# Patient Record
Sex: MALE | Race: WHITE | NOT HISPANIC OR LATINO | Employment: PART TIME | URBAN - NONMETROPOLITAN AREA
[De-identification: names, ages, dates, MRNs, and addresses within clinical notes are randomized per-mention and may not be internally consistent; named-entity substitution may affect disease eponyms.]

---

## 2017-01-03 ENCOUNTER — PROCEDURE VISIT (OUTPATIENT)
Dept: UROLOGY | Facility: CLINIC | Age: 67
End: 2017-01-03

## 2017-01-03 DIAGNOSIS — E29.1 HYPOGONADISM IN MALE: Primary | ICD-10-CM

## 2017-01-03 PROCEDURE — 96372 THER/PROPH/DIAG INJ SC/IM: CPT | Performed by: UROLOGY

## 2017-01-03 RX ORDER — TESTOSTERONE CYPIONATE 200 MG/ML
300 INJECTION, SOLUTION INTRAMUSCULAR
Status: DISCONTINUED | OUTPATIENT
Start: 2017-01-03 | End: 2017-01-03 | Stop reason: HOSPADM

## 2017-01-03 RX ORDER — TESTOSTERONE CYPIONATE 200 MG/ML
300 INJECTION, SOLUTION INTRAMUSCULAR
Status: CANCELLED
Start: 2017-01-03

## 2017-01-03 RX ADMIN — TESTOSTERONE CYPIONATE 300 MG: 200 INJECTION, SOLUTION INTRAMUSCULAR at 13:11

## 2017-01-03 NOTE — MR AVS SNAPSHOT
Zay LANGSTON Asad   1/3/2017 1:00 PM   Procedure visit    Dept Phone:  [phone]   Encounter #:  [id number]    Provider:  [facility] UROLOGY NURSE   Department:  [facility] UROLOGY                Your Full Care Plan              Your Updated Medication List          This list is accurate as of: 1/3/17  1:11 PM.  Always use your most recent med list.                atorvastatin 10 MG tablet   Commonly known as:  LIPITOR       LEVOTHROID 200 MCG tablet   Generic drug:  levothyroxine       lisinopril 10 MG tablet   Commonly known as:  PRINIVIL,ZESTRIL       meloxicam 15 MG tablet   Commonly known as:  MOBIC       SITagliptin 100 MG tablet   Commonly known as:  JANUVIA       tamsulosin 0.4 MG capsule 24 hr capsule   Commonly known as:  FLOMAX               You Were Diagnosed With        Codes Comments    Hypogonadism in male    -  Primary ICD-10-CM: E29.1  ICD-9-CM: 257.2       Instructions     None    Patient Instructions History      Upcoming Appointments     Visit Type Date Time Department    IN OFFICE PROCEDURE 1/3/2017  1:00 PM [facility] UROLOGY PAD    FOLLOW UP 2017  4:10 PM [facility] UROLOGY PAD    IN OFFICE PROCEDURE 2017  1:30 PM [facility] UROLOGY PAD    OFFICE VISIT 2017  9:45 AM [facility] GASTRO PAD      MyChart Signup     [facility] [employer] allows you to send messages to your doctor, view your test results, renew your prescriptions, schedule appointments, and more. To sign up, go to [employer] [facility] and click on the Sign Up Now link in the New User? box. Enter your [employer]Stream Activation Code exactly as it appears below along with the last four digits of your Social Security Number and your Date of Birth () to complete the sign-up process. If you do not sign up before the expiration date, you must request a new code.    ProfStream Activation Code: [email or web address]  Expires: 2017  1:10 PM    If you have questions, you can email [email or web address] or call  [phone] to talk to our MyChart staff. Remember, MyChart is NOT to be used for urgent needs. For medical emergencies, dial 911.               Other Info from Your Visit           Your Appointments     Jan 05, 2017  4:10 PM CST   Follow Up with Hernesto Hong MD   [facility] UROLOGY (--)    [address]  [facility] [address] [phone]           Arrive 15 minutes prior to appointment.            Jan 24, 2017  1:30 PM CST   IN OFFICE PROCEDURE with [facility]   [facility] UROLOGY (--)    [address]  [facility] [address] [phone]           Bring medication list, test results, and radiology films that apply.            Feb 07, 2017  9:45 AM CST   Office Visit with LUIS Mast   [facility] GASTROENTEROLOGY (--)    [address]  [facility] [address] [phone]           Arrive 15 minutes prior to appointment.              Allergies     No Known Allergies      Reason for Visit     Injections Patient states he is here his 3 week testosterone injection.       Vital Signs     Smoking Status                   Never Smoker           Problems and Diagnoses Noted     Hypogonadism in male

## 2017-01-03 NOTE — PROGRESS NOTES
Patient of Dr. Hong states he is here today for his testosterone injection. Patient denies any fever, chills, N&V or hematuria. I administered 1.5cc/ 300 mg of testosterone IM Right hip with no complications. Dr. Rebolledo was here in the office at the time of injection. The patient was advised to follow up in 3 weeks for his next testosterone injection. He verbalized understanding.

## 2017-01-05 ENCOUNTER — OFFICE VISIT (OUTPATIENT)
Dept: UROLOGY | Facility: CLINIC | Age: 67
End: 2017-01-05

## 2017-01-05 VITALS — BODY MASS INDEX: 31.92 KG/M2 | HEIGHT: 71 IN | TEMPERATURE: 96.5 F | WEIGHT: 228 LBS

## 2017-01-05 DIAGNOSIS — E29.1 HYPOGONADISM IN MALE: Primary | ICD-10-CM

## 2017-01-05 DIAGNOSIS — N52.01 ERECTILE DYSFUNCTION DUE TO ARTERIAL INSUFFICIENCY: ICD-10-CM

## 2017-01-05 LAB
BILIRUB BLD-MCNC: NEGATIVE MG/DL
CLARITY, POC: CLEAR
COLOR UR: YELLOW
GLUCOSE UR STRIP-MCNC: ABNORMAL MG/DL
KETONES UR QL: NEGATIVE
LEUKOCYTE EST, POC: NEGATIVE
NITRITE UR-MCNC: NEGATIVE MG/ML
PH UR: 7 [PH] (ref 5–8)
PROT UR STRIP-MCNC: NEGATIVE MG/DL
RBC # UR STRIP: NEGATIVE /UL
SP GR UR: 1.02 (ref 1–1.03)
UROBILINOGEN UR QL: NORMAL

## 2017-01-05 PROCEDURE — 81003 URINALYSIS AUTO W/O SCOPE: CPT | Performed by: UROLOGY

## 2017-01-05 PROCEDURE — 99212 OFFICE O/P EST SF 10 MIN: CPT | Performed by: UROLOGY

## 2017-01-05 NOTE — PROGRESS NOTES
Mr. Kamara is 66 y.o. male    CHIEF COMPLAINT: I am still having problems with erections    HPI  Erectile Dysfunction  Patient complains of erectile dysfunction. Onset of dysfunction was approximately  2 years ago and was gradual in onset.  Patient states the nature of difficulty is both attaining and maintaining erection.   Libido is not affected. Associated features include Hypertension and Hypercholesterolemia. Previous treatment of ED include(s) PDE 5 inhibitors  which did not provide relief. Partial erection with 0.4 cc (16mg) of PGE1.        The following portions of the patient's history were reviewed and updated as appropriate: allergies, current medications, past family history, past medical history, past social history, past surgical history and problem list.    Review of Systems   Constitutional: Negative for chills and fever.   Gastrointestinal: Negative for abdominal pain, anal bleeding and blood in stool.   Genitourinary: Negative for flank pain and hematuria.         Current Outpatient Prescriptions:   •  atorvastatin (LIPITOR) 10 MG tablet, Take 10 mg by mouth daily., Disp: , Rfl:   •  levothyroxine (LEVOTHROID) 200 MCG tablet, Take 1 tablet by mouth daily, Disp: , Rfl:   •  lisinopril (PRINIVIL,ZESTRIL) 10 MG tablet, Take 10 mg by mouth daily., Disp: , Rfl:   •  meloxicam (MOBIC) 15 MG tablet, Take 15 mg by mouth daily., Disp: , Rfl:   •  SITagliptin (JANUVIA) 100 MG tablet, Take 100 mg by mouth Daily., Disp: , Rfl:   •  tamsulosin (FLOMAX) 0.4 MG capsule 24 hr capsule, Take 1 capsule by mouth every night., Disp: , Rfl:     Past Medical History   Diagnosis Date   • Diabetes    • Hypercholesteremia    • Hypertension        Past Surgical History   Procedure Laterality Date   • Thyroglossal cystectomy     • Cholecystectomy         Social History     Social History   • Marital status:      Spouse name: N/A   • Number of children: N/A   • Years of education: N/A     Social History Main Topics   •  "Smoking status: Never Smoker   • Smokeless tobacco: None   • Alcohol use No   • Drug use: No   • Sexual activity: Not Asked     Other Topics Concern   • None     Social History Narrative       Family History   Problem Relation Age of Onset   • Family history unknown: Yes   • Prostate cancer Father        Visit Vitals   • Temp 96.5 °F (35.8 °C)   • Ht 71\" (180.3 cm)   • Wt 228 lb (103 kg)   • BMI 31.8 kg/m2       Physical Exam   Constitutional: He is oriented to person, place, and time. He appears well-developed and well-nourished. No distress.   Pulmonary/Chest: Effort normal.   Abdominal: Soft. He exhibits no distension and no mass. There is no tenderness. There is no rebound and no guarding. No hernia.   Neurological: He is alert and oriented to person, place, and time.   Skin: Skin is warm and dry. He is not diaphoretic.   Psychiatric: He has a normal mood and affect.   Vitals reviewed.        Results for orders placed or performed in visit on 01/05/17   POC Urinalysis Dipstick, Automated   Result Value Ref Range    Color Yellow Yellow, Straw, Dark Yellow, Cindi    Clarity, UA Clear Clear    Glucose,  mg/dL (A) Negative, 1000 mg/dL (3+) mg/dL    Bilirubin Negative Negative    Ketones, UA Negative Negative    Specific Gravity  1.025 1.005 - 1.030    Blood, UA Negative Negative    pH, Urine 7.0 5.0 - 8.0    Protein, POC Negative Negative mg/dL    Urobilinogen, UA Normal Normal    Leukocytes Negative Negative    Nitrite, UA Negative Negative       Assessment and Plan  Diagnoses and all orders for this visit:    Hypogonadism in male  -     POC Urinalysis Dipstick, Automated    Erectile dysfunction due to arterial insufficiency    Will increase his PGE 1 does to 0.7ml (25 mg). ONly partial erection with 0.7ml.        Hernesto Hong MD  01/05/17  4:25 PM    EMR Dragon/Transcription disclaimer:  Much of this encounter note is an electronic transcription/translation of spoken language to printed text. The " electronic translation of spoken language may permit erroneous, or at times, nonsensical words or phrases to be inadvertently transcribed; although I have reviewed the note for such errors, some may still exist.       Cc:

## 2017-01-05 NOTE — MR AVS SNAPSHOT
Zay LANGSTON Asad   2017 4:10 PM   Office Visit    Dept Phone:  [phone]   Encounter #:  [id number]    Provider:  Hernesto Hong MD   Department:  [facility] UROLOGY                Your Full Care Plan              Your Updated Medication List          This list is accurate as of: 17  4:40 PM.  Always use your most recent med list.                atorvastatin 10 MG tablet   Commonly known as:  LIPITOR       LEVOTHROID 200 MCG tablet   Generic drug:  levothyroxine       lisinopril 10 MG tablet   Commonly known as:  PRINIVIL,ZESTRIL       meloxicam 15 MG tablet   Commonly known as:  MOBIC       SITagliptin 100 MG tablet   Commonly known as:  JANUVIA       tamsulosin 0.4 MG capsule 24 hr capsule   Commonly known as:  FLOMAX               We Performed the Following     POC Urinalysis Dipstick, Automated       You Were Diagnosed With        Codes Comments    Hypogonadism in male    -  Primary ICD-10-CM: E29.1  ICD-9-CM: 257.2     Erectile dysfunction due to arterial insufficiency     ICD-10-CM: N52.01  ICD-9-CM: 607.84       Instructions     None    Patient Instructions History      Upcoming Appointments     Visit Type Date Time Department    FOLLOW UP 2017  4:10 PM [facility]    IN OFFICE PROCEDURE 2017  1:30 PM [facility]    OFFICE VISIT 2017  9:45 AM [facility]      MyChart Signup     [facility] [employer] allows you to send messages to your doctor, view your test results, renew your prescriptions, schedule appointments, and more. To sign up, go to [employer] and click on the Sign Up Now link in the New User? box. Enter your [employer] Activation Code exactly as it appears below along with the last four digits of your Social Security Number and your Date of Birth () to complete the sign-up process. If you do not sign up before the expiration date, you must request a new code.    [employer] Activation Code:  [email or web address]  Expires: 1/17/2017  1:10 PM    If you have questions, you can email [email or web address] or call [phone] to talk to our MyChart staff. Remember, [employer] is NOT to be used for urgent needs. For medical emergencies, dial 911.               Other Info from Your Visit           Your Appointments     Jan 24, 2017  1:30 PM CST   IN OFFICE PROCEDURE with [facility] UROLOGY NURSE   [facility] UROLOGY (--)    [address]  [facility] [address] [phone]           Bring medication list, test results, and radiology films that apply.            Feb 07, 2017  9:45 AM CST   Office Visit with LUIS Mast   [facility] GASTROENTEROLOGY (--)    [address]  [place] [address] [phone]           Arrive 15 minutes prior to appointment.              Allergies     No Known Allergies      Vital Signs     Temperature Height Weight Body Mass Index Smoking Status       96.5 °F (35.8 °C) 71\" (180.3 cm) 228 lb (103 kg) 31.8 kg/m2 Never Smoker       Problems and Diagnoses Noted     ED (erectile dysfunction)    Hypogonadism in male      Results     POC Urinalysis Dipstick, Automated      Component Value Standard Range & Units    Color Yellow Yellow, Straw, Dark Yellow, Cindi    Clarity, UA Clear Clear    Glucose,  mg/dL Negative, 1000 mg/dL (3+) mg/dL    Bilirubin Negative Negative    Ketones, UA Negative Negative    Specific Gravity  1.025 1.005 - 1.030    Blood, UA Negative Negative    pH, Urine 7.0 5.0 - 8.0    Protein, POC Negative Negative mg/dL    Urobilinogen, UA Normal Normal    Leukocytes Negative Negative    Nitrite, UA Negative Negative                    "

## 2017-01-05 NOTE — LETTER
January 8, 2017     LUIS Tesfaye  [address] [address]    Patient: Zay Kamara   YOB: 1950   Date of Visit: 1/5/2017     Dear LUIS Rodriguez:    Thank you for referring Zay Kamara to me for evaluation. Below are the relevant portions of my assessment and plan of care.    If you have questions, please do not hesitate to call me. I look forward to following Zay along with you.         Sincerely,        Hernesto Hong MD        CC: No Recipients    Progress Notes:    Mr. Kamara is 66 y.o. male    CHIEF COMPLAINT: I am still having problems with erections    HPI  Erectile Dysfunction  Patient complains of erectile dysfunction. Onset of dysfunction was approximately  2 years ago and was gradual in onset.  Patient states the nature of difficulty is both attaining and maintaining erection.   Libido is not affected. Associated features include Hypertension and Hypercholesterolemia. Previous treatment of ED include(s) PDE 5 inhibitors  which did not provide relief. Partial erection with 0.4 cc (16mg) of PGE1.        The following portions of the patient's history were reviewed and updated as appropriate: allergies, current medications, past family history, past medical history, past social history, past surgical history and problem list.    Review of Systems   Constitutional: Negative for chills and fever.   Gastrointestinal: Negative for abdominal pain, anal bleeding and blood in stool.   Genitourinary: Negative for flank pain and hematuria.         Current Outpatient Prescriptions:   •  atorvastatin (LIPITOR) 10 MG tablet, Take 10 mg by mouth daily., Disp: , Rfl:   •  levothyroxine (LEVOTHROID) 200 MCG tablet, Take 1 tablet by mouth daily, Disp: , Rfl:   •  lisinopril (PRINIVIL,ZESTRIL) 10 MG tablet, Take 10 mg by mouth daily., Disp: , Rfl:   •  meloxicam (MOBIC) 15 MG tablet, Take 15 mg by mouth daily., Disp: , Rfl:   •  SITagliptin (JANUVIA) 100 MG  "tablet, Take 100 mg by mouth Daily., Disp: , Rfl:   •  tamsulosin (FLOMAX) 0.4 MG capsule 24 hr capsule, Take 1 capsule by mouth every night., Disp: , Rfl:     Past Medical History   Diagnosis Date   • Diabetes    • Hypercholesteremia    • Hypertension        Past Surgical History   Procedure Laterality Date   • Thyroglossal cystectomy     • Cholecystectomy         Social History     Social History   • Marital status:      Spouse name: N/A   • Number of children: N/A   • Years of education: N/A     Social History Main Topics   • Smoking status: Never Smoker   • Smokeless tobacco: None   • Alcohol use No   • Drug use: No   • Sexual activity: Not Asked     Other Topics Concern   • None     Social History Narrative       Family History   Problem Relation Age of Onset   • Family history unknown: Yes   • Prostate cancer Father        Visit Vitals   • Temp 96.5 °F (35.8 °C)   • Ht 71\" (180.3 cm)   • Wt 228 lb (103 kg)   • BMI 31.8 kg/m2       Physical Exam   Constitutional: He is oriented to person, place, and time. He appears well-developed and well-nourished. No distress.   Pulmonary/Chest: Effort normal.   Abdominal: Soft. He exhibits no distension and no mass. There is no tenderness. There is no rebound and no guarding. No hernia.   Neurological: He is alert and oriented to person, place, and time.   Skin: Skin is warm and dry. He is not diaphoretic.   Psychiatric: He has a normal mood and affect.   Vitals reviewed.        Results for orders placed or performed in visit on 01/05/17   POC Urinalysis Dipstick, Automated   Result Value Ref Range    Color Yellow Yellow, Straw, Dark Yellow, Cindi    Clarity, UA Clear Clear    Glucose,  mg/dL (A) Negative, 1000 mg/dL (3+) mg/dL    Bilirubin Negative Negative    Ketones, UA Negative Negative    Specific Gravity  1.025 1.005 - 1.030    Blood, UA Negative Negative    pH, Urine 7.0 5.0 - 8.0    Protein, POC Negative Negative mg/dL    Urobilinogen, UA Normal Normal "    Leukocytes Negative Negative    Nitrite, UA Negative Negative       Assessment and Plan  Diagnoses and all orders for this visit:    Hypogonadism in male  -     POC Urinalysis Dipstick, Automated    Erectile dysfunction due to arterial insufficiency    Will increase his PGE 1 does to 0.7ml (25 mg). ONly partial erection with 0.7ml.        Hernesto Hong MD  01/05/17  4:25 PM    EMR Dragon/Transcription disclaimer:  Much of this encounter note is an electronic transcription/translation of spoken language to printed text. The electronic translation of spoken language may permit erroneous, or at times, nonsensical words or phrases to be inadvertently transcribed; although I have reviewed the note for such errors, some may still exist.       Cc:

## 2017-01-31 ENCOUNTER — PROCEDURE VISIT (OUTPATIENT)
Dept: UROLOGY | Facility: CLINIC | Age: 67
End: 2017-01-31

## 2017-01-31 DIAGNOSIS — E29.1 HYPOGONADISM IN MALE: Primary | ICD-10-CM

## 2017-01-31 PROCEDURE — 96372 THER/PROPH/DIAG INJ SC/IM: CPT | Performed by: UROLOGY

## 2017-01-31 RX ORDER — TESTOSTERONE CYPIONATE 200 MG/ML
300 INJECTION, SOLUTION INTRAMUSCULAR
Status: CANCELLED
Start: 2017-01-31

## 2017-01-31 RX ORDER — TESTOSTERONE CYPIONATE 200 MG/ML
300 INJECTION, SOLUTION INTRAMUSCULAR
Status: DISCONTINUED | OUTPATIENT
Start: 2017-01-31 | End: 2017-01-31 | Stop reason: HOSPADM

## 2017-01-31 RX ADMIN — TESTOSTERONE CYPIONATE 300 MG: 200 INJECTION, SOLUTION INTRAMUSCULAR at 14:32

## 2017-01-31 NOTE — MR AVS SNAPSHOT
Zay LANGSTON Asad   2017 2:30 PM   Appointment    Dept Phone:  [phone]   Encounter #:  [id number]    Provider:  [facility]   Department:  [facility] UROLOGY                Your Full Care Plan              Your Updated Medication List          This list is accurate as of: 17  2:19 PM.  Always use your most recent med list.                atorvastatin 10 MG tablet   Commonly known as:  LIPITOR       LEVOTHROID 200 MCG tablet   Generic drug:  levothyroxine       lisinopril 10 MG tablet   Commonly known as:  PRINIVIL,ZESTRIL       meloxicam 15 MG tablet   Commonly known as:  MOBIC       SITagliptin 100 MG tablet   Commonly known as:  JANUVIA       tamsulosin 0.4 MG capsule 24 hr capsule   Commonly known as:  FLOMAX               Instructions     None    Patient Instructions History      Upcoming Appointments     Visit Type Date Time Department    IN OFFICE PROCEDURE 2017  2:30 PM [facility]    OFFICE VISIT 2017  9:45 AM [facility]    IN OFFICE PROCEDURE 2017  1:30 PM [facility]      ePrept Signup     [facility] [employer] allows you to send messages to your doctor, view your test results, renew your prescriptions, schedule appointments, and more. To sign up, go to "[employer]" and click on the Sign Up Now link in the New User? box. Enter your [employer] Activation Code exactly as it appears below along with the last four digits of your Social Security Number and your Date of Birth () to complete the sign-up process. If you do not sign up before the expiration date, you must request a new code.    [employer] Activation Code: [email or web address]  Expires: 2017  2:18 PM    If you have questions, you can email [email or web address] or call [phone] to talk to our [employer] staff. Remember, [employer] is NOT to be used for urgent needs. For medical emergencies, dial 911.               Other Info from Your Visit           Your Appointments     Jan 31, 2017  2:30 PM CST   IN OFFICE PROCEDURE with [facility] NURSE   [facility] UROLOGY (--)    [address]  [facility] [address] [phone]           Bring medication list, test results, and radiology films that apply.            Feb 07, 2017  9:45 AM CST   Office Visit with LUIS Mast   [facility] GASTROENTEROLOGY (--)    [address] [place] [address] [address]  [facility] [address] [phone]           Arrive 15 minutes prior to appointment.            Feb 21, 2017  1:30 PM CST   IN OFFICE PROCEDURE with [facility] UROLOGY NURSE   [facility] UROLOGY (--)    [address]  [facility] [address] [phone]           Bring medication list, test results, and radiology films that apply.              Allergies     No Known Allergies      Vital Signs     Smoking Status                   Never Smoker

## 2017-01-31 NOTE — PROGRESS NOTES
Patient of Dr. Hong states he is here today for his testosterone injection. Patient denies any fever, chills, N&V or hematuria. I administered 1.5cc/ 300 mg of testosterone IM  Left hip with no complications. Dr. Garcia was here in the office at the time of injection. The patient was advised to follow up in 3 weeks for his next testosterone injection. He verbalized understanding.

## 2017-02-07 ENCOUNTER — OFFICE VISIT (OUTPATIENT)
Dept: GASTROENTEROLOGY | Facility: CLINIC | Age: 67
End: 2017-02-07

## 2017-02-07 ENCOUNTER — TELEPHONE (OUTPATIENT)
Dept: UROLOGY | Facility: CLINIC | Age: 67
End: 2017-02-07

## 2017-02-07 VITALS
HEART RATE: 90 BPM | TEMPERATURE: 96.3 F | WEIGHT: 229 LBS | HEIGHT: 71 IN | OXYGEN SATURATION: 99 % | BODY MASS INDEX: 32.06 KG/M2 | DIASTOLIC BLOOD PRESSURE: 72 MMHG | SYSTOLIC BLOOD PRESSURE: 152 MMHG

## 2017-02-07 DIAGNOSIS — R19.7 DIARRHEA, UNSPECIFIED TYPE: Primary | ICD-10-CM

## 2017-02-07 DIAGNOSIS — I10 ESSENTIAL HYPERTENSION: ICD-10-CM

## 2017-02-07 PROCEDURE — 99214 OFFICE O/P EST MOD 30 MIN: CPT | Performed by: NURSE PRACTITIONER

## 2017-02-07 RX ORDER — LISINOPRIL 40 MG/1
TABLET ORAL
Refills: 10 | Status: ON HOLD | COMMUNITY
Start: 2017-01-09 | End: 2017-08-03

## 2017-02-07 NOTE — TELEPHONE ENCOUNTER
----- Message from Maty Ng sent at 2/7/2017 12:24 PM CST -----  Contact: PATIENT  PATIENT CAME INTO THE OFFICE TODAY AND SAID THAT HIS PGE INJECTIONS HAVE NOT BEEN WORKING. HE WANTED TO KNOW IF HE COULD GET A STRONGER DOSE OR IF HE NEEDED TO MAKE AN APPT WITH MIKEY. HE CAN BE REACHED [phone]. THANKS.

## 2017-02-07 NOTE — PROGRESS NOTES
[facility] GASTROENTEROLOGY - OFFICE NOTE    2/7/2017    Zay Kamara   1950    Chief Complaint   Patient presents with   • GI Problem     IBS   • Diarrhea         HISTORY OF PRESENT ILLNESS    Zay Kamara is a 66 y.o. male presents  with diarrhea and he has had intermittent diarrhea over the last year.  This has worsened over the last 4-5 months.  The diarrhea is daily.  He has anywhere from 2-4 bowel movements a day.  His stools are almost always loose.  He will occasionally have a solid stool.  This usually occurs after eating, he starts with lower abdominal cramping followed by bowel movement.  After the bowel movement the pain resolves.  He has found no certain foods.  It can be bland food.  Has rectal bleeding.  Denies unintentional weight loss.  Denies fevers or chills.  He was placed on antibiotics for the diarrhea to see if it would improve but did not.  Also metformin was stopped which did not help.  Also tried over-the-counter gas gas pills which did not help.  He was placed on Bentyl and that did not help.  Tried Imodium without relief.  According to his PCP's notes, the patient try to eliminate caffeine.  However the patient tells me he still drinks diet sodas.  He does use artificial sweeteners.  He denies any new medicines prior to the diarrhea getting worse or starting.  He denies any antibiotics prior to the diarrhea starting are getting worse.  Been under some stress.  Went through her divorce over the last year.  States that he had a colonoscopy by Dr. Lama at [facility] around a year ago and was okay.    Past Medical History   Diagnosis Date   • Cancer      thyroid   • Diabetes    • Disease of thyroid gland    • Hypercholesteremia    • Hypertension    • IBS (irritable bowel syndrome)    • Sleep apnea        Past Surgical History   Procedure Laterality Date   • Thyroglossal cystectomy     • Cholecystectomy     • Thyroid surgery     • Prostate surgery         Outpatient  Prescriptions Marked as Taking for the 2/7/17 encounter (Office Visit) with LUIS Mast   Medication Sig Dispense Refill   • atorvastatin (LIPITOR) 10 MG tablet Take 10 mg by mouth daily.     • levothyroxine (LEVOTHROID) 200 MCG tablet Take 1 tablet by mouth daily     • lisinopril (PRINIVIL,ZESTRIL) 40 MG tablet TAKE 1 TABLET BY MOUTH DAILY  10   • meloxicam (MOBIC) 15 MG tablet Take 15 mg by mouth daily.     • SITagliptin (JANUVIA) 100 MG tablet Take 100 mg by mouth Daily.     • tamsulosin (FLOMAX) 0.4 MG capsule 24 hr capsule Take 1 capsule by mouth every night.         No Known Allergies    Social History     Social History   • Marital status:      Spouse name: N/A   • Number of children: N/A   • Years of education: N/A     Occupational History   • Not on file.     Social History Main Topics   • Smoking status: Never Smoker   • Smokeless tobacco: Not on file   • Alcohol use No   • Drug use: No   • Sexual activity: Not on file     Other Topics Concern   • Not on file     Social History Narrative       Family History   Problem Relation Age of Onset   • Family history unknown: Yes   • Prostate cancer Father        Review of Systems   Constitutional: Negative for appetite change, chills, fatigue, fever and unexpected weight change.   HENT: Negative for sore throat and trouble swallowing.    Eyes: Negative for pain and visual disturbance.   Respiratory: Negative for cough, chest tightness, shortness of breath and wheezing.    Cardiovascular: Negative for chest pain and palpitations.   Gastrointestinal: Positive for abdominal pain and diarrhea. Negative for abdominal distention, anal bleeding, blood in stool, constipation, nausea, rectal pain and vomiting.        As mentioned in hpi   Endocrine: Negative for cold intolerance and heat intolerance.   Genitourinary: Negative for difficulty urinating, dysuria and hematuria.   Musculoskeletal: Negative for arthralgias and back pain.   Skin: Negative for color  "change and rash.   Neurological: Negative for dizziness, tremors, seizures, syncope, light-headedness and headaches.   Hematological: Negative for adenopathy. Does not bruise/bleed easily.   Psychiatric/Behavioral: Negative for confusion. The patient is not nervous/anxious.        Vitals:    02/07/17 0941   BP: 152/72   BP Location: Left arm   Patient Position: Sitting   Cuff Size: Adult   Pulse: 90   Temp: 96.3 °F (35.7 °C)   SpO2: 99%   Weight: 229 lb (104 kg)   Height: 71\" (180.3 cm)      Body mass index is 31.94 kg/(m^2).    Physical Exam   Constitutional: He is oriented to person, place, and time. He appears well-developed and well-nourished. No distress.   HENT:   Head: Normocephalic and atraumatic.   Mouth/Throat: Oropharynx is clear and moist.   Eyes: Pupils are equal, round, and reactive to light. No scleral icterus.   Neck: Normal range of motion. Neck supple. No JVD present. No thyromegaly present.   Cardiovascular: Normal rate, regular rhythm, normal heart sounds and intact distal pulses.  Exam reveals no gallop and no friction rub.    No murmur heard.  Pulmonary/Chest: Effort normal and breath sounds normal. No respiratory distress. He has no wheezes. He has no rales.   Abdominal: Soft. Bowel sounds are normal. He exhibits no distension and no mass. There is no tenderness. There is no rebound and no guarding.   Musculoskeletal: Normal range of motion. He exhibits no edema or deformity.   Lymphadenopathy:     He has no cervical adenopathy.   Neurological: He is alert and oriented to person, place, and time. No cranial nerve deficit.   Skin: Skin is warm and dry. No rash noted.   Psychiatric: He has a normal mood and affect. His behavior is normal.   Vitals reviewed.      Results for orders placed or performed in visit on 01/05/17   POC Urinalysis Dipstick, Automated   Result Value Ref Range    Color Yellow Yellow, Straw, Dark Yellow, Cindi    Clarity, UA Clear Clear    Glucose,  mg/dL (A) Negative, " 1000 mg/dL (3+) mg/dL    Bilirubin Negative Negative    Ketones, UA Negative Negative    Specific Gravity  1.025 1.005 - 1.030    Blood, UA Negative Negative    pH, Urine 7.0 5.0 - 8.0    Protein, POC Negative Negative mg/dL    Urobilinogen, UA Normal Normal    Leukocytes Negative Negative    Nitrite, UA Negative Negative           ASSESSMENT AND PLAN    Zay was seen today for gi problem and diarrhea.    Diagnoses and all orders for this visit:    Diarrhea, unspecified type  Comments:  worsening   Orders:  -     Gastrointestinal Panel, PCR; Future  -     Fecal Leukocytes  -     Case request    Essential hypertension    BMI 31.0-31.9,adult    Other orders  -     polyethylene glycol (GOLYTELY) 236 G solution; Take 4,000 mL by mouth 1 (One) Time for 1 dose. Take as directed per instruction sheet.      In regards to diarrhea, will check stool studies.  Recommend taking a probiotic daily.  Recommended a daily fiber supplementation such as Citrucel.  Trial of a fod map diet.  Plan for repeat colonoscopy.  Request last colonoscopy from Dr. Lama/[facility].  Instructed patient to take blood pressure medication a.m. of procedure if that is when he normally takes.  Also instructed patient to hold Mobic 5 days prior to procedure.  We discussed BMI, recommend gradual weight loss to ideal body weight, healthy diet, exercise, and follow-up with PCP.      COLONOSCOPY WITH ANESTHESIA (N/A) All risks, benefits, alternatives, and indications of colonoscopy procedure have been discussed with the patient. Risks to include perforation of the colon requiring possible surgery or colostomy, risk of bleeding from biopsies or removal of colon tissue, possibility of missing a colon polyp or cancer, or adverse drug reaction.  Benefits to include the diagnosis and management of disease of the colon and rectum. Alternatives to include barium enema, radiographic evaluation, lab testing or no intervention. Pt verbalizes  understanding and agrees.         Body mass index is 31.94 kg/(m^2).         LUIS Weir    EMR Dragon/transcription disclaimer:  Much of this encounter note is electronic transcription/translation of spoken language to printed text.  The electronic translation of spoken language may be erroneous, or at times, nonsensical words or phrases may be inadvertently transcribed.  Although I have reviewed the note for such errors, some may still exist.      Received last colonoscopy report done 12/17/2015, dr mcpherson,  colonoscopy  to the cecum, diverticular disease was noted, 2 polyps were noted, pathology revealed hyperplastic polyp.    He would like to use  suprep instead of GoLYTELY.  Will send prescription.

## 2017-02-14 ENCOUNTER — TELEPHONE (OUTPATIENT)
Dept: UROLOGY | Facility: CLINIC | Age: 67
End: 2017-02-14

## 2017-02-14 NOTE — TELEPHONE ENCOUNTER
----- Message from Maty Ng sent at 2/14/2017  4:33 PM CST -----  Contact: PATIENT  PATIENT SAID THE NEW DOSE OF THE PGE INJECTION IS NOT WORKING. HE WANTED TO KNOW IF HE NEEDED ANOTHER STRONGER DOSE. PLEASE ADVISE.

## 2017-02-20 NOTE — TELEPHONE ENCOUNTER
Will increase dosage to 1 mL (40 µg) per penile injection.  Patient may  maximum 3 injections.  We will last staff to prepare.

## 2017-02-21 ENCOUNTER — PROCEDURE VISIT (OUTPATIENT)
Dept: UROLOGY | Facility: CLINIC | Age: 67
End: 2017-02-21

## 2017-02-21 DIAGNOSIS — E29.1 HYPOGONADISM IN MALE: Primary | ICD-10-CM

## 2017-02-21 PROCEDURE — 96372 THER/PROPH/DIAG INJ SC/IM: CPT | Performed by: UROLOGY

## 2017-02-21 RX ORDER — TESTOSTERONE CYPIONATE 200 MG/ML
300 INJECTION, SOLUTION INTRAMUSCULAR
Status: DISCONTINUED | OUTPATIENT
Start: 2017-02-21 | End: 2017-02-21 | Stop reason: HOSPADM

## 2017-02-21 RX ORDER — TESTOSTERONE CYPIONATE 200 MG/ML
300 INJECTION, SOLUTION INTRAMUSCULAR
Status: CANCELLED
Start: 2017-02-21

## 2017-02-21 RX ADMIN — TESTOSTERONE CYPIONATE 300 MG: 200 INJECTION, SOLUTION INTRAMUSCULAR at 13:41

## 2017-02-21 NOTE — PROGRESS NOTES
Patient of Dr. Hong states he is here today for his testosterone injection. Patient denies any fever, chills, N&V or hematuria. I administered 1.5cc/300 mg of testosterone IM Right hip with no complications. Dr. Rebolledo was here in the office at the time of injection. The patient was advised to follow up in 2 weeks for his next testosterone injection. He verbalized understanding.

## 2017-03-02 ENCOUNTER — TELEPHONE (OUTPATIENT)
Dept: GASTROENTEROLOGY | Facility: CLINIC | Age: 67
End: 2017-03-02

## 2017-03-02 RX ORDER — SODIUM, POTASSIUM,MAG SULFATES 17.5-3.13G
SOLUTION, RECONSTITUTED, ORAL ORAL
Qty: 2 BOTTLE | Refills: 0 | Status: ON HOLD | OUTPATIENT
Start: 2017-03-02 | End: 2017-03-07

## 2017-03-02 NOTE — TELEPHONE ENCOUNTER
i picked golytely because safer on the kidneys ( since he is diabetic),  He can use suprep otherwise. Let me know

## 2017-03-06 ENCOUNTER — ANESTHESIA EVENT (OUTPATIENT)
Dept: GASTROENTEROLOGY | Facility: HOSPITAL | Age: 67
End: 2017-03-06

## 2017-03-07 ENCOUNTER — HOSPITAL ENCOUNTER (OUTPATIENT)
Facility: HOSPITAL | Age: 67
Setting detail: HOSPITAL OUTPATIENT SURGERY
Discharge: HOME | End: 2017-03-07
Attending: INTERNAL MEDICINE | Admitting: INTERNAL MEDICINE

## 2017-03-07 ENCOUNTER — ANESTHESIA (OUTPATIENT)
Dept: GASTROENTEROLOGY | Facility: HOSPITAL | Age: 67
End: 2017-03-07

## 2017-03-07 VITALS
RESPIRATION RATE: 14 BRPM | WEIGHT: 219 LBS | TEMPERATURE: 98.3 F | BODY MASS INDEX: 30.66 KG/M2 | SYSTOLIC BLOOD PRESSURE: 115 MMHG | OXYGEN SATURATION: 98 % | DIASTOLIC BLOOD PRESSURE: 62 MMHG | HEART RATE: 78 BPM | HEIGHT: 71 IN

## 2017-03-07 DIAGNOSIS — R19.7 DIARRHEA, UNSPECIFIED TYPE: ICD-10-CM

## 2017-03-07 PROCEDURE — 25010000002 PROPOFOL 10 MG/ML EMULSION: Performed by: NURSE ANESTHETIST, CERTIFIED REGISTERED

## 2017-03-07 PROCEDURE — 45380 COLONOSCOPY AND BIOPSY: CPT | Performed by: INTERNAL MEDICINE

## 2017-03-07 PROCEDURE — 45385 COLONOSCOPY W/LESION REMOVAL: CPT | Performed by: INTERNAL MEDICINE

## 2017-03-07 PROCEDURE — 88305 TISSUE EXAM BY PATHOLOGIST: CPT | Performed by: INTERNAL MEDICINE

## 2017-03-07 RX ORDER — SODIUM CHLORIDE 0.9 % (FLUSH) 0.9 %
1-10 SYRINGE (ML) INJECTION AS NEEDED
Status: DISCONTINUED | OUTPATIENT
Start: 2017-03-07 | End: 2017-03-07 | Stop reason: HOSPADM

## 2017-03-07 RX ORDER — LIDOCAINE HYDROCHLORIDE 20 MG/ML
INJECTION, SOLUTION INFILTRATION; PERINEURAL AS NEEDED
Status: DISCONTINUED | OUTPATIENT
Start: 2017-03-07 | End: 2017-03-07 | Stop reason: SURG

## 2017-03-07 RX ORDER — SODIUM CHLORIDE 9 MG/ML
100 INJECTION, SOLUTION INTRAVENOUS CONTINUOUS
Status: DISCONTINUED | OUTPATIENT
Start: 2017-03-07 | End: 2017-03-07 | Stop reason: HOSPADM

## 2017-03-07 RX ORDER — ONDANSETRON 2 MG/ML
4 INJECTION INTRAMUSCULAR; INTRAVENOUS ONCE AS NEEDED
Status: DISCONTINUED | OUTPATIENT
Start: 2017-03-07 | End: 2017-03-07 | Stop reason: HOSPADM

## 2017-03-07 RX ORDER — PROPOFOL 10 MG/ML
VIAL (ML) INTRAVENOUS AS NEEDED
Status: DISCONTINUED | OUTPATIENT
Start: 2017-03-07 | End: 2017-03-07 | Stop reason: SURG

## 2017-03-07 RX ADMIN — LIDOCAINE HYDROCHLORIDE 20 MG: 20 INJECTION, SOLUTION INFILTRATION; PERINEURAL at 08:18

## 2017-03-07 RX ADMIN — PROPOFOL 50 MG: 10 INJECTION, EMULSION INTRAVENOUS at 08:28

## 2017-03-07 RX ADMIN — PROPOFOL 50 MG: 10 INJECTION, EMULSION INTRAVENOUS at 08:39

## 2017-03-07 RX ADMIN — PROPOFOL 50 MG: 10 INJECTION, EMULSION INTRAVENOUS at 08:30

## 2017-03-07 RX ADMIN — SODIUM CHLORIDE 100 ML/HR: 9 INJECTION, SOLUTION INTRAVENOUS at 07:54

## 2017-03-07 RX ADMIN — PROPOFOL 50 MG: 10 INJECTION, EMULSION INTRAVENOUS at 08:25

## 2017-03-07 RX ADMIN — PROPOFOL 100 MG: 10 INJECTION, EMULSION INTRAVENOUS at 08:18

## 2017-03-07 RX ADMIN — PROPOFOL 50 MG: 10 INJECTION, EMULSION INTRAVENOUS at 08:22

## 2017-03-07 RX ADMIN — PROPOFOL 50 MG: 10 INJECTION, EMULSION INTRAVENOUS at 08:35

## 2017-03-07 NOTE — PLAN OF CARE
Problem: GI Endoscopy (Adult)  Goal: Signs and Symptoms of Listed Potential Problems Will be Absent or Manageable (GI Endoscopy)  Outcome: Outcome(s) achieved Date Met:  03/07/17 03/07/17 0902   GI Endoscopy   Problems Assessed (GI Endoscopy) all   Problems Present (GI Endoscopy) none

## 2017-03-07 NOTE — PLAN OF CARE
Problem: Patient Care Overview (Adult)  Goal: Plan of Care Review  Outcome: Outcome(s) achieved Date Met:  03/07/17 03/07/17 0902   Coping/Psychosocial Response Interventions   Plan Of Care Reviewed With patient;daughter   Patient Care Overview   Progress improving   Outcome Evaluation   Outcome Summary/Follow up Plan discharge criteria met

## 2017-03-07 NOTE — ANESTHESIA PREPROCEDURE EVALUATION
Anesthesia Evaluation        Airway   Mallampati: II  no difficulty expected  Dental      Pulmonary    (+) sleep apnea,   Cardiovascular   Exercise tolerance: excellent (>7 METS)    (+) hypertension,       Neuro/Psych- negative ROS  GI/Hepatic/Renal/Endo    (+)  diabetes mellitus type 2 well controlled,     Musculoskeletal (-) negative ROS    Abdominal    Substance History      OB/GYN negative ob/gyn ROS         Other - negative ROS                                   Anesthesia Plan    ASA 2     general and MAC     intravenous induction   Anesthetic plan and risks discussed with patient.

## 2017-03-07 NOTE — PLAN OF CARE
Problem: GI Endoscopy (Adult)  Goal: Signs and Symptoms of Listed Potential Problems Will be Absent or Manageable (GI Endoscopy)  Outcome: Ongoing (interventions implemented as appropriate)    Problem: Patient Care Overview (Adult)  Goal: Plan of Care Review  Outcome: Ongoing (interventions implemented as appropriate)

## 2017-03-07 NOTE — INTERVAL H&P NOTE
H&P updated. The patient was examined and the following changes are noted:  He is feeling little better on the daily probiotic.  He did not have stool studies done.  He also has not tried a fiber supplement.

## 2017-03-07 NOTE — H&P (VIEW-ONLY)
[facility] GASTROENTEROLOGY - OFFICE NOTE    2/7/2017    Zay Kamara   1950    Chief Complaint   Patient presents with   • GI Problem     IBS   • Diarrhea         HISTORY OF PRESENT ILLNESS    Zay Kamara is a 66 y.o. male presents  with diarrhea and he has had intermittent diarrhea over the last year.  This has worsened over the last 4-5 months.  The diarrhea is daily.  He has anywhere from 2-4 bowel movements a day.  His stools are almost always loose.  He will occasionally have a solid stool.  This usually occurs after eating, he starts with lower abdominal cramping followed by bowel movement.  After the bowel movement the pain resolves.  He has found no certain foods.  It can be bland food.  Has rectal bleeding.  Denies unintentional weight loss.  Denies fevers or chills.  He was placed on antibiotics for the diarrhea to see if it would improve but did not.  Also metformin was stopped which did not help.  Also tried over-the-counter gas gas pills which did not help.  He was placed on Bentyl and that did not help.  Tried Imodium without relief.  According to his PCP's notes, the patient try to eliminate caffeine.  However the patient tells me he still drinks diet sodas.  He does use artificial sweeteners.  He denies any new medicines prior to the diarrhea getting worse or starting.  He denies any antibiotics prior to the diarrhea starting are getting worse.  Been under some stress.  Went through her divorce over the last year.  States that he had a colonoscopy by Dr. Lama at [facility] around a year ago and was okay.    Past Medical History   Diagnosis Date   • Cancer      thyroid   • Diabetes    • Disease of thyroid gland    • Hypercholesteremia    • Hypertension    • IBS (irritable bowel syndrome)    • Sleep apnea        Past Surgical History   Procedure Laterality Date   • Thyroglossal cystectomy     • Cholecystectomy     • Thyroid surgery     • Prostate surgery         Outpatient  Prescriptions Marked as Taking for the 2/7/17 encounter (Office Visit) with LUIS Mast   Medication Sig Dispense Refill   • atorvastatin (LIPITOR) 10 MG tablet Take 10 mg by mouth daily.     • levothyroxine (LEVOTHROID) 200 MCG tablet Take 1 tablet by mouth daily     • lisinopril (PRINIVIL,ZESTRIL) 40 MG tablet TAKE 1 TABLET BY MOUTH DAILY  10   • meloxicam (MOBIC) 15 MG tablet Take 15 mg by mouth daily.     • SITagliptin (JANUVIA) 100 MG tablet Take 100 mg by mouth Daily.     • tamsulosin (FLOMAX) 0.4 MG capsule 24 hr capsule Take 1 capsule by mouth every night.         No Known Allergies    Social History     Social History   • Marital status:      Spouse name: N/A   • Number of children: N/A   • Years of education: N/A     Occupational History   • Not on file.     Social History Main Topics   • Smoking status: Never Smoker   • Smokeless tobacco: Not on file   • Alcohol use No   • Drug use: No   • Sexual activity: Not on file     Other Topics Concern   • Not on file     Social History Narrative       Family History   Problem Relation Age of Onset   • Family history unknown: Yes   • Prostate cancer Father        Review of Systems   Constitutional: Negative for appetite change, chills, fatigue, fever and unexpected weight change.   HENT: Negative for sore throat and trouble swallowing.    Eyes: Negative for pain and visual disturbance.   Respiratory: Negative for cough, chest tightness, shortness of breath and wheezing.    Cardiovascular: Negative for chest pain and palpitations.   Gastrointestinal: Positive for abdominal pain and diarrhea. Negative for abdominal distention, anal bleeding, blood in stool, constipation, nausea, rectal pain and vomiting.        As mentioned in hpi   Endocrine: Negative for cold intolerance and heat intolerance.   Genitourinary: Negative for difficulty urinating, dysuria and hematuria.   Musculoskeletal: Negative for arthralgias and back pain.   Skin: Negative for color  "change and rash.   Neurological: Negative for dizziness, tremors, seizures, syncope, light-headedness and headaches.   Hematological: Negative for adenopathy. Does not bruise/bleed easily.   Psychiatric/Behavioral: Negative for confusion. The patient is not nervous/anxious.        Vitals:    02/07/17 0941   BP: 152/72   BP Location: Left arm   Patient Position: Sitting   Cuff Size: Adult   Pulse: 90   Temp: 96.3 °F (35.7 °C)   SpO2: 99%   Weight: 229 lb (104 kg)   Height: 71\" (180.3 cm)      Body mass index is 31.94 kg/(m^2).    Physical Exam   Constitutional: He is oriented to person, place, and time. He appears well-developed and well-nourished. No distress.   HENT:   Head: Normocephalic and atraumatic.   Mouth/Throat: Oropharynx is clear and moist.   Eyes: Pupils are equal, round, and reactive to light. No scleral icterus.   Neck: Normal range of motion. Neck supple. No JVD present. No thyromegaly present.   Cardiovascular: Normal rate, regular rhythm, normal heart sounds and intact distal pulses.  Exam reveals no gallop and no friction rub.    No murmur heard.  Pulmonary/Chest: Effort normal and breath sounds normal. No respiratory distress. He has no wheezes. He has no rales.   Abdominal: Soft. Bowel sounds are normal. He exhibits no distension and no mass. There is no tenderness. There is no rebound and no guarding.   Musculoskeletal: Normal range of motion. He exhibits no edema or deformity.   Lymphadenopathy:     He has no cervical adenopathy.   Neurological: He is alert and oriented to person, place, and time. No cranial nerve deficit.   Skin: Skin is warm and dry. No rash noted.   Psychiatric: He has a normal mood and affect. His behavior is normal.   Vitals reviewed.      Results for orders placed or performed in visit on 01/05/17   POC Urinalysis Dipstick, Automated   Result Value Ref Range    Color Yellow Yellow, Straw, Dark Yellow, Cindi    Clarity, UA Clear Clear    Glucose,  mg/dL (A) Negative, " 1000 mg/dL (3+) mg/dL    Bilirubin Negative Negative    Ketones, UA Negative Negative    Specific Gravity  1.025 1.005 - 1.030    Blood, UA Negative Negative    pH, Urine 7.0 5.0 - 8.0    Protein, POC Negative Negative mg/dL    Urobilinogen, UA Normal Normal    Leukocytes Negative Negative    Nitrite, UA Negative Negative           ASSESSMENT AND PLAN    Zay was seen today for gi problem and diarrhea.    Diagnoses and all orders for this visit:    Diarrhea, unspecified type  Comments:  worsening   Orders:  -     Gastrointestinal Panel, PCR; Future  -     Fecal Leukocytes  -     Case request    Essential hypertension    BMI 31.0-31.9,adult    Other orders  -     polyethylene glycol (GOLYTELY) 236 G solution; Take 4,000 mL by mouth 1 (One) Time for 1 dose. Take as directed per instruction sheet.      In regards to diarrhea, will check stool studies.  Recommend taking a probiotic daily.  Recommended a daily fiber supplementation such as Citrucel.  Trial of a fod map diet.  Plan for repeat colonoscopy.  Request last colonoscopy from Dr. Lama/[facility].  Instructed patient to take blood pressure medication a.m. of procedure if that is when he normally takes.  Also instructed patient to hold Mobic 5 days prior to procedure.  We discussed BMI, recommend gradual weight loss to ideal body weight, healthy diet, exercise, and follow-up with PCP.      COLONOSCOPY WITH ANESTHESIA (N/A) All risks, benefits, alternatives, and indications of colonoscopy procedure have been discussed with the patient. Risks to include perforation of the colon requiring possible surgery or colostomy, risk of bleeding from biopsies or removal of colon tissue, possibility of missing a colon polyp or cancer, or adverse drug reaction.  Benefits to include the diagnosis and management of disease of the colon and rectum. Alternatives to include barium enema, radiographic evaluation, lab testing or no intervention. Pt verbalizes  understanding and agrees.         Body mass index is 31.94 kg/(m^2).         LUIS Weir    EMR Dragon/transcription disclaimer:  Much of this encounter note is electronic transcription/translation of spoken language to printed text.  The electronic translation of spoken language may be erroneous, or at times, nonsensical words or phrases may be inadvertently transcribed.  Although I have reviewed the note for such errors, some may still exist.      Received last colonoscopy report done 12/17/2015, dr mcpherson,  colonoscopy  to the cecum, diverticular disease was noted, 2 polyps were noted, pathology revealed hyperplastic polyp.    He would like to use  suprep instead of GoLYTELY.  Will send prescription.

## 2017-03-08 LAB
CYTO UR: NORMAL
LAB AP CASE REPORT: NORMAL
LAB AP CLINICAL INFORMATION: NORMAL
Lab: NORMAL
PATH REPORT.FINAL DX SPEC: NORMAL
PATH REPORT.GROSS SPEC: NORMAL

## 2017-03-14 ENCOUNTER — PROCEDURE VISIT (OUTPATIENT)
Dept: UROLOGY | Facility: CLINIC | Age: 67
End: 2017-03-14

## 2017-03-14 DIAGNOSIS — N40.1 BPH (BENIGN PROSTATIC HYPERTROPHY) WITH URINARY OBSTRUCTION: ICD-10-CM

## 2017-03-14 DIAGNOSIS — N13.8 BPH (BENIGN PROSTATIC HYPERTROPHY) WITH URINARY OBSTRUCTION: ICD-10-CM

## 2017-03-14 DIAGNOSIS — E29.1 HYPOGONADISM MALE: Primary | ICD-10-CM

## 2017-03-14 DIAGNOSIS — E29.1 HYPOGONADISM IN MALE: ICD-10-CM

## 2017-03-14 PROCEDURE — 96372 THER/PROPH/DIAG INJ SC/IM: CPT | Performed by: UROLOGY

## 2017-03-14 RX ORDER — TESTOSTERONE CYPIONATE 200 MG/ML
300 INJECTION, SOLUTION INTRAMUSCULAR
Status: DISCONTINUED | OUTPATIENT
Start: 2017-03-14 | End: 2017-03-14 | Stop reason: HOSPADM

## 2017-03-14 RX ORDER — TESTOSTERONE CYPIONATE 200 MG/ML
300 INJECTION, SOLUTION INTRAMUSCULAR
Status: CANCELLED
Start: 2017-03-14

## 2017-03-14 RX ADMIN — TESTOSTERONE CYPIONATE 300 MG: 200 INJECTION, SOLUTION INTRAMUSCULAR at 14:12

## 2017-03-14 NOTE — PATIENT INSTRUCTIONS
Patient advised to have AM Testosterone, CMP and CBC and schedule a follow up appt with Dr. Hong. Schedule follow up appt for Hypogonadism and lab review about 1 week after labs.

## 2017-03-14 NOTE — PROGRESS NOTES
Patient of Dr. Hong states he is here today for his testosterone injection. Patient denies any fever, chills, N&V or hematuria. I administered 1.5cc/ 300 mg of testosterone IM Left hip with no complications. Dr. Hong was here in the office at the time of injection. The patient was advised to follow up in 3 weeks for his next testosterone injection. He verbalized understanding.     Patient advised to have AM Testosterone, CMP and CBC and schedule a follow up appt with Dr. Hong.

## 2017-03-19 ENCOUNTER — RESULTS ENCOUNTER (OUTPATIENT)
Dept: UROLOGY | Facility: CLINIC | Age: 67
End: 2017-03-19

## 2017-03-19 DIAGNOSIS — E29.1 HYPOGONADISM MALE: ICD-10-CM

## 2017-03-19 DIAGNOSIS — N13.8 BPH (BENIGN PROSTATIC HYPERTROPHY) WITH URINARY OBSTRUCTION: ICD-10-CM

## 2017-03-19 DIAGNOSIS — N40.1 BPH (BENIGN PROSTATIC HYPERTROPHY) WITH URINARY OBSTRUCTION: ICD-10-CM

## 2017-03-21 ENCOUNTER — OFFICE VISIT (OUTPATIENT)
Dept: PRIMARY CARE CLINIC | Age: 67
End: 2017-03-21
Payer: MEDICARE

## 2017-03-21 VITALS
DIASTOLIC BLOOD PRESSURE: 80 MMHG | SYSTOLIC BLOOD PRESSURE: 138 MMHG | WEIGHT: 222 LBS | TEMPERATURE: 98.3 F | OXYGEN SATURATION: 96 % | BODY MASS INDEX: 31.08 KG/M2 | HEART RATE: 87 BPM | HEIGHT: 71 IN

## 2017-03-21 DIAGNOSIS — E78.2 MIXED HYPERLIPIDEMIA: ICD-10-CM

## 2017-03-21 DIAGNOSIS — I10 ESSENTIAL HYPERTENSION: Primary | ICD-10-CM

## 2017-03-21 DIAGNOSIS — K21.9 GASTROESOPHAGEAL REFLUX DISEASE WITHOUT ESOPHAGITIS: ICD-10-CM

## 2017-03-21 DIAGNOSIS — B35.1 ONYCHOMYCOSIS OF MULTIPLE TOENAILS WITH TYPE 2 DIABETES MELLITUS (HCC): ICD-10-CM

## 2017-03-21 DIAGNOSIS — E11.69 ONYCHOMYCOSIS OF MULTIPLE TOENAILS WITH TYPE 2 DIABETES MELLITUS (HCC): ICD-10-CM

## 2017-03-21 DIAGNOSIS — E11.9 ENCOUNTER FOR DIABETIC FOOT EXAM (HCC): ICD-10-CM

## 2017-03-21 DIAGNOSIS — E11.9 TYPE 2 DIABETES MELLITUS WITHOUT COMPLICATION, WITHOUT LONG-TERM CURRENT USE OF INSULIN (HCC): ICD-10-CM

## 2017-03-21 PROCEDURE — 99214 OFFICE O/P EST MOD 30 MIN: CPT | Performed by: NURSE PRACTITIONER

## 2017-03-21 PROCEDURE — 3044F HG A1C LEVEL LT 7.0%: CPT | Performed by: NURSE PRACTITIONER

## 2017-03-21 PROCEDURE — G8417 CALC BMI ABV UP PARAM F/U: HCPCS | Performed by: NURSE PRACTITIONER

## 2017-03-21 PROCEDURE — 1123F ACP DISCUSS/DSCN MKR DOCD: CPT | Performed by: NURSE PRACTITIONER

## 2017-03-21 PROCEDURE — G8484 FLU IMMUNIZE NO ADMIN: HCPCS | Performed by: NURSE PRACTITIONER

## 2017-03-21 PROCEDURE — 1036F TOBACCO NON-USER: CPT | Performed by: NURSE PRACTITIONER

## 2017-03-21 PROCEDURE — 4040F PNEUMOC VAC/ADMIN/RCVD: CPT | Performed by: NURSE PRACTITIONER

## 2017-03-21 PROCEDURE — 3017F COLORECTAL CA SCREEN DOC REV: CPT | Performed by: NURSE PRACTITIONER

## 2017-03-21 PROCEDURE — G8427 DOCREV CUR MEDS BY ELIG CLIN: HCPCS | Performed by: NURSE PRACTITIONER

## 2017-03-21 RX ORDER — DEXLANSOPRAZOLE 30 MG/1
30 CAPSULE, DELAYED RELEASE ORAL DAILY
Qty: 30 CAPSULE | Refills: 11 | Status: SHIPPED | OUTPATIENT
Start: 2017-03-21 | End: 2017-09-21 | Stop reason: ALTCHOICE

## 2017-03-21 RX ORDER — OMEPRAZOLE 40 MG/1
40 CAPSULE, DELAYED RELEASE ORAL DAILY
Qty: 30 CAPSULE | Refills: 11 | Status: CANCELLED | OUTPATIENT
Start: 2017-03-21

## 2017-03-21 RX ORDER — TERBINAFINE HYDROCHLORIDE 250 MG/1
250 TABLET ORAL DAILY
Qty: 30 TABLET | Refills: 2 | Status: SHIPPED | OUTPATIENT
Start: 2017-03-21 | End: 2017-09-21 | Stop reason: ALTCHOICE

## 2017-03-21 ASSESSMENT — ENCOUNTER SYMPTOMS
VOMITING: 0
BLOOD IN STOOL: 0
ABDOMINAL PAIN: 1
CONSTIPATION: 0
NAUSEA: 0

## 2017-03-24 ENCOUNTER — TELEPHONE (OUTPATIENT)
Dept: PRIMARY CARE CLINIC | Age: 67
End: 2017-03-24

## 2017-03-24 DIAGNOSIS — Z12.5 PROSTATE CANCER SCREENING: ICD-10-CM

## 2017-03-24 DIAGNOSIS — E78.2 MIXED HYPERLIPIDEMIA: ICD-10-CM

## 2017-03-24 DIAGNOSIS — E11.9 TYPE 2 DIABETES MELLITUS WITHOUT COMPLICATION, WITHOUT LONG-TERM CURRENT USE OF INSULIN (HCC): ICD-10-CM

## 2017-03-24 DIAGNOSIS — I10 ESSENTIAL HYPERTENSION: ICD-10-CM

## 2017-03-24 DIAGNOSIS — Z00.00 ROUTINE PHYSICAL EXAMINATION: ICD-10-CM

## 2017-03-24 LAB
ALBUMIN SERPL-MCNC: 4.2 G/DL (ref 3.5–5.2)
ALP BLD-CCNC: 39 U/L (ref 40–130)
ALT SERPL-CCNC: 8 U/L (ref 5–41)
ANION GAP SERPL CALCULATED.3IONS-SCNC: 13 MMOL/L (ref 7–19)
AST SERPL-CCNC: 13 U/L (ref 5–40)
BACTERIA: NEGATIVE /HPF
BASOPHILS ABSOLUTE: 0 K/UL (ref 0–0.2)
BASOPHILS RELATIVE PERCENT: 0.3 % (ref 0–1)
BILIRUB SERPL-MCNC: 0.6 MG/DL (ref 0.2–1.2)
BILIRUBIN URINE: NEGATIVE
BLOOD, URINE: NEGATIVE
BUN BLDV-MCNC: 20 MG/DL (ref 8–23)
CALCIUM SERPL-MCNC: 8.7 MG/DL (ref 8.8–10.2)
CHLORIDE BLD-SCNC: 101 MMOL/L (ref 98–111)
CHOLESTEROL, TOTAL: 81 MG/DL (ref 160–199)
CLARITY: CLEAR
CO2: 25 MMOL/L (ref 22–29)
COLOR: YELLOW
CREAT SERPL-MCNC: 1.1 MG/DL (ref 0.5–1.2)
CREATININE URINE: 137.5 MG/DL (ref 4.2–622)
EOSINOPHILS ABSOLUTE: 0.2 K/UL (ref 0–0.6)
EOSINOPHILS RELATIVE PERCENT: 2.3 % (ref 0–5)
EPITHELIAL CELLS, UA: 0 /HPF (ref 0–5)
GFR NON-AFRICAN AMERICAN: >60
GLOBULIN: 2.8 G/DL
GLUCOSE BLD-MCNC: 100 MG/DL (ref 74–109)
GLUCOSE URINE: NEGATIVE MG/DL
HBA1C MFR BLD: 5.8 %
HCT VFR BLD CALC: 44.8 % (ref 42–52)
HDLC SERPL-MCNC: 18 MG/DL (ref 55–121)
HEMOGLOBIN: 15.6 G/DL (ref 14–18)
HYALINE CASTS: 2 /HPF (ref 0–8)
KETONES, URINE: ABNORMAL MG/DL
LDL CHOLESTEROL CALCULATED: 47 MG/DL
LEUKOCYTE ESTERASE, URINE: ABNORMAL
LYMPHOCYTES ABSOLUTE: 1 K/UL (ref 1.1–4.5)
LYMPHOCYTES RELATIVE PERCENT: 13.5 % (ref 20–40)
MCH RBC QN AUTO: 29.7 PG (ref 27–31)
MCHC RBC AUTO-ENTMCNC: 34.8 G/DL (ref 33–37)
MCV RBC AUTO: 85.2 FL (ref 80–94)
MICROALBUMIN UR-MCNC: 2.5 MG/DL (ref 0–19)
MICROALBUMIN/CREAT UR-RTO: 18.2 MG/G
MONOCYTES ABSOLUTE: 0.5 K/UL (ref 0–0.9)
MONOCYTES RELATIVE PERCENT: 7.2 % (ref 0–10)
NEUTROPHILS ABSOLUTE: 5.6 K/UL (ref 1.5–7.5)
NEUTROPHILS RELATIVE PERCENT: 76.7 % (ref 50–65)
NITRITE, URINE: NEGATIVE
PDW BLD-RTO: 14.1 % (ref 11.5–14.5)
PH UA: 6
PLATELET # BLD: 228 K/UL (ref 130–400)
PMV BLD AUTO: 10.5 FL (ref 7.4–10.4)
POTASSIUM SERPL-SCNC: 4.2 MMOL/L (ref 3.5–5)
PROSTATE SPECIFIC ANTIGEN: 6.55 NG/ML (ref 0–4)
PROTEIN UA: NEGATIVE MG/DL
RBC # BLD: 5.26 M/UL (ref 4.7–6.1)
RBC UA: 1 /HPF (ref 0–4)
SODIUM BLD-SCNC: 139 MMOL/L (ref 136–145)
SPECIFIC GRAVITY UA: 1.02
TOTAL PROTEIN: 7 G/DL (ref 6.6–8.7)
TRIGL SERPL-MCNC: 80 MG/DL (ref 150–199)
UROBILINOGEN, URINE: 0.2 E.U./DL
WBC # BLD: 7.4 K/UL (ref 4.8–10.8)
WBC UA: 20 /HPF (ref 0–5)

## 2017-03-25 LAB
T4 FREE: 1.3 NG/ML (ref 0.9–1.7)
TSH SERPL DL<=0.05 MIU/L-ACNC: 2.6 UIU/ML (ref 0.27–4.2)

## 2017-03-26 LAB — URINE CULTURE, ROUTINE: NORMAL

## 2017-03-27 ENCOUNTER — TELEPHONE (OUTPATIENT)
Dept: PRIMARY CARE CLINIC | Age: 67
End: 2017-03-27

## 2017-04-04 ENCOUNTER — PROCEDURE VISIT (OUTPATIENT)
Dept: UROLOGY | Facility: CLINIC | Age: 67
End: 2017-04-04

## 2017-04-04 DIAGNOSIS — E29.1 HYPOGONADISM IN MALE: Primary | ICD-10-CM

## 2017-04-04 PROCEDURE — 96372 THER/PROPH/DIAG INJ SC/IM: CPT | Performed by: UROLOGY

## 2017-04-04 RX ORDER — TESTOSTERONE CYPIONATE 200 MG/ML
300 INJECTION, SOLUTION INTRAMUSCULAR
Status: DISCONTINUED | OUTPATIENT
Start: 2017-04-04 | End: 2017-04-04 | Stop reason: HOSPADM

## 2017-04-04 RX ORDER — TESTOSTERONE CYPIONATE 200 MG/ML
300 INJECTION, SOLUTION INTRAMUSCULAR
Status: CANCELLED
Start: 2017-04-04

## 2017-04-04 RX ADMIN — TESTOSTERONE CYPIONATE 300 MG: 200 INJECTION, SOLUTION INTRAMUSCULAR at 09:04

## 2017-04-05 LAB
ALBUMIN SERPL-MCNC: 4.2 G/DL (ref 3.5–5)
ALBUMIN/GLOB SERPL: 1.4 G/DL (ref 1.1–2.5)
ALP SERPL-CCNC: 48 U/L (ref 24–120)
ALT SERPL-CCNC: 33 U/L (ref 0–54)
AST SERPL-CCNC: 21 U/L (ref 7–45)
BASOPHILS # BLD AUTO: 0.02 10*3/MM3 (ref 0–0.2)
BASOPHILS NFR BLD AUTO: 0.3 % (ref 0–2)
BILIRUB SERPL-MCNC: 1.1 MG/DL (ref 0.1–1)
BUN SERPL-MCNC: 18 MG/DL (ref 5–21)
BUN/CREAT SERPL: 16.4 (ref 7–25)
CALCIUM SERPL-MCNC: 9.4 MG/DL (ref 8.4–10.4)
CHLORIDE SERPL-SCNC: 99 MMOL/L (ref 98–110)
CO2 SERPL-SCNC: 27 MMOL/L (ref 24–31)
CONV COMMENT: ABNORMAL
CREAT SERPL-MCNC: 1.1 MG/DL (ref 0.5–1.4)
EOSINOPHIL # BLD AUTO: 0.11 10*3/MM3 (ref 0–0.7)
EOSINOPHIL NFR BLD AUTO: 1.4 % (ref 0–4)
ERYTHROCYTE [DISTWIDTH] IN BLOOD BY AUTOMATED COUNT: 14.1 % (ref 12–15)
GLOBULIN SER CALC-MCNC: 2.9 GM/DL
GLUCOSE SERPL-MCNC: 119 MG/DL (ref 70–100)
HCT VFR BLD AUTO: 47.1 % (ref 40–52)
HGB BLD-MCNC: 16.1 G/DL (ref 14–18)
IMM GRANULOCYTES # BLD: 0.02 10*3/MM3 (ref 0–0.03)
IMM GRANULOCYTES NFR BLD: 0.3 % (ref 0–5)
LYMPHOCYTES # BLD AUTO: 0.89 10*3/MM3 (ref 0.72–4.86)
LYMPHOCYTES NFR BLD AUTO: 11.5 % (ref 15–45)
MCH RBC QN AUTO: 29 PG (ref 28–32)
MCHC RBC AUTO-ENTMCNC: 34.2 G/DL (ref 33–36)
MCV RBC AUTO: 84.9 FL (ref 82–95)
MONOCYTES # BLD AUTO: 0.4 10*3/MM3 (ref 0.19–1.3)
MONOCYTES NFR BLD AUTO: 5.2 % (ref 4–12)
NEUTROPHILS # BLD AUTO: 6.27 10*3/MM3 (ref 1.87–8.4)
NEUTROPHILS NFR BLD AUTO: 81.3 % (ref 39–78)
NRBC BLD AUTO-RTO: 0 /100 WBC (ref 0–0)
PLATELET # BLD AUTO: 182 10*3/MM3 (ref 130–400)
POTASSIUM SERPL-SCNC: 4.6 MMOL/L (ref 3.5–5.3)
PROT SERPL-MCNC: 7.1 G/DL (ref 6.3–8.7)
PSA SERPL-MCNC: 4.87 NG/ML (ref 0–4)
RBC # BLD AUTO: 5.55 10*6/MM3 (ref 4.8–5.9)
SODIUM SERPL-SCNC: 139 MMOL/L (ref 135–145)
TESTOST SERPL-MCNC: 134 NG/DL (ref 348–1197)
WBC # BLD AUTO: 7.71 10*3/MM3 (ref 4.8–10.8)

## 2017-04-10 ENCOUNTER — OFFICE VISIT (OUTPATIENT)
Dept: UROLOGY | Facility: CLINIC | Age: 67
End: 2017-04-10

## 2017-04-10 DIAGNOSIS — R97.20 ELEVATED PSA: ICD-10-CM

## 2017-04-10 DIAGNOSIS — E29.1 HYPOGONADISM IN MALE: Primary | ICD-10-CM

## 2017-04-10 LAB
BILIRUB BLD-MCNC: NEGATIVE MG/DL
CLARITY, POC: CLEAR
COLOR UR: YELLOW
GLUCOSE UR STRIP-MCNC: NEGATIVE MG/DL
KETONES UR QL: NEGATIVE
LEUKOCYTE EST, POC: NEGATIVE
NITRITE UR-MCNC: NEGATIVE MG/ML
PH UR: 5 [PH] (ref 5–8)
PROT UR STRIP-MCNC: NEGATIVE MG/DL
RBC # UR STRIP: NEGATIVE /UL
SP GR UR: 1.03 (ref 1–1.03)
UROBILINOGEN UR QL: NORMAL

## 2017-04-10 PROCEDURE — 81003 URINALYSIS AUTO W/O SCOPE: CPT | Performed by: UROLOGY

## 2017-04-10 PROCEDURE — 99214 OFFICE O/P EST MOD 30 MIN: CPT | Performed by: UROLOGY

## 2017-04-10 NOTE — PROGRESS NOTES
Mr. Kamara is 66 y.o. male    CHIEF COMPLAINT: I am here today for elevated psa and hypogonadism. My psa is 4.870 and testosterone is 134    HPI  Elevated PSA  The patient presents with a presumed abnormality of the prostate gland, that is an elevated PSA.  This has been found in the context of PSA screening. Severity is best defined by the PSA level of 4.87 ng/ml. This test was done approximately 1week ago. Associated voiding symptom assessment reveals Intermittency. He has never undergone prostate biopsy..     Erectile Dysfunction  Patient complains of erectile dysfunction. Onset of dysfunction was approximately  5 years ago and was sudden in onset.  Patient states the nature of difficulty is maintaining erection.   Libido is affected. Associated features include No obvious items. Previous treatment ofED include(s) PDE 5 inhibitors   ineffective and Penile injections   not very effective         Previous PSA values include :   Lab Results   Component Value Date    PSA 4.870 (H) 04/04/2017         The following portions of the patient's history were reviewed and updated as appropriate: allergies, current medications, past family history, past medical history, past social history, past surgical history and problem list.    Review of Systems   Constitutional: Negative for chills and fever.   Gastrointestinal: Negative for abdominal pain, anal bleeding and blood in stool.   Genitourinary: Negative for flank pain and hematuria.         Current Outpatient Prescriptions:   •  atorvastatin (LIPITOR) 10 MG tablet, Take 10 mg by mouth daily., Disp: , Rfl:   •  levothyroxine (LEVOTHROID) 200 MCG tablet, Take 1 tablet by mouth daily, Disp: , Rfl:   •  lisinopril (PRINIVIL,ZESTRIL) 40 MG tablet, TAKE 1 TABLET BY MOUTH DAILY, Disp: , Rfl: 10  •  meloxicam (MOBIC) 15 MG tablet, Take 15 mg by mouth daily., Disp: , Rfl:   •  SITagliptin (JANUVIA) 100 MG tablet, Take 100 mg by mouth Daily., Disp: , Rfl:   •  tamsulosin (FLOMAX)  0.4 MG capsule 24 hr capsule, Take 1 capsule by mouth every night., Disp: , Rfl:     Past Medical History:   Diagnosis Date   • Arthritis    • Cancer     thyroid   • Diabetes    • Disease of thyroid gland    • Hypercholesteremia    • Hypertension    • IBS (irritable bowel syndrome)    • Sleep apnea        Past Surgical History:   Procedure Laterality Date   • CHOLECYSTECTOMY     • COLONOSCOPY N/A 3/7/2017    Procedure: COLONOSCOPY WITH ANESTHESIA;  Surgeon: Hector Alvarenga MD;  Location: [facility] ENDOSCOPY;  Service:    • PROSTATE SURGERY     • THYROID SURGERY         Social History     Social History   • Marital status:      Spouse name: N/A   • Number of children: N/A   • Years of education: N/A     Social History Main Topics   • Smoking status: Never Smoker   • Smokeless tobacco: Never Used   • Alcohol use No   • Drug use: No   • Sexual activity: Not Asked     Other Topics Concern   • None     Social History Narrative       Family History   Problem Relation Age of Onset   • Family history unknown: Yes   • Prostate cancer Father        There were no vitals taken for this visit.    Physical Exam   Constitutional: He is oriented to person, place, and time. He appears well-developed and well-nourished.   Pulmonary/Chest: Effort normal.   Abdominal: Soft. He exhibits no distension and no mass. There is no tenderness. There is no rebound and no guarding. No hernia.   Genitourinary: Penis normal. Rectal exam shows no mass, no tenderness and anal tone normal. Enlarged: for the age of the patient. Right testis shows no mass, no swelling and no tenderness. Left testis shows no mass, no swelling and no tenderness. No hypospadias. No discharge found.   Genitourinary Comments:  The urethral meatus normal in position without evidence of stricture. Epididymis without mass or tenderness. Vas Deferens is palpably normal.Anus and perineum without mass or tenderness. The prostate is approximately 40 ml. It is Symmetric, with  a Soft consistency. There are no nodules present. . The seminal vesicles are Not palpable due to the size of the prostate.     Neurological: He is alert and oriented to person, place, and time.   Vitals reviewed.        Results for orders placed or performed in visit on 04/10/17   POC Urinalysis Dipstick, Automated   Result Value Ref Range    Color Yellow Yellow, Straw, Dark Yellow, Cindi    Clarity, UA Clear Clear    Glucose, UA Negative Negative, 1000 mg/dL (3+) mg/dL    Bilirubin Negative Negative    Ketones, UA Negative Negative    Specific Gravity  1.030 1.005 - 1.030    Blood, UA Negative Negative    pH, Urine 5.0 5.0 - 8.0    Protein, POC Negative Negative mg/dL    Urobilinogen, UA Normal Normal    Leukocytes Negative Negative    Nitrite, UA Negative Negative       Imaging Results (last 7 days)     ** No results found for the last 168 hours. **          Assessment and Plan  Diagnoses and all orders for this visit:    Hypogonadism in male  -     POC Urinalysis Dipstick, Automated    Elevated PSA  -     PSA, Total & Free; Future    This represents an undiagnosed problem with uncertain prognosis.  I discussed elevated PSA with the patient today.  We discussed that PSA is a protein measured in the bloodstream that comes exclusively from the prostate gland.  I mentioned to him that all men with a prostate gland will have a certain PSA level.  We discussed this number can be compared to all men, or men of a certain age.  We can also follow trend of PSA which is called the PSA velocity.  We discussed the prostate cancer is a possible cause of PSA elevation, but benign etiologies such as infection, enlargement, aging, and inflammation should also be considered.  There is also convincing evidence that some patients will have a PSA that waxes and wanes completely unrelated to symptomatic disease of the prostate for cancer.  We discussed that some patients with a normal PSA may also have prostate cancer.  The necessity  of digital rectal exam is also discussed.  Finally we discussed the role of free to total PSA ratio.  It is explained that this test is done in hopes of avoiding unnecessary biopsies, but that a few patients with prostate cancer will have false-negative results when measuring there free PSA.  We have elected to do a PSA with free to total ratio.  We did discuss the natural course of prostate cancer in most patients.  It is explained that waiting to see what the results of this test*are very unlikely to affect the clinical course of the disease.  However, there are exceptions in the biology of each cancer.  The risks and possible benefits of transrectal ultrasound with biopsy of the prostate gland is also discussed.  I did explain that biopsy is the standard of care for diagnosing prostate cancer. The risks, alternatives, and benefits of this treatment recommendation are discussed.  I did answer all questions of the patient.      Hernesto Hong MD  04/10/17  3:43 PM    EMR Dragon/Transcription disclaimer:  Much of this encounter note is an electronic transcription/translation of spoken language to printed text. The electronic translation of spoken language may permit erroneous, or at times, nonsensical words or phrases to be inadvertently transcribed; although I have reviewed the note for such errors, some may still exist.       Cc:.

## 2017-04-18 ENCOUNTER — OFFICE VISIT (OUTPATIENT)
Dept: GASTROENTEROLOGY | Facility: CLINIC | Age: 67
End: 2017-04-18

## 2017-04-18 VITALS
DIASTOLIC BLOOD PRESSURE: 86 MMHG | BODY MASS INDEX: 31.78 KG/M2 | SYSTOLIC BLOOD PRESSURE: 146 MMHG | OXYGEN SATURATION: 98 % | WEIGHT: 227 LBS | TEMPERATURE: 96.5 F | HEIGHT: 71 IN | HEART RATE: 84 BPM

## 2017-04-18 DIAGNOSIS — R19.7 DIARRHEA, UNSPECIFIED TYPE: Primary | ICD-10-CM

## 2017-04-18 PROCEDURE — 99213 OFFICE O/P EST LOW 20 MIN: CPT | Performed by: NURSE PRACTITIONER

## 2017-04-18 RX ORDER — ATORVASTATIN CALCIUM 20 MG/1
TABLET, FILM COATED ORAL
Refills: 2 | COMMUNITY
Start: 2017-02-09 | End: 2017-04-18 | Stop reason: SDUPTHER

## 2017-04-18 RX ORDER — TERBINAFINE HYDROCHLORIDE 250 MG/1
TABLET ORAL
COMMUNITY
Start: 2017-03-21 | End: 2017-07-25

## 2017-04-18 RX ORDER — TERBINAFINE HYDROCHLORIDE 250 MG/1
TABLET ORAL
Refills: 2 | COMMUNITY
Start: 2017-03-21 | End: 2017-04-18 | Stop reason: SDUPTHER

## 2017-04-18 RX ORDER — DEXLANSOPRAZOLE 30 MG/1
CAPSULE, DELAYED RELEASE ORAL
COMMUNITY
Start: 2017-03-21 | End: 2017-04-18

## 2017-04-18 NOTE — PROGRESS NOTES
[facility] GASTROENTEROLOGY - OFFICE NOTE    4/18/2017    Zay Kamara   1950    Chief Complaint   Patient presents with   • Follow-up   f/u diarrhea      HISTORY OF PRESENT ILLNESS    Zay Kamara is a 66 y.o. male presents here for f/u on diarrhea.  Since last ov had colonoscopy done 3/7/17,  Noting diverticulosis and colon polyp.,  Biopsies were obtained of the left and right colon which were normal, he had a hyperplastic polyp noted.  Recommended recall colonoscopy in 5 years.  Since his colonoscopy he is doing better.  He is having one bowel movement a day and stools are formed.  He might have some diarrhea about once a week. No abdominal pain .   He does complain of a lot of gas.  he does snore.  He still uses artificial sweeteners.  He has tried to cut back on the spicy foods.  States that he probably needs to redo the diet sheet that Dr. Alvarenga suggested,fod map diet.  Has cut back on milk as well as cheese.  He has been under a lot of stress over the last year.  Denies fevers chills.  Denies wt loss. Denies nausea or vomiting.             Ov  2/7/17  Zay Kamara is a 66 y.o. male presents with diarrhea and he has had intermittent diarrhea over the last year. This has worsened over the last 4-5 months. The diarrhea is daily. He has anywhere from 2-4 bowel movements a day. His stools are almost always loose. He will occasionally have a solid stool. This usually occurs after eating, he starts with lower abdominal cramping followed by bowel movement. After the bowel movement the pain resolves. He has found no certain foods. It can be bland food. Has no  rectal bleeding. Denies unintentional weight loss. Denies fevers or chills. He was placed on antibiotics for the diarrhea to see if it would improve but did not. Also metformin was stopped which did not help. Also tried over-the-counter gas gas pills which did not help. He was placed on Bentyl and that did not help. Tried Imodium without  relief. According to his PCP's notes, the patient try to eliminate caffeine. However the patient tells me he still drinks diet sodas. He does use artificial sweeteners. He denies any new medicines prior to the diarrhea getting worse or starting. He denies any antibiotics prior to the diarrhea starting are getting worse. Been under some stress. Went through her divorce over the last year. States that he had a colonoscopy by Dr. Lama at [facility] around a year ago and was okay    Past Medical History:   Diagnosis Date   • Arthritis    • Cancer     thyroid   • Diabetes    • Disease of thyroid gland    • Hypercholesteremia    • Hypertension    • IBS (irritable bowel syndrome)    • Sleep apnea        Past Surgical History:   Procedure Laterality Date   • CHOLECYSTECTOMY     • COLONOSCOPY N/A 3/7/2017    Procedure: COLONOSCOPY WITH ANESTHESIA;  Surgeon: Hector Alvarenga MD;  Location: [facility] ENDOSCOPY;  Service:    • PROSTATE SURGERY     • THYROID SURGERY         Outpatient Prescriptions Marked as Taking for the 4/18/17 encounter (Office Visit) with LUIS Mast   Medication Sig Dispense Refill   • atorvastatin (LIPITOR) 10 MG tablet Take 10 mg by mouth daily.     • levothyroxine (LEVOTHROID) 200 MCG tablet Take 1 tablet by mouth daily     • lisinopril (PRINIVIL,ZESTRIL) 40 MG tablet TAKE 1 TABLET BY MOUTH DAILY  10   • meloxicam (MOBIC) 15 MG tablet Take 15 mg by mouth daily.     • SITagliptin (JANUVIA) 100 MG tablet Take 100 mg by mouth Daily.     • tamsulosin (FLOMAX) 0.4 MG capsule 24 hr capsule Take 1 capsule by mouth every night.     • terbinafine (lamiSIL) 250 MG tablet Take  by mouth.     • [DISCONTINUED] dexlansoprazole (DEXILANT) 30 MG capsule Take  by mouth.         No Known Allergies    Social History     Social History   • Marital status:      Spouse name: N/A   • Number of children: N/A   • Years of education: N/A     Occupational History   • Not on file.     Social History Main Topics  "  • Smoking status: Never Smoker   • Smokeless tobacco: Never Used   • Alcohol use No   • Drug use: No   • Sexual activity: Not on file     Other Topics Concern   • Not on file     Social History Narrative       Family History   Problem Relation Age of Onset   • Family history unknown: Yes   • Prostate cancer Father        Review of Systems   Constitutional: Negative for appetite change, chills, fatigue, fever and unexpected weight change.   HENT: Negative for sore throat and trouble swallowing.    Respiratory: Negative for cough, chest tightness, shortness of breath and wheezing.    Cardiovascular: Negative for chest pain and palpitations.   Gastrointestinal: Positive for diarrhea. Negative for abdominal distention, abdominal pain, anal bleeding, blood in stool, constipation, nausea, rectal pain and vomiting.        As mentioned in hpi   Genitourinary: Negative for difficulty urinating, dysuria and hematuria.   Musculoskeletal: Negative for arthralgias and back pain.   Skin: Negative for color change and rash.   Neurological: Negative for dizziness, tremors, seizures, syncope, light-headedness and headaches.   Psychiatric/Behavioral: Negative for confusion. The patient is not nervous/anxious.        Vitals:    04/18/17 0854   BP: 146/86   BP Location: Left arm   Patient Position: Sitting   Cuff Size: Adult   Pulse: 84   Temp: 96.5 °F (35.8 °C)   SpO2: 98%   Weight: 227 lb (103 kg)   Height: 71\" (180.3 cm)      Body mass index is 31.66 kg/(m^2).    Physical Exam   Constitutional: He is oriented to person, place, and time. He appears well-developed and well-nourished. No distress.   HENT:   Head: Normocephalic and atraumatic.   Mouth/Throat: Oropharynx is clear and moist.   Eyes: Pupils are equal, round, and reactive to light. No scleral icterus.   Neck: Normal range of motion. Neck supple. No JVD present. No tracheal deviation present.   Cardiovascular: Normal rate, regular rhythm, normal heart sounds and intact " distal pulses.  Exam reveals no gallop and no friction rub.    No murmur heard.  Pulmonary/Chest: Effort normal and breath sounds normal. No stridor. No respiratory distress. He has no wheezes. He has no rales.   Abdominal: Soft. Bowel sounds are normal. He exhibits no distension and no mass. There is no tenderness. There is no rebound and no guarding.   Musculoskeletal: Normal range of motion. He exhibits no edema or deformity.   Neurological: He is alert and oriented to person, place, and time.   Skin: Skin is warm and dry. No rash noted.   Psychiatric: He has a normal mood and affect. His behavior is normal.   Vitals reviewed.      Results for orders placed or performed in visit on 04/10/17   POC Urinalysis Dipstick, Automated   Result Value Ref Range    Color Yellow Yellow, Straw, Dark Yellow, Cindi    Clarity, UA Clear Clear    Glucose, UA Negative Negative, 1000 mg/dL (3+) mg/dL    Bilirubin Negative Negative    Ketones, UA Negative Negative    Specific Gravity  1.030 1.005 - 1.030    Blood, UA Negative Negative    pH, Urine 5.0 5.0 - 8.0    Protein, POC Negative Negative mg/dL    Urobilinogen, UA Normal Normal    Leukocytes Negative Negative    Nitrite, UA Negative Negative           ASSESSMENT AND PLAN    Zay was seen today for follow-up.    Diagnoses and all orders for this visit:    Diarrhea, unspecified type     he is doing better since his last office visit.  No further abdominal pain.  Less diarrhea.  He is having maybe 1 episode of loose stool per week.  Otherwise he is moving his bowels once a day and stools are formed.  He does have a lot of gas.  He has changed his diet, cut back on milk as well as cheese.  He has cut back on spicy foods as well which was a culprit that would cause his lower abdominal pain and cramping.  He still does use artificial sweeteners and I have recommended that he eliminate.  He admits that he did not really do the diet sheet well that Dr. Alvarenga provided.  I have  provided him again with the fod map diet sheet.  Continue taking a fiber supplement daily.  Continue taking probiotic.  I have recommended anti-gas pill Phazyme to start taking as well.  Since he is doing better overall we will see how he does over the next few weeks.  He would rather call back if he  continues to have problems rather than make  another appointment.  If he continues to have symptoms and they worsen we can consider further evaluation such as EGD with small bowel biopsies rule out celiac.  Currently I feel like his symptoms are most likely rated related to IBS.       Body mass index is 31.66 kg/(m^2).     Return if symptoms worsen or fail to improve.                Kati Galvan, LUIS    EMR Dragon/transcription disclaimer:  Much of this encounter note is electronic transcription/translation of spoken language to printed text.  The electronic translation of spoken language may be erroneous, or at times, nonsensical words or phrases may be inadvertently transcribed.  Although I have reviewed the note for such errors, some may still exist.    Results for orders placed or performed in visit on 04/10/17   POC Urinalysis Dipstick, Automated   Result Value Ref Range    Color Yellow Yellow, Straw, Dark Yellow, Cindi    Clarity, UA Clear Clear    Glucose, UA Negative Negative, 1000 mg/dL (3+) mg/dL    Bilirubin Negative Negative    Ketones, UA Negative Negative    Specific Gravity  1.030 1.005 - 1.030    Blood, UA Negative Negative    pH, Urine 5.0 5.0 - 8.0    Protein, POC Negative Negative mg/dL    Urobilinogen, UA Normal Normal    Leukocytes Negative Negative    Nitrite, UA Negative Negative

## 2017-04-25 ENCOUNTER — TELEPHONE (OUTPATIENT)
Dept: PRIMARY CARE CLINIC | Age: 67
End: 2017-04-25

## 2017-04-25 ENCOUNTER — OFFICE VISIT (OUTPATIENT)
Dept: PRIMARY CARE CLINIC | Age: 67
End: 2017-04-25
Payer: MEDICARE

## 2017-04-25 ENCOUNTER — HOSPITAL ENCOUNTER (OUTPATIENT)
Dept: GENERAL RADIOLOGY | Age: 67
Discharge: HOME | End: 2017-04-25
Payer: MEDICARE

## 2017-04-25 VITALS
HEIGHT: 71 IN | SYSTOLIC BLOOD PRESSURE: 160 MMHG | HEART RATE: 95 BPM | BODY MASS INDEX: 31.64 KG/M2 | OXYGEN SATURATION: 97 % | DIASTOLIC BLOOD PRESSURE: 90 MMHG | TEMPERATURE: 98.3 F | WEIGHT: 226 LBS

## 2017-04-25 DIAGNOSIS — M25.562 ACUTE PAIN OF LEFT KNEE: ICD-10-CM

## 2017-04-25 DIAGNOSIS — M17.12 PRIMARY OSTEOARTHRITIS OF LEFT KNEE: ICD-10-CM

## 2017-04-25 DIAGNOSIS — M25.562 ACUTE PAIN OF LEFT KNEE: Primary | ICD-10-CM

## 2017-04-25 PROCEDURE — 1123F ACP DISCUSS/DSCN MKR DOCD: CPT | Performed by: NURSE PRACTITIONER

## 2017-04-25 PROCEDURE — 1036F TOBACCO NON-USER: CPT | Performed by: NURSE PRACTITIONER

## 2017-04-25 PROCEDURE — 20610 DRAIN/INJ JOINT/BURSA W/O US: CPT | Performed by: NURSE PRACTITIONER

## 2017-04-25 PROCEDURE — G8427 DOCREV CUR MEDS BY ELIG CLIN: HCPCS | Performed by: NURSE PRACTITIONER

## 2017-04-25 PROCEDURE — 3017F COLORECTAL CA SCREEN DOC REV: CPT | Performed by: NURSE PRACTITIONER

## 2017-04-25 PROCEDURE — G8417 CALC BMI ABV UP PARAM F/U: HCPCS | Performed by: NURSE PRACTITIONER

## 2017-04-25 PROCEDURE — 73562 X-RAY EXAM OF KNEE 3: CPT

## 2017-04-25 PROCEDURE — 99213 OFFICE O/P EST LOW 20 MIN: CPT | Performed by: NURSE PRACTITIONER

## 2017-04-25 PROCEDURE — 4040F PNEUMOC VAC/ADMIN/RCVD: CPT | Performed by: NURSE PRACTITIONER

## 2017-04-25 RX ORDER — METHYLPREDNISOLONE ACETATE 40 MG/ML
80 INJECTION, SUSPENSION INTRA-ARTICULAR; INTRALESIONAL; INTRAMUSCULAR; SOFT TISSUE ONCE
Qty: 1 ML | Refills: 0
Start: 2017-04-25 | End: 2017-04-25

## 2017-04-27 ENCOUNTER — TELEPHONE (OUTPATIENT)
Dept: PRIMARY CARE CLINIC | Age: 67
End: 2017-04-27

## 2017-04-27 DIAGNOSIS — M25.562 LEFT KNEE PAIN, UNSPECIFIED CHRONICITY: Primary | ICD-10-CM

## 2017-04-27 RX ORDER — CELECOXIB 100 MG/1
100 CAPSULE ORAL DAILY
Qty: 30 CAPSULE | Refills: 3 | Status: SHIPPED | OUTPATIENT
Start: 2017-04-27 | End: 2017-09-21 | Stop reason: ALTCHOICE

## 2017-04-28 ENCOUNTER — PROCEDURE VISIT (OUTPATIENT)
Dept: UROLOGY | Facility: CLINIC | Age: 67
End: 2017-04-28

## 2017-04-28 DIAGNOSIS — E29.1 HYPOGONADISM IN MALE: Primary | ICD-10-CM

## 2017-04-28 PROCEDURE — 96372 THER/PROPH/DIAG INJ SC/IM: CPT | Performed by: UROLOGY

## 2017-04-28 RX ORDER — TESTOSTERONE CYPIONATE 200 MG/ML
300 INJECTION, SOLUTION INTRAMUSCULAR
Status: DISCONTINUED | OUTPATIENT
Start: 2017-04-28 | End: 2017-04-28 | Stop reason: HOSPADM

## 2017-04-28 RX ORDER — TESTOSTERONE CYPIONATE 200 MG/ML
300 INJECTION, SOLUTION INTRAMUSCULAR
Status: CANCELLED
Start: 2017-04-28

## 2017-04-28 RX ADMIN — TESTOSTERONE CYPIONATE 300 MG: 200 INJECTION, SOLUTION INTRAMUSCULAR at 09:34

## 2017-05-08 ENCOUNTER — TELEPHONE (OUTPATIENT)
Dept: PRIMARY CARE CLINIC | Age: 67
End: 2017-05-08

## 2017-05-08 RX ORDER — TRAMADOL HYDROCHLORIDE 50 MG/1
50 TABLET ORAL EVERY 6 HOURS PRN
Qty: 40 TABLET | Refills: 0 | OUTPATIENT
Start: 2017-05-08 | End: 2017-05-18

## 2017-05-18 DIAGNOSIS — R97.20 ELEVATED PSA: ICD-10-CM

## 2017-05-19 LAB
PSA FREE MFR SERPL: 27.9 %
PSA FREE SERPL-MCNC: 1.76 NG/ML
PSA SERPL-MCNC: 6.3 NG/ML (ref 0–4)

## 2017-05-25 ENCOUNTER — OFFICE VISIT (OUTPATIENT)
Dept: UROLOGY | Facility: CLINIC | Age: 67
End: 2017-05-25

## 2017-05-25 ENCOUNTER — TELEPHONE (OUTPATIENT)
Dept: UROLOGY | Facility: CLINIC | Age: 67
End: 2017-05-25

## 2017-05-25 VITALS — WEIGHT: 220 LBS | TEMPERATURE: 98.2 F | HEIGHT: 71 IN | BODY MASS INDEX: 30.8 KG/M2

## 2017-05-25 DIAGNOSIS — N52.9 ERECTILE DYSFUNCTION, UNSPECIFIED ERECTILE DYSFUNCTION TYPE: ICD-10-CM

## 2017-05-25 DIAGNOSIS — R97.20 ELEVATED PSA: Primary | ICD-10-CM

## 2017-05-25 DIAGNOSIS — N40.1 BPH (BENIGN PROSTATIC HYPERTROPHY) WITH URINARY OBSTRUCTION: ICD-10-CM

## 2017-05-25 DIAGNOSIS — E29.1 HYPOGONADISM IN MALE: ICD-10-CM

## 2017-05-25 DIAGNOSIS — N13.8 BPH (BENIGN PROSTATIC HYPERTROPHY) WITH URINARY OBSTRUCTION: ICD-10-CM

## 2017-05-25 LAB
BILIRUB BLD-MCNC: NEGATIVE MG/DL
CLARITY, POC: CLEAR
COLOR UR: YELLOW
GLUCOSE UR STRIP-MCNC: NEGATIVE MG/DL
KETONES UR QL: NEGATIVE
LEUKOCYTE EST, POC: NEGATIVE
NITRITE UR-MCNC: NEGATIVE MG/ML
PH UR: 5.5 [PH] (ref 5–8)
PROT UR STRIP-MCNC: NEGATIVE MG/DL
RBC # UR STRIP: NEGATIVE /UL
SP GR UR: 1.02 (ref 1–1.03)
UROBILINOGEN UR QL: NORMAL

## 2017-05-25 PROCEDURE — 99214 OFFICE O/P EST MOD 30 MIN: CPT | Performed by: UROLOGY

## 2017-05-25 PROCEDURE — 81003 URINALYSIS AUTO W/O SCOPE: CPT | Performed by: UROLOGY

## 2017-05-25 RX ORDER — CEPHALEXIN 500 MG/1
500 CAPSULE ORAL 2 TIMES DAILY
Qty: 2 CAPSULE | Refills: 0 | Status: SHIPPED | OUTPATIENT
Start: 2017-05-25 | End: 2017-05-26

## 2017-06-20 ENCOUNTER — PROCEDURE VISIT (OUTPATIENT)
Dept: UROLOGY | Facility: CLINIC | Age: 67
End: 2017-06-20

## 2017-06-20 DIAGNOSIS — E29.1 HYPOGONADISM IN MALE: Primary | ICD-10-CM

## 2017-06-20 PROCEDURE — 96372 THER/PROPH/DIAG INJ SC/IM: CPT | Performed by: UROLOGY

## 2017-06-20 RX ORDER — TESTOSTERONE CYPIONATE 200 MG/ML
300 INJECTION, SOLUTION INTRAMUSCULAR
Status: CANCELLED
Start: 2017-06-20

## 2017-06-20 RX ORDER — TESTOSTERONE CYPIONATE 200 MG/ML
300 INJECTION, SOLUTION INTRAMUSCULAR
Status: DISCONTINUED | OUTPATIENT
Start: 2017-06-20 | End: 2017-06-20 | Stop reason: HOSPADM

## 2017-06-20 RX ADMIN — TESTOSTERONE CYPIONATE 300 MG: 200 INJECTION, SOLUTION INTRAMUSCULAR at 14:10

## 2017-06-23 ENCOUNTER — TELEPHONE (OUTPATIENT)
Dept: UROLOGY | Facility: CLINIC | Age: 67
End: 2017-06-23

## 2017-06-23 NOTE — TELEPHONE ENCOUNTER
Called and left message with patient to confirm his appt for his biopsy on 6/26/17 730. I needed to confirm he has been off his Mobic and remind him to do his fleets enema 1-2 hrs prior to procedure and start abx morning of procedure. Awaiting call back to confirm

## 2017-06-26 ENCOUNTER — TELEPHONE (OUTPATIENT)
Dept: UROLOGY | Facility: CLINIC | Age: 67
End: 2017-06-26

## 2017-06-26 NOTE — TELEPHONE ENCOUNTER
Patient arrived for his BIOPSY however he had been taken off the Meloxicam and put on Generic Relefan (Naubentom 500mg) and had only been off 4 days, i believe Dr. Hong told him to come back Friday Morning 6/30/17 at 7:30 to try again.    Please Advise.

## 2017-06-26 NOTE — TELEPHONE ENCOUNTER
Had to reschedule patient for bx due to not being off Relefen long enough prior to biopsy per Dr. Hong. I have called him in some more antibiotics for the procedure, see below:    Keflex 500mg 1 po bid starting morning of procedure. #2 No refills.

## 2017-06-27 DIAGNOSIS — R97.20 ELEVATED PSA: Primary | ICD-10-CM

## 2017-06-27 RX ORDER — NABUMETONE 500 MG/1
500 TABLET, FILM COATED ORAL 2 TIMES DAILY PRN
Qty: 30 TABLET | Refills: 0 | Status: ON HOLD
Start: 2017-06-27 | End: 2017-08-03

## 2017-06-27 NOTE — TELEPHONE ENCOUNTER
Called and left message for patient needing to confirm his biopsy appt for 6/30/17 020. I informed him he would have to redo his enema the morning of 1-2 hrs prior, stay off his relefan and any other blood thinners, and that I had called him in some more antibiotics to his pharmacy. I asked pt to call back just to confirm his did rec. My message.

## 2017-06-30 ENCOUNTER — PROCEDURE VISIT (OUTPATIENT)
Dept: UROLOGY | Facility: CLINIC | Age: 67
End: 2017-06-30

## 2017-06-30 DIAGNOSIS — R97.20 ELEVATED PSA: Primary | ICD-10-CM

## 2017-06-30 LAB
BILIRUB BLD-MCNC: NEGATIVE MG/DL
CLARITY, POC: CLEAR
COLOR UR: YELLOW
GLUCOSE UR STRIP-MCNC: NEGATIVE MG/DL
KETONES UR QL: NEGATIVE
LEUKOCYTE EST, POC: NEGATIVE
NITRITE UR-MCNC: NEGATIVE MG/ML
PH UR: 6 [PH] (ref 5–8)
PROT UR STRIP-MCNC: NEGATIVE MG/DL
RBC # UR STRIP: NEGATIVE /UL
SP GR UR: 1.02 (ref 1–1.03)
UROBILINOGEN UR QL: NORMAL

## 2017-06-30 PROCEDURE — 88342 IMHCHEM/IMCYTCHM 1ST ANTB: CPT | Performed by: UROLOGY

## 2017-06-30 PROCEDURE — 81003 URINALYSIS AUTO W/O SCOPE: CPT | Performed by: UROLOGY

## 2017-06-30 PROCEDURE — 76872 US TRANSRECTAL: CPT | Performed by: UROLOGY

## 2017-06-30 PROCEDURE — G0416 PROSTATE BIOPSY, ANY MTHD: HCPCS | Performed by: UROLOGY

## 2017-06-30 PROCEDURE — 55700 PR PROSTATE NEEDLE BIOPSY ANY APPROACH: CPT | Performed by: UROLOGY

## 2017-06-30 PROCEDURE — 76942 ECHO GUIDE FOR BIOPSY: CPT | Performed by: UROLOGY

## 2017-06-30 NOTE — PROGRESS NOTES
CC: I am here for my prostate biopsy    TRUS PROSTATE WITH BIOPSY PROCEDURE NOTE  Indications:  Elevated PSA    Pre-operative prep:  fleets enema and oral antibiotic      Procedure:    After proper identification of patient and procedure, patient was placed in the left later decubitus position.  2% lidocaine jelly was instilled per rectum  for topical anesthesia.  The ultrasound probe was gently inserted per rectum. Prostate was scanned from the base of the bladder to the apex.  20 cc of 1% lidocaine plain was then used to perform a prostate nerve block injecting the junction of the seminal vesicle and bladder laterally.      Prostate length: 6.43 cm    Prostate width: 6.28 cm    Prostate height: 5.31 cm    Prostate volume: 112 cc    PSA density: 0.06    Abnormal findings:  No lesions noted, Ejaculatory ducts not visible.  No significant dilation, Transition zone enlargement and Seminal vesical enlargement    Median lobe:  no    A total of 16 biopsies were taken from the base, apex, and mid portion of the gland on both the right and the left sides.     Patient tolerated the procedure well    Complications: none    Estimated blood loss: minimal    Mr. Kamara was given instructions for follow up.  He will notify the office if he has excessive hematuria, hematochezia, fevers, perineal, or abdominal pain.    Follow up:   He will follow up in 1 week  for pathology results.

## 2017-06-30 NOTE — PATIENT INSTRUCTIONS
What can I expect after a prostate biopsy?    After the biopsy, it is normal to experience the following sensation or symptoms:    Burning with urination-it is normal to feel burning with urination for the first 24 hours after the biopsy.  It may continue for up to 3 days.    Frequent urination-this will gradually improve over the first 24-36 hours.    Blood in the urine-is normal to have slightly red tinged urine or urine that resembles a césar or red wine color.  This may last for 12 hours and may occur intermittently up to a couple weeks.    Blood in stool-you may notice red stains on the toilet tissue or see some bloody streaks in your stool.  This may last up to 5 days.    Blood in the semen-this may persist for up to 6 weeks after your biopsy.  It often starts bright red and then gradually gives way to more of a purplish discoloration.  Eventually it will be rust colored and then go away.    How should I take care of myself after the biopsy?    Drink plenty of fluids to prevent blood clots and infection and the bladder    Avoid strenuous exercise such as jogging, heavy lifting, golfing, and bike riding for 5 days.  He should also avoid riding motorcycles, lawn and tractor equipment, and ATVs during this period.    Be sure to take your final antibiotic tablet, if a prescription was given, the evening following the biopsy.    Do not drink alcoholic beverages until after completing your antibiotics.    Do not take aspirin and anti-inflammatory products such as Celebrex, ibuprofen or naproxen for 5 days following the biopsy unless specifically instructed to do so by the doctor.    Avoid sexual activity for 7 days.    If you are on warfarin, clopidogrel (Plavix), Elliquis, Effient, Trental, Pletal, or other drug to reduce clotting be sure you have discussed when to resume the medication. As a general rule, we like to keep you off this drug approximately 3-4 days following the biopsy.     When should I call my  doctor?    Call [facility] at [phone] if you have any of the following signs and symptoms:    Persistent urinary frequency or burning.    Fever greater than 101°.    Urine that is bright red or has clots large enough to make it difficult to urinate.    Rectal bleeding with clots or pure bloody stools.    Persistent bleeding that lasts longer than 1 week.

## 2017-07-06 LAB
CYTO UR: NORMAL
LAB AP CASE REPORT: NORMAL
LAB AP SYNOPTIC CHECKLIST: NORMAL
Lab: NORMAL
PATH REPORT.FINAL DX SPEC: NORMAL
PATH REPORT.GROSS SPEC: NORMAL

## 2017-07-07 ENCOUNTER — TELEPHONE (OUTPATIENT)
Dept: UROLOGY | Facility: CLINIC | Age: 67
End: 2017-07-07

## 2017-07-11 ENCOUNTER — PROCEDURE VISIT (OUTPATIENT)
Dept: UROLOGY | Facility: CLINIC | Age: 67
End: 2017-07-11

## 2017-07-11 DIAGNOSIS — E29.1 HYPOGONADISM IN MALE: Primary | ICD-10-CM

## 2017-07-11 PROCEDURE — 96372 THER/PROPH/DIAG INJ SC/IM: CPT | Performed by: UROLOGY

## 2017-07-11 RX ORDER — TESTOSTERONE CYPIONATE 200 MG/ML
300 INJECTION, SOLUTION INTRAMUSCULAR
Status: DISCONTINUED | OUTPATIENT
Start: 2017-07-11 | End: 2017-07-11 | Stop reason: HOSPADM

## 2017-07-11 RX ORDER — TESTOSTERONE CYPIONATE 200 MG/ML
300 INJECTION, SOLUTION INTRAMUSCULAR
Status: CANCELLED
Start: 2017-07-11

## 2017-07-11 RX ADMIN — TESTOSTERONE CYPIONATE 300 MG: 200 INJECTION, SOLUTION INTRAMUSCULAR at 09:58

## 2017-07-13 ENCOUNTER — OFFICE VISIT (OUTPATIENT)
Dept: UROLOGY | Facility: CLINIC | Age: 67
End: 2017-07-13

## 2017-07-13 DIAGNOSIS — C61 PROSTATE CANCER (HCC): Primary | ICD-10-CM

## 2017-07-13 PROCEDURE — 99215 OFFICE O/P EST HI 40 MIN: CPT | Performed by: UROLOGY

## 2017-07-16 NOTE — PROGRESS NOTES
Prostate Cancer consult  Mr. Kamara is 66 y.o. male    Chief complaint: I am here about my prostate biopsy.    He is here today to discuss his newly diagnosed prostate cancer.  His biopsy was done 1 week(s) ago. This was done in the context of Elevated PSA. Severity best described as adenocarcinoma in 2/12 cores with the maximum emma grade of 3+3. The patient did not need imaging based on risk stratification. . Symptoms include obstructive voiding symptoms.  The patients potency status can be defined as impotence that is occasionally PDE responsive with testosterone supplementation.      Current Outpatient Prescriptions:   •  atorvastatin (LIPITOR) 10 MG tablet, Take 10 mg by mouth daily., Disp: , Rfl:   •  levothyroxine (LEVOTHROID) 200 MCG tablet, Take 1 tablet by mouth daily, Disp: , Rfl:   •  lisinopril (PRINIVIL,ZESTRIL) 40 MG tablet, TAKE 1 TABLET BY MOUTH DAILY, Disp: , Rfl: 10  •  nabumetone (RELAFEN) 500 MG tablet, Take 1 tablet by mouth 2 (Two) Times a Day As Needed for Mild Pain (1-3)., Disp: 30 tablet, Rfl: 0  •  SITagliptin (JANUVIA) 100 MG tablet, Take 100 mg by mouth Daily., Disp: , Rfl:   •  tamsulosin (FLOMAX) 0.4 MG capsule 24 hr capsule, Take 1 capsule by mouth every night., Disp: , Rfl:   •  terbinafine (lamiSIL) 250 MG tablet, Take  by mouth., Disp: , Rfl:     Past Medical History:   Diagnosis Date   • Arthritis    • Cancer     thyroid   • Diabetes    • Disease of thyroid gland    • Hypercholesteremia    • Hypertension    • IBS (irritable bowel syndrome)    • Sleep apnea        Past Surgical History:   Procedure Laterality Date   • CHOLECYSTECTOMY     • COLONOSCOPY N/A 3/7/2017    Procedure: COLONOSCOPY WITH ANESTHESIA;  Surgeon: Hector Alvarenga MD;  Location: [facility] ENDOSCOPY;  Service:    • PROSTATE SURGERY     • THYROID SURGERY     Review of Systems  Constitutional: Negative for chills and fever.   Gastrointestinal: Negative for abdominal pain, anal bleeding and blood in stool.    Genitourinary: Negative for flank pain and hematuria.     Physical examination  Alert and oriented ×3  Not agitated or distressed  No obvious deformities  No respiratory distress  Skin without pallor or diaphoresis      Assessment and Plan  There are no diagnoses linked to this encounter.    After finding out the patient has done well from the biopsy, we went over the pathology report including the assignment and application of the [place] score, the volume of prostate cancer as predicted by the number of cores and percent involvement in each, We went over how this is used to determine whether or not the patient needs a bone scan and or CT scan of the abdomen and pelvis in addition to other preoperative workup. We talked about the use of a Anna nomogram to predict whether or not this prostate cancer is likely to still be confined to the gland.     We categorize him as: low risk with [place] grade 3+3, t1c disease in 2/12 cores    Active Surveillance  Active Surveillance is explained as a technique that involves following the cancer because in most patients there is a significant lag time between diagnosis and spread beyond the prostate gland. This type of surveillance is different than watchful waiting because it provides a way to closely follow the patient using CALIN, PSA and repeat biopsies to assess an increase in volume or grade of tumor in hopes of still providing curative therapy while delaying the possible side effects that go with therapy as opposed to not treating the patient until there are symptoms of metastatic disease. The former treatment appears to be beneficial for those patients that have low risk disease but a greater than ten year life expectancy. The psychological manifestations of observing a clinically confined cancer are considered and discussed. The latter treatment is only appropriate in those patients whose life expectancy is less than ten years. I emphasized the risks of repeat biopsy.  We talked about the experience in  countries in this technique and that a joint study with the [place] and [place] shows promise but will not be complete with definitive recommendations for a few years. It is also emphasized that some patients did not have a cancer free survival and that some patients  in the active surveillance groups but that there was no statistical significance when compared to the group treated immediately after diagnosis.     Additional information reviewed with the patient and provided to him on active surveillance as follows:   Which patients are the best candidates for no immediate treatment or active surveillance?   Prostate cancer is the most prevalent male cancer, but the majority of men with the disease do not die of prostate cancer. Studies suggest that 30-50 percent of men over the age of 60 years diagnosed with prostate cancer today by PSA screening undergo a treatment that will not extend their life or improve its quality. This does not mean that prostate cancer does not kill men, but rather that some men who are older and/or in poor health with a slowly progressive form of the disease, may not need immediate treatment. The key is to identify the men who for now can safely forego treatment.   Eligible men should meet the following criteria for Very Low or Low Risk Disease:   Very Low Risk Prostate Cancer   · Life expectancy less than 20 years   · Cancer not felt on digital rectal examination (stage T1c)   · PSA density (PSA divided by prostate volume) is less than 0.15   · Minerva score is 6 or less with no Minerva pattern 4 or 5   · No more than 2 cores with cancer, or cancer involving no more than 50% of any core on at least a 12 core biopsy   Low Risk Prostate Cancer   · Life expectancy less than 10-15 years   · Cancer not felt on digital rectal examination and/or small nodule (stage T1c or T2a)   · PSA below 10ng/ml   · Van Dyne score is 6 or less with no Van Dyne pattern 4  or 5 on at least a 12 core biopsy   What does active surveillance mean?   In the past, the term watchful waiting meant no treatment until the development of metastatic disease, at which time androgen ablation (hormonal) therapy was initiated.     Today, men who have very low to low risk prostate cancer, and who choose no immediate treatment as an alternative to immediate treatment, are closely monitored with intervention -if necessary- at a time when cure is still possible. We now refer to this as active surveillance with selective delayed curative intervention. This means that men undergo periodic evaluations including PSA tests, digital rectal examinations, and prostate biopsies. If there is evidence that the cancer is progressing, treatment is recommended with the intention of curing the disease.     We discussed what rationale is used to define lower risk prostate cancer based on biopsy results.   Dr. Andrea Morgan, an expert in prostate pathology at [facility], has shown in two separate studies that cancers less than 0.5 cc (smaller than an eraser tip) can be identified correctly about 75-80 percent of the time using PSA and information from the prostate biopsy. Dr. Morgan's criteria provide a method for helping men choose between immediate treatment and active surveillance.   If a man decides on active surveillance, what recommendations are made for follow-up of the cancer?   · 12-14 core prostate biopsy periodically until age 75 years   · PSA and Digital Rectal Examination every six months     Studies from [facility] active surveillance program have shown that PSA level changes are not sufficient to alert us as to whether or not the cancer is changing. This necessitates surveillance biopsies for careful monitoring. For those men that have a biopsy done about one year after the original biopsy that shows no cancer, we recommend that the interval between biopsies be lengthened to 18-24  months. This interval may increase with time e.g. every 2-3 years. Prostate biopsies may be discontinued at age 75 years, since prostate cancer is unlikely to cause harm beyond this age if a man has not yet had a change in the cancer with careful monitoring.   How does a man who qualifies for surveillance make a choice between active surveillance and treatment?     I also explained that I do not encourage healthy men in their 50's to strongly consider active surveillance because of their longer life expectancy and time horizon for cancer progression. For older men, especially over age 65 years who meet criteria, active surveillance is one of the options that should be seriously considered.     The men best suited for surveillance would appear to be those that:   · have the ability to live with cancer without worry reducing their quality of life   · are most concerned about the potential side effects of treatments   · value near term quality of life to a greater extent than any long term consequences that could occur   Each man should carefully weigh the potential loss of quality of life with treatment (radiation or surgery), against the possibility that the window of opportunity for cure will disappear without treatment.   What does a man have to lose with active surveillance of a PSA-detected cancer?   The major concern is that while surveillance is taking place, the tumor will progress beyond the prostate to the point where cure is no longer possible. Experience to date with active surveillance allows a number of conclusions regarding this risk.   · About 30 percent of the men who have entered surveillance have undergone treatment within 5 years   · High grade cancers that are Minerva score 7 or more are found on surveillance biopsies at a rate of about 4% per year   · Most men who undergo treatment do not have high grade cancers   · Recognizing the probability of death from prostate cancer among men who are  treated for high grade cancers, it is estimated that a man at age 65 years who enters surveillance with very low risk prostate cancer has   · a 20 year risk of death from prostate cancer of approximately 5%   · a 20 year risk of dying of another cause of approximately 60%     Healthy Living Tips   If a man chooses active surveillance, what dietary changes should he make?   Most men ask what they should be doing in terms of lifestyle changes to help prevent progression of their disease. This is an area of intense interest now but it's one that is clouded by the fact that dietary supplements are a billion dollar industry in the [place] This often makes it hard for consumers to distinguish between science and marketing.     The Prostate Cancer Foundation (PCF.org <http://www.pcf.org/>) has a free guide that can be ordered from their website titled Nutrition, Exercise, and Prostate Cancer. I would advise all men with prostate cancer to read this. Also, the American Cancer Society <http://www.cancer.org/Healthy/EatHealthyGetActive/ACSGuidelinesonNutritionPhysicalActivityforCancerPrevention/dwll-frxdltnfeq-rwp>has up to date information on lifestyle choices and cancer prevention.   The bottom line is that a diet that is low in animal and dairy fat, high in a wide variety of fruits and vegetables and healthy grains and nuts, is thought be a diet that can slow the progression of cancer. Maintaining a healthy weight with daily exercise is also considered important.     Recent studies have shown that a man's lifestyle-especially nutrition and exercise-has a significant influence in prostate cancer prevention and treatment.   · Follow these tips to help you live a healthier life   · Lose body fat by eating fewer calories per day than you burn   · Maintain muscle mass by increasing protein intake and exercise   · Cut carbohydrate intake to cut down on excess fat and weight, which can slow tumor growth   · Exercise every day,  combining cardio fitness and weight lifting   · Eat nine servings a day of colorful fruits and vegetables   · Reduce stress by focusing on living a balanced life and taking care of yourself   · Plan ahead to eat healthfully and minimize stress   · Track your behaviors and help chart your progress   · Establish a support system by maintaining healthy relationships with people who understand what you are going through     What about patients with intermediate disease risk?   Intermediate disease risk is best defined as Lenox Grade 7 disease (preferably 3+4), PSA >10, or palpable disease over 2cm on one side or both.   Studies show that the risk of death on active surveillance with intermediate disease is approximately 15% compared to approximately 5% with low risk disease.   Future studies will likely reveal reasonable candidates for intermediate risk disease.     Robot Assisted Laparoscopic Prostatectomy  We went over the rationale of the use of robot assisted laparoscopic prostatectomy. I explained my experience with the procedure. I explained to him that I feel confident with this technique because I have done nearly 300 cases in six years. I explained that I had not done that many open radical prostatectomies the previous ten years of practice combined which I believe plays a role in my comfort level. I explained that physicians that do that many open retropubic prostatectomies or perineal prostatectomies likely feel that they offer more benefit to patients with their respective technique because they are more proficient, familiar and comfortable. My experience with cancer control is discussed as well as the fact that cancer free survival from this technique are based on comparing early success and that long term comparisons aren't available yet. Clinically it is my impression that this procedure offers excellent local control with comparable intermediate cure rates. I also explained the technique of open  retropubic prostatectomy. We discussed how previous abdominal surgery makes the operation more challenging in addition to body habitus and previous other anatomic variations that sometimes cause the procedure to last longer than expected or require conversion to the open procedure. A shorter hospital stay, more rapid progression during convalescence, and considerable decrease in blood loss due to pneumoperitoneum are explained. Complications of pneumoperitoneum and trochar placement are explained as a unique problem with robot prostatectomy. Preservation of continence and erections are discussed and compared with open surgery, radiation and other modalities of treatment. The role of early penile rehabilitation for more rapid resumption of erectile treatment is also discussed. Bladder neck contracture, Vesicourethral anastomotic leak, ureteral obstruction, and rectal injury are also discussed.     Radiation therapy options  We also talked about the types of radiation therapy are available. I explained to him the difference in traditional external beam radiotherapy versus three-dimensional conformal IMR T. I explained that the latter seems to reduce the risks of damage to surrounding tissue with no decrease in the benefit of overall cancer treatment in studies. I did explain that typically this requires the placement of prostate side a shearing markers which will require an additional transrectal ultrasound with transrectal puncture to place these markers. He was explained that this is to accommodate the radiation oncologist in locating the prostate gland at each therapy session. I did explain that 1 disadvantages of this technique as the time consumption 8-9 consecutive weeks of treatment. We also discussed the used to prostate interstitial seed implantation. I explained to him the role of a volume study. It is also explained that radioactive seeds are implanted which increases the overall dose of radiation to the  prostate while limiting the exposure to the surrounding tissues. I did also explain that our experience with this type of treatment in the limited number of these therapies that we did lead us to make a decision to no longer offer this to patient's locally. I did explain that I have a referral basis would be delighted to send this patient. We also briefly discussed proton therapy since that is not something available in our area. I explained the basics that normal tissue and a fixed distance from the target receives less scatter radiation from proton therapy than x-rays. This is this is the theoretical advantage to use of proton therapy versus IMRT.     Systemic Therapy  We talked about the role of chemotherapy and hormonal therapy in this setting. I explained that the former is used in patients that have failed primary curative therapy and have radiographic evidence of metastatic disease. It is generally preserved for patients that have failed hormonal therapy because of its high toxicity. Hormonal therapy and prostate cancer is explained. Both orchiectomy as well as systemic therapy using gonadotropin releasing hormones is explained to the patient. The benefits of hormonal therapy is explained as palliative or adjunctive but not curative. The benefit as shown clinically by neoadjuvant hormonal therapy combined with radiotherapy in high risk patients is explained. I also went over the risks of hormonal therapy including the metabolic effects that may lead to metabolic syndrome. The latter is explained at increased risk in heart attack stroke and death. The metabolic effects on bone are explained including the increased possibility of fracture due to osteoporosis. It is explained at vitamin D and calcium supplementation is the recommendation upon initiation of the stalk treatments. Sexual side effects will also testosterone were also discussed.     Cryotherapy  Cryotherapy is explained as freezing of the prostate  gland which is believed to kill prostate cancer cells. Clinical studies have shown a benefit for patients with organ confined prostate cancer. I told the patient that we don't offer this therapy, but also I really have never had a patient that has undergone cryotherapy, but I did offer to find a regional urologist for them to see that does it. I explained that it can also be used to treat patients that have failed initial radiation therapy.    Based on this discussion, the patient has elected to undergo robot assisted laparoscopic prostatectomy.     More than half of this 50 minute visit was spent on coordination of care and counseling the patient about the biology of prostate cancer, his biopsy report, risk stratification and management options .          Hernesto Hong MD  07/16/17  3:26 PM

## 2017-07-20 ENCOUNTER — OFFICE VISIT (OUTPATIENT)
Dept: UROLOGY | Facility: CLINIC | Age: 67
End: 2017-07-20

## 2017-07-20 VITALS — TEMPERATURE: 98.4 F | WEIGHT: 231 LBS | HEIGHT: 71 IN | BODY MASS INDEX: 32.34 KG/M2

## 2017-07-20 DIAGNOSIS — R31.29 MICROSCOPIC HEMATURIA: ICD-10-CM

## 2017-07-20 DIAGNOSIS — C61 PROSTATE CANCER (HCC): Primary | ICD-10-CM

## 2017-07-20 LAB
BILIRUB BLD-MCNC: NEGATIVE MG/DL
CLARITY, POC: CLEAR
COLOR UR: YELLOW
GLUCOSE UR STRIP-MCNC: NEGATIVE MG/DL
KETONES UR QL: NEGATIVE
LEUKOCYTE EST, POC: NEGATIVE
NITRITE UR-MCNC: NEGATIVE MG/ML
PH UR: 5.5 [PH] (ref 5–8)
PROT UR STRIP-MCNC: NEGATIVE MG/DL
RBC # UR STRIP: ABNORMAL /UL
SP GR UR: 1.02 (ref 1–1.03)
UROBILINOGEN UR QL: NORMAL

## 2017-07-20 PROCEDURE — 99214 OFFICE O/P EST MOD 30 MIN: CPT | Performed by: UROLOGY

## 2017-07-20 PROCEDURE — 81003 URINALYSIS AUTO W/O SCOPE: CPT | Performed by: UROLOGY

## 2017-07-20 NOTE — PROGRESS NOTES
Mr. Kamara is 66 y.o. male    CHIEF COMPLAINT: I am here to discuss my prostate cancer.     HPI  Prostate cancer   He was initially diagnosed with prostate cancer about  3 week(s) ago. This was identified in the context of elevated psa.  Severity of the disease is best described as probable organ confined disease. Previous or current management includes biopsy and awaiting treatment. Associated symptoms include erectile dysfunction.     The following portions of the patient's history were reviewed and updated as appropriate: allergies, current medications, past family history, past medical history, past social history, past surgical history and problem list.    Review of Systems   Constitutional: Negative for chills and fever.   Gastrointestinal: Negative for abdominal pain, anal bleeding and blood in stool.   Genitourinary: Negative for flank pain and hematuria.         Current Outpatient Prescriptions:   •  atorvastatin (LIPITOR) 10 MG tablet, Take 10 mg by mouth daily., Disp: , Rfl:   •  levothyroxine (LEVOTHROID) 200 MCG tablet, Take 1 tablet by mouth daily, Disp: , Rfl:   •  lisinopril (PRINIVIL,ZESTRIL) 40 MG tablet, TAKE 1 TABLET BY MOUTH DAILY, Disp: , Rfl: 10  •  nabumetone (RELAFEN) 500 MG tablet, Take 1 tablet by mouth 2 (Two) Times a Day As Needed for Mild Pain (1-3)., Disp: 30 tablet, Rfl: 0  •  SITagliptin (JANUVIA) 100 MG tablet, Take 100 mg by mouth Daily., Disp: , Rfl:   •  tamsulosin (FLOMAX) 0.4 MG capsule 24 hr capsule, Take 1 capsule by mouth every night., Disp: , Rfl:   •  terbinafine (lamiSIL) 250 MG tablet, Take  by mouth., Disp: , Rfl:     Past Medical History:   Diagnosis Date   • Arthritis    • Cancer     thyroid   • Diabetes    • Disease of thyroid gland    • Hypercholesteremia    • Hypertension    • IBS (irritable bowel syndrome)    • Sleep apnea        Past Surgical History:   Procedure Laterality Date   • CHOLECYSTECTOMY     • COLONOSCOPY N/A 3/7/2017    Procedure: COLONOSCOPY WITH  "ANESTHESIA;  Surgeon: Hector Alvarenga MD;  Location: [facility] ENDOSCOPY;  Service:    • PROSTATE SURGERY     • THYROID SURGERY         Social History     Social History   • Marital status:      Spouse name: N/A   • Number of children: N/A   • Years of education: N/A     Social History Main Topics   • Smoking status: Never Smoker   • Smokeless tobacco: Never Used   • Alcohol use No   • Drug use: No   • Sexual activity: Not on file     Other Topics Concern   • Not on file     Social History Narrative       Family History   Problem Relation Age of Onset   • Family history unknown: Yes   • Prostate cancer Father        Temp 98.4 °F (36.9 °C)  Ht 71\" (180.3 cm)  Wt 231 lb (105 kg)  BMI 32.22 kg/m2    Physical Exam  Constitutional: The patient  is oriented to person, place, and time. They  appear well-developed and well-nourished. No distress.   Pulmonary/Chest: Effort normal.   Abdominal: Soft. The patient exhibits no distension and no mass. There is no tenderness. There is no rebound and no guarding. No hernia.   Neurological: Patient is alert and oriented to person, place, and time.   Skin: Skin is warm and dry. Not diaphoretic.   Psychiatric:  normal mood and affect.   Vitals reviewed.      Results for orders placed or performed in visit on 07/20/17   POC Urinalysis Dipstick, Automated   Result Value Ref Range    Color Yellow Yellow, Straw, Dark Yellow, Cindi    Clarity, UA Clear Clear    Glucose, UA Negative Negative, 1000 mg/dL (3+) mg/dL    Bilirubin Negative Negative    Ketones, UA Negative Negative    Specific Gravity  1.025 1.005 - 1.030    Blood, UA Large (A) Negative    pH, Urine 5.5 5.0 - 8.0    Protein, POC Negative Negative mg/dL    Urobilinogen, UA Normal Normal    Leukocytes Negative Negative    Nitrite, UA Negative Negative     Microscopic Urinalysis  I inspected the urine myself based on the clinical situation including the dipstick urine. The urine is spun in a centrifuge for three minutes. " The spun urine shows 3-6 rbc/hpf, 0-2 wbc/hpf, 0-2 epi/hpf, trace bacteria, negative crystals, and negative casts.         Assessment and Plan  Diagnoses and all orders for this visit:    Prostate cancer  -     POC Urinalysis Dipstick, Automated  -     Case Request; Standing  -     ceFAZolin (ANCEF) 2 g in sodium chloride 0.9 % 100 mL IVPB; Infuse 2 g into a venous catheter 1 (One) Time.  -     Case Request    Microscopic hematuria    Other orders  -     Inpatient Admission; Standing  -     Follow Anesthesia Guidelines / Standing Orders; Future  -     Obtain Informed Consent  -     Follow Anesthesia Guidelines / Standing Orders; Standing  -     Verify NPO Status; Standing  -     Obtain Informed Consent; Standing    He opted to undergo robot-assisted lap scopic prostatectomy.  At his previous visit we went over the pros and cons of treatment versus active surveillance.  We discussed the types of therapy that are available and today he is back because he has decided to undergo robotic prostatectomy.  He was present with his wife and we discussed the following  #1.  The technique of robot-assisted laparoscopic prostatectomy including my preference and approaching prostatectomy in this manner versus open prostatectomy.  I explained that the magnification and 3-dimensional view via the robot gives a high definition view that is superior to the view in pelvic surgery.  We talked about the precision of the small instruments in addition to tremor reduction.  Lastly we discussed that robot prostatectomy in my experience leads to shorter hospital stay.  Return to normal activities sooner and less blood loss.  I explained my 7 years of experience with the procedure to draw these conclusions.  #2.  We went over the complications of the procedure in more detail than at the previous visit regarding infection, hernia, blood loss including the possible need for transfusion, incontinence, impotence, infertility with loss of  ejaculation, shortening of the penis and possibly Peyronie's disease, vesicourethral anastomotic leak and its complications including abscess, urinoma, along with bladder neck contracture, urethral stricture, ureteral injury either immediate or delayed, need to convert to open surgery rectal injury including temporary or permanent colostomy possibly, and damage to other intra-abdominal organs.  I explained that there is even a possibility that upon introduction of the trochars and mobilization that there could be a reason to abandon the procedure and recommend external beam radiotherapy and a later time.  #3.  We went over the anticipated postoperative course that included hospital stay, work limitations, and other restrictions that will be recommended following the procedure.  We also discussed management of the catheter postoperatively as well as possible GETACHEW drain.    More than half of this 25 minute visit was spent on counseling/coordinating care.     Hernesto Hong MD  07/22/17  9:12 PM      Cc:LUIS Razo

## 2017-07-21 ENCOUNTER — RESULTS ENCOUNTER (OUTPATIENT)
Dept: UROLOGY | Facility: CLINIC | Age: 67
End: 2017-07-21

## 2017-07-21 DIAGNOSIS — C61 PROSTATE CANCER (HCC): ICD-10-CM

## 2017-07-25 ENCOUNTER — APPOINTMENT (OUTPATIENT)
Dept: PREADMISSION TESTING | Facility: HOSPITAL | Age: 67
End: 2017-07-25

## 2017-07-25 VITALS
OXYGEN SATURATION: 94 % | DIASTOLIC BLOOD PRESSURE: 63 MMHG | HEIGHT: 71 IN | SYSTOLIC BLOOD PRESSURE: 152 MMHG | WEIGHT: 227 LBS | HEART RATE: 85 BPM | BODY MASS INDEX: 31.78 KG/M2

## 2017-07-25 LAB
ALBUMIN SERPL-MCNC: 4.3 G/DL (ref 3.5–5)
ALBUMIN/GLOB SERPL: 1.5 G/DL (ref 1.1–2.5)
ALP SERPL-CCNC: 41 U/L (ref 24–120)
ALT SERPL W P-5'-P-CCNC: 43 U/L (ref 0–54)
ANION GAP SERPL CALCULATED.3IONS-SCNC: 13 MMOL/L (ref 4–13)
AST SERPL-CCNC: 20 U/L (ref 7–45)
BILIRUB SERPL-MCNC: 1.2 MG/DL (ref 0.1–1)
BILIRUB UR QL STRIP: NEGATIVE
BUN BLD-MCNC: 19 MG/DL (ref 5–21)
BUN/CREAT SERPL: 16.4 (ref 7–25)
CALCIUM SPEC-SCNC: 9.1 MG/DL (ref 8.4–10.4)
CHLORIDE SERPL-SCNC: 99 MMOL/L (ref 98–110)
CLARITY UR: CLEAR
CO2 SERPL-SCNC: 27 MMOL/L (ref 24–31)
COLOR UR: YELLOW
CREAT BLD-MCNC: 1.16 MG/DL (ref 0.5–1.4)
DEPRECATED RDW RBC AUTO: 46.5 FL (ref 40–54)
ERYTHROCYTE [DISTWIDTH] IN BLOOD BY AUTOMATED COUNT: 15 % (ref 12–15)
GFR SERPL CREATININE-BSD FRML MDRD: 63 ML/MIN/1.73
GLOBULIN UR ELPH-MCNC: 2.8 GM/DL
GLUCOSE BLD-MCNC: 158 MG/DL (ref 70–100)
GLUCOSE UR STRIP-MCNC: NEGATIVE MG/DL
HCT VFR BLD AUTO: 46.4 % (ref 40–52)
HGB BLD-MCNC: 15.8 G/DL (ref 14–18)
HGB UR QL STRIP.AUTO: NEGATIVE
KETONES UR QL STRIP: NEGATIVE
LEUKOCYTE ESTERASE UR QL STRIP.AUTO: NEGATIVE
MCH RBC QN AUTO: 29.4 PG (ref 28–32)
MCHC RBC AUTO-ENTMCNC: 34.1 G/DL (ref 33–36)
MCV RBC AUTO: 86.2 FL (ref 82–95)
NITRITE UR QL STRIP: NEGATIVE
PH UR STRIP.AUTO: 5.5 [PH] (ref 5–8)
PLATELET # BLD AUTO: 195 10*3/MM3 (ref 130–400)
PMV BLD AUTO: 10.4 FL (ref 6–12)
POTASSIUM BLD-SCNC: 4.3 MMOL/L (ref 3.5–5.3)
PROT SERPL-MCNC: 7.1 G/DL (ref 6.3–8.7)
PROT UR QL STRIP: NEGATIVE
RBC # BLD AUTO: 5.38 10*6/MM3 (ref 4.8–5.9)
SODIUM BLD-SCNC: 139 MMOL/L (ref 135–145)
SP GR UR STRIP: 1.01 (ref 1–1.03)
UROBILINOGEN UR QL STRIP: NORMAL
WBC NRBC COR # BLD: 9.75 10*3/MM3 (ref 4.8–10.8)

## 2017-07-25 PROCEDURE — 93005 ELECTROCARDIOGRAM TRACING: CPT

## 2017-07-25 PROCEDURE — 36415 COLL VENOUS BLD VENIPUNCTURE: CPT | Performed by: UROLOGY

## 2017-07-25 PROCEDURE — 80053 COMPREHEN METABOLIC PANEL: CPT | Performed by: UROLOGY

## 2017-07-25 PROCEDURE — 81003 URINALYSIS AUTO W/O SCOPE: CPT | Performed by: UROLOGY

## 2017-07-25 PROCEDURE — 93010 ELECTROCARDIOGRAM REPORT: CPT | Performed by: INTERNAL MEDICINE

## 2017-07-25 PROCEDURE — 85027 COMPLETE CBC AUTOMATED: CPT | Performed by: UROLOGY

## 2017-07-25 NOTE — DISCHARGE INSTRUCTIONS
DAY OF SURGERY INSTRUCTIONS      Prostatectomy Laparoscopic With Davinci Si Robot -N/A    Hernesto Hong MD    Admission Date:   8/1/2017    ARRIVAL TIME: AS DIRECTED BY OFFICE    DAY OF SURGERY TAKE ONLY THESE MEDICATIONS: NONE        BEFORE YOU COME TO THE HOSPITAL  (Pre-op instructions)  • Do not eat, drink, smoke or chew gum after midnight the night before surgery.  This also includes no mints.  • Morning of surgery take only the medicines you have been instructed with a sip of water unless otherwise instructed  by your physician.  • Do not shave, wear makeup or dark nail polish.  • Remove all jewelry including rings.  • Leave anything you consider valuable at home.  • Leave your suitcase in the car until after your surgery.  • Bring the following with you if applicable:  o Picture ID and insurance, Medicare or Medicaid cards  o Co-pay/deductible required by insurance (cash, check, credit card)  o Copy of advance directive, living will or power-of- documents if not brought to PAT  o CPAP or BIPAP mask and tubing  o Relaxation aids (MP3 player, book, magazine)  • On the day of surgery check in at registration located at the main entrance of the hospital.       Outpatient Surgery Guidelines, Adult  Outpatient procedures are those for which the person having the procedure is allowed to go home the same day as the procedure. Various procedures are done on an outpatient basis. You should follow some general guidelines if you will be having an outpatient procedure.  LET YOUR HEALTH CARE PROVIDER KNOW ABOUT:  · Any allergies you have.  · All medicines you are taking, including vitamins, herbs, eye drops, creams, and over-the-counter medicines.  · Previous problems you or members of your family have had with the use of anesthetics.  · Any blood disorders you have.  · Previous surgeries you have had.  · Medical conditions you have.  RISKS AND COMPLICATIONS  Your health care provider will discuss possible  risks and complications with you before surgery. Common risks and complications include:    · Problems due to the use of anesthetics.  · Blood loss and replacement (does not apply to minor surgical procedures).  · Temporary increase in pain due to surgery.  · Uncorrected pain or problems that the surgery was meant to correct.  · Infection.  · New damage.  BEFORE THE PROCEDURE  · Ask your health care provider about changing or stopping your regular medicines. You may need to stop taking certain medicines in the days or weeks before the procedure.  · Stop smoking at least 2 weeks before surgery. This lowers your risk for complications during and after surgery. Ask your health care provider for help with this if needed.  · Eat your usual meals and a light supper the day before surgery. Continue fluid intake. Do not drink alcohol.  · Do not eat or drink after midnight the night before your surgery.   · Arrange for someone to take you home and to stay with you for 24 hours after the procedure. Medicine given for your procedure may affect your ability to drive or to care for yourself.  · Call your health care provider's office if you develop an illness or problem that may prevent you from safely having your procedure.  AFTER THE PROCEDURE  After surgery, you will be taken to a recovery area, where your progress will be monitored. If there are no complications, you will be allowed to go home when you are awake, stable, and taking fluids well. You may have numbness around the surgical site. Healing will take some time. You will have tenderness at the surgical site and may have some swelling and bruising. You may also have some nausea.  HOME CARE INSTRUCTIONS  · Do not drive for 24 hours, or as directed by your health care provider. Do not drive while taking prescription pain medicines.  · Do not drink alcohol for 24 hours.  · Do not make important decisions or sign legal documents for 24 hours.  · You may resume a normal  diet and activities as directed.  · Do not lift anything heavier than 10 pounds (4.5 kg) or play contact sports until your health care provider says it is okay.  · Change your bandages (dressings) as directed.  · Only take over-the-counter or prescription medicines as directed by your health care provider.  · Follow up with your health care provider as directed.  SEEK MEDICAL CARE IF:  · You have increased bleeding (more than a small spot) from the surgical site.  · You have redness, swelling, or increasing pain in the wound.  · You see pus coming from the wound.  · You have a fever.  · You notice a bad smell coming from the wound or dressing.  · You feel lightheaded or faint.  · You develop a rash.  · You have trouble breathing.  · You develop allergies.  MAKE SURE YOU:  · Understand these instructions.  · Will watch your condition.  · Will get help right away if you are not doing well or get worse.     This information is not intended to replace advice given to you by your health care provider. Make sure you discuss any questions you have with your health care provider.     Document Released: 09/12/2002 Document Revised: 05/03/2016 Document Reviewed: 05/22/2014  [employer] Interactive Patient Education ©2016 [employer]       Fall Prevention in Hospitals, Adult  As a hospital patient, your condition and the treatments you receive can increase your risk for falls. Some additional risk factors for falls in a hospital include:  · Being in an unfamiliar environment.  · Being on bed rest.  · Your surgery.  · Taking certain medicines.  · Your tubing requirements, such as intravenous (IV) therapy or catheters.  It is important that you learn how to decrease fall risks while at the hospital. Below are important tips that can help prevent falls.  SAFETY TIPS FOR PREVENTING FALLS  Talk about your risk of falling.  · Ask your health care provider why you are at risk for falling. Is it your medicine, illness, tubing  placement, or something else?  · Make a plan with your health care provider to keep you safe from falls.  · Ask your health care provider or pharmacist about side effects of your medicines. Some medicines can make you dizzy or affect your coordination.  Ask for help.  · Ask for help before getting out of bed. You may need to press your call button.  · Ask for assistance in getting safely to the toilet.  · Ask for a walker or cane to be put at your bedside. Ask that most of the side rails on your bed be placed up before your health care provider leaves the room.  · Ask family or friends to sit with you.  · Ask for things that are out of your reach, such as your glasses, hearing aids, telephone, bedside table, or call button.  Follow these tips to avoid falling:  · Stay lying or seated, rather than standing, while waiting for help.  · Wear rubber-soled slippers or shoes whenever you walk in the hospital.  · Avoid quick, sudden movements.  ¨ Change positions slowly.  ¨ Sit on the side of your bed before standing.  ¨ Stand up slowly and wait before you start to walk.  · Let your health care provider know if there is a spill on the floor.  · Pay careful attention to the medical equipment, electrical cords, and tubes around you.  · When you need help, use your call button by your bed or in the bathroom. Wait for one of your health care providers to help you.  · If you feel dizzy or unsure of your footing, return to bed and wait for assistance.  · Avoid being distracted by the TV, telephone, or another person in your room.  · Do not lean or support yourself on rolling objects, such as IV poles or bedside tables.     This information is not intended to replace advice given to you by your health care provider. Make sure you discuss any questions you have with your health care provider.     Document Released: 12/15/2001 Document Revised: 01/08/2016 Document Reviewed: 08/25/2013  Elsevier Interactive Patient Education ©2016  [employer]       Surgical Site Infections FAQs  What is a Surgical Site Infection (SSI)?  A surgical site infection is an infection that occurs after surgery in the part of the body where the surgery took place. Most patients who have surgery do not develop an infection. However, infections develop in about 1 to 3 out of every 100 patients who have surgery.  Some of the common symptoms of a surgical site infection are:  · Redness and pain around the area where you had surgery  · Drainage of cloudy fluid from your surgical wound  · Fever  Can SSIs be treated?  Yes. Most surgical site infections can be treated with antibiotics. The antibiotic given to you depends on the bacteria (germs) causing the infection. Sometimes patients with SSIs also need another surgery to treat the infection.  What are some of the things that hospitals are doing to prevent SSIs?  To prevent SSIs, doctors, nurses, and other healthcare providers:  · Clean their hands and arms up to their elbows with an antiseptic agent just before the surgery.  · Clean their hands with soap and water or an alcohol-based hand rub before and after caring for each patient.  · May remove some of your hair immediately before your surgery using electric clippers if the hair is in the same area where the procedure will occur. They should not shave you with a razor.  · Wear special hair covers, masks, gowns, and gloves during surgery to keep the surgery area clean.  · Give you antibiotics before your surgery starts. In most cases, you should get antibiotics within 60 minutes before the surgery starts and the antibiotics should be stopped within 24 hours after surgery.  · Clean the skin at the site of your surgery with a special soap that kills germs.  What can I do to help prevent SSIs?  Before your surgery:  · Tell your doctor about other medical problems you may have. Health problems such as allergies, diabetes, and obesity could affect your surgery and your  treatment.  · Quit smoking. Patients who smoke get more infections. Talk to your doctor about how you can quit before your surgery.  · Do not shave near where you will have surgery. Shaving with a razor can irritate your skin and make it easier to develop an infection.  At the time of your surgery:  · Speak up if someone tries to shave you with a razor before surgery. Ask why you need to be shaved and talk with your surgeon if you have any concerns.  · Ask if you will get antibiotics before surgery.  After your surgery:  · Make sure that your healthcare providers clean their hands before examining you, either with soap and water or an alcohol-based hand rub.  · If you do not see your providers clean their hands, please ask them to do so.  · Family and friends who visit you should not touch the surgical wound or dressings.  · Family and friends should clean their hands with soap and water or an alcohol-based hand rub before and after visiting you. If you do not see them clean their hands, ask them to clean their hands.  What do I need to do when I go home from the hospital?  · Before you go home, your doctor or nurse should explain everything you need to know about taking care of your wound. Make sure you understand how to care for your wound before you leave the hospital.  · Always clean your hands before and after caring for your wound.  · Before you go home, make sure you know who to contact if you have questions or problems after you get home.  · If you have any symptoms of an infection, such as redness and pain at the surgery site, drainage, or fever, call your doctor immediately.  If you have additional questions, please ask your doctor or nurse.  Developed and co-sponsored by The Society for Healthcare Epidemiology of [place] (SHEA); Infectious Diseases Society [place] (IDSA); American Hospital Association; Association for Professionals in Infection Control and Epidemiology (APIC); Centers for Disease  Control and Prevention (CDC); and The Joint Commission.     This information is not intended to replace advice given to you by your health care provider. Make sure you discuss any questions you have with your health care provider.     Document Released: 12/23/2014 Document Revised: 01/08/2016 Document Reviewed: 03/02/2016  [employer] Interactive Patient Education ©2016 [employer].       [facility]  CHG 4% Patient Instruction Sheet    Preparing the Skin Before Surgery  Preparing or “prepping” skin before surgery can reduce the risk of infection at the surgical site. To make the process easier,[facility] has chosen 4% Chlorhexidine Gluconate (CHG) antiseptic solution.   The steps below outline the prepping process and should be carefully followed.                                                                                                                                                      Prep the skin at the following time(s):                                                      We recommend you shower the night before surgery, and again the morning of surgery with the 4% CHG antiseptic solution using half of the bottle and a cloth each time.  Dress in clean clothes/sleepwear after showering.  See instructions below for application.          Do not apply any lotions or moisturizers.       Do not shave the area to be prepped for at least 2 days prior to surgery.    Clipping the hair may be done immediately prior to your surgery at the hospital    if needed.    Directions:  Thoroughly rinse your body with water.  Apply 4% CHG to a cloth and wash skin gently, paying special attention to the operative site.  Rinse again thoroughly.  Once you have begun using this product do not apply anything else to your skin. If itching or redness persists, rinse affected areas and discontinue use.    When using this product:  • Keep out of eyes, ears, and mouth.  • If solution should contact these areas, rinse out promptly  and thoroughly with water.  • For external use only.  • Do not use in genital area, on your face or head.      PATIENT/FAMILY/RESPONSIBLE PARTY VERBALIZES UNDERSTANDING OF ABOVE EDUCATION.  COPY OF PAIN SCALE GIVEN AND REVIEWED WITH VERBALIZED UNDERSTANDING.

## 2017-07-31 ENCOUNTER — ANESTHESIA EVENT (OUTPATIENT)
Dept: PERIOP | Facility: HOSPITAL | Age: 67
End: 2017-07-31

## 2017-07-31 ENCOUNTER — TELEPHONE (OUTPATIENT)
Dept: UROLOGY | Facility: CLINIC | Age: 67
End: 2017-07-31

## 2017-07-31 DIAGNOSIS — C61 PROSTATE CANCER (HCC): Primary | ICD-10-CM

## 2017-07-31 NOTE — TELEPHONE ENCOUNTER
----- Message from Dirk Espinal sent at 7/31/2017  2:22 PM CDT -----  Regarding: LAB ORDER  NEED TYPE AND SCREEN ORDER PUT IN FOR THIS PATIENT FOR SURGERY JACINTA

## 2017-08-01 ENCOUNTER — ANESTHESIA (OUTPATIENT)
Dept: PERIOP | Facility: HOSPITAL | Age: 67
End: 2017-08-01

## 2017-08-01 ENCOUNTER — HOSPITAL ENCOUNTER (INPATIENT)
Facility: HOSPITAL | Age: 67
LOS: 11 days | Discharge: HOME | End: 2017-08-12
Attending: UROLOGY | Admitting: UROLOGY

## 2017-08-01 DIAGNOSIS — C61 PROSTATE CANCER (HCC): ICD-10-CM

## 2017-08-01 LAB
ABO GROUP BLD: NORMAL
BLD GP AB SCN SERPL QL: NEGATIVE
GLUCOSE BLDC GLUCOMTR-MCNC: 129 MG/DL (ref 70–130)
GLUCOSE BLDC GLUCOMTR-MCNC: 191 MG/DL (ref 70–130)
RH BLD: POSITIVE

## 2017-08-01 PROCEDURE — 25010000003 CEFAZOLIN PER 500 MG: Performed by: UROLOGY

## 2017-08-01 PROCEDURE — 0VT04ZZ RESECTION OF PROSTATE, PERCUTANEOUS ENDOSCOPIC APPROACH: ICD-10-PCS | Performed by: UROLOGY

## 2017-08-01 PROCEDURE — 82962 GLUCOSE BLOOD TEST: CPT

## 2017-08-01 PROCEDURE — 25010000002 MIDAZOLAM PER 1 MG: Performed by: ANESTHESIOLOGY

## 2017-08-01 PROCEDURE — 86850 RBC ANTIBODY SCREEN: CPT | Performed by: UROLOGY

## 2017-08-01 PROCEDURE — 25010000002 ONDANSETRON PER 1 MG: Performed by: UROLOGY

## 2017-08-01 PROCEDURE — 55866 LAPS SURG PRST8ECT RPBIC RAD: CPT | Performed by: UROLOGY

## 2017-08-01 PROCEDURE — 94799 UNLISTED PULMONARY SVC/PX: CPT

## 2017-08-01 PROCEDURE — 36415 COLL VENOUS BLD VENIPUNCTURE: CPT

## 2017-08-01 PROCEDURE — 25010000002 ONDANSETRON PER 1 MG: Performed by: NURSE ANESTHETIST, CERTIFIED REGISTERED

## 2017-08-01 PROCEDURE — 25010000002 HYDROMORPHONE PER 4 MG: Performed by: ANESTHESIOLOGY

## 2017-08-01 PROCEDURE — 86900 BLOOD TYPING SEROLOGIC ABO: CPT | Performed by: UROLOGY

## 2017-08-01 PROCEDURE — 25010000002 PROPOFOL 10 MG/ML EMULSION: Performed by: NURSE ANESTHETIST, CERTIFIED REGISTERED

## 2017-08-01 PROCEDURE — 86901 BLOOD TYPING SEROLOGIC RH(D): CPT | Performed by: UROLOGY

## 2017-08-01 PROCEDURE — 25010000002 NEOSTIGMINE PER 0.5 MG: Performed by: NURSE ANESTHETIST, CERTIFIED REGISTERED

## 2017-08-01 PROCEDURE — 25010000002 MORPHINE PER 10 MG: Performed by: UROLOGY

## 2017-08-01 PROCEDURE — 25010000002 ONDANSETRON PER 1 MG: Performed by: ANESTHESIOLOGY

## 2017-08-01 PROCEDURE — 25010000002 KETOROLAC TROMETHAMINE PER 15 MG: Performed by: UROLOGY

## 2017-08-01 PROCEDURE — 88309 TISSUE EXAM BY PATHOLOGIST: CPT | Performed by: UROLOGY

## 2017-08-01 PROCEDURE — 8E0W4CZ ROBOTIC ASSISTED PROCEDURE OF TRUNK REGION, PERCUTANEOUS ENDOSCOPIC APPROACH: ICD-10-PCS | Performed by: UROLOGY

## 2017-08-01 RX ORDER — LEVOTHYROXINE SODIUM 0.2 MG/1
200 TABLET ORAL
Status: DISCONTINUED | OUTPATIENT
Start: 2017-08-01 | End: 2017-08-05

## 2017-08-01 RX ORDER — SODIUM CHLORIDE, SODIUM LACTATE, POTASSIUM CHLORIDE, CALCIUM CHLORIDE 600; 310; 30; 20 MG/100ML; MG/100ML; MG/100ML; MG/100ML
100 INJECTION, SOLUTION INTRAVENOUS CONTINUOUS
Status: DISCONTINUED | OUTPATIENT
Start: 2017-08-01 | End: 2017-08-01 | Stop reason: HOSPADM

## 2017-08-01 RX ORDER — HYDROCODONE BITARTRATE AND ACETAMINOPHEN 7.5; 325 MG/1; MG/1
2 TABLET ORAL EVERY 4 HOURS PRN
Status: COMPLETED | OUTPATIENT
Start: 2017-08-01 | End: 2017-08-04

## 2017-08-01 RX ORDER — LIDOCAINE HYDROCHLORIDE 20 MG/ML
INJECTION, SOLUTION INFILTRATION; PERINEURAL AS NEEDED
Status: DISCONTINUED | OUTPATIENT
Start: 2017-08-01 | End: 2017-08-01 | Stop reason: SURG

## 2017-08-01 RX ORDER — SODIUM CHLORIDE, SODIUM LACTATE, POTASSIUM CHLORIDE, CALCIUM CHLORIDE 600; 310; 30; 20 MG/100ML; MG/100ML; MG/100ML; MG/100ML
20 INJECTION, SOLUTION INTRAVENOUS CONTINUOUS
Status: DISCONTINUED | OUTPATIENT
Start: 2017-08-01 | End: 2017-08-01 | Stop reason: HOSPADM

## 2017-08-01 RX ORDER — NALOXONE HCL 0.4 MG/ML
0.04 VIAL (ML) INJECTION AS NEEDED
Status: DISCONTINUED | OUTPATIENT
Start: 2017-08-01 | End: 2017-08-01 | Stop reason: HOSPADM

## 2017-08-01 RX ORDER — ONDANSETRON 2 MG/ML
4 INJECTION INTRAMUSCULAR; INTRAVENOUS EVERY 6 HOURS PRN
Status: DISCONTINUED | OUTPATIENT
Start: 2017-08-01 | End: 2017-08-04

## 2017-08-01 RX ORDER — LISINOPRIL 20 MG/1
40 TABLET ORAL
Status: DISCONTINUED | OUTPATIENT
Start: 2017-08-01 | End: 2017-08-05

## 2017-08-01 RX ORDER — MORPHINE SULFATE 2 MG/ML
2 INJECTION, SOLUTION INTRAMUSCULAR; INTRAVENOUS AS NEEDED
Status: DISCONTINUED | OUTPATIENT
Start: 2017-08-01 | End: 2017-08-01 | Stop reason: HOSPADM

## 2017-08-01 RX ORDER — IPRATROPIUM BROMIDE AND ALBUTEROL SULFATE 2.5; .5 MG/3ML; MG/3ML
3 SOLUTION RESPIRATORY (INHALATION) ONCE AS NEEDED
Status: DISCONTINUED | OUTPATIENT
Start: 2017-08-01 | End: 2017-08-01 | Stop reason: HOSPADM

## 2017-08-01 RX ORDER — FAMOTIDINE 10 MG/ML
20 INJECTION, SOLUTION INTRAVENOUS
Status: DISCONTINUED | OUTPATIENT
Start: 2017-08-01 | End: 2017-08-01 | Stop reason: HOSPADM

## 2017-08-01 RX ORDER — VASOPRESSIN 20 U/ML
INJECTION PARENTERAL AS NEEDED
Status: DISCONTINUED | OUTPATIENT
Start: 2017-08-01 | End: 2017-08-01 | Stop reason: SURG

## 2017-08-01 RX ORDER — NALOXONE HCL 0.4 MG/ML
0.4 VIAL (ML) INJECTION
Status: DISCONTINUED | OUTPATIENT
Start: 2017-08-01 | End: 2017-08-12 | Stop reason: HOSPADM

## 2017-08-01 RX ORDER — MEPERIDINE HYDROCHLORIDE 25 MG/ML
12.5 INJECTION INTRAMUSCULAR; INTRAVENOUS; SUBCUTANEOUS
Status: DISCONTINUED | OUTPATIENT
Start: 2017-08-01 | End: 2017-08-01 | Stop reason: HOSPADM

## 2017-08-01 RX ORDER — ONDANSETRON 4 MG/1
4 TABLET, FILM COATED ORAL EVERY 6 HOURS PRN
Status: DISCONTINUED | OUTPATIENT
Start: 2017-08-01 | End: 2017-08-04

## 2017-08-01 RX ORDER — MIDAZOLAM HYDROCHLORIDE 1 MG/ML
2 INJECTION INTRAMUSCULAR; INTRAVENOUS
Status: DISCONTINUED | OUTPATIENT
Start: 2017-08-01 | End: 2017-08-01 | Stop reason: HOSPADM

## 2017-08-01 RX ORDER — SODIUM CHLORIDE 0.9 % (FLUSH) 0.9 %
1-10 SYRINGE (ML) INJECTION AS NEEDED
Status: DISCONTINUED | OUTPATIENT
Start: 2017-08-01 | End: 2017-08-01 | Stop reason: HOSPADM

## 2017-08-01 RX ORDER — SODIUM CHLORIDE 450 MG/100ML
150 INJECTION, SOLUTION INTRAVENOUS CONTINUOUS
Status: DISCONTINUED | OUTPATIENT
Start: 2017-08-01 | End: 2017-08-03

## 2017-08-01 RX ORDER — SUFENTANIL CITRATE 50 UG/ML
INJECTION EPIDURAL; INTRAVENOUS AS NEEDED
Status: DISCONTINUED | OUTPATIENT
Start: 2017-08-01 | End: 2017-08-01 | Stop reason: SURG

## 2017-08-01 RX ORDER — ONDANSETRON 2 MG/ML
INJECTION INTRAMUSCULAR; INTRAVENOUS AS NEEDED
Status: DISCONTINUED | OUTPATIENT
Start: 2017-08-01 | End: 2017-08-01 | Stop reason: SURG

## 2017-08-01 RX ORDER — MORPHINE SULFATE 10 MG/ML
6 INJECTION INTRAMUSCULAR; INTRAVENOUS; SUBCUTANEOUS
Status: DISPENSED | OUTPATIENT
Start: 2017-08-01 | End: 2017-08-11

## 2017-08-01 RX ORDER — MIDAZOLAM HYDROCHLORIDE 1 MG/ML
1 INJECTION INTRAMUSCULAR; INTRAVENOUS
Status: DISCONTINUED | OUTPATIENT
Start: 2017-08-01 | End: 2017-08-01 | Stop reason: HOSPADM

## 2017-08-01 RX ORDER — MAGNESIUM HYDROXIDE 1200 MG/15ML
LIQUID ORAL AS NEEDED
Status: DISCONTINUED | OUTPATIENT
Start: 2017-08-01 | End: 2017-08-01 | Stop reason: HOSPADM

## 2017-08-01 RX ORDER — FLUMAZENIL 0.1 MG/ML
0.2 INJECTION INTRAVENOUS AS NEEDED
Status: DISCONTINUED | OUTPATIENT
Start: 2017-08-01 | End: 2017-08-01 | Stop reason: HOSPADM

## 2017-08-01 RX ORDER — GLYCOPYRROLATE 0.2 MG/ML
INJECTION INTRAMUSCULAR; INTRAVENOUS AS NEEDED
Status: DISCONTINUED | OUTPATIENT
Start: 2017-08-01 | End: 2017-08-01 | Stop reason: SURG

## 2017-08-01 RX ORDER — PHENYLEPHRINE HCL IN 0.9% NACL 0.8MG/10ML
SYRINGE (ML) INTRAVENOUS AS NEEDED
Status: DISCONTINUED | OUTPATIENT
Start: 2017-08-01 | End: 2017-08-01 | Stop reason: SURG

## 2017-08-01 RX ORDER — ONDANSETRON 2 MG/ML
4 INJECTION INTRAMUSCULAR; INTRAVENOUS AS NEEDED
Status: DISCONTINUED | OUTPATIENT
Start: 2017-08-01 | End: 2017-08-01 | Stop reason: HOSPADM

## 2017-08-01 RX ORDER — ACETAMINOPHEN 325 MG/1
650 TABLET ORAL EVERY 4 HOURS PRN
Status: DISCONTINUED | OUTPATIENT
Start: 2017-08-01 | End: 2017-08-05

## 2017-08-01 RX ORDER — PROPOFOL 10 MG/ML
VIAL (ML) INTRAVENOUS AS NEEDED
Status: DISCONTINUED | OUTPATIENT
Start: 2017-08-01 | End: 2017-08-01 | Stop reason: SURG

## 2017-08-01 RX ORDER — METOCLOPRAMIDE HYDROCHLORIDE 5 MG/ML
5 INJECTION INTRAMUSCULAR; INTRAVENOUS
Status: DISCONTINUED | OUTPATIENT
Start: 2017-08-01 | End: 2017-08-01 | Stop reason: HOSPADM

## 2017-08-01 RX ORDER — HYDRALAZINE HYDROCHLORIDE 20 MG/ML
5 INJECTION INTRAMUSCULAR; INTRAVENOUS
Status: DISCONTINUED | OUTPATIENT
Start: 2017-08-01 | End: 2017-08-01 | Stop reason: HOSPADM

## 2017-08-01 RX ORDER — KETOROLAC TROMETHAMINE 15 MG/ML
15 INJECTION, SOLUTION INTRAMUSCULAR; INTRAVENOUS EVERY 6 HOURS SCHEDULED
Status: COMPLETED | OUTPATIENT
Start: 2017-08-01 | End: 2017-08-02

## 2017-08-01 RX ORDER — ONDANSETRON 4 MG/1
4 TABLET, ORALLY DISINTEGRATING ORAL EVERY 6 HOURS PRN
Status: DISCONTINUED | OUTPATIENT
Start: 2017-08-01 | End: 2017-08-04

## 2017-08-01 RX ORDER — ROCURONIUM BROMIDE 10 MG/ML
INJECTION, SOLUTION INTRAVENOUS AS NEEDED
Status: DISCONTINUED | OUTPATIENT
Start: 2017-08-01 | End: 2017-08-01 | Stop reason: SURG

## 2017-08-01 RX ORDER — LABETALOL HYDROCHLORIDE 5 MG/ML
5 INJECTION, SOLUTION INTRAVENOUS
Status: DISCONTINUED | OUTPATIENT
Start: 2017-08-01 | End: 2017-08-01 | Stop reason: HOSPADM

## 2017-08-01 RX ORDER — HYDROCODONE BITARTRATE AND ACETAMINOPHEN 7.5; 325 MG/1; MG/1
1 TABLET ORAL EVERY 4 HOURS PRN
Status: DISPENSED | OUTPATIENT
Start: 2017-08-02 | End: 2017-08-04

## 2017-08-01 RX ADMIN — ONDANSETRON HYDROCHLORIDE 4 MG: 2 SOLUTION INTRAMUSCULAR; INTRAVENOUS at 15:05

## 2017-08-01 RX ADMIN — KETOROLAC TROMETHAMINE 15 MG: 15 INJECTION, SOLUTION INTRAMUSCULAR; INTRAVENOUS at 18:02

## 2017-08-01 RX ADMIN — MORPHINE SULFATE 10 MG: 10 INJECTION, SOLUTION INTRAMUSCULAR; INTRAVENOUS at 19:55

## 2017-08-01 RX ADMIN — ROCURONIUM BROMIDE 50 MG: 10 INJECTION INTRAVENOUS at 11:38

## 2017-08-01 RX ADMIN — GLYCOPYRROLATE 0.4 MG: 0.2 INJECTION, SOLUTION INTRAMUSCULAR; INTRAVENOUS at 15:05

## 2017-08-01 RX ADMIN — KETOROLAC TROMETHAMINE 15 MG: 15 INJECTION, SOLUTION INTRAMUSCULAR; INTRAVENOUS at 23:27

## 2017-08-01 RX ADMIN — Medication 160 MCG: at 13:09

## 2017-08-01 RX ADMIN — SODIUM CHLORIDE, POTASSIUM CHLORIDE, SODIUM LACTATE AND CALCIUM CHLORIDE 100 ML/HR: 600; 310; 30; 20 INJECTION, SOLUTION INTRAVENOUS at 09:34

## 2017-08-01 RX ADMIN — SODIUM CHLORIDE, POTASSIUM CHLORIDE, SODIUM LACTATE AND CALCIUM CHLORIDE 20 ML/HR: 600; 310; 30; 20 INJECTION, SOLUTION INTRAVENOUS at 09:30

## 2017-08-01 RX ADMIN — HYDROMORPHONE HYDROCHLORIDE 1 MG: 1 INJECTION, SOLUTION INTRAMUSCULAR; INTRAVENOUS; SUBCUTANEOUS at 15:50

## 2017-08-01 RX ADMIN — ROCURONIUM BROMIDE 20 MG: 10 INJECTION INTRAVENOUS at 12:29

## 2017-08-01 RX ADMIN — SUFENTANIL CITRATE 50 MCG: 50 INJECTION, SOLUTION EPIDURAL; INTRAVENOUS at 11:37

## 2017-08-01 RX ADMIN — FAMOTIDINE 20 MG: 10 INJECTION, SOLUTION INTRAVENOUS at 10:22

## 2017-08-01 RX ADMIN — ONDANSETRON 4 MG: 2 INJECTION INTRAMUSCULAR; INTRAVENOUS at 15:41

## 2017-08-01 RX ADMIN — PROPOFOL 100 MG: 10 INJECTION, EMULSION INTRAVENOUS at 11:37

## 2017-08-01 RX ADMIN — CEFAZOLIN SODIUM 2 G: 2 SOLUTION INTRAVENOUS at 11:51

## 2017-08-01 RX ADMIN — EPHEDRINE SULFATE 10 MG: 50 INJECTION INTRAMUSCULAR; INTRAVENOUS; SUBCUTANEOUS at 11:47

## 2017-08-01 RX ADMIN — Medication 3 MG: at 15:05

## 2017-08-01 RX ADMIN — ONDANSETRON 4 MG: 2 INJECTION INTRAMUSCULAR; INTRAVENOUS at 19:57

## 2017-08-01 RX ADMIN — LISINOPRIL 40 MG: 20 TABLET ORAL at 18:04

## 2017-08-01 RX ADMIN — CEFAZOLIN SODIUM 2 G: 2 SOLUTION INTRAVENOUS at 18:09

## 2017-08-01 RX ADMIN — Medication 80 MCG: at 11:54

## 2017-08-01 RX ADMIN — EPHEDRINE SULFATE 10 MG: 50 INJECTION INTRAMUSCULAR; INTRAVENOUS; SUBCUTANEOUS at 11:51

## 2017-08-01 RX ADMIN — LINAGLIPTIN 5 MG: 5 TABLET, FILM COATED ORAL at 18:04

## 2017-08-01 RX ADMIN — Medication 80 MCG: at 11:45

## 2017-08-01 RX ADMIN — ROCURONIUM BROMIDE 30 MG: 10 INJECTION INTRAVENOUS at 12:17

## 2017-08-01 RX ADMIN — LIDOCAINE HYDROCHLORIDE 100 MG: 20 INJECTION, SOLUTION INFILTRATION; PERINEURAL at 11:37

## 2017-08-01 RX ADMIN — SODIUM CHLORIDE 150 ML/HR: 4.5 INJECTION, SOLUTION INTRAVENOUS at 17:31

## 2017-08-01 RX ADMIN — MIDAZOLAM HYDROCHLORIDE 2 MG: 1 INJECTION, SOLUTION INTRAMUSCULAR; INTRAVENOUS at 10:23

## 2017-08-01 RX ADMIN — LEVOTHYROXINE SODIUM 200 MCG: 200 TABLET ORAL at 18:04

## 2017-08-01 RX ADMIN — LIDOCAINE HYDROCHLORIDE 0.5 ML: 10 INJECTION, SOLUTION EPIDURAL; INFILTRATION; INTRACAUDAL; PERINEURAL at 09:21

## 2017-08-01 RX ADMIN — SUFENTANIL CITRATE 50 MCG: 50 INJECTION, SOLUTION EPIDURAL; INTRAVENOUS at 12:29

## 2017-08-01 RX ADMIN — VASOPRESSIN 1 UNITS: 20 INJECTION INTRAVENOUS at 11:50

## 2017-08-01 RX ADMIN — EPHEDRINE SULFATE 10 MG: 50 INJECTION INTRAMUSCULAR; INTRAVENOUS; SUBCUTANEOUS at 11:49

## 2017-08-01 RX ADMIN — METHYLENE BLUE 10 ML: 10 INJECTION INTRAVENOUS at 13:08

## 2017-08-01 RX ADMIN — SUFENTANIL CITRATE 20 MCG: 50 INJECTION, SOLUTION EPIDURAL; INTRAVENOUS at 13:55

## 2017-08-01 NOTE — OP NOTE
Operative Summary    Zay Kamara  Date of Procedure: 8/1/2017    Pre-op Diagnosis:   Prostate cancer [C61]    Post-op Diagnosis:     Post-Op Diagnosis Codes:     * Prostate cancer [C61]    Procedure/CPT® Codes:      Procedure(s):  PROSTATECTOMY LAPAROSCOPIC WITH DAVINCI SI ROBOT     Surgeon(s):  Hernesto Hong MD    Anesthesia: General    Staff:   Circulator: Radha Lafleur RN; Stefan Kuhn RN  Scrub Person: Daniel Freire; Rylee Chery; Darron Murillo  Assistant: Bruna Garcia    Indications for procedure:  This 66-year-old white male has low risk prostate cancer at 2 of 16 cores showing Minerva 3+3= 6.  He has opted for robot prostatectomy.    Procedure details:  The patient is identified in the preoperative holding area.  The informed consent process had been completed In the office documented by my last progress note that included a discussion of the risks of this procedure, alternative management options, and the potential benefits of this procedure.  The patient voiced a good recollection of that discussion.  The patient voiced no additional questions.     The patient is taken to the operating room and given effective general anesthesia. The patient is then placed in theBody position: dorsal lithotomy position with care being placed to appropriately pad the patient to avoid excessive compression with the Donald stirrups, especially laterally to the leg on the peroneal nerve.  The patient is then prepped and draped in the usual sterile fashion. A sterile 18FR corbin catheter is placed at this point.  Appropriate timeout protocol was observed.    The anterior portion abdominal walls examined closely.  There are no previous incisions noted.  An 11 blade is used to make a small incision just above the umbilicus.  A Veress needle was used and passed gently into the abdomen.  Definitive pops to the midline fascia and peritoneum were felt.  At this point CO2 insufflation was started.  I started  out with low flow and observe the pressures closely.  A slow increase in the abdominal pressure was noted.  Once examination noted the abdomen was distending appropriately, I increased the patient the high flow.  Pressure maximum was set on 15.  Once this was observed the Veress needle was removed.  At this point a 15 mm bariatric trocar was placed, mainly for the extra length, with a blade less trocar.  The obturator is removed and the 30° robotic camera is inserted.  The intra-abdominal contents are examined.  There was no evidence of trauma from the Veress needle.  No significant adhesions are noted.  There is no evidence of inguinal hernia.  The sigmoid colon as well as the distal ileum and cecum showed no evidence of an obvious mass.    The anterior abdominal wall is inspected.  I used the standard trocar placements for robot prostatectomy.  Initially I placed an 8.5 Kenyan da Curry trocar site about 2 fingerbreadths cephalad and medial to the left anterior superior iliac spine.  Arm #3 of the robot would be attached to this trocar Then 1 handbreadth from the camera port, which was near the left midclavicular line, I placed another 8.5 Kenyan da Curry trocar.  Arm #2 of the robot would be attached to this trocar.  These were both placed under direct vision.  There was no evidence of excessive bleeding.    I then turned my attention to the right lower quadrant.  Again under direct vision and 1 handbreadth from the camera port near the right midclavicular line, I placed another 8.5 Kenyan da Curry trocar.  Arm #3 of the robot would be attached to this trocar.  Then in the right lower quadrant approximately 2 fingerbreadths medial and cephalad to the anterior superior iliac spine, I placed a 12 mm air seal port.  CO2 insufflation at this point was changed to the air seal system which allowed constant insufflation throughout the case.  Pressure would be limited to 15 throughout the entire case.  At this point I  placed 2 separate 1 Vicryl sutures using the Vik Rojas needle to prevent hernia.    Lastly in the right upper quadrant, approximately 1 handbreadth cephalad to the camera port and midway between the right midclavicular line port and the camera port, I placed a 5 mm trocar for the assistant to use throughout the case.    With all trochars placed in the standard fashion and without evidence of trauma, the patient is placed in the Trendelenburg position at this point the da Curry SI robot is docked in the standard fashion.    The initial step was to take down an anterior strip of the peritoneum lateral to the median umbilical ligaments on both sides and connect these just below the umbilicus.  As I took this plane down I could identify the inferior aspect of the pubic bone which was the initial landmark.  On both sides the posterior perineal incision is carried around laterally towards the vas deferens.  This exposed the obturator forest package.  On both sides at that posterior perineal incision is carried along the external iliac artery until the common artery iliac is identified.  The ureter is identified but not exposed on both sides.    Attention was turned to removing the prostate gland. This was a very large prostate gland with large intravesical portion of the gland.   Fat is removed off the anterior surface of the prostate.  The endopelvic fascia is then opened laterally on both sides.  The levator ani fibers are pushed away laterally to define the lateral aspect of the prostate.  The general area of the  bladder neck is then identified by manipulating the catheter, compressing the lateral aspect of the bladder with these and then noting the prostate gland was not compressible, and looking for the slight angle created by the base of the prostate and the bladder. It was difficult to tell given the size to the gland.   At this point I began to cut down on the area anteriorly that I felt defined the base of  the prostate from the bladder neck.  Circular fibers of the detrusor muscle are noted.  A relatively bloodless plane is also identified to make me feel comfortable I was in this area. The bladder was entered at this point. A large intravesical portion of the prostate is noted, particularly anterior. The ureteral orifices were identified and cannulated with 5 Slovenian whistle tip catheters on both sides. This dissection is carried around laterally so that the bladder mucosa and the most proximal aspect of the urethral mucosa is opened well away from the trigone.  The posterior aspect of the urethra and bladder neck were then identified.  The balloon is deflated and the tip of the catheter is pulled into the posterior urethra.    Once I was through the posterior lip of the bladder neck, I could see the fibers of Denonivillier's fascia as well as fat that surrounds the vas deferens and seminal vesicles.  I initially dissected out the right vas deferens to the level of what I felt would be the tip of the seminal vesicles.  The identical maneuver was used on the contralateral side.  I felt it I was away from the nerve bundle enough to comfortably use cautery in this area.  I then dissected out the seminal vesicles doing the right side prior to the left.  There was no obvious evidence of metastatic disease to the seminal vesicles.  I dissected the medial aspect initially and then freed the lateral aspect from what was felt to be the neurovascular bundle.  Care was taken to avoid the use of electrocautery from about the midportion of the seminal vesicle all the way to the tip.  I did use bipolar cautery to cauterize the artery at the very tip of the seminal vesicle holding it medially away from the remainder of the neurovascular bundle.  The lateral most attachments of the bladder to the prostate are then taken down with the bipolar energy.  This created a nice plane as well as good hemostasis.  The anteriormost aspect of  the neurovascular bundle was now identified.  At this point the base of the prostate, and the seminal vesicles were free.    I then turned my attention to the posterior aspect of the prostate gland.  Caudal to the seminal vesicles and vas deferens which were now retracted anteriorly I developed this plane between the prerectal fat and  Denonvilliers' fascia.  fascia.  I did this in the midline initially and then swept this laterally with a combination of both sharp and blunt dissection with the instruments.  I was able to free the prostate down very near the apex.  There was no obvious evidence of extracapsular involvement nor invasion to the prerectal fat.    Now the prostate free at its base and posterior aspect, I turned my attention to doing a nerve sparing prostatectomy.  I used the same technique on the left as the right to do a bilateral nerve sparing approach as follows: The lateral prostatic fascia is divided about midway up on the prostate as defined by the lateral coursing vein that goes all the way to the apex of the prostate.  I identified the plane between the lateral wall the prostate and the neurovascular bundle at about the mid level of the prostate.  This is dissected both apically and towards the base of the prostate.  At the base of the prostate was felt to be the largest portion of the blood supply to the gland.  Where the blood supply coursed anterior laterally to the prostate, I had dissected this free so that I could insert the vessel sealer and divided this neurovascular tissue.  I used scissors without cautery to dissected the lateral aspect of the prostate all the way down apically.  Any obvious vessel but needed to be controlled at this point was divided after a titanium clip was applied.    With the prostate now free at its base, posteriorly, and laterally, I turned my attention to the apex of the prostate.  The levator fibers were swept away from the apex.  The puboprostatic ligaments  are identified and divided using the electrocautery scissors.  The plane between the dorsal vein and the anterior aspect of the urethra is developed.  Care was taken to avoid entering the apical tissue of the prostate posteriorly.  I then placed 1 Vicryl suture around the dorsal vein to prevent backbleeding.  The dorsal vein at this point is divided.  I used scissors to remove the anterior attachments of the endopelvic fascia and left later fibers from the apex of the prostate.  The urethra circumferentially dissected out and divided anteriorly.  The catheters pulled back into the bulbar urethra at this point.  The posterior lip urethra was then divided.  Slight retraction is noted but the mucosa was still visible.  The final attachments from the posterior, apical button of the prostate are removed from the endopelvic fascia sharply.  Then and a barrel rolled type technique, any remaining attachments from apex to the neurovascular bundle, the posterior striated sphincter, and the prerectal fat were divided sharply without cautery.  The prostate was now free and placed in a laparoscopic specimen removal bag and placed in the upper abdomen.  Hemostatic agent Lian was placed in the prostatic fossa for additional hemostasis.    A vesicourethral anastomosis is carried out with a 2-0 Stratofix suture in a running nature.  This was started at anatomic 6 o'clock for a mucosa to mucosa anastomosis.  Perineal pressure with a sponge stick by the assistant 80 did an exposure of the urethral stump.  Care was taken not to incorporate the neurovascular bundle.  At the conclusion of the anastomosis, the bladder is irrigated free of clot and the anastomosis expose for leak.  It did appear to be watertight.    At this point CO2 insufflation pressures were dropped to 6 and the pelvis inspected.  There was no active venous or arterial bleeding.  A 15 mm round GETACHEW drain is brought through the 5 mm trocar site and placed in the space or  Retzius.  That trocar is removed and the drain is secured at the level of the skin with a 2-0 silk suture.  The robotic instruments are removed and the robot is undocked in standard fashion.  The patient is then taken out of Trendelenburg position.    Using standard lap scopic instruments the strings of both specimen removal bag's, the forest package and the prostate are brought out the camera port.  CO2 insufflation is halted and all trochars are removed.  Manipulation anterior abdominal wall to remove CO2 gas is then attempted with external pressure.  The camera port incision is extended to allow removal of the specimen bags.  The previously placed Vicryl sutures for the 12 mm air seal port are then tied.  The rectus fascia of the camera is approximated using interrupted 0 PDS sutures.  All skin is closed with 4-0 Vicryl in a subcuticular nature.  Sterile dressing is applied.  The catheter is secured.  The bulb was attached with suction to the GETACHEW drain.    Patient is extubated after appropriate respiratory parameters were met per anesthesia.  The patient is transferred to recovery room in good condition.    Estimated Blood Loss: 150 mL    Specimens:                  ID Type Source Tests Collected by Time Destination   A : Prostate Tissue Prostate TISSUE EXAM Hernesto Hong MD 8/1/2017 1256          Drains:   Drain/Device Site 08/01/17 1442 Right lower abdomen collapsible closed device (Active)       Urethral Catheter 08/01/17 1226 100% silicone 20 10 10 (Active)           Complications: none    Plan: We will leave the corbin for 14 days.     Hernesto Hong MD     Date: 8/1/2017  Time: 3:35 PM

## 2017-08-01 NOTE — ANESTHESIA POSTPROCEDURE EVALUATION
Patient: Zay Kamara    Procedure Summary     Date Anesthesia Start Anesthesia Stop Room / Location    08/01/17 1133 1533 [facility] OR 14 / [facility]       Procedure Diagnosis Surgeon Provider    PROSTATECTOMY LAPAROSCOPIC WITH DAVINCI SI ROBOT  (N/A Abdomen) Prostate cancer  (Prostate cancer [C61]) MD Crow Bella CRNA          Anesthesia Type: general  Last vitals  BP   166/74 (08/01/17 1649)    Temp   97.1 °F (36.2 °C) (08/01/17 1635)    Pulse   100 (08/01/17 1649)   Resp   18 (08/01/17 1649)    SpO2   95 % (08/01/17 1649)      Post Anesthesia Care and Evaluation    Patient location during evaluation: PACU  Patient participation: complete - patient participated  Level of consciousness: awake and alert  Pain management: adequate  Airway patency: patent  Anesthetic complications: No anesthetic complications    Cardiovascular status: acceptable  Respiratory status: acceptable  Hydration status: acceptable

## 2017-08-01 NOTE — H&P (VIEW-ONLY)
Mr. Kamara is 66 y.o. male    CHIEF COMPLAINT: I am here to discuss my prostate cancer.     HPI  Prostate cancer   He was initially diagnosed with prostate cancer about  3 week(s) ago. This was identified in the context of elevated psa.  Severity of the disease is best described as probable organ confined disease. Previous or current management includes biopsy and awaiting treatment. Associated symptoms include erectile dysfunction.     The following portions of the patient's history were reviewed and updated as appropriate: allergies, current medications, past family history, past medical history, past social history, past surgical history and problem list.    Review of Systems   Constitutional: Negative for chills and fever.   Gastrointestinal: Negative for abdominal pain, anal bleeding and blood in stool.   Genitourinary: Negative for flank pain and hematuria.         Current Outpatient Prescriptions:   •  atorvastatin (LIPITOR) 10 MG tablet, Take 10 mg by mouth daily., Disp: , Rfl:   •  levothyroxine (LEVOTHROID) 200 MCG tablet, Take 1 tablet by mouth daily, Disp: , Rfl:   •  lisinopril (PRINIVIL,ZESTRIL) 40 MG tablet, TAKE 1 TABLET BY MOUTH DAILY, Disp: , Rfl: 10  •  nabumetone (RELAFEN) 500 MG tablet, Take 1 tablet by mouth 2 (Two) Times a Day As Needed for Mild Pain (1-3)., Disp: 30 tablet, Rfl: 0  •  SITagliptin (JANUVIA) 100 MG tablet, Take 100 mg by mouth Daily., Disp: , Rfl:   •  tamsulosin (FLOMAX) 0.4 MG capsule 24 hr capsule, Take 1 capsule by mouth every night., Disp: , Rfl:   •  terbinafine (lamiSIL) 250 MG tablet, Take  by mouth., Disp: , Rfl:     Past Medical History:   Diagnosis Date   • Arthritis    • Cancer     thyroid   • Diabetes    • Disease of thyroid gland    • Hypercholesteremia    • Hypertension    • IBS (irritable bowel syndrome)    • Sleep apnea        Past Surgical History:   Procedure Laterality Date   • CHOLECYSTECTOMY     • COLONOSCOPY N/A 3/7/2017    Procedure: COLONOSCOPY WITH  "ANESTHESIA;  Surgeon: Hector Alvarenga MD;  Location: [facility] ENDOSCOPY;  Service:    • PROSTATE SURGERY     • THYROID SURGERY         Social History     Social History   • Marital status:      Spouse name: N/A   • Number of children: N/A   • Years of education: N/A     Social History Main Topics   • Smoking status: Never Smoker   • Smokeless tobacco: Never Used   • Alcohol use No   • Drug use: No   • Sexual activity: Not on file     Other Topics Concern   • Not on file     Social History Narrative       Family History   Problem Relation Age of Onset   • Family history unknown: Yes   • Prostate cancer Father        Temp 98.4 °F (36.9 °C)  Ht 71\" (180.3 cm)  Wt 231 lb (105 kg)  BMI 32.22 kg/m2    Physical Exam  Constitutional: The patient  is oriented to person, place, and time. They  appear well-developed and well-nourished. No distress.   Pulmonary/Chest: Effort normal.   Abdominal: Soft. The patient exhibits no distension and no mass. There is no tenderness. There is no rebound and no guarding. No hernia.   Neurological: Patient is alert and oriented to person, place, and time.   Skin: Skin is warm and dry. Not diaphoretic.   Psychiatric:  normal mood and affect.   Vitals reviewed.      Results for orders placed or performed in visit on 07/20/17   POC Urinalysis Dipstick, Automated   Result Value Ref Range    Color Yellow Yellow, Straw, Dark Yellow, Cindi    Clarity, UA Clear Clear    Glucose, UA Negative Negative, 1000 mg/dL (3+) mg/dL    Bilirubin Negative Negative    Ketones, UA Negative Negative    Specific Gravity  1.025 1.005 - 1.030    Blood, UA Large (A) Negative    pH, Urine 5.5 5.0 - 8.0    Protein, POC Negative Negative mg/dL    Urobilinogen, UA Normal Normal    Leukocytes Negative Negative    Nitrite, UA Negative Negative     Microscopic Urinalysis  I inspected the urine myself based on the clinical situation including the dipstick urine. The urine is spun in a centrifuge for three minutes. " The spun urine shows 3-6 rbc/hpf, 0-2 wbc/hpf, 0-2 epi/hpf, trace bacteria, negative crystals, and negative casts.         Assessment and Plan  Diagnoses and all orders for this visit:    Prostate cancer  -     POC Urinalysis Dipstick, Automated  -     Case Request; Standing  -     ceFAZolin (ANCEF) 2 g in sodium chloride 0.9 % 100 mL IVPB; Infuse 2 g into a venous catheter 1 (One) Time.  -     Case Request    Microscopic hematuria    Other orders  -     Inpatient Admission; Standing  -     Follow Anesthesia Guidelines / Standing Orders; Future  -     Obtain Informed Consent  -     Follow Anesthesia Guidelines / Standing Orders; Standing  -     Verify NPO Status; Standing  -     Obtain Informed Consent; Standing    He opted to undergo robot-assisted lap scopic prostatectomy.  At his previous visit we went over the pros and cons of treatment versus active surveillance.  We discussed the types of therapy that are available and today he is back because he has decided to undergo robotic prostatectomy.  He was present with his wife and we discussed the following  #1.  The technique of robot-assisted laparoscopic prostatectomy including my preference and approaching prostatectomy in this manner versus open prostatectomy.  I explained that the magnification and 3-dimensional view via the robot gives a high definition view that is superior to the view in pelvic surgery.  We talked about the precision of the small instruments in addition to tremor reduction.  Lastly we discussed that robot prostatectomy in my experience leads to shorter hospital stay.  Return to normal activities sooner and less blood loss.  I explained my 7 years of experience with the procedure to draw these conclusions.  #2.  We went over the complications of the procedure in more detail than at the previous visit regarding infection, hernia, blood loss including the possible need for transfusion, incontinence, impotence, infertility with loss of  ejaculation, shortening of the penis and possibly Peyronie's disease, vesicourethral anastomotic leak and its complications including abscess, urinoma, along with bladder neck contracture, urethral stricture, ureteral injury either immediate or delayed, need to convert to open surgery rectal injury including temporary or permanent colostomy possibly, and damage to other intra-abdominal organs.  I explained that there is even a possibility that upon introduction of the trochars and mobilization that there could be a reason to abandon the procedure and recommend external beam radiotherapy and a later time.  #3.  We went over the anticipated postoperative course that included hospital stay, work limitations, and other restrictions that will be recommended following the procedure.  We also discussed management of the catheter postoperatively as well as possible GETACHEW drain.    More than half of this 25 minute visit was spent on counseling/coordinating care.     Hernesto Hong MD  07/22/17  9:12 PM      Cc:LUIS Razo

## 2017-08-01 NOTE — ANESTHESIA PROCEDURE NOTES
Airway  Urgency: elective    Date/Time: 8/1/2017 11:40 AM  End Time:8/1/2017 11:40 AM  Airway not difficult    General Information and Staff    Patient location during procedure: OR  CRNA: JESENIA MOREL    Indications and Patient Condition  Indications for airway management: airway protection    Preoxygenated: yes  MILS maintained throughout  Mask difficulty assessment: 2 - vent by mask + OA or adjuvant +/- NMBA    Final Airway Details  Final airway type: endotracheal airway      Successful airway: ETT  Cuffed: yes   Successful intubation technique: direct laryngoscopy  Endotracheal tube insertion site: oral  Blade: Cmaara  Blade size: #2  ETT size: 7.5 mm  Cormack-Lehane Classification: grade I - full view of glottis  Placement verified by: chest auscultation and capnometry   Cuff volume (mL): 8  Measured from: lips  ETT to lips (cm): 23  Number of attempts at approach: 1    Additional Comments  Intubation by ALVARO Valles CRNA

## 2017-08-01 NOTE — ANESTHESIA PREPROCEDURE EVALUATION
Anesthesia Evaluation     Patient summary reviewed and Nursing notes reviewed   no history of anesthetic complications:  NPO Solid Status: > 8 hours  NPO Liquid Status: > 8 hours     Airway   Mallampati: II  TM distance: >3 FB  Neck ROM: full  no difficulty expected  Dental      Pulmonary     breath sounds clear to auscultation  (+) sleep apnea (not using cpap),   (-) COPD, not a smoker  Cardiovascular   Exercise tolerance: good (4-7 METS)    ECG reviewed  Rhythm: regular  Rate: normal    (+) hypertension, hyperlipidemia  (-) CAD, cardiac stents    ROS comment: PVC's on EKG. Pt reports stress test 2 years prior that was WNL. Reports decent functional status.     Neuro/Psych  (-) seizures, TIA, CVA  GI/Hepatic/Renal/Endo    (+) obesity,  diabetes mellitus type 2, hypothyroidism,   (-) GERD, liver disease, no renal disease    ROS Comment: IBS    Musculoskeletal     Abdominal    Substance History      OB/GYN          Other   (+) arthritis                                     Anesthesia Plan    ASA 3     general     intravenous induction   Anesthetic plan and risks discussed with patient.

## 2017-08-01 NOTE — PLAN OF CARE
Problem: Patient Care Overview (Adult)  Goal: Plan of Care Review  Outcome: Ongoing (interventions implemented as appropriate)    08/01/17 1643   Coping/Psychosocial Response Interventions   Plan Of Care Reviewed With patient   Patient Care Overview   Progress no change   Outcome Evaluation   Outcome Summary/Follow up Plan Pt still drowsy bu more oriented and awakens more readily. VSS. decreased drainage from GETACHEW. Uriine less bloody. States pain still 10 but stil sleeps very easily between care. Meets dc criteria.         Problem: Perioperative Period (Adult)  Goal: Signs and Symptoms of Listed Potential Problems Will be Absent or Manageable (Perioperative Period)  Outcome: Ongoing (interventions implemented as appropriate)

## 2017-08-01 NOTE — PLAN OF CARE
Problem: Perioperative Period (Adult)  Goal: Signs and Symptoms of Listed Potential Problems Will be Absent or Manageable (Perioperative Period)  Outcome: Ongoing (interventions implemented as appropriate)    08/01/17 1007   Perioperative Period   Problems Assessed (Perioperative Period) pain;embolism;hypothermia;hemorrhage;hypoxia/hypoxemia;infection;situational response   Problems Present (Perioperative Period) situational response

## 2017-08-02 ENCOUNTER — APPOINTMENT (OUTPATIENT)
Dept: ULTRASOUND IMAGING | Facility: HOSPITAL | Age: 67
End: 2017-08-02

## 2017-08-02 LAB
ANION GAP SERPL CALCULATED.3IONS-SCNC: 11 MMOL/L (ref 4–13)
ANION GAP SERPL CALCULATED.3IONS-SCNC: 9 MMOL/L (ref 4–13)
BASOPHILS # BLD AUTO: 0.03 10*3/MM3 (ref 0–0.2)
BASOPHILS NFR BLD AUTO: 0.2 % (ref 0–2)
BUN BLD-MCNC: 29 MG/DL (ref 5–21)
BUN BLD-MCNC: 37 MG/DL (ref 5–21)
BUN/CREAT SERPL: 12 (ref 7–25)
BUN/CREAT SERPL: 12.5 (ref 7–25)
CALCIUM SPEC-SCNC: 7.2 MG/DL (ref 8.4–10.4)
CALCIUM SPEC-SCNC: 7.6 MG/DL (ref 8.4–10.4)
CHLORIDE SERPL-SCNC: 101 MMOL/L (ref 98–110)
CHLORIDE SERPL-SCNC: 102 MMOL/L (ref 98–110)
CO2 SERPL-SCNC: 24 MMOL/L (ref 24–31)
CO2 SERPL-SCNC: 24 MMOL/L (ref 24–31)
CREAT BLD-MCNC: 2.42 MG/DL (ref 0.5–1.4)
CREAT BLD-MCNC: 2.97 MG/DL (ref 0.5–1.4)
CREAT FLD-MCNC: 1.7 MG/DL
DEPRECATED RDW RBC AUTO: 44.7 FL (ref 40–54)
DEPRECATED RDW RBC AUTO: 45.1 FL (ref 40–54)
EOSINOPHIL # BLD AUTO: 0.01 10*3/MM3 (ref 0–0.7)
EOSINOPHIL NFR BLD AUTO: 0.1 % (ref 0–4)
ERYTHROCYTE [DISTWIDTH] IN BLOOD BY AUTOMATED COUNT: 14.3 % (ref 12–15)
ERYTHROCYTE [DISTWIDTH] IN BLOOD BY AUTOMATED COUNT: 14.5 % (ref 12–15)
GFR SERPL CREATININE-BSD FRML MDRD: 21 ML/MIN/1.73
GFR SERPL CREATININE-BSD FRML MDRD: 27 ML/MIN/1.73
GLUCOSE BLD-MCNC: 142 MG/DL (ref 70–100)
GLUCOSE BLD-MCNC: 215 MG/DL (ref 70–100)
HCT VFR BLD AUTO: 32.5 % (ref 40–52)
HCT VFR BLD AUTO: 37.7 % (ref 40–52)
HGB BLD-MCNC: 11 G/DL (ref 14–18)
HGB BLD-MCNC: 12.7 G/DL (ref 14–18)
IMM GRANULOCYTES # BLD: 0.08 10*3/MM3 (ref 0–0.03)
IMM GRANULOCYTES NFR BLD: 0.5 % (ref 0–5)
LYMPHOCYTES # BLD AUTO: 0.2 10*3/MM3 (ref 0.72–4.86)
LYMPHOCYTES NFR BLD AUTO: 1.2 % (ref 15–45)
MCH RBC QN AUTO: 29 PG (ref 28–32)
MCH RBC QN AUTO: 29.1 PG (ref 28–32)
MCHC RBC AUTO-ENTMCNC: 33.7 G/DL (ref 33–36)
MCHC RBC AUTO-ENTMCNC: 33.8 G/DL (ref 33–36)
MCV RBC AUTO: 85.8 FL (ref 82–95)
MCV RBC AUTO: 86.3 FL (ref 82–95)
MONOCYTES # BLD AUTO: 0.83 10*3/MM3 (ref 0.19–1.3)
MONOCYTES NFR BLD AUTO: 4.9 % (ref 4–12)
NEUTROPHILS # BLD AUTO: 15.69 10*3/MM3 (ref 1.87–8.4)
NEUTROPHILS NFR BLD AUTO: 93.1 % (ref 39–78)
PLATELET # BLD AUTO: 232 10*3/MM3 (ref 130–400)
PLATELET # BLD AUTO: 277 10*3/MM3 (ref 130–400)
PMV BLD AUTO: 10.2 FL (ref 6–12)
PMV BLD AUTO: 10.5 FL (ref 6–12)
POTASSIUM BLD-SCNC: 5 MMOL/L (ref 3.5–5.3)
POTASSIUM BLD-SCNC: 5.5 MMOL/L (ref 3.5–5.3)
RBC # BLD AUTO: 3.79 10*6/MM3 (ref 4.8–5.9)
RBC # BLD AUTO: 4.37 10*6/MM3 (ref 4.8–5.9)
SODIUM BLD-SCNC: 135 MMOL/L (ref 135–145)
SODIUM BLD-SCNC: 136 MMOL/L (ref 135–145)
WBC NRBC COR # BLD: 14.61 10*3/MM3 (ref 4.8–10.8)
WBC NRBC COR # BLD: 16.84 10*3/MM3 (ref 4.8–10.8)

## 2017-08-02 PROCEDURE — 82570 ASSAY OF URINE CREATININE: CPT | Performed by: UROLOGY

## 2017-08-02 PROCEDURE — 25010000002 KETOROLAC TROMETHAMINE PER 15 MG: Performed by: UROLOGY

## 2017-08-02 PROCEDURE — 85025 COMPLETE CBC W/AUTO DIFF WBC: CPT | Performed by: UROLOGY

## 2017-08-02 PROCEDURE — 85027 COMPLETE CBC AUTOMATED: CPT | Performed by: UROLOGY

## 2017-08-02 PROCEDURE — 83935 ASSAY OF URINE OSMOLALITY: CPT | Performed by: INTERNAL MEDICINE

## 2017-08-02 PROCEDURE — 25010000002 MORPHINE PER 10 MG: Performed by: UROLOGY

## 2017-08-02 PROCEDURE — 84540 ASSAY OF URINE/UREA-N: CPT | Performed by: INTERNAL MEDICINE

## 2017-08-02 PROCEDURE — 82652 VIT D 1 25-DIHYDROXY: CPT | Performed by: INTERNAL MEDICINE

## 2017-08-02 PROCEDURE — 25010000003 CEFAZOLIN PER 500 MG: Performed by: UROLOGY

## 2017-08-02 PROCEDURE — 80048 BASIC METABOLIC PNL TOTAL CA: CPT | Performed by: UROLOGY

## 2017-08-02 PROCEDURE — 84300 ASSAY OF URINE SODIUM: CPT | Performed by: INTERNAL MEDICINE

## 2017-08-02 PROCEDURE — 76775 US EXAM ABDO BACK WALL LIM: CPT

## 2017-08-02 PROCEDURE — 82570 ASSAY OF URINE CREATININE: CPT | Performed by: INTERNAL MEDICINE

## 2017-08-02 RX ADMIN — KETOROLAC TROMETHAMINE 15 MG: 15 INJECTION, SOLUTION INTRAMUSCULAR; INTRAVENOUS at 06:12

## 2017-08-02 RX ADMIN — KETOROLAC TROMETHAMINE 15 MG: 15 INJECTION, SOLUTION INTRAMUSCULAR; INTRAVENOUS at 12:46

## 2017-08-02 RX ADMIN — HYDROCODONE BITARTRATE AND ACETAMINOPHEN 1 TABLET: 7.5; 325 TABLET ORAL at 06:13

## 2017-08-02 RX ADMIN — SODIUM CHLORIDE 1000 ML: 9 INJECTION, SOLUTION INTRAVENOUS at 03:36

## 2017-08-02 RX ADMIN — SODIUM CHLORIDE 1000 ML: 9 INJECTION, SOLUTION INTRAVENOUS at 18:50

## 2017-08-02 RX ADMIN — LEVOTHYROXINE SODIUM 200 MCG: 200 TABLET ORAL at 06:12

## 2017-08-02 RX ADMIN — HYDROCODONE BITARTRATE AND ACETAMINOPHEN 2 TABLET: 7.5; 325 TABLET ORAL at 20:24

## 2017-08-02 RX ADMIN — CEFAZOLIN SODIUM 2 G: 2 SOLUTION INTRAVENOUS at 03:01

## 2017-08-02 RX ADMIN — SODIUM CHLORIDE 150 ML/HR: 4.5 INJECTION, SOLUTION INTRAVENOUS at 16:02

## 2017-08-02 RX ADMIN — HYDROCODONE BITARTRATE AND ACETAMINOPHEN 2 TABLET: 7.5; 325 TABLET ORAL at 16:00

## 2017-08-02 RX ADMIN — SODIUM CHLORIDE 1000 ML: 9 INJECTION, SOLUTION INTRAVENOUS at 12:46

## 2017-08-02 RX ADMIN — LINAGLIPTIN 5 MG: 5 TABLET, FILM COATED ORAL at 09:10

## 2017-08-02 RX ADMIN — SODIUM CHLORIDE 150 ML/HR: 4.5 INJECTION, SOLUTION INTRAVENOUS at 00:30

## 2017-08-02 RX ADMIN — MORPHINE SULFATE 6 MG: 10 INJECTION, SOLUTION INTRAMUSCULAR; INTRAVENOUS at 09:10

## 2017-08-02 NOTE — PLAN OF CARE
Problem: Patient Care Overview (Adult)  Goal: Plan of Care Review  Outcome: Ongoing (interventions implemented as appropriate)    08/01/17 1859   Coping/Psychosocial Response Interventions   Plan Of Care Reviewed With patient;family   Patient Care Overview   Progress no change   Outcome Evaluation   Outcome Summary/Follow up Plan New admit from PACU to 3C. Pt drowsy, responds to voice, voices pain, medicated per orders. GETACHEW x1. Lap x5, mepilex dressings, scant serosanguineous drainage. VSS. F/C to BSD, urine red/bloody, MD aware. Sleeping between care. IVF and abx initiated per orders. Tolerating ice chips and sips. Will continue to monitor.        Goal: Adult Individualization and Mutuality  Outcome: Ongoing (interventions implemented as appropriate)    08/01/17 1859   Individualization   Patient Specific Goals Resolve pain   Patient Specific Interventions PRN and scheduled pain meds, encourage turn, cough, deep breathing.       Goal: Discharge Needs Assessment  Outcome: Ongoing (interventions implemented as appropriate)    Problem: Perioperative Period (Adult)  Goal: Signs and Symptoms of Listed Potential Problems Will be Absent or Manageable (Perioperative Period)  Outcome: Ongoing (interventions implemented as appropriate)    08/01/17 1859   Perioperative Period   Problems Assessed (Perioperative Period) all   Problems Present (Perioperative Period) pain

## 2017-08-02 NOTE — NURSING NOTE
The patient was sitting up in the chair when the nurse observed a large amount of blood had soaked through his gown via the GETACHEW insertion site.  Upon closer inspection, blood had accumulated underneath the patient as well.  Pressure was held and charge nurse, Julianne Troy, was notified.  She notified Dr. Hong who ordered the GETACHEW stripped and redressed with gauze.

## 2017-08-02 NOTE — PLAN OF CARE
Problem: Patient Care Overview (Adult)  Goal: Plan of Care Review  Outcome: Ongoing (interventions implemented as appropriate)    08/02/17 1641   Coping/Psychosocial Response Interventions   Plan Of Care Reviewed With patient;family   Patient Care Overview   Progress improving   Outcome Evaluation   Outcome Summary/Follow up Plan Medicated for pain PRN per orders. Urine output continues to be low after 2L total fluid boluses today. Renal ultrasound this afternoon. Hypotensive this morning. GETACHEW dressing changed d/t copious bleeding, MD notified, currently C/D/I. IVF continues. Will continue to monitor.        Goal: Adult Individualization and Mutuality  Outcome: Ongoing (interventions implemented as appropriate)    08/02/17 1641   Individualization   Patient Specific Preferences None identified at this time.   Patient Specific Goals Resolve pain, improve urine output   Patient Specific Interventions IV fluid bolus per orders, encourage OOB   Mutuality/Individual Preferences   What Anxieties, Fears or Concerns Do You Have About Your Health or Care? Concerned about urine output.   What Questions Do You Have About Your Health or Care? None identified at this time.   What Information Would Help Us Give You More Personalized Care? None identified at this time.       Goal: Discharge Needs Assessment  Outcome: Ongoing (interventions implemented as appropriate)    08/01/17 1702 08/02/17 0240 08/02/17 1641   Discharge Needs Assessment   Concerns To Be Addressed --  no discharge needs identified --    Readmission Within The Last 30 Days --  --  no previous admission in last 30 days   Discharge Disposition --  --  still a patient   Current Health   Anticipated Changes Related to Illness --  --  none   Living Environment   Transportation Available car;family or friend will provide --  --    Self-Care   Equipment Currently Used at Home none --  --          Problem: Perioperative Period (Adult)  Goal: Signs and Symptoms of Listed  Potential Problems Will be Absent or Manageable (Perioperative Period)  Outcome: Ongoing (interventions implemented as appropriate)    08/02/17 3693   Perioperative Period   Problems Assessed (Perioperative Period) all   Problems Present (Perioperative Period) pain;situational response  (low urine output)

## 2017-08-02 NOTE — PROGRESS NOTES
Urology  Length of Stay: 1  Patient Care Team:  LUIS Souza as PCP - General (Family Medicine)  Hernesto Hong MD as Consulting Physician (Urology)    Chief Complaint:  I had my prostate removed    Subjective     Interval History:   Patient's profile prostatectomy.  Mild nausea.    Review of Systems:   Review of Systems   Constitutional: Negative for fever.   Gastrointestinal: Positive for abdominal pain (typical incisional).   Genitourinary: Negative for flank pain.       Objective     Vital Signs  Temp:  [97.1 °F (36.2 °C)-99.2 °F (37.3 °C)] 98 °F (36.7 °C)  Heart Rate:  [] 84  Resp:  [14-18] 16  BP: ()/() 100/53    Physical Exam:  Physical Exam  Alert and oriented ×3  Not agitated or distressed  No obvious deformities  No respiratory distress  Skin without pallor or diaphoresis  Abdomen mildly distended. Dressing dry and intact.   Results Review:       I reviewed the patient's new clinical results.  Lab Results (last 24 hours)     Procedure Component Value Units Date/Time    POC Glucose Fingerstick [[id number]]  (Normal) Collected:  08/01/17 0925    Specimen:  Blood Updated:  08/01/17 0943     Glucose 129 mg/dL       : [id number] Rafi Watkins ID: [id number]       POC Glucose Fingerstick [[id number]]  (Abnormal) Collected:  08/01/17 1600    Specimen:  Blood Updated:  08/01/17 1612     Glucose 191 (H) mg/dL       : [id number] Lanny Johnson ID: [id number]       CBC & Differential [[id number]] Collected:  08/02/17 0304    Specimen:  Blood Updated:  08/02/17 0346    Narrative:       The following orders were created for panel order CBC & Differential.  Procedure                               Abnormality         Status                     ---------                               -----------         ------                     CBC Auto Differential[[id number]]        Abnormal            Final result                 Please view results for these tests on the individual  orders.    CBC Auto Differential [[id number]]  (Abnormal) Collected:  08/02/17 0304    Specimen:  Blood Updated:  08/02/17 0346     WBC 16.84 (H) 10*3/mm3      RBC 4.37 (L) 10*6/mm3      Hemoglobin 12.7 (L) g/dL      Hematocrit 37.7 (L) %      MCV 86.3 fL      MCH 29.1 pg      MCHC 33.7 g/dL      RDW 14.3 %      RDW-SD 44.7 fl      MPV 10.5 fL      Platelets 277 10*3/mm3      Neutrophil % 93.1 (H) %      Lymphocyte % 1.2 (L) %      Monocyte % 4.9 %      Eosinophil % 0.1 %      Basophil % 0.2 %      Immature Grans % 0.5 %      Neutrophils, Absolute 15.69 (H) 10*3/mm3      Lymphocytes, Absolute 0.20 (L) 10*3/mm3      Monocytes, Absolute 0.83 10*3/mm3      Eosinophils, Absolute 0.01 10*3/mm3      Basophils, Absolute 0.03 10*3/mm3      Immature Grans, Absolute 0.08 (H) 10*3/mm3     Basic Metabolic Panel [[id number]]  (Abnormal) Collected:  08/02/17 0304    Specimen:  Blood Updated:  08/02/17 0405     Glucose 215 (H) mg/dL      BUN 29 (H) mg/dL      Creatinine 2.42 (H) mg/dL      Sodium 136 mmol/L      Potassium 5.5 (H) mmol/L      Chloride 101 mmol/L      CO2 24.0 mmol/L      Calcium 7.6 (L) mg/dL      eGFR Non African Amer 27 (L) mL/min/1.73      BUN/Creatinine Ratio 12.0     Anion Gap 11.0 mmol/L     Narrative:       GFR Normal >60  Chronic Kidney Disease <60  Kidney Failure <15    Creatinine, Body Fluid [[id number]] Collected:  08/02/17 0344    Specimen:  Body Fluid from Peritoneum Updated:  08/02/17 0423     Creatinine, Fluid 1.7 mg/dL         Imaging Results (last 24 hours)     ** No results found for the last 24 hours. **          Medication Review:     Current Facility-Administered Medications:   •  acetaminophen (TYLENOL) tablet 650 mg, 650 mg, Oral, Q4H PRN, Hernesto Hong MD  •  HYDROcodone-acetaminophen (NORCO) 7.5-325 MG per tablet 1 tablet, 1 tablet, Oral, Q4H PRN, Hernesto Hong MD, 1 tablet at 08/02/17 0613  •  HYDROcodone-acetaminophen (NORCO) 7.5-325 MG per tablet 2 tablet, 2 tablet, Oral, Q4H PRN,  Hernesto Hong MD  •  ketorolac (TORADOL) injection 15 mg, 15 mg, Intravenous, Q6H, Hernesto Hong MD, 15 mg at 08/02/17 0612  •  levothyroxine (SYNTHROID, LEVOTHROID) tablet 200 mcg, 200 mcg, Oral, Q AM, Hernesto Hong MD, 200 mcg at 08/02/17 0612  •  linagliptin (TRADJENTA) tablet 5 mg, 5 mg, Oral, Daily, Hernesto Hong MD, 5 mg at 08/01/17 1804  •  lisinopril (PRINIVIL,ZESTRIL) tablet 40 mg, 40 mg, Oral, Q24H, Hernesto Hong MD, 40 mg at 08/01/17 1804  •  Morphine injection 6 mg, 6 mg, Intravenous, Q2H PRN, 10 mg at 08/01/17 1955 **AND** naloxone (NARCAN) injection 0.4 mg, 0.4 mg, Intravenous, Q5 Min PRN, Hernesto Hong MD  •  ondansetron (ZOFRAN) tablet 4 mg, 4 mg, Oral, Q6H PRN **OR** ondansetron ODT (ZOFRAN-ODT) disintegrating tablet 4 mg, 4 mg, Oral, Q6H PRN **OR** ondansetron (ZOFRAN) injection 4 mg, 4 mg, Intravenous, Q6H PRN, Hernesto Hong MD, 4 mg at 08/01/17 1957  •  sodium chloride 0.45 % infusion, 150 mL/hr, Intravenous, Continuous, Hernesto Hong MD, Stopped at 08/02/17 0336    Assessment/Plan:   #1. Prostate cancer - s/p prostatectomy  #2.. Acute Kidney inury  - fluid bolus. If no response, repeat and get a renal ultrasound. Doubt obstruction.       Hernesto Hong MD  08/02/17  7:33 AM

## 2017-08-03 LAB
25(OH)D3 SERPL-MCNC: 20.9 NG/ML (ref 30–100)
ALBUMIN SERPL-MCNC: 2.8 G/DL (ref 3.5–5)
ALBUMIN/GLOB SERPL: 1.2 G/DL (ref 1.1–2.5)
ALP SERPL-CCNC: 29 U/L (ref 24–120)
ALT SERPL W P-5'-P-CCNC: 28 U/L (ref 0–54)
ANION GAP SERPL CALCULATED.3IONS-SCNC: 7 MMOL/L (ref 4–13)
AST SERPL-CCNC: 24 U/L (ref 7–45)
BILIRUB SERPL-MCNC: 1.3 MG/DL (ref 0.1–1)
BUN BLD-MCNC: 33 MG/DL (ref 5–21)
BUN/CREAT SERPL: 17.4 (ref 7–25)
CALCIUM SPEC-SCNC: 6.8 MG/DL (ref 8.4–10.4)
CHLORIDE SERPL-SCNC: 105 MMOL/L (ref 98–110)
CO2 SERPL-SCNC: 24 MMOL/L (ref 24–31)
CREAT BLD-MCNC: 1.9 MG/DL (ref 0.5–1.4)
CREAT UR-MCNC: 139.9 MG/DL
CYTO UR: NORMAL
CYTO UR: NORMAL
DEPRECATED RDW RBC AUTO: 46.4 FL (ref 40–54)
ERYTHROCYTE [DISTWIDTH] IN BLOOD BY AUTOMATED COUNT: 14.7 % (ref 12–15)
GFR SERPL CREATININE-BSD FRML MDRD: 36 ML/MIN/1.73
GLOBULIN UR ELPH-MCNC: 2.3 GM/DL
GLUCOSE BLD-MCNC: 118 MG/DL (ref 70–100)
HCT VFR BLD AUTO: 27.2 % (ref 40–52)
HGB BLD-MCNC: 9.2 G/DL (ref 14–18)
LAB AP CASE REPORT: NORMAL
LAB AP CASE REPORT: NORMAL
LAB AP SYNOPTIC CHECKLIST: NORMAL
Lab: NORMAL
Lab: NORMAL
MAGNESIUM SERPL-MCNC: 2.4 MG/DL (ref 1.4–2.2)
MCH RBC QN AUTO: 29.3 PG (ref 28–32)
MCHC RBC AUTO-ENTMCNC: 33.8 G/DL (ref 33–36)
MCV RBC AUTO: 86.6 FL (ref 82–95)
OSMOLALITY UR: 411 MOSM/KG (ref 601–850)
PATH REPORT.FINAL DX SPEC: NORMAL
PATH REPORT.FINAL DX SPEC: NORMAL
PATH REPORT.GROSS SPEC: NORMAL
PATH REPORT.GROSS SPEC: NORMAL
PHOSPHATE SERPL-MCNC: 2.9 MG/DL (ref 2.5–4.5)
PLATELET # BLD AUTO: 187 10*3/MM3 (ref 130–400)
PMV BLD AUTO: 9.8 FL (ref 6–12)
POTASSIUM BLD-SCNC: 4.5 MMOL/L (ref 3.5–5.3)
PROT SERPL-MCNC: 5.1 G/DL (ref 6.3–8.7)
RBC # BLD AUTO: 3.14 10*6/MM3 (ref 4.8–5.9)
SODIUM BLD-SCNC: 136 MMOL/L (ref 135–145)
SODIUM UR-SCNC: 36 MMOL/L (ref 30–90)
UUN 24H UR-MCNC: 705 MG/DL
WBC NRBC COR # BLD: 11.34 10*3/MM3 (ref 4.8–10.8)

## 2017-08-03 PROCEDURE — 25010000002 ONDANSETRON PER 1 MG: Performed by: UROLOGY

## 2017-08-03 PROCEDURE — 84100 ASSAY OF PHOSPHORUS: CPT | Performed by: INTERNAL MEDICINE

## 2017-08-03 PROCEDURE — 83735 ASSAY OF MAGNESIUM: CPT | Performed by: INTERNAL MEDICINE

## 2017-08-03 PROCEDURE — 85027 COMPLETE CBC AUTOMATED: CPT | Performed by: INTERNAL MEDICINE

## 2017-08-03 PROCEDURE — 82306 VITAMIN D 25 HYDROXY: CPT | Performed by: INTERNAL MEDICINE

## 2017-08-03 PROCEDURE — 80053 COMPREHEN METABOLIC PANEL: CPT | Performed by: INTERNAL MEDICINE

## 2017-08-03 RX ORDER — SODIUM CHLORIDE 9 MG/ML
125 INJECTION, SOLUTION INTRAVENOUS CONTINUOUS
Status: DISCONTINUED | OUTPATIENT
Start: 2017-08-03 | End: 2017-08-08

## 2017-08-03 RX ORDER — IBUPROFEN 200 MG
1250 CAPSULE ORAL
Status: DISCONTINUED | OUTPATIENT
Start: 2017-08-03 | End: 2017-08-03 | Stop reason: SDUPTHER

## 2017-08-03 RX ORDER — NABUMETONE 500 MG/1
500 TABLET, FILM COATED ORAL 2 TIMES DAILY PRN
COMMUNITY
End: 2017-11-15 | Stop reason: ALTCHOICE

## 2017-08-03 RX ORDER — MELATONIN
1000 DAILY
Status: DISCONTINUED | OUTPATIENT
Start: 2017-08-03 | End: 2017-08-05

## 2017-08-03 RX ORDER — LISINOPRIL 40 MG/1
40 TABLET ORAL DAILY
COMMUNITY
End: 2017-12-05 | Stop reason: HOSPADM

## 2017-08-03 RX ORDER — LEVOTHYROXINE SODIUM 0.2 MG/1
200 TABLET ORAL DAILY
COMMUNITY

## 2017-08-03 RX ADMIN — HYDROCODONE BITARTRATE AND ACETAMINOPHEN 2 TABLET: 7.5; 325 TABLET ORAL at 17:18

## 2017-08-03 RX ADMIN — HYDROCODONE BITARTRATE AND ACETAMINOPHEN 2 TABLET: 7.5; 325 TABLET ORAL at 06:18

## 2017-08-03 RX ADMIN — SODIUM CHLORIDE 150 ML/HR: 4.5 INJECTION, SOLUTION INTRAVENOUS at 13:49

## 2017-08-03 RX ADMIN — LEVOTHYROXINE SODIUM 200 MCG: 200 TABLET ORAL at 06:18

## 2017-08-03 RX ADMIN — LINAGLIPTIN 5 MG: 5 TABLET, FILM COATED ORAL at 09:06

## 2017-08-03 RX ADMIN — SODIUM CHLORIDE 125 ML/HR: 9 INJECTION, SOLUTION INTRAVENOUS at 15:52

## 2017-08-03 RX ADMIN — CALCIUM CARBONATE 1250 MG: 1250 SUSPENSION ORAL at 17:15

## 2017-08-03 RX ADMIN — SODIUM CHLORIDE 150 ML/HR: 4.5 INJECTION, SOLUTION INTRAVENOUS at 07:21

## 2017-08-03 RX ADMIN — HYDROCODONE BITARTRATE AND ACETAMINOPHEN 2 TABLET: 7.5; 325 TABLET ORAL at 11:52

## 2017-08-03 RX ADMIN — SODIUM CHLORIDE 150 ML/HR: 4.5 INJECTION, SOLUTION INTRAVENOUS at 00:41

## 2017-08-03 RX ADMIN — HYDROCODONE BITARTRATE AND ACETAMINOPHEN 2 TABLET: 7.5; 325 TABLET ORAL at 00:02

## 2017-08-03 RX ADMIN — VITAMIN D 1000 UNITS: 25 TAB ORAL at 15:52

## 2017-08-03 RX ADMIN — HYDROCODONE BITARTRATE AND ACETAMINOPHEN 2 TABLET: 7.5; 325 TABLET ORAL at 20:49

## 2017-08-03 RX ADMIN — ONDANSETRON 4 MG: 2 INJECTION INTRAMUSCULAR; INTRAVENOUS at 10:00

## 2017-08-03 RX ADMIN — ONDANSETRON 4 MG: 2 INJECTION INTRAMUSCULAR; INTRAVENOUS at 15:14

## 2017-08-03 NOTE — PLAN OF CARE
Problem: Patient Care Overview (Adult)  Goal: Plan of Care Review  Outcome: Ongoing (interventions implemented as appropriate)  Medicated for nausea X2, pt vomited X1, taking clear liquids, adequate UO, ambulating in hallway with SBA, IVF changed to NS    08/03/17 1536   Coping/Psychosocial Response Interventions   Plan Of Care Reviewed With patient   Patient Care Overview   Progress improving       Goal: Adult Individualization and Mutuality  Outcome: Ongoing (interventions implemented as appropriate)  Goal: Discharge Needs Assessment  Outcome: Ongoing (interventions implemented as appropriate)    Problem: Perioperative Period (Adult)  Goal: Signs and Symptoms of Listed Potential Problems Will be Absent or Manageable (Perioperative Period)  Outcome: Ongoing (interventions implemented as appropriate)

## 2017-08-03 NOTE — CONSULTS
Nephrology ([facility]) Consult Note      Patient:  Zay Kamara  YOB: 1950  Date of Service: 8/3/2017  MRN: [id number]   Acct: [id number]   Primary Care Physician: LUIS Souza  Advance Directive: Full Code  Admit Date: 8/1/2017       Hospital Day: 2  Referring Provider: Hernesto Hong MD      Patient personally seen and examined.  Complete chart including Consults, Notes, Operative Reports, Labs, Cardiology, and Radiology studies reviewed as able.        Subjective:  Zay Kamara is a 66 y.o. male  whom we were consulted for acute kidney injury.  No prior known renal history.  Underwent prostatectomy on 8/01 due to prostate CA.  Since then has had diminished urine output and increased creatinine.  Jacobson catheter in place draining clear urine.  Administered IV fluid bolus yesterday with improvement of urine output.  Output is nonoliguric.    Allergies:  Review of patient's allergies indicates no known allergies.    Home Meds:  Prescriptions Prior to Admission   Medication Sig Dispense Refill Last Dose   • atorvastatin (LIPITOR) 10 MG tablet Take 20 mg by mouth Daily.   7/31/2017   • levothyroxine (LEVOTHROID) 200 MCG tablet Take 1 tablet by mouth daily   7/30/2017   • lisinopril (PRINIVIL,ZESTRIL) 40 MG tablet TAKE 1 TABLET BY MOUTH DAILY  10 7/30/2017   • nabumetone (RELAFEN) 500 MG tablet Take 1 tablet by mouth 2 (Two) Times a Day As Needed for Mild Pain (1-3). (Patient taking differently: Take 500 mg by mouth 2 (Two) Times a Day As Needed for Mild Pain (1-3) (ARTHRITIS AND KNEE PAIN). ON HOLD SINCE 7-22-17) 30 tablet 0 7/30/2017   • SITagliptin (JANUVIA) 100 MG tablet Take 100 mg by mouth Daily.   7/30/2017   • tamsulosin (FLOMAX) 0.4 MG capsule 24 hr capsule Take 1 capsule by mouth every night.   7/30/2017       Medicines:  Current Facility-Administered Medications   Medication Dose Route Frequency Provider Last Rate Last Dose   • acetaminophen  (TYLENOL) tablet 650 mg  650 mg Oral Q4H PRN Hernesto Hong MD       • HYDROcodone-acetaminophen (NORCO) 7.5-325 MG per tablet 1 tablet  1 tablet Oral Q4H PRN Hernesto Hong MD   1 tablet at 08/02/17 0613   • HYDROcodone-acetaminophen (NORCO) 7.5-325 MG per tablet 2 tablet  2 tablet Oral Q4H PRN Hernesto Hong MD   2 tablet at 08/03/17 0618   • levothyroxine (SYNTHROID, LEVOTHROID) tablet 200 mcg  200 mcg Oral Q AM Hernesto Hong MD   200 mcg at 08/03/17 0618   • linagliptin (TRADJENTA) tablet 5 mg  5 mg Oral Daily Hernesto Hong MD   5 mg at 08/03/17 0906   • lisinopril (PRINIVIL,ZESTRIL) tablet 40 mg  40 mg Oral Q24H Hernesto Hong MD   40 mg at 08/01/17 1804   • Morphine injection 6 mg  6 mg Intravenous Q2H PRN Hernesto Hong MD   6 mg at 08/02/17 0910    And   • naloxone (NARCAN) injection 0.4 mg  0.4 mg Intravenous Q5 Min PRN Hernesto Hong MD       • ondansetron (ZOFRAN) tablet 4 mg  4 mg Oral Q6H PRN Hernesto Hong MD        Or   • ondansetron ODT (ZOFRAN-ODT) disintegrating tablet 4 mg  4 mg Oral Q6H PRN Hernesto Hong MD        Or   • ondansetron (ZOFRAN) injection 4 mg  4 mg Intravenous Q6H PRN Hernesto Hong MD   4 mg at 08/01/17 1957   • sodium chloride 0.45 % infusion  150 mL/hr Intravenous Continuous Hernesto Hong  mL/hr at 08/03/17 0721 150 mL/hr at 08/03/17 0721       Past Medical History:  Past Medical History:   Diagnosis Date   • Arthritis    • Cancer     thyroid   • Diabetes    • Disease of thyroid gland    • Hypercholesteremia    • Hypertension    • IBS (irritable bowel syndrome)    • Prostate cancer    • Sleep apnea     NON COMPLIANT WITH C PAP       Past Surgical History:  Past Surgical History:   Procedure Laterality Date   • CHOLECYSTECTOMY     • COLONOSCOPY N/A 3/7/2017    Procedure: COLONOSCOPY WITH ANESTHESIA;  Surgeon: Hector Alvarenga MD;  Location: [facility] ENDOSCOPY;  Service:    • CYSTOSCOPY TRANSURETHRAL RESECTION OF PROSTATE     • PROSTATE SURGERY    "  • PROSTATECTOMY N/A 8/1/2017    Procedure: PROSTATECTOMY LAPAROSCOPIC WITH DAVINCI SI ROBOT ;  Surgeon: Hernesto Hong MD;  Location: [facility] OR;  Service:    • THYROID SURGERY      RADIATION THERAPY AFTER SURGERY       Family History  Family History   Problem Relation Age of Onset   • Family history unknown: Yes   • Prostate cancer Father        Social History  Social History     Social History   • Marital status:      Spouse name: N/A   • Number of children: N/A   • Years of education: N/A     Occupational History   • Not on file.     Social History Main Topics   • Smoking status: Never Smoker   • Smokeless tobacco: Never Used   • Alcohol use No   • Drug use: No   • Sexual activity: Defer     Other Topics Concern   • Not on file     Social History Narrative         Review of Systems:  History obtained from chart review and the patient  General ROS: No fever or chills  Respiratory ROS: No cough, shortness of breath, wheezing  Cardiovascular ROS: no chest pain or dyspnea on exertion  Gastrointestinal ROS: No abdominal pain or melena  Genito-Urinary ROS: positive for - pelvic pain  No dysuria or hematuria  Neurological ROS: negative  14 point ROS reviewed with the patient and negative except as noted above and in the HPI unless unable to obtain.    Objective:  /61 (BP Location: Right arm, Patient Position: Lying)  Pulse 88  Temp 98.5 °F (36.9 °C) (Oral)   Resp 16  Ht 71\" (180.3 cm)  Wt 227 lb (103 kg)  SpO2 93%  BMI 31.66 kg/m2    Intake/Output Summary (Last 24 hours) at 08/03/17 1000  Last data filed at 08/03/17 0903   Gross per 24 hour   Intake             5407 ml   Output             1585 ml   Net             3822 ml     General: awake/alert    Neck: supple, no JVD  Chest:  clear to auscultation bilaterally without respiratory distress  CVS: regular rate and rhythm  Abdominal: soft, nontender, normal bowel sounds  Extremities: no cyanosis or edema  Skin: warm and dry without rash   Neuro: no " focal motor deficits      Labs:    Results from last 7 days  Lab Units 08/03/17  0516 08/02/17  1323 08/02/17  0304   WBC 10*3/mm3 11.34* 14.61* 16.84*   HEMOGLOBIN g/dL 9.2* 11.0* 12.7*   HEMATOCRIT % 27.2* 32.5* 37.7*   PLATELETS 10*3/mm3 187 232 277           Results from last 7 days  Lab Units 08/03/17  0516 08/02/17  1323 08/02/17  0304   SODIUM mmol/L 136 135 136   POTASSIUM mmol/L 4.5 5.0 5.5*   CHLORIDE mmol/L 105 102 101   CO2 mmol/L 24.0 24.0 24.0   BUN mg/dL 33* 37* 29*   CREATININE mg/dL 1.90* 2.97* 2.42*   CALCIUM mg/dL 6.8* 7.2* 7.6*   BILIRUBIN mg/dL 1.3*  --   --    ALK PHOS U/L 29  --   --    ALT (SGPT) U/L 28  --   --    AST (SGOT) U/L 24  --   --    GLUCOSE mg/dL 118* 142* 215*       Radiology:   Imaging Results (last 72 hours)     Procedure Component Value Units Date/Time    US Renal Bilateral [[id number]] Collected:  08/02/17 1450     Updated:  08/02/17 1516    Narrative:       RENAL ULTRASOUND COMPLETE 8/2/2017 2:21 PM CDT     REASON FOR EXAM: Oliguria s/p prostatectomy; C53-[id number] neoplasm of  prostate       COMPARISON: None       TECHNIQUE: Multiple longitudinal and transverse realtime sonographic  images of the kidneys and urinary bladder are obtained.      FINDINGS:      RIGHT KIDNEY: 5.6 x 5.1 x 12.1 cm. Normal in size, shape, contour and  position. Small 2.5 cm cyst is present upper pole the right kidney. The  central echo complex is compact with no evidence for hydronephrosis. No  nephrolithiasis or abnormal perinephric fluid collections . The  pelvicalyceal system is mildly prominent..      LEFT KIDNEY: 5.7 x 4.8 x 13.2 cm. Normal in size, shape, contour and  position. No solid or cystic masses. The central echo complex is compact  with no evidence for hydronephrosis. No nephrolithiasis or abnormal  perinephric fluid collections . No hydroureter.      PELVIS: Jacobson catheter is present in the bladder. There is no  surrounding ascites.        Impression:       1. Slightly prominent  right renal pelvis. These findings were reviewed  with Dr. Eliazar Hong at the time of this dictation.  2. Incidental note made of a right renal cyst.  3. Negative left renal ultrasound.        This report was finalized on 08/02/2017 14:58 by Dr. Ruiz Sandoval MD.          Culture:         Assessment   1.  Acute kidney injury due to prerenal azotemia from intravascular volume depletion (calculated FeNa 0.4%)--improving  2.  Essential hypertension  3.  Type 2 diabetes  4.  Prostate cancer--status post prostate resection    Plan:  1.  Continue IV fluids  2.  Continue to follow renal function closely.  3.  Further assessment and plan pending Dr Doss's evaluation of patient      Thank you for the consult, we appreciate the opportunity to provide care to your patients.  Feel free to contact me if I can be of any further assistance.      Tigre Cabello, APRN  8/3/2017  10:00 AM

## 2017-08-03 NOTE — PLAN OF CARE
Problem: Patient Care Overview (Adult)  Goal: Plan of Care Review  Outcome: Ongoing (interventions implemented as appropriate)  ivf cont. Po pain meds effective. fouzia drain with serosang drainage, drsg at drain site with scant old drainage. abd dist and firmer than yesturday. Jacobson with blue urine, output improved, sent spec to lab as ordered. Ambulated in [place] with assist x1, sat up in chair x1.     08/03/17 0207   Coping/Psychosocial Response Interventions   Plan Of Care Reviewed With patient   Patient Care Overview   Progress improving       Goal: Adult Individualization and Mutuality  Outcome: Ongoing (interventions implemented as appropriate)  Goal: Discharge Needs Assessment  Outcome: Ongoing (interventions implemented as appropriate)    Problem: Perioperative Period (Adult)  Goal: Signs and Symptoms of Listed Potential Problems Will be Absent or Manageable (Perioperative Period)  Outcome: Ongoing (interventions implemented as appropriate)

## 2017-08-03 NOTE — PROGRESS NOTES
Urology  Length of Stay: 2  Patient Care Team:  LUIS Souza as PCP - General (Family Medicine)  Hernesto Hong MD as Consulting Physician (Urology)    Chief Complaint:  You took my prostate out    Subjective     Interval History:   POD#2  C/o abdominal distension  No flatus    Review of Systems:   Review of Systems   Constitutional: Negative for fever.   Gastrointestinal: Positive for abdominal distention. Negative for nausea.       Objective     Vital Signs  Temp:  [97 °F (36.1 °C)-99.4 °F (37.4 °C)] 98.1 °F (36.7 °C)  Heart Rate:  [78-99] 92  Resp:  [16-18] 16  BP: ()/(41-57) 111/54    Physical Exam:  Physical Exam  Abdomen moderately distended.    Results Review:       I reviewed the patient's new clinical results.  Lab Results (last 24 hours)     Procedure Component Value Units Date/Time    Tissue Pathology Exam [[id number]] Collected:  08/01/17 1256    Specimen:  Tissue from Prostate Updated:  08/02/17 0809    CBC (No Diff) [[id number]]  (Abnormal) Collected:  08/02/17 1323    Specimen:  Blood Updated:  08/02/17 1332     WBC 14.61 (H) 10*3/mm3      RBC 3.79 (L) 10*6/mm3      Hemoglobin 11.0 (L) g/dL      Hematocrit 32.5 (L) %      MCV 85.8 fL      MCH 29.0 pg      MCHC 33.8 g/dL      RDW 14.5 %      RDW-SD 45.1 fl      MPV 10.2 fL      Platelets 232 10*3/mm3     Basic Metabolic Panel [[id number]]  (Abnormal) Collected:  08/02/17 1323    Specimen:  Blood Updated:  08/02/17 1345     Glucose 142 (H) mg/dL      BUN 37 (H) mg/dL      Creatinine 2.97 (H) mg/dL      Sodium 135 mmol/L      Potassium 5.0 mmol/L      Chloride 102 mmol/L      CO2 24.0 mmol/L      Calcium 7.2 (L) mg/dL      eGFR Non African Amer 21 (L) mL/min/1.73      BUN/Creatinine Ratio 12.5     Anion Gap 9.0 mmol/L     Narrative:       GFR Normal >60  Chronic Kidney Disease <60  Kidney Failure <15    Vitamin D 1,25 Dihydroxy [[id number]] Collected:  08/02/17 1922    Specimen:  Blood Updated:  08/02/17 1932    Creatinine, Urine,  Random [[id number]] Collected:  08/02/17 2346    Specimen:  Urine from Urine, Clean Catch Updated:  08/03/17 0010     Creatinine, Urine 139.9 mg/dL     Sodium, Urine, Random [[id number]]  (Normal) Collected:  08/02/17 2346    Specimen:  Urine from Urine, Clean Catch Updated:  08/03/17 0010     Sodium, Urine 36 mmol/L     Urea Nitrogen, Urine [[id number]] Collected:  08/02/17 2346    Specimen:  Urine from Urine, Clean Catch Updated:  08/03/17 0010     Urea Nitrogen, Urine 705 mg/dL     Osmolality, Urine [[id number]]  (Abnormal) Collected:  08/02/17 2346    Specimen:  Urine from Urine, Clean Catch Updated:  08/03/17 0015     Osmolality, Urine 411 (L) mOsm/kg     CBC (No Diff) [[id number]]  (Abnormal) Collected:  08/03/17 0516    Specimen:  Blood Updated:  08/03/17 0534     WBC 11.34 (H) 10*3/mm3      RBC 3.14 (L) 10*6/mm3      Hemoglobin 9.2 (L) g/dL      Hematocrit 27.2 (L) %      MCV 86.6 fL      MCH 29.3 pg      MCHC 33.8 g/dL      RDW 14.7 %      RDW-SD 46.4 fl      MPV 9.8 fL      Platelets 187 10*3/mm3     Comprehensive Metabolic Panel [[id number]]  (Abnormal) Collected:  08/03/17 0516    Specimen:  Blood Updated:  08/03/17 0552     Glucose 118 (H) mg/dL      BUN 33 (H) mg/dL      Creatinine 1.90 (H) mg/dL      Sodium 136 mmol/L      Potassium 4.5 mmol/L      Chloride 105 mmol/L      CO2 24.0 mmol/L      Calcium 6.8 (L) mg/dL      Total Protein 5.1 (L) g/dL      Albumin 2.80 (L) g/dL      ALT (SGPT) 28 U/L      AST (SGOT) 24 U/L      Alkaline Phosphatase 29 U/L      Total Bilirubin 1.3 (H) mg/dL      eGFR Non African Amer 36 (L) mL/min/1.73      Globulin 2.3 gm/dL      A/G Ratio 1.2 g/dL      BUN/Creatinine Ratio 17.4     Anion Gap 7.0 mmol/L     Magnesium [[id number]]  (Abnormal) Collected:  08/03/17 0516    Specimen:  Blood Updated:  08/03/17 0552     Magnesium 2.4 (H) mg/dL     Phosphorus [[id number]]  (Normal) Collected:  08/03/17 0516    Specimen:  Blood Updated:  08/03/17 0552     Phosphorus 2.9 mg/dL          Imaging Results (last 24 hours)     Procedure Component Value Units Date/Time    US Renal Bilateral [[id number]] Collected:  08/02/17 1450     Updated:  08/02/17 1516    Narrative:       RENAL ULTRASOUND COMPLETE 8/2/2017 2:21 PM CDT     REASON FOR EXAM: Oliguria s/p prostatectomy; P19-[id number] neoplasm of  prostate       COMPARISON: None       TECHNIQUE: Multiple longitudinal and transverse realtime sonographic  images of the kidneys and urinary bladder are obtained.      FINDINGS:      RIGHT KIDNEY: 5.6 x 5.1 x 12.1 cm. Normal in size, shape, contour and  position. Small 2.5 cm cyst is present upper pole the right kidney. The  central echo complex is compact with no evidence for hydronephrosis. No  nephrolithiasis or abnormal perinephric fluid collections . The  pelvicalyceal system is mildly prominent..      LEFT KIDNEY: 5.7 x 4.8 x 13.2 cm. Normal in size, shape, contour and  position. No solid or cystic masses. The central echo complex is compact  with no evidence for hydronephrosis. No nephrolithiasis or abnormal  perinephric fluid collections . No hydroureter.      PELVIS: Jacobson catheter is present in the bladder. There is no  surrounding ascites.        Impression:       1. Slightly prominent right renal pelvis. These findings were reviewed  with Dr. Eliazar Hong at the time of this dictation.  2. Incidental note made of a right renal cyst.  3. Negative left renal ultrasound.        This report was finalized on 08/02/2017 14:58 by Dr. Ruiz Sandoval MD.          Medication Review:     Current Facility-Administered Medications:   •  acetaminophen (TYLENOL) tablet 650 mg, 650 mg, Oral, Q4H PRN, Hernesto Hong MD  •  HYDROcodone-acetaminophen (NORCO) 7.5-325 MG per tablet 1 tablet, 1 tablet, Oral, Q4H PRN, Hernesto Hong MD, 1 tablet at 08/02/17 0613  •  HYDROcodone-acetaminophen (NORCO) 7.5-325 MG per tablet 2 tablet, 2 tablet, Oral, Q4H PRN, Hernesto Hong MD, 2 tablet at 08/03/17 0618  •   levothyroxine (SYNTHROID, LEVOTHROID) tablet 200 mcg, 200 mcg, Oral, Q AM, Hernesto Hong MD, 200 mcg at 08/03/17 0618  •  linagliptin (TRADJENTA) tablet 5 mg, 5 mg, Oral, Daily, Hernesto Hong MD, 5 mg at 08/02/17 0910  •  lisinopril (PRINIVIL,ZESTRIL) tablet 40 mg, 40 mg, Oral, Q24H, Hernesto Hong MD, 40 mg at 08/01/17 1804  •  Morphine injection 6 mg, 6 mg, Intravenous, Q2H PRN, 6 mg at 08/02/17 0910 **AND** naloxone (NARCAN) injection 0.4 mg, 0.4 mg, Intravenous, Q5 Min PRN, Hernesto Hong MD  •  ondansetron (ZOFRAN) tablet 4 mg, 4 mg, Oral, Q6H PRN **OR** ondansetron ODT (ZOFRAN-ODT) disintegrating tablet 4 mg, 4 mg, Oral, Q6H PRN **OR** ondansetron (ZOFRAN) injection 4 mg, 4 mg, Intravenous, Q6H PRN, Hernesto Hong MD, 4 mg at 08/01/17 1957  •  sodium chloride 0.45 % infusion, 150 mL/hr, Intravenous, Continuous, Hernesto Hong MD, Last Rate: 150 mL/hr at 08/03/17 0041, 150 mL/hr at 08/03/17 0041    Assessment/Plan:   #.1 Prostate Cancer  #2. Acute Kidney injury - improving with fluids. Appreciate Dr. Doss input  #3. Anemia secondary to acute blood loss. Hemodynamically stable. Will continue to follow.         Hernesto Hong MD  08/03/17  6:55 AM

## 2017-08-04 ENCOUNTER — TELEPHONE (OUTPATIENT)
Dept: UROLOGY | Facility: CLINIC | Age: 67
End: 2017-08-04

## 2017-08-04 LAB
ALBUMIN SERPL-MCNC: 3.2 G/DL (ref 3.5–5)
ALBUMIN/GLOB SERPL: 1.3 G/DL (ref 1.1–2.5)
ALP SERPL-CCNC: 32 U/L (ref 24–120)
ALT SERPL W P-5'-P-CCNC: 27 U/L (ref 0–54)
ANION GAP SERPL CALCULATED.3IONS-SCNC: 5 MMOL/L (ref 4–13)
AST SERPL-CCNC: 24 U/L (ref 7–45)
BASOPHILS # BLD AUTO: 0.01 10*3/MM3 (ref 0–0.2)
BASOPHILS NFR BLD AUTO: 0.1 % (ref 0–2)
BILIRUB SERPL-MCNC: 1.2 MG/DL (ref 0.1–1)
BUN BLD-MCNC: 22 MG/DL (ref 5–21)
BUN/CREAT SERPL: 19.3 (ref 7–25)
CALCIUM SPEC-SCNC: 7.7 MG/DL (ref 8.4–10.4)
CHLORIDE SERPL-SCNC: 103 MMOL/L (ref 98–110)
CO2 SERPL-SCNC: 27 MMOL/L (ref 24–31)
CREAT BLD-MCNC: 1.14 MG/DL (ref 0.5–1.4)
CREAT FLD-MCNC: 3.7 MG/DL
DEPRECATED RDW RBC AUTO: 46.7 FL (ref 40–54)
EOSINOPHIL # BLD AUTO: 0.25 10*3/MM3 (ref 0–0.7)
EOSINOPHIL NFR BLD AUTO: 2.3 % (ref 0–4)
ERYTHROCYTE [DISTWIDTH] IN BLOOD BY AUTOMATED COUNT: 14.6 % (ref 12–15)
GFR SERPL CREATININE-BSD FRML MDRD: 64 ML/MIN/1.73
GLOBULIN UR ELPH-MCNC: 2.4 GM/DL
GLUCOSE BLD-MCNC: 113 MG/DL (ref 70–100)
HCT VFR BLD AUTO: 26.9 % (ref 40–52)
HGB BLD-MCNC: 9 G/DL (ref 14–18)
IMM GRANULOCYTES # BLD: 0.05 10*3/MM3 (ref 0–0.03)
IMM GRANULOCYTES NFR BLD: 0.5 % (ref 0–5)
LYMPHOCYTES # BLD AUTO: 0.58 10*3/MM3 (ref 0.72–4.86)
LYMPHOCYTES NFR BLD AUTO: 5.3 % (ref 15–45)
MCH RBC QN AUTO: 29.2 PG (ref 28–32)
MCHC RBC AUTO-ENTMCNC: 33.5 G/DL (ref 33–36)
MCV RBC AUTO: 87.3 FL (ref 82–95)
MONOCYTES # BLD AUTO: 0.59 10*3/MM3 (ref 0.19–1.3)
MONOCYTES NFR BLD AUTO: 5.3 % (ref 4–12)
NEUTROPHILS # BLD AUTO: 9.56 10*3/MM3 (ref 1.87–8.4)
NEUTROPHILS NFR BLD AUTO: 86.5 % (ref 39–78)
PLATELET # BLD AUTO: 208 10*3/MM3 (ref 130–400)
PMV BLD AUTO: 10 FL (ref 6–12)
POTASSIUM BLD-SCNC: 4.7 MMOL/L (ref 3.5–5.3)
PROT SERPL-MCNC: 5.6 G/DL (ref 6.3–8.7)
RBC # BLD AUTO: 3.08 10*6/MM3 (ref 4.8–5.9)
SODIUM BLD-SCNC: 135 MMOL/L (ref 135–145)
WBC NRBC COR # BLD: 11.04 10*3/MM3 (ref 4.8–10.8)

## 2017-08-04 PROCEDURE — 25010000002 MORPHINE PER 10 MG: Performed by: UROLOGY

## 2017-08-04 PROCEDURE — 82570 ASSAY OF URINE CREATININE: CPT | Performed by: UROLOGY

## 2017-08-04 PROCEDURE — 25010000002 ONDANSETRON PER 1 MG: Performed by: UROLOGY

## 2017-08-04 PROCEDURE — 85025 COMPLETE CBC W/AUTO DIFF WBC: CPT | Performed by: INTERNAL MEDICINE

## 2017-08-04 PROCEDURE — 80053 COMPREHEN METABOLIC PANEL: CPT | Performed by: INTERNAL MEDICINE

## 2017-08-04 PROCEDURE — 0D9670Z DRAINAGE OF STOMACH WITH DRAINAGE DEVICE, VIA NATURAL OR ARTIFICIAL OPENING: ICD-10-PCS | Performed by: UROLOGY

## 2017-08-04 RX ORDER — ONDANSETRON 4 MG/1
4 TABLET, FILM COATED ORAL EVERY 4 HOURS PRN
Status: DISCONTINUED | OUTPATIENT
Start: 2017-08-04 | End: 2017-08-12 | Stop reason: HOSPADM

## 2017-08-04 RX ORDER — ONDANSETRON 2 MG/ML
4 INJECTION INTRAMUSCULAR; INTRAVENOUS EVERY 4 HOURS PRN
Status: DISCONTINUED | OUTPATIENT
Start: 2017-08-04 | End: 2017-08-12 | Stop reason: HOSPADM

## 2017-08-04 RX ORDER — LEVOFLOXACIN 500 MG/1
500 TABLET, FILM COATED ORAL ONCE
Qty: 1 TABLET | Refills: 0 | Status: SHIPPED | OUTPATIENT
Start: 2017-08-04 | End: 2017-08-12 | Stop reason: HOSPADM

## 2017-08-04 RX ORDER — ONDANSETRON 4 MG/1
4 TABLET, ORALLY DISINTEGRATING ORAL EVERY 4 HOURS PRN
Status: DISCONTINUED | OUTPATIENT
Start: 2017-08-04 | End: 2017-08-12 | Stop reason: HOSPADM

## 2017-08-04 RX ORDER — FERROUS SULFATE 325(65) MG
325 TABLET ORAL
Qty: 30 TABLET | Refills: 0 | Status: SHIPPED | OUTPATIENT
Start: 2017-08-04 | End: 2017-08-12 | Stop reason: HOSPADM

## 2017-08-04 RX ADMIN — LISINOPRIL 40 MG: 20 TABLET ORAL at 08:22

## 2017-08-04 RX ADMIN — ONDANSETRON 4 MG: 2 INJECTION INTRAMUSCULAR; INTRAVENOUS at 13:58

## 2017-08-04 RX ADMIN — HYDROCODONE BITARTRATE AND ACETAMINOPHEN 2 TABLET: 7.5; 325 TABLET ORAL at 00:53

## 2017-08-04 RX ADMIN — SODIUM CHLORIDE 125 ML/HR: 9 INJECTION, SOLUTION INTRAVENOUS at 14:47

## 2017-08-04 RX ADMIN — LINAGLIPTIN 5 MG: 5 TABLET, FILM COATED ORAL at 08:22

## 2017-08-04 RX ADMIN — HYDROCODONE BITARTRATE AND ACETAMINOPHEN 1 TABLET: 7.5; 325 TABLET ORAL at 18:52

## 2017-08-04 RX ADMIN — MORPHINE SULFATE 6 MG: 10 INJECTION, SOLUTION INTRAMUSCULAR; INTRAVENOUS at 20:17

## 2017-08-04 RX ADMIN — VITAMIN D 1000 UNITS: 25 TAB ORAL at 08:22

## 2017-08-04 RX ADMIN — MORPHINE SULFATE 6 MG: 10 INJECTION, SOLUTION INTRAMUSCULAR; INTRAVENOUS at 22:14

## 2017-08-04 RX ADMIN — HYDROCODONE BITARTRATE AND ACETAMINOPHEN 2 TABLET: 7.5; 325 TABLET ORAL at 05:41

## 2017-08-04 RX ADMIN — HYDROCODONE BITARTRATE AND ACETAMINOPHEN 1 TABLET: 7.5; 325 TABLET ORAL at 14:44

## 2017-08-04 RX ADMIN — LEVOTHYROXINE SODIUM 200 MCG: 200 TABLET ORAL at 05:41

## 2017-08-04 RX ADMIN — CALCIUM CARBONATE 1250 MG: 1250 SUSPENSION ORAL at 08:22

## 2017-08-04 RX ADMIN — ONDANSETRON 4 MG: 2 INJECTION INTRAMUSCULAR; INTRAVENOUS at 08:35

## 2017-08-04 RX ADMIN — HYDROCODONE BITARTRATE AND ACETAMINOPHEN 2 TABLET: 7.5; 325 TABLET ORAL at 09:55

## 2017-08-04 RX ADMIN — SODIUM CHLORIDE 125 ML/HR: 9 INJECTION, SOLUTION INTRAVENOUS at 08:41

## 2017-08-04 RX ADMIN — ONDANSETRON 4 MG: 2 INJECTION INTRAMUSCULAR; INTRAVENOUS at 20:06

## 2017-08-04 NOTE — DISCHARGE SUMMARY
Date of Discharge:  8/12/2017    Discharge Diagnosis:   #1.  Prostate cancer  #2.  Acute kidney injury probably due to acute tubular necrosis with resolution at time of discharge  #3.  Anemia secondary to acute blood loss  #4. Protracted colonic ileus  #5. Vesicourethral anastomosis.     Presenting Problem/History of Present Illness  Prostate cancer [C61]  Prostate cancer [C61]       Hospital Course  Patient is a 66 y.o. male presented with likely organ confined prostate cancer.  He underwent a robot-assisted prostatectomy.  Postoperatively he developed significant oliguria.  His creatinine césar to 2.9 but he responded to fluid challenge nicely.  Ultrasound confirmed no evidence of obstruction.    The patient also had some postoperative anemia that was seen to be from slight bleeding following the procedure.  This is not unusual with a very large prostate like this one.    He developed significant abdominal distention with obstipation and pickups.  He eventually required a nasogastric tube for a couple days.  After nasogastric tube removal he started tolerating clear liquid diet but then developed increasing abdominal distention again in general surgery consultation was obtained.  Both CT scan and an ileus.  There was no abscess or significant fluid accumulations.  He started tolerating a clear liquid diet and then regular diet prior to discharge and had improvement in the above symptoms.    There was an increase in his GETACHEW drainage several days the hospitalization.  Creatinine level was 6.5 consistent with vesicourethral anastomotic leak.  The drains taken off suction and left to gravity drainage he will be discharged home with this.  He is also being given Jacobson catheter care.  The catheter will not be removed until we have had a cystogram confirming there is no evidence of leak.      Procedures Performed  Procedure(s):  PROSTATECTOMY LAPAROSCOPIC WITH DAVINCI SI ROBOT        Consults:   Consults     Date and  Time Order Name Status Description    8/10/2017 0700 Inpatient Consult to General Surgery      8/2/2017 1752 Inpatient Consult to Nephrology Completed           Condition on Discharge:  Stable    Vital Signs  Temp:  [97.7 °F (36.5 °C)-98.9 °F (37.2 °C)] 98.4 °F (36.9 °C)  Heart Rate:  [71-91] 71  Resp:  [14-18] 16  BP: (120-151)/(47-65) 151/65      Discharge Disposition  Home or Self Care    Discharge Medications   Zay Kamara   Home Medication Instructions JOSAFAT:[id number]    Printed on:08/12/17 0713   Medication Information                      atorvastatin (LIPITOR) 10 MG tablet  Take 20 mg by mouth Daily.             cephalexin (KEFLEX) 500 MG capsule  Take 1 capsule by mouth 1 (One) Time for 1 dose. Take this morning of catheter removal             ferrous sulfate 325 (65 FE) MG tablet  Take 1 tablet by mouth 3 (Three) Times a Day With Meals for 10 days.             HYDROcodone-acetaminophen (NORCO) 7.5-325 MG per tablet  Take 1 tablet by mouth Every 6 (Six) Hours As Needed for Moderate Pain (4-6) for up to 24 doses.             levothyroxine (SYNTHROID, LEVOTHROID) 200 MCG tablet  Take 200 mcg by mouth Daily.             lisinopril (PRINIVIL,ZESTRIL) 40 MG tablet  Take 40 mg by mouth Daily.             nabumetone (RELAFEN) 500 MG tablet  Take 500 mg by mouth 2 (Two) Times a Day As Needed for Mild Pain (1-3) (arhritis and knee pain).             SITagliptin (JANUVIA) 100 MG tablet  Take 100 mg by mouth Daily.             tamsulosin (FLOMAX) 0.4 MG capsule 24 hr capsule  Take 1 capsule by mouth every night.                 Discharge Diet:   Diet Instructions     Diet: Regular       Discharge Diet:  Regular       Diet: Regular; Thin Liquids, No Restrictions       Discharge Diet:  Regular   Fluid Consistency:  Thin Liquids, No Restrictions                 Activity at Discharge:   Activity Instructions     Discharge Activity Restrictions       1) No driving for 2 weeks and no longer taking narcotics.   2)  Return to school / work in 4 weeks.  3) May shower / sponge bathe today. No tub baths until corbin catheter is removed.   4) Do not lift / push / pull more then 10 lbs for four weeks.  5) Avoid ATV's/bicycles/motorcycle for 4 weeks.  6) No suppositories or enemas for 3 weeks.       Discharge Activity Restrictions       1) No driving until catheter is removed  2) Return to school / work 24 hours after catheter is removed.  3) May shower / sponge bathe starting tomorrow  4) Do not lift / push / pull more then 10 lbs, exercise, or ride ATV, Bicycle, Lawn equipment while catheter is in place.                 Follow-up Appointments  Future Appointments  Date Time Provider Department Center   8/18/2017 3:30 PM Hernesto Hong MD [facility] None     Additional Instructions for the Follow-ups that You Need to Schedule     Call MD With Problems / Concerns    As directed    If catheter stops draining; If urine becomes bloody enough to form clots in tubing; If wound drainage requires dressing changes more than twice daily; If you have fever >101F; If you have nausea and vomiting more than once in a day, especially if abdomen is distended;       Call MD With Problems / Concerns    As directed    Instructions: If catheter becomes obstructed try to gently irrigate as instructed by nursing. If catheter will not drain after attempt at irrigation. Contact office between hours of [phone] or go to Emergency room at [facility]       Discharge Follow-Up With Specified Provider    As directed    To:  Follow up will be arranged when he calls office Monday 8/14/17   Follow Up:  1 Week   Follow Up Details:  Pt should call office Monday with drain out put amounts.       Follow-Up    As directed    Follow Up Details:  2 weeks to remove catheter.   Has the patient’s follow-up appointment been scheduled and documented in the discharge navigator?:  Yes, documented in the appointment section                 Test Results Pending at  Discharge       Hernesto Hong MD  08/12/17  7:13 AM    Time: Discharge 15 min

## 2017-08-04 NOTE — PLAN OF CARE
Problem: Patient Care Overview (Adult)  Goal: Plan of Care Review  Outcome: Ongoing (interventions implemented as appropriate)    08/03/17 1536 08/03/17 2000   Coping/Psychosocial Response Interventions   Plan Of Care Reviewed With --  patient   Patient Care Overview   Progress improving --        Goal: Adult Individualization and Mutuality  Outcome: Ongoing (interventions implemented as appropriate)  Goal: Discharge Needs Assessment  Outcome: Ongoing (interventions implemented as appropriate)    Problem: Perioperative Period (Adult)  Goal: Signs and Symptoms of Listed Potential Problems Will be Absent or Manageable (Perioperative Period)  Outcome: Ongoing (interventions implemented as appropriate)

## 2017-08-04 NOTE — PLAN OF CARE
Problem: Patient Care Overview (Adult)  Goal: Plan of Care Review  Outcome: Ongoing (interventions implemented as appropriate)  Discharge planned for today was cancelled due to nausea and vomiting, returned to Clear Liquid Diet, VSS, ok from nephrology standpoint to be discharged, GETACHEW output sent to lab with result of 3.7, safety maintained    08/04/17 0890   Coping/Psychosocial Response Interventions   Plan Of Care Reviewed With patient   Patient Care Overview   Progress improving       Goal: Adult Individualization and Mutuality  Outcome: Ongoing (interventions implemented as appropriate)  Goal: Discharge Needs Assessment  Outcome: Ongoing (interventions implemented as appropriate)    Problem: Perioperative Period (Adult)  Goal: Signs and Symptoms of Listed Potential Problems Will be Absent or Manageable (Perioperative Period)  Outcome: Ongoing (interventions implemented as appropriate)

## 2017-08-04 NOTE — TELEPHONE ENCOUNTER
Hospital called about patient appointment. They said patient needs appointment in 2 weeks with corbin removal with G[facility]. He has no appointment available then. Does he need to come in 2 weeks for nurse and then next week with follow with [facility].

## 2017-08-04 NOTE — PROGRESS NOTES
Nephrology ([facility]) Progress Note      Patient:  Zay Kamara  YOB: 1950  Date of Service: 8/4/2017  MRN: [id number]   Acct: [id number]   Primary Care Physician: LUIS Souza  Advance Directive: Full Code  Admit Date: 8/1/2017       Hospital Day: 3  Referring Provider: Hernesto Hong MD      Patient personally seen and examined.  Complete chart including Consults, Notes, Operative Reports, Labs, Cardiology, and Radiology studies reviewed as able.        Subjective:  Zay Kamara is a 66 y.o. male  whom we were consulted for acute kidney injury.  No prior renal history.  Has prostatectomy on 8/01 due to prostate CA.  Episode of acute kidney injury post-operatively that was due to volume depletion.  responded well to IV volume replacement.  Urine output nonoliguric.  Today is feeling better; no new issues.    Allergies:  Review of patient's allergies indicates no known allergies.    Home Meds:  Prescriptions Prior to Admission   Medication Sig Dispense Refill Last Dose   • atorvastatin (LIPITOR) 10 MG tablet Take 20 mg by mouth Daily.   7/30/2017   • levothyroxine (SYNTHROID, LEVOTHROID) 200 MCG tablet Take 200 mcg by mouth Daily.   7/30/2017   • lisinopril (PRINIVIL,ZESTRIL) 40 MG tablet Take 40 mg by mouth Daily.   7/30/2017   • nabumetone (RELAFEN) 500 MG tablet Take 500 mg by mouth 2 (Two) Times a Day As Needed for Mild Pain (1-3) (arhritis and knee pain).   7/30/2017   • SITagliptin (JANUVIA) 100 MG tablet Take 100 mg by mouth Daily.   7/30/2017   • tamsulosin (FLOMAX) 0.4 MG capsule 24 hr capsule Take 1 capsule by mouth every night.   7/30/2017       Medicines:  Current Facility-Administered Medications   Medication Dose Route Frequency Provider Last Rate Last Dose   • acetaminophen (TYLENOL) tablet 650 mg  650 mg Oral Q4H PRN Hernesto Hong MD       • calcium carbonate 1250 (500 CA) MG/5ML suspension 1,250 mg  1,250 mg Oral TID With Meals  Jasen Doss MD   1,250 mg at 08/04/17 0822   • cholecalciferol (VITAMIN D3) tablet 1,000 Units  1,000 Units Oral Daily Jasen Doss MD   1,000 Units at 08/04/17 0822   • HYDROcodone-acetaminophen (NORCO) 7.5-325 MG per tablet 1 tablet  1 tablet Oral Q4H PRN Hernesto Hong MD   1 tablet at 08/02/17 0613   • levothyroxine (SYNTHROID, LEVOTHROID) tablet 200 mcg  200 mcg Oral Q AM Hernesto Hong MD   200 mcg at 08/04/17 0541   • linagliptin (TRADJENTA) tablet 5 mg  5 mg Oral Daily Hernesto Hong MD   5 mg at 08/04/17 0822   • lisinopril (PRINIVIL,ZESTRIL) tablet 40 mg  40 mg Oral Q24H Hernesto Hong MD   40 mg at 08/04/17 0822   • Morphine injection 6 mg  6 mg Intravenous Q2H PRN Hernesto Hong MD   6 mg at 08/02/17 0910    And   • naloxone (NARCAN) injection 0.4 mg  0.4 mg Intravenous Q5 Min PRN Hernesto Hong MD       • ondansetron (ZOFRAN) tablet 4 mg  4 mg Oral Q6H PRN Hernesto Hong MD        Or   • ondansetron ODT (ZOFRAN-ODT) disintegrating tablet 4 mg  4 mg Oral Q6H PRN Hernesto Hong MD        Or   • ondansetron (ZOFRAN) injection 4 mg  4 mg Intravenous Q6H PRN Hernesto Hong MD   4 mg at 08/04/17 0835   • sodium chloride 0.9 % infusion  125 mL/hr Intravenous Continuous Jasen Doss MD   Stopped at 08/04/17 0955       Past Medical History:  Past Medical History:   Diagnosis Date   • Arthritis    • Cancer     thyroid   • Diabetes    • Disease of thyroid gland    • Hypercholesteremia    • Hypertension    • IBS (irritable bowel syndrome)    • Prostate cancer    • Sleep apnea     NON COMPLIANT WITH C PAP       Past Surgical History:  Past Surgical History:   Procedure Laterality Date   • CHOLECYSTECTOMY     • COLONOSCOPY N/A 3/7/2017    Procedure: COLONOSCOPY WITH ANESTHESIA;  Surgeon: Hector Alvarenga MD;  Location: [facility] ENDOSCOPY;  Service:    • CYSTOSCOPY TRANSURETHRAL RESECTION OF PROSTATE     • PROSTATE SURGERY     • PROSTATECTOMY N/A 8/1/2017    Procedure: PROSTATECTOMY LAPAROSCOPIC WITH  "ILDAI SI ROBOT ;  Surgeon: Hernesto Hong MD;  Location: [facility] OR;  Service:    • THYROID SURGERY      RADIATION THERAPY AFTER SURGERY       Family History  Family History   Problem Relation Age of Onset   • Family history unknown: Yes   • Prostate cancer Father        Social History  Social History     Social History   • Marital status:      Spouse name: N/A   • Number of children: N/A   • Years of education: N/A     Occupational History   • Not on file.     Social History Main Topics   • Smoking status: Never Smoker   • Smokeless tobacco: Never Used   • Alcohol use No   • Drug use: No   • Sexual activity: Defer     Other Topics Concern   • Not on file     Social History Narrative         Review of Systems:  History obtained from chart review and the patient  General ROS: No fever or chills  Respiratory ROS: No cough, shortness of breath, wheezing  Cardiovascular ROS: no chest pain or dyspnea on exertion  Gastrointestinal ROS: No abdominal pain or melena  Genito-Urinary ROS: No dysuria or hematuria  14 point ROS reviewed with the patient and negative except as noted above and in the HPI unless unable to obtain.    Objective:  /56 (BP Location: Right arm, Patient Position: Lying)  Pulse 76  Temp 98.6 °F (37 °C) (Oral)   Resp 16  Ht 71\" (180.3 cm)  Wt 227 lb (103 kg)  SpO2 91%  BMI 31.66 kg/m2    Intake/Output Summary (Last 24 hours) at 08/04/17 1043  Last data filed at 08/04/17 1037   Gross per 24 hour   Intake             2404 ml   Output             2680 ml   Net             -276 ml     General: awake/alert   Chest:  clear to auscultation bilaterally without respiratory distress  CVS: regular rate and rhythm  Abdominal: soft, nontender, normal bowel sounds  Extremities: no cyanosis or edema  Skin: warm and dry without rash      Labs:    Results from last 7 days  Lab Units 08/04/17  0457 08/03/17  0516 08/02/17  1323   WBC 10*3/mm3 11.04* 11.34* 14.61*   HEMOGLOBIN g/dL 9.0* 9.2* 11.0* "   HEMATOCRIT % 26.9* 27.2* 32.5*   PLATELETS 10*3/mm3 208 187 232           Results from last 7 days  Lab Units 08/04/17  0457 08/03/17  0516 08/02/17  1323   SODIUM mmol/L 135 136 135   POTASSIUM mmol/L 4.7 4.5 5.0   CHLORIDE mmol/L 103 105 102   CO2 mmol/L 27.0 24.0 24.0   BUN mg/dL 22* 33* 37*   CREATININE mg/dL 1.14 1.90* 2.97*   CALCIUM mg/dL 7.7* 6.8* 7.2*   BILIRUBIN mg/dL 1.2* 1.3*  --    ALK PHOS U/L 32 29  --    ALT (SGPT) U/L 27 28  --    AST (SGOT) U/L 24 24  --    GLUCOSE mg/dL 113* 118* 142*       Radiology:   Imaging Results (last 72 hours)     Procedure Component Value Units Date/Time    US Renal Bilateral [[id number]] Collected:  08/02/17 1450     Updated:  08/02/17 1516    Narrative:       RENAL ULTRASOUND COMPLETE 8/2/2017 2:21 PM CDT     REASON FOR EXAM: Oliguria s/p prostatectomy; [id number] neoplasm of  prostate       COMPARISON: None       TECHNIQUE: Multiple longitudinal and transverse realtime sonographic  images of the kidneys and urinary bladder are obtained.      FINDINGS:      RIGHT KIDNEY: 5.6 x 5.1 x 12.1 cm. Normal in size, shape, contour and  position. Small 2.5 cm cyst is present upper pole the right kidney. The  central echo complex is compact with no evidence for hydronephrosis. No  nephrolithiasis or abnormal perinephric fluid collections . The  pelvicalyceal system is mildly prominent..      LEFT KIDNEY: 5.7 x 4.8 x 13.2 cm. Normal in size, shape, contour and  position. No solid or cystic masses. The central echo complex is compact  with no evidence for hydronephrosis. No nephrolithiasis or abnormal  perinephric fluid collections . No hydroureter.      PELVIS: Jacobson catheter is present in the bladder. There is no  surrounding ascites.        Impression:       1. Slightly prominent right renal pelvis. These findings were reviewed  with Dr. Eliazar Hong at the time of this dictation.  2. Incidental note made of a right renal cyst.  3. Negative left renal ultrasound.        This  report was finalized on 08/02/2017 14:58 by Dr. Ruiz Sandoval MD.          Culture:         Assessment   1.  Acute kidney injury secondary to prerenal azotemia-- resolving  2.  Status post prostatectomy for the treatment of prostate cancer.  3.  Type II diabetes.  4.  Hypertension  5.  Hypocalcemia--improving    Plan:  1.  OK for discharge from renal standpoint; follow up in office in 1-2 weeks      Tigre Cabello, LUIS  8/4/2017  10:43 AM

## 2017-08-05 ENCOUNTER — APPOINTMENT (OUTPATIENT)
Dept: GENERAL RADIOLOGY | Facility: HOSPITAL | Age: 67
End: 2017-08-05

## 2017-08-05 LAB
ANION GAP SERPL CALCULATED.3IONS-SCNC: 9 MMOL/L (ref 4–13)
BASOPHILS # BLD AUTO: 0.01 10*3/MM3 (ref 0–0.2)
BASOPHILS NFR BLD AUTO: 0.1 % (ref 0–2)
BUN BLD-MCNC: 17 MG/DL (ref 5–21)
BUN/CREAT SERPL: 17.2 (ref 7–25)
CALCIUM SPEC-SCNC: 8.1 MG/DL (ref 8.4–10.4)
CHLORIDE SERPL-SCNC: 99 MMOL/L (ref 98–110)
CO2 SERPL-SCNC: 28 MMOL/L (ref 24–31)
CREAT BLD-MCNC: 0.99 MG/DL (ref 0.5–1.4)
DEPRECATED RDW RBC AUTO: 44.3 FL (ref 40–54)
DEPRECATED RDW RBC AUTO: 44.9 FL (ref 40–54)
EOSINOPHIL # BLD AUTO: 0.19 10*3/MM3 (ref 0–0.7)
EOSINOPHIL NFR BLD AUTO: 1.6 % (ref 0–4)
ERYTHROCYTE [DISTWIDTH] IN BLOOD BY AUTOMATED COUNT: 14.1 % (ref 12–15)
ERYTHROCYTE [DISTWIDTH] IN BLOOD BY AUTOMATED COUNT: 14.2 % (ref 12–15)
GFR SERPL CREATININE-BSD FRML MDRD: 75 ML/MIN/1.73
GLUCOSE BLD-MCNC: 127 MG/DL (ref 70–100)
GLUCOSE BLDC GLUCOMTR-MCNC: 130 MG/DL (ref 70–130)
GLUCOSE BLDC GLUCOMTR-MCNC: 131 MG/DL (ref 70–130)
GLUCOSE BLDC GLUCOMTR-MCNC: 133 MG/DL (ref 70–130)
GLUCOSE BLDC GLUCOMTR-MCNC: 137 MG/DL (ref 70–130)
HCT VFR BLD AUTO: 29.2 % (ref 40–52)
HCT VFR BLD AUTO: 30.6 % (ref 40–52)
HGB BLD-MCNC: 10.1 G/DL (ref 14–18)
HGB BLD-MCNC: 9.8 G/DL (ref 14–18)
IMM GRANULOCYTES # BLD: 0.05 10*3/MM3 (ref 0–0.03)
IMM GRANULOCYTES NFR BLD: 0.4 % (ref 0–5)
LYMPHOCYTES # BLD AUTO: 0.43 10*3/MM3 (ref 0.72–4.86)
LYMPHOCYTES NFR BLD AUTO: 3.5 % (ref 15–45)
MCH RBC QN AUTO: 28.9 PG (ref 28–32)
MCH RBC QN AUTO: 29.2 PG (ref 28–32)
MCHC RBC AUTO-ENTMCNC: 33 G/DL (ref 33–36)
MCHC RBC AUTO-ENTMCNC: 33.6 G/DL (ref 33–36)
MCV RBC AUTO: 86.9 FL (ref 82–95)
MCV RBC AUTO: 87.7 FL (ref 82–95)
MONOCYTES # BLD AUTO: 0.69 10*3/MM3 (ref 0.19–1.3)
MONOCYTES NFR BLD AUTO: 5.7 % (ref 4–12)
NEUTROPHILS # BLD AUTO: 10.81 10*3/MM3 (ref 1.87–8.4)
NEUTROPHILS NFR BLD AUTO: 88.7 % (ref 39–78)
PLATELET # BLD AUTO: 256 10*3/MM3 (ref 130–400)
PLATELET # BLD AUTO: 283 10*3/MM3 (ref 130–400)
PMV BLD AUTO: 9.5 FL (ref 6–12)
PMV BLD AUTO: 9.8 FL (ref 6–12)
POTASSIUM BLD-SCNC: 4.5 MMOL/L (ref 3.5–5.3)
RBC # BLD AUTO: 3.36 10*6/MM3 (ref 4.8–5.9)
RBC # BLD AUTO: 3.49 10*6/MM3 (ref 4.8–5.9)
SODIUM BLD-SCNC: 136 MMOL/L (ref 135–145)
WBC NRBC COR # BLD: 11.18 10*3/MM3 (ref 4.8–10.8)
WBC NRBC COR # BLD: 12.18 10*3/MM3 (ref 4.8–10.8)

## 2017-08-05 PROCEDURE — 25010000002 HEPARIN (PORCINE) PER 1000 UNITS: Performed by: UROLOGY

## 2017-08-05 PROCEDURE — 74000 HC ABDOMEN KUB: CPT

## 2017-08-05 PROCEDURE — 25010000002 MORPHINE PER 10 MG: Performed by: UROLOGY

## 2017-08-05 PROCEDURE — 85025 COMPLETE CBC W/AUTO DIFF WBC: CPT | Performed by: INTERNAL MEDICINE

## 2017-08-05 PROCEDURE — 85027 COMPLETE CBC AUTOMATED: CPT | Performed by: UROLOGY

## 2017-08-05 PROCEDURE — 80048 BASIC METABOLIC PNL TOTAL CA: CPT | Performed by: INTERNAL MEDICINE

## 2017-08-05 PROCEDURE — 82962 GLUCOSE BLOOD TEST: CPT

## 2017-08-05 PROCEDURE — 99024 POSTOP FOLLOW-UP VISIT: CPT | Performed by: UROLOGY

## 2017-08-05 RX ORDER — NICOTINE POLACRILEX 4 MG
15 LOZENGE BUCCAL
Status: DISCONTINUED | OUTPATIENT
Start: 2017-08-05 | End: 2017-08-12 | Stop reason: HOSPADM

## 2017-08-05 RX ORDER — HEPARIN SODIUM 5000 [USP'U]/ML
5000 INJECTION, SOLUTION INTRAVENOUS; SUBCUTANEOUS EVERY 8 HOURS SCHEDULED
Status: DISCONTINUED | OUTPATIENT
Start: 2017-08-05 | End: 2017-08-12 | Stop reason: HOSPADM

## 2017-08-05 RX ORDER — HYDRALAZINE HYDROCHLORIDE 20 MG/ML
10 INJECTION INTRAMUSCULAR; INTRAVENOUS EVERY 4 HOURS PRN
Status: DISCONTINUED | OUTPATIENT
Start: 2017-08-05 | End: 2017-08-12 | Stop reason: HOSPADM

## 2017-08-05 RX ORDER — DEXTROSE MONOHYDRATE 25 G/50ML
25 INJECTION, SOLUTION INTRAVENOUS
Status: DISCONTINUED | OUTPATIENT
Start: 2017-08-05 | End: 2017-08-12 | Stop reason: HOSPADM

## 2017-08-05 RX ADMIN — SODIUM CHLORIDE 125 ML/HR: 9 INJECTION, SOLUTION INTRAVENOUS at 06:05

## 2017-08-05 RX ADMIN — HEPARIN SODIUM 5000 UNITS: 5000 INJECTION, SOLUTION INTRAVENOUS; SUBCUTANEOUS at 14:55

## 2017-08-05 RX ADMIN — MORPHINE SULFATE 6 MG: 10 INJECTION, SOLUTION INTRAMUSCULAR; INTRAVENOUS at 08:55

## 2017-08-05 RX ADMIN — MORPHINE SULFATE 6 MG: 10 INJECTION, SOLUTION INTRAMUSCULAR; INTRAVENOUS at 13:09

## 2017-08-05 RX ADMIN — LEVOTHYROXINE SODIUM 200 MCG: 200 TABLET ORAL at 06:04

## 2017-08-05 RX ADMIN — SODIUM CHLORIDE 125 ML/HR: 9 INJECTION, SOLUTION INTRAVENOUS at 20:57

## 2017-08-05 RX ADMIN — SODIUM CHLORIDE 125 ML/HR: 9 INJECTION, SOLUTION INTRAVENOUS at 13:10

## 2017-08-05 RX ADMIN — HEPARIN SODIUM 5000 UNITS: 5000 INJECTION, SOLUTION INTRAVENOUS; SUBCUTANEOUS at 08:56

## 2017-08-05 RX ADMIN — MORPHINE SULFATE 6 MG: 10 INJECTION, SOLUTION INTRAMUSCULAR; INTRAVENOUS at 06:04

## 2017-08-05 RX ADMIN — MORPHINE SULFATE 6 MG: 10 INJECTION, SOLUTION INTRAMUSCULAR; INTRAVENOUS at 00:19

## 2017-08-05 RX ADMIN — MORPHINE SULFATE 6 MG: 10 INJECTION, SOLUTION INTRAMUSCULAR; INTRAVENOUS at 18:27

## 2017-08-05 RX ADMIN — HEPARIN SODIUM 5000 UNITS: 5000 INJECTION, SOLUTION INTRAVENOUS; SUBCUTANEOUS at 23:10

## 2017-08-05 RX ADMIN — MORPHINE SULFATE 6 MG: 10 INJECTION, SOLUTION INTRAMUSCULAR; INTRAVENOUS at 03:08

## 2017-08-05 NOTE — PLAN OF CARE
Problem: Patient Care Overview (Adult)  Goal: Discharge Needs Assessment  Outcome: Ongoing (interventions implemented as appropriate)    Problem: Prostatectomy, Radical (Adult)  Goal: Signs and Symptoms of Listed Potential Problems Will be Absent or Manageable (Prostatectomy, Radical)  Outcome: Ongoing (interventions implemented as appropriate)    08/05/17 1321   Prostatectomy, Radical   Problems Assessed (Radical Prostatectomy) all   Problems Present (Radical Prostatectomy) pain;other (see comments)

## 2017-08-05 NOTE — PROGRESS NOTES
Nephrology ([facility]) Progress Note      Patient:  Zay Kamara  YOB: 1950  Date of Service: 8/5/2017  MRN: [id number]   Acct: [id number]   Primary Care Physician: LUIS Souza  Advance Directive: Full Code  Admit Date: 8/1/2017       Hospital Day: 4  Referring Provider: Hernesto Hong MD      Patient personally seen and examined.  Complete chart including Consults, Notes, Operative Reports, Labs, Cardiology, and Radiology studies reviewed as able.        Subjective:  Zay Kamara is a 66 y.o. male  whom we were consulted for acute kidney injury.  No prior renal history.  He is status post prostatectomy on 8/01 for the treatment of prostate cancer.  Patient has one episode of acute kidney injury related to prerenal as ischemia, responded very well to IV fluid administration.  He is now developing paralytic ileus and has nasogastric tube.  Otherwise he is feeling better.    Allergies:  Review of patient's allergies indicates no known allergies.    Home Meds:  Prescriptions Prior to Admission   Medication Sig Dispense Refill Last Dose   • atorvastatin (LIPITOR) 10 MG tablet Take 20 mg by mouth Daily.   7/30/2017   • levothyroxine (SYNTHROID, LEVOTHROID) 200 MCG tablet Take 200 mcg by mouth Daily.   7/30/2017   • lisinopril (PRINIVIL,ZESTRIL) 40 MG tablet Take 40 mg by mouth Daily.   7/30/2017   • nabumetone (RELAFEN) 500 MG tablet Take 500 mg by mouth 2 (Two) Times a Day As Needed for Mild Pain (1-3) (arhritis and knee pain).   7/30/2017   • SITagliptin (JANUVIA) 100 MG tablet Take 100 mg by mouth Daily.   7/30/2017   • tamsulosin (FLOMAX) 0.4 MG capsule 24 hr capsule Take 1 capsule by mouth every night.   7/30/2017       Medicines:  Current Facility-Administered Medications   Medication Dose Route Frequency Provider Last Rate Last Dose   • dextrose (D50W) solution 25 g  25 g Intravenous Q15 Min PRN Zaheer Rebolledo MD       • dextrose (GLUTOSE)  oral gel 15 g  15 g Oral Q15 Min PRN Zaheer Rebolledo MD       • glucagon (human recombinant) (GLUCAGEN DIAGNOSTIC) injection 1 mg  1 mg Subcutaneous Q15 Min PRN Zaheer Rebolledo MD       • heparin (porcine) 5000 UNIT/ML injection 5,000 Units  5,000 Units Subcutaneous Q8H Zaheer Rebolledo MD   5,000 Units at 08/05/17 0856   • hydrALAZINE (APRESOLINE) injection 10 mg  10 mg Intravenous Q4H PRN Zaheer Rebolledo MD       • insulin lispro (humaLOG) injection 0-9 Units  0-9 Units Subcutaneous 4x Daily With Meals & Nightly Zaheer Rebolledo MD       • Morphine injection 6 mg  6 mg Intravenous Q2H PRN Hernesto Hong MD   6 mg at 08/05/17 0855    And   • naloxone (NARCAN) injection 0.4 mg  0.4 mg Intravenous Q5 Min PRN Hernesto Hong MD       • ondansetron (ZOFRAN) injection 4 mg  4 mg Intravenous Q4H PRN Zaheer Rebolledo MD   4 mg at 08/04/17 2006    Or   • ondansetron (ZOFRAN) tablet 4 mg  4 mg Oral Q4H PRN Zaheer Rebolledo MD        Or   • ondansetron ODT (ZOFRAN-ODT) disintegrating tablet 4 mg  4 mg Oral Q4H PRN Zaheer Rebolledo MD       • sodium chloride 0.9 % infusion  125 mL/hr Intravenous Continuous Jasen Doss  mL/hr at 08/05/17 0605 125 mL/hr at 08/05/17 0605       Past Medical History:  Past Medical History:   Diagnosis Date   • Arthritis    • Cancer     thyroid   • Diabetes    • Disease of thyroid gland    • Hypercholesteremia    • Hypertension    • IBS (irritable bowel syndrome)    • Prostate cancer    • Sleep apnea     NON COMPLIANT WITH C PAP       Past Surgical History:  Past Surgical History:   Procedure Laterality Date   • CHOLECYSTECTOMY     • COLONOSCOPY N/A 3/7/2017    Procedure: COLONOSCOPY WITH ANESTHESIA;  Surgeon: Hector Alvarenga MD;  Location: [facility] ENDOSCOPY;  Service:    • CYSTOSCOPY TRANSURETHRAL RESECTION OF PROSTATE     • PROSTATE SURGERY     • PROSTATECTOMY N/A 8/1/2017    Procedure: PROSTATECTOMY LAPAROSCOPIC WITH DAVINCI SI ROBOT ;  Surgeon: Hernesto LANGSTON  "MD Gely;  Location:  [facility] OR;  Service:    • THYROID SURGERY      RADIATION THERAPY AFTER SURGERY       Family History  Family History   Problem Relation Age of Onset   • Family history unknown: Yes   • Prostate cancer Father        Social History  Social History     Social History   • Marital status:      Spouse name: N/A   • Number of children: N/A   • Years of education: N/A     Occupational History   • Not on file.     Social History Main Topics   • Smoking status: Never Smoker   • Smokeless tobacco: Never Used   • Alcohol use No   • Drug use: No   • Sexual activity: Defer     Other Topics Concern   • Not on file     Social History Narrative         Review of Systems:  History obtained from chart review and the patient  General ROS: No fever or chills  Respiratory ROS: No cough, shortness of breath, wheezing  Cardiovascular ROS: no chest pain or dyspnea on exertion  Gastrointestinal ROS: No abdominal pain or melena  Genito-Urinary ROS: No dysuria or hematuria  14 point ROS reviewed with the patient and negative except as noted above and in the HPI unless unable to obtain.    Objective:  /67 (BP Location: Right arm, Patient Position: Sitting) Comment: nurse notifed  Pulse 92  Temp 99.8 °F (37.7 °C) (Oral)   Resp 16  Ht 71\" (180.3 cm)  Wt 227 lb (103 kg)  SpO2 93%  BMI 31.66 kg/m2    Intake/Output Summary (Last 24 hours) at 08/05/17 1219  Last data filed at 08/05/17 0945   Gross per 24 hour   Intake             1589 ml   Output             5350 ml   Net            -3761 ml     General: awake/alert   HEENT: Normocephalic atraumatic head, pupils round and reactive to light  Neck:supple, no JVD or carotid bruits.  Chest:  clear to auscultation bilaterally without respiratory distress  CVS: regular rate and rhythm  Abdominal: Distended, hypoactive bowel sounds, right lower quadrant GETACHEW.  Extremities: no cyanosis or edema  Skin: warm and dry without rash      Labs:    Results from last 7 " days  Lab Units 08/05/17  0436 08/04/17  0457 08/03/17  0516   WBC 10*3/mm3 12.18* 11.04* 11.34*   HEMOGLOBIN g/dL 9.8* 9.0* 9.2*   HEMATOCRIT % 29.2* 26.9* 27.2*   PLATELETS 10*3/mm3 256 208 187           Results from last 7 days  Lab Units 08/05/17  0436 08/04/17  0457 08/03/17  0516   SODIUM mmol/L 136 135 136   POTASSIUM mmol/L 4.5 4.7 4.5   CHLORIDE mmol/L 99 103 105   CO2 mmol/L 28.0 27.0 24.0   BUN mg/dL 17 22* 33*   CREATININE mg/dL 0.99 1.14 1.90*   CALCIUM mg/dL 8.1* 7.7* 6.8*   BILIRUBIN mg/dL  --  1.2* 1.3*   ALK PHOS U/L  --  32 29   ALT (SGPT) U/L  --  27 28   AST (SGOT) U/L  --  24 24   GLUCOSE mg/dL 127* 113* 118*       Radiology:   Imaging Results (last 72 hours)     Procedure Component Value Units Date/Time    US Renal Bilateral [[id number]] Collected:  08/02/17 1450     Updated:  08/02/17 1516    Narrative:       RENAL ULTRASOUND COMPLETE 8/2/2017 2:21 PM CDT     REASON FOR EXAM: Oliguria s/p prostatectomy; W03-Fdufadrip neoplasm of  prostate       COMPARISON: None       TECHNIQUE: Multiple longitudinal and transverse realtime sonographic  images of the kidneys and urinary bladder are obtained.      FINDINGS:      RIGHT KIDNEY: 5.6 x 5.1 x 12.1 cm. Normal in size, shape, contour and  position. Small 2.5 cm cyst is present upper pole the right kidney. The  central echo complex is compact with no evidence for hydronephrosis. No  nephrolithiasis or abnormal perinephric fluid collections . The  pelvicalyceal system is mildly prominent..      LEFT KIDNEY: 5.7 x 4.8 x 13.2 cm. Normal in size, shape, contour and  position. No solid or cystic masses. The central echo complex is compact  with no evidence for hydronephrosis. No nephrolithiasis or abnormal  perinephric fluid collections . No hydroureter.      PELVIS: Jacobson catheter is present in the bladder. There is no  surrounding ascites.        Impression:       1. Slightly prominent right renal pelvis. These findings were reviewed  with Dr. Eliazar Hong at  the time of this dictation.  2. Incidental note made of a right renal cyst.  3. Negative left renal ultrasound.        This report was finalized on 08/02/2017 14:58 by Dr. Ruiz Sandoval MD.          Culture:         Assessment   1.  Acute kidney injury secondary to prerenal azotemia, improving with IV fluid.  2.  Status post prostatectomy for the treatment of prostate cancer.  3.  Type II diabetes.  4.  Hypertension  5.  Hypocalcemia--improving  6.  Paralytic ileus.  7.  Right renal simple cyst.    Plan:  1.  Continue IV fluid.  2.  Continue nothing by mouth and NG tube.      Jasen Doss MD  8/5/2017  12:19 PM

## 2017-08-05 NOTE — PROGRESS NOTES
LOS: 4 days   Patient Care Team:  LUIS Souza as PCP - General (Family Medicine)  Hernesto Hong MD as Consulting Physician (Urology)    Chief Complaint:  Nausea vomiting    Subjective     Interval History:     Patient Complaints: Vomiting  Patient Denies:  Fevers  History taken from: patient RN    Review of Systems:   Review of Systems   Constitutional: Negative for chills and fever.   Respiratory: Negative for shortness of breath.    Cardiovascular: Negative for chest pain.   Gastrointestinal: Positive for abdominal distention, nausea and vomiting. Negative for abdominal pain.   Genitourinary: Negative for hematuria.       Objective     Vital Signs  Temp:  [98 °F (36.7 °C)-99.3 °F (37.4 °C)] 98.7 °F (37.1 °C)  Heart Rate:  [] 94  Resp:  [16] 16  BP: (155-167)/(56-94) 166/74    Physical Exam:  Physical Exam   Constitutional: He is oriented to person, place, and time. He appears well-developed. No distress.   Cardiovascular: Normal rate.    Pulmonary/Chest: Effort normal.   Abdominal: Soft. He exhibits distension. There is no tenderness. There is no rebound and no guarding.   NG tube in place with light brown output.  GETACHEW is serosanguineous   Genitourinary:   Genitourinary Comments: Jacobson in place draining clear urine   Neurological: He is alert and oriented to person, place, and time.        Results Review:       Lab Results (last 72 hours)     Procedure Component Value Units Date/Time    CBC (No Diff) [[id number]]  (Abnormal) Collected:  08/02/17 1323    Specimen:  Blood Updated:  08/02/17 1332     WBC 14.61 (H) 10*3/mm3      RBC 3.79 (L) 10*6/mm3      Hemoglobin 11.0 (L) g/dL      Hematocrit 32.5 (L) %      MCV 85.8 fL      MCH 29.0 pg      MCHC 33.8 g/dL      RDW 14.5 %      RDW-SD 45.1 fl      MPV 10.2 fL      Platelets 232 10*3/mm3     Basic Metabolic Panel [[id number]]  (Abnormal) Collected:  08/02/17 1323    Specimen:  Blood Updated:  08/02/17 1345     Glucose 142 (H) mg/dL       BUN 37 (H) mg/dL      Creatinine 2.97 (H) mg/dL      Sodium 135 mmol/L      Potassium 5.0 mmol/L      Chloride 102 mmol/L      CO2 24.0 mmol/L      Calcium 7.2 (L) mg/dL      eGFR Non African Amer 21 (L) mL/min/1.73      BUN/Creatinine Ratio 12.5     Anion Gap 9.0 mmol/L     Narrative:       GFR Normal >60  Chronic Kidney Disease <60  Kidney Failure <15    Vitamin D 1,25 Dihydroxy [[id number]] Collected:  08/02/17 1922    Specimen:  Blood Updated:  08/02/17 1932    Creatinine, Urine, Random [[id number]] Collected:  08/02/17 2346    Specimen:  Urine from Urine, Clean Catch Updated:  08/03/17 0010     Creatinine, Urine 139.9 mg/dL     Sodium, Urine, Random [[id number]]  (Normal) Collected:  08/02/17 2346    Specimen:  Urine from Urine, Clean Catch Updated:  08/03/17 0010     Sodium, Urine 36 mmol/L     Urea Nitrogen, Urine [[id number]] Collected:  08/02/17 2346    Specimen:  Urine from Urine, Clean Catch Updated:  08/03/17 0010     Urea Nitrogen, Urine 705 mg/dL     Osmolality, Urine [[id number]]  (Abnormal) Collected:  08/02/17 2346    Specimen:  Urine from Urine, Clean Catch Updated:  08/03/17 0015     Osmolality, Urine 411 (L) mOsm/kg     CBC (No Diff) [[id number]]  (Abnormal) Collected:  08/03/17 0516    Specimen:  Blood Updated:  08/03/17 0534     WBC 11.34 (H) 10*3/mm3      RBC 3.14 (L) 10*6/mm3      Hemoglobin 9.2 (L) g/dL      Hematocrit 27.2 (L) %      MCV 86.6 fL      MCH 29.3 pg      MCHC 33.8 g/dL      RDW 14.7 %      RDW-SD 46.4 fl      MPV 9.8 fL      Platelets 187 10*3/mm3     Comprehensive Metabolic Panel [[id number]]  (Abnormal) Collected:  08/03/17 0516    Specimen:  Blood Updated:  08/03/17 0552     Glucose 118 (H) mg/dL      BUN 33 (H) mg/dL      Creatinine 1.90 (H) mg/dL      Sodium 136 mmol/L      Potassium 4.5 mmol/L      Chloride 105 mmol/L      CO2 24.0 mmol/L      Calcium 6.8 (L) mg/dL      Total Protein 5.1 (L) g/dL      Albumin 2.80 (L) g/dL      ALT (SGPT) 28 U/L      AST (SGOT) 24 U/L       Alkaline Phosphatase 29 U/L      Total Bilirubin 1.3 (H) mg/dL      eGFR Non African Amer 36 (L) mL/min/1.73      Globulin 2.3 gm/dL      A/G Ratio 1.2 g/dL      BUN/Creatinine Ratio 17.4     Anion Gap 7.0 mmol/L     Magnesium [[id number]]  (Abnormal) Collected:  08/03/17 0516    Specimen:  Blood Updated:  08/03/17 0552     Magnesium 2.4 (H) mg/dL     Phosphorus [[id number]]  (Normal) Collected:  08/03/17 0516    Specimen:  Blood Updated:  08/03/17 0552     Phosphorus 2.9 mg/dL     Tissue Pathology Exam [[id number]] Collected:  08/01/17 1256    Specimen:  Tissue from Prostate Updated:  08/03/17 1511     Case Report --     Surgical Pathology Report                         Case: [id number]                                  Authorizing Provider:  Hernesto Hong MD        Collected:           08/01/2017 12:56 PM          Ordering Location:     [facility] OR  Received:            08/02/2017 08:09 AM          Pathologist:           Ej Schroeder MD                                                      Specimen:    Prostate                                                                                    Final Diagnosis --     Prostate, radical prostatectomy:       A.  Prostatic adenocarcinoma (Custer's grade 3+4 = 7).       B.  Tumor is present bilaterally in the gland.       C.  Perineural invasion is present.       D.  Largest tumor focus measures 1.3 cm in greatest dimension.       E.  Tumor occupies approximately 10% of the evaluated gland.       F.  Negative for evidence of extraprostatic extension.       G.  Negative for evidence of vas deferens or seminal vesicle invasion.       H.  Surgical excision margins are negative for evidence of malignancy.    AJCC STAGE:  pT2c, pNx, pMx          Synoptic Checklist --     PROSTATE GLAND: Radical Prostatectomy  (Prostate Res - All Specimens)         Specimen Site:    Prostatic structure     Tumor Site:    Prostatic structure    SPECIMEN     Procedure:     "Radical prostatectomy     Prostate Size:           Size (cm):    7.3 cm       Size (cm):    4.9 cm       Size (cm):    4.2 cm     Prostate Weight:           Specify Weight (g):    115     Lymph Node Sampling:    No lymph nodes present    TUMOR     Histologic Type:    Adenocarcinoma (acinar, not otherwise specified)       [place] Pattern:    [place] pattern         Primary Pattern:    Grade 3         Secondary Pattern:    Grade 4         Tertiary Pattern:    Not applicable         Total [place] Score:    7       Tumor Quantitation:    Proportion (percentage) of Prostate Involved by Tumor         Specify (%):    10       Tumor Quantitation:    Tumor size (dominant nodule, if present)         Greatest Dimension (mm):    13 mm       Extraprostatic Extension:    Not identified       Seminal Vesicle Invasion (invasion of muscular wall required):    Not identified       Lymph-Vascular Invasion:    Not Identified       Perineural Invasion:    Present       Treatment Effect:    Not identified    MARGINS     Margins:    Margins uninvolved by invasive carcinoma    LYMPH NODES     Regional Lymph Nodes:    No nodes submitted or found    STAGE (pTNM)     Primary Tumor (pT):    +pT2c: Bilateral disease     Regional Lymph Nodes (pN):    pNX: Cannot be assessed     Distant Metastasis (pM):    Not applicable    ADDITIONAL FINDINGS     Additional Pathologic Findings:    None identified    SPECIAL STUDIES     Ancillary Studies:    Not performed       Gross Description --     *Surgery - Radical prostatectomy  *Designation - \"Prostate\"  *Labelled - Patient's name and accession number  *Received - in formalin is a prostate with attached bilateral seminal vesicles and vas deferens.    PROSTATE    *Measurement - 7.3 cm  right to left; 4.9 cm apex to base; 4.2 cm anterior to posterior  *Weight - 115 g.   *Capsule - tan/pink and intact.  Smooth and glistening with a small amount of adherent soft tissue.  A bulging, intact yellow to tan nodule " is bulging out of the bladder neck and measuring 2.7 x 2.4 cm.  Right lobe is inked black, left lobe is inked blue and the posterior midline is inked yellow.    *Sectioning - The prostate is sectioned from apex to base.  · Right Lobe - multiple yellow/pink periurethral nodules grossly consistent with benign hyperplasia measuring up to 2.3 cm.  There is an area of yellow/tan, firm discoloration in the anterior lateral aspect of the apical region measuring 1.1 cm  in greatest dimension and extending to within 0.1 cm  of the lateral capsule.  These areas do not appear to cross the midline.    · Left Lobe -  multiple yellow/pink periurethral nodules grossly consistent with benign hyperplasia measuring up to 2.7 cm.  There are 2 areas of yellow/tan, firm discoloration in the posterior-lateral aspect of the apical and mid regions measuring 0.7 and 0.5 cm less than 0.1 cm from the lateral capsule and 0.2 cm from the posterior capsule.  Another area of yellow/tan, firm discoloration is identified in the posterior-lateral aspect of the base region measuring 0.4 cm in greatest dimension and extending to within 0.1 cm of the posterior lateral capsule.  These areas do not appear to cross the midline.     Right Seminal Vesicle and Vas Deferens  *Seminal vesicle measurement - 2.5 x 2.3 x 0.6 cm   *External surface - tan/brown, intact and tightly adhered to prostate capsule  *Sectioning - pink/gray and unremarkable  *Vas deferens measurement - 4.4 cm in length by 0.5 cm in diameter  *External surface - tan/brown, intact and unremarkable  *Sectioning - unremarkable    Left Seminal Vesicle and Vas Deferens  *Seminal vesicle measurement - 2.8 x 1.3 x 0.5 cm   *External surface - tan/brown, intact and tightly adhered to capsule  *Sectioning - pink/gray and unremarkable  *Vas deferens measurement - 3.8 cm in length by 0.4 cm in diameter.  *External surface - tan/brown, intact and unremarkable  *Sectioning - unremarkable    Block  Summary    1A through 1C -  Apical margin shaved, sectioned and submitted from right to left  1D - Right bladder neck margin shaved and sectioned  1E - Left bladder neck margin shave and sectioned  1F - Perpendicular sections of mass bulging from bladder neck  1G [id number] - Representative sections of apical region of the right lobe  1K through 1P - Representative sections of mid region of the right lobe  1Q through 1V - Representative sections of base region of the right lobe  1W - Representative sections of right seminal vesicle and vas deferens  1X through 1Z - Representative sections of apical region of the left lobe  1AA through 1FF - Representative sections of mid region of the left lobe  1GG through 1KK - Representative sections of base region of the left lobe  1LL - Representative sections of left seminal vesicle and vas deferens               Microscopic Description --     Microscopic examination reveal sections of a prostate gland demonstrating an infiltrative epithelial lesion comprised predominantly of closely spaced small round glands with minor foci of more complexly arranged to poorly formed glands also identified.  The largest tumor focus is 1.3 cm in greatest dimension and tumor appears to occupy approximately 10% of the evaluated gland.  Perineural invasion is identified, however no evidence of extraprostatic extension is present.  Sections of the vas deferens and seminal vesicles bilaterally reveal no evidence of involvement by malignancy.  The surgical excision margins are negative for evidence of malignancy.       Embedded Images --    Urine Eosinophils, Rafa's Stain [[id number]] Collected:  08/02/17 0936    Specimen:  Urine from Urine, Clean Catch Updated:  08/03/17 1520     Case Report --     Surgical Pathology Report                         [id number]: [id number]                                  Authorizing Provider:  Jasen Doss MD            Collected:           08/02/2017 11:46 PM           Ordering Location:     [address]  Received:            08/03/2017 01:25 PM          Pathologist:           Jackie Steve MD                                                           Specimen:    Urine, Clean Catch, Urine Eosinophils, Rafa's Stain                                       Final Diagnosis --     Urine, Rafa's stain:  Negative for increased urine eosinophils       Gross Description --     Received in a container labeled with the patient's name, medical record number and date of birth is 100 mls of blue-green fluid.  1 smear and  1 cytospin slide is prepared for examination.         Microscopic Description --     Smear and cytospin demonstrate red blood cells, neutrophils and scattered urothelial cells.  Negative for increased urine eosinophils.  Given the atypical appearance of the patient's gross urine specimen.  Dr. Steve contacted KALLI Núñez on 8/3/2017 at 15:05.  Dr. Steve conveyed that the urine was a dark blue-green in color and may warrant further investigation.  The urine is currently saved in the refrigerator pathology department.       Embedded Images --    Vitamin D 25 Hydroxy [[id number]]  (Abnormal) Collected:  08/03/17 1450    Specimen:  Blood Updated:  08/03/17 1609     25 Hydroxy, Vitamin D 20.9 (L) ng/ml     CBC & Differential [[id number]] Collected:  08/04/17 0457    Specimen:  Blood Updated:  08/04/17 0536    Narrative:       The following orders were created for panel order CBC & Differential.  Procedure                               Abnormality         Status                     ---------                               -----------         ------                     CBC Auto Differential[[id number]]        Abnormal            Final result                 Please view results for these tests on the individual orders.    CBC Auto Differential [[id number]]  (Abnormal) Collected:  08/04/17 0457    Specimen:  Blood Updated:  08/04/17 0536     WBC 11.04 (H)  10*3/mm3      RBC 3.08 (L) 10*6/mm3      Hemoglobin 9.0 (L) g/dL      Hematocrit 26.9 (L) %      MCV 87.3 fL      MCH 29.2 pg      MCHC 33.5 g/dL      RDW 14.6 %      RDW-SD 46.7 fl      MPV 10.0 fL      Platelets 208 10*3/mm3      Neutrophil % 86.5 (H) %      Lymphocyte % 5.3 (L) %      Monocyte % 5.3 %      Eosinophil % 2.3 %      Basophil % 0.1 %      Immature Grans % 0.5 %      Neutrophils, Absolute 9.56 (H) 10*3/mm3      Lymphocytes, Absolute 0.58 (L) 10*3/mm3      Monocytes, Absolute 0.59 10*3/mm3      Eosinophils, Absolute 0.25 10*3/mm3      Basophils, Absolute 0.01 10*3/mm3      Immature Grans, Absolute 0.05 (H) 10*3/mm3     Comprehensive Metabolic Panel [[id number]]  (Abnormal) Collected:  08/04/17 0457    Specimen:  Blood Updated:  08/04/17 0547     Glucose 113 (H) mg/dL      BUN 22 (H) mg/dL      Creatinine 1.14 mg/dL      Sodium 135 mmol/L      Potassium 4.7 mmol/L      Chloride 103 mmol/L      CO2 27.0 mmol/L      Calcium 7.7 (L) mg/dL      Total Protein 5.6 (L) g/dL      Albumin 3.20 (L) g/dL      ALT (SGPT) 27 U/L      AST (SGOT) 24 U/L      Alkaline Phosphatase 32 U/L      Total Bilirubin 1.2 (H) mg/dL      eGFR Non African Amer 64 mL/min/1.73      Globulin 2.4 gm/dL      A/G Ratio 1.3 g/dL      BUN/Creatinine Ratio 19.3     Anion Gap 5.0 mmol/L     Creatinine, Body Fluid [[id number]] Collected:  08/04/17 0825    Specimen:  Body Fluid from Peritoneum Updated:  08/04/17 0849     Creatinine, Fluid 3.7 mg/dL     CBC & Differential [[id number]] Collected:  08/05/17 0436    Specimen:  Blood Updated:  08/05/17 0454    Narrative:       The following orders were created for panel order CBC & Differential.  Procedure                               Abnormality         Status                     ---------                               -----------         ------                     CBC Auto Differential[[id number]]        Abnormal            Final result                 Please view results for these tests on the  individual orders.    CBC Auto Differential [[id number]]  (Abnormal) Collected:  08/05/17 0436    Specimen:  Blood Updated:  08/05/17 0454     WBC 12.18 (H) 10*3/mm3      RBC 3.36 (L) 10*6/mm3      Hemoglobin 9.8 (L) g/dL      Hematocrit 29.2 (L) %      MCV 86.9 fL      MCH 29.2 pg      MCHC 33.6 g/dL      RDW 14.1 %      RDW-SD 44.3 fl      MPV 9.8 fL      Platelets 256 10*3/mm3      Neutrophil % 88.7 (H) %      Lymphocyte % 3.5 (L) %      Monocyte % 5.7 %      Eosinophil % 1.6 %      Basophil % 0.1 %      Immature Grans % 0.4 %      Neutrophils, Absolute 10.81 (H) 10*3/mm3      Lymphocytes, Absolute 0.43 (L) 10*3/mm3      Monocytes, Absolute 0.69 10*3/mm3      Eosinophils, Absolute 0.19 10*3/mm3      Basophils, Absolute 0.01 10*3/mm3      Immature Grans, Absolute 0.05 (H) 10*3/mm3     Basic Metabolic Panel [[id number]]  (Abnormal) Collected:  08/05/17 0436    Specimen:  Blood Updated:  08/05/17 0503     Glucose 127 (H) mg/dL      BUN 17 mg/dL      Creatinine 0.99 mg/dL      Sodium 136 mmol/L      Potassium 4.5 mmol/L      Chloride 99 mmol/L      CO2 28.0 mmol/L      Calcium 8.1 (L) mg/dL      eGFR Non African Amer 75 mL/min/1.73      BUN/Creatinine Ratio 17.2     Anion Gap 9.0 mmol/L     Narrative:       GFR Normal >60  Chronic Kidney Disease <60  Kidney Failure <15        Imaging Results (last 72 hours)     Procedure Component Value Units Date/Time    US Renal Bilateral [[id number]] Collected:  08/02/17 1450     Updated:  08/02/17 1516    Narrative:       RENAL ULTRASOUND COMPLETE 8/2/2017 2:21 PM CDT     REASON FOR EXAM: Oliguria s/p prostatectomy; [id number] neoplasm of  prostate       COMPARISON: None       TECHNIQUE: Multiple longitudinal and transverse realtime sonographic  images of the kidneys and urinary bladder are obtained.      FINDINGS:      RIGHT KIDNEY: 5.6 x 5.1 x 12.1 cm. Normal in size, shape, contour and  position. Small 2.5 cm cyst is present upper pole the right kidney. The  central echo  complex is compact with no evidence for hydronephrosis. No  nephrolithiasis or abnormal perinephric fluid collections . The  pelvicalyceal system is mildly prominent..      LEFT KIDNEY: 5.7 x 4.8 x 13.2 cm. Normal in size, shape, contour and  position. No solid or cystic masses. The central echo complex is compact  with no evidence for hydronephrosis. No nephrolithiasis or abnormal  perinephric fluid collections . No hydroureter.      PELVIS: Jacobson catheter is present in the bladder. There is no  surrounding ascites.        Impression:       1. Slightly prominent right renal pelvis. These findings were reviewed  with Dr. Eliazar Hong at the time of this dictation.  2. Incidental note made of a right renal cyst.  3. Negative left renal ultrasound.        This report was finalized on 08/02/2017 14:58 by Dr. Ruiz Sandoval MD.          Medication Review:     Current Facility-Administered Medications:   •  dextrose (D50W) solution 25 g, 25 g, Intravenous, Q15 Min PRN, Zaheer Rebolledo MD  •  dextrose (GLUTOSE) oral gel 15 g, 15 g, Oral, Q15 Min PRN, Zaheer Rebolledo MD  •  glucagon (human recombinant) (GLUCAGEN DIAGNOSTIC) injection 1 mg, 1 mg, Subcutaneous, Q15 Min PRN, Zaheer Rebolledo MD  •  heparin (porcine) 5000 UNIT/ML injection 5,000 Units, 5,000 Units, Subcutaneous, Q8H, Zaheer Rebolledo MD  •  hydrALAZINE (APRESOLINE) injection 10 mg, 10 mg, Intravenous, Q4H PRN, Zaheer Rebolledo MD  •  insulin lispro (humaLOG) injection 0-9 Units, 0-9 Units, Subcutaneous, 4x Daily With Meals & Nightly, Zaheer Rebolledo MD  •  Morphine injection 6 mg, 6 mg, Intravenous, Q2H PRN, 6 mg at 08/05/17 0604 **AND** naloxone (NARCAN) injection 0.4 mg, 0.4 mg, Intravenous, Q5 Min PRN, Hernesto Hong MD  •  ondansetron (ZOFRAN) tablet 4 mg, 4 mg, Oral, Q4H PRN **OR** ondansetron ODT (ZOFRAN-ODT) disintegrating tablet 4 mg, 4 mg, Oral, Q4H PRN **OR** ondansetron (ZOFRAN) injection 4 mg, 4 mg, Intravenous, Q4H PRN,  Zaheer Rebolledo MD, 4 mg at 08/04/17 2006  •  sodium chloride 0.9 % infusion, 125 mL/hr, Intravenous, Continuous, Jasen Doss MD, Last Rate: 125 mL/hr at 08/05/17 0605, 125 mL/hr at 08/05/17 0605    Assessment/Plan     Principal Problem:    Prostate cancer    Multiple episodes of large volume emesis described as light brown.  NG tube placed overnight.  Continue NG tube to wall suction.  Nothing by mouth.  KUB this morning.  Likely significant ileus.  Continue to ambulate, continue pain control.  Creatinine within normal limits today.  No significant leukocytosis or acidosis.      Zaheer Rebolledo MD  08/05/17  7:25 AM

## 2017-08-05 NOTE — PLAN OF CARE
Problem: Patient Care Overview (Adult)  Goal: Plan of Care Review  Outcome: Ongoing (interventions implemented as appropriate)    08/04/17 1606 08/04/17 2000   Coping/Psychosocial Response Interventions   Plan Of Care Reviewed With --  patient   Patient Care Overview   Progress improving --        Goal: Adult Individualization and Mutuality  Outcome: Ongoing (interventions implemented as appropriate)  Goal: Discharge Needs Assessment  Outcome: Ongoing (interventions implemented as appropriate)    Problem: Perioperative Period (Adult)  Goal: Signs and Symptoms of Listed Potential Problems Will be Absent or Manageable (Perioperative Period)  Outcome: Ongoing (interventions implemented as appropriate)

## 2017-08-05 NOTE — PLAN OF CARE
Problem: Patient Care Overview (Adult)  Goal: Plan of Care Review  Outcome: Ongoing (interventions implemented as appropriate)    08/05/17 1322   Coping/Psychosocial Response Interventions   Plan Of Care Reviewed With patient   Patient Care Overview   Progress no change   Pt educated on pain medications and decided to wait at least 4 hours between pain medications. NG to LIWS. GETACHEW dressing chnaged twice. Pt ambulating in halls. NPO.   Goal: Adult Individualization and Mutuality  Outcome: Ongoing (interventions implemented as appropriate)

## 2017-08-06 LAB
ALBUMIN SERPL-MCNC: 3.1 G/DL (ref 3.5–5)
ALBUMIN/GLOB SERPL: 1.3 G/DL (ref 1.1–2.5)
ALP SERPL-CCNC: 36 U/L (ref 24–120)
ALT SERPL W P-5'-P-CCNC: 31 U/L (ref 0–54)
ANION GAP SERPL CALCULATED.3IONS-SCNC: 7 MMOL/L (ref 4–13)
AST SERPL-CCNC: 22 U/L (ref 7–45)
BASOPHILS # BLD AUTO: 0.02 10*3/MM3 (ref 0–0.2)
BASOPHILS NFR BLD AUTO: 0.2 % (ref 0–2)
BILIRUB SERPL-MCNC: 1.5 MG/DL (ref 0.1–1)
BUN BLD-MCNC: 18 MG/DL (ref 5–21)
BUN/CREAT SERPL: 19.1 (ref 7–25)
CALCIUM SPEC-SCNC: 7.8 MG/DL (ref 8.4–10.4)
CHLORIDE SERPL-SCNC: 101 MMOL/L (ref 98–110)
CO2 SERPL-SCNC: 30 MMOL/L (ref 24–31)
CREAT BLD-MCNC: 0.94 MG/DL (ref 0.5–1.4)
DEPRECATED RDW RBC AUTO: 45.6 FL (ref 40–54)
EOSINOPHIL # BLD AUTO: 0.33 10*3/MM3 (ref 0–0.7)
EOSINOPHIL NFR BLD AUTO: 2.6 % (ref 0–4)
ERYTHROCYTE [DISTWIDTH] IN BLOOD BY AUTOMATED COUNT: 14.2 % (ref 12–15)
GFR SERPL CREATININE-BSD FRML MDRD: 80 ML/MIN/1.73
GLOBULIN UR ELPH-MCNC: 2.4 GM/DL
GLUCOSE BLD-MCNC: 125 MG/DL (ref 70–100)
GLUCOSE BLDC GLUCOMTR-MCNC: 121 MG/DL (ref 70–130)
GLUCOSE BLDC GLUCOMTR-MCNC: 127 MG/DL (ref 70–130)
GLUCOSE BLDC GLUCOMTR-MCNC: 133 MG/DL (ref 70–130)
GLUCOSE BLDC GLUCOMTR-MCNC: 139 MG/DL (ref 70–130)
HCT VFR BLD AUTO: 29.9 % (ref 40–52)
HGB BLD-MCNC: 9.9 G/DL (ref 14–18)
IMM GRANULOCYTES # BLD: 0.08 10*3/MM3 (ref 0–0.03)
IMM GRANULOCYTES NFR BLD: 0.6 % (ref 0–5)
LYMPHOCYTES # BLD AUTO: 0.58 10*3/MM3 (ref 0.72–4.86)
LYMPHOCYTES NFR BLD AUTO: 4.6 % (ref 15–45)
MCH RBC QN AUTO: 29.3 PG (ref 28–32)
MCHC RBC AUTO-ENTMCNC: 33.1 G/DL (ref 33–36)
MCV RBC AUTO: 88.5 FL (ref 82–95)
MONOCYTES # BLD AUTO: 0.95 10*3/MM3 (ref 0.19–1.3)
MONOCYTES NFR BLD AUTO: 7.6 % (ref 4–12)
NEUTROPHILS # BLD AUTO: 10.62 10*3/MM3 (ref 1.87–8.4)
NEUTROPHILS NFR BLD AUTO: 84.4 % (ref 39–78)
PLATELET # BLD AUTO: 293 10*3/MM3 (ref 130–400)
PMV BLD AUTO: 9.5 FL (ref 6–12)
POTASSIUM BLD-SCNC: 4.6 MMOL/L (ref 3.5–5.3)
PROT SERPL-MCNC: 5.5 G/DL (ref 6.3–8.7)
RBC # BLD AUTO: 3.38 10*6/MM3 (ref 4.8–5.9)
SODIUM BLD-SCNC: 138 MMOL/L (ref 135–145)
WBC NRBC COR # BLD: 12.58 10*3/MM3 (ref 4.8–10.8)

## 2017-08-06 PROCEDURE — 85025 COMPLETE CBC W/AUTO DIFF WBC: CPT | Performed by: INTERNAL MEDICINE

## 2017-08-06 PROCEDURE — 80053 COMPREHEN METABOLIC PANEL: CPT | Performed by: UROLOGY

## 2017-08-06 PROCEDURE — 25010000002 MORPHINE PER 10 MG: Performed by: UROLOGY

## 2017-08-06 PROCEDURE — 25010000002 HEPARIN (PORCINE) PER 1000 UNITS: Performed by: UROLOGY

## 2017-08-06 PROCEDURE — 99024 POSTOP FOLLOW-UP VISIT: CPT | Performed by: UROLOGY

## 2017-08-06 PROCEDURE — 82962 GLUCOSE BLOOD TEST: CPT

## 2017-08-06 RX ADMIN — SODIUM CHLORIDE 125 ML/HR: 9 INJECTION, SOLUTION INTRAVENOUS at 13:26

## 2017-08-06 RX ADMIN — SODIUM CHLORIDE 125 ML/HR: 9 INJECTION, SOLUTION INTRAVENOUS at 21:36

## 2017-08-06 RX ADMIN — MORPHINE SULFATE 6 MG: 10 INJECTION, SOLUTION INTRAMUSCULAR; INTRAVENOUS at 09:07

## 2017-08-06 RX ADMIN — MORPHINE SULFATE 6 MG: 10 INJECTION, SOLUTION INTRAMUSCULAR; INTRAVENOUS at 13:11

## 2017-08-06 RX ADMIN — HEPARIN SODIUM 5000 UNITS: 5000 INJECTION, SOLUTION INTRAVENOUS; SUBCUTANEOUS at 05:48

## 2017-08-06 RX ADMIN — SODIUM CHLORIDE 125 ML/HR: 9 INJECTION, SOLUTION INTRAVENOUS at 05:48

## 2017-08-06 RX ADMIN — MORPHINE SULFATE 5 MG: 10 INJECTION, SOLUTION INTRAMUSCULAR; INTRAVENOUS at 00:05

## 2017-08-06 RX ADMIN — HEPARIN SODIUM 5000 UNITS: 5000 INJECTION, SOLUTION INTRAVENOUS; SUBCUTANEOUS at 21:25

## 2017-08-06 RX ADMIN — MORPHINE SULFATE 5 MG: 10 INJECTION, SOLUTION INTRAMUSCULAR; INTRAVENOUS at 23:40

## 2017-08-06 RX ADMIN — HEPARIN SODIUM 5000 UNITS: 5000 INJECTION, SOLUTION INTRAVENOUS; SUBCUTANEOUS at 13:10

## 2017-08-06 NOTE — PROGRESS NOTES
LOS: 5 days   Patient Care Team:  LUIS Souza as PCP - General (Family Medicine)  Hernesto Hong MD as Consulting Physician (Urology)    Chief Complaint:  Prostate cancer, nausea/vomiting    Subjective     Interval History:     Patient Complaints: none  Patient Denies:  Nausea, vomitng  History taken from: patient  Passing gas overnight    Review of Systems:   Review of Systems   Constitutional: Negative for chills and fever.   Respiratory: Negative for shortness of breath.    Cardiovascular: Negative for chest pain.   Gastrointestinal: Positive for abdominal distention. Negative for vomiting.   Genitourinary: Negative for hematuria.       Objective     Vital Signs  Temp:  [98 °F (36.7 °C)-99.8 °F (37.7 °C)] 98.9 °F (37.2 °C)  Heart Rate:  [87-94] 91  Resp:  [16-18] 16  BP: (137-167)/(62-71) 167/71    Physical Exam:  Physical Exam   Constitutional: He appears well-developed. No distress.   Cardiovascular: Normal rate.    Pulmonary/Chest: Effort normal.   Abdominal: Soft. He exhibits distension. There is no rebound and no guarding.   GETACHEW serosanguineous   Genitourinary:   Genitourinary Comments: Jacobson in place clear urine   Vitals reviewed.       Results Review:       Lab Results (last 72 hours)     Procedure Component Value Units Date/Time    Tissue Pathology Exam [[id number]] Collected:  08/01/17 1256    Specimen:  Tissue from Prostate Updated:  08/03/17 1511     Case Report --     Surgical Pathology Report                         Case: [id number]                                  Authorizing Provider:  Hernesto Hong MD        Collected:           08/01/2017 12:56 PM          Ordering Location:     [facility] OR  Received:            08/02/2017 08:09 AM          Pathologist:           Ej Schroeder MD                                                      Specimen:    Prostate                                                                                    Final  Diagnosis --     Prostate, radical prostatectomy:       A.  Prostatic adenocarcinoma (Minerva's grade 3+4 = 7).       B.  Tumor is present bilaterally in the gland.       C.  Perineural invasion is present.       D.  Largest tumor focus measures 1.3 cm in greatest dimension.       E.  Tumor occupies approximately 10% of the evaluated gland.       F.  Negative for evidence of extraprostatic extension.       G.  Negative for evidence of vas deferens or seminal vesicle invasion.       H.  Surgical excision margins are negative for evidence of malignancy.    AJCC STAGE:  pT2c, pNx, pMx          Synoptic Checklist --     PROSTATE GLAND: Radical Prostatectomy  (Prostate Res - All Specimens)         Specimen Site:    Prostatic structure     Tumor Site:    Prostatic structure    SPECIMEN     Procedure:    Radical prostatectomy     Prostate Size:           Size (cm):    7.3 cm       Size (cm):    4.9 cm       Size (cm):    4.2 cm     Prostate Weight:           Specify Weight (g):    115     Lymph Node Sampling:    No lymph nodes present    TUMOR     Histologic Type:    Adenocarcinoma (acinar, not otherwise specified)       Minerva Pattern:    Minerva pattern         Primary Pattern:    Grade 3         Secondary Pattern:    Grade 4         Tertiary Pattern:    Not applicable         Total [place] Score:    7       Tumor Quantitation:    Proportion (percentage) of Prostate Involved by Tumor         Specify (%):    10       Tumor Quantitation:    Tumor size (dominant nodule, if present)         Greatest Dimension (mm):    13 mm       Extraprostatic Extension:    Not identified       Seminal Vesicle Invasion (invasion of muscular wall required):    Not identified       Lymph-Vascular Invasion:    Not Identified       Perineural Invasion:    Present       Treatment Effect:    Not identified    MARGINS     Margins:    Margins uninvolved by invasive carcinoma    LYMPH NODES     Regional Lymph Nodes:    No nodes submitted or  "found    STAGE (pTNM)     Primary Tumor (pT):    +pT2c: Bilateral disease     Regional Lymph Nodes (pN):    pNX: Cannot be assessed     Distant Metastasis (pM):    Not applicable    ADDITIONAL FINDINGS     Additional Pathologic Findings:    None identified    SPECIAL STUDIES     Ancillary Studies:    Not performed       Gross Description --     *Surgery - Radical prostatectomy  *Designation - \"Prostate\"  *Labelled - Patient's name and accession number  *Received - in formalin is a prostate with attached bilateral seminal vesicles and vas deferens.    PROSTATE    *Measurement - 7.3 cm  right to left; 4.9 cm apex to base; 4.2 cm anterior to posterior  *Weight - 115 g.   *Capsule - tan/pink and intact.  Smooth and glistening with a small amount of adherent soft tissue.  A bulging, intact yellow to tan nodule is bulging out of the bladder neck and measuring 2.7 x 2.4 cm.  Right lobe is inked black, left lobe is inked blue and the posterior midline is inked yellow.    *Sectioning - The prostate is sectioned from apex to base.  · Right Lobe - multiple yellow/pink periurethral nodules grossly consistent with benign hyperplasia measuring up to 2.3 cm.  There is an area of yellow/tan, firm discoloration in the anterior lateral aspect of the apical region measuring 1.1 cm  in greatest dimension and extending to within 0.1 cm  of the lateral capsule.  These areas do not appear to cross the midline.    · Left Lobe -  multiple yellow/pink periurethral nodules grossly consistent with benign hyperplasia measuring up to 2.7 cm.  There are 2 areas of yellow/tan, firm discoloration in the posterior-lateral aspect of the apical and mid regions measuring 0.7 and 0.5 cm less than 0.1 cm from the lateral capsule and 0.2 cm from the posterior capsule.  Another area of yellow/tan, firm discoloration is identified in the posterior-lateral aspect of the base region measuring 0.4 cm in greatest dimension and extending to within 0.1 cm of the " posterior lateral capsule.  These areas do not appear to cross the midline.     Right Seminal Vesicle and Vas Deferens  *Seminal vesicle measurement - 2.5 x 2.3 x 0.6 cm   *External surface - tan/brown, intact and tightly adhered to prostate capsule  *Sectioning - pink/gray and unremarkable  *Vas deferens measurement - 4.4 cm in length by 0.5 cm in diameter  *External surface - tan/brown, intact and unremarkable  *Sectioning - unremarkable    Left Seminal Vesicle and Vas Deferens  *Seminal vesicle measurement - 2.8 x 1.3 x 0.5 cm   *External surface - tan/brown, intact and tightly adhered to capsule  *Sectioning - pink/gray and unremarkable  *Vas deferens measurement - 3.8 cm in length by 0.4 cm in diameter.  *External surface - tan/brown, intact and unremarkable  *Sectioning - unremarkable    Block Summary    1A through 1C -  Apical margin shaved, sectioned and submitted from right to left  1D - Right bladder neck margin shaved and sectioned  1E - Left bladder neck margin shave and sectioned  1F - Perpendicular sections of mass bulging from bladder neck  1G [id number] - Representative sections of apical region of the right lobe  1K through 1P - Representative sections of mid region of the right lobe  1Q through 1V - Representative sections of base region of the right lobe  1W - Representative sections of right seminal vesicle and vas deferens  1X through 1Z - Representative sections of apical region of the left lobe  1AA through 1FF - Representative sections of mid region of the left lobe  1GG through 1KK - Representative sections of base region of the left lobe  1LL - Representative sections of left seminal vesicle and vas deferens               Microscopic Description --     Microscopic examination reveal sections of a prostate gland demonstrating an infiltrative epithelial lesion comprised predominantly of closely spaced small round glands with minor foci of more complexly arranged to poorly formed glands also  identified.  The largest tumor focus is 1.3 cm in greatest dimension and tumor appears to occupy approximately 10% of the evaluated gland.  Perineural invasion is identified, however no evidence of extraprostatic extension is present.  Sections of the vas deferens and seminal vesicles bilaterally reveal no evidence of involvement by malignancy.  The surgical excision margins are negative for evidence of malignancy.       Embedded Images --    Urine Eosinophils, Rafa's Stain [[id number]] Collected:  08/02/17 2346    Specimen:  Urine from Urine, Clean Catch Updated:  08/03/17 1520     Case Report --     Surgical Pathology Report                         Case: [id number]                                  Authorizing Provider:  Jasen Doss MD            Collected:           08/02/2017 11:46 PM          Ordering Location:     [address]  Received:            08/03/2017 01:25 PM          Pathologist:           Jackie Steve MD                                                           Specimen:    Urine, Clean Catch, Urine Eosinophils, Rafa's Stain                                       Final Diagnosis --     Urine, Rafa's stain:  Negative for increased urine eosinophils       Gross Description --     Received in a container labeled with the patient's name, medical record number and date of birth is 100 mls of blue-green fluid.  1 smear and  1 cytospin slide is prepared for examination.         Microscopic Description --     Smear and cytospin demonstrate red blood cells, neutrophils and scattered urothelial cells.  Negative for increased urine eosinophils.  Given the atypical appearance of the patient's gross urine specimen.  Dr. Steve contacted KALLI Núñez on 8/3/2017 at 15:05.  Dr. Steve conveyed that the urine was a dark blue-green in color and may warrant further investigation.  The urine is currently saved in the refrigerator pathology department.       Embedded Images --    Vitamin D 25  Hydroxy [[id number]]  (Abnormal) Collected:  08/03/17 1450    Specimen:  Blood Updated:  08/03/17 1609     25 Hydroxy, Vitamin D 20.9 (L) ng/ml     CBC & Differential [[id number]] Collected:  08/04/17 0457    Specimen:  Blood Updated:  08/04/17 0536    Narrative:       The following orders were created for panel order CBC & Differential.  Procedure                               Abnormality         Status                     ---------                               -----------         ------                     CBC Auto Differential[[id number]]        Abnormal            Final result                 Please view results for these tests on the individual orders.    CBC Auto Differential [[id number]]  (Abnormal) Collected:  08/04/17 0457    Specimen:  Blood Updated:  08/04/17 0536     WBC 11.04 (H) 10*3/mm3      RBC 3.08 (L) 10*6/mm3      Hemoglobin 9.0 (L) g/dL      Hematocrit 26.9 (L) %      MCV 87.3 fL      MCH 29.2 pg      MCHC 33.5 g/dL      RDW 14.6 %      RDW-SD 46.7 fl      MPV 10.0 fL      Platelets 208 10*3/mm3      Neutrophil % 86.5 (H) %      Lymphocyte % 5.3 (L) %      Monocyte % 5.3 %      Eosinophil % 2.3 %      Basophil % 0.1 %      Immature Grans % 0.5 %      Neutrophils, Absolute 9.56 (H) 10*3/mm3      Lymphocytes, Absolute 0.58 (L) 10*3/mm3      Monocytes, Absolute 0.59 10*3/mm3      Eosinophils, Absolute 0.25 10*3/mm3      Basophils, Absolute 0.01 10*3/mm3      Immature Grans, Absolute 0.05 (H) 10*3/mm3     Comprehensive Metabolic Panel [[id number]]  (Abnormal) Collected:  08/04/17 0457    Specimen:  Blood Updated:  08/04/17 0547     Glucose 113 (H) mg/dL      BUN 22 (H) mg/dL      Creatinine 1.14 mg/dL      Sodium 135 mmol/L      Potassium 4.7 mmol/L      Chloride 103 mmol/L      CO2 27.0 mmol/L      Calcium 7.7 (L) mg/dL      Total Protein 5.6 (L) g/dL      Albumin 3.20 (L) g/dL      ALT (SGPT) 27 U/L      AST (SGOT) 24 U/L      Alkaline Phosphatase 32 U/L      Total Bilirubin 1.2 (H) mg/dL      eGFR Non   Amer 64 mL/min/1.73      Globulin 2.4 gm/dL      A/G Ratio 1.3 g/dL      BUN/Creatinine Ratio 19.3     Anion Gap 5.0 mmol/L     Creatinine, Body Fluid [[id number]] Collected:  08/04/17 0825    Specimen:  Body Fluid from Peritoneum Updated:  08/04/17 0849     Creatinine, Fluid 3.7 mg/dL     CBC & Differential [[id number]] Collected:  08/05/17 0436    Specimen:  Blood Updated:  08/05/17 0454    Narrative:       The following orders were created for panel order CBC & Differential.  Procedure                               Abnormality         Status                     ---------                               -----------         ------                     CBC Auto Differential[[id number]]        Abnormal            Final result                 Please view results for these tests on the individual orders.    CBC Auto Differential [[id number]]  (Abnormal) Collected:  08/05/17 0436    Specimen:  Blood Updated:  08/05/17 0454     WBC 12.18 (H) 10*3/mm3      RBC 3.36 (L) 10*6/mm3      Hemoglobin 9.8 (L) g/dL      Hematocrit 29.2 (L) %      MCV 86.9 fL      MCH 29.2 pg      MCHC 33.6 g/dL      RDW 14.1 %      RDW-SD 44.3 fl      MPV 9.8 fL      Platelets 256 10*3/mm3      Neutrophil % 88.7 (H) %      Lymphocyte % 3.5 (L) %      Monocyte % 5.7 %      Eosinophil % 1.6 %      Basophil % 0.1 %      Immature Grans % 0.4 %      Neutrophils, Absolute 10.81 (H) 10*3/mm3      Lymphocytes, Absolute 0.43 (L) 10*3/mm3      Monocytes, Absolute 0.69 10*3/mm3      Eosinophils, Absolute 0.19 10*3/mm3      Basophils, Absolute 0.01 10*3/mm3      Immature Grans, Absolute 0.05 (H) 10*3/mm3     Basic Metabolic Panel [[id number]]  (Abnormal) Collected:  08/05/17 0436    Specimen:  Blood Updated:  08/05/17 0503     Glucose 127 (H) mg/dL      BUN 17 mg/dL      Creatinine 0.99 mg/dL      Sodium 136 mmol/L      Potassium 4.5 mmol/L      Chloride 99 mmol/L      CO2 28.0 mmol/L      Calcium 8.1 (L) mg/dL      eGFR Non African Amer 75 mL/min/1.73       BUN/Creatinine Ratio 17.2     Anion Gap 9.0 mmol/L     Narrative:       GFR Normal >60  Chronic Kidney Disease <60  Kidney Failure <15    POC Glucose Fingerstick [[id number]]  (Abnormal) Collected:  08/05/17 0859    Specimen:  Blood Updated:  08/05/17 0920     Glucose 137 (H) mg/dL       : [id number] Franko BallicaMeter ID: [id number]       POC Glucose Fingerstick [[id number]]  (Abnormal) Collected:  08/05/17 1209    Specimen:  Blood Updated:  08/05/17 1230     Glucose 133 (H) mg/dL       : [id number] Franko VelascossicaMeter ID: [id number]       CBC (No Diff) [[id number]]  (Abnormal) Collected:  08/05/17 1652    Specimen:  Blood Updated:  08/05/17 1659     WBC 11.18 (H) 10*3/mm3      RBC 3.49 (L) 10*6/mm3      Hemoglobin 10.1 (L) g/dL      Hematocrit 30.6 (L) %      MCV 87.7 fL      MCH 28.9 pg      MCHC 33.0 g/dL      RDW 14.2 %      RDW-SD 44.9 fl      MPV 9.5 fL      Platelets 283 10*3/mm3     POC Glucose Fingerstick [[id number]]  (Abnormal) Collected:  08/05/17 1734    Specimen:  Blood Updated:  08/05/17 1755     Glucose 131 (H) mg/dL       : [id number] Franko BallicaMeter ID: [id number]       POC Glucose Fingerstick [[id number]]  (Normal) Collected:  08/05/17 2018    Specimen:  Blood Updated:  08/05/17 2035     Glucose 130 mg/dL       : [id number] Be MoreiraMeter ID: [id number]       CBC & Differential [[id number]] Collected:  08/06/17 0456    Specimen:  Blood Updated:  08/06/17 0520    Narrative:       The following orders were created for panel order CBC & Differential.  Procedure                               Abnormality         Status                     ---------                               -----------         ------                     CBC Auto Differential[[id number]]        Abnormal            Final result                 Please view results for these tests on the individual orders.    CBC Auto Differential [[id number]]  (Abnormal) Collected:  08/06/17 0456    Specimen:  Blood Updated:   08/06/17 0520     WBC 12.58 (H) 10*3/mm3      RBC 3.38 (L) 10*6/mm3      Hemoglobin 9.9 (L) g/dL      Hematocrit 29.9 (L) %      MCV 88.5 fL      MCH 29.3 pg      MCHC 33.1 g/dL      RDW 14.2 %      RDW-SD 45.6 fl      MPV 9.5 fL      Platelets 293 10*3/mm3      Neutrophil % 84.4 (H) %      Lymphocyte % 4.6 (L) %      Monocyte % 7.6 %      Eosinophil % 2.6 %      Basophil % 0.2 %      Immature Grans % 0.6 %      Neutrophils, Absolute 10.62 (H) 10*3/mm3      Lymphocytes, Absolute 0.58 (L) 10*3/mm3      Monocytes, Absolute 0.95 10*3/mm3      Eosinophils, Absolute 0.33 10*3/mm3      Basophils, Absolute 0.02 10*3/mm3      Immature Grans, Absolute 0.08 (H) 10*3/mm3     Comprehensive Metabolic Panel [[id number]]  (Abnormal) Collected:  08/06/17 0456    Specimen:  Blood Updated:  08/06/17 0537     Glucose 125 (H) mg/dL      BUN 18 mg/dL      Creatinine 0.94 mg/dL      Sodium 138 mmol/L      Potassium 4.6 mmol/L      Chloride 101 mmol/L      CO2 30.0 mmol/L      Calcium 7.8 (L) mg/dL      Total Protein 5.5 (L) g/dL      Albumin 3.10 (L) g/dL      ALT (SGPT) 31 U/L      AST (SGOT) 22 U/L      Alkaline Phosphatase 36 U/L      Total Bilirubin 1.5 (H) mg/dL      eGFR Non African Amer 80 mL/min/1.73      Globulin 2.4 gm/dL      A/G Ratio 1.3 g/dL      BUN/Creatinine Ratio 19.1     Anion Gap 7.0 mmol/L         Imaging Results (last 72 hours)     Procedure Component Value Units Date/Time    XR Abdomen KUB [[id number]] Collected:  08/05/17 0903     Updated:  08/05/17 0909    Narrative:       EXAMINATION: XR ABDOMEN KUB- 8/5/2017 8:03 CST     HISTORY: Possible obstruction     Comparison: None     Findings:  Single view of the abdomen is submitted, demonstrating gas-filled loops  of large and small bowel throughout the abdomen, with gas noted in the  rectum. There is no significant gaseous distention to suggest  obstruction. A catheter is seen overlying the pelvis with tip against  the left pelvic sidewall, which may represent a drain.  Air is seen  within the subcutaneous soft tissues of the right abdominal wall. No  pathologic calcifications are seen within the abdomen. A surgical clip  is seen in the right upper quadrant and in the pelvis. There is no  appreciable organomegaly.       Impression:       Impression: No evidence of obstruction.  This report was finalized on 08/05/2017 09:06 by Dr. Ta Camara MD.          Medication Review:     Current Facility-Administered Medications:   •  dextrose (D50W) solution 25 g, 25 g, Intravenous, Q15 Min PRN, Zaheer Rebolledo MD  •  dextrose (GLUTOSE) oral gel 15 g, 15 g, Oral, Q15 Min PRN, Zaheer Rebolledo MD  •  glucagon (human recombinant) (GLUCAGEN DIAGNOSTIC) injection 1 mg, 1 mg, Subcutaneous, Q15 Min PRN, Zaheer Rebolledo MD  •  heparin (porcine) 5000 UNIT/ML injection 5,000 Units, 5,000 Units, Subcutaneous, Q8H, Zaheer Rebolledo MD, 5,000 Units at 08/06/17 0548  •  hydrALAZINE (APRESOLINE) injection 10 mg, 10 mg, Intravenous, Q4H PRN, Zaheer Rebolledo MD  •  insulin lispro (humaLOG) injection 0-9 Units, 0-9 Units, Subcutaneous, 4x Daily With Meals & Nightly, Zaheer Rebolledo MD  •  Morphine injection 6 mg, 6 mg, Intravenous, Q2H PRN, 5 mg at 08/06/17 0005 **AND** naloxone (NARCAN) injection 0.4 mg, 0.4 mg, Intravenous, Q5 Min PRN, Hernesto Hong MD  •  ondansetron (ZOFRAN) tablet 4 mg, 4 mg, Oral, Q4H PRN **OR** ondansetron ODT (ZOFRAN-ODT) disintegrating tablet 4 mg, 4 mg, Oral, Q4H PRN **OR** ondansetron (ZOFRAN) injection 4 mg, 4 mg, Intravenous, Q4H PRN, Zaheer Rebolledo MD, 4 mg at 08/04/17 2006  •  sodium chloride 0.9 % infusion, 125 mL/hr, Intravenous, Continuous, Jasen Doss MD, Last Rate: 125 mL/hr at 08/06/17 0548, 125 mL/hr at 08/06/17 0570    Assessment/Plan     Principal Problem:    Prostate cancer    Hemoglobin stable.  Creatinine continues to improve.  NG output significantly reduced overnight.  Nausea has improved.  He is passing gas.  Plan to discontinue  the NG tube today.  He is still quite distended and somewhat start him on a diet yet.      Zaheer Rebolledo MD  08/06/17  7:43 AM

## 2017-08-06 NOTE — PROGRESS NOTES
Nephrology ([facility]) Progress Note      Patient:  Zay Kamara  YOB: 1950  Date of Service: 8/6/2017  MRN: [id number]   Acct: [id number]   Primary Care Physician: LUIS Souza  Advance Directive: Full Code  Admit Date: 8/1/2017       Hospital Day: 5  Referring Provider: Hernesto Hong MD      Patient personally seen and examined.  Complete chart including Consults, Notes, Operative Reports, Labs, Cardiology, and Radiology studies reviewed as able.        Subjective:  Zay Kamara is a 66 y.o. male  whom we were consulted for acute kidney injury.  No prior renal history.  He is status post prostatectomy on 8/01 for the treatment of prostate cancer.  Patient has one episode of acute kidney injury related to prerenal as ischemia, responded very well to IV fluid administration.  He has also developed paralytic ileus, needing nasogastric tube.  Nasogastric tube is out now and he is taking chips and sips.  Otherwise he is feeling better.    Allergies:  Review of patient's allergies indicates no known allergies.    Home Meds:  Prescriptions Prior to Admission   Medication Sig Dispense Refill Last Dose   • atorvastatin (LIPITOR) 10 MG tablet Take 20 mg by mouth Daily.   7/30/2017   • levothyroxine (SYNTHROID, LEVOTHROID) 200 MCG tablet Take 200 mcg by mouth Daily.   7/30/2017   • lisinopril (PRINIVIL,ZESTRIL) 40 MG tablet Take 40 mg by mouth Daily.   7/30/2017   • nabumetone (RELAFEN) 500 MG tablet Take 500 mg by mouth 2 (Two) Times a Day As Needed for Mild Pain (1-3) (arhritis and knee pain).   7/30/2017   • SITagliptin (JANUVIA) 100 MG tablet Take 100 mg by mouth Daily.   7/30/2017   • tamsulosin (FLOMAX) 0.4 MG capsule 24 hr capsule Take 1 capsule by mouth every night.   7/30/2017       Medicines:  Current Facility-Administered Medications   Medication Dose Route Frequency Provider Last Rate Last Dose   • dextrose (D50W) solution 25 g  25 g  Intravenous Q15 Min PRN Zaheer Rebolledo MD       • dextrose (GLUTOSE) oral gel 15 g  15 g Oral Q15 Min PRN Zaheer Rebolledo MD       • glucagon (human recombinant) (GLUCAGEN DIAGNOSTIC) injection 1 mg  1 mg Subcutaneous Q15 Min PRN Zaheer Rebolledo MD       • heparin (porcine) 5000 UNIT/ML injection 5,000 Units  5,000 Units Subcutaneous Q8H Zaheer Rebolledo MD   5,000 Units at 08/06/17 0548   • hydrALAZINE (APRESOLINE) injection 10 mg  10 mg Intravenous Q4H PRN Zaheer Rebolledo MD       • insulin lispro (humaLOG) injection 0-9 Units  0-9 Units Subcutaneous 4x Daily With Meals & Nightly Zaheer Rebolledo MD       • Morphine injection 6 mg  6 mg Intravenous Q2H PRN Hernesto Hong MD   6 mg at 08/06/17 0907    And   • naloxone (NARCAN) injection 0.4 mg  0.4 mg Intravenous Q5 Min PRN Hernesto Hong MD       • ondansetron (ZOFRAN) injection 4 mg  4 mg Intravenous Q4H PRN Zaheer Rebolledo MD   4 mg at 08/04/17 2006    Or   • ondansetron (ZOFRAN) tablet 4 mg  4 mg Oral Q4H PRN Zaheer Rebolledo MD        Or   • ondansetron ODT (ZOFRAN-ODT) disintegrating tablet 4 mg  4 mg Oral Q4H PRN Zaheer Rebolledo MD       • sodium chloride 0.9 % infusion  125 mL/hr Intravenous Continuous Jasen Doss  mL/hr at 08/06/17 0548 125 mL/hr at 08/06/17 0548       Past Medical History:  Past Medical History:   Diagnosis Date   • Arthritis    • Cancer     thyroid   • Diabetes    • Disease of thyroid gland    • Hypercholesteremia    • Hypertension    • IBS (irritable bowel syndrome)    • Prostate cancer    • Sleep apnea     NON COMPLIANT WITH C PAP       Past Surgical History:  Past Surgical History:   Procedure Laterality Date   • CHOLECYSTECTOMY     • COLONOSCOPY N/A 3/7/2017    Procedure: COLONOSCOPY WITH ANESTHESIA;  Surgeon: Hector Alvarenga MD;  Location: [facility] ENDOSCOPY;  Service:    • CYSTOSCOPY TRANSURETHRAL RESECTION OF PROSTATE     • PROSTATE SURGERY     • PROSTATECTOMY N/A 8/1/2017    Procedure:  "PROSTATECTOMY LAPAROSCOPIC WITH DAVINCI SI ROBOT ;  Surgeon: Hernesto Hong MD;  Location: [facility] OR;  Service:    • THYROID SURGERY      RADIATION THERAPY AFTER SURGERY       Family History  Family History   Problem Relation Age of Onset   • Family history unknown: Yes   • Prostate cancer Father        Social History  Social History     Social History   • Marital status:      Spouse name: N/A   • Number of children: N/A   • Years of education: N/A     Occupational History   • Not on file.     Social History Main Topics   • Smoking status: Never Smoker   • Smokeless tobacco: Never Used   • Alcohol use No   • Drug use: No   • Sexual activity: Defer     Other Topics Concern   • Not on file     Social History Narrative         Review of Systems:  History obtained from chart review and the patient  General ROS: No fever or chills  Respiratory ROS: No cough, shortness of breath, wheezing  Cardiovascular ROS: no chest pain or dyspnea on exertion  Gastrointestinal ROS: No abdominal pain or melena  Genito-Urinary ROS: No dysuria or hematuria  14 point ROS reviewed with the patient and negative except as noted above and in the HPI unless unable to obtain.    Objective:  /71 (BP Location: Right arm, Patient Position: Lying)  Pulse 91  Temp 98.9 °F (37.2 °C) (Oral)   Resp 16  Ht 71\" (180.3 cm)  Wt 227 lb (103 kg)  SpO2 93%  BMI 31.66 kg/m2    Intake/Output Summary (Last 24 hours) at 08/06/17 1153  Last data filed at 08/06/17 1057   Gross per 24 hour   Intake              950 ml   Output             3680 ml   Net            -2730 ml     General: awake/alert   HEENT: Normocephalic atraumatic head, pupils round and reactive to light  Neck:supple, no JVD or carotid bruits.  Chest:  clear to auscultation bilaterally without respiratory distress  CVS: regular rate and rhythm  Abdominal: Distended, hypoactive bowel sounds, right lower quadrant GETACHEW.  Extremities: no cyanosis or edema  Skin: warm and dry without " rash      Labs:    Results from last 7 days  Lab Units 08/06/17  0456 08/05/17  1652 08/05/17  0436   WBC 10*3/mm3 12.58* 11.18* 12.18*   HEMOGLOBIN g/dL 9.9* 10.1* 9.8*   HEMATOCRIT % 29.9* 30.6* 29.2*   PLATELETS 10*3/mm3 293 283 256           Results from last 7 days  Lab Units 08/06/17  0456 08/05/17  0436 08/04/17  0457 08/03/17  0516   SODIUM mmol/L 138 136 135 136   POTASSIUM mmol/L 4.6 4.5 4.7 4.5   CHLORIDE mmol/L 101 99 103 105   CO2 mmol/L 30.0 28.0 27.0 24.0   BUN mg/dL 18 17 22* 33*   CREATININE mg/dL 0.94 0.99 1.14 1.90*   CALCIUM mg/dL 7.8* 8.1* 7.7* 6.8*   BILIRUBIN mg/dL 1.5*  --  1.2* 1.3*   ALK PHOS U/L 36  --  32 29   ALT (SGPT) U/L 31  --  27 28   AST (SGOT) U/L 22  --  24 24   GLUCOSE mg/dL 125* 127* 113* 118*       Radiology:   Imaging Results (last 72 hours)     Procedure Component Value Units Date/Time    US Renal Bilateral [[id number]] Collected:  08/02/17 1450     Updated:  08/02/17 1516    Narrative:       RENAL ULTRASOUND COMPLETE 8/2/2017 2:21 PM CDT     REASON FOR EXAM: Oliguria s/p prostatectomy; L71-Fqnqujagy neoplasm of  prostate       COMPARISON: None       TECHNIQUE: Multiple longitudinal and transverse realtime sonographic  images of the kidneys and urinary bladder are obtained.      FINDINGS:      RIGHT KIDNEY: 5.6 x 5.1 x 12.1 cm. Normal in size, shape, contour and  position. Small 2.5 cm cyst is present upper pole the right kidney. The  central echo complex is compact with no evidence for hydronephrosis. No  nephrolithiasis or abnormal perinephric fluid collections . The  pelvicalyceal system is mildly prominent..      LEFT KIDNEY: 5.7 x 4.8 x 13.2 cm. Normal in size, shape, contour and  position. No solid or cystic masses. The central echo complex is compact  with no evidence for hydronephrosis. No nephrolithiasis or abnormal  perinephric fluid collections . No hydroureter.      PELVIS: Jacobson catheter is present in the bladder. There is no  surrounding ascites.         Impression:       1. Slightly prominent right renal pelvis. These findings were reviewed  with Dr. Eliazar Hong at the time of this dictation.  2. Incidental note made of a right renal cyst.  3. Negative left renal ultrasound.        This report was finalized on 08/02/2017 14:58 by Dr. Ruiz Sandoval MD.          Culture:         Assessment   1.  Acute kidney injury secondary to prerenal azotemia, improving with IV fluid.  2.  Status post prostatectomy for the treatment of prostate cancer.  3.  Type II diabetes.  4.  Hypertension  5.  Hypocalcemia--improving  6.  Paralytic ileus.  7.  Right renal simple cyst.    Plan:  1.  Continue IV fluid.  2.  Monitor electrolytes      Jasen Doss MD  8/6/2017  11:53 AM

## 2017-08-06 NOTE — PLAN OF CARE
Problem: Patient Care Overview (Adult)  Goal: Plan of Care Review  Outcome: Ongoing (interventions implemented as appropriate)  Goal: Adult Individualization and Mutuality  Outcome: Ongoing (interventions implemented as appropriate)  Goal: Discharge Needs Assessment  Outcome: Ongoing (interventions implemented as appropriate)  C/o gas pain/constipation. Advised to move more and ambulate in [place].     08/06/17 8308   Discharge Needs Assessment   Discharge Disposition still a patient         Problem: Prostatectomy, Radical (Adult)  Goal: Signs and Symptoms of Listed Potential Problems Will be Absent or Manageable (Prostatectomy, Radical)  Outcome: Ongoing (interventions implemented as appropriate)    Problem: Pain, Acute (Adult)  Goal: Identify Related Risk Factors and Signs and Symptoms  Outcome: Ongoing (interventions implemented as appropriate)  Goal: Acceptable Pain Control/Comfort Level  Outcome: Ongoing (interventions implemented as appropriate)

## 2017-08-06 NOTE — PLAN OF CARE
Problem: Patient Care Overview (Adult)  Goal: Plan of Care Review  Outcome: Ongoing (interventions implemented as appropriate)  ivf cont. Npo with ng to lws with grn/ brn drainage. Jacobson with very slight blue tinge. + small amount of flatus, no stool since prior to hospitalization. fouzia drain with serosang drainage- drsg at midline dry & intact. Morphine iv for pain control- effective relief. Cont to monitor.    08/02/17 1641 08/05/17 1322   Coping/Psychosocial Response Interventions   Plan Of Care Reviewed With --  patient   Patient Care Overview   Progress --  no change   Outcome Evaluation   Outcome Summary/Follow up Plan Medicated for pain PRN per orders. Urine output continues to be low after 2L total fluid boluses today. Renal ultrasound. Hypotensive this morning. FOUZIA dressing changed d/t copious bleeding, currently C/D/I. IVF continues. Will continue to monitor.  --        Goal: Adult Individualization and Mutuality  Outcome: Ongoing (interventions implemented as appropriate)  Goal: Discharge Needs Assessment  Outcome: Ongoing (interventions implemented as appropriate)    Problem: Prostatectomy, Radical (Adult)  Goal: Signs and Symptoms of Listed Potential Problems Will be Absent or Manageable (Prostatectomy, Radical)  Outcome: Ongoing (interventions implemented as appropriate)

## 2017-08-07 LAB
1,25(OH)2D3 SERPL-MCNC: 18.7 PG/ML (ref 19.9–79.3)
ANION GAP SERPL CALCULATED.3IONS-SCNC: 4 MMOL/L (ref 4–13)
BASOPHILS # BLD AUTO: 0.05 10*3/MM3 (ref 0–0.2)
BASOPHILS NFR BLD AUTO: 0.5 % (ref 0–2)
BUN BLD-MCNC: 14 MG/DL (ref 5–21)
BUN/CREAT SERPL: 16.7 (ref 7–25)
CALCIUM SPEC-SCNC: 7.5 MG/DL (ref 8.4–10.4)
CHLORIDE SERPL-SCNC: 99 MMOL/L (ref 98–110)
CO2 SERPL-SCNC: 32 MMOL/L (ref 24–31)
CREAT BLD-MCNC: 0.84 MG/DL (ref 0.5–1.4)
DEPRECATED RDW RBC AUTO: 44.7 FL (ref 40–54)
EOSINOPHIL # BLD AUTO: 0.45 10*3/MM3 (ref 0–0.7)
EOSINOPHIL NFR BLD AUTO: 4.2 % (ref 0–4)
ERYTHROCYTE [DISTWIDTH] IN BLOOD BY AUTOMATED COUNT: 14.2 % (ref 12–15)
GFR SERPL CREATININE-BSD FRML MDRD: 91 ML/MIN/1.73
GLUCOSE BLD-MCNC: 118 MG/DL (ref 70–100)
GLUCOSE BLDC GLUCOMTR-MCNC: 135 MG/DL (ref 70–130)
GLUCOSE BLDC GLUCOMTR-MCNC: 150 MG/DL (ref 70–130)
HCT VFR BLD AUTO: 28.6 % (ref 40–52)
HGB BLD-MCNC: 9.6 G/DL (ref 14–18)
IMM GRANULOCYTES # BLD: 0.12 10*3/MM3 (ref 0–0.03)
IMM GRANULOCYTES NFR BLD: 1.1 % (ref 0–5)
LYMPHOCYTES # BLD AUTO: 0.99 10*3/MM3 (ref 0.72–4.86)
LYMPHOCYTES NFR BLD AUTO: 9.3 % (ref 15–45)
MCH RBC QN AUTO: 29.4 PG (ref 28–32)
MCHC RBC AUTO-ENTMCNC: 33.6 G/DL (ref 33–36)
MCV RBC AUTO: 87.7 FL (ref 82–95)
MONOCYTES # BLD AUTO: 0.75 10*3/MM3 (ref 0.19–1.3)
MONOCYTES NFR BLD AUTO: 7.1 % (ref 4–12)
NEUTROPHILS # BLD AUTO: 8.23 10*3/MM3 (ref 1.87–8.4)
NEUTROPHILS NFR BLD AUTO: 77.8 % (ref 39–78)
PLATELET # BLD AUTO: 282 10*3/MM3 (ref 130–400)
PMV BLD AUTO: 9.4 FL (ref 6–12)
POTASSIUM BLD-SCNC: 4 MMOL/L (ref 3.5–5.3)
RBC # BLD AUTO: 3.26 10*6/MM3 (ref 4.8–5.9)
SODIUM BLD-SCNC: 135 MMOL/L (ref 135–145)
WBC NRBC COR # BLD: 10.59 10*3/MM3 (ref 4.8–10.8)

## 2017-08-07 PROCEDURE — 82962 GLUCOSE BLOOD TEST: CPT

## 2017-08-07 PROCEDURE — 25010000002 MORPHINE PER 10 MG: Performed by: UROLOGY

## 2017-08-07 PROCEDURE — 85025 COMPLETE CBC W/AUTO DIFF WBC: CPT | Performed by: INTERNAL MEDICINE

## 2017-08-07 PROCEDURE — 25010000002 HEPARIN (PORCINE) PER 1000 UNITS: Performed by: UROLOGY

## 2017-08-07 PROCEDURE — 25010000002 HYDRALAZINE PER 20 MG: Performed by: UROLOGY

## 2017-08-07 PROCEDURE — 80048 BASIC METABOLIC PNL TOTAL CA: CPT | Performed by: UROLOGY

## 2017-08-07 RX ADMIN — HYDRALAZINE HYDROCHLORIDE 10 MG: 20 INJECTION INTRAMUSCULAR; INTRAVENOUS at 21:09

## 2017-08-07 RX ADMIN — SODIUM CHLORIDE 125 ML/HR: 9 INJECTION, SOLUTION INTRAVENOUS at 05:24

## 2017-08-07 RX ADMIN — HEPARIN SODIUM 5000 UNITS: 5000 INJECTION, SOLUTION INTRAVENOUS; SUBCUTANEOUS at 14:09

## 2017-08-07 RX ADMIN — SODIUM CHLORIDE 125 ML/HR: 9 INJECTION, SOLUTION INTRAVENOUS at 14:07

## 2017-08-07 RX ADMIN — SODIUM CHLORIDE 125 ML/HR: 9 INJECTION, SOLUTION INTRAVENOUS at 21:05

## 2017-08-07 RX ADMIN — HEPARIN SODIUM 5000 UNITS: 5000 INJECTION, SOLUTION INTRAVENOUS; SUBCUTANEOUS at 21:00

## 2017-08-07 RX ADMIN — MORPHINE SULFATE 6 MG: 10 INJECTION, SOLUTION INTRAMUSCULAR; INTRAVENOUS at 21:14

## 2017-08-07 RX ADMIN — MORPHINE SULFATE 6 MG: 10 INJECTION, SOLUTION INTRAMUSCULAR; INTRAVENOUS at 03:08

## 2017-08-07 RX ADMIN — HEPARIN SODIUM 5000 UNITS: 5000 INJECTION, SOLUTION INTRAVENOUS; SUBCUTANEOUS at 05:22

## 2017-08-07 NOTE — PLAN OF CARE
Problem: Patient Care Overview (Adult)  Goal: Plan of Care Review  Outcome: Ongoing (interventions implemented as appropriate)    08/07/17 1635   Coping/Psychosocial Response Interventions   Plan Of Care Reviewed With patient   Patient Care Overview   Progress improving   Outcome Evaluation   Outcome Summary/Follow up Plan Tolerating clear liquid diet. Up with assist. Moderate drainage to GETACHEW continues. Jacobson care q4 hours. No c/o pain. C/O abdominal distention and hiccups.       Goal: Adult Individualization and Mutuality  Outcome: Ongoing (interventions implemented as appropriate)    08/07/17 1635   Individualization   Patient Specific Preferences none   Patient Specific Goals go home soon   Patient Specific Interventions cath care q4   Mutuality/Individual Preferences   What Anxieties, Fears or Concerns Do You Have About Your Health or Care? none   What Questions Do You Have About Your Health or Care? when can he go home   What Information Would Help Us Give You More Personalized Care? none       Goal: Discharge Needs Assessment  Outcome: Ongoing (interventions implemented as appropriate)    08/07/17 1446   Discharge Needs Assessment   Concerns To Be Addressed denies needs/concerns at this time;no discharge needs identified   Readmission Within The Last 30 Days no previous admission in last 30 days   Equipment Needed After Discharge none   Discharge Disposition still a patient   Current Health   Anticipated Changes Related to Illness none   Living Environment   Transportation Available family or friend will provide;car   Self-Care   Equipment Currently Used at Home none         Problem: Prostatectomy, Radical (Adult)  Goal: Signs and Symptoms of Listed Potential Problems Will be Absent or Manageable (Prostatectomy, Radical)  Outcome: Ongoing (interventions implemented as appropriate)    08/07/17 1635   Prostatectomy, Radical   Problems Assessed (Radical Prostatectomy) all   Problems Present (Radical Prostatectomy)  postoperative ileus         Problem: Pain, Acute (Adult)  Goal: Identify Related Risk Factors and Signs and Symptoms  Outcome: Outcome(s) achieved Date Met:  08/07/17 08/06/17 1557 08/07/17 1635   Pain, Acute   Related Risk Factors (Acute Pain) surgery --    Signs and Symptoms (Acute Pain) --  verbalization of pain descriptors       Goal: Acceptable Pain Control/Comfort Level  Outcome: Ongoing (interventions implemented as appropriate)    08/07/17 1635   Pain, Acute (Adult)   Acceptable Pain Control/Comfort Level making progress toward outcome

## 2017-08-07 NOTE — PROGRESS NOTES
Discharge Planning Assessment  [facility]     Patient Name: Zay Kamara  MRN: [id number]  Today's Date: 8/7/2017    Admit Date: 8/1/2017          Discharge Needs Assessment       08/07/17 1446    Living Environment    Lives With alone    Living Arrangements house    Home Accessibility no concerns    Stair Railings at Home none    Type of Financial/Environmental Concern none    Transportation Available family or friend will provide;car    Living Environment    Provides Primary Care For no one    Quality Of Family Relationships helpful    Able to Return to Prior Living Arrangements yes    Discharge Needs Assessment    Concerns To Be Addressed denies needs/concerns at this time;no discharge needs identified    Readmission Within The Last 30 Days no previous admission in last 30 days    Anticipated Changes Related to Illness none    Equipment Currently Used at Home none    Equipment Needed After Discharge none    Discharge Disposition still a patient    Discharge Planning Comments PT LIVES ALONE AND PLANS HOME AT [place]. PT INDEPENDENT PRIOR TO ADMIT. [place] NEEDS DENIED AT PRESENT TIME. WILL CONTINUE TO FOLLOW.             Discharge Plan       08/07/17 1447    Case Management/Social Work Plan    Plan HOME        Discharge Placement     No information found        Expected Discharge Date and Time     Expected Discharge Date Expected Discharge Time    Aug 4, 2017               Demographic Summary     None            Functional Status     None            Psychosocial     None            Abuse/Neglect     None            Legal     None            Substance Abuse     None            Patient Forms     None          JORGE Qiu

## 2017-08-07 NOTE — PLAN OF CARE
Problem: Patient Care Overview (Adult)  Goal: Plan of Care Review  Outcome: Ongoing (interventions implemented as appropriate)  ivf cont. Npo. abd dist, + flatus, no stool, + bow sounds. fouzia drain with serosang drainage. Jacobson with yellow urine. amb in [place], up in chair this shift. Moving about better, more independent. Iv pain med x1- effective relief. Cont monitor lab.    08/07/17 0125   Coping/Psychosocial Response Interventions   Plan Of Care Reviewed With patient   Patient Care Overview   Progress improving       Goal: Adult Individualization and Mutuality  Outcome: Ongoing (interventions implemented as appropriate)  Goal: Discharge Needs Assessment  Outcome: Ongoing (interventions implemented as appropriate)    Problem: Prostatectomy, Radical (Adult)  Goal: Signs and Symptoms of Listed Potential Problems Will be Absent or Manageable (Prostatectomy, Radical)  Outcome: Ongoing (interventions implemented as appropriate)    Problem: Pain, Acute (Adult)  Goal: Acceptable Pain Control/Comfort Level  Outcome: Ongoing (interventions implemented as appropriate)

## 2017-08-07 NOTE — PROGRESS NOTES
Nephrology ([facility]) Progress Note      Patient:  Zay Kamara  YOB: 1950  Date of Service: 8/7/2017  MRN: [id number]   Acct: [id number]   Primary Care Physician: ULIS Souza  Advance Directive: Full Code  Admit Date: 8/1/2017       Hospital Day: 6  Referring Provider: Hernesto Hong MD      Patient personally seen and examined.  Complete chart including Consults, Notes, Operative Reports, Labs, Cardiology, and Radiology studies reviewed as able.        Subjective:  Zay Kamara is a 66 y.o. male  whom we were consulted for acute kidney injury.  No prior renal history.  He is status post prostatectomy on 8/01 for the treatment of prostate cancer.  Patient had episode of acute kidney injury related to prerenal azotemia, responded very well to IV fluid administration.  He also developed paralytic ileus, needing nasogastric tube.  Nasogastric tube is out now and diet is being advanced.  Otherwise is feeling well, looking forward to discharge.    Allergies:  Review of patient's allergies indicates no known allergies.    Home Meds:  Prescriptions Prior to Admission   Medication Sig Dispense Refill Last Dose   • atorvastatin (LIPITOR) 10 MG tablet Take 20 mg by mouth Daily.   7/30/2017   • levothyroxine (SYNTHROID, LEVOTHROID) 200 MCG tablet Take 200 mcg by mouth Daily.   7/30/2017   • lisinopril (PRINIVIL,ZESTRIL) 40 MG tablet Take 40 mg by mouth Daily.   7/30/2017   • nabumetone (RELAFEN) 500 MG tablet Take 500 mg by mouth 2 (Two) Times a Day As Needed for Mild Pain (1-3) (arhritis and knee pain).   7/30/2017   • SITagliptin (JANUVIA) 100 MG tablet Take 100 mg by mouth Daily.   7/30/2017   • tamsulosin (FLOMAX) 0.4 MG capsule 24 hr capsule Take 1 capsule by mouth every night.   7/30/2017       Medicines:  Current Facility-Administered Medications   Medication Dose Route Frequency Provider Last Rate Last Dose   • dextrose (D50W) solution 25 g  25 g  Intravenous Q15 Min PRN Zaheer Rebolledo MD       • dextrose (GLUTOSE) oral gel 15 g  15 g Oral Q15 Min PRN Zaheer Rebolledo MD       • glucagon (human recombinant) (GLUCAGEN DIAGNOSTIC) injection 1 mg  1 mg Subcutaneous Q15 Min PRN Zaheer Rebolledo MD       • heparin (porcine) 5000 UNIT/ML injection 5,000 Units  5,000 Units Subcutaneous Q8H Zaheer Rebolledo MD   5,000 Units at 08/07/17 0522   • hydrALAZINE (APRESOLINE) injection 10 mg  10 mg Intravenous Q4H PRN Zaheer Rebolledo MD       • insulin lispro (humaLOG) injection 0-9 Units  0-9 Units Subcutaneous 4x Daily With Meals & Nightly Zaheer Rebolledo MD       • Morphine injection 6 mg  6 mg Intravenous Q2H PRN Hernesto Hong MD   6 mg at 08/07/17 0308    And   • naloxone (NARCAN) injection 0.4 mg  0.4 mg Intravenous Q5 Min PRN Hernesto Hong MD       • ondansetron (ZOFRAN) injection 4 mg  4 mg Intravenous Q4H PRN Zaheer Rebolledo MD   4 mg at 08/04/17 2006    Or   • ondansetron (ZOFRAN) tablet 4 mg  4 mg Oral Q4H PRN Zaheer Rebolledo MD        Or   • ondansetron ODT (ZOFRAN-ODT) disintegrating tablet 4 mg  4 mg Oral Q4H PRN Zaheer Rebolledo MD       • sodium chloride 0.9 % infusion  125 mL/hr Intravenous Continuous Jasen Doss  mL/hr at 08/07/17 0524 125 mL/hr at 08/07/17 0524       Past Medical History:  Past Medical History:   Diagnosis Date   • Arthritis    • Cancer     thyroid   • Diabetes    • Disease of thyroid gland    • Hypercholesteremia    • Hypertension    • IBS (irritable bowel syndrome)    • Prostate cancer    • Sleep apnea     NON COMPLIANT WITH C PAP       Past Surgical History:  Past Surgical History:   Procedure Laterality Date   • CHOLECYSTECTOMY     • COLONOSCOPY N/A 3/7/2017    Procedure: COLONOSCOPY WITH ANESTHESIA;  Surgeon: Hector Alvarenga MD;  Location: [facility] ENDOSCOPY;  Service:    • CYSTOSCOPY TRANSURETHRAL RESECTION OF PROSTATE     • PROSTATE SURGERY     • PROSTATECTOMY N/A 8/1/2017    Procedure:  "PROSTATECTOMY LAPAROSCOPIC WITH DAVINCI SI ROBOT ;  Surgeon: Hernesto Hong MD;  Location:  [facility] OR;  Service:    • THYROID SURGERY      RADIATION THERAPY AFTER SURGERY       Family History  Family History   Problem Relation Age of Onset   • Family history unknown: Yes   • Prostate cancer Father        Social History  Social History     Social History   • Marital status:      Spouse name: N/A   • Number of children: N/A   • Years of education: N/A     Occupational History   • Not on file.     Social History Main Topics   • Smoking status: Never Smoker   • Smokeless tobacco: Never Used   • Alcohol use No   • Drug use: No   • Sexual activity: Defer     Other Topics Concern   • Not on file     Social History Narrative         Review of Systems:  History obtained from chart review and the patient  General ROS: No fever or chills  Respiratory ROS: No cough, shortness of breath, wheezing  Cardiovascular ROS: no chest pain or dyspnea on exertion  Gastrointestinal ROS: No abdominal pain or melena  Genito-Urinary ROS: No dysuria or hematuria  Neurological ROS: negative  14 point ROS reviewed with the patient and negative except as noted above and in the HPI unless unable to obtain.    Objective:  /56 (BP Location: Right arm, Patient Position: Lying)  Pulse 84  Temp 98.7 °F (37.1 °C) (Oral)   Resp 18  Ht 71\" (180.3 cm)  Wt 227 lb (103 kg)  SpO2 94%  BMI 31.66 kg/m2    Intake/Output Summary (Last 24 hours) at 08/07/17 1034  Last data filed at 08/07/17 1016   Gross per 24 hour   Intake             5475 ml   Output             3835 ml   Net             1640 ml     General: awake/alert   HEENT: Normocephalic, atraumatic  Neck:supple, no JVD or carotid bruits.  Chest:  clear to auscultation bilaterally without respiratory distress  CVS: regular rate and rhythm  Abdominal: Distended, hypoactive bowel sounds, right lower quadrant GETACHEW.  Extremities: no cyanosis or edema  Skin: warm and dry without " rash      Labs:    Results from last 7 days  Lab Units 08/07/17  0436 08/06/17  0456 08/05/17  1652   WBC 10*3/mm3 10.59 12.58* 11.18*   HEMOGLOBIN g/dL 9.6* 9.9* 10.1*   HEMATOCRIT % 28.6* 29.9* 30.6*   PLATELETS 10*3/mm3 282 293 283           Results from last 7 days  Lab Units 08/07/17  0436 08/06/17  0456 08/05/17  0436 08/04/17  0457 08/03/17  0516   SODIUM mmol/L 135 138 136 135 136   POTASSIUM mmol/L 4.0 4.6 4.5 4.7 4.5   CHLORIDE mmol/L 99 101 99 103 105   CO2 mmol/L 32.0* 30.0 28.0 27.0 24.0   BUN mg/dL 14 18 17 22* 33*   CREATININE mg/dL 0.84 0.94 0.99 1.14 1.90*   CALCIUM mg/dL 7.5* 7.8* 8.1* 7.7* 6.8*   BILIRUBIN mg/dL  --  1.5*  --  1.2* 1.3*   ALK PHOS U/L  --  36  --  32 29   ALT (SGPT) U/L  --  31  --  27 28   AST (SGOT) U/L  --  22  --  24 24   GLUCOSE mg/dL 118* 125* 127* 113* 118*       Radiology:   Imaging Results (last 72 hours)     Procedure Component Value Units Date/Time    US Renal Bilateral [[id number]] Collected:  08/02/17 1450     Updated:  08/02/17 1516    Narrative:       RENAL ULTRASOUND COMPLETE 8/2/2017 2:21 PM CDT     REASON FOR EXAM: Oliguria s/p prostatectomy; [id number] neoplasm of  prostate       COMPARISON: None       TECHNIQUE: Multiple longitudinal and transverse realtime sonographic  images of the kidneys and urinary bladder are obtained.      FINDINGS:      RIGHT KIDNEY: 5.6 x 5.1 x 12.1 cm. Normal in size, shape, contour and  position. Small 2.5 cm cyst is present upper pole the right kidney. The  central echo complex is compact with no evidence for hydronephrosis. No  nephrolithiasis or abnormal perinephric fluid collections . The  pelvicalyceal system is mildly prominent..      LEFT KIDNEY: 5.7 x 4.8 x 13.2 cm. Normal in size, shape, contour and  position. No solid or cystic masses. The central echo complex is compact  with no evidence for hydronephrosis. No nephrolithiasis or abnormal  perinephric fluid collections . No hydroureter.      PELVIS: Jacobson catheter is  present in the bladder. There is no  surrounding ascites.        Impression:       1. Slightly prominent right renal pelvis. These findings were reviewed  with Dr. Eliazar Hong at the time of this dictation.  2. Incidental note made of a right renal cyst.  3. Negative left renal ultrasound.        This report was finalized on 08/02/2017 14:58 by Dr. Ruiz Sandoval MD.          Culture:         Assessment   1.  Acute kidney injury secondary to prerenal azotemia--resolved  2.  Status post prostatectomy for the treatment of prostate cancer.  3.  Type II diabetes.  4.  Hypertension  5.  Hypocalcemia  6.  Paralytic ileus.  7.  Right renal simple cyst.    Plan:  1.  Continue IV fluid.  2.  Monitor electrolytes      Tigre Cabello, LUIS  8/7/2017  10:34 AM

## 2017-08-07 NOTE — PROGRESS NOTES
Nutrition Services    Patient Name:  Zay Kamara  YOB: 1950  MRN: [id number]  Admit Date:  8/1/2017        Pt has been NPO x 3 days with some chips/sips. If unable to advance diet and establish intake, pt may benefit from alternate means of nutrition support. Please advise. KARL will continue to follow.      Electronically signed by:  Bria Amaya RDN, XENIA  08/07/17 10:15 AM

## 2017-08-08 LAB
DEPRECATED RDW RBC AUTO: 43.5 FL (ref 40–54)
ERYTHROCYTE [DISTWIDTH] IN BLOOD BY AUTOMATED COUNT: 14.5 % (ref 12–15)
GLUCOSE BLDC GLUCOMTR-MCNC: 134 MG/DL (ref 70–130)
GLUCOSE BLDC GLUCOMTR-MCNC: 143 MG/DL (ref 70–130)
GLUCOSE BLDC GLUCOMTR-MCNC: 143 MG/DL (ref 70–130)
GLUCOSE BLDC GLUCOMTR-MCNC: 157 MG/DL (ref 70–130)
HCT VFR BLD AUTO: 30.1 % (ref 40–52)
HGB BLD-MCNC: 10.2 G/DL (ref 14–18)
MCH RBC QN AUTO: 29.2 PG (ref 28–32)
MCHC RBC AUTO-ENTMCNC: 33.9 G/DL (ref 33–36)
MCV RBC AUTO: 86.2 FL (ref 82–95)
PLATELET # BLD AUTO: 327 10*3/MM3 (ref 130–400)
PMV BLD AUTO: 9.5 FL (ref 6–12)
RBC # BLD AUTO: 3.49 10*6/MM3 (ref 4.8–5.9)
WBC NRBC COR # BLD: 12.95 10*3/MM3 (ref 4.8–10.8)

## 2017-08-08 PROCEDURE — 82962 GLUCOSE BLOOD TEST: CPT

## 2017-08-08 PROCEDURE — 85027 COMPLETE CBC AUTOMATED: CPT | Performed by: UROLOGY

## 2017-08-08 PROCEDURE — 25010000002 MORPHINE PER 10 MG: Performed by: UROLOGY

## 2017-08-08 PROCEDURE — 25010000002 HEPARIN (PORCINE) PER 1000 UNITS: Performed by: UROLOGY

## 2017-08-08 RX ADMIN — HEPARIN SODIUM 5000 UNITS: 5000 INJECTION, SOLUTION INTRAVENOUS; SUBCUTANEOUS at 23:17

## 2017-08-08 RX ADMIN — MORPHINE SULFATE 6 MG: 10 INJECTION, SOLUTION INTRAMUSCULAR; INTRAVENOUS at 19:56

## 2017-08-08 RX ADMIN — HEPARIN SODIUM 5000 UNITS: 5000 INJECTION, SOLUTION INTRAVENOUS; SUBCUTANEOUS at 14:45

## 2017-08-08 RX ADMIN — SODIUM CHLORIDE 125 ML/HR: 9 INJECTION, SOLUTION INTRAVENOUS at 06:18

## 2017-08-08 RX ADMIN — HEPARIN SODIUM 5000 UNITS: 5000 INJECTION, SOLUTION INTRAVENOUS; SUBCUTANEOUS at 05:11

## 2017-08-08 RX ADMIN — MORPHINE SULFATE 6 MG: 10 INJECTION, SOLUTION INTRAMUSCULAR; INTRAVENOUS at 00:17

## 2017-08-08 NOTE — PROGRESS NOTES
Nephrology ([facility]) Progress Note      Patient:  Zay Kamara  YOB: 1950  Date of Service: 8/8/2017  MRN: [id number]   Acct: [id number]   Primary Care Physician: LUIS Souza  Advance Directive: Full Code  Admit Date: 8/1/2017       Hospital Day: 7  Referring Provider: Hernesto Hong MD      Patient personally seen and examined.  Complete chart including Consults, Notes, Operative Reports, Labs, Cardiology, and Radiology studies reviewed as able.        Subjective:  Zay Kamara is a 66 y.o. male  whom we were consulted for acute kidney injury.  No prior renal history.  He is status post prostatectomy on 8/01 for the treatment of prostate cancer.  Patient had episode of acute kidney injury related to prerenal azotemia, responded very well to IV fluid administration.  He also developed paralytic ileus needing nasogastric tube.  Nasogastric tube has now been removed and diet is being advanced.  Continues to look forward to going home; no new issues.    Allergies:  Review of patient's allergies indicates no known allergies.    Home Meds:  Prescriptions Prior to Admission   Medication Sig Dispense Refill Last Dose   • atorvastatin (LIPITOR) 10 MG tablet Take 20 mg by mouth Daily.   7/30/2017   • levothyroxine (SYNTHROID, LEVOTHROID) 200 MCG tablet Take 200 mcg by mouth Daily.   7/30/2017   • lisinopril (PRINIVIL,ZESTRIL) 40 MG tablet Take 40 mg by mouth Daily.   7/30/2017   • nabumetone (RELAFEN) 500 MG tablet Take 500 mg by mouth 2 (Two) Times a Day As Needed for Mild Pain (1-3) (arhritis and knee pain).   7/30/2017   • SITagliptin (JANUVIA) 100 MG tablet Take 100 mg by mouth Daily.   7/30/2017   • tamsulosin (FLOMAX) 0.4 MG capsule 24 hr capsule Take 1 capsule by mouth every night.   7/30/2017       Medicines:  Current Facility-Administered Medications   Medication Dose Route Frequency Provider Last Rate Last Dose   • dextrose (D50W) solution 25 g   25 g Intravenous Q15 Min PRN Zaheer Rebolledo MD       • dextrose (GLUTOSE) oral gel 15 g  15 g Oral Q15 Min PRN Zaheer Rebolledo MD       • glucagon (human recombinant) (GLUCAGEN DIAGNOSTIC) injection 1 mg  1 mg Subcutaneous Q15 Min PRN Zaheer Rebolledo MD       • heparin (porcine) 5000 UNIT/ML injection 5,000 Units  5,000 Units Subcutaneous Q8H Zaheer Rebolledo MD   5,000 Units at 08/08/17 0511   • hydrALAZINE (APRESOLINE) injection 10 mg  10 mg Intravenous Q4H PRN Zaheer Rebolledo MD   10 mg at 08/07/17 2109   • insulin lispro (humaLOG) injection 0-9 Units  0-9 Units Subcutaneous 4x Daily With Meals & Nightly Zaheer Rebolledo MD       • Morphine injection 6 mg  6 mg Intravenous Q2H PRN Hernesto Hong MD   6 mg at 08/08/17 0017    And   • naloxone (NARCAN) injection 0.4 mg  0.4 mg Intravenous Q5 Min PRN Hernesto Hong MD       • ondansetron (ZOFRAN) injection 4 mg  4 mg Intravenous Q4H PRN Zaheer Rebolledo MD   4 mg at 08/04/17 2006    Or   • ondansetron (ZOFRAN) tablet 4 mg  4 mg Oral Q4H PRN Zaheer Rebolledo MD        Or   • ondansetron ODT (ZOFRAN-ODT) disintegrating tablet 4 mg  4 mg Oral Q4H PRN Zaheer Rebolledo MD       • sodium chloride 0.9 % infusion  125 mL/hr Intravenous Continuous Jasen Doss  mL/hr at 08/08/17 0618 125 mL/hr at 08/08/17 0618       Past Medical History:  Past Medical History:   Diagnosis Date   • Arthritis    • Cancer     thyroid   • Diabetes    • Disease of thyroid gland    • Hypercholesteremia    • Hypertension    • IBS (irritable bowel syndrome)    • Prostate cancer    • Sleep apnea     NON COMPLIANT WITH C PAP       Past Surgical History:  Past Surgical History:   Procedure Laterality Date   • CHOLECYSTECTOMY     • COLONOSCOPY N/A 3/7/2017    Procedure: COLONOSCOPY WITH ANESTHESIA;  Surgeon: Hector Alvarenga MD;  Location: [facility] ENDOSCOPY;  Service:    • CYSTOSCOPY TRANSURETHRAL RESECTION OF PROSTATE     • PROSTATE SURGERY     • PROSTATECTOMY  "N/A 8/1/2017    Procedure: PROSTATECTOMY LAPAROSCOPIC WITH DAVINCI SI ROBOT ;  Surgeon: Hernesto Hong MD;  Location: [facility] OR;  Service:    • THYROID SURGERY      RADIATION THERAPY AFTER SURGERY       Family History  Family History   Problem Relation Age of Onset   • Family history unknown: Yes   • Prostate cancer Father        Social History  Social History     Social History   • Marital status:      Spouse name: N/A   • Number of children: N/A   • Years of education: N/A     Occupational History   • Not on file.     Social History Main Topics   • Smoking status: Never Smoker   • Smokeless tobacco: Never Used   • Alcohol use No   • Drug use: No   • Sexual activity: Defer     Other Topics Concern   • Not on file     Social History Narrative         Review of Systems:  History obtained from chart review and the patient  General ROS: No fever or chills  Respiratory ROS: No cough, shortness of breath, wheezing  Cardiovascular ROS: no chest pain or dyspnea on exertion  Gastrointestinal ROS: No abdominal pain or melena  Genito-Urinary ROS: No dysuria or hematuria  Neurological ROS: negative  14 point ROS reviewed with the patient and negative except as noted above and in the HPI unless unable to obtain.    Objective:  /83 (BP Location: Left arm)  Pulse 84  Temp 98.5 °F (36.9 °C) (Oral)   Resp 16  Ht 71\" (180.3 cm)  Wt 227 lb (103 kg)  SpO2 97%  BMI 31.66 kg/m2    Intake/Output Summary (Last 24 hours) at 08/08/17 1142  Last data filed at 08/08/17 1100   Gross per 24 hour   Intake          4201.32 ml   Output             6265 ml   Net         -2063.68 ml     General: awake/alert   HEENT: Normocephalic, atraumatic  Neck:supple, no JVD or carotid bruits.  Chest:  clear to auscultation bilaterally without respiratory distress  CVS: regular rate and rhythm  Abdominal: Distended, hypoactive bowel sounds, right lower quadrant GETACHEW.  Extremities: no cyanosis or edema  Skin: warm and dry without " rash      Labs:    Results from last 7 days  Lab Units 08/08/17  0440 08/07/17  0436 08/06/17  0456   WBC 10*3/mm3 12.95* 10.59 12.58*   HEMOGLOBIN g/dL 10.2* 9.6* 9.9*   HEMATOCRIT % 30.1* 28.6* 29.9*   PLATELETS 10*3/mm3 327 282 293           Results from last 7 days  Lab Units 08/07/17  0436 08/06/17  0456 08/05/17  0436 08/04/17  0457 08/03/17  0516   SODIUM mmol/L 135 138 136 135 136   POTASSIUM mmol/L 4.0 4.6 4.5 4.7 4.5   CHLORIDE mmol/L 99 101 99 103 105   CO2 mmol/L 32.0* 30.0 28.0 27.0 24.0   BUN mg/dL 14 18 17 22* 33*   CREATININE mg/dL 0.84 0.94 0.99 1.14 1.90*   CALCIUM mg/dL 7.5* 7.8* 8.1* 7.7* 6.8*   BILIRUBIN mg/dL  --  1.5*  --  1.2* 1.3*   ALK PHOS U/L  --  36  --  32 29   ALT (SGPT) U/L  --  31  --  27 28   AST (SGOT) U/L  --  22  --  24 24   GLUCOSE mg/dL 118* 125* 127* 113* 118*       Radiology:   Imaging Results (last 72 hours)     Procedure Component Value Units Date/Time    US Renal Bilateral [[id number]] Collected:  08/02/17 1450     Updated:  08/02/17 1516    Narrative:       RENAL ULTRASOUND COMPLETE 8/2/2017 2:21 PM CDT     REASON FOR EXAM: Oliguria s/p prostatectomy; [id number] neoplasm of  prostate       COMPARISON: None       TECHNIQUE: Multiple longitudinal and transverse realtime sonographic  images of the kidneys and urinary bladder are obtained.      FINDINGS:      RIGHT KIDNEY: 5.6 x 5.1 x 12.1 cm. Normal in size, shape, contour and  position. Small 2.5 cm cyst is present upper pole the right kidney. The  central echo complex is compact with no evidence for hydronephrosis. No  nephrolithiasis or abnormal perinephric fluid collections . The  pelvicalyceal system is mildly prominent..      LEFT KIDNEY: 5.7 x 4.8 x 13.2 cm. Normal in size, shape, contour and  position. No solid or cystic masses. The central echo complex is compact  with no evidence for hydronephrosis. No nephrolithiasis or abnormal  perinephric fluid collections . No hydroureter.      PELVIS: Jacobson catheter is  present in the bladder. There is no  surrounding ascites.        Impression:       1. Slightly prominent right renal pelvis. These findings were reviewed  with Dr. Eliazar Hong at the time of this dictation.  2. Incidental note made of a right renal cyst.  3. Negative left renal ultrasound.        This report was finalized on 08/02/2017 14:58 by Dr. Ruiz Sandoval MD.          Culture:         Assessment   1.  Acute kidney injury secondary to prerenal azotemia--resolved  2.  Status post prostatectomy for the treatment of prostate cancer.  3.  Type II diabetes.  4.  Hypertension  5.  Hypocalcemia  6.  Paralytic ileus.  7.  Right renal simple cyst.    Plan:  1.  Wean IV fluid.  2.  Monitor electrolytes  3.  OK for discharge from renal standpoint; follow up in office in 1-2 weeks      LUIS Puga  8/8/2017  11:42 AM

## 2017-08-08 NOTE — PLAN OF CARE
Problem: Patient Care Overview (Adult)  Goal: Plan of Care Review  Outcome: Ongoing (interventions implemented as appropriate)    08/08/17 1532   Coping/Psychosocial Response Interventions   Plan Of Care Reviewed With patient   Patient Care Overview   Progress no change   Outcome Evaluation   Outcome Summary/Follow up Plan Patient ambulating frequently in hallway. Continues to c/o hiccups. Abdominal distention continues. GETACHEW output decreased. No BM yet, but does report passing gas.       Goal: Adult Individualization and Mutuality  Outcome: Ongoing (interventions implemented as appropriate)    08/08/17 1532   Individualization   Patient Specific Preferences none   Patient Specific Goals decrease distention, resolve hiccups, have BM   Patient Specific Interventions q 4 hour cath care   Mutuality/Individual Preferences   What Anxieties, Fears or Concerns Do You Have About Your Health or Care? none   What Questions Do You Have About Your Health or Care? none   What Information Would Help Us Give You More Personalized Care? none       Goal: Discharge Needs Assessment  Outcome: Ongoing (interventions implemented as appropriate)    08/07/17 1446   Discharge Needs Assessment   Concerns To Be Addressed denies needs/concerns at this time;no discharge needs identified   Readmission Within The Last 30 Days no previous admission in last 30 days   Equipment Needed After Discharge none   Discharge Disposition still a patient   Current Health   Anticipated Changes Related to Illness none   Living Environment   Transportation Available family or friend will provide;car   Self-Care   Equipment Currently Used at Home none         Problem: Prostatectomy, Radical (Adult)  Goal: Signs and Symptoms of Listed Potential Problems Will be Absent or Manageable (Prostatectomy, Radical)  Outcome: Ongoing (interventions implemented as appropriate)    08/08/17 1532   Prostatectomy, Radical   Problems Assessed (Radical Prostatectomy) all   Problems  Present (Radical Prostatectomy) postoperative ileus         Problem: Pain, Acute (Adult)  Goal: Acceptable Pain Control/Comfort Level  Outcome: Ongoing (interventions implemented as appropriate)    08/08/17 1532   Pain, Acute (Adult)   Acceptable Pain Control/Comfort Level making progress toward outcome

## 2017-08-08 NOTE — PLAN OF CARE
Problem: Patient Care Overview (Adult)  Goal: Plan of Care Review  Outcome: Ongoing (interventions implemented as appropriate)    08/07/17 1635 08/08/17 0124   Coping/Psychosocial Response Interventions   Plan Of Care Reviewed With patient --    Patient Care Overview   Progress improving --    Outcome Evaluation   Outcome Summary/Follow up Plan --  Pt has c/o abd pain X2, gave prn pain meds. States he feels like it's gas pain and is relieved when passing flatus. Abd still distended. Drsg c/d/i, GETACHEW to suction, voiding, ambulating, will cont to monitor.        Goal: Adult Individualization and Mutuality  Outcome: Ongoing (interventions implemented as appropriate)    08/07/17 1635   Individualization   Patient Specific Preferences none   Patient Specific Goals go home soon   Patient Specific Interventions cath care q4   Mutuality/Individual Preferences   What Anxieties, Fears or Concerns Do You Have About Your Health or Care? none   What Questions Do You Have About Your Health or Care? when can he go home   What Information Would Help Us Give You More Personalized Care? none       Goal: Discharge Needs Assessment  Outcome: Ongoing (interventions implemented as appropriate)    08/07/17 1446   Discharge Needs Assessment   Concerns To Be Addressed denies needs/concerns at this time;no discharge needs identified   Readmission Within The Last 30 Days no previous admission in last 30 days   Equipment Needed After Discharge none   Discharge Disposition still a patient   Discharge Planning Comments PT LIVES ALONE AND PLANS HOME AT [place]. PT INDEPENDENT PRIOR TO ADMIT. [place] NEEDS DENIED AT PRESENT TIME. WILL CONTINUE TO FOLLOW.    Current Health   Anticipated Changes Related to Illness none   Living Environment   Transportation Available family or friend will provide;car   Self-Care   Equipment Currently Used at Home none         Problem: Prostatectomy, Radical (Adult)  Goal: Signs and Symptoms of Listed Potential Problems Will be  Absent or Manageable (Prostatectomy, Radical)  Outcome: Ongoing (interventions implemented as appropriate)    08/07/17 1635   Prostatectomy, Radical   Problems Assessed (Radical Prostatectomy) all   Problems Present (Radical Prostatectomy) postoperative ileus         Problem: Pain, Acute (Adult)  Goal: Acceptable Pain Control/Comfort Level  Outcome: Ongoing (interventions implemented as appropriate)    08/08/17 0124   Pain, Acute (Adult)   Acceptable Pain Control/Comfort Level making progress toward outcome

## 2017-08-09 ENCOUNTER — APPOINTMENT (OUTPATIENT)
Dept: CT IMAGING | Facility: HOSPITAL | Age: 67
End: 2017-08-09

## 2017-08-09 LAB
ALBUMIN SERPL-MCNC: 3.2 G/DL (ref 3.5–5)
ALBUMIN/GLOB SERPL: 1.4 G/DL (ref 1.1–2.5)
ALP SERPL-CCNC: 40 U/L (ref 24–120)
ALT SERPL W P-5'-P-CCNC: 41 U/L (ref 0–54)
ANION GAP SERPL CALCULATED.3IONS-SCNC: 8 MMOL/L (ref 4–13)
AST SERPL-CCNC: 24 U/L (ref 7–45)
BILIRUB SERPL-MCNC: 1.4 MG/DL (ref 0.1–1)
BUN BLD-MCNC: 12 MG/DL (ref 5–21)
BUN/CREAT SERPL: 13.5 (ref 7–25)
CALCIUM SPEC-SCNC: 8.7 MG/DL (ref 8.4–10.4)
CHLORIDE SERPL-SCNC: 98 MMOL/L (ref 98–110)
CO2 SERPL-SCNC: 28 MMOL/L (ref 24–31)
CREAT BLD-MCNC: 0.89 MG/DL (ref 0.5–1.4)
DEPRECATED RDW RBC AUTO: 44.3 FL (ref 40–54)
ERYTHROCYTE [DISTWIDTH] IN BLOOD BY AUTOMATED COUNT: 14.8 % (ref 12–15)
GFR SERPL CREATININE-BSD FRML MDRD: 85 ML/MIN/1.73
GLOBULIN UR ELPH-MCNC: 2.3 GM/DL
GLUCOSE BLD-MCNC: 120 MG/DL (ref 70–100)
GLUCOSE BLDC GLUCOMTR-MCNC: 131 MG/DL (ref 70–130)
GLUCOSE BLDC GLUCOMTR-MCNC: 131 MG/DL (ref 70–130)
GLUCOSE BLDC GLUCOMTR-MCNC: 165 MG/DL (ref 70–130)
GLUCOSE BLDC GLUCOMTR-MCNC: 191 MG/DL (ref 70–130)
HCT VFR BLD AUTO: 31.8 % (ref 40–52)
HGB BLD-MCNC: 10.7 G/DL (ref 14–18)
MCH RBC QN AUTO: 28.9 PG (ref 28–32)
MCHC RBC AUTO-ENTMCNC: 33.6 G/DL (ref 33–36)
MCV RBC AUTO: 85.9 FL (ref 82–95)
PLATELET # BLD AUTO: 346 10*3/MM3 (ref 130–400)
PMV BLD AUTO: 9.6 FL (ref 6–12)
POTASSIUM BLD-SCNC: 4.3 MMOL/L (ref 3.5–5.3)
PROT SERPL-MCNC: 5.5 G/DL (ref 6.3–8.7)
RBC # BLD AUTO: 3.7 10*6/MM3 (ref 4.8–5.9)
SODIUM BLD-SCNC: 134 MMOL/L (ref 135–145)
WBC NRBC COR # BLD: 13.77 10*3/MM3 (ref 4.8–10.8)

## 2017-08-09 PROCEDURE — 80053 COMPREHEN METABOLIC PANEL: CPT | Performed by: UROLOGY

## 2017-08-09 PROCEDURE — 85027 COMPLETE CBC AUTOMATED: CPT | Performed by: UROLOGY

## 2017-08-09 PROCEDURE — 25010000002 HEPARIN (PORCINE) PER 1000 UNITS: Performed by: UROLOGY

## 2017-08-09 PROCEDURE — 82962 GLUCOSE BLOOD TEST: CPT

## 2017-08-09 PROCEDURE — 74177 CT ABD & PELVIS W/CONTRAST: CPT

## 2017-08-09 PROCEDURE — 0 IOHEXOL 300 MG/ML SOLUTION: Performed by: UROLOGY

## 2017-08-09 PROCEDURE — 25010000002 MORPHINE PER 10 MG: Performed by: UROLOGY

## 2017-08-09 PROCEDURE — 0 IOPAMIDOL PER 1 ML: Performed by: UROLOGY

## 2017-08-09 RX ORDER — SODIUM CHLORIDE, SODIUM LACTATE, POTASSIUM CHLORIDE, CALCIUM CHLORIDE 600; 310; 30; 20 MG/100ML; MG/100ML; MG/100ML; MG/100ML
100 INJECTION, SOLUTION INTRAVENOUS CONTINUOUS
Status: DISCONTINUED | OUTPATIENT
Start: 2017-08-09 | End: 2017-08-11

## 2017-08-09 RX ORDER — DIAZEPAM 10 MG/1
10 TABLET ORAL ONCE
Status: COMPLETED | OUTPATIENT
Start: 2017-08-10 | End: 2017-08-09

## 2017-08-09 RX ADMIN — MORPHINE SULFATE 6 MG: 10 INJECTION, SOLUTION INTRAMUSCULAR; INTRAVENOUS at 22:42

## 2017-08-09 RX ADMIN — IOHEXOL 50 ML: 300 INJECTION, SOLUTION INTRAVENOUS at 06:16

## 2017-08-09 RX ADMIN — MORPHINE SULFATE 6 MG: 10 INJECTION, SOLUTION INTRAMUSCULAR; INTRAVENOUS at 18:21

## 2017-08-09 RX ADMIN — DIAZEPAM 10 MG: 10 TABLET ORAL at 23:28

## 2017-08-09 RX ADMIN — SODIUM CHLORIDE, POTASSIUM CHLORIDE, SODIUM LACTATE AND CALCIUM CHLORIDE 100 ML/HR: 600; 310; 30; 20 INJECTION, SOLUTION INTRAVENOUS at 22:42

## 2017-08-09 RX ADMIN — HEPARIN SODIUM 5000 UNITS: 5000 INJECTION, SOLUTION INTRAVENOUS; SUBCUTANEOUS at 13:23

## 2017-08-09 RX ADMIN — HEPARIN SODIUM 5000 UNITS: 5000 INJECTION, SOLUTION INTRAVENOUS; SUBCUTANEOUS at 22:24

## 2017-08-09 RX ADMIN — IOPAMIDOL 100 ML: 755 INJECTION, SOLUTION INTRAVENOUS at 08:45

## 2017-08-09 RX ADMIN — HEPARIN SODIUM 5000 UNITS: 5000 INJECTION, SOLUTION INTRAVENOUS; SUBCUTANEOUS at 05:04

## 2017-08-09 NOTE — PROGRESS NOTES
Nephrology ([facility]) Progress Note      Patient:  Zay Kamara  YOB: 1950  Date of Service: 8/9/2017  MRN: [id number]   Acct: [id number]   Primary Care Physician: LUIS Souza  Advance Directive: Full Code  Admit Date: 8/1/2017       Hospital Day: 8  Referring Provider: Hernesto Hong MD      Patient personally seen and examined.  Complete chart including Consults, Notes, Operative Reports, Labs, Cardiology, and Radiology studies reviewed as able.        Subjective:  Zay Kamara is a 66 y.o. male  whom we were consulted for acute kidney injury.  No prior renal history.  He is status post prostatectomy on 8/01 for the treatment of prostate cancer.  Patient had episode of acute kidney injury related to prerenal azotemia, responded very well to IV fluid administration.       Having more issues with ileus, now off diet again.    Allergies:  Review of patient's allergies indicates no known allergies.    Home Meds:  Prescriptions Prior to Admission   Medication Sig Dispense Refill Last Dose   • atorvastatin (LIPITOR) 10 MG tablet Take 20 mg by mouth Daily.   7/30/2017   • levothyroxine (SYNTHROID, LEVOTHROID) 200 MCG tablet Take 200 mcg by mouth Daily.   7/30/2017   • lisinopril (PRINIVIL,ZESTRIL) 40 MG tablet Take 40 mg by mouth Daily.   7/30/2017   • nabumetone (RELAFEN) 500 MG tablet Take 500 mg by mouth 2 (Two) Times a Day As Needed for Mild Pain (1-3) (arhritis and knee pain).   7/30/2017   • SITagliptin (JANUVIA) 100 MG tablet Take 100 mg by mouth Daily.   7/30/2017   • tamsulosin (FLOMAX) 0.4 MG capsule 24 hr capsule Take 1 capsule by mouth every night.   7/30/2017       Medicines:  Current Facility-Administered Medications   Medication Dose Route Frequency Provider Last Rate Last Dose   • dextrose (D50W) solution 25 g  25 g Intravenous Q15 Min PRN Zaheer Rebolledo MD       • dextrose (GLUTOSE) oral gel 15 g  15 g Oral Q15 Min PRSHAUNNA Schwab  Chepe Rebolledo MD       • glucagon (human recombinant) (GLUCAGEN DIAGNOSTIC) injection 1 mg  1 mg Subcutaneous Q15 Min PRN Zaheer Rebolledo MD       • heparin (porcine) 5000 UNIT/ML injection 5,000 Units  5,000 Units Subcutaneous Q8H Zaheer Rebolledo MD   5,000 Units at 08/09/17 1323   • hydrALAZINE (APRESOLINE) injection 10 mg  10 mg Intravenous Q4H PRN Zaheer Rebolledo MD   10 mg at 08/07/17 2109   • insulin lispro (humaLOG) injection 0-9 Units  0-9 Units Subcutaneous 4x Daily With Meals & Nightly Zaheer Rebolledo MD       • Morphine injection 6 mg  6 mg Intravenous Q2H PRN Hernesto Hong MD   6 mg at 08/08/17 1956    And   • naloxone (NARCAN) injection 0.4 mg  0.4 mg Intravenous Q5 Min PRN Hernesto Hong MD       • ondansetron (ZOFRAN) injection 4 mg  4 mg Intravenous Q4H PRN Zaheer Rebolledo MD   4 mg at 08/04/17 2006    Or   • ondansetron (ZOFRAN) tablet 4 mg  4 mg Oral Q4H PRN Zaheer Rebolledo MD        Or   • ondansetron ODT (ZOFRAN-ODT) disintegrating tablet 4 mg  4 mg Oral Q4H PRN Zaheer Rebolledo MD           Past Medical History:  Past Medical History:   Diagnosis Date   • Arthritis    • Cancer     thyroid   • Diabetes    • Disease of thyroid gland    • Hypercholesteremia    • Hypertension    • IBS (irritable bowel syndrome)    • Prostate cancer    • Sleep apnea     NON COMPLIANT WITH C PAP       Past Surgical History:  Past Surgical History:   Procedure Laterality Date   • CHOLECYSTECTOMY     • COLONOSCOPY N/A 3/7/2017    Procedure: COLONOSCOPY WITH ANESTHESIA;  Surgeon: Hector Alvarenga MD;  Location: [facility] ENDOSCOPY;  Service:    • CYSTOSCOPY TRANSURETHRAL RESECTION OF PROSTATE     • PROSTATE SURGERY     • PROSTATECTOMY N/A 8/1/2017    Procedure: PROSTATECTOMY LAPAROSCOPIC WITH DAVINCI SI ROBOT ;  Surgeon: Hernesto Hong MD;  Location: [facility] OR;  Service:    • THYROID SURGERY      RADIATION THERAPY AFTER SURGERY       Family History  Family History   Problem Relation Age of  "Onset   • Family history unknown: Yes   • Prostate cancer Father        Social History  Social History     Social History   • Marital status:      Spouse name: N/A   • Number of children: N/A   • Years of education: N/A     Occupational History   • Not on file.     Social History Main Topics   • Smoking status: Never Smoker   • Smokeless tobacco: Never Used   • Alcohol use No   • Drug use: No   • Sexual activity: Defer     Other Topics Concern   • Not on file     Social History Narrative         Review of Systems:  History obtained from chart review and the patient  General ROS: No fever or chills  Respiratory ROS: No cough, shortness of breath, wheezing  Cardiovascular ROS: no chest pain or dyspnea on exertion  Gastrointestinal ROS: No abdominal pain or melena  Genito-Urinary ROS: No dysuria or hematuria  14 point ROS reviewed with the patient and negative except as noted above and in the HPI unless unable to obtain.    Objective:  /66 (BP Location: Right arm, Patient Position: Sitting)  Pulse 95  Temp 97.9 °F (36.6 °C) (Oral)   Resp 16  Ht 71\" (180.3 cm)  Wt 214 lb (97.1 kg)  SpO2 95%  BMI 29.85 kg/m2    Intake/Output Summary (Last 24 hours) at 08/09/17 1342  Last data filed at 08/09/17 1331   Gross per 24 hour   Intake              240 ml   Output             4955 ml   Net            -4715 ml     General: awake/alert   Chest:  clear to auscultation bilaterally without respiratory distress  CVS: regular rate and rhythm  Abdominal: soft, distended, mild ttp  Extremities: no cyanosis or edema  Skin: warm and dry without rash      Labs:    Results from last 7 days  Lab Units 08/09/17  0503 08/08/17  0440 08/07/17  0436   WBC 10*3/mm3 13.77* 12.95* 10.59   HEMOGLOBIN g/dL 10.7* 10.2* 9.6*   HEMATOCRIT % 31.8* 30.1* 28.6*   PLATELETS 10*3/mm3 346 327 282           Results from last 7 days  Lab Units 08/09/17  0503 08/07/17  0436 08/06/17  0456  08/04/17  0457   SODIUM mmol/L 134* 135 138  < > 135 "   POTASSIUM mmol/L 4.3 4.0 4.6  < > 4.7   CHLORIDE mmol/L 98 99 101  < > 103   CO2 mmol/L 28.0 32.0* 30.0  < > 27.0   BUN mg/dL 12 14 18  < > 22*   CREATININE mg/dL 0.89 0.84 0.94  < > 1.14   CALCIUM mg/dL 8.7 7.5* 7.8*  < > 7.7*   BILIRUBIN mg/dL 1.4*  --  1.5*  --  1.2*   ALK PHOS U/L 40  --  36  --  32   ALT (SGPT) U/L 41  --  31  --  27   AST (SGOT) U/L 24  --  22  --  24   GLUCOSE mg/dL 120* 118* 125*  < > 113*   < > = values in this interval not displayed.    Radiology:   Imaging Results (last 72 hours)     Procedure Component Value Units Date/Time    CT Abdomen Pelvis With Contrast [[id number]] Collected:  08/09/17 0831     Updated:  08/09/17 0837    Narrative:       EXAMINATION: CT ABDOMEN PELVIS W CONTRAST-      8/9/2017 9:12 AM EDT     HISTORY: Protracted ileus; L99-Wwhmyckja neoplasm of prostate     In order to have a CT radiation dose as low as reasonably achievable  Automated Exposure Control was utilized for adjustment of the mA and/or  KV according to patient size.     DLP in mGycm= 775.     Axial, sagittal, and coronal post contrast CT imaging of the  abdomen/pelvis.     Postsurgical changes with anterior abdominal wall and inguinal  subcutaneous air.  Pelvic surgical drain in good position.     No significant peritoneal fluid.     Mildly prominent air-filled transverse colon. No small bowel dilation.  Oral contrast is seen throughout the small intestine.     Heart size is normal.  Mild bibasilar atelectasis.     A few tiny low density foci within the liver have the appearance of  cysts though are too small to characterize.  Cholecystectomy clips.  Normal pancreas and spleen.  Normal and symmetric adrenal glands.  Symmetric renal size. Small nonobstructing stones are seen within each  kidney.  There is mild right hydronephrosis and mild prominence of the full  length of the right ureter though no obstructing ureteral stone is seen.     Incidental tiny fat-containing umbilical hernia.     Summary:  1.  Air-filled mildly dilated transverse colon. No small bowel dilation  to indicate diffuse ileus.  2. Postsurgical changes.  3. No mass or abscess is seen.                                   This report was finalized on 08/09/2017 08:34 by Dr. Anish Reeves MD.          Culture:         Assessment   Acute kidney injury secondary to prerenal azotemia--resolved  Status post prostatectomy for the treatment of prostate cancer.  Type II diabetes.  Hypertension  Hypocalcemia  Paralytic ileus.  Right renal simple cyst.      Plan:  Resume IVF  W/u ongoing    Andrea Savage MD  8/9/2017  1:42 PM

## 2017-08-09 NOTE — PROGRESS NOTES
Urology  S: Passing more flatus  Still belching  O: No fever  Abdomen moderate distension but less than day before   A/P: Advance diet  Ambulate

## 2017-08-09 NOTE — PROGRESS NOTES
Urology  Length of Stay: 8  Patient Care Team:  LUIS Souza as PCP - General (Family Medicine)  Hernesto Hong MD as Consulting Physician (Urology)    Chief Complaint:  Complains of hiccups, nausea and abdominal distention    Subjective     Interval History:   Patient is now postoperative day 8 from robot assisted laparoscopic prostatectomy.  Continues to have difficulty with abdominal distention with nausea.    Review of Systems:   Review of Systems   Gastrointestinal: Positive for abdominal distention, abdominal pain and nausea.   Genitourinary: Negative for flank pain and hematuria.       Objective     Vital Signs  Temp:  [97.6 °F (36.4 °C)-98.7 °F (37.1 °C)] 97.6 °F (36.4 °C)  Heart Rate:  [74-88] 76  Resp:  [16-17] 17  BP: (150-157)/(66-83) 150/72    Physical Exam:  Physical Exam   Constitutional: He is oriented to person, place, and time. He appears well-developed and well-nourished. No distress.   Pulmonary/Chest: Effort normal.   Abdominal: Soft. He exhibits distension. He exhibits no mass. There is tenderness. There is no rebound and no guarding. No hernia.   Neurological: He is alert and oriented to person, place, and time.   Skin: Skin is warm and dry. He is not diaphoretic.   Psychiatric: He has a normal mood and affect.   Vitals reviewed.       Results Review:       I reviewed the patient's new clinical results.  Lab Results (last 24 hours)     Procedure Component Value Units Date/Time    POC Glucose Fingerstick [[id number]]  (Abnormal) Collected:  08/08/17 0744    Specimen:  Blood Updated:  08/08/17 0756     Glucose 143 (H) mg/dL       : [id number] Pandoo TEKter ID: [id number]       POC Glucose Fingerstick [[id number]]  (Abnormal) Collected:  08/08/17 1115    Specimen:  Blood Updated:  08/08/17 1138     Glucose 157 (H) mg/dL       : [id number] [place] TEKter ID: [id number]       POC Glucose Fingerstick [[id number]]  (Abnormal) Collected:  08/08/17 1658    Specimen:  Blood  Updated:  08/08/17 1720     Glucose 134 (H) mg/dL       : [id number] Dipti More ID: [id number]       POC Glucose Fingerstick [[id number]]  (Abnormal) Collected:  08/08/17 2002    Specimen:  Blood Updated:  08/08/17 2013     Glucose 143 (H) mg/dL       : [id number] Kamron SaenzMeter ID: [id number]           Imaging Results (last 24 hours)     ** No results found for the last 24 hours. **          Medication Review:     Current Facility-Administered Medications:   •  dextrose (D50W) solution 25 g, 25 g, Intravenous, Q15 Min PRN, Zaheer Rebolledo MD  •  dextrose (GLUTOSE) oral gel 15 g, 15 g, Oral, Q15 Min PRN, Zaheer Rebolledo MD  •  glucagon (human recombinant) (GLUCAGEN DIAGNOSTIC) injection 1 mg, 1 mg, Subcutaneous, Q15 Min PRN, Zaheer Rebolledo MD  •  heparin (porcine) 5000 UNIT/ML injection 5,000 Units, 5,000 Units, Subcutaneous, Q8H, Zaheer Rebolledo MD, 5,000 Units at 08/09/17 0504  •  hydrALAZINE (APRESOLINE) injection 10 mg, 10 mg, Intravenous, Q4H PRN, Zaheer Rebolledo MD, 10 mg at 08/07/17 2109  •  insulin lispro (humaLOG) injection 0-9 Units, 0-9 Units, Subcutaneous, 4x Daily With Meals & Nightly, Zaheer Rebolledo MD  •  Morphine injection 6 mg, 6 mg, Intravenous, Q2H PRN, 6 mg at 08/08/17 1956 **AND** naloxone (NARCAN) injection 0.4 mg, 0.4 mg, Intravenous, Q5 Min PRN, Hernesto Hong MD  •  ondansetron (ZOFRAN) tablet 4 mg, 4 mg, Oral, Q4H PRN **OR** ondansetron ODT (ZOFRAN-ODT) disintegrating tablet 4 mg, 4 mg, Oral, Q4H PRN **OR** ondansetron (ZOFRAN) injection 4 mg, 4 mg, Intravenous, Q4H PRN, Zaheer Rebolledo MD, 4 mg at 08/04/17 2006    Assessment/Plan:   #1.  Adenocarcinoma of the prostate status post robot assisted laparoscopic prostatectomy.  Pathology is T2c with negative margins, [place] 3+4 = 7  #2.  Protracted ileus- Symptoms worsened after he resumed a regular diet yesterday.  We will consider TPN.  We will obtain CT scan abdomen and pelvis today.  #3.   Anemia secondary to acute blood loss        Hernesto Hong MD  08/09/17  5:37 AM

## 2017-08-09 NOTE — PLAN OF CARE
Problem: Patient Care Overview (Adult)  Goal: Plan of Care Review  Outcome: Ongoing (interventions implemented as appropriate)    08/09/17 1449   Coping/Psychosocial Response Interventions   Plan Of Care Reviewed With patient   Patient Care Overview   Progress no change   Outcome Evaluation   Outcome Summary/Follow up Plan Pt has been NPO today, abdomen distended, hypoactive bowel sounds. No nausea or vomiting this shift. Encouraged ambulation. C/o constant hiccups. F/C to BSD, Q4H corbin care maintained per protocol, urine output adequate. GETACHEW x1, serosanguineous drainage. CT of abd/pelvis this AM. VSS. Will continue to monitor.        Goal: Adult Individualization and Mutuality  Outcome: Ongoing (interventions implemented as appropriate)    08/09/17 1449   Individualization   Patient Specific Preferences None identified at this time.   Patient Specific Goals Resolve ileus, hiccups, have BM, tolerate regular food   Patient Specific Interventions Encourage ambulation, pain meds PRN   Mutuality/Individual Preferences   What Anxieties, Fears or Concerns Do You Have About Your Health or Care? Concerned about having a BM   What Questions Do You Have About Your Health or Care? None identified at this time.   What Information Would Help Us Give You More Personalized Care? None identified at this time.       Goal: Discharge Needs Assessment  Outcome: Ongoing (interventions implemented as appropriate)    08/07/17 1446   Discharge Needs Assessment   Concerns To Be Addressed denies needs/concerns at this time;no discharge needs identified   Readmission Within The Last 30 Days no previous admission in last 30 days   Equipment Needed After Discharge none   Discharge Disposition still a patient   Current Health   Anticipated Changes Related to Illness none   Living Environment   Transportation Available family or friend will provide;car   Self-Care   Equipment Currently Used at Home none         Problem: Prostatectomy, Radical  (Adult)  Goal: Signs and Symptoms of Listed Potential Problems Will be Absent or Manageable (Prostatectomy, Radical)  Outcome: Ongoing (interventions implemented as appropriate)    08/09/17 1449   Prostatectomy, Radical   Problems Assessed (Radical Prostatectomy) all   Problems Present (Radical Prostatectomy) postoperative ileus         Problem: Pain, Acute (Adult)  Goal: Acceptable Pain Control/Comfort Level  Outcome: Ongoing (interventions implemented as appropriate)    08/09/17 1449   Pain, Acute (Adult)   Acceptable Pain Control/Comfort Level making progress toward outcome

## 2017-08-09 NOTE — PLAN OF CARE
Problem: Patient Care Overview (Adult)  Goal: Plan of Care Review  Outcome: Ongoing (interventions implemented as appropriate)    08/08/17 1532 08/09/17 0225   Coping/Psychosocial Response Interventions   Plan Of Care Reviewed With patient --    Patient Care Overview   Progress no change --    Outcome Evaluation   Outcome Summary/Follow up Plan --  Pt still having hiccups and belching. Abd still distended. Passing flatus--no BM. Dr. Hong notified and stated to place NG if pt has emesis. Vitals stable, ambulating, will cont to monitor.        Goal: Adult Individualization and Mutuality  Outcome: Ongoing (interventions implemented as appropriate)    08/08/17 1532   Individualization   Patient Specific Preferences none   Patient Specific Goals decrease distention, resolve hiccups, have BM   Patient Specific Interventions q 4 hour cath care   Mutuality/Individual Preferences   What Anxieties, Fears or Concerns Do You Have About Your Health or Care? none   What Questions Do You Have About Your Health or Care? none   What Information Would Help Us Give You More Personalized Care? none       Goal: Discharge Needs Assessment  Outcome: Ongoing (interventions implemented as appropriate)    08/07/17 1446   Discharge Needs Assessment   Concerns To Be Addressed denies needs/concerns at this time;no discharge needs identified   Readmission Within The Last 30 Days no previous admission in last 30 days   Equipment Needed After Discharge none   Discharge Disposition still a patient   Discharge Planning Comments PT LIVES ALONE AND PLANS HOME AT [place]. PT INDEPENDENT PRIOR TO ADMIT. DC NEEDS DENIED AT PRESENT TIME. WILL CONTINUE TO FOLLOW.    Current Health   Anticipated Changes Related to Illness none   Living Environment   Transportation Available family or friend will provide;car   Self-Care   Equipment Currently Used at Home none         Problem: Prostatectomy, Radical (Adult)  Goal: Signs and Symptoms of Listed Potential Problems  Will be Absent or Manageable (Prostatectomy, Radical)  Outcome: Ongoing (interventions implemented as appropriate)    08/08/17 1532   Prostatectomy, Radical   Problems Assessed (Radical Prostatectomy) all   Problems Present (Radical Prostatectomy) postoperative ileus         Problem: Pain, Acute (Adult)  Goal: Acceptable Pain Control/Comfort Level  Outcome: Ongoing (interventions implemented as appropriate)    08/09/17 0225   Pain, Acute (Adult)   Acceptable Pain Control/Comfort Level making progress toward outcome

## 2017-08-09 NOTE — PLAN OF CARE
Problem: Patient Care Overview (Adult)  Goal: Plan of Care Review  Outcome: Ongoing (interventions implemented as appropriate)    08/09/17 0839   Coping/Psychosocial Response Interventions   Plan Of Care Reviewed With caregiver   Patient Care Overview   Progress no change   Outcome Evaluation   Outcome Summary/Follow up Plan Pt is now NPO again. Having abdominal distention, belching, hiccups. If pt has emesis, they will place an NG tube for suction. Prior to NPO status, pt had consumed 94% of the last 4 meals. No new weight to compare. Will request updated weight. ROGELIO resolved per MD notes. Will cont to follow for diet initiation and tolerance.

## 2017-08-10 ENCOUNTER — APPOINTMENT (OUTPATIENT)
Dept: GENERAL RADIOLOGY | Facility: HOSPITAL | Age: 67
End: 2017-08-10

## 2017-08-10 LAB
ANION GAP SERPL CALCULATED.3IONS-SCNC: 6 MMOL/L (ref 4–13)
BUN BLD-MCNC: 15 MG/DL (ref 5–21)
BUN/CREAT SERPL: 15.2 (ref 7–25)
CALCIUM SPEC-SCNC: 8.2 MG/DL (ref 8.4–10.4)
CHLORIDE SERPL-SCNC: 98 MMOL/L (ref 98–110)
CO2 SERPL-SCNC: 30 MMOL/L (ref 24–31)
CREAT BLD-MCNC: 0.99 MG/DL (ref 0.5–1.4)
DEPRECATED RDW RBC AUTO: 45.8 FL (ref 40–54)
ERYTHROCYTE [DISTWIDTH] IN BLOOD BY AUTOMATED COUNT: 15.5 % (ref 12–15)
GFR SERPL CREATININE-BSD FRML MDRD: 75 ML/MIN/1.73
GLUCOSE BLD-MCNC: 117 MG/DL (ref 70–100)
GLUCOSE BLDC GLUCOMTR-MCNC: 122 MG/DL (ref 70–130)
GLUCOSE BLDC GLUCOMTR-MCNC: 168 MG/DL (ref 70–130)
GLUCOSE BLDC GLUCOMTR-MCNC: 220 MG/DL (ref 70–130)
HCT VFR BLD AUTO: 32.1 % (ref 40–52)
HGB BLD-MCNC: 10.6 G/DL (ref 14–18)
MAGNESIUM SERPL-MCNC: 2 MG/DL (ref 1.4–2.2)
MCH RBC QN AUTO: 29 PG (ref 28–32)
MCHC RBC AUTO-ENTMCNC: 33 G/DL (ref 33–36)
MCV RBC AUTO: 87.7 FL (ref 82–95)
PHOSPHATE SERPL-MCNC: 3.9 MG/DL (ref 2.5–4.5)
PLATELET # BLD AUTO: 344 10*3/MM3 (ref 130–400)
PMV BLD AUTO: 9.5 FL (ref 6–12)
POTASSIUM BLD-SCNC: 4.2 MMOL/L (ref 3.5–5.3)
RBC # BLD AUTO: 3.66 10*6/MM3 (ref 4.8–5.9)
SODIUM BLD-SCNC: 134 MMOL/L (ref 135–145)
WBC NRBC COR # BLD: 14.27 10*3/MM3 (ref 4.8–10.8)

## 2017-08-10 PROCEDURE — 85027 COMPLETE CBC AUTOMATED: CPT | Performed by: UROLOGY

## 2017-08-10 PROCEDURE — 74250 X-RAY XM SM INT 1CNTRST STD: CPT

## 2017-08-10 PROCEDURE — 82962 GLUCOSE BLOOD TEST: CPT

## 2017-08-10 PROCEDURE — 83735 ASSAY OF MAGNESIUM: CPT | Performed by: SPECIALIST

## 2017-08-10 PROCEDURE — 63710000001 INSULIN LISPRO (HUMAN) PER 5 UNITS: Performed by: UROLOGY

## 2017-08-10 PROCEDURE — 25010000002 CHLORPROMAZINE HCL 50 MG/2ML SOLUTION: Performed by: UROLOGY

## 2017-08-10 PROCEDURE — 80048 BASIC METABOLIC PNL TOTAL CA: CPT | Performed by: NURSE PRACTITIONER

## 2017-08-10 PROCEDURE — 25010000002 MORPHINE PER 10 MG: Performed by: UROLOGY

## 2017-08-10 PROCEDURE — 84100 ASSAY OF PHOSPHORUS: CPT | Performed by: SPECIALIST

## 2017-08-10 PROCEDURE — 25010000002 HEPARIN (PORCINE) PER 1000 UNITS: Performed by: UROLOGY

## 2017-08-10 RX ORDER — CHLORPROMAZINE HYDROCHLORIDE 25 MG/ML
12.5 INJECTION INTRAMUSCULAR ONCE
Status: COMPLETED | OUTPATIENT
Start: 2017-08-10 | End: 2017-08-10

## 2017-08-10 RX ADMIN — HEPARIN SODIUM 5000 UNITS: 5000 INJECTION, SOLUTION INTRAVENOUS; SUBCUTANEOUS at 06:19

## 2017-08-10 RX ADMIN — SODIUM CHLORIDE, POTASSIUM CHLORIDE, SODIUM LACTATE AND CALCIUM CHLORIDE 100 ML/HR: 600; 310; 30; 20 INJECTION, SOLUTION INTRAVENOUS at 06:19

## 2017-08-10 RX ADMIN — MORPHINE SULFATE 6 MG: 10 INJECTION, SOLUTION INTRAMUSCULAR; INTRAVENOUS at 03:09

## 2017-08-10 RX ADMIN — SODIUM CHLORIDE, POTASSIUM CHLORIDE, SODIUM LACTATE AND CALCIUM CHLORIDE 100 ML/HR: 600; 310; 30; 20 INJECTION, SOLUTION INTRAVENOUS at 18:46

## 2017-08-10 RX ADMIN — CHLORPROMAZINE HYDROCHLORIDE 12.5 MG: 25 INJECTION INTRAMUSCULAR at 09:12

## 2017-08-10 RX ADMIN — INSULIN LISPRO 4 UNITS: 100 INJECTION, SOLUTION INTRAVENOUS; SUBCUTANEOUS at 17:52

## 2017-08-10 RX ADMIN — HEPARIN SODIUM 5000 UNITS: 5000 INJECTION, SOLUTION INTRAVENOUS; SUBCUTANEOUS at 21:52

## 2017-08-10 RX ADMIN — HEPARIN SODIUM 5000 UNITS: 5000 INJECTION, SOLUTION INTRAVENOUS; SUBCUTANEOUS at 14:24

## 2017-08-10 NOTE — PROGRESS NOTES
Nephrology ([facility]) Progress Note      Patient:  Zay Kamara  YOB: 1950  Date of Service: 8/10/2017  MRN: [id number]   Acct: [id number]   Primary Care Physician: LUIS Souza  Advance Directive: Full Code  Admit Date: 8/1/2017       Hospital Day: 9  Referring Provider: Hernesto Hong MD      Patient personally seen and examined.  Complete chart including Consults, Notes, Operative Reports, Labs, Cardiology, and Radiology studies reviewed as able.        Subjective:  Zay Kamara is a 66 y.o. male  whom we were consulted for acute kidney injury.  No prior renal history.  He is status post prostatectomy on 8/01 for the treatment of prostate cancer.  Patient had episode of acute kidney injury related to prerenal azotemia, responded very well to IV fluid administration.  He also developed paralytic ileus--is currently NPO awaiting general surgery consult.  Complaint today of protracted hiccups.    Allergies:  Review of patient's allergies indicates no known allergies.    Home Meds:  Prescriptions Prior to Admission   Medication Sig Dispense Refill Last Dose   • atorvastatin (LIPITOR) 10 MG tablet Take 20 mg by mouth Daily.   7/30/2017   • levothyroxine (SYNTHROID, LEVOTHROID) 200 MCG tablet Take 200 mcg by mouth Daily.   7/30/2017   • lisinopril (PRINIVIL,ZESTRIL) 40 MG tablet Take 40 mg by mouth Daily.   7/30/2017   • nabumetone (RELAFEN) 500 MG tablet Take 500 mg by mouth 2 (Two) Times a Day As Needed for Mild Pain (1-3) (arhritis and knee pain).   7/30/2017   • SITagliptin (JANUVIA) 100 MG tablet Take 100 mg by mouth Daily.   7/30/2017   • tamsulosin (FLOMAX) 0.4 MG capsule 24 hr capsule Take 1 capsule by mouth every night.   7/30/2017       Medicines:  Current Facility-Administered Medications   Medication Dose Route Frequency Provider Last Rate Last Dose   • dextrose (D50W) solution 25 g  25 g Intravenous Q15 Min PRN Zaheer Rebolledo MD        • dextrose (GLUTOSE) oral gel 15 g  15 g Oral Q15 Min PRN Zaheer Rebolledo MD       • glucagon (human recombinant) (GLUCAGEN DIAGNOSTIC) injection 1 mg  1 mg Subcutaneous Q15 Min PRN Zaheer Rebolledo MD       • heparin (porcine) 5000 UNIT/ML injection 5,000 Units  5,000 Units Subcutaneous Q8H Zaheer Rebolledo MD   5,000 Units at 08/10/17 0619   • hydrALAZINE (APRESOLINE) injection 10 mg  10 mg Intravenous Q4H PRN Zaheer Rebolledo MD   10 mg at 08/07/17 2109   • insulin lispro (humaLOG) injection 0-9 Units  0-9 Units Subcutaneous 4x Daily With Meals & Nightly Zaheer Rebolledo MD       • lactated ringers infusion  100 mL/hr Intravenous Continuous Andrea Savage  mL/hr at 08/10/17 0619 100 mL/hr at 08/10/17 0619   • Morphine injection 6 mg  6 mg Intravenous Q2H PRN Hernesto Hong MD   6 mg at 08/10/17 0309    And   • naloxone (NARCAN) injection 0.4 mg  0.4 mg Intravenous Q5 Min PRN Hernesto Hong MD       • ondansetron (ZOFRAN) injection 4 mg  4 mg Intravenous Q4H PRN Zaheer Rebolledo MD   4 mg at 08/04/17 2006    Or   • ondansetron (ZOFRAN) tablet 4 mg  4 mg Oral Q4H PRSHAUNNA Rebolledo MD        Or   • ondansetron ODT (ZOFRAN-ODT) disintegrating tablet 4 mg  4 mg Oral Q4H PRN Zaheer Rebolledo MD           Past Medical History:  Past Medical History:   Diagnosis Date   • Arthritis    • Cancer     thyroid   • Diabetes    • Disease of thyroid gland    • Hypercholesteremia    • Hypertension    • IBS (irritable bowel syndrome)    • Prostate cancer    • Sleep apnea     NON COMPLIANT WITH C PAP       Past Surgical History:  Past Surgical History:   Procedure Laterality Date   • CHOLECYSTECTOMY     • COLONOSCOPY N/A 3/7/2017    Procedure: COLONOSCOPY WITH ANESTHESIA;  Surgeon: Hector Alvarenga MD;  Location: [facility] ENDOSCOPY;  Service:    • CYSTOSCOPY TRANSURETHRAL RESECTION OF PROSTATE     • PROSTATE SURGERY     • PROSTATECTOMY N/A 8/1/2017    Procedure: PROSTATECTOMY  "LAPAROSCOPIC WITH DAVINCI SI ROBOT ;  Surgeon: Hernesto Hong MD;  Location: [facility] OR;  Service:    • THYROID SURGERY      RADIATION THERAPY AFTER SURGERY       Family History  Family History   Problem Relation Age of Onset   • Family history unknown: Yes   • Prostate cancer Father        Social History  Social History     Social History   • Marital status:      Spouse name: N/A   • Number of children: N/A   • Years of education: N/A     Occupational History   • Not on file.     Social History Main Topics   • Smoking status: Never Smoker   • Smokeless tobacco: Never Used   • Alcohol use No   • Drug use: No   • Sexual activity: Defer     Other Topics Concern   • Not on file     Social History Narrative         Review of Systems:  History obtained from chart review and the patient  General ROS: No fever or chills  Respiratory ROS: No cough, shortness of breath, wheezing  Cardiovascular ROS: no chest pain or dyspnea on exertion  Gastrointestinal ROS: No abdominal pain or melena  Genito-Urinary ROS: No dysuria or hematuria  Neurological ROS: negative  14 point ROS reviewed with the patient and negative except as noted above and in the HPI unless unable to obtain.    Objective:  /64 (BP Location: Right arm, Patient Position: Lying)  Pulse 80  Temp 98.3 °F (36.8 °C) (Oral)   Resp 18  Ht 71\" (180.3 cm)  Wt 214 lb (97.1 kg)  SpO2 96%  BMI 29.85 kg/m2    Intake/Output Summary (Last 24 hours) at 08/10/17 0959  Last data filed at 08/10/17 0734   Gross per 24 hour   Intake              950 ml   Output             1925 ml   Net             -975 ml     General: awake/alert   HEENT: Normocephalic, atraumatic  Neck:supple, no JVD or carotid bruits.  Chest:  clear to auscultation bilaterally without respiratory distress  CVS: regular rate and rhythm  Abdominal: Distended, + bowel sounds, diffuse mild TTP  Extremities: no cyanosis or edema  Skin: warm and dry without rash      Labs:    Results from last 7 " days  Lab Units 08/10/17  0507 08/09/17  0503 08/08/17  0440   WBC 10*3/mm3 14.27* 13.77* 12.95*   HEMOGLOBIN g/dL 10.6* 10.7* 10.2*   HEMATOCRIT % 32.1* 31.8* 30.1*   PLATELETS 10*3/mm3 344 346 327           Results from last 7 days  Lab Units 08/10/17  0925 08/09/17  0503 08/07/17  0436 08/06/17  0456  08/04/17  0457   SODIUM mmol/L 134* 134* 135 138  < > 135   POTASSIUM mmol/L 4.2 4.3 4.0 4.6  < > 4.7   CHLORIDE mmol/L 98 98 99 101  < > 103   CO2 mmol/L 30.0 28.0 32.0* 30.0  < > 27.0   BUN mg/dL 15 12 14 18  < > 22*   CREATININE mg/dL 0.99 0.89 0.84 0.94  < > 1.14   CALCIUM mg/dL 8.2* 8.7 7.5* 7.8*  < > 7.7*   BILIRUBIN mg/dL  --  1.4*  --  1.5*  --  1.2*   ALK PHOS U/L  --  40  --  36  --  32   ALT (SGPT) U/L  --  41  --  31  --  27   AST (SGOT) U/L  --  24  --  22  --  24   GLUCOSE mg/dL 117* 120* 118* 125*  < > 113*   < > = values in this interval not displayed.    Radiology:   Imaging Results (last 72 hours)     Procedure Component Value Units Date/Time    US Renal Bilateral [[id number]] Collected:  08/02/17 1450     Updated:  08/02/17 1516    Narrative:       RENAL ULTRASOUND COMPLETE 8/2/2017 2:21 PM CDT     REASON FOR EXAM: Oliguria s/p prostatectomy; D[place] neoplasm of  prostate       COMPARISON: None       TECHNIQUE: Multiple longitudinal and transverse realtime sonographic  images of the kidneys and urinary bladder are obtained.      FINDINGS:      RIGHT KIDNEY: 5.6 x 5.1 x 12.1 cm. Normal in size, shape, contour and  position. Small 2.5 cm cyst is present upper pole the right kidney. The  central echo complex is compact with no evidence for hydronephrosis. No  nephrolithiasis or abnormal perinephric fluid collections . The  pelvicalyceal system is mildly prominent..      LEFT KIDNEY: 5.7 x 4.8 x 13.2 cm. Normal in size, shape, contour and  position. No solid or cystic masses. The central echo complex is compact  with no evidence for hydronephrosis. No nephrolithiasis or abnormal  perinephric  fluid collections . No hydroureter.      PELVIS: Jacobson catheter is present in the bladder. There is no  surrounding ascites.        Impression:       1. Slightly prominent right renal pelvis. These findings were reviewed  with Dr. Eliazar Hong at the time of this dictation.  2. Incidental note made of a right renal cyst.  3. Negative left renal ultrasound.        This report was finalized on 08/02/2017 14:58 by Dr. Ruiz Sandoval MD.          Culture:         Assessment   1.  Acute kidney injury secondary to prerenal azotemia--resolved  2.  Status post prostatectomy for the treatment of prostate cancer.  3.  Type II diabetes.  4.  Hypertension  5.  Hypocalcemia  6.  Paralytic ileus.  7.  Right renal simple cyst.    Plan:  1.  Continue IV fluid.  2.  Monitor electrolytes        Tigre Cabello, LUIS  8/10/2017  9:59 AM

## 2017-08-10 NOTE — PROGRESS NOTES
Urology  Length of Stay: 9  Patient Care Team:  LUIS Souza as PCP - General (Family Medicine)  Hernesto Hong MD as Consulting Physician (Urology)    Chief Complaint:  Hiccups  Subjective     Interval History:   Patient with persistent hiccups.  He has continued abdominal distention.  He is passing some flatus and had small bowel movement yesterday    Review of Systems:   Review of Systems   Constitutional: Negative for fever.   Gastrointestinal: Positive for abdominal distention. Negative for abdominal pain.   Genitourinary: Negative for hematuria.       Objective     Vital Signs  Temp:  [97.9 °F (36.6 °C)-98.8 °F (37.1 °C)] 98.4 °F (36.9 °C)  Heart Rate:  [80-95] 80  Resp:  [16-18] 16  BP: (119-139)/(49-66) 127/62    Physical Exam:  Physical Exam  Alert and oriented ×3  Not agitated or distressed  No obvious deformities  No respiratory distress  Skin without pallor or diaphoresis  Abdomen moderately distended. Slightly better today.    Results Review:       I reviewed the patient's new clinical results.  Lab Results (last 24 hours)     Procedure Component Value Units Date/Time    Comprehensive Metabolic Panel [[id number]]  (Abnormal) Collected:  08/09/17 0503    Specimen:  Blood Updated:  08/09/17 0711     Glucose 120 (H) mg/dL      BUN 12 mg/dL      Creatinine 0.89 mg/dL      Sodium 134 (L) mmol/L      Potassium 4.3 mmol/L      Chloride 98 mmol/L      CO2 28.0 mmol/L      Calcium 8.7 mg/dL      Total Protein 5.5 (L) g/dL      Albumin 3.20 (L) g/dL      ALT (SGPT) 41 U/L      AST (SGOT) 24 U/L      Alkaline Phosphatase 40 U/L      Total Bilirubin 1.4 (H) mg/dL      eGFR Non African Amer 85 mL/min/1.73      Globulin 2.3 gm/dL      A/G Ratio 1.4 g/dL      BUN/Creatinine Ratio 13.5     Anion Gap 8.0 mmol/L     POC Glucose Fingerstick [[id number]]  (Abnormal) Collected:  08/09/17 0753    Specimen:  Blood Updated:  08/09/17 0805     Glucose 191 (H) mg/dL       : [id number] Riddle  ID: [id number]       POC Glucose Fingerstick [[id number]]  (Abnormal) Collected:  08/09/17 1127    Specimen:  Blood Updated:  08/09/17 1138     Glucose 131 (H) mg/dL       : [id number] Judy KristieMeter ID: [id number]       POC Glucose Fingerstick [[id number]]  (Abnormal) Collected:  08/09/17 1805    Specimen:  Blood Updated:  08/09/17 1817     Glucose 131 (H) mg/dL       : [id number] Judy KristieMeter ID: [id number]       POC Glucose Fingerstick [[id number]]  (Abnormal) Collected:  08/09/17 2222    Specimen:  Blood Updated:  08/09/17 2251     Glucose 165 (H) mg/dL       : [id number] Holli AngelitaMeter ID: [id number]       CBC (No Diff) [[id number]]  (Abnormal) Collected:  08/10/17 0507    Specimen:  Blood Updated:  08/10/17 0542     WBC 14.27 (H) 10*3/mm3      RBC 3.66 (L) 10*6/mm3      Hemoglobin 10.6 (L) g/dL      Hematocrit 32.1 (L) %      MCV 87.7 fL      MCH 29.0 pg      MCHC 33.0 g/dL      RDW 15.5 (H) %      RDW-SD 45.8 fl      MPV 9.5 fL      Platelets 344 10*3/mm3         Imaging Results (last 24 hours)     Procedure Component Value Units Date/Time    CT Abdomen Pelvis With Contrast [[id number]] Collected:  08/09/17 0831     Updated:  08/09/17 0837    Narrative:       EXAMINATION: CT ABDOMEN PELVIS W CONTRAST-      8/9/2017 9:12 AM EDT     HISTORY: Protracted ileus; N00-Yrsnhrxgo neoplasm of prostate     In order to have a CT radiation dose as low as reasonably achievable  Automated Exposure Control was utilized for adjustment of the mA and/or  KV according to patient size.     DLP in mGycm= 775.     Axial, sagittal, and coronal post contrast CT imaging of the  abdomen/pelvis.     Postsurgical changes with anterior abdominal wall and inguinal  subcutaneous air.  Pelvic surgical drain in good position.     No significant peritoneal fluid.     Mildly prominent air-filled transverse colon. No small bowel dilation.  Oral contrast is seen throughout the small intestine.     Heart size is  normal.  Mild bibasilar atelectasis.     A few tiny low density foci within the liver have the appearance of  cysts though are too small to characterize.  Cholecystectomy clips.  Normal pancreas and spleen.  Normal and symmetric adrenal glands.  Symmetric renal size. Small nonobstructing stones are seen within each  kidney.  There is mild right hydronephrosis and mild prominence of the full  length of the right ureter though no obstructing ureteral stone is seen.     Incidental tiny fat-containing umbilical hernia.     Summary:  1. Air-filled mildly dilated transverse colon. No small bowel dilation  to indicate diffuse ileus.  2. Postsurgical changes.  3. No mass or abscess is seen.                                   This report was finalized on 08/09/2017 08:34 by Dr. Anish Reeves MD.          Medication Review:     Current Facility-Administered Medications:   •  ChlorproMAZINE HCl (THORAZINE) injection 12.5 mg, 12.5 mg, Intramuscular, Once, Hernesto Hong MD  •  dextrose (D50W) solution 25 g, 25 g, Intravenous, Q15 Min PRN, Zaheer Rebolledo MD  •  dextrose (GLUTOSE) oral gel 15 g, 15 g, Oral, Q15 Min PRN, Zaheer Rebolledo MD  •  glucagon (human recombinant) (GLUCAGEN DIAGNOSTIC) injection 1 mg, 1 mg, Subcutaneous, Q15 Min PRN, Zaheer Rebolledo MD  •  heparin (porcine) 5000 UNIT/ML injection 5,000 Units, 5,000 Units, Subcutaneous, Q8H, Zaheer Rebolledo MD, 5,000 Units at 08/10/17 0619  •  hydrALAZINE (APRESOLINE) injection 10 mg, 10 mg, Intravenous, Q4H PRN, Zaheer Rebolledo MD, 10 mg at 08/07/17 2109  •  insulin lispro (humaLOG) injection 0-9 Units, 0-9 Units, Subcutaneous, 4x Daily With Meals & Nightly, Zaheer Rebolledo MD  •  lactated ringers infusion, 100 mL/hr, Intravenous, Continuous, Andrea Savage MD, Last Rate: 100 mL/hr at 08/10/17 0619, 100 mL/hr at 08/10/17 0619  •  Morphine injection 6 mg, 6 mg, Intravenous, Q2H PRN, 6 mg at 08/10/17 0309 **AND** naloxone (NARCAN)  injection 0.4 mg, 0.4 mg, Intravenous, Q5 Min PRN, Hernesto Hong MD  •  ondansetron (ZOFRAN) tablet 4 mg, 4 mg, Oral, Q4H PRN **OR** ondansetron ODT (ZOFRAN-ODT) disintegrating tablet 4 mg, 4 mg, Oral, Q4H PRN **OR** ondansetron (ZOFRAN) injection 4 mg, 4 mg, Intravenous, Q4H PRN, Zaheer Rebolledo MD, 4 mg at 08/04/17 2006    Assessment/Plan:   #1. Prostate cancer  #2. Anemia secondary to acute blood loss improving  #3. Protracted post operative abdominal distension  #4. Hiccups    May be some better today. Exhausted secondary to protracted hiccups which I believe is related to his abdominal distension. CT scan without evidence of fluid collection, abscess, etc.     Will ask General surgery to see. Will hold diet until they see. If he is going to remain NPO, I need to have a PICC line placed and begin TPN. Will await their recommendations.     Will try low I.m. Dose of thorazine for his hiccups.     Hernesto Hong MD  08/10/17  7:01 AM

## 2017-08-10 NOTE — PLAN OF CARE
Problem: Patient Care Overview (Adult)  Goal: Plan of Care Review  Outcome: Ongoing (interventions implemented as appropriate)    08/10/17 0314   Coping/Psychosocial Response Interventions   Plan Of Care Reviewed With patient   Patient Care Overview   Progress no change   Outcome Evaluation   Outcome Summary/Follow up Plan Pt cont to be NPO. Abd distended with hypoactive bowel sounds. No c/o N/V this shift. Cont to encourage ambulation. Pt c/o constant hiccups. Call placed X1 to Dr. Matos. One time order for Valium. FC to BSD urine looked joelle colored for a short period, urine has cleared. GETACHEW X 1 with small amt drainage. VSS, cont to monitor.       Goal: Adult Individualization and Mutuality  Outcome: Ongoing (interventions implemented as appropriate)  Goal: Discharge Needs Assessment  Outcome: Ongoing (interventions implemented as appropriate)    Problem: Prostatectomy, Radical (Adult)  Goal: Signs and Symptoms of Listed Potential Problems Will be Absent or Manageable (Prostatectomy, Radical)  Outcome: Ongoing (interventions implemented as appropriate)    Problem: Pain, Acute (Adult)  Goal: Acceptable Pain Control/Comfort Level  Outcome: Ongoing (interventions implemented as appropriate)

## 2017-08-10 NOTE — PLAN OF CARE
Problem: Patient Care Overview (Adult)  Goal: Plan of Care Review  Outcome: Ongoing (interventions implemented as appropriate)    08/10/17 1507   Coping/Psychosocial Response Interventions   Plan Of Care Reviewed With patient   Patient Care Overview   Progress improving   Outcome Evaluation   Outcome Summary/Follow up Plan General surgery consult, SBFT this AM. Awaiting orders from Dr. Freitas. One time dose of thorazine given per orders, hiccups resolved. Pt has had several large BM's following SBFT. Q4H corbin care continues. IVF continues. Will continue to monitor.        Goal: Adult Individualization and Mutuality  Outcome: Ongoing (interventions implemented as appropriate)    08/09/17 1449 08/10/17 1507   Individualization   Patient Specific Preferences None identified at this time. --    Patient Specific Goals --  Resolve ileus, tolerate regular food.   Patient Specific Interventions Encourage ambulation, pain meds PRN --    Mutuality/Individual Preferences   What Anxieties, Fears or Concerns Do You Have About Your Health or Care? --  Concerned about results of SBFT   What Questions Do You Have About Your Health or Care? --  None identified at this time.   What Information Would Help Us Give You More Personalized Care? --  None identified at this time.       Goal: Discharge Needs Assessment  Outcome: Ongoing (interventions implemented as appropriate)    08/07/17 1446   Discharge Needs Assessment   Concerns To Be Addressed denies needs/concerns at this time;no discharge needs identified   Readmission Within The Last 30 Days no previous admission in last 30 days   Equipment Needed After Discharge none   Discharge Disposition still a patient   Current Health   Anticipated Changes Related to Illness none   Living Environment   Transportation Available family or friend will provide;car   Self-Care   Equipment Currently Used at Home none         Problem: Prostatectomy, Radical (Adult)  Goal: Signs and Symptoms of  Listed Potential Problems Will be Absent or Manageable (Prostatectomy, Radical)  Outcome: Ongoing (interventions implemented as appropriate)    08/10/17 1507   Prostatectomy, Radical   Problems Assessed (Radical Prostatectomy) all   Problems Present (Radical Prostatectomy) postoperative ileus         Problem: Pain, Acute (Adult)  Goal: Acceptable Pain Control/Comfort Level  Outcome: Ongoing (interventions implemented as appropriate)    08/10/17 1507   Pain, Acute (Adult)   Acceptable Pain Control/Comfort Level making progress toward outcome

## 2017-08-10 NOTE — CONSULTS
Diamond Freitas MD FACS Consult Note    Referring Provider: Hernesto Hong MD    Patient Care Team:  LUIS Souza as PCP - General (Family Medicine)  Hernesto Hong MD as Consulting Physician (Urology)    Chief complaint ileus    Subjective .     History of present illness:  The patient is a 66-year-old male who is status post da daiana assisted laparoscopic prostatectomy.  He has develped postoperative distension.  CT a/p demonstrated decompressed small bowel with dilation of the right and mainly transverse colon.  He denies nausea or vomiting, just he feels full.    Review of Systems  All systems were reviewed and negative for    Constitution:chills, fevers, night sweats and weight loss, weight gain  Eyes:  double vision, blurriness and loss of vision  ENT:  earaches, hearing loss and hoarseness  Respiratory:  cough, dry, cough, productive, hemoptysis and shortness of air  Cardiovascular:  chest pressure / pain, at rest, chest pressure / pain, on exertion, irregular pulse and palpitations  Gastrointestinal: bright red blood per rectum, change in bowel habits, constipation, diarrhea, heartburn, hematemesis, melena, nausea, pain and vomiting  Genitourinary:  difficulty / inability to void, pain, blood in urine and painful urination  Integument:  itching, rash, redness and swelling  Breast:  lump / mass, nipple discharge and pain  Hematologic / Lymphatic: easy bruising and lymphadenopathy  Musculoskeletal: joint pain, muscle pain and muscle weakness  Neurological: dizziness, loss of consciousness, numbness, vertigo and weakness  Behavioral/Psych: anxiety and depression  Endocrine: diabetes, thyroid disorder      History  Past Medical History:   Diagnosis Date   • Arthritis    • Cancer     thyroid   • Diabetes    • Disease of thyroid gland    • Hypercholesteremia    • Hypertension    • IBS (irritable bowel syndrome)    • Prostate cancer    • Sleep apnea     NON COMPLIANT WITH C PAP   ,   Past  Surgical History:   Procedure Laterality Date   • CHOLECYSTECTOMY     • COLONOSCOPY N/A 3/7/2017    Procedure: COLONOSCOPY WITH ANESTHESIA;  Surgeon: Hector Alvarenga MD;  Location: [facility] ENDOSCOPY;  Service:    • CYSTOSCOPY TRANSURETHRAL RESECTION OF PROSTATE     • PROSTATE SURGERY     • PROSTATECTOMY N/A 8/1/2017    Procedure: PROSTATECTOMY LAPAROSCOPIC WITH DAVINCI SI ROBOT ;  Surgeon: Hernesto Hong MD;  Location: [facility] OR;  Service:    • THYROID SURGERY      RADIATION THERAPY AFTER SURGERY   ,   Family History   Problem Relation Age of Onset   • Family history unknown: Yes   • Prostate cancer Father    ,   Social History   Substance Use Topics   • Smoking status: Never Smoker   • Smokeless tobacco: Never Used   • Alcohol use No   ,   Prescriptions Prior to Admission   Medication Sig Dispense Refill Last Dose   • atorvastatin (LIPITOR) 10 MG tablet Take 20 mg by mouth Daily.   7/30/2017   • levothyroxine (SYNTHROID, LEVOTHROID) 200 MCG tablet Take 200 mcg by mouth Daily.   7/30/2017   • lisinopril (PRINIVIL,ZESTRIL) 40 MG tablet Take 40 mg by mouth Daily.   7/30/2017   • nabumetone (RELAFEN) 500 MG tablet Take 500 mg by mouth 2 (Two) Times a Day As Needed for Mild Pain (1-3) (arhritis and knee pain).   7/30/2017   • SITagliptin (JANUVIA) 100 MG tablet Take 100 mg by mouth Daily.   7/30/2017   • tamsulosin (FLOMAX) 0.4 MG capsule 24 hr capsule Take 1 capsule by mouth every night.   7/30/2017    and Allergies:  Review of patient's allergies indicates no known allergies.    Current Facility-Administered Medications:   •  dextrose (D50W) solution 25 g, 25 g, Intravenous, Q15 Min PRN, Zaheer Rebolledo MD  •  dextrose (GLUTOSE) oral gel 15 g, 15 g, Oral, Q15 Min PRN, Zaheer Rebolledo MD  •  glucagon (human recombinant) (GLUCAGEN DIAGNOSTIC) injection 1 mg, 1 mg, Subcutaneous, Q15 Min PRN, Zaheer Rebolledo MD  •  heparin (porcine) 5000 UNIT/ML injection 5,000 Units, 5,000 Units, Subcutaneous, Q8H, Zaheer  Chepe Rebolledo MD, 5,000 Units at 08/10/17 1424  •  hydrALAZINE (APRESOLINE) injection 10 mg, 10 mg, Intravenous, Q4H PRN, Zaheer Rebolledo MD, 10 mg at 08/07/17 2109  •  insulin lispro (humaLOG) injection 0-9 Units, 0-9 Units, Subcutaneous, 4x Daily With Meals & Nightly, Zaheer Rebolledo MD  •  lactated ringers infusion, 100 mL/hr, Intravenous, Continuous, Andrea Savage MD, Last Rate: 100 mL/hr at 08/10/17 0619, 100 mL/hr at 08/10/17 0619  •  Morphine injection 6 mg, 6 mg, Intravenous, Q2H PRN, 6 mg at 08/10/17 0309 **AND** naloxone (NARCAN) injection 0.4 mg, 0.4 mg, Intravenous, Q5 Min PRN, Hernesto Hong MD  •  ondansetron (ZOFRAN) tablet 4 mg, 4 mg, Oral, Q4H PRN **OR** ondansetron ODT (ZOFRAN-ODT) disintegrating tablet 4 mg, 4 mg, Oral, Q4H PRN **OR** ondansetron (ZOFRAN) injection 4 mg, 4 mg, Intravenous, Q4H PRN, Zaheer Rebolledo MD, 4 mg at 08/04/17 2006    Objective     Vital Signs   Temp:  [96.8 °F (36 °C)-98.8 °F (37.1 °C)] 97.6 °F (36.4 °C)  Heart Rate:  [80-99] 81  Resp:  [16-18] 18  BP: (119-143)/(56-65) 139/56    Physical Exam:  General appearance - alert, well appearing, and in no distress  Mental status - alert, oriented to person, place, and time  Neck - supple, no significant adenopathy  Chest - clear to auscultation, no wheezes, rales or rhonchi, symmetric air entry  Heart - normal rate, regular rhythm, normal S1, S2, no murmurs, rubs, clicks or gallops  Abdomen - soft, distended, no masses, appropriately tender at incision sites  Neurological - alert, oriented, normal speech, no focal findings or movement disorder noted  Musculoskeletal - no joint tenderness, deformity or swelling  Extremities - peripheral pulses normal, no pedal edema, no clubbing or cyanosis    Results Review:    Lab Results (last 24 hours)     Procedure Component Value Units Date/Time    POC Glucose Fingerstick [[id number]]  (Abnormal) Collected:  08/09/17 1805    Specimen:  Blood Updated:  08/09/17 1817      Glucose 131 (H) mg/dL       : [id number] Judy KristieMeter ID: [id number]       POC Glucose Fingerstick [[id number]]  (Abnormal) Collected:  08/09/17 2222    Specimen:  Blood Updated:  08/09/17 2251     Glucose 165 (H) mg/dL       : [id number] Holli AngelitaMeter ID: [id number]       CBC (No Diff) [[id number]]  (Abnormal) Collected:  08/10/17 0507    Specimen:  Blood Updated:  08/10/17 0542     WBC 14.27 (H) 10*3/mm3      RBC 3.66 (L) 10*6/mm3      Hemoglobin 10.6 (L) g/dL      Hematocrit 32.1 (L) %      MCV 87.7 fL      MCH 29.0 pg      MCHC 33.0 g/dL      RDW 15.5 (H) %      RDW-SD 45.8 fl      MPV 9.5 fL      Platelets 344 10*3/mm3     POC Glucose Fingerstick [[id number]]  (Normal) Collected:  08/10/17 0907    Specimen:  Blood Updated:  08/10/17 0919     Glucose 122 mg/dL       : [id number] Judy KristieMeter ID: [id number]       Basic Metabolic Panel [[id number]]  (Abnormal) Collected:  08/10/17 0925    Specimen:  Blood Updated:  08/10/17 0957     Glucose 117 (H) mg/dL      BUN 15 mg/dL      Creatinine 0.99 mg/dL      Sodium 134 (L) mmol/L      Potassium 4.2 mmol/L      Chloride 98 mmol/L      CO2 30.0 mmol/L      Calcium 8.2 (L) mg/dL      eGFR Non African Amer 75 mL/min/1.73      BUN/Creatinine Ratio 15.2     Anion Gap 6.0 mmol/L     Narrative:       GFR Normal >60  Chronic Kidney Disease <60  Kidney Failure <15    Magnesium [[id number]]  (Normal) Collected:  08/10/17 0925    Specimen:  Blood Updated:  08/10/17 0957     Magnesium 2.0 mg/dL     Phosphorus [[id number]]  (Normal) Collected:  08/10/17 0925    Specimen:  Blood Updated:  08/10/17 0957     Phosphorus 3.9 mg/dL     POC Glucose Fingerstick [[id number]]  (Abnormal) Collected:  08/10/17 1139    Specimen:  Blood Updated:  08/10/17 1151     Glucose 168 (H) mg/dL       : Napoleon Kim KristieMeter ID: [id number]           Imaging Results (last 24 hours)     Procedure Component Value Units Date/Time    FL Small Bowel Follow Through  [[id number]] Collected:  08/10/17 1228     Updated:  08/10/17 1238    Narrative:       History:  66-year-old male evaluated for colonic ileus.     Reference:  CT abdomen/pelvis 1 day previous. CT abdomen pelvis October 2011.     Findings:  Multiple sequential radiographs of the abdomen following oral  administration of water-soluble contrast.      film shows residual contrast in the colon (including distal colon)  related to yesterday's CT examination. A catheter enters the right  abdomen terminating in the left hemipelvis. Cholecystectomy clips.     Images show rapid transit of contrast through the small bowel with  contrast reaching the colon by approximately 30 minutes. Contrast passes  to the level of the distal descending/sigmoid colon by 60 minutes. The  colon, particularly transverse colon is dilated which appears to be a  chronic finding.          Impression:       Impression:  Negative for obstruction. Chronic dilatation of the colon suggestive of  ileus.  This report was finalized on 08/10/2017 12:35 by  Lester Elliott, .                Assessment/Plan       Colonic ileus.  Continue to correct electrolytes and increase activity.  SBFT stimulated flatus and multiple loose stools.  Will start clears and advance as ileus resolves.      Diamond Freitas MD  08/10/17  5:25 PM

## 2017-08-10 NOTE — PROGRESS NOTES
Continued Stay Note   [facility]     Patient Name: Zay Kamara  MRN: [id number]  Today's Date: 8/10/2017    Admit Date: 8/1/2017          Discharge Plan       08/10/17 1601    Case Management/Social Work Plan    Plan HOME    Patient/Family In Agreement With Plan yes    Additional Comments REVIEWED CHART; NOTED GEN SURGERY CONSULT AND POSSIBLE TPN.  WILL CONT. TO FOLLOW FOR ANY D/C PLANNING NEEDS THAT MAY ARISE.  PLEASE ADVISE.  THANKS.               Discharge Codes     None        Expected Discharge Date and Time     Expected Discharge Date Expected Discharge Time    Aug 4, 2017             JAJA Jacobsen

## 2017-08-11 LAB
ANION GAP SERPL CALCULATED.3IONS-SCNC: 7 MMOL/L (ref 4–13)
BASOPHILS # BLD AUTO: 0.04 10*3/MM3 (ref 0–0.2)
BASOPHILS NFR BLD AUTO: 0.3 % (ref 0–2)
BUN BLD-MCNC: 16 MG/DL (ref 5–21)
BUN/CREAT SERPL: 15.1 (ref 7–25)
CALCIUM SPEC-SCNC: 7.9 MG/DL (ref 8.4–10.4)
CHLORIDE SERPL-SCNC: 100 MMOL/L (ref 98–110)
CO2 SERPL-SCNC: 28 MMOL/L (ref 24–31)
CREAT BLD-MCNC: 1.06 MG/DL (ref 0.5–1.4)
CREAT FLD-MCNC: 6.5 MG/DL
DEPRECATED RDW RBC AUTO: 48.6 FL (ref 40–54)
EOSINOPHIL # BLD AUTO: 0.64 10*3/MM3 (ref 0–0.7)
EOSINOPHIL NFR BLD AUTO: 5 % (ref 0–4)
ERYTHROCYTE [DISTWIDTH] IN BLOOD BY AUTOMATED COUNT: 15.7 % (ref 12–15)
GFR SERPL CREATININE-BSD FRML MDRD: 70 ML/MIN/1.73
GLUCOSE BLD-MCNC: 124 MG/DL (ref 70–100)
GLUCOSE BLDC GLUCOMTR-MCNC: 115 MG/DL (ref 70–130)
GLUCOSE BLDC GLUCOMTR-MCNC: 190 MG/DL (ref 70–130)
GLUCOSE BLDC GLUCOMTR-MCNC: 196 MG/DL (ref 70–130)
GLUCOSE BLDC GLUCOMTR-MCNC: 209 MG/DL (ref 70–130)
HCT VFR BLD AUTO: 30.4 % (ref 40–52)
HGB BLD-MCNC: 10.1 G/DL (ref 14–18)
IMM GRANULOCYTES # BLD: 0.19 10*3/MM3 (ref 0–0.03)
IMM GRANULOCYTES NFR BLD: 1.5 % (ref 0–5)
LYMPHOCYTES # BLD AUTO: 1.41 10*3/MM3 (ref 0.72–4.86)
LYMPHOCYTES NFR BLD AUTO: 10.9 % (ref 15–45)
MAGNESIUM SERPL-MCNC: 1.9 MG/DL (ref 1.4–2.2)
MCH RBC QN AUTO: 29.3 PG (ref 28–32)
MCHC RBC AUTO-ENTMCNC: 33.2 G/DL (ref 33–36)
MCV RBC AUTO: 88.1 FL (ref 82–95)
MONOCYTES # BLD AUTO: 0.82 10*3/MM3 (ref 0.19–1.3)
MONOCYTES NFR BLD AUTO: 6.4 % (ref 4–12)
NEUTROPHILS # BLD AUTO: 9.81 10*3/MM3 (ref 1.87–8.4)
NEUTROPHILS NFR BLD AUTO: 75.9 % (ref 39–78)
PLATELET # BLD AUTO: 322 10*3/MM3 (ref 130–400)
PMV BLD AUTO: 9.2 FL (ref 6–12)
POTASSIUM BLD-SCNC: 3.7 MMOL/L (ref 3.5–5.3)
RBC # BLD AUTO: 3.45 10*6/MM3 (ref 4.8–5.9)
SODIUM BLD-SCNC: 135 MMOL/L (ref 135–145)
WBC NRBC COR # BLD: 12.91 10*3/MM3 (ref 4.8–10.8)

## 2017-08-11 PROCEDURE — 83735 ASSAY OF MAGNESIUM: CPT | Performed by: SPECIALIST

## 2017-08-11 PROCEDURE — 63710000001 INSULIN LISPRO (HUMAN) PER 5 UNITS: Performed by: UROLOGY

## 2017-08-11 PROCEDURE — 82962 GLUCOSE BLOOD TEST: CPT

## 2017-08-11 PROCEDURE — 25010000002 HEPARIN (PORCINE) PER 1000 UNITS: Performed by: UROLOGY

## 2017-08-11 PROCEDURE — 85025 COMPLETE CBC W/AUTO DIFF WBC: CPT | Performed by: UROLOGY

## 2017-08-11 PROCEDURE — 80048 BASIC METABOLIC PNL TOTAL CA: CPT | Performed by: NURSE PRACTITIONER

## 2017-08-11 PROCEDURE — 82570 ASSAY OF URINE CREATININE: CPT | Performed by: UROLOGY

## 2017-08-11 PROCEDURE — 25010000002 MORPHINE PER 10 MG: Performed by: UROLOGY

## 2017-08-11 RX ORDER — HYDROCODONE BITARTRATE AND ACETAMINOPHEN 7.5; 325 MG/1; MG/1
1 TABLET ORAL EVERY 4 HOURS PRN
Status: DISCONTINUED | OUTPATIENT
Start: 2017-08-11 | End: 2017-08-12 | Stop reason: HOSPADM

## 2017-08-11 RX ADMIN — MORPHINE SULFATE 6 MG: 10 INJECTION, SOLUTION INTRAMUSCULAR; INTRAVENOUS at 03:50

## 2017-08-11 RX ADMIN — HEPARIN SODIUM 5000 UNITS: 5000 INJECTION, SOLUTION INTRAVENOUS; SUBCUTANEOUS at 06:25

## 2017-08-11 RX ADMIN — HYDROCODONE BITARTRATE AND ACETAMINOPHEN 1 TABLET: 7.5; 325 TABLET ORAL at 21:50

## 2017-08-11 RX ADMIN — SODIUM CHLORIDE, POTASSIUM CHLORIDE, SODIUM LACTATE AND CALCIUM CHLORIDE 100 ML/HR: 600; 310; 30; 20 INJECTION, SOLUTION INTRAVENOUS at 13:40

## 2017-08-11 RX ADMIN — HEPARIN SODIUM 5000 UNITS: 5000 INJECTION, SOLUTION INTRAVENOUS; SUBCUTANEOUS at 13:40

## 2017-08-11 RX ADMIN — INSULIN LISPRO 2 UNITS: 100 INJECTION, SOLUTION INTRAVENOUS; SUBCUTANEOUS at 08:43

## 2017-08-11 RX ADMIN — SODIUM CHLORIDE, POTASSIUM CHLORIDE, SODIUM LACTATE AND CALCIUM CHLORIDE 100 ML/HR: 600; 310; 30; 20 INJECTION, SOLUTION INTRAVENOUS at 03:51

## 2017-08-11 RX ADMIN — INSULIN LISPRO 2 UNITS: 100 INJECTION, SOLUTION INTRAVENOUS; SUBCUTANEOUS at 11:42

## 2017-08-11 RX ADMIN — INSULIN LISPRO 4 UNITS: 100 INJECTION, SOLUTION INTRAVENOUS; SUBCUTANEOUS at 17:35

## 2017-08-11 RX ADMIN — HEPARIN SODIUM 5000 UNITS: 5000 INJECTION, SOLUTION INTRAVENOUS; SUBCUTANEOUS at 21:50

## 2017-08-11 RX ADMIN — HYDROCODONE BITARTRATE AND ACETAMINOPHEN 1 TABLET: 7.5; 325 TABLET ORAL at 14:54

## 2017-08-11 RX ADMIN — MORPHINE SULFATE 6 MG: 10 INJECTION, SOLUTION INTRAMUSCULAR; INTRAVENOUS at 00:26

## 2017-08-11 NOTE — PROGRESS NOTES
Urology  Length of Stay: 10  Patient Care Team:  LUIS Souza as PCP - General (Family Medicine)  Hernesto Hong MD as Consulting Physician (Urology)    Chief Complaint:  Still describes some cramping in epigastrium    Subjective     Interval History:   He feels better this morning.  Tolerated liquid diet.  Denies nausea.  Multiple bowel movements yesterday after small bowel follow-through.    Review of Systems:   Review of Systems   Constitutional: Negative for chills and fever.   Gastrointestinal: Positive for abdominal distention and abdominal pain (both improved). Negative for anal bleeding and blood in stool.   Genitourinary: Negative for flank pain and hematuria.       Objective     Vital Signs  Temp:  [96.8 °F (36 °C)-98.6 °F (37 °C)] 97.9 °F (36.6 °C)  Heart Rate:  [74-99] 74  Resp:  [16-19] 19  BP: (124-143)/(50-68) 139/68    Physical Exam:  Physical Exam  Abdomen soft but still slightly distended.  Incisional tenderness but otherwise benign exam.   Results Review:       I reviewed the patient's new clinical results.  Lab Results (last 24 hours)     Procedure Component Value Units Date/Time    POC Glucose Fingerstick [[id number]]  (Normal) Collected:  08/10/17 0907    Specimen:  Blood Updated:  08/10/17 0919     Glucose 122 mg/dL       : [id number] Kim KristieMeter ID: [id number]       Basic Metabolic Panel [[id number]]  (Abnormal) Collected:  08/10/17 0925    Specimen:  Blood Updated:  08/10/17 0957     Glucose 117 (H) mg/dL      BUN 15 mg/dL      Creatinine 0.99 mg/dL      Sodium 134 (L) mmol/L      Potassium 4.2 mmol/L      Chloride 98 mmol/L      CO2 30.0 mmol/L      Calcium 8.2 (L) mg/dL      eGFR Non African Amer 75 mL/min/1.73      BUN/Creatinine Ratio 15.2     Anion Gap 6.0 mmol/L     Narrative:       GFR Normal >60  Chronic Kidney Disease <60  Kidney Failure <15    Magnesium [[id number]]  (Normal) Collected:  08/10/17 0925    Specimen:  Blood Updated:  08/10/17 0957      Magnesium 2.0 mg/dL     Phosphorus [[id number]]  (Normal) Collected:  08/10/17 0925    Specimen:  Blood Updated:  08/10/17 0957     Phosphorus 3.9 mg/dL     POC Glucose Fingerstick [[id number]]  (Abnormal) Collected:  08/10/17 1139    Specimen:  Blood Updated:  08/10/17 1151     Glucose 168 (H) mg/dL       : [id number] Judy KristieMeter ID: [id number]       POC Glucose Fingerstick [[id number]]  (Abnormal) Collected:  08/10/17 1748    Specimen:  Blood Updated:  08/10/17 1759     Glucose 220 (H) mg/dL       : [id number] Judy KristieMeter ID: [id number]       CBC & Differential [[id number]] Collected:  08/11/17 0528    Specimen:  Blood Updated:  08/11/17 0558    Narrative:       The following orders were created for panel order CBC & Differential.  Procedure                               Abnormality         Status                     ---------                               -----------         ------                     CBC Auto Differential[[id number]]        Abnormal            Final result                 Please view results for these tests on the individual orders.    CBC Auto Differential [[id number]]  (Abnormal) Collected:  08/11/17 0528    Specimen:  Blood Updated:  08/11/17 0558     WBC 12.91 (H) 10*3/mm3      RBC 3.45 (L) 10*6/mm3      Hemoglobin 10.1 (L) g/dL      Hematocrit 30.4 (L) %      MCV 88.1 fL      MCH 29.3 pg      MCHC 33.2 g/dL      RDW 15.7 (H) %      RDW-SD 48.6 fl      MPV 9.2 fL      Platelets 322 10*3/mm3      Neutrophil % 75.9 %      Lymphocyte % 10.9 (L) %      Monocyte % 6.4 %      Eosinophil % 5.0 (H) %      Basophil % 0.3 %      Immature Grans % 1.5 %      Neutrophils, Absolute 9.81 (H) 10*3/mm3      Lymphocytes, Absolute 1.41 10*3/mm3      Monocytes, Absolute 0.82 10*3/mm3      Eosinophils, Absolute 0.64 10*3/mm3      Basophils, Absolute 0.04 10*3/mm3      Immature Grans, Absolute 0.19 (H) 10*3/mm3     Basic Metabolic Panel [[id number]]  (Abnormal) Collected:  08/11/17 0528     Specimen:  Blood Updated:  08/11/17 0626     Glucose 124 (H) mg/dL      BUN 16 mg/dL      Creatinine 1.06 mg/dL      Sodium 135 mmol/L      Potassium 3.7 mmol/L      Chloride 100 mmol/L      CO2 28.0 mmol/L      Calcium 7.9 (L) mg/dL      eGFR Non African Amer 70 mL/min/1.73      BUN/Creatinine Ratio 15.1     Anion Gap 7.0 mmol/L     Narrative:       GFR Normal >60  Chronic Kidney Disease <60  Kidney Failure <15    Magnesium [[id number]]  (Normal) Collected:  08/11/17 0528    Specimen:  Blood Updated:  08/11/17 0626     Magnesium 1.9 mg/dL         Imaging Results (last 24 hours)     Procedure Component Value Units Date/Time    FL Small Bowel Follow Through [[id number]] Collected:  08/10/17 1228     Updated:  08/10/17 1238    Narrative:       History:  66-year-old male evaluated for colonic ileus.     Reference:  CT abdomen/pelvis 1 day previous. CT abdomen pelvis October 2011.     Findings:  Multiple sequential radiographs of the abdomen following oral  administration of water-soluble contrast.      film shows residual contrast in the colon (including distal colon)  related to yesterday's CT examination. A catheter enters the right  abdomen terminating in the left hemipelvis. Cholecystectomy clips.     Images show rapid transit of contrast through the small bowel with  contrast reaching the colon by approximately 30 minutes. Contrast passes  to the level of the distal descending/sigmoid colon by 60 minutes. The  colon, particularly transverse colon is dilated which appears to be a  chronic finding.          Impression:       Impression:  Negative for obstruction. Chronic dilatation of the colon suggestive of  ileus.  This report was finalized on 08/10/2017 12:35 by  Lester Elliott, .          Medication Review:     Current Facility-Administered Medications:   •  dextrose (D50W) solution 25 g, 25 g, Intravenous, Q15 Min PRN, Zaheer Rebolledo MD  •  dextrose (GLUTOSE) oral gel 15 g, 15 g, Oral, Q15 Min PRN, Zaheer  Chepe Rebolledo MD  •  glucagon (human recombinant) (GLUCAGEN DIAGNOSTIC) injection 1 mg, 1 mg, Subcutaneous, Q15 Min PRN, Zaheer Rebolledo MD  •  heparin (porcine) 5000 UNIT/ML injection 5,000 Units, 5,000 Units, Subcutaneous, Q8H, Zaheer Rebolledo MD, 5,000 Units at 08/11/17 0625  •  hydrALAZINE (APRESOLINE) injection 10 mg, 10 mg, Intravenous, Q4H PRN, Zaheer Rebolledo MD, 10 mg at 08/07/17 2109  •  insulin lispro (humaLOG) injection 0-9 Units, 0-9 Units, Subcutaneous, 4x Daily With Meals & Nightly, Zaheer Rebolledo MD, 4 Units at 08/10/17 1752  •  lactated ringers infusion, 100 mL/hr, Intravenous, Continuous, Andrea Savage MD, Last Rate: 100 mL/hr at 08/11/17 0351, 100 mL/hr at 08/11/17 0351  •  Morphine injection 6 mg, 6 mg, Intravenous, Q2H PRN, 6 mg at 08/11/17 0350 **AND** naloxone (NARCAN) injection 0.4 mg, 0.4 mg, Intravenous, Q5 Min PRN, Hernesto Hong MD  •  ondansetron (ZOFRAN) tablet 4 mg, 4 mg, Oral, Q4H PRN **OR** ondansetron ODT (ZOFRAN-ODT) disintegrating tablet 4 mg, 4 mg, Oral, Q4H PRN **OR** ondansetron (ZOFRAN) injection 4 mg, 4 mg, Intravenous, Q4H PRN, Zaheer Rebolledo MD, 4 mg at 08/04/17 2006    Assessment/Plan:   #1. Prostate cancer status post robot assisted laparoscopic prostatectomy.  GETACHEW output is increase I will go ahead and check another creatinine on this.  Given all the abdominal distention he may well have a small vesicourethral anastomotic leak.  #2. Anemia secondary to acute blood loss improving  #3. Colonic ileus - considerably improved after small bowel follow-through.  Denies nausea this morning.  May be able to advance diet.  Will defer to Dr. Freitas's recommendations.  #4. Hiccups- improved with Thorazine  #5. Acute kidney injury         Hernesto Hong MD  08/11/17  6:34 AM

## 2017-08-11 NOTE — PROGRESS NOTES
Diamond Freitas MD FACS  Progress Note     LOS: 10 days   Patient Care Team:  LUIS Souza as PCP - General (Family Medicine)  Hernesto Hong MD as Consulting Physician (Urology)      Subjective     Interval History:      no nausea.  rosalie soft diet.  +flatus     Objective     Vital Signs  Temp:  [97.4 °F (36.3 °C)-98.6 °F (37 °C)] 97.7 °F (36.5 °C)  Heart Rate:  [74-91] 91  Resp:  [16-19] 18  BP: (120-139)/(47-68) 120/47    Physical Exam:  General appearance - alert, well appearing, and in no distress  Abdomen - soft, still some distension      Results Review:    Lab Results (last 24 hours)     Procedure Component Value Units Date/Time    POC Glucose Fingerstick [[id number]]  (Abnormal) Collected:  08/10/17 1748    Specimen:  Blood Updated:  08/10/17 1759     Glucose 220 (H) mg/dL       : [id number] ReynaistieMeter ID: [id number]       CBC & Differential [[id number]] Collected:  08/11/17 0528    Specimen:  Blood Updated:  08/11/17 0558    Narrative:       The following orders were created for panel order CBC & Differential.  Procedure                               Abnormality         Status                     ---------                               -----------         ------                     CBC Auto Differential[[id number]]        Abnormal            Final result                 Please view results for these tests on the individual orders.    CBC Auto Differential [[id number]]  (Abnormal) Collected:  08/11/17 0528    Specimen:  Blood Updated:  08/11/17 0558     WBC 12.91 (H) 10*3/mm3      RBC 3.45 (L) 10*6/mm3      Hemoglobin 10.1 (L) g/dL      Hematocrit 30.4 (L) %      MCV 88.1 fL      MCH 29.3 pg      MCHC 33.2 g/dL      RDW 15.7 (H) %      RDW-SD 48.6 fl      MPV 9.2 fL      Platelets 322 10*3/mm3      Neutrophil % 75.9 %      Lymphocyte % 10.9 (L) %      Monocyte % 6.4 %      Eosinophil % 5.0 (H) %      Basophil % 0.3 %      Immature Grans % 1.5 %      Neutrophils, Absolute 9.81  (H) 10*3/mm3      Lymphocytes, Absolute 1.41 10*3/mm3      Monocytes, Absolute 0.82 10*3/mm3      Eosinophils, Absolute 0.64 10*3/mm3      Basophils, Absolute 0.04 10*3/mm3      Immature Grans, Absolute 0.19 (H) 10*3/mm3     Basic Metabolic Panel [[id number]]  (Abnormal) Collected:  08/11/17 0528    Specimen:  Blood Updated:  08/11/17 0626     Glucose 124 (H) mg/dL      BUN 16 mg/dL      Creatinine 1.06 mg/dL      Sodium 135 mmol/L      Potassium 3.7 mmol/L      Chloride 100 mmol/L      CO2 28.0 mmol/L      Calcium 7.9 (L) mg/dL      eGFR Non African Amer 70 mL/min/1.73      BUN/Creatinine Ratio 15.1     Anion Gap 7.0 mmol/L     Narrative:       GFR Normal >60  Chronic Kidney Disease <60  Kidney Failure <15    Magnesium [[id number]]  (Normal) Collected:  08/11/17 0528    Specimen:  Blood Updated:  08/11/17 0626     Magnesium 1.9 mg/dL     POC Glucose Fingerstick [[id number]]  (Abnormal) Collected:  08/11/17 0839    Specimen:  Blood Updated:  08/11/17 0850     Glucose 190 (H) mg/dL       : [id number] eGood PatriciaMeter ID: [id number]       Creatinine, Body Fluid [[id number]] Collected:  08/11/17 0845    Specimen:  Body Fluid from Peritoneum Updated:  08/11/17 0927     Creatinine, Fluid 6.5 mg/dL     POC Glucose Fingerstick [[id number]]  (Abnormal) Collected:  08/11/17 1139    Specimen:  Blood Updated:  08/11/17 1151     Glucose 196 (H) mg/dL       : [id number] eGood PatriciaMeter ID: [id number]           Imaging Results (last 24 hours)     ** No results found for the last 24 hours. **            Assessment/Plan       Continue to increase activity.  Improving colonic ileus      Diamond Freitas MD  08/11/17  12:58 PM

## 2017-08-11 NOTE — PLAN OF CARE
Problem: Patient Care Overview (Adult)  Goal: Plan of Care Review  Outcome: Ongoing (interventions implemented as appropriate)    08/11/17 0247   Coping/Psychosocial Response Interventions   Plan Of Care Reviewed With patient   Patient Care Overview   Progress improving   Outcome Evaluation   Outcome Summary/Follow up Plan Pt tolerating clear liquid diet. C/o pain X 1 with good relief. No c/o nausea/hiccups. Pt cont to pass large amounts of gas. Pt cont to ambulate in [place]. Jacobson to BSD with Q4 care. IVF cont.       Goal: Adult Individualization and Mutuality  Outcome: Ongoing (interventions implemented as appropriate)  Goal: Discharge Needs Assessment  Outcome: Ongoing (interventions implemented as appropriate)    Problem: Prostatectomy, Radical (Adult)  Goal: Signs and Symptoms of Listed Potential Problems Will be Absent or Manageable (Prostatectomy, Radical)  Outcome: Ongoing (interventions implemented as appropriate)    Problem: Pain, Acute (Adult)  Goal: Acceptable Pain Control/Comfort Level  Outcome: Ongoing (interventions implemented as appropriate)

## 2017-08-11 NOTE — PLAN OF CARE
Problem: Patient Care Overview (Adult)  Goal: Plan of Care Review  Outcome: Ongoing (interventions implemented as appropriate)    08/11/17 1505   Coping/Psychosocial Response Interventions   Plan Of Care Reviewed With patient   Patient Care Overview   Progress improving   Outcome Evaluation   Outcome Summary/Follow up Plan Patient tolerating GI soft diet. Norco ordered for pain. Ambulating in halls. [place] fluid sent for creatinine- results normal. Possible discharge tomorrow.       Goal: Adult Individualization and Mutuality  Outcome: Ongoing (interventions implemented as appropriate)    08/11/17 1505   Individualization   Patient Specific Preferences None at this time   Patient Specific Goals Decreased pain   Patient Specific Interventions Encourage ambulation, called for PO pain medication   Mutuality/Individual Preferences   What Anxieties, Fears or Concerns Do You Have About Your Health or Care? None at this time   What Questions Do You Have About Your Health or Care? None at this time   What Information Would Help Us Give You More Personalized Care? None at this time       Goal: Discharge Needs Assessment  Outcome: Ongoing (interventions implemented as appropriate)    08/07/17 1446   Discharge Needs Assessment   Concerns To Be Addressed denies needs/concerns at this time;no discharge needs identified   Readmission Within The Last 30 Days no previous admission in last 30 days   Equipment Needed After Discharge none   Discharge Disposition still a patient   Discharge Planning Comments PT LIVES ALONE AND PLANS HOME AT [place]. PT INDEPENDENT PRIOR TO ADMIT. DC NEEDS DENIED AT PRESENT TIME. WILL CONTINUE TO FOLLOW.    Current Health   Anticipated Changes Related to Illness none   Living Environment   Transportation Available family or friend will provide;car   Self-Care   Equipment Currently Used at Home none         Problem: Prostatectomy, Radical (Adult)  Goal: Signs and Symptoms of Listed Potential Problems Will be  Absent or Manageable (Prostatectomy, Radical)  Outcome: Ongoing (interventions implemented as appropriate)    08/11/17 1505   Prostatectomy, Radical   Problems Assessed (Radical Prostatectomy) all   Problems Present (Radical Prostatectomy) pain;postoperative ileus         Problem: Pain, Acute (Adult)  Goal: Acceptable Pain Control/Comfort Level  Outcome: Ongoing (interventions implemented as appropriate)    08/11/17 1505   Pain, Acute (Adult)   Acceptable Pain Control/Comfort Level making progress toward outcome

## 2017-08-12 VITALS
WEIGHT: 214 LBS | HEART RATE: 74 BPM | SYSTOLIC BLOOD PRESSURE: 133 MMHG | BODY MASS INDEX: 29.96 KG/M2 | HEIGHT: 71 IN | DIASTOLIC BLOOD PRESSURE: 57 MMHG | RESPIRATION RATE: 16 BRPM | TEMPERATURE: 98.4 F | OXYGEN SATURATION: 95 %

## 2017-08-12 LAB
ALBUMIN SERPL-MCNC: 3 G/DL (ref 3.5–5)
ALBUMIN/GLOB SERPL: 1.2 G/DL (ref 1.1–2.5)
ALP SERPL-CCNC: 39 U/L (ref 24–120)
ALT SERPL W P-5'-P-CCNC: 65 U/L (ref 0–54)
ANION GAP SERPL CALCULATED.3IONS-SCNC: 7 MMOL/L (ref 4–13)
AST SERPL-CCNC: 35 U/L (ref 7–45)
BILIRUB SERPL-MCNC: 0.8 MG/DL (ref 0.1–1)
BUN BLD-MCNC: 14 MG/DL (ref 5–21)
BUN/CREAT SERPL: 11.8 (ref 7–25)
CALCIUM SPEC-SCNC: 8.1 MG/DL (ref 8.4–10.4)
CHLORIDE SERPL-SCNC: 99 MMOL/L (ref 98–110)
CO2 SERPL-SCNC: 29 MMOL/L (ref 24–31)
CREAT BLD-MCNC: 1.19 MG/DL (ref 0.5–1.4)
DEPRECATED RDW RBC AUTO: 48.2 FL (ref 40–54)
ERYTHROCYTE [DISTWIDTH] IN BLOOD BY AUTOMATED COUNT: 15.3 % (ref 12–15)
GFR SERPL CREATININE-BSD FRML MDRD: 61 ML/MIN/1.73
GLOBULIN UR ELPH-MCNC: 2.5 GM/DL
GLUCOSE BLD-MCNC: 126 MG/DL (ref 70–100)
GLUCOSE BLDC GLUCOMTR-MCNC: 115 MG/DL (ref 70–130)
HCT VFR BLD AUTO: 31.5 % (ref 40–52)
HGB BLD-MCNC: 10.4 G/DL (ref 14–18)
MAGNESIUM SERPL-MCNC: 2 MG/DL (ref 1.4–2.2)
MCH RBC QN AUTO: 29.1 PG (ref 28–32)
MCHC RBC AUTO-ENTMCNC: 33 G/DL (ref 33–36)
MCV RBC AUTO: 88 FL (ref 82–95)
PLATELET # BLD AUTO: 317 10*3/MM3 (ref 130–400)
PMV BLD AUTO: 9.3 FL (ref 6–12)
POTASSIUM BLD-SCNC: 3.8 MMOL/L (ref 3.5–5.3)
PROT SERPL-MCNC: 5.5 G/DL (ref 6.3–8.7)
RBC # BLD AUTO: 3.58 10*6/MM3 (ref 4.8–5.9)
SODIUM BLD-SCNC: 135 MMOL/L (ref 135–145)
WBC NRBC COR # BLD: 13.05 10*3/MM3 (ref 4.8–10.8)

## 2017-08-12 PROCEDURE — 25010000002 HEPARIN (PORCINE) PER 1000 UNITS: Performed by: UROLOGY

## 2017-08-12 PROCEDURE — 82962 GLUCOSE BLOOD TEST: CPT

## 2017-08-12 PROCEDURE — 85027 COMPLETE CBC AUTOMATED: CPT | Performed by: UROLOGY

## 2017-08-12 PROCEDURE — 80053 COMPREHEN METABOLIC PANEL: CPT | Performed by: UROLOGY

## 2017-08-12 PROCEDURE — 83735 ASSAY OF MAGNESIUM: CPT | Performed by: SPECIALIST

## 2017-08-12 RX ORDER — CEPHALEXIN 500 MG/1
500 CAPSULE ORAL ONCE
Qty: 1 CAPSULE | Refills: 0 | Status: SHIPPED | OUTPATIENT
Start: 2017-08-12 | End: 2017-08-12

## 2017-08-12 RX ORDER — HYDROCODONE BITARTRATE AND ACETAMINOPHEN 7.5; 325 MG/1; MG/1
1 TABLET ORAL EVERY 6 HOURS PRN
Qty: 24 TABLET | Refills: 0 | Status: SHIPPED | OUTPATIENT
Start: 2017-08-12 | End: 2017-08-22

## 2017-08-12 RX ADMIN — HEPARIN SODIUM 5000 UNITS: 5000 INJECTION, SOLUTION INTRAVENOUS; SUBCUTANEOUS at 06:21

## 2017-08-12 NOTE — PLAN OF CARE
Problem: Patient Care Overview (Adult)  Goal: Plan of Care Review  Outcome: Ongoing (interventions implemented as appropriate)    08/12/17 0308   Coping/Psychosocial Response Interventions   Plan Of Care Reviewed With patient   Patient Care Overview   Progress no change   Outcome Evaluation   Outcome Summary/Follow up Plan Pt tolerating GI soft diet. Norco for pain. Pt c/o bloating, I advised pt to ambulate. Pt ambulating in the halls. Cont Q4 corbin care. Possible DC today.       Goal: Adult Individualization and Mutuality  Outcome: Ongoing (interventions implemented as appropriate)  Goal: Discharge Needs Assessment  Outcome: Ongoing (interventions implemented as appropriate)    Problem: Prostatectomy, Radical (Adult)  Goal: Signs and Symptoms of Listed Potential Problems Will be Absent or Manageable (Prostatectomy, Radical)  Outcome: Ongoing (interventions implemented as appropriate)    Problem: Pain, Acute (Adult)  Goal: Acceptable Pain Control/Comfort Level  Outcome: Ongoing (interventions implemented as appropriate)

## 2017-08-12 NOTE — PROGRESS NOTES
Diamond Freitas MD FACS  Progress Note     LOS: 11 days   Patient Care Team:  LUIS Souza as PCP - General (Family Medicine)  Hernesto Hong MD as Consulting Physician (Urology)      Subjective     Interval History:      rosalie po.  +flatus  +bm     Objective     Vital Signs  Temp:  [98.2 °F (36.8 °C)-98.9 °F (37.2 °C)] 98.4 °F (36.9 °C)  Heart Rate:  [71-88] 74  Resp:  [14-18] 16  BP: (133-151)/(55-65) 133/57    Physical Exam:  General appearance - alert, well appearing, and in no distress  Abdomen - soft, mildly distended, appropriately tender      Results Review:    Lab Results (last 24 hours)     Procedure Component Value Units Date/Time    POC Glucose Fingerstick [[id number]]  (Abnormal) Collected:  08/11/17 1139    Specimen:  Blood Updated:  08/11/17 1151     Glucose 196 (H) mg/dL       : [id number] luma-id PatriciaMeter ID: [id number]       POC Glucose Fingerstick [[id number]]  (Abnormal) Collected:  08/11/17 1732    Specimen:  Blood Updated:  08/11/17 1743     Glucose 209 (H) mg/dL       : [id number] luma-id PatriciaMeter ID: [id number]       POC Glucose Fingerstick [[id number]]  (Normal) Collected:  08/11/17 2149    Specimen:  Blood Updated:  08/11/17 2209     Glucose 115 mg/dL       : [id number] Holli AngelitaMeter ID: [id number]       CBC (No Diff) [[id number]]  (Abnormal) Collected:  08/12/17 0449    Specimen:  Blood Updated:  08/12/17 0511     WBC 13.05 (H) 10*3/mm3      RBC 3.58 (L) 10*6/mm3      Hemoglobin 10.4 (L) g/dL      Hematocrit 31.5 (L) %      MCV 88.0 fL      MCH 29.1 pg      MCHC 33.0 g/dL      RDW 15.3 (H) %      RDW-SD 48.2 fl      MPV 9.3 fL      Platelets 317 10*3/mm3     Comprehensive Metabolic Panel [[id number]]  (Abnormal) Collected:  08/12/17 0449    Specimen:  Blood Updated:  08/12/17 0522     Glucose 126 (H) mg/dL      BUN 14 mg/dL      Creatinine 1.19 mg/dL      Sodium 135 mmol/L      Potassium 3.8 mmol/L      Chloride 99 mmol/L      CO2 29.0 mmol/L       Calcium 8.1 (L) mg/dL      Total Protein 5.5 (L) g/dL      Albumin 3.00 (L) g/dL      ALT (SGPT) 65 (H) U/L      AST (SGOT) 35 U/L      Alkaline Phosphatase 39 U/L      Total Bilirubin 0.8 mg/dL      eGFR Non African Amer 61 mL/min/1.73      Globulin 2.5 gm/dL      A/G Ratio 1.2 g/dL      BUN/Creatinine Ratio 11.8     Anion Gap 7.0 mmol/L     Magnesium [[id number]]  (Normal) Collected:  08/12/17 0449    Specimen:  Blood Updated:  08/12/17 0522     Magnesium 2.0 mg/dL     POC Glucose Fingerstick [[id number]]  (Normal) Collected:  08/12/17 0803    Specimen:  Blood Updated:  08/12/17 0814     Glucose 115 mg/dL       : [id number] Elizabeth PatriciaMeter ID: [id number]           Imaging Results (last 24 hours)     ** No results found for the last 24 hours. **            Assessment/Plan       Continue to increase activity for colonic ileus resolution.  Home today.      Diamond Freitas MD  08/12/17  9:45 AM

## 2017-08-14 DIAGNOSIS — C61 PROSTATE CANCER (HCC): Primary | ICD-10-CM

## 2017-08-15 ENCOUNTER — TELEPHONE (OUTPATIENT)
Dept: UROLOGY | Facility: CLINIC | Age: 67
End: 2017-08-15

## 2017-08-17 ENCOUNTER — PROCEDURE VISIT (OUTPATIENT)
Dept: UROLOGY | Facility: CLINIC | Age: 67
End: 2017-08-17

## 2017-08-17 DIAGNOSIS — C61 PROSTATE CANCER (HCC): Primary | ICD-10-CM

## 2017-08-17 PROCEDURE — 99024 POSTOP FOLLOW-UP VISIT: CPT | Performed by: UROLOGY

## 2017-08-18 ENCOUNTER — APPOINTMENT (OUTPATIENT)
Dept: GENERAL RADIOLOGY | Facility: HOSPITAL | Age: 67
End: 2017-08-18
Attending: UROLOGY

## 2017-08-22 ENCOUNTER — TELEPHONE (OUTPATIENT)
Dept: UROLOGY | Facility: CLINIC | Age: 67
End: 2017-08-22

## 2017-08-22 DIAGNOSIS — C61 PROSTATE CANCER (HCC): Primary | ICD-10-CM

## 2017-08-22 RX ORDER — HYDROCODONE BITARTRATE AND ACETAMINOPHEN 5; 325 MG/1; MG/1
1 TABLET ORAL EVERY 4 HOURS PRN
Qty: 24 TABLET | Refills: 0 | Status: SHIPPED | OUTPATIENT
Start: 2017-08-22 | End: 2017-09-22

## 2017-08-22 NOTE — PROGRESS NOTES
He called me and has run out of pain medication.  He still has a catheter in place and is still quite uncomfortable.  I am going to reduce the dose of his Norco from 7.5 down to 5 mg.  At the conclusion of this I will expect him to change to a nonsteroidal anti-inflammatory drug.

## 2017-08-22 NOTE — TELEPHONE ENCOUNTER
----- Message from Mitra L Paxton sent at 8/22/2017 10:03 AM CDT -----  Regarding: RE: Is his GETACHEW out yet?   1/2 ounce a day if that. I notified him to wait until Friday. He wants more pain medication. He has one left.   ----- Message -----     From: Hernesto Hong MD     Sent: 8/22/2017   6:50 AM       To: Mitra Matta  Subject: Is his GETACHEW out yet?                               He needs to wait until Friday. Had a postop leak. Is his drain still In? If so, need to know how much he is draining.         ----- Message -----     From: Mitra Matta     Sent: 8/21/2017   2:16 PM       To: Hernesto Hong MD    Patient had prostatectomy 8/1 and is scheduled for cystogram Friday and then to get catheter out. He wants to know if we can do it sooner. Would it be okay to do it sooner or do you want him to wait until Friday?

## 2017-08-23 ENCOUNTER — PROCEDURE VISIT (OUTPATIENT)
Dept: UROLOGY | Facility: CLINIC | Age: 67
End: 2017-08-23

## 2017-08-23 DIAGNOSIS — C61 PROSTATE CANCER (HCC): Primary | ICD-10-CM

## 2017-08-23 NOTE — PROGRESS NOTES
Patient is here today to get his GETACHEW Drain Removed. He has had less than 25ml of drainage in the past 24 hours. I cut the suture below the knot and pulled the tube out. I put a piece of gauze covering the wound and put medical tape on it. Patient tolerated well with no complications.

## 2017-08-25 ENCOUNTER — HOSPITAL ENCOUNTER (OUTPATIENT)
Dept: GENERAL RADIOLOGY | Facility: HOSPITAL | Age: 67
Discharge: HOME | End: 2017-08-25
Attending: UROLOGY | Admitting: UROLOGY

## 2017-08-25 ENCOUNTER — APPOINTMENT (OUTPATIENT)
Dept: GENERAL RADIOLOGY | Facility: HOSPITAL | Age: 67
End: 2017-08-25
Attending: UROLOGY

## 2017-08-25 ENCOUNTER — OFFICE VISIT (OUTPATIENT)
Dept: UROLOGY | Facility: CLINIC | Age: 67
End: 2017-08-25

## 2017-08-25 VITALS — HEIGHT: 71 IN | BODY MASS INDEX: 29.96 KG/M2 | WEIGHT: 214 LBS

## 2017-08-25 DIAGNOSIS — C61 PROSTATE CANCER (HCC): Primary | ICD-10-CM

## 2017-08-25 PROCEDURE — 74430 CONTRAST X-RAY BLADDER: CPT

## 2017-08-25 PROCEDURE — 99024 POSTOP FOLLOW-UP VISIT: CPT | Performed by: UROLOGY

## 2017-08-25 PROCEDURE — 0 DIATRIZOATE MEGLUMINE PER 1 ML: Performed by: UROLOGY

## 2017-08-25 RX ADMIN — DIATRIZOATE MEGLUMINE 300 ML: 300 INJECTION, SOLUTION INTRAVENOUS at 10:10

## 2017-08-28 ENCOUNTER — TELEPHONE (OUTPATIENT)
Dept: UROLOGY | Facility: CLINIC | Age: 67
End: 2017-08-28

## 2017-08-28 NOTE — TELEPHONE ENCOUNTER
Left patient a message and informed him that the incontinence after his prostatectomy is not abnormal. And the pain in his penis is not abnormal after he got that catheter removed. Informed patient to give us a call back if he has any questions

## 2017-08-28 NOTE — TELEPHONE ENCOUNTER
Pt called. Had cath out 8/25/17. He states that he had a temp of 103 Friday evening but that has resolved. He has gone through 1 1/2 packs of pull ups over the weekend. He states his penis is throbbing and painful.

## 2017-08-30 DIAGNOSIS — E78.2 MIXED HYPERLIPIDEMIA: ICD-10-CM

## 2017-08-30 RX ORDER — ATORVASTATIN CALCIUM 20 MG/1
TABLET, FILM COATED ORAL
Qty: 90 TABLET | Refills: 2 | Status: SHIPPED | OUTPATIENT
Start: 2017-08-30 | End: 2018-09-10 | Stop reason: SDUPTHER

## 2017-08-31 DIAGNOSIS — I10 ESSENTIAL HYPERTENSION: ICD-10-CM

## 2017-08-31 RX ORDER — LISINOPRIL 40 MG/1
TABLET ORAL
Qty: 30 TABLET | Refills: 11 | Status: SHIPPED | OUTPATIENT
Start: 2017-08-31 | End: 2018-09-11 | Stop reason: SDUPTHER

## 2017-09-13 ENCOUNTER — TELEPHONE (OUTPATIENT)
Dept: UROLOGY | Facility: CLINIC | Age: 67
End: 2017-09-13

## 2017-09-13 NOTE — TELEPHONE ENCOUNTER
Patient called-still having issues with can't control flow-has been over 6 weeks since his surgery.  He is going through a lot of pull-ups.  He has flow every time he moves.  Is this considered as normal this far out from surgery?     He does have appt for PSA on Friday 9/15 and F/U appt on 9/22.

## 2017-09-13 NOTE — TELEPHONE ENCOUNTER
Called and informed patient that this was not unusual. Patient has an appt on the 22nd and I informed him he could consult dr leblanc about this then. HC

## 2017-09-15 DIAGNOSIS — C61 PROSTATE CANCER (HCC): ICD-10-CM

## 2017-09-15 LAB — PSA SERPL-MCNC: <0.07 NG/ML (ref 0–4)

## 2017-09-21 ENCOUNTER — OFFICE VISIT (OUTPATIENT)
Dept: PSYCHIATRY | Age: 67
End: 2017-09-21
Payer: MEDICARE

## 2017-09-21 ENCOUNTER — OFFICE VISIT (OUTPATIENT)
Dept: PRIMARY CARE CLINIC | Age: 67
End: 2017-09-21
Payer: MEDICARE

## 2017-09-21 VITALS
BODY MASS INDEX: 29.29 KG/M2 | TEMPERATURE: 98.2 F | HEART RATE: 60 BPM | WEIGHT: 209.25 LBS | SYSTOLIC BLOOD PRESSURE: 138 MMHG | HEIGHT: 71 IN | OXYGEN SATURATION: 98 % | DIASTOLIC BLOOD PRESSURE: 74 MMHG

## 2017-09-21 DIAGNOSIS — E03.9 ACQUIRED HYPOTHYROIDISM: ICD-10-CM

## 2017-09-21 DIAGNOSIS — E55.9 VITAMIN D DEFICIENCY: ICD-10-CM

## 2017-09-21 DIAGNOSIS — I10 ESSENTIAL HYPERTENSION: ICD-10-CM

## 2017-09-21 DIAGNOSIS — M19.90 OSTEOARTHRITIS, UNSPECIFIED OSTEOARTHRITIS TYPE, UNSPECIFIED SITE: ICD-10-CM

## 2017-09-21 DIAGNOSIS — Z11.59 NEED FOR HEPATITIS C SCREENING TEST: ICD-10-CM

## 2017-09-21 DIAGNOSIS — F32.A DEPRESSIVE DISORDER: Primary | ICD-10-CM

## 2017-09-21 DIAGNOSIS — E11.9 TYPE 2 DIABETES MELLITUS WITHOUT COMPLICATION, WITHOUT LONG-TERM CURRENT USE OF INSULIN (HCC): ICD-10-CM

## 2017-09-21 DIAGNOSIS — Z23 NEED FOR INFLUENZA VACCINATION: ICD-10-CM

## 2017-09-21 DIAGNOSIS — I49.9 IRREGULAR HEARTBEAT: ICD-10-CM

## 2017-09-21 DIAGNOSIS — F32.9 REACTIVE DEPRESSION: ICD-10-CM

## 2017-09-21 DIAGNOSIS — C61 PROSTATE CANCER (HCC): Primary | ICD-10-CM

## 2017-09-21 LAB
ALBUMIN SERPL-MCNC: 4.2 G/DL (ref 3.5–5.2)
ALP BLD-CCNC: 44 U/L (ref 40–130)
ALT SERPL-CCNC: 7 U/L (ref 5–41)
ANION GAP SERPL CALCULATED.3IONS-SCNC: 15 MMOL/L (ref 7–19)
AST SERPL-CCNC: 9 U/L (ref 5–40)
BASOPHILS ABSOLUTE: 0 K/UL (ref 0–0.2)
BASOPHILS RELATIVE PERCENT: 0.5 % (ref 0–1)
BILIRUB SERPL-MCNC: 0.6 MG/DL (ref 0.2–1.2)
BUN BLDV-MCNC: 18 MG/DL (ref 8–23)
CALCIUM SERPL-MCNC: 9.3 MG/DL (ref 8.8–10.2)
CHLORIDE BLD-SCNC: 103 MMOL/L (ref 98–111)
CO2: 25 MMOL/L (ref 22–29)
CREAT SERPL-MCNC: 1.1 MG/DL (ref 0.5–1.2)
EOSINOPHILS ABSOLUTE: 0.2 K/UL (ref 0–0.6)
EOSINOPHILS RELATIVE PERCENT: 2 % (ref 0–5)
GFR NON-AFRICAN AMERICAN: >60
GLUCOSE BLD-MCNC: 141 MG/DL (ref 74–109)
HBA1C MFR BLD: 6.6 %
HCT VFR BLD CALC: 40.9 % (ref 42–52)
HEMOGLOBIN: 13 G/DL (ref 14–18)
LYMPHOCYTES ABSOLUTE: 1.4 K/UL (ref 1.1–4.5)
LYMPHOCYTES RELATIVE PERCENT: 15.7 % (ref 20–40)
MCH RBC QN AUTO: 28.6 PG (ref 27–31)
MCHC RBC AUTO-ENTMCNC: 31.8 G/DL (ref 33–37)
MCV RBC AUTO: 89.9 FL (ref 80–94)
MONOCYTES ABSOLUTE: 0.5 K/UL (ref 0–0.9)
MONOCYTES RELATIVE PERCENT: 5.2 % (ref 0–10)
NEUTROPHILS ABSOLUTE: 6.6 K/UL (ref 1.5–7.5)
NEUTROPHILS RELATIVE PERCENT: 76.3 % (ref 50–65)
PDW BLD-RTO: 14.9 % (ref 11.5–14.5)
PLATELET # BLD: 240 K/UL (ref 130–400)
PMV BLD AUTO: 10.3 FL (ref 9.4–12.4)
POTASSIUM SERPL-SCNC: 4.3 MMOL/L (ref 3.5–5)
RBC # BLD: 4.55 M/UL (ref 4.7–6.1)
SODIUM BLD-SCNC: 143 MMOL/L (ref 136–145)
T4 FREE: 2.2 NG/DL (ref 0.9–1.7)
TOTAL PROTEIN: 7.3 G/DL (ref 6.6–8.7)
TSH SERPL DL<=0.05 MIU/L-ACNC: 3.48 UIU/ML (ref 0.27–4.2)
WBC # BLD: 8.6 K/UL (ref 4.8–10.8)

## 2017-09-21 PROCEDURE — 4040F PNEUMOC VAC/ADMIN/RCVD: CPT | Performed by: NURSE PRACTITIONER

## 2017-09-21 PROCEDURE — G8427 DOCREV CUR MEDS BY ELIG CLIN: HCPCS | Performed by: NURSE PRACTITIONER

## 2017-09-21 PROCEDURE — 1123F ACP DISCUSS/DSCN MKR DOCD: CPT | Performed by: NURSE PRACTITIONER

## 2017-09-21 PROCEDURE — 93000 ELECTROCARDIOGRAM COMPLETE: CPT | Performed by: NURSE PRACTITIONER

## 2017-09-21 PROCEDURE — 99214 OFFICE O/P EST MOD 30 MIN: CPT | Performed by: NURSE PRACTITIONER

## 2017-09-21 PROCEDURE — 90791 PSYCH DIAGNOSTIC EVALUATION: CPT | Performed by: SOCIAL WORKER

## 2017-09-21 PROCEDURE — 3017F COLORECTAL CA SCREEN DOC REV: CPT | Performed by: NURSE PRACTITIONER

## 2017-09-21 PROCEDURE — 1036F TOBACCO NON-USER: CPT | Performed by: NURSE PRACTITIONER

## 2017-09-21 PROCEDURE — G0008 ADMIN INFLUENZA VIRUS VAC: HCPCS | Performed by: NURSE PRACTITIONER

## 2017-09-21 PROCEDURE — 3046F HEMOGLOBIN A1C LEVEL >9.0%: CPT | Performed by: NURSE PRACTITIONER

## 2017-09-21 PROCEDURE — G0444 DEPRESSION SCREEN ANNUAL: HCPCS | Performed by: NURSE PRACTITIONER

## 2017-09-21 PROCEDURE — G8417 CALC BMI ABV UP PARAM F/U: HCPCS | Performed by: NURSE PRACTITIONER

## 2017-09-21 PROCEDURE — 90662 IIV NO PRSV INCREASED AG IM: CPT | Performed by: NURSE PRACTITIONER

## 2017-09-21 RX ORDER — ESCITALOPRAM OXALATE 10 MG/1
10 TABLET ORAL DAILY
Qty: 30 TABLET | Refills: 11 | Status: SHIPPED | OUTPATIENT
Start: 2017-09-21 | End: 2018-09-29 | Stop reason: SDUPTHER

## 2017-09-21 RX ORDER — MELOXICAM 15 MG/1
15 TABLET ORAL DAILY
Qty: 30 TABLET | Refills: 11 | Status: SHIPPED | OUTPATIENT
Start: 2017-09-21 | End: 2018-05-09 | Stop reason: SINTOL

## 2017-09-21 RX ORDER — LEVOTHYROXINE SODIUM 0.2 MG/1
200 TABLET ORAL DAILY
Qty: 90 TABLET | Refills: 3 | Status: SHIPPED | OUTPATIENT
Start: 2017-09-21 | End: 2019-01-09 | Stop reason: SDUPTHER

## 2017-09-21 ASSESSMENT — PATIENT HEALTH QUESTIONNAIRE - PHQ9
SUM OF ALL RESPONSES TO PHQ9 QUESTIONS 1 & 2: 6
SUM OF ALL RESPONSES TO PHQ QUESTIONS 1-9: 14
2. FEELING DOWN, DEPRESSED OR HOPELESS: 3
9. THOUGHTS THAT YOU WOULD BE BETTER OFF DEAD, OR OF HURTING YOURSELF: 0
4. FEELING TIRED OR HAVING LITTLE ENERGY: 1
8. MOVING OR SPEAKING SO SLOWLY THAT OTHER PEOPLE COULD HAVE NOTICED. OR THE OPPOSITE, BEING SO FIGETY OR RESTLESS THAT YOU HAVE BEEN MOVING AROUND A LOT MORE THAN USUAL: 2
10. IF YOU CHECKED OFF ANY PROBLEMS, HOW DIFFICULT HAVE THESE PROBLEMS MADE IT FOR YOU TO DO YOUR WORK, TAKE CARE OF THINGS AT HOME, OR GET ALONG WITH OTHER PEOPLE: 1
6. FEELING BAD ABOUT YOURSELF - OR THAT YOU ARE A FAILURE OR HAVE LET YOURSELF OR YOUR FAMILY DOWN: 2
1. LITTLE INTEREST OR PLEASURE IN DOING THINGS: 3
7. TROUBLE CONCENTRATING ON THINGS, SUCH AS READING THE NEWSPAPER OR WATCHING TELEVISION: 1
5. POOR APPETITE OR OVEREATING: 0
3. TROUBLE FALLING OR STAYING ASLEEP: 2

## 2017-09-21 ASSESSMENT — ENCOUNTER SYMPTOMS
RHINORRHEA: 0
DIARRHEA: 0
SORE THROAT: 0
CONSTIPATION: 0
VOMITING: 0
COUGH: 0
ABDOMINAL PAIN: 0
NAUSEA: 0
TROUBLE SWALLOWING: 0
SHORTNESS OF BREATH: 0

## 2017-09-22 ENCOUNTER — OFFICE VISIT (OUTPATIENT)
Dept: UROLOGY | Facility: CLINIC | Age: 67
End: 2017-09-22

## 2017-09-22 ENCOUNTER — TELEPHONE (OUTPATIENT)
Dept: PRIMARY CARE CLINIC | Age: 67
End: 2017-09-22

## 2017-09-22 VITALS — HEIGHT: 71 IN | TEMPERATURE: 97.4 F | WEIGHT: 210 LBS | BODY MASS INDEX: 29.4 KG/M2

## 2017-09-22 DIAGNOSIS — C61 PROSTATE CANCER (HCC): Primary | ICD-10-CM

## 2017-09-22 LAB — HEPATITIS C ANTIBODY INTERPRETATION: NORMAL

## 2017-09-22 PROCEDURE — 99024 POSTOP FOLLOW-UP VISIT: CPT | Performed by: UROLOGY

## 2017-09-22 PROCEDURE — 51798 US URINE CAPACITY MEASURE: CPT | Performed by: UROLOGY

## 2017-09-22 NOTE — PROGRESS NOTES
Mr. Kamara is 66 y.o. male    CHIEF COMPLAINT: I am here to follow up on my prostate cancer    HPI  S/p prostatectomy      Lab Results   Component Value Date    PSA <0.070 09/15/2017    PSA 6.3 (H) 05/18/2017    PSA 4.870 (H) 04/04/2017         The following portions of the patient's history were reviewed and updated as appropriate: allergies, current medications, past family history, past medical history, past social history, past surgical history and problem list.    Review of Systems   Constitutional: Negative for chills and fever.   Gastrointestinal: Negative for abdominal pain, anal bleeding and blood in stool.   Genitourinary: Negative for flank pain and hematuria.         Current Outpatient Prescriptions:   •  atorvastatin (LIPITOR) 10 MG tablet, Take 20 mg by mouth Daily., Disp: , Rfl:   •  levothyroxine (SYNTHROID, LEVOTHROID) 200 MCG tablet, Take 200 mcg by mouth Daily., Disp: , Rfl:   •  lisinopril (PRINIVIL,ZESTRIL) 40 MG tablet, Take 40 mg by mouth Daily., Disp: , Rfl:   •  nabumetone (RELAFEN) 500 MG tablet, Take 500 mg by mouth 2 (Two) Times a Day As Needed for Mild Pain (1-3) (arhritis and knee pain)., Disp: , Rfl:   •  SITagliptin (JANUVIA) 100 MG tablet, Take 100 mg by mouth Daily., Disp: , Rfl:   •  tamsulosin (FLOMAX) 0.4 MG capsule 24 hr capsule, Take 1 capsule by mouth every night., Disp: , Rfl:     Past Medical History:   Diagnosis Date   • Arthritis    • Cancer     thyroid   • Diabetes    • Disease of thyroid gland    • Hypercholesteremia    • Hypertension    • IBS (irritable bowel syndrome)    • Prostate cancer    • Sleep apnea     NON COMPLIANT WITH C PAP       Past Surgical History:   Procedure Laterality Date   • CHOLECYSTECTOMY     • COLONOSCOPY N/A 3/7/2017    Procedure: COLONOSCOPY WITH ANESTHESIA;  Surgeon: Hector Alvarenga MD;  Location: [facility] ENDOSCOPY;  Service:    • CYSTOSCOPY TRANSURETHRAL RESECTION OF PROSTATE     • PROSTATE SURGERY     • PROSTATECTOMY N/A 8/1/2017     "Procedure: PROSTATECTOMY LAPAROSCOPIC WITH DAVINCI SI ROBOT ;  Surgeon: Hernesto Hong MD;  Location: [facility] OR;  Service:    • THYROID SURGERY      RADIATION THERAPY AFTER SURGERY       Social History     Social History   • Marital status:      Spouse name: N/A   • Number of children: N/A   • Years of education: N/A     Social History Main Topics   • Smoking status: Never Smoker   • Smokeless tobacco: Never Used   • Alcohol use No   • Drug use: No   • Sexual activity: Defer     Other Topics Concern   • Not on file     Social History Narrative       Family History   Problem Relation Age of Onset   • Family history unknown: Yes   • Prostate cancer Father        Temp 97.4 °F (36.3 °C)  Ht 71\" (180.3 cm)  Wt 210 lb (95.3 kg)  BMI 29.29 kg/m2    Physical Exam  Alert and oriented ×3  Not agitated or distressed  No obvious deformities  No respiratory distress  Skin without pallor or diaphoresis        Results for orders placed or performed in visit on 09/15/17   PSA   Result Value Ref Range    PSA <0.070 0.000 - 4.000 ng/mL       Imaging Results (last 7 days)     ** No results found for the last 168 hours. **      Bladder Scan interpretation  Estimation of residual urine via abdominal ultrasound  Residual Urine: 0 ml  Indication: bph  Position: Supine  Examination: Incremental scanning of the suprapubic area using 3 MHz transducer using copious amounts of acoustic gel.   Findings: An anechoic area was demonstrated which represented the bladder, with measurement of residual urine as noted. I inspected this myself. In that the residual urine was stable or insignificant, no treatment will be necessary at this time.         Assessment and Plan  Diagnoses and all orders for this visit:    Prostate cancer  Still with significant stress incontinence.     Hernesto Hong MD  09/22/17  10:55 AM      Cc: LUIS Maria  "

## 2017-10-05 ENCOUNTER — OFFICE VISIT (OUTPATIENT)
Dept: PSYCHIATRY | Age: 67
End: 2017-10-05
Payer: MEDICARE

## 2017-10-05 DIAGNOSIS — F32.A DEPRESSIVE DISORDER: Primary | ICD-10-CM

## 2017-10-05 PROCEDURE — 90837 PSYTX W PT 60 MINUTES: CPT | Performed by: SOCIAL WORKER

## 2017-10-05 NOTE — MR AVS SNAPSHOT
Alcohol often promotes the onset of sleep, but as alcohol is metabolized sleep becomes disturbed and fragmented. Thus, a large amount of alcohol is a poor sleep aid and should not be used as such. Limit alcohol use to small quantities to moderate quantities. Sleeping Pills:  Sleep Medications are Effective Only Temporarily       Scientists have shown that sleep medications lose their effectiveness in about 2 - 4 weeks when taken regularly. Despite advertisements to the contrary, over-the-counter sleeping aids have little impact on sleep beyond the placebo effect. Over time, sleeping pills actually can make sleep problems worse. When sleeping pills have been used for a long period, withdrawal from the medication can lead to an insomnia rebound. Thus, after long-term use, many individuals incorrectly conclude that they need sleeping pills in order to sleep normally. Keep use of sleep pills infrequent, but dont worry if you need t use one on an occasional basis. Regular Exercise       Get regular exercise, preferably 40 minutes each day of an activity that causes sweating. .  Exercise in the late afternoon or early evening seems to aid sleep, although the positive effect often takes several weeks to become noticeable. Exercising sporadically is not likely to improve sleep, and exercise within 2 hours of bedtime may elevate nervous system activity and interfere with sleep onset. Hot Baths  Spending 20 minutes in a tub of hot water an hour or two prior to bedtime may promote sleep and is strongly recommended. Bedroom Environment: Moderate Temperature, Quiet, and Dark       Extremes of heat or cold can disrupt sleep. A quiet environment is more sleep promoting than a noisy one. Noises can be masked with background white noise (such as the noise of a fan) or with earplugs. Bedrooms may be darkened with black-out shades or sleep masks can be worn. Position clocks out-of-sight since clock-watching can increase worry about the effects of lack of sleep. Be sure your mattress is not too soft or too firm and that your pillow is the right height and firmness. Eating       A light bedtime snack, such a glass of warm milk, cheese, or a bowl of cereal can promote sleep. You should avoid the following foods at bedtime:  any caffeinated foods (e.g., chocolate), peanuts, beans, most raw fruits and vegetables (since they may cause gas), and high-fat foods such as potato chips or corn chips. Avoid snacks in the middle of the nights since awakening may become associated with hunger. If you have trouble with regurgitation, be especially careful to avid heavy meals and spices in the evening. Do not go to bed too hungry or too full. It may help to elevate you head with some pillows. Avoid Naps       Avoid naps, the sleep you obtain during the day takes away from you sleep need that night resulting in lighter, more restless sleep, difficulty falling asleep or early morning awakening. If you must nap, keep it brief, and take the nap about 8 hours after arising. It is best to set an alarm to ensure you dont sleep more than 10-15 minutes. Limit Your Time in Bed        Restrict your sleep period to the average number of hours you have actually slept per night during the preceding week. Quality of sleep is important. Too much time in bed can decrease the quality on subsequent night and contribute to the maintenance of existing sleep problems. Dont lay in bed for extended times not sleep. If you arent asleep in about 15-20 minutes go ahead and get up. Do something outside the bedroom that is relaxing. When you feel sleepy (i.e., yawning, head bobbing, eyes closing, concentration decreasing, then return to bed. Dont confuse tiredness with sleepiness, they are different. Tiredness doesnt lead to sleep, only sleepiness does. Regular Sleep Schedule       Keep a regular time each day, 7 days a week, to get out of bed. Keeping a regular awaking time helps set your circadian rhythm set so that your body learns to sleep at the desired time. Use the attached form to develop a plan for improving you sleep hygiene. It will take time for you sleep to get back in line so once you begin your sleep hygiene plan, stick with if for at least 6-8 weeks. Planned Improvements of My Sleep Hygiene    Check Those  That Apply  _____ Avoid Caffeine 6-8 Hours Before Bedtime. I will not have caffeine after ________ PM.    ____ Avoid Nicotine Before Bedtime. I will not have a cigarette after _________ PM.    ______  Limit Alcohol Use. I will not have more than _______ drinks in the evening.    ______ Avoid Use of Sleeping Pills. (If you are currently using them regularly, all changes should be   medical supervised by your medical provider). ______ Do Exercise Regularly, But Not Within 2 Hours of Bedtime. I ________________ for ____   minutes, on the following days ____________________________________________________    ______ Ensure your Bedroom is a Comfortable Temperature, Quiet, and Dark and Your   Mattress and Pillow are good. I will make the  following changes to my bedroom   ____________________________________________________________________________    ______ Do Take a Hot Bath 1-2 Hours Prior to Bedtime. I will take a hot bath about ______ PM.    ______ Eat a Light Snack at Bedtime but Avoid Large or Problematic Foods. I will eat     __________________  or _____________________ or __________________ before bed.    ______ Avoid Naps. I try not to nap, if I must, I will limit it to _______ minutes, about 8 hours after I   awoke and will use alarm to limit my nap time. ______ Limit Time In Bed.   I have been sleeping on average ______ hours per night, therefore I will [employer] allows you to send messages to your doctor, view your test results, renew your prescriptions, schedule appointments, view visit notes, and more. How Do I Sign Up? 1. In your Internet browser, go to [email or web address]  2. Click on the Sign Up Now link in the Sign In box. You will see the New Member Sign Up page. 3. Enter your [employer] Access Code exactly as it appears below. You will not need to use this code after youve completed the sign-up process. If you do not sign up before the expiration date, you must request a new code. [employer] Access Code: [email or web address]  Expires: 11/20/2017  8:40 AM    4. Enter your Social Security Number ([id number]) and Date of Birth (mm/dd/yyyy) as indicated and click Submit. You will be taken to the next sign-up page. 5. Create a [employer] ID. This will be your [employer] login ID and cannot be changed, so think of one that is secure and easy to remember. 6. Create a [employer] password. You can change your password at any time. 7. Enter your Password Reset Question and Answer. This can be used at a later time if you forget your password. 8. Enter your e-mail address. You will receive e-mail notification when new information is available in [address]. 9. Click Sign Up. You can now view your medical record. Additional Information  If you have questions, please contact the physician practice where you receive care. Remember, [employer] is NOT to be used for urgent needs. For medical emergencies, dial 911. For questions regarding your [employer] account call [phone]. If you have a clinical question, please call your doctor's office.

## 2017-10-05 NOTE — PROGRESS NOTES
Behavioral Health Consultation  Ruthine Litten [place]  10/5/2017  11:33 AM      Time spent with Patient: 30 minutes  This is patient's second  [facility] appointment. Reason for Consult:    Chief Complaint   Patient presents with    Depression     Referring Provider: Demario Ribeiro, APRN  [address], [address]      Feedback given to PCP. S:  CC:  Depressed mood  Doing some better. A bit sleepy still in the morning; pt inquired if it was the anti depressant but also shared he took tylenol pm.   Discussed occurrences since last session. Pt's gfriend is attempting to repair relationship. Pt is guarded, but willing to give the relationship a try. Discussed healthy communication and healthy relationships and how gfriend has been in unhealthy relationships in the past.  Pt shared he has a good relationship with gfriend's family and enjoys spending time with them. Wants to have a healthy relationship. Discussed financial stressors due to being laid off from work; plans to [employer] his home possibly and get an apartment. Has been getting out and participating in activities in the area and with other people. May be going on a trip next week. Talked about stressors related to prostate surgery, and how he may have to have another surgery if he doesn't recover/restore bodily functions within a certain time frame. O:  MSE:    Appearance    alert, cooperative  Appetite normal- was having less of an appetite but reports its better now  Sleep disturbance Yes and taking some tylenol pm at times.    Fatigue No  Loss of pleasure No  Impulsive behavior No  Speech    spontaneous, normal rate and normal volume  Mood    Euthymic   Affect    normal affect  Thought Content    intact  Thought Process    circumstantial  Associations    logical connections  Insight    Fair  Judgment    Intact  Orientation    oriented to person, place, time, and general circumstances  Memory    recent and sweating. .  Exercise in the late afternoon or early evening seems to aid sleep, although the positive effect often takes several weeks to become noticeable. Exercising sporadically is not likely to improve sleep, and exercise within 2 hours of bedtime may elevate nervous system activity and interfere with sleep onset. Hot Baths  Spending 20 minutes in a tub of hot water an hour or two prior to bedtime may promote sleep and is strongly recommended. Bedroom Environment: Moderate Temperature, Quiet, and Dark       Extremes of heat or cold can disrupt sleep. A quiet environment is more sleep promoting than a noisy one. Noises can be masked with background white noise (such as the noise of a fan) or with earplugs. Bedrooms may be darkened with black-out shades or sleep masks can be worn. Position clocks out-of-sight since clock-watching can increase worry about the effects of lack of sleep. Be sure your mattress is not too soft or too firm and that your pillow is the right height and firmness. Eating       A light bedtime snack, such a glass of warm milk, cheese, or a bowl of cereal can promote sleep. You should avoid the following foods at bedtime:  any caffeinated foods (e.g., chocolate), peanuts, beans, most raw fruits and vegetables (since they may cause gas), and high-fat foods such as potato chips or corn chips. Avoid snacks in the middle of the nights since awakening may become associated with hunger. If you have trouble with regurgitation, be especially careful to avid heavy meals and spices in the evening. Do not go to bed too hungry or too full. It may help to elevate you head with some pillows. Avoid Naps       Avoid naps, the sleep you obtain during the day takes away from you sleep need that night resulting in lighter, more restless sleep, difficulty falling asleep or early morning awakening. If you must nap, keep it brief, and take the nap about 8 hours after arising.

## 2017-11-02 ENCOUNTER — OFFICE VISIT (OUTPATIENT)
Dept: PRIMARY CARE CLINIC | Age: 67
End: 2017-11-02
Payer: MEDICARE

## 2017-11-02 VITALS
SYSTOLIC BLOOD PRESSURE: 130 MMHG | HEART RATE: 82 BPM | TEMPERATURE: 97.5 F | HEIGHT: 71 IN | OXYGEN SATURATION: 97 % | DIASTOLIC BLOOD PRESSURE: 76 MMHG | WEIGHT: 210.5 LBS | BODY MASS INDEX: 29.47 KG/M2

## 2017-11-02 DIAGNOSIS — E03.9 ACQUIRED HYPOTHYROIDISM: ICD-10-CM

## 2017-11-02 DIAGNOSIS — Z85.46 HISTORY OF PROSTATE CANCER: ICD-10-CM

## 2017-11-02 DIAGNOSIS — E11.9 TYPE 2 DIABETES MELLITUS WITHOUT COMPLICATION, WITHOUT LONG-TERM CURRENT USE OF INSULIN (HCC): Primary | ICD-10-CM

## 2017-11-02 DIAGNOSIS — I10 ESSENTIAL HYPERTENSION: ICD-10-CM

## 2017-11-02 DIAGNOSIS — F51.04 PSYCHOPHYSIOLOGICAL INSOMNIA: ICD-10-CM

## 2017-11-02 DIAGNOSIS — F32.9 REACTIVE DEPRESSION: ICD-10-CM

## 2017-11-02 PROCEDURE — 1036F TOBACCO NON-USER: CPT | Performed by: NURSE PRACTITIONER

## 2017-11-02 PROCEDURE — 3017F COLORECTAL CA SCREEN DOC REV: CPT | Performed by: NURSE PRACTITIONER

## 2017-11-02 PROCEDURE — G8417 CALC BMI ABV UP PARAM F/U: HCPCS | Performed by: NURSE PRACTITIONER

## 2017-11-02 PROCEDURE — 3044F HG A1C LEVEL LT 7.0%: CPT | Performed by: NURSE PRACTITIONER

## 2017-11-02 PROCEDURE — G8427 DOCREV CUR MEDS BY ELIG CLIN: HCPCS | Performed by: NURSE PRACTITIONER

## 2017-11-02 PROCEDURE — 99213 OFFICE O/P EST LOW 20 MIN: CPT | Performed by: NURSE PRACTITIONER

## 2017-11-02 PROCEDURE — G8484 FLU IMMUNIZE NO ADMIN: HCPCS | Performed by: NURSE PRACTITIONER

## 2017-11-02 PROCEDURE — 1123F ACP DISCUSS/DSCN MKR DOCD: CPT | Performed by: NURSE PRACTITIONER

## 2017-11-02 PROCEDURE — 4040F PNEUMOC VAC/ADMIN/RCVD: CPT | Performed by: NURSE PRACTITIONER

## 2017-11-02 RX ORDER — TRAZODONE HYDROCHLORIDE 50 MG/1
50 TABLET ORAL NIGHTLY
Qty: 60 TABLET | Refills: 1 | Status: SHIPPED | OUTPATIENT
Start: 2017-11-02 | End: 2018-03-04 | Stop reason: SDUPTHER

## 2017-11-02 ASSESSMENT — ENCOUNTER SYMPTOMS
NAUSEA: 0
COUGH: 0
VOMITING: 0
TROUBLE SWALLOWING: 0
ABDOMINAL PAIN: 0
DIARRHEA: 0
SORE THROAT: 0
CONSTIPATION: 0
RHINORRHEA: 0
SHORTNESS OF BREATH: 0

## 2017-11-02 NOTE — PROGRESS NOTES
[address]  [place], [address]  Phone [phone]   Fax [phone]      OFFICE VISIT: 2017    Ahmet Dykes- : 1950        Reason For Visit:  Evelyne Kaye is a 75 y.o. male who is here for Diabetes (here for a follow up. [place] and [place] cost increased. ); Hypertension; and Health Maintenance (eye exam- scheduled next week. )         HPI    Pt is here for diabetes and HTN follow up    Diabetes  Checks sugars at home-usually every other day  130's-150's average at home  Denies adverse effects with medication    HTN  Checks at home intermittently   Home checks average around 130's/70's  Denies unwanted side effects of medications  Denies chest pain, SOA, headaches, vision changes    Depression  Stabilized-improved from last visit  Seen Leatha Carter twice and thought that helped. Difficulty falling asleep-no issue when finally asleep       height is 5' 11\" (1.803 m) and weight is 210 lb 8 oz (95.5 kg). His temporal temperature is 97.5 °F (36.4 °C). His blood pressure is 130/76 and his pulse is 82. His oxygen saturation is 97%. Body mass index is 29.36 kg/m².     Results for orders placed or performed in visit on 17   TSH without Reflex   Result Value Ref Range    TSH 3.480 0.270 - 4.200 uIU/mL   T4, Free   Result Value Ref Range    T4 Free 2.2 (H) 0.9 - 1.7 ng/dL   Comprehensive Metabolic Panel   Result Value Ref Range    Sodium 143 136 - 145 mmol/L    Potassium 4.3 3.5 - 5.0 mmol/L    Chloride 103 98 - 111 mmol/L    CO2 25 22 - 29 mmol/L    Anion Gap 15 7 - 19 mmol/L    Glucose 141 (H) 74 - 109 mg/dL    BUN 18 8 - 23 mg/dL    CREATININE 1.1 0.5 - 1.2 mg/dL    GFR Non-African American >60 >60    Calcium 9.3 8.8 - 10.2 mg/dL    Total Protein 7.3 6.6 - 8.7 g/dL    Alb 4.2 3.5 - 5.2 g/dL    Total Bilirubin 0.6 0.2 - 1.2 mg/dL    Alkaline Phosphatase 44 40 - 130 U/L    ALT 7 5 - 41 U/L    AST 9 5 - 40 U/L   HEMOGLOBIN A1C   Result Value Ref Range    Hemoglobin A1C 6.6 (H) See comment %   Hepatitis C Antibody Result Value Ref Range    Hep C Ab Interp Non-Reactive    CBC Auto Differential   Result Value Ref Range    WBC 8.6 4.8 - 10.8 K/uL    RBC 4.55 (L) 4.70 - 6.10 M/uL    Hemoglobin 13.0 (L) 14.0 - 18.0 g/dL    Hematocrit 40.9 (L) 42.0 - 52.0 %    MCV 89.9 80.0 - 94.0 fL    MCH 28.6 27.0 - 31.0 pg    MCHC 31.8 (L) 33.0 - 37.0 g/dL    RDW 14.9 (H) 11.5 - 14.5 %    Platelets 747 665 - 133 K/uL    MPV 10.3 9.4 - 12.4 fL    Neutrophils % 76.3 (H) 50.0 - 65.0 %    Lymphocytes % 15.7 (L) 20.0 - 40.0 %    Monocytes % 5.2 0.0 - 10.0 %    Eosinophils % 2.0 0.0 - 5.0 %    Basophils % 0.5 0.0 - 1.0 %    Neutrophils # 6.6 1.5 - 7.5 K/uL    Lymphocytes # 1.4 1.1 - 4.5 K/uL    Monocytes # 0.50 0.00 - 0.90 K/uL    Eosinophils # 0.20 0.00 - 0.60 K/uL    Basophils # 0.00 0.00 - 0.20 K/uL       I have reviewed the following with the Mr. Ortega   Lab Review   Orders Only on 09/21/2017   Component Date Value    TSH 09/21/2017 3.480     T4 Free 09/21/2017 2.2*    Sodium 09/21/2017 143     Potassium 09/21/2017 4.3     Chloride 09/21/2017 103     CO2 09/21/2017 25     Anion Gap 09/21/2017 15     Glucose 09/21/2017 141*    BUN 09/21/2017 18     CREATININE 09/21/2017 1.1     GFR Non- 09/21/2017 >60     Calcium 09/21/2017 9.3     Total Protein 09/21/2017 7.3     Alb 09/21/2017 4.2     Total Bilirubin 09/21/2017 0.6     Alkaline Phosphatase 09/21/2017 44     ALT 09/21/2017 7     AST 09/21/2017 9     Hemoglobin A1C 09/21/2017 6.6*    Hep C Ab Interp 09/22/2017 Non-Reactive     WBC 09/21/2017 8.6     RBC 09/21/2017 4.55*    Hemoglobin 09/21/2017 13.0*    Hematocrit 09/21/2017 40.9*    MCV 09/21/2017 89.9     MCH 09/21/2017 28.6     MCHC 09/21/2017 31.8*    RDW 09/21/2017 14.9*    Platelets 28/99/0720 240     MPV 09/21/2017 10.3     Neutrophils % 09/21/2017 76.3*    Lymphocytes % 09/21/2017 15.7*    Monocytes % 09/21/2017 5.2     Eosinophils % 09/21/2017 2.0     Basophils % 09/21/2017 0.5     Neutrophils # 09/21/2017 6.6     Lymphocytes # 09/21/2017 1.4     Monocytes # 09/21/2017 0.50     Eosinophils # 09/21/2017 0.20     Basophils # 09/21/2017 0.00      Copies of these are in the chart. Prior to Visit Medications    Medication Sig Taking? Authorizing Provider   traZODone (DESYREL) 50 MG tablet Take 1 tablet by mouth nightly May take 1-2 tablets at bedtime Yes MARY Rosen   meloxicam (MOBIC) 15 MG tablet Take 1 tablet by mouth daily Yes MARY Rosen   SITagliptin (JANUVIA) 100 MG tablet Take 1 tablet by mouth daily Yes MARY Rosen   levothyroxine (LEVOTHROID) 200 MCG tablet Take 1 tablet by mouth daily Yes MARY Rosen   escitalopram (LEXAPRO) 10 MG tablet Take 1 tablet by mouth daily Yes MARY Rosen   lisinopril (PRINIVIL;ZESTRIL) 40 MG tablet TAKE 1 TABLET BY MOUTH DAILY Yes MARY Rosen   atorvastatin (LIPITOR) 20 MG tablet TAKE 1 TABLET EVERY DAY Yes MARY Rosen       Allergies: Review of patient's allergies indicates no known allergies. Past Medical History:   Diagnosis Date    Allergic rhinitis     Cancer ([facility] [address] 2012    Thyroid  - Dr Neha Boone Hyperthyroidism     Prostate cancer [facility])     Sleep apnea     Type II or unspecified type diabetes mellitus without mention of complication, not stated as uncontrolled        Past Surgical History:   Procedure Laterality Date    CHOLECYSTECTOMY      PROSTATE SURGERY      Dr Irving Wong,[address]  08/01/2017    removal, dr Margo Ricketts      removed by Dr Cordelia Paz History   Substance Use Topics    Smoking status: Never Smoker    Smokeless tobacco: Never Used    Alcohol use No       Review of Systems   Constitutional: Negative for activity change, appetite change, fatigue, fever and unexpected weight change.    HENT: Negative for ear pain, rhinorrhea, sore throat and trouble swallowing. Eyes: Negative for visual disturbance. Respiratory: Negative for cough and shortness of breath. Cardiovascular: Negative for chest pain, palpitations and leg swelling. Gastrointestinal: Negative for abdominal pain, constipation, diarrhea, nausea and vomiting. Genitourinary: Negative for flank pain. Musculoskeletal: Negative for arthralgias, myalgias, neck pain and neck stiffness. Neurological: Negative for headaches. Psychiatric/Behavioral: Negative for decreased concentration and sleep disturbance. The patient is not nervous/anxious. Physical Exam   Constitutional: He is oriented to person, place, and time. He appears well-developed and well-nourished. HENT:   Head: Normocephalic and atraumatic. Right Ear: External ear normal.   Left Ear: External ear normal.   Nose: Nose normal.   Mouth/Throat: Oropharynx is clear and moist.   Eyes: Conjunctivae are normal. Pupils are equal, round, and reactive to light. No scleral icterus. Neck: Normal range of motion. Neck supple. Carotid bruit is not present. No edema present. Cardiovascular: Normal rate, regular rhythm, normal heart sounds and intact distal pulses. No murmur heard. Pulmonary/Chest: Effort normal and breath sounds normal. No respiratory distress. Abdominal: Soft. Normal appearance and bowel sounds are normal. He exhibits no distension. There is no hepatosplenomegaly. There is no tenderness. Musculoskeletal: Normal range of motion. He exhibits no edema. Neurological: He is alert and oriented to person, place, and time. Skin: Skin is warm, dry and intact. No rash noted. Psychiatric: He has a normal mood and affect. His speech is normal and behavior is normal. Judgment and thought content normal.   Vitals reviewed. ASSESSMENT      ICD-10-CM ICD-9-CM    1.  Type 2 diabetes mellitus without complication, without long-term current use of insulin ([facility]) E11.9 250.00 CBC Auto Differential      Comprehensive and Free        Return in about 3 months (around 2/2/2018). There are no Patient Instructions on file for this visit. Controlled Substances Monitoring: Additional Instructions: As always, patient is advised to bring in medication bottles in order to correctly reconcile with our current list.    Jerry Choi received counseling on the following healthy behaviors: medication adherence    Patient given educational materials on dx    I have instructed Pillo to complete a self tracking handout on blood sugar and instructed them to bring it with them to his next appointment. Discussed use, benefit, and side effects of prescribed medications. Barriers to medication compliance addressed. All patient questions answered. Pt voiced understanding.      MARY Santana

## 2017-11-15 ENCOUNTER — OFFICE VISIT (OUTPATIENT)
Dept: GASTROENTEROLOGY | Facility: CLINIC | Age: 67
End: 2017-11-15

## 2017-11-15 VITALS
TEMPERATURE: 96.9 F | HEART RATE: 77 BPM | OXYGEN SATURATION: 99 % | SYSTOLIC BLOOD PRESSURE: 124 MMHG | DIASTOLIC BLOOD PRESSURE: 64 MMHG | WEIGHT: 211 LBS | HEIGHT: 71 IN | BODY MASS INDEX: 29.54 KG/M2

## 2017-11-15 DIAGNOSIS — R14.3 FLATULENCE: Primary | ICD-10-CM

## 2017-11-15 PROCEDURE — 99213 OFFICE O/P EST LOW 20 MIN: CPT | Performed by: NURSE PRACTITIONER

## 2017-11-15 RX ORDER — MELOXICAM 7.5 MG/1
7.5 TABLET ORAL DAILY
COMMUNITY
End: 2017-12-05 | Stop reason: HOSPADM

## 2017-11-15 RX ORDER — TRAZODONE HYDROCHLORIDE 50 MG/1
50 TABLET ORAL NIGHTLY PRN
COMMUNITY
End: 2019-08-09

## 2017-11-15 NOTE — PROGRESS NOTES
[facility] GASTROENTEROLOGY - OFFICE NOTE    11/15/2017    Zay Kamara   1950    Primary Physician: LUIS Souza    Chief Complaint   Patient presents with   • GI Problem     gas          HISTORY OF PRESENT ILLNESS    Zay Kamara is a 67 y.o. male presents with Gas.  Recently he has noted increased intestinal gas.  He is moving his bowels daily without any constipation or diarrhea.  No rectal bleeding.    Recently had prostate removed August 2017 for cancer.  No unintentional weight loss.  No abdominal pain.  No fevers or chills.  No nausea or vomiting..  He had a colonoscopy March 2017.      Past Medical History:   Diagnosis Date   • Arthritis    • Cancer     thyroid   • Diabetes    • Disease of thyroid gland    • Hypercholesteremia    • Hypertension    • IBS (irritable bowel syndrome)    • Prostate cancer    • Sleep apnea     NON COMPLIANT WITH C PAP       Past Surgical History:   Procedure Laterality Date   • CHOLECYSTECTOMY     • COLONOSCOPY N/A 3/7/2017    Procedure: COLONOSCOPY WITH ANESTHESIA;  Surgeon: Hector Alvarenga MD;  Location: [facility] ENDOSCOPY;  Service:    • CYSTOSCOPY TRANSURETHRAL RESECTION OF PROSTATE     • PROSTATE SURGERY     • PROSTATECTOMY N/A 8/1/2017    Procedure: PROSTATECTOMY LAPAROSCOPIC WITH DAVINCI SI ROBOT ;  Surgeon: Hernesto Hong MD;  Location: [facility] OR;  Service:    • THYROID SURGERY      RADIATION THERAPY AFTER SURGERY       Outpatient Prescriptions Marked as Taking for the 11/15/17 encounter (Office Visit) with LUIS Mast   Medication Sig Dispense Refill   • atorvastatin (LIPITOR) 10 MG tablet Take 20 mg by mouth Daily.     • levothyroxine (SYNTHROID, LEVOTHROID) 200 MCG tablet Take 200 mcg by mouth Daily.     • lisinopril (PRINIVIL,ZESTRIL) 40 MG tablet Take 40 mg by mouth Daily.     • meloxicam (MOBIC) 7.5 MG tablet Take 7.5 mg by mouth Daily.     • SITagliptin (JANUVIA) 100 MG tablet Take 100 mg by mouth Daily.     • traZODone  "(DESYREL) 50 MG tablet Take 50 mg by mouth Every Night.         No Known Allergies    Social History     Social History   • Marital status:      Spouse name: N/A   • Number of children: N/A   • Years of education: N/A     Occupational History   • Not on file.     Social History Main Topics   • Smoking status: Never Smoker   • Smokeless tobacco: Never Used   • Alcohol use Yes      Comment: occ   • Drug use: No   • Sexual activity: Defer     Other Topics Concern   • Not on file     Social History Narrative       Family History   Problem Relation Age of Onset   • Prostate cancer Father    • Colon cancer Neg Hx    • Colon polyps Neg Hx        Review of Systems   Constitutional: Negative for chills and fever.   Respiratory: Negative for cough, shortness of breath and wheezing.    Cardiovascular: Negative for chest pain and palpitations.   Gastrointestinal: Negative for abdominal distention, abdominal pain, anal bleeding, blood in stool, constipation, diarrhea, nausea, rectal pain and vomiting.       Vitals:    11/15/17 1357   BP: 124/64   BP Location: Left arm   Patient Position: Sitting   Cuff Size: Adult   Pulse: 77   Temp: 96.9 °F (36.1 °C)   SpO2: 99%   Weight: 211 lb (95.7 kg)   Height: 71\" (180.3 cm)      Body mass index is 29.43 kg/(m^2).    Physical Exam   Constitutional: He is oriented to person, place, and time. He appears well-developed and well-nourished. No distress.   Cardiovascular: Normal rate, regular rhythm and normal heart sounds.    Pulmonary/Chest: Effort normal and breath sounds normal.   Abdominal: Soft. Bowel sounds are normal. He exhibits no distension and no mass. There is no tenderness. There is no rebound and no guarding.   Musculoskeletal: He exhibits no edema.   Neurological: He is alert and oriented to person, place, and time.   Skin: Skin is warm and dry.   Psychiatric: He has a normal mood and affect. His behavior is normal.   Vitals reviewed.      Results for orders placed or " performed in visit on 09/15/17   PSA   Result Value Ref Range    PSA <0.070 0.000 - 4.000 ng/mL           ASSESSMENT AND PLAN    Zay was seen today for gi problem.    Diagnoses and all orders for this visit:    Flatulence       he is up to date on colonoscopy.  We discussed causes of  Gas.  I provided him literature of foods that can cause gas.  Also rec trial of either gas x or phazyme.  Will see if this helps. Ov prn.     Body mass index is 29.43 kg/(m^2).     Return if symptoms worsen or fail to improve.              LUIS Weir    EMR Dragon/transcription disclaimer:  Much of this encounter note is electronic transcription/translation of spoken language to printed text.  The electronic translation of spoken language may be erroneous, or at times, nonsensical words or phrases may be inadvertently transcribed.  Although I have reviewed the note for such errors, some may still exist.    Results for orders placed or performed in visit on 09/15/17   PSA   Result Value Ref Range    PSA <0.070 0.000 - 4.000 ng/mL

## 2017-11-28 ENCOUNTER — TELEPHONE (OUTPATIENT)
Dept: UROLOGY | Facility: CLINIC | Age: 67
End: 2017-11-28

## 2017-11-28 NOTE — TELEPHONE ENCOUNTER
I spoke with patient he informed me he has been urinating today but now he feels constipated and he thinks that is why he is having trouble urinating normally Patient is going to try a stool softener and if he has any problems not being able to urinate at all he will give us a call.

## 2017-11-28 NOTE — TELEPHONE ENCOUNTER
Zay called and said that this morning he had a throbbing in the bottom of his stomach. He feels like he has to urinate and also have a bowel movement and nothing is happening. He does not know what to do.

## 2017-11-30 ENCOUNTER — APPOINTMENT (OUTPATIENT)
Dept: CT IMAGING | Facility: HOSPITAL | Age: 67
End: 2017-11-30

## 2017-11-30 ENCOUNTER — OFFICE VISIT (OUTPATIENT)
Dept: PRIMARY CARE CLINIC | Age: 67
End: 2017-11-30
Payer: MEDICARE

## 2017-11-30 ENCOUNTER — HOSPITAL ENCOUNTER (OUTPATIENT)
Dept: GENERAL RADIOLOGY | Age: 67
Discharge: HOME | End: 2017-11-30
Payer: MEDICARE

## 2017-11-30 ENCOUNTER — HOSPITAL ENCOUNTER (INPATIENT)
Facility: HOSPITAL | Age: 67
LOS: 5 days | Discharge: HOME | End: 2017-12-05
Attending: EMERGENCY MEDICINE | Admitting: INTERNAL MEDICINE

## 2017-11-30 VITALS
WEIGHT: 210 LBS | DIASTOLIC BLOOD PRESSURE: 58 MMHG | OXYGEN SATURATION: 96 % | BODY MASS INDEX: 29.29 KG/M2 | TEMPERATURE: 98.7 F | SYSTOLIC BLOOD PRESSURE: 104 MMHG | HEART RATE: 100 BPM

## 2017-11-30 DIAGNOSIS — R19.7 DIARRHEA, UNSPECIFIED TYPE: ICD-10-CM

## 2017-11-30 DIAGNOSIS — R10.30 LOWER ABDOMINAL PAIN: ICD-10-CM

## 2017-11-30 DIAGNOSIS — N17.9 ACUTE KIDNEY INJURY (HCC): ICD-10-CM

## 2017-11-30 DIAGNOSIS — N17.9 ACUTE RENAL FAILURE, UNSPECIFIED ACUTE RENAL FAILURE TYPE (HCC): Primary | ICD-10-CM

## 2017-11-30 DIAGNOSIS — E86.0 DEHYDRATION: Primary | ICD-10-CM

## 2017-11-30 DIAGNOSIS — E11.9 TYPE 2 DIABETES MELLITUS WITHOUT COMPLICATION, WITHOUT LONG-TERM CURRENT USE OF INSULIN (HCC): ICD-10-CM

## 2017-11-30 PROBLEM — E07.9 DISEASE OF THYROID GLAND: Status: ACTIVE | Noted: 2017-11-30

## 2017-11-30 PROBLEM — N12 PYELONEPHRITIS: Status: ACTIVE | Noted: 2017-11-30

## 2017-11-30 PROBLEM — Z85.46 HISTORY OF PROSTATE CANCER: Status: ACTIVE | Noted: 2017-11-30

## 2017-11-30 PROBLEM — D72.829 LEUKOCYTOSIS: Status: ACTIVE | Noted: 2017-11-30

## 2017-11-30 PROBLEM — I10 HYPERTENSION: Status: ACTIVE | Noted: 2017-11-30

## 2017-11-30 LAB
ALBUMIN SERPL-MCNC: 3.6 G/DL (ref 3.5–5)
ALBUMIN/GLOB SERPL: 1.1 G/DL (ref 1.1–2.5)
ALP SERPL-CCNC: 49 U/L (ref 24–120)
ALT SERPL W P-5'-P-CCNC: 45 U/L (ref 0–54)
ANION GAP SERPL CALCULATED.3IONS-SCNC: 13 MMOL/L (ref 4–13)
AST SERPL-CCNC: 28 U/L (ref 7–45)
BILIRUB SERPL-MCNC: 0.7 MG/DL (ref 0.1–1)
BUN BLD-MCNC: 78 MG/DL (ref 5–21)
BUN/CREAT SERPL: 15.1 (ref 7–25)
CALCIUM IONIZED: 1.06 MMOL/L (ref 1.1–1.3)
CALCIUM SPEC-SCNC: 8.1 MG/DL (ref 8.4–10.4)
CHLORIDE SERPL-SCNC: 100 MMOL/L (ref 98–110)
CO2 SERPL-SCNC: 21 MMOL/L (ref 24–31)
CO2: 22 MEQ/L (ref 21–32)
CREAT BLD-MCNC: 5.15 MG/DL (ref 0.5–1.4)
D-LACTATE SERPL-SCNC: 1.6 MMOL/L (ref 0.5–2)
D-LACTATE SERPL-SCNC: 1.7 MMOL/L (ref 0.5–2)
DEPRECATED RDW RBC AUTO: 42.9 FL (ref 40–54)
ELLIPTOCYTES BLD QL SMEAR: ABNORMAL
EOSINOPHIL # BLD MANUAL: 0.17 10*3/MM3 (ref 0–0.7)
EOSINOPHIL NFR BLD MANUAL: 1 % (ref 0–4)
ERYTHROCYTE [DISTWIDTH] IN BLOOD BY AUTOMATED COUNT: 14.5 % (ref 12–15)
GFR NON-AFRICAN AMERICAN: 10
GFR SERPL CREATININE-BSD FRML MDRD: 11 ML/MIN/1.73
GLOBULIN UR ELPH-MCNC: 3.2 GM/DL
GLUCOSE BLD-MCNC: 162 MG/DL (ref 70–100)
GLUCOSE BLD-MCNC: 202 MG/DL (ref 70–99)
GLUCOSE BLDC GLUCOMTR-MCNC: 147 MG/DL (ref 70–130)
HCT VFR BLD AUTO: 32.5 % (ref 40–52)
HEMOGLOBIN: 13.3 GM/DL (ref 12–18)
HGB BLD-MCNC: 11.3 G/DL (ref 14–18)
HOLD SPECIMEN: NORMAL
HOLD SPECIMEN: NORMAL
LIPASE SERPL-CCNC: 15 U/L (ref 23–203)
LYMPHOCYTES # BLD MANUAL: 0.35 10*3/MM3 (ref 0.72–4.86)
LYMPHOCYTES NFR BLD MANUAL: 2 % (ref 15–45)
LYMPHOCYTES NFR BLD MANUAL: 2 % (ref 4–12)
MAGNESIUM SERPL-MCNC: 1.9 MG/DL (ref 1.4–2.2)
MCH RBC QN AUTO: 28.1 PG (ref 28–32)
MCHC RBC AUTO-ENTMCNC: 34.8 G/DL (ref 33–36)
MCV RBC AUTO: 80.8 FL (ref 82–95)
METAMYELOCYTES NFR BLD MANUAL: 3 % (ref 0–0)
MONOCYTES # BLD AUTO: 0.35 10*3/MM3 (ref 0.19–1.3)
NEUTROPHILS # BLD AUTO: 15.87 10*3/MM3 (ref 1.87–8.4)
NEUTROPHILS NFR BLD MANUAL: 67 % (ref 39–78)
NEUTS BAND NFR BLD MANUAL: 25 % (ref 0–10)
NEUTS VAC BLD QL SMEAR: ABNORMAL
PERFORMED ON: ABNORMAL
PHOSPHATE SERPL-MCNC: 3.5 MG/DL (ref 2.5–4.5)
PLATELET # BLD AUTO: 204 10*3/MM3 (ref 130–400)
PMV BLD AUTO: 10.6 FL (ref 6–12)
POC ANION GAP: 13
POC BUN: 76 MG/DL (ref 7–18)
POC CHLORIDE: 98 MEQ/L (ref 99–110)
POC CREATININE: 5.8 MG/DL (ref 0.3–1.3)
POC HEMATOCRIT: 39 % (ref 37–52)
POC POTASSIUM: 3.5 MEQ/L (ref 3.5–5.1)
POC SODIUM: 133 MEQ/L (ref 136–145)
POTASSIUM BLD-SCNC: 3.3 MMOL/L (ref 3.5–5.3)
PROCALCITONIN SERPL-MCNC: 39.11 NG/ML
PROT SERPL-MCNC: 6.8 G/DL (ref 6.3–8.7)
RBC # BLD AUTO: 4.02 10*6/MM3 (ref 4.8–5.9)
SCAN SLIDE: NORMAL
SMALL PLATELETS BLD QL SMEAR: ADEQUATE
SODIUM BLD-SCNC: 134 MMOL/L (ref 135–145)
WBC NRBC COR # BLD: 17.25 10*3/MM3 (ref 4.8–10.8)
WHOLE BLOOD HOLD SPECIMEN: NORMAL
WHOLE BLOOD HOLD SPECIMEN: NORMAL

## 2017-11-30 PROCEDURE — 87186 SC STD MICRODIL/AGAR DIL: CPT | Performed by: FAMILY MEDICINE

## 2017-11-30 PROCEDURE — 82962 GLUCOSE BLOOD TEST: CPT

## 2017-11-30 PROCEDURE — 83735 ASSAY OF MAGNESIUM: CPT | Performed by: FAMILY MEDICINE

## 2017-11-30 PROCEDURE — 83605 ASSAY OF LACTIC ACID: CPT | Performed by: FAMILY MEDICINE

## 2017-11-30 PROCEDURE — 80053 COMPREHEN METABOLIC PANEL: CPT | Performed by: EMERGENCY MEDICINE

## 2017-11-30 PROCEDURE — G8427 DOCREV CUR MEDS BY ELIG CLIN: HCPCS | Performed by: NURSE PRACTITIONER

## 2017-11-30 PROCEDURE — 83605 ASSAY OF LACTIC ACID: CPT | Performed by: INTERNAL MEDICINE

## 2017-11-30 PROCEDURE — 1036F TOBACCO NON-USER: CPT | Performed by: NURSE PRACTITIONER

## 2017-11-30 PROCEDURE — 85025 COMPLETE CBC W/AUTO DIFF WBC: CPT | Performed by: EMERGENCY MEDICINE

## 2017-11-30 PROCEDURE — 99215 OFFICE O/P EST HI 40 MIN: CPT | Performed by: NURSE PRACTITIONER

## 2017-11-30 PROCEDURE — 87150 DNA/RNA AMPLIFIED PROBE: CPT | Performed by: FAMILY MEDICINE

## 2017-11-30 PROCEDURE — 85007 BL SMEAR W/DIFF WBC COUNT: CPT | Performed by: EMERGENCY MEDICINE

## 2017-11-30 PROCEDURE — 84145 PROCALCITONIN (PCT): CPT | Performed by: FAMILY MEDICINE

## 2017-11-30 PROCEDURE — 25010000002 CEFTRIAXONE PER 250 MG: Performed by: FAMILY MEDICINE

## 2017-11-30 PROCEDURE — 3044F HG A1C LEVEL LT 7.0%: CPT | Performed by: NURSE PRACTITIONER

## 2017-11-30 PROCEDURE — 1123F ACP DISCUSS/DSCN MKR DOCD: CPT | Performed by: NURSE PRACTITIONER

## 2017-11-30 PROCEDURE — 84100 ASSAY OF PHOSPHORUS: CPT | Performed by: FAMILY MEDICINE

## 2017-11-30 PROCEDURE — 96360 HYDRATION IV INFUSION INIT: CPT | Performed by: NURSE PRACTITIONER

## 2017-11-30 PROCEDURE — G8484 FLU IMMUNIZE NO ADMIN: HCPCS | Performed by: NURSE PRACTITIONER

## 2017-11-30 PROCEDURE — G8417 CALC BMI ABV UP PARAM F/U: HCPCS | Performed by: NURSE PRACTITIONER

## 2017-11-30 PROCEDURE — 99283 EMERGENCY DEPT VISIT LOW MDM: CPT

## 2017-11-30 PROCEDURE — 87040 BLOOD CULTURE FOR BACTERIA: CPT | Performed by: FAMILY MEDICINE

## 2017-11-30 PROCEDURE — 80047 BASIC METABLC PNL IONIZED CA: CPT | Performed by: NURSE PRACTITIONER

## 2017-11-30 PROCEDURE — 74176 CT ABD & PELVIS W/O CONTRAST: CPT

## 2017-11-30 PROCEDURE — 25010000002 ENOXAPARIN PER 10 MG: Performed by: FAMILY MEDICINE

## 2017-11-30 PROCEDURE — 4040F PNEUMOC VAC/ADMIN/RCVD: CPT | Performed by: NURSE PRACTITIONER

## 2017-11-30 PROCEDURE — 3017F COLORECTAL CA SCREEN DOC REV: CPT | Performed by: NURSE PRACTITIONER

## 2017-11-30 PROCEDURE — 83690 ASSAY OF LIPASE: CPT | Performed by: EMERGENCY MEDICINE

## 2017-11-30 RX ORDER — HYDROCODONE BITARTRATE AND ACETAMINOPHEN 5; 325 MG/1; MG/1
1 TABLET ORAL EVERY 4 HOURS PRN
Status: DISCONTINUED | OUTPATIENT
Start: 2017-11-30 | End: 2017-12-05 | Stop reason: HOSPADM

## 2017-11-30 RX ORDER — ALUMINA, MAGNESIA, AND SIMETHICONE 2400; 2400; 240 MG/30ML; MG/30ML; MG/30ML
15 SUSPENSION ORAL EVERY 6 HOURS PRN
Status: DISCONTINUED | OUTPATIENT
Start: 2017-11-30 | End: 2017-12-05 | Stop reason: HOSPADM

## 2017-11-30 RX ORDER — LEVOTHYROXINE SODIUM 0.2 MG/1
200 TABLET ORAL
Status: DISCONTINUED | OUTPATIENT
Start: 2017-12-01 | End: 2017-12-05 | Stop reason: HOSPADM

## 2017-11-30 RX ORDER — NICOTINE POLACRILEX 4 MG
15 LOZENGE BUCCAL
Status: DISCONTINUED | OUTPATIENT
Start: 2017-11-30 | End: 2017-12-05 | Stop reason: HOSPADM

## 2017-11-30 RX ORDER — TRAZODONE HYDROCHLORIDE 50 MG/1
50 TABLET ORAL NIGHTLY
Status: DISCONTINUED | OUTPATIENT
Start: 2017-11-30 | End: 2017-12-01

## 2017-11-30 RX ORDER — HYDROCODONE BITARTRATE AND ACETAMINOPHEN 10; 325 MG/1; MG/1
1 TABLET ORAL EVERY 4 HOURS PRN
Status: DISCONTINUED | OUTPATIENT
Start: 2017-11-30 | End: 2017-12-05 | Stop reason: HOSPADM

## 2017-11-30 RX ORDER — SODIUM CHLORIDE 0.9 % (FLUSH) 0.9 %
1-10 SYRINGE (ML) INJECTION AS NEEDED
Status: DISCONTINUED | OUTPATIENT
Start: 2017-11-30 | End: 2017-12-05 | Stop reason: HOSPADM

## 2017-11-30 RX ORDER — SODIUM CHLORIDE 9 MG/ML
60 INJECTION, SOLUTION INTRAVENOUS CONTINUOUS
Status: DISCONTINUED | OUTPATIENT
Start: 2017-11-30 | End: 2017-12-05 | Stop reason: HOSPADM

## 2017-11-30 RX ORDER — ACETAMINOPHEN 325 MG/1
650 TABLET ORAL EVERY 4 HOURS PRN
Status: DISCONTINUED | OUTPATIENT
Start: 2017-11-30 | End: 2017-12-05 | Stop reason: HOSPADM

## 2017-11-30 RX ORDER — SODIUM CHLORIDE 0.9 % (FLUSH) 0.9 %
10 SYRINGE (ML) INJECTION AS NEEDED
Status: DISCONTINUED | OUTPATIENT
Start: 2017-11-30 | End: 2017-12-05 | Stop reason: HOSPADM

## 2017-11-30 RX ORDER — ONDANSETRON 2 MG/ML
4 INJECTION INTRAMUSCULAR; INTRAVENOUS EVERY 6 HOURS PRN
Status: DISCONTINUED | OUTPATIENT
Start: 2017-11-30 | End: 2017-12-05 | Stop reason: HOSPADM

## 2017-11-30 RX ORDER — ATORVASTATIN CALCIUM 10 MG/1
20 TABLET, FILM COATED ORAL NIGHTLY
Status: DISCONTINUED | OUTPATIENT
Start: 2017-11-30 | End: 2017-12-05 | Stop reason: HOSPADM

## 2017-11-30 RX ORDER — DEXTROSE MONOHYDRATE 25 G/50ML
25 INJECTION, SOLUTION INTRAVENOUS
Status: DISCONTINUED | OUTPATIENT
Start: 2017-11-30 | End: 2017-12-05 | Stop reason: HOSPADM

## 2017-11-30 RX ORDER — SODIUM CHLORIDE 9 MG/ML
125 INJECTION, SOLUTION INTRAVENOUS CONTINUOUS
Status: DISCONTINUED | OUTPATIENT
Start: 2017-11-30 | End: 2017-12-01 | Stop reason: SDUPTHER

## 2017-11-30 RX ADMIN — SODIUM CHLORIDE 1000 ML: 9 INJECTION, SOLUTION INTRAVENOUS at 23:00

## 2017-11-30 RX ADMIN — ATORVASTATIN CALCIUM 20 MG: 10 TABLET, FILM COATED ORAL at 21:35

## 2017-11-30 RX ADMIN — ENOXAPARIN SODIUM 30 MG: 30 INJECTION SUBCUTANEOUS at 21:49

## 2017-11-30 RX ADMIN — SODIUM CHLORIDE 125 ML/HR: 9 INJECTION, SOLUTION INTRAVENOUS at 21:36

## 2017-11-30 RX ADMIN — CEFTRIAXONE SODIUM 1 G: 1 INJECTION, POWDER, FOR SOLUTION INTRAMUSCULAR; INTRAVENOUS at 21:34

## 2017-11-30 RX ADMIN — TRAZODONE HYDROCHLORIDE 50 MG: 50 TABLET ORAL at 21:35

## 2017-11-30 RX ADMIN — LINAGLIPTIN 5 MG: 5 TABLET, FILM COATED ORAL at 21:35

## 2017-11-30 RX ADMIN — SODIUM CHLORIDE 1000 ML: 9 INJECTION, SOLUTION INTRAVENOUS at 17:29

## 2017-11-30 ASSESSMENT — ENCOUNTER SYMPTOMS
VOMITING: 0
ABDOMINAL PAIN: 1
SORE THROAT: 0
TROUBLE SWALLOWING: 0
DIARRHEA: 1
CONSTIPATION: 0
NAUSEA: 1
SHORTNESS OF BREATH: 0
COUGH: 0
RHINORRHEA: 0

## 2017-11-30 NOTE — PROGRESS NOTES
Type II or unspecified type diabetes mellitus without mention of complication, not stated as uncontrolled        Past Surgical History:   Procedure Laterality Date    CHOLECYSTECTOMY      PROSTATE SURGERY      Dr Alvino Ballard  08/01/2017    removal, dr Julia Ann      removed by Dr Merissa Cortes History   Substance Use Topics    Smoking status: Never Smoker    Smokeless tobacco: Never Used    Alcohol use No       Review of Systems   Constitutional: Positive for chills. Negative for activity change, appetite change, fatigue, fever and unexpected weight change. HENT: Negative for ear pain, rhinorrhea, sore throat and trouble swallowing. Eyes: Negative for visual disturbance. Respiratory: Negative for cough and shortness of breath. Cardiovascular: Negative for chest pain, palpitations and leg swelling. Gastrointestinal: Positive for abdominal pain, diarrhea and nausea. Negative for constipation and vomiting. Genitourinary: Negative for flank pain. Musculoskeletal: Negative for arthralgias, myalgias, neck pain and neck stiffness. Neurological: Negative for headaches. Psychiatric/Behavioral: Negative for decreased concentration and sleep disturbance. The patient is not nervous/anxious. Physical Exam   Constitutional: He is oriented to person, place, and time. He appears well-developed and well-nourished. HENT:   Head: Normocephalic and atraumatic. Mouth/Throat: Mucous membranes are dry. Neck: Normal range of motion. Neck supple. Cardiovascular: Regular rhythm, normal heart sounds and intact distal pulses. Tachycardia present. Pulmonary/Chest: Effort normal and breath sounds normal.   Abdominal: Soft. There is tenderness. There is rebound and guarding. Musculoskeletal: Normal range of motion. Neurological: He is alert and oriented to person, place, and time. Skin: Skin is warm and dry. Psychiatric: He has a normal mood and affect.

## 2017-12-01 LAB
AMORPH URATE CRY URNS QL MICRO: ABNORMAL /HPF
ANION GAP SERPL CALCULATED.3IONS-SCNC: 9 MMOL/L (ref 4–13)
BACTERIA BLD CULT: ABNORMAL
BACTERIA UR QL AUTO: ABNORMAL /HPF
BILIRUB UR QL STRIP: NEGATIVE
BUN BLD-MCNC: 71 MG/DL (ref 5–21)
BUN/CREAT SERPL: 17.2 (ref 7–25)
CALCIUM SPEC-SCNC: 7.2 MG/DL (ref 8.4–10.4)
CHLORIDE SERPL-SCNC: 106 MMOL/L (ref 98–110)
CLARITY UR: ABNORMAL
CO2 SERPL-SCNC: 21 MMOL/L (ref 24–31)
COLOR UR: YELLOW
CREAT BLD-MCNC: 4.13 MG/DL (ref 0.5–1.4)
DEPRECATED RDW RBC AUTO: 42.6 FL (ref 40–54)
EOSINOPHIL # BLD MANUAL: 0.12 10*3/MM3 (ref 0–0.7)
EOSINOPHIL NFR BLD MANUAL: 1 % (ref 0–4)
ERYTHROCYTE [DISTWIDTH] IN BLOOD BY AUTOMATED COUNT: 14.5 % (ref 12–15)
GFR SERPL CREATININE-BSD FRML MDRD: 14 ML/MIN/1.73
GLUCOSE BLD-MCNC: 142 MG/DL (ref 70–100)
GLUCOSE BLDC GLUCOMTR-MCNC: 123 MG/DL (ref 70–130)
GLUCOSE BLDC GLUCOMTR-MCNC: 135 MG/DL (ref 70–130)
GLUCOSE BLDC GLUCOMTR-MCNC: 147 MG/DL (ref 70–130)
GLUCOSE BLDC GLUCOMTR-MCNC: 163 MG/DL (ref 70–130)
GLUCOSE UR STRIP-MCNC: NEGATIVE MG/DL
HCT VFR BLD AUTO: 28 % (ref 40–52)
HGB BLD-MCNC: 9.7 G/DL (ref 14–18)
HGB UR QL STRIP.AUTO: ABNORMAL
HYALINE CASTS UR QL AUTO: ABNORMAL /LPF
KETONES UR QL STRIP: NEGATIVE
LEUKOCYTE ESTERASE UR QL STRIP.AUTO: ABNORMAL
LYMPHOCYTES # BLD MANUAL: 0.25 10*3/MM3 (ref 0.72–4.86)
LYMPHOCYTES NFR BLD MANUAL: 2 % (ref 15–45)
LYMPHOCYTES NFR BLD MANUAL: 2 % (ref 4–12)
MAGNESIUM SERPL-MCNC: 1.8 MG/DL (ref 1.4–2.2)
MCH RBC QN AUTO: 28 PG (ref 28–32)
MCHC RBC AUTO-ENTMCNC: 34.6 G/DL (ref 33–36)
MCV RBC AUTO: 80.7 FL (ref 82–95)
MONOCYTES # BLD AUTO: 0.25 10*3/MM3 (ref 0.19–1.3)
NEUTROPHILS # BLD AUTO: 11.68 10*3/MM3 (ref 1.87–8.4)
NEUTROPHILS NFR BLD MANUAL: 89 % (ref 39–78)
NEUTS BAND NFR BLD MANUAL: 6 % (ref 0–10)
NITRITE UR QL STRIP: NEGATIVE
PH UR STRIP.AUTO: <=5 [PH] (ref 5–8)
PHOSPHATE SERPL-MCNC: 3.2 MG/DL (ref 2.5–4.5)
PLAT MORPH BLD: NORMAL
PLATELET # BLD AUTO: 162 10*3/MM3 (ref 130–400)
PMV BLD AUTO: 10.5 FL (ref 6–12)
POIKILOCYTOSIS BLD QL SMEAR: ABNORMAL
POTASSIUM BLD-SCNC: 3.2 MMOL/L (ref 3.5–5.3)
PROT UR QL STRIP: ABNORMAL
RBC # BLD AUTO: 3.47 10*6/MM3 (ref 4.8–5.9)
RBC # UR: ABNORMAL /HPF
REF LAB TEST METHOD: ABNORMAL
SCAN SLIDE: NORMAL
SODIUM BLD-SCNC: 136 MMOL/L (ref 135–145)
SP GR UR STRIP: 1.02 (ref 1–1.03)
SQUAMOUS #/AREA URNS HPF: ABNORMAL /HPF
UROBILINOGEN UR QL STRIP: ABNORMAL
WBC MORPH BLD: NORMAL
WBC NRBC COR # BLD: 12.29 10*3/MM3 (ref 4.8–10.8)
WBC UR QL AUTO: ABNORMAL /HPF

## 2017-12-01 PROCEDURE — 25010000002 ENOXAPARIN PER 10 MG: Performed by: FAMILY MEDICINE

## 2017-12-01 PROCEDURE — 99222 1ST HOSP IP/OBS MODERATE 55: CPT | Performed by: UROLOGY

## 2017-12-01 PROCEDURE — 80048 BASIC METABOLIC PNL TOTAL CA: CPT | Performed by: FAMILY MEDICINE

## 2017-12-01 PROCEDURE — 85007 BL SMEAR W/DIFF WBC COUNT: CPT | Performed by: FAMILY MEDICINE

## 2017-12-01 PROCEDURE — 82962 GLUCOSE BLOOD TEST: CPT

## 2017-12-01 PROCEDURE — 87086 URINE CULTURE/COLONY COUNT: CPT | Performed by: FAMILY MEDICINE

## 2017-12-01 PROCEDURE — 84100 ASSAY OF PHOSPHORUS: CPT | Performed by: FAMILY MEDICINE

## 2017-12-01 PROCEDURE — 85025 COMPLETE CBC W/AUTO DIFF WBC: CPT | Performed by: FAMILY MEDICINE

## 2017-12-01 PROCEDURE — 83735 ASSAY OF MAGNESIUM: CPT | Performed by: FAMILY MEDICINE

## 2017-12-01 PROCEDURE — 81001 URINALYSIS AUTO W/SCOPE: CPT | Performed by: FAMILY MEDICINE

## 2017-12-01 PROCEDURE — 25010000002 CEFTRIAXONE PER 250 MG: Performed by: FAMILY MEDICINE

## 2017-12-01 PROCEDURE — 63710000001 INSULIN LISPRO (HUMAN) PER 5 UNITS: Performed by: FAMILY MEDICINE

## 2017-12-01 RX ORDER — TRAZODONE HYDROCHLORIDE 50 MG/1
50 TABLET ORAL NIGHTLY PRN
Status: DISCONTINUED | OUTPATIENT
Start: 2017-12-01 | End: 2017-12-05 | Stop reason: HOSPADM

## 2017-12-01 RX ORDER — POTASSIUM CHLORIDE 20 MEQ/1
20 TABLET, EXTENDED RELEASE ORAL ONCE
Status: DISCONTINUED | OUTPATIENT
Start: 2017-12-01 | End: 2017-12-01 | Stop reason: CLARIF

## 2017-12-01 RX ORDER — TRAZODONE HYDROCHLORIDE 50 MG/1
50 TABLET ORAL NIGHTLY PRN
Status: DISCONTINUED | OUTPATIENT
Start: 2017-12-01 | End: 2017-12-01 | Stop reason: SDUPTHER

## 2017-12-01 RX ORDER — POTASSIUM CHLORIDE 750 MG/1
20 CAPSULE, EXTENDED RELEASE ORAL ONCE
Status: COMPLETED | OUTPATIENT
Start: 2017-12-01 | End: 2017-12-01

## 2017-12-01 RX ADMIN — CEFTRIAXONE SODIUM 1 G: 1 INJECTION, POWDER, FOR SOLUTION INTRAMUSCULAR; INTRAVENOUS at 21:12

## 2017-12-01 RX ADMIN — INSULIN LISPRO 2 UNITS: 100 INJECTION, SOLUTION INTRAVENOUS; SUBCUTANEOUS at 21:13

## 2017-12-01 RX ADMIN — SODIUM CHLORIDE 125 ML/HR: 9 INJECTION, SOLUTION INTRAVENOUS at 09:59

## 2017-12-01 RX ADMIN — TRAZODONE HYDROCHLORIDE 50 MG: 50 TABLET ORAL at 21:12

## 2017-12-01 RX ADMIN — POTASSIUM CHLORIDE 20 MEQ: 750 CAPSULE, EXTENDED RELEASE ORAL at 22:46

## 2017-12-01 RX ADMIN — LEVOTHYROXINE SODIUM 200 MCG: 200 TABLET ORAL at 06:38

## 2017-12-01 RX ADMIN — LINAGLIPTIN 5 MG: 5 TABLET, FILM COATED ORAL at 08:11

## 2017-12-01 RX ADMIN — ENOXAPARIN SODIUM 30 MG: 30 INJECTION SUBCUTANEOUS at 21:12

## 2017-12-01 RX ADMIN — SODIUM CHLORIDE 125 ML/HR: 9 INJECTION, SOLUTION INTRAVENOUS at 17:34

## 2017-12-01 RX ADMIN — ATORVASTATIN CALCIUM 20 MG: 10 TABLET, FILM COATED ORAL at 21:11

## 2017-12-01 RX ADMIN — HYDROCODONE BITARTRATE AND ACETAMINOPHEN 1 TABLET: 10; 325 TABLET ORAL at 21:12

## 2017-12-01 NOTE — H&P
[facility] Medicine Services  HISTORY AND PHYSICAL    Date of Admission: 11/30/2017  Primary Care Physician: LUIS Souza    Subjective     Chief Complaint:   Fatigue, lower abdominal discomfort and diarrhea    History of Present Illness  This 67-year-old white male is admitted with a chief complaint of fatigue, lower abdominal discomfort and diarrhea.  Patient states that he has been having the aforementioned symptoms for the past 3 or 4 days with symptoms worsening over that period of time.  He went to his primary care physician's office today for evaluation.  Laboratory obtained at that office showed evidence of acute renal failure with an elevated BUN and creatinine the patient received 1 L of IV normal saline at that office and was sent to the emergency department for further evaluation.  The patient states that he thought that he was constipated initially and took laxatives on Tuesday with resulting frequent diarrhea 4-5 times daily since that time.   Patient states that he has had no appetite since that time and has had intermittent blurry vision.  The patient also notes a decrease in urinary frequency over the past 2 or 3 days.  He also states that he has been having nightly chills and cold sweats.    The patient has a history of prostate cancer with laparoscopic assisted prostatectomy performed in August of this year.      Review of Systems   Constitutional: Positive for activity change, appetite change (anorexia), chills, fatigue and fever.   HENT: Negative.    Eyes: Negative.    Respiratory: Negative.    Cardiovascular: Negative.    Gastrointestinal: Positive for abdominal pain and diarrhea.   Endocrine: Negative.    Genitourinary: Positive for decreased urine volume and flank pain.   Skin: Negative.    Allergic/Immunologic: Negative.    Neurological: Positive for weakness.   Hematological: Negative.    Psychiatric/Behavioral: Negative.        Otherwise complete ROS reviewed and negative except as mentioned in the HPI.    Past Medical History:     Past Medical History:   Diagnosis Date   • Arthritis    • Cancer     thyroid   • Diabetes    • Disease of thyroid gland    • Hypercholesteremia    • Hypertension    • IBS (irritable bowel syndrome)    • Prostate cancer    • Sleep apnea     NON COMPLIANT WITH C PAP       Past Surgical History:  Past Surgical History:   Procedure Laterality Date   • CHOLECYSTECTOMY     • COLONOSCOPY N/A 3/7/2017    Procedure: COLONOSCOPY WITH ANESTHESIA;  Surgeon: Hector Alvarenga MD;  Location:  [facility] ENDOSCOPY;  Service:    • LAPAROSCOPIC RETROPUBIC PROSTATECTOMY     • PROSTATE SURGERY     • PROSTATECTOMY N/A 8/1/2017    Procedure: PROSTATECTOMY LAPAROSCOPIC WITH DAVINCI SI ROBOT ;  Surgeon: Hernesto Hong MD;  Location:  [facility] OR;  Service:    • THYROID SURGERY      RADIATION THERAPY AFTER SURGERY       Family History:   family history includes Cancer in his father; Diabetes in his sister; Heart disease in his mother; Prostate cancer in his father. There is no history of Colon cancer or Colon polyps.    Social History:    reports that he has never smoked. He has never used smokeless tobacco. He reports that he drinks alcohol. He reports that he does not use illicit drugs.    Medications:  Prior to Admission medications    Medication Sig Start Date End Date Taking? Authorizing Provider   atorvastatin (LIPITOR) 10 MG tablet Take 20 mg by mouth Daily.   Yes Historical Provider, MD   levothyroxine (SYNTHROID, LEVOTHROID) 200 MCG tablet Take 200 mcg by mouth Daily.   Yes Historical Provider, MD   lisinopril (PRINIVIL,ZESTRIL) 40 MG tablet Take 40 mg by mouth Daily.   Yes Historical Provider, MD   meloxicam (MOBIC) 7.5 MG tablet Take 7.5 mg by mouth Daily.   Yes Historical Provider, MD   SITagliptin (JANUVIA) 100 MG tablet Take 100 mg by mouth Daily.   Yes Historical Provider, MD   traZODone (DESYREL) 50 MG tablet Take 50 mg by  "mouth Every Night.   Yes Historical Provider, MD       Allergies:  No Known Allergies    Objective     Vital Signs:   /60  Pulse 102  Temp 98.3 °F (36.8 °C)  Resp 17  Ht 71\" (180.3 cm)  Wt 214 lb (97.1 kg)  SpO2 97%  BMI 29.85 kg/m2    Physical Exam   Constitutional: He is oriented to person, place, and time. He appears well-developed and well-nourished. He is cooperative. He appears ill. No distress.   HENT:   Head: Normocephalic and atraumatic.   Right Ear: External ear normal.   Left Ear: External ear normal.   Nose: Nose normal.   Mouth/Throat: Oropharynx is clear and moist.   Eyes: Conjunctivae and EOM are normal. Pupils are equal, round, and reactive to light. No scleral icterus.   Neck: Normal range of motion. Neck supple. No JVD present. No tracheal deviation present. No thyromegaly present.   Cardiovascular: Normal rate, regular rhythm, normal heart sounds and intact distal pulses.    No murmur heard.  Pulmonary/Chest: Effort normal and breath sounds normal. No respiratory distress. He has no wheezes. He has no rales.   Abdominal: Soft. Bowel sounds are normal. He exhibits no distension. There is tenderness (right and left lower quadrants). There is rebound. There is no guarding.   Musculoskeletal: Normal range of motion. He exhibits no edema or tenderness.   Neurological: He is alert and oriented to person, place, and time. He has normal reflexes. No cranial nerve deficit. Coordination normal.   Skin: Skin is warm and dry. No erythema. No pallor.   Psychiatric: He has a normal mood and affect. His behavior is normal. Judgment and thought content normal.     Results Reviewed:  Lab Results (last 24 hours)     Procedure Component Value Units Date/Time    Scan Slide [[id number]] Collected:  11/30/17 1729    Specimen:  Blood Updated:  11/30/17 1755    CBC Auto Differential [[id number]]  (Abnormal) Collected:  11/30/17 1729    Specimen:  Blood Updated:  11/30/17 1758     WBC 17.25 (H) 10*3/mm3      " RBC 4.02 (L) 10*6/mm3      Hemoglobin 11.3 (L) g/dL      Hematocrit 32.5 (L) %      MCV 80.8 (L) fL      MCH 28.1 pg      MCHC 34.8 g/dL      RDW 14.5 %      RDW-SD 42.9 fl      MPV 10.6 fL      Platelets 204 10*3/mm3      nRBC 0.0 /100 WBC     Comprehensive Metabolic Panel [[id number]]  (Abnormal) Collected:  11/30/17 1729    Specimen:  Blood Updated:  11/30/17 1804     Glucose 162 (H) mg/dL      BUN 78 (H) mg/dL      Creatinine 5.15 (H) mg/dL      Sodium 134 (L) mmol/L      Potassium 3.3 (L) mmol/L      Chloride 100 mmol/L      CO2 21.0 (L) mmol/L      Calcium 8.1 (L) mg/dL      Total Protein 6.8 g/dL      Albumin 3.60 g/dL      ALT (SGPT) 45 U/L      AST (SGOT) 28 U/L      Alkaline Phosphatase 49 U/L      Total Bilirubin 0.7 mg/dL      eGFR Non African Amer 11 (L) mL/min/1.73      Globulin 3.2 gm/dL      A/G Ratio 1.1 g/dL      BUN/Creatinine Ratio 15.1     Anion Gap 13.0 mmol/L     Lipase [[id number]]  (Abnormal) Collected:  11/30/17 1729    Specimen:  Blood Updated:  11/30/17 1804     Lipase 15 (L) U/L     Manual Differential [[id number]] Collected:  11/30/17 1729    Specimen:  Blood Updated:  11/30/17 1848          Ct Abdomen Pelvis Without Contrast    Result Date: 11/30/2017  Narrative: EXAMINATION: CT ABDOMEN PELVIS WO CONTRAST- 11/30/2017 6:15 PM CST  HISTORY: History of prostate cancer with prostatectomy; bilateral flank pain  COMPARISON: CT abdomen and pelvis contrast 08/09/2017  DOSE: 1204 mGy-cm  TECHNIQUE: Sequential imaging was performed from the lung bases through the pubic symphysis without the use of IV contrast.  Sagittal and coronal reformations were made from the original source data and reviewed. Automated exposure control was also utilized to decrease patient radiation dose.  FINDINGS: Visualized heart appears normal in size. Coronary artery calcifications are present. Dependent changes are seen at the lung bases.  Evaluation is limited by a lack of IV contrast. Allowing for these limitations,  the liver, pancreas, and adrenal glands are normal in appearance. Gallbladder is surgically absent. Spleen is mildly enlarged, measuring up to 16.4 cm in length. A nonobstructive stone is seen in the left kidney measuring approximately 4 mm in size. There is mild left perinephric stranding with prominence of the left renal collecting system and left ureter to the level of the urinary bladder. No ureteral stones are identified. A stable hypodense lesion is seen in the right kidney, presumably a cyst. The right ureter appears decompressed.  The abdominal aorta appears normal in caliber. There are scattered atherosclerotic calcifications of the aorta and its branch vessels.  There is no appreciable central mesenteric lymphadenopathy. Numerous small lymph nodes are seen in the retroperitoneum in the para-aortic region, not pathologically enlarged but more prominent than on the most recent examination. There is mild retroperitoneal stranding.  Small hiatal hernia is suspected. The stomach and small bowel do not appear overly dilated. There are scattered colonic diverticula without evidence of diverticulitis. Large bowel otherwise appears grossly normal. The appendix appears normal. No free air is seen in the abdomen. There is a small fat-containing umbilical hernia. A midline abdominal wall scar is present.  The urinary bladder is completely decompressed and not well evaluated. Surgical clips are seen in the pelvis, compatible with recent prostatectomy. Free fluid is also noted in the pelvis. Inflammation extends along the left ureter to the level of the urinary bladder, with a large amount of inflammatory stranding in the left pelvic sidewall similar to the recent comparison exam.  Review of the visualized osseous structures demonstrates no acute or aggressive lesions.         Impression: 1. Increased left perinephric stranding as well as mild hydronephrosis and questionable left hydroureter to the level of the urinary  bladder. Additionally, lymph nodes in the retroperitoneum do not appear pathologically enlarged but are increased in size when compared to the most recent exam, and there is some associated retroperitoneal stranding. These findings are concerning for possible ascending infection. Correlate clinically. Free fluid in the pelvis may be reactive or postsurgical in nature. The urinary bladder is completely decompressed and not well evaluated. 2. Atherosclerotic disease. 3. Splenomegaly. 4. Nonobstructive left renal stone. 5. Colonic diverticulosis without evidence of diverticulitis.    This report was finalized on 11/30/2017 18:27 by Dr. Ta Camara MD.     I have personally reviewed and interpreted the radiology studies and ECG obtained at time of admission.     Assessment / Plan      Assessment & Plan  Hospital Problem List     * (Principal)Pyelonephritis    Acute renal failure    Leukocytosis    Diabetes    Hypertension    Disease of thyroid gland    History of prostate cancer          PLAN:   Admit to the medical surgical floor  Rocephin 1 g IV every 24 hours  IV fluids normal saline at 125 mL hour  Nephrology consult  Urology consult  Urinalysis with urine culture  Blood cultures ×2  Discontinue lisinopril and meloxicam  Sliding scale insulin    Code Status:   Full code  Surrogate decision-maker is the patient's daughter     I discussed the patients findings and my recommendations with: The patient and his daughter    Estimated length of stay: Unknown    Chino Sanders DO   11/30/17   6:54 PM

## 2017-12-01 NOTE — CONSULTS
Consults         Referring Provider: Tommy  Reason for Consultation: Left Pyelonephritis/Left Hydronephrosis    Chief complaint: Dehydration  Subjective .     History of present illness:  Abnormal radiographic finding   This patient presents with a possible abnormal finding of the left kidneyon CT that included abdominal cuts. This is a  new finding. This test was done 1 day(s) ago. Onset is sudden. This was done in context of evaluation for declining renal function. Symptoms include Flank pain  on the left.         Review of Systems  The following systems were reviewed and negative;  eyes, ENT, respiratory, integument, breast, hematologic / lymphatic, musculoskeletal, neurological, behavioral/psych, endocrine and allergies / immunologic     History  Past Medical History:   Diagnosis Date   • Arthritis    • Cancer     thyroid   • Diabetes    • Disease of thyroid gland    • Hypercholesteremia    • Hypertension    • IBS (irritable bowel syndrome)    • Prostate cancer    • Sleep apnea     NON COMPLIANT WITH C PAP       Past Surgical History:   Procedure Laterality Date   • CHOLECYSTECTOMY     • COLONOSCOPY N/A 3/7/2017    Procedure: COLONOSCOPY WITH ANESTHESIA;  Surgeon: Hector Alvraenga MD;  Location: [facility] ENDOSCOPY;  Service:    • LAPAROSCOPIC RETROPUBIC PROSTATECTOMY     • PROSTATE SURGERY     • PROSTATECTOMY N/A 8/1/2017    Procedure: PROSTATECTOMY LAPAROSCOPIC WITH DAVINCI SI ROBOT ;  Surgeon: Hernesto Hong MD;  Location: [facility] OR;  Service:    • THYROID SURGERY      RADIATION THERAPY AFTER SURGERY       Family History   Problem Relation Age of Onset   • Prostate cancer Father    • Cancer Father    • Heart disease Mother    • Diabetes Sister    • Colon cancer Neg Hx    • Colon polyps Neg Hx        Social History     Social History   • Marital status:      Spouse name: N/A   • Number of children: N/A   • Years of education: N/A     Occupational History   • Not on file.     Social History Main  Topics   • Smoking status: Never Smoker   • Smokeless tobacco: Never Used   • Alcohol use Yes      Comment: occ   • Drug use: No   • Sexual activity: Defer     Other Topics Concern   • Not on file     Social History Narrative       Current Facility-Administered Medications   Medication Dose Route Frequency Provider Last Rate Last Dose   • acetaminophen (TYLENOL) tablet 650 mg  650 mg Oral Q4H PRN Chino Sanders, DO       • aluminum-magnesium hydroxide-simethicone (MAALOX MAX) 400-400-40 MG/5ML suspension 15 mL  15 mL Oral Q6H PRN Chino Sanders DO       • atorvastatin (LIPITOR) tablet 20 mg  20 mg Oral Nightly Chino Sanders DO   20 mg at 11/30/17 2135   • cefTRIAXone (ROCEPHIN) 1 g/100 mL 0.9% NS (MBP)  1 g Intravenous Q24H Chino Sanders DO   Stopped at 11/30/17 2312   • dextrose (D50W) solution 25 g  25 g Intravenous Q15 Min PRN Chino Sanders, DO       • dextrose (GLUTOSE) oral gel 15 g  15 g Oral Q15 Min PRN Chino Sanders DO       • enoxaparin (LOVENOX) syringe 30 mg  30 mg Subcutaneous Q24H Chino Sanders, DO   30 mg at 11/30/17 2149   • glucagon (human recombinant) (GLUCAGEN DIAGNOSTIC) injection 1 mg  1 mg Subcutaneous Q15 Min PRN Chino Sanders DO       • HYDROcodone-acetaminophen (NORCO)  MG per tablet 1 tablet  1 tablet Oral Q4H PRN Chino Sanders DO       • HYDROcodone-acetaminophen (NORCO) 5-325 MG per tablet 1 tablet  1 tablet Oral Q4H PRN Chino Sanders DO       • insulin lispro (humaLOG) injection 2-7 Units  2-7 Units Subcutaneous 4x Daily With Meals & Nightly Chino Sanders DO       • levothyroxine (SYNTHROID, LEVOTHROID) tablet 200 mcg  200 mcg Oral Q AM Chino Sanders DO   200 mcg at 12/01/17 0638   • linagliptin (TRADJENTA) tablet 5 mg  5 mg Oral Daily Chino Sanders DO   5 mg at 12/01/17 0811   • ondansetron (ZOFRAN) injection 4 mg  4 mg Intravenous Q6H PRN Chino S Sanders, DO       • pneumococcal polysaccharide 23-valent  (PNEUMOVAX-23) vaccine 0.5 mL  0.5 mL Intramuscular During Hospitalization Chino Sanders, DO       • sodium chloride 0.9 % flush 1-10 mL  1-10 mL Intravenous PRN Chino Sanders DO       • sodium chloride 0.9 % flush 10 mL  10 mL Intravenous PRN Segun Coulter Jr., MD       • sodium chloride 0.9 % infusion  125 mL/hr Intravenous Continuous Segun Coulter Jr.,  mL/hr at 11/30/17 2136 125 mL/hr at 11/30/17 2136   • sodium chloride 0.9 % infusion  125 mL/hr Intravenous Continuous Chino Sanders  mL/hr at 12/01/17 0959 125 mL/hr at 12/01/17 0959   • traZODone (DESYREL) tablet 50 mg  50 mg Oral Nightly Chino Sanders, DO   50 mg at 11/30/17 2135        No Known Allergies    Objective     Vital Signs   Temp:  [97.2 °F (36.2 °C)-99.3 °F (37.4 °C)] 97.7 °F (36.5 °C)  Heart Rate:  [] 70  Resp:  [14-20] 20  BP: ()/(41-62) 113/62    Intake/Output Summary (Last 24 hours) at 12/01/17 1457  Last data filed at 12/01/17 1340   Gross per 24 hour   Intake             1360 ml   Output              500 ml   Net              860 ml       Physical Exam:     General Appearance:    Alert, cooperative, in no acute distress   Head:    Normocephalic, without obvious abnormality, atraumatic   Eyes:            Lids and lashes normal, conjunctivae and sclerae normal, no   icterus, no pallor, corneas clear, PERRLA   Ears:    Ears appear intact with no abnormalities noted   Throat:   No oral lesions, no thrush, oral mucosa moist   Neck:   No adenopathy, supple, trachea midline, no thyromegaly, no   carotid bruit, no JVD   Back:     No kyphosis present, no scoliosis present, no skin lesions,      erythema or scars, no tenderness to percussion or                   palpation,   range of motion normal   Lungs:     Unlabored respirations    Heart:    Regular rate   Chest Wall:    No abnormalities observed   Abdomen:     Normal bowel sounds, no masses, no organomegaly, soft        non-tender, non-distended,  no guarding, no rebound                Tenderness     Genitorurinary:   + L CVA tenderness   Extremities:   Moves all extremities well, no edema, no cyanosis, no             redness   Pulses:   Pulses palpable and equal bilaterally   Skin:   No bleeding, bruising or rash   Lymph nodes:   No palpable adenopathy   Neurologic:   Cranial nerves 2 - 12 grossly intact       Results Review:   I reviewed the patient's new clinical results.              Imaging Results (last 24 hours)     Procedure Component Value Units Date/Time    CT Abdomen Pelvis Without Contrast [[id number]] Collected:  11/30/17 1815     Updated:  11/30/17 1830    Narrative:       EXAMINATION: CT ABDOMEN PELVIS WO CONTRAST- 11/30/2017 6:15 PM CST     HISTORY: History of prostate cancer with prostatectomy; bilateral flank  pain     COMPARISON: CT abdomen and pelvis contrast 08/09/2017     DOSE: 1204 mGy-cm     TECHNIQUE: Sequential imaging was performed from the lung bases through  the pubic symphysis without the use of IV contrast.  Sagittal and  coronal reformations were made from the original source data and  reviewed. Automated exposure control was also utilized to decrease  patient radiation dose.     FINDINGS:   Visualized heart appears normal in size. Coronary artery calcifications  are present. Dependent changes are seen at the lung bases.     Evaluation is limited by a lack of IV contrast. Allowing for these  limitations, the liver, pancreas, and adrenal glands are normal in  appearance. Gallbladder is surgically absent. Spleen is mildly enlarged,  measuring up to 16.4 cm in length. A nonobstructive stone is seen in the  left kidney measuring approximately 4 mm in size. There is mild left  perinephric stranding with prominence of the left renal collecting  system and left ureter to the level of the urinary bladder. No ureteral  stones are identified. A stable hypodense lesion is seen in the right  kidney, presumably a cyst. The right ureter  appears decompressed.     The abdominal aorta appears normal in caliber. There are scattered  atherosclerotic calcifications of the aorta and its branch vessels.     There is no appreciable central mesenteric lymphadenopathy. Numerous  small lymph nodes are seen in the retroperitoneum in the para-aortic  region, not pathologically enlarged but more prominent than on the most  recent examination. There is mild retroperitoneal stranding.     Small hiatal hernia is suspected. The stomach and small bowel do not  appear overly dilated. There are scattered colonic diverticula without  evidence of diverticulitis. Large bowel otherwise appears grossly  normal. The appendix appears normal. No free air is seen in the abdomen.  There is a small fat-containing umbilical hernia. A midline abdominal  wall scar is present.     The urinary bladder is completely decompressed and not well evaluated.  Surgical clips are seen in the pelvis, compatible with recent  prostatectomy. Free fluid is also noted in the pelvis. Inflammation  extends along the left ureter to the level of the urinary bladder, with  a large amount of inflammatory stranding in the left pelvic sidewall  similar to the recent comparison exam.     Review of the visualized osseous structures demonstrates no acute or  aggressive lesions.           Impression:       1. Increased left perinephric stranding as well as mild hydronephrosis  and questionable left hydroureter to the level of the urinary bladder.  Additionally, lymph nodes in the retroperitoneum do not appear  pathologically enlarged but are increased in size when compared to the  most recent exam, and there is some associated retroperitoneal  stranding. These findings are concerning for possible ascending  infection. Correlate clinically. Free fluid in the pelvis may be  reactive or postsurgical in nature. The urinary bladder is completely  decompressed and not well evaluated.  2. Atherosclerotic disease.  3.  Splenomegaly.  4. Nonobstructive left renal stone.  5. Colonic diverticulosis without evidence of diverticulitis.           This report was finalized on 11/30/2017 18:27 by Dr. Ta Camara MD.        CT independent reivew    The CT scan of the abdomen/pelvis done with and without contrast is available for me to review.  Treatment recommendations require an independent review.  First I scanned the liver, spleen, and bowel pattern.  The retroperitoneum including the major vessels and lymphatic packages are briefly reviewed.  This film as been reviewed by the radiologist to determine any non urologic abnormalities that are present.  The kidneys are closely inspected for size, symmetry, contour, parenchymal thickness, perinephric reaction, presence of calcifications, and intrarenal dilation of the collecting system.  The ureters are inspected for their course, caliber, and any calcifications.  The bladder is inspected for its thickness, size, and presence of any calcifications.  This scan shows:    The right kidney appears normal on this contrasted CT scan.  The renal parenchymal is norml in thickness.  There are no solid masses or cysts.  There is no hydronephrosis.  There are no stones.      The left kidney appears + perinephric stranding with left hydronephrosis down to level of bladder    The bladder appears normal on this non-contrasted CT scan.  The bladder appears normal in thickness.  There no masses or stones seen on this exam.         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  His hospital course was prolonged with ileus, acute renal failure and anastomotic leak.  He presented with dehydration as well as left flank pain and was found have a creatinine  of 5.1 his creatinine has improved to 4.1 with hydration.  CT scan shows perinephric stranding on left with dilation of the ureter down to level of the bladder.  I believe this is from a decrease in peristalsis related to infection and does not require stent placement we will watch to make sure that his creatinine improves.  If it does not improve then he may require ureteral stent placement.  However I doubt that he will need this.  Continue culture specific antibiotics we will continue to follow along.  [place] will return on Monday.    I discussed the patients findings and my recommendations with patient    Crow Matos MD  12/01/17  2:57 PM

## 2017-12-01 NOTE — PROGRESS NOTES
[facility]  INPATIENT PROGRESS NOTE    Length of Stay: 1  Date of Admission: 11/30/2017  Primary Care Physician: LUIS Souza    Subjective     Chief Complaint:     Fatigue, low abdominal discomfort and diarrhea    HPI     The patient feels significantly improved today.  He feels very fatigued however and has only been out of bed to use the bathroom.  Renal function is improving.  White blood cell count has decreased into the 12,000 range.  He has very little appetite but has been drinking well.  Urology consultation is noted and appreciated.  Urine culture is positive for Escherichia coli.  Sensitivities are pending.    Review of Systems     All pertinent negatives and positives are as above. All other systems have been reviewed and are negative unless otherwise stated.     Objective    Temp:  [96.5 °F (35.8 °C)-99.3 °F (37.4 °C)] 96.5 °F (35.8 °C)  Heart Rate:  [] 84  Resp:  [14-20] 18  BP: ()/(41-92) 131/92    Lab Results (last 24 hours)     Procedure Component Value Units Date/Time    Comprehensive Metabolic Panel [[id number]]  (Abnormal) Collected:  11/30/17 1729    Specimen:  Blood Updated:  11/30/17 1804     Glucose 162 (H) mg/dL      BUN 78 (H) mg/dL      Creatinine 5.15 (H) mg/dL      Sodium 134 (L) mmol/L      Potassium 3.3 (L) mmol/L      Chloride 100 mmol/L      CO2 21.0 (L) mmol/L      Calcium 8.1 (L) mg/dL      Total Protein 6.8 g/dL      Albumin 3.60 g/dL      ALT (SGPT) 45 U/L      AST (SGOT) 28 U/L      Alkaline Phosphatase 49 U/L      Total Bilirubin 0.7 mg/dL      eGFR Non African Amer 11 (L) mL/min/1.73      Globulin 3.2 gm/dL      A/G Ratio 1.1 g/dL      BUN/Creatinine Ratio 15.1     Anion Gap 13.0 mmol/L     Lipase [[id number]]  (Abnormal) Collected:  11/30/17 1729    Specimen:  Blood Updated:  11/30/17 1804     Lipase 15 (L) U/L     CBC Auto Differential [[id number]]  (Abnormal) Collected:  11/30/17 1729     Specimen:  Blood Updated:  11/30/17 1857     WBC 17.25 (H) 10*3/mm3      RBC 4.02 (L) 10*6/mm3      Hemoglobin 11.3 (L) g/dL      Hematocrit 32.5 (L) %      MCV 80.8 (L) fL      MCH 28.1 pg      MCHC 34.8 g/dL      RDW 14.5 %      RDW-SD 42.9 fl      MPV 10.6 fL      Platelets 204 10*3/mm3     Manual Differential [[id number]]  (Abnormal) Collected:  11/30/17 1729    Specimen:  Blood Updated:  11/30/17 1857     Neutrophil % 67.0 %      Lymphocyte % 2.0 (L) %      Monocyte % 2.0 (L) %      Eosinophil % 1.0 %      Bands %  25.0 (H) %      Metamyelocyte % 3.0 (H) %      Neutrophils Absolute 15.87 (H) 10*3/mm3      Lymphocytes Absolute 0.35 (L) 10*3/mm3      Monocytes Absolute 0.35 10*3/mm3      Eosinophils Absolute 0.17 10*3/mm3      Elliptocytes Slight/1+     Vacuolated Neutrophils Slight/1+     Platelet Estimate Adequate    Magnesium [[id number]]  (Normal) Collected:  11/30/17 1729    Specimen:  Blood Updated:  11/30/17 2010     Magnesium 1.9 mg/dL     Phosphorus [[id number]]  (Normal) Collected:  11/30/17 1729    Specimen:  Blood Updated:  11/30/17 2010     Phosphorus 3.5 mg/dL     Procalcitonin [[id number]]  (Abnormal) Collected:  11/30/17 1729    Specimen:  Blood Updated:  11/30/17 2052     Procalcitonin 39.11 (H) ng/mL     Lactic Acid, Plasma [[id number]]  (Normal) Collected:  11/30/17 2013    Specimen:  Blood Updated:  11/30/17 2058     Lactate 1.7 mmol/L     POC Glucose Fingerstick [[id number]]  (Abnormal) Collected:  11/30/17 2049    Specimen:  Blood Updated:  11/30/17 2100     Glucose 147 (H) mg/dL       : [id number] Pedroza ID: [id number]       Lactic Acid, Plasma [[id number]]  (Normal) Collected:  11/30/17 2239    Specimen:  Blood Updated:  11/30/17 2302     Lactate 1.6 mmol/L     Urine Culture - Urine, Urine, Clean Catch [[id number]] Collected:  12/01/17 0051    Specimen:  Urine from Urine, Clean Catch Updated:  12/01/17 0054    Urinalysis With / Microscopic If Indicated - Urine, Clean Catch  [[id number]]  (Abnormal) Collected:  12/01/17 0051    Specimen:  Urine from Urine, Clean Catch Updated:  12/01/17 0113     Color, UA Yellow     Appearance, UA Cloudy (A)     pH, UA <=5.0     Specific Gravity, UA 1.017     Glucose, UA Negative     Ketones, UA Negative     Bilirubin, UA Negative     Blood, UA Moderate (2+) (A)     Protein,  mg/dL (2+) (A)     Leuk Esterase, UA Moderate (2+) (A)     Nitrite, UA Negative     Urobilinogen, UA 0.2 E.U./dL    Urinalysis, Microscopic Only - Urine, Clean Catch [[id number]]  (Abnormal) Collected:  12/01/17 0051    Specimen:  Urine from Urine, Clean Catch Updated:  12/01/17 0113     RBC, UA 21-30 (A) /HPF      WBC, UA 21-30 (A) /HPF      Bacteria, UA Trace (A) /HPF      Squamous Epithelial Cells, UA 3-6 (A) /HPF      Hyaline Casts, UA 3-6 /LPF      Amorphous Crystals, UA Small/1+ /HPF      Methodology Manual Light Microscopy    Basic Metabolic Panel [[id number]]  (Abnormal) Collected:  12/01/17 0416    Specimen:  Blood Updated:  12/01/17 0550     Glucose 142 (H) mg/dL      BUN 71 (H) mg/dL      Creatinine 4.13 (H) mg/dL      Sodium 136 mmol/L      Potassium 3.2 (L) mmol/L      Chloride 106 mmol/L      CO2 21.0 (L) mmol/L      Calcium 7.2 (L) mg/dL      eGFR Non African Amer 14 (L) mL/min/1.73      BUN/Creatinine Ratio 17.2     Anion Gap 9.0 mmol/L     Narrative:       GFR Normal >60  Chronic Kidney Disease <60  Kidney Failure <15    Magnesium [[id number]]  (Normal) Collected:  12/01/17 0416    Specimen:  Blood Updated:  12/01/17 0550     Magnesium 1.8 mg/dL     Phosphorus [[id number]]  (Normal) Collected:  12/01/17 0416    Specimen:  Blood Updated:  12/01/17 0550     Phosphorus 3.2 mg/dL     CBC Auto Differential [[id number]]  (Abnormal) Collected:  12/01/17 0416    Specimen:  Blood Updated:  12/01/17 0652     WBC 12.29 (H) 10*3/mm3      RBC 3.47 (L) 10*6/mm3      Hemoglobin 9.7 (L) g/dL      Hematocrit 28.0 (L) %      MCV 80.7 (L) fL      MCH 28.0 pg      MCHC 34.6 g/dL       RDW 14.5 %      RDW-SD 42.6 fl      MPV 10.5 fL      Platelets 162 10*3/mm3     Scan Slide [[id number]] Collected:  12/01/17 0416    Specimen:  Blood Updated:  12/01/17 0652     Scan Slide --      See Manual Differential Results       Manual Differential [[id number]]  (Abnormal) Collected:  12/01/17 0416    Specimen:  Blood Updated:  12/01/17 0652     Neutrophil % 89.0 (H) %      Lymphocyte % 2.0 (L) %      Monocyte % 2.0 (L) %      Eosinophil % 1.0 %      Bands %  6.0 %      Neutrophils Absolute 11.68 (H) 10*3/mm3      Lymphocytes Absolute 0.25 (L) 10*3/mm3      Monocytes Absolute 0.25 10*3/mm3      Eosinophils Absolute 0.12 10*3/mm3      Poikilocytes Slight/1+     WBC Morphology Normal     Platelet Morphology Normal    POC Glucose Fingerstick [[id number]]  (Abnormal) Collected:  12/01/17 0721    Specimen:  Blood Updated:  12/01/17 0734     Glucose 135 (H) mg/dL       : [id number] [employer] ID: [id number]       Blood Culture - Blood, [[id number]]  (Normal) Collected:  11/30/17 2013    Specimen:  Blood from Arm, Left Updated:  12/01/17 0901     Blood Culture No growth at less than 24 hours    Blood Culture - Blood, [[id number]]  (Abnormal) Collected:  11/30/17 2013    Specimen:  Blood from Arm, Right Updated:  12/01/17 1014     Blood Culture Abnormal Stain (A)      Gram Negative Bacilli (A)     Isolated from Pediatric Bottle     Gram Stain Result Gram negative bacilli    POC Glucose Fingerstick [[id number]]  (Abnormal) Collected:  12/01/17 1105    Specimen:  Blood Updated:  12/01/17 1118     Glucose 147 (H) mg/dL       : [id number] VeryLastRoomidMedprivÃ©eter ID: [id number]       Blood Culture ID, PCR - Blood, [[id number]]  (Abnormal) Collected:  11/30/17 2013    Specimen:  Blood from Arm, Right Updated:  12/01/17 1133     BCID, PCR Escherichia coli. Identification by BCID PCR. (C)          Imaging Results (last 24 hours)     Procedure Component Value Units Date/Time    CT Abdomen Pelvis Without Contrast [[id number]]  Collected:  11/30/17 1815     Updated:  11/30/17 1830    Narrative:       EXAMINATION: CT ABDOMEN PELVIS WO CONTRAST- 11/30/2017 6:15 PM CST     HISTORY: History of prostate cancer with prostatectomy; bilateral flank  pain     COMPARISON: CT abdomen and pelvis contrast 08/09/2017     DOSE: 1204 mGy-cm     TECHNIQUE: Sequential imaging was performed from the lung bases through  the pubic symphysis without the use of IV contrast.  Sagittal and  coronal reformations were made from the original source data and  reviewed. Automated exposure control was also utilized to decrease  patient radiation dose.     FINDINGS:   Visualized heart appears normal in size. Coronary artery calcifications  are present. Dependent changes are seen at the lung bases.     Evaluation is limited by a lack of IV contrast. Allowing for these  limitations, the liver, pancreas, and adrenal glands are normal in  appearance. Gallbladder is surgically absent. Spleen is mildly enlarged,  measuring up to 16.4 cm in length. A nonobstructive stone is seen in the  left kidney measuring approximately 4 mm in size. There is mild left  perinephric stranding with prominence of the left renal collecting  system and left ureter to the level of the urinary bladder. No ureteral  stones are identified. A stable hypodense lesion is seen in the right  kidney, presumably a cyst. The right ureter appears decompressed.     The abdominal aorta appears normal in caliber. There are scattered  atherosclerotic calcifications of the aorta and its branch vessels.     There is no appreciable central mesenteric lymphadenopathy. Numerous  small lymph nodes are seen in the retroperitoneum in the para-aortic  region, not pathologically enlarged but more prominent than on the most  recent examination. There is mild retroperitoneal stranding.     Small hiatal hernia is suspected. The stomach and small bowel do not  appear overly dilated. There are scattered colonic diverticula  without  evidence of diverticulitis. Large bowel otherwise appears grossly  normal. The appendix appears normal. No free air is seen in the abdomen.  There is a small fat-containing umbilical hernia. A midline abdominal  wall scar is present.     The urinary bladder is completely decompressed and not well evaluated.  Surgical clips are seen in the pelvis, compatible with recent  prostatectomy. Free fluid is also noted in the pelvis. Inflammation  extends along the left ureter to the level of the urinary bladder, with  a large amount of inflammatory stranding in the left pelvic sidewall  similar to the recent comparison exam.     Review of the visualized osseous structures demonstrates no acute or  aggressive lesions.           Impression:       1. Increased left perinephric stranding as well as mild hydronephrosis  and questionable left hydroureter to the level of the urinary bladder.  Additionally, lymph nodes in the retroperitoneum do not appear  pathologically enlarged but are increased in size when compared to the  most recent exam, and there is some associated retroperitoneal  stranding. These findings are concerning for possible ascending  infection. Correlate clinically. Free fluid in the pelvis may be  reactive or postsurgical in nature. The urinary bladder is completely  decompressed and not well evaluated.  2. Atherosclerotic disease.  3. Splenomegaly.  4. Nonobstructive left renal stone.  5. Colonic diverticulosis without evidence of diverticulitis.           This report was finalized on 11/30/2017 18:27 by Dr. Ta Camara MD.             Intake/Output Summary (Last 24 hours) at 12/01/17 1549  Last data filed at 12/01/17 1340   Gross per 24 hour   Intake             1360 ml   Output              500 ml   Net              860 ml       Physical Exam  Constitutional: He is oriented to person, place, and time. He appears well-developed and well-nourished. He is cooperative. He appears ill. No distress.    HENT:   Head: Normocephalic and atraumatic.   Right Ear: External ear normal.   Left Ear: External ear normal.   Nose: Nose normal.   Mouth/Throat: Oropharynx is clear and moist.   Eyes: Conjunctivae and EOM are normal. Pupils are equal, round, and reactive to light. No scleral icterus.   Neck: Normal range of motion. Neck supple. No JVD present. No tracheal deviation present. No thyromegaly present.   Cardiovascular: Normal rate, regular rhythm, normal heart sounds and intact distal pulses.    No murmur heard.  Pulmonary/Chest: Effort normal and breath sounds normal. No respiratory distress. He has no wheezes. He has no rales.   Abdominal: Soft. Bowel sounds are normal. He exhibits no distension. There is tenderness (right and left lower quadrants). There is rebound. There is no guarding.   Musculoskeletal: Normal range of motion. He exhibits no edema or tenderness.   Neurological: He is alert and oriented to person, place, and time. He has normal reflexes. No cranial nerve deficit. Coordination normal.   Skin: Skin is warm and dry. No erythema. No pallor.   Psychiatric: He has a normal mood and affect. His behavior is normal. Judgment and thought content normal.         Results Review:  I have reviewed the labs, radiology results, and diagnostic studies since my last progress note and made treatment changes reflective of the results.   I have reviewed the current medications.    Assessment/Plan     Hospital Problem List     * (Principal)Pyelonephritis    Acute renal failure    Leukocytosis    Diabetes    Hypertension    Disease of thyroid gland    History of prostate cancer          PLAN:  Continue IV antibiotics  Continue IV fluids  Continue serial labs    Chino Sanders DO   12/01/17   3:49 PM

## 2017-12-01 NOTE — PLAN OF CARE
Problem: Patient Care Overview (Adult)  Goal: Plan of Care Review  Outcome: Ongoing (interventions implemented as appropriate)    12/01/17 0328   Coping/Psychosocial Response Interventions   Plan Of Care Reviewed With patient;family   Patient Care Overview   Progress no change   Outcome Evaluation   Outcome Summary/Follow up Plan Pt rested well this shift, IV abx initiated, blood glucose level monitored. No c/o of pain voiced, will continue to monitor.        Goal: Adult Individualization and Mutuality  Outcome: Ongoing (interventions implemented as appropriate)  Goal: Discharge Needs Assessment  Outcome: Ongoing (interventions implemented as appropriate)    Problem: Infection, Risk/Actual (Adult)  Goal: Identify Related Risk Factors and Signs and Symptoms  Outcome: Ongoing (interventions implemented as appropriate)  Goal: Infection Prevention/Resolution  Outcome: Ongoing (interventions implemented as appropriate)    Problem: Diabetes, Type 2 (Adult)  Goal: Signs and Symptoms of Listed Potential Problems Will be Absent or Manageable (Diabetes, Type 2)  Outcome: Ongoing (interventions implemented as appropriate)

## 2017-12-02 PROBLEM — A41.51 SEPSIS DUE TO ESCHERICHIA COLI (HCC): Status: ACTIVE | Noted: 2017-12-02

## 2017-12-02 LAB
ANION GAP SERPL CALCULATED.3IONS-SCNC: 6 MMOL/L (ref 4–13)
BASOPHILS # BLD AUTO: 0.03 10*3/MM3 (ref 0–0.2)
BASOPHILS NFR BLD AUTO: 0.3 % (ref 0–2)
BUN BLD-MCNC: 57 MG/DL (ref 5–21)
BUN/CREAT SERPL: 20.3 (ref 7–25)
CALCIUM SPEC-SCNC: 7.7 MG/DL (ref 8.4–10.4)
CHLORIDE SERPL-SCNC: 108 MMOL/L (ref 98–110)
CO2 SERPL-SCNC: 24 MMOL/L (ref 24–31)
CREAT BLD-MCNC: 2.81 MG/DL (ref 0.5–1.4)
DEPRECATED RDW RBC AUTO: 44.2 FL (ref 40–54)
EOSINOPHIL # BLD AUTO: 0.12 10*3/MM3 (ref 0–0.7)
EOSINOPHIL NFR BLD AUTO: 1.1 % (ref 0–4)
ERYTHROCYTE [DISTWIDTH] IN BLOOD BY AUTOMATED COUNT: 14.9 % (ref 12–15)
GFR SERPL CREATININE-BSD FRML MDRD: 23 ML/MIN/1.73
GLUCOSE BLD-MCNC: 131 MG/DL (ref 70–100)
GLUCOSE BLDC GLUCOMTR-MCNC: 115 MG/DL (ref 70–130)
GLUCOSE BLDC GLUCOMTR-MCNC: 147 MG/DL (ref 70–130)
GLUCOSE BLDC GLUCOMTR-MCNC: 163 MG/DL (ref 70–130)
GLUCOSE BLDC GLUCOMTR-MCNC: 189 MG/DL (ref 70–130)
HCT VFR BLD AUTO: 29 % (ref 40–52)
HGB BLD-MCNC: 10.1 G/DL (ref 14–18)
IMM GRANULOCYTES # BLD: 0.05 10*3/MM3 (ref 0–0.03)
IMM GRANULOCYTES NFR BLD: 0.5 % (ref 0–5)
LYMPHOCYTES # BLD AUTO: 0.36 10*3/MM3 (ref 0.72–4.86)
LYMPHOCYTES NFR BLD AUTO: 3.4 % (ref 15–45)
MAGNESIUM SERPL-MCNC: 2 MG/DL (ref 1.4–2.2)
MCH RBC QN AUTO: 28.6 PG (ref 28–32)
MCHC RBC AUTO-ENTMCNC: 34.8 G/DL (ref 33–36)
MCV RBC AUTO: 82.2 FL (ref 82–95)
MONOCYTES # BLD AUTO: 0.57 10*3/MM3 (ref 0.19–1.3)
MONOCYTES NFR BLD AUTO: 5.4 % (ref 4–12)
NEUTROPHILS # BLD AUTO: 9.48 10*3/MM3 (ref 1.87–8.4)
NEUTROPHILS NFR BLD AUTO: 89.3 % (ref 39–78)
NRBC BLD MANUAL-RTO: 0 /100 WBC (ref 0–0)
PHOSPHATE SERPL-MCNC: 3.1 MG/DL (ref 2.5–4.5)
PLATELET # BLD AUTO: 159 10*3/MM3 (ref 130–400)
PMV BLD AUTO: 10.1 FL (ref 6–12)
POTASSIUM BLD-SCNC: 3.5 MMOL/L (ref 3.5–5.3)
RBC # BLD AUTO: 3.53 10*6/MM3 (ref 4.8–5.9)
SODIUM BLD-SCNC: 138 MMOL/L (ref 135–145)
URATE SERPL-MCNC: 8.8 MG/DL (ref 3.5–8.5)
WBC NRBC COR # BLD: 10.61 10*3/MM3 (ref 4.8–10.8)

## 2017-12-02 PROCEDURE — 99232 SBSQ HOSP IP/OBS MODERATE 35: CPT | Performed by: UROLOGY

## 2017-12-02 PROCEDURE — 25010000002 CEFTRIAXONE PER 250 MG: Performed by: FAMILY MEDICINE

## 2017-12-02 PROCEDURE — 82962 GLUCOSE BLOOD TEST: CPT

## 2017-12-02 PROCEDURE — 80048 BASIC METABOLIC PNL TOTAL CA: CPT | Performed by: FAMILY MEDICINE

## 2017-12-02 PROCEDURE — 25010000002 ENOXAPARIN PER 10 MG: Performed by: FAMILY MEDICINE

## 2017-12-02 PROCEDURE — 84100 ASSAY OF PHOSPHORUS: CPT | Performed by: FAMILY MEDICINE

## 2017-12-02 PROCEDURE — 84550 ASSAY OF BLOOD/URIC ACID: CPT | Performed by: INTERNAL MEDICINE

## 2017-12-02 PROCEDURE — 83735 ASSAY OF MAGNESIUM: CPT | Performed by: FAMILY MEDICINE

## 2017-12-02 PROCEDURE — 63710000001 INSULIN LISPRO (HUMAN) PER 5 UNITS: Performed by: FAMILY MEDICINE

## 2017-12-02 PROCEDURE — 85025 COMPLETE CBC W/AUTO DIFF WBC: CPT | Performed by: FAMILY MEDICINE

## 2017-12-02 RX ADMIN — INSULIN LISPRO 2 UNITS: 100 INJECTION, SOLUTION INTRAVENOUS; SUBCUTANEOUS at 22:00

## 2017-12-02 RX ADMIN — ATORVASTATIN CALCIUM 20 MG: 10 TABLET, FILM COATED ORAL at 21:51

## 2017-12-02 RX ADMIN — LEVOTHYROXINE SODIUM 200 MCG: 200 TABLET ORAL at 06:17

## 2017-12-02 RX ADMIN — HYDROCODONE BITARTRATE AND ACETAMINOPHEN 1 TABLET: 10; 325 TABLET ORAL at 21:51

## 2017-12-02 RX ADMIN — CEFTRIAXONE SODIUM 1 G: 1 INJECTION, POWDER, FOR SOLUTION INTRAMUSCULAR; INTRAVENOUS at 21:50

## 2017-12-02 RX ADMIN — INSULIN LISPRO 2 UNITS: 100 INJECTION, SOLUTION INTRAVENOUS; SUBCUTANEOUS at 12:16

## 2017-12-02 RX ADMIN — SODIUM CHLORIDE 125 ML/HR: 9 INJECTION, SOLUTION INTRAVENOUS at 18:33

## 2017-12-02 RX ADMIN — LINAGLIPTIN 5 MG: 5 TABLET, FILM COATED ORAL at 08:32

## 2017-12-02 RX ADMIN — ENOXAPARIN SODIUM 30 MG: 30 INJECTION SUBCUTANEOUS at 21:57

## 2017-12-02 RX ADMIN — SODIUM CHLORIDE 125 ML/HR: 9 INJECTION, SOLUTION INTRAVENOUS at 11:20

## 2017-12-02 RX ADMIN — TRAZODONE HYDROCHLORIDE 50 MG: 50 TABLET ORAL at 21:50

## 2017-12-02 RX ADMIN — ALUMINUM HYDROXIDE, MAGNESIUM HYDROXIDE, AND DIMETHICONE 15 ML: 400; 400; 40 SUSPENSION ORAL at 18:36

## 2017-12-02 RX ADMIN — SODIUM CHLORIDE 125 ML/HR: 9 INJECTION, SOLUTION INTRAVENOUS at 01:55

## 2017-12-02 RX ADMIN — HYDROCODONE BITARTRATE AND ACETAMINOPHEN 1 TABLET: 10; 325 TABLET ORAL at 08:35

## 2017-12-02 NOTE — CONSULTS
Nephrology ([facility]) Consult Note      Patient:  Zay Kamara  YOB: 1950  Date of Service: 12/1/2017  MRN: [id number]   Acct: [id number]   Primary Care Physician: LUIS Souza  Advance Directive: Full Code  Admit Date: 11/30/2017       Hospital Day: 1  Referring Provider: No ref. provider found      Patient Seen, Chart, Consults, Notes, Labs, Radiology studies reviewed.        Subjective:  Zay Kamara is a 67 y.o. male  whom we were consulted for acute kidney injury.  Patient has a history of benign hypertension, type II diabetes and degenerative spine disease.  In August 2017, he underwent prostatectomy for cancer. His course was complicated with acute kidney injury that he recovered.  He was in his regular state of health until recently when he noticed that he was somewhat constipated.  He started using some laxatives and this caused him to have diarrhea. He was having 3-4 large watery bowel movements per day.  The patient was also experiencing some abdominal pain as well as flank pain and was having malaise and decreased appetite.  He was seen by his primary care physician who diagnosed an acute kidney injury.  He was given IV fluids at his primary physician's office and then he was sent to the emergency room to be subsequently admitted for acute kidney injury management.  His workup revealed acute pyelonephritis as his urine was infected and there were positive CT scan findings.  He was also found with nonobstructing kidney stones.  His serum creatinine was 5.1 on admission and the patient was started on intravenous fluids along with antibiotics.  Renal service was consulted to manage his acute kidney injury.  Patient was having dysuria and decreased urine output prior to admission.  He also has urinary incontinence mostly after his prostate surgery.  He admitted the use of meloxicam for pain management.    Allergies:  Review of patient's  allergies indicates no known allergies.    Home Meds:  Prescriptions Prior to Admission   Medication Sig Dispense Refill Last Dose   • atorvastatin (LIPITOR) 10 MG tablet Take 20 mg by mouth Daily.   Taking   • levothyroxine (SYNTHROID, LEVOTHROID) 200 MCG tablet Take 200 mcg by mouth Daily.   Taking   • lisinopril (PRINIVIL,ZESTRIL) 40 MG tablet Take 40 mg by mouth Daily.   Taking   • meloxicam (MOBIC) 7.5 MG tablet Take 7.5 mg by mouth Daily.   Taking   • SITagliptin (JANUVIA) 100 MG tablet Take 100 mg by mouth Daily.   Taking   • traZODone (DESYREL) 50 MG tablet Take 50 mg by mouth Every Night.   Taking       Medicines:  Current Facility-Administered Medications   Medication Dose Route Frequency Provider Last Rate Last Dose   • acetaminophen (TYLENOL) tablet 650 mg  650 mg Oral Q4H PRN Chino Sanders, DO       • aluminum-magnesium hydroxide-simethicone (MAALOX MAX) 400-400-40 MG/5ML suspension 15 mL  15 mL Oral Q6H PRN Chino Sanders DO       • atorvastatin (LIPITOR) tablet 20 mg  20 mg Oral Nightly Chino Sanders DO   20 mg at 11/30/17 2135   • cefTRIAXone (ROCEPHIN) 1 g/100 mL 0.9% NS (MBP)  1 g Intravenous Q24H Chino Sanders DO   Stopped at 11/30/17 2312   • dextrose (D50W) solution 25 g  25 g Intravenous Q15 Min PRN Chino Sanders DO       • dextrose (GLUTOSE) oral gel 15 g  15 g Oral Q15 Min PRN Chino Sanders DO       • enoxaparin (LOVENOX) syringe 30 mg  30 mg Subcutaneous Q24H Chino Sanders DO   30 mg at 11/30/17 2149   • glucagon (human recombinant) (GLUCAGEN DIAGNOSTIC) injection 1 mg  1 mg Subcutaneous Q15 Min PRN Chino Sanders DO       • HYDROcodone-acetaminophen (NORCO)  MG per tablet 1 tablet  1 tablet Oral Q4H PRN Chino Sanders DO       • HYDROcodone-acetaminophen (NORCO) 5-325 MG per tablet 1 tablet  1 tablet Oral Q4H PRN Chino Sanders DO       • insulin lispro (humaLOG) injection 2-7 Units  2-7 Units Subcutaneous 4x Daily With Meals &  Nightly Chino Sanders DO       • levothyroxine (SYNTHROID, LEVOTHROID) tablet 200 mcg  200 mcg Oral Q AM Chino Sanders DO   200 mcg at 12/01/17 0638   • linagliptin (TRADJENTA) tablet 5 mg  5 mg Oral Daily Chino Sanders DO   5 mg at 12/01/17 0811   • ondansetron (ZOFRAN) injection 4 mg  4 mg Intravenous Q6H PRN Chino Sanders DO       • pneumococcal polysaccharide 23-valent (PNEUMOVAX-23) vaccine 0.5 mL  0.5 mL Intramuscular During Hospitalization Chino Sanders DO       • sodium chloride 0.9 % flush 1-10 mL  1-10 mL Intravenous PRN Chino Sanders DO       • sodium chloride 0.9 % flush 10 mL  10 mL Intravenous PRN Segun Coulter Jr., MD       • sodium chloride 0.9 % infusion  125 mL/hr Intravenous Continuous Chino Sanders  mL/hr at 12/01/17 1734 125 mL/hr at 12/01/17 1734   • traZODone (DESYREL) tablet 50 mg  50 mg Oral Nightly PRN Chino Sanders DO           Past Medical History:  Past Medical History:   Diagnosis Date   • Arthritis    • Cancer     thyroid   • Diabetes    • Disease of thyroid gland    • Hypercholesteremia    • Hypertension    • IBS (irritable bowel syndrome)    • Prostate cancer    • Sleep apnea     NON COMPLIANT WITH C PAP       Past Surgical History:  Past Surgical History:   Procedure Laterality Date   • CHOLECYSTECTOMY     • COLONOSCOPY N/A 3/7/2017    Procedure: COLONOSCOPY WITH ANESTHESIA;  Surgeon: Hector Alvarenga MD;  Location: [facility] ENDOSCOPY;  Service:    • LAPAROSCOPIC RETROPUBIC PROSTATECTOMY     • PROSTATE SURGERY     • PROSTATECTOMY N/A 8/1/2017    Procedure: PROSTATECTOMY LAPAROSCOPIC WITH DAVINCI SI ROBOT ;  Surgeon: Hernesto Hong MD;  Location: [facility] OR;  Service:    • THYROID SURGERY      RADIATION THERAPY AFTER SURGERY       Family History  Family History   Problem Relation Age of Onset   • Prostate cancer Father    • Cancer Father    • Heart disease Mother    • Diabetes Sister    • Colon cancer Neg Hx    • Colon polyps  "Neg Hx        Social History  Social History     Social History   • Marital status:      Spouse name: N/A   • Number of children: N/A   • Years of education: N/A     Occupational History   • Not on file.     Social History Main Topics   • Smoking status: Never Smoker   • Smokeless tobacco: Never Used   • Alcohol use Yes      Comment: occ   • Drug use: No   • Sexual activity: Defer     Other Topics Concern   • Not on file     Social History Narrative         Review of Systems:  History obtained from chart review and the patient  General ROS: No fever or chills  Respiratory ROS: No cough, shortness of breath, wheezing  Cardiovascular ROS: no chest pain or dyspnea on exertion  Gastrointestinal ROS: No abdominal pain or melena  Genito-Urinary ROS: + dysuria or hematuria  14 point ROS reviewed with the patient and negative except as noted above and in the HPI unless unable to obtain.    Objective:  /61 (BP Location: Right arm)  Pulse 100  Temp 98.3 °F (36.8 °C) (Oral)   Resp 18  Ht 71\" (180.3 cm)  Wt 215 lb (97.5 kg)  SpO2 94%  BMI 29.99 kg/m2    Intake/Output Summary (Last 24 hours) at 12/01/17 2037  Last data filed at 12/01/17 1807   Gross per 24 hour   Intake             2492 ml   Output              925 ml   Net             1567 ml     General: awake/alert   Chest:  clear to auscultation bilaterally without respiratory distress  CVS: regular rate and rhythm  Abdominal: mildly distended, non-tender to palpation, splenomegaly, BS (+)  Extremities: no cyanosis or edema  Skin: warm and dry without rash  Neuro: No focal motor deficits  Musculoskeletal: No obvious joint effusions    Labs:  Lab Results (last 72 hours)     Procedure Component Value Units Date/Time    Comprehensive Metabolic Panel [[id number]]  (Abnormal) Collected:  11/30/17 1729    Specimen:  Blood Updated:  11/30/17 1804     Glucose 162 (H) mg/dL      BUN 78 (H) mg/dL      Creatinine 5.15 (H) mg/dL      Sodium 134 (L) mmol/L      " Potassium 3.3 (L) mmol/L      Chloride 100 mmol/L      CO2 21.0 (L) mmol/L      Calcium 8.1 (L) mg/dL      Total Protein 6.8 g/dL      Albumin 3.60 g/dL      ALT (SGPT) 45 U/L      AST (SGOT) 28 U/L      Alkaline Phosphatase 49 U/L      Total Bilirubin 0.7 mg/dL      eGFR Non African Amer 11 (L) mL/min/1.73      Globulin 3.2 gm/dL      A/G Ratio 1.1 g/dL      BUN/Creatinine Ratio 15.1     Anion Gap 13.0 mmol/L     Lipase [[id number]]  (Abnormal) Collected:  11/30/17 1729    Specimen:  Blood Updated:  11/30/17 1804     Lipase 15 (L) U/L     Light Blue Top [[id number]] Collected:  11/30/17 1728    Specimen:  Blood Updated:  11/30/17 1831     Extra Tube hold for add-on      Auto resulted       Green Top (Gel) [[id number]] Collected:  11/30/17 1728    Specimen:  Blood Updated:  11/30/17 1831     Extra Tube Hold for add-ons.      Auto resulted.       Lavender Top [[id number]] Collected:  11/30/17 1728    Specimen:  Blood Updated:  11/30/17 1831     Extra Tube hold for add-on      Auto resulted       Red Top [[id number]] Collected:  11/30/17 1728    Specimen:  Blood Updated:  11/30/17 1831     Extra Tube Hold for add-ons.      Auto resulted.       Bedias Draw [[id number]] Collected:  11/30/17 1728    Specimen:  Blood Updated:  11/30/17 1831    Narrative:       The following orders were created for panel order Bedias Draw.  Procedure                               Abnormality         Status                     ---------                               -----------         ------                     Light Blue Top[[id number]]                                   Final result               Green Top (Gel)[[id number]]                                  Final result               Lavender Top[[id number]]                                     Final result               Red Top[[id number]]                                          Final result                 Please view results for these tests on the individual orders.    CBC & Differential  [[id number]] Collected:  11/30/17 1729    Specimen:  Blood Updated:  11/30/17 1857    Narrative:       The following orders were created for panel order CBC & Differential.  Procedure                               Abnormality         Status                     ---------                               -----------         ------                     Manual Differential[[id number]]          Abnormal            Final result               Scan Slide[[id number]]                                       Final result               CBC Auto Differential[[id number]]        Abnormal            Final result                 Please view results for these tests on the individual orders.    CBC Auto Differential [[id number]]  (Abnormal) Collected:  11/30/17 1729    Specimen:  Blood Updated:  11/30/17 1857     WBC 17.25 (H) 10*3/mm3      RBC 4.02 (L) 10*6/mm3      Hemoglobin 11.3 (L) g/dL      Hematocrit 32.5 (L) %      MCV 80.8 (L) fL      MCH 28.1 pg      MCHC 34.8 g/dL      RDW 14.5 %      RDW-SD 42.9 fl      MPV 10.6 fL      Platelets 204 10*3/mm3     Narrative:       The previously reported component NRBC is no longer being reported.    Scan Slide [[id number]] Collected:  11/30/17 1729    Specimen:  Blood Updated:  11/30/17 1857     Scan Slide --      See Manual Differential Results       Manual Differential [[id number]]  (Abnormal) Collected:  11/30/17 1729    Specimen:  Blood Updated:  11/30/17 1857     Neutrophil % 67.0 %      Lymphocyte % 2.0 (L) %      Monocyte % 2.0 (L) %      Eosinophil % 1.0 %      Bands %  25.0 (H) %      Metamyelocyte % 3.0 (H) %      Neutrophils Absolute 15.87 (H) 10*3/mm3      Lymphocytes Absolute 0.35 (L) 10*3/mm3      Monocytes Absolute 0.35 10*3/mm3      Eosinophils Absolute 0.17 10*3/mm3      Elliptocytes Slight/1+     Vacuolated Neutrophils Slight/1+     Platelet Estimate Adequate    Magnesium [[id number]]  (Normal) Collected:  11/30/17 1729    Specimen:  Blood Updated:  11/30/17 2010     Magnesium 1.9  mg/dL     Phosphorus [[id number]]  (Normal) Collected:  11/30/17 1729    Specimen:  Blood Updated:  11/30/17 2010     Phosphorus 3.5 mg/dL     Procalcitonin [[id number]]  (Abnormal) Collected:  11/30/17 1729    Specimen:  Blood Updated:  11/30/17 2052     Procalcitonin 39.11 (H) ng/mL     Narrative:       SIRS, sepsis, severe sepsis, and septic shock are categorized according to the criteria of the consensus conference of the American College of Chest Physicians/Society of Critical Care Medicine.    PCT < 0.5 ng/mL     Systemic infection (sepsis) is not likely.    PCT >0.5 and < 2.0 ng/mL Systemic infection (sepsis) is possible, but other conditions are known to elevate PCT as well.    PCT > 2.0 ng/mL     Systemic infection (sepsis) is likely, unless other causes are known.      PCT > 10.0 ng/mL    Important systemic inflammatory response, almost exclusively due to severe bacterial sepsis or septic shock.    PCT values of < 0.5 ng/mL do not exclude an infection, because localized infections (without systemic signs) may be associated with such low concentrations, or a systemic infection in its initial stages (<6 hours).  Increased PCT can occur without infection.  PCT concentrations between 0.5 and 2.0 ng/mL should be interpreted taking into account the patients history.  It is recommended to retest PCT within 6-24 hours if any concentrations < 2.0 ng/mL are obtained.    Lactic Acid, Plasma [[id number]]  (Normal) Collected:  11/30/17 2013    Specimen:  Blood Updated:  11/30/17 2058     Lactate 1.7 mmol/L     POC Glucose Fingerstick [[id number]]  (Abnormal) Collected:  11/30/17 2049    Specimen:  Blood Updated:  11/30/17 2100     Glucose 147 (H) mg/dL       [facility] [id number] [id number]       Lactic Acid, Plasma [[id number]]  (Normal) Collected:  11/30/17 2239    Specimen:  Blood Updated:  11/30/17 2302     Lactate 1.6 mmol/L     Urine Culture - Urine, Urine, Clean Catch [[id number]] Collected:  12/01/17  0051    Specimen:  Urine from Urine, Clean Catch Updated:  12/01/17 0054    Urinalysis With / Microscopic If Indicated - Urine, Clean Catch [[id number]]  (Abnormal) Collected:  12/01/17 0051    Specimen:  Urine from Urine, Clean Catch Updated:  12/01/17 0113     Color, UA Yellow     Appearance, UA Cloudy (A)     pH, UA <=5.0     Specific Gravity, UA 1.017     Glucose, UA Negative     Ketones, UA Negative     Bilirubin, UA Negative     Blood, UA Moderate (2+) (A)     Protein,  mg/dL (2+) (A)     Leuk Esterase, UA Moderate (2+) (A)     Nitrite, UA Negative     Urobilinogen, UA 0.2 E.U./dL    Urinalysis, Microscopic Only - Urine, Clean Catch [[id number]]  (Abnormal) Collected:  12/01/17 0051    Specimen:  Urine from Urine, Clean Catch Updated:  12/01/17 0113     RBC, UA 21-30 (A) /HPF      WBC, UA 21-30 (A) /HPF      Bacteria, UA Trace (A) /HPF      Squamous Epithelial Cells, UA 3-6 (A) /HPF      Hyaline Casts, UA 3-6 /LPF      Amorphous Crystals, UA Small/1+ /HPF      Methodology Manual Light Microscopy    Basic Metabolic Panel [[id number]]  (Abnormal) Collected:  12/01/17 0416    Specimen:  Blood Updated:  12/01/17 0550     Glucose 142 (H) mg/dL      BUN 71 (H) mg/dL      Creatinine 4.13 (H) mg/dL      Sodium 136 mmol/L      Potassium 3.2 (L) mmol/L      Chloride 106 mmol/L      CO2 21.0 (L) mmol/L      Calcium 7.2 (L) mg/dL      eGFR Non African Amer 14 (L) mL/min/1.73      BUN/Creatinine Ratio 17.2     Anion Gap 9.0 mmol/L     Narrative:       GFR Normal >60  Chronic Kidney Disease <60  Kidney Failure <15    Magnesium [[id number]]  (Normal) Collected:  12/01/17 0416    Specimen:  Blood Updated:  12/01/17 0550     Magnesium 1.8 mg/dL     Phosphorus [[id number]]  (Normal) Collected:  12/01/17 0416    Specimen:  Blood Updated:  12/01/17 0550     Phosphorus 3.2 mg/dL     CBC & Differential [[id number]] Collected:  12/01/17 0416    Specimen:  Blood Updated:  12/01/17 0652    Narrative:       The following orders were  created for panel order CBC & Differential.  Procedure                               Abnormality         Status                     ---------                               -----------         ------                     Manual Differential[[id number]]          Abnormal            Final result               Scan Slide[[id number]]                                       Final result               CBC Auto Differential[[id number]]        Abnormal            Final result                 Please view results for these tests on the individual orders.    CBC Auto Differential [[id number]]  (Abnormal) Collected:  12/01/17 0416    Specimen:  Blood Updated:  12/01/17 0652     WBC 12.29 (H) 10*3/mm3      RBC 3.47 (L) 10*6/mm3      Hemoglobin 9.7 (L) g/dL      Hematocrit 28.0 (L) %      MCV 80.7 (L) fL      MCH 28.0 pg      MCHC 34.6 g/dL      RDW 14.5 %      RDW-SD 42.6 fl      MPV 10.5 fL      Platelets 162 10*3/mm3     Scan Slide [[id number]] Collected:  12/01/17 0416    Specimen:  Blood Updated:  12/01/17 0652     Scan Slide --      See Manual Differential Results       Manual Differential [[id number]]  (Abnormal) Collected:  12/01/17 0416    Specimen:  Blood Updated:  12/01/17 0652     Neutrophil % 89.0 (H) %      Lymphocyte % 2.0 (L) %      Monocyte % 2.0 (L) %      Eosinophil % 1.0 %      Bands %  6.0 %      Neutrophils Absolute 11.68 (H) 10*3/mm3      Lymphocytes Absolute 0.25 (L) 10*3/mm3      Monocytes Absolute 0.25 10*3/mm3      Eosinophils Absolute 0.12 10*3/mm3      Poikilocytes Slight/1+     WBC Morphology Normal     Platelet Morphology Normal    POC Glucose Fingerstick [[id number]]  (Abnormal) Collected:  12/01/17 0721    Specimen:  Blood Updated:  12/01/17 0734     Glucose 135 (H) mg/dL       : [id number] Claudette Costa ID: [id number]       Blood Culture - Blood, [[id number]]  (Abnormal) Collected:  11/30/17 2013    Specimen:  Blood from Arm, Right Updated:  12/01/17 1014     Blood Culture Abnormal Stain (A)       Gram Negative Bacilli (A)     Isolated from Pediatric Bottle     Gram Stain Result Gram negative bacilli    POC Glucose Fingerstick [[id number]]  (Abnormal) Collected:  12/01/17 1105    Specimen:  Blood Updated:  12/01/17 1118     Glucose 147 (H) mg/dL       : [id number] Borum DeidraMeter ID: [id number]       Blood Culture ID, PCR - Blood, [[id number]]  (Abnormal) Collected:  11/30/17 2013    Specimen:  Blood from Arm, Right Updated:  12/01/17 1133     BCID, PCR Escherichia coli. Identification by BCID PCR. (C)    POC Glucose Fingerstick [[id number]]  (Normal) Collected:  12/01/17 1627    Specimen:  Blood Updated:  12/01/17 1638     Glucose 123 mg/dL       : [id number] Borum DeidraMeter ID: [id number]       Blood Culture - Blood, [[id number]]  (Abnormal) Collected:  11/30/17 2013    Specimen:  Blood from Arm, Left Updated:  12/01/17 1908     Blood Culture Abnormal Stain (A)      Gram Negative Bacilli (A)     Isolated from Aerobic Bottle     Gram Stain Result Gram negative bacilli          Radiology:   Imaging Results (last 72 hours)     Procedure Component Value Units Date/Time    CT Abdomen Pelvis Without Contrast [[id number]] Collected:  11/30/17 1815     Updated:  11/30/17 1830    Narrative:       EXAMINATION: CT ABDOMEN PELVIS WO CONTRAST- 11/30/2017 6:15 PM CST     HISTORY: History of prostate cancer with prostatectomy; bilateral flank  pain     COMPARISON: CT abdomen and pelvis contrast 08/09/2017     DOSE: 1204 mGy-cm     TECHNIQUE: Sequential imaging was performed from the lung bases through  the pubic symphysis without the use of IV contrast.  Sagittal and  coronal reformations were made from the original source data and  reviewed. Automated exposure control was also utilized to decrease  patient radiation dose.     FINDINGS:   Visualized heart appears normal in size. Coronary artery calcifications  are present. Dependent changes are seen at the lung bases.     Evaluation is limited by a lack of IV  contrast. Allowing for these  limitations, the liver, pancreas, and adrenal glands are normal in  appearance. Gallbladder is surgically absent. Spleen is mildly enlarged,  measuring up to 16.4 cm in length. A nonobstructive stone is seen in the  left kidney measuring approximately 4 mm in size. There is mild left  perinephric stranding with prominence of the left renal collecting  system and left ureter to the level of the urinary bladder. No ureteral  stones are identified. A stable hypodense lesion is seen in the right  kidney, presumably a cyst. The right ureter appears decompressed.     The abdominal aorta appears normal in caliber. There are scattered  atherosclerotic calcifications of the aorta and its branch vessels.     There is no appreciable central mesenteric lymphadenopathy. Numerous  small lymph nodes are seen in the retroperitoneum in the para-aortic  region, not pathologically enlarged but more prominent than on the most  recent examination. There is mild retroperitoneal stranding.     Small hiatal hernia is suspected. The stomach and small bowel do not  appear overly dilated. There are scattered colonic diverticula without  evidence of diverticulitis. Large bowel otherwise appears grossly  normal. The appendix appears normal. No free air is seen in the abdomen.  There is a small fat-containing umbilical hernia. A midline abdominal  wall scar is present.     The urinary bladder is completely decompressed and not well evaluated.  Surgical clips are seen in the pelvis, compatible with recent  prostatectomy. Free fluid is also noted in the pelvis. Inflammation  extends along the left ureter to the level of the urinary bladder, with  a large amount of inflammatory stranding in the left pelvic sidewall  similar to the recent comparison exam.     Review of the visualized osseous structures demonstrates no acute or  aggressive lesions.           Impression:       1. Increased left perinephric stranding as  well as mild hydronephrosis  and questionable left hydroureter to the level of the urinary bladder.  Additionally, lymph nodes in the retroperitoneum do not appear  pathologically enlarged but are increased in size when compared to the  most recent exam, and there is some associated retroperitoneal  stranding. These findings are concerning for possible ascending  infection. Correlate clinically. Free fluid in the pelvis may be  reactive or postsurgical in nature. The urinary bladder is completely  decompressed and not well evaluated.  2. Atherosclerotic disease.  3. Splenomegaly.  4. Nonobstructive left renal stone.  5. Colonic diverticulosis without evidence of diverticulitis.           This report was finalized on 11/30/2017 18:27 by Dr. Ta Camara MD.          Culture:  No components found for: WOUNDCUL, 3  No components found for: CSFCUL, 3  No components found for: BC, 3  No components found for: URINECUL, 3      Assessment     -Acute pyelonephritis  -Acute kidney injury likely acute tubular necrosis  -Benign hypertension  -Diarrhea  -Hypokalemia  -Type 2 diabetes  -History of prostate cancer    Plan:  As his time, I agree was intravenous hydration.His serum creatinine seems to be improving.  There is no need for dialysis.  I will check a serum uric acid level.  We will continue to follow his urine culture result and adjust the antibiotic therapy accordingly.  We will correct his electrolytes and check his serum magnesium.  We need to avoid laxatives and NSAIDs at this time.      Thank you for the consult, we appreciate the opportunity to provide care to your patients.  Feel free to contact me if I can be of any further assistance.      Renaldo Matos MD  12/1/2017  8:37 PM

## 2017-12-02 NOTE — PROGRESS NOTES
[facility] Medicine Services  INPATIENT PROGRESS NOTE    Length of Stay: 2  Date of Admission: 11/30/2017  Primary Care Physician: LUIS Souza    Subjective     Chief Complaint:      Fatigue, low abdominal discomfort and diarrhea    HPI     He's feeling much better. He has been ambulating in his room without difficulty. He even took a shower today.  He's been afrebrile for 24 hours now.  Renal function continues to improve but is not yet nearing baseline.  Blood and urine culture are all positive for E. Coli.      Review of Systems     All pertinent negatives and positives are as above. All other systems have been reviewed and are negative unless otherwise stated.     Objective    Temp:  [96.5 °F (35.8 °C)-99 °F (37.2 °C)] 98 °F (36.7 °C)  Heart Rate:  [] 90  Resp:  [16-22] 20  BP: (105-138)/(50-92) 138/61    Lab Results (last 24 hours)     Procedure Component Value Units Date/Time    POC Glucose Fingerstick [[id number]]  (Normal) Collected:  12/01/17 1627    Specimen:  Blood Updated:  12/01/17 1638     Glucose 123 mg/dL       : [id number] Claudette DeidraMeter ID: [id number]       POC Glucose Fingerstick [[id number]]  (Abnormal) Collected:  12/01/17 2102    Specimen:  Blood Updated:  12/01/17 2113     Glucose 163 (H) mg/dL       : [id number] Stan KarlaMeter ID: [id number]       CBC Auto Differential [[id number]]  (Abnormal) Collected:  12/02/17 0425    Specimen:  Blood Updated:  12/02/17 0439     WBC 10.61 10*3/mm3      RBC 3.53 (L) 10*6/mm3      Hemoglobin 10.1 (L) g/dL      Hematocrit 29.0 (L) %      MCV 82.2 fL      MCH 28.6 pg      MCHC 34.8 g/dL      RDW 14.9 %      RDW-SD 44.2 fl      MPV 10.1 fL      Platelets 159 10*3/mm3      Neutrophil % 89.3 (H) %      Lymphocyte % 3.4 (L) %      Monocyte % 5.4 %      Eosinophil % 1.1 %      Basophil % 0.3 %      Immature Grans % 0.5 %      Neutrophils, Absolute 9.48 (H) 10*3/mm3      Lymphocytes, Absolute  0.36 (L) 10*3/mm3      Monocytes, Absolute 0.57 10*3/mm3      Eosinophils, Absolute 0.12 10*3/mm3      Basophils, Absolute 0.03 10*3/mm3      Immature Grans, Absolute 0.05 (H) 10*3/mm3      nRBC 0.0 /100 WBC     Magnesium [[id number]]  (Normal) Collected:  12/02/17 0425    Specimen:  Blood Updated:  12/02/17 0449     Magnesium 2.0 mg/dL     Phosphorus [[id number]]  (Normal) Collected:  12/02/17 0425    Specimen:  Blood Updated:  12/02/17 0449     Phosphorus 3.1 mg/dL     Uric Acid [[id number]]  (Abnormal) Collected:  12/02/17 0425    Specimen:  Blood Updated:  12/02/17 0449     Uric Acid 8.8 (H) mg/dL     Basic Metabolic Panel [[id number]]  (Abnormal) Collected:  12/02/17 0425    Specimen:  Blood Updated:  12/02/17 0500     Glucose 131 (H) mg/dL      BUN 57 (H) mg/dL      Creatinine 2.81 (H) mg/dL      Sodium 138 mmol/L      Potassium 3.5 mmol/L      Chloride 108 mmol/L      CO2 24.0 mmol/L      Calcium 7.7 (L) mg/dL      eGFR Non African Amer 23 (L) mL/min/1.73      BUN/Creatinine Ratio 20.3     Anion Gap 6.0 mmol/L     Urine Culture - Urine, Urine, Clean Catch [[id number]]  (Abnormal) Collected:  12/01/17 0051    Specimen:  Urine from Urine, Clean Catch Updated:  12/02/17 0740     Urine Culture --      10,000-20,000 CFU/mL Mixed Gram Positive Garima (A)    Narrative:       Probable contaminant.    POC Glucose Fingerstick [[id number]]  (Normal) Collected:  12/02/17 0758    Specimen:  Blood Updated:  12/02/17 0831     Glucose 115 mg/dL       : [id number] PriestMeter ID: [id number]       Blood Culture - Blood, [[id number]]  (Abnormal) Collected:  11/30/17 2013    Specimen:  Blood from Arm, Right Updated:  12/02/17 1040     Blood Culture Abnormal Stain (A)      Escherichia coli (A)     Isolated from Pediatric Bottle     Gram Stain Result Gram negative bacilli    Blood Culture - Blood, [[id number]]  (Abnormal) Collected:  11/30/17 2013    Specimen:  Blood from Arm, Left Updated:  12/02/17 1041     Blood Culture  Abnormal Stain (A)      Escherichia coli (A)     Isolated from Aerobic Bottle     Gram Stain Result Gram negative bacilli          Imaging Results (last 24 hours)     ** No results found for the last 24 hours. **             Intake/Output Summary (Last 24 hours) at 12/02/17 1208  Last data filed at 12/02/17 1001   Gross per 24 hour   Intake          3515.75 ml   Output             1100 ml   Net          2415.75 ml       Physical Exam    Constitutional: He is oriented to person, place, and time. He appears well-developed and well-nourished. He is cooperative. No distress.   HENT:   Head: Normocephalic and atraumatic.   Right Ear: External ear normal.   Left Ear: External ear normal.   Nose: Nose normal.   Mouth/Throat: Oropharynx is clear and moist.   Eyes: Conjunctivae and EOM are normal. Pupils are equal, round, and reactive to light. No scleral icterus.   Neck: Normal range of motion. Neck supple. No JVD present. No tracheal deviation present. No thyromegaly present.   Cardiovascular: Normal rate, regular rhythm, normal heart sounds and intact distal pulses.    No murmur heard.  Pulmonary/Chest: Effort normal and breath sounds normal. No respiratory distress. He has no wheezes. He has no rales.   Abdominal: Soft. Bowel sounds are normal. He exhibits no distension. There is almost no tenderness (right and left lower quadrants). There is no further rebound. There is no guarding.   Musculoskeletal: Normal range of motion. He exhibits no edema or tenderness.   Neurological: He is alert and oriented to person, place, and time. He has normal reflexes. No cranial nerve deficit. Coordination normal.   Skin: Skin is warm and dry. No erythema. No pallor.   Psychiatric: He has a normal mood and affect. His behavior is normal. Judgment and thought content normal    Results Review:  I have reviewed the labs, radiology results, and diagnostic studies since my last progress note and made treatment changes reflective of the  results.   I have reviewed the current medications.    Assessment/Plan     Hospital Problem List     * (Principal)Sepsis due to Escherichia coli    Pyelonephritis    Acute renal failure    Leukocytosis    Diabetes    Hypertension    Disease of thyroid gland    History of prostate cancer          PLAN:  Continue IV Rocephin.   Serial labs  Increase ambulation    Chino Sanders DO   12/02/17   12:08 PM

## 2017-12-02 NOTE — PLAN OF CARE
Problem: Patient Care Overview (Adult)  Goal: Plan of Care Review  Outcome: Ongoing (interventions implemented as appropriate)    12/02/17 0457   Outcome Evaluation   Outcome Summary/Follow up Plan C/o of pain x1, PRN given with relief. Continues IV abx, VSS. Will continue to monitor.        Goal: Adult Individualization and Mutuality  Outcome: Ongoing (interventions implemented as appropriate)  Goal: Discharge Needs Assessment  Outcome: Ongoing (interventions implemented as appropriate)    Problem: Infection, Risk/Actual (Adult)  Goal: Identify Related Risk Factors and Signs and Symptoms  Outcome: Ongoing (interventions implemented as appropriate)  Goal: Infection Prevention/Resolution  Outcome: Ongoing (interventions implemented as appropriate)    Problem: Diabetes, Type 2 (Adult)  Goal: Signs and Symptoms of Listed Potential Problems Will be Absent or Manageable (Diabetes, Type 2)  Outcome: Ongoing (interventions implemented as appropriate)

## 2017-12-02 NOTE — PLAN OF CARE
"Problem: Patient Care Overview (Adult)  Goal: Plan of Care Review  Outcome: Ongoing (interventions implemented as appropriate)    12/01/17 4940   Coping/Psychosocial Response Interventions   Plan Of Care Reviewed With patient   Patient Care Overview   Progress no change   Outcome Evaluation   Outcome Summary/Follow up Plan pt c/o mild abdominal pain, fatigue, and \"not feeling well\". making urine. blood cultures positive for gram negative rods today. md notified. vss. will continue to monitor         Problem: Infection, Risk/Actual (Adult)  Goal: Identify Related Risk Factors and Signs and Symptoms  Outcome: Ongoing (interventions implemented as appropriate)  Goal: Infection Prevention/Resolution  Outcome: Ongoing (interventions implemented as appropriate)    Problem: Diabetes, Type 2 (Adult)  Goal: Signs and Symptoms of Listed Potential Problems Will be Absent or Manageable (Diabetes, Type 2)  Outcome: Ongoing (interventions implemented as appropriate)      "

## 2017-12-02 NOTE — PROGRESS NOTES
Subjective    Mr. Kamara is 67 y.o. male    Chief Complaint: Flank Pain    History of Present Illness    Continues to have flank pain, bilateral, no radiation, worse with laying in bed, no hematuria, associated dysuria    The following portions of the patient's history were reviewed and updated as appropriate: allergies, current medications, past family history, past medical history, past social history, past surgical history and problem list.    Review of Systems   Constitutional: Negative for chills and fever.   Gastrointestinal: Negative for abdominal pain, anal bleeding and blood in stool.   Genitourinary: Positive for dysuria and flank pain. Negative for hematuria.         Current Facility-Administered Medications:   •  acetaminophen (TYLENOL) tablet 650 mg, 650 mg, Oral, Q4H PRN, Chino Sanders DO  •  aluminum-magnesium hydroxide-simethicone (MAALOX MAX) 400-400-40 MG/5ML suspension 15 mL, 15 mL, Oral, Q6H PRN, Chino Sanders DO  •  atorvastatin (LIPITOR) tablet 20 mg, 20 mg, Oral, Nightly, Chino Sanders DO, 20 mg at 12/01/17 2111  •  cefTRIAXone (ROCEPHIN) 1 g/100 mL 0.9% NS (MBP), 1 g, Intravenous, Q24H, Chino Sanders DO, Stopped at 12/01/17 2307  •  dextrose (D50W) solution 25 g, 25 g, Intravenous, Q15 Min PRN, Chino Sanders DO  •  dextrose (GLUTOSE) oral gel 15 g, 15 g, Oral, Q15 Min PRN, Chino Sanders DO  •  enoxaparin (LOVENOX) syringe 30 mg, 30 mg, Subcutaneous, Q24H, Chino Sanders DO, 30 mg at 12/01/17 2112  •  glucagon (human recombinant) (GLUCAGEN DIAGNOSTIC) injection 1 mg, 1 mg, Subcutaneous, Q15 Min PRN, Chino Sanders DO  •  HYDROcodone-acetaminophen (NORCO)  MG per tablet 1 tablet, 1 tablet, Oral, Q4H PRN, Chino Sanders DO, 1 tablet at 12/01/17 2112  •  HYDROcodone-acetaminophen (NORCO) 5-325 MG per tablet 1 tablet, 1 tablet, Oral, Q4H PRN, Chino S Sanders, DO  •  insulin lispro (humaLOG) injection 2-7 Units, 2-7 Units, Subcutaneous, 4x  Daily With Meals & Nightly, Chino Sanders DO, 2 Units at 12/01/17 2113  •  levothyroxine (SYNTHROID, LEVOTHROID) tablet 200 mcg, 200 mcg, Oral, Q AM, Chino Sanders DO, 200 mcg at 12/02/17 0617  •  linagliptin (TRADJENTA) tablet 5 mg, 5 mg, Oral, Daily, Chino Sanders DO, 5 mg at 12/01/17 0811  •  ondansetron (ZOFRAN) injection 4 mg, 4 mg, Intravenous, Q6H PRN, Chino Sanders DO  •  pneumococcal polysaccharide 23-valent (PNEUMOVAX-23) vaccine 0.5 mL, 0.5 mL, Intramuscular, During Hospitalization, Chino Sanders DO  •  sodium chloride 0.9 % flush 1-10 mL, 1-10 mL, Intravenous, PRN, Chino Sanders DO  •  Insert peripheral IV, , , Once **AND** sodium chloride 0.9 % flush 10 mL, 10 mL, Intravenous, PRN, Segun Coulter Jr., MD  •  sodium chloride 0.9 % infusion, 125 mL/hr, Intravenous, Continuous, Chino Sanders DO, Last Rate: 125 mL/hr at 12/02/17 0155, 125 mL/hr at 12/02/17 0155  •  traZODone (DESYREL) tablet 50 mg, 50 mg, Oral, Nightly PRN, Chino Sanders DO, 50 mg at 12/01/17 2112    Past Medical History:   Diagnosis Date   • Arthritis    • Cancer     thyroid   • Diabetes    • Disease of thyroid gland    • Hypercholesteremia    • Hypertension    • IBS (irritable bowel syndrome)    • Prostate cancer    • Sleep apnea     NON COMPLIANT WITH C PAP       Past Surgical History:   Procedure Laterality Date   • CHOLECYSTECTOMY     • COLONOSCOPY N/A 3/7/2017    Procedure: COLONOSCOPY WITH ANESTHESIA;  Surgeon: Hector Alvarenga MD;  Location: [facility] ENDOSCOPY;  Service:    • LAPAROSCOPIC RETROPUBIC PROSTATECTOMY     • PROSTATE SURGERY     • PROSTATECTOMY N/A 8/1/2017    Procedure: PROSTATECTOMY LAPAROSCOPIC WITH DAVINCI SI ROBOT ;  Surgeon: Hernesto Hong MD;  Location: [facility] OR;  Service:    • THYROID SURGERY      RADIATION THERAPY AFTER SURGERY       Social History     Social History   • Marital status:      Spouse name: N/A   • Number of children: N/A   • Years of  "education: N/A     Social History Main Topics   • Smoking status: Never Smoker   • Smokeless tobacco: Never Used   • Alcohol use Yes      Comment: occ   • Drug use: No   • Sexual activity: Defer     Other Topics Concern   • None     Social History Narrative       Family History   Problem Relation Age of Onset   • Prostate cancer Father    • Cancer Father    • Heart disease Mother    • Diabetes Sister    • Colon cancer Neg Hx    • Colon polyps Neg Hx        Objective    /53  Pulse 85  Temp 97.6 °F (36.4 °C)  Resp 22  Ht 71\" (180.3 cm)  Wt 215 lb (97.5 kg)  SpO2 94%  BMI 29.99 kg/m2    Intake/Output Summary (Last 24 hours) at 12/02/17 0806  Last data filed at 12/02/17 0425   Gross per 24 hour   Intake          3635.75 ml   Output             1025 ml   Net          2610.75 ml       Physical Exam   Constitutional: He is oriented to person, place, and time. He appears well-developed and well-nourished. No distress.   Pulmonary/Chest: Effort normal.   Abdominal: Soft. He exhibits no distension and no mass. There is no tenderness. There is no rebound and no guarding. No hernia.   Genitourinary:   Genitourinary Comments: + Left CVA tenderness   Neurological: He is alert and oriented to person, place, and time.   Skin: Skin is warm and dry. He is not diaphoretic.   Psychiatric: He has a normal mood and affect.   Vitals reviewed.      Results Review:   I reviewed the patient's new clinical results.              Imaging Results (last 24 hours)     ** No results found for the last 24 hours. **            Results for orders placed or performed during the hospital encounter of 11/30/17   Blood Culture - Blood,   Result Value Ref Range    Blood Culture Abnormal Stain (A)     Blood Culture Gram Negative Bacilli (A)     Isolated from Aerobic Bottle     Gram Stain Result Gram negative bacilli    Blood Culture - Blood,   Result Value Ref Range    Blood Culture Abnormal Stain (A)     Blood Culture Gram Negative Bacilli (A) "     Isolated from Pediatric Bottle     Gram Stain Result Gram negative bacilli    Urine Culture - Urine, Urine, Clean Catch   Result Value Ref Range    Urine Culture      Urine Culture 10,000-20,000 CFU/mL Mixed Gram Positive Garima (A)    Blood Culture ID, PCR - Blood,   Result Value Ref Range    BCID, PCR Escherichia coli. Identification by BCID PCR. (C) No organism detected by BCID PCR., Negative by BCID PCR. Culture to Follow.   Comprehensive Metabolic Panel   Result Value Ref Range    Glucose 162 (H) 70 - 100 mg/dL    BUN 78 (H) 5 - 21 mg/dL    Creatinine 5.15 (H) 0.50 - 1.40 mg/dL    Sodium 134 (L) 135 - 145 mmol/L    Potassium 3.3 (L) 3.5 - 5.3 mmol/L    Chloride 100 98 - 110 mmol/L    CO2 21.0 (L) 24.0 - 31.0 mmol/L    Calcium 8.1 (L) 8.4 - 10.4 mg/dL    Total Protein 6.8 6.3 - 8.7 g/dL    Albumin 3.60 3.50 - 5.00 g/dL    ALT (SGPT) 45 0 - 54 U/L    AST (SGOT) 28 7 - 45 U/L    Alkaline Phosphatase 49 24 - 120 U/L    Total Bilirubin 0.7 0.1 - 1.0 mg/dL    eGFR Non African Amer 11 (L) >60 mL/min/1.73    Globulin 3.2 gm/dL    A/G Ratio 1.1 1.1 - 2.5 g/dL    BUN/Creatinine Ratio 15.1 7.0 - 25.0    Anion Gap 13.0 4.0 - 13.0 mmol/L   Lipase   Result Value Ref Range    Lipase 15 (L) 23 - 203 U/L   CBC Auto Differential   Result Value Ref Range    WBC 17.25 (H) 4.80 - 10.80 10*3/mm3    RBC 4.02 (L) 4.80 - 5.90 10*6/mm3    Hemoglobin 11.3 (L) 14.0 - 18.0 g/dL    Hematocrit 32.5 (L) 40.0 - 52.0 %    MCV 80.8 (L) 82.0 - 95.0 fL    MCH 28.1 28.0 - 32.0 pg    MCHC 34.8 33.0 - 36.0 g/dL    RDW 14.5 12.0 - 15.0 %    RDW-SD 42.9 40.0 - 54.0 fl    MPV 10.6 6.0 - 12.0 fL    Platelets 204 130 - 400 10*3/mm3   Scan Slide   Result Value Ref Range    Scan Slide     Lactic Acid, Plasma   Result Value Ref Range    Lactate 1.7 0.5 - 2.0 mmol/L   Procalcitonin   Result Value Ref Range    Procalcitonin 39.11 (H) <=0.25 ng/mL   Magnesium   Result Value Ref Range    Magnesium 1.9 1.4 - 2.2 mg/dL   Phosphorus   Result Value Ref Range     Phosphorus 3.5 2.5 - 4.5 mg/dL   Urinalysis With / Microscopic If Indicated - Urine, Clean Catch   Result Value Ref Range    Color, UA Yellow Yellow, Straw    Appearance, UA Cloudy (A) Clear    pH, UA <=5.0 5.0 - 8.0    Specific Gravity, UA 1.017 1.005 - 1.030    Glucose, UA Negative Negative    Ketones, UA Negative Negative    Bilirubin, UA Negative Negative    Blood, UA Moderate (2+) (A) Negative    Protein,  mg/dL (2+) (A) Negative    Leuk Esterase, UA Moderate (2+) (A) Negative    Nitrite, UA Negative Negative    Urobilinogen, UA 0.2 E.U./dL 0.2 - 1.0 E.U./dL   Lactic Acid, Plasma   Result Value Ref Range    Lactate 1.6 0.5 - 2.0 mmol/L   Basic Metabolic Panel   Result Value Ref Range    Glucose 142 (H) 70 - 100 mg/dL    BUN 71 (H) 5 - 21 mg/dL    Creatinine 4.13 (H) 0.50 - 1.40 mg/dL    Sodium 136 135 - 145 mmol/L    Potassium 3.2 (L) 3.5 - 5.3 mmol/L    Chloride 106 98 - 110 mmol/L    CO2 21.0 (L) 24.0 - 31.0 mmol/L    Calcium 7.2 (L) 8.4 - 10.4 mg/dL    eGFR Non African Amer 14 (L) >60 mL/min/1.73    BUN/Creatinine Ratio 17.2 7.0 - 25.0    Anion Gap 9.0 4.0 - 13.0 mmol/L   Magnesium   Result Value Ref Range    Magnesium 1.8 1.4 - 2.2 mg/dL   Phosphorus   Result Value Ref Range    Phosphorus 3.2 2.5 - 4.5 mg/dL   CBC Auto Differential   Result Value Ref Range    WBC 12.29 (H) 4.80 - 10.80 10*3/mm3    RBC 3.47 (L) 4.80 - 5.90 10*6/mm3    Hemoglobin 9.7 (L) 14.0 - 18.0 g/dL    Hematocrit 28.0 (L) 40.0 - 52.0 %    MCV 80.7 (L) 82.0 - 95.0 fL    MCH 28.0 28.0 - 32.0 pg    MCHC 34.6 33.0 - 36.0 g/dL    RDW 14.5 12.0 - 15.0 %    RDW-SD 42.6 40.0 - 54.0 fl    MPV 10.5 6.0 - 12.0 fL    Platelets 162 130 - 400 10*3/mm3   Urinalysis, Microscopic Only - Urine, Clean Catch   Result Value Ref Range    RBC, UA 21-30 (A) None Seen /HPF    WBC, UA 21-30 (A) None Seen /HPF    Bacteria, UA Trace (A) None Seen /HPF    Squamous Epithelial Cells, UA 3-6 (A) None Seen, 0-2 /HPF    Hyaline Casts, UA 3-6 None Seen /LPF     Amorphous Crystals, UA Small/1+ None Seen /HPF    Methodology Manual Light Microscopy    Scan Slide   Result Value Ref Range    Scan Slide     Basic Metabolic Panel   Result Value Ref Range    Glucose 131 (H) 70 - 100 mg/dL    BUN 57 (H) 5 - 21 mg/dL    Creatinine 2.81 (H) 0.50 - 1.40 mg/dL    Sodium 138 135 - 145 mmol/L    Potassium 3.5 3.5 - 5.3 mmol/L    Chloride 108 98 - 110 mmol/L    CO2 24.0 24.0 - 31.0 mmol/L    Calcium 7.7 (L) 8.4 - 10.4 mg/dL    eGFR Non African Amer 23 (L) >60 mL/min/1.73    BUN/Creatinine Ratio 20.3 7.0 - 25.0    Anion Gap 6.0 4.0 - 13.0 mmol/L   Magnesium   Result Value Ref Range    Magnesium 2.0 1.4 - 2.2 mg/dL   Phosphorus   Result Value Ref Range    Phosphorus 3.1 2.5 - 4.5 mg/dL   Uric Acid   Result Value Ref Range    Uric Acid 8.8 (H) 3.5 - 8.5 mg/dL   CBC Auto Differential   Result Value Ref Range    WBC 10.61 4.80 - 10.80 10*3/mm3    RBC 3.53 (L) 4.80 - 5.90 10*6/mm3    Hemoglobin 10.1 (L) 14.0 - 18.0 g/dL    Hematocrit 29.0 (L) 40.0 - 52.0 %    MCV 82.2 82.0 - 95.0 fL    MCH 28.6 28.0 - 32.0 pg    MCHC 34.8 33.0 - 36.0 g/dL    RDW 14.9 12.0 - 15.0 %    RDW-SD 44.2 40.0 - 54.0 fl    MPV 10.1 6.0 - 12.0 fL    Platelets 159 130 - 400 10*3/mm3    Neutrophil % 89.3 (H) 39.0 - 78.0 %    Lymphocyte % 3.4 (L) 15.0 - 45.0 %    Monocyte % 5.4 4.0 - 12.0 %    Eosinophil % 1.1 0.0 - 4.0 %    Basophil % 0.3 0.0 - 2.0 %    Immature Grans % 0.5 0.0 - 5.0 %    Neutrophils, Absolute 9.48 (H) 1.87 - 8.40 10*3/mm3    Lymphocytes, Absolute 0.36 (L) 0.72 - 4.86 10*3/mm3    Monocytes, Absolute 0.57 0.19 - 1.30 10*3/mm3    Eosinophils, Absolute 0.12 0.00 - 0.70 10*3/mm3    Basophils, Absolute 0.03 0.00 - 0.20 10*3/mm3    Immature Grans, Absolute 0.05 (H) 0.00 - 0.03 10*3/mm3    nRBC 0.0 0.0 - 0.0 /100 WBC   POC Glucose Fingerstick   Result Value Ref Range    Glucose 147 (H) 70 - 130 mg/dL   POC Glucose Fingerstick   Result Value Ref Range    Glucose 135 (H) 70 - 130 mg/dL   POC Glucose Fingerstick    Result Value Ref Range    Glucose 147 (H) 70 - 130 mg/dL   POC Glucose Fingerstick   Result Value Ref Range    Glucose 123 70 - 130 mg/dL   POC Glucose Fingerstick   Result Value Ref Range    Glucose 163 (H) 70 - 130 mg/dL   Light Blue Top   Result Value Ref Range    Extra Tube hold for add-on    Green Top (Gel)   Result Value Ref Range    Extra Tube Hold for add-ons.    Lavender Top   Result Value Ref Range    Extra Tube hold for add-on    Red Top   Result Value Ref Range    Extra Tube Hold for add-ons.    Manual Differential   Result Value Ref Range    Neutrophil % 67.0 39.0 - 78.0 %    Lymphocyte % 2.0 (L) 15.0 - 45.0 %    Monocyte % 2.0 (L) 4.0 - 12.0 %    Eosinophil % 1.0 0.0 - 4.0 %    Bands %  25.0 (H) 0.0 - 10.0 %    Metamyelocyte % 3.0 (H) 0.0 - 0.0 %    Neutrophils Absolute 15.87 (H) 1.87 - 8.40 10*3/mm3    Lymphocytes Absolute 0.35 (L) 0.72 - 4.86 10*3/mm3    Monocytes Absolute 0.35 0.19 - 1.30 10*3/mm3    Eosinophils Absolute 0.17 0.00 - 0.70 10*3/mm3    Elliptocytes Slight/1+ None Seen    Vacuolated Neutrophils Slight/1+ None Seen    Platelet Estimate Adequate Normal   Manual Differential   Result Value Ref Range    Neutrophil % 89.0 (H) 39.0 - 78.0 %    Lymphocyte % 2.0 (L) 15.0 - 45.0 %    Monocyte % 2.0 (L) 4.0 - 12.0 %    Eosinophil % 1.0 0.0 - 4.0 %    Bands %  6.0 0.0 - 10.0 %    Neutrophils Absolute 11.68 (H) 1.87 - 8.40 10*3/mm3    Lymphocytes Absolute 0.25 (L) 0.72 - 4.86 10*3/mm3    Monocytes Absolute 0.25 0.19 - 1.30 10*3/mm3    Eosinophils Absolute 0.12 0.00 - 0.70 10*3/mm3    Poikilocytes Slight/1+ None Seen    WBC Morphology Normal Normal    Platelet Morphology Normal Normal     Assessment and Plan  Left Pyelonephritis  E. Coli Sepsis  Left Hydronephrosis  Sp RALP for prostate cancer    Creatinine continues to improve with hydration.  Continue antibiotic therapy.  .  Blood cultures show Escherichia coli however urine culture shows gram- positive Garima. No need for ureteral stenting at  this time.  We will continue to follow.  Dr. Hong will return on Monday.

## 2017-12-02 NOTE — PROGRESS NOTES
PROGRESS NOTE.      Patient:  Zay Kamara  YOB: 1950  Date of Service: 12/2/2017  MRN: [id number]   Acct: [id number]   Primary Care Physician: LUIS Souza  Advance Directive: Full Code  Admit Date: 11/30/2017       Hospital Day: 2  Referring Provider: No ref. provider found      Patient Seen, Chart, Consults, Notes, Labs, Radiology studies reviewed.        Subjective:  Zay Kamara is a 67 y.o. male  whom we were consulted for acute kidney injury.  Patient has a history of benign hypertension, type II diabetes and degenerative spine disease.  In August 2017, he underwent prostatectomy for cancer. His course was complicated with acute kidney injury that he recovered. Recently, he was using some laxatives and this caused him to have diarrhea. He was having 3-4 large watery bowel movements per day.  The patient was also experiencing some abdominal pain as well as flank pain and malaise along with decreased appetite.  He was seen by his primary care physician who diagnosed an acute kidney injury.  He was given IV fluids at his primary physician's office and then he was sent to the emergency room to be subsequently admitted for acute kidney injury management.  His workup revealed acute pyelonephritis.  He was also found with nonobstructing kidney stones.  His serum creatinine was 5.1 on admission and the patient was started on intravenous fluids along with antibiotics.  Renal service was consulted to manage his acute kidney injury.  Patient has been doing better since admission. He denies much pain today.       Review of Systems:  History obtained from chart review and the patient  General ROS: No fever or chills  Respiratory ROS: No cough, shortness of breath, wheezing  Cardiovascular ROS: no chest pain or dyspnea on exertion  Gastrointestinal ROS: No abdominal pain or melena  Genito-Urinary ROS: No dysuria or hematuria  Musculoskeletal: negative  Skin:  "negative    Objective:  /61  Pulse 90  Temp 98 °F (36.7 °C)  Resp 20  Ht 71\" (180.3 cm)  Wt 215 lb (97.5 kg)  SpO2 94%  BMI 29.99 kg/m2    Intake/Output Summary (Last 24 hours) at 12/02/17 1243  Last data filed at 12/02/17 1001   Gross per 24 hour   Intake          3515.75 ml   Output             1100 ml   Net          2415.75 ml       Physical examination:  General: awake/alert   Chest:  clear to auscultation bilaterally without respiratory distress  CVS: regular rate and rhythm  Abdominal: soft, nontender, normal bowel sounds  Extremities: no cyanosis or edema  Skin: warm and dry without rash  Neuro: No focal motor deficits    Labs:  Lab Results (last 24 hours)     Procedure Component Value Units Date/Time    POC Glucose Fingerstick [[id number]]  (Normal) Collected:  12/01/17 1627    Specimen:  Blood Updated:  12/01/17 1638     Glucose 123 mg/dL       : [id number] Borum DeidraMeter ID: [id number]       POC Glucose Fingerstick [[id number]]  (Abnormal) Collected:  12/01/17 2102    Specimen:  Blood Updated:  12/01/17 2113     Glucose 163 (H) mg/dL       : [id number] Stan KarlaMeter ID: [id number]       CBC & Differential [[id number]] Collected:  12/02/17 0425    Specimen:  Blood Updated:  12/02/17 0439    Narrative:       The following orders were created for panel order CBC & Differential.  Procedure                               Abnormality         Status                     ---------                               -----------         ------                     CBC Auto Differential[[id number]]        Abnormal            Final result                 Please view results for these tests on the individual orders.    CBC Auto Differential [[id number]]  (Abnormal) Collected:  12/02/17 0425    Specimen:  Blood Updated:  12/02/17 0439     WBC 10.61 10*3/mm3      RBC 3.53 (L) 10*6/mm3      Hemoglobin 10.1 (L) g/dL      Hematocrit 29.0 (L) %      MCV 82.2 fL      MCH 28.6 pg      MCHC 34.8 g/dL      RDW 14.9 " %      RDW-SD 44.2 fl      MPV 10.1 fL      Platelets 159 10*3/mm3      Neutrophil % 89.3 (H) %      Lymphocyte % 3.4 (L) %      Monocyte % 5.4 %      Eosinophil % 1.1 %      Basophil % 0.3 %      Immature Grans % 0.5 %      Neutrophils, Absolute 9.48 (H) 10*3/mm3      Lymphocytes, Absolute 0.36 (L) 10*3/mm3      Monocytes, Absolute 0.57 10*3/mm3      Eosinophils, Absolute 0.12 10*3/mm3      Basophils, Absolute 0.03 10*3/mm3      Immature Grans, Absolute 0.05 (H) 10*3/mm3      nRBC 0.0 /100 WBC     Magnesium [[id number]]  (Normal) Collected:  12/02/17 0425    Specimen:  Blood Updated:  12/02/17 0449     Magnesium 2.0 mg/dL     Phosphorus [[id number]]  (Normal) Collected:  12/02/17 0425    Specimen:  Blood Updated:  12/02/17 0449     Phosphorus 3.1 mg/dL     Uric Acid [[id number]]  (Abnormal) Collected:  12/02/17 0425    Specimen:  Blood Updated:  12/02/17 0449     Uric Acid 8.8 (H) mg/dL     Basic Metabolic Panel [[id number]]  (Abnormal) Collected:  12/02/17 0425    Specimen:  Blood Updated:  12/02/17 0500     Glucose 131 (H) mg/dL      BUN 57 (H) mg/dL      Creatinine 2.81 (H) mg/dL      Sodium 138 mmol/L      Potassium 3.5 mmol/L      Chloride 108 mmol/L      CO2 24.0 mmol/L      Calcium 7.7 (L) mg/dL      eGFR Non African Amer 23 (L) mL/min/1.73      BUN/Creatinine Ratio 20.3     Anion Gap 6.0 mmol/L     Narrative:       GFR Normal >60  Chronic Kidney Disease <60  Kidney Failure <15    Urine Culture - Urine, Urine, Clean Catch [[id number]]  (Abnormal) Collected:  12/01/17 0051    Specimen:  Urine from Urine, Clean Catch Updated:  12/02/17 0740     Urine Culture --      10,000-20,000 CFU/mL Mixed Gram Positive Garima (A)    Narrative:       Probable contaminant.    POC Glucose Fingerstick [[id number]]  (Normal) Collected:  12/02/17 0758    Specimen:  Blood Updated:  12/02/17 0831     Glucose 115 mg/dL       : [id number] Zackary [facility] ID: [id number]       Blood Culture - Blood, [[id number]]  (Abnormal) Collected:   11/30/17 2013    Specimen:  Blood from Arm, Right Updated:  12/02/17 1040     Blood Culture Abnormal Stain (A)      Escherichia coli (A)     Isolated from Pediatric Bottle     Gram Stain Result Gram negative bacilli    Blood Culture - Blood, [[id number]]  (Abnormal) Collected:  11/30/17 2013    Specimen:  Blood from Arm, Left Updated:  12/02/17 1041     Blood Culture Abnormal Stain (A)      Escherichia coli (A)     Isolated from Aerobic Bottle     Gram Stain Result Gram negative bacilli    POC Glucose Fingerstick [[id number]]  (Abnormal) Collected:  12/02/17 1135    Specimen:  Blood Updated:  12/02/17 1208     Glucose 189 (H) mg/dL       : [id number] Zackary [employer] Boards[employer] ID: [id number]             Radiology:   Imaging Results (last 24 hours)     ** No results found for the last 24 hours. **              Assessment     -Acute pyelonephritis  -Acute kidney injury (acute tubular necrosis)- renal function has improved  -Benign hypertension  -Diarrhea  -Hypokalemia  -Type 2 diabetes  -History of prostate cancer    Plan:  Continue IV hydration and follow up labs.       Renaldo Matos MD  12/2/2017  12:43 PM

## 2017-12-02 NOTE — PLAN OF CARE
Problem: Patient Care Overview (Adult)  Goal: Plan of Care Review  Outcome: Ongoing (interventions implemented as appropriate)    12/02/17 1600   Coping/Psychosocial Response Interventions   Plan Of Care Reviewed With patient   Patient Care Overview   Progress no change   Outcome Evaluation   Outcome Summary/Follow up Plan C/O back pain, prn pain meds given with good relief. VSS. Pt has been up walking orellana today.          Problem: Infection, Risk/Actual (Adult)  Goal: Identify Related Risk Factors and Signs and Symptoms  Outcome: Ongoing (interventions implemented as appropriate)  Goal: Infection Prevention/Resolution  Outcome: Ongoing (interventions implemented as appropriate)    Problem: Diabetes, Type 2 (Adult)  Goal: Signs and Symptoms of Listed Potential Problems Will be Absent or Manageable (Diabetes, Type 2)  Outcome: Ongoing (interventions implemented as appropriate)

## 2017-12-03 LAB
ANION GAP SERPL CALCULATED.3IONS-SCNC: 6 MMOL/L (ref 4–13)
BACTERIA SPEC AEROBE CULT: ABNORMAL
BASOPHILS # BLD AUTO: 0.01 10*3/MM3 (ref 0–0.2)
BASOPHILS NFR BLD AUTO: 0.1 % (ref 0–2)
BUN BLD-MCNC: 43 MG/DL (ref 5–21)
BUN/CREAT SERPL: 19.2 (ref 7–25)
CALCIUM SPEC-SCNC: 7.7 MG/DL (ref 8.4–10.4)
CHLORIDE SERPL-SCNC: 108 MMOL/L (ref 98–110)
CO2 SERPL-SCNC: 22 MMOL/L (ref 24–31)
CREAT BLD-MCNC: 2.24 MG/DL (ref 0.5–1.4)
DEPRECATED RDW RBC AUTO: 45.7 FL (ref 40–54)
EOSINOPHIL # BLD AUTO: 0.25 10*3/MM3 (ref 0–0.7)
EOSINOPHIL NFR BLD AUTO: 2.6 % (ref 0–4)
ERYTHROCYTE [DISTWIDTH] IN BLOOD BY AUTOMATED COUNT: 15 % (ref 12–15)
GFR SERPL CREATININE-BSD FRML MDRD: 29 ML/MIN/1.73
GLUCOSE BLD-MCNC: 107 MG/DL (ref 70–100)
GLUCOSE BLDC GLUCOMTR-MCNC: 111 MG/DL (ref 70–130)
GLUCOSE BLDC GLUCOMTR-MCNC: 147 MG/DL (ref 70–130)
GLUCOSE BLDC GLUCOMTR-MCNC: 172 MG/DL (ref 70–130)
GLUCOSE BLDC GLUCOMTR-MCNC: 193 MG/DL (ref 70–130)
GRAM STN SPEC: ABNORMAL
HCT VFR BLD AUTO: 27.8 % (ref 40–52)
HGB BLD-MCNC: 9.2 G/DL (ref 14–18)
IMM GRANULOCYTES # BLD: 0.08 10*3/MM3 (ref 0–0.03)
IMM GRANULOCYTES NFR BLD: 0.8 % (ref 0–5)
ISOLATED FROM: ABNORMAL
LYMPHOCYTES # BLD AUTO: 0.51 10*3/MM3 (ref 0.72–4.86)
LYMPHOCYTES NFR BLD AUTO: 5.3 % (ref 15–45)
MAGNESIUM SERPL-MCNC: 2 MG/DL (ref 1.4–2.2)
MCH RBC QN AUTO: 27.7 PG (ref 28–32)
MCHC RBC AUTO-ENTMCNC: 33.1 G/DL (ref 33–36)
MCV RBC AUTO: 83.7 FL (ref 82–95)
MONOCYTES # BLD AUTO: 0.87 10*3/MM3 (ref 0.19–1.3)
MONOCYTES NFR BLD AUTO: 9 % (ref 4–12)
NEUTROPHILS # BLD AUTO: 7.95 10*3/MM3 (ref 1.87–8.4)
NEUTROPHILS NFR BLD AUTO: 82.2 % (ref 39–78)
NRBC BLD MANUAL-RTO: 0 /100 WBC (ref 0–0)
PHOSPHATE SERPL-MCNC: 2.8 MG/DL (ref 2.5–4.5)
PLATELET # BLD AUTO: 153 10*3/MM3 (ref 130–400)
PMV BLD AUTO: 9.9 FL (ref 6–12)
POTASSIUM BLD-SCNC: 4.2 MMOL/L (ref 3.5–5.3)
RBC # BLD AUTO: 3.32 10*6/MM3 (ref 4.8–5.9)
SODIUM BLD-SCNC: 136 MMOL/L (ref 135–145)
WBC NRBC COR # BLD: 9.67 10*3/MM3 (ref 4.8–10.8)

## 2017-12-03 PROCEDURE — 80048 BASIC METABOLIC PNL TOTAL CA: CPT | Performed by: FAMILY MEDICINE

## 2017-12-03 PROCEDURE — 85025 COMPLETE CBC W/AUTO DIFF WBC: CPT | Performed by: FAMILY MEDICINE

## 2017-12-03 PROCEDURE — 25010000002 ENOXAPARIN PER 10 MG: Performed by: FAMILY MEDICINE

## 2017-12-03 PROCEDURE — 99232 SBSQ HOSP IP/OBS MODERATE 35: CPT | Performed by: UROLOGY

## 2017-12-03 PROCEDURE — 83735 ASSAY OF MAGNESIUM: CPT | Performed by: FAMILY MEDICINE

## 2017-12-03 PROCEDURE — 84100 ASSAY OF PHOSPHORUS: CPT | Performed by: FAMILY MEDICINE

## 2017-12-03 PROCEDURE — 63710000001 INSULIN LISPRO (HUMAN) PER 5 UNITS: Performed by: FAMILY MEDICINE

## 2017-12-03 PROCEDURE — 82962 GLUCOSE BLOOD TEST: CPT

## 2017-12-03 PROCEDURE — 25010000002 CEFTRIAXONE PER 250 MG: Performed by: FAMILY MEDICINE

## 2017-12-03 RX ADMIN — ENOXAPARIN SODIUM 30 MG: 30 INJECTION SUBCUTANEOUS at 21:38

## 2017-12-03 RX ADMIN — HYDROCODONE BITARTRATE AND ACETAMINOPHEN 1 TABLET: 10; 325 TABLET ORAL at 21:33

## 2017-12-03 RX ADMIN — TRAZODONE HYDROCHLORIDE 50 MG: 50 TABLET ORAL at 21:33

## 2017-12-03 RX ADMIN — ATORVASTATIN CALCIUM 20 MG: 10 TABLET, FILM COATED ORAL at 21:34

## 2017-12-03 RX ADMIN — SODIUM CHLORIDE 125 ML/HR: 9 INJECTION, SOLUTION INTRAVENOUS at 06:20

## 2017-12-03 RX ADMIN — LEVOTHYROXINE SODIUM 200 MCG: 200 TABLET ORAL at 06:20

## 2017-12-03 RX ADMIN — LINAGLIPTIN 5 MG: 5 TABLET, FILM COATED ORAL at 08:09

## 2017-12-03 RX ADMIN — CEFTRIAXONE SODIUM 1 G: 1 INJECTION, POWDER, FOR SOLUTION INTRAMUSCULAR; INTRAVENOUS at 22:09

## 2017-12-03 RX ADMIN — INSULIN LISPRO 2 UNITS: 100 INJECTION, SOLUTION INTRAVENOUS; SUBCUTANEOUS at 21:37

## 2017-12-03 RX ADMIN — SODIUM CHLORIDE 75 ML/HR: 9 INJECTION, SOLUTION INTRAVENOUS at 13:29

## 2017-12-03 RX ADMIN — INSULIN LISPRO 2 UNITS: 100 INJECTION, SOLUTION INTRAVENOUS; SUBCUTANEOUS at 17:25

## 2017-12-03 RX ADMIN — HYDROCODONE BITARTRATE AND ACETAMINOPHEN 1 TABLET: 10; 325 TABLET ORAL at 06:23

## 2017-12-03 RX ADMIN — ALUMINUM HYDROXIDE, MAGNESIUM HYDROXIDE, AND DIMETHICONE 15 ML: 400; 400; 40 SUSPENSION ORAL at 20:18

## 2017-12-03 NOTE — PROGRESS NOTES
PROGRESS NOTE.      Patient:  Zay Kamara  YOB: 1950  Date of Service: 12/3/2017  MRN: [id number]   Acct: [id number]   Primary Care Physician: LUIS Souza  Advance Directive: Full Code  Admit Date: 11/30/2017       Hospital Day: 3  Referring Provider: No ref. provider found      Patient Seen, Chart, Consults, Notes, Labs, Radiology studies reviewed.        Subjective:  Zay Kamara is a 67 y.o. male  whom we were consulted for acute kidney injury.  Patient has a history of benign hypertension, type II diabetes and degenerative spine disease.  In August 2017, he underwent prostatectomy for cancer. His course was complicated with acute kidney injury that he recovered. Recently, he was using some laxatives and this caused him to have diarrhea. He was having 3-4 large watery bowel movements per day.  The patient was also experiencing some abdominal pain as well as flank pain and malaise along with decreased appetite.  He was seen by his primary care physician who diagnosed an acute kidney injury.  He was given IV fluids at his primary physician's office and then he was sent to the emergency room to be subsequently admitted for acute kidney injury management.  His workup revealed acute pyelonephritis.  He was also found with nonobstructing kidney stones.  His serum creatinine was 5.1 on admission and the patient was started on intravenous fluids along with antibiotics.  Renal service was consulted to manage his acute kidney injury.  Patient has no diarrhea and better urine output.       Review of Systems:  History obtained from chart review and the patient  General ROS: No fever or chills  Respiratory ROS: No cough, shortness of breath, wheezing  Cardiovascular ROS: no chest pain or dyspnea on exertion  Gastrointestinal ROS: No abdominal pain or melena  Genito-Urinary ROS: No dysuria or hematuria  Musculoskeletal: negative  Skin: negative    Objective:  /66  Pulse 86   "Temp 96.9 °F (36.1 °C) (Temporal Artery )   Resp 16  Ht 71\" (180.3 cm)  Wt 220 lb (99.8 kg)  SpO2 97%  BMI 30.68 kg/m2    Intake/Output Summary (Last 24 hours) at 12/03/17 1234  Last data filed at 12/03/17 1210   Gross per 24 hour   Intake             3326 ml   Output             3125 ml   Net              201 ml       Physical examination:  General: awake/alert   Chest:  clear to auscultation bilaterally without respiratory distress  CVS: regular rate and rhythm  Abdominal: soft, nontender, normal bowel sounds  Extremities: no cyanosis or edema  Skin: warm and dry without rash  Neuro: No focal motor deficits    Labs:  Lab Results (last 24 hours)     Procedure Component Value Units Date/Time    POC Glucose Fingerstick [[id number]]  (Abnormal) Collected:  12/02/17 1646    Specimen:  Blood Updated:  12/02/17 1729     Glucose 147 (H) mg/dL       : [id number] Zackary KimMeter ID: [id number]       POC Glucose Fingerstick [[id number]]  (Abnormal) Collected:  12/02/17 2149    Specimen:  Blood Updated:  12/02/17 2219     Glucose 163 (H) mg/dL       : [id number] Teodoro Pickett ID: [id number]       CBC & Differential [[id number]] Collected:  12/03/17 0431    Specimen:  Blood Updated:  12/03/17 0444    Narrative:       The following orders were created for panel order CBC & Differential.  Procedure                               Abnormality         Status                     ---------                               -----------         ------                     CBC Auto Differential[[id number]]        Abnormal            Final result                 Please view results for these tests on the individual orders.    CBC Auto Differential [[id number]]  (Abnormal) Collected:  12/03/17 0431    Specimen:  Blood Updated:  12/03/17 0444     WBC 9.67 10*3/mm3      RBC 3.32 (L) 10*6/mm3      Hemoglobin 9.2 (L) g/dL      Hematocrit 27.8 (L) %      MCV 83.7 fL      MCH 27.7 (L) pg      MCHC 33.1 g/dL      RDW 15.0 %      " RDW-SD 45.7 fl      MPV 9.9 fL      Platelets 153 10*3/mm3      Neutrophil % 82.2 (H) %      Lymphocyte % 5.3 (L) %      Monocyte % 9.0 %      Eosinophil % 2.6 %      Basophil % 0.1 %      Immature Grans % 0.8 %      Neutrophils, Absolute 7.95 10*3/mm3      Lymphocytes, Absolute 0.51 (L) 10*3/mm3      Monocytes, Absolute 0.87 10*3/mm3      Eosinophils, Absolute 0.25 10*3/mm3      Basophils, Absolute 0.01 10*3/mm3      Immature Grans, Absolute 0.08 (H) 10*3/mm3      nRBC 0.0 /100 WBC     Basic Metabolic Panel [[id number]]  (Abnormal) Collected:  12/03/17 0431    Specimen:  Blood Updated:  12/03/17 0455     Glucose 107 (H) mg/dL      BUN 43 (H) mg/dL      Creatinine 2.24 (H) mg/dL      Sodium 136 mmol/L      Potassium 4.2 mmol/L      Chloride 108 mmol/L      CO2 22.0 (L) mmol/L      Calcium 7.7 (L) mg/dL      eGFR Non African Amer 29 (L) mL/min/1.73      BUN/Creatinine Ratio 19.2     Anion Gap 6.0 mmol/L     Narrative:       GFR Normal >60  Chronic Kidney Disease <60  Kidney Failure <15    Magnesium [[id number]]  (Normal) Collected:  12/03/17 0431    Specimen:  Blood Updated:  12/03/17 0455     Magnesium 2.0 mg/dL     Phosphorus [[id number]]  (Normal) Collected:  12/03/17 0431    Specimen:  Blood Updated:  12/03/17 0455     Phosphorus 2.8 mg/dL     Blood Culture - Blood, [[id number]]  (Abnormal)  (Susceptibility) Collected:  11/30/17 2013    Specimen:  Blood from Arm, Right Updated:  12/03/17 0648     Blood Culture Abnormal Stain (A)      Escherichia coli (A)     Isolated from Pediatric Bottle     Gram Stain Result Gram negative bacilli    Susceptibility      Escherichia coli     [place]     Ampicillin <=2 ug/ml Susceptible     Ampicillin + Sulbactam <=2 ug/ml Susceptible     Cefazolin <=4 ug/ml Susceptible     Cefepime <=1 ug/ml Susceptible     Ceftriaxone <=1 ug/ml Susceptible     Ertapenem <=0.5 ug/ml Susceptible     ESBL Confirmation Test NEG  Negative     Gentamicin <=1 ug/ml Susceptible     Levofloxacin <=0.12 ug/ml  Susceptible     Meropenem <=0.25 ug/ml Susceptible     Piperacillin + Tazobactam <=4 ug/ml Susceptible     Trimethoprim + Sulfamethoxazole <=20 ug/ml Susceptible                    POC Glucose Fingerstick [[id number]]  (Normal) Collected:  12/03/17 0725    Specimen:  Blood Updated:  12/03/17 0736     Glucose 111 mg/dL       : [id number] Rundles SabrinaMeter ID: [id number]       Urine Culture - Urine, Urine, Clean Catch [[id number]]  (Abnormal) Collected:  12/01/17 0051    Specimen:  Urine from Urine, Clean Catch Updated:  12/03/17 0744     Urine Culture --      10,000-20,000 CFU/mL Mixed Gram Positive Garima (A)    Narrative:       Probable contaminant.    POC Glucose Fingerstick [[id number]]  (Abnormal) Collected:  12/03/17 1125    Specimen:  Blood Updated:  12/03/17 1136     Glucose 147 (H) mg/dL       : [id number] Rundles SabrinaMeter ID: [id number]             Radiology:   Imaging Results (last 24 hours)     ** No results found for the last 24 hours. **              Assessment     -Acute pyelonephritis  -Acute kidney injury (acute tubular necrosis)- renal function has improved  -Benign hypertension  -Diarrhea  -Hypokalemia  -Type 2 diabetes  -History of prostate cancer    Plan:  Continue gentle IV hydration and will follow up labs.       Renaldo Matos MD  12/3/2017  12:34 PM

## 2017-12-03 NOTE — PROGRESS NOTES
Subjective    Mr. Kamara is 67 y.o. male    Chief Complaint: Dehydration    History of Present Illness     Flank pain improved, dysuria improved, no hematuria, having hip pain    The following portions of the patient's history were reviewed and updated as appropriate: allergies, current medications, past family history, past medical history, past social history, past surgical history and problem list.    Review of Systems   Constitutional: Negative for chills and fever.   Gastrointestinal: Negative for abdominal pain, anal bleeding and blood in stool.   Genitourinary: Negative for flank pain and hematuria.         Current Facility-Administered Medications:   •  acetaminophen (TYLENOL) tablet 650 mg, 650 mg, Oral, Q4H PRN, Chino Sanders, DO  •  aluminum-magnesium hydroxide-simethicone (MAALOX MAX) 400-400-40 MG/5ML suspension 15 mL, 15 mL, Oral, Q6H PRN, Chino Sanders, DO, 15 mL at 12/02/17 1836  •  atorvastatin (LIPITOR) tablet 20 mg, 20 mg, Oral, Nightly, Chino Sanders, DO, 20 mg at 12/02/17 2151  •  cefTRIAXone (ROCEPHIN) 1 g/100 mL 0.9% NS (MBP), 1 g, Intravenous, Q24H, Chino Sanders DO, Last Rate: 0 mL/hr at 12/01/17 2307, 1 g at 12/02/17 2150  •  dextrose (D50W) solution 25 g, 25 g, Intravenous, Q15 Min PRN, Chino Sanders, DO  •  dextrose (GLUTOSE) oral gel 15 g, 15 g, Oral, Q15 Min PRN, Chino Sanders, DO  •  enoxaparin (LOVENOX) syringe 30 mg, 30 mg, Subcutaneous, Q24H, Chino Sanders, DO, 30 mg at 12/02/17 2157  •  glucagon (human recombinant) (GLUCAGEN DIAGNOSTIC) injection 1 mg, 1 mg, Subcutaneous, Q15 Min PRN, Chino Sanders, DO  •  HYDROcodone-acetaminophen (NORCO)  MG per tablet 1 tablet, 1 tablet, Oral, Q4H PRN, Chino Sanders, DO, 1 tablet at 12/03/17 0623  •  HYDROcodone-acetaminophen (NORCO) 5-325 MG per tablet 1 tablet, 1 tablet, Oral, Q4H PRN, Chino Sanders, DO  •  insulin lispro (humaLOG) injection 2-7 Units, 2-7 Units, Subcutaneous, 4x Daily  With Meals & Nightly, Chino Sanders DO, 2 Units at 12/02/17 2200  •  levothyroxine (SYNTHROID, LEVOTHROID) tablet 200 mcg, 200 mcg, Oral, Q AM, Chino Sanders DO, 200 mcg at 12/03/17 0620  •  linagliptin (TRADJENTA) tablet 5 mg, 5 mg, Oral, Daily, Chino Sanders DO, 5 mg at 12/03/17 0809  •  ondansetron (ZOFRAN) injection 4 mg, 4 mg, Intravenous, Q6H PRN, Chino Sanders DO  •  pneumococcal polysaccharide 23-valent (PNEUMOVAX-23) vaccine 0.5 mL, 0.5 mL, Intramuscular, During Hospitalization, Chino Sanders DO  •  sodium chloride 0.9 % flush 1-10 mL, 1-10 mL, Intravenous, PRN, Chino Sanders DO  •  Insert peripheral IV, , , Once **AND** sodium chloride 0.9 % flush 10 mL, 10 mL, Intravenous, PRN, Segun Coulter Jr., MD  •  sodium chloride 0.9 % infusion, 125 mL/hr, Intravenous, Continuous, Chino Sanders DO, Last Rate: 125 mL/hr at 12/03/17 0620, 125 mL/hr at 12/03/17 0620  •  traZODone (DESYREL) tablet 50 mg, 50 mg, Oral, Nightly PRN, Chino Sanders DO, 50 mg at 12/02/17 2150    Past Medical History:   Diagnosis Date   • Arthritis    • Cancer     thyroid   • Diabetes    • Disease of thyroid gland    • Hypercholesteremia    • Hypertension    • IBS (irritable bowel syndrome)    • Prostate cancer    • Sleep apnea     NON COMPLIANT WITH C PAP       Past Surgical History:   Procedure Laterality Date   • CHOLECYSTECTOMY     • COLONOSCOPY N/A 3/7/2017    Procedure: COLONOSCOPY WITH ANESTHESIA;  Surgeon: Hector Alvarenga MD;  Location: [facility] ENDOSCOPY;  Service:    • LAPAROSCOPIC RETROPUBIC PROSTATECTOMY     • PROSTATE SURGERY     • PROSTATECTOMY N/A 8/1/2017    Procedure: PROSTATECTOMY LAPAROSCOPIC WITH DAVINCI SI ROBOT ;  Surgeon: Hernesto Hong MD;  Location: [facility] OR;  Service:    • THYROID SURGERY      RADIATION THERAPY AFTER SURGERY       Social History     Social History   • Marital status:      Spouse name: N/A   • Number of children: N/A   • Years of education:  "N/A     Social History Main Topics   • Smoking status: Never Smoker   • Smokeless tobacco: Never Used   • Alcohol use Yes      Comment: occ   • Drug use: No   • Sexual activity: Defer     Other Topics Concern   • None     Social History Narrative       Family History   Problem Relation Age of Onset   • Prostate cancer Father    • Cancer Father    • Heart disease Mother    • Diabetes Sister    • Colon cancer Neg Hx    • Colon polyps Neg Hx        Objective    /58  Pulse 83  Temp 98.2 °F (36.8 °C) (Temporal Artery )   Resp 16  Ht 71\" (180.3 cm)  Wt 220 lb (99.8 kg)  SpO2 96%  BMI 30.68 kg/m2    Intake/Output Summary (Last 24 hours) at 12/03/17 0931  Last data filed at 12/03/17 0852   Gross per 24 hour   Intake             3616 ml   Output             2725 ml   Net              891 ml       Physical Exam   Constitutional: He is oriented to person, place, and time. He appears well-developed and well-nourished. No distress.   Pulmonary/Chest: Effort normal.   Abdominal: Soft. He exhibits no distension and no mass. There is no tenderness. There is no rebound and no guarding. No hernia.   Neurological: He is alert and oriented to person, place, and time.   Skin: Skin is warm and dry. He is not diaphoretic.   Psychiatric: He has a normal mood and affect.   Vitals reviewed.      Results Review:   I reviewed the patient's new clinical results.            Imaging Results (last 24 hours)     ** No results found for the last 24 hours. **            Results for orders placed or performed during the hospital encounter of 11/30/17   Blood Culture - Blood,   Result Value Ref Range    Blood Culture Abnormal Stain (A)     Blood Culture Escherichia coli (A)     Isolated from Aerobic Bottle     Gram Stain Result Gram negative bacilli    Blood Culture - Blood,   Result Value Ref Range    Blood Culture Abnormal Stain (A)     Blood Culture Escherichia coli (A)     Isolated from Pediatric Bottle     Gram Stain Result Gram " negative bacilli        Susceptibility    Escherichia coli - [facility]     Ampicillin <=2 Susceptible ug/ml     Ampicillin + Sulbactam <=2 Susceptible ug/ml     Cefazolin <=4 Susceptible ug/ml     Cefepime <=1 Susceptible ug/ml     Ceftriaxone <=1 Susceptible ug/ml     Ertapenem <=0.5 Susceptible ug/ml     ESBL Confirmation Test NEG Negative      Gentamicin <=1 Susceptible ug/ml     Levofloxacin <=0.12 Susceptible ug/ml     Meropenem <=0.25 Susceptible ug/ml     Piperacillin + Tazobactam <=4 Susceptible ug/ml     Trimethoprim + Sulfamethoxazole <=20 Susceptible ug/ml   Urine Culture - Urine, Urine, Clean Catch   Result Value Ref Range    Urine Culture      Urine Culture 10,000-20,000 CFU/mL Mixed Gram Positive Garima (A)    Blood Culture ID, PCR - Blood,   Result Value Ref Range    BCID, PCR Escherichia coli. Identification by BCID PCR. (C) No organism detected by BCID PCR., Negative by BCID PCR. Culture to Follow.   Comprehensive Metabolic Panel   Result Value Ref Range    Glucose 162 (H) 70 - 100 mg/dL    BUN 78 (H) 5 - 21 mg/dL    Creatinine 5.15 (H) 0.50 - 1.40 mg/dL    Sodium 134 (L) 135 - 145 mmol/L    Potassium 3.3 (L) 3.5 - 5.3 mmol/L    Chloride 100 98 - 110 mmol/L    CO2 21.0 (L) 24.0 - 31.0 mmol/L    Calcium 8.1 (L) 8.4 - 10.4 mg/dL    Total Protein 6.8 6.3 - 8.7 g/dL    Albumin 3.60 3.50 - 5.00 g/dL    ALT (SGPT) 45 0 - 54 U/L    AST (SGOT) 28 7 - 45 U/L    Alkaline Phosphatase 49 24 - 120 U/L    Total Bilirubin 0.7 0.1 - 1.0 mg/dL    eGFR Non African Amer 11 (L) >60 mL/min/1.73    Globulin 3.2 gm/dL    A/G Ratio 1.1 1.1 - 2.5 g/dL    BUN/Creatinine Ratio 15.1 7.0 - 25.0    Anion Gap 13.0 4.0 - 13.0 mmol/L   Lipase   Result Value Ref Range    Lipase 15 (L) 23 - 203 U/L   CBC Auto Differential   Result Value Ref Range    WBC 17.25 (H) 4.80 - 10.80 10*3/mm3    RBC 4.02 (L) 4.80 - 5.90 10*6/mm3    Hemoglobin 11.3 (L) 14.0 - 18.0 g/dL    Hematocrit 32.5 (L) 40.0 - 52.0 %    MCV 80.8 (L) 82.0 - 95.0 fL    MCH 28.1  28.0 - 32.0 pg    MCHC 34.8 33.0 - 36.0 g/dL    RDW 14.5 12.0 - 15.0 %    RDW-SD 42.9 40.0 - 54.0 fl    MPV 10.6 6.0 - 12.0 fL    Platelets 204 130 - 400 10*3/mm3   Scan Slide   Result Value Ref Range    Scan Slide     Lactic Acid, Plasma   Result Value Ref Range    Lactate 1.7 0.5 - 2.0 mmol/L   Procalcitonin   Result Value Ref Range    Procalcitonin 39.11 (H) <=0.25 ng/mL   Magnesium   Result Value Ref Range    Magnesium 1.9 1.4 - 2.2 mg/dL   Phosphorus   Result Value Ref Range    Phosphorus 3.5 2.5 - 4.5 mg/dL   Urinalysis With / Microscopic If Indicated - Urine, Clean Catch   Result Value Ref Range    Color, UA Yellow Yellow, Straw    Appearance, UA Cloudy (A) Clear    pH, UA <=5.0 5.0 - 8.0    Specific Gravity, UA 1.017 1.005 - 1.030    Glucose, UA Negative Negative    Ketones, UA Negative Negative    Bilirubin, UA Negative Negative    Blood, UA Moderate (2+) (A) Negative    Protein,  mg/dL (2+) (A) Negative    Leuk Esterase, UA Moderate (2+) (A) Negative    Nitrite, UA Negative Negative    Urobilinogen, UA 0.2 E.U./dL 0.2 - 1.0 E.U./dL   Lactic Acid, Plasma   Result Value Ref Range    Lactate 1.6 0.5 - 2.0 mmol/L   Basic Metabolic Panel   Result Value Ref Range    Glucose 142 (H) 70 - 100 mg/dL    BUN 71 (H) 5 - 21 mg/dL    Creatinine 4.13 (H) 0.50 - 1.40 mg/dL    Sodium 136 135 - 145 mmol/L    Potassium 3.2 (L) 3.5 - 5.3 mmol/L    Chloride 106 98 - 110 mmol/L    CO2 21.0 (L) 24.0 - 31.0 mmol/L    Calcium 7.2 (L) 8.4 - 10.4 mg/dL    eGFR Non African Amer 14 (L) >60 mL/min/1.73    BUN/Creatinine Ratio 17.2 7.0 - 25.0    Anion Gap 9.0 4.0 - 13.0 mmol/L   Magnesium   Result Value Ref Range    Magnesium 1.8 1.4 - 2.2 mg/dL   Phosphorus   Result Value Ref Range    Phosphorus 3.2 2.5 - 4.5 mg/dL   CBC Auto Differential   Result Value Ref Range    WBC 12.29 (H) 4.80 - 10.80 10*3/mm3    RBC 3.47 (L) 4.80 - 5.90 10*6/mm3    Hemoglobin 9.7 (L) 14.0 - 18.0 g/dL    Hematocrit 28.0 (L) 40.0 - 52.0 %    MCV 80.7 (L)  82.0 - 95.0 fL    MCH 28.0 28.0 - 32.0 pg    MCHC 34.6 33.0 - 36.0 g/dL    RDW 14.5 12.0 - 15.0 %    RDW-SD 42.6 40.0 - 54.0 fl    MPV 10.5 6.0 - 12.0 fL    Platelets 162 130 - 400 10*3/mm3   Urinalysis, Microscopic Only - Urine, Clean Catch   Result Value Ref Range    RBC, UA 21-30 (A) None Seen /HPF    WBC, UA 21-30 (A) None Seen /HPF    Bacteria, UA Trace (A) None Seen /HPF    Squamous Epithelial Cells, UA 3-6 (A) None Seen, 0-2 /HPF    Hyaline Casts, UA 3-6 None Seen /LPF    Amorphous Crystals, UA Small/1+ None Seen /HPF    Methodology Manual Light Microscopy    Scan Slide   Result Value Ref Range    Scan Slide     Basic Metabolic Panel   Result Value Ref Range    Glucose 131 (H) 70 - 100 mg/dL    BUN 57 (H) 5 - 21 mg/dL    Creatinine 2.81 (H) 0.50 - 1.40 mg/dL    Sodium 138 135 - 145 mmol/L    Potassium 3.5 3.5 - 5.3 mmol/L    Chloride 108 98 - 110 mmol/L    CO2 24.0 24.0 - 31.0 mmol/L    Calcium 7.7 (L) 8.4 - 10.4 mg/dL    eGFR Non African Amer 23 (L) >60 mL/min/1.73    BUN/Creatinine Ratio 20.3 7.0 - 25.0    Anion Gap 6.0 4.0 - 13.0 mmol/L   Magnesium   Result Value Ref Range    Magnesium 2.0 1.4 - 2.2 mg/dL   Phosphorus   Result Value Ref Range    Phosphorus 3.1 2.5 - 4.5 mg/dL   Uric Acid   Result Value Ref Range    Uric Acid 8.8 (H) 3.5 - 8.5 mg/dL   CBC Auto Differential   Result Value Ref Range    WBC 10.61 4.80 - 10.80 10*3/mm3    RBC 3.53 (L) 4.80 - 5.90 10*6/mm3    Hemoglobin 10.1 (L) 14.0 - 18.0 g/dL    Hematocrit 29.0 (L) 40.0 - 52.0 %    MCV 82.2 82.0 - 95.0 fL    MCH 28.6 28.0 - 32.0 pg    MCHC 34.8 33.0 - 36.0 g/dL    RDW 14.9 12.0 - 15.0 %    RDW-SD 44.2 40.0 - 54.0 fl    MPV 10.1 6.0 - 12.0 fL    Platelets 159 130 - 400 10*3/mm3    Neutrophil % 89.3 (H) 39.0 - 78.0 %    Lymphocyte % 3.4 (L) 15.0 - 45.0 %    Monocyte % 5.4 4.0 - 12.0 %    Eosinophil % 1.1 0.0 - 4.0 %    Basophil % 0.3 0.0 - 2.0 %    Immature Grans % 0.5 0.0 - 5.0 %    Neutrophils, Absolute 9.48 (H) 1.87 - 8.40 10*3/mm3     Lymphocytes, Absolute 0.36 (L) 0.72 - 4.86 10*3/mm3    Monocytes, Absolute 0.57 0.19 - 1.30 10*3/mm3    Eosinophils, Absolute 0.12 0.00 - 0.70 10*3/mm3    Basophils, Absolute 0.03 0.00 - 0.20 10*3/mm3    Immature Grans, Absolute 0.05 (H) 0.00 - 0.03 10*3/mm3    nRBC 0.0 0.0 - 0.0 /100 WBC   Basic Metabolic Panel   Result Value Ref Range    Glucose 107 (H) 70 - 100 mg/dL    BUN 43 (H) 5 - 21 mg/dL    Creatinine 2.24 (H) 0.50 - 1.40 mg/dL    Sodium 136 135 - 145 mmol/L    Potassium 4.2 3.5 - 5.3 mmol/L    Chloride 108 98 - 110 mmol/L    CO2 22.0 (L) 24.0 - 31.0 mmol/L    Calcium 7.7 (L) 8.4 - 10.4 mg/dL    eGFR Non African Amer 29 (L) >60 mL/min/1.73    BUN/Creatinine Ratio 19.2 7.0 - 25.0    Anion Gap 6.0 4.0 - 13.0 mmol/L   Magnesium   Result Value Ref Range    Magnesium 2.0 1.4 - 2.2 mg/dL   Phosphorus   Result Value Ref Range    Phosphorus 2.8 2.5 - 4.5 mg/dL   CBC Auto Differential   Result Value Ref Range    WBC 9.67 4.80 - 10.80 10*3/mm3    RBC 3.32 (L) 4.80 - 5.90 10*6/mm3    Hemoglobin 9.2 (L) 14.0 - 18.0 g/dL    Hematocrit 27.8 (L) 40.0 - 52.0 %    MCV 83.7 82.0 - 95.0 fL    MCH 27.7 (L) 28.0 - 32.0 pg    MCHC 33.1 33.0 - 36.0 g/dL    RDW 15.0 12.0 - 15.0 %    RDW-SD 45.7 40.0 - 54.0 fl    MPV 9.9 6.0 - 12.0 fL    Platelets 153 130 - 400 10*3/mm3    Neutrophil % 82.2 (H) 39.0 - 78.0 %    Lymphocyte % 5.3 (L) 15.0 - 45.0 %    Monocyte % 9.0 4.0 - 12.0 %    Eosinophil % 2.6 0.0 - 4.0 %    Basophil % 0.1 0.0 - 2.0 %    Immature Grans % 0.8 0.0 - 5.0 %    Neutrophils, Absolute 7.95 1.87 - 8.40 10*3/mm3    Lymphocytes, Absolute 0.51 (L) 0.72 - 4.86 10*3/mm3    Monocytes, Absolute 0.87 0.19 - 1.30 10*3/mm3    Eosinophils, Absolute 0.25 0.00 - 0.70 10*3/mm3    Basophils, Absolute 0.01 0.00 - 0.20 10*3/mm3    Immature Grans, Absolute 0.08 (H) 0.00 - 0.03 10*3/mm3    nRBC 0.0 0.0 - 0.0 /100 WBC   POC Glucose Fingerstick   Result Value Ref Range    Glucose 147 (H) 70 - 130 mg/dL   POC Glucose Fingerstick   Result  Value Ref Range    Glucose 135 (H) 70 - 130 mg/dL   POC Glucose Fingerstick   Result Value Ref Range    Glucose 147 (H) 70 - 130 mg/dL   POC Glucose Fingerstick   Result Value Ref Range    Glucose 123 70 - 130 mg/dL   POC Glucose Fingerstick   Result Value Ref Range    Glucose 163 (H) 70 - 130 mg/dL   POC Glucose Fingerstick   Result Value Ref Range    Glucose 115 70 - 130 mg/dL   POC Glucose Fingerstick   Result Value Ref Range    Glucose 189 (H) 70 - 130 mg/dL   POC Glucose Fingerstick   Result Value Ref Range    Glucose 147 (H) 70 - 130 mg/dL   POC Glucose Fingerstick   Result Value Ref Range    Glucose 163 (H) 70 - 130 mg/dL   POC Glucose Fingerstick   Result Value Ref Range    Glucose 111 70 - 130 mg/dL   Light Blue Top   Result Value Ref Range    Extra Tube hold for add-on    Green Top (Gel)   Result Value Ref Range    Extra Tube Hold for add-ons.    Lavender Top   Result Value Ref Range    Extra Tube hold for add-on    Red Top   Result Value Ref Range    Extra Tube Hold for add-ons.    Manual Differential   Result Value Ref Range    Neutrophil % 67.0 39.0 - 78.0 %    Lymphocyte % 2.0 (L) 15.0 - 45.0 %    Monocyte % 2.0 (L) 4.0 - 12.0 %    Eosinophil % 1.0 0.0 - 4.0 %    Bands %  25.0 (H) 0.0 - 10.0 %    Metamyelocyte % 3.0 (H) 0.0 - 0.0 %    Neutrophils Absolute 15.87 (H) 1.87 - 8.40 10*3/mm3    Lymphocytes Absolute 0.35 (L) 0.72 - 4.86 10*3/mm3    Monocytes Absolute 0.35 0.19 - 1.30 10*3/mm3    Eosinophils Absolute 0.17 0.00 - 0.70 10*3/mm3    Elliptocytes Slight/1+ None Seen    Vacuolated Neutrophils Slight/1+ None Seen    Platelet Estimate Adequate Normal   Manual Differential   Result Value Ref Range    Neutrophil % 89.0 (H) 39.0 - 78.0 %    Lymphocyte % 2.0 (L) 15.0 - 45.0 %    Monocyte % 2.0 (L) 4.0 - 12.0 %    Eosinophil % 1.0 0.0 - 4.0 %    Bands %  6.0 0.0 - 10.0 %    Neutrophils Absolute 11.68 (H) 1.87 - 8.40 10*3/mm3    Lymphocytes Absolute 0.25 (L) 0.72 - 4.86 10*3/mm3    Monocytes Absolute 0.25  0.19 - 1.30 10*3/mm3    Eosinophils Absolute 0.12 0.00 - 0.70 10*3/mm3    Poikilocytes Slight/1+ None Seen    WBC Morphology Normal Normal    Platelet Morphology Normal Normal     Assessment and Plan    E.coli Sepsis  Left Pyelonephritis  Left Hydronephrosis  Acute Kidney Injury  Sp RALP    1. Cont abx.  Dr. Hong to return tomorrow.

## 2017-12-03 NOTE — PLAN OF CARE
Problem: Patient Care Overview (Adult)  Goal: Plan of Care Review  Outcome: Ongoing (interventions implemented as appropriate)    Problem: Infection, Risk/Actual (Adult)  Goal: Infection Prevention/Resolution  Outcome: Ongoing (interventions implemented as appropriate)    Problem: Diabetes, Type 2 (Adult)  Goal: Signs and Symptoms of Listed Potential Problems Will be Absent or Manageable (Diabetes, Type 2)  Outcome: Ongoing (interventions implemented as appropriate)

## 2017-12-03 NOTE — PLAN OF CARE
Problem: Patient Care Overview (Adult)  Goal: Plan of Care Review  Outcome: Ongoing (interventions implemented as appropriate)    12/03/17 3625   Coping/Psychosocial Response Interventions   Plan Of Care Reviewed With patient   Patient Care Overview   Progress no change   Outcome Evaluation   Outcome Summary/Follow up Plan Patient has been walking hallways today. VSS. Pt is hoping to go home soon. Continue to monitor labs.          Problem: Infection, Risk/Actual (Adult)  Goal: Identify Related Risk Factors and Signs and Symptoms  Outcome: Ongoing (interventions implemented as appropriate)  Goal: Infection Prevention/Resolution  Outcome: Ongoing (interventions implemented as appropriate)    Problem: Diabetes, Type 2 (Adult)  Goal: Signs and Symptoms of Listed Potential Problems Will be Absent or Manageable (Diabetes, Type 2)  Outcome: Ongoing (interventions implemented as appropriate)

## 2017-12-03 NOTE — PROGRESS NOTES
[facility] Medicine Services  INPATIENT PROGRESS NOTE    Length of Stay: 3  Date of Admission: 11/30/2017  Primary Care Physician: LUIS Souza    Subjective     Chief Complaint:     Fatigue, low abdominal discomfort and diarrhea    HPI     The patient continues to feel much better.  He continues to ambulate without difficulty.  He had a low-grade temperature of 99.1 last evening.  Renal function continues to slowly improve.  Escherichia coli cultured from urine is pansensitive.    Review of Systems     All pertinent negatives and positives are as above. All other systems have been reviewed and are negative unless otherwise stated.     Objective    Temp:  [97 °F (36.1 °C)-99.1 °F (37.3 °C)] 98.2 °F (36.8 °C)  Heart Rate:  [] 83  Resp:  [16-22] 16  BP: (123-146)/(57-65) 124/58    Lab Results (last 24 hours)     Procedure Component Value Units Date/Time    POC Glucose Fingerstick [[id number]]  (Abnormal) Collected:  12/02/17 1135    Specimen:  Blood Updated:  12/02/17 1208     Glucose 189 (H) mg/dL       : [id number] Zackary KimMeter ID: [id number]       POC Glucose Fingerstick [[id number]]  (Abnormal) Collected:  12/02/17 1646    Specimen:  Blood Updated:  12/02/17 1729     Glucose 147 (H) mg/dL       : [id number] Zackary KimMeter ID: [id number]       POC Glucose Fingerstick [[id number]]  (Abnormal) Collected:  12/02/17 2149    Specimen:  Blood Updated:  12/02/17 2219     Glucose 163 (H) mg/dL       : [id number] [facility]ly[facility]: [id number]       CBC Auto Differential [[id number]]  (Abnormal) Collected:  12/03/17 0431    Specimen:  Blood Updated:  12/03/17 0444     WBC 9.67 10*3/mm3      RBC 3.32 (L) 10*6/mm3      Hemoglobin 9.2 (L) g/dL      Hematocrit 27.8 (L) %      MCV 83.7 fL      MCH 27.7 (L) pg      MCHC 33.1 g/dL      RDW 15.0 %      RDW-SD 45.7 fl      MPV 9.9 fL      Platelets 153 10*3/mm3      Neutrophil % 82.2 (H) %       Lymphocyte % 5.3 (L) %      Monocyte % 9.0 %      Eosinophil % 2.6 %      Basophil % 0.1 %      Immature Grans % 0.8 %      Neutrophils, Absolute 7.95 10*3/mm3      Lymphocytes, Absolute 0.51 (L) 10*3/mm3      Monocytes, Absolute 0.87 10*3/mm3      Eosinophils, Absolute 0.25 10*3/mm3      Basophils, Absolute 0.01 10*3/mm3      Immature Grans, Absolute 0.08 (H) 10*3/mm3      nRBC 0.0 /100 WBC     Basic Metabolic Panel [[id number]]  (Abnormal) Collected:  12/03/17 0431    Specimen:  Blood Updated:  12/03/17 0455     Glucose 107 (H) mg/dL      BUN 43 (H) mg/dL      Creatinine 2.24 (H) mg/dL      Sodium 136 mmol/L      Potassium 4.2 mmol/L      Chloride 108 mmol/L      CO2 22.0 (L) mmol/L      Calcium 7.7 (L) mg/dL      eGFR Non African Amer 29 (L) mL/min/1.73      BUN/Creatinine Ratio 19.2     Anion Gap 6.0 mmol/L     Magnesium [[id number]]  (Normal) Collected:  12/03/17 0431    Specimen:  Blood Updated:  12/03/17 0455     Magnesium 2.0 mg/dL     Phosphorus [[id number]]  (Normal) Collected:  12/03/17 0431    Specimen:  Blood Updated:  12/03/17 0455     Phosphorus 2.8 mg/dL     Blood Culture - Blood, [[id number]]  (Abnormal)  (Susceptibility) Collected:  11/30/17 2013    Specimen:  Blood from Arm, Right Updated:  12/03/17 0648     Blood Culture Abnormal Stain (A)      Escherichia coli (A)     Isolated from Pediatric Bottle     Gram Stain Result Gram negative bacilli    Susceptibility      Escherichia coli     [facility]     Ampicillin <=2 ug/ml Susceptible     Ampicillin + Sulbactam <=2 ug/ml Susceptible     Cefazolin <=4 ug/ml Susceptible     Cefepime <=1 ug/ml Susceptible     Ceftriaxone <=1 ug/ml Susceptible     Ertapenem <=0.5 ug/ml Susceptible     ESBL Confirmation Test NEG  Negative     Gentamicin <=1 ug/ml Susceptible     Levofloxacin <=0.12 ug/ml Susceptible     Meropenem <=0.25 ug/ml Susceptible     Piperacillin + Tazobactam <=4 ug/ml Susceptible     Trimethoprim + Sulfamethoxazole <=20 ug/ml Susceptible                     POC Glucose Fingerstick [[id number]]  (Normal) Collected:  12/03/17 0725    Specimen:  Blood Updated:  12/03/17 0736     Glucose 111 mg/dL       : [id number] Susanna YañezaMeter ID: [id number]       Urine Culture - Urine, Urine, Clean Catch [[id number]]  (Abnormal) Collected:  12/01/17 0051    Specimen:  Urine from Urine, Clean Catch Updated:  12/03/17 0744     Urine Culture --      10,000-20,000 CFU/mL Mixed Gram Positive Garima (A)    Narrative:       Probable contaminant.          Imaging Results (last 24 hours)     ** No results found for the last 24 hours. **             Intake/Output Summary (Last 24 hours) at 12/03/17 1121  Last data filed at 12/03/17 0852   Gross per 24 hour   Intake             2616 ml   Output             2725 ml   Net             -109 ml       Physical Exam  Constitutional: He is oriented to person, place, and time. He appears well-developed and well-nourished. He is cooperative. No distress.   HENT:   Head: Normocephalic and atraumatic.   Right Ear: External ear normal.   Left Ear: External ear normal.   Nose: Nose normal.   Mouth/Throat: Oropharynx is clear and moist.   Eyes: Conjunctivae and EOM are normal. Pupils are equal, round, and reactive to light. No scleral icterus.   Neck: Normal range of motion. Neck supple. No JVD present. No tracheal deviation present. No thyromegaly present.   Cardiovascular: Normal rate, regular rhythm, normal heart sounds and intact distal pulses.    No murmur heard.  Pulmonary/Chest: Effort normal and breath sounds normal. No respiratory distress. He has no wheezes. He has no rales.   Abdominal: Soft. Bowel sounds are normal. He exhibits no distension. There is almost no tenderness (right and left lower quadrants). There is no further rebound. There is no guarding.   Musculoskeletal: Normal range of motion. He exhibits no edema or tenderness.   Neurological: He is alert and oriented to person, place, and time. He has normal reflexes. No cranial  nerve deficit. Coordination normal.   Skin: Skin is warm and dry. No erythema. No pallor.   Psychiatric: He has a normal mood and affect. His behavior is normal. Judgment and thought content normal      Results Review:  I have reviewed the labs, radiology results, and diagnostic studies since my last progress note and made treatment changes reflective of the results.   I have reviewed the current medications.    Assessment/Plan     Hospital Problem List     * (Principal)Sepsis due to Escherichia coli    Pyelonephritis    Acute renal failure    Leukocytosis    Diabetes    Hypertension    Disease of thyroid gland    History of prostate cancer          PLAN:  Continue IV Rocephin.   Serial labs  Increase ambulation  Decrease IV fluids to 75 mL per hour    Chino Sanders DO   12/03/17   11:21 AM

## 2017-12-04 LAB
ANION GAP SERPL CALCULATED.3IONS-SCNC: 7 MMOL/L (ref 4–13)
BASOPHILS # BLD AUTO: 0.03 10*3/MM3 (ref 0–0.2)
BASOPHILS NFR BLD AUTO: 0.4 % (ref 0–2)
BUN BLD-MCNC: 33 MG/DL (ref 5–21)
BUN/CREAT SERPL: 16.5 (ref 7–25)
CALCIUM SPEC-SCNC: 7.7 MG/DL (ref 8.4–10.4)
CHLORIDE SERPL-SCNC: 106 MMOL/L (ref 98–110)
CO2 SERPL-SCNC: 24 MMOL/L (ref 24–31)
CREAT BLD-MCNC: 2 MG/DL (ref 0.5–1.4)
DEPRECATED RDW RBC AUTO: 45.6 FL (ref 40–54)
EOSINOPHIL # BLD AUTO: 0.2 10*3/MM3 (ref 0–0.7)
EOSINOPHIL NFR BLD AUTO: 2.5 % (ref 0–4)
ERYTHROCYTE [DISTWIDTH] IN BLOOD BY AUTOMATED COUNT: 14.9 % (ref 12–15)
GFR SERPL CREATININE-BSD FRML MDRD: 33 ML/MIN/1.73
GLUCOSE BLD-MCNC: 118 MG/DL (ref 70–100)
GLUCOSE BLDC GLUCOMTR-MCNC: 106 MG/DL (ref 70–130)
GLUCOSE BLDC GLUCOMTR-MCNC: 138 MG/DL (ref 70–130)
GLUCOSE BLDC GLUCOMTR-MCNC: 144 MG/DL (ref 70–130)
GLUCOSE BLDC GLUCOMTR-MCNC: 189 MG/DL (ref 70–130)
HBA1C MFR BLD: 5.9 %
HCT VFR BLD AUTO: 28.2 % (ref 40–52)
HGB BLD-MCNC: 9.4 G/DL (ref 14–18)
IMM GRANULOCYTES # BLD: 0.08 10*3/MM3 (ref 0–0.03)
IMM GRANULOCYTES NFR BLD: 1 % (ref 0–5)
LYMPHOCYTES # BLD AUTO: 0.51 10*3/MM3 (ref 0.72–4.86)
LYMPHOCYTES NFR BLD AUTO: 6.3 % (ref 15–45)
MAGNESIUM SERPL-MCNC: 2 MG/DL (ref 1.4–2.2)
MCH RBC QN AUTO: 27.6 PG (ref 28–32)
MCHC RBC AUTO-ENTMCNC: 33.3 G/DL (ref 33–36)
MCV RBC AUTO: 82.9 FL (ref 82–95)
MONOCYTES # BLD AUTO: 0.88 10*3/MM3 (ref 0.19–1.3)
MONOCYTES NFR BLD AUTO: 10.9 % (ref 4–12)
NEUTROPHILS # BLD AUTO: 6.4 10*3/MM3 (ref 1.87–8.4)
NEUTROPHILS NFR BLD AUTO: 78.9 % (ref 39–78)
NRBC BLD MANUAL-RTO: 0 /100 WBC (ref 0–0)
PHOSPHATE SERPL-MCNC: 2.6 MG/DL (ref 2.5–4.5)
PLATELET # BLD AUTO: 148 10*3/MM3 (ref 130–400)
PMV BLD AUTO: 10.4 FL (ref 6–12)
POTASSIUM BLD-SCNC: 3.7 MMOL/L (ref 3.5–5.3)
RBC # BLD AUTO: 3.4 10*6/MM3 (ref 4.8–5.9)
SODIUM BLD-SCNC: 137 MMOL/L (ref 135–145)
WBC NRBC COR # BLD: 8.1 10*3/MM3 (ref 4.8–10.8)

## 2017-12-04 PROCEDURE — 82962 GLUCOSE BLOOD TEST: CPT

## 2017-12-04 PROCEDURE — 80048 BASIC METABOLIC PNL TOTAL CA: CPT | Performed by: FAMILY MEDICINE

## 2017-12-04 PROCEDURE — 85025 COMPLETE CBC W/AUTO DIFF WBC: CPT | Performed by: FAMILY MEDICINE

## 2017-12-04 PROCEDURE — 63710000001 INSULIN LISPRO (HUMAN) PER 5 UNITS: Performed by: FAMILY MEDICINE

## 2017-12-04 PROCEDURE — 25010000002 ENOXAPARIN PER 10 MG: Performed by: FAMILY MEDICINE

## 2017-12-04 PROCEDURE — 83036 HEMOGLOBIN GLYCOSYLATED A1C: CPT | Performed by: INTERNAL MEDICINE

## 2017-12-04 PROCEDURE — 84100 ASSAY OF PHOSPHORUS: CPT | Performed by: FAMILY MEDICINE

## 2017-12-04 PROCEDURE — 25010000002 CEFTRIAXONE PER 250 MG: Performed by: FAMILY MEDICINE

## 2017-12-04 PROCEDURE — 83735 ASSAY OF MAGNESIUM: CPT | Performed by: FAMILY MEDICINE

## 2017-12-04 RX ORDER — LISINOPRIL 5 MG/1
5 TABLET ORAL
Status: DISCONTINUED | OUTPATIENT
Start: 2017-12-04 | End: 2017-12-05 | Stop reason: HOSPADM

## 2017-12-04 RX ADMIN — SODIUM CHLORIDE 75 ML/HR: 9 INJECTION, SOLUTION INTRAVENOUS at 08:12

## 2017-12-04 RX ADMIN — HYDROCODONE BITARTRATE AND ACETAMINOPHEN 1 TABLET: 10; 325 TABLET ORAL at 15:39

## 2017-12-04 RX ADMIN — HYDROCODONE BITARTRATE AND ACETAMINOPHEN 1 TABLET: 5; 325 TABLET ORAL at 08:17

## 2017-12-04 RX ADMIN — LINAGLIPTIN 5 MG: 5 TABLET, FILM COATED ORAL at 08:12

## 2017-12-04 RX ADMIN — TRAZODONE HYDROCHLORIDE 50 MG: 50 TABLET ORAL at 22:58

## 2017-12-04 RX ADMIN — ATORVASTATIN CALCIUM 20 MG: 10 TABLET, FILM COATED ORAL at 21:41

## 2017-12-04 RX ADMIN — INSULIN LISPRO 2 UNITS: 100 INJECTION, SOLUTION INTRAVENOUS; SUBCUTANEOUS at 11:44

## 2017-12-04 RX ADMIN — LEVOTHYROXINE SODIUM 200 MCG: 200 TABLET ORAL at 08:12

## 2017-12-04 RX ADMIN — ENOXAPARIN SODIUM 30 MG: 30 INJECTION SUBCUTANEOUS at 21:41

## 2017-12-04 RX ADMIN — HYDROCODONE BITARTRATE AND ACETAMINOPHEN 1 TABLET: 10; 325 TABLET ORAL at 21:40

## 2017-12-04 RX ADMIN — LEVOTHYROXINE SODIUM 200 MCG: 200 TABLET ORAL at 04:40

## 2017-12-04 RX ADMIN — HYDROCODONE BITARTRATE AND ACETAMINOPHEN 1 TABLET: 10; 325 TABLET ORAL at 04:40

## 2017-12-04 RX ADMIN — CEFTRIAXONE SODIUM 1 G: 1 INJECTION, POWDER, FOR SOLUTION INTRAMUSCULAR; INTRAVENOUS at 21:40

## 2017-12-04 RX ADMIN — LISINOPRIL 5 MG: 5 TABLET ORAL at 11:44

## 2017-12-04 NOTE — PLAN OF CARE
Problem: Patient Care Overview (Adult)  Goal: Plan of Care Review  Outcome: Ongoing (interventions implemented as appropriate)    12/04/17 1534   Coping/Psychosocial Response Interventions   Plan Of Care Reviewed With patient   Patient Care Overview   Progress improving   Outcome Evaluation   Outcome Summary/Follow up Plan Pain managed with PRN pain medication. VSS. IV fluids infusing at 60. Pt up walking halls. Antipate d/c marsha. Will continue to monitor.        Goal: Adult Individualization and Mutuality  Outcome: Ongoing (interventions implemented as appropriate)  Goal: Discharge Needs Assessment  Outcome: Ongoing (interventions implemented as appropriate)    Problem: Infection, Risk/Actual (Adult)  Goal: Identify Related Risk Factors and Signs and Symptoms  Outcome: Ongoing (interventions implemented as appropriate)  Goal: Infection Prevention/Resolution  Outcome: Ongoing (interventions implemented as appropriate)    Problem: Diabetes, Type 2 (Adult)  Goal: Signs and Symptoms of Listed Potential Problems Will be Absent or Manageable (Diabetes, Type 2)  Outcome: Ongoing (interventions implemented as appropriate)

## 2017-12-04 NOTE — PROGRESS NOTES
Nephrology ([facility]) Progress Note      Patient:  Zay Kamara  YOB: 1950  Date of Service: 12/4/2017  MRN: [id number]   Acct: [id number]   Primary Care Physician: LUIS Souza  Advance Directive: Full Code  Admit Date: 11/30/2017       Hospital Day: 4  Referring Provider: No ref. provider found      Patient personally seen and examined.  Complete chart including Consults, Notes, Operative Reports, Labs, Cardiology, and Radiology studies reviewed as able.        Subjective:  Zay Kamara is a 67 y.o. male  whom we were consulted for acute kidney injury.  Patient has a history of benign hypertension, type II diabetes and degenerative spine disease.  In August 2017, he underwent prostatectomy for cancer. His course was complicated with acute kidney injury that later recovered. Recently, he was using some laxatives and this caused him to have diarrhea. He was having 3-4 large watery bowel movements per day.  The patient was also experiencing some abdominal pain as well as flank pain and malaise along with decreased appetite.  He was seen by his primary care physician who diagnosed an acute kidney injury.  He was given IV fluids at his primary physician's office and then he was sent to the emergency room to be subsequently admitted for acute kidney injury management.  His workup revealed acute pyelonephritis.  He was also found with nonobstructing kidney stones.  His serum creatinine was 5.1 on admission and the patient was started on intravenous fluids along with antibiotics.  Renal service was consulted to manage his acute kidney injury.  Patient has no diarrhea and better urine output.     Today, no overnight events.  No n/v/d/cp.       Allergies:  Review of patient's allergies indicates no known allergies.    Home Meds:  Prescriptions Prior to Admission   Medication Sig Dispense Refill Last Dose   • atorvastatin (LIPITOR) 10 MG tablet Take 20 mg by  mouth Daily.   Taking   • levothyroxine (SYNTHROID, LEVOTHROID) 200 MCG tablet Take 200 mcg by mouth Daily.   Taking   • lisinopril (PRINIVIL,ZESTRIL) 40 MG tablet Take 40 mg by mouth Daily.   Taking   • meloxicam (MOBIC) 7.5 MG tablet Take 7.5 mg by mouth Daily.   Taking   • SITagliptin (JANUVIA) 100 MG tablet Take 100 mg by mouth Daily.   Taking   • traZODone (DESYREL) 50 MG tablet Take 50 mg by mouth Every Night.   Taking       Medicines:  Current Facility-Administered Medications   Medication Dose Route Frequency Provider Last Rate Last Dose   • acetaminophen (TYLENOL) tablet 650 mg  650 mg Oral Q4H PRN Chino Sanders, DO       • aluminum-magnesium hydroxide-simethicone (MAALOX MAX) 400-400-40 MG/5ML suspension 15 mL  15 mL Oral Q6H PRN Chino Sanders, DO   15 mL at 12/03/17 2018   • atorvastatin (LIPITOR) tablet 20 mg  20 mg Oral Nightly Chino Sanders, DO   20 mg at 12/03/17 2134   • cefTRIAXone (ROCEPHIN) 1 g/100 mL 0.9% NS (MBP)  1 g Intravenous Q24H Chino Sanders, DO 0 mL/hr at 12/01/17 2307 1 g at 12/03/17 2209   • dextrose (D50W) solution 25 g  25 g Intravenous Q15 Min PRN Chino Sanders, DO       • dextrose (GLUTOSE) oral gel 15 g  15 g Oral Q15 Min PRN Chino Sanders DO       • enoxaparin (LOVENOX) syringe 30 mg  30 mg Subcutaneous Q24H Chino Sanders, DO   30 mg at 12/03/17 2138   • glucagon (human recombinant) (GLUCAGEN DIAGNOSTIC) injection 1 mg  1 mg Subcutaneous Q15 Min PRN Chino Sanders, DO       • HYDROcodone-acetaminophen (NORCO)  MG per tablet 1 tablet  1 tablet Oral Q4H PRN Chino Sanders, DO   1 tablet at 12/04/17 1539   • HYDROcodone-acetaminophen (NORCO) 5-325 MG per tablet 1 tablet  1 tablet Oral Q4H PRN Chino Sanders, DO   1 tablet at 12/04/17 0817   • insulin lispro (humaLOG) injection 2-7 Units  2-7 Units Subcutaneous 4x Daily With Meals & Nightly Chino Sanders, DO   2 Units at 12/04/17 1144   • levothyroxine (SYNTHROID, LEVOTHROID)  tablet 200 mcg  200 mcg Oral Q AM Chino Sanders DO   200 mcg at 12/04/17 0812   • linagliptin (TRADJENTA) tablet 5 mg  5 mg Oral Daily Chino Sanders DO   5 mg at 12/04/17 0812   • lisinopril (PRINIVIL,ZESTRIL) tablet 5 mg  5 mg Oral Q24H Eduar Ziegler, DO   5 mg at 12/04/17 1144   • ondansetron (ZOFRAN) injection 4 mg  4 mg Intravenous Q6H PRN Chino Sanders DO       • pneumococcal polysaccharide 23-valent (PNEUMOVAX-23) vaccine 0.5 mL  0.5 mL Intramuscular During Hospitalization Chino Sanders, DO       • sodium chloride 0.9 % flush 1-10 mL  1-10 mL Intravenous PRN Chino Sanders DO       • sodium chloride 0.9 % flush 10 mL  10 mL Intravenous PRN Segun Coulter Jr., MD       • sodium chloride 0.9 % infusion  60 mL/hr Intravenous Continuous Eduar Ziegler DO 60 mL/hr at 12/04/17 1136 60 mL/hr at 12/04/17 1136   • traZODone (DESYREL) tablet 50 mg  50 mg Oral Nightly PRN Chino Sanders DO   50 mg at 12/03/17 2133       Past Medical History:  Past Medical History:   Diagnosis Date   • Arthritis    • Cancer     thyroid   • Diabetes    • Disease of thyroid gland    • Hypercholesteremia    • Hypertension    • IBS (irritable bowel syndrome)    • Prostate cancer    • Sleep apnea     NON COMPLIANT WITH C PAP       Past Surgical History:  Past Surgical History:   Procedure Laterality Date   • CHOLECYSTECTOMY     • COLONOSCOPY N/A 3/7/2017    Procedure: COLONOSCOPY WITH ANESTHESIA;  Surgeon: Hector Alvarenga MD;  Location: [facility] ENDOSCOPY;  Service:    • LAPAROSCOPIC RETROPUBIC PROSTATECTOMY     • PROSTATE SURGERY     • PROSTATECTOMY N/A 8/1/2017    Procedure: PROSTATECTOMY LAPAROSCOPIC WITH DAVINCI SI ROBOT ;  Surgeon: Hernesto Hong MD;  Location: [facility] OR;  Service:    • THYROID SURGERY      RADIATION THERAPY AFTER SURGERY       Family History  Family History   Problem Relation Age of Onset   • Prostate cancer Father    • Cancer Father    • Heart disease Mother    • Diabetes Sister   "  • Colon cancer Neg Hx    • Colon polyps Neg Hx        Social History  Social History     Social History   • Marital status:      Spouse name: N/A   • Number of children: N/A   • Years of education: N/A     Occupational History   • Not on file.     Social History Main Topics   • Smoking status: Never Smoker   • Smokeless tobacco: Never Used   • Alcohol use Yes      Comment: occ   • Drug use: No   • Sexual activity: Defer     Other Topics Concern   • Not on file     Social History Narrative         Review of Systems:  History obtained from chart review and the patient  General ROS: No fever or chills  Respiratory ROS: No cough, shortness of breath, wheezing  Cardiovascular ROS: no chest pain or dyspnea on exertion  Gastrointestinal ROS: No abdominal pain or melena  Genito-Urinary ROS: No dysuria or hematuria      Objective:  /60 (BP Location: Right arm, Patient Position: Lying)  Pulse 89  Temp 98.2 °F (36.8 °C) (Tympanic)   Resp 16  Ht 71\" (180.3 cm)  Wt 230 lb (104 kg)  SpO2 96%  BMI 32.08 kg/m2    Intake/Output Summary (Last 24 hours) at 12/04/17 1608  Last data filed at 12/04/17 1334   Gross per 24 hour   Intake             1480 ml   Output             2900 ml   Net            -1420 ml     General: awake/alert   Chest:  clear to auscultation bilaterally without respiratory distress  CVS: regular rate and rhythm  Abdominal: soft, nontender, normal bowel sounds  Extremities: no cyanosis or edema  Skin: warm and dry without rash      Labs:    Results from last 7 days  Lab Units 12/04/17 0421 12/03/17 0431 12/02/17 0425   WBC 10*3/mm3 8.10 9.67 10.61   HEMOGLOBIN g/dL 9.4* 9.2* 10.1*   HEMATOCRIT % 28.2* 27.8* 29.0*   PLATELETS 10*3/mm3 148 153 159           Results from last 7 days  Lab Units 12/04/17  0421 12/03/17  0431 12/02/17 0425 11/30/17  1729   SODIUM mmol/L 137 136 138  < > 134*   POTASSIUM mmol/L 3.7 4.2 3.5  < > 3.3*   CHLORIDE mmol/L 106 108 108  < > 100   CO2 mmol/L 24.0 22.0* " 24.0  < > 21.0*   BUN mg/dL 33* 43* 57*  < > 78*   CREATININE mg/dL 2.00* 2.24* 2.81*  < > 5.15*   CALCIUM mg/dL 7.7* 7.7* 7.7*  < > 8.1*   BILIRUBIN mg/dL  --   --   --   --  0.7   ALK PHOS U/L  --   --   --   --  49   ALT (SGPT) U/L  --   --   --   --  45   AST (SGOT) U/L  --   --   --   --  28   GLUCOSE mg/dL 118* 107* 131*  < > 162*   < > = values in this interval not displayed.    Radiology:   Imaging Results (last 72 hours)     ** No results found for the last 72 hours. **          Culture:  Blood Culture   Date Value Ref Range Status   11/30/2017 Abnormal Stain (A)  Preliminary   11/30/2017 Escherichia coli (A)  Preliminary   11/30/2017 Abnormal Stain (A)  Final   11/30/2017 Escherichia coli (A)  Final     Urine Culture   Date Value Ref Range Status   12/01/2017 10,000-20,000 CFU/mL Mixed Gram Positive Garima (A)  Final         Assessment   ROGELIO/ATN  Acute left pyelonephritis  HTN  DM2  Anemia    Plan:  Wean IVF  Reviewed condition with pt/family        Andrea Savage MD  12/4/2017  4:08 PM

## 2017-12-04 NOTE — PLAN OF CARE
Problem: Patient Care Overview (Adult)  Goal: Plan of Care Review  Outcome: Ongoing (interventions implemented as appropriate)  Renal function improving but pt tired of being here displeased with bed wants to go home taking more pain meds

## 2017-12-04 NOTE — PROGRESS NOTES
Discharge Planning Assessment  [facility]     Patient Name: Zay Kamara  MRN: [id number]  Today's Date: 12/4/2017    Admit Date: 11/30/2017          Discharge Needs Assessment       12/04/17 1547    Living Environment    Lives With alone    Living Arrangements house    Home Accessibility no concerns    Stair Railings at Home none    Type of Financial/Environmental Concern none    Transportation Available car;family or friend will provide    Living Environment    Provides Primary Care For no one    Quality Of Family Relationships unable to assess    Able to Return to Prior Living Arrangements yes    Discharge Needs Assessment    Concerns To Be Addressed denies needs/concerns at this time;no discharge needs identified    Readmission Within The Last 30 Days no previous admission in last 30 days    Anticipated Changes Related to Illness none    Equipment Currently Used at Home none    Equipment Needed After Discharge none    Discharge Disposition home or self-care    Discharge Planning Comments Pt lives alone and has been independent. He will likely d/c tomorrow. No needs identified at this time.             Discharge Plan     None        Discharge Placement     No information found                Demographic Summary     None            Functional Status     None            Psychosocial     None            Abuse/Neglect     None            Legal     None            Substance Abuse     None            Patient Forms     None          JAJA Noriega

## 2017-12-04 NOTE — PLAN OF CARE
Problem: Patient Care Overview (Adult)  Goal: Plan of Care Review  Outcome: Ongoing (interventions implemented as appropriate)    12/04/17 1044   Coping/Psychosocial Response Interventions   Plan Of Care Reviewed With patient   Patient Care Overview   Progress improving   Outcome Evaluation   Outcome Summary/Follow up Plan Initial RDN assessment. Pt reports his appetite is improving. PO intake 60.7%/7meals/3days on Renal diet. Pt did not desire RD to review diet edu information at this time. Encouraged intake and will continue to follow.

## 2017-12-04 NOTE — PROGRESS NOTES
[facility]  INPATIENT PROGRESS NOTE    Length of Stay: 4  Date of Admission: 11/30/2017  Primary Care Physician: LUIS Souza    Subjective   Chief Complaint: hip pain  HPI   He continues to improve.  He says that he feels much better today.  He did have to take a pain pill this morning due to his chronic left hip pain, but feels much better now.  He is hopeful to go home tomorrow.    He does not complain of any current flank pain.  He has run no fever.  No difficulties with urinating or constipation at this point in time.  Tolerating diet.    Review of Systems   All pertinent negatives and positives are as above. All other systems have been reviewed and are negative unless otherwise stated.     Objective    Temp:  [96.9 °F (36.1 °C)-99 °F (37.2 °C)] 97.9 °F (36.6 °C)  Heart Rate:  [] 82  Resp:  [16-18] 16  BP: (129-155)/(48-73) 149/73  Physical Exam   Constitutional: He is oriented to person, place, and time. He appears well-developed and well-nourished.   Up in bed.  No distress.  No family present.  Discussed with his nurse, Abbey.   HENT:   Head: Normocephalic and atraumatic.   Eyes: Conjunctivae and EOM are normal. Pupils are equal, round, and reactive to light.   Neck: Neck supple. No JVD present.   Cardiovascular: Normal rate, regular rhythm, normal heart sounds and intact distal pulses.    Pulmonary/Chest: Effort normal and breath sounds normal.   Abdominal: Soft. Bowel sounds are normal. He exhibits no distension. There is no tenderness.   Musculoskeletal: Normal range of motion. He exhibits no edema, tenderness or deformity.   Neurological: He is alert and oriented to person, place, and time. He displays normal reflexes. No cranial nerve deficit. He exhibits normal muscle tone.   Skin: Skin is warm and dry. No rash noted.   Psychiatric: He has a normal mood and affect. His behavior is normal. Judgment and thought content  normal.     Results Review:  I have reviewed the labs, radiology results, and diagnostic studies.    Laboratory Data:     Results from last 7 days  Lab Units 12/04/17 0421 12/03/17 0431 12/02/17 0425   WBC 10*3/mm3 8.10 9.67 10.61   HEMOGLOBIN g/dL 9.4* 9.2* 10.1*   HEMATOCRIT % 28.2* 27.8* 29.0*   PLATELETS 10*3/mm3 148 153 159       Results from last 7 days  Lab Units 12/04/17  0421 12/03/17 0431 12/02/17 0425 11/30/17  1729   SODIUM mmol/L 137 136 138  < > 134*   POTASSIUM mmol/L 3.7 4.2 3.5  < > 3.3*   CHLORIDE mmol/L 106 108 108  < > 100   CO2 mmol/L 24.0 22.0* 24.0  < > 21.0*   BUN mg/dL 33* 43* 57*  < > 78*   CREATININE mg/dL 2.00* 2.24* 2.81*  < > 5.15*   CALCIUM mg/dL 7.7* 7.7* 7.7*  < > 8.1*   BILIRUBIN mg/dL  --   --   --   --  0.7   ALK PHOS U/L  --   --   --   --  49   ALT (SGPT) U/L  --   --   --   --  45   AST (SGOT) U/L  --   --   --   --  28   GLUCOSE mg/dL 118* 107* 131*  < > 162*   < > = values in this interval not displayed.    Most recent glucose is 147, 172, 118, 106.    Culture Data:   Blood Culture   Date Value Ref Range Status   11/30/2017 Abnormal Stain (A)  Preliminary   11/30/2017 Escherichia coli (A)  Preliminary   11/30/2017 Abnormal Stain (A)  Final   11/30/2017 Escherichia coli (A)  Final       Susceptibility         Escherichia coli         BRADFORD         Ampicillin <=2  Susceptible         Ampicillin + Sulbactam <=2  Susceptible         Cefazolin <=4  Susceptible         Cefepime <=1  Susceptible         Ceftriaxone <=1  Susceptible         Ertapenem <=0.5  Susceptible         ESBL Confirmation Test NEG  Negative         Gentamicin <=1  Susceptible         Levofloxacin <=0.12  Susceptible         Meropenem <=0.25  Susceptible         Piperacillin + Tazobactam <=4  Susceptible         Trimethoprim + Sulfamethoxazole <=20  Susceptible       Urine Culture   Date Value Ref Range Status   12/01/2017 10,000-20,000 CFU/mL Mixed Gram Positive Garima (A)  Final     I have reviewed the  patient current medications.     Assessment/Plan   Assessment:   1.  Acute left sided pyelonephritis.   2.  Questionable mild left hydronephrosis and left hydroureter, no stent per urology.  3.  Escherichia coli bacteremia.  4.  Acute renal failure, resolving.  5.  History of prostate cancer status post robotic-assisted laparoscopic prostatectomy in August 2017.  6.  Leukocytosis, resolved.  7.  Normocytic anemia.  8.  Systemic arterial hypertension.  9.  Hypothyroidism.  10.  Type II diabetes mellitus of uncertain control.    Plan:   Continue Rocephin.  He will need 2 weeks total of antibiotics.  We can likely use Omnicef as an outpatient.  Today is day #5.     Decrease IV fluids. Restart a low dose of lisinopril today.     Continue to hold meloxicam.    Check anemia substrates.    Accu-Cheks and sliding scale insulin before meals and at bedtime.  Tradjenta has been used in the hospital as a substitute for Januvia.  Check hemoglobin A1c.    Lovenox for DVT prophylaxis.  Encouraged to increase activity today.    Discharge Planning: I expect the patient to be discharged to home tomorrow.     Eduar Ziegler,    12/04/17   10:51 AM

## 2017-12-04 NOTE — PROGRESS NOTES
Chief complaint: Follow up UTI    HPI:    Hx: Denies dysuria or hematuria. Course has improved over last 48 hours.   ROS: Diarrhea and abdominal pain improved. No dysuria or hematuria. Mild flank pain.     Medication Review:     Current Facility-Administered Medications:   •  acetaminophen (TYLENOL) tablet 650 mg, 650 mg, Oral, Q4H PRN, Chino Sanders DO  •  aluminum-magnesium hydroxide-simethicone (MAALOX MAX) 400-400-40 MG/5ML suspension 15 mL, 15 mL, Oral, Q6H PRN, Chino Sanders DO, 15 mL at 12/03/17 2018  •  atorvastatin (LIPITOR) tablet 20 mg, 20 mg, Oral, Nightly, Chino Sanders DO, 20 mg at 12/03/17 2134  •  cefTRIAXone (ROCEPHIN) 1 g/100 mL 0.9% NS (MBP), 1 g, Intravenous, Q24H, Chino Sanders DO, Last Rate: 0 mL/hr at 12/01/17 2307, 1 g at 12/03/17 2209  •  dextrose (D50W) solution 25 g, 25 g, Intravenous, Q15 Min PRN, Chino Sanders DO  •  dextrose (GLUTOSE) oral gel 15 g, 15 g, Oral, Q15 Min PRN, Chino Sanders DO  •  enoxaparin (LOVENOX) syringe 30 mg, 30 mg, Subcutaneous, Q24H, Chino Sanders DO, 30 mg at 12/03/17 2138  •  glucagon (human recombinant) (GLUCAGEN DIAGNOSTIC) injection 1 mg, 1 mg, Subcutaneous, Q15 Min PRN, Chino Sanders DO  •  HYDROcodone-acetaminophen (NORCO)  MG per tablet 1 tablet, 1 tablet, Oral, Q4H PRN, Chino Sanders DO, 1 tablet at 12/04/17 0440  •  HYDROcodone-acetaminophen (NORCO) 5-325 MG per tablet 1 tablet, 1 tablet, Oral, Q4H PRN, Chino Sanders DO  •  insulin lispro (humaLOG) injection 2-7 Units, 2-7 Units, Subcutaneous, 4x Daily With Meals & Nightly, Chino Sanders DO, 2 Units at 12/03/17 2137  •  levothyroxine (SYNTHROID, LEVOTHROID) tablet 200 mcg, 200 mcg, Oral, Q AM, Chino Sanders DO, 200 mcg at 12/04/17 0440  •  linagliptin (TRADJENTA) tablet 5 mg, 5 mg, Oral, Daily, Chino Sanders DO, 5 mg at 12/03/17 0809  •  ondansetron (ZOFRAN) injection 4 mg, 4 mg, Intravenous, Q6H PRN, Chino CANALES  DO Tommy  •  pneumococcal polysaccharide 23-valent (PNEUMOVAX-23) vaccine 0.5 mL, 0.5 mL, Intramuscular, During Hospitalization, Chino Sanders DO  •  sodium chloride 0.9 % flush 1-10 mL, 1-10 mL, Intravenous, PRN, Chino Sanders DO  •  Insert peripheral IV, , , Once **AND** sodium chloride 0.9 % flush 10 mL, 10 mL, Intravenous, PRN, Segun Coulter Jr., MD  •  sodium chloride 0.9 % infusion, 75 mL/hr, Intravenous, Continuous, Chino Sanders DO, Last Rate: 75 mL/hr at 12/03/17 1329, 75 mL/hr at 12/03/17 1329  •  traZODone (DESYREL) tablet 50 mg, 50 mg, Oral, Nightly PRN, Chino Sanders DO, 50 mg at 12/03/17 2133  EXAM:     Temp:  [96.9 °F (36.1 °C)-99 °F (37.2 °C)] 99 °F (37.2 °C)  Heart Rate:  [] 85  Resp:  [16-18] 18  BP: (124-155)/(48-72) 131/48  Alert and oriented ×3  Not agitated or distressed  No obvious deformities  No respiratory distress  Skin without pallor or diaphoresis      DATA Review:         I reviewed the patient's new clinical results.  Lab Results (last 24 hours)     Procedure Component Value Units Date/Time    POC Glucose Fingerstick [[id number]]  (Normal) Collected:  12/03/17 0725    Specimen:  Blood Updated:  12/03/17 0736     Glucose 111 mg/dL       : [id number] Aloricater ID: [id number]       Urine Culture - Urine, Urine, Clean Catch [[id number]]  (Abnormal) Collected:  12/01/17 0051    Specimen:  Urine from Urine, Clean Catch Updated:  12/03/17 0744     Urine Culture --      10,000-20,000 CFU/mL Mixed Gram Positive Garima (A)    Narrative:       Probable contaminant.    POC Glucose Fingerstick [[id number]]  (Abnormal) Collected:  12/03/17 1125    Specimen:  Blood Updated:  12/03/17 1136     Glucose 147 (H) mg/dL       : [id number] [facility] ID: [id number]       POC Glucose Fingerstick [[id number]]  (Abnormal) Collected:  12/03/17 1646    Specimen:  Blood Updated:  12/03/17 1657     Glucose 193 (H) mg/dL       : [id number] Susanna  MatthiasrashawnAmina ID: [id number]       POC Glucose Fingerstick [[id number]]  (Abnormal) Collected:  12/03/17 2132    Specimen:  Blood Updated:  12/03/17 2155     Glucose 172 (H) mg/dL       : [id number] Teodoro Pickett ID: [id number]       CBC & Differential [[id number]] Collected:  12/04/17 0421    Specimen:  Blood Updated:  12/04/17 0444    Narrative:       The following orders were created for panel order CBC & Differential.  Procedure                               Abnormality         Status                     ---------                               -----------         ------                     Scan Slide[[id number]]                                                                  CBC Auto Differential[[id number]]        Abnormal            Final result                 Please view results for these tests on the individual orders.    CBC Auto Differential [[id number]]  (Abnormal) Collected:  12/04/17 0421    Specimen:  Blood Updated:  12/04/17 0444     WBC 8.10 10*3/mm3      RBC 3.40 (L) 10*6/mm3      Hemoglobin 9.4 (L) g/dL      Hematocrit 28.2 (L) %      MCV 82.9 fL      MCH 27.6 (L) pg      MCHC 33.3 g/dL      RDW 14.9 %      RDW-SD 45.6 fl      MPV 10.4 fL      Platelets 148 10*3/mm3      Neutrophil % 78.9 (H) %      Lymphocyte % 6.3 (L) %      Monocyte % 10.9 %      Eosinophil % 2.5 %      Basophil % 0.4 %      Immature Grans % 1.0 %      Neutrophils, Absolute 6.40 10*3/mm3      Lymphocytes, Absolute 0.51 (L) 10*3/mm3      Monocytes, Absolute 0.88 10*3/mm3      Eosinophils, Absolute 0.20 10*3/mm3      Basophils, Absolute 0.03 10*3/mm3      Immature Grans, Absolute 0.08 (H) 10*3/mm3      nRBC 0.0 /100 WBC     Magnesium [[id number]]  (Normal) Collected:  12/04/17 0421    Specimen:  Blood Updated:  12/04/17 0452     Magnesium 2.0 mg/dL     Basic Metabolic Panel [[id number]]  (Abnormal) Collected:  12/04/17 0421    Specimen:  Blood Updated:  12/04/17 0452     Glucose 118 (H) mg/dL      BUN 33 (H) mg/dL       Creatinine 2.00 (H) mg/dL      Sodium 137 mmol/L      Potassium 3.7 mmol/L      Chloride 106 mmol/L      CO2 24.0 mmol/L      Calcium 7.7 (L) mg/dL      eGFR Non African Amer 33 (L) mL/min/1.73      BUN/Creatinine Ratio 16.5     Anion Gap 7.0 mmol/L     Narrative:       GFR Normal >60  Chronic Kidney Disease <60  Kidney Failure <15    Phosphorus [[id number]]  (Normal) Collected:  12/04/17 0421    Specimen:  Blood Updated:  12/04/17 0452     Phosphorus 2.6 mg/dL       Independent review of CT scan of the abdomen/pelvis The patient has undergone a CT scan of the abdomen and pelvis Without contrast. The images are available for me to review as an independent interpretation for evaluation and management.  Assessment of the renal parenchyma with regards to thickness, scarring, symmetry in appearance and function, presence of masses both pre-and postcontrast, and calcifications are noted.  The collecting system with regard to dilatation, presence of calcifications, and masses were reviewed.  The course and caliber the ureters also noted.  The renal vessels and retroperitoneum is inspected for pathology.  The solid viscera and bowel pattern are briefly reviewed, but will also be inspected by the radiologist. The renal pelvis is inspected.  This study shows mild left hydronephrosis with significant perinephric inflammation.    Assessment/Plan:     #1. Sepsis secondary to UTI with pansensitive E.coli.   #2. Left hydronephrosis - mild, probably secondary to UTI with slowed peristalsis.   #3. Left Renal calculus - non obstructive. Will follow.   #4. Acute kidney injury. Doubt obstructive.     Agree with current antibiotic coverage. Will need 14 day total upon discharge including iv antibiotic coverage.   Renal function improving.  Will need repeat renal US in few weeks.   Consider cystoscopy.      Hernesto Hong MD  12/04/17  7:09 AM

## 2017-12-05 VITALS
DIASTOLIC BLOOD PRESSURE: 65 MMHG | HEIGHT: 71 IN | OXYGEN SATURATION: 95 % | HEART RATE: 97 BPM | BODY MASS INDEX: 32.07 KG/M2 | TEMPERATURE: 97.6 F | SYSTOLIC BLOOD PRESSURE: 123 MMHG | RESPIRATION RATE: 16 BRPM | WEIGHT: 229.1 LBS

## 2017-12-05 PROBLEM — N12 PYELONEPHRITIS: Status: RESOLVED | Noted: 2017-11-30 | Resolved: 2017-12-05

## 2017-12-05 PROBLEM — A41.51 SEPSIS DUE TO ESCHERICHIA COLI: Status: RESOLVED | Noted: 2017-12-02 | Resolved: 2017-12-05

## 2017-12-05 PROBLEM — N17.9 ACUTE RENAL FAILURE (HCC): Status: RESOLVED | Noted: 2017-11-30 | Resolved: 2017-12-05

## 2017-12-05 LAB
ANION GAP SERPL CALCULATED.3IONS-SCNC: 7 MMOL/L (ref 4–13)
BUN BLD-MCNC: 29 MG/DL (ref 5–21)
BUN/CREAT SERPL: 16.1 (ref 7–25)
CALCIUM SPEC-SCNC: 8 MG/DL (ref 8.4–10.4)
CHLORIDE SERPL-SCNC: 103 MMOL/L (ref 98–110)
CO2 SERPL-SCNC: 27 MMOL/L (ref 24–31)
CREAT BLD-MCNC: 1.8 MG/DL (ref 0.5–1.4)
DEPRECATED RDW RBC AUTO: 44.6 FL (ref 40–54)
ERYTHROCYTE [DISTWIDTH] IN BLOOD BY AUTOMATED COUNT: 14.6 % (ref 12–15)
FERRITIN SERPL-MCNC: 227 NG/ML (ref 17.9–464)
FOLATE SERPL-MCNC: 5.05 NG/ML
GFR SERPL CREATININE-BSD FRML MDRD: 38 ML/MIN/1.73
GLUCOSE BLD-MCNC: 128 MG/DL (ref 70–100)
GLUCOSE BLDC GLUCOMTR-MCNC: 129 MG/DL (ref 70–130)
HCT VFR BLD AUTO: 31.3 % (ref 40–52)
HGB BLD-MCNC: 10.3 G/DL (ref 14–18)
IRON 24H UR-MRATE: 19 MCG/DL (ref 42–180)
IRON SATN MFR SERPL: 10 % (ref 20–45)
MCH RBC QN AUTO: 27.4 PG (ref 28–32)
MCHC RBC AUTO-ENTMCNC: 32.9 G/DL (ref 33–36)
MCV RBC AUTO: 83.2 FL (ref 82–95)
PLATELET # BLD AUTO: 200 10*3/MM3 (ref 130–400)
PMV BLD AUTO: 10.2 FL (ref 6–12)
POTASSIUM BLD-SCNC: 3.4 MMOL/L (ref 3.5–5.3)
RBC # BLD AUTO: 3.76 10*6/MM3 (ref 4.8–5.9)
SODIUM BLD-SCNC: 137 MMOL/L (ref 135–145)
TIBC SERPL-MCNC: 195 MCG/DL (ref 225–420)
VIT B12 BLD-MCNC: 733 PG/ML (ref 239–931)
WBC NRBC COR # BLD: 9.09 10*3/MM3 (ref 4.8–10.8)

## 2017-12-05 PROCEDURE — 85027 COMPLETE CBC AUTOMATED: CPT | Performed by: INTERNAL MEDICINE

## 2017-12-05 PROCEDURE — 82728 ASSAY OF FERRITIN: CPT | Performed by: INTERNAL MEDICINE

## 2017-12-05 PROCEDURE — 80048 BASIC METABOLIC PNL TOTAL CA: CPT | Performed by: INTERNAL MEDICINE

## 2017-12-05 PROCEDURE — 83550 IRON BINDING TEST: CPT | Performed by: INTERNAL MEDICINE

## 2017-12-05 PROCEDURE — 82746 ASSAY OF FOLIC ACID SERUM: CPT | Performed by: INTERNAL MEDICINE

## 2017-12-05 PROCEDURE — 82962 GLUCOSE BLOOD TEST: CPT

## 2017-12-05 PROCEDURE — 83540 ASSAY OF IRON: CPT | Performed by: INTERNAL MEDICINE

## 2017-12-05 PROCEDURE — 82607 VITAMIN B-12: CPT | Performed by: INTERNAL MEDICINE

## 2017-12-05 RX ORDER — LISINOPRIL 5 MG/1
5 TABLET ORAL
Qty: 30 TABLET | Refills: 1 | Status: SHIPPED | OUTPATIENT
Start: 2017-12-06 | End: 2018-05-25

## 2017-12-05 RX ORDER — HYDROCODONE BITARTRATE AND ACETAMINOPHEN 5; 325 MG/1; MG/1
1 TABLET ORAL EVERY 6 HOURS PRN
Qty: 12 TABLET | Refills: 0 | Status: SHIPPED | OUTPATIENT
Start: 2017-12-05 | End: 2017-12-10

## 2017-12-05 RX ORDER — CIPROFLOXACIN 500 MG/1
500 TABLET, FILM COATED ORAL 2 TIMES DAILY
Qty: 16 TABLET | Refills: 0 | Status: SHIPPED | OUTPATIENT
Start: 2017-12-05 | End: 2017-12-28

## 2017-12-05 RX ORDER — FERROUS SULFATE 325(65) MG
325 TABLET ORAL
Qty: 30 TABLET | Refills: 1 | Status: SHIPPED | OUTPATIENT
Start: 2017-12-05 | End: 2018-08-08

## 2017-12-05 RX ADMIN — LINAGLIPTIN 5 MG: 5 TABLET, FILM COATED ORAL at 08:22

## 2017-12-05 RX ADMIN — HYDROCODONE BITARTRATE AND ACETAMINOPHEN 1 TABLET: 10; 325 TABLET ORAL at 08:29

## 2017-12-05 RX ADMIN — SODIUM CHLORIDE 60 ML/HR: 9 INJECTION, SOLUTION INTRAVENOUS at 02:05

## 2017-12-05 RX ADMIN — LEVOTHYROXINE SODIUM 200 MCG: 200 TABLET ORAL at 06:26

## 2017-12-05 RX ADMIN — LISINOPRIL 5 MG: 5 TABLET ORAL at 08:22

## 2017-12-05 RX ADMIN — HYDROCODONE BITARTRATE AND ACETAMINOPHEN 1 TABLET: 10; 325 TABLET ORAL at 03:59

## 2017-12-05 NOTE — PLAN OF CARE
Problem: Patient Care Overview (Adult)  Goal: Plan of Care Review  Outcome: Ongoing (interventions implemented as appropriate)    12/05/17 0657   Coping/Psychosocial Response Interventions   Plan Of Care Reviewed With patient   Patient Care Overview   Progress no change   Outcome Evaluation   Outcome Summary/Follow up Plan PRN pain meds as ordered, with relief noted. IVF as ordered. Pt up ad segundo in room. Pt hoping for d/c today         Problem: Infection, Risk/Actual (Adult)  Goal: Identify Related Risk Factors and Signs and Symptoms  Outcome: Outcome(s) achieved Date Met:  12/05/17  Goal: Infection Prevention/Resolution  Outcome: Ongoing (interventions implemented as appropriate)    Problem: Diabetes, Type 2 (Adult)  Goal: Signs and Symptoms of Listed Potential Problems Will be Absent or Manageable (Diabetes, Type 2)  Outcome: Ongoing (interventions implemented as appropriate)

## 2017-12-05 NOTE — DISCHARGE SUMMARY
[facility] Medicine Services  DISCHARGE SUMMARY       Date of Admission: 11/30/2017  Date of Discharge:  12/5/2017  Primary Care Physician: LUIS Souza    Presenting Problem/History of Present Illness:  Flank pain.     Final Discharge Diagnoses:  1.  Acute left sided pyelonephritis.   2.  Questionable mild left hydronephrosis and left hydroureter, no stent per urology.  3.  Escherichia coli bacteremia.  4.  Acute renal failure, resolving.  5.  History of prostate cancer status post robotic-assisted laparoscopic prostatectomy in August 2017.  6.  Leukocytosis, resolved.  7.  Normocytic anemia consistent with anemia of chronic disease.  8.  Systemic arterial hypertension.  9.  Hypothyroidism.  10.  Type II diabetes mellitus well-controlled on oral hypoglycemics with a hemoglobin A1c of 5.9.    Consults:   1. Dr. Matos covering for Dr. Hong with urology.   2. Dr. Matos and Dr. Savage with nephrology.     Procedures Performed: None.     Pertinent Test Results:   Imaging Results (last 7 days)     Procedure Component Value Units Date/Time    CT Abdomen Pelvis Without Contrast [[id number]] Collected:  11/30/17 1815     Updated:  11/30/17 1830    Narrative:       EXAMINATION: CT ABDOMEN PELVIS WO CONTRAST- 11/30/2017 6:15 PM CST     HISTORY: History of prostate cancer with prostatectomy; bilateral flank  pain     COMPARISON: CT abdomen and pelvis contrast 08/09/2017     DOSE: 1204 mGy-cm     TECHNIQUE: Sequential imaging was performed from the lung bases through  the pubic symphysis without the use of IV contrast.  Sagittal and  coronal reformations were made from the original source data and  reviewed. Automated exposure control was also utilized to decrease  patient radiation dose.     FINDINGS:   Visualized heart appears normal in size. Coronary artery calcifications  are present. Dependent changes are seen at the lung bases.     Evaluation is limited by a  lack of IV contrast. Allowing for these  limitations, the liver, pancreas, and adrenal glands are normal in  appearance. Gallbladder is surgically absent. Spleen is mildly enlarged,  measuring up to 16.4 cm in length. A nonobstructive stone is seen in the  left kidney measuring approximately 4 mm in size. There is mild left  perinephric stranding with prominence of the left renal collecting  system and left ureter to the level of the urinary bladder. No ureteral  stones are identified. A stable hypodense lesion is seen in the right  kidney, presumably a cyst. The right ureter appears decompressed.     The abdominal aorta appears normal in caliber. There are scattered  atherosclerotic calcifications of the aorta and its branch vessels.     There is no appreciable central mesenteric lymphadenopathy. Numerous  small lymph nodes are seen in the retroperitoneum in the para-aortic  region, not pathologically enlarged but more prominent than on the most  recent examination. There is mild retroperitoneal stranding.     Small hiatal hernia is suspected. The stomach and small bowel do not  appear overly dilated. There are scattered colonic diverticula without  evidence of diverticulitis. Large bowel otherwise appears grossly  normal. The appendix appears normal. No free air is seen in the abdomen.  There is a small fat-containing umbilical hernia. A midline abdominal  wall scar is present.     The urinary bladder is completely decompressed and not well evaluated.  Surgical clips are seen in the pelvis, compatible with recent  prostatectomy. Free fluid is also noted in the pelvis. Inflammation  extends along the left ureter to the level of the urinary bladder, with  a large amount of inflammatory stranding in the left pelvic sidewall  similar to the recent comparison exam.     Review of the visualized osseous structures demonstrates no acute or  aggressive lesions.           Impression:       1. Increased left perinephric  stranding as well as mild hydronephrosis  and questionable left hydroureter to the level of the urinary bladder.  Additionally, lymph nodes in the retroperitoneum do not appear  pathologically enlarged but are increased in size when compared to the  most recent exam, and there is some associated retroperitoneal  stranding. These findings are concerning for possible ascending  infection. Correlate clinically. Free fluid in the pelvis may be  reactive or postsurgical in nature. The urinary bladder is completely  decompressed and not well evaluated.  2. Atherosclerotic disease.  3. Splenomegaly.  4. Nonobstructive left renal stone.  5. Colonic diverticulosis without evidence of diverticulitis.           This report was finalized on 11/30/2017 18:27 by Dr. Ta Camara MD.        Lab Results (last 7 days)     Procedure Component Value Units Date/Time    Comprehensive Metabolic Panel [[id number]]  (Abnormal) Collected:  11/30/17 1729    Specimen:  Blood Updated:  11/30/17 1804     Glucose 162 (H) mg/dL      BUN 78 (H) mg/dL      Creatinine 5.15 (H) mg/dL      Sodium 134 (L) mmol/L      Potassium 3.3 (L) mmol/L      Chloride 100 mmol/L      CO2 21.0 (L) mmol/L      Calcium 8.1 (L) mg/dL      Total Protein 6.8 g/dL      Albumin 3.60 g/dL      ALT (SGPT) 45 U/L      AST (SGOT) 28 U/L      Alkaline Phosphatase 49 U/L      Total Bilirubin 0.7 mg/dL      eGFR Non African Amer 11 (L) mL/min/1.73      Globulin 3.2 gm/dL      A/G Ratio 1.1 g/dL      BUN/Creatinine Ratio 15.1     Anion Gap 13.0 mmol/L     Lipase [[id number]]  (Abnormal) Collected:  11/30/17 1729    Specimen:  Blood Updated:  11/30/17 1804     Lipase 15 (L) U/L     CBC Auto Differential [[id number]]  (Abnormal) Collected:  11/30/17 1729    Specimen:  Blood Updated:  11/30/17 1857     WBC 17.25 (H) 10*3/mm3      RBC 4.02 (L) 10*6/mm3      Hemoglobin 11.3 (L) g/dL      Hematocrit 32.5 (L) %      MCV 80.8 (L) fL      MCH 28.1 pg      MCHC 34.8 g/dL      RDW 14.5 %       RDW-SD 42.9 fl      MPV 10.6 fL      Platelets 204 10*3/mm3     Narrative:       The previously reported component NRBC is no longer being reported.    Scan Slide [[id number]] Collected:  11/30/17 1729    Specimen:  Blood Updated:  11/30/17 1857     Scan Slide --      See Manual Differential Results       Manual Differential [[id number]]  (Abnormal) Collected:  11/30/17 1729    Specimen:  Blood Updated:  11/30/17 1857     Neutrophil % 67.0 %      Lymphocyte % 2.0 (L) %      Monocyte % 2.0 (L) %      Eosinophil % 1.0 %      Bands %  25.0 (H) %      Metamyelocyte % 3.0 (H) %      Neutrophils Absolute 15.87 (H) 10*3/mm3      Lymphocytes Absolute 0.35 (L) 10*3/mm3      Monocytes Absolute 0.35 10*3/mm3      Eosinophils Absolute 0.17 10*3/mm3      Elliptocytes Slight/1+     Vacuolated Neutrophils Slight/1+     Platelet Estimate Adequate    Magnesium [[id number]]  (Normal) Collected:  11/30/17 1729    Specimen:  Blood Updated:  11/30/17 2010     Magnesium 1.9 mg/dL     Phosphorus [[id number]]  (Normal) Collected:  11/30/17 1729    Specimen:  Blood Updated:  11/30/17 2010     Phosphorus 3.5 mg/dL     Procalcitonin [[id number]]  (Abnormal) Collected:  11/30/17 1729    Specimen:  Blood Updated:  11/30/17 2052     Procalcitonin 39.11 (H) ng/mL     Lactic Acid, Plasma [[id number]]  (Normal) Collected:  11/30/17 2013    Specimen:  Blood Updated:  11/30/17 2058     Lactate 1.7 mmol/L     Lactic Acid, Plasma [[id number]]  (Normal) Collected:  11/30/17 2239    Specimen:  Blood Updated:  11/30/17 2302     Lactate 1.6 mmol/L     Urinalysis With / Microscopic If Indicated - Urine, Clean Catch [[id number]]  (Abnormal) Collected:  12/01/17 0051    Specimen:  Urine from Urine, Clean Catch Updated:  12/01/17 0113     Color, UA Yellow     Appearance, UA Cloudy (A)     pH, UA <=5.0     Specific Gravity, UA 1.017     Glucose, UA Negative     Ketones, UA Negative     Bilirubin, UA Negative     Blood, UA Moderate (2+) (A)     Protein,  mg/dL  (2+) (A)     Leuk Esterase, UA Moderate (2+) (A)     Nitrite, UA Negative     Urobilinogen, UA 0.2 E.U./dL    Urinalysis, Microscopic Only - Urine, Clean Catch [[id number]]  (Abnormal) Collected:  12/01/17 0051    Specimen:  Urine from Urine, Clean Catch Updated:  12/01/17 0113     RBC, UA 21-30 (A) /HPF      WBC, UA 21-30 (A) /HPF      Bacteria, UA Trace (A) /HPF      Squamous Epithelial Cells, UA 3-6 (A) /HPF      Hyaline Casts, UA 3-6 /LPF      Amorphous Crystals, UA Small/1+ /HPF      Methodology Manual Light Microscopy    Basic Metabolic Panel [[id number]]  (Abnormal) Collected:  12/01/17 0416    Specimen:  Blood Updated:  12/01/17 0550     Glucose 142 (H) mg/dL      BUN 71 (H) mg/dL      Creatinine 4.13 (H) mg/dL      Sodium 136 mmol/L      Potassium 3.2 (L) mmol/L      Chloride 106 mmol/L      CO2 21.0 (L) mmol/L      Calcium 7.2 (L) mg/dL      eGFR Non African Amer 14 (L) mL/min/1.73      BUN/Creatinine Ratio 17.2     Anion Gap 9.0 mmol/L     Narrative:       GFR Normal >60  Chronic Kidney Disease <60  Kidney Failure <15    Magnesium [[id number]]  (Normal) Collected:  12/01/17 0416    Specimen:  Blood Updated:  12/01/17 0550     Magnesium 1.8 mg/dL     Phosphorus [[id number]]  (Normal) Collected:  12/01/17 0416    Specimen:  Blood Updated:  12/01/17 0550     Phosphorus 3.2 mg/dL     CBC Auto Differential [[id number]]  (Abnormal) Collected:  12/01/17 0416    Specimen:  Blood Updated:  12/01/17 0652     WBC 12.29 (H) 10*3/mm3      RBC 3.47 (L) 10*6/mm3      Hemoglobin 9.7 (L) g/dL      Hematocrit 28.0 (L) %      MCV 80.7 (L) fL      MCH 28.0 pg      MCHC 34.6 g/dL      RDW 14.5 %      RDW-SD 42.6 fl      MPV 10.5 fL      Platelets 162 10*3/mm3     Scan Slide [[id number]] Collected:  12/01/17 0416    Specimen:  Blood Updated:  12/01/17 0652     Scan Slide --      See Manual Differential Results       Manual Differential [[id number]]  (Abnormal) Collected:  12/01/17 0416    Specimen:  Blood Updated:  12/01/17 0652      Neutrophil % 89.0 (H) %      Lymphocyte % 2.0 (L) %      Monocyte % 2.0 (L) %      Eosinophil % 1.0 %      Bands %  6.0 %      Neutrophils Absolute 11.68 (H) 10*3/mm3      Lymphocytes Absolute 0.25 (L) 10*3/mm3      Monocytes Absolute 0.25 10*3/mm3      Eosinophils Absolute 0.12 10*3/mm3      Poikilocytes Slight/1+     WBC Morphology Normal     Platelet Morphology Normal    Blood Culture ID, PCR - Blood, [[id number]]  (Abnormal) Collected:  11/30/17 2013    Specimen:  Blood from Arm, Right Updated:  12/01/17 1133     BCID, PCR Escherichia coli. Identification by BCID PCR. (C)    CBC Auto Differential [[id number]]  (Abnormal) Collected:  12/02/17 0425    Specimen:  Blood Updated:  12/02/17 0439     WBC 10.61 10*3/mm3      RBC 3.53 (L) 10*6/mm3      Hemoglobin 10.1 (L) g/dL      Hematocrit 29.0 (L) %      MCV 82.2 fL      MCH 28.6 pg      MCHC 34.8 g/dL      RDW 14.9 %      RDW-SD 44.2 fl      MPV 10.1 fL      Platelets 159 10*3/mm3      Neutrophil % 89.3 (H) %      Lymphocyte % 3.4 (L) %      Monocyte % 5.4 %      Eosinophil % 1.1 %      Basophil % 0.3 %      Immature Grans % 0.5 %      Neutrophils, Absolute 9.48 (H) 10*3/mm3      Lymphocytes, Absolute 0.36 (L) 10*3/mm3      Monocytes, Absolute 0.57 10*3/mm3      Eosinophils, Absolute 0.12 10*3/mm3      Basophils, Absolute 0.03 10*3/mm3      Immature Grans, Absolute 0.05 (H) 10*3/mm3      nRBC 0.0 /100 WBC     Magnesium [[id number]]  (Normal) Collected:  12/02/17 0425    Specimen:  Blood Updated:  12/02/17 0449     Magnesium 2.0 mg/dL     Phosphorus [[id number]]  (Normal) Collected:  12/02/17 0425    Specimen:  Blood Updated:  12/02/17 0449     Phosphorus 3.1 mg/dL     Uric Acid [[id number]]  (Abnormal) Collected:  12/02/17 0425    Specimen:  Blood Updated:  12/02/17 0449     Uric Acid 8.8 (H) mg/dL     Basic Metabolic Panel [[id number]]  (Abnormal) Collected:  12/02/17 0425    Specimen:  Blood Updated:  12/02/17 0500     Glucose 131 (H) mg/dL      BUN 57 (H) mg/dL       Creatinine 2.81 (H) mg/dL      Sodium 138 mmol/L      Potassium 3.5 mmol/L      Chloride 108 mmol/L      CO2 24.0 mmol/L      Calcium 7.7 (L) mg/dL      eGFR Non African Amer 23 (L) mL/min/1.73      BUN/Creatinine Ratio 20.3     Anion Gap 6.0 mmol/L     Narrative:       GFR Normal >60  Chronic Kidney Disease <60  Kidney Failure <15    Blood Culture - Blood, [[id number]]  (Abnormal) Collected:  11/30/17 2013    Specimen:  Blood from Arm, Left Updated:  12/02/17 1041     Blood Culture Abnormal Stain (A)      Escherichia coli (A)     Isolated from Aerobic Bottle     Gram Stain Result Gram negative bacilli    CBC Auto Differential [[id number]]  (Abnormal) Collected:  12/03/17 0431    Specimen:  Blood Updated:  12/03/17 0444     WBC 9.67 10*3/mm3      RBC 3.32 (L) 10*6/mm3      Hemoglobin 9.2 (L) g/dL      Hematocrit 27.8 (L) %      MCV 83.7 fL      MCH 27.7 (L) pg      MCHC 33.1 g/dL      RDW 15.0 %      RDW-SD 45.7 fl      MPV 9.9 fL      Platelets 153 10*3/mm3      Neutrophil % 82.2 (H) %      Lymphocyte % 5.3 (L) %      Monocyte % 9.0 %      Eosinophil % 2.6 %      Basophil % 0.1 %      Immature Grans % 0.8 %      Neutrophils, Absolute 7.95 10*3/mm3      Lymphocytes, Absolute 0.51 (L) 10*3/mm3      Monocytes, Absolute 0.87 10*3/mm3      Eosinophils, Absolute 0.25 10*3/mm3      Basophils, Absolute 0.01 10*3/mm3      Immature Grans, Absolute 0.08 (H) 10*3/mm3      nRBC 0.0 /100 WBC     Basic Metabolic Panel [[id number]]  (Abnormal) Collected:  12/03/17 0431    Specimen:  Blood Updated:  12/03/17 0455     Glucose 107 (H) mg/dL      BUN 43 (H) mg/dL      Creatinine 2.24 (H) mg/dL      Sodium 136 mmol/L      Potassium 4.2 mmol/L      Chloride 108 mmol/L      CO2 22.0 (L) mmol/L      Calcium 7.7 (L) mg/dL      eGFR Non African Amer 29 (L) mL/min/1.73      BUN/Creatinine Ratio 19.2     Anion Gap 6.0 mmol/L     Narrative:       GFR Normal >60  Chronic Kidney Disease <60  Kidney Failure <15    Magnesium [[id number]]  (Normal)  Collected:  12/03/17 0431    Specimen:  Blood Updated:  12/03/17 0455     Magnesium 2.0 mg/dL     Phosphorus [[id number]]  (Normal) Collected:  12/03/17 0431    Specimen:  Blood Updated:  12/03/17 0455     Phosphorus 2.8 mg/dL     Blood Culture - Blood, [[id number]]  (Abnormal)  (Susceptibility) Collected:  11/30/17 2013    Specimen:  Blood from Arm, Right Updated:  12/03/17 0648     Blood Culture Abnormal Stain (A)      Escherichia coli (A)     Isolated from Pediatric Bottle     Gram Stain Result Gram negative bacilli    Susceptibility      Escherichia coli     [place]     Ampicillin <=2 ug/ml Susceptible     Ampicillin + Sulbactam <=2 ug/ml Susceptible     Cefazolin <=4 ug/ml Susceptible     Cefepime <=1 ug/ml Susceptible     Ceftriaxone <=1 ug/ml Susceptible     Ertapenem <=0.5 ug/ml Susceptible     ESBL Confirmation Test NEG  Negative     Gentamicin <=1 ug/ml Susceptible     Levofloxacin <=0.12 ug/ml Susceptible     Meropenem <=0.25 ug/ml Susceptible     Piperacillin + Tazobactam <=4 ug/ml Susceptible     Trimethoprim + Sulfamethoxazole <=20 ug/ml Susceptible                    Urine Culture - Urine, Urine, Clean Catch [[id number]]  (Abnormal) Collected:  12/01/17 0051    Specimen:  Urine from Urine, Clean Catch Updated:  12/03/17 0744     Urine Culture --      10,000-20,000 CFU/mL Mixed Gram Positive Garima (A)    Narrative:       Probable contaminant.    CBC Auto Differential [[id number]]  (Abnormal) Collected:  12/04/17 0421    Specimen:  Blood Updated:  12/04/17 0444     WBC 8.10 10*3/mm3      RBC 3.40 (L) 10*6/mm3      Hemoglobin 9.4 (L) g/dL      Hematocrit 28.2 (L) %      MCV 82.9 fL      MCH 27.6 (L) pg      MCHC 33.3 g/dL      RDW 14.9 %      RDW-SD 45.6 fl      MPV 10.4 fL      Platelets 148 10*3/mm3      Neutrophil % 78.9 (H) %      Lymphocyte % 6.3 (L) %      Monocyte % 10.9 %      Eosinophil % 2.5 %      Basophil % 0.4 %      Immature Grans % 1.0 %      Neutrophils, Absolute 6.40 10*3/mm3      Lymphocytes,  Absolute 0.51 (L) 10*3/mm3      Monocytes, Absolute 0.88 10*3/mm3      Eosinophils, Absolute 0.20 10*3/mm3      Basophils, Absolute 0.03 10*3/mm3      Immature Grans, Absolute 0.08 (H) 10*3/mm3      nRBC 0.0 /100 WBC     Magnesium [[id number]]  (Normal) Collected:  12/04/17 0421    Specimen:  Blood Updated:  12/04/17 0452     Magnesium 2.0 mg/dL     Basic Metabolic Panel [[id number]]  (Abnormal) Collected:  12/04/17 0421    Specimen:  Blood Updated:  12/04/17 0452     Glucose 118 (H) mg/dL      BUN 33 (H) mg/dL      Creatinine 2.00 (H) mg/dL      Sodium 137 mmol/L      Potassium 3.7 mmol/L      Chloride 106 mmol/L      CO2 24.0 mmol/L      Calcium 7.7 (L) mg/dL      eGFR Non African Amer 33 (L) mL/min/1.73      BUN/Creatinine Ratio 16.5     Anion Gap 7.0 mmol/L     Narrative:       GFR Normal >60  Chronic Kidney Disease <60  Kidney Failure <15    Phosphorus [[id number]]  (Normal) Collected:  12/04/17 0421    Specimen:  Blood Updated:  12/04/17 0452     Phosphorus 2.6 mg/dL     Hemoglobin A1c [[id number]] Collected:  12/04/17 0421    Specimen:  Blood Updated:  12/04/17 1142     Hemoglobin A1C 5.9 %     Narrative:       Less than 6.0           Non-Diabetic Range  6.0-7.0                 ADA Therapeutic Target  Greater than 7.0        Action Suggested    CBC (No Diff) [[id number]]  (Abnormal) Collected:  12/05/17 0419    Specimen:  Blood Updated:  12/05/17 0455     WBC 9.09 10*3/mm3      RBC 3.76 (L) 10*6/mm3      Hemoglobin 10.3 (L) g/dL      Hematocrit 31.3 (L) %      MCV 83.2 fL      MCH 27.4 (L) pg      MCHC 32.9 (L) g/dL      RDW 14.6 %      RDW-SD 44.6 fl      MPV 10.2 fL      Platelets 200 10*3/mm3     Basic Metabolic Panel [[id number]]  (Abnormal) Collected:  12/05/17 0419    Specimen:  Blood Updated:  12/05/17 0509     Glucose 128 (H) mg/dL      BUN 29 (H) mg/dL      Creatinine 1.80 (H) mg/dL      Sodium 137 mmol/L      Potassium 3.4 (L) mmol/L      Chloride 103 mmol/L      CO2 27.0 mmol/L      Calcium 8.0 (L) mg/dL       eGFR Non  Amer 38 (L) mL/min/1.73      BUN/Creatinine Ratio 16.1     Anion Gap 7.0 mmol/L     Narrative:       GFR Normal >60  Chronic Kidney Disease <60  Kidney Failure <15    Iron Profile [[id number]]  (Abnormal) Collected:  12/05/17 0419    Specimen:  Blood Updated:  12/05/17 0516     Iron 19 (L) mcg/dL      TIBC 195 (L) mcg/dL      Iron Saturation 10 (L) %     Ferritin [[id number]]  (Normal) Collected:  12/05/17 0419    Specimen:  Blood Updated:  12/05/17 0544     Ferritin 227.00 ng/mL     Vitamin B12 [[id number]]  (Normal) Collected:  12/05/17 0419    Specimen:  Blood Updated:  12/05/17 0559     Vitamin B-12 733 pg/mL     Folate [[id number]] Collected:  12/05/17 0419    Specimen:  Blood Updated:  12/05/17 0614     Folate 5.05 ng/mL         Hospital Course:  The patient is a 67 y.o. male who presented to [facility] with complaints of flank pain, nausea, and weakness.  He was admitted to the hospital by Dr. Sanders.  He was subsequently evaluated by nephrology and urology as he presented with acute renal failure, left-sided hydronephrosis/hydroureter, and a urinary tract infection.  He clinically had pyelonephritis and radiographically had pyelonephritis.  He was evaluated by urology who felt that a ureteral stent was not needed.  He was treated aggressively with saline hydration and Rocephin.  He ultimately grew pan susceptible Escherichia coli from blood.  At this point in time, he has completed 6 days of IV antibiotics with Rocephin.  I was going to transition him to [facility] as an outpatient, however, his insurance will pay for this.  Ciprofloxacin will be used instead.  He will complete a total of 2 weeks of culture specific antibiotic therapy.    His serum creatinine has continued to improve on a daily basis.  His baseline serum creatinine appears to be 1.1.  It is 1.8 today.  I suspect that this will continue to improve even after discharge.      I restarted a low dose of lisinopril on  "12/4.  He will go home on 5 mg as his blood pressure is running well with this.  He likely does not need to be back on 40 mg daily at this point.  Continue to hold meloxicam.  I will send him home with a small prescription of Norco.     Check of anemia substrates is consistent with chronic disease.  I will place him on ferrous sulfate once daily and he can follow-up with primary care on this.     Accu-Cheks and sliding scale insulin before meals and at bedtime were done here.  Tradjenta was used in the hospital as a substitute for Januvia.  Check of hemoglobin A1c shows good control at 5.9.     He is doing very well today.  He feels to be back at his baseline.  He would like to go home and follow-up as an outpatient. He can see the nephrology group in 1 week in their office with a BMP.  He has an appointment to see Dr. Hong at the end of the month, which he will keep.      Physical Exam on Discharge:  /65  Pulse 97  Temp 97.6 °F (36.4 °C) (Temporal Artery )   Resp 16  Ht 180.3 cm (71\")  Wt 104 kg (229 lb 1.6 oz)  SpO2 95%  BMI 31.95 kg/m2  Physical Exam  Constitutional: He is oriented to person, place, and time. He appears well-developed and well-nourished.   Up in bed.  No distress.  No family present.  Head: Normocephalic and atraumatic.   Eyes: Conjunctivae and EOM are normal. Pupils are equal, round, and reactive to light.   Neck: Neck supple. No JVD present.   Cardiovascular: Normal rate, regular rhythm, normal heart sounds and intact distal pulses.    Pulmonary/Chest: Effort normal and breath sounds normal.   Abdominal: Soft. Bowel sounds are normal. He exhibits no distension. There is no tenderness.   Musculoskeletal: Normal range of motion. He exhibits no edema, tenderness or deformity.   Neurological: He is alert and oriented to person, place, and time. He displays normal reflexes. No cranial nerve deficit. He exhibits normal muscle tone.   Skin: Skin is warm and dry. No rash noted. "   Psychiatric: He has a normal mood and affect. His behavior is normal. Judgment and thought content normal.     Condition on Discharge: Good.     Discharge Disposition:  Home or Self Care    Discharge Medications:   Zay Kamara   Home Medication Instructions JOSAFAT:[id number]    Printed on:12/05/17 6579   Medication Information                      atorvastatin (LIPITOR) 10 MG tablet  Take 20 mg by mouth Daily.             ciprofloxacin (CIPRO) 500 MG tablet  Take 1 tablet by mouth 2 (Two) Times a Day.             ferrous sulfate (FERROUSUL) 325 (65 FE) MG tablet  Take 1 tablet by mouth Daily With Breakfast.             HYDROcodone-acetaminophen (NORCO) 5-325 MG per tablet  Take 1 tablet by mouth Every 6 (Six) Hours As Needed for Moderate Pain  for up to 5 days.             levothyroxine (SYNTHROID, LEVOTHROID) 200 MCG tablet  Take 200 mcg by mouth Daily.             lisinopril (PRINIVIL,ZESTRIL) 5 MG tablet  Take 1 tablet by mouth Daily.             pneumococcal polysaccharide 23-valent (PNEUMOVAX-23) 25 MCG/0.5ML vaccine  Inject 0.5 mL into the shoulder, thigh, or buttocks 1 (One) Time for 1 dose.             SITagliptin (JANUVIA) 100 MG tablet  Take 100 mg by mouth Daily.             traZODone (DESYREL) 50 MG tablet  Take 50 mg by mouth Every Night.               Discharge Diet:   Diet Instructions     Diet: Consistent Carbohydrate; Thin       Discharge Diet:  Consistent Carbohydrate   Fluid Consistency:  Thin               Activity at Discharge:   Activity Instructions     Activity as Tolerated                   Follow-up Appointments:   LUIS Souza in 1 week.   Nephrology in 1 week with a EDUAR.   Future Appointments  Date Time Provider Department Center   12/28/2017 8:50 AM Hernesto Hong MD [facility] None     Test Results Pending at Discharge: 35 minutes.     Eduar Ziegler DO  12/05/17  9:17 AM    Time: 35 minutes.

## 2017-12-06 LAB
BACTERIA SPEC AEROBE CULT: ABNORMAL
BACTERIA SPEC AEROBE CULT: ABNORMAL
GRAM STN SPEC: ABNORMAL
ISOLATED FROM: ABNORMAL

## 2017-12-21 ENCOUNTER — RESULTS ENCOUNTER (OUTPATIENT)
Dept: UROLOGY | Facility: CLINIC | Age: 67
End: 2017-12-21

## 2017-12-21 DIAGNOSIS — C61 PROSTATE CANCER (HCC): ICD-10-CM

## 2017-12-22 LAB — PSA SERPL-MCNC: <0.07 NG/ML (ref 0–4)

## 2017-12-28 ENCOUNTER — OFFICE VISIT (OUTPATIENT)
Dept: UROLOGY | Facility: CLINIC | Age: 67
End: 2017-12-28

## 2017-12-28 VITALS
TEMPERATURE: 97.5 F | WEIGHT: 214 LBS | HEIGHT: 71 IN | SYSTOLIC BLOOD PRESSURE: 158 MMHG | BODY MASS INDEX: 29.96 KG/M2 | DIASTOLIC BLOOD PRESSURE: 86 MMHG

## 2017-12-28 DIAGNOSIS — N13.30 HYDRONEPHROSIS OF LEFT KIDNEY: ICD-10-CM

## 2017-12-28 DIAGNOSIS — C61 PROSTATE CANCER (HCC): Primary | ICD-10-CM

## 2017-12-28 DIAGNOSIS — N10 ACUTE PYELONEPHRITIS: ICD-10-CM

## 2017-12-28 LAB
BILIRUB BLD-MCNC: NEGATIVE MG/DL
CLARITY, POC: CLEAR
COLOR UR: YELLOW
GLUCOSE UR STRIP-MCNC: NEGATIVE MG/DL
KETONES UR QL: NEGATIVE
LEUKOCYTE EST, POC: ABNORMAL
NITRITE UR-MCNC: NEGATIVE MG/ML
PH UR: 5 [PH] (ref 5–8)
PROT UR STRIP-MCNC: NEGATIVE MG/DL
RBC # UR STRIP: NEGATIVE /UL
SP GR UR: 1.01 (ref 1–1.03)
UROBILINOGEN UR QL: NORMAL

## 2017-12-28 PROCEDURE — 81003 URINALYSIS AUTO W/O SCOPE: CPT | Performed by: UROLOGY

## 2017-12-28 PROCEDURE — 87086 URINE CULTURE/COLONY COUNT: CPT | Performed by: UROLOGY

## 2017-12-28 PROCEDURE — 51798 US URINE CAPACITY MEASURE: CPT | Performed by: UROLOGY

## 2017-12-28 PROCEDURE — 99214 OFFICE O/P EST MOD 30 MIN: CPT | Performed by: UROLOGY

## 2017-12-28 NOTE — PROGRESS NOTES
Mr. Kamara is 67 y.o. male    CHIEF COMPLAINT: I am here to follow up on my urinary tract infection and prostate cancer  HPI  Prostate cancer   He was initially diagnosed with prostate cancer about  4 month(s) ago. This was identified in the context of elevated psa.  Severity of the disease is best described as probable organ confined disease. Previous or current management includes robot assisted laparoscpic prostatectomy. Associated symptoms include no evidence of irritative or obstructive voiding symptoms, gross hematuria, lower extremity swelling or weight loss. . Currently his PSA is  <0.070 ng/ml.       Lab Results   Component Value Date    PSA <0.070 12/22/2017    PSA <0.070 09/15/2017    PSA 6.3 (H) 05/18/2017     Recent Urinary Tract Infection  The patient presents after a recent episode classified anatomically as Acute pyelonephritis. The symptoms started 1months ago. Symptoms included urgency and frequency and flank pain  on the left. These are better. Possible coexisting conditions or situations that may predispose the patient to urinary tract include recent surgery. The patient  Evaluation included ds uti evaluation: ct scan. A urine culture grew E.coli.  The patient was treated with antibiotics which did provide some relief.       The following portions of the patient's history were reviewed and updated as appropriate: allergies, current medications, past family history, past medical history, past social history, past surgical history and problem list.    Review of Systems   Constitutional: Negative for chills and fever.   Gastrointestinal: Negative for abdominal pain, anal bleeding and blood in stool.   Genitourinary: Negative for flank pain and hematuria.         Current Outpatient Prescriptions:   •  atorvastatin (LIPITOR) 10 MG tablet, Take 20 mg by mouth Daily., Disp: , Rfl:   •  ferrous sulfate (FERROUSUL) 325 (65 FE) MG tablet, Take 1 tablet by mouth Daily With Breakfast., Disp: 30 tablet, Rfl:  "1  •  levothyroxine (SYNTHROID, LEVOTHROID) 200 MCG tablet, Take 200 mcg by mouth Daily., Disp: , Rfl:   •  lisinopril (PRINIVIL,ZESTRIL) 5 MG tablet, Take 1 tablet by mouth Daily., Disp: 30 tablet, Rfl: 1  •  SITagliptin (JANUVIA) 100 MG tablet, Take 100 mg by mouth Daily., Disp: , Rfl:   •  traZODone (DESYREL) 50 MG tablet, Take 50 mg by mouth Every Night., Disp: , Rfl:     Past Medical History:   Diagnosis Date   • Arthritis    • Cancer     thyroid   • Diabetes    • Disease of thyroid gland    • Hypercholesteremia    • Hypertension    • IBS (irritable bowel syndrome)    • Prostate cancer    • Sleep apnea     NON COMPLIANT WITH C PAP       Past Surgical History:   Procedure Laterality Date   • CHOLECYSTECTOMY     • COLONOSCOPY N/A 3/7/2017    Procedure: COLONOSCOPY WITH ANESTHESIA;  Surgeon: Hector Alvarenga MD;  Location: [facility] ENDOSCOPY;  Service:    • LAPAROSCOPIC RETROPUBIC PROSTATECTOMY     • PROSTATE SURGERY     • PROSTATECTOMY N/A 8/1/2017    Procedure: PROSTATECTOMY LAPAROSCOPIC WITH DAVINCI SI ROBOT ;  Surgeon: Hernesto Hong MD;  Location: [facility] OR;  Service:    • THYROID SURGERY      RADIATION THERAPY AFTER SURGERY       Social History     Social History   • Marital status:      Spouse name: N/A   • Number of children: N/A   • Years of education: N/A     Social History Main Topics   • Smoking status: Never Smoker   • Smokeless tobacco: Never Used   • Alcohol use Yes      Comment: occ   • Drug use: No   • Sexual activity: Defer     Other Topics Concern   • None     Social History Narrative       Family History   Problem Relation Age of Onset   • Prostate cancer Father    • Cancer Father    • Heart disease Mother    • Diabetes Sister    • Colon cancer Neg Hx    • Colon polyps Neg Hx        /86  Temp 97.5 °F (36.4 °C)  Ht 180.3 cm (71\")  Wt 97.1 kg (214 lb)  BMI 29.85 kg/m2    Physical Exam  Constitutional: The patient  is oriented to person, place, and time. They  appear " well-developed and well-nourished. No distress.   Pulmonary/Chest: Effort normal.   Abdominal: Soft. The patient exhibits no distension and no mass. There is no tenderness. There is no rebound and no guarding. No hernia.   Neurological: Patient is alert and oriented to person, place, and time.   Skin: Skin is warm and dry. Not diaphoretic.   Psychiatric:  normal mood and affect.   Vitals reviewed.        Results for orders placed or performed in visit on 12/28/17   POC Urinalysis Dipstick, Automated   Result Value Ref Range    Color Yellow Yellow, Straw, Dark Yellow, Cindi    Clarity, UA Clear Clear    Glucose, UA Negative Negative, 1000 mg/dL (3+) mg/dL    Bilirubin Negative Negative    Ketones, UA Negative Negative    Specific Gravity  1.015 1.005 - 1.030    Blood, UA Negative Negative    pH, Urine 5.0 5.0 - 8.0    Protein, POC Negative Negative mg/dL    Urobilinogen, UA Normal Normal    Leukocytes Trace (A) Negative    Nitrite, UA Negative Negative            Bladder Scan interpretation  Estimation of residual urine via abdominal ultrasound  Residual Urine: 26 ml  Indication: incontinence  Position: Supine  Examination: Incremental scanning of the suprapubic area using 3 MHz transducer using copious amounts of acoustic gel.   Findings: An anechoic area was demonstrated which represented the bladder, with measurement of residual urine as noted. I inspected this myself. In that the residual urine was stable or insignificant, no treatment will be necessary at this time.         Assessment and Plan  Diagnoses and all orders for this visit:    Prostate cancer  -     POC Urinalysis Dipstick, Automated    Acute pyelonephritis  -     Urine Culture - Urine, Urine, Clean Catch    Hydronephrosis of left kidney  -     Urine Culture - Urine, Urine, Clean Catch    #1. The PSA is <0.07 which is undetectable suggesting no clinical evidence of prostate cancer   #2. Repeat urine culture. Grew pansensitive E.coli one month ago.  Antbiotic course completed.   #3. Needs a repeat US and cystoscopy. Cystoscopy as the definitive lower urinary tract study is discussed . The risks of pain and discomfort, infection, and urethral stricture are discussed with the patient including the technique used in the office setting.  All patient questions were answered.     Hernesto Hong MD  12/28/17  9:24 AM      Cc: LUIS Maria

## 2017-12-30 LAB — BACTERIA SPEC AEROBE CULT: NORMAL

## 2018-01-10 ENCOUNTER — HOSPITAL ENCOUNTER (OUTPATIENT)
Dept: ULTRASOUND IMAGING | Facility: HOSPITAL | Age: 68
Discharge: HOME | End: 2018-01-10
Attending: UROLOGY | Admitting: UROLOGY

## 2018-01-10 DIAGNOSIS — N13.30 HYDRONEPHROSIS OF LEFT KIDNEY: ICD-10-CM

## 2018-01-10 PROCEDURE — 76775 US EXAM ABDO BACK WALL LIM: CPT

## 2018-01-11 ENCOUNTER — PROCEDURE VISIT (OUTPATIENT)
Dept: UROLOGY | Facility: CLINIC | Age: 68
End: 2018-01-11

## 2018-01-11 DIAGNOSIS — C61 PROSTATE CANCER (HCC): Primary | ICD-10-CM

## 2018-01-11 DIAGNOSIS — N10 ACUTE PYELONEPHRITIS: ICD-10-CM

## 2018-01-11 LAB
BILIRUB BLD-MCNC: NEGATIVE MG/DL
CLARITY, POC: CLEAR
COLOR UR: YELLOW
GLUCOSE UR STRIP-MCNC: NEGATIVE MG/DL
KETONES UR QL: NEGATIVE
LEUKOCYTE EST, POC: NEGATIVE
NITRITE UR-MCNC: NEGATIVE MG/ML
PH UR: 5 [PH] (ref 5–8)
PROT UR STRIP-MCNC: NEGATIVE MG/DL
RBC # UR STRIP: ABNORMAL /UL
SP GR UR: 1.01 (ref 1–1.03)
UROBILINOGEN UR QL: NORMAL

## 2018-01-11 PROCEDURE — 52000 CYSTOURETHROSCOPY: CPT | Performed by: UROLOGY

## 2018-01-11 PROCEDURE — 81003 URINALYSIS AUTO W/O SCOPE: CPT | Performed by: UROLOGY

## 2018-01-11 NOTE — PROGRESS NOTES
CC: I am here for the doctor to look at my bladder    Cystoscopy procedure note  Pre- operative diagnosis:  Recurrent UTI    Post operative diagnosis:  Same    Procedure:  The patient was prepped and draped in a normal sterile fashion.  The urethra was anesthetized with 2% lidocaine jelly.  A flexible cystoscope was introduced per urethra.      Urethra:  No urethral stricture    Bladder:  There is no evidence of a stone, foreign body or mass within the bladder.  The bladder is minimally trabeculated.  The bladder neck is without contracture.  The vesicourethral anastomosis looks normal and wide open.  No evidence for an body.    Ureteral orifices:  Normal position bilaterally and Clear efflux bilaterally      Patient tolerated the procedure well    Complications: none    Blood loss: minimal    Diagnoses and all orders for this visit:    Prostate cancer  -     POC Urinalysis Dipstick, Automated  -     PSA DIAGNOSTIC; Future    Acute pyelonephritis        Follow up:    Routine follow up  Renal ultrasound looks normal.

## 2018-03-04 DIAGNOSIS — F32.9 REACTIVE DEPRESSION: ICD-10-CM

## 2018-03-04 DIAGNOSIS — F51.04 PSYCHOPHYSIOLOGICAL INSOMNIA: ICD-10-CM

## 2018-03-05 RX ORDER — TRAZODONE HYDROCHLORIDE 50 MG/1
50 TABLET ORAL NIGHTLY
Qty: 60 TABLET | Refills: 5 | Status: SHIPPED | OUTPATIENT
Start: 2018-03-05 | End: 2019-07-17

## 2018-03-22 ENCOUNTER — OFFICE VISIT (OUTPATIENT)
Dept: PRIMARY CARE CLINIC | Age: 68
End: 2018-03-22
Payer: MEDICARE

## 2018-03-22 VITALS
SYSTOLIC BLOOD PRESSURE: 152 MMHG | DIASTOLIC BLOOD PRESSURE: 78 MMHG | OXYGEN SATURATION: 95 % | HEIGHT: 71 IN | WEIGHT: 223 LBS | BODY MASS INDEX: 31.22 KG/M2 | TEMPERATURE: 98.7 F | HEART RATE: 83 BPM

## 2018-03-22 DIAGNOSIS — E11.9 TYPE 2 DIABETES MELLITUS WITHOUT COMPLICATION, WITHOUT LONG-TERM CURRENT USE OF INSULIN (HCC): ICD-10-CM

## 2018-03-22 DIAGNOSIS — M10.9 ACUTE GOUT INVOLVING TOE OF RIGHT FOOT, UNSPECIFIED CAUSE: Primary | ICD-10-CM

## 2018-03-22 DIAGNOSIS — D17.1 LIPOMA OF BACK: ICD-10-CM

## 2018-03-22 PROCEDURE — 96372 THER/PROPH/DIAG INJ SC/IM: CPT | Performed by: NURSE PRACTITIONER

## 2018-03-22 PROCEDURE — 3017F COLORECTAL CA SCREEN DOC REV: CPT | Performed by: NURSE PRACTITIONER

## 2018-03-22 PROCEDURE — 1036F TOBACCO NON-USER: CPT | Performed by: NURSE PRACTITIONER

## 2018-03-22 PROCEDURE — G8417 CALC BMI ABV UP PARAM F/U: HCPCS | Performed by: NURSE PRACTITIONER

## 2018-03-22 PROCEDURE — G8427 DOCREV CUR MEDS BY ELIG CLIN: HCPCS | Performed by: NURSE PRACTITIONER

## 2018-03-22 PROCEDURE — G8482 FLU IMMUNIZE ORDER/ADMIN: HCPCS | Performed by: NURSE PRACTITIONER

## 2018-03-22 PROCEDURE — 1123F ACP DISCUSS/DSCN MKR DOCD: CPT | Performed by: NURSE PRACTITIONER

## 2018-03-22 PROCEDURE — 99213 OFFICE O/P EST LOW 20 MIN: CPT | Performed by: NURSE PRACTITIONER

## 2018-03-22 PROCEDURE — 3046F HEMOGLOBIN A1C LEVEL >9.0%: CPT | Performed by: NURSE PRACTITIONER

## 2018-03-22 PROCEDURE — 4040F PNEUMOC VAC/ADMIN/RCVD: CPT | Performed by: NURSE PRACTITIONER

## 2018-03-22 RX ORDER — COLCHICINE 0.6 MG/1
TABLET ORAL
Qty: 12 TABLET | Refills: 0 | Status: SHIPPED | OUTPATIENT
Start: 2018-03-22 | End: 2019-02-10 | Stop reason: SDUPTHER

## 2018-03-22 RX ORDER — TESTOSTERONE CYPIONATE 200 MG/ML
10 INJECTION INTRAMUSCULAR ONCE
Status: COMPLETED | OUTPATIENT
Start: 2018-03-22 | End: 2018-03-22

## 2018-03-22 RX ORDER — DEXAMETHASONE SODIUM PHOSPHATE 10 MG/ML
10 INJECTION INTRAMUSCULAR; INTRAVENOUS ONCE
Qty: 1 ML | Refills: 0
Start: 2018-03-22 | End: 2018-03-22 | Stop reason: CLARIF

## 2018-03-22 RX ADMIN — TESTOSTERONE CYPIONATE 10 MG: 200 INJECTION INTRAMUSCULAR at 16:16

## 2018-03-22 ASSESSMENT — ENCOUNTER SYMPTOMS
DIARRHEA: 0
BACK PAIN: 1
NAUSEA: 0
RHINORRHEA: 0
ABDOMINAL PAIN: 0
SORE THROAT: 0
TROUBLE SWALLOWING: 0
SHORTNESS OF BREATH: 0
VOMITING: 0
COUGH: 0
CONSTIPATION: 0

## 2018-03-22 NOTE — PROGRESS NOTES
After obtaining consent from Nakia Bain, gave patient Dexamethasone 10mg injection in Right upper quad. gluteus, patient tolerated well. Medication was not supplied by the patient.

## 2018-03-27 ENCOUNTER — TELEPHONE (OUTPATIENT)
Dept: UROLOGY | Facility: CLINIC | Age: 68
End: 2018-03-27

## 2018-03-27 NOTE — TELEPHONE ENCOUNTER
Patient called and is still having issues with leaking urine. He states if he's sleeping or just sitting and watching tv he's fine. When he is up walking or working he is leaking to the point he's going thru 2-3 pads a day. His next appt is May 14th and he wanted to see if he needed to schedule an appt sooner. His day off is Thursday 3-29 and he said a response could wait until then.

## 2018-03-27 NOTE — TELEPHONE ENCOUNTER
Called and spoke with patient he is not taking any bladder medication, I will send dr leblanc a message regarding this.

## 2018-03-28 ENCOUNTER — TELEPHONE (OUTPATIENT)
Dept: UROLOGY | Facility: CLINIC | Age: 68
End: 2018-03-28

## 2018-03-28 NOTE — TELEPHONE ENCOUNTER
----- Message from Hernesto Hong MD sent at 3/27/2018  3:42 PM CDT -----  Regarding: Incontinence  There is no medicine to make a sphincter work better. He needs to continue his pelvic floor exercises. May eventually need a procedure to fix it.   ----- Message -----  From: Riddhi Cartwright CMA  Sent: 3/27/2018   2:57 PM  To: Hernesto Hong MD    Patient is still leaking when he stands up or works outside. Could we offer patient a medication for this or do you want me to put him on the nurse practitioner schedule?

## 2018-04-16 ENCOUNTER — RESULTS ENCOUNTER (OUTPATIENT)
Dept: UROLOGY | Facility: CLINIC | Age: 68
End: 2018-04-16

## 2018-04-16 DIAGNOSIS — C61 PROSTATE CANCER (HCC): ICD-10-CM

## 2018-05-04 NOTE — PROGRESS NOTES
Mr. Kamara is 67 y.o. male    CHIEF COMPLAINT: I am here for follow up on prostate cancer.  PSA is <0.070    HPI  Prostate cancer  Location: Prostate glands, bilateral  Quality:  Adenocarcinoma  Severity: intermediate risk, [place] 3+4, t2c with perineural invasion. 10% gland involvement.   Duration: 9 months  Context: Found during evaluation of elevated PSA   Modifying factors: PSA immeasurable (remission) after prostatectomy  Associated signs or symptoms: Moderate to severe stress incontinence since surgery. Also complains of a midline protuberance above midline incision.       Lab Results   Component Value Date    PSA <0.070 05/09/2018    PSA <0.070 12/22/2017    PSA <0.070 09/15/2017         The following portions of the patient's history were reviewed and updated as appropriate: allergies, current medications, past family history, past medical history, past social history, past surgical history and problem list.      Review of Systems   Constitutional: Negative for chills and fever.   Gastrointestinal: Negative for abdominal pain, anal bleeding and blood in stool.   Genitourinary: Positive for frequency (filling 4 pads a day). Negative for flank pain, hematuria and urgency.           Current Outpatient Prescriptions:   •  atorvastatin (LIPITOR) 10 MG tablet, Take 20 mg by mouth Daily., Disp: , Rfl:   •  levothyroxine (SYNTHROID, LEVOTHROID) 200 MCG tablet, Take 200 mcg by mouth Daily., Disp: , Rfl:   •  lisinopril (PRINIVIL,ZESTRIL) 5 MG tablet, Take 1 tablet by mouth Daily., Disp: 30 tablet, Rfl: 1  •  SITagliptin (JANUVIA) 100 MG tablet, Take 100 mg by mouth Daily., Disp: , Rfl:   •  traZODone (DESYREL) 50 MG tablet, Take 50 mg by mouth Every Night., Disp: , Rfl:   •  escitalopram (LEXAPRO) 10 MG tablet, Take 10 mg by mouth Daily., Disp: , Rfl: 11  •  ferrous sulfate (FERROUSUL) 325 (65 FE) MG tablet, Take 1 tablet by mouth Daily With Breakfast., Disp: 30 tablet, Rfl: 1    Past Medical History:   Diagnosis  "Date   • Arthritis    • Cancer     thyroid   • Diabetes    • Disease of thyroid gland    • Hypercholesteremia    • Hypertension    • IBS (irritable bowel syndrome)    • Prostate cancer    • Sleep apnea     NON COMPLIANT WITH C PAP       Past Surgical History:   Procedure Laterality Date   • CHOLECYSTECTOMY     • COLONOSCOPY N/A 3/7/2017    Procedure: COLONOSCOPY WITH ANESTHESIA;  Surgeon: Hector Alvarenga MD;  Location: [facility] ENDOSCOPY;  Service:    • LAPAROSCOPIC RETROPUBIC PROSTATECTOMY     • PROSTATE SURGERY     • PROSTATECTOMY N/A 8/1/2017    Procedure: PROSTATECTOMY LAPAROSCOPIC WITH DAVINCI SI ROBOT ;  Surgeon: Hernesto Hong MD;  Location:  [facility] OR;  Service:    • THYROID SURGERY      RADIATION THERAPY AFTER SURGERY       Social History     Social History   • Marital status:      Social History Main Topics   • Smoking status: Never Smoker   • Smokeless tobacco: Never Used   • Alcohol use Yes      Comment: occ   • Drug use: No   • Sexual activity: Defer     Other Topics Concern   • Not on file       Family History   Problem Relation Age of Onset   • Prostate cancer Father    • Cancer Father    • Heart disease Mother    • Diabetes Sister    • Colon cancer Neg Hx    • Colon polyps Neg Hx          /80   Temp 99 °F (37.2 °C)   Ht 180.3 cm (71\")   Wt 101 kg (223 lb 9.6 oz)   BMI 31.19 kg/m²       Physical Exam  Constitutional: The patient  is oriented to person, place, and time. They  appear well-developed and well-nourished. No distress.   Pulmonary/Chest: Effort normal.   Abdominal: Soft. The patient exhibits no distension and no mass. There is no tenderness. There is no rebound and no guarding. No hernia.   Neurological: Patient is alert and oriented to person, place, and time.   Skin: Skin is warm and dry. Not diaphoretic.   Psychiatric:  normal mood and affect.   Vitals reviewed.      Data  Results for orders placed or performed in visit on 05/14/18   POC Urinalysis Dipstick, Automated "   Result Value Ref Range    Color Yellow Yellow, Straw, Dark Yellow, Cindi    Clarity, UA Clear Clear    Glucose, UA Negative Negative, 1000 mg/dL (3+) mg/dL    Bilirubin Negative Negative    Ketones, UA Negative Negative    Specific Gravity  1.020 1.005 - 1.030    Blood, UA Trace (A) Negative    pH, Urine 5.5 5.0 - 8.0    Protein, POC Negative Negative mg/dL    Urobilinogen, UA Normal Normal    Leukocytes Negative Negative    Nitrite, UA Negative Negative     Bladder Scan interpretation  Estimation of residual urine via abdominal ultrasound  Residual Urine: 96 ml  Indication: Stress incontinence.   Position: Supine  Examination: Incremental scanning of the suprapubic area using 3 MHz transducer using copious amounts of acoustic gel.   Findings: An anechoic area was demonstrated which represented the bladder, with measurement of residual urine as noted. I inspected this myself. In that the residual urine was stable or insignificant, no treatment will be necessary at this time.         Assessment and Plan  Diagnoses and all orders for this visit:    Prostate cancer  -     POC Urinalysis Dipstick, Automated    Stress incontinence, male    Incisional hernia, without obstruction or gangrene  -     Ambulatory Referral to General Surgery      #1. The PSA is <0.07 which is undetectable suggesting no clinical evidence of prostate cancer   #2. Moderate to severe stress incontinence. Kegel exercises are not helpful. Will probably eventually need an artificial urinary sphincter vs. Male sling procedure.   #3. He also wants to consider having a penile prosthesis. He has tried PDE 5 inhibitors and penile injections without success.   #4. Lastly, I will set up him up to see Dr. Flowers about this incisional hernia that is symptomatic.         Hernesto Hong MD  05/14/18  8:44 AM      Cc: LUIS Maria

## 2018-05-09 ENCOUNTER — TELEPHONE (OUTPATIENT)
Dept: PRIMARY CARE CLINIC | Age: 68
End: 2018-05-09

## 2018-05-09 DIAGNOSIS — M10.9 ACUTE GOUT INVOLVING TOE OF RIGHT FOOT, UNSPECIFIED CAUSE: ICD-10-CM

## 2018-05-09 DIAGNOSIS — Z85.46 HISTORY OF PROSTATE CANCER: ICD-10-CM

## 2018-05-09 DIAGNOSIS — E03.9 ACQUIRED HYPOTHYROIDISM: ICD-10-CM

## 2018-05-09 DIAGNOSIS — E11.9 TYPE 2 DIABETES MELLITUS WITHOUT COMPLICATION, WITHOUT LONG-TERM CURRENT USE OF INSULIN (HCC): ICD-10-CM

## 2018-05-09 DIAGNOSIS — I10 ESSENTIAL HYPERTENSION: ICD-10-CM

## 2018-05-09 DIAGNOSIS — F32.9 REACTIVE DEPRESSION: ICD-10-CM

## 2018-05-09 LAB
ALBUMIN SERPL-MCNC: 4.7 G/DL (ref 3.5–5.2)
ALP BLD-CCNC: 45 U/L (ref 40–130)
ALT SERPL-CCNC: 11 U/L (ref 5–41)
ANION GAP SERPL CALCULATED.3IONS-SCNC: 16 MMOL/L (ref 7–19)
AST SERPL-CCNC: 15 U/L (ref 5–40)
BILIRUB SERPL-MCNC: 0.5 MG/DL (ref 0.2–1.2)
BUN BLDV-MCNC: 26 MG/DL (ref 8–23)
CALCIUM SERPL-MCNC: 10.3 MG/DL (ref 8.8–10.2)
CHLORIDE BLD-SCNC: 102 MMOL/L (ref 98–111)
CHOLESTEROL, TOTAL: 147 MG/DL (ref 160–199)
CO2: 26 MMOL/L (ref 22–29)
CREAT SERPL-MCNC: 1.6 MG/DL (ref 0.5–1.2)
CREATININE URINE: 77.4 MG/DL (ref 4.2–622)
GFR NON-AFRICAN AMERICAN: 43
GLUCOSE BLD-MCNC: 121 MG/DL (ref 74–109)
HBA1C MFR BLD: 5.7 %
HDLC SERPL-MCNC: 25 MG/DL (ref 55–121)
LDL CHOLESTEROL CALCULATED: 98 MG/DL
MICROALBUMIN UR-MCNC: 1.4 MG/DL (ref 0–19)
MICROALBUMIN/CREAT UR-RTO: 18.1 MG/G
POTASSIUM SERPL-SCNC: 4.9 MMOL/L (ref 3.5–5)
PSA SERPL-MCNC: <0.07 NG/ML (ref 0–4)
SODIUM BLD-SCNC: 144 MMOL/L (ref 136–145)
T4 FREE: 1.5 NG/DL (ref 0.9–1.7)
TOTAL PROTEIN: 7.4 G/DL (ref 6.6–8.7)
TRIGL SERPL-MCNC: 120 MG/DL (ref 0–149)
TSH SERPL DL<=0.05 MIU/L-ACNC: 4.63 UIU/ML (ref 0.27–4.2)
URIC ACID, SERUM: 7.9 MG/DL (ref 3.4–7)

## 2018-05-11 LAB
PROSTATE SPECIFIC ANTIGEN FREE: <0.1 NG/ML
PROSTATE SPECIFIC ANTIGEN PERCENT FREE: NORMAL %
PROSTATE SPECIFIC ANTIGEN: <0.1 NG/ML (ref 0–4)

## 2018-05-14 ENCOUNTER — OFFICE VISIT (OUTPATIENT)
Dept: UROLOGY | Facility: CLINIC | Age: 68
End: 2018-05-14

## 2018-05-14 VITALS
BODY MASS INDEX: 31.3 KG/M2 | HEIGHT: 71 IN | TEMPERATURE: 99 F | WEIGHT: 223.6 LBS | DIASTOLIC BLOOD PRESSURE: 80 MMHG | SYSTOLIC BLOOD PRESSURE: 134 MMHG

## 2018-05-14 DIAGNOSIS — N39.3 STRESS INCONTINENCE, MALE: ICD-10-CM

## 2018-05-14 DIAGNOSIS — C61 PROSTATE CANCER (HCC): Primary | ICD-10-CM

## 2018-05-14 DIAGNOSIS — K43.2 INCISIONAL HERNIA, WITHOUT OBSTRUCTION OR GANGRENE: ICD-10-CM

## 2018-05-14 PROCEDURE — 51798 US URINE CAPACITY MEASURE: CPT | Performed by: UROLOGY

## 2018-05-14 PROCEDURE — 99214 OFFICE O/P EST MOD 30 MIN: CPT | Performed by: UROLOGY

## 2018-05-14 PROCEDURE — 81003 URINALYSIS AUTO W/O SCOPE: CPT | Performed by: UROLOGY

## 2018-05-14 RX ORDER — ESCITALOPRAM OXALATE 10 MG/1
10 TABLET ORAL DAILY
Refills: 11 | COMMUNITY
Start: 2018-04-18 | End: 2019-08-09

## 2018-05-14 NOTE — PATIENT INSTRUCTIONS

## 2018-05-16 ENCOUNTER — OFFICE VISIT (OUTPATIENT)
Dept: PRIMARY CARE CLINIC | Age: 68
End: 2018-05-16
Payer: MEDICARE

## 2018-05-16 VITALS
WEIGHT: 223 LBS | OXYGEN SATURATION: 96 % | HEART RATE: 74 BPM | DIASTOLIC BLOOD PRESSURE: 69 MMHG | BODY MASS INDEX: 31.1 KG/M2 | TEMPERATURE: 98.4 F | SYSTOLIC BLOOD PRESSURE: 139 MMHG

## 2018-05-16 DIAGNOSIS — E79.0 HYPERURICEMIA: ICD-10-CM

## 2018-05-16 DIAGNOSIS — I10 ESSENTIAL HYPERTENSION: ICD-10-CM

## 2018-05-16 DIAGNOSIS — E03.9 ACQUIRED HYPOTHYROIDISM: ICD-10-CM

## 2018-05-16 DIAGNOSIS — E78.2 MIXED HYPERLIPIDEMIA: ICD-10-CM

## 2018-05-16 DIAGNOSIS — M15.9 PRIMARY OSTEOARTHRITIS INVOLVING MULTIPLE JOINTS: ICD-10-CM

## 2018-05-16 DIAGNOSIS — E11.9 TYPE 2 DIABETES MELLITUS WITHOUT COMPLICATION, WITHOUT LONG-TERM CURRENT USE OF INSULIN (HCC): Primary | ICD-10-CM

## 2018-05-16 DIAGNOSIS — R79.89 ELEVATED SERUM CREATININE: ICD-10-CM

## 2018-05-16 PROCEDURE — 4040F PNEUMOC VAC/ADMIN/RCVD: CPT | Performed by: NURSE PRACTITIONER

## 2018-05-16 PROCEDURE — 1036F TOBACCO NON-USER: CPT | Performed by: NURSE PRACTITIONER

## 2018-05-16 PROCEDURE — 3017F COLORECTAL CA SCREEN DOC REV: CPT | Performed by: NURSE PRACTITIONER

## 2018-05-16 PROCEDURE — 99214 OFFICE O/P EST MOD 30 MIN: CPT | Performed by: NURSE PRACTITIONER

## 2018-05-16 PROCEDURE — G8427 DOCREV CUR MEDS BY ELIG CLIN: HCPCS | Performed by: NURSE PRACTITIONER

## 2018-05-16 PROCEDURE — 1123F ACP DISCUSS/DSCN MKR DOCD: CPT | Performed by: NURSE PRACTITIONER

## 2018-05-16 PROCEDURE — 3044F HG A1C LEVEL LT 7.0%: CPT | Performed by: NURSE PRACTITIONER

## 2018-05-16 PROCEDURE — G8417 CALC BMI ABV UP PARAM F/U: HCPCS | Performed by: NURSE PRACTITIONER

## 2018-05-16 PROCEDURE — 2022F DILAT RTA XM EVC RTNOPTHY: CPT | Performed by: NURSE PRACTITIONER

## 2018-05-16 ASSESSMENT — ENCOUNTER SYMPTOMS
NAUSEA: 0
CONSTIPATION: 0
TROUBLE SWALLOWING: 0
DIARRHEA: 0
ABDOMINAL PAIN: 0
COUGH: 0
SORE THROAT: 0
VOMITING: 0
RHINORRHEA: 0
SHORTNESS OF BREATH: 0

## 2018-05-30 ENCOUNTER — TELEPHONE (OUTPATIENT)
Dept: URGENT CARE | Facility: CLINIC | Age: 68
End: 2018-05-30

## 2018-05-30 DIAGNOSIS — H65.01 RIGHT ACUTE SEROUS OTITIS MEDIA, RECURRENCE NOT SPECIFIED: Primary | ICD-10-CM

## 2018-05-30 DIAGNOSIS — R79.89 ELEVATED SERUM CREATININE: ICD-10-CM

## 2018-05-30 DIAGNOSIS — I10 ESSENTIAL HYPERTENSION: ICD-10-CM

## 2018-05-30 LAB
ANION GAP SERPL CALCULATED.3IONS-SCNC: 12 MMOL/L (ref 7–19)
BUN BLDV-MCNC: 31 MG/DL (ref 8–23)
CALCIUM SERPL-MCNC: 9 MG/DL (ref 8.8–10.2)
CHLORIDE BLD-SCNC: 103 MMOL/L (ref 98–111)
CO2: 27 MMOL/L (ref 22–29)
CREAT SERPL-MCNC: 1.3 MG/DL (ref 0.5–1.2)
GFR NON-AFRICAN AMERICAN: 55
GLUCOSE BLD-MCNC: 89 MG/DL (ref 74–109)
POTASSIUM SERPL-SCNC: 4.4 MMOL/L (ref 3.5–5)
SODIUM BLD-SCNC: 142 MMOL/L (ref 136–145)

## 2018-05-30 NOTE — TELEPHONE ENCOUNTER
Pt called back wondering if he could have a referral because pts ear is not feeling any better and hard of hearing.

## 2018-05-31 ENCOUNTER — TELEPHONE (OUTPATIENT)
Dept: PRIMARY CARE CLINIC | Age: 68
End: 2018-05-31

## 2018-06-12 ENCOUNTER — TELEPHONE (OUTPATIENT)
Dept: PRIMARY CARE CLINIC | Age: 68
End: 2018-06-12

## 2018-06-12 DIAGNOSIS — M25.50 ARTHRALGIA OF MULTIPLE JOINTS: Primary | ICD-10-CM

## 2018-06-13 RX ORDER — TRAMADOL HYDROCHLORIDE 50 MG/1
50 TABLET ORAL 2 TIMES DAILY PRN
Qty: 60 TABLET | Refills: 0 | Status: SHIPPED | OUTPATIENT
Start: 2018-06-13 | End: 2018-08-22 | Stop reason: SDUPTHER

## 2018-06-13 RX ORDER — TRAMADOL HYDROCHLORIDE 50 MG/1
50 TABLET ORAL 2 TIMES DAILY PRN
Qty: 60 TABLET | Refills: 0 | Status: SHIPPED | OUTPATIENT
Start: 2018-06-13 | End: 2018-06-13 | Stop reason: SDUPTHER

## 2018-06-27 ENCOUNTER — OFFICE VISIT (OUTPATIENT)
Dept: PRIMARY CARE CLINIC | Age: 68
End: 2018-06-27
Payer: MEDICARE

## 2018-06-27 ENCOUNTER — HOSPITAL ENCOUNTER (OUTPATIENT)
Dept: GENERAL RADIOLOGY | Age: 68
Discharge: HOME | End: 2018-06-27
Payer: MEDICARE

## 2018-06-27 ENCOUNTER — TELEPHONE (OUTPATIENT)
Dept: PRIMARY CARE CLINIC | Age: 68
End: 2018-06-27

## 2018-06-27 VITALS
BODY MASS INDEX: 31.41 KG/M2 | OXYGEN SATURATION: 96 % | DIASTOLIC BLOOD PRESSURE: 74 MMHG | SYSTOLIC BLOOD PRESSURE: 160 MMHG | WEIGHT: 224.38 LBS | TEMPERATURE: 98.1 F | HEART RATE: 78 BPM | HEIGHT: 71 IN

## 2018-06-27 DIAGNOSIS — M54.42 ACUTE LEFT-SIDED LOW BACK PAIN WITH LEFT-SIDED SCIATICA: ICD-10-CM

## 2018-06-27 DIAGNOSIS — L30.9 DERMATITIS: ICD-10-CM

## 2018-06-27 DIAGNOSIS — E11.9 TYPE 2 DIABETES MELLITUS WITHOUT COMPLICATION, WITHOUT LONG-TERM CURRENT USE OF INSULIN (HCC): ICD-10-CM

## 2018-06-27 DIAGNOSIS — M54.42 ACUTE LEFT-SIDED LOW BACK PAIN WITH LEFT-SIDED SCIATICA: Primary | ICD-10-CM

## 2018-06-27 PROCEDURE — 3044F HG A1C LEVEL LT 7.0%: CPT | Performed by: NURSE PRACTITIONER

## 2018-06-27 PROCEDURE — G8427 DOCREV CUR MEDS BY ELIG CLIN: HCPCS | Performed by: NURSE PRACTITIONER

## 2018-06-27 PROCEDURE — 3017F COLORECTAL CA SCREEN DOC REV: CPT | Performed by: NURSE PRACTITIONER

## 2018-06-27 PROCEDURE — 72100 X-RAY EXAM L-S SPINE 2/3 VWS: CPT

## 2018-06-27 PROCEDURE — 2022F DILAT RTA XM EVC RTNOPTHY: CPT | Performed by: NURSE PRACTITIONER

## 2018-06-27 PROCEDURE — 1036F TOBACCO NON-USER: CPT | Performed by: NURSE PRACTITIONER

## 2018-06-27 PROCEDURE — 96372 THER/PROPH/DIAG INJ SC/IM: CPT | Performed by: NURSE PRACTITIONER

## 2018-06-27 PROCEDURE — G8417 CALC BMI ABV UP PARAM F/U: HCPCS | Performed by: NURSE PRACTITIONER

## 2018-06-27 PROCEDURE — 99213 OFFICE O/P EST LOW 20 MIN: CPT | Performed by: NURSE PRACTITIONER

## 2018-06-27 PROCEDURE — 73502 X-RAY EXAM HIP UNI 2-3 VIEWS: CPT

## 2018-06-27 PROCEDURE — 4040F PNEUMOC VAC/ADMIN/RCVD: CPT | Performed by: NURSE PRACTITIONER

## 2018-06-27 PROCEDURE — 1123F ACP DISCUSS/DSCN MKR DOCD: CPT | Performed by: NURSE PRACTITIONER

## 2018-06-27 RX ORDER — TESTOSTERONE CYPIONATE 200 MG/ML
10 INJECTION INTRAMUSCULAR ONCE
Status: COMPLETED | OUTPATIENT
Start: 2018-06-27 | End: 2018-06-27

## 2018-06-27 RX ADMIN — TESTOSTERONE CYPIONATE 10 MG: 200 INJECTION INTRAMUSCULAR at 08:21

## 2018-06-27 ASSESSMENT — ENCOUNTER SYMPTOMS
CONSTIPATION: 0
SHORTNESS OF BREATH: 0
DIARRHEA: 0
ABDOMINAL PAIN: 0
RHINORRHEA: 0
TROUBLE SWALLOWING: 0
SORE THROAT: 0
VOMITING: 0
BACK PAIN: 1
COUGH: 0
NAUSEA: 0

## 2018-06-27 NOTE — TELEPHONE ENCOUNTER
----- Message from MARY Darby sent at 6/27/2018 10:02 AM CDT -----  Lumbar x-ray shows diffuse disc degeneration particularly in the lower spine. There is no acute bony abnormality. There is no evidence of acute fracture. Recommend treatment as prescribed in office. If symptoms don't improve recommend physical therapy.

## 2018-06-28 ENCOUNTER — OFFICE VISIT (OUTPATIENT)
Dept: OTOLARYNGOLOGY | Facility: CLINIC | Age: 68
End: 2018-06-28

## 2018-06-28 ENCOUNTER — TELEPHONE (OUTPATIENT)
Dept: PRIMARY CARE CLINIC | Age: 68
End: 2018-06-28

## 2018-06-28 ENCOUNTER — PROCEDURE VISIT (OUTPATIENT)
Dept: OTOLARYNGOLOGY | Facility: CLINIC | Age: 68
End: 2018-06-28

## 2018-06-28 VITALS
BODY MASS INDEX: 31.5 KG/M2 | TEMPERATURE: 97.8 F | WEIGHT: 225 LBS | HEIGHT: 71 IN | DIASTOLIC BLOOD PRESSURE: 90 MMHG | RESPIRATION RATE: 20 BRPM | SYSTOLIC BLOOD PRESSURE: 142 MMHG | HEART RATE: 78 BPM

## 2018-06-28 DIAGNOSIS — H90.3 ASYMMETRIC SNHL (SENSORINEURAL HEARING LOSS): ICD-10-CM

## 2018-06-28 DIAGNOSIS — H93.11 TINNITUS OF RIGHT EAR: ICD-10-CM

## 2018-06-28 DIAGNOSIS — H91.21 SUDDEN HEARING LOSS, RIGHT: ICD-10-CM

## 2018-06-28 DIAGNOSIS — H90.3 SENSORINEURAL HEARING LOSS (SNHL) OF BOTH EARS: Primary | ICD-10-CM

## 2018-06-28 PROCEDURE — 99214 OFFICE O/P EST MOD 30 MIN: CPT | Performed by: NURSE PRACTITIONER

## 2018-06-28 RX ORDER — PREDNISONE 10 MG/1
TABLET ORAL
Qty: 42 TABLET | Refills: 0 | Status: SHIPPED | OUTPATIENT
Start: 2018-06-28 | End: 2018-11-21
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
## 2018-06-28 NOTE — PATIENT INSTRUCTIONS
###### BMI  #####   MyPlate from USDA  The general, healthful diet is based on the 2010 Dietary Guidelines for Americans. The amount of food you need to eat from each food group depends on your age, sex, and level of physical activity and can be individualized by a dietitian. Go to ChooseMyPlate.gov for more information.  What do I need to know about the MyPlate plan?  · Enjoy your food, but eat less.  · Avoid oversized portions.  ? ½ of your plate should include fruits and vegetables.  ? ¼ of your plate should be grains.  ? ¼ of your plate should be protein.  Grains  · Make at least half of your grains whole grains.  · For a 2,000 calorie daily food plan, eat 6 oz every day.  · 1 oz is about 1 slice bread, 1 cup cereal, or ½ cup cooked rice, cereal, or pasta.  Vegetables  · Make half your plate fruits and vegetables.  · For a 2,000 calorie daily food plan, eat 2½ cups every day.  · 1 cup is about 1 cup raw or cooked vegetables or vegetable juice or 2 cups raw leafy greens.  Fruits  · Make half your plate fruits and vegetables.  · For a 2,000 calorie daily food plan, eat 2 cups every day.  · 1 cup is about 1 cup fruit or 100% fruit juice or ½ cup dried fruit.  Protein  · For a 2,000 calorie daily food plan, eat 5½ oz every day.  · 1 oz is about 1 oz meat, poultry, or fish, ¼ cup cooked beans, 1 egg, 1 Tbsp peanut butter, or ½ oz nuts or seeds.  Dairy  · Switch to fat-free or low-fat (1%) milk.  · For a 2,000 calorie daily food plan, eat 3 cups every day.  · 1 cup is about 1 cup milk or yogurt or soy milk (soy beverage), 1½ oz natural cheese, or 2 oz processed cheese.  Fats, Oils, and Empty Calories  · Only small amounts of oils are recommended.  · Empty calories are calories from solid fats or added sugars.  · Compare sodium in foods like soup, bread, and frozen meals. Choose the foods with lower numbers.  · Drink water instead of sugary drinks.  What foods can I eat?  Grains  Whole grains such as whole wheat,  quinoa, millet, and bulgur. Bread, rolls, and pasta made from whole grains. Brown or wild rice. Hot or cold cereals made from whole grains and without added sugar.  Vegetables  All fresh vegetables, especially fresh red, dark green, or orange vegetables. Peas and beans. Low-sodium frozen or canned vegetables prepared without added salt. Low-sodium vegetable juices.  Fruits  All fresh, frozen, and dried fruits. Canned fruit packed in water or fruit juice without added sugar. Fruit juices without added sugar.  Meats and Other Protein Sources  Boiled, baked, or grilled lean meat trimmed of fat. Skinless poultry. Fresh seafood and shellfish. Canned seafood packed in water. Unsalted nuts and unsalted nut butters. Tofu. Dried beans and pea. Eggs.  Dairy  Low-fat or fat-free milk, yogurt, and cheeses.  Sweets and Desserts  Frozen desserts made from low-fat milk.  Fats and Oils  Olive, peanut, and canola oils and margarine. Salad dressing and mayonnaise made from these oils.  Other  Soups and casseroles made from allowed ingredients and without added fat or salt.  The items listed above may not be a complete list of recommended foods or beverages. Contact your dietitian for more options.  What foods are not recommended?  Grains  Sweetened, low-fiber cereals. Packaged baked goods. Snack crackers and chips. Cheese crackers, butter crackers, and biscuits. Frozen waffles, sweet breads, doughnuts, pastries, packaged baking mixes, pancakes, cakes, and cookies.  Vegetables  Regular canned or frozen vegetables or vegetables prepared with salt. Canned tomatoes. Canned tomato sauce. Fried vegetables. Vegetables in cream sauce or cheese sauce.  Fruits  Fruits packed in syrup or made with added sugar.  Meats and Other Protein Sources  Marbled or fatty meats such as ribs. Poultry with skin. Fried meats, poultry, eggs, or fish. Sausages, hot dogs, and deli meats such as pastrami, bologna, or salami.  Dairy  Whole milk, cream, cheeses  made from whole milk, sour cream. Ice cream or yogurt made from whole milk or with added sugar.  Beverages  For adults, no more than one alcoholic drink per day. Regular soft drinks or other sugary beverages. Juice drinks.  Sweets and Desserts  Sugary or fatty desserts, candy, and other sweets.  Fats and Oils  Solid shortening or partially hydrogenated oils. Solid margarine. Margarine that contains trans fats. Butter.  The items listed above may not be a complete list of foods and beverages to avoid. Contact your dietitian for more information.  This information is not intended to replace advice given to you by your health care provider. Make sure you discuss any questions you have with your health care provider.  Document Released: 01/06/2009 Document Revised: 05/25/2017 Document Reviewed: 11/26/2014  [employer] Interactive Patient Education © 2018 [employer]     Calorie Counting for Weight Loss  Calories are units of energy. Your body needs a certain amount of calories from food to keep you going throughout the day. When you eat more calories than your body needs, your body stores the extra calories as fat. When you eat fewer calories than your body needs, your body burns fat to get the energy it needs.  Calorie counting means keeping track of how many calories you eat and drink each day. Calorie counting can be helpful if you need to lose weight. If you make sure to eat fewer calories than your body needs, you should lose weight. Ask your health care provider what a healthy weight is for you.  For calorie counting to work, you will need to eat the right number of calories in a day in order to lose a healthy amount of weight per week. A dietitian can help you determine how many calories you need in a day and will give you suggestions on how to reach your calorie goal.  · A healthy amount of weight to lose per week is usually 1-2 lb (0.5-0.9 kg). This usually means that your daily calorie intake should be reduced  by 500-750 calories.  · Eating 1,200 - 1,500 calories per day can help most women lose weight.  · Eating 1,500 - 1,800 calories per day can help most men lose weight.    What is my plan?  My goal is to have __________ calories per day.  If I have this many calories per day, I should lose around __________ pounds per week.  What do I need to know about calorie counting?  In order to meet your daily calorie goal, you will need to:  · Find out how many calories are in each food you would like to eat. Try to do this before you eat.  · Decide how much of the food you plan to eat.  · Write down what you ate and how many calories it had. Doing this is called keeping a food log.    To successfully lose weight, it is important to balance calorie counting with a healthy lifestyle that includes regular activity. Aim for 150 minutes of moderate exercise (such as walking) or 75 minutes of vigorous exercise (such as running) each week.  Where do I find calorie information?    The number of calories in a food can be found on a Nutrition Facts label. If a food does not have a Nutrition Facts label, try to look up the calories online or ask your dietitian for help.  Remember that calories are listed per serving. If you choose to have more than one serving of a food, you will have to multiply the calories per serving by the amount of servings you plan to eat. For example, the label on a package of bread might say that a serving size is 1 slice and that there are 90 calories in a serving. If you eat 1 slice, you will have eaten 90 calories. If you eat 2 slices, you will have eaten 180 calories.  How do I keep a food log?  Immediately after each meal, record the following information in your food log:  · What you ate. Don't forget to include toppings, sauces, and other extras on the food.  · How much you ate. This can be measured in cups, ounces, or number of items.  · How many calories each food and drink had.  · The total number of  "calories in the meal.    Keep your food log near you, such as in a small notebook in your pocket, or use a mobile matthew or website. Some programs will calculate calories for you and show you how many calories you have left for the day to meet your goal.  What are some calorie counting tips?  · Use your calories on foods and drinks that will fill you up and not leave you hungry:  ? Some examples of foods that fill you up are nuts and nut butters, vegetables, lean proteins, and high-fiber foods like whole grains. High-fiber foods are foods with more than 5 g fiber per serving.  ? Drinks such as sodas, specialty coffee drinks, alcohol, and juices have a lot of calories, yet do not fill you up.  · Eat nutritious foods and avoid empty calories. Empty calories are calories you get from foods or beverages that do not have many vitamins or protein, such as candy, sweets, and soda. It is better to have a nutritious high-calorie food (such as an avocado) than a food with few nutrients (such as a bag of chips).  · Know how many calories are in the foods you eat most often. This will help you calculate calorie counts faster.  · Pay attention to calories in drinks. Low-calorie drinks include water and unsweetened drinks.  · Pay attention to nutrition labels for \"low fat\" or \"fat free\" foods. These foods sometimes have the same amount of calories or more calories than the full fat versions. They also often have added sugar, starch, or salt, to make up for flavor that was removed with the fat.  · Find a way of tracking calories that works for you. Get creative. Try different apps or programs if writing down calories does not work for you.  What are some portion control tips?  · Know how many calories are in a serving. This will help you know how many servings of a certain food you can have.  · Use a measuring cup to measure serving sizes. You could also try weighing out portions on a kitchen scale. With time, you will be able to " estimate serving sizes for some foods.  · Take some time to put servings of different foods on your favorite plates, bowls, and cups so you know what a serving looks like.  · Try not to eat straight from a bag or box. Doing this can lead to overeating. Put the amount you would like to eat in a cup or on a plate to make sure you are eating the right portion.  · Use smaller plates, glasses, and bowls to prevent overeating.  · Try not to multitask (for example, watch TV or use your computer) while eating. If it is time to eat, sit down at a table and enjoy your food. This will help you to know when you are full. It will also help you to be aware of what you are eating and how much you are eating.  What are tips for following this plan?  Reading food labels  · Check the calorie count compared to the serving size. The serving size may be smaller than what you are used to eating.  · Check the source of the calories. Make sure the food you are eating is high in vitamins and protein and low in saturated and trans fats.  Shopping  · Read nutrition labels while you shop. This will help you make healthy decisions before you decide to purchase your food.  · Make a grocery list and stick to it.  Cooking  · Try to cook your favorite foods in a healthier way. For example, try baking instead of frying.  · Use low-fat dairy products.  Meal planning  · Use more fruits and vegetables. Half of your plate should be fruits and vegetables.  · Include lean proteins like poultry and fish.  How do I count calories when eating out?  · Ask for smaller portion sizes.  · Consider sharing an entree and sides instead of getting your own entree.  · If you get your own entree, eat only half. Ask for a box at the beginning of your meal and put the rest of your entree in it so you are not tempted to eat it.  · If calories are listed on the menu, choose the lower calorie options.  · Choose dishes that include vegetables, fruits, whole grains, low-fat  dairy products, and lean protein.  · Choose items that are boiled, broiled, grilled, or steamed. Stay away from items that are buttered, battered, fried, or served with cream sauce. Items labeled “crispy” are usually fried, unless stated otherwise.  · Choose water, low-fat milk, unsweetened iced tea, or other drinks without added sugar. If you want an alcoholic beverage, choose a lower calorie option such as a glass of wine or light beer.  · Ask for dressings, sauces, and syrups on the side. These are usually high in calories, so you should limit the amount you eat.  · If you want a salad, choose a garden salad and ask for grilled meats. Avoid extra toppings like rider, cheese, or fried items. Ask for the dressing on the side, or ask for olive oil and vinegar or lemon to use as dressing.  · Estimate how many servings of a food you are given. For example, a serving of cooked rice is ½ cup or about the size of half a baseball. Knowing serving sizes will help you be aware of how much food you are eating at restaurants. The list below tells you how big or small some common portion sizes are based on everyday objects:  ? 1 oz--4 stacked dice.  ? 3 oz--1 deck of cards.  ? 1 tsp--1 die.  ? 1 Tbsp--½ a ping-pong ball.  ? 2 Tbsp--1 ping-pong ball.  ? ½ cup--½ baseball.  ? 1 cup--1 baseball.  Summary  · Calorie counting means keeping track of how many calories you eat and drink each day. If you eat fewer calories than your body needs, you should lose weight.  · A healthy amount of weight to lose per week is usually 1-2 lb (0.5-0.9 kg). This usually means reducing your daily calorie intake by 500-750 calories.  · The number of calories in a food can be found on a Nutrition Facts label. If a food does not have a Nutrition Facts label, try to look up the calories online or ask your dietitian for help.  · Use your calories on foods and drinks that will fill you up, and not on foods and drinks that will leave you hungry.  · Use  smaller plates, glasses, and bowls to prevent overeating.  This information is not intended to replace advice given to you by your health care provider. Make sure you discuss any questions you have with your health care provider.  Document Released: 12/18/2006 Document Revised: 11/17/2017 Document Reviewed: 11/17/2017  [employer] Interactive Patient Education © 2018 [employer]     Exercising to Lose Weight  Exercising can help you to lose weight. In order to lose weight through exercise, you need to do vigorous-intensity exercise. You can tell that you are exercising with vigorous intensity if you are breathing very hard and fast and cannot hold a conversation while exercising.  Moderate-intensity exercise helps to maintain your current weight. You can tell that you are exercising at a moderate level if you have a higher heart rate and faster breathing, but you are still able to hold a conversation.  How often should I exercise?  Choose an activity that you enjoy and set realistic goals. Your health care provider can help you to make an activity plan that works for you. Exercise regularly as directed by your health care provider. This may include:  · Doing resistance training twice each week, such as:  ? Push-ups.  ? Sit-ups.  ? Lifting weights.  ? Using resistance bands.  · Doing a given intensity of exercise for a given amount of time. Choose from these options:  ? 150 minutes of moderate-intensity exercise every week.  ? 75 minutes of vigorous-intensity exercise every week.  ? A mix of moderate-intensity and vigorous-intensity exercise every week.    Children, pregnant women, people who are out of shape, people who are overweight, and older adults may need to consult a health care provider for individual recommendations. If you have any sort of medical condition, be sure to consult your health care provider before starting a new exercise program.  What are some activities that can help me to lose  weight?  · Walking at a rate of at least 4.5 miles an hour.  · Jogging or running at a rate of 5 miles per hour.  · Biking at a rate of at least 10 miles per hour.  · Lap swimming.  · Roller-skating or in-line skating.  · Cross-country skiing.  · Vigorous competitive sports, such as football, basketball, and soccer.  · Jumping rope.  · Aerobic dancing.  How can I be more active in my day-to-day activities?  · Use the stairs instead of the elevator.  · Take a walk during your lunch break.  · If you drive, park your car farther away from work or school.  · If you take public transportation, get off one stop early and walk the rest of the way.  · Make all of your phone calls while standing up and walking around.  · Get up, stretch, and walk around every 30 minutes throughout the day.  What guidelines should I follow while exercising?  · Do not exercise so much that you hurt yourself, feel dizzy, or get very short of breath.  · Consult your health care provider prior to starting a new exercise program.  · Wear comfortable clothes and shoes with good support.  · Drink plenty of water while you exercise to prevent dehydration or heat stroke. Body water is lost during exercise and must be replaced.  · Work out until you breathe faster and your heart beats faster.  This information is not intended to replace advice given to you by your health care provider. Make sure you discuss any questions you have with your health care provider.  Document Released: 01/20/2012 Document Revised: 05/25/2017 Document Reviewed: 05/21/2015  [employer] Interactive Patient Education © 2018 [employer]

## 2018-06-28 NOTE — PATIENT INSTRUCTIONS
(1) See the medical provider as scheduled due to patient complaints as well as asymmetrical hearing. Imaging studies are recommended to rule out any retrocochlear pathology.  (2) Receive audiological testing as needed.

## 2018-06-29 ENCOUNTER — TELEPHONE (OUTPATIENT)
Dept: PRIMARY CARE CLINIC | Age: 68
End: 2018-06-29

## 2018-07-02 ENCOUNTER — TELEPHONE (OUTPATIENT)
Dept: PRIMARY CARE CLINIC | Age: 68
End: 2018-07-02

## 2018-07-02 DIAGNOSIS — M54.50 LOW BACK PAIN WITHOUT SCIATICA, UNSPECIFIED BACK PAIN LATERALITY, UNSPECIFIED CHRONICITY: Primary | ICD-10-CM

## 2018-07-02 NOTE — TELEPHONE ENCOUNTER
Called patient, spoke with: Patient regarding the results of the patients most recent x-rays. I advised Patient of Monse Caputo recommendations. Patient did voice understanding. Referred patient to [facility] per patient's choice.

## 2018-07-02 NOTE — TELEPHONE ENCOUNTER
Called patient to give results and refer to Physical Therapy. Patient requested something stronger than Tramadol.

## 2018-07-09 ENCOUNTER — HOSPITAL ENCOUNTER (OUTPATIENT)
Dept: MRI IMAGING | Facility: HOSPITAL | Age: 68
Discharge: HOME | End: 2018-07-09
Admitting: NURSE PRACTITIONER

## 2018-07-09 ENCOUNTER — HOSPITAL ENCOUNTER (OUTPATIENT)
Dept: MRI IMAGING | Facility: HOSPITAL | Age: 68
Discharge: HOME | End: 2018-07-09

## 2018-07-09 DIAGNOSIS — H93.11 TINNITUS OF RIGHT EAR: ICD-10-CM

## 2018-07-09 DIAGNOSIS — H90.3 SENSORINEURAL HEARING LOSS (SNHL) OF BOTH EARS: ICD-10-CM

## 2018-07-09 DIAGNOSIS — H91.21 SUDDEN HEARING LOSS, RIGHT: ICD-10-CM

## 2018-07-09 DIAGNOSIS — H90.3 ASYMMETRIC SNHL (SENSORINEURAL HEARING LOSS): ICD-10-CM

## 2018-07-09 LAB — CREAT BLDA-MCNC: 1.6 MG/DL (ref 0.6–1.3)

## 2018-07-09 PROCEDURE — A9577 INJ MULTIHANCE: HCPCS | Performed by: NURSE PRACTITIONER

## 2018-07-09 PROCEDURE — 0 GADOBENATE DIMEGLUMINE 529 MG/ML SOLUTION: Performed by: NURSE PRACTITIONER

## 2018-07-09 PROCEDURE — 82565 ASSAY OF CREATININE: CPT

## 2018-07-09 PROCEDURE — 70553 MRI BRAIN STEM W/O & W/DYE: CPT

## 2018-07-09 RX ADMIN — GADOBENATE DIMEGLUMINE 20 ML: 529 INJECTION, SOLUTION INTRAVENOUS at 09:45

## 2018-07-31 NOTE — PROGRESS NOTES
Left message for patient regarding availability of genetic testing results  The patient was encouraged to call back to discuss results  Patient was in today for GETACHEW drain removal. Patients GETACHEW had drained 25ml from 9:00 AM-10:30 AM. I consulted Dr. Rebolledo about this and he advised me to not take this GETACHEW drain out because of how much it had drained in a hour in a half. I informed patient to consult me when the GETACHEW drain has drained less then 3 ounces all day. Patient voiced understanding and will give us a call.

## 2018-08-08 ENCOUNTER — OFFICE VISIT (OUTPATIENT)
Dept: OTOLARYNGOLOGY | Facility: CLINIC | Age: 68
End: 2018-08-08

## 2018-08-08 ENCOUNTER — PROCEDURE VISIT (OUTPATIENT)
Dept: OTOLARYNGOLOGY | Facility: CLINIC | Age: 68
End: 2018-08-08

## 2018-08-08 VITALS
HEIGHT: 71 IN | TEMPERATURE: 97.8 F | SYSTOLIC BLOOD PRESSURE: 136 MMHG | WEIGHT: 225.5 LBS | BODY MASS INDEX: 31.57 KG/M2 | HEART RATE: 76 BPM | DIASTOLIC BLOOD PRESSURE: 73 MMHG

## 2018-08-08 DIAGNOSIS — H93.3X1 DISORDER OF RIGHT ACOUSTIC NERVE: Primary | ICD-10-CM

## 2018-08-08 DIAGNOSIS — H93.A1 PULSATILE TINNITUS, RIGHT EAR: ICD-10-CM

## 2018-08-08 DIAGNOSIS — H90.3 ASYMMETRICAL SENSORINEURAL HEARING LOSS: ICD-10-CM

## 2018-08-08 DIAGNOSIS — H90.3 ASYMMETRIC SNHL (SENSORINEURAL HEARING LOSS): Primary | ICD-10-CM

## 2018-08-08 PROCEDURE — 99213 OFFICE O/P EST LOW 20 MIN: CPT | Performed by: NURSE PRACTITIONER

## 2018-08-08 NOTE — PROGRESS NOTES
CASE HISTORY DETAILS   Mr. Kamara presented to the clinic this date for a recheck of hearing following treatment for sudden hearing loss in the right ear. He denied improvement since treatment.        SUMMARY   RIGHT  · Otoscopy revealed clear EAC/Unremarkable TM.  · Mild to profound sloping sensorineural hearing loss (previously established).    LEFT  · Otoscopy revealed non-occluding cerumen.  · Moderately-severe high frequency sensorineural hearing loss (previously established).    Stable thresholds bilaterally when compared to pre-treatment audiogram obtained in June.    RECOMMENDATIONS   Results of today's evaluation were discussed with Mr. Kamara and the following recommendations were made:  1. ENT evaluation today as scheduled.    AUDIOGRAM AND IMMITANCE       Grace Gerardo, CCC-A  Audiologist
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
## 2018-08-08 NOTE — PATIENT INSTRUCTIONS
Avoid loud noise exposure. Protect hearing in with extreme loud noise.   Recommend fan, sound machine, white noise from TV for tinnitus masking. Avoid excessive caffeine, decrease stress level, monitor BP regularly, routine sleep schedule. Low Sodium Diet.   Tinnitus Handout   Hearing loss handout   Appt with hearing aid specialist for hearing aid fitting when patient so desires.   Call for change or worsening symptoms not controlled by current treatment regimen.      Hearing aids if he decides to proceed  Handouts given regarding Vascular loop syndrome  Referral to [facility] for further eval and recommendations

## 2018-08-17 ENCOUNTER — TELEPHONE (OUTPATIENT)
Dept: PRIMARY CARE CLINIC | Age: 68
End: 2018-08-17

## 2018-08-17 DIAGNOSIS — R79.89 ELEVATED SERUM CREATININE: ICD-10-CM

## 2018-08-17 DIAGNOSIS — E78.2 MIXED HYPERLIPIDEMIA: ICD-10-CM

## 2018-08-17 DIAGNOSIS — E79.0 HYPERURICEMIA: ICD-10-CM

## 2018-08-17 DIAGNOSIS — E11.9 TYPE 2 DIABETES MELLITUS WITHOUT COMPLICATION, WITHOUT LONG-TERM CURRENT USE OF INSULIN (HCC): ICD-10-CM

## 2018-08-17 DIAGNOSIS — E03.9 ACQUIRED HYPOTHYROIDISM: ICD-10-CM

## 2018-08-17 DIAGNOSIS — I10 ESSENTIAL HYPERTENSION: ICD-10-CM

## 2018-08-17 LAB
ALBUMIN SERPL-MCNC: 4.5 G/DL (ref 3.5–5.2)
ALP BLD-CCNC: 42 U/L (ref 40–130)
ALT SERPL-CCNC: 9 U/L (ref 5–41)
ANION GAP SERPL CALCULATED.3IONS-SCNC: 18 MMOL/L (ref 7–19)
AST SERPL-CCNC: 13 U/L (ref 5–40)
BILIRUB SERPL-MCNC: 0.6 MG/DL (ref 0.2–1.2)
BUN BLDV-MCNC: 25 MG/DL (ref 8–23)
CALCIUM SERPL-MCNC: 9.6 MG/DL (ref 8.8–10.2)
CHLORIDE BLD-SCNC: 99 MMOL/L (ref 98–111)
CHOLESTEROL, TOTAL: 112 MG/DL (ref 160–199)
CO2: 24 MMOL/L (ref 22–29)
CREAT SERPL-MCNC: 1.6 MG/DL (ref 0.5–1.2)
GFR NON-AFRICAN AMERICAN: 43
GLUCOSE BLD-MCNC: 129 MG/DL (ref 74–109)
HBA1C MFR BLD: 6.2 % (ref 4–6)
HDLC SERPL-MCNC: 23 MG/DL (ref 55–121)
LDL CHOLESTEROL CALCULATED: 62 MG/DL
POTASSIUM SERPL-SCNC: 4.7 MMOL/L (ref 3.5–5)
SODIUM BLD-SCNC: 141 MMOL/L (ref 136–145)
TOTAL PROTEIN: 7.2 G/DL (ref 6.6–8.7)
TRIGL SERPL-MCNC: 133 MG/DL (ref 0–149)
TSH SERPL DL<=0.05 MIU/L-ACNC: 4.54 UIU/ML (ref 0.27–4.2)
URIC ACID, SERUM: 8 MG/DL (ref 3.4–7)

## 2018-08-22 ENCOUNTER — OFFICE VISIT (OUTPATIENT)
Dept: PRIMARY CARE CLINIC | Age: 68
End: 2018-08-22
Payer: MEDICARE

## 2018-08-22 VITALS
HEIGHT: 71 IN | OXYGEN SATURATION: 98 % | DIASTOLIC BLOOD PRESSURE: 77 MMHG | BODY MASS INDEX: 31.36 KG/M2 | HEART RATE: 71 BPM | TEMPERATURE: 97.3 F | WEIGHT: 224 LBS | SYSTOLIC BLOOD PRESSURE: 149 MMHG

## 2018-08-22 DIAGNOSIS — N18.30 TYPE 2 DIABETES MELLITUS WITH STAGE 3 CHRONIC KIDNEY DISEASE, WITHOUT LONG-TERM CURRENT USE OF INSULIN (HCC): ICD-10-CM

## 2018-08-22 DIAGNOSIS — E11.22 TYPE 2 DIABETES MELLITUS WITH STAGE 3 CHRONIC KIDNEY DISEASE, WITHOUT LONG-TERM CURRENT USE OF INSULIN (HCC): ICD-10-CM

## 2018-08-22 DIAGNOSIS — I10 ESSENTIAL HYPERTENSION: ICD-10-CM

## 2018-08-22 DIAGNOSIS — M54.42 CHRONIC LEFT-SIDED LOW BACK PAIN WITH LEFT-SIDED SCIATICA: ICD-10-CM

## 2018-08-22 DIAGNOSIS — E78.2 MIXED HYPERLIPIDEMIA: ICD-10-CM

## 2018-08-22 DIAGNOSIS — G89.29 CHRONIC LEFT-SIDED LOW BACK PAIN WITH LEFT-SIDED SCIATICA: ICD-10-CM

## 2018-08-22 DIAGNOSIS — M25.50 ARTHRALGIA OF MULTIPLE JOINTS: Primary | ICD-10-CM

## 2018-08-22 DIAGNOSIS — L84 CORN OF FOOT: ICD-10-CM

## 2018-08-22 DIAGNOSIS — E79.0 HYPERURICEMIA: ICD-10-CM

## 2018-08-22 PROCEDURE — 1123F ACP DISCUSS/DSCN MKR DOCD: CPT | Performed by: NURSE PRACTITIONER

## 2018-08-22 PROCEDURE — 2022F DILAT RTA XM EVC RTNOPTHY: CPT | Performed by: NURSE PRACTITIONER

## 2018-08-22 PROCEDURE — G8417 CALC BMI ABV UP PARAM F/U: HCPCS | Performed by: NURSE PRACTITIONER

## 2018-08-22 PROCEDURE — 3017F COLORECTAL CA SCREEN DOC REV: CPT | Performed by: NURSE PRACTITIONER

## 2018-08-22 PROCEDURE — 1101F PT FALLS ASSESS-DOCD LE1/YR: CPT | Performed by: NURSE PRACTITIONER

## 2018-08-22 PROCEDURE — 4040F PNEUMOC VAC/ADMIN/RCVD: CPT | Performed by: NURSE PRACTITIONER

## 2018-08-22 PROCEDURE — 99214 OFFICE O/P EST MOD 30 MIN: CPT | Performed by: NURSE PRACTITIONER

## 2018-08-22 PROCEDURE — G8427 DOCREV CUR MEDS BY ELIG CLIN: HCPCS | Performed by: NURSE PRACTITIONER

## 2018-08-22 PROCEDURE — 3044F HG A1C LEVEL LT 7.0%: CPT | Performed by: NURSE PRACTITIONER

## 2018-08-22 PROCEDURE — 1036F TOBACCO NON-USER: CPT | Performed by: NURSE PRACTITIONER

## 2018-08-22 RX ORDER — TRAMADOL HYDROCHLORIDE 50 MG/1
50 TABLET ORAL 2 TIMES DAILY PRN
Qty: 60 TABLET | Refills: 1 | Status: SHIPPED | OUTPATIENT
Start: 2018-08-22 | End: 2018-10-03 | Stop reason: SDUPTHER

## 2018-08-22 RX ORDER — ALLOPURINOL 100 MG/1
100 TABLET ORAL DAILY
Qty: 30 TABLET | Refills: 3 | Status: SHIPPED | OUTPATIENT
Start: 2018-08-22 | End: 2018-11-13 | Stop reason: SDUPTHER

## 2018-08-22 RX ORDER — GABAPENTIN 400 MG/1
400 CAPSULE ORAL 3 TIMES DAILY
Qty: 90 CAPSULE | Refills: 3 | Status: SHIPPED | OUTPATIENT
Start: 2018-08-22 | End: 2019-01-09 | Stop reason: SDUPTHER

## 2018-08-22 RX ORDER — AMLODIPINE BESYLATE 5 MG/1
5 TABLET ORAL DAILY
Qty: 30 TABLET | Refills: 3 | Status: SHIPPED | OUTPATIENT
Start: 2018-08-22 | End: 2018-11-13 | Stop reason: SDUPTHER

## 2018-08-22 ASSESSMENT — ENCOUNTER SYMPTOMS
COUGH: 0
SORE THROAT: 0
RHINORRHEA: 0
NAUSEA: 0
VOMITING: 0
TROUBLE SWALLOWING: 0
SHORTNESS OF BREATH: 0
BACK PAIN: 1
DIARRHEA: 0
ROS SKIN COMMENTS: RIGHT FOOT PAIN
CONSTIPATION: 0
ABDOMINAL PAIN: 0

## 2018-08-24 ENCOUNTER — TELEPHONE (OUTPATIENT)
Dept: PRIMARY CARE CLINIC | Age: 68
End: 2018-08-24

## 2018-08-24 NOTE — TELEPHONE ENCOUNTER
I called and left a message to se if pt had been seen there and to get records sent to us to update referral.

## 2018-09-01 ENCOUNTER — RESULTS ENCOUNTER (OUTPATIENT)
Dept: UROLOGY | Facility: CLINIC | Age: 68
End: 2018-09-01

## 2018-09-01 DIAGNOSIS — C61 PROSTATE CANCER (HCC): ICD-10-CM

## 2018-09-05 ENCOUNTER — HOSPITAL ENCOUNTER (OUTPATIENT)
Dept: MRI IMAGING | Age: 68
Discharge: HOME | End: 2018-09-05
Payer: MEDICARE

## 2018-09-05 DIAGNOSIS — M54.42 CHRONIC LEFT-SIDED LOW BACK PAIN WITH LEFT-SIDED SCIATICA: ICD-10-CM

## 2018-09-05 DIAGNOSIS — G89.29 CHRONIC LEFT-SIDED LOW BACK PAIN WITH LEFT-SIDED SCIATICA: ICD-10-CM

## 2018-09-05 PROCEDURE — 72148 MRI LUMBAR SPINE W/O DYE: CPT

## 2018-09-06 ENCOUNTER — TELEPHONE (OUTPATIENT)
Dept: PRIMARY CARE CLINIC | Age: 68
End: 2018-09-06

## 2018-09-06 NOTE — TELEPHONE ENCOUNTER
----- Message from MARY Jiang sent at 9/6/2018  8:55 AM CDT -----  Please call patient and let them know results. MRI shows lumbar spondylosis (arthritis) with mild stenosis. Recommend physical therapy as discussed.

## 2018-09-10 DIAGNOSIS — E78.2 MIXED HYPERLIPIDEMIA: ICD-10-CM

## 2018-09-10 RX ORDER — ATORVASTATIN CALCIUM 20 MG/1
20 TABLET, FILM COATED ORAL DAILY
Qty: 90 TABLET | Refills: 3 | Status: SHIPPED | OUTPATIENT
Start: 2018-09-10 | End: 2019-10-25 | Stop reason: SDUPTHER

## 2018-09-11 DIAGNOSIS — I10 ESSENTIAL HYPERTENSION: ICD-10-CM

## 2018-09-11 RX ORDER — LISINOPRIL 40 MG/1
TABLET ORAL
Qty: 90 TABLET | Refills: 3 | Status: SHIPPED | OUTPATIENT
Start: 2018-09-11 | End: 2019-10-04 | Stop reason: SDUPTHER

## 2018-09-11 NOTE — TELEPHONE ENCOUNTER
Received fax from pharmacy requesting refill on pts medication(s). Pt was last seen in office on 8/22/2018  and has a follow up scheduled for 9/26/2018. Will send request to  Dulce Frias  for authorization.      Requested Prescriptions     Pending Prescriptions Disp Refills    lisinopril (PRINIVIL;ZESTRIL) 40 MG tablet 90 tablet 3     Sig: TAKE 1 TABLET BY MOUTH DAILY

## 2018-09-12 NOTE — PROGRESS NOTES
Mr. Kamara is 67 y.o. male    CHIEF COMPLAINT: I am here for follow up on prostate cancer. My PSA is <0.070    HPI  Prostate cancer  Location: Prostate gland  Quality:  Adenocarcinoma  Severity: Thornton 3+4, t2c with perineural invasion. 10% gland involvement.   Duration: Diagnosed in June 2017   Context: Found during evaluation of elevated PSA   Modifying factors: PSA immeasurable  after robot-assisted laparoscopic prostatectomy  Associated signs or symptoms: Moderate (4 pads/day) stress incontinence since surgery.   Hx related to this:   08/2017- RALP     Final Diagnosis   Prostate, radical prostatectomy:       A.  Prostatic adenocarcinoma (Minerva's grade 3+4 = 7).       B.  Tumor is present bilaterally in the gland.       C.  Perineural invasion is present.       D.  Largest tumor focus measures 1.3 cm in greatest dimension.       E.  Tumor occupies approximately 10% of the evaluated gland.       F.  Negative for evidence of extraprostatic extension.       G.  Negative for evidence of vas deferens or seminal vesicle invasion.       H.  Surgical excision margins are negative for evidence of malignancy.     AJCC STAGE:  pT2c, pNx, pMx      Electronically signed by Ej Schroeder MD on 8/3/2017 at 1511    Other issues:   ED since surgery 14 months. Failed PDE 5 inhibitors. Failed intracavernosal injections.     Lab Results   Component Value Date    PSA <0.070 09/14/2018    PSA <0.070 05/09/2018    PSA <0.070 12/22/2017           The following portions of the patient's history were reviewed and updated as appropriate: allergies, current medications, past family history, past medical history, past social history, past surgical history and problem list.      Review of Systems   Constitutional: Negative for chills and fever.   Gastrointestinal: Negative for abdominal pain, anal bleeding and blood in stool.   Genitourinary: Positive for frequency. Negative for dysuria, hematuria and urgency.           Current  Outpatient Prescriptions:   •  allopurinol (ZYLOPRIM) 100 MG tablet, , Disp: , Rfl:   •  amLODIPine (NORVASC) 5 MG tablet, , Disp: , Rfl:   •  atorvastatin (LIPITOR) 20 MG tablet, Take 20 mg by mouth Daily., Disp: , Rfl: 2  •  escitalopram (LEXAPRO) 10 MG tablet, Take 10 mg by mouth Daily., Disp: , Rfl: 11  •  gabapentin (NEURONTIN) 400 MG capsule, Take 400 mg by mouth., Disp: , Rfl:   •  levothyroxine (SYNTHROID, LEVOTHROID) 200 MCG tablet, Take 200 mcg by mouth Daily., Disp: , Rfl:   •  lisinopril (PRINIVIL,ZESTRIL) 40 MG tablet, Take 40 mg by mouth Daily., Disp: , Rfl: 11  •  SITagliptin (JANUVIA) 100 MG tablet, Take 100 mg by mouth Daily., Disp: , Rfl:   •  traMADol (ULTRAM) 50 MG tablet, , Disp: , Rfl:   •  traZODone (DESYREL) 50 MG tablet, Take 50 mg by mouth Every Night., Disp: , Rfl:   •  predniSONE (DELTASONE) 10 MG tablet, 6 tabs daily x 2 days, 5 tabs daily x 2 days, 4 tabs x 2 days, 3 tablets x 2 days, 2 tablets x 2 days, and 1 tab x 2 days., Disp: 42 tablet, Rfl: 0    Past Medical History:   Diagnosis Date   • Arthritis    • Cancer (CMS/HCC)     thyroid   • Diabetes (CMS/HCC)    • Disease of thyroid gland    • Hypercholesteremia    • Hypertension    • IBS (irritable bowel syndrome)    • Prostate cancer (CMS/HCC)    • Sensorineural hearing loss    • Sleep apnea     NON COMPLIANT WITH C PAP   • Sudden hearing loss    • Tinnitus        Past Surgical History:   Procedure Laterality Date   • CHOLECYSTECTOMY     • COLONOSCOPY N/A 3/7/2017    Procedure: COLONOSCOPY WITH ANESTHESIA;  Surgeon: Hector Alvarenga MD;  Location: [facility] ENDOSCOPY;  Service:    • LAPAROSCOPIC RETROPUBIC PROSTATECTOMY     • PROSTATE SURGERY     • PROSTATECTOMY N/A 8/1/2017    Procedure: PROSTATECTOMY LAPAROSCOPIC WITH DAVINCI SI ROBOT ;  Surgeon: Hernesto Hogn MD;  Location: [facility] OR;  Service:    • THYROID SURGERY      RADIATION THERAPY AFTER SURGERY       Social History     Social History   • Marital status:      Social  "History Main Topics   • Smoking status: Never Smoker   • Smokeless tobacco: Never Used   • Alcohol use Yes      Comment: occasionally   • Drug use: No   • Sexual activity: Defer     Other Topics Concern   • Not on file       Family History   Problem Relation Age of Onset   • Prostate cancer Father    • Cancer Father    • Heart disease Mother    • Diabetes Sister    • Colon cancer Neg Hx    • Colon polyps Neg Hx          /70   Temp 98 °F (36.7 °C)   Ht 180.3 cm (71\")   Wt 102 kg (225 lb 3.2 oz)   BMI 31.41 kg/m²       Physical Exam  Constitutional:  They  appear well-developed and well-nourished. There are no obvious deformities. No distress.   Pulmonary/Chest: Effort normal.   GI: Soft. The patient exhibits no distension and no mass. There is no tenderness. There is no rebound and no guarding. No hernia.   Neurological: Patient is alert and oriented to person, place, and time.   Skin: Skin is warm and dry. Not diaphoretic.   Psychiatric:  normal mood and affect. Not agitated.         Data  Results for orders placed or performed in visit on 09/19/18   POC Urinalysis Dipstick, Multipro   Result Value Ref Range    Color Yellow Yellow, Straw, Dark Yellow, Cindi    Clarity, UA Clear Clear    Glucose,  mg/dL (A) Negative, 1000 mg/dL (3+) mg/dL    Bilirubin Negative Negative    Ketones, UA Negative Negative    Specific Gravity  1.025 1.005 - 1.030    Blood, UA Trace (A) Negative    pH, Urine 6.5 5.0 - 8.0    Protein, POC Negative Negative mg/dL    Urobilinogen, UA Normal Normal    Nitrite, UA Negative Negative    Leukocytes Negative Negative       Assessment and Plan  Diagnoses and all orders for this visit:    Prostate cancer (CMS/[facility])  -     POC Urinalysis Dipstick, Multipro  -     PSA DIAGNOSTIC; Future    Stress incontinence, male    Erectile dysfunction, unspecified erectile dysfunction type    - The PSA is <0.07 which is undetectable suggesting no clinical evidence of prostate cancer   - Incontinence " very bothersome. Will likely need sling vs. Sphincter.  - His ED continues. Failed PDE5 inhibitors. Failed intracavernosal injections. Wants to consider penile prosthesis.     Will ask Dr. Rebolledo to evaluate. He saw him one year ago.     (Please note that portions of this note were completed with a voice recognition program.)  Hernesto Hong MD  09/19/18  8:48 AM      Cc: Frankie Bradley APRN

## 2018-09-14 ENCOUNTER — TELEPHONE (OUTPATIENT)
Dept: UROLOGY | Facility: CLINIC | Age: 68
End: 2018-09-14

## 2018-09-14 LAB — PSA SERPL-MCNC: <0.07 NG/ML (ref 0–4)

## 2018-09-19 ENCOUNTER — OFFICE VISIT (OUTPATIENT)
Dept: UROLOGY | Facility: CLINIC | Age: 68
End: 2018-09-19

## 2018-09-19 VITALS
DIASTOLIC BLOOD PRESSURE: 70 MMHG | SYSTOLIC BLOOD PRESSURE: 150 MMHG | HEIGHT: 71 IN | WEIGHT: 225.2 LBS | TEMPERATURE: 98 F | BODY MASS INDEX: 31.53 KG/M2

## 2018-09-19 DIAGNOSIS — C61 PROSTATE CANCER (HCC): Primary | ICD-10-CM

## 2018-09-19 DIAGNOSIS — N52.9 ERECTILE DYSFUNCTION, UNSPECIFIED ERECTILE DYSFUNCTION TYPE: ICD-10-CM

## 2018-09-19 DIAGNOSIS — N39.3 STRESS INCONTINENCE, MALE: ICD-10-CM

## 2018-09-19 LAB
BILIRUB BLD-MCNC: NEGATIVE MG/DL
CLARITY, POC: CLEAR
COLOR UR: YELLOW
GLUCOSE UR STRIP-MCNC: ABNORMAL MG/DL
KETONES UR QL: NEGATIVE
LEUKOCYTE EST, POC: NEGATIVE
NITRITE UR-MCNC: NEGATIVE MG/ML
PH UR: 6.5 [PH] (ref 5–8)
PROT UR STRIP-MCNC: NEGATIVE MG/DL
RBC # UR STRIP: ABNORMAL /UL
SP GR UR: 1.02 (ref 1–1.03)
UROBILINOGEN UR QL: NORMAL

## 2018-09-19 PROCEDURE — 99214 OFFICE O/P EST MOD 30 MIN: CPT | Performed by: UROLOGY

## 2018-09-19 PROCEDURE — 81001 URINALYSIS AUTO W/SCOPE: CPT | Performed by: UROLOGY

## 2018-09-19 RX ORDER — TRAMADOL HYDROCHLORIDE 50 MG/1
50 TABLET ORAL 2 TIMES DAILY
COMMUNITY
Start: 2018-08-22 | End: 2021-12-08

## 2018-09-19 RX ORDER — GABAPENTIN 400 MG/1
400 CAPSULE ORAL 2 TIMES DAILY
COMMUNITY
Start: 2018-08-22 | End: 2018-11-21

## 2018-09-19 RX ORDER — ALLOPURINOL 100 MG/1
100 TABLET ORAL DAILY
COMMUNITY
Start: 2018-08-22

## 2018-09-19 RX ORDER — AMLODIPINE BESYLATE 5 MG/1
5 TABLET ORAL DAILY
COMMUNITY
Start: 2018-08-22 | End: 2021-12-08 | Stop reason: SDUPTHER

## 2018-09-19 NOTE — PATIENT INSTRUCTIONS

## 2018-09-26 ENCOUNTER — OFFICE VISIT (OUTPATIENT)
Dept: UROLOGY | Facility: CLINIC | Age: 68
End: 2018-09-26

## 2018-09-26 VITALS — TEMPERATURE: 98.6 F | HEIGHT: 71 IN | WEIGHT: 225 LBS | BODY MASS INDEX: 31.5 KG/M2

## 2018-09-26 DIAGNOSIS — N39.3 STRESS INCONTINENCE OF URINE: ICD-10-CM

## 2018-09-26 DIAGNOSIS — N52.31 ERECTILE DYSFUNCTION FOLLOWING RADICAL PROSTATECTOMY: Primary | ICD-10-CM

## 2018-09-26 DIAGNOSIS — E79.0 HYPERURICEMIA: ICD-10-CM

## 2018-09-26 LAB
ANION GAP SERPL CALCULATED.3IONS-SCNC: 11 MMOL/L (ref 7–19)
BILIRUB BLD-MCNC: NEGATIVE MG/DL
BUN BLDV-MCNC: 29 MG/DL (ref 8–23)
CALCIUM SERPL-MCNC: 9.6 MG/DL (ref 8.8–10.2)
CHLORIDE BLD-SCNC: 101 MMOL/L (ref 98–111)
CLARITY, POC: CLEAR
CO2: 27 MMOL/L (ref 22–29)
COLOR UR: YELLOW
CREAT SERPL-MCNC: 1.5 MG/DL (ref 0.5–1.2)
GFR NON-AFRICAN AMERICAN: 46
GLUCOSE BLD-MCNC: 131 MG/DL (ref 74–109)
GLUCOSE UR STRIP-MCNC: NEGATIVE MG/DL
KETONES UR QL: NEGATIVE
LEUKOCYTE EST, POC: NEGATIVE
NITRITE UR-MCNC: NEGATIVE MG/ML
PH UR: 7 [PH] (ref 5–8)
POTASSIUM SERPL-SCNC: 4.6 MMOL/L (ref 3.5–5)
PROT UR STRIP-MCNC: NEGATIVE MG/DL
RBC # UR STRIP: NEGATIVE /UL
SODIUM BLD-SCNC: 139 MMOL/L (ref 136–145)
SP GR UR: 1.03 (ref 1–1.03)
URIC ACID, SERUM: 6.1 MG/DL (ref 3.4–7)
UROBILINOGEN UR QL: NORMAL

## 2018-09-26 PROCEDURE — 99213 OFFICE O/P EST LOW 20 MIN: CPT | Performed by: UROLOGY

## 2018-09-26 PROCEDURE — 51798 US URINE CAPACITY MEASURE: CPT | Performed by: UROLOGY

## 2018-09-26 PROCEDURE — 81001 URINALYSIS AUTO W/SCOPE: CPT | Performed by: UROLOGY

## 2018-09-26 NOTE — PROGRESS NOTES
Mr. Kamara is 67 y.o. male    Chief Complaint   Patient presents with   • Erectile Dysfunction   • Urinary Incontinence       Erectile Dysfunction   This is a chronic problem. The current episode started more than 1 year ago. The problem is unchanged. The nature of his difficulty is achieving erection. Irritative symptoms include frequency and urgency. Pertinent negatives include no chills, dysuria or hematuria. Nothing aggravates the symptoms. Past treatments include sildenafil and injection treatment. The treatment provided no relief. He has had no adverse reactions caused by medications. Risk factors include prostate surgery.     Urinary Incontinence  Patient complains of urinary incontinence. This has been present for several months. The patient leaks urine with bending, coughing, lifting. Patient describes the symptoms as urine leakage with coughing/heavy physical activity. Factors associated with symptoms include prostate surgery. Evaluation to date includes none. Treatment to date includes Kegel exercises, which was ineffective.      The following portions of the patient's history were reviewed and updated as appropriate: allergies, current medications, past family history, past medical history, past social history, past surgical history and problem list.    Review of Systems   Constitutional: Negative for chills and fever.   Gastrointestinal: Negative for abdominal pain, anal bleeding and blood in stool.   Genitourinary: Positive for frequency and urgency. Negative for decreased urine volume, difficulty urinating, discharge, dysuria, enuresis, flank pain, genital sores, hematuria, penile pain, penile swelling, scrotal swelling and testicular pain.         Current Outpatient Prescriptions:   •  allopurinol (ZYLOPRIM) 100 MG tablet, , Disp: , Rfl:   •  amLODIPine (NORVASC) 5 MG tablet, , Disp: , Rfl:   •  atorvastatin (LIPITOR) 20 MG tablet, Take 20 mg by mouth Daily., Disp: , Rfl: 2  •  escitalopram (LEXAPRO)  10 MG tablet, Take 10 mg by mouth Daily., Disp: , Rfl: 11  •  gabapentin (NEURONTIN) 400 MG capsule, Take 400 mg by mouth., Disp: , Rfl:   •  levothyroxine (SYNTHROID, LEVOTHROID) 200 MCG tablet, Take 200 mcg by mouth Daily., Disp: , Rfl:   •  lisinopril (PRINIVIL,ZESTRIL) 40 MG tablet, Take 40 mg by mouth Daily., Disp: , Rfl: 11  •  predniSONE (DELTASONE) 10 MG tablet, 6 tabs daily x 2 days, 5 tabs daily x 2 days, 4 tabs x 2 days, 3 tablets x 2 days, 2 tablets x 2 days, and 1 tab x 2 days., Disp: 42 tablet, Rfl: 0  •  SITagliptin (JANUVIA) 100 MG tablet, Take 100 mg by mouth Daily., Disp: , Rfl:   •  traMADol (ULTRAM) 50 MG tablet, , Disp: , Rfl:   •  traZODone (DESYREL) 50 MG tablet, Take 50 mg by mouth Every Night., Disp: , Rfl:     Past Medical History:   Diagnosis Date   • Arthritis    • Cancer (CMS/HCC)     thyroid   • Diabetes (CMS/HCC)    • Disease of thyroid gland    • Hypercholesteremia    • Hypertension    • IBS (irritable bowel syndrome)    • Prostate cancer (CMS/HCC)    • Sensorineural hearing loss    • Sleep apnea     NON COMPLIANT WITH C PAP   • Sudden hearing loss    • Tinnitus        Past Surgical History:   Procedure Laterality Date   • CHOLECYSTECTOMY     • COLONOSCOPY N/A 3/7/2017    Procedure: COLONOSCOPY WITH ANESTHESIA;  Surgeon: Hector Alvarenga MD;  Location: [facility] ENDOSCOPY;  Service:    • LAPAROSCOPIC RETROPUBIC PROSTATECTOMY     • PROSTATE SURGERY     • PROSTATECTOMY N/A 8/1/2017    Procedure: PROSTATECTOMY LAPAROSCOPIC WITH DAVINCI SI ROBOT ;  Surgeon: Hernesto Hong MD;  Location: [facility] OR;  Service:    • THYROID SURGERY      RADIATION THERAPY AFTER SURGERY       Social History     Social History   • Marital status:      Social History Main Topics   • Smoking status: Never Smoker   • Smokeless tobacco: Never Used   • Alcohol use Yes      Comment: occasionally   • Drug use: No   • Sexual activity: Defer     Other Topics Concern   • Not on file       Family History   Problem  "Relation Age of Onset   • Prostate cancer Father    • Cancer Father    • Heart disease Mother    • Diabetes Sister    • Colon cancer Neg Hx    • Colon polyps Neg Hx        Objective    Temp 98.6 °F (37 °C)   Ht 180.3 cm (71\")   Wt 102 kg (225 lb)   BMI 31.38 kg/m²     Physical Exam    Office Visit on 09/19/2018   Component Date Value Ref Range Status   • Color 09/19/2018 Yellow  Yellow, Straw, Dark Yellow, Cindi Final   • Clarity, UA 09/19/2018 Clear  Clear Final   • Glucose, UA 09/19/2018 100 mg/dL* Negative, 1000 mg/dL (3+) mg/dL Final   • Bilirubin 09/19/2018 Negative  Negative Final   • Ketones, UA 09/19/2018 Negative  Negative Final   • Specific Gravity  09/19/2018 1.025  1.005 - 1.030 Final   • Blood, UA 09/19/2018 Trace* Negative Final   • pH, Urine 09/19/2018 6.5  5.0 - 8.0 Final   • Protein, POC 09/19/2018 Negative  Negative mg/dL Final   • Urobilinogen, UA 09/19/2018 Normal  Normal Final   • Nitrite, UA 09/19/2018 Negative  Negative Final   • Leukocytes 09/19/2018 Negative  Negative Final       Results for orders placed or performed in visit on 09/26/18   POC Urinalysis Dipstick, Multipro   Result Value Ref Range    Color Yellow Yellow, Straw, Dark Yellow, Cindi    Clarity, UA Clear Clear    Glucose, UA Negative Negative, 1000 mg/dL (3+) mg/dL    Bilirubin Negative Negative    Ketones, UA Negative Negative    Specific Gravity  1.030 1.005 - 1.030    Blood, UA Negative Negative    pH, Urine 7.0 5.0 - 8.0    Protein, POC Negative Negative mg/dL    Urobilinogen, UA Normal Normal    Nitrite, UA Negative Negative    Leukocytes Negative Negative     Estimation of residual urine via abdominal ultrasound  Residual Urine: 0 ml  Indication: bph  Position: Supine  Examination: Incremental scanning of the suprapubic area using 3 MHz transducer using copious amounts of acoustic gel.   Findings: An anechoic area was demonstrated which represented the bladder, with measurement of residual urine as noted. I inspected " this myself. In that the residual urine was stable or insignificant, no treatment will be necessary at this time.         Assessment and Plan    Zay was seen today for erectile dysfunction and urinary incontinence.    Diagnoses and all orders for this visit:    Erectile dysfunction following radical prostatectomy  -     POC Urinalysis Dipstick, Multipro    Stress incontinence of urine    The patient is known to me.  I saw him approximately 2 years ago for erectile dysfunction and he was started on injections the time.  Patient states that these did not work for him.  Since our last visit, he has had a prostatectomy.  He continues to have erectile dysfunction which is unchanged from prior to his prostatectomy.  In addition, now he does have significant stress incontinence.  He does empty his bladder well today on bladder scan.    I think in terms of treatment of this patient, the which I will treat his complaints are treating his incontinence first followed by erectile dysfunction.  He is interested in surgical intervention for both.  We discussed today at length the penile prosthesis, as well as the male advance sling versus artificial urinary sphincter, in addition to nonoperative management of both erectile dysfunction and incontinence.  He is not interested in any of the nonoperative managements at this time.  In order to determine which procedure he would be best suited for, he does need a cystoscopy to ensure that he has coaptation of his external sphincter.  Based on this, we will give him a better idea of surgery better for him moving forward.  He expressed understanding and understands the risk and benefits associated with a cystoscopy.  He does have information on both the penile prosthesis as well as the artificial sphincter and advanced sling with him today    Cystoscopy as the definitive lower urinary tract study is discussed . The risks of pain and discomfort, infection, and urethral stricture are  discussed with the patient including the technique used in the office setting.  All patient questions were answered.

## 2018-09-27 ENCOUNTER — TELEPHONE (OUTPATIENT)
Dept: PRIMARY CARE CLINIC | Age: 68
End: 2018-09-27

## 2018-09-29 DIAGNOSIS — F32.9 REACTIVE DEPRESSION: ICD-10-CM

## 2018-10-01 RX ORDER — ESCITALOPRAM OXALATE 10 MG/1
10 TABLET ORAL DAILY
Qty: 30 TABLET | Refills: 11 | Status: SHIPPED | OUTPATIENT
Start: 2018-10-01 | End: 2018-10-17 | Stop reason: SDUPTHER

## 2018-10-03 ENCOUNTER — PROCEDURE VISIT (OUTPATIENT)
Dept: UROLOGY | Facility: CLINIC | Age: 68
End: 2018-10-03

## 2018-10-03 ENCOUNTER — OFFICE VISIT (OUTPATIENT)
Dept: PRIMARY CARE CLINIC | Age: 68
End: 2018-10-03
Payer: MEDICARE

## 2018-10-03 VITALS
OXYGEN SATURATION: 96 % | HEART RATE: 79 BPM | BODY MASS INDEX: 31.78 KG/M2 | HEIGHT: 71 IN | SYSTOLIC BLOOD PRESSURE: 114 MMHG | WEIGHT: 227 LBS | TEMPERATURE: 97.8 F | DIASTOLIC BLOOD PRESSURE: 61 MMHG

## 2018-10-03 DIAGNOSIS — Z23 NEED FOR INFLUENZA VACCINATION: ICD-10-CM

## 2018-10-03 DIAGNOSIS — N18.30 TYPE 2 DIABETES MELLITUS WITH STAGE 3 CHRONIC KIDNEY DISEASE, WITHOUT LONG-TERM CURRENT USE OF INSULIN (HCC): Primary | ICD-10-CM

## 2018-10-03 DIAGNOSIS — I10 ESSENTIAL HYPERTENSION: ICD-10-CM

## 2018-10-03 DIAGNOSIS — Z23 NEED FOR SHINGLES VACCINE: ICD-10-CM

## 2018-10-03 DIAGNOSIS — L84 CORN OF FOOT: ICD-10-CM

## 2018-10-03 DIAGNOSIS — E03.9 ACQUIRED HYPOTHYROIDISM: ICD-10-CM

## 2018-10-03 DIAGNOSIS — N32.0 BLADDER NECK CONTRACTURE: ICD-10-CM

## 2018-10-03 DIAGNOSIS — E11.22 TYPE 2 DIABETES MELLITUS WITH STAGE 3 CHRONIC KIDNEY DISEASE, WITHOUT LONG-TERM CURRENT USE OF INSULIN (HCC): Primary | ICD-10-CM

## 2018-10-03 DIAGNOSIS — N39.3 STRESS INCONTINENCE OF URINE: Primary | ICD-10-CM

## 2018-10-03 DIAGNOSIS — M47.26 OSTEOARTHRITIS OF SPINE WITH RADICULOPATHY, LUMBAR REGION: ICD-10-CM

## 2018-10-03 DIAGNOSIS — M25.50 ARTHRALGIA OF MULTIPLE JOINTS: ICD-10-CM

## 2018-10-03 PROCEDURE — G8427 DOCREV CUR MEDS BY ELIG CLIN: HCPCS | Performed by: NURSE PRACTITIONER

## 2018-10-03 PROCEDURE — 52000 CYSTOURETHROSCOPY: CPT | Performed by: UROLOGY

## 2018-10-03 PROCEDURE — 1123F ACP DISCUSS/DSCN MKR DOCD: CPT | Performed by: NURSE PRACTITIONER

## 2018-10-03 PROCEDURE — 3044F HG A1C LEVEL LT 7.0%: CPT | Performed by: NURSE PRACTITIONER

## 2018-10-03 PROCEDURE — 4040F PNEUMOC VAC/ADMIN/RCVD: CPT | Performed by: NURSE PRACTITIONER

## 2018-10-03 PROCEDURE — 99213 OFFICE O/P EST LOW 20 MIN: CPT | Performed by: UROLOGY

## 2018-10-03 PROCEDURE — 1036F TOBACCO NON-USER: CPT | Performed by: NURSE PRACTITIONER

## 2018-10-03 PROCEDURE — 1101F PT FALLS ASSESS-DOCD LE1/YR: CPT | Performed by: NURSE PRACTITIONER

## 2018-10-03 PROCEDURE — 99214 OFFICE O/P EST MOD 30 MIN: CPT | Performed by: NURSE PRACTITIONER

## 2018-10-03 PROCEDURE — 2022F DILAT RTA XM EVC RTNOPTHY: CPT | Performed by: NURSE PRACTITIONER

## 2018-10-03 PROCEDURE — G0008 ADMIN INFLUENZA VIRUS VAC: HCPCS | Performed by: NURSE PRACTITIONER

## 2018-10-03 PROCEDURE — 3017F COLORECTAL CA SCREEN DOC REV: CPT | Performed by: NURSE PRACTITIONER

## 2018-10-03 PROCEDURE — G8417 CALC BMI ABV UP PARAM F/U: HCPCS | Performed by: NURSE PRACTITIONER

## 2018-10-03 PROCEDURE — G8482 FLU IMMUNIZE ORDER/ADMIN: HCPCS | Performed by: NURSE PRACTITIONER

## 2018-10-03 PROCEDURE — 90662 IIV NO PRSV INCREASED AG IM: CPT | Performed by: NURSE PRACTITIONER

## 2018-10-03 RX ORDER — TRAMADOL HYDROCHLORIDE 50 MG/1
50 TABLET ORAL 2 TIMES DAILY PRN
Qty: 60 TABLET | Refills: 1 | Status: SHIPPED | OUTPATIENT
Start: 2018-10-03 | End: 2018-11-02

## 2018-10-03 ASSESSMENT — ENCOUNTER SYMPTOMS
SORE THROAT: 0
VOMITING: 0
RHINORRHEA: 0
ABDOMINAL PAIN: 0
TROUBLE SWALLOWING: 0
NAUSEA: 0
COUGH: 0
SHORTNESS OF BREATH: 0
CONSTIPATION: 0
DIARRHEA: 0

## 2018-10-03 NOTE — PROGRESS NOTES
After obtaining consent from [facility], gave patient fluzone high dose injection in Left deltoid, patient tolerated well. Medication was not supplied by the patient.
Status:   Future     Standing Expiration Date:   10/4/2019     Orders Placed This Encounter   Medications    zoster recombinant adjuvanted vaccine (SHINGRIX) 50 MCG SUSR injection     Sig: Inject 0.5 mLs into the muscle once for 1 dose     Dispense:  0.5 mL     Refill:  1    traMADol (ULTRAM) 50 MG tablet     Sig: Take 1 tablet by mouth 2 times daily as needed for Pain for up to 30 days. .     Dispense:  60 tablet     Refill:  1         Discussed use, benefit, and side effects of prescribed medications. All patient questions answered. Pt voiced understanding. Reviewed health maintenance. .  Patient agreed with treatment plan. Follow up as directed. Controlled Substances Monitoring:     RX Monitoring 10/3/2018   Attestation The Prescription Monitoring Report for this patient was reviewed today. Documentation Possible medication side effects, risk of tolerance/dependence & alternative treatments discussed. ;No signs of potential drug abuse or diversion identified. Patient Instructions   Fasting labs prior to follow up appointment.            Electronically signed by MARY Rutledge on 10/3/2018 at 12:25 PM

## 2018-10-03 NOTE — PROGRESS NOTES
Pre- operative diagnosis:  incontinence    Post operative diagnosis:  Date coaptation of the external sphincter, mild bladder neck contracture    Procedure:  The patient was prepped and draped in a normal sterile fashion.  The urethra was anesthetized with 2% lidocaine jelly.  A flexible cystoscope was introduced per urethra.      Urethra:  Normal, the bladder neck there is a contracture but I am able to pass the scope without difficulty    Bladder:  There is no evidence of a stone, foreign body or mass within the bladder.  The bladder is minimally trabeculated.  The bladder neck is without contracture.    Ureteral orifices:  Normal position bilaterally    Prostate:  Surgically absent    Patient tolerated the procedure well    Complications: none    Blood loss: minimal    Follow up:    Advance male sling risk and benefits as outlined below.    Mr. Kamara is 67 y.o. male    Chief Complaint   Patient presents with   • Urinary Incontinence       History of Present Illness    The following portions of the patient's history were reviewed and updated as appropriate: allergies, current medications, past family history, past medical history, past social history, past surgical history and problem list.    Review of Systems   Constitutional: Negative for chills and fever.   Gastrointestinal: Negative for abdominal pain, anal bleeding and blood in stool.   Genitourinary: Positive for frequency and urgency. Negative for decreased urine volume, difficulty urinating, discharge, dysuria, enuresis, flank pain, genital sores, hematuria, penile pain, penile swelling, scrotal swelling and testicular pain.         Current Outpatient Prescriptions:   •  allopurinol (ZYLOPRIM) 100 MG tablet, , Disp: , Rfl:   •  amLODIPine (NORVASC) 5 MG tablet, , Disp: , Rfl:   •  atorvastatin (LIPITOR) 20 MG tablet, Take 20 mg by mouth Daily., Disp: , Rfl: 2  •  escitalopram (LEXAPRO) 10 MG tablet, Take 10 mg by mouth Daily., Disp: , Rfl: 11  •   gabapentin (NEURONTIN) 400 MG capsule, Take 400 mg by mouth., Disp: , Rfl:   •  levothyroxine (SYNTHROID, LEVOTHROID) 200 MCG tablet, Take 200 mcg by mouth Daily., Disp: , Rfl:   •  lisinopril (PRINIVIL,ZESTRIL) 40 MG tablet, Take 40 mg by mouth Daily., Disp: , Rfl: 11  •  predniSONE (DELTASONE) 10 MG tablet, 6 tabs daily x 2 days, 5 tabs daily x 2 days, 4 tabs x 2 days, 3 tablets x 2 days, 2 tablets x 2 days, and 1 tab x 2 days., Disp: 42 tablet, Rfl: 0  •  SITagliptin (JANUVIA) 100 MG tablet, Take 100 mg by mouth Daily., Disp: , Rfl:   •  traMADol (ULTRAM) 50 MG tablet, , Disp: , Rfl:   •  traZODone (DESYREL) 50 MG tablet, Take 50 mg by mouth Every Night., Disp: , Rfl:     Past Medical History:   Diagnosis Date   • Arthritis    • Cancer (CMS/HCC)     thyroid   • Diabetes (CMS/HCC)    • Disease of thyroid gland    • Hypercholesteremia    • Hypertension    • IBS (irritable bowel syndrome)    • Prostate cancer (CMS/HCC)    • Sensorineural hearing loss    • Sleep apnea     NON COMPLIANT WITH C PAP   • Sudden hearing loss    • Tinnitus        Past Surgical History:   Procedure Laterality Date   • CHOLECYSTECTOMY     • COLONOSCOPY N/A 3/7/2017    Procedure: COLONOSCOPY WITH ANESTHESIA;  Surgeon: Hector Alvarenga MD;  Location: [facility] ENDOSCOPY;  Service:    • LAPAROSCOPIC RETROPUBIC PROSTATECTOMY     • PROSTATE SURGERY     • PROSTATECTOMY N/A 8/1/2017    Procedure: PROSTATECTOMY LAPAROSCOPIC WITH DAVINCI SI ROBOT ;  Surgeon: Hernesto Hong MD;  Location: [facility] OR;  Service:    • THYROID SURGERY      RADIATION THERAPY AFTER SURGERY       Social History     Social History   • Marital status:      Social History Main Topics   • Smoking status: Never Smoker   • Smokeless tobacco: Never Used   • Alcohol use Yes      Comment: occasionally   • Drug use: No   • Sexual activity: Defer     Other Topics Concern   • Not on file       Family History   Problem Relation Age of Onset   • Prostate cancer Father    • Cancer  Father    • Heart disease Mother    • Diabetes Sister    • Colon cancer Neg Hx    • Colon polyps Neg Hx        Objective    There were no vitals taken for this visit.    Physical Exam   Constitutional: He is oriented to person, place, and time. He appears well-developed and well-nourished. No distress.   Pulmonary/Chest: Effort normal.   Abdominal: Soft. He exhibits no distension and no mass. There is no tenderness. There is no rebound and no guarding. No hernia.   Neurological: He is alert and oriented to person, place, and time.   Skin: Skin is warm and dry. He is not diaphoretic.   Psychiatric: He has a normal mood and affect.   Vitals reviewed.      Office Visit on 09/26/2018   Component Date Value Ref Range Status   • Color 09/26/2018 Yellow  Yellow, Straw, Dark Yellow, Cindi Final   • Clarity, UA 09/26/2018 Clear  Clear Final   • Glucose, UA 09/26/2018 Negative  Negative, 1000 mg/dL (3+) mg/dL Final   • Bilirubin 09/26/2018 Negative  Negative Final   • Ketones, UA 09/26/2018 Negative  Negative Final   • Specific Gravity  09/26/2018 1.030  1.005 - 1.030 Final   • Blood, UA 09/26/2018 Negative  Negative Final   • pH, Urine 09/26/2018 7.0  5.0 - 8.0 Final   • Protein, POC 09/26/2018 Negative  Negative mg/dL Final   • Urobilinogen, UA 09/26/2018 Normal  Normal Final   • Nitrite, UA 09/26/2018 Negative  Negative Final   • Leukocytes 09/26/2018 Negative  Negative Final       Results for orders placed or performed in visit on 09/26/18   POC Urinalysis Dipstick, Multipro   Result Value Ref Range    Color Yellow Yellow, Straw, Dark Yellow, Cindi    Clarity, UA Clear Clear    Glucose, UA Negative Negative, 1000 mg/dL (3+) mg/dL    Bilirubin Negative Negative    Ketones, UA Negative Negative    Specific Gravity  1.030 1.005 - 1.030    Blood, UA Negative Negative    pH, Urine 7.0 5.0 - 8.0    Protein, POC Negative Negative mg/dL    Urobilinogen, UA Normal Normal    Nitrite, UA Negative Negative    Leukocytes Negative  Negative     Assessment and Plan    Zay was seen today for urinary incontinence.    Diagnoses and all orders for this visit:    Stress incontinence of urine  -     Case Request; Standing  -     ceFAZolin (ANCEF) 2 g in sodium chloride 0.9 % 100 mL IVPB; Infuse 2 g into a venous catheter 1 (One) Time.  -     Case Request    Bladder neck contracture    Other orders  -     Follow Anesthesia Guidelines / Standing Orders; Future  -     Provide instructions to patient on NPO status  -     Obtain informed consent  -     Follow Anesthesia Guidelines / Standing Orders; Standing  -     Verify NPO Status; Standing      On cystoscopic evaluation today, he does have good coaptation of the external sphincter.  He does have a bladder neck contracture.  I did test the size of this and it appears to be approximately 18 Belgian, as I was able to place an 18 Belgian catheter per urethra sterilely with very little resistance.  At this point I think with a bladder neck open at least 18 Belgian he is appropriate for surgical intervention.  Options include the artificial urinary sphincter versus the advance male sling.  After review of literature and discussion with me, he has chosen the advance male sling.  Risks of bleeding, infection, mesh extrusion or erosion, urinary retention requiring catheterization or eventual surgical removal, injury to bladder or urethra or ureters, or postoperative pain were discussed.  He would like to proceed.

## 2018-10-05 ENCOUNTER — TELEPHONE (OUTPATIENT)
Dept: OTOLARYNGOLOGY | Facility: CLINIC | Age: 68
End: 2018-10-05

## 2018-10-05 NOTE — TELEPHONE ENCOUNTER
Pt cannot keep referral appt on Oct 9 at 12:15 with [facility] (Hank Finch) and will call to reschedule.

## 2018-10-08 ENCOUNTER — APPOINTMENT (OUTPATIENT)
Dept: PREADMISSION TESTING | Facility: HOSPITAL | Age: 68
End: 2018-10-08

## 2018-10-17 DIAGNOSIS — F32.9 REACTIVE DEPRESSION: ICD-10-CM

## 2018-10-17 RX ORDER — ESCITALOPRAM OXALATE 10 MG/1
10 TABLET ORAL DAILY
Qty: 90 TABLET | Refills: 3 | Status: SHIPPED | OUTPATIENT
Start: 2018-10-17 | End: 2019-01-18 | Stop reason: ALTCHOICE

## 2018-11-13 DIAGNOSIS — I10 ESSENTIAL HYPERTENSION: ICD-10-CM

## 2018-11-13 DIAGNOSIS — E79.0 HYPERURICEMIA: ICD-10-CM

## 2018-11-13 RX ORDER — AMLODIPINE BESYLATE 5 MG/1
5 TABLET ORAL DAILY
Qty: 90 TABLET | Refills: 3 | Status: SHIPPED | OUTPATIENT
Start: 2018-11-13 | End: 2019-01-09 | Stop reason: SDUPTHER

## 2018-11-13 RX ORDER — ALLOPURINOL 100 MG/1
100 TABLET ORAL DAILY
Qty: 90 TABLET | Refills: 3 | Status: SHIPPED | OUTPATIENT
Start: 2018-11-13 | End: 2020-01-20

## 2018-11-13 NOTE — TELEPHONE ENCOUNTER
Received fax from pharmacy requesting refill on pts medication(s). Pt was last seen in office on 10/3/2018  and has a follow up scheduled for 1/9/2019. Will send request to  Ree Abdoulaye  for patient.      Requested Prescriptions     Pending Prescriptions Disp Refills    allopurinol (ZYLOPRIM) 100 MG tablet 90 tablet 3     Sig: Take 1 tablet by mouth daily

## 2018-11-19 ENCOUNTER — APPOINTMENT (OUTPATIENT)
Dept: PREADMISSION TESTING | Facility: HOSPITAL | Age: 68
End: 2018-11-19

## 2018-11-21 ENCOUNTER — APPOINTMENT (OUTPATIENT)
Dept: PREADMISSION TESTING | Facility: HOSPITAL | Age: 68
End: 2018-11-21

## 2018-11-21 VITALS
BODY MASS INDEX: 32.35 KG/M2 | HEART RATE: 85 BPM | HEIGHT: 70 IN | OXYGEN SATURATION: 97 % | SYSTOLIC BLOOD PRESSURE: 152 MMHG | DIASTOLIC BLOOD PRESSURE: 59 MMHG | WEIGHT: 225.97 LBS

## 2018-11-21 LAB
ANION GAP SERPL CALCULATED.3IONS-SCNC: 11 MMOL/L (ref 4–13)
BACTERIA UR QL AUTO: ABNORMAL /HPF
BILIRUB UR QL STRIP: NEGATIVE
BUN BLD-MCNC: 32 MG/DL (ref 5–21)
BUN/CREAT SERPL: 20.8 (ref 7–25)
CALCIUM SPEC-SCNC: 9.4 MG/DL (ref 8.4–10.4)
CHLORIDE SERPL-SCNC: 99 MMOL/L (ref 98–110)
CLARITY UR: CLEAR
CO2 SERPL-SCNC: 29 MMOL/L (ref 24–31)
COLOR UR: YELLOW
CREAT BLD-MCNC: 1.54 MG/DL (ref 0.5–1.4)
DEPRECATED RDW RBC AUTO: 40.4 FL (ref 40–54)
ERYTHROCYTE [DISTWIDTH] IN BLOOD BY AUTOMATED COUNT: 13.1 % (ref 12–15)
GFR SERPL CREATININE-BSD FRML MDRD: 45 ML/MIN/1.73
GLUCOSE BLD-MCNC: 130 MG/DL (ref 70–100)
GLUCOSE UR STRIP-MCNC: NEGATIVE MG/DL
HCT VFR BLD AUTO: 34.9 % (ref 40–52)
HGB BLD-MCNC: 11.9 G/DL (ref 14–18)
HGB UR QL STRIP.AUTO: NEGATIVE
HYALINE CASTS UR QL AUTO: ABNORMAL /LPF
KETONES UR QL STRIP: NEGATIVE
LEUKOCYTE ESTERASE UR QL STRIP.AUTO: ABNORMAL
MCH RBC QN AUTO: 29 PG (ref 28–32)
MCHC RBC AUTO-ENTMCNC: 34.1 G/DL (ref 33–36)
MCV RBC AUTO: 85.1 FL (ref 82–95)
NITRITE UR QL STRIP: NEGATIVE
PH UR STRIP.AUTO: 5.5 [PH] (ref 5–8)
PLATELET # BLD AUTO: 217 10*3/MM3 (ref 130–400)
PMV BLD AUTO: 9.9 FL (ref 6–12)
POTASSIUM BLD-SCNC: 4.7 MMOL/L (ref 3.5–5.3)
PROT UR QL STRIP: NEGATIVE
RBC # BLD AUTO: 4.1 10*6/MM3 (ref 4.8–5.9)
RBC # UR: ABNORMAL /HPF
REF LAB TEST METHOD: ABNORMAL
SODIUM BLD-SCNC: 139 MMOL/L (ref 135–145)
SP GR UR STRIP: 1.01 (ref 1–1.03)
SQUAMOUS #/AREA URNS HPF: ABNORMAL /HPF
UROBILINOGEN UR QL STRIP: ABNORMAL
WBC NRBC COR # BLD: 8.5 10*3/MM3 (ref 4.8–10.8)
WBC UR QL AUTO: ABNORMAL /HPF

## 2018-11-21 PROCEDURE — 81001 URINALYSIS AUTO W/SCOPE: CPT | Performed by: UROLOGY

## 2018-11-21 PROCEDURE — 93010 ELECTROCARDIOGRAM REPORT: CPT | Performed by: INTERNAL MEDICINE

## 2018-11-21 PROCEDURE — 85027 COMPLETE CBC AUTOMATED: CPT | Performed by: UROLOGY

## 2018-11-21 PROCEDURE — 80048 BASIC METABOLIC PNL TOTAL CA: CPT | Performed by: UROLOGY

## 2018-11-21 PROCEDURE — 87186 SC STD MICRODIL/AGAR DIL: CPT | Performed by: UROLOGY

## 2018-11-21 PROCEDURE — 87077 CULTURE AEROBIC IDENTIFY: CPT | Performed by: UROLOGY

## 2018-11-21 PROCEDURE — 93005 ELECTROCARDIOGRAM TRACING: CPT

## 2018-11-21 PROCEDURE — 87086 URINE CULTURE/COLONY COUNT: CPT | Performed by: UROLOGY

## 2018-11-21 PROCEDURE — 36415 COLL VENOUS BLD VENIPUNCTURE: CPT

## 2018-11-21 RX ORDER — SULFAMETHOXAZOLE AND TRIMETHOPRIM 800; 160 MG/1; MG/1
1 TABLET ORAL 2 TIMES DAILY
Status: ON HOLD | COMMUNITY
Start: 2018-11-21 | End: 2018-11-26 | Stop reason: SDUPTHER

## 2018-11-21 NOTE — DISCHARGE INSTRUCTIONS
DAY OF SURGERY INSTRUCTIONS  advance male sling    Dr ivey      DATE OF SURGERY: NOV 26 2018    ARRIVAL TIME: AS DIRECTED BY OFFICE    YOU MAY TAKE THE FOLLOWING MEDICATION(S) THE MORNING OF SURGERY WITH A SIP OF WATER: AMLODIPINE, GABAPENTIN AND TRAMADOL    DO NOT TAKE LISINOPRIL MORNING OF SURGERY                MANAGING PAIN AFTER SURGERY    We know you are probably wondering what your pain will be like after surgery.  Following surgery it is unrealistic to expect you will not have pain.   Pain is how our bodies let us know that something is wrong or cautions us to be careful.  That said, our goal is to make your pain tolerable.    Methods we may use to treat your pain include (oral or IV medications, PCAs, epidurals, nerve blocks, etc.)   While some procedures require IV pain medications for a short time after surgery, transitioning to pain medications by mouth allows for better management of pain.   Your nurse will encourage you to take oral pain medications whenever possible.  IV medications work almost immediately, but only last a short while.  Taking medications by mouth allows for a more constant level of medication in your blood stream for a longer period of time.      Once your pain is out of control it is harder to get back under control.  It is important you are aware when your next dose of pain medication is due.  If you are admitted, your nurse may write the time of your next dose on the white board in your room to help you remember.      We are interested in your pain and encourage you to inform us about aggravating factors during your visit.   Many times a simple repositioning every few hours can make a big difference.    If your physician says it is okay, do not let your pain prevent you from getting out of bed. Be sure to call your nurse for assistance prior to getting up so you do not fall.      Before surgery, please decide your tolerable pain goal.  These faces help describe the pain ratings  we use on a 0-10 scale.   Be prepared to tell us your goal and whether or not you take pain or anxiety medications at home.            BEFORE YOU COME TO THE HOSPITAL  (Pre-op instructions)  • Do not eat, drink, smoke or chew gum after midnight the night before surgery.  This also includes no mints.  • Morning of surgery take only the medicines you have been instructed with a sip of water unless otherwise instructed  by your physician.  • Do not shave, wear makeup or dark nail polish.  • Remove all jewelry including rings.  • Leave anything you consider valuable at home.  • Leave your suitcase in the car until after your surgery.  • Bring the following with you if applicable:  o Picture ID and insurance, Medicare or Medicaid cards  o Co-pay/deductible required by insurance (cash, check, credit card)  o Copy of advance directive, living will or power-of- documents if not brought to PAT  o CPAP or BIPAP mask and tubing  o Relaxation aids (MP3 player, book, magazine)  • On the day of surgery check in at registration located at the main entrance of the hospital.       Outpatient Surgery Guidelines, Adult  Outpatient procedures are those for which the person having the procedure is allowed to go home the same day as the procedure. Various procedures are done on an outpatient basis. You should follow some general guidelines if you will be having an outpatient procedure.  LET YOUR HEALTH CARE PROVIDER KNOW ABOUT:  · Any allergies you have.  · All medicines you are taking, including vitamins, herbs, eye drops, creams, and over-the-counter medicines.  · Previous problems you or members of your family have had with the use of anesthetics.  · Any blood disorders you have.  · Previous surgeries you have had.  · Medical conditions you have.  RISKS AND COMPLICATIONS  Your health care provider will discuss possible risks and complications with you before surgery. Common risks and complications include:    · Problems due to  the use of anesthetics.  · Blood loss and replacement (does not apply to minor surgical procedures).  · Temporary increase in pain due to surgery.  · Uncorrected pain or problems that the surgery was meant to correct.  · Infection.  · New damage.  BEFORE THE PROCEDURE  · Ask your health care provider about changing or stopping your regular medicines. You may need to stop taking certain medicines in the days or weeks before the procedure.  · Stop smoking at least 2 weeks before surgery. This lowers your risk for complications during and after surgery. Ask your health care provider for help with this if needed.  · Eat your usual meals and a light supper the day before surgery. Continue fluid intake. Do not drink alcohol.  · Do not eat or drink after midnight the night before your surgery.   · Arrange for someone to take you home and to stay with you for 24 hours after the procedure. Medicine given for your procedure may affect your ability to drive or to care for yourself.  · Call your health care provider's office if you develop an illness or problem that may prevent you from safely having your procedure.  AFTER THE PROCEDURE  After surgery, you will be taken to a recovery area, where your progress will be monitored. If there are no complications, you will be allowed to go home when you are awake, stable, and taking fluids well. You may have numbness around the surgical site. Healing will take some time. You will have tenderness at the surgical site and may have some swelling and bruising. You may also have some nausea.  HOME CARE INSTRUCTIONS  · Do not drive for 24 hours, or as directed by your health care provider. Do not drive while taking prescription pain medicines.  · Do not drink alcohol for 24 hours.  · Do not make important decisions or sign legal documents for 24 hours.  · You may resume a normal diet and activities as directed.  · Do not lift anything heavier than 10 pounds (4.5 kg) or play contact sports  until your health care provider says it is okay.  · Change your bandages (dressings) as directed.  · Only take over-the-counter or prescription medicines as directed by your health care provider.  · Follow up with your health care provider as directed.  SEEK MEDICAL CARE IF:  · You have increased bleeding (more than a small spot) from the surgical site.  · You have redness, swelling, or increasing pain in the wound.  · You see pus coming from the wound.  · You have a fever.  · You notice a bad smell coming from the wound or dressing.  · You feel lightheaded or faint.  · You develop a rash.  · You have trouble breathing.  · You develop allergies.  MAKE SURE YOU:  · Understand these instructions.  · Will watch your condition.  · Will get help right away if you are not doing well or get worse.     This information is not intended to replace advice given to you by your health care provider. Make sure you discuss any questions you have with your health care provider.     Document Released: 09/12/2002 Document Revised: 05/03/2016 Document Reviewed: 05/22/2014  [employer] Interactive Patient Education ©2016 [employer]       Fall Prevention in Hospitals, Adult  As a hospital patient, your condition and the treatments you receive can increase your risk for falls. Some additional risk factors for falls in a hospital include:  · Being in an unfamiliar environment.  · Being on bed rest.  · Your surgery.  · Taking certain medicines.  · Your tubing requirements, such as intravenous (IV) therapy or catheters.  It is important that you learn how to decrease fall risks while at the hospital. Below are important tips that can help prevent falls.  SAFETY TIPS FOR PREVENTING FALLS  Talk about your risk of falling.  · Ask your health care provider why you are at risk for falling. Is it your medicine, illness, tubing placement, or something else?  · Make a plan with your health care provider to keep you safe from falls.  · Ask your  health care provider or pharmacist about side effects of your medicines. Some medicines can make you dizzy or affect your coordination.  Ask for help.  · Ask for help before getting out of bed. You may need to press your call button.  · Ask for assistance in getting safely to the toilet.  · Ask for a walker or cane to be put at your bedside. Ask that most of the side rails on your bed be placed up before your health care provider leaves the room.  · Ask family or friends to sit with you.  · Ask for things that are out of your reach, such as your glasses, hearing aids, telephone, bedside table, or call button.  Follow these tips to avoid falling:  · Stay lying or seated, rather than standing, while waiting for help.  · Wear rubber-soled slippers or shoes whenever you walk in the hospital.  · Avoid quick, sudden movements.  ¨ Change positions slowly.  ¨ Sit on the side of your bed before standing.  ¨ Stand up slowly and wait before you start to walk.  · Let your health care provider know if there is a spill on the floor.  · Pay careful attention to the medical equipment, electrical cords, and tubes around you.  · When you need help, use your call button by your bed or in the bathroom. Wait for one of your health care providers to help you.  · If you feel dizzy or unsure of your footing, return to bed and wait for assistance.  · Avoid being distracted by the TV, telephone, or another person in your room.  · Do not lean or support yourself on rolling objects, such as IV poles or bedside tables.     This information is not intended to replace advice given to you by your health care provider. Make sure you discuss any questions you have with your health care provider.     Document Released: 12/15/2001 Document Revised: 01/08/2016 Document Reviewed: 08/25/2013  [employer] Interactive Patient Education ©2016 [employer]       Surgical Site Infections FAQs  What is a Surgical Site Infection (SSI)?  A surgical site infection  is an infection that occurs after surgery in the part of the body where the surgery took place. Most patients who have surgery do not develop an infection. However, infections develop in about 1 to 3 out of every 100 patients who have surgery.  Some of the common symptoms of a surgical site infection are:  · Redness and pain around the area where you had surgery  · Drainage of cloudy fluid from your surgical wound  · Fever  Can SSIs be treated?  Yes. Most surgical site infections can be treated with antibiotics. The antibiotic given to you depends on the bacteria (germs) causing the infection. Sometimes patients with SSIs also need another surgery to treat the infection.  What are some of the things that hospitals are doing to prevent SSIs?  To prevent SSIs, doctors, nurses, and other healthcare providers:  · Clean their hands and arms up to their elbows with an antiseptic agent just before the surgery.  · Clean their hands with soap and water or an alcohol-based hand rub before and after caring for each patient.  · May remove some of your hair immediately before your surgery using electric clippers if the hair is in the same area where the procedure will occur. They should not shave you with a razor.  · Wear special hair covers, masks, gowns, and gloves during surgery to keep the surgery area clean.  · Give you antibiotics before your surgery starts. In most cases, you should get antibiotics within 60 minutes before the surgery starts and the antibiotics should be stopped within 24 hours after surgery.  · Clean the skin at the site of your surgery with a special soap that kills germs.  What can I do to help prevent SSIs?  Before your surgery:  · Tell your doctor about other medical problems you may have. Health problems such as allergies, diabetes, and obesity could affect your surgery and your treatment.  · Quit smoking. Patients who smoke get more infections. Talk to your doctor about how you can quit before your  surgery.  · Do not shave near where you will have surgery. Shaving with a razor can irritate your skin and make it easier to develop an infection.  At the time of your surgery:  · Speak up if someone tries to shave you with a razor before surgery. Ask why you need to be shaved and talk with your surgeon if you have any concerns.  · Ask if you will get antibiotics before surgery.  After your surgery:  · Make sure that your healthcare providers clean their hands before examining you, either with soap and water or an alcohol-based hand rub.  · If you do not see your providers clean their hands, please ask them to do so.  · Family and friends who visit you should not touch the surgical wound or dressings.  · Family and friends should clean their hands with soap and water or an alcohol-based hand rub before and after visiting you. If you do not see them clean their hands, ask them to clean their hands.  What do I need to do when I go home from the hospital?  · Before you go home, your doctor or nurse should explain everything you need to know about taking care of your wound. Make sure you understand how to care for your wound before you leave the hospital.  · Always clean your hands before and after caring for your wound.  · Before you go home, make sure you know who to contact if you have questions or problems after you get home.  · If you have any symptoms of an infection, such as redness and pain at the surgery site, drainage, or fever, call your doctor immediately.  If you have additional questions, please ask your doctor or nurse.  Developed and co-sponsored by The Society for Healthcare Epidemiology of [place] (SHEA); Infectious Diseases Society of [place] (IDSA); American Hospital Association; Association for Professionals in Infection Control and Epidemiology (APIC); Centers for Disease Control and Prevention (CDC); and The Joint Commission.     This information is not intended to replace advice given to you by  your health care provider. Make sure you discuss any questions you have with your health care provider.     Document Released: 12/23/2014 Document Revised: 01/08/2016 Document Reviewed: 03/02/2016  [employer] Interactive Patient Education ©2016 [employer]     PATIENT/FAMILY/RESPONSIBLE PARTY VERBALIZES UNDERSTANDING OF ABOVE EDUCATION.  COPY OF PAIN SCALE GIVEN AND REVIEWED WITH VERBALIZED UNDERSTANDING.

## 2018-11-21 NOTE — PAT
NOTIFIED DR. LOVE OF UA WITH 4+ BACTERIA AND CULTURE PENDING, ALSO ELEVATED BUN/CREATININE, DR. LOVE STS HE IS NOT NEAR COMPUTER AND REQUESTED I NOTIFY PT TO  PRESCRIPTION FOR BACTRIM DS, NOTIFIED PT AND CALLED IN PRESCRIPTION TO [facility] ON [facility]

## 2018-11-23 LAB — BACTERIA SPEC AEROBE CULT: ABNORMAL

## 2018-11-26 ENCOUNTER — ANESTHESIA (OUTPATIENT)
Dept: PERIOP | Facility: HOSPITAL | Age: 68
End: 2018-11-26

## 2018-11-26 ENCOUNTER — HOSPITAL ENCOUNTER (OUTPATIENT)
Facility: HOSPITAL | Age: 68
Setting detail: HOSPITAL OUTPATIENT SURGERY
Discharge: HOME | End: 2018-11-26
Attending: UROLOGY | Admitting: UROLOGY

## 2018-11-26 ENCOUNTER — ANESTHESIA EVENT (OUTPATIENT)
Dept: PERIOP | Facility: HOSPITAL | Age: 68
End: 2018-11-26

## 2018-11-26 VITALS
TEMPERATURE: 98.4 F | HEART RATE: 79 BPM | DIASTOLIC BLOOD PRESSURE: 62 MMHG | RESPIRATION RATE: 16 BRPM | SYSTOLIC BLOOD PRESSURE: 126 MMHG | OXYGEN SATURATION: 94 %

## 2018-11-26 DIAGNOSIS — N39.3 STRESS INCONTINENCE OF URINE: ICD-10-CM

## 2018-11-26 LAB
BILIRUB UR QL STRIP: NEGATIVE
CLARITY UR: CLEAR
COLOR UR: YELLOW
GLUCOSE BLDC GLUCOMTR-MCNC: 117 MG/DL (ref 70–130)
GLUCOSE UR STRIP-MCNC: NEGATIVE MG/DL
HGB UR QL STRIP.AUTO: NEGATIVE
KETONES UR QL STRIP: NEGATIVE
LEUKOCYTE ESTERASE UR QL STRIP.AUTO: NEGATIVE
NITRITE UR QL STRIP: NEGATIVE
PH UR STRIP.AUTO: 5.5 [PH] (ref 5–8)
PROT UR QL STRIP: NEGATIVE
SP GR UR STRIP: 1.02 (ref 1–1.03)
UROBILINOGEN UR QL STRIP: NORMAL

## 2018-11-26 PROCEDURE — 81003 URINALYSIS AUTO W/O SCOPE: CPT | Performed by: UROLOGY

## 2018-11-26 PROCEDURE — 25010000002 ONDANSETRON PER 1 MG: Performed by: NURSE ANESTHETIST, CERTIFIED REGISTERED

## 2018-11-26 PROCEDURE — 25010000002 FENTANYL CITRATE (PF) 100 MCG/2ML SOLUTION: Performed by: NURSE ANESTHETIST, CERTIFIED REGISTERED

## 2018-11-26 PROCEDURE — 53440 MALE SLING PROCEDURE: CPT | Performed by: UROLOGY

## 2018-11-26 PROCEDURE — 82962 GLUCOSE BLOOD TEST: CPT

## 2018-11-26 PROCEDURE — S0260 H&P FOR SURGERY: HCPCS | Performed by: UROLOGY

## 2018-11-26 PROCEDURE — 25010000002 PROPOFOL 10 MG/ML EMULSION: Performed by: NURSE ANESTHETIST, CERTIFIED REGISTERED

## 2018-11-26 PROCEDURE — C1771 REP DEV, URINARY, W/SLING: HCPCS | Performed by: UROLOGY

## 2018-11-26 PROCEDURE — 87086 URINE CULTURE/COLONY COUNT: CPT | Performed by: UROLOGY

## 2018-11-26 PROCEDURE — C1769 GUIDE WIRE: HCPCS | Performed by: UROLOGY

## 2018-11-26 DEVICE — SLNG M SYS ADV: Type: IMPLANTABLE DEVICE | Status: FUNCTIONAL

## 2018-11-26 RX ORDER — OXYCODONE HYDROCHLORIDE AND ACETAMINOPHEN 5; 325 MG/1; MG/1
1 TABLET ORAL EVERY 4 HOURS PRN
Qty: 18 TABLET | Refills: 0 | Status: SHIPPED | OUTPATIENT
Start: 2018-11-26 | End: 2019-01-23

## 2018-11-26 RX ORDER — OXYCODONE AND ACETAMINOPHEN 7.5; 325 MG/1; MG/1
2 TABLET ORAL ONCE AS NEEDED
Status: DISCONTINUED | OUTPATIENT
Start: 2018-11-26 | End: 2018-11-26 | Stop reason: HOSPADM

## 2018-11-26 RX ORDER — NALOXONE HCL 0.4 MG/ML
0.4 VIAL (ML) INJECTION AS NEEDED
Status: DISCONTINUED | OUTPATIENT
Start: 2018-11-26 | End: 2018-11-26 | Stop reason: HOSPADM

## 2018-11-26 RX ORDER — SODIUM CHLORIDE, SODIUM LACTATE, POTASSIUM CHLORIDE, CALCIUM CHLORIDE 600; 310; 30; 20 MG/100ML; MG/100ML; MG/100ML; MG/100ML
9 INJECTION, SOLUTION INTRAVENOUS CONTINUOUS
Status: DISCONTINUED | OUTPATIENT
Start: 2018-11-26 | End: 2018-11-26 | Stop reason: HOSPADM

## 2018-11-26 RX ORDER — METOCLOPRAMIDE HYDROCHLORIDE 5 MG/ML
5 INJECTION INTRAMUSCULAR; INTRAVENOUS
Status: DISCONTINUED | OUTPATIENT
Start: 2018-11-26 | End: 2018-11-26 | Stop reason: HOSPADM

## 2018-11-26 RX ORDER — MIDAZOLAM HYDROCHLORIDE 1 MG/ML
1 INJECTION INTRAMUSCULAR; INTRAVENOUS
Status: DISCONTINUED | OUTPATIENT
Start: 2018-11-26 | End: 2018-11-26 | Stop reason: HOSPADM

## 2018-11-26 RX ORDER — MIDAZOLAM HYDROCHLORIDE 1 MG/ML
2 INJECTION INTRAMUSCULAR; INTRAVENOUS
Status: DISCONTINUED | OUTPATIENT
Start: 2018-11-26 | End: 2018-11-26 | Stop reason: HOSPADM

## 2018-11-26 RX ORDER — FENTANYL CITRATE 50 UG/ML
INJECTION, SOLUTION INTRAMUSCULAR; INTRAVENOUS AS NEEDED
Status: DISCONTINUED | OUTPATIENT
Start: 2018-11-26 | End: 2018-11-26 | Stop reason: SURG

## 2018-11-26 RX ORDER — LIDOCAINE HYDROCHLORIDE 20 MG/ML
INJECTION, SOLUTION INFILTRATION; PERINEURAL AS NEEDED
Status: DISCONTINUED | OUTPATIENT
Start: 2018-11-26 | End: 2018-11-26 | Stop reason: SURG

## 2018-11-26 RX ORDER — ONDANSETRON 2 MG/ML
4 INJECTION INTRAMUSCULAR; INTRAVENOUS ONCE AS NEEDED
Status: DISCONTINUED | OUTPATIENT
Start: 2018-11-26 | End: 2018-11-26 | Stop reason: HOSPADM

## 2018-11-26 RX ORDER — BUPIVACAINE HCL/0.9 % NACL/PF 0.1 %
2 PLASTIC BAG, INJECTION (ML) EPIDURAL ONCE
Status: COMPLETED | OUTPATIENT
Start: 2018-11-26 | End: 2018-11-26

## 2018-11-26 RX ORDER — ONDANSETRON 2 MG/ML
INJECTION INTRAMUSCULAR; INTRAVENOUS AS NEEDED
Status: DISCONTINUED | OUTPATIENT
Start: 2018-11-26 | End: 2018-11-26 | Stop reason: SURG

## 2018-11-26 RX ORDER — FENTANYL CITRATE 50 UG/ML
25 INJECTION, SOLUTION INTRAMUSCULAR; INTRAVENOUS
Status: DISCONTINUED | OUTPATIENT
Start: 2018-11-26 | End: 2018-11-26 | Stop reason: HOSPADM

## 2018-11-26 RX ORDER — OXYCODONE AND ACETAMINOPHEN 10; 325 MG/1; MG/1
1 TABLET ORAL ONCE AS NEEDED
Status: COMPLETED | OUTPATIENT
Start: 2018-11-26 | End: 2018-11-26

## 2018-11-26 RX ORDER — GABAPENTIN 400 MG/1
400 CAPSULE ORAL 3 TIMES DAILY
COMMUNITY

## 2018-11-26 RX ORDER — SODIUM CHLORIDE 9 MG/ML
INJECTION, SOLUTION INTRAVENOUS AS NEEDED
Status: DISCONTINUED | OUTPATIENT
Start: 2018-11-26 | End: 2018-11-26 | Stop reason: HOSPADM

## 2018-11-26 RX ORDER — PROPOFOL 10 MG/ML
VIAL (ML) INTRAVENOUS AS NEEDED
Status: DISCONTINUED | OUTPATIENT
Start: 2018-11-26 | End: 2018-11-26 | Stop reason: SURG

## 2018-11-26 RX ORDER — SODIUM CHLORIDE, SODIUM LACTATE, POTASSIUM CHLORIDE, CALCIUM CHLORIDE 600; 310; 30; 20 MG/100ML; MG/100ML; MG/100ML; MG/100ML
1000 INJECTION, SOLUTION INTRAVENOUS CONTINUOUS
Status: DISCONTINUED | OUTPATIENT
Start: 2018-11-26 | End: 2018-11-26 | Stop reason: HOSPADM

## 2018-11-26 RX ORDER — DEXTROSE MONOHYDRATE 25 G/50ML
12.5 INJECTION, SOLUTION INTRAVENOUS AS NEEDED
Status: DISCONTINUED | OUTPATIENT
Start: 2018-11-26 | End: 2018-11-26 | Stop reason: HOSPADM

## 2018-11-26 RX ORDER — SODIUM CHLORIDE 0.9 % (FLUSH) 0.9 %
3 SYRINGE (ML) INJECTION AS NEEDED
Status: DISCONTINUED | OUTPATIENT
Start: 2018-11-26 | End: 2018-11-26 | Stop reason: HOSPADM

## 2018-11-26 RX ORDER — IBUPROFEN 600 MG/1
600 TABLET ORAL ONCE AS NEEDED
Status: DISCONTINUED | OUTPATIENT
Start: 2018-11-26 | End: 2018-11-26 | Stop reason: HOSPADM

## 2018-11-26 RX ORDER — MEPERIDINE HYDROCHLORIDE 25 MG/ML
12.5 INJECTION INTRAMUSCULAR; INTRAVENOUS; SUBCUTANEOUS
Status: DISCONTINUED | OUTPATIENT
Start: 2018-11-26 | End: 2018-11-26 | Stop reason: HOSPADM

## 2018-11-26 RX ORDER — BUPIVACAINE HYDROCHLORIDE 2.5 MG/ML
INJECTION, SOLUTION INFILTRATION; PERINEURAL AS NEEDED
Status: DISCONTINUED | OUTPATIENT
Start: 2018-11-26 | End: 2018-11-26 | Stop reason: HOSPADM

## 2018-11-26 RX ORDER — SULFAMETHOXAZOLE AND TRIMETHOPRIM 800; 160 MG/1; MG/1
1 TABLET ORAL 2 TIMES DAILY
Qty: 14 TABLET | Refills: 0 | Status: SHIPPED | OUTPATIENT
Start: 2018-11-26 | End: 2018-12-03

## 2018-11-26 RX ORDER — SODIUM CHLORIDE 0.9 % (FLUSH) 0.9 %
1-10 SYRINGE (ML) INJECTION AS NEEDED
Status: DISCONTINUED | OUTPATIENT
Start: 2018-11-26 | End: 2018-11-26 | Stop reason: HOSPADM

## 2018-11-26 RX ORDER — LABETALOL HYDROCHLORIDE 5 MG/ML
5 INJECTION, SOLUTION INTRAVENOUS
Status: DISCONTINUED | OUTPATIENT
Start: 2018-11-26 | End: 2018-11-26 | Stop reason: HOSPADM

## 2018-11-26 RX ORDER — FENTANYL CITRATE 50 UG/ML
25 INJECTION, SOLUTION INTRAMUSCULAR; INTRAVENOUS AS NEEDED
Status: DISCONTINUED | OUTPATIENT
Start: 2018-11-26 | End: 2018-11-26 | Stop reason: HOSPADM

## 2018-11-26 RX ORDER — OXYCODONE HYDROCHLORIDE AND ACETAMINOPHEN 5; 325 MG/1; MG/1
1 TABLET ORAL ONCE AS NEEDED
Status: DISCONTINUED | OUTPATIENT
Start: 2018-11-26 | End: 2018-11-26 | Stop reason: HOSPADM

## 2018-11-26 RX ORDER — IPRATROPIUM BROMIDE AND ALBUTEROL SULFATE 2.5; .5 MG/3ML; MG/3ML
3 SOLUTION RESPIRATORY (INHALATION) ONCE AS NEEDED
Status: DISCONTINUED | OUTPATIENT
Start: 2018-11-26 | End: 2018-11-26 | Stop reason: HOSPADM

## 2018-11-26 RX ADMIN — OXYCODONE HYDROCHLORIDE AND ACETAMINOPHEN 1 TABLET: 5; 325 TABLET ORAL at 10:29

## 2018-11-26 RX ADMIN — FENTANYL CITRATE 100 MCG: 50 INJECTION, SOLUTION INTRAMUSCULAR; INTRAVENOUS at 07:24

## 2018-11-26 RX ADMIN — Medication 2 G: at 07:30

## 2018-11-26 RX ADMIN — LIDOCAINE HYDROCHLORIDE 100 MG: 20 INJECTION, SOLUTION INFILTRATION; PERINEURAL at 07:24

## 2018-11-26 RX ADMIN — PROPOFOL 150 MG: 10 INJECTION, EMULSION INTRAVENOUS at 07:24

## 2018-11-26 RX ADMIN — FENTANYL CITRATE 100 MCG: 50 INJECTION, SOLUTION INTRAMUSCULAR; INTRAVENOUS at 08:03

## 2018-11-26 RX ADMIN — SODIUM CHLORIDE, POTASSIUM CHLORIDE, SODIUM LACTATE AND CALCIUM CHLORIDE 1000 ML: 600; 310; 30; 20 INJECTION, SOLUTION INTRAVENOUS at 06:19

## 2018-11-26 RX ADMIN — OXYCODONE HYDROCHLORIDE AND ACETAMINOPHEN 1 TABLET: 10; 325 TABLET ORAL at 09:26

## 2018-11-26 RX ADMIN — SODIUM CHLORIDE, POTASSIUM CHLORIDE, SODIUM LACTATE AND CALCIUM CHLORIDE: 600; 310; 30; 20 INJECTION, SOLUTION INTRAVENOUS at 08:30

## 2018-11-26 RX ADMIN — ONDANSETRON HYDROCHLORIDE 4 MG: 2 SOLUTION INTRAMUSCULAR; INTRAVENOUS at 07:45

## 2018-11-26 NOTE — ANESTHESIA PREPROCEDURE EVALUATION
Anesthesia Evaluation     Patient summary reviewed   no history of anesthetic complications:  NPO Solid Status: > 8 hours             Airway   Mallampati: II  TM distance: >3 FB  Neck ROM: full  Dental      Pulmonary    (+) sleep apnea on CPAP,   (-) COPD, asthma, not a smoker  Cardiovascular   Exercise tolerance: excellent (>7 METS)    ECG reviewed    (+) hypertension, hyperlipidemia,   (-) pacemaker, past MI, angina, cardiac stents      Neuro/Psych  (-) seizures, TIA, CVA  GI/Hepatic/Renal/Endo    (+) obesity,   renal disease CRI, diabetes mellitus, hypothyroidism,   (-) GERD, liver disease    Musculoskeletal     Abdominal    Substance History      OB/GYN          Other                        Anesthesia Plan    ASA 3     general     intravenous induction   Anesthetic plan, all risks, benefits, and alternatives have been provided, discussed and informed consent has been obtained with: patient.

## 2018-11-26 NOTE — OP NOTE
BLADDER SUSPENSION MALE SLING  Procedure Note    Zay Kamara  11/26/2018    Pre-op Diagnosis:   Stress incontinence of urine [N39.3]    Post-op Diagnosis:     Post-Op Diagnosis Codes:     * Stress incontinence of urine [N39.3]    Procedure/CPT® Codes:      Procedure(s):  CYSTOSCOPY BLADDER SUSPENSION MALE SLING    Surgeon(s):  Zaheer Rebolledo MD    Anesthesia: General    Staff:   Circulator: Jodi Johnson RN  Scrub Person: Hank Carreon; Daniel Freire    Indications for procedure:  Post prostatectomy incontinence    Procedure details:  Patient identified in preoperative anesthesia care unit. He was then brought back to the operating theater and after induction of general anesthetic, a proper timeout was performed. After all were in agreement, patient was prepped and draped in a normal sterile fashion. I began by inserting a Jacobson catheter per urethra and draining the bladder completely. I then made an incision 1 cm above the anus and the perineum midline. I used electrocautery to carry this down to the level of the bulbospongiosis muscle. I then split the muscle sharply and identified the urethra at the bulb. I dissected a clear plane around this in the avascular plane and then identified the central tendon proximally. I marked the area of insertion of the central tendon onto the urethra and then sharply dissected the central tendon down until there was 4 cm of proximal movement of the urethra. I next identified the patient's adductor longus on the left and marked this. I marked an area 1 fingerbreadth below and used a finder needle to ensure that this was a good spot off of the ischial pubic ramus. I then repeated this on the patient's right side. Once identified the spot a used a knife to make an incision. I started on the patient's left side and used the helical trocar to pop through the fascia and come around the initial pubic ramus in the obturator foramen. I was careful to come through to  the apex of the dissection made with the urethra medially and the pubic bone laterally. The urethra was protected with my finger. Once I came through this area I did place the left arm of the sling on the helical trocar and brought this back out through the incision. This was repeated on the patient's right side. Next tension the sling so that the midline laid flat on the urethra and sutured using 3-0 Vicryl sutures the sling into the urethra itself, with the proximal end aligning to the insertion point of the central tendon. All 4 corners of the sling were sutured to the urethra itself. I then removed the retractor and placed a cystoscope per urethra and identified the external sphincter. I tensioned the sling and there showed to be good coaptation of the sphincter with the sling tension. Cystoscopy showed no evidence of any damage to bladder or urethra itself. This point I removed the outer sheath of the sling on both sides and the sling remained in good tension. The excess was cut at the skin level. I replaced the Jacobson catheter. The wound was then closed in several layers using 2-0 Vicryl sutures first closing the bulbospongiosus muscle in closing the fascia over top. I then closed the skin using running Monocryl suture. Closed the 2 stab incisions using the same Monocryl suture. Dermabond was placed over all incisions and cord percent Marcaine was used and anesthetize.     Estimated Blood Loss: 50 mL    Specimens:                None      Drains:   Urethral Catheter Other (Comment);Latex;Double-lumen 16 Fr. (Active)   Daily Indications Selected surgeries ( tract, abdomen) 11/26/2018  9:03 AM   Site Assessment Clean;Skin intact 11/26/2018  9:03 AM   Collection Container Standard drainage bag 11/26/2018  9:03 AM   Securement Method Securing device 11/26/2018  9:03 AM       Complications: none    Follow up:   Wednesday nurse visit catheter removal, Dr. Rebolledo in 4 weeks    Zaheer Rebolledo MD     Date:  11/26/2018  Time: 9:18 AM

## 2018-11-26 NOTE — H&P
Mr. Kamara is 68 y.o. male    CHIEF COMPLAINT: stress incontinence    History of Present Illness  Stress incontinence after prostatectomy    The following portions of the patient's history were reviewed and updated as appropriate: allergies, current medications, past family history, past medical history, past social history, past surgical history and problem list.    Review of Systems   Constitutional: Negative for chills and fever.   Gastrointestinal: Negative for abdominal pain, anal bleeding and blood in stool.   Genitourinary: Positive for frequency and urgency. Negative for decreased urine volume, difficulty urinating, discharge, dysuria, enuresis, flank pain, genital sores, hematuria, penile pain, penile swelling, scrotal swelling and testicular pain.         Current Facility-Administered Medications:   •  buffered lidocaine syringe 0.5 mL, 0.5 mL, Intradermal, Once PRN, Zaheer Rebolledo MD  •  buffered lidocaine syringe 0.5 mL, 0.5 mL, Injection, Once PRN, Andrea Vargas MD  •  ceFAZolin in 0.9% normal saline (ANCEF) IVPB solution 2 g, 2 g, Intravenous, Once, Zaheer Rebolledo MD  •  dextrose (D50W) 25 g/ 50mL Intravenous Solution 12.5 g, 12.5 g, Intravenous, PRN, Andrea Vargas MD  •  fentaNYL citrate (PF) (SUBLIMAZE) injection 25 mcg, 25 mcg, Intravenous, Q5 Min PRN, Andrea Vargas MD  •  lactated ringers infusion 1,000 mL, 1,000 mL, Intravenous, Continuous, Zaheer Rebolledo MD, Last Rate: 25 mL/hr at 11/26/18 0619, 1,000 mL at 11/26/18 0619  •  lactated ringers infusion, 9 mL/hr, Intravenous, Continuous, Andrea Vargas MD  •  midazolam (VERSED) injection 1 mg, 1 mg, Intravenous, Q5 Min PRN **OR** midazolam (VERSED) injection 2 mg, 2 mg, Intravenous, Q5 Min PRN, Andrea Vargas MD  •  sodium chloride 0.9 % flush 1-10 mL, 1-10 mL, Intravenous, PRN, Andrea Vargas MD  •  sodium chloride 0.9 % flush 3 mL, 3 mL, Intravenous, PRN,  Zaheer Rebolledo MD  •  sodium chloride 0.9 % solution, , , Amaury GARCIA Matthew Alan, MD, 1,000 mL at 11/26/18 0710  •  sodium chloride 1,000 mL with bacitracin 50,000 Units, polymyxin B 500,000 Units irrigation, , , Amaury GARCIA Matthew Alan, MD    No Known Allergies    Past Medical History:   Diagnosis Date   • Arthritis    • Cancer (CMS/HCC)     thyroid   • Depression    • Diabetes (CMS/HCC)    • Disease of thyroid gland    • Gout    • Hypercholesteremia    • Hypertension    • IBS (irritable bowel syndrome)    • Kidney disease    • Prostate cancer (CMS/HCC)    • Sensorineural hearing loss    • Sleep apnea     NON COMPLIANT WITH C PAP   • Sudden hearing loss    • Tinnitus    • Urine incontinence        Past Surgical History:   Procedure Laterality Date   • CHOLECYSTECTOMY     • LAPAROSCOPIC RETROPUBIC PROSTATECTOMY     • PROSTATE SURGERY     • THYROID SURGERY      RADIATION THERAPY AFTER SURGERY       Social History     Socioeconomic History   • Marital status:      Spouse name: Not on file   • Number of children: Not on file   • Years of education: Not on file   • Highest education level: Not on file   Tobacco Use   • Smoking status: Never Smoker   • Smokeless tobacco: Never Used   Substance and Sexual Activity   • Alcohol use: Yes     Comment: occasionally   • Drug use: No   • Sexual activity: Defer       Family History   Problem Relation Age of Onset   • Prostate cancer Father    • Cancer Father    • Heart disease Mother    • Diabetes Sister    • Colon cancer Neg Hx    • Colon polyps Neg Hx          /63 (BP Location: Left arm, Patient Position: Sitting)   Pulse 73   Temp 97.6 °F (36.4 °C) (Temporal)   Resp 18   SpO2 93%     Physical Exam   Constitutional: He is oriented to person, place, and time. He appears well-developed and well-nourished. No distress.   Pulmonary/Chest: Effort normal.   Abdominal: Soft. He exhibits no distension and no mass. There is no tenderness. There is no rebound  and no guarding. No hernia.   Neurological: He is alert and oriented to person, place, and time.   Skin: Skin is warm and dry. He is not diaphoretic.   Psychiatric: He has a normal mood and affect.   Vitals reviewed.      Lab Results (last 72 hours)     Procedure Component Value Units Date/Time    POC Glucose Once [[id number]]  (Normal) Collected:  11/26/18 0618    Specimen:  Blood Updated:  11/26/18 0634     Glucose 117 mg/dL      Comment: : [id number] Peter Junior ID: [id number]       Urinalysis With Culture If Indicated - Urine, Clean Catch [[id number]]  (Normal) Collected:  11/26/18 0545    Specimen:  Urine, Clean Catch Updated:  11/26/18 0619     Color, UA Yellow     Appearance, UA Clear     pH, UA 5.5     Specific Gravity, UA 1.016     Glucose, UA Negative     Ketones, UA Negative     Bilirubin, UA Negative     Blood, UA Negative     Protein, UA Negative     Leuk Esterase, UA Negative     Nitrite, UA Negative     Urobilinogen, UA 1.0 E.U./dL    Narrative:       Urine microscopic not indicated.    Urine Culture - Urine, Urine, Clean Catch [[id number]] Collected:  11/26/18 0545    Specimen:  Urine, Clean Catch Updated:  11/26/18 0619        Imaging Results (last 72 hours)     ** No results found for the last 72 hours. **          Assessment and Plan      Stress incontinence of urine  Proceed with male sling.  Risks ans benefits as previously outlined.  Positive urine culture, on Bacrim, culture specific

## 2018-11-26 NOTE — ANESTHESIA POSTPROCEDURE EVALUATION
Patient: Zay Kamara    Procedure Summary     Date:  11/26/18 Room / Location:   [facility]  /  [facility]    Anesthesia Start:  0720 Anesthesia Stop:  0908    Procedure:  CYSTOSCOPY BLADDER SUSPENSION MALE SLING (N/A Scrotum) Diagnosis:       Stress incontinence of urine      (Stress incontinence of urine [N39.3])    Surgeon:  Zaheer Rebolledo MD Provider:  Javan Balderrama CRNA    Anesthesia Type:  general ASA Status:  3          Anesthesia Type: general  Last vitals  BP   126/62 (11/26/18 1000)   Temp   98.4 °F (36.9 °C) (11/26/18 0945)   Pulse   79 (11/26/18 1000)   Resp   16 (11/26/18 1000)     SpO2   94 % (11/26/18 1000)     Post Anesthesia Care and Evaluation    Patient location during evaluation: PACU  Patient participation: complete - patient participated  Level of consciousness: awake and alert  Pain management: adequate  Airway patency: patent  Anesthetic complications: No anesthetic complications    Cardiovascular status: acceptable  Respiratory status: acceptable  Hydration status: acceptable    Comments: Blood pressure 126/62, pulse 79, temperature 98.4 °F (36.9 °C), temperature source Temporal, resp. rate 16, SpO2 94 %.    Pt discharged from PACU based on naheed score >8

## 2018-11-26 NOTE — ANESTHESIA PROCEDURE NOTES
ANESTHESIA INTUBATION  Airway not difficult    General Information and Staff    Patient location during procedure: OR  CRNA: Javan Balderrama CRNA    Indications and Patient Condition  Indications for airway management: airway protection    Preoxygenated: yes  Mask difficulty assessment: 1 - vent by mask    Final Airway Details  Final airway type: supraglottic airway      Successful airway: classic  Size 4    Number of attempts at approach: 1

## 2018-11-26 NOTE — DISCHARGE INSTRUCTIONS

## 2018-11-28 ENCOUNTER — PROCEDURE VISIT (OUTPATIENT)
Dept: UROLOGY | Facility: CLINIC | Age: 68
End: 2018-11-28

## 2018-11-28 DIAGNOSIS — N39.3 STRESS INCONTINENCE OF URINE: Primary | ICD-10-CM

## 2018-11-28 LAB — BACTERIA SPEC AEROBE CULT: NORMAL

## 2018-11-28 PROCEDURE — 51700 IRRIGATION OF BLADDER: CPT | Performed by: UROLOGY

## 2018-11-28 NOTE — PROGRESS NOTES
Patient of  states he is here today to have his catheter removed. The patient denies any fever, chills or  N&V. Using the catheter in place and sterile water, 130cc was installed into the bladder with no complications. Patient was able to void 50cc.  Patient advised to call the office with any questions or concerns. The patient verbalized understanding. Dr. Rebolledo  was in the office at the time of procedure.      Patient was advised to drink clear fluids for the next couple hours and urinate. he was advised he may experience some blood in the urine and burning with urination for the next couple days. If the patient is unable to urinate or develops fever, chills, N&V or suprapubic pain he will call to return for an appt at clinic or seek medical treatment at [facility], PCP or Urgent Care after hours. Patient verbalized understanding and all questions were answered.      Reviewed and agree with above

## 2018-11-29 ENCOUNTER — TELEPHONE (OUTPATIENT)
Dept: UROLOGY | Facility: CLINIC | Age: 68
End: 2018-11-29

## 2018-11-29 NOTE — TELEPHONE ENCOUNTER
Patient called concerned about urinating slow and not having a forceful stream. He also mention that he was urinating frequently last night. I did call ANDREW Puentes to double check to make sure this was normal. Deloris did inform me that this was normal. I did let the patient know that ANDREW Puentes informed me that this was normal and that he would have some discomfort and it would take a couple of days for him to get back to normal. Patient verbally understood.

## 2018-12-26 ENCOUNTER — OFFICE VISIT (OUTPATIENT)
Dept: UROLOGY | Facility: CLINIC | Age: 68
End: 2018-12-26

## 2018-12-26 VITALS
BODY MASS INDEX: 30.8 KG/M2 | HEIGHT: 71 IN | SYSTOLIC BLOOD PRESSURE: 138 MMHG | TEMPERATURE: 98.9 F | DIASTOLIC BLOOD PRESSURE: 74 MMHG | WEIGHT: 220 LBS

## 2018-12-26 DIAGNOSIS — N39.41 URGE INCONTINENCE: Primary | ICD-10-CM

## 2018-12-26 DIAGNOSIS — N52.31 ERECTILE DYSFUNCTION AFTER RADICAL PROSTATECTOMY: ICD-10-CM

## 2018-12-26 DIAGNOSIS — N39.3 STRESS INCONTINENCE OF URINE: ICD-10-CM

## 2018-12-26 PROCEDURE — 99024 POSTOP FOLLOW-UP VISIT: CPT | Performed by: UROLOGY

## 2018-12-26 PROCEDURE — 81003 URINALYSIS AUTO W/O SCOPE: CPT | Performed by: UROLOGY

## 2018-12-26 PROCEDURE — 51798 US URINE CAPACITY MEASURE: CPT | Performed by: UROLOGY

## 2018-12-26 NOTE — PATIENT INSTRUCTIONS

## 2018-12-26 NOTE — PROGRESS NOTES
Mr. Kamara is 68 y.o. male    Chief Complaint   Patient presents with   • Urinary Incontinence       History of Present Illness   Urinary Incontinence  Patient complains of urinary incontinence. This has been present for several months. The patient leaks urine with with urge, with a full bladder. Patient describes the symptoms as decreased force of stream, the urge to urinate recurs again shortly following micturition, urge to urinate with little or no warning and urine leaking unpredictably. Factors associated with symptoms include prostate surgery. Evaluation to date includes none. Treatment to date includes Kegel exercises, which was unable to assess effectiveness and s/p male bladder sling 11/2018.    The following portions of the patient's history were reviewed and updated as appropriate: allergies, current medications, past family history, past medical history, past social history, past surgical history and problem list.    Review of Systems   Constitutional: Negative for chills and fever.   Gastrointestinal: Negative for abdominal pain, anal bleeding and blood in stool.   Genitourinary: Negative for dysuria, frequency, hematuria and urgency.         Current Outpatient Medications:   •  allopurinol (ZYLOPRIM) 100 MG tablet, Take 100 mg by mouth Daily., Disp: , Rfl:   •  amLODIPine (NORVASC) 5 MG tablet, Take 5 mg by mouth Daily., Disp: , Rfl:   •  atorvastatin (LIPITOR) 20 MG tablet, Take 20 mg by mouth Daily., Disp: , Rfl: 2  •  escitalopram (LEXAPRO) 10 MG tablet, Take 10 mg by mouth Daily., Disp: , Rfl: 11  •  gabapentin (NEURONTIN) 100 MG capsule, Take 100 mg by mouth 3 (Three) Times a Day. STATES TAKES ONLY BID D/T SIDE EFFECTS, Disp: , Rfl:   •  levothyroxine (SYNTHROID, LEVOTHROID) 200 MCG tablet, Take 200 mcg by mouth Daily., Disp: , Rfl:   •  lisinopril (PRINIVIL,ZESTRIL) 40 MG tablet, Take 40 mg by mouth Daily., Disp: , Rfl: 11  •  oxyCODONE-acetaminophen (PERCOCET) 5-325 MG per tablet, Take 1 tablet  by mouth Every 4 (Four) Hours As Needed for Severe Pain ., Disp: 18 tablet, Rfl: 0  •  SITagliptin (JANUVIA) 100 MG tablet, Take 100 mg by mouth Daily., Disp: , Rfl:   •  traMADol (ULTRAM) 50 MG tablet, Take 50 mg by mouth 2 (Two) Times a Day., Disp: , Rfl:   •  traZODone (DESYREL) 50 MG tablet, Take 50 mg by mouth At Night As Needed for Sleep., Disp: , Rfl:     Past Medical History:   Diagnosis Date   • Arthritis    • Cancer (CMS/HCC)     thyroid   • Depression    • Diabetes (CMS/HCC)    • Disease of thyroid gland    • Gout    • Hypercholesteremia    • Hypertension    • IBS (irritable bowel syndrome)    • Kidney disease    • Prostate cancer (CMS/HCC)    • Sensorineural hearing loss    • Sleep apnea     NON COMPLIANT WITH C PAP   • Sudden hearing loss    • Tinnitus    • Urine incontinence        Past Surgical History:   Procedure Laterality Date   • BLADDER SUSPENSION N/A 11/26/2018    Procedure: CYSTOSCOPY BLADDER SUSPENSION MALE SLING;  Surgeon: Zaheer Rebolledo MD;  Location: [facility] OR;  Service: Urology   • CHOLECYSTECTOMY     • COLONOSCOPY N/A 3/7/2017    Procedure: COLONOSCOPY WITH ANESTHESIA;  Surgeon: Hector Alvarenga MD;  Location: [facility] ENDOSCOPY;  Service:    • LAPAROSCOPIC RETROPUBIC PROSTATECTOMY     • PROSTATE SURGERY     • PROSTATECTOMY N/A 8/1/2017    Procedure: PROSTATECTOMY LAPAROSCOPIC WITH DAVINCI SI ROBOT ;  Surgeon: Hernesto Hong MD;  Location: [facility] OR;  Service:    • THYROID SURGERY      RADIATION THERAPY AFTER SURGERY       Social History     Socioeconomic History   • Marital status:      Spouse name: Not on file   • Number of children: Not on file   • Years of education: Not on file   • Highest education level: Not on file   Tobacco Use   • Smoking status: Never Smoker   • Smokeless tobacco: Never Used   Substance and Sexual Activity   • Alcohol use: Yes     Comment: occasionally   • Drug use: No   • Sexual activity: Defer       Family History   Problem Relation Age of  "Onset   • Prostate cancer Father    • Cancer Father    • Heart disease Mother    • Diabetes Sister    • Colon cancer Neg Hx    • Colon polyps Neg Hx        Objective    /74   Temp 98.9 °F (37.2 °C)   Ht 180.3 cm (71\")   Wt 99.8 kg (220 lb)   BMI 30.68 kg/m²     Physical Exam   Constitutional: Well nourished, Well developed; No apparent distress.  His vital signs are reviewed  Psychiatric: Appropriate affect; Alert and oriented  Eyes: Unremarkable  Musculoskeletal: Normal gait and station  GI: Abdomen is soft, non-tender  Respiratory: No distress; Unlabored movement; No accessory musculature needed with symmetric movements  Skin: No pallor or diaphoresis  : Penis and testicles are normal; healing surgical incision in perineal area      Admission on 11/26/2018, Discharged on 11/26/2018   Component Date Value Ref Range Status   • Color, UA 11/26/2018 Yellow  Yellow, Straw Final   • Appearance, UA 11/26/2018 Clear  Clear Final   • pH, UA 11/26/2018 5.5  5.0 - 8.0 Final   • Specific Gravity, UA 11/26/2018 1.016  1.005 - 1.030 Final   • Glucose, UA 11/26/2018 Negative  Negative Final   • Ketones, UA 11/26/2018 Negative  Negative Final   • Bilirubin, UA 11/26/2018 Negative  Negative Final   • Blood, UA 11/26/2018 Negative  Negative Final   • Protein, UA 11/26/2018 Negative  Negative Final   • Leuk Esterase, UA 11/26/2018 Negative  Negative Final   • Nitrite, UA 11/26/2018 Negative  Negative Final   • Urobilinogen, UA 11/26/2018 1.0 E.U./dL  0.2 - 1.0 E.U./dL Final   • Urine Culture 11/26/2018 No growth at 2 days   Final   • Glucose 11/26/2018 117  70 - 130 mg/dL Final    : [id number] Peter Junior ID: [id number]       Results for orders placed or performed in visit on 12/26/18   POC Urinalysis Dipstick, Multipro   Result Value Ref Range    Color Yellow Yellow, Straw, Dark Yellow, Cindi    Clarity, UA Clear Clear    Glucose, UA Negative Negative, 1000 mg/dL (3+) mg/dL    Bilirubin Negative Negative "    Ketones, UA Negative Negative    Specific Gravity  1.020 1.005 - 1.030    Blood, UA Negative Negative    pH, Urine 6.0 5.0 - 8.0    Protein, POC Negative Negative mg/dL    Urobilinogen, UA Normal Normal    Nitrite, UA Negative Negative    Leukocytes Negative Negative     Patient's Body mass index is 30.68 kg/m². BMI is above normal parameters. Recommendations include: educational material.    Bladder Scan interpretation  Estimation of residual urine via abdominal ultrasound  Residual Urine: 53 ml  Indication: stress incontinence  Position: Supine  Examination: Incremental scanning of the suprapubic area using 3 MHz transducer using copious amounts of acoustic gel.   Findings: An anechoic area was demonstrated which represented the bladder, with measurement of residual urine as noted. I inspected this myself. In that the residual urine was stable or insignificant, no treatment will be necessary at this time.       Assessment and Plan    Zay was seen today for urinary incontinence.    Diagnoses and all orders for this visit:    Urge incontinence    Stress incontinence of urine  -     POC Urinalysis Dipstick, Multipro    Erectile dysfunction after radical prostatectomy      Patient here today for follow up after male bladder sling surgery on 11/26/2018.  Patient is down to 1 PPD incontinence and is very pleased with his results thus far.  Continued Kegel exercises were recommended to improve leaking and a month sample of Myrbetriq was given to hopefully improve urge incontinence issues.  The side effects of Myrbetriq were discussed, including high blood pressure, dry eyes, dry mouth, and constipation.  Patient verbalized understanding and has no further questions.    Surgical management of ED was briefly discussed and patient remains interested in this option.  We will follow up with him in 4 weeks and if his incontinence has improved and he has sufficiently healed from sling surgery, we will likely proceed with  a penile implant.

## 2019-01-08 ENCOUNTER — TELEPHONE (OUTPATIENT)
Dept: PRIMARY CARE CLINIC | Age: 69
End: 2019-01-08

## 2019-01-08 DIAGNOSIS — E03.9 ACQUIRED HYPOTHYROIDISM: ICD-10-CM

## 2019-01-08 DIAGNOSIS — N18.30 TYPE 2 DIABETES MELLITUS WITH STAGE 3 CHRONIC KIDNEY DISEASE, WITHOUT LONG-TERM CURRENT USE OF INSULIN (HCC): ICD-10-CM

## 2019-01-08 DIAGNOSIS — E11.22 TYPE 2 DIABETES MELLITUS WITH STAGE 3 CHRONIC KIDNEY DISEASE, WITHOUT LONG-TERM CURRENT USE OF INSULIN (HCC): ICD-10-CM

## 2019-01-08 DIAGNOSIS — I10 ESSENTIAL HYPERTENSION: ICD-10-CM

## 2019-01-08 LAB
ALBUMIN SERPL-MCNC: 4.1 G/DL (ref 3.5–5.2)
ALP BLD-CCNC: 51 U/L (ref 40–130)
ALT SERPL-CCNC: 10 U/L (ref 5–41)
ANION GAP SERPL CALCULATED.3IONS-SCNC: 13 MMOL/L (ref 7–19)
AST SERPL-CCNC: 12 U/L (ref 5–40)
BASOPHILS ABSOLUTE: 0 K/UL (ref 0–0.2)
BASOPHILS RELATIVE PERCENT: 0.5 % (ref 0–1)
BILIRUB SERPL-MCNC: 0.5 MG/DL (ref 0.2–1.2)
BUN BLDV-MCNC: 30 MG/DL (ref 8–23)
CALCIUM SERPL-MCNC: 9.2 MG/DL (ref 8.8–10.2)
CHLORIDE BLD-SCNC: 100 MMOL/L (ref 98–111)
CHOLESTEROL, TOTAL: 107 MG/DL (ref 160–199)
CO2: 25 MMOL/L (ref 22–29)
CREAT SERPL-MCNC: 1.6 MG/DL (ref 0.5–1.2)
EOSINOPHILS ABSOLUTE: 0.2 K/UL (ref 0–0.6)
EOSINOPHILS RELATIVE PERCENT: 2.6 % (ref 0–5)
GFR NON-AFRICAN AMERICAN: 43
GLUCOSE BLD-MCNC: 145 MG/DL (ref 74–109)
HBA1C MFR BLD: 5.7 % (ref 4–6)
HCT VFR BLD CALC: 36.9 % (ref 42–52)
HDLC SERPL-MCNC: 21 MG/DL (ref 55–121)
HEMOGLOBIN: 12 G/DL (ref 14–18)
LDL CHOLESTEROL CALCULATED: 61 MG/DL
LYMPHOCYTES ABSOLUTE: 1.2 K/UL (ref 1.1–4.5)
LYMPHOCYTES RELATIVE PERCENT: 15.3 % (ref 20–40)
MCH RBC QN AUTO: 28.4 PG (ref 27–31)
MCHC RBC AUTO-ENTMCNC: 32.5 G/DL (ref 33–37)
MCV RBC AUTO: 87.4 FL (ref 80–94)
MONOCYTES ABSOLUTE: 0.5 K/UL (ref 0–0.9)
MONOCYTES RELATIVE PERCENT: 6.5 % (ref 0–10)
NEUTROPHILS ABSOLUTE: 5.9 K/UL (ref 1.5–7.5)
NEUTROPHILS RELATIVE PERCENT: 74.5 % (ref 50–65)
PDW BLD-RTO: 13.8 % (ref 11.5–14.5)
PLATELET # BLD: 225 K/UL (ref 130–400)
PMV BLD AUTO: 9.7 FL (ref 9.4–12.4)
POTASSIUM SERPL-SCNC: 4.5 MMOL/L (ref 3.5–5)
RBC # BLD: 4.22 M/UL (ref 4.7–6.1)
SODIUM BLD-SCNC: 138 MMOL/L (ref 136–145)
TOTAL PROTEIN: 7.1 G/DL (ref 6.6–8.7)
TRIGL SERPL-MCNC: 127 MG/DL (ref 0–149)
TSH SERPL DL<=0.05 MIU/L-ACNC: 8.32 UIU/ML (ref 0.27–4.2)
WBC # BLD: 8 K/UL (ref 4.8–10.8)

## 2019-01-09 ENCOUNTER — OFFICE VISIT (OUTPATIENT)
Dept: PRIMARY CARE CLINIC | Age: 69
End: 2019-01-09
Payer: MEDICARE

## 2019-01-09 VITALS
OXYGEN SATURATION: 98 % | DIASTOLIC BLOOD PRESSURE: 64 MMHG | BODY MASS INDEX: 32.48 KG/M2 | SYSTOLIC BLOOD PRESSURE: 143 MMHG | HEIGHT: 71 IN | HEART RATE: 87 BPM | TEMPERATURE: 98.7 F | WEIGHT: 232 LBS

## 2019-01-09 DIAGNOSIS — M54.42 CHRONIC LEFT-SIDED LOW BACK PAIN WITH LEFT-SIDED SCIATICA: ICD-10-CM

## 2019-01-09 DIAGNOSIS — M15.9 PRIMARY OSTEOARTHRITIS INVOLVING MULTIPLE JOINTS: ICD-10-CM

## 2019-01-09 DIAGNOSIS — E03.9 ACQUIRED HYPOTHYROIDISM: ICD-10-CM

## 2019-01-09 DIAGNOSIS — E11.22 TYPE 2 DIABETES MELLITUS WITH STAGE 3 CHRONIC KIDNEY DISEASE, WITHOUT LONG-TERM CURRENT USE OF INSULIN (HCC): Primary | ICD-10-CM

## 2019-01-09 DIAGNOSIS — M25.50 ARTHRALGIA OF MULTIPLE JOINTS: ICD-10-CM

## 2019-01-09 DIAGNOSIS — E78.2 MIXED HYPERLIPIDEMIA: ICD-10-CM

## 2019-01-09 DIAGNOSIS — I10 ESSENTIAL HYPERTENSION: ICD-10-CM

## 2019-01-09 DIAGNOSIS — N18.30 CHRONIC KIDNEY DISEASE, STAGE III (MODERATE) (HCC): ICD-10-CM

## 2019-01-09 DIAGNOSIS — N18.30 TYPE 2 DIABETES MELLITUS WITH STAGE 3 CHRONIC KIDNEY DISEASE, WITHOUT LONG-TERM CURRENT USE OF INSULIN (HCC): Primary | ICD-10-CM

## 2019-01-09 DIAGNOSIS — G89.29 CHRONIC LEFT-SIDED LOW BACK PAIN WITH LEFT-SIDED SCIATICA: ICD-10-CM

## 2019-01-09 PROCEDURE — 1101F PT FALLS ASSESS-DOCD LE1/YR: CPT | Performed by: NURSE PRACTITIONER

## 2019-01-09 PROCEDURE — 99214 OFFICE O/P EST MOD 30 MIN: CPT | Performed by: NURSE PRACTITIONER

## 2019-01-09 PROCEDURE — 2022F DILAT RTA XM EVC RTNOPTHY: CPT | Performed by: NURSE PRACTITIONER

## 2019-01-09 PROCEDURE — G8417 CALC BMI ABV UP PARAM F/U: HCPCS | Performed by: NURSE PRACTITIONER

## 2019-01-09 PROCEDURE — 3017F COLORECTAL CA SCREEN DOC REV: CPT | Performed by: NURSE PRACTITIONER

## 2019-01-09 PROCEDURE — 4040F PNEUMOC VAC/ADMIN/RCVD: CPT | Performed by: NURSE PRACTITIONER

## 2019-01-09 PROCEDURE — G8482 FLU IMMUNIZE ORDER/ADMIN: HCPCS | Performed by: NURSE PRACTITIONER

## 2019-01-09 PROCEDURE — G8427 DOCREV CUR MEDS BY ELIG CLIN: HCPCS | Performed by: NURSE PRACTITIONER

## 2019-01-09 PROCEDURE — 1123F ACP DISCUSS/DSCN MKR DOCD: CPT | Performed by: NURSE PRACTITIONER

## 2019-01-09 PROCEDURE — 1036F TOBACCO NON-USER: CPT | Performed by: NURSE PRACTITIONER

## 2019-01-09 PROCEDURE — 3044F HG A1C LEVEL LT 7.0%: CPT | Performed by: NURSE PRACTITIONER

## 2019-01-09 RX ORDER — TRAMADOL HYDROCHLORIDE 50 MG/1
50 TABLET ORAL 2 TIMES DAILY PRN
Qty: 60 TABLET | Refills: 1 | Status: SHIPPED | OUTPATIENT
Start: 2019-01-09 | End: 2019-04-10 | Stop reason: SDUPTHER

## 2019-01-09 RX ORDER — LEVOTHYROXINE SODIUM 0.2 MG/1
200 TABLET ORAL DAILY
Qty: 90 TABLET | Refills: 3 | Status: SHIPPED | OUTPATIENT
Start: 2019-01-09 | End: 2020-01-23

## 2019-01-09 RX ORDER — LEVOTHYROXINE SODIUM 0.03 MG/1
25 TABLET ORAL DAILY
Qty: 30 TABLET | Refills: 11 | Status: SHIPPED | OUTPATIENT
Start: 2019-01-09 | End: 2019-11-13 | Stop reason: ALTCHOICE

## 2019-01-09 RX ORDER — GABAPENTIN 400 MG/1
400 CAPSULE ORAL 3 TIMES DAILY
Qty: 90 CAPSULE | Refills: 3 | Status: SHIPPED | OUTPATIENT
Start: 2019-01-09 | End: 2019-05-09 | Stop reason: SDUPTHER

## 2019-01-09 RX ORDER — AMLODIPINE BESYLATE 10 MG/1
10 TABLET ORAL DAILY
Qty: 90 TABLET | Refills: 3 | Status: SHIPPED | OUTPATIENT
Start: 2019-01-09 | End: 2019-07-17 | Stop reason: SDUPTHER

## 2019-01-09 RX ORDER — TRAMADOL HYDROCHLORIDE 50 MG/1
50 TABLET ORAL 2 TIMES DAILY PRN
COMMUNITY
End: 2019-01-09 | Stop reason: SDUPTHER

## 2019-01-09 ASSESSMENT — PATIENT HEALTH QUESTIONNAIRE - PHQ9
SUM OF ALL RESPONSES TO PHQ9 QUESTIONS 1 & 2: 0
1. LITTLE INTEREST OR PLEASURE IN DOING THINGS: 0
SUM OF ALL RESPONSES TO PHQ QUESTIONS 1-9: 0
2. FEELING DOWN, DEPRESSED OR HOPELESS: 0
SUM OF ALL RESPONSES TO PHQ QUESTIONS 1-9: 0

## 2019-01-09 ASSESSMENT — ENCOUNTER SYMPTOMS
DIARRHEA: 0
SHORTNESS OF BREATH: 0
NAUSEA: 0
RHINORRHEA: 0
TROUBLE SWALLOWING: 0
ABDOMINAL PAIN: 0
SORE THROAT: 0
CONSTIPATION: 0
COUGH: 0
VOMITING: 0
BACK PAIN: 1

## 2019-01-18 RX ORDER — CITALOPRAM 20 MG/1
20 TABLET ORAL DAILY
Qty: 30 TABLET | Refills: 11 | Status: SHIPPED | OUTPATIENT
Start: 2019-01-18 | End: 2020-01-22

## 2019-01-23 ENCOUNTER — OFFICE VISIT (OUTPATIENT)
Dept: UROLOGY | Facility: CLINIC | Age: 69
End: 2019-01-23

## 2019-01-23 VITALS — TEMPERATURE: 97.3 F | BODY MASS INDEX: 30.8 KG/M2 | WEIGHT: 220 LBS | HEIGHT: 71 IN

## 2019-01-23 DIAGNOSIS — C61 PROSTATE CANCER (HCC): ICD-10-CM

## 2019-01-23 DIAGNOSIS — N39.3 STRESS INCONTINENCE: Primary | ICD-10-CM

## 2019-01-23 DIAGNOSIS — N52.31 ERECTILE DYSFUNCTION AFTER RADICAL PROSTATECTOMY: ICD-10-CM

## 2019-01-23 LAB
BILIRUB BLD-MCNC: NEGATIVE MG/DL
CLARITY, POC: CLEAR
COLOR UR: YELLOW
GLUCOSE UR STRIP-MCNC: NEGATIVE MG/DL
KETONES UR QL: NEGATIVE
LEUKOCYTE EST, POC: ABNORMAL
NITRITE UR-MCNC: NEGATIVE MG/ML
PH UR: 5.5 [PH] (ref 5–8)
PROT UR STRIP-MCNC: NEGATIVE MG/DL
RBC # UR STRIP: NEGATIVE /UL
SP GR UR: 1.02 (ref 1–1.03)
UROBILINOGEN UR QL: NORMAL

## 2019-01-23 PROCEDURE — 99024 POSTOP FOLLOW-UP VISIT: CPT | Performed by: UROLOGY

## 2019-01-23 PROCEDURE — 51798 US URINE CAPACITY MEASURE: CPT | Performed by: UROLOGY

## 2019-01-23 PROCEDURE — 81003 URINALYSIS AUTO W/O SCOPE: CPT | Performed by: UROLOGY

## 2019-01-23 NOTE — PATIENT INSTRUCTIONS

## 2019-01-23 NOTE — PROGRESS NOTES
Mr. Kamara is 68 y.o. male    Chief Complaint   Patient presents with   • Prostate Cancer   • Urinary Incontinence   • Erectile Dysfunction       History of Present Illness   He was initially diagnosed with prostate cancer about  2 year(s) ago. This was identified in the context of elevated psa.  Severity of the disease is best described as probable organ confined disease. Previous or current management includes robot assisted laparoscpic prostatectomy. Associated symptoms include urinary incontinence and erectile dysfunction. Currently his PSA is  undetectable.     The following portions of the patient's history were reviewed and updated as appropriate: allergies, current medications, past family history, past medical history, past social history, past surgical history and problem list.    Review of Systems   Constitutional: Negative for chills and fever.   Gastrointestinal: Negative for abdominal pain, anal bleeding and blood in stool.   Genitourinary: Positive for urgency (leaking). Negative for decreased urine volume, difficulty urinating (intermit; dribbling), discharge, dysuria, enuresis, flank pain, frequency, genital sores, hematuria, penile pain, penile swelling, scrotal swelling and testicular pain.       I have reviewed the review of systems      Current Outpatient Medications:   •  allopurinol (ZYLOPRIM) 100 MG tablet, Take 100 mg by mouth Daily., Disp: , Rfl:   •  amLODIPine (NORVASC) 5 MG tablet, Take 5 mg by mouth Daily., Disp: , Rfl:   •  atorvastatin (LIPITOR) 20 MG tablet, Take 20 mg by mouth Daily., Disp: , Rfl: 2  •  escitalopram (LEXAPRO) 10 MG tablet, Take 10 mg by mouth Daily., Disp: , Rfl: 11  •  gabapentin (NEURONTIN) 100 MG capsule, Take 100 mg by mouth 3 (Three) Times a Day. STATES TAKES ONLY BID D/T SIDE EFFECTS, Disp: , Rfl:   •  levothyroxine (SYNTHROID, LEVOTHROID) 200 MCG tablet, Take 200 mcg by mouth Daily., Disp: , Rfl:   •  lisinopril (PRINIVIL,ZESTRIL) 40 MG tablet, Take 40 mg by  mouth Daily., Disp: , Rfl: 11  •  SITagliptin (JANUVIA) 100 MG tablet, Take 100 mg by mouth Daily., Disp: , Rfl:   •  traMADol (ULTRAM) 50 MG tablet, Take 50 mg by mouth 2 (Two) Times a Day., Disp: , Rfl:   •  traZODone (DESYREL) 50 MG tablet, Take 50 mg by mouth At Night As Needed for Sleep., Disp: , Rfl:     Past Medical History:   Diagnosis Date   • Arthritis    • Cancer (CMS/HCC)     thyroid   • Depression    • Diabetes (CMS/HCC)    • Disease of thyroid gland    • Gout    • Hypercholesteremia    • Hypertension    • IBS (irritable bowel syndrome)    • Kidney disease    • Prostate cancer (CMS/HCC)    • Sensorineural hearing loss    • Sleep apnea     NON COMPLIANT WITH C PAP   • Sudden hearing loss    • Tinnitus    • Urine incontinence        Past Surgical History:   Procedure Laterality Date   • BLADDER SUSPENSION N/A 11/26/2018    Procedure: CYSTOSCOPY BLADDER SUSPENSION MALE SLING;  Surgeon: Zaheer Rebolledo MD;  Location: [facility] OR;  Service: Urology   • CHOLECYSTECTOMY     • COLONOSCOPY N/A 3/7/2017    Procedure: COLONOSCOPY WITH ANESTHESIA;  Surgeon: Hector Alvarenga MD;  Location: [facility] ENDOSCOPY;  Service:    • LAPAROSCOPIC RETROPUBIC PROSTATECTOMY     • PROSTATE SURGERY     • PROSTATECTOMY N/A 8/1/2017    Procedure: PROSTATECTOMY LAPAROSCOPIC WITH DAVINCI SI ROBOT ;  Surgeon: Hernesto Hong MD;  Location: [facility] OR;  Service:    • THYROID SURGERY      RADIATION THERAPY AFTER SURGERY       Social History     Socioeconomic History   • Marital status:      Spouse name: Not on file   • Number of children: Not on file   • Years of education: Not on file   • Highest education level: Not on file   Tobacco Use   • Smoking status: Never Smoker   • Smokeless tobacco: Never Used   Substance and Sexual Activity   • Alcohol use: Yes     Comment: occasionally   • Drug use: No   • Sexual activity: Defer       Family History   Problem Relation Age of Onset   • Prostate cancer Father    • Cancer Father    •  "Heart disease Mother    • Diabetes Sister    • Colon cancer Neg Hx    • Colon polyps Neg Hx        Objective    Temp 97.3 °F (36.3 °C)   Ht 180.3 cm (71\")   Wt 99.8 kg (220 lb)   BMI 30.68 kg/m²     Physical Exam    Office Visit on 12/26/2018   Component Date Value Ref Range Status   • Color 12/26/2018 Yellow  Yellow, Straw, Dark Yellow, Cindi Final   • Clarity, UA 12/26/2018 Clear  Clear Final   • Glucose, UA 12/26/2018 Negative  Negative, 1000 mg/dL (3+) mg/dL Final   • Bilirubin 12/26/2018 Negative  Negative Final   • Ketones, UA 12/26/2018 Negative  Negative Final   • Specific Gravity  12/26/2018 1.020  1.005 - 1.030 Final   • Blood, UA 12/26/2018 Negative  Negative Final   • pH, Urine 12/26/2018 6.0  5.0 - 8.0 Final   • Protein, POC 12/26/2018 Negative  Negative mg/dL Final   • Urobilinogen, UA 12/26/2018 Normal  Normal Final   • Nitrite, UA 12/26/2018 Negative  Negative Final   • Leukocytes 12/26/2018 Negative  Negative Final       Results for orders placed or performed in visit on 01/23/19   POC Urinalysis Dipstick, Multipro   Result Value Ref Range    Color Yellow Yellow, Straw, Dark Yellow, Cindi    Clarity, UA Clear Clear    Glucose, UA Negative Negative, 1000 mg/dL (3+) mg/dL    Bilirubin Negative Negative    Ketones, UA Negative Negative    Specific Gravity  1.020 1.005 - 1.030    Blood, UA Negative Negative    pH, Urine 5.5 5.0 - 8.0    Protein, POC Negative Negative mg/dL    Urobilinogen, UA Normal Normal    Nitrite, UA Negative Negative    Leukocytes Small (1+) (A) Negative     Patient's Body mass index is 30.68 kg/m². BMI is above normal parameters. Recommendations include: educational material.    Estimation of residual urine via abdominal ultrasound  Residual Urine: 174 ml  Indication: incontinence  Position: Supine  Examination: Incremental scanning of the suprapubic area using 3 MHz transducer using copious amounts of acoustic gel.   Findings: An anechoic area was demonstrated which " represented the bladder, with measurement of residual urine as noted. I inspected this myself. In that the residual urine was stable or insignificant, no treatment will be necessary at this time.       Assessment and Plan    Zay was seen today for prostate cancer, urinary incontinence and erectile dysfunction.    Diagnoses and all orders for this visit:    Stress incontinence  -     POC Urinalysis Dipstick, Multipro    Erectile dysfunction after radical prostatectomy    Prostate cancer (CMS/[facility])    Patient's history of prostate cancer treated with radical prostatectomy by Dr. Hong.  Postoperative erectile dysfunction and stress incontinence.  He underwent a advance male urethral sling with significant decrease in his urinary incontinence.  Patient states he wears 1 pad a day which is mostly dry depending on his activities and he is overall very happy with this.  He is interested in a penile prosthesis and so today we spent some time discussing the risks and benefits as well as the options for this.  At this time he would like to wait until April due to the fact that he cannot take off of work at this point.  I will see him back in April and we will decide he was to have this at the time.    He will continue follow-up for his prostate cancer with Dr. Hong.

## 2019-01-25 DIAGNOSIS — E11.9 TYPE 2 DIABETES MELLITUS WITHOUT COMPLICATION, WITHOUT LONG-TERM CURRENT USE OF INSULIN (HCC): ICD-10-CM

## 2019-02-10 DIAGNOSIS — M10.9 ACUTE GOUT INVOLVING TOE OF RIGHT FOOT, UNSPECIFIED CAUSE: ICD-10-CM

## 2019-02-11 RX ORDER — COLCHICINE 0.6 MG/1
TABLET, FILM COATED ORAL
Qty: 12 TABLET | Refills: 5 | Status: SHIPPED | OUTPATIENT
Start: 2019-02-11

## 2019-03-08 ENCOUNTER — RESULTS ENCOUNTER (OUTPATIENT)
Dept: UROLOGY | Facility: CLINIC | Age: 69
End: 2019-03-08

## 2019-03-08 DIAGNOSIS — C61 PROSTATE CANCER (HCC): ICD-10-CM

## 2019-03-11 ENCOUNTER — RESULTS ENCOUNTER (OUTPATIENT)
Dept: UROLOGY | Facility: CLINIC | Age: 69
End: 2019-03-11

## 2019-03-11 DIAGNOSIS — C61 PROSTATE CANCER (HCC): ICD-10-CM

## 2019-03-11 DIAGNOSIS — C61 PROSTATE CANCER (HCC): Primary | ICD-10-CM

## 2019-03-11 NOTE — PROGRESS NOTES
Mr. Kamara is 68 y.o. male    CHIEF COMPLAINT: I am here for follow up on prostate cancer. My PSA is <0.070    HPI     Prostate cancer  Location: Prostate gland  Quality:  Adenocarcinoma  Severity: [place] 3+4, t2c with perineural invasion. 10% gland involvement.   Duration: Diagnosed in June 2017   Context: Found during evaluation of elevated PSA   Modifying factors: Biochemical remission after robot-assisted laparoscopic prostatectomy  Associated signs or symptoms: Incontinence much better after male sling procedure.   Hx related to this:   08/2017- RALP                Final Diagnosis   Prostate, radical prostatectomy:       A.  Prostatic adenocarcinoma ([place]'s grade 3+4 = 7).       B.  Tumor is present bilaterally in the gland.       C.  Perineural invasion is present.       D.  Largest tumor focus measures 1.3 cm in greatest dimension.       E.  Tumor occupies approximately 10% of the evaluated gland.       F.  Negative for evidence of extraprostatic extension.       G.  Negative for evidence of vas deferens or seminal vesicle invasion.       H.  Surgical excision margins are negative for evidence of malignancy.     AJCC STAGE:  pT2c, pNx, pMx      Electronically signed by Ej Schroeder MD on 8/3/2017 at 1511    Other issues:       Lab Results   Component Value Date    PSA <0.070 03/13/2019    PSA <0.070 09/14/2018    PSA <0.070 05/09/2018       The following portions of the patient's history were reviewed and updated as appropriate: allergies, current medications, past family history, past medical history, past social history, past surgical history and problem list.      Review of Systems   Constitutional: Negative for chills and fever.   Gastrointestinal: Negative for abdominal pain, anal bleeding and blood in stool.   Genitourinary: Positive for urgency. Negative for dysuria, frequency and hematuria.           Current Outpatient Medications:   •  allopurinol (ZYLOPRIM) 100 MG tablet, Take 100 mg  by mouth Daily., Disp: , Rfl:   •  amLODIPine (NORVASC) 5 MG tablet, Take 5 mg by mouth Daily., Disp: , Rfl:   •  atorvastatin (LIPITOR) 20 MG tablet, Take 20 mg by mouth Daily., Disp: , Rfl: 2  •  escitalopram (LEXAPRO) 10 MG tablet, Take 10 mg by mouth Daily., Disp: , Rfl: 11  •  gabapentin (NEURONTIN) 100 MG capsule, Take 100 mg by mouth 3 (Three) Times a Day. STATES TAKES ONLY BID D/T SIDE EFFECTS, Disp: , Rfl:   •  levothyroxine (SYNTHROID, LEVOTHROID) 200 MCG tablet, Take 200 mcg by mouth Daily., Disp: , Rfl:   •  lisinopril (PRINIVIL,ZESTRIL) 40 MG tablet, Take 40 mg by mouth Daily., Disp: , Rfl: 11  •  SITagliptin (JANUVIA) 100 MG tablet, Take 100 mg by mouth Daily., Disp: , Rfl:   •  traMADol (ULTRAM) 50 MG tablet, Take 50 mg by mouth 2 (Two) Times a Day., Disp: , Rfl:   •  traZODone (DESYREL) 50 MG tablet, Take 50 mg by mouth At Night As Needed for Sleep., Disp: , Rfl:     Past Medical History:   Diagnosis Date   • Arthritis    • Cancer (CMS/HCC)     thyroid   • Depression    • Diabetes (CMS/HCC)    • Disease of thyroid gland    • Gout    • Hypercholesteremia    • Hypertension    • IBS (irritable bowel syndrome)    • Kidney disease    • Prostate cancer (CMS/HCC)    • Sensorineural hearing loss    • Sleep apnea     NON COMPLIANT WITH C PAP   • Sudden hearing loss    • Tinnitus    • Urine incontinence        Past Surgical History:   Procedure Laterality Date   • BLADDER SUSPENSION N/A 11/26/2018    Procedure: CYSTOSCOPY BLADDER SUSPENSION MALE SLING;  Surgeon: Zaheer Rebolledo MD;  Location: [facility] OR;  Service: Urology   • CHOLECYSTECTOMY     • COLONOSCOPY N/A 3/7/2017    Procedure: COLONOSCOPY WITH ANESTHESIA;  Surgeon: Hector Alvarenga MD;  Location: [facility] ENDOSCOPY;  Service:    • LAPAROSCOPIC RETROPUBIC PROSTATECTOMY     • PROSTATE SURGERY     • PROSTATECTOMY N/A 8/1/2017    Procedure: PROSTATECTOMY LAPAROSCOPIC WITH DAVINCI SI ROBOT ;  Surgeon: Hernesto Hong MD;  Location: [facility] OR;  Service:  "   • THYROID SURGERY      RADIATION THERAPY AFTER SURGERY       Social History     Socioeconomic History   • Marital status:      Spouse name: Not on file   • Number of children: Not on file   • Years of education: Not on file   • Highest education level: Not on file   Tobacco Use   • Smoking status: Never Smoker   • Smokeless tobacco: Never Used   Substance and Sexual Activity   • Alcohol use: Yes     Comment: occasionally   • Drug use: No   • Sexual activity: Defer       Family History   Problem Relation Age of Onset   • Prostate cancer Father    • Cancer Father    • Heart disease Mother    • Diabetes Sister    • Colon cancer Neg Hx    • Colon polyps Neg Hx          Temp 98.2 °F (36.8 °C)   Ht 180.3 cm (71\")   Wt 99.8 kg (220 lb)   BMI 30.68 kg/m²       Physical Exam  Constitutional:  They  appear well-developed and well-nourished. There are no obvious deformities. No distress.   Pulmonary/Chest: Effort normal.   GI: Soft. The patient exhibits no distension and no mass. There is no tenderness. There is no rebound and no guarding. No hernia.   Neurological: Patient is alert and oriented to person, place, and time.   Skin: Skin is warm and dry. Not diaphoretic.   Psychiatric:  normal mood and affect. Not agitated.     Data  Results for orders placed or performed in visit on 03/20/19   POC Urinalysis Dipstick, Multipro   Result Value Ref Range    Color Yellow Yellow, Straw, Dark Yellow, Cindi    Clarity, UA Clear Clear    Glucose, UA Negative Negative, 1000 mg/dL (3+) mg/dL    Bilirubin Negative Negative    Ketones, UA Negative Negative    Specific Gravity  1.020 1.005 - 1.030    Blood, UA Trace (A) Negative    pH, Urine 6.0 5.0 - 8.0    Protein, POC Negative Negative mg/dL    Urobilinogen, UA Normal Normal    Nitrite, UA Negative Negative    Leukocytes Small (1+) (A) Negative         Imaging Results (last 7 days)     ** No results found for the last 168 hours. **        Patient's Body mass index is 30.68 " kg/m². BMI is above normal parameters. Recommendations include: educational material.    Assessment and Plan  Diagnoses and all orders for this visit:    Prostate cancer (CMS/[facility])  -     POC Urinalysis Dipstick, Multipro  -     PSA DIAGNOSTIC; Future    Stress incontinence, male    Erectile dysfunction after radical prostatectomy  -     Testosterone; Future    The PSA is <0.07 which is undetectable suggesting no clinical evidence of prostate cancer   Incontinence improved. Still with some post void dribbling. Perineal massage.   Refractory ED. Considering penile injections.     (Please note that portions of this note were completed with a voice recognition program.)  Hernesto Hong MD  03/20/19  9:20 AM    Cc: Frankie Bradley APRN

## 2019-03-13 LAB — PSA SERPL-MCNC: <0.07 NG/ML (ref 0–4)

## 2019-03-20 ENCOUNTER — OFFICE VISIT (OUTPATIENT)
Dept: UROLOGY | Facility: CLINIC | Age: 69
End: 2019-03-20

## 2019-03-20 VITALS — HEIGHT: 71 IN | TEMPERATURE: 98.2 F | WEIGHT: 220 LBS | BODY MASS INDEX: 30.8 KG/M2

## 2019-03-20 DIAGNOSIS — N52.31 ERECTILE DYSFUNCTION AFTER RADICAL PROSTATECTOMY: ICD-10-CM

## 2019-03-20 DIAGNOSIS — N39.3 STRESS INCONTINENCE, MALE: ICD-10-CM

## 2019-03-20 DIAGNOSIS — C61 PROSTATE CANCER (HCC): Primary | ICD-10-CM

## 2019-03-20 LAB
BILIRUB BLD-MCNC: NEGATIVE MG/DL
CLARITY, POC: CLEAR
COLOR UR: YELLOW
GLUCOSE UR STRIP-MCNC: NEGATIVE MG/DL
KETONES UR QL: NEGATIVE
LEUKOCYTE EST, POC: ABNORMAL
NITRITE UR-MCNC: NEGATIVE MG/ML
PH UR: 6 [PH] (ref 5–8)
PROT UR STRIP-MCNC: NEGATIVE MG/DL
RBC # UR STRIP: ABNORMAL /UL
SP GR UR: 1.02 (ref 1–1.03)
UROBILINOGEN UR QL: NORMAL

## 2019-03-20 PROCEDURE — 81003 URINALYSIS AUTO W/O SCOPE: CPT | Performed by: UROLOGY

## 2019-03-20 PROCEDURE — 99214 OFFICE O/P EST MOD 30 MIN: CPT | Performed by: UROLOGY

## 2019-03-20 NOTE — PATIENT INSTRUCTIONS

## 2019-04-02 ENCOUNTER — TELEPHONE (OUTPATIENT)
Dept: PRIMARY CARE CLINIC | Age: 69
End: 2019-04-02

## 2019-04-02 DIAGNOSIS — E78.2 MIXED HYPERLIPIDEMIA: ICD-10-CM

## 2019-04-02 DIAGNOSIS — E03.9 ACQUIRED HYPOTHYROIDISM: ICD-10-CM

## 2019-04-02 DIAGNOSIS — E11.9 TYPE 2 DIABETES MELLITUS WITHOUT COMPLICATION, WITHOUT LONG-TERM CURRENT USE OF INSULIN (HCC): Primary | ICD-10-CM

## 2019-04-02 DIAGNOSIS — N18.30 CHRONIC KIDNEY DISEASE, STAGE III (MODERATE) (HCC): ICD-10-CM

## 2019-04-02 DIAGNOSIS — I10 ESSENTIAL HYPERTENSION: ICD-10-CM

## 2019-04-02 DIAGNOSIS — E55.9 VITAMIN D DEFICIENCY: ICD-10-CM

## 2019-04-02 NOTE — TELEPHONE ENCOUNTER
pts needs labs ordered for his appt next week. He is also out of Tramadol, but refused an earlier appt to be seen for his refill.

## 2019-04-08 ENCOUNTER — TELEPHONE (OUTPATIENT)
Dept: PRIMARY CARE CLINIC | Age: 69
End: 2019-04-08

## 2019-04-08 DIAGNOSIS — I10 ESSENTIAL HYPERTENSION: ICD-10-CM

## 2019-04-08 DIAGNOSIS — E55.9 VITAMIN D DEFICIENCY: ICD-10-CM

## 2019-04-08 DIAGNOSIS — N18.30 CHRONIC KIDNEY DISEASE, STAGE III (MODERATE) (HCC): ICD-10-CM

## 2019-04-08 DIAGNOSIS — E11.9 TYPE 2 DIABETES MELLITUS WITHOUT COMPLICATION, WITHOUT LONG-TERM CURRENT USE OF INSULIN (HCC): ICD-10-CM

## 2019-04-08 DIAGNOSIS — E78.2 MIXED HYPERLIPIDEMIA: ICD-10-CM

## 2019-04-08 DIAGNOSIS — E03.9 ACQUIRED HYPOTHYROIDISM: ICD-10-CM

## 2019-04-08 LAB
ALBUMIN SERPL-MCNC: 4.2 G/DL (ref 3.5–5.2)
ALP BLD-CCNC: 57 U/L (ref 40–130)
ALT SERPL-CCNC: 12 U/L (ref 5–41)
ANION GAP SERPL CALCULATED.3IONS-SCNC: 15 MMOL/L (ref 7–19)
AST SERPL-CCNC: 12 U/L (ref 5–40)
BASOPHILS ABSOLUTE: 0 K/UL (ref 0–0.2)
BASOPHILS RELATIVE PERCENT: 0.6 % (ref 0–1)
BILIRUB SERPL-MCNC: 0.5 MG/DL (ref 0.2–1.2)
BUN BLDV-MCNC: 27 MG/DL (ref 8–23)
CALCIUM SERPL-MCNC: 9.4 MG/DL (ref 8.8–10.2)
CHLORIDE BLD-SCNC: 102 MMOL/L (ref 98–111)
CHOLESTEROL, TOTAL: 92 MG/DL (ref 160–199)
CO2: 24 MMOL/L (ref 22–29)
CREAT SERPL-MCNC: 1.3 MG/DL (ref 0.5–1.2)
CREATININE URINE: 84.9 MG/DL (ref 4.2–622)
EOSINOPHILS ABSOLUTE: 0.2 K/UL (ref 0–0.6)
EOSINOPHILS RELATIVE PERCENT: 3.2 % (ref 0–5)
GFR NON-AFRICAN AMERICAN: 55
GLUCOSE BLD-MCNC: 139 MG/DL (ref 74–109)
HBA1C MFR BLD: 6 % (ref 4–6)
HCT VFR BLD CALC: 36.9 % (ref 42–52)
HDLC SERPL-MCNC: 21 MG/DL (ref 55–121)
HEMOGLOBIN: 11.7 G/DL (ref 14–18)
LDL CHOLESTEROL CALCULATED: 48 MG/DL
LYMPHOCYTES ABSOLUTE: 1.1 K/UL (ref 1.1–4.5)
LYMPHOCYTES RELATIVE PERCENT: 15.7 % (ref 20–40)
MCH RBC QN AUTO: 28.4 PG (ref 27–31)
MCHC RBC AUTO-ENTMCNC: 31.7 G/DL (ref 33–37)
MCV RBC AUTO: 89.6 FL (ref 80–94)
MICROALBUMIN UR-MCNC: <1.2 MG/DL (ref 0–19)
MICROALBUMIN/CREAT UR-RTO: NORMAL MG/G
MONOCYTES ABSOLUTE: 0.5 K/UL (ref 0–0.9)
MONOCYTES RELATIVE PERCENT: 6.6 % (ref 0–10)
NEUTROPHILS ABSOLUTE: 5.2 K/UL (ref 1.5–7.5)
NEUTROPHILS RELATIVE PERCENT: 73.3 % (ref 50–65)
PDW BLD-RTO: 13.3 % (ref 11.5–14.5)
PLATELET # BLD: 223 K/UL (ref 130–400)
PMV BLD AUTO: 10 FL (ref 9.4–12.4)
POTASSIUM SERPL-SCNC: 4.6 MMOL/L (ref 3.5–5)
RBC # BLD: 4.12 M/UL (ref 4.7–6.1)
SODIUM BLD-SCNC: 141 MMOL/L (ref 136–145)
T4 FREE: 2.1 NG/DL (ref 0.9–1.7)
TOTAL PROTEIN: 7.1 G/DL (ref 6.6–8.7)
TRIGL SERPL-MCNC: 114 MG/DL (ref 0–149)
TSH SERPL DL<=0.05 MIU/L-ACNC: 0.05 UIU/ML (ref 0.27–4.2)
VITAMIN D 25-HYDROXY: 26.9 NG/ML
WBC # BLD: 7.1 K/UL (ref 4.8–10.8)

## 2019-04-08 NOTE — TELEPHONE ENCOUNTER
----- Message from MARY Son sent at 4/8/2019  2:07 PM CDT -----  Please call patient and let them know results. Normal cholesterol  Metabolic panel is normal which includes liver function. Kidney function has improved from previous check and is stable  Vitamin D level is slightly low, recommend taking vitamin D 3 4000 units daily (this is available over the counter)  Thyroid testing is slightly abnormal but if no symptoms of diarrhea, sweating, jitteriness would leave at current dosage.

## 2019-04-10 ENCOUNTER — OFFICE VISIT (OUTPATIENT)
Dept: PRIMARY CARE CLINIC | Age: 69
End: 2019-04-10
Payer: MEDICARE

## 2019-04-10 VITALS
TEMPERATURE: 98.6 F | HEIGHT: 71 IN | HEART RATE: 82 BPM | SYSTOLIC BLOOD PRESSURE: 130 MMHG | DIASTOLIC BLOOD PRESSURE: 57 MMHG | OXYGEN SATURATION: 95 % | WEIGHT: 233 LBS | BODY MASS INDEX: 32.62 KG/M2

## 2019-04-10 DIAGNOSIS — E55.9 VITAMIN D DEFICIENCY: ICD-10-CM

## 2019-04-10 DIAGNOSIS — M54.42 CHRONIC LEFT-SIDED LOW BACK PAIN WITH LEFT-SIDED SCIATICA: ICD-10-CM

## 2019-04-10 DIAGNOSIS — G89.29 CHRONIC LEFT-SIDED LOW BACK PAIN WITH LEFT-SIDED SCIATICA: ICD-10-CM

## 2019-04-10 DIAGNOSIS — M25.50 ARTHRALGIA OF MULTIPLE JOINTS: ICD-10-CM

## 2019-04-10 DIAGNOSIS — B35.6 TINEA CRURIS: ICD-10-CM

## 2019-04-10 DIAGNOSIS — E11.22 TYPE 2 DIABETES MELLITUS WITH STAGE 3 CHRONIC KIDNEY DISEASE, WITHOUT LONG-TERM CURRENT USE OF INSULIN (HCC): Primary | ICD-10-CM

## 2019-04-10 DIAGNOSIS — E78.2 MIXED HYPERLIPIDEMIA: ICD-10-CM

## 2019-04-10 DIAGNOSIS — I10 ESSENTIAL HYPERTENSION: ICD-10-CM

## 2019-04-10 DIAGNOSIS — Z85.46 HISTORY OF PROSTATE CANCER: ICD-10-CM

## 2019-04-10 DIAGNOSIS — N18.30 TYPE 2 DIABETES MELLITUS WITH STAGE 3 CHRONIC KIDNEY DISEASE, WITHOUT LONG-TERM CURRENT USE OF INSULIN (HCC): Primary | ICD-10-CM

## 2019-04-10 PROCEDURE — 2022F DILAT RTA XM EVC RTNOPTHY: CPT | Performed by: NURSE PRACTITIONER

## 2019-04-10 PROCEDURE — 1123F ACP DISCUSS/DSCN MKR DOCD: CPT | Performed by: NURSE PRACTITIONER

## 2019-04-10 PROCEDURE — G8417 CALC BMI ABV UP PARAM F/U: HCPCS | Performed by: NURSE PRACTITIONER

## 2019-04-10 PROCEDURE — 99214 OFFICE O/P EST MOD 30 MIN: CPT | Performed by: NURSE PRACTITIONER

## 2019-04-10 PROCEDURE — 3017F COLORECTAL CA SCREEN DOC REV: CPT | Performed by: NURSE PRACTITIONER

## 2019-04-10 PROCEDURE — 4040F PNEUMOC VAC/ADMIN/RCVD: CPT | Performed by: NURSE PRACTITIONER

## 2019-04-10 PROCEDURE — G8427 DOCREV CUR MEDS BY ELIG CLIN: HCPCS | Performed by: NURSE PRACTITIONER

## 2019-04-10 PROCEDURE — 1036F TOBACCO NON-USER: CPT | Performed by: NURSE PRACTITIONER

## 2019-04-10 PROCEDURE — 3044F HG A1C LEVEL LT 7.0%: CPT | Performed by: NURSE PRACTITIONER

## 2019-04-10 RX ORDER — CHOLECALCIFEROL (VITAMIN D3) 125 MCG
1 CAPSULE ORAL DAILY
Qty: 30 TABLET | Refills: 11 | COMMUNITY
Start: 2019-04-10

## 2019-04-10 RX ORDER — TRAMADOL HYDROCHLORIDE 50 MG/1
50 TABLET ORAL 2 TIMES DAILY PRN
Qty: 60 TABLET | Refills: 0 | Status: SHIPPED | OUTPATIENT
Start: 2019-04-10 | End: 2019-05-15 | Stop reason: SDUPTHER

## 2019-04-10 RX ORDER — NYSTATIN 100000 [USP'U]/G
POWDER TOPICAL
Qty: 60 G | Refills: 1 | Status: SHIPPED | OUTPATIENT
Start: 2019-04-10 | End: 2019-11-13

## 2019-04-10 ASSESSMENT — ENCOUNTER SYMPTOMS
SORE THROAT: 0
DIARRHEA: 0
SHORTNESS OF BREATH: 0
COUGH: 0
TROUBLE SWALLOWING: 0
CONSTIPATION: 0
VOMITING: 0
ABDOMINAL PAIN: 0
NAUSEA: 0
RHINORRHEA: 0

## 2019-04-10 NOTE — PATIENT INSTRUCTIONS
feet:  ? Wash your feet every day. Use warm (not hot) water. Check the water temperature with your wrists or other part of your body, not your feet. ? Dry your feet well. Pat them dry. Do not rub the skin on your feet too hard. Dry well between your toes. If the skin on your feet stays moist, bacteria or a fungus can grow, which can lead to infection. ? Keep your skin soft. Use moisturizing skin cream to keep the skin on your feet soft and prevent calluses and cracks. But do not put the cream between your toes, and stop using any cream that causes a rash. ? Clean underneath your toenails carefully. Do not use a sharp object to clean underneath your toenails. Use the blunt end of a nail file or other rounded tool. ? Trim and file your toenails straight across to prevent ingrown toenails. Use a nail clipper, not scissors. Use an emery board to smooth the edges. · Change socks daily. Socks without seams are best, because seams often rub the feet. You can find socks for people with diabetes from specialty catalogs. · Look inside your shoes every day for things like gravel or torn linings, which could cause blisters or sores. · Buy shoes that fit well:  ? Look for shoes that have plenty of space around the toes. This helps prevent bunions and blisters. ? Try on shoes while wearing the kind of socks you will usually wear with the shoes. ? Avoid plastic shoes. They may rub your feet and cause blisters. Good shoes should be made of materials that are flexible and breathable, such as leather or cloth. ? Break in new shoes slowly by wearing them for no more than an hour a day for several days. Take extra time to check your feet for red areas, blisters, or other problems after you wear new shoes. · Do not go barefoot. Do not wear sandals, and do not wear shoes with very thin soles. Thin soles are easy to puncture. They also do not protect your feet from hot pavement or cold weather.   · Have your doctor check your feet during each visit. If you have a foot problem, see your doctor. Do not try to treat an early foot problem at home. Home remedies or treatments that you can buy without a prescription (such as corn removers) can be harmful. · Always get early treatment for foot problems. A minor irritation can lead to a major problem if not properly cared for early. When should you call for help? Call your doctor now or seek immediate medical care if:    · You have a foot sore, an ulcer or break in the skin that is not healing after 4 days, bleeding corns or calluses, or an ingrown toenail.     · You have blue or black areas, which can mean bruising or blood flow problems.     · You have peeling skin or tiny blisters between your toes or cracking or oozing of the skin.     · You have a fever for more than 24 hours and a foot sore.     · You have new numbness or tingling in your feet that does not go away after you move your feet or change positions.     · You have unexplained or unusual swelling of the foot or ankle.    Watch closely for changes in your health, and be sure to contact your doctor if:    · You cannot do proper foot care. Where can you learn more? Go to [email or web address] and sign in to your [employer] account. Enter A739 in the [facility] box to learn more about \"Diabetes Foot Health: Care Instructions. \"     If you do not have an account, please click on the \"Sign Up Now\" link. Current as of: July 25, 2018  Content Version: 11.9  © 0656-9353 Healthwise, Incorporated. Care instructions adapted under license by [facility] ([facility]). If you have questions about a medical condition or this instruction, always ask your healthcare professional. [place] [address] any warranty or liability for your use of this information. Patient Education        Jock Itch: Care Instructions  Your Care Instructions  Jock itch is a fungal infection of the groin.  The fungus that causes jock itch lives on your skin. It often affects male athletes, but anyone can get jock itch. You may get an itchy rash on your inner thighs and rear end (buttocks). It spreads and starts to itch when you sweat or are in steamy showers or locker rooms. Jock itch should end soon if you keep your skin dry after you clean it. You can treat jock itch at home with antifungal creams and powders that you can buy without a prescription. Follow-up care is a key part of your treatment and safety. Be sure to make and go to all appointments, and call your doctor if you are having problems. It's also a good idea to know your test results and keep a list of the medicines you take. How can you care for yourself at home? · Wash the rash with soap and water. · Wet a soft cloth with cool water alone or mixed with baking soda or essential oils such as lavender or jericho. Put the cool compress on the skin to relieve itching. · If you have large areas of blisters or sores, use compresses such as those made with Burow's solution (available without a prescription) to soothe and dry out the blisters. · Spread antifungal cream or powder over, and beyond the edge of, the rash. Follow the directions on the package. · If your doctor prescribed medicine, take it exactly as directed. Call your doctor if you have any problems with your medicine. · Try not to scratch the rash. · Shower or bathe daily and after you exercise. · Keep your skin dry as much as possible to allow it to heal.  · Until your jock itch is cured, wear loose-fitting cotton clothing. Avoid tight underwear, pants, and panty hose. · Wash your supporters and shorts after every wearing. · Do not share clothing, sports equipment, towels, or sheets to avoid spreading the fungi to other people. To prevent jock itch  · Put on socks before you put on underwear if you have athlete's foot.  This action helps prevent the fungus on your feet from spreading to your

## 2019-04-10 NOTE — PROGRESS NOTES
4/10/2019     Lissy Dior (:  1950) is a 70 y.o. male, here for evaluation of the following medical concerns: here for f/u on diabetes, HTN, HLP     HPI  Treatment Adherence:   Medication compliance:  compliant all of the time  Diet compliance:  noncompliant: tries to for the most part but not closely  Weight trend: stable  Current exercise: no regular exercise  Barriers: none    Diabetes Mellitus Type 2: Current symptoms/problems include none. Home blood sugar records: patient tests 3 time(s) per week runs around 150 fasting. Any episodes of hypoglycemia? no  Eye exam current (within one year): yes  Tobacco history: He  reports that he has never smoked. He has never used smokeless tobacco.   Daily Aspirin? Yes  Would like diabetic shoe form sent to [facility]    Hypertension:  Home blood pressure monitoring: Yes - occasionally. He is not adherent to a low sodium diet. Patient denies chest pain, shortness of breath, headache and lightheadedness. Antihypertensive medication side effects: no medication side effects noted. Use of agents associated with hypertension: none. Hyperlipidemia:  No new myalgias or GI upset on atorvastatin (Lipitor). Lab Results   Component Value Date    LABA1C 6.0 2019    LABA1C 5.7 2019    LABA1C 6.2 (H) 2018     Lab Results   Component Value Date    LABMICR <1.20 2019    CREATININE 1.3 (H) 2019     Lab Results   Component Value Date    ALT 12 2019    AST 12 2019     Lab Results   Component Value Date    CHOL 92 (L) 2019    TRIG 114 2019    HDL 21 (L) 2019    LDLCALC 48 2019    LDLDIRECT 81 (L) 2015        Rash in groin  Has been there off/on for years. Usually more during the summer.    Itches at times  Have treated with topical medicine and it helps    Need refill on ultram for chronic arthritis  Can not take NSAIDS/arthritis meds due to CKD  Have not had any so pain has been bad.     Vitamin d def  Vitamin D level 26.9    Prostate cancer  Followed by Dr Ashley Samaniego at [facility].. Review of Systems   Constitutional: Negative for activity change, appetite change, fatigue, fever and unexpected weight change. HENT: Negative for ear pain, rhinorrhea, sore throat and trouble swallowing. Eyes: Negative for visual disturbance. Respiratory: Negative for cough and shortness of breath. Cardiovascular: Negative for chest pain, palpitations and leg swelling. Gastrointestinal: Negative for abdominal pain, constipation, diarrhea, nausea and vomiting. Genitourinary: Negative for flank pain. Musculoskeletal: Positive for arthralgias. Negative for myalgias, neck pain and neck stiffness. Skin: Positive for rash. Neurological: Negative for headaches. Psychiatric/Behavioral: Negative for decreased concentration and sleep disturbance. The patient is not nervous/anxious. Prior to Visit Medications    Medication Sig Taking? Authorizing Provider   traMADol (ULTRAM) 50 MG tablet Take 1 tablet by mouth 2 times daily as needed for Pain for up to 60 days. Yes MARY Kwan   nystatin (MYCOSTATIN) 137051 UNIT/GM powder Apply topically 4 times daily. Yes MARY Devi   Cholecalciferol (VITAMIN D3) 2000 units TABS Take 1 tablet by mouth daily Yes MARY Devi   COLCRYS 0.6 MG tablet TAKE 2 TABLETS THEN AN HOUR LATER TAKE 1 TABLET WHEN GOUT FLARES Yes MARY Devi   SITagliptin (JANUVIA) 100 MG tablet Take 1 tablet by mouth daily Yes MARY Devi   citalopram (CELEXA) 20 MG tablet Take 1 tablet by mouth daily Yes MARY Devi   levothyroxine (LEVOTHROID) 200 MCG tablet Take 1 tablet by mouth daily Yes MARY Devi   gabapentin (NEURONTIN) 400 MG capsule Take 1 capsule by mouth 3 times daily for 90 days. MARY Briseno   levothyroxine (SYNTHROID) 25 MCG tablet Take 1 tablet by mouth daily Yes MARY Shah   amLODIPine (NORVASC) 10 MG tablet Take 1 tablet by mouth daily Yes MARY Shah   allopurinol (ZYLOPRIM) 100 MG tablet Take 1 tablet by mouth daily Yes MARY Shah   lisinopril (PRINIVIL;ZESTRIL) 40 MG tablet TAKE 1 TABLET BY MOUTH DAILY Yes MARY Shah   atorvastatin (LIPITOR) 20 MG tablet Take 1 tablet by mouth daily Yes MARY Shah   traZODone (DESYREL) 50 MG tablet Take 1 tablet by mouth nightly Yes MRAY Shah        Social History     Tobacco Use    Smoking status: Never Smoker    Smokeless tobacco: Never Used   Substance Use Topics    Alcohol use: No        Vitals:    04/10/19 1400   BP: (!) 130/57   Site: Left Upper Arm   Position: Sitting   Cuff Size: Large Adult   Pulse: 82   Temp: 98.6 °F (37 °C)   TempSrc: Temporal   SpO2: 95%   Weight: 233 lb (105.7 kg)   Height: 5' 11\" (1.803 m)     Estimated body mass index is 32.5 kg/m² as calculated from the following:    Height as of this encounter: 5' 11\" (1.803 m). Weight as of this encounter: 233 lb (105.7 kg). Physical Exam   Constitutional: He is oriented to person, place, and time. He appears well-developed and well-nourished. HENT:   Head: Normocephalic and atraumatic. Neck: Normal range of motion. Neck supple. Cardiovascular: Normal rate, regular rhythm, normal heart sounds and intact distal pulses. Pulmonary/Chest: Effort normal and breath sounds normal.   Abdominal: Soft. Bowel sounds are normal. He exhibits no distension and no mass. There is no tenderness. There is no guarding. Musculoskeletal: Normal range of motion. Neurological: He is alert and oriented to person, place, and time. Skin: Skin is warm. Groin folds with mildly erythematous patches of skin. No drainage or excoriation noted. Psychiatric: He has a normal mood and affect.  His behavior is normal. Judgment and thought content normal.     Monofilament Exam Reveals:  Pulses: normal  Edema:normal  Skin Lesions:callous right foot ball proximal to great toe  Right Foot:    Left Foot:  Normal sensation at all but 2  Normal sensation at all but 3   Diminished sensation at    Diminished sensation at    No sensation at 2    No sensation at 3           ASSESSMENT/PLAN:  1. Arthralgia of multiple joints    - traMADol (ULTRAM) 50 MG tablet; Take 1 tablet by mouth 2 times daily as needed for Pain for up to 60 days. Dispense: 60 tablet; Refill: 0    2. Chronic left-sided low back pain with left-sided sciatica    - traMADol (ULTRAM) 50 MG tablet; Take 1 tablet by mouth 2 times daily as needed for Pain for up to 60 days. Dispense: 60 tablet; Refill: 0    3. Type 2 diabetes mellitus with stage 3 chronic kidney disease, without long-term current use of insulin (HCC)  The current medical regimen is effective;  continue present plan and medications. - Diabetic Foot Exam  - Comprehensive Metabolic Panel; Future  - Hemoglobin A1C; Future  - Lipid Panel; Future    4. Essential hypertension  The current medical regimen is effective;  continue present plan and medications. 5. Mixed hyperlipidemia  The current medical regimen is effective;  continue present plan and medications. - Hemoglobin A1C; Future  - Lipid Panel; Future    6. History of prostate cancer  The current medical regimen is effective;  continue present plan and medications. 7. Tinea cruris  Discussed ensuring completely dry after bathing.     - nystatin (MYCOSTATIN) 163705 UNIT/GM powder; Apply topically 4 times daily. Dispense: 60 g; Refill: 1    8. Vitamin D deficiency  otc vitamin d3   - Vitamin D 25 hydroxy    Controlled Substances Monitoring:     RX Monitoring 4/10/2019   Attestation The Prescription Monitoring Report for this patient was reviewed today.    Chronic Pain Routine Monitoring Possible medication side effects, risk of tolerance/dependence & alternative treatments discussed. ;No signs of potential drug abuse or diversion identified: otherwise, see note documentation           No follow-ups on file. An electronic signature was used to authenticate this note.     --Elvin Escobar, APRN on 4/10/2019 at 3:38 PM

## 2019-04-12 ENCOUNTER — TELEPHONE (OUTPATIENT)
Dept: UROLOGY | Facility: CLINIC | Age: 69
End: 2019-04-12

## 2019-04-12 NOTE — TELEPHONE ENCOUNTER
Called and left patient a message in regards to his appointment on 4/24/19 with .  wanted patient to keep his follow up with  and didn't need to follow up with . I left a message saying that we cancelled his appointment because he didn't need to follow up with  and to keep his follow up appointment with .

## 2019-04-17 ENCOUNTER — RESULTS ENCOUNTER (OUTPATIENT)
Dept: UROLOGY | Facility: CLINIC | Age: 69
End: 2019-04-17

## 2019-04-17 DIAGNOSIS — N52.31 ERECTILE DYSFUNCTION AFTER RADICAL PROSTATECTOMY: ICD-10-CM

## 2019-04-19 DIAGNOSIS — N52.31 ERECTILE DYSFUNCTION AFTER RADICAL PROSTATECTOMY: Primary | ICD-10-CM

## 2019-04-24 ENCOUNTER — TELEPHONE (OUTPATIENT)
Dept: UROLOGY | Facility: CLINIC | Age: 69
End: 2019-04-24

## 2019-04-24 NOTE — TELEPHONE ENCOUNTER
Pt called stating he does not understand why his appt with Dr. Rebolledo was cancelled today for he wanted to discuss the penial implant. He also informed me that due to work he wouldn't be able to get this procedure done for a few months. I advised him to call back and get rescheduled when he knows it is a good time with his work schedule to get this consultation. Verified understanding.

## 2019-05-09 DIAGNOSIS — M25.50 ARTHRALGIA OF MULTIPLE JOINTS: ICD-10-CM

## 2019-05-09 DIAGNOSIS — G89.29 CHRONIC LEFT-SIDED LOW BACK PAIN WITH LEFT-SIDED SCIATICA: ICD-10-CM

## 2019-05-09 DIAGNOSIS — M54.42 CHRONIC LEFT-SIDED LOW BACK PAIN WITH LEFT-SIDED SCIATICA: ICD-10-CM

## 2019-05-13 RX ORDER — GABAPENTIN 400 MG/1
400 CAPSULE ORAL 3 TIMES DAILY
Qty: 270 CAPSULE | Refills: 0 | Status: SHIPPED | OUTPATIENT
Start: 2019-05-13 | End: 2019-11-13 | Stop reason: SDUPTHER

## 2019-05-15 ENCOUNTER — OFFICE VISIT (OUTPATIENT)
Dept: PRIMARY CARE CLINIC | Age: 69
End: 2019-05-15
Payer: MEDICARE

## 2019-05-15 VITALS
TEMPERATURE: 97 F | OXYGEN SATURATION: 95 % | DIASTOLIC BLOOD PRESSURE: 61 MMHG | SYSTOLIC BLOOD PRESSURE: 138 MMHG | WEIGHT: 236 LBS | HEART RATE: 76 BPM | BODY MASS INDEX: 32.92 KG/M2

## 2019-05-15 DIAGNOSIS — M77.8 LEFT SHOULDER TENDONITIS: Primary | ICD-10-CM

## 2019-05-15 DIAGNOSIS — G89.29 CHRONIC LEFT-SIDED LOW BACK PAIN WITH LEFT-SIDED SCIATICA: ICD-10-CM

## 2019-05-15 DIAGNOSIS — K43.2 INCISIONAL HERNIA, WITHOUT OBSTRUCTION OR GANGRENE: ICD-10-CM

## 2019-05-15 DIAGNOSIS — M25.50 ARTHRALGIA OF MULTIPLE JOINTS: ICD-10-CM

## 2019-05-15 DIAGNOSIS — M54.42 CHRONIC LEFT-SIDED LOW BACK PAIN WITH LEFT-SIDED SCIATICA: ICD-10-CM

## 2019-05-15 DIAGNOSIS — B35.6 TINEA CRURIS: ICD-10-CM

## 2019-05-15 PROCEDURE — 1123F ACP DISCUSS/DSCN MKR DOCD: CPT | Performed by: NURSE PRACTITIONER

## 2019-05-15 PROCEDURE — 20610 DRAIN/INJ JOINT/BURSA W/O US: CPT | Performed by: NURSE PRACTITIONER

## 2019-05-15 PROCEDURE — 99214 OFFICE O/P EST MOD 30 MIN: CPT | Performed by: NURSE PRACTITIONER

## 2019-05-15 PROCEDURE — 3017F COLORECTAL CA SCREEN DOC REV: CPT | Performed by: NURSE PRACTITIONER

## 2019-05-15 PROCEDURE — 1036F TOBACCO NON-USER: CPT | Performed by: NURSE PRACTITIONER

## 2019-05-15 PROCEDURE — G8417 CALC BMI ABV UP PARAM F/U: HCPCS | Performed by: NURSE PRACTITIONER

## 2019-05-15 PROCEDURE — G8427 DOCREV CUR MEDS BY ELIG CLIN: HCPCS | Performed by: NURSE PRACTITIONER

## 2019-05-15 PROCEDURE — 4040F PNEUMOC VAC/ADMIN/RCVD: CPT | Performed by: NURSE PRACTITIONER

## 2019-05-15 RX ORDER — METHYLPREDNISOLONE ACETATE 80 MG/ML
80 INJECTION, SUSPENSION INTRA-ARTICULAR; INTRALESIONAL; INTRAMUSCULAR; SOFT TISSUE ONCE
Status: COMPLETED | OUTPATIENT
Start: 2019-05-15 | End: 2019-05-15

## 2019-05-15 RX ORDER — TRAMADOL HYDROCHLORIDE 50 MG/1
50 TABLET ORAL 2 TIMES DAILY PRN
Qty: 60 TABLET | Refills: 0 | Status: SHIPPED | OUTPATIENT
Start: 2019-05-15 | End: 2019-06-19 | Stop reason: SDUPTHER

## 2019-05-15 RX ORDER — CLOTRIMAZOLE 1 %
CREAM (GRAM) TOPICAL
Qty: 60 G | Refills: 0 | Status: SHIPPED | OUTPATIENT
Start: 2019-05-15 | End: 2019-05-22

## 2019-05-15 RX ADMIN — METHYLPREDNISOLONE ACETATE 80 MG: 80 INJECTION, SUSPENSION INTRA-ARTICULAR; INTRALESIONAL; INTRAMUSCULAR; SOFT TISSUE at 14:23

## 2019-05-15 ASSESSMENT — ENCOUNTER SYMPTOMS
TROUBLE SWALLOWING: 0
SORE THROAT: 0
RHINORRHEA: 0
CONSTIPATION: 0
SHORTNESS OF BREATH: 0
DIARRHEA: 0
COUGH: 0
VOMITING: 0
ABDOMINAL PAIN: 0
NAUSEA: 0

## 2019-05-15 NOTE — PROGRESS NOTES
[address]  [place], [address] [place] [address] Rd  Phone [phone]   Fax [phone]      OFFICE VISIT: 5/15/2019    Aleen Collet is a 70 y.o. male whopresents today for his medical conditions/complaints as noted below. Pillo King Purl isc/o of Shoulder Pain (arm and shoulder pain, right side. no help with tramadol. pain shoots down arm. started around 1 month ago. ); Medication Refill (tramadol); and Testiclular Problem (would like ointment for jock itch instead of cream if possible. )        HPI:      HPI  Here for right shoulder pain  Onset 1 month. Hurts in shoulder with movements. Throbbing pain. Hurts down arm. Denies any injury  Take the ultram with some relief. Having problems with jock itch. Have tried nystatin powder and it helps when use it. Have a hernia   appt with Dr Reji Wall surgeon on Friday.      Past Medical History:   Diagnosis Date    Allergic rhinitis     Cancer ([facility] [address] 2012    Thyroid  - Dr Ean Bray Hyperthyroidism     Prostate cancer [facility])     Sleep apnea     Type II or unspecified type diabetes mellitus without mention of complication, not stated as uncontrolled       Past Surgical History:   Procedure Laterality Date    CHOLECYSTECTOMY      INCONTINENCE SURGERY  11/26/2018    dr young Louisa Gitelman Dr Kip Frohlich  08/01/2017    removal, dr Lisset Gutierrez      removed by Dr Ondina Howard        Family History   Problem Relation Age of Onset    Schizophrenia Mother     Heart Failure Mother     Heart Attack Mother     Cancer Father         Prostate & Leukemia     Heart Disease Father     Heart Attack Father     Thyroid Disease Sister        Social History     Tobacco Use    Smoking status: Never Smoker    Smokeless tobacco: Never Used   Substance Use Topics    Alcohol use: No      Current Outpatient Medications   Medication Sig Dispense Refill    traMADol (ULTRAM) 50 MG tablet Take 1 tablet by mouth 2 times daily as needed for Pain for  Pneumococcal 65+ years Vaccine  Completed    Hepatitis C screen  Completed        Subjective:     Review of Systems   Constitutional: Negative for activity change, appetite change, fatigue, fever and unexpected weight change. HENT: Negative for ear pain, rhinorrhea, sore throat and trouble swallowing. Eyes: Negative for visual disturbance. Respiratory: Negative for cough and shortness of breath. Cardiovascular: Negative for chest pain, palpitations and leg swelling. Gastrointestinal: Negative for abdominal pain, constipation, diarrhea, nausea and vomiting. Genitourinary: Negative for flank pain. Musculoskeletal: Positive for arthralgias. Negative for myalgias, neck pain and neck stiffness. Right shoulder pain   Neurological: Negative for headaches. Psychiatric/Behavioral: Negative for decreased concentration and sleep disturbance. The patient is not nervous/anxious. Objective:      Physical Exam   Constitutional: He is oriented to person, place, and time. He appears well-developed and well-nourished. HENT:   Head: Normocephalic and atraumatic. Neck: Normal range of motion. Neck supple. Cardiovascular: Normal rate, regular rhythm, normal heart sounds and intact distal pulses. Pulmonary/Chest: Effort normal and breath sounds normal.   Abdominal: Soft. Bowel sounds are normal. He exhibits no distension. There is no tenderness. There is no guarding. Musculoskeletal:        Right shoulder: He exhibits decreased range of motion (secondary to pain) and tenderness (anterior lateral aspect of shoulder). He exhibits no bony tenderness. Neurological: He is alert and oriented to person, place, and time. Skin: Skin is warm. Groin folds with mild erythematous patches of skin. No excoriation.         /61 (Site: Left Upper Arm, Position: Sitting, Cuff Size: Large Adult)   Pulse 76   Temp 97 °F (36.1 °C) (Temporal)   Wt 236 lb (107 kg)   SpO2 95%   BMI 32.92 kg/m² Assessment:           ICD-10-CM    1. Left shoulder tendonitis M75.82 methylPREDNISolone acetate (DEPO-MEDROL) injection 80 mg     GA DRAIN/INJECT LARGE JOINT/BURSA   2. Arthralgia of multiple joints M25.50 traMADol (ULTRAM) 50 MG tablet   3. Chronic left-sided low back pain with left-sided sciatica M54.42 traMADol (ULTRAM) 50 MG tablet    G89.29    4. Incisional hernia, without obstruction or gangrene K43.2    5. Tinea cruris B35.6 clotrimazole (LOTRIMIN) 1 % cream       Plan:      No follow-ups on file. Orders Placed This Encounter   Procedures    GA DRAIN/INJECT LARGE JOINT/BURSA     Orders Placed This Encounter   Medications    traMADol (ULTRAM) 50 MG tablet     Sig: Take 1 tablet by mouth 2 times daily as needed for Pain for up to 60 days. Dispense:  60 tablet     Refill:  0     Reduce doses taken as pain becomes manageable    clotrimazole (LOTRIMIN) 1 % cream     Sig: Apply topically 2 times daily. Dispense:  60 g     Refill:  0    methylPREDNISolone acetate (DEPO-MEDROL) injection 80 mg         Discussed use, benefit, and side effectsof prescribed medications. All patient questions answered. Pt voiced understanding. Reviewed health maintenance. .  Patient agreed with treatment plan. Follow up asdirected. Patient Instructions     Patient Education        Learning About Joint Injections  What are joint injections? Joint injections are shots into a joint, such as the knee or shoulder. They are used to put in medicines, such as pain relievers and steroid medicines. Steroids can be injected directly into a swollen and painful joint to reduce inflammation. A steroid shot can sometimes help with short-term pain relief when other treatments haven't worked. If steroid shots help, pain may improve for weeks or months. How are they done? First, the area over the joint will be cleaned.  Your doctor may then use a tiny needle to numb the skin in the area where you will get the joint injection. If a tiny needle is used to numb the area, your doctor will use another needle to inject the medicine. Your doctor may use a pain reliever, a steroid, or both. You may feel some pressure or discomfort. The procedure takes 10 to 30 minutes. But the injection itself usually takes only a few minutes. Your doctor may put ice on the area before you go home. You will probably go home soon after your shot. What can you expect after a joint injection? You may have numbness around the joint for a few hours. If your shot included both a pain reliever and a steroid, then the pain will probably go away right away. But it might come back after a few hours. This might happen if the pain reliever wears off and the steroid hasn't started to work yet. Steroids don't always work. But when they do, the pain relief can last for several days to a few months or longer. Your doctor may tell you to use ice on the area. You can also use ice if the pain comes back. Put ice or a cold pack on your joint for 10 to 20 minutes at a time. Put a thin cloth between the ice and your skin. Follow your doctor's instructions carefully. Follow-up care is a key part of your treatment and safety. Be sure to make and go to all appointments, and call your doctor if you are having problems. It's also a good idea to know your test results and keep a list of the medicines you take. Where can you learn more? Go to [email or web address]://Desecuritrex.[email or web address] org and sign in to your [facility] account. Enter L887 in the Simplee box to learn more about \"Learning About Joint Injections. \"     If you do not have an account, please click on the \"Sign Up Now\" link. Current as of: September 20, 2018  Content Version: 12.0  © 5071-1563 Healthwise, Incorporated. Care instructions adapted under license by [employer].  If you have questions about a medical condition or this instruction, always ask your healthcare professional. Ai Masters

## 2019-05-15 NOTE — PATIENT INSTRUCTIONS
It's also a good idea to know your test results and keep a list of the medicines you take. Where can you learn more? Go to [email or web address] [email or web address] org and sign in to your [employer] account. Enter P371 in the Ind[employer] box to learn more about \"Learning About Joint Injections. \"     If you do not have an account, please click on the \"Sign Up Now\" link. Current as of: September 20, 2018  Content Version: 12.0  © 1937-3014 Healthwise, Incorporated. Care instructions adapted under license by [facility] ([facility]). If you have questions about a medical condition or this instruction, always ask your healthcare professional. [address] any warranty or liability for your use of this information.

## 2019-05-28 ENCOUNTER — APPOINTMENT (OUTPATIENT)
Dept: GENERAL RADIOLOGY | Facility: HOSPITAL | Age: 69
End: 2019-05-28

## 2019-05-28 ENCOUNTER — HOSPITAL ENCOUNTER (EMERGENCY)
Facility: HOSPITAL | Age: 69
Discharge: HOME | End: 2019-05-28
Admitting: EMERGENCY MEDICINE

## 2019-05-28 ENCOUNTER — APPOINTMENT (OUTPATIENT)
Dept: CT IMAGING | Facility: HOSPITAL | Age: 69
End: 2019-05-28

## 2019-05-28 VITALS
TEMPERATURE: 98 F | BODY MASS INDEX: 31.5 KG/M2 | HEIGHT: 71 IN | WEIGHT: 225 LBS | HEART RATE: 88 BPM | OXYGEN SATURATION: 96 % | SYSTOLIC BLOOD PRESSURE: 141 MMHG | RESPIRATION RATE: 22 BRPM | DIASTOLIC BLOOD PRESSURE: 56 MMHG

## 2019-05-28 DIAGNOSIS — R55 SYNCOPE, UNSPECIFIED SYNCOPE TYPE: Primary | ICD-10-CM

## 2019-05-28 LAB
ALBUMIN SERPL-MCNC: 4.4 G/DL (ref 3.5–5)
ALBUMIN/GLOB SERPL: 1.6 G/DL (ref 1.1–2.5)
ALP SERPL-CCNC: 58 U/L (ref 24–120)
ALT SERPL W P-5'-P-CCNC: 15 U/L (ref 0–54)
ANION GAP SERPL CALCULATED.3IONS-SCNC: 9 MMOL/L (ref 4–13)
APTT PPP: 29.8 SECONDS (ref 24.1–35)
AST SERPL-CCNC: 24 U/L (ref 7–45)
BASOPHILS # BLD AUTO: 0.04 10*3/MM3 (ref 0–0.2)
BASOPHILS NFR BLD AUTO: 0.2 % (ref 0–2)
BILIRUB SERPL-MCNC: 0.5 MG/DL (ref 0.1–1)
BUN BLD-MCNC: 39 MG/DL (ref 5–21)
BUN/CREAT SERPL: 22 (ref 7–25)
CALCIUM SPEC-SCNC: 9 MG/DL (ref 8.4–10.4)
CHLORIDE SERPL-SCNC: 103 MMOL/L (ref 98–110)
CO2 SERPL-SCNC: 29 MMOL/L (ref 24–31)
CREAT BLD-MCNC: 1.77 MG/DL (ref 0.5–1.4)
DEPRECATED RDW RBC AUTO: 40.9 FL (ref 40–54)
EOSINOPHIL # BLD AUTO: 0.04 10*3/MM3 (ref 0–0.7)
EOSINOPHIL NFR BLD AUTO: 0.2 % (ref 0–4)
ERYTHROCYTE [DISTWIDTH] IN BLOOD BY AUTOMATED COUNT: 13.3 % (ref 12–15)
GFR SERPL CREATININE-BSD FRML MDRD: 38 ML/MIN/1.73
GLOBULIN UR ELPH-MCNC: 2.7 GM/DL
GLUCOSE BLD-MCNC: 190 MG/DL (ref 70–100)
GLUCOSE BLDC GLUCOMTR-MCNC: 210 MG/DL (ref 70–130)
HCT VFR BLD AUTO: 38.4 % (ref 40–52)
HGB BLD-MCNC: 13 G/DL (ref 14–18)
HOLD SPECIMEN: NORMAL
HOLD SPECIMEN: NORMAL
IMM GRANULOCYTES # BLD AUTO: 0.12 10*3/MM3 (ref 0–0.05)
IMM GRANULOCYTES NFR BLD AUTO: 0.7 % (ref 0–5)
INR PPP: 0.96 (ref 0.91–1.09)
LYMPHOCYTES # BLD AUTO: 0.89 10*3/MM3 (ref 0.72–4.86)
LYMPHOCYTES NFR BLD AUTO: 5.3 % (ref 15–45)
MCH RBC QN AUTO: 28.5 PG (ref 28–32)
MCHC RBC AUTO-ENTMCNC: 33.9 G/DL (ref 33–36)
MCV RBC AUTO: 84.2 FL (ref 82–95)
MONOCYTES # BLD AUTO: 0.68 10*3/MM3 (ref 0.19–1.3)
MONOCYTES NFR BLD AUTO: 4.1 % (ref 4–12)
NEUTROPHILS # BLD AUTO: 15 10*3/MM3 (ref 1.87–8.4)
NEUTROPHILS NFR BLD AUTO: 89.5 % (ref 39–78)
NRBC BLD AUTO-RTO: 0 /100 WBC (ref 0–0.2)
PLATELET # BLD AUTO: 275 10*3/MM3 (ref 130–400)
PMV BLD AUTO: 9.4 FL (ref 6–12)
POTASSIUM BLD-SCNC: 4.7 MMOL/L (ref 3.5–5.3)
PROT SERPL-MCNC: 7.1 G/DL (ref 6.3–8.7)
PROTHROMBIN TIME: 13.1 SECONDS (ref 11.9–14.6)
RBC # BLD AUTO: 4.56 10*6/MM3 (ref 4.8–5.9)
SODIUM BLD-SCNC: 141 MMOL/L (ref 135–145)
TROPONIN I SERPL-MCNC: <0.012 NG/ML (ref 0–0.03)
TROPONIN I SERPL-MCNC: <0.012 NG/ML (ref 0–0.03)
TSH SERPL DL<=0.05 MIU/L-ACNC: 0.08 MIU/ML (ref 0.47–4.68)
WBC NRBC COR # BLD: 16.77 10*3/MM3 (ref 4.8–10.8)
WHOLE BLOOD HOLD SPECIMEN: NORMAL
WHOLE BLOOD HOLD SPECIMEN: NORMAL

## 2019-05-28 PROCEDURE — 72110 X-RAY EXAM L-2 SPINE 4/>VWS: CPT

## 2019-05-28 PROCEDURE — 99284 EMERGENCY DEPT VISIT MOD MDM: CPT

## 2019-05-28 PROCEDURE — 82962 GLUCOSE BLOOD TEST: CPT

## 2019-05-28 PROCEDURE — 73030 X-RAY EXAM OF SHOULDER: CPT

## 2019-05-28 PROCEDURE — 93010 ELECTROCARDIOGRAM REPORT: CPT | Performed by: INTERNAL MEDICINE

## 2019-05-28 PROCEDURE — 85025 COMPLETE CBC W/AUTO DIFF WBC: CPT | Performed by: NURSE PRACTITIONER

## 2019-05-28 PROCEDURE — 85610 PROTHROMBIN TIME: CPT | Performed by: NURSE PRACTITIONER

## 2019-05-28 PROCEDURE — 0 IOPAMIDOL PER 1 ML: Performed by: NURSE PRACTITIONER

## 2019-05-28 PROCEDURE — 72072 X-RAY EXAM THORAC SPINE 3VWS: CPT

## 2019-05-28 PROCEDURE — 96375 TX/PRO/DX INJ NEW DRUG ADDON: CPT

## 2019-05-28 PROCEDURE — 93005 ELECTROCARDIOGRAM TRACING: CPT | Performed by: NURSE PRACTITIONER

## 2019-05-28 PROCEDURE — 85730 THROMBOPLASTIN TIME PARTIAL: CPT | Performed by: NURSE PRACTITIONER

## 2019-05-28 PROCEDURE — 71275 CT ANGIOGRAPHY CHEST: CPT

## 2019-05-28 PROCEDURE — 72125 CT NECK SPINE W/O DYE: CPT

## 2019-05-28 PROCEDURE — 80053 COMPREHEN METABOLIC PANEL: CPT | Performed by: NURSE PRACTITIONER

## 2019-05-28 PROCEDURE — 96374 THER/PROPH/DIAG INJ IV PUSH: CPT

## 2019-05-28 PROCEDURE — 70450 CT HEAD/BRAIN W/O DYE: CPT

## 2019-05-28 PROCEDURE — 84443 ASSAY THYROID STIM HORMONE: CPT | Performed by: NURSE PRACTITIONER

## 2019-05-28 PROCEDURE — 25010000002 ONDANSETRON PER 1 MG: Performed by: NURSE PRACTITIONER

## 2019-05-28 PROCEDURE — 93005 ELECTROCARDIOGRAM TRACING: CPT

## 2019-05-28 PROCEDURE — 71045 X-RAY EXAM CHEST 1 VIEW: CPT

## 2019-05-28 PROCEDURE — 96376 TX/PRO/DX INJ SAME DRUG ADON: CPT

## 2019-05-28 PROCEDURE — 84484 ASSAY OF TROPONIN QUANT: CPT | Performed by: NURSE PRACTITIONER

## 2019-05-28 RX ORDER — ONDANSETRON 2 MG/ML
4 INJECTION INTRAMUSCULAR; INTRAVENOUS ONCE
Status: COMPLETED | OUTPATIENT
Start: 2019-05-28 | End: 2019-05-28

## 2019-05-28 RX ORDER — HYDROCODONE BITARTRATE AND ACETAMINOPHEN 5; 325 MG/1; MG/1
1 TABLET ORAL EVERY 4 HOURS PRN
Qty: 15 TABLET | Refills: 0 | Status: SHIPPED | OUTPATIENT
Start: 2019-05-28 | End: 2019-08-09

## 2019-05-28 RX ADMIN — HYDROMORPHONE HYDROCHLORIDE 1 MG: 1 INJECTION, SOLUTION INTRAMUSCULAR; INTRAVENOUS; SUBCUTANEOUS at 20:46

## 2019-05-28 RX ADMIN — HYDROMORPHONE HYDROCHLORIDE 1 MG: 1 INJECTION, SOLUTION INTRAMUSCULAR; INTRAVENOUS; SUBCUTANEOUS at 16:44

## 2019-05-28 RX ADMIN — ONDANSETRON 4 MG: 2 INJECTION, SOLUTION INTRAMUSCULAR; INTRAVENOUS at 20:47

## 2019-05-28 RX ADMIN — ONDANSETRON 4 MG: 2 SOLUTION INTRAMUSCULAR; INTRAVENOUS at 16:45

## 2019-05-28 RX ADMIN — IOPAMIDOL 86 ML: 755 INJECTION, SOLUTION INTRAVENOUS at 18:20

## 2019-06-05 ENCOUNTER — OFFICE VISIT (OUTPATIENT)
Dept: PRIMARY CARE CLINIC | Age: 69
End: 2019-06-05
Payer: MEDICARE

## 2019-06-05 ENCOUNTER — TRANSCRIBE ORDERS (OUTPATIENT)
Dept: ADMINISTRATIVE | Facility: HOSPITAL | Age: 69
End: 2019-06-05

## 2019-06-05 VITALS
TEMPERATURE: 98 F | DIASTOLIC BLOOD PRESSURE: 59 MMHG | BODY MASS INDEX: 31.36 KG/M2 | OXYGEN SATURATION: 97 % | HEIGHT: 71 IN | WEIGHT: 224 LBS | HEART RATE: 77 BPM | SYSTOLIC BLOOD PRESSURE: 137 MMHG

## 2019-06-05 DIAGNOSIS — E11.22 TYPE 2 DIABETES MELLITUS WITH STAGE 3 CHRONIC KIDNEY DISEASE, WITHOUT LONG-TERM CURRENT USE OF INSULIN (HCC): ICD-10-CM

## 2019-06-05 DIAGNOSIS — R55 SYNCOPE, UNSPECIFIED SYNCOPE TYPE: Primary | ICD-10-CM

## 2019-06-05 DIAGNOSIS — R55 SYNCOPE AND COLLAPSE: Primary | ICD-10-CM

## 2019-06-05 DIAGNOSIS — N18.30 TYPE 2 DIABETES MELLITUS WITH STAGE 3 CHRONIC KIDNEY DISEASE, WITHOUT LONG-TERM CURRENT USE OF INSULIN (HCC): ICD-10-CM

## 2019-06-05 DIAGNOSIS — I10 ESSENTIAL HYPERTENSION: ICD-10-CM

## 2019-06-05 PROCEDURE — 3017F COLORECTAL CA SCREEN DOC REV: CPT | Performed by: NURSE PRACTITIONER

## 2019-06-05 PROCEDURE — 1111F DSCHRG MED/CURRENT MED MERGE: CPT | Performed by: NURSE PRACTITIONER

## 2019-06-05 PROCEDURE — 3044F HG A1C LEVEL LT 7.0%: CPT | Performed by: NURSE PRACTITIONER

## 2019-06-05 PROCEDURE — 1036F TOBACCO NON-USER: CPT | Performed by: NURSE PRACTITIONER

## 2019-06-05 PROCEDURE — 99214 OFFICE O/P EST MOD 30 MIN: CPT | Performed by: NURSE PRACTITIONER

## 2019-06-05 PROCEDURE — 1123F ACP DISCUSS/DSCN MKR DOCD: CPT | Performed by: NURSE PRACTITIONER

## 2019-06-05 PROCEDURE — 4040F PNEUMOC VAC/ADMIN/RCVD: CPT | Performed by: NURSE PRACTITIONER

## 2019-06-05 PROCEDURE — 2022F DILAT RTA XM EVC RTNOPTHY: CPT | Performed by: NURSE PRACTITIONER

## 2019-06-05 PROCEDURE — G8427 DOCREV CUR MEDS BY ELIG CLIN: HCPCS | Performed by: NURSE PRACTITIONER

## 2019-06-05 PROCEDURE — G8417 CALC BMI ABV UP PARAM F/U: HCPCS | Performed by: NURSE PRACTITIONER

## 2019-06-05 ASSESSMENT — ENCOUNTER SYMPTOMS
CONSTIPATION: 0
SORE THROAT: 0
VOMITING: 0
TROUBLE SWALLOWING: 0
ABDOMINAL PAIN: 0
COUGH: 0
RHINORRHEA: 0
NAUSEA: 0
SHORTNESS OF BREATH: 0
DIARRHEA: 0

## 2019-06-05 NOTE — PROGRESS NOTES
[address], [address]  Phone [phone]   Fax [phone]      OFFICE VISIT: 2019    Nilesh Quintero is a 70 y.o. male whopresents today for his medical conditions/complaints as noted below. Nilesh Quintero isc/o of Loss of Consciousness (2019- passed out at work. hit head on concreate. went to [facility] er. still sore on back of head and all of back. ) and Health Maintenance (discuss shingrix. )        :     HPI  Here for f/u after passing out at work 2019  Seen and evaluated at [facility]. Was work standing behind counter, the next thing remembers woke up lying on the floor. Right before occurred was hot, dizzy. That morning had felt light headed, stopped and stool for a minute and it would get better. Denies chest pain. It was witnessed. He denies any seizure like activity. \"Before manager got up front to check on me I was better\"  Pt states it felt like he  and came back. Hit the back of head. Review of [facility] record 2019    CT head without/CT c-spine w/o  Result Impression   CT head. No acute intracranial abnormalities. CT cervical spine. No acute osseous posttraumatic finding. This report was finalized on 2019 18:45 by Dr. Evelyne Kaye MD.     CT chest with contrast  Result Addenda   Addendum by Michel Ramsay MD on 2019  6:58 PM  Addendum: Incompletely characterized right renal hypodensity measuring  2.7 cm, statistically most likely a cyst. Nonobstructive left  nephrolithiasis. This report was finalized on 2019 18:58 by Dr. Evelyne Kaye MD.   Result Impression   1. No evidence of pulmonary embolus or other acute cardiopulmonary  process. 2. Dependent atelectasis bilaterally     EKG  Normal sinus rhythm  Right bundle branch block  Abnormal ECG  When compared with ECG of 2018 15:15,  No significant change was found    Review of labs - unremarkable except leukocytosis.    Renal function stable BUN 39/creatinine 1.77  He was not orthostatic. Cardiac enzymes were negative x2    Normal stress echo in 2015  Sugars have been running good. Denies any lows. Past Medical History:   Diagnosis Date    Allergic rhinitis     Cancer ([address] 2012    Thyroid  - Dr Carroll Medico Hyperthyroidism     Prostate cancer [facility])     Sleep apnea     Type II or unspecified type diabetes mellitus without mention of complication, not stated as uncontrolled       Past Surgical History:   Procedure Laterality Date    CHOLECYSTECTOMY      INCONTINENCE SURGERY  11/26/2018    dr gino Hurst  08/01/2017    removal, dr Melvin Bonilla      removed by Dr Jani Little        Family History   Problem Relation Age of Onset    Schizophrenia Mother     Heart Failure Mother     Heart Attack Mother     Cancer Father         Prostate & Leukemia     Heart Disease Father     Heart Attack Father     Thyroid Disease Sister        Social History     Tobacco Use    Smoking status: Never Smoker    Smokeless tobacco: Never Used   Substance Use Topics    Alcohol use: No      Current Outpatient Medications   Medication Sig Dispense Refill    traMADol (ULTRAM) 50 MG tablet Take 1 tablet by mouth 2 times daily as needed for Pain for up to 60 days. 60 tablet 0    gabapentin (NEURONTIN) 400 MG capsule Take 1 capsule by mouth 3 times daily for 90 days. 270 capsule 0    nystatin (MYCOSTATIN) 266693 UNIT/GM powder Apply topically 4 times daily.  60 g 1    Cholecalciferol (VITAMIN D3) 2000 units TABS Take 1 tablet by mouth daily 30 tablet 11    COLCRYS 0.6 MG tablet TAKE 2 TABLETS THEN AN HOUR LATER TAKE 1 TABLET WHEN GOUT FLARES 12 tablet 5    SITagliptin (JANUVIA) 100 MG tablet Take 1 tablet by mouth daily 90 tablet 3    citalopram (CELEXA) 20 MG tablet Take 1 tablet by mouth daily 30 tablet 11    levothyroxine (LEVOTHROID) 200 MCG tablet Take 1 tablet by mouth daily 90 tablet 3    levothyroxine (SYNTHROID) 25 MCG Constitutional: He is oriented to person, place, and time. He appears well-developed and well-nourished. HENT:   Head: Normocephalic and atraumatic. Right Ear: External ear normal.   Left Ear: External ear normal.   Nose: Nose normal.   Mouth/Throat: Oropharynx is clear and moist.   Eyes: Pupils are equal, round, and reactive to light. Conjunctivae are normal. No scleral icterus. Neck: Trachea normal and normal range of motion. Neck supple. No spinous process tenderness present. Carotid bruit is not present. No edema present. Cardiovascular: Normal rate, regular rhythm, normal heart sounds and intact distal pulses. No murmur heard. Pulmonary/Chest: Effort normal and breath sounds normal.   Abdominal: Soft. Normal appearance and bowel sounds are normal. He exhibits no distension. There is no hepatosplenomegaly. There is no tenderness. There is no rebound and no guarding. Musculoskeletal: Normal range of motion. He exhibits no edema. Neurological: He is alert and oriented to person, place, and time. He has normal strength. No cranial nerve deficit or sensory deficit. Coordination and gait normal. GCS eye subscore is 4. GCS verbal subscore is 5. GCS motor subscore is 6. Finger to nose normal  Heel to shin normal   + bilaterally   Skin: Skin is warm, dry and intact. No rash noted. Psychiatric: He has a normal mood and affect. His speech is normal and behavior is normal. Judgment and thought content normal.   Vitals reviewed. BP (!) 137/59 (Site: Left Upper Arm, Position: Sitting, Cuff Size: Large Adult)   Pulse 77   Temp 98 °F (36.7 °C) (Temporal)   Ht 5' 11\" (1.803 m)   Wt 224 lb (101.6 kg)   SpO2 97%   BMI 31.24 kg/m²     :        ICD-10-CM    1. Syncope, unspecified syncope type R55 VA DISCHARGE MEDS RECONCILED W/ CURRENT OUTPATIENT MED LIST     Cardiac event monitor    Going to check for arrhythmia. Advised to go to ER if symptoms recurs.     2. Type 2 diabetes mellitus with

## 2019-06-07 ENCOUNTER — APPOINTMENT (OUTPATIENT)
Dept: CARDIOLOGY | Facility: HOSPITAL | Age: 69
End: 2019-06-07

## 2019-06-10 ENCOUNTER — HOSPITAL ENCOUNTER (OUTPATIENT)
Dept: CARDIOLOGY | Facility: HOSPITAL | Age: 69
Discharge: HOME | End: 2019-06-10
Admitting: NURSE PRACTITIONER

## 2019-06-10 DIAGNOSIS — R55 SYNCOPE AND COLLAPSE: ICD-10-CM

## 2019-06-10 PROCEDURE — 0296T HC EXT ECG > 48HR TO 21 DAY RCRD W/CONECT INTL RCRD: CPT

## 2019-06-19 DIAGNOSIS — M54.42 CHRONIC LEFT-SIDED LOW BACK PAIN WITH LEFT-SIDED SCIATICA: ICD-10-CM

## 2019-06-19 DIAGNOSIS — M25.50 ARTHRALGIA OF MULTIPLE JOINTS: ICD-10-CM

## 2019-06-19 DIAGNOSIS — G89.29 CHRONIC LEFT-SIDED LOW BACK PAIN WITH LEFT-SIDED SCIATICA: ICD-10-CM

## 2019-06-19 RX ORDER — TRAMADOL HYDROCHLORIDE 50 MG/1
50 TABLET ORAL 2 TIMES DAILY PRN
Qty: 60 TABLET | Refills: 0 | Status: SHIPPED | OUTPATIENT
Start: 2019-06-19 | End: 2019-07-26 | Stop reason: SDUPTHER

## 2019-06-19 NOTE — TELEPHONE ENCOUNTER
Pt was seen 6/5/19 and last refill 5/15/19. Didn't know if you would want to RF. Received fax from pharmacy requesting refill on pts medication(s). Pt was last seen in office on 6/5/2019  and has a follow up scheduled for 7/17/2019. Will send request to  Sly Tamez  for patient.      Requested Prescriptions     Pending Prescriptions Disp Refills    traMADol (ULTRAM) 50 MG tablet [Pharmacy Med Name: TRAMADOL HCL 50 MG TABLET] 60 tablet 0     Sig: TAKE 1 TABLET BY MOUTH 2 TIMES DAILY AS NEEDED FOR PAIN

## 2019-07-08 ENCOUNTER — RESULTS ENCOUNTER (OUTPATIENT)
Dept: UROLOGY | Facility: CLINIC | Age: 69
End: 2019-07-08

## 2019-07-08 DIAGNOSIS — C61 PROSTATE CANCER (HCC): ICD-10-CM

## 2019-07-10 DIAGNOSIS — C61 PROSTATE CANCER (HCC): Primary | ICD-10-CM

## 2019-07-15 ENCOUNTER — RESULTS ENCOUNTER (OUTPATIENT)
Dept: UROLOGY | Facility: CLINIC | Age: 69
End: 2019-07-15

## 2019-07-15 DIAGNOSIS — C61 PROSTATE CANCER (HCC): ICD-10-CM

## 2019-07-15 PROCEDURE — 0298T PR EXT ECG > 48HR TO 21 DAY REVIEW AND INTERPRETATN: CPT | Performed by: INTERNAL MEDICINE

## 2019-07-16 ENCOUNTER — TELEPHONE (OUTPATIENT)
Dept: PRIMARY CARE CLINIC | Age: 69
End: 2019-07-16

## 2019-07-16 DIAGNOSIS — N18.30 TYPE 2 DIABETES MELLITUS WITH STAGE 3 CHRONIC KIDNEY DISEASE, WITHOUT LONG-TERM CURRENT USE OF INSULIN (HCC): ICD-10-CM

## 2019-07-16 DIAGNOSIS — E78.2 MIXED HYPERLIPIDEMIA: ICD-10-CM

## 2019-07-16 DIAGNOSIS — E11.22 TYPE 2 DIABETES MELLITUS WITH STAGE 3 CHRONIC KIDNEY DISEASE, WITHOUT LONG-TERM CURRENT USE OF INSULIN (HCC): ICD-10-CM

## 2019-07-16 LAB
ALBUMIN SERPL-MCNC: 4.4 G/DL (ref 3.5–5.2)
ALP BLD-CCNC: 65 U/L (ref 40–130)
ALT SERPL-CCNC: 17 U/L (ref 5–41)
ANION GAP SERPL CALCULATED.3IONS-SCNC: 16 MMOL/L (ref 7–19)
AST SERPL-CCNC: 13 U/L (ref 5–40)
BILIRUB SERPL-MCNC: 0.4 MG/DL (ref 0.2–1.2)
BUN BLDV-MCNC: 30 MG/DL (ref 8–23)
CALCIUM SERPL-MCNC: 9.5 MG/DL (ref 8.8–10.2)
CHLORIDE BLD-SCNC: 103 MMOL/L (ref 98–111)
CHOLESTEROL, TOTAL: 102 MG/DL (ref 160–199)
CO2: 23 MMOL/L (ref 22–29)
CREAT SERPL-MCNC: 1.6 MG/DL (ref 0.5–1.2)
GFR NON-AFRICAN AMERICAN: 43
GLUCOSE BLD-MCNC: 138 MG/DL (ref 74–109)
HBA1C MFR BLD: 6 % (ref 4–6)
HDLC SERPL-MCNC: 22 MG/DL (ref 55–121)
LDL CHOLESTEROL CALCULATED: 57 MG/DL
POTASSIUM SERPL-SCNC: 4.9 MMOL/L (ref 3.5–5)
SODIUM BLD-SCNC: 142 MMOL/L (ref 136–145)
TOTAL PROTEIN: 7.5 G/DL (ref 6.6–8.7)
TRIGL SERPL-MCNC: 117 MG/DL (ref 0–149)

## 2019-07-16 NOTE — TELEPHONE ENCOUNTER
----- Message from MARY Thomas sent at 7/16/2019  1:08 PM CDT -----  Please call patient and let them know results. Normal cholesterol  Kidney function is stable, metabolic panel was normal  Blood sugars well controlled with an A1c of 6.0.   This is three-month blood sugar average of 126

## 2019-07-17 ENCOUNTER — OFFICE VISIT (OUTPATIENT)
Dept: PRIMARY CARE CLINIC | Age: 69
End: 2019-07-17
Payer: MEDICARE

## 2019-07-17 ENCOUNTER — TRANSCRIBE ORDERS (OUTPATIENT)
Dept: ADMINISTRATIVE | Facility: HOSPITAL | Age: 69
End: 2019-07-17

## 2019-07-17 ENCOUNTER — TELEPHONE (OUTPATIENT)
Dept: UROLOGY | Facility: CLINIC | Age: 69
End: 2019-07-17

## 2019-07-17 VITALS
TEMPERATURE: 98.4 F | HEART RATE: 83 BPM | OXYGEN SATURATION: 95 % | DIASTOLIC BLOOD PRESSURE: 60 MMHG | HEIGHT: 71 IN | WEIGHT: 230 LBS | SYSTOLIC BLOOD PRESSURE: 126 MMHG | BODY MASS INDEX: 32.2 KG/M2

## 2019-07-17 DIAGNOSIS — C61 PROSTATE CANCER (HCC): Primary | ICD-10-CM

## 2019-07-17 DIAGNOSIS — I10 ESSENTIAL HYPERTENSION: ICD-10-CM

## 2019-07-17 DIAGNOSIS — R07.9 CHEST PAIN, UNSPECIFIED TYPE: ICD-10-CM

## 2019-07-17 DIAGNOSIS — E78.2 MIXED HYPERLIPIDEMIA: ICD-10-CM

## 2019-07-17 DIAGNOSIS — N18.30 TYPE 2 DIABETES MELLITUS WITH STAGE 3 CHRONIC KIDNEY DISEASE, WITHOUT LONG-TERM CURRENT USE OF INSULIN (HCC): ICD-10-CM

## 2019-07-17 DIAGNOSIS — E11.22 TYPE 2 DIABETES MELLITUS WITH STAGE 3 CHRONIC KIDNEY DISEASE, WITHOUT LONG-TERM CURRENT USE OF INSULIN (HCC): ICD-10-CM

## 2019-07-17 DIAGNOSIS — I48.0 PAROXYSMAL ATRIAL FIBRILLATION (HCC): Primary | ICD-10-CM

## 2019-07-17 PROCEDURE — 2022F DILAT RTA XM EVC RTNOPTHY: CPT | Performed by: NURSE PRACTITIONER

## 2019-07-17 PROCEDURE — G8427 DOCREV CUR MEDS BY ELIG CLIN: HCPCS | Performed by: NURSE PRACTITIONER

## 2019-07-17 PROCEDURE — 99214 OFFICE O/P EST MOD 30 MIN: CPT | Performed by: NURSE PRACTITIONER

## 2019-07-17 PROCEDURE — 4040F PNEUMOC VAC/ADMIN/RCVD: CPT | Performed by: NURSE PRACTITIONER

## 2019-07-17 PROCEDURE — G8417 CALC BMI ABV UP PARAM F/U: HCPCS | Performed by: NURSE PRACTITIONER

## 2019-07-17 PROCEDURE — 3017F COLORECTAL CA SCREEN DOC REV: CPT | Performed by: NURSE PRACTITIONER

## 2019-07-17 PROCEDURE — 1036F TOBACCO NON-USER: CPT | Performed by: NURSE PRACTITIONER

## 2019-07-17 PROCEDURE — 1123F ACP DISCUSS/DSCN MKR DOCD: CPT | Performed by: NURSE PRACTITIONER

## 2019-07-17 PROCEDURE — 3044F HG A1C LEVEL LT 7.0%: CPT | Performed by: NURSE PRACTITIONER

## 2019-07-17 RX ORDER — AMLODIPINE BESYLATE 10 MG/1
5 TABLET ORAL DAILY
Qty: 90 TABLET | Refills: 3
Start: 2019-07-17 | End: 2022-01-18

## 2019-07-17 RX ORDER — METOPROLOL SUCCINATE 25 MG/1
25 TABLET, EXTENDED RELEASE ORAL DAILY
Qty: 30 TABLET | Refills: 11 | Status: SHIPPED | OUTPATIENT
Start: 2019-07-17 | End: 2020-08-17 | Stop reason: SDUPTHER

## 2019-07-17 ASSESSMENT — ENCOUNTER SYMPTOMS
NAUSEA: 0
CHEST TIGHTNESS: 0
RHINORRHEA: 0
DIARRHEA: 0
SHORTNESS OF BREATH: 0
ABDOMINAL PAIN: 0
COUGH: 0
CONSTIPATION: 0
SORE THROAT: 0
TROUBLE SWALLOWING: 0
VOMITING: 0

## 2019-07-17 NOTE — TELEPHONE ENCOUNTER
Called and left  for pt to return call.  He did not show for lab appt and we need to reschedule his  Appt as well.      Thanks.

## 2019-07-17 NOTE — PATIENT INSTRUCTIONS
Cut amlodipine dose in 1/2. Take 1/2 tablet daily. Patient is asked to monitor BP at home or work, several times per month and return with written values at next office visit. Any signs of bleeding follow up. Patient Education        Atrial Fibrillation: Care Instructions  Your Care Instructions    Atrial fibrillation is an irregular and often fast heartbeat. Treating this condition is important for several reasons. It can cause blood clots, which can travel from your heart to your brain and cause a stroke. If you have a fast heartbeat, you may feel lightheaded, dizzy, and weak. An irregular heartbeat can also increase your risk for heart failure. Atrial fibrillation is often the result of another heart condition, such as high blood pressure or coronary artery disease. Making changes to improve your heart condition will help you stay healthy and active. Follow-up care is a key part of your treatment and safety. Be sure to make and go to all appointments, and call your doctor if you are having problems. It's also a good idea to know your test results and keep a list of the medicines you take. How can you care for yourself at home? Medicines    · Take your medicines exactly as prescribed. Call your doctor if you think you are having a problem with your medicine. You will get more details on the specific medicines your doctor prescribes.     · If your doctor has given you a blood thinner to prevent a stroke, be sure you get instructions about how to take your medicine safely. Blood thinners can cause serious bleeding problems.     · Do not take any vitamins, over-the-counter drugs, or herbal products without talking to your doctor first.    Lifestyle changes    · Do not smoke. Smoking can increase your chance of a stroke and heart attack. If you need help quitting, talk to your doctor about stop-smoking programs and medicines.  These can increase your chances of quitting for good.     · Eat a heart-healthy

## 2019-07-17 NOTE — PROGRESS NOTES
2019     Evelina Fuentes (:  1950) is a 70 y.o. male, here for evaluation of the following medical concerns:  Chief Complaint   Patient presents with    Results     here for results of zio patch-Caodaism. had chest pain while wearing patch. also got lightheaded.  Results     discuss lab results. HPI   Here for f/u on zio patch results and f/u on labs for health conditions. Has not had any new syncope. States had some intermittent chest pains when sitting down when wearing the zio patch, was able to press the button to identify them on the timeline. States it was a dull pain, no pressure, and the longest lasted about 10 minutes. Some lightheadedness when he bends over and raises up. Treatment Adherence:   Medication compliance:  compliant all of the time  Diet compliance:  compliant most of the time  Weight trend: stable  Current exercise: states he has been cutting trees and working outside  Barriers: none    Diabetes Mellitus Type 2: Current symptoms/problems include none. Home blood sugar records: patient tests 2 time(s) per week, 140-150 in morning  Any episodes of hypoglycemia? no  Eye exam current (within one year): yes  Tobacco history: He  reports that he has never smoked. He has never used smokeless tobacco.   Daily Aspirin? Yes    Hypertension:  Home blood pressure monitoring: Yes - about once per week. He is not adherent to a low sodium diet. Patient denies shortness of breath and headache. States he has intermittent chest pains when he sits down, only a few times and it was when he was wearing the zio patch and able to miguel angel when it happened. Antihypertensive medication side effects: no medication side effects noted. Use of agents associated with hypertension: none. Hyperlipidemia:  No new myalgias or GI upset on atorvastatin (Lipitor).        Lab Results   Component Value Date    LABA1C 6.0 2019    LABA1C 6.0 2019    LABA1C 5.7 2019     Lab

## 2019-07-18 ENCOUNTER — TRANSCRIBE ORDERS (OUTPATIENT)
Dept: ADMINISTRATIVE | Facility: HOSPITAL | Age: 69
End: 2019-07-18

## 2019-07-18 DIAGNOSIS — R07.9 CHEST PAIN, UNSPECIFIED TYPE: Primary | ICD-10-CM

## 2019-07-22 ENCOUNTER — RESULTS ENCOUNTER (OUTPATIENT)
Dept: UROLOGY | Facility: CLINIC | Age: 69
End: 2019-07-22

## 2019-07-22 DIAGNOSIS — C61 PROSTATE CANCER (HCC): ICD-10-CM

## 2019-07-24 DIAGNOSIS — C61 PROSTATE CANCER (HCC): Primary | ICD-10-CM

## 2019-07-25 ENCOUNTER — TELEPHONE (OUTPATIENT)
Dept: PRIMARY CARE CLINIC | Age: 69
End: 2019-07-25

## 2019-07-25 ENCOUNTER — HOSPITAL ENCOUNTER (OUTPATIENT)
Dept: CARDIOLOGY | Facility: HOSPITAL | Age: 69
Discharge: HOME | End: 2019-07-25
Admitting: NURSE PRACTITIONER

## 2019-07-25 ENCOUNTER — HOSPITAL ENCOUNTER (OUTPATIENT)
Dept: CARDIOLOGY | Facility: HOSPITAL | Age: 69
Discharge: HOME | End: 2019-07-25

## 2019-07-25 VITALS — HEIGHT: 71 IN | BODY MASS INDEX: 31.4 KG/M2

## 2019-07-25 VITALS
HEART RATE: 60 BPM | SYSTOLIC BLOOD PRESSURE: 113 MMHG | BODY MASS INDEX: 30.7 KG/M2 | DIASTOLIC BLOOD PRESSURE: 48 MMHG | WEIGHT: 220 LBS

## 2019-07-25 DIAGNOSIS — R07.9 CHEST PAIN, UNSPECIFIED TYPE: ICD-10-CM

## 2019-07-25 DIAGNOSIS — I48.0 PAROXYSMAL ATRIAL FIBRILLATION (HCC): ICD-10-CM

## 2019-07-25 PROCEDURE — 93350 STRESS TTE ONLY: CPT | Performed by: INTERNAL MEDICINE

## 2019-07-25 PROCEDURE — 25010000002 PERFLUTREN 6.52 MG/ML SUSPENSION: Performed by: INTERNAL MEDICINE

## 2019-07-25 PROCEDURE — 93352 ADMIN ECG CONTRAST AGENT: CPT | Performed by: INTERNAL MEDICINE

## 2019-07-25 PROCEDURE — 93350 STRESS TTE ONLY: CPT

## 2019-07-25 PROCEDURE — 93018 CV STRESS TEST I&R ONLY: CPT | Performed by: INTERNAL MEDICINE

## 2019-07-25 PROCEDURE — 93306 TTE W/DOPPLER COMPLETE: CPT

## 2019-07-25 PROCEDURE — 93017 CV STRESS TEST TRACING ONLY: CPT

## 2019-07-25 PROCEDURE — 93306 TTE W/DOPPLER COMPLETE: CPT | Performed by: INTERNAL MEDICINE

## 2019-07-25 RX ADMIN — PERFLUTREN 8.48 MG: 6.52 INJECTION, SUSPENSION INTRAVENOUS at 11:02

## 2019-07-26 DIAGNOSIS — M54.42 CHRONIC LEFT-SIDED LOW BACK PAIN WITH LEFT-SIDED SCIATICA: ICD-10-CM

## 2019-07-26 DIAGNOSIS — G89.29 CHRONIC LEFT-SIDED LOW BACK PAIN WITH LEFT-SIDED SCIATICA: ICD-10-CM

## 2019-07-26 DIAGNOSIS — M25.50 ARTHRALGIA OF MULTIPLE JOINTS: ICD-10-CM

## 2019-07-26 LAB
BH CV ECHO MEAS - AO MAX PG (FULL): 3.3 MMHG
BH CV ECHO MEAS - AO MAX PG: 10.1 MMHG
BH CV ECHO MEAS - AO MEAN PG (FULL): 1 MMHG
BH CV ECHO MEAS - AO MEAN PG: 4 MMHG
BH CV ECHO MEAS - AO ROOT AREA (BSA CORRECTED): 1.4
BH CV ECHO MEAS - AO ROOT AREA: 7.1 CM^2
BH CV ECHO MEAS - AO ROOT DIAM: 3 CM
BH CV ECHO MEAS - AO V2 MAX: 159 CM/SEC
BH CV ECHO MEAS - AO V2 MEAN: 89.2 CM/SEC
BH CV ECHO MEAS - AO V2 VTI: 30.4 CM
BH CV ECHO MEAS - AVA(I,A): 3.3 CM^2
BH CV ECHO MEAS - AVA(I,D): 3.3 CM^2
BH CV ECHO MEAS - AVA(V,A): 2.8 CM^2
BH CV ECHO MEAS - AVA(V,D): 2.8 CM^2
BH CV ECHO MEAS - BSA(HAYCOCK): 2.3 M^2
BH CV ECHO MEAS - BSA: 2.2 M^2
BH CV ECHO MEAS - BZI_BMI: 30.7 KILOGRAMS/M^2
BH CV ECHO MEAS - BZI_METRIC_HEIGHT: 180.3 CM
BH CV ECHO MEAS - BZI_METRIC_WEIGHT: 99.8 KG
BH CV ECHO MEAS - EDV(CUBED): 65 ML
BH CV ECHO MEAS - EDV(MOD-SP4): 147 ML
BH CV ECHO MEAS - EDV(TEICH): 70.8 ML
BH CV ECHO MEAS - EF(CUBED): 58.9 %
BH CV ECHO MEAS - EF(MOD-SP4): 60.8 %
BH CV ECHO MEAS - EF(TEICH): 51 %
BH CV ECHO MEAS - ESV(CUBED): 26.7 ML
BH CV ECHO MEAS - ESV(MOD-SP4): 57.6 ML
BH CV ECHO MEAS - ESV(TEICH): 34.7 ML
BH CV ECHO MEAS - FS: 25.6 %
BH CV ECHO MEAS - IVS/LVPW: 1.2
BH CV ECHO MEAS - IVSD: 0.82 CM
BH CV ECHO MEAS - LA DIMENSION: 4 CM
BH CV ECHO MEAS - LA/AO: 1.3
BH CV ECHO MEAS - LAT PEAK E' VEL: 12.6 CM/SEC
BH CV ECHO MEAS - LV DIASTOLIC VOL/BSA (35-75): 66.9 ML/M^2
BH CV ECHO MEAS - LV MASS(C)D: 85.7 GRAMS
BH CV ECHO MEAS - LV MASS(C)DI: 39 GRAMS/M^2
BH CV ECHO MEAS - LV MAX PG: 6.8 MMHG
BH CV ECHO MEAS - LV MEAN PG: 3 MMHG
BH CV ECHO MEAS - LV SYSTOLIC VOL/BSA (12-30): 26.2 ML/M^2
BH CV ECHO MEAS - LV V1 MAX: 130.5 CM/SEC
BH CV ECHO MEAS - LV V1 MEAN: 79.9 CM/SEC
BH CV ECHO MEAS - LV V1 VTI: 29.2 CM
BH CV ECHO MEAS - LVIDD: 4 CM
BH CV ECHO MEAS - LVIDS: 3 CM
BH CV ECHO MEAS - LVLD AP4: 8.7 CM
BH CV ECHO MEAS - LVLS AP4: 6.8 CM
BH CV ECHO MEAS - LVOT AREA (M): 3.5 CM^2
BH CV ECHO MEAS - LVOT AREA: 3.5 CM^2
BH CV ECHO MEAS - LVOT DIAM: 2.1 CM
BH CV ECHO MEAS - LVPWD: 0.67 CM
BH CV ECHO MEAS - MED PEAK E' VEL: 8.49 CM/SEC
BH CV ECHO MEAS - MR MAX PG: 39.7 MMHG
BH CV ECHO MEAS - MR MAX VEL: 312.7 CM/SEC
BH CV ECHO MEAS - MV A MAX VEL: 49.9 CM/SEC
BH CV ECHO MEAS - MV DEC TIME: 0.25 SEC
BH CV ECHO MEAS - MV E MAX VEL: 74.7 CM/SEC
BH CV ECHO MEAS - MV E/A: 1.5
BH CV ECHO MEAS - SI(AO): 97.8 ML/M^2
BH CV ECHO MEAS - SI(CUBED): 17.4 ML/M^2
BH CV ECHO MEAS - SI(LVOT): 46.1 ML/M^2
BH CV ECHO MEAS - SI(MOD-SP4): 40.7 ML/M^2
BH CV ECHO MEAS - SI(TEICH): 16.4 ML/M^2
BH CV ECHO MEAS - SV(AO): 214.9 ML
BH CV ECHO MEAS - SV(CUBED): 38.2 ML
BH CV ECHO MEAS - SV(LVOT): 101.1 ML
BH CV ECHO MEAS - SV(MOD-SP4): 89.4 ML
BH CV ECHO MEAS - SV(TEICH): 36.1 ML
BH CV ECHO MEAS - TR MAX VEL: 218 CM/SEC
BH CV ECHO MEASUREMENTS AVERAGE E/E' RATIO: 7.08
BH CV STRESS BP STAGE 1: NORMAL
BH CV STRESS BP STAGE 2: NORMAL
BH CV STRESS DURATION MIN STAGE 1: 3
BH CV STRESS DURATION MIN STAGE 2: 3
BH CV STRESS DURATION SEC STAGE 1: 0
BH CV STRESS DURATION SEC STAGE 2: 0
BH CV STRESS GRADE STAGE 1: 10
BH CV STRESS GRADE STAGE 2: 12
BH CV STRESS HR STAGE 1: 89
BH CV STRESS HR STAGE 2: 108
BH CV STRESS METS STAGE 1: 5
BH CV STRESS METS STAGE 2: 7.5
BH CV STRESS PROTOCOL 1: NORMAL
BH CV STRESS RECOVERY BP: NORMAL MMHG
BH CV STRESS RECOVERY HR: 72 BPM
BH CV STRESS SPEED STAGE 1: 1.7
BH CV STRESS SPEED STAGE 2: 2.5
BH CV STRESS STAGE 1: 1
BH CV STRESS STAGE 2: 2
LEFT ATRIUM VOLUME INDEX: 48.2 ML/M2
LEFT ATRIUM VOLUME: 106 CM3
LV EF 2D ECHO EST: 60 %
MAXIMAL PREDICTED HEART RATE: 151 BPM
MAXIMAL PREDICTED HEART RATE: 151 BPM
PERCENT MAX PREDICTED HR: 71.52 %
STRESS BASELINE BP: NORMAL MMHG
STRESS BASELINE HR: 60 BPM
STRESS PERCENT HR: 84 %
STRESS POST ESTIMATED WORKLOAD: 7.5 METS
STRESS POST EXERCISE DUR MIN: 6 MIN
STRESS POST EXERCISE DUR SEC: 0 SEC
STRESS POST PEAK BP: NORMAL MMHG
STRESS POST PEAK HR: 108 BPM
STRESS TARGET HR: 128 BPM
STRESS TARGET HR: 128 BPM

## 2019-07-29 ENCOUNTER — TELEPHONE (OUTPATIENT)
Dept: PRIMARY CARE CLINIC | Age: 69
End: 2019-07-29

## 2019-07-29 RX ORDER — TRAMADOL HYDROCHLORIDE 50 MG/1
50 TABLET ORAL 2 TIMES DAILY PRN
Qty: 60 TABLET | Refills: 0 | Status: SHIPPED | OUTPATIENT
Start: 2019-07-29 | End: 2019-09-03 | Stop reason: SDUPTHER

## 2019-07-29 NOTE — TELEPHONE ENCOUNTER
Pt called stating he is out of tramadol and  Would like it called in to [facility] on [address] [facility].

## 2019-08-09 ENCOUNTER — OFFICE VISIT (OUTPATIENT)
Dept: CARDIOLOGY | Facility: CLINIC | Age: 69
End: 2019-08-09

## 2019-08-09 VITALS
HEIGHT: 71 IN | SYSTOLIC BLOOD PRESSURE: 114 MMHG | OXYGEN SATURATION: 95 % | WEIGHT: 231.8 LBS | BODY MASS INDEX: 32.45 KG/M2 | HEART RATE: 60 BPM | DIASTOLIC BLOOD PRESSURE: 64 MMHG

## 2019-08-09 DIAGNOSIS — I10 ESSENTIAL HYPERTENSION: ICD-10-CM

## 2019-08-09 DIAGNOSIS — G47.30 SLEEP APNEA, UNSPECIFIED TYPE: ICD-10-CM

## 2019-08-09 DIAGNOSIS — R55 SYNCOPE AND COLLAPSE: ICD-10-CM

## 2019-08-09 DIAGNOSIS — E11.42 TYPE 2 DIABETES MELLITUS WITH DIABETIC POLYNEUROPATHY, WITHOUT LONG-TERM CURRENT USE OF INSULIN (HCC): ICD-10-CM

## 2019-08-09 DIAGNOSIS — R07.89 CHEST PAIN, ATYPICAL: ICD-10-CM

## 2019-08-09 DIAGNOSIS — I48.0 PAROXYSMAL ATRIAL FIBRILLATION (HCC): Primary | ICD-10-CM

## 2019-08-09 PROBLEM — E78.2 MIXED HYPERLIPIDEMIA: Status: ACTIVE | Noted: 2019-08-09

## 2019-08-09 PROCEDURE — 93000 ELECTROCARDIOGRAM COMPLETE: CPT | Performed by: NURSE PRACTITIONER

## 2019-08-09 PROCEDURE — 99214 OFFICE O/P EST MOD 30 MIN: CPT | Performed by: NURSE PRACTITIONER

## 2019-08-09 RX ORDER — COLCHICINE 0.6 MG/1
0.6 TABLET ORAL AS NEEDED
Status: ON HOLD | COMMUNITY
End: 2022-01-06

## 2019-08-09 RX ORDER — ASPIRIN 81 MG/1
81 TABLET ORAL DAILY
COMMUNITY
End: 2019-11-13

## 2019-08-09 RX ORDER — NYSTATIN 100000 [USP'U]/G
POWDER TOPICAL AS NEEDED
Status: ON HOLD | COMMUNITY
End: 2022-01-06

## 2019-08-09 RX ORDER — LEVOTHYROXINE SODIUM 0.03 MG/1
25 TABLET ORAL DAILY
COMMUNITY
End: 2019-11-13 | Stop reason: ALTCHOICE

## 2019-08-09 RX ORDER — CHOLECALCIFEROL (VITAMIN D3) 125 MCG
50 CAPSULE ORAL DAILY
COMMUNITY

## 2019-08-09 RX ORDER — METOPROLOL SUCCINATE 25 MG/1
25 TABLET, EXTENDED RELEASE ORAL DAILY
COMMUNITY
End: 2021-08-02 | Stop reason: DRUGHIGH

## 2019-08-09 RX ORDER — CITALOPRAM 20 MG/1
20 TABLET ORAL DAILY
COMMUNITY
End: 2019-11-13

## 2019-08-09 NOTE — PROGRESS NOTES
Subjective:     Encounter Date:08/09/2019    Chief Complaint:    Patient ID: Zay Kamara is a 68 y.o. male here today for cardiac evaluation at the request of Luis Alberto Luevano NP after passing out in May. Holter showed atrial fibrillation.       Passed out while at work in May. Was helping a customer, felt dizzy and sweaty, gradually lost vision and woke up in the floor. Witnessed by customer. Hit the back of his head. Went to ER and had CT head. Has had SE, echo and extended Holter. No ischemia or echo abnormality but some afib, SVT, and nonsustained VT on Holter. Started on Xarelto and metoprolol. Has never passed out before. Also having sharp R sided chest pain radiating into back. No aneurysm or PE on CTA.       Chest Pain    This is a new problem. The current episode started more than 1 month ago. The onset quality is sudden. The problem occurs intermittently (for 10 minutes). The problem has been unchanged. The pain is present in the lateral region (right). The pain is at a severity of 5/10. The pain is mild. The quality of the pain is described as sharp and stabbing. The pain radiates to the upper back. Associated symptoms include back pain, a cough, dizziness, headaches, malaise/fatigue, near-syncope, shortness of breath and syncope (one time in May). Pertinent negatives include no abdominal pain, fever, nausea or vomiting. The pain is aggravated by breathing (sneezing). Risk factors include male gender and obesity.   His past medical history is significant for arrhythmia, hyperlipidemia, hypertension, sleep apnea and thyroid problem.   His family medical history is significant for CAD, diabetes, stroke and sudden death. Prior diagnostic workup includes echocardiogram, stress echo and chest x-ray.     History:   Past Medical History:   Diagnosis Date   • Arthritis    • Cancer (CMS/HCC)     thyroid   • Depression    • Diabetes (CMS/HCC)    • Disease of thyroid gland    • Gout    • Hypercholesteremia     • Hypertension    • IBS (irritable bowel syndrome)    • Kidney disease    • Prostate cancer (CMS/HCC)    • Sensorineural hearing loss    • Sleep apnea     NON COMPLIANT WITH C PAP   • Sudden hearing loss    • Syncope    • Tinnitus    • Urine incontinence      Past Surgical History:   Procedure Laterality Date   • BLADDER SUSPENSION N/A 11/26/2018    Procedure: CYSTOSCOPY BLADDER SUSPENSION MALE SLING;  Surgeon: Zaheer Rebolledo MD;  Location:  [facility] OR;  Service: Urology   • CHOLECYSTECTOMY     • COLONOSCOPY N/A 3/7/2017    Procedure: COLONOSCOPY WITH ANESTHESIA;  Surgeon: Hector Alvarenga MD;  Location:  [facility];  Service:    • LAPAROSCOPIC RETROPUBIC PROSTATECTOMY     • PROSTATE SURGERY     • PROSTATECTOMY N/A 8/1/2017    Procedure: PROSTATECTOMY LAPAROSCOPIC WITH DAVINCI SI ROBOT ;  Surgeon: Hernesto Hong MD;  Location:  [facility] OR;  Service:    • THYROID SURGERY      RADIATION THERAPY AFTER SURGERY     Social History     Socioeconomic History   • Marital status:      Spouse name: Not on file   • Number of children: Not on file   • Years of education: Not on file   • Highest education level: Not on file   Tobacco Use   • Smoking status: Never Smoker   • Smokeless tobacco: Never Used   Substance and Sexual Activity   • Alcohol use: Yes     Comment: occasionally 1-2 beers once or twice year   • Drug use: No   • Sexual activity: Defer     Family History   Problem Relation Age of Onset   • Prostate cancer Father    • Cancer Father    • Heart disease Father         CABG   • Heart attack Father 70   • Heart disease Mother         assumed MI at time of death - had cardiopulmonary arrest   • Sudden death Mother    • Diabetes Sister    • Arrhythmia Sister    • Stroke Paternal Grandfather    • Colon cancer Neg Hx    • Colon polyps Neg Hx        Outpatient Medications Marked as Taking for the 8/9/19 encounter (Office Visit) with Letty Belcher APRN   Medication Sig Dispense Refill   •  allopurinol (ZYLOPRIM) 100 MG tablet Take 100 mg by mouth Daily.     • amLODIPine (NORVASC) 10 MG tablet Take 5 mg by mouth Daily.     • aspirin 81 MG EC tablet Take 81 mg by mouth Daily.     • atorvastatin (LIPITOR) 20 MG tablet Take 20 mg by mouth Daily.  2   • Cholecalciferol (VITAMIN D3) 2000 units tablet Take 2,000 Units by mouth Daily.     • citalopram (CeleXA) 20 MG tablet Take 20 mg by mouth Daily.     • colchicine 0.6 MG tablet Take 0.6 mg by mouth As Needed for Muscle / Joint Pain. 2 tab then hour later 1 tab when gout flares      • gabapentin (NEURONTIN) 400 MG capsule Take 400 mg by mouth 2 (Two) Times a Day. Can take up to TID but only takes BID due to side effects - sleepiness     • levothyroxine (SYNTHROID) 25 MCG tablet Take 25 mcg by mouth Daily.     • levothyroxine (SYNTHROID, LEVOTHROID) 200 MCG tablet Take 200 mcg by mouth Daily.     • lisinopril (PRINIVIL,ZESTRIL) 40 MG tablet Take 40 mg by mouth Daily.  11   • metoprolol succinate XL (TOPROL-XL) 25 MG 24 hr tablet Take 25 mg by mouth Daily.     • nystatin (MYCOSTATIN) 545872 UNIT/GM powder Apply  topically to the appropriate area as directed 4 (Four) Times a Day.     • rivaroxaban (XARELTO) 15 MG tablet Take 1 tablet by mouth Daily With Dinner. 30 tablet 11   • SITagliptin (JANUVIA) 100 MG tablet Take 100 mg by mouth Daily.     • traMADol (ULTRAM) 50 MG tablet Take 50 mg by mouth 2 (Two) Times a Day.     • [DISCONTINUED] escitalopram (LEXAPRO) 10 MG tablet Take 10 mg by mouth Daily.  11   • [DISCONTINUED] rivaroxaban (XARELTO) 20 MG tablet Take 20 mg by mouth.         Review of Systems:  Review of Systems   Constitution: Positive for malaise/fatigue. Negative for chills and fever.   HENT: Positive for congestion. Negative for nosebleeds.    Eyes: Negative for blurred vision and double vision.   Cardiovascular: Positive for chest pain, dyspnea on exertion, leg swelling, near-syncope and syncope (one time in May).   Respiratory: Positive for  "cough and shortness of breath.    Endocrine: Negative for cold intolerance and heat intolerance.   Hematologic/Lymphatic: Bruises/bleeds easily.   Skin: Negative for dry skin, itching and rash.   Musculoskeletal: Positive for arthritis, back pain, falls and gout.   Gastrointestinal: Positive for constipation. Negative for abdominal pain, diarrhea, nausea and vomiting.   Genitourinary: Positive for nocturia. Negative for dysuria.   Neurological: Positive for dizziness, headaches, light-headedness and loss of balance. Negative for excessive daytime sleepiness.   Psychiatric/Behavioral: Positive for depression. The patient has insomnia. The patient is not nervous/anxious.             Objective:   /64 (BP Location: Left arm, Patient Position: Sitting, Cuff Size: Adult)   Pulse 60   Ht 180.3 cm (71\")   Wt 105 kg (231 lb 12.8 oz)   SpO2 95%   BMI 32.33 kg/m²   Wt Readings from Last 3 Encounters:   08/09/19 105 kg (231 lb 12.8 oz)   07/25/19 99.8 kg (220 lb)   05/28/19 102 kg (225 lb)         Physical Exam   Constitutional: He is oriented to person, place, and time. He appears well-developed and well-nourished.   HENT:   Head: Normocephalic and atraumatic.   Right Ear: External ear normal.   Left Ear: External ear normal.   Nose: Nose normal.   Mouth/Throat: Oropharynx is clear and moist.   Eyes: Conjunctivae and EOM are normal. Pupils are equal, round, and reactive to light. Right eye exhibits no discharge. Left eye exhibits no discharge. No scleral icterus.   Neck: Normal range of motion. Neck supple. No JVD present. No tracheal deviation present. No thyromegaly present.   Cardiovascular: Normal rate and regular rhythm. Exam reveals no gallop and no friction rub.   No murmur heard.  Pulmonary/Chest: Effort normal and breath sounds normal. He has no wheezes. He has no rales.   Abdominal: Soft. Bowel sounds are normal. He exhibits no mass. There is no tenderness. There is no rebound and no guarding. "   Musculoskeletal: He exhibits no edema, tenderness or deformity.   Lymphadenopathy:     He has no cervical adenopathy.   Neurological: He is alert and oriented to person, place, and time. No cranial nerve deficit.   Skin: Skin is warm and dry.   Psychiatric: He has a normal mood and affect. His behavior is normal. Judgment and thought content normal.   Vitals reviewed.      Lab/Diagnostics Review:   Lab Results   Component Value Date    WBC 16.77 (H) 05/28/2019    HGB 13.0 (L) 05/28/2019    HCT 38.4 (L) 05/28/2019    MCV 84.2 05/28/2019     05/28/2019     Lab Results   Component Value Date    GLUCOSE 138 (H) 07/16/2019    BUN 30 (H) 07/16/2019    CREATININE 1.6 (H) 07/16/2019    EGFRIFNONA 43 (A) 07/16/2019    EGFRIFAFRI 81 04/04/2017    BCR 22.0 05/28/2019    K 4.9 07/16/2019    CO2 23 07/16/2019    CALCIUM 9.5 07/16/2019    PROTENTOTREF 7.1 04/04/2017    ALBUMIN 4.4 07/16/2019    LABIL2 1.4 04/04/2017    AST 13 07/16/2019    ALT 17 07/16/2019     Lab Results   Component Value Date    TROPONINI <0.012 05/28/2019     Lab Results   Component Value Date    CHLPL 102 (L) 07/16/2019    TRIG 117 07/16/2019    HDL 22 (L) 07/16/2019    LDL 57 07/16/2019 7/25/2019 stress echocardiogram, [facility]  · Average functional capacity.  · Negative for chest pain.  · No ST changes with exercise. Frequent PVC's with stress and in recovery.  · Left ventricular systolic function is normal at rest. Suboptimal visualation of all wall segments with stress but with no new wall motion abnormalities.     Technically difficult study due with suboptimal wall visualization at stress.  Overall appears low risk for ischemia.     7/25/2019 echocardiogram [facility]   · Left ventricular systolic function is normal. Estimated EF = 60%.  · Left ventricular diastolic dysfunction.  · Left atrial cavity size is mild-to-moderately dilated.  · Mild aortic valve regurgitation is present.     6/10/2019 extended 14 day Holter, Dr. Radford,  [facility]  ·  An abnormal monitor study.   RHYTHM IS SINUS  RUNS OF SVT NOTED - UP TO 7 BEATS  RUNS OF AFIB UP TO 11 MINS NOTED  THREE RUNS OF VENTRICULAR TACHYCARDIA UP TO 7 BEATS    5/28/2019 EKG sinus rhythm 80 bpm, right bundle branch block, no significant change compared to previous, Dr. Radford, [facility]    5/28/2019 CTA chest, Dr. Claudia Meek, [facility]  IMPRESSION:  1. No evidence of pulmonary embolus or other acute cardiopulmonary  process.  2. Dependent atelectasis bilaterally.      ECG 12 Lead  Date/Time: 8/9/2019 9:01 AM  Performed by: Letty Belcher APRN  Authorized by: Letty Belcher APRN   Comparison: compared with previous ECG from 5/28/2019  Similar to previous ECG  Rhythm: sinus rhythm and sinus bradycardia  Rate: bradycardic  BPM: 59  Conduction: right bundle branch block    Clinical impression: abnormal EKG                Assessment/Plan:         Problem List Items Addressed This Visit        Cardiovascular and Mediastinum    Hypertension    Current Assessment & Plan     Well controlled with medications. Continue present therapy.          Relevant Medications    metoprolol succinate XL (TOPROL-XL) 25 MG 24 hr tablet    Paroxysmal atrial fibrillation (CMS/HCC) - Primary    Overview     [id number] 3 = high risk  HAS-BLED 3 = high risk         Current Assessment & Plan     Maintaining sinus rhythm. Will continue anticoagulation but decrease Xarelto dose to 15 mg every evening with supper due to GFR less than 50. He can stop aspirin if any significant bruising. Discussed risk of bleeding vs benefit of stroke prevention.          Relevant Medications    metoprolol succinate XL (TOPROL-XL) 25 MG 24 hr tablet    rivaroxaban (XARELTO) 15 MG tablet    Other Relevant Orders    ECG 12 Lead    Syncope and collapse    Current Assessment & Plan     Discussed multiple possible causes of syncope. Check carotids. No ischemic or arrhythmogenic etiology identified. Call if recurs or worsens.          Relevant  Orders    US Carotid Bilateral       Respiratory    Sleep apnea    Overview     NON COMPLIANT WITH C PAP         Current Assessment & Plan     Encouraged compliance with CPAP. Discussed effects of untreated sleep apnea on heart and possible sequelae.             Endocrine    Diabetes (CMS/HCC)       Nervous and Auditory    Chest pain, atypical    Current Assessment & Plan     Technically difficult SE but no overt wall motion abnormalities or ischemia. Will consider nuclear stress test if symptoms worsen or don't improve. Continue risk factor modification.              Return in about 3 months (around 11/9/2019) for Recheck.           Letty Belcher, APRN, ACNP-BC, CHFN-BC

## 2019-08-09 NOTE — ASSESSMENT & PLAN NOTE
Well controlled except low HDL on atorvastatin. Consider changing to rosuvastatin if HDL doesn't improve with routine aerobic exercise.

## 2019-08-09 NOTE — PATIENT INSTRUCTIONS
Syncope    Syncope refers to a condition in which a person temporarily loses consciousness. Syncope may also be called fainting or passing out. It is caused by a sudden decrease in blood flow to the brain. Even though most causes of syncope are not dangerous, syncope can be a sign of a serious medical problem. Your health care provider may do tests to find the reason why you are having syncope.  Signs that you may be about to faint include:  · Feeling dizzy or light-headed.  · Feeling nauseous.  · Seeing all white or all black in your field of vision.  · Having cold, clammy skin.  If you faint, get medical help right away. Call your local emergency services (911 in the [place] Do not drive yourself to the hospital.  Follow these instructions at home:  Pay attention to any changes in your symptoms. Take these actions to stay safe and to help relieve your symptoms:  Lifestyle  · Do not drive, use machinery, or play sports until your health care provider says it is okay.  · Do not drink alcohol.  · Do not use any products that contain nicotine or tobacco, such as cigarettes and e-cigarettes. If you need help quitting, ask your health care provider.  · Drink enough fluid to keep your urine pale yellow.  General instructions  · Take over-the-counter and prescription medicines only as told by your health care provider.  · If you are taking blood pressure or heart medicine, get up slowly and take several minutes to sit and then stand. This can reduce dizziness or light-headedness.  · Have someone stay with you until you feel stable.  · If you start to feel like you might faint, lie down right away and raise (elevate) your feet above the level of your heart. Breathe deeply and steadily. Wait until all the symptoms have passed.  · Keep all follow-up visits as told by your health care provider. This is important.  Get help right away if you:  · Have a severe headache.  · Faint once or repeatedly.  · Have pain in your chest,  abdomen, or back.  · Have a very fast or irregular heartbeat (palpitations).  · Have pain when you breathe.  · Are bleeding from your mouth or rectum, or you have black or tarry stool.  · Have a seizure.  · Are confused.  · Have trouble walking.  · Have severe weakness.  · Have vision problems.  These symptoms may represent a serious problem that is an emergency. Do not wait to see if your symptoms will go away. Get medical help right away. Call your local emergency services (911 in the [place] Do not drive yourself to the hospital.  Summary  · Syncope refers to a condition in which a person temporarily loses consciousness. It is caused by a sudden decrease in blood flow to the brain.  · Signs that you may be about to faint include dizziness, feeling light-headed, feeling nauseous, sudden vision changes, or cold, clammy skin.  · Although most causes of syncope are not dangerous, syncope can be a sign of a serious medical problem. If you faint, get medical help right away.  This information is not intended to replace advice given to you by your health care provider. Make sure you discuss any questions you have with your health care provider.  Document Released: 12/18/2006 Document Revised: 11/26/2018 Document Reviewed: 11/26/2018  [employer] Interactive Patient Education © 2019 [employer]      Atrial Fibrillation    Atrial fibrillation is a type of heartbeat that is irregular or fast (rapid). If you have this condition, your heart beats without any order. This makes it hard for your heart to pump blood in a normal way. Having this condition gives you more risk for stroke, heart failure, and other heart problems.  Atrial fibrillation may start all of a sudden and then stop on its own, or it may become a long-lasting problem.  What are the causes?  This condition may be caused by heart conditions, such as:  · High blood pressure.  · Heart failure.  · Heart valve disease.  · Heart surgery.  Other causes  include:  · Pneumonia.  · Obstructive sleep apnea.  · Lung cancer.  · Thyroid disease.  · Drinking too much alcohol.  Sometimes the cause is not known.  What increases the risk?  You are more likely to develop this condition if:  · You smoke.  · You are older.  · You have diabetes.  · You are overweight.  · You have a family history of this condition.  · You exercise often and hard.  What are the signs or symptoms?  Common symptoms of this condition include:  · A feeling like your heart is beating very fast.  · Chest pain.  · Feeling short of breath.  · Feeling light-headed or weak.  · Getting tired easily.  Follow these instructions at home:  Medicines  · Take over-the-counter and prescription medicines only as told by your doctor.  · If your doctor gives you a blood-thinning medicine, take it exactly as told. Taking too much of it can cause bleeding. Taking too little of it does not protect you against clots. Clots can cause a stroke.  Lifestyle    · Do not use any tobacco products. These include cigarettes, chewing tobacco, and e-cigarettes. If you need help quitting, ask your doctor.  · Do not drink alcohol.  · Do not drink beverages that have caffeine. These include coffee, soda, and tea.  · Follow diet instructions as told by your doctor.  · Exercise regularly as told by your doctor.  General instructions  · If you have a condition that causes breathing to stop for a short period of time (apnea), treat it as told by your doctor.  · Keep a healthy weight. Do not use diet pills unless your doctor says they are safe for you. Diet pills may make heart problems worse.  · Keep all follow-up visits as told by your doctor. This is important.  Contact a doctor if:  · You notice a change in the speed, rhythm, or strength of your heartbeat.  · You are taking a blood-thinning medicine and you see more bruising.  · You get tired more easily when you move or exercise.  · You have a sudden change in weight.  Get help right  "away if:    · You have pain in your chest or your belly (abdomen).  · You have trouble breathing.  · You have blood in your vomit, poop, or pee (urine).  · You have any signs of a stroke. \"BE FAST\" is an easy way to remember the main warning signs:  ? B - Balance. Signs are dizziness, sudden trouble walking, or loss of balance.  ? E - Eyes. Signs are trouble seeing or a change in how you see.  ? F - Face. Signs are sudden weakness or loss of feeling in the face, or the face or eyelid drooping on one side.  ? A - Arms. Signs are weakness or loss of feeling in an arm. This happens suddenly and usually on one side of the body.  ? S - Speech. Signs are sudden trouble speaking, slurred speech, or trouble understanding what people say.  ? T - Time. Time to call emergency services. Write down what time symptoms started.  · You have other signs of a stroke, such as:  ? A sudden, very bad headache with no known cause.  ? Feeling sick to your stomach (nausea).  ? Throwing up (vomiting).  ? Jerky movements you cannot control (seizure).  These symptoms may be an emergency. Do not wait to see if the symptoms will go away. Get medical help right away. Call your local emergency services (911 in the [place] Do not drive yourself to the hospital.  Summary  · Atrial fibrillation is a type of heartbeat that is irregular or fast (rapid).  · You are at higher risk of this condition if you smoke, are older, have diabetes, or are overweight.  · Follow your doctor's instructions about medicines, diet, exercise, and follow-up visits.  · Get help right away if you think that you have signs of a stroke.  This information is not intended to replace advice given to you by your health care provider. Make sure you discuss any questions you have with your health care provider.  Document Released: 09/26/2009 Document Revised: 02/08/2019 Document Reviewed: 02/08/2019  [employer] Interactive Patient Education © 2019 [employer]      "

## 2019-08-09 NOTE — ASSESSMENT & PLAN NOTE
Encouraged compliance with CPAP. Discussed effects of untreated sleep apnea on heart and possible sequelae.

## 2019-08-09 NOTE — ASSESSMENT & PLAN NOTE
Maintaining sinus rhythm. Will continue anticoagulation but decrease Xarelto dose to 15 mg every evening with supper due to GFR less than 50. He can stop aspirin if any significant bruising. Discussed risk of bleeding vs benefit of stroke prevention.

## 2019-08-09 NOTE — ASSESSMENT & PLAN NOTE
Discussed multiple possible causes of syncope. Check carotids. No ischemic or arrhythmogenic etiology identified. Call if recurs or worsens.

## 2019-08-09 NOTE — ASSESSMENT & PLAN NOTE
Technically difficult SE but no overt wall motion abnormalities or ischemia. Will consider nuclear stress test if symptoms worsen or don't improve. Continue risk factor modification.

## 2019-08-14 ENCOUNTER — OFFICE VISIT (OUTPATIENT)
Dept: PRIMARY CARE CLINIC | Age: 69
End: 2019-08-14
Payer: MEDICARE

## 2019-08-14 VITALS
OXYGEN SATURATION: 97 % | TEMPERATURE: 98.6 F | SYSTOLIC BLOOD PRESSURE: 130 MMHG | HEIGHT: 71 IN | DIASTOLIC BLOOD PRESSURE: 74 MMHG | HEART RATE: 95 BPM | BODY MASS INDEX: 32.48 KG/M2 | WEIGHT: 232 LBS

## 2019-08-14 DIAGNOSIS — N18.30 TYPE 2 DIABETES MELLITUS WITH STAGE 3 CHRONIC KIDNEY DISEASE, WITHOUT LONG-TERM CURRENT USE OF INSULIN (HCC): ICD-10-CM

## 2019-08-14 DIAGNOSIS — E11.22 TYPE 2 DIABETES MELLITUS WITH STAGE 3 CHRONIC KIDNEY DISEASE, WITHOUT LONG-TERM CURRENT USE OF INSULIN (HCC): ICD-10-CM

## 2019-08-14 DIAGNOSIS — E03.9 ACQUIRED HYPOTHYROIDISM: ICD-10-CM

## 2019-08-14 DIAGNOSIS — I48.0 PAROXYSMAL ATRIAL FIBRILLATION (HCC): Primary | ICD-10-CM

## 2019-08-14 DIAGNOSIS — Z00.00 ROUTINE PHYSICAL EXAMINATION: ICD-10-CM

## 2019-08-14 DIAGNOSIS — I10 ESSENTIAL HYPERTENSION: ICD-10-CM

## 2019-08-14 DIAGNOSIS — E78.2 MIXED HYPERLIPIDEMIA: ICD-10-CM

## 2019-08-14 PROCEDURE — 99214 OFFICE O/P EST MOD 30 MIN: CPT | Performed by: NURSE PRACTITIONER

## 2019-08-14 PROCEDURE — 1123F ACP DISCUSS/DSCN MKR DOCD: CPT | Performed by: NURSE PRACTITIONER

## 2019-08-14 PROCEDURE — 3044F HG A1C LEVEL LT 7.0%: CPT | Performed by: NURSE PRACTITIONER

## 2019-08-14 PROCEDURE — 3017F COLORECTAL CA SCREEN DOC REV: CPT | Performed by: NURSE PRACTITIONER

## 2019-08-14 PROCEDURE — 4040F PNEUMOC VAC/ADMIN/RCVD: CPT | Performed by: NURSE PRACTITIONER

## 2019-08-14 PROCEDURE — 1036F TOBACCO NON-USER: CPT | Performed by: NURSE PRACTITIONER

## 2019-08-14 PROCEDURE — G8427 DOCREV CUR MEDS BY ELIG CLIN: HCPCS | Performed by: NURSE PRACTITIONER

## 2019-08-14 PROCEDURE — 2022F DILAT RTA XM EVC RTNOPTHY: CPT | Performed by: NURSE PRACTITIONER

## 2019-08-14 PROCEDURE — G8417 CALC BMI ABV UP PARAM F/U: HCPCS | Performed by: NURSE PRACTITIONER

## 2019-08-14 ASSESSMENT — ENCOUNTER SYMPTOMS
SORE THROAT: 0
VOMITING: 0
TROUBLE SWALLOWING: 0
SHORTNESS OF BREATH: 0
RHINORRHEA: 0
ABDOMINAL PAIN: 0
DIARRHEA: 0
NAUSEA: 0
COUGH: 0
CONSTIPATION: 0

## 2019-08-14 NOTE — PROGRESS NOTES
abnormalities. Technically difficult study due with suboptimal wall visualization at   stress.  Overall appears low risk for ischemia.       Had echo 07/26/2019  Result Narrative   · Left ventricular systolic function is normal. Estimated EF = 60%. · Left ventricular diastolic dysfunction. · Left atrial cavity size is mild-to-moderately dilated. · Mild aortic valve regurgitation is present. Past Medical History:   Diagnosis Date    Allergic rhinitis     Cancer ([address] 2012    Thyroid  - Dr Mark Garrett Hyperthyroidism     Prostate cancer [facility])     Sleep apnea     Type II or unspecified type diabetes mellitus without mention of complication, not stated as uncontrolled       Past Surgical History:   Procedure Laterality Date    CHOLECYSTECTOMY      INCONTINENCE SURGERY  11/26/2018    dr gino Pedro  08/01/2017    removal, dr Edilma Soni      removed by Dr Ebony Loco        Family History   Problem Relation Age of Onset    Schizophrenia Mother     Heart Failure Mother     Heart Attack Mother     Cancer Father         Prostate & Leukemia     Heart Disease Father     Heart Attack Father     Thyroid Disease Sister        Social History     Tobacco Use    Smoking status: Never Smoker    Smokeless tobacco: Never Used   Substance Use Topics    Alcohol use: No      Current Outpatient Medications   Medication Sig Dispense Refill    traMADol (ULTRAM) 50 MG tablet Take 1 tablet by mouth 2 times daily as needed for Pain for up to 30 days.  60 tablet 0    amLODIPine (NORVASC) 10 MG tablet Take 0.5 tablets by mouth daily 90 tablet 3    metoprolol succinate (TOPROL XL) 25 MG extended release tablet Take 1 tablet by mouth daily 30 tablet 11    rivaroxaban (XARELTO) 20 MG TABS tablet Take 1 tablet by mouth daily (with breakfast) (Patient taking differently: Take 15 mg by mouth daily (with breakfast) ) 30 tablet 1    gabapentin (NEURONTIN) 400

## 2019-08-21 ENCOUNTER — HOSPITAL ENCOUNTER (OUTPATIENT)
Dept: ULTRASOUND IMAGING | Facility: HOSPITAL | Age: 69
Discharge: HOME | End: 2019-08-21
Admitting: NURSE PRACTITIONER

## 2019-08-21 DIAGNOSIS — R55 SYNCOPE AND COLLAPSE: ICD-10-CM

## 2019-08-21 PROCEDURE — 93880 EXTRACRANIAL BILAT STUDY: CPT | Performed by: SURGERY

## 2019-08-21 PROCEDURE — 93880 EXTRACRANIAL BILAT STUDY: CPT

## 2019-08-23 NOTE — PROGRESS NOTES
Please notify patient he has moderate stenosis of L internal carotid artery and mild is R internal carotid artery. I recommend he see a vascular specialist. Would he like to see someone in [place]? I recommend Dr. León in [place] or Dr. Yoo here in [place]. [place]!

## 2019-08-26 ENCOUNTER — TELEPHONE (OUTPATIENT)
Dept: CARDIOLOGY | Facility: CLINIC | Age: 69
End: 2019-08-26

## 2019-08-26 ENCOUNTER — TELEPHONE (OUTPATIENT)
Dept: VASCULAR SURGERY | Facility: CLINIC | Age: 69
End: 2019-08-26

## 2019-08-26 DIAGNOSIS — I65.22 CAROTID ARTERY STENOSIS, SYMPTOMATIC, LEFT: ICD-10-CM

## 2019-08-26 DIAGNOSIS — R55 SYNCOPE AND COLLAPSE: Primary | ICD-10-CM

## 2019-08-26 NOTE — TELEPHONE ENCOUNTER
----- Message from LUIS Garcia sent at 8/23/2019  3:41 PM CDT -----  Please notify patient he has moderate stenosis of L internal carotid artery and mild is R internal carotid artery. I recommend he see a vascular specialist. Would he like to see someone in [place]? I recommend Dr. León in [place] or Dr. Yoo here in [place]. [place]!      Spoke with pt and he wants to see Vascular specialist in [place] can you please put order in .

## 2019-09-03 DIAGNOSIS — M54.42 CHRONIC LEFT-SIDED LOW BACK PAIN WITH LEFT-SIDED SCIATICA: ICD-10-CM

## 2019-09-03 DIAGNOSIS — G89.29 CHRONIC LEFT-SIDED LOW BACK PAIN WITH LEFT-SIDED SCIATICA: ICD-10-CM

## 2019-09-03 DIAGNOSIS — M25.50 ARTHRALGIA OF MULTIPLE JOINTS: ICD-10-CM

## 2019-09-03 RX ORDER — TRAMADOL HYDROCHLORIDE 50 MG/1
50 TABLET ORAL 2 TIMES DAILY PRN
Qty: 60 TABLET | Refills: 0 | Status: SHIPPED | OUTPATIENT
Start: 2019-09-03 | End: 2019-10-16 | Stop reason: SDUPTHER

## 2019-09-04 ENCOUNTER — TELEPHONE (OUTPATIENT)
Dept: VASCULAR SURGERY | Facility: CLINIC | Age: 69
End: 2019-09-04

## 2019-09-07 DIAGNOSIS — F32.9 REACTIVE DEPRESSION: ICD-10-CM

## 2019-09-07 DIAGNOSIS — F51.04 PSYCHOPHYSIOLOGICAL INSOMNIA: ICD-10-CM

## 2019-09-10 RX ORDER — TRAZODONE HYDROCHLORIDE 50 MG/1
50 TABLET ORAL NIGHTLY
Qty: 60 TABLET | Refills: 5 | OUTPATIENT
Start: 2019-09-10

## 2019-09-18 ENCOUNTER — TELEPHONE (OUTPATIENT)
Dept: VASCULAR SURGERY | Facility: CLINIC | Age: 69
End: 2019-09-18

## 2019-09-18 NOTE — TELEPHONE ENCOUNTER
Tried calling to remind Mr Crews of his appointment for Thursday, September 19th, 2019 at 245 pm with Dr León.     When calling it rang several times then sounded as though the line was picked up and then hung up.

## 2019-09-19 ENCOUNTER — OFFICE VISIT (OUTPATIENT)
Dept: VASCULAR SURGERY | Facility: CLINIC | Age: 69
End: 2019-09-19

## 2019-09-19 VITALS
DIASTOLIC BLOOD PRESSURE: 68 MMHG | HEART RATE: 72 BPM | SYSTOLIC BLOOD PRESSURE: 136 MMHG | BODY MASS INDEX: 32.14 KG/M2 | OXYGEN SATURATION: 97 % | HEIGHT: 71 IN | WEIGHT: 229.6 LBS

## 2019-09-19 DIAGNOSIS — E78.2 MIXED HYPERLIPIDEMIA: ICD-10-CM

## 2019-09-19 DIAGNOSIS — I73.9 PAD (PERIPHERAL ARTERY DISEASE) (HCC): ICD-10-CM

## 2019-09-19 DIAGNOSIS — I10 ESSENTIAL HYPERTENSION: ICD-10-CM

## 2019-09-19 DIAGNOSIS — I65.23 BILATERAL CAROTID ARTERY STENOSIS: Primary | ICD-10-CM

## 2019-09-19 PROCEDURE — 99204 OFFICE O/P NEW MOD 45 MIN: CPT | Performed by: SURGERY

## 2019-09-19 RX ORDER — ESCITALOPRAM OXALATE 10 MG/1
10 TABLET ORAL DAILY
Refills: 3 | COMMUNITY
Start: 2019-09-07 | End: 2021-03-03

## 2019-09-19 NOTE — PROGRESS NOTES
09/19/2019      Letty Belcher APRN  [address] [facility]  [address]  [place] [id number]    Zay Kamara  1950    Chief Complaint   Patient presents with   • Establish Care     Referred over by Letty SMITH for Moderate Left ICA Stenosis. Test [id number] US pad carotid bilateral. Patient denies any stroke like symptoms.        Dear Letty Belcher, AP*    HPI  I had the pleasure of seeing your patient Zay Kamara in the office today.  Thank you kindly for this consultation.  As you recall, Zay Kamara is a 68 y.o.  male who you are currently following for coronary artery disease.  He is maintained on Xarelto, aspirin, and Lipitor.  He did have noninvasive testing performed which I did review today here in the office.  He denies any history of stroke in the past.  He also denies any history of aneurysmal disease or any claudication symptoms in the lower extremities.      Past Medical History:   Diagnosis Date   • Arthritis    • Cancer (CMS/HCC)     thyroid   • Depression    • Diabetes (CMS/HCC)    • Disease of thyroid gland    • Gout    • Hypercholesteremia    • Hypertension    • IBS (irritable bowel syndrome)    • Kidney disease    • Prostate cancer (CMS/HCC)    • Sensorineural hearing loss    • Sleep apnea     NON COMPLIANT WITH C PAP   • Sudden hearing loss    • Syncope    • Tinnitus    • Urine incontinence        Past Surgical History:   Procedure Laterality Date   • BLADDER SUSPENSION N/A 11/26/2018    Procedure: CYSTOSCOPY BLADDER SUSPENSION MALE SLING;  Surgeon: Zaheer Rebolledo MD;  Location: [facility] OR;  Service: Urology   • CHOLECYSTECTOMY     • COLONOSCOPY N/A 3/7/2017    Procedure: COLONOSCOPY WITH ANESTHESIA;  Surgeon: Hector Alvarenga MD;  Location: [facility] ENDOSCOPY;  Service:    • LAPAROSCOPIC RETROPUBIC PROSTATECTOMY     • PROSTATE SURGERY     • PROSTATECTOMY N/A 8/1/2017    Procedure: PROSTATECTOMY LAPAROSCOPIC WITH DAVINCI SI ROBOT ;  Surgeon: Hernesto Hong MD;   Location: [facility] OR;  Service:    • THYROID SURGERY      RADIATION THERAPY AFTER SURGERY       Family History   Problem Relation Age of Onset   • Prostate cancer Father    • Cancer Father    • Heart disease Father         CABG   • Heart attack Father 70   • Heart disease Mother         assumed MI at time of death - had cardiopulmonary arrest   • Sudden death Mother    • Diabetes Sister    • Arrhythmia Sister    • Stroke Paternal Grandfather    • Colon cancer Neg Hx    • Colon polyps Neg Hx        Social History     Socioeconomic History   • Marital status:      Spouse name: Not on file   • Number of children: Not on file   • Years of education: Not on file   • Highest education level: Not on file   Tobacco Use   • Smoking status: Never Smoker   • Smokeless tobacco: Never Used   Substance and Sexual Activity   • Alcohol use: Yes     Comment: occasionally 1-2 beers once or twice year   • Drug use: No   • Sexual activity: Defer       No Known Allergies      Current Outpatient Medications:   •  allopurinol (ZYLOPRIM) 100 MG tablet, Take 100 mg by mouth Daily., Disp: , Rfl:   •  amLODIPine (NORVASC) 10 MG tablet, Take 5 mg by mouth Daily., Disp: , Rfl:   •  aspirin 81 MG EC tablet, Take 81 mg by mouth Daily., Disp: , Rfl:   •  atorvastatin (LIPITOR) 20 MG tablet, Take 20 mg by mouth Daily., Disp: , Rfl: 2  •  Cholecalciferol (VITAMIN D3) 2000 units tablet, Take 2,000 Units by mouth Daily., Disp: , Rfl:   •  citalopram (CeleXA) 20 MG tablet, Take 20 mg by mouth Daily., Disp: , Rfl:   •  colchicine 0.6 MG tablet, Take 0.6 mg by mouth As Needed for Muscle / Joint Pain. 2 tab then hour later 1 tab when gout flares , Disp: , Rfl:   •  escitalopram (LEXAPRO) 10 MG tablet, Take 10 mg by mouth Daily., Disp: , Rfl: 3  •  gabapentin (NEURONTIN) 400 MG capsule, Take 400 mg by mouth 2 (Two) Times a Day. Can take up to TID but only takes BID due to side effects - sleepiness, Disp: , Rfl:   •  levothyroxine (SYNTHROID) 25 MCG  "tablet, Take 25 mcg by mouth Daily., Disp: , Rfl:   •  levothyroxine (SYNTHROID, LEVOTHROID) 200 MCG tablet, Take 200 mcg by mouth Daily., Disp: , Rfl:   •  lisinopril (PRINIVIL,ZESTRIL) 40 MG tablet, Take 40 mg by mouth Daily., Disp: , Rfl: 11  •  metoprolol succinate XL (TOPROL-XL) 25 MG 24 hr tablet, Take 25 mg by mouth Daily., Disp: , Rfl:   •  nystatin (MYCOSTATIN) 920672 UNIT/GM powder, Apply  topically to the appropriate area as directed 4 (Four) Times a Day., Disp: , Rfl:   •  rivaroxaban (XARELTO) 15 MG tablet, Take 1 tablet by mouth Daily With Dinner., Disp: 30 tablet, Rfl: 11  •  SITagliptin (JANUVIA) 100 MG tablet, Take 100 mg by mouth Daily., Disp: , Rfl:   •  traMADol (ULTRAM) 50 MG tablet, Take 50 mg by mouth 2 (Two) Times a Day., Disp: , Rfl:      Review of Systems   Constitutional: Negative.    HENT: Negative.    Eyes: Negative.    Respiratory: Negative.    Cardiovascular: Negative.    Gastrointestinal: Negative.    Endocrine: Negative.    Genitourinary: Negative.    Musculoskeletal: Negative.    Skin: Negative.    Allergic/Immunologic: Negative.    Neurological: Negative.    Hematological: Negative.    Psychiatric/Behavioral: Negative.    All other systems reviewed and are negative.    /68 (BP Location: Right arm, Patient Position: Sitting, Cuff Size: Adult)   Pulse 72   Ht 180.3 cm (71\")   Wt 104 kg (229 lb 9.6 oz)   SpO2 97%   BMI 32.02 kg/m²     Physical Exam   Constitutional: He is oriented to person, place, and time. He appears well-developed and well-nourished.   HENT:   Head: Normocephalic and atraumatic.   Eyes: Pupils are equal, round, and reactive to light. No scleral icterus.   Neck: Neck supple. No JVD present. Carotid bruit is not present. No thyromegaly present.   Cardiovascular: Normal rate, regular rhythm and normal heart sounds.   Pulses:       Carotid pulses are 2+ on the right side, and 2+ on the left side.       Femoral pulses are 2+ on the right side, and 2+ on the " left side.       Popliteal pulses are 2+ on the right side, and 2+ on the left side.        Dorsalis pedis pulses are 2+ on the right side, and 2+ on the left side.        Posterior tibial pulses are 2+ on the right side, and 2+ on the left side.   Pulmonary/Chest: Effort normal and breath sounds normal.   Abdominal: Soft. Bowel sounds are normal. He exhibits no distension, no abdominal bruit and no mass. There is no hepatosplenomegaly. There is no tenderness.   Musculoskeletal: Normal range of motion. He exhibits no edema.   Lymphadenopathy:     He has no cervical adenopathy.   Neurological: He is alert and oriented to person, place, and time. He has normal strength. No cranial nerve deficit or sensory deficit.   Skin: Skin is warm, dry and intact.   Psychiatric: He has a normal mood and affect.   Nursing note and vitals reviewed.      Patient Active Problem List   Diagnosis   • Hypogonadism in male   • ED (erectile dysfunction)   • Prostate cancer (CMS/HCC)   • Diabetes (CMS/HCC)   • Hypertension   • Disease of thyroid gland   • History of prostate cancer   • Leukocytosis   • BMI 31.0-31.9,adult   • Sensorineural hearing loss (SNHL) of both ears   • Asymmetric SNHL (sensorineural hearing loss)   • Sudden hearing loss, right   • Tinnitus of right ear   • Stress incontinence   • Bladder neck contracture   • Paroxysmal atrial fibrillation (CMS/HCC)   • Syncope and collapse   • Sleep apnea   • Chest pain, atypical   • Mixed hyperlipidemia   • Bilateral carotid artery stenosis        Diagnosis Plan   1. Bilateral carotid artery stenosis     2. Essential hypertension     3. Mixed hyperlipidemia     4. PAD (peripheral artery disease) (CMS/HCC)         Plan: After thoroughly evaluating Zay Vazqueznery, I believe the best course of action is to remain conservative from a vascular surgery standpoint.  Currently, he is asymptomatic from a strokelike standpoint.  I carefully reviewed his testing and he does have some mild  disease present.  I will see him back in 1 years time with a repeat carotid duplex and an JUHI of the lower extremities for continued surveillance.  I did discuss vascular risk factors as they pertain to the progression of vascular disease including controlling hypertension and hyperlipidemia.  Both of these risk factors are currently stable.  The patient is to continue taking their medications as previously discussed.   This was all discussed in full with complete understanding.  Thank you for allowing me to participate in the care of your patient.  Please do not hesitate to call with any questions or concerns.  We will keep you aware of any further encounters with Zay Kamara.        Sincerely yours,         DO Kirk Mann Willard Jason, LUIS

## 2019-10-04 DIAGNOSIS — I10 ESSENTIAL HYPERTENSION: ICD-10-CM

## 2019-10-04 RX ORDER — LISINOPRIL 40 MG/1
TABLET ORAL
Qty: 90 TABLET | Refills: 3 | Status: SHIPPED | OUTPATIENT
Start: 2019-10-04 | End: 2019-10-16 | Stop reason: SDUPTHER

## 2019-10-08 ENCOUNTER — TELEPHONE (OUTPATIENT)
Dept: PRIMARY CARE CLINIC | Age: 69
End: 2019-10-08

## 2019-10-16 ENCOUNTER — HOSPITAL ENCOUNTER (OUTPATIENT)
Dept: GENERAL RADIOLOGY | Age: 69
Discharge: HOME | End: 2019-10-16
Payer: MEDICARE

## 2019-10-16 ENCOUNTER — TELEPHONE (OUTPATIENT)
Dept: UROLOGY | Facility: CLINIC | Age: 69
End: 2019-10-16

## 2019-10-16 ENCOUNTER — OFFICE VISIT (OUTPATIENT)
Dept: PRIMARY CARE CLINIC | Age: 69
End: 2019-10-16
Payer: MEDICARE

## 2019-10-16 ENCOUNTER — TELEPHONE (OUTPATIENT)
Dept: PRIMARY CARE CLINIC | Age: 69
End: 2019-10-16

## 2019-10-16 VITALS
HEART RATE: 70 BPM | OXYGEN SATURATION: 98 % | DIASTOLIC BLOOD PRESSURE: 74 MMHG | TEMPERATURE: 98.6 F | SYSTOLIC BLOOD PRESSURE: 134 MMHG | BODY MASS INDEX: 32.36 KG/M2 | WEIGHT: 232 LBS

## 2019-10-16 DIAGNOSIS — G89.29 CHRONIC LEFT-SIDED LOW BACK PAIN WITH LEFT-SIDED SCIATICA: ICD-10-CM

## 2019-10-16 DIAGNOSIS — I10 ESSENTIAL HYPERTENSION: Primary | ICD-10-CM

## 2019-10-16 DIAGNOSIS — M25.50 ARTHRALGIA OF MULTIPLE JOINTS: ICD-10-CM

## 2019-10-16 DIAGNOSIS — M54.42 CHRONIC LEFT-SIDED LOW BACK PAIN WITH LEFT-SIDED SCIATICA: ICD-10-CM

## 2019-10-16 DIAGNOSIS — Z23 NEED FOR INFLUENZA VACCINATION: ICD-10-CM

## 2019-10-16 DIAGNOSIS — M25.511 RIGHT ANTERIOR SHOULDER PAIN: ICD-10-CM

## 2019-10-16 DIAGNOSIS — I48.0 PAROXYSMAL ATRIAL FIBRILLATION (HCC): ICD-10-CM

## 2019-10-16 PROCEDURE — G8482 FLU IMMUNIZE ORDER/ADMIN: HCPCS | Performed by: NURSE PRACTITIONER

## 2019-10-16 PROCEDURE — 99213 OFFICE O/P EST LOW 20 MIN: CPT | Performed by: NURSE PRACTITIONER

## 2019-10-16 PROCEDURE — 73030 X-RAY EXAM OF SHOULDER: CPT

## 2019-10-16 PROCEDURE — G8417 CALC BMI ABV UP PARAM F/U: HCPCS | Performed by: NURSE PRACTITIONER

## 2019-10-16 PROCEDURE — G0008 ADMIN INFLUENZA VIRUS VAC: HCPCS | Performed by: NURSE PRACTITIONER

## 2019-10-16 PROCEDURE — 3017F COLORECTAL CA SCREEN DOC REV: CPT | Performed by: NURSE PRACTITIONER

## 2019-10-16 PROCEDURE — 1036F TOBACCO NON-USER: CPT | Performed by: NURSE PRACTITIONER

## 2019-10-16 PROCEDURE — 1123F ACP DISCUSS/DSCN MKR DOCD: CPT | Performed by: NURSE PRACTITIONER

## 2019-10-16 PROCEDURE — 4040F PNEUMOC VAC/ADMIN/RCVD: CPT | Performed by: NURSE PRACTITIONER

## 2019-10-16 PROCEDURE — 90653 IIV ADJUVANT VACCINE IM: CPT | Performed by: NURSE PRACTITIONER

## 2019-10-16 PROCEDURE — G8427 DOCREV CUR MEDS BY ELIG CLIN: HCPCS | Performed by: NURSE PRACTITIONER

## 2019-10-16 RX ORDER — TRAMADOL HYDROCHLORIDE 50 MG/1
50 TABLET ORAL 2 TIMES DAILY PRN
Qty: 60 TABLET | Refills: 0 | Status: SHIPPED | OUTPATIENT
Start: 2019-10-16 | End: 2019-11-13 | Stop reason: SDUPTHER

## 2019-10-16 RX ORDER — LISINOPRIL 40 MG/1
TABLET ORAL
Qty: 90 TABLET | Refills: 3 | Status: SHIPPED | OUTPATIENT
Start: 2019-10-16 | End: 2020-11-04 | Stop reason: SDUPTHER

## 2019-10-16 ASSESSMENT — ENCOUNTER SYMPTOMS
CONSTIPATION: 0
RHINORRHEA: 0
ABDOMINAL PAIN: 0
SHORTNESS OF BREATH: 0
NAUSEA: 0
DIARRHEA: 0
SORE THROAT: 0
TROUBLE SWALLOWING: 0
VOMITING: 0
COUGH: 0

## 2019-10-16 NOTE — PROGRESS NOTES
Mr. Kamara is 69 y.o. male    CHIEF COMPLAINT: I am here for my 4 month follow up for prostate cancer, ED, and stress incontinence. My PSA is <0.014.    HPI  Prostate cancer  He is doing well since prostatectomy. Patient denies any possible systemic symptoms of prostate cancer such as weight loss, lower extremity edema, or skeletal pain that could be worrisome for metastases.  Location: Prostate gland  Quality:  Adenocarcinoma  Severity: [place] 3+4, t2c with perineural invasion. 10% gland involvement.   Duration: Diagnosed in June 2017   Context: Found during evaluation of elevated PSA   Modifying factors: Biochemical remission after robot-assisted laparoscopic prostatectomy  Associated signs or symptoms: Incontinence much better after male sling procedure.   Hx related to this:   08/2017- RALP     Final Diagnosis   Prostate, radical prostatectomy:       A.  Prostatic adenocarcinoma ([place]'s grade 3+4 = 7).       B.  Tumor is present bilaterally in the gland.       C.  Perineural invasion is present.       D.  Largest tumor focus measures 1.3 cm in greatest dimension.       E.  Tumor occupies approximately 10% of the evaluated gland.       F.  Negative for evidence of extraprostatic extension.       G.  Negative for evidence of vas deferens or seminal vesicle invasion.       H.  Surgical excision margins are negative for evidence of malignancy.     AJCC STAGE:  pT2c, pNx, pMx      Electronically signed by Ej Schroeder MD on 8/3/2017 at 1511     Lab Results   Component Value Date    PSA <0.014 10/18/2019    PSA <0.070 03/13/2019    PSA <0.070 09/14/2018     Other issues to be evaluated/managed today:  - KIRT. He underwent a male urethral sling by Dr. Rebolledo in 11/2018. Still occasionally wears a pad. Much better. More of an issue with post void dribbling.   -ED. He wants to consider a penile prosthesis. Failed PDE 5 inhibitors and intracavernosal injections.       The following portions of the patient's  history were reviewed and updated as appropriate: allergies, current medications, past family history, past medical history, past social history, past surgical history and problem list.      Review of Systems   Constitutional: Negative for chills and fever.   Gastrointestinal: Negative for abdominal pain, anal bleeding and blood in stool.   Genitourinary: Negative for dysuria and hematuria.         Current Outpatient Medications:   •  allopurinol (ZYLOPRIM) 100 MG tablet, Take 100 mg by mouth Daily., Disp: , Rfl:   •  amLODIPine (NORVASC) 10 MG tablet, Take 5 mg by mouth Daily., Disp: , Rfl:   •  aspirin 81 MG EC tablet, Take 81 mg by mouth Daily., Disp: , Rfl:   •  atorvastatin (LIPITOR) 20 MG tablet, Take 20 mg by mouth Daily., Disp: , Rfl: 2  •  Cholecalciferol (VITAMIN D3) 2000 units tablet, Take 2,000 Units by mouth Daily., Disp: , Rfl:   •  citalopram (CeleXA) 20 MG tablet, Take 20 mg by mouth Daily., Disp: , Rfl:   •  clotrimazole-betamethasone (LOTRISONE) 1-0.05 % cream, Apply  topically to the appropriate area as directed 2 (Two) Times a Day for 28 days. apply twice daily to affected area, Disp: 45 g, Rfl: 2  •  colchicine 0.6 MG tablet, Take 0.6 mg by mouth As Needed for Muscle / Joint Pain. 2 tab then hour later 1 tab when gout flares , Disp: , Rfl:   •  escitalopram (LEXAPRO) 10 MG tablet, Take 10 mg by mouth Daily., Disp: , Rfl: 3  •  gabapentin (NEURONTIN) 400 MG capsule, Take 400 mg by mouth 2 (Two) Times a Day. Can take up to TID but only takes BID due to side effects - sleepiness, Disp: , Rfl:   •  levothyroxine (SYNTHROID) 25 MCG tablet, Take 25 mcg by mouth Daily., Disp: , Rfl:   •  levothyroxine (SYNTHROID, LEVOTHROID) 200 MCG tablet, Take 200 mcg by mouth Daily., Disp: , Rfl:   •  lisinopril (PRINIVIL,ZESTRIL) 40 MG tablet, Take 40 mg by mouth Daily., Disp: , Rfl: 11  •  metoprolol succinate XL (TOPROL-XL) 25 MG 24 hr tablet, Take 25 mg by mouth Daily., Disp: , Rfl:   •  nystatin (MYCOSTATIN)  729268 UNIT/GM powder, Apply  topically to the appropriate area as directed 4 (Four) Times a Day., Disp: , Rfl:   •  rivaroxaban (XARELTO) 15 MG tablet, Take 1 tablet by mouth Daily With Dinner., Disp: 30 tablet, Rfl: 11  •  SITagliptin (JANUVIA) 100 MG tablet, Take 100 mg by mouth Daily., Disp: , Rfl:   •  traMADol (ULTRAM) 50 MG tablet, Take 50 mg by mouth 2 (Two) Times a Day., Disp: , Rfl:     Past Medical History:   Diagnosis Date   • Arthritis    • Cancer (CMS/HCC)     thyroid   • Depression    • Diabetes (CMS/HCC)    • Disease of thyroid gland    • Gout    • Hypercholesteremia    • Hypertension    • IBS (irritable bowel syndrome)    • Kidney disease    • Prostate cancer (CMS/HCC)    • Sensorineural hearing loss    • Sleep apnea     NON COMPLIANT WITH C PAP   • Sudden hearing loss    • Syncope    • Tinnitus    • Urine incontinence        Past Surgical History:   Procedure Laterality Date   • BLADDER SUSPENSION N/A 11/26/2018    Procedure: CYSTOSCOPY BLADDER SUSPENSION MALE SLING;  Surgeon: Zaheer Rebolledo MD;  Location: [facility] OR;  Service: Urology   • CHOLECYSTECTOMY     • COLONOSCOPY N/A 3/7/2017    Procedure: COLONOSCOPY WITH ANESTHESIA;  Surgeon: Hector Alvarenga MD;  Location: [facility] ENDOSCOPY;  Service:    • LAPAROSCOPIC RETROPUBIC PROSTATECTOMY     • PROSTATE SURGERY     • PROSTATECTOMY N/A 8/1/2017    Procedure: PROSTATECTOMY LAPAROSCOPIC WITH DAVINCI SI ROBOT ;  Surgeon: Hernesto Hong MD;  Location: [facility] OR;  Service:    • THYROID SURGERY      RADIATION THERAPY AFTER SURGERY       Social History     Socioeconomic History   • Marital status:      Spouse name: Not on file   • Number of children: Not on file   • Years of education: Not on file   • Highest education level: Not on file   Tobacco Use   • Smoking status: Never Smoker   • Smokeless tobacco: Never Used   Substance and Sexual Activity   • Alcohol use: Yes     Comment: occasionally 1-2 beers once or twice year   • Drug use: No  "  • Sexual activity: Defer       Family History   Problem Relation Age of Onset   • Prostate cancer Father    • Cancer Father    • Heart disease Father         CABG   • Heart attack Father 70   • Heart disease Mother         assumed MI at time of death - had cardiopulmonary arrest   • Sudden death Mother    • Diabetes Sister    • Arrhythmia Sister    • Stroke Paternal Grandfather    • Colon cancer Neg Hx    • Colon polyps Neg Hx          Temp 98 °F (36.7 °C)   Ht 180.3 cm (71\")   Wt 104 kg (229 lb)   BMI 31.94 kg/m²       Physical Exam  Constitutional:  They  appear well-developed and well-nourished. There are no obvious deformities. No distress. The vital signs are reviewed  Pulmonary/Chest: Effort normal.   GI: Soft. The patient exhibits no distension and no mass. There is no tenderness. There is no rebound and no guarding. No hernia.   Neurological: Patient is alert and oriented to person, place, and time.   Skin: Skin is warm and dry. Not diaphoretic.   Psychiatric:  normal mood and affect. Not agitated.   : Maculopapular erythema on left lateral aspect of penis and on glans.   Data  Results for orders placed or performed in visit on 10/22/19   POC Urinalysis Dipstick, Multipro   Result Value Ref Range    Color Yellow Yellow, Straw, Dark Yellow, Cindi    Clarity, UA Clear Clear    Glucose, UA Negative Negative, 1000 mg/dL (3+) mg/dL    Bilirubin Negative Negative    Ketones, UA Negative Negative    Specific Gravity  1.015 1.005 - 1.030    Blood, UA Negative Negative    pH, Urine 6.0 5.0 - 8.0    Protein, POC Negative Negative mg/dL    Urobilinogen, UA Normal Normal    Nitrite, UA Negative Negative    Leukocytes Negative Negative         Assessment and Plan  Diagnoses and all orders for this visit:    Prostate cancer (CMS/[facility])  -     POC Urinalysis Dipstick, Multipro  -     clotrimazole-betamethasone (LOTRISONE) 1-0.05 % cream; Apply  topically to the appropriate area as directed 2 (Two) Times a Day for 28 " days. apply twice daily to affected area  -     PSA DIAGNOSTIC; Future    Erectile dysfunction after radical prostatectomy  -     POC Urinalysis Dipstick, Multipro    Stress incontinence, male  -     POC Urinalysis Dipstick, Multipro    Balanoposthitis    -His PSA is undetectable suggesting no clinical evidence of prostate cancer   - Refer to Dr. Kiser in re: penile prosthesis. Discussed vacuum device and Muse.   - Improved with sling.   -Start lotrisone cream for balanitis.     F/u: 6 months with psa befoe the visit  (Please note that portions of this note were completed with a voice recognition program.)  Hernesto Hong MD  10/22/19  8:40 AM

## 2019-10-16 NOTE — TELEPHONE ENCOUNTER
Called, no answer, left voicemail to let pt know he has to have a PSA done prior to his appt on Tuesday next week or he will need to be reschd.

## 2019-10-18 LAB — PSA SERPL-MCNC: <0.014 NG/ML (ref 0–4)

## 2019-10-22 ENCOUNTER — OFFICE VISIT (OUTPATIENT)
Dept: UROLOGY | Facility: CLINIC | Age: 69
End: 2019-10-22

## 2019-10-22 VITALS — TEMPERATURE: 98 F | WEIGHT: 229 LBS | HEIGHT: 71 IN | BODY MASS INDEX: 32.06 KG/M2

## 2019-10-22 DIAGNOSIS — C61 PROSTATE CANCER (HCC): Primary | ICD-10-CM

## 2019-10-22 DIAGNOSIS — N39.3 STRESS INCONTINENCE, MALE: ICD-10-CM

## 2019-10-22 DIAGNOSIS — N52.31 ERECTILE DYSFUNCTION AFTER RADICAL PROSTATECTOMY: ICD-10-CM

## 2019-10-22 DIAGNOSIS — N47.6 BALANOPOSTHITIS: ICD-10-CM

## 2019-10-22 LAB
BILIRUB BLD-MCNC: NEGATIVE MG/DL
CLARITY, POC: CLEAR
COLOR UR: YELLOW
GLUCOSE UR STRIP-MCNC: NEGATIVE MG/DL
KETONES UR QL: NEGATIVE
LEUKOCYTE EST, POC: NEGATIVE
NITRITE UR-MCNC: NEGATIVE MG/ML
PH UR: 6 [PH] (ref 5–8)
PROT UR STRIP-MCNC: NEGATIVE MG/DL
RBC # UR STRIP: NEGATIVE /UL
SP GR UR: 1.01 (ref 1–1.03)
UROBILINOGEN UR QL: NORMAL

## 2019-10-22 PROCEDURE — 99214 OFFICE O/P EST MOD 30 MIN: CPT | Performed by: UROLOGY

## 2019-10-22 PROCEDURE — 81003 URINALYSIS AUTO W/O SCOPE: CPT | Performed by: UROLOGY

## 2019-10-22 RX ORDER — CLOTRIMAZOLE AND BETAMETHASONE DIPROPIONATE 10; .64 MG/G; MG/G
CREAM TOPICAL 2 TIMES DAILY
Qty: 45 G | Refills: 2 | Status: SHIPPED | OUTPATIENT
Start: 2019-10-22 | End: 2019-11-19

## 2019-10-25 DIAGNOSIS — E78.2 MIXED HYPERLIPIDEMIA: ICD-10-CM

## 2019-10-28 RX ORDER — ATORVASTATIN CALCIUM 20 MG/1
20 TABLET, FILM COATED ORAL DAILY
Qty: 90 TABLET | Refills: 3 | Status: SHIPPED | OUTPATIENT
Start: 2019-10-28 | End: 2021-02-17 | Stop reason: SDUPTHER

## 2019-11-06 ENCOUNTER — OFFICE VISIT (OUTPATIENT)
Dept: UROLOGY | Facility: CLINIC | Age: 69
End: 2019-11-06

## 2019-11-06 VITALS — BODY MASS INDEX: 30.8 KG/M2 | WEIGHT: 220 LBS | TEMPERATURE: 97.2 F | HEIGHT: 71 IN

## 2019-11-06 DIAGNOSIS — I48.0 PAROXYSMAL ATRIAL FIBRILLATION (HCC): ICD-10-CM

## 2019-11-06 DIAGNOSIS — I10 ESSENTIAL HYPERTENSION: ICD-10-CM

## 2019-11-06 DIAGNOSIS — N52.31 ERECTILE DYSFUNCTION AFTER RADICAL PROSTATECTOMY: Primary | ICD-10-CM

## 2019-11-06 PROCEDURE — 81001 URINALYSIS AUTO W/SCOPE: CPT | Performed by: UROLOGY

## 2019-11-06 PROCEDURE — 99214 OFFICE O/P EST MOD 30 MIN: CPT | Performed by: UROLOGY

## 2019-11-06 NOTE — PATIENT INSTRUCTIONS

## 2019-11-08 DIAGNOSIS — E03.9 ACQUIRED HYPOTHYROIDISM: ICD-10-CM

## 2019-11-08 DIAGNOSIS — E11.22 TYPE 2 DIABETES MELLITUS WITH STAGE 3 CHRONIC KIDNEY DISEASE, WITHOUT LONG-TERM CURRENT USE OF INSULIN (HCC): ICD-10-CM

## 2019-11-08 DIAGNOSIS — Z00.00 ROUTINE PHYSICAL EXAMINATION: ICD-10-CM

## 2019-11-08 DIAGNOSIS — I10 ESSENTIAL HYPERTENSION: ICD-10-CM

## 2019-11-08 DIAGNOSIS — E78.2 MIXED HYPERLIPIDEMIA: ICD-10-CM

## 2019-11-08 DIAGNOSIS — N18.30 TYPE 2 DIABETES MELLITUS WITH STAGE 3 CHRONIC KIDNEY DISEASE, WITHOUT LONG-TERM CURRENT USE OF INSULIN (HCC): ICD-10-CM

## 2019-11-08 LAB
ALBUMIN SERPL-MCNC: 4.3 G/DL (ref 3.5–5.2)
ALP BLD-CCNC: 52 U/L (ref 40–130)
ALT SERPL-CCNC: 9 U/L (ref 5–41)
ANION GAP SERPL CALCULATED.3IONS-SCNC: 13 MMOL/L (ref 7–19)
AST SERPL-CCNC: 11 U/L (ref 5–40)
BASOPHILS ABSOLUTE: 0.1 K/UL (ref 0–0.2)
BASOPHILS RELATIVE PERCENT: 0.6 % (ref 0–1)
BILIRUB SERPL-MCNC: 0.6 MG/DL (ref 0.2–1.2)
BUN BLDV-MCNC: 23 MG/DL (ref 8–23)
CALCIUM SERPL-MCNC: 9.2 MG/DL (ref 8.8–10.2)
CHLORIDE BLD-SCNC: 100 MMOL/L (ref 98–111)
CHOLESTEROL, TOTAL: 133 MG/DL (ref 160–199)
CO2: 25 MMOL/L (ref 22–29)
CREAT SERPL-MCNC: 1.3 MG/DL (ref 0.5–1.2)
CREATININE URINE: 86.3 MG/DL (ref 4.2–622)
EOSINOPHILS ABSOLUTE: 0.2 K/UL (ref 0–0.6)
EOSINOPHILS RELATIVE PERCENT: 2.4 % (ref 0–5)
GFR NON-AFRICAN AMERICAN: 55
GLUCOSE BLD-MCNC: 99 MG/DL (ref 74–109)
HBA1C MFR BLD: 5.8 % (ref 4–6)
HCT VFR BLD CALC: 41 % (ref 42–52)
HDLC SERPL-MCNC: 21 MG/DL (ref 55–121)
HEMOGLOBIN: 13.3 G/DL (ref 14–18)
IMMATURE GRANULOCYTES #: 0.1 K/UL
LDL CHOLESTEROL CALCULATED: 74 MG/DL
LYMPHOCYTES ABSOLUTE: 1.3 K/UL (ref 1.1–4.5)
LYMPHOCYTES RELATIVE PERCENT: 15 % (ref 20–40)
MCH RBC QN AUTO: 28.9 PG (ref 27–31)
MCHC RBC AUTO-ENTMCNC: 32.4 G/DL (ref 33–37)
MCV RBC AUTO: 88.9 FL (ref 80–94)
MICROALBUMIN UR-MCNC: <1.2 MG/DL (ref 0–19)
MICROALBUMIN/CREAT UR-RTO: NORMAL MG/G
MONOCYTES ABSOLUTE: 0.5 K/UL (ref 0–0.9)
MONOCYTES RELATIVE PERCENT: 5.3 % (ref 0–10)
NEUTROPHILS ABSOLUTE: 6.4 K/UL (ref 1.5–7.5)
NEUTROPHILS RELATIVE PERCENT: 76.1 % (ref 50–65)
PDW BLD-RTO: 13.2 % (ref 11.5–14.5)
PLATELET # BLD: 248 K/UL (ref 130–400)
PMV BLD AUTO: 9.7 FL (ref 9.4–12.4)
POTASSIUM SERPL-SCNC: 4.5 MMOL/L (ref 3.5–5)
RBC # BLD: 4.61 M/UL (ref 4.7–6.1)
SODIUM BLD-SCNC: 138 MMOL/L (ref 136–145)
T4 FREE: 2 NG/DL (ref 0.9–1.7)
TOTAL PROTEIN: 7.1 G/DL (ref 6.6–8.7)
TRIGL SERPL-MCNC: 192 MG/DL (ref 0–149)
TSH SERPL DL<=0.05 MIU/L-ACNC: 0.04 UIU/ML (ref 0.27–4.2)
WBC # BLD: 8.4 K/UL (ref 4.8–10.8)

## 2019-11-11 ENCOUNTER — TELEPHONE (OUTPATIENT)
Dept: PRIMARY CARE CLINIC | Age: 69
End: 2019-11-11

## 2019-11-13 ENCOUNTER — OFFICE VISIT (OUTPATIENT)
Dept: CARDIOLOGY | Facility: CLINIC | Age: 69
End: 2019-11-13

## 2019-11-13 ENCOUNTER — OFFICE VISIT (OUTPATIENT)
Dept: PRIMARY CARE CLINIC | Age: 69
End: 2019-11-13
Payer: MEDICARE

## 2019-11-13 VITALS
DIASTOLIC BLOOD PRESSURE: 77 MMHG | SYSTOLIC BLOOD PRESSURE: 134 MMHG | BODY MASS INDEX: 31.78 KG/M2 | WEIGHT: 227 LBS | TEMPERATURE: 98 F | HEIGHT: 71 IN | HEART RATE: 78 BPM | OXYGEN SATURATION: 97 %

## 2019-11-13 VITALS
WEIGHT: 230.8 LBS | HEART RATE: 71 BPM | HEIGHT: 71 IN | SYSTOLIC BLOOD PRESSURE: 132 MMHG | OXYGEN SATURATION: 96 % | DIASTOLIC BLOOD PRESSURE: 66 MMHG | BODY MASS INDEX: 32.31 KG/M2

## 2019-11-13 DIAGNOSIS — I10 ESSENTIAL HYPERTENSION: ICD-10-CM

## 2019-11-13 DIAGNOSIS — M25.50 ARTHRALGIA OF MULTIPLE JOINTS: ICD-10-CM

## 2019-11-13 DIAGNOSIS — I48.0 PAROXYSMAL ATRIAL FIBRILLATION (HCC): ICD-10-CM

## 2019-11-13 DIAGNOSIS — E78.2 MIXED HYPERLIPIDEMIA: ICD-10-CM

## 2019-11-13 DIAGNOSIS — G89.29 CHRONIC LEFT-SIDED LOW BACK PAIN WITHOUT SCIATICA: ICD-10-CM

## 2019-11-13 DIAGNOSIS — N18.30 TYPE 2 DIABETES MELLITUS WITH STAGE 3 CHRONIC KIDNEY DISEASE, WITHOUT LONG-TERM CURRENT USE OF INSULIN (HCC): ICD-10-CM

## 2019-11-13 DIAGNOSIS — M54.50 CHRONIC LEFT-SIDED LOW BACK PAIN WITHOUT SCIATICA: ICD-10-CM

## 2019-11-13 DIAGNOSIS — G47.30 SLEEP APNEA, UNSPECIFIED TYPE: ICD-10-CM

## 2019-11-13 DIAGNOSIS — Z85.46 HISTORY OF PROSTATE CANCER: ICD-10-CM

## 2019-11-13 DIAGNOSIS — E03.9 ACQUIRED HYPOTHYROIDISM: ICD-10-CM

## 2019-11-13 DIAGNOSIS — R55 SYNCOPE AND COLLAPSE: ICD-10-CM

## 2019-11-13 DIAGNOSIS — G89.29 CHRONIC LEFT-SIDED LOW BACK PAIN WITH LEFT-SIDED SCIATICA: ICD-10-CM

## 2019-11-13 DIAGNOSIS — M47.816 LUMBAR SPONDYLOSIS: ICD-10-CM

## 2019-11-13 DIAGNOSIS — Z00.00 ROUTINE GENERAL MEDICAL EXAMINATION AT A HEALTH CARE FACILITY: Primary | ICD-10-CM

## 2019-11-13 DIAGNOSIS — R07.89 CHEST PAIN, ATYPICAL: Primary | ICD-10-CM

## 2019-11-13 DIAGNOSIS — I65.23 BILATERAL CAROTID ARTERY STENOSIS: ICD-10-CM

## 2019-11-13 DIAGNOSIS — E11.22 TYPE 2 DIABETES MELLITUS WITH STAGE 3 CHRONIC KIDNEY DISEASE, WITHOUT LONG-TERM CURRENT USE OF INSULIN (HCC): ICD-10-CM

## 2019-11-13 DIAGNOSIS — G47.33 OSA (OBSTRUCTIVE SLEEP APNEA): ICD-10-CM

## 2019-11-13 DIAGNOSIS — M54.42 CHRONIC LEFT-SIDED LOW BACK PAIN WITH LEFT-SIDED SCIATICA: ICD-10-CM

## 2019-11-13 PROBLEM — E66.09 CLASS 1 OBESITY DUE TO EXCESS CALORIES WITH SERIOUS COMORBIDITY AND BODY MASS INDEX (BMI) OF 32.0 TO 32.9 IN ADULT: Chronic | Status: ACTIVE | Noted: 2018-06-28

## 2019-11-13 PROBLEM — E66.811 CLASS 1 OBESITY DUE TO EXCESS CALORIES WITH SERIOUS COMORBIDITY AND BODY MASS INDEX (BMI) OF 32.0 TO 32.9 IN ADULT: Chronic | Status: ACTIVE | Noted: 2018-06-28

## 2019-11-13 PROCEDURE — 99214 OFFICE O/P EST MOD 30 MIN: CPT | Performed by: NURSE PRACTITIONER

## 2019-11-13 PROCEDURE — 2022F DILAT RTA XM EVC RTNOPTHY: CPT | Performed by: NURSE PRACTITIONER

## 2019-11-13 PROCEDURE — 1123F ACP DISCUSS/DSCN MKR DOCD: CPT | Performed by: NURSE PRACTITIONER

## 2019-11-13 PROCEDURE — G8417 CALC BMI ABV UP PARAM F/U: HCPCS | Performed by: NURSE PRACTITIONER

## 2019-11-13 PROCEDURE — G8427 DOCREV CUR MEDS BY ELIG CLIN: HCPCS | Performed by: NURSE PRACTITIONER

## 2019-11-13 PROCEDURE — 4040F PNEUMOC VAC/ADMIN/RCVD: CPT | Performed by: NURSE PRACTITIONER

## 2019-11-13 PROCEDURE — 3044F HG A1C LEVEL LT 7.0%: CPT | Performed by: NURSE PRACTITIONER

## 2019-11-13 PROCEDURE — 1036F TOBACCO NON-USER: CPT | Performed by: NURSE PRACTITIONER

## 2019-11-13 PROCEDURE — G0439 PPPS, SUBSEQ VISIT: HCPCS | Performed by: NURSE PRACTITIONER

## 2019-11-13 PROCEDURE — 3017F COLORECTAL CA SCREEN DOC REV: CPT | Performed by: NURSE PRACTITIONER

## 2019-11-13 PROCEDURE — G8482 FLU IMMUNIZE ORDER/ADMIN: HCPCS | Performed by: NURSE PRACTITIONER

## 2019-11-13 RX ORDER — GABAPENTIN 400 MG/1
400 CAPSULE ORAL 3 TIMES DAILY
Qty: 270 CAPSULE | Refills: 0 | Status: SHIPPED | OUTPATIENT
Start: 2019-11-13 | End: 2020-04-08 | Stop reason: SDUPTHER

## 2019-11-13 RX ORDER — TRAMADOL HYDROCHLORIDE 50 MG/1
50 TABLET ORAL 2 TIMES DAILY PRN
Qty: 60 TABLET | Refills: 1 | Status: SHIPPED | OUTPATIENT
Start: 2019-11-13 | End: 2020-01-22 | Stop reason: SDUPTHER

## 2019-11-13 ASSESSMENT — LIFESTYLE VARIABLES
HOW OFTEN DURING THE LAST YEAR HAVE YOU FOUND THAT YOU WERE NOT ABLE TO STOP DRINKING ONCE YOU HAD STARTED: 0
AUDIT-C TOTAL SCORE: 1
HOW OFTEN DO YOU HAVE SIX OR MORE DRINKS ON ONE OCCASION: 0
HOW OFTEN DURING THE LAST YEAR HAVE YOU BEEN UNABLE TO REMEMBER WHAT HAPPENED THE NIGHT BEFORE BECAUSE YOU HAD BEEN DRINKING: 0
HAS A RELATIVE, FRIEND, DOCTOR, OR ANOTHER HEALTH PROFESSIONAL EXPRESSED CONCERN ABOUT YOUR DRINKING OR SUGGESTED YOU CUT DOWN: 0
HOW OFTEN DURING THE LAST YEAR HAVE YOU NEEDED AN ALCOHOLIC DRINK FIRST THING IN THE MORNING TO GET YOURSELF GOING AFTER A NIGHT OF HEAVY DRINKING: 0
HOW MANY STANDARD DRINKS CONTAINING ALCOHOL DO YOU HAVE ON A TYPICAL DAY: 0
HAVE YOU OR SOMEONE ELSE BEEN INJURED AS A RESULT OF YOUR DRINKING: 0
HOW OFTEN DO YOU HAVE A DRINK CONTAINING ALCOHOL: 1
HOW OFTEN DURING THE LAST YEAR HAVE YOU FAILED TO DO WHAT WAS NORMALLY EXPECTED FROM YOU BECAUSE OF DRINKING: 0
HOW OFTEN DURING THE LAST YEAR HAVE YOU HAD A FEELING OF GUILT OR REMORSE AFTER DRINKING: 0
AUDIT TOTAL SCORE: 1

## 2019-11-13 ASSESSMENT — ENCOUNTER SYMPTOMS
BACK PAIN: 1
COUGH: 0
VOMITING: 0
RHINORRHEA: 0
NAUSEA: 0
SHORTNESS OF BREATH: 0
DIARRHEA: 0
CONSTIPATION: 0
SORE THROAT: 0
ABDOMINAL PAIN: 0
TROUBLE SWALLOWING: 0

## 2019-11-13 ASSESSMENT — PATIENT HEALTH QUESTIONNAIRE - PHQ9
SUM OF ALL RESPONSES TO PHQ QUESTIONS 1-9: 0
SUM OF ALL RESPONSES TO PHQ QUESTIONS 1-9: 0

## 2019-11-13 NOTE — PROGRESS NOTES
Subjective:     Encounter Date:11/13/2019    Chief Complaint:    Patient ID: Zay Kamara is a 69 y.o. male here today for 3 month cardiac follow-up.    HPI     Atrial Fibrillation      Additional comments: paroxysmal, no palpitations, stopped aspirin, on Xarelto, bruises easily, no major bleeding, no falls. Thyroid replacement decreased a few days ago.              Chest Pain      Additional comments: resolved, technically difficult SE but no overt wall motion abnormalities or ischemia              Loss of Consciousness      Additional comments: resolved, no recurrence, able to exert without difficulty, mild to moderate carotid disease now being followed by Dr. León          Last edited by Letty Belcher APRN on 11/13/2019  2:26 PM. (History)        History:   Past Medical History:   Diagnosis Date   • Arthritis    • Atrial fibrillation (CMS/HCC)     paroxysmal, on Xarelto   • BMI 31.0-31.9,adult 6/28/2018   • Depression    • Diabetes (CMS/HCC)    • Disease of thyroid gland    • Gout    • Hypercholesteremia    • Hypertension    • IBS (irritable bowel syndrome)    • Kidney disease    • Prostate cancer (CMS/HCC)     Dr. Hong following   • Sensorineural hearing loss    • Sleep apnea     CPAP   • Sudden hearing loss    • Syncope    • Tinnitus    • Urine incontinence      Past Surgical History:   Procedure Laterality Date   • BLADDER SUSPENSION N/A 11/26/2018    Procedure: CYSTOSCOPY BLADDER SUSPENSION MALE SLING;  Surgeon: Zaheer Rebolledo MD;  Location: [facility] OR;  Service: Urology   • CHOLECYSTECTOMY     • COLONOSCOPY N/A 3/7/2017    Procedure: COLONOSCOPY WITH ANESTHESIA;  Surgeon: Hector Alvarenga MD;  Location: [facility] ENDOSCOPY;  Service:    • LAPAROSCOPIC RETROPUBIC PROSTATECTOMY     • PROSTATE SURGERY     • PROSTATECTOMY N/A 8/1/2017    Procedure: PROSTATECTOMY LAPAROSCOPIC WITH DAVINCI SI ROBOT ;  Surgeon: Hernesto Hong MD;  Location: [facility] OR;  Service:    • THYROID SURGERY       RADIATION THERAPY AFTER SURGERY     Social History     Socioeconomic History   • Marital status:      Spouse name: Not on file   • Number of children: Not on file   • Years of education: Not on file   • Highest education level: Not on file   Tobacco Use   • Smoking status: Never Smoker   • Smokeless tobacco: Never Used   Substance and Sexual Activity   • Alcohol use: Yes     Comment: occasionally 1-2 beers once or twice year   • Drug use: No   • Sexual activity: Defer     Family History   Problem Relation Age of Onset   • Prostate cancer Father    • Cancer Father    • Heart disease Father         CABG   • Heart attack Father 70   • Heart disease Mother         assumed MI at time of death - had cardiopulmonary arrest   • Sudden death Mother    • Diabetes Sister    • Arrhythmia Sister    • Stroke Paternal Grandfather    • Colon cancer Neg Hx    • Colon polyps Neg Hx        Outpatient Medications Marked as Taking for the 11/13/19 encounter (Office Visit) with Letty Belcher APRN   Medication Sig Dispense Refill   • allopurinol (ZYLOPRIM) 100 MG tablet Take 100 mg by mouth Daily.     • amLODIPine (NORVASC) 10 MG tablet Take 5 mg by mouth Daily.     • atorvastatin (LIPITOR) 20 MG tablet Take 20 mg by mouth Daily.  2   • Cholecalciferol (VITAMIN D3) 2000 units tablet Take 2,000 Units by mouth Daily.     • clotrimazole-betamethasone (LOTRISONE) 1-0.05 % cream Apply  topically to the appropriate area as directed 2 (Two) Times a Day for 28 days. apply twice daily to affected area 45 g 2   • colchicine 0.6 MG tablet Take 0.6 mg by mouth As Needed for Muscle / Joint Pain. 2 tab then hour later 1 tab when gout flares      • escitalopram (LEXAPRO) 10 MG tablet Take 10 mg by mouth Daily.  3   • gabapentin (NEURONTIN) 400 MG capsule Take 400 mg by mouth 2 (Two) Times a Day. Can take up to TID but only takes BID due to side effects - sleepiness     • levothyroxine (SYNTHROID, LEVOTHROID) 200 MCG tablet Take 200 mcg  by mouth Daily.     • lisinopril (PRINIVIL,ZESTRIL) 40 MG tablet Take 40 mg by mouth Daily.  11   • metoprolol succinate XL (TOPROL-XL) 25 MG 24 hr tablet Take 25 mg by mouth Daily.     • nystatin (MYCOSTATIN) 796108 UNIT/GM powder Apply  topically to the appropriate area as directed 4 (Four) Times a Day.     • rivaroxaban (XARELTO) 15 MG tablet Take 1 tablet by mouth Daily With Dinner. 30 tablet 11   • SITagliptin (JANUVIA) 100 MG tablet Take 100 mg by mouth Daily.     • traMADol (ULTRAM) 50 MG tablet Take 50 mg by mouth 2 (Two) Times a Day.     • [DISCONTINUED] citalopram (CeleXA) 20 MG tablet Take 20 mg by mouth Daily.     • [DISCONTINUED] levothyroxine (SYNTHROID) 25 MCG tablet Take 25 mcg by mouth Daily.         Review of Systems:  Review of Systems   Constitution: Positive for malaise/fatigue. Negative for chills and fever.   HENT: Positive for congestion and hearing loss (chronic). Negative for nosebleeds.    Eyes: Negative for blurred vision and double vision.   Cardiovascular: Positive for dyspnea on exertion. Negative for chest pain, leg swelling and near-syncope. Syncope: one time in May.   Respiratory: Positive for shortness of breath. Negative for cough.    Endocrine: Negative for cold intolerance and heat intolerance.   Hematologic/Lymphatic: Bruises/bleeds easily.   Skin: Negative for dry skin, itching and rash.   Musculoskeletal: Positive for arthritis, back pain and gout. Negative for falls.   Gastrointestinal: Positive for constipation (due to tramadol and gabapentin). Negative for abdominal pain, diarrhea, nausea and vomiting.   Genitourinary: Positive for nocturia. Negative for dysuria.   Neurological: Positive for headaches (not very often). Negative for excessive daytime sleepiness, dizziness, light-headedness and loss of balance.   Psychiatric/Behavioral: Positive for depression (controlled with medication). The patient does not have insomnia and is not nervous/anxious.         Objective:   BP  "132/66 (BP Location: Left arm, Patient Position: Sitting, Cuff Size: Adult)   Pulse 71   Ht 180.3 cm (71\")   Wt 105 kg (230 lb 12.8 oz)   SpO2 96%   BMI 32.19 kg/m²   Wt Readings from Last 3 Encounters:   11/13/19 105 kg (230 lb 12.8 oz)   11/06/19 99.8 kg (220 lb)   10/22/19 104 kg (229 lb)       Physical Exam   Constitutional: He is oriented to person, place, and time. He appears well-developed and well-nourished.   HENT:   Right Ear: Decreased hearing is noted.   Left Ear: Decreased hearing is noted.   Eyes: No scleral icterus.   Neck: No JVD present.   Cardiovascular: Normal rate, regular rhythm, normal heart sounds and intact distal pulses. Exam reveals no gallop and no friction rub.   No murmur heard.  Pulmonary/Chest: Effort normal and breath sounds normal.   Musculoskeletal: He exhibits no edema.   Neurological: He is alert and oriented to person, place, and time.   Skin: Skin is warm and dry.   Psychiatric: He has a normal mood and affect.   Vitals reviewed.    Lab/Diagnostics Review:   Lab Results   Component Value Date    WBC 8.4 11/08/2019    HGB 13.3 (L) 11/08/2019    HCT 41.0 (L) 11/08/2019    MCV 88.9 11/08/2019     11/08/2019     Lab Results   Component Value Date    GLUCOSE 99 11/08/2019    BUN 23 11/08/2019    CREATININE 1.3 (H) 11/08/2019    EGFRIFNONA 55 (A) 11/08/2019    EGFRIFAFRI 81 04/04/2017    BCR 22.0 05/28/2019    K 4.5 11/08/2019    CO2 25 11/08/2019    CALCIUM 9.2 11/08/2019    PROTENTOTREF 7.1 04/04/2017    ALBUMIN 4.3 11/08/2019    LABIL2 1.4 04/04/2017    AST 11 11/08/2019    ALT 9 11/08/2019     Lab Results   Component Value Date    TSH 0.035 (L) 11/08/2019     Lab Results   Component Value Date    CHLPL 133 (L) 11/08/2019    TRIG 192 (H) 11/08/2019    HDL 21 (L) 11/08/2019    LDL 74 11/08/2019     Lab Results   Component Value Date    HGBA1C 5.8 11/08/2019     Lab Results   Component Value Date     WBC 16.77 (H) 05/28/2019     HGB 13.0 (L) 05/28/2019     HCT 38.4 (L) " 05/28/2019     MCV 84.2 05/28/2019      05/28/2019            Lab Results   Component Value Date     GLUCOSE 138 (H) 07/16/2019     BUN 30 (H) 07/16/2019     CREATININE 1.6 (H) 07/16/2019     EGFRIFNONA 43 (A) 07/16/2019     EGFRIFAFRI 81 04/04/2017     BCR 22.0 05/28/2019     K 4.9 07/16/2019     CO2 23 07/16/2019     CALCIUM 9.5 07/16/2019     PROTENTOTREF 7.1 04/04/2017     ALBUMIN 4.4 07/16/2019     LABIL2 1.4 04/04/2017     AST 13 07/16/2019     ALT 17 07/16/2019            Lab Results   Component Value Date     TROPONINI <0.012 05/28/2019            Lab Results   Component Value Date     CHLPL 102 (L) 07/16/2019     TRIG 117 07/16/2019     HDL 22 (L) 07/16/2019     LDL 57 07/16/2019 8/21/2019  carotid bilateral, [facility], Dr. León  1. There is less than 50% stenosis of the right internal carotid artery.  2. There is 50-69% stenosis of the left internal carotid artery.  3. Antegrade flow is demonstrated in bilateral vertebral arteries.    7/25/2019 stress echocardiogram, [facility]  · Average functional capacity.  · Negative for chest pain.  · No ST changes with exercise. Frequent PVC's with stress and in recovery.  · Left ventricular systolic function is normal at rest. Suboptimal visualation of all wall segments with stress but with no new wall motion abnormalities.  Technically difficult study due with suboptimal wall visualization at stress.  Overall appears low risk for ischemia.      7/25/2019 echocardiogram [facility] Henson   · Left ventricular systolic function is normal. Estimated EF = 60%.  · Left ventricular diastolic dysfunction.  · Left atrial cavity size is mild-to-moderately dilated.  · Mild aortic valve regurgitation is present.     6/10/2019 extended 14 day Holter, [facility]  ·  An abnormal monitor study.   RHYTHM IS SINUS  RUNS OF SVT NOTED - UP TO 7 BEATS  RUNS OF AFIB UP TO 11 MINS NOTED  THREE RUNS OF VENTRICULAR TACHYCARDIA UP TO 7 BEATS     5/28/2019 EKG sinus rhythm 80  bpm, right bundle branch block, no significant change compared to previous, Dr. Radford [facility]     5/28/2019 CTA chest, Dr. Claudia Meek [facility]  IMPRESSION:  1. No evidence of pulmonary embolus or other acute cardiopulmonary  process.  2. Dependent atelectasis bilaterally.      ECG 12 Lead  Date/Time: 8/9/2019 9:01 AM  Performed by: Letty Belcher APRN  Authorized by: Letty Belcher APRN   Comparison: compared with previous ECG from 5/28/2019  Similar to previous ECG  Rhythm: sinus rhythm and sinus bradycardia  Rate: bradycardic  BPM: 59  Conduction: right bundle branch block    Clinical impression: abnormal EKG    Procedures: none in office today        Assessment/Plan:         Problem List Items Addressed This Visit        Cardiovascular and Mediastinum    Paroxysmal atrial fibrillation (CMS/HCC) (Chronic)    Overview     [id number] 3 = high risk  HAS-BLED 3 = high risk         Current Assessment & Plan     Continue Xarelto 15 mg daily. Call if begins to fall frequently or with minor bleeding. Go to ER with any major bleeding.         Hypertension (Chronic)    Current Assessment & Plan     Controlled with medications. Continue present therapy. Encouraged weight loss, healthy diet, and routine aerobic exercise.         Mixed hyperlipidemia (Chronic)    Current Assessment & Plan     Well controlled except low HDL on atorvastatin. Will consider changing to rosuvastatin if HDL doesn't improve to goal of 40 or above with healthy diet and routine aerobic exercise.          Bilateral carotid artery stenosis (Chronic)    Overview     Dr. León following  8/21/2019 US - less than 50% R ICA, 50-69% L ICA stenosis         Current Assessment & Plan     Encouraged healthy diet, BP, lipid, and glucose control, and routine aerobic exercise. Continue present therapy. Recheck per Dr. León.         RESOLVED: Syncope and collapse    Current Assessment & Plan     Unclear etiology. Call if recurs.             Respiratory    Sleep apnea (Chronic)    Overview     Previously noncompliant with CPAP         Current Assessment & Plan     Encouraged continued compliance with CPAP. Discussed weight loss and possibility of decreasing pressure requirements or stopping CPAP pending re-titration study.             Nervous and Auditory    RESOLVED: Chest pain, atypical - Primary    Current Assessment & Plan     Call if recurs and will check nuclear stress test given technically difficult SE 8/2019             Return in about 1 year (around 11/13/2020) for Recheck.           Letty Belcher, APRN, ACNP-BC, CHFN-BC

## 2019-11-13 NOTE — ASSESSMENT & PLAN NOTE
Encouraged continued compliance with CPAP. Discussed weight loss and possibility of decreasing pressure requirements or stopping CPAP pending re-titration study.

## 2019-11-13 NOTE — ASSESSMENT & PLAN NOTE
Encouraged healthy diet, BP, lipid, and glucose control, and routine aerobic exercise. Continue present therapy. Recheck per Dr. León.

## 2019-11-13 NOTE — ASSESSMENT & PLAN NOTE
Well controlled except low HDL on atorvastatin. Will consider changing to rosuvastatin if HDL doesn't improve to goal of 40 or above with healthy diet and routine aerobic exercise.

## 2019-11-13 NOTE — ASSESSMENT & PLAN NOTE
Controlled with medications. Continue present therapy. Encouraged weight loss, healthy diet, and routine aerobic exercise.

## 2019-11-13 NOTE — ASSESSMENT & PLAN NOTE
Continue Xarelto 15 mg daily. Call if begins to fall frequently or with minor bleeding. Go to ER with any major bleeding.

## 2019-11-20 ENCOUNTER — TELEPHONE (OUTPATIENT)
Dept: CARDIOLOGY | Facility: CLINIC | Age: 69
End: 2019-11-20

## 2019-12-04 ENCOUNTER — TELEPHONE (OUTPATIENT)
Dept: PRIMARY CARE CLINIC | Age: 69
End: 2019-12-04

## 2019-12-30 DIAGNOSIS — F32.9 REACTIVE DEPRESSION: ICD-10-CM

## 2019-12-30 RX ORDER — ESCITALOPRAM OXALATE 10 MG/1
10 TABLET ORAL DAILY
Qty: 90 TABLET | Refills: 3 | OUTPATIENT
Start: 2019-12-30

## 2020-01-08 ENCOUNTER — TELEPHONE (OUTPATIENT)
Dept: CARDIOLOGY | Facility: CLINIC | Age: 70
End: 2020-01-08

## 2020-01-08 RX ORDER — LEVOTHYROXINE SODIUM 0.03 MG/1
25 TABLET ORAL DAILY
Qty: 90 TABLET | Refills: 3 | OUTPATIENT
Start: 2020-01-08

## 2020-01-08 NOTE — TELEPHONE ENCOUNTER
This pt came by asking for Xarelto 15 mg samples. He received samples back in November when pt was in medicare gap. I gave him another month samples. I advised him that he needs to apply for pt asst if he cannot afford the copay. Pt asst forms given to pt. I told him to read and fill out the forms and bring it back to us along with proof of income. He voiced understanding..

## 2020-01-16 RX ORDER — GLIPIZIDE 5 MG/1
5 TABLET ORAL DAILY
Qty: 30 TABLET | Refills: 11 | Status: SHIPPED | OUTPATIENT
Start: 2020-01-16 | End: 2021-01-08

## 2020-01-20 ENCOUNTER — RESULTS ENCOUNTER (OUTPATIENT)
Dept: UROLOGY | Facility: CLINIC | Age: 70
End: 2020-01-20

## 2020-01-20 DIAGNOSIS — C61 PROSTATE CANCER (HCC): ICD-10-CM

## 2020-01-20 RX ORDER — ALLOPURINOL 100 MG/1
100 TABLET ORAL DAILY
Qty: 90 TABLET | Refills: 3 | Status: SHIPPED | OUTPATIENT
Start: 2020-01-20 | End: 2020-05-14

## 2020-01-22 ENCOUNTER — TELEPHONE (OUTPATIENT)
Dept: PRIMARY CARE CLINIC | Age: 70
End: 2020-01-22

## 2020-01-22 ENCOUNTER — OFFICE VISIT (OUTPATIENT)
Dept: PRIMARY CARE CLINIC | Age: 70
End: 2020-01-22
Payer: MEDICARE

## 2020-01-22 VITALS
BODY MASS INDEX: 32.76 KG/M2 | SYSTOLIC BLOOD PRESSURE: 122 MMHG | HEART RATE: 83 BPM | OXYGEN SATURATION: 97 % | WEIGHT: 234 LBS | HEIGHT: 71 IN | DIASTOLIC BLOOD PRESSURE: 60 MMHG | TEMPERATURE: 98.5 F

## 2020-01-22 DIAGNOSIS — E03.9 ACQUIRED HYPOTHYROIDISM: ICD-10-CM

## 2020-01-22 DIAGNOSIS — I10 ESSENTIAL HYPERTENSION: ICD-10-CM

## 2020-01-22 DIAGNOSIS — N18.30 TYPE 2 DIABETES MELLITUS WITH STAGE 3 CHRONIC KIDNEY DISEASE, WITHOUT LONG-TERM CURRENT USE OF INSULIN (HCC): ICD-10-CM

## 2020-01-22 DIAGNOSIS — E11.22 TYPE 2 DIABETES MELLITUS WITH STAGE 3 CHRONIC KIDNEY DISEASE, WITHOUT LONG-TERM CURRENT USE OF INSULIN (HCC): ICD-10-CM

## 2020-01-22 DIAGNOSIS — E78.2 MIXED HYPERLIPIDEMIA: ICD-10-CM

## 2020-01-22 DIAGNOSIS — E79.0 HYPERURICEMIA: ICD-10-CM

## 2020-01-22 LAB
ALBUMIN SERPL-MCNC: 4.3 G/DL (ref 3.5–5.2)
ALP BLD-CCNC: 52 U/L (ref 40–130)
ALT SERPL-CCNC: 10 U/L (ref 5–41)
ANION GAP SERPL CALCULATED.3IONS-SCNC: 13 MMOL/L (ref 7–19)
AST SERPL-CCNC: 13 U/L (ref 5–40)
BILIRUB SERPL-MCNC: 0.4 MG/DL (ref 0.2–1.2)
BUN BLDV-MCNC: 26 MG/DL (ref 8–23)
CALCIUM SERPL-MCNC: 8.8 MG/DL (ref 8.8–10.2)
CHLORIDE BLD-SCNC: 104 MMOL/L (ref 98–111)
CHOLESTEROL, TOTAL: 91 MG/DL (ref 160–199)
CO2: 23 MMOL/L (ref 22–29)
CREAT SERPL-MCNC: 1.3 MG/DL (ref 0.5–1.2)
CREATININE URINE: 97.7 MG/DL (ref 4.2–622)
GFR NON-AFRICAN AMERICAN: 55
GLUCOSE BLD-MCNC: 175 MG/DL (ref 74–109)
HBA1C MFR BLD: 6.3 % (ref 4–6)
HDLC SERPL-MCNC: 20 MG/DL (ref 55–121)
LDL CHOLESTEROL CALCULATED: 40 MG/DL
MICROALBUMIN UR-MCNC: 2 MG/DL (ref 0–19)
MICROALBUMIN/CREAT UR-RTO: 20.5 MG/G
POTASSIUM SERPL-SCNC: 4.4 MMOL/L (ref 3.5–5)
SODIUM BLD-SCNC: 140 MMOL/L (ref 136–145)
TOTAL PROTEIN: 7 G/DL (ref 6.6–8.7)
TRIGL SERPL-MCNC: 153 MG/DL (ref 0–149)
TSH SERPL DL<=0.05 MIU/L-ACNC: 0.34 UIU/ML (ref 0.27–4.2)
URIC ACID, SERUM: 5.7 MG/DL (ref 3.4–7)

## 2020-01-22 PROCEDURE — 99214 OFFICE O/P EST MOD 30 MIN: CPT | Performed by: NURSE PRACTITIONER

## 2020-01-22 PROCEDURE — G8482 FLU IMMUNIZE ORDER/ADMIN: HCPCS | Performed by: NURSE PRACTITIONER

## 2020-01-22 PROCEDURE — G8428 CUR MEDS NOT DOCUMENT: HCPCS | Performed by: NURSE PRACTITIONER

## 2020-01-22 PROCEDURE — 3017F COLORECTAL CA SCREEN DOC REV: CPT | Performed by: NURSE PRACTITIONER

## 2020-01-22 PROCEDURE — 4040F PNEUMOC VAC/ADMIN/RCVD: CPT | Performed by: NURSE PRACTITIONER

## 2020-01-22 PROCEDURE — 3046F HEMOGLOBIN A1C LEVEL >9.0%: CPT | Performed by: NURSE PRACTITIONER

## 2020-01-22 PROCEDURE — 2022F DILAT RTA XM EVC RTNOPTHY: CPT | Performed by: NURSE PRACTITIONER

## 2020-01-22 PROCEDURE — G8417 CALC BMI ABV UP PARAM F/U: HCPCS | Performed by: NURSE PRACTITIONER

## 2020-01-22 PROCEDURE — 1123F ACP DISCUSS/DSCN MKR DOCD: CPT | Performed by: NURSE PRACTITIONER

## 2020-01-22 PROCEDURE — 93000 ELECTROCARDIOGRAM COMPLETE: CPT | Performed by: NURSE PRACTITIONER

## 2020-01-22 PROCEDURE — 1036F TOBACCO NON-USER: CPT | Performed by: NURSE PRACTITIONER

## 2020-01-22 RX ORDER — TRAMADOL HYDROCHLORIDE 50 MG/1
50 TABLET ORAL 2 TIMES DAILY PRN
Qty: 60 TABLET | Refills: 1 | Status: SHIPPED | OUTPATIENT
Start: 2020-01-22 | End: 2020-04-08 | Stop reason: SDUPTHER

## 2020-01-22 RX ORDER — CITALOPRAM 20 MG/1
20 TABLET ORAL DAILY
Qty: 30 TABLET | Refills: 11 | Status: SHIPPED | OUTPATIENT
Start: 2020-01-22 | End: 2020-10-28 | Stop reason: SDUPTHER

## 2020-01-22 ASSESSMENT — ENCOUNTER SYMPTOMS
NAUSEA: 0
RHINORRHEA: 0
ABDOMINAL PAIN: 0
SORE THROAT: 0
VOMITING: 0
TROUBLE SWALLOWING: 0
DIARRHEA: 0
CONSTIPATION: 0
SHORTNESS OF BREATH: 0
COUGH: 0

## 2020-01-22 NOTE — PROGRESS NOTES
MARY Hi   atorvastatin (LIPITOR) 20 MG tablet Take 1 tablet by mouth daily Yes MARY Miles   lisinopril (PRINIVIL;ZESTRIL) 40 MG tablet TAKE 1 TABLET BY MOUTH DAILY Yes MARY Miles   rivaroxaban (XARELTO) 15 MG TABS tablet Take 15 mg by mouth daily Yes Historical Provider, MD   amLODIPine (NORVASC) 10 MG tablet Take 0.5 tablets by mouth daily Yes MARY Miles   metoprolol succinate (TOPROL XL) 25 MG extended release tablet Take 1 tablet by mouth daily Yes MARY Miles   Cholecalciferol (VITAMIN D3) 2000 units TABS Take 1 tablet by mouth daily Yes MARY Miles   COLCRYS 0.6 MG tablet TAKE 2 TABLETS THEN AN HOUR LATER TAKE 1 TABLET WHEN GOUT FLARES Yes MARY Miles   levothyroxine (LEVOTHROID) 200 MCG tablet Take 1 tablet by mouth daily Yes MARY Miles        Social History     Tobacco Use    Smoking status: Never Smoker    Smokeless tobacco: Never Used   Substance Use Topics    Alcohol use: No        Vitals:    01/22/20 0913 01/22/20 0920 01/22/20 0921   BP: 131/63 133/65 122/60   Site: Left Upper Arm Left Upper Arm Left Upper Arm   Position: Supine Sitting Standing   Cuff Size: Large Adult Large Adult Large Adult   Pulse: 74 78 83   Temp: 98.5 °F (36.9 °C)     TempSrc: Temporal     SpO2: 97% 96% 97%   Weight: 234 lb (106.1 kg)     Height: 5' 11\" (1.803 m)       Estimated body mass index is 32.64 kg/m² as calculated from the following:    Height as of this encounter: 5' 11\" (1.803 m). Weight as of this encounter: 234 lb (106.1 kg). Physical Exam  Vitals signs reviewed. Constitutional:       Appearance: Normal appearance. HENT:      Head: Normocephalic and atraumatic.       Right Ear: Tympanic membrane, ear canal and external ear normal.      Left Ear: Tympanic membrane, ear canal and external ear normal.      Nose: Nose normal.      Mouth/Throat:      Mouth: Mucous membranes are moist.      Pharynx: Oropharynx is clear. Eyes:      Extraocular Movements:      Right eye: No nystagmus. Left eye: No nystagmus. Conjunctiva/sclera: Conjunctivae normal.      Pupils: Pupils are equal, round, and reactive to light. Neck:      Musculoskeletal: Normal range of motion and neck supple. Cardiovascular:      Rate and Rhythm: Normal rate and regular rhythm. Pulses: Normal pulses. Heart sounds: Normal heart sounds. Pulmonary:      Effort: Pulmonary effort is normal.      Breath sounds: Normal breath sounds. Abdominal:      General: Bowel sounds are normal. There is no distension. Palpations: Abdomen is soft. Tenderness: There is no tenderness. There is no guarding. Musculoskeletal: Normal range of motion. Skin:     General: Skin is warm and dry. Neurological:      Mental Status: He is alert and oriented to person, place, and time. Psychiatric:         Mood and Affect: Mood normal.         Behavior: Behavior normal.         Thought Content: Thought content normal.         Judgment: Judgment normal.         ASSESSMENT/PLAN:  1. Dizziness    - EKG 12 lead; Future  - EKG 12 lead  - [place] ELECTROCARDIOGRAM, COMPLETE    Sinus rhythm with PVC. There is no ST or T wave changes. Noted rate of 78    2. Paroxysmal atrial fibrillation (HCC)    - [place] ELECTROCARDIOGRAM, COMPLETE    3. Essential hypertension  The current medical regimen is effective;  continue present plan and medications. 4. Type 2 diabetes mellitus with stage 3 chronic kidney disease, without long-term current use of insulin (HCC)  The current medical regimen is effective;  continue present plan and medications. Discussed with patient about continued lifestyle modifications ie: appropriate diet, exercise, and weight management. Check BS 1 times per day. Keep log for your next appointment.   Reviewed our continued goal of HbgA1c of 7.0 or less, LDL of 70 or less, and a BP of 130/80 or less. 5. Acquired hypothyroidism  The current medical regimen is effective;  continue present plan and medications. 6. Mixed hyperlipidemia  The current medical regimen is effective;  continue present plan and medications. 7. SUSY (obstructive sleep apnea)  Discussed importance of using    8. Primary osteoarthritis involving multiple joints  The current medical regimen is effective;  continue present plan and medications. - traMADol (ULTRAM) 50 MG tablet; Take 1 tablet by mouth 2 times daily as needed for Pain for up to 30 days. Dispense: 60 tablet; Refill: 1    9. Chronic left-sided low back pain with left-sided sciatica    - traMADol (ULTRAM) 50 MG tablet; Take 1 tablet by mouth 2 times daily as needed for Pain for up to 30 days. Dispense: 60 tablet; Refill: 1      Return in about 3 months (around 4/22/2020). An electronic signature was used to authenticate this note.     --MARY Quiroga on 1/22/2020 at 9:58 AM

## 2020-01-23 RX ORDER — LEVOTHYROXINE SODIUM 0.2 MG/1
200 TABLET ORAL DAILY
Qty: 90 TABLET | Refills: 3 | Status: SHIPPED | OUTPATIENT
Start: 2020-01-23 | End: 2021-02-05

## 2020-02-12 ENCOUNTER — OFFICE VISIT (OUTPATIENT)
Dept: PRIMARY CARE CLINIC | Age: 70
End: 2020-02-12
Payer: MEDICARE

## 2020-02-12 VITALS
BODY MASS INDEX: 32.48 KG/M2 | WEIGHT: 232 LBS | DIASTOLIC BLOOD PRESSURE: 80 MMHG | HEART RATE: 68 BPM | SYSTOLIC BLOOD PRESSURE: 160 MMHG | OXYGEN SATURATION: 97 % | TEMPERATURE: 98.1 F | HEIGHT: 71 IN

## 2020-02-12 PROBLEM — N32.0 BLADDER NECK CONTRACTURE: Status: ACTIVE | Noted: 2018-10-03

## 2020-02-12 PROBLEM — H91.21 SUDDEN HEARING LOSS, RIGHT: Status: ACTIVE | Noted: 2018-06-28

## 2020-02-12 PROBLEM — C73 PAPILLARY CARCINOMA OF THYROID (HCC): Status: ACTIVE | Noted: 2020-02-12

## 2020-02-12 PROBLEM — E66.811 CLASS 1 OBESITY DUE TO EXCESS CALORIES WITH SERIOUS COMORBIDITY AND BODY MASS INDEX (BMI) OF 32.0 TO 32.9 IN ADULT: Status: ACTIVE | Noted: 2018-06-28

## 2020-02-12 PROBLEM — C61 PROSTATE CANCER (HCC): Status: ACTIVE | Noted: 2017-07-20

## 2020-02-12 PROBLEM — D72.829 LEUKOCYTOSIS: Status: ACTIVE | Noted: 2017-11-30

## 2020-02-12 PROBLEM — E89.0 POSTOPERATIVE HYPOTHYROIDISM: Status: ACTIVE | Noted: 2020-02-12

## 2020-02-12 PROBLEM — I65.23 BILATERAL CAROTID ARTERY STENOSIS: Status: ACTIVE | Noted: 2019-09-19

## 2020-02-12 PROBLEM — H93.11 TINNITUS OF RIGHT EAR: Status: ACTIVE | Noted: 2018-06-28

## 2020-02-12 PROBLEM — D86.9 SARCOIDOSIS: Status: ACTIVE | Noted: 2020-02-12

## 2020-02-12 PROBLEM — N39.3 STRESS INCONTINENCE: Status: ACTIVE | Noted: 2018-10-03

## 2020-02-12 PROBLEM — H90.3 ASYMMETRIC SNHL (SENSORINEURAL HEARING LOSS): Status: ACTIVE | Noted: 2018-06-28

## 2020-02-12 PROBLEM — N40.0 BENIGN PROSTATIC HYPERPLASIA: Status: ACTIVE | Noted: 2020-02-12

## 2020-02-12 PROBLEM — E66.09 CLASS 1 OBESITY DUE TO EXCESS CALORIES WITH SERIOUS COMORBIDITY AND BODY MASS INDEX (BMI) OF 32.0 TO 32.9 IN ADULT: Status: ACTIVE | Noted: 2018-06-28

## 2020-02-12 PROCEDURE — 1123F ACP DISCUSS/DSCN MKR DOCD: CPT | Performed by: NURSE PRACTITIONER

## 2020-02-12 PROCEDURE — 4040F PNEUMOC VAC/ADMIN/RCVD: CPT | Performed by: NURSE PRACTITIONER

## 2020-02-12 PROCEDURE — G8482 FLU IMMUNIZE ORDER/ADMIN: HCPCS | Performed by: NURSE PRACTITIONER

## 2020-02-12 PROCEDURE — 1036F TOBACCO NON-USER: CPT | Performed by: NURSE PRACTITIONER

## 2020-02-12 PROCEDURE — 99213 OFFICE O/P EST LOW 20 MIN: CPT | Performed by: NURSE PRACTITIONER

## 2020-02-12 PROCEDURE — 3017F COLORECTAL CA SCREEN DOC REV: CPT | Performed by: NURSE PRACTITIONER

## 2020-02-12 PROCEDURE — G8427 DOCREV CUR MEDS BY ELIG CLIN: HCPCS | Performed by: NURSE PRACTITIONER

## 2020-02-12 PROCEDURE — G8417 CALC BMI ABV UP PARAM F/U: HCPCS | Performed by: NURSE PRACTITIONER

## 2020-02-12 RX ORDER — CHLORHEXIDINE GLUCONATE 0.12 MG/ML
15 RINSE ORAL 2 TIMES DAILY
Qty: 420 ML | Refills: 0 | Status: SHIPPED | OUTPATIENT
Start: 2020-02-12 | End: 2020-02-26

## 2020-02-12 RX ORDER — AMOXICILLIN AND CLAVULANATE POTASSIUM 875; 125 MG/1; MG/1
1 TABLET, FILM COATED ORAL 2 TIMES DAILY
Qty: 20 TABLET | Refills: 0 | Status: SHIPPED | OUTPATIENT
Start: 2020-02-12 | End: 2020-02-22

## 2020-02-12 RX ORDER — HYDROCODONE BITARTRATE AND ACETAMINOPHEN 7.5; 325 MG/1; MG/1
1 TABLET ORAL EVERY 6 HOURS PRN
Qty: 12 TABLET | Refills: 0 | Status: SHIPPED | OUTPATIENT
Start: 2020-02-12 | End: 2020-02-15

## 2020-02-12 ASSESSMENT — ENCOUNTER SYMPTOMS
RHINORRHEA: 1
VOMITING: 0
SHORTNESS OF BREATH: 0
SORE THROAT: 1
CONSTIPATION: 0
EYE REDNESS: 0
COUGH: 0
DIARRHEA: 0

## 2020-02-12 NOTE — PROGRESS NOTES
Brad Paredes  Phone [phone]   Fax [phone]      OFFICE VISIT: 2020    Shwetha Garcia- : 1950    Chief Complaint:Pillo is a 78 y.o. male who is here for Dental Pain; Headache; Otalgia; and Jaw Pain     HPI  The patient presents today for evaluation of dental pain. \"My gums started hurting. \"   This started a few days ago. \"I got a bad tooth. \"  \"The entire side of my face is hurting. \"  Reports right sided otalgia. He hasn't slept well the last 3 nights. height is 5' 11\" (1.803 m) and weight is 232 lb (105.2 kg). His temporal temperature is 98.1 °F (36.7 °C). His blood pressure is 160/80 (abnormal) and his pulse is 68. His oxygen saturation is 97%. Body mass index is 32.36 kg/m².     Results for orders placed or performed in visit on 20   Uric Acid   Result Value Ref Range    Uric Acid, Serum 5.7 3.4 - 7.0 mg/dL   TSH without Reflex   Result Value Ref Range    TSH 0.341 0.270 - 4.200 uIU/mL   Comprehensive Metabolic Panel   Result Value Ref Range    Sodium 140 136 - 145 mmol/L    Potassium 4.4 3.5 - 5.0 mmol/L    Chloride 104 98 - 111 mmol/L    CO2 23 22 - 29 mmol/L    Anion Gap 13 7 - 19 mmol/L    Glucose 175 (H) 74 - 109 mg/dL    BUN 26 (H) 8 - 23 mg/dL    CREATININE 1.3 (H) 0.5 - 1.2 mg/dL    GFR Non-African American 55 (A) >60    Calcium 8.8 8.8 - 10.2 mg/dL    Total Protein 7.0 6.6 - 8.7 g/dL    Alb 4.3 3.5 - 5.2 g/dL    Total Bilirubin 0.4 0.2 - 1.2 mg/dL    Alkaline Phosphatase 52 40 - 130 U/L    ALT 10 5 - 41 U/L    AST 13 5 - 40 U/L   Hemoglobin A1C   Result Value Ref Range    Hemoglobin A1C 6.3 (H) 4.0 - 6.0 %   Microalbumin / Creatinine Urine Ratio   Result Value Ref Range    Microalbumin, Random Urine 2.00 0.00 - 19.00 mg/dL    Creatinine, Ur 97.7 4.2 - 622.0 mg/dL    Microalbumin Creatinine Ratio 20.5 mg/g   Lipid Panel   Result Value Ref Range    Cholesterol, Total 91 (L) 160 - 199 mg/dL    Triglycerides 153 (H) 0 - 149 mg/dL    HDL 20 (L) 55 - 121 mg/dL    LDL Calculated 40 <100 mg/dL     have reviewed the following with the Mr. Ortega   Lab Review   Orders Only on 01/22/2020   Component Date Value    Uric Acid, Serum 01/22/2020 5.7     TSH 01/22/2020 0.341     Sodium 01/22/2020 140     Potassium 01/22/2020 4.4     Chloride 01/22/2020 104     CO2 01/22/2020 23     Anion Gap 01/22/2020 13     Glucose 01/22/2020 175*    BUN 01/22/2020 26*    CREATININE 01/22/2020 1.3*    GFR Non- 01/22/2020 55*    Calcium 01/22/2020 8.8     Total Protein 01/22/2020 7.0     Alb 01/22/2020 4.3     Total Bilirubin 01/22/2020 0.4     Alkaline Phosphatase 01/22/2020 52     ALT 01/22/2020 10     AST 01/22/2020 13     Hemoglobin A1C 01/22/2020 6.3*    Microalbumin, Random Uri* 01/22/2020 2.00     Creatinine, Ur 01/22/2020 97.7     Microalbumin Creatinine * 01/22/2020 20.5     Cholesterol, Total 01/22/2020 91*    Triglycerides 01/22/2020 153*    HDL 01/22/2020 20*    LDL Calculated 01/22/2020 40    Orders Only on 11/08/2019   Component Date Value    WBC 11/08/2019 8.4     RBC 11/08/2019 4.61*    Hemoglobin 11/08/2019 13.3*    Hematocrit 11/08/2019 41.0*    MCV 11/08/2019 88.9     MCH 11/08/2019 28.9     MCHC 11/08/2019 32.4*    RDW 11/08/2019 13.2     Platelets 50/41/3807 248     MPV 11/08/2019 9.7     Neutrophils % 11/08/2019 76.1*    Lymphocytes % 11/08/2019 15.0*    Monocytes % 11/08/2019 5.3     Eosinophils % 11/08/2019 2.4     Basophils % 11/08/2019 0.6     Neutrophils Absolute 11/08/2019 6.4     Immature Granulocytes # 11/08/2019 0.1     Lymphocytes Absolute 11/08/2019 1.3     Monocytes Absolute 11/08/2019 0.50     Eosinophils Absolute 11/08/2019 0.20     Basophils Absolute 11/08/2019 0.10     Sodium 11/08/2019 138     Potassium 11/08/2019 4.5     Chloride 11/08/2019 100     CO2 11/08/2019 25     Anion Gap 11/08/2019 13     Glucose 11/08/2019 99     BUN 11/08/2019 23     CREATININE 11/08/2019 1.3*    GFR Non- 11/08/2019 55*    Calcium 11/08/2019 9.2     Total Protein 11/08/2019 7.1     Alb 11/08/2019 4.3     Total Bilirubin 11/08/2019 0.6     Alkaline Phosphatase 11/08/2019 52     ALT 11/08/2019 9     AST 11/08/2019 11     Cholesterol, Total 11/08/2019 133*    Triglycerides 11/08/2019 192*    HDL 11/08/2019 21*    LDL Calculated 11/08/2019 74     TSH 11/08/2019 0.035*    T4 Free 11/08/2019 2.0*    Microalbumin, Random Uri* 11/08/2019 <1.20     Creatinine, Ur 11/08/2019 86.3     Microalbumin Creatinine * 11/08/2019 see below     Hemoglobin A1C 11/08/2019 5.8      Copies of these are in the chart. Prior to Visit Medications    Medication Sig Taking? Authorizing Provider   amoxicillin-clavulanate (AUGMENTIN) 875-125 MG per tablet Take 1 tablet by mouth 2 times daily for 10 days Yes MARY Fisher   chlorhexidine (PERIDEX) 0.12 % solution Take 15 mLs by mouth 2 times daily for 14 days Yes MARY Fisher   HYDROcodone-acetaminophen (NORCO) 7.5-325 MG per tablet Take 1 tablet by mouth every 6 hours as needed for Pain for up to 3 days. Intended supply: 3 days. Take lowest dose possible to manage pain Yes MARY Fisher   levothyroxine (SYNTHROID) 200 MCG tablet Take 1 tablet by mouth Daily Tube feeding (TF) interaction, obtain physician order to manage, recommend holding TF for 30 minutes before and after dose. Patient taking differently: Take 200 mcg by mouth Daily  Yes MARY Santos   traMADol (ULTRAM) 50 MG tablet Take 1 tablet by mouth 2 times daily as needed for Pain for up to 30 days.  Yes MARY Santos   citalopram (CELEXA) 20 MG tablet Take 1 tablet by mouth daily Yes MARY Santos   allopurinol (ZYLOPRIM) 100 MG tablet Take 1 tablet by mouth daily Yes MARY Santos   glipiZIDE (GLUCOTROL) 5 MG tablet Take 1 tablet by mouth daily Yes MARY Santos   gabapentin (NEURONTIN) 400 MG capsule Take 1 capsule by mouth 3 times daily for 90 days. Yes MARY Parikh   atorvastatin (LIPITOR) 20 MG tablet Take 1 tablet by mouth daily Yes MARY Parikh   lisinopril (PRINIVIL;ZESTRIL) 40 MG tablet TAKE 1 TABLET BY MOUTH DAILY Yes MARY Parikh   rivaroxaban (XARELTO) 15 MG TABS tablet Take 15 mg by mouth daily Yes Historical Provider, MD   amLODIPine (NORVASC) 10 MG tablet Take 0.5 tablets by mouth daily Yes MARY Parikh   metoprolol succinate (TOPROL XL) 25 MG extended release tablet Take 1 tablet by mouth daily Yes MARY Parikh   Cholecalciferol (VITAMIN D3) 2000 units TABS Take 1 tablet by mouth daily Yes MARY Parikh   COLCRYS 0.6 MG tablet TAKE 2 TABLETS THEN AN HOUR LATER TAKE 1 TABLET WHEN GOUT FLARES Yes MARY Parikh       Allergies: Patient has no known allergies.     Past Medical History:   Diagnosis Date    Allergic rhinitis     Cancer ([facility] [address] 2012    Thyroid  - Dr Mccauley Pay Hyperthyroidism     Prostate cancer [facility])     Sleep apnea     Type II or unspecified type diabetes mellitus without mention of complication, not stated as uncontrolled        Past Surgical History:   Procedure Laterality Date    CHOLECYSTECTOMY      INCONTINENCE SURGERY  11/26/2018    dr young Erman [place] Dr Vonna Jungling  08/01/2017    removal, dr Charleen Almeida      removed by Dr Samantha Blair History     Tobacco Use    Smoking status: Never Smoker    Smokeless tobacco: Never Used   Substance Use Topics    Alcohol use: No       Family History   Problem Relation Age of Onset    Schizophrenia Mother     Heart Failure Mother     Heart Attack Mother     Cancer Father         Prostate & Leukemia     Heart Disease Father     Heart Attack Father     Thyroid Disease Sister        Review of Systems   Constitutional: Negative for chills, fatigue and

## 2020-03-04 ENCOUNTER — TELEPHONE (OUTPATIENT)
Dept: CARDIOLOGY | Facility: CLINIC | Age: 70
End: 2020-03-04

## 2020-03-04 NOTE — TELEPHONE ENCOUNTER
Pt states that Xarelto is costing him $400 because he has not met deducible, after meeting deducible it will cost about $100. He would like something cheaper.

## 2020-03-04 NOTE — TELEPHONE ENCOUNTER
Patient called the [facility] office and left a voicemail stating that he is not able to afford his Xarelto.  He is wanting more samples.  He is also wanting to know if he could possibly be switched to an alternative.  He stated he made too much to qualify for patient assistance.  Please call when available.

## 2020-03-04 NOTE — TELEPHONE ENCOUNTER
He can check with his insurance to see if Eliquis or Pradaxa are preferred on his formulary with lower copay or if he can request a tier exception for Xarelto to get cheaper. Otherwise, he will have to take warfarin (Coumadin) and have at least monthly labs and adjust dose to keep levels therapeutic. Have him  samples for now. TX!

## 2020-03-11 NOTE — TELEPHONE ENCOUNTER
Advise pt to call insurance to see if Eliquis or Pradaxa are preferred on his formulary with lower copay. Advise he can try take Coumadin but will have to have labs to adjust dose. Advise he can come by [facility] to get samples of Xarelto until he call insurance.

## 2020-04-06 ENCOUNTER — TELEPHONE (OUTPATIENT)
Dept: PRIMARY CARE CLINIC | Age: 70
End: 2020-04-06

## 2020-04-07 NOTE — TELEPHONE ENCOUNTER
Abelino Parrish called this morning needing a refill for : -         Last Appt:  2/12/2020  Next Appt:   4/22/2020  [address] pharmacy[address] [place]

## 2020-04-08 ENCOUNTER — VIRTUAL VISIT (OUTPATIENT)
Dept: PRIMARY CARE CLINIC | Age: 70
End: 2020-04-08
Payer: MEDICARE

## 2020-04-08 PROCEDURE — 99442 PR PHYS/QHP TELEPHONE EVALUATION 11-20 MIN: CPT | Performed by: NURSE PRACTITIONER

## 2020-04-08 RX ORDER — GABAPENTIN 400 MG/1
400 CAPSULE ORAL 3 TIMES DAILY
Qty: 270 CAPSULE | Refills: 0 | Status: SHIPPED | OUTPATIENT
Start: 2020-04-08 | End: 2020-08-24 | Stop reason: SDUPTHER

## 2020-04-08 RX ORDER — TRAMADOL HYDROCHLORIDE 50 MG/1
50 TABLET ORAL 2 TIMES DAILY PRN
Qty: 60 TABLET | Refills: 0 | Status: SHIPPED | OUTPATIENT
Start: 2020-04-08 | End: 2020-05-13 | Stop reason: SDUPTHER

## 2020-04-08 ASSESSMENT — ENCOUNTER SYMPTOMS
COUGH: 0
SHORTNESS OF BREATH: 0
BACK PAIN: 1

## 2020-04-08 NOTE — PROGRESS NOTES
lisinopril (PRINIVIL;ZESTRIL) 40 MG tablet TAKE 1 TABLET BY MOUTH DAILY  MARY Burgess   rivaroxaban (XARELTO) 15 MG TABS tablet Take 15 mg by mouth daily  Historical Provider, MD   amLODIPine (NORVASC) 10 MG tablet Take 0.5 tablets by mouth daily  MARY Torres   metoprolol succinate (TOPROL XL) 25 MG extended release tablet Take 1 tablet by mouth daily  MARY Torres   Cholecalciferol (VITAMIN D3) 2000 units TABS Take 1 tablet by mouth daily  MARY Torres   COLCRYS 0.6 MG tablet TAKE 2 TABLETS THEN AN HOUR LATER TAKE 1 TABLET WHEN GOUT FLARES  MARY Torres       Allergies: Patient has no known allergies. Past Medical History:   Diagnosis Date    Allergic rhinitis     Cancer ([facility] [address] 2012    Thyroid  - Dr Kyle Hernandez Hyperthyroidism     [facility]state cancer [facility])     Sleep apnea     Type II or unspecified type diabetes mellitus without mention of complication, not stated as uncontrolled        Past Surgical History:   Procedure Laterality Date    CHOLECYSTECTOMY      INCONTINENCE SURGERY  11/26/2018    dr gino Mosher  08/01/2017    removal, dr Mayela Mixon      removed by Dr Tonny Garza History     Tobacco Use    Smoking status: Never Smoker    Smokeless tobacco: Never Used   Substance Use Topics    Alcohol use: No       Family History   Problem Relation Age of Onset    Schizophrenia Mother     Heart Failure Mother     Heart Attack Mother     Cancer Father         Prostate & Leukemia     Heart Disease Father     Heart Attack Father     Thyroid Disease Sister        Review of Systems   Constitutional: Negative for fever. Respiratory: Negative for cough and shortness of breath. Cardiovascular: Negative for palpitations. Musculoskeletal: Positive for arthralgias and back pain.        Physical Exam  N/A due to telehealth    ASSESSMENT tracking handout on n/a and instructed them to bring it with them to his next appointment. Discussed use, benefit, and side effects of prescribed medications. Barriers to medication compliance addressed. All patient questions answered. Pt voiced understanding.      Lavell Chen, APRN

## 2020-04-09 ENCOUNTER — RESULTS ENCOUNTER (OUTPATIENT)
Dept: UROLOGY | Facility: CLINIC | Age: 70
End: 2020-04-09

## 2020-04-09 DIAGNOSIS — C61 PROSTATE CANCER (HCC): ICD-10-CM

## 2020-04-14 ENCOUNTER — TELEPHONE (OUTPATIENT)
Dept: UROLOGY | Facility: CLINIC | Age: 70
End: 2020-04-14

## 2020-04-14 NOTE — TELEPHONE ENCOUNTER
Called pt and left message that we needed to set up a tele health visit with him on the 23rd with dr leblanc instead of him coming into the office. We also need to make sure he had a psa before his apt.

## 2020-04-19 ENCOUNTER — RESULTS ENCOUNTER (OUTPATIENT)
Dept: UROLOGY | Facility: CLINIC | Age: 70
End: 2020-04-19

## 2020-04-19 DIAGNOSIS — N52.31 ERECTILE DYSFUNCTION AFTER RADICAL PROSTATECTOMY: ICD-10-CM

## 2020-05-08 ENCOUNTER — TELEPHONE (OUTPATIENT)
Dept: PRIMARY CARE CLINIC | Age: 70
End: 2020-05-08

## 2020-05-08 RX ORDER — TRAMADOL HYDROCHLORIDE 50 MG/1
50 TABLET ORAL 2 TIMES DAILY PRN
Qty: 60 TABLET | Refills: 0 | OUTPATIENT
Start: 2020-05-08 | End: 2020-06-07

## 2020-05-08 NOTE — TELEPHONE ENCOUNTER
Do you want to do any labs? He is down for an AWV in office. Do you want to change that to a VV too?

## 2020-05-13 ENCOUNTER — OFFICE VISIT (OUTPATIENT)
Dept: PRIMARY CARE CLINIC | Age: 70
End: 2020-05-13
Payer: MEDICARE

## 2020-05-13 ENCOUNTER — TELEPHONE (OUTPATIENT)
Dept: PRIMARY CARE CLINIC | Age: 70
End: 2020-05-13

## 2020-05-13 VITALS
HEIGHT: 71 IN | HEART RATE: 72 BPM | SYSTOLIC BLOOD PRESSURE: 136 MMHG | TEMPERATURE: 97.5 F | WEIGHT: 232 LBS | OXYGEN SATURATION: 96 % | BODY MASS INDEX: 32.48 KG/M2 | DIASTOLIC BLOOD PRESSURE: 80 MMHG

## 2020-05-13 DIAGNOSIS — I10 ESSENTIAL HYPERTENSION: ICD-10-CM

## 2020-05-13 DIAGNOSIS — N18.30 TYPE 2 DIABETES MELLITUS WITH STAGE 3 CHRONIC KIDNEY DISEASE, WITHOUT LONG-TERM CURRENT USE OF INSULIN (HCC): ICD-10-CM

## 2020-05-13 DIAGNOSIS — E03.9 ACQUIRED HYPOTHYROIDISM: ICD-10-CM

## 2020-05-13 DIAGNOSIS — E11.22 TYPE 2 DIABETES MELLITUS WITH STAGE 3 CHRONIC KIDNEY DISEASE, WITHOUT LONG-TERM CURRENT USE OF INSULIN (HCC): ICD-10-CM

## 2020-05-13 LAB
ALBUMIN SERPL-MCNC: 4.5 G/DL (ref 3.5–5.2)
ALP BLD-CCNC: 56 U/L (ref 40–130)
ALT SERPL-CCNC: 16 U/L (ref 5–41)
ANION GAP SERPL CALCULATED.3IONS-SCNC: 13 MMOL/L (ref 7–19)
AST SERPL-CCNC: 15 U/L (ref 5–40)
BASOPHILS ABSOLUTE: 0 K/UL (ref 0–0.2)
BASOPHILS RELATIVE PERCENT: 0.5 % (ref 0–1)
BILIRUB SERPL-MCNC: 0.6 MG/DL (ref 0.2–1.2)
BUN BLDV-MCNC: 22 MG/DL (ref 8–23)
CALCIUM SERPL-MCNC: 9.2 MG/DL (ref 8.8–10.2)
CHLORIDE BLD-SCNC: 99 MMOL/L (ref 98–111)
CO2: 26 MMOL/L (ref 22–29)
CREAT SERPL-MCNC: 1.2 MG/DL (ref 0.5–1.2)
EOSINOPHILS ABSOLUTE: 0.2 K/UL (ref 0–0.6)
EOSINOPHILS RELATIVE PERCENT: 2.7 % (ref 0–5)
GFR NON-AFRICAN AMERICAN: 60
GLUCOSE BLD-MCNC: 123 MG/DL (ref 74–109)
HBA1C MFR BLD: 6.2 % (ref 4–6)
HCT VFR BLD CALC: 42.1 % (ref 42–52)
HEMOGLOBIN: 13.9 G/DL (ref 14–18)
IMMATURE GRANULOCYTES #: 0 K/UL
LYMPHOCYTES ABSOLUTE: 1.3 K/UL (ref 1.1–4.5)
LYMPHOCYTES RELATIVE PERCENT: 15.4 % (ref 20–40)
MCH RBC QN AUTO: 28.9 PG (ref 27–31)
MCHC RBC AUTO-ENTMCNC: 33 G/DL (ref 33–37)
MCV RBC AUTO: 87.5 FL (ref 80–94)
MONOCYTES ABSOLUTE: 0.5 K/UL (ref 0–0.9)
MONOCYTES RELATIVE PERCENT: 6.4 % (ref 0–10)
NEUTROPHILS ABSOLUTE: 6 K/UL (ref 1.5–7.5)
NEUTROPHILS RELATIVE PERCENT: 74.6 % (ref 50–65)
PDW BLD-RTO: 12.9 % (ref 11.5–14.5)
PLATELET # BLD: 230 K/UL (ref 130–400)
PMV BLD AUTO: 9.9 FL (ref 9.4–12.4)
POTASSIUM SERPL-SCNC: 4.7 MMOL/L (ref 3.5–5)
RBC # BLD: 4.81 M/UL (ref 4.7–6.1)
SODIUM BLD-SCNC: 138 MMOL/L (ref 136–145)
TOTAL PROTEIN: 7.1 G/DL (ref 6.6–8.7)
TSH SERPL DL<=0.05 MIU/L-ACNC: 0.25 UIU/ML (ref 0.27–4.2)
WBC # BLD: 8.1 K/UL (ref 4.8–10.8)

## 2020-05-13 PROCEDURE — 3044F HG A1C LEVEL LT 7.0%: CPT | Performed by: NURSE PRACTITIONER

## 2020-05-13 PROCEDURE — G0439 PPPS, SUBSEQ VISIT: HCPCS | Performed by: NURSE PRACTITIONER

## 2020-05-13 PROCEDURE — 3017F COLORECTAL CA SCREEN DOC REV: CPT | Performed by: NURSE PRACTITIONER

## 2020-05-13 PROCEDURE — 1123F ACP DISCUSS/DSCN MKR DOCD: CPT | Performed by: NURSE PRACTITIONER

## 2020-05-13 PROCEDURE — 4040F PNEUMOC VAC/ADMIN/RCVD: CPT | Performed by: NURSE PRACTITIONER

## 2020-05-13 PROCEDURE — 99213 OFFICE O/P EST LOW 20 MIN: CPT | Performed by: NURSE PRACTITIONER

## 2020-05-13 PROCEDURE — G8417 CALC BMI ABV UP PARAM F/U: HCPCS | Performed by: NURSE PRACTITIONER

## 2020-05-13 PROCEDURE — 1036F TOBACCO NON-USER: CPT | Performed by: NURSE PRACTITIONER

## 2020-05-13 PROCEDURE — 2022F DILAT RTA XM EVC RTNOPTHY: CPT | Performed by: NURSE PRACTITIONER

## 2020-05-13 PROCEDURE — G8427 DOCREV CUR MEDS BY ELIG CLIN: HCPCS | Performed by: NURSE PRACTITIONER

## 2020-05-13 RX ORDER — TRAMADOL HYDROCHLORIDE 50 MG/1
50 TABLET ORAL EVERY 8 HOURS PRN
Qty: 90 TABLET | Refills: 0 | Status: SHIPPED | OUTPATIENT
Start: 2020-05-13 | End: 2020-06-10 | Stop reason: SDUPTHER

## 2020-05-13 ASSESSMENT — PATIENT HEALTH QUESTIONNAIRE - PHQ9
SUM OF ALL RESPONSES TO PHQ QUESTIONS 1-9: 1
SUM OF ALL RESPONSES TO PHQ QUESTIONS 1-9: 1

## 2020-05-13 ASSESSMENT — ENCOUNTER SYMPTOMS
EYE REDNESS: 0
VOMITING: 0
NAUSEA: 0
DIARRHEA: 0
RHINORRHEA: 0
SHORTNESS OF BREATH: 0
ABDOMINAL PAIN: 0
SORE THROAT: 0
COUGH: 0
BACK PAIN: 1
CONSTIPATION: 0

## 2020-05-13 ASSESSMENT — LIFESTYLE VARIABLES: HOW OFTEN DO YOU HAVE A DRINK CONTAINING ALCOHOL: 0

## 2020-05-13 NOTE — PROGRESS NOTES
Medicare Annual Wellness Visit  Name: Devyn Santoro Date: 2020   MRN: [id number] Sex: Male   Age: 78 y.o. Ethnicity: Non-/Non    : 1950 Race: Da Reynolds is here for Medicare AWV and Discuss Medications (tramadol)    Screenings for behavioral, psychosocial and functional/safety risks, and cognitive dysfunction are all negative except as indicated below. These results, as well as other patient data from the [address] form, are documented in Flowsheets linked to this Encounter. No Known Allergies      Prior to Visit Medications    Medication Sig Taking? Authorizing Provider   traMADol (ULTRAM) 50 MG tablet Take 1 tablet by mouth every 8 hours as needed for Pain for up to 30 days. Yes MARY Fisher   gabapentin (NEURONTIN) 400 MG capsule Take 1 capsule by mouth 3 times daily for 90 days. Yes MARY Fisher   levothyroxine (SYNTHROID) 200 MCG tablet Take 1 tablet by mouth Daily Tube feeding (TF) interaction, obtain physician order to manage, recommend holding TF for 30 minutes before and after dose.   Patient taking differently: Take 200 mcg by mouth Daily  Yes MARY Velasquez   citalopram (CELEXA) 20 MG tablet Take 1 tablet by mouth daily Yes MARY Velasquez   allopurinol (ZYLOPRIM) 100 MG tablet Take 1 tablet by mouth daily Yes MARY Velasquez   glipiZIDE (GLUCOTROL) 5 MG tablet Take 1 tablet by mouth daily Yes MARY Velasquez   atorvastatin (LIPITOR) 20 MG tablet Take 1 tablet by mouth daily Yes MARY Velasquez   lisinopril (PRINIVIL;ZESTRIL) 40 MG tablet TAKE 1 TABLET BY MOUTH DAILY Yes MARY Velasquez   rivaroxaban (XARELTO) 15 MG TABS tablet Take 15 mg by mouth daily Yes Historical Provider, MD   amLODIPine (NORVASC) 10 MG tablet Take 0.5 tablets by mouth daily Yes MARY Velasquez   metoprolol succinate (TOPROL XL) 25 MG extended release tablet Take 1 tablet by mouth daily Yes MARY Oakes   Cholecalciferol (VITAMIN D3) 2000 units TABS Take 1 tablet by mouth daily Yes MARY Oakes   COLCRYS 0.6 MG tablet TAKE 2 TABLETS THEN AN HOUR LATER TAKE 1 TABLET WHEN GOUT FLARES Yes MARY Oakes         Past Medical History:   Diagnosis Date    Allergic rhinitis     Cancer ([facility] [address] 2012    Thyroid  - Dr Alex Arreola Hyperthyroidism     Prostate cancer ([facility] [address]     Sleep apnea     Type II or unspecified type diabetes mellitus without mention of complication, not stated as uncontrolled        Past Surgical History:   Procedure Laterality Date    CHOLECYSTECTOMY      INCONTINENCE SURGERY  11/26/2018    dr gino Vaughan  08/01/2017    removal, dr Iftikhar Kay      removed by Dr Remigio Gutierrez          Family History   Problem Relation Age of Onset    Schizophrenia Mother     Heart Failure Mother     Heart Attack Mother     Cancer Father         Prostate & Leukemia     Heart Disease Father     Heart Attack Father     Thyroid Disease Sister        CareTeam (Including outside providers/suppliers regularly involved in providing care):   Patient Care Team:  MARY Oakes as PCP - General (Family Nurse Practitioner)  MARY Oakes as PCP - [facility]  MARY Flanagan - CNP as Nurse Practitioner (Cardiology)  Usha Martin MD as Consulting Physician (Urology)    Wt Readings from Last 3 Encounters:   05/13/20 232 lb (105.2 kg)   02/12/20 232 lb (105.2 kg)   01/22/20 234 lb (106.1 kg)     Vitals:    05/13/20 0856   BP: 136/80   Pulse: 72   Temp: 97.5 °F (36.4 °C)   TempSrc: Temporal   SpO2: 96%   Weight: 232 lb (105.2 kg)   Height: 5' 11\" (1.803 m)     Body mass index is 32.36 kg/m².     Based upon direct observation of the patient, evaluation of cognition reveals recent and remote memory intact. Patient's complete Health Risk Assessment and screening values have been reviewed and are found in Flowsheets. The following problems were reviewed today and where indicated follow up appointments were made and/or referrals ordered. Positive Risk Factor Screenings with Interventions:     Fall Risk:  Timed Up and Go Test > 12 seconds? (Complete if either Fall Risk answers are Yes): no  2 or more falls in past year?: no  Fall with injury in past year?: (!) yes  Fall Risk Interventions:    · Home exercises provided to promote strength and balance    General Health:  General  In general, how would you say your health is?: Fair  In the past 7 days, have you experienced any of the following? New or Increased Pain, New or Increased Fatigue, Loneliness, Social Isolation, Stress or Anger?: (!) New or Increased Pain, Stress  Do you get the social and emotional support that you need?: Yes  Do you have a Living Will?: (!) No  General Health Risk Interventions:  · No Living Will: additional information provided patient and provided the state-specific advance directive document to the patient    Health Habits/Nutrition:  Health Habits/Nutrition  Do you exercise for at least 20 minutes 2-3 times per week?: Yes  Have you lost any weight without trying in the past 3 months?: No  Do you eat fewer than 2 meals per day?: No  Have you seen a dentist within the past year?: Yes  Body mass index is 32.36 kg/m².   Health Habits/Nutrition Interventions:  · No further counseling needed    Hearing/Vision:  No exam data present  Hearing/Vision  Do you or your family notice any trouble with your hearing?: (!) Yes  Do you have difficulty driving, watching TV, or doing any of your daily activities because of your eyesight?: No  Have you had an eye exam within the past year?: Yes  Hearing/Vision Interventions:  · Hearing concerns:  patient declines any further evaluation/treatment for hearing issues, he wears hearing sensation at 10, 2, 3, 7, 8   Normal sensation at 10, 2, 3, 7, 8   Diminished sensation at 1, 4, 5, 6   Diminished sensation at 4, 5, 6   No sensation at 9     No sensation at 1, 9           ASSESSMENT      ICD-10-CM    1. Routine general medical examination at a health care facility Z00.00  Repeat labs today TSH, CBC, CMP, A1c. Information regarding living will given to patient   2. Chronic left-sided low back pain with left-sided sciatica M54.42 traMADol (ULTRAM) 50 MG tablet  Increase to 90 tablets/month  Recommend patient take Tylenol with tramadol as needed    G89.29    3. Primary osteoarthritis involving multiple joints M15.0 traMADol (ULTRAM) 50 MG tablet  Recommend patient take Tylenol with tramadol as needed   4. Prostate cancer [facility]) C61  Keep follow-up with urology in June 2020  STABLE   5. Essential hypertension I10 TSH without Reflex  The current medical regimen is effective;  continue present plan and medications. Patient is asked to monitor BP at home or work, several times per month and return with written values at next office visit. Continue lisinopril 40 mg daily  Continue amlodipine 10 mg daily  Continue metoprolol 25 mg daily     6. Type 2 diabetes mellitus with stage 3 chronic kidney disease, without long-term current use of insulin ([facility]) E11.22 Diabetic Foot Exam  Continue glipizide 5 mg daily  Recommend ADA diet  Continue to check fasting blood sugars  Continue to exercise  Continue Lipitor 20 mg daily  Continue lisinopril 40 mg daily    N18.3    7. Acquired hypothyroidism E03.9 TSH without Reflex  Continue Synthroid 200 mcg daily  Further recommendations pending TSH results   8. PAF (paroxysmal atrial fibrillation) ([facility]) I48.0  Xarelto samples given to patient  Continue metoprolol 25 mg daily  Keep follow-up with cardiology  STABLE   9.  Impacted cerumen of left ear H61.22  Cerumen was removed         PLAN    Orders Placed This Encounter   Procedures    TSH without Reflex    Diabetic Foot Exam        Return in about 1 month (around 6/13/2020), or if symptoms worsen or fail to improve, for Medicare Annual Wellness Visit in 1 year. Patient Instructions     Personalized Preventive Plan for M[place] Se[place] - 5/13/2020  Medicare offers a range of preventive health benefits. Some of the tests and screenings are paid in full while other may be subject to a deductible, co-insurance, and/or copay. Some of these benefits include a comprehensive review of your medical history including lifestyle, illnesses that may run in your family, and various assessments and screenings as appropriate. After reviewing your medical record and screening and assessments performed today your provider may have ordered immunizations, labs, imaging, and/or referrals for you. A list of these orders (if applicable) as well as your Preventive Care list are included within your After Visit Summary for your review. Other Preventive Recommendations:    · A preventive eye exam performed by an eye specialist is recommended every 1-2 years to screen for glaucoma; cataracts, macular degeneration, and other eye disorders. · A preventive dental visit is recommended every 6 months. · Try to get at least 150 minutes of exercise per week or 10,000 steps per day on a pedometer . · Order or download the FREE \"Exercise & Physical Activity: Your Everyday Guide\" from [employer]. Call 0-891.975.6163 or search [employer] online. · You need 7614-6031 mg of calcium and 7618-7827 IU of vitamin D per day. It is possible to meet your calcium requirement with diet alone, but a vitamin D supplement is usually necessary to meet this goal.  · When exposed to the sun, use a sunscreen that protects against both UVA and UVB radiation with an SPF of 30 or greater. Reapply every 2 to 3 hours or after sweating, drying off with a towel, or swimming. · Always wear a seat belt when traveling in a car. Always wear a helmet when riding a bicycle or motorcycle. Controlled Substances Monitoring: Attestation: The Prescription Monitoring Report for this patient was reviewed today. ([id number]) MARY Santiago)        -MARY Santiago on 5/13/2020 at 9:59 AM    An electronic signature was used to authenticate this note.

## 2020-05-14 ENCOUNTER — TELEPHONE (OUTPATIENT)
Dept: PRIMARY CARE CLINIC | Age: 70
End: 2020-05-14

## 2020-05-14 RX ORDER — ALLOPURINOL 100 MG/1
100 TABLET ORAL DAILY
Qty: 90 TABLET | Refills: 3 | Status: SHIPPED | OUTPATIENT
Start: 2020-05-14 | End: 2021-06-04

## 2020-05-28 LAB — PSA SERPL-MCNC: <0.014 NG/ML (ref 0–4)

## 2020-06-01 NOTE — PROGRESS NOTES
Mr. Kamara is 69 y.o. male    CHIEF COMPLAINT: I am here for my 6 month follow for Prostate Cancer and Erectile Dysfunction.     HPI    Prostate cancer  Patient denies any possible systemic symptoms of prostate cancer such as weight loss, lower extremity edema, or skeletal pain that could be worrisome for metastases.  His incontinence is minimal since sling procedure but he is bothered by post void dribbling. ED continues. He is considering an inflatable penile implant by Dr. Kiser.   Location: Prostate gland  Quality:  Adenocarcinoma  Severity: Minerva 3+4, t2c with perineural invasion. 10% gland involvement.   Duration: Diagnosed in June 2017   Context: Found during evaluation of elevated PSA   Modifying factors: Biochemical remission after robot-assisted laparoscopic prostatectomy     Hx related to this:   08/2017- RALP     Final Diagnosis   Prostate, radical prostatectomy:       A.  Prostatic adenocarcinoma (Niwot's grade 3+4 = 7).       B.  Tumor is present bilaterally in the gland.       C.  Perineural invasion is present.       D.  Largest tumor focus measures 1.3 cm in greatest dimension.       E.  Tumor occupies approximately 10% of the evaluated gland.       F.  Negative for evidence of extraprostatic extension.       G.  Negative for evidence of vas deferens or seminal vesicle invasion.       H.  Surgical excision margins are negative for evidence of malignancy.     AJCC STAGE:  pT2c, pNx, pMx      Electronically signed by Ej Schroeder MD on 8/3/2017 at 1511       Lab Results   Component Value Date    PSA <0.014 05/27/2020    PSA <0.014 10/18/2019    PSA <0.070 03/13/2019       The following portions of the patient's history were reviewed and updated as appropriate: allergies, current medications, past family history, past medical history, past social history, past surgical history and problem list.      Review of Systems   Constitutional: Negative for chills and fever.   Gastrointestinal:  Negative for abdominal pain, anal bleeding and blood in stool.   Genitourinary: Negative for dysuria, frequency, hematuria and urgency.         Current Outpatient Medications:   •  allopurinol (ZYLOPRIM) 100 MG tablet, Take 100 mg by mouth Daily., Disp: , Rfl:   •  amLODIPine (NORVASC) 10 MG tablet, Take 5 mg by mouth Daily., Disp: , Rfl:   •  atorvastatin (LIPITOR) 20 MG tablet, Take 20 mg by mouth Daily., Disp: , Rfl: 2  •  Cholecalciferol (VITAMIN D3) 2000 units tablet, Take 2,000 Units by mouth Daily., Disp: , Rfl:   •  colchicine 0.6 MG tablet, Take 0.6 mg by mouth As Needed for Muscle / Joint Pain. 2 tab then hour later 1 tab when gout flares , Disp: , Rfl:   •  escitalopram (LEXAPRO) 10 MG tablet, Take 10 mg by mouth Daily., Disp: , Rfl: 3  •  gabapentin (NEURONTIN) 400 MG capsule, Take 400 mg by mouth 2 (Two) Times a Day. Can take up to TID but only takes BID due to side effects - sleepiness, Disp: , Rfl:   •  levothyroxine (SYNTHROID, LEVOTHROID) 200 MCG tablet, Take 200 mcg by mouth Daily., Disp: , Rfl:   •  lisinopril (PRINIVIL,ZESTRIL) 40 MG tablet, Take 40 mg by mouth Daily., Disp: , Rfl: 11  •  metoprolol succinate XL (TOPROL-XL) 25 MG 24 hr tablet, Take 25 mg by mouth Daily., Disp: , Rfl:   •  rivaroxaban (XARELTO) 15 MG tablet, Take 1 tablet by mouth Daily With Dinner., Disp: 30 tablet, Rfl: 11  •  traMADol (ULTRAM) 50 MG tablet, Take 50 mg by mouth 2 (Two) Times a Day., Disp: , Rfl:   •  glipizide (GLUCOTROL) 5 MG tablet, Take 5 mg by mouth Daily., Disp: , Rfl:   •  nystatin (MYCOSTATIN) 394282 UNIT/GM powder, Apply  topically to the appropriate area as directed 4 (Four) Times a Day., Disp: , Rfl:   •  SITagliptin (JANUVIA) 100 MG tablet, Take 100 mg by mouth Daily., Disp: , Rfl:     Past Medical History:   Diagnosis Date   • Arthritis    • Atrial fibrillation (CMS/HCC)     paroxysmal, on Xarelto   • BMI 31.0-31.9,adult 6/28/2018   • Depression    • Diabetes (CMS/HCC)    • Disease of thyroid gland   "  • Gout    • Hypercholesteremia    • Hypertension    • IBS (irritable bowel syndrome)    • Kidney disease    • Prostate cancer (CMS/HCC)     Dr. Hong following   • Sensorineural hearing loss    • Sleep apnea     CPAP   • Sudden hearing loss    • Syncope    • Tinnitus    • Urine incontinence        Past Surgical History:   Procedure Laterality Date   • BLADDER SUSPENSION N/A 11/26/2018    Procedure: CYSTOSCOPY BLADDER SUSPENSION MALE SLING;  Surgeon: Zaheer Rebolledo MD;  Location:  [facility] OR;  Service: Urology   • CHOLECYSTECTOMY     • COLONOSCOPY N/A 3/7/2017    Procedure: COLONOSCOPY WITH ANESTHESIA;  Surgeon: Hector Alvarenga MD;  Location:  [facility]COPY;  Service:    • LAPAROSCOPIC RETROPUBIC PROSTATECTOMY     • PROSTATE SURGERY     • PROSTATECTOMY N/A 8/1/2017    Procedure: PROSTATECTOMY LAPAROSCOPIC WITH DAVINCI SI ROBOT ;  Surgeon: Hernesto Hong MD;  Location:  [facility] OR;  Service:    • THYROID SURGERY      RADIATION THERAPY AFTER SURGERY       Social History     Socioeconomic History   • Marital status:      Spouse name: Not on file   • Number of children: Not on file   • Years of education: Not on file   • Highest education level: Not on file   Tobacco Use   • Smoking status: Never Smoker   • Smokeless tobacco: Never Used   Substance and Sexual Activity   • Alcohol use: Yes     Comment: occasionally 1-2 beers once or twice year   • Drug use: No   • Sexual activity: Defer       Family History   Problem Relation Age of Onset   • Prostate cancer Father    • Cancer Father    • Heart disease Father         CABG   • Heart attack Father 70   • Heart disease Mother         assumed MI at time of death - had cardiopulmonary arrest   • Sudden death Mother    • Diabetes Sister    • Arrhythmia Sister    • Stroke Paternal Grandfather    • Colon cancer Neg Hx    • Colon polyps Neg Hx          Temp 98 °F (36.7 °C)   Ht 180.3 cm (71\")   Wt 107 kg (235 lb 6.4 oz)   BMI 32.83 kg/m²       Physical " Exam  Constitutional:  They  appear well-developed and well-nourished. There are no obvious deformities. No distress. The vital signs are reviewed  Pulmonary/Chest: Effort normal.   GI: Soft. The patient exhibits no distension and no mass. There is no tenderness. There is no rebound and no guarding. No hernia.   Neurological: Patient is alert and oriented to person, place, and time.   Skin: Skin is warm and dry. Not diaphoretic.   Psychiatric:  normal mood and affect. Not agitated.         Data  Results for orders placed or performed in visit on 06/02/20   POC Urinalysis Dipstick, Multipro   Result Value Ref Range    Color Yellow Yellow, Straw, Dark Yellow, Cindi    Clarity, UA Clear Clear    Glucose, UA Negative Negative, 1000 mg/dL (3+) mg/dL    Bilirubin Negative Negative    Ketones, UA Negative Negative    Specific Gravity  1.020 1.005 - 1.030    Blood, UA Trace (A) Negative    pH, Urine 5.5 5.0 - 8.0    Protein, POC Trace (A) Negative mg/dL    Urobilinogen, UA Normal Normal    Nitrite, UA Negative Negative    Leukocytes Trace (A) Negative       Assessment and Plan  Diagnoses and all orders for this visit:    Prostate cancer (CMS/[facility])  -     POC Urinalysis Dipstick, Multipro    Erectile dysfunction after radical prostatectomy    Stress incontinence, male    -His PSA is <0.2 suggesting no clinical evidence of prostate cancer at this time.   -Considering an IPP in the fall.   -leakage has improved. Instruction on reducing post void dribbling discussed          (Please note that portions of this note were completed with a voice recognition program.)  Hernesto Hong MD  06/02/20  15:04

## 2020-06-02 ENCOUNTER — OFFICE VISIT (OUTPATIENT)
Dept: UROLOGY | Facility: CLINIC | Age: 70
End: 2020-06-02

## 2020-06-02 VITALS — WEIGHT: 235.4 LBS | TEMPERATURE: 98 F | BODY MASS INDEX: 32.96 KG/M2 | HEIGHT: 71 IN

## 2020-06-02 DIAGNOSIS — C61 PROSTATE CANCER (HCC): Primary | ICD-10-CM

## 2020-06-02 DIAGNOSIS — N39.3 STRESS INCONTINENCE, MALE: ICD-10-CM

## 2020-06-02 DIAGNOSIS — N52.31 ERECTILE DYSFUNCTION AFTER RADICAL PROSTATECTOMY: ICD-10-CM

## 2020-06-02 LAB
BILIRUB BLD-MCNC: NEGATIVE MG/DL
CLARITY, POC: CLEAR
COLOR UR: YELLOW
GLUCOSE UR STRIP-MCNC: NEGATIVE MG/DL
KETONES UR QL: NEGATIVE
LEUKOCYTE EST, POC: ABNORMAL
NITRITE UR-MCNC: NEGATIVE MG/ML
PH UR: 5.5 [PH] (ref 5–8)
PROT UR STRIP-MCNC: ABNORMAL MG/DL
RBC # UR STRIP: ABNORMAL /UL
SP GR UR: 1.02 (ref 1–1.03)
UROBILINOGEN UR QL: NORMAL

## 2020-06-02 PROCEDURE — 99214 OFFICE O/P EST MOD 30 MIN: CPT | Performed by: UROLOGY

## 2020-06-02 PROCEDURE — 81003 URINALYSIS AUTO W/O SCOPE: CPT | Performed by: UROLOGY

## 2020-06-02 RX ORDER — GLIPIZIDE 5 MG/1
5 TABLET ORAL DAILY
COMMUNITY
Start: 2020-05-24

## 2020-06-10 ENCOUNTER — OFFICE VISIT (OUTPATIENT)
Dept: PRIMARY CARE CLINIC | Age: 70
End: 2020-06-10
Payer: MEDICARE

## 2020-06-10 VITALS
HEART RATE: 60 BPM | HEIGHT: 71 IN | SYSTOLIC BLOOD PRESSURE: 134 MMHG | OXYGEN SATURATION: 98 % | WEIGHT: 234.8 LBS | DIASTOLIC BLOOD PRESSURE: 82 MMHG | TEMPERATURE: 97.9 F | BODY MASS INDEX: 32.87 KG/M2

## 2020-06-10 PROCEDURE — G8417 CALC BMI ABV UP PARAM F/U: HCPCS | Performed by: NURSE PRACTITIONER

## 2020-06-10 PROCEDURE — 3017F COLORECTAL CA SCREEN DOC REV: CPT | Performed by: NURSE PRACTITIONER

## 2020-06-10 PROCEDURE — 2022F DILAT RTA XM EVC RTNOPTHY: CPT | Performed by: NURSE PRACTITIONER

## 2020-06-10 PROCEDURE — 69209 REMOVE IMPACTED EAR WAX UNI: CPT | Performed by: NURSE PRACTITIONER

## 2020-06-10 PROCEDURE — 4040F PNEUMOC VAC/ADMIN/RCVD: CPT | Performed by: NURSE PRACTITIONER

## 2020-06-10 PROCEDURE — 3044F HG A1C LEVEL LT 7.0%: CPT | Performed by: NURSE PRACTITIONER

## 2020-06-10 PROCEDURE — 1036F TOBACCO NON-USER: CPT | Performed by: NURSE PRACTITIONER

## 2020-06-10 PROCEDURE — G8427 DOCREV CUR MEDS BY ELIG CLIN: HCPCS | Performed by: NURSE PRACTITIONER

## 2020-06-10 PROCEDURE — 1123F ACP DISCUSS/DSCN MKR DOCD: CPT | Performed by: NURSE PRACTITIONER

## 2020-06-10 PROCEDURE — 99214 OFFICE O/P EST MOD 30 MIN: CPT | Performed by: NURSE PRACTITIONER

## 2020-06-10 RX ORDER — TRAMADOL HYDROCHLORIDE 50 MG/1
50 TABLET ORAL EVERY 8 HOURS PRN
Qty: 90 TABLET | Refills: 0 | Status: SHIPPED | OUTPATIENT
Start: 2020-06-10 | End: 2020-07-24 | Stop reason: SDUPTHER

## 2020-06-10 RX ORDER — PANTOPRAZOLE SODIUM 20 MG/1
20 TABLET, DELAYED RELEASE ORAL
Qty: 30 TABLET | Refills: 5 | Status: SHIPPED | OUTPATIENT
Start: 2020-06-10 | End: 2020-12-30

## 2020-06-10 ASSESSMENT — ENCOUNTER SYMPTOMS
DIARRHEA: 0
ABDOMINAL PAIN: 0
VOMITING: 0
COUGH: 1
EYE REDNESS: 0
SHORTNESS OF BREATH: 0
BACK PAIN: 1
RHINORRHEA: 0
NAUSEA: 0
SORE THROAT: 0
CONSTIPATION: 0

## 2020-06-10 NOTE — PROGRESS NOTES
hours as needed for Pain for up to 30 days. Yes MARY Fisher   pantoprazole (PROTONIX) 20 MG tablet Take 1 tablet by mouth every morning (before breakfast) Yes MARY Fisher   allopurinol (ZYLOPRIM) 100 MG tablet Take 1 tablet by mouth daily Yes MARY Harvey   gabapentin (NEURONTIN) 400 MG capsule Take 1 capsule by mouth 3 times daily for 90 days. Yes MARY Fisher   levothyroxine (SYNTHROID) 200 MCG tablet Take 1 tablet by mouth Daily Tube feeding (TF) interaction, obtain physician order to manage, recommend holding TF for 30 minutes before and after dose. Patient taking differently: Take 200 mcg by mouth Daily  Yes MARY Harvey   citalopram (CELEXA) 20 MG tablet Take 1 tablet by mouth daily Yes MARY Harvey   glipiZIDE (GLUCOTROL) 5 MG tablet Take 1 tablet by mouth daily Yes MARY Harvey   atorvastatin (LIPITOR) 20 MG tablet Take 1 tablet by mouth daily Yes MARY Harvey   lisinopril (PRINIVIL;ZESTRIL) 40 MG tablet TAKE 1 TABLET BY MOUTH DAILY Yes MARY Harvey   rivaroxaban (XARELTO) 15 MG TABS tablet Take 15 mg by mouth daily Yes Historical Provider, MD   amLODIPine (NORVASC) 10 MG tablet Take 0.5 tablets by mouth daily Yes MARY Harvey   metoprolol succinate (TOPROL XL) 25 MG extended release tablet Take 1 tablet by mouth daily Yes MARY Harvey   Cholecalciferol (VITAMIN D3) 2000 units TABS Take 1 tablet by mouth daily Yes MARY Harvey   COLCRYS 0.6 MG tablet TAKE 2 TABLETS THEN AN HOUR LATER TAKE 1 TABLET WHEN GOUT FLARES Yes MARY Harvey       Allergies: Patient has no known allergies.     Past Medical History:   Diagnosis Date    Allergic rhinitis     Cancer [facility]) 2012    Thyroid  - Dr Jeremy Bradshaw Hyperthyroidism     Prostate cancer [facility])     Sleep apnea     Type II or unspecified type diabetes mellitus without mention of complication, not stated as uncontrolled        Past Surgical History:   Procedure Laterality Date    CHOLECYSTECTOMY      INCONTINENCE SURGERY  11/26/2018    dr christian   [address] [facility]      Dr Vicki Loomis  08/01/2017    removal, dr Sri Carter      removed by Dr Anatoliy Ling History     Tobacco Use    Smoking status: Never Smoker    Smokeless tobacco: Never Used   Substance Use Topics    Alcohol use: No       Family History   Problem Relation Age of Onset    Schizophrenia Mother     Heart Failure Mother     Heart Attack Mother     Cancer Father         Prostate & Leukemia     Heart Disease Father     Heart Attack Father     Thyroid Disease Sister        Review of Systems   Constitutional: Negative for chills, fatigue and fever. HENT: Positive for hearing loss and sneezing (After he eats). Negative for congestion, ear pain, rhinorrhea and sore throat. Eyes: Negative for redness. Respiratory: Positive for cough (After he eats). Negative for shortness of breath. Cardiovascular: Negative for chest pain. Gastrointestinal: Negative for abdominal pain, constipation, diarrhea, nausea and vomiting. Genitourinary: Negative for dysuria. Musculoskeletal: Positive for arthralgias (Improved with Ultram), back pain (Improved with Ultram) and neck pain (Improved with Ultram). Skin: Negative for rash. Neurological: Negative for dizziness and headaches. Psychiatric/Behavioral: Negative for sleep disturbance. The patient is not nervous/anxious (Stable on current regimen). Physical Exam  Vitals signs reviewed. Constitutional:       Appearance: He is well-developed. HENT:      Head: Normocephalic. Right Ear: Tympanic membrane and external ear normal. There is impacted cerumen (Removed with irrigation). Left Ear: Tympanic membrane and external ear normal. There is impacted cerumen (Removed with irrigation).       Nose: Nose normal. No congestion or rhinorrhea. Eyes:      General:         Right eye: No discharge. Left eye: No discharge. Neck:      Musculoskeletal: Normal range of motion. Vascular: No carotid bruit. Cardiovascular:      Rate and Rhythm: Normal rate and regular rhythm. Pulmonary:      Effort: Pulmonary effort is normal.      Breath sounds: Normal breath sounds. No wheezing, rhonchi or rales. Abdominal:      General: Bowel sounds are normal. There is no distension. Palpations: Abdomen is soft. Tenderness: There is no abdominal tenderness. Musculoskeletal:         General: Tenderness (generalized tenderness) present. Lymphadenopathy:      Cervical: No cervical adenopathy. Skin:     General: Skin is dry. Neurological:      General: No focal deficit present. Mental Status: He is alert and oriented to person, place, and time. Mental status is at baseline. Psychiatric:         Mood and Affect: Mood normal.         Behavior: Behavior normal.         Thought Content: Thought content normal.         Judgment: Judgment normal.       ASSESSMENT      ICD-10-CM    1. Primary osteoarthritis involving multiple joints M15.0 traMADol (ULTRAM) 50 MG tablet   2. Chronic left-sided low back pain with left-sided sciatica M54.42 traMADol (ULTRAM) 50 MG tablet    G89.29    3. Gastroesophageal reflux disease, esophagitis presence not specified K21.9 pantoprazole (PROTONIX) 20 MG tablet   4. Bilateral impacted cerumen H61.23 WA REMOVAL IMPACTED CERUMEN IRRIGATION/LVG UNILAT   5. Essential hypertension I10 The current medical regimen is effective;  continue present plan and medications. Patient is asked to monitor BP at home or work, several times per month and return with written values at next office visit.      6. Type 2 diabetes mellitus with stage 3 chronic kidney disease, without long-term current use of insulin (HCC) E11.22 The current medical regimen is effective;  continue present plan and

## 2020-07-23 NOTE — PROGRESS NOTES
[address], [address]  Phone [phone]   Fax [phone]      OFFICE VISIT: 7/24/2020    Anthony Whitaker is a 78 y.o. male whopresents today for his medical conditions/complaints as noted below. Anthony Whitaker isc/o of Medication Refill (here for medication refill )        :     HPI   Here for refill on pain medicine. CHRONIC PAIN:  He takes Ultram prn for chronic pain/osteoarthritis. Bilateral shoulders, knees and back. The Ultram 3x/day with Tylenol is helping his pain. Last fill of tramadol according to [facility] was 06/15/2020, #90. The patient is requesting a refill today.   Pain is much better controlled with 3 times a day and Tylenol as needed    Lab Review   Orders Only on 05/13/2020   Component Date Value    TSH 05/13/2020 0.247*    Sodium 05/13/2020 138     Potassium 05/13/2020 4.7     Chloride 05/13/2020 99     CO2 05/13/2020 26     Anion Gap 05/13/2020 13     Glucose 05/13/2020 123*    BUN 05/13/2020 22     CREATININE 05/13/2020 1.2     GFR Non- 05/13/2020 60*    Calcium 05/13/2020 9.2     Total Protein 05/13/2020 7.1     Alb 05/13/2020 4.5     Total Bilirubin 05/13/2020 0.6     Alkaline Phosphatase 05/13/2020 56     ALT 05/13/2020 16     AST 05/13/2020 15     WBC 05/13/2020 8.1     RBC 05/13/2020 4.81     Hemoglobin 05/13/2020 13.9*    Hematocrit 05/13/2020 42.1     MCV 05/13/2020 87.5     MCH 05/13/2020 28.9     MCHC 05/13/2020 33.0     RDW 05/13/2020 12.9     Platelets 47/60/6350 230     MPV 05/13/2020 9.9     Neutrophils % 05/13/2020 74.6*    Lymphocytes % 05/13/2020 15.4*    Monocytes % 05/13/2020 6.4     Eosinophils % 05/13/2020 2.7     Basophils % 05/13/2020 0.5     Neutrophils Absolute 05/13/2020 6.0     Immature Granulocytes # 05/13/2020 0.0     Lymphocytes Absolute 05/13/2020 1.3     Monocytes Absolute 05/13/2020 0.50     Eosinophils Absolute 05/13/2020 0.20     Basophils Absolute 05/13/2020 0.00     Hemoglobin A1C 05/13/2020 6.2*     Past Medical History:   Diagnosis Date    Allergic rhinitis     Cancer ([facility] [address] 2012    Thyroid  - Dr Vinny Boone Hyperthyroidism     Prostate cancer ([facility]     Sleep apnea     Type II or unspecified type diabetes mellitus without mention of complication, not stated as uncontrolled       Past Surgical History:   Procedure Laterality Date    CHOLECYSTECTOMY      INCONTINENCE SURGERY  11/26/2018    dr gino Kimball  08/01/2017    removal, dr Abdulaziz Meeks      removed by Dr Abraham العلي        Family History   Problem Relation Age of Onset    Schizophrenia Mother     Heart Failure Mother     Heart Attack Mother     Cancer Father         Prostate & Leukemia     Heart Disease Father     Heart Attack Father     Thyroid Disease Sister        Social History     Tobacco Use    Smoking status: Never Smoker    Smokeless tobacco: Never Used   Substance Use Topics    Alcohol use: No      Current Outpatient Medications   Medication Sig Dispense Refill    traMADol (ULTRAM) 50 MG tablet Take 1 tablet by mouth every 8 hours as needed for Pain for up to 30 days. 90 tablet 0    pantoprazole (PROTONIX) 20 MG tablet Take 1 tablet by mouth every morning (before breakfast) 30 tablet 5    allopurinol (ZYLOPRIM) 100 MG tablet Take 1 tablet by mouth daily 90 tablet 3    gabapentin (NEURONTIN) 400 MG capsule Take 1 capsule by mouth 3 times daily for 90 days. 270 capsule 0    levothyroxine (SYNTHROID) 200 MCG tablet Take 1 tablet by mouth Daily Tube feeding (TF) interaction, obtain physician order to manage, recommend holding TF for 30 minutes before and after dose.  (Patient taking differently: Take 200 mcg by mouth Daily ) 90 tablet 3    citalopram (CELEXA) 20 MG tablet Take 1 tablet by mouth daily 30 tablet 11    glipiZIDE (GLUCOTROL) 5 MG tablet Take 1 tablet by mouth daily 30 tablet 11    atorvastatin (LIPITOR) 20 MG tablet Take 1 tablet by mouth daily 90 tablet 3    lisinopril (PRINIVIL;ZESTRIL) 40 MG tablet TAKE 1 TABLET BY MOUTH DAILY 90 tablet 3    rivaroxaban (XARELTO) 15 MG TABS tablet Take 15 mg by mouth daily      amLODIPine (NORVASC) 10 MG tablet Take 0.5 tablets by mouth daily 90 tablet 3    metoprolol succinate (TOPROL XL) 25 MG extended release tablet Take 1 tablet by mouth daily 30 tablet 11    Cholecalciferol (VITAMIN D3) 2000 units TABS Take 1 tablet by mouth daily 30 tablet 11    COLCRYS 0.6 MG tablet TAKE 2 TABLETS THEN AN HOUR LATER TAKE 1 TABLET WHEN GOUT FLARES 12 tablet 5     No current facility-administered medications for this visit. No Known Allergies    Health Maintenance   Topic Date Due    DTaP/Tdap/Td vaccine (1 - Tdap) 10/22/1969    PSA counseling  05/09/2019    Diabetic retinal exam  12/05/2019    Flu vaccine (1) 09/01/2020    Lipid screen  01/22/2021    Diabetic foot exam  05/13/2021    A1C test (Diabetic or Prediabetic)  05/13/2021    TSH testing  05/13/2021    Potassium monitoring  05/13/2021    Creatinine monitoring  05/13/2021    Annual Wellness Visit (AWV)  05/14/2021    Colon cancer screen colonoscopy  03/08/2022    Shingles Vaccine  Completed    Pneumococcal 65+ years Vaccine  Completed    Hepatitis C screen  Completed    Hepatitis A vaccine  Aged Out    Hib vaccine  Aged Out    Meningococcal (ACWY) vaccine  Aged Out        :     Review of Systems   Constitutional: Negative for activity change, appetite change, fatigue, fever and unexpected weight change. HENT: Negative for ear pain, rhinorrhea, sore throat and trouble swallowing. Eyes: Negative for visual disturbance. Respiratory: Negative for cough and shortness of breath. Cardiovascular: Negative for chest pain, palpitations and leg swelling. Gastrointestinal: Negative for abdominal pain, constipation, diarrhea, nausea and vomiting. Genitourinary: Negative for flank pain.    Musculoskeletal: Negative for arthralgias, understanding. Reviewed health maintenance. .  Patient agreed with treatment plan. Follow up asdirected. There are no Patient Instructions on file for this visit. RX Monitoring 7/24/2020   Attestation The Prescription Monitoring Report for this patient was reviewed today. Periodic Controlled Substance Monitoring Possible medication side effects, risk of tolerance/dependence & alternative treatments discussed. ;No signs of potential drug abuse or diversion identified.        Electronically signed by MARY Balderas on7/24/2020 at 9:20 AM

## 2020-07-24 ENCOUNTER — OFFICE VISIT (OUTPATIENT)
Dept: PRIMARY CARE CLINIC | Age: 70
End: 2020-07-24
Payer: MEDICARE

## 2020-07-24 VITALS
DIASTOLIC BLOOD PRESSURE: 84 MMHG | SYSTOLIC BLOOD PRESSURE: 134 MMHG | HEIGHT: 71 IN | OXYGEN SATURATION: 94 % | HEART RATE: 87 BPM | TEMPERATURE: 98.4 F | WEIGHT: 235 LBS | BODY MASS INDEX: 32.9 KG/M2

## 2020-07-24 PROCEDURE — 4040F PNEUMOC VAC/ADMIN/RCVD: CPT | Performed by: NURSE PRACTITIONER

## 2020-07-24 PROCEDURE — 3017F COLORECTAL CA SCREEN DOC REV: CPT | Performed by: NURSE PRACTITIONER

## 2020-07-24 PROCEDURE — 1036F TOBACCO NON-USER: CPT | Performed by: NURSE PRACTITIONER

## 2020-07-24 PROCEDURE — G8427 DOCREV CUR MEDS BY ELIG CLIN: HCPCS | Performed by: NURSE PRACTITIONER

## 2020-07-24 PROCEDURE — G8417 CALC BMI ABV UP PARAM F/U: HCPCS | Performed by: NURSE PRACTITIONER

## 2020-07-24 PROCEDURE — 99213 OFFICE O/P EST LOW 20 MIN: CPT | Performed by: NURSE PRACTITIONER

## 2020-07-24 PROCEDURE — 1123F ACP DISCUSS/DSCN MKR DOCD: CPT | Performed by: NURSE PRACTITIONER

## 2020-07-24 RX ORDER — TRAMADOL HYDROCHLORIDE 50 MG/1
50 TABLET ORAL EVERY 8 HOURS PRN
Qty: 90 TABLET | Refills: 0 | Status: SHIPPED | OUTPATIENT
Start: 2020-07-24 | End: 2020-08-24 | Stop reason: SDUPTHER

## 2020-07-24 ASSESSMENT — ENCOUNTER SYMPTOMS
SORE THROAT: 0
NAUSEA: 0
VOMITING: 0
CONSTIPATION: 0
RHINORRHEA: 0
SHORTNESS OF BREATH: 0
COUGH: 0
ABDOMINAL PAIN: 0
TROUBLE SWALLOWING: 0
DIARRHEA: 0

## 2020-08-06 ENCOUNTER — TELEPHONE (OUTPATIENT)
Dept: VASCULAR SURGERY | Facility: CLINIC | Age: 70
End: 2020-08-06

## 2020-08-06 NOTE — TELEPHONE ENCOUNTER
Spoke with patient and advised that his 8/20 appointment with Dr. León had been moved to 9/1/2020.  Advised that patient needed to keep his testing on 8/20.  Patient expressed understanding for all that was discussed and all information was mailed to address on file.

## 2020-08-17 RX ORDER — METOPROLOL SUCCINATE 25 MG/1
25 TABLET, EXTENDED RELEASE ORAL DAILY
Qty: 90 TABLET | Refills: 3 | Status: SHIPPED | OUTPATIENT
Start: 2020-08-17 | End: 2021-09-13

## 2020-08-17 NOTE — TELEPHONE ENCOUNTER
Received fax from pharmacy requesting refill on pts medication(s). Pt was last seen in office on 7/24/2020  and has a follow up scheduled for 8/26/2020. Will send request to  Martine Joy  for patient.      Requested Prescriptions     Pending Prescriptions Disp Refills    metoprolol succinate (TOPROL XL) 25 MG extended release tablet 90 tablet 3     Sig: Take 1 tablet by mouth daily

## 2020-08-20 ENCOUNTER — HOSPITAL ENCOUNTER (OUTPATIENT)
Dept: ULTRASOUND IMAGING | Facility: HOSPITAL | Age: 70
Discharge: HOME | End: 2020-08-20

## 2020-08-20 ENCOUNTER — HOSPITAL ENCOUNTER (OUTPATIENT)
Dept: ULTRASOUND IMAGING | Facility: HOSPITAL | Age: 70
Discharge: HOME | End: 2020-08-20
Admitting: SURGERY

## 2020-08-20 DIAGNOSIS — I65.23 BILATERAL CAROTID ARTERY STENOSIS: ICD-10-CM

## 2020-08-20 DIAGNOSIS — I73.9 PAD (PERIPHERAL ARTERY DISEASE) (HCC): ICD-10-CM

## 2020-08-20 PROCEDURE — 93923 UPR/LXTR ART STDY 3+ LVLS: CPT | Performed by: SURGERY

## 2020-08-20 PROCEDURE — 93880 EXTRACRANIAL BILAT STUDY: CPT | Performed by: SURGERY

## 2020-08-20 PROCEDURE — 93923 UPR/LXTR ART STDY 3+ LVLS: CPT

## 2020-08-20 PROCEDURE — 93880 EXTRACRANIAL BILAT STUDY: CPT

## 2020-08-24 ENCOUNTER — OFFICE VISIT (OUTPATIENT)
Dept: PRIMARY CARE CLINIC | Age: 70
End: 2020-08-24
Payer: MEDICARE

## 2020-08-24 VITALS
OXYGEN SATURATION: 97 % | HEART RATE: 78 BPM | TEMPERATURE: 97.4 F | SYSTOLIC BLOOD PRESSURE: 124 MMHG | DIASTOLIC BLOOD PRESSURE: 84 MMHG | WEIGHT: 235 LBS | BODY MASS INDEX: 32.78 KG/M2

## 2020-08-24 LAB
APPEARANCE FLUID: CLEAR
BILIRUBIN, POC: NORMAL
BLOOD URINE, POC: NORMAL
CLARITY, POC: CLEAR
COLOR, POC: YELLOW
GLUCOSE URINE, POC: NORMAL
KETONES, POC: NORMAL
LEUKOCYTE EST, POC: NORMAL
NITRITE, POC: NORMAL
PH, POC: 6
PROTEIN, POC: NORMAL
SPECIFIC GRAVITY, POC: 1.02
UROBILINOGEN, POC: 1

## 2020-08-24 PROCEDURE — 99213 OFFICE O/P EST LOW 20 MIN: CPT | Performed by: NURSE PRACTITIONER

## 2020-08-24 PROCEDURE — G8417 CALC BMI ABV UP PARAM F/U: HCPCS | Performed by: NURSE PRACTITIONER

## 2020-08-24 PROCEDURE — 81002 URINALYSIS NONAUTO W/O SCOPE: CPT | Performed by: NURSE PRACTITIONER

## 2020-08-24 PROCEDURE — 96372 THER/PROPH/DIAG INJ SC/IM: CPT | Performed by: NURSE PRACTITIONER

## 2020-08-24 PROCEDURE — 1036F TOBACCO NON-USER: CPT | Performed by: NURSE PRACTITIONER

## 2020-08-24 PROCEDURE — G8427 DOCREV CUR MEDS BY ELIG CLIN: HCPCS | Performed by: NURSE PRACTITIONER

## 2020-08-24 PROCEDURE — 3017F COLORECTAL CA SCREEN DOC REV: CPT | Performed by: NURSE PRACTITIONER

## 2020-08-24 PROCEDURE — 4040F PNEUMOC VAC/ADMIN/RCVD: CPT | Performed by: NURSE PRACTITIONER

## 2020-08-24 PROCEDURE — 1123F ACP DISCUSS/DSCN MKR DOCD: CPT | Performed by: NURSE PRACTITIONER

## 2020-08-24 RX ORDER — TRAMADOL HYDROCHLORIDE 50 MG/1
50 TABLET ORAL EVERY 8 HOURS PRN
Qty: 90 TABLET | Refills: 0 | Status: SHIPPED | OUTPATIENT
Start: 2020-08-24 | End: 2020-10-07 | Stop reason: SDUPTHER

## 2020-08-24 RX ORDER — LIDOCAINE 50 MG/G
1 PATCH TOPICAL DAILY
Qty: 30 PATCH | Refills: 0 | Status: SHIPPED | OUTPATIENT
Start: 2020-08-24 | End: 2020-09-23

## 2020-08-24 RX ORDER — TESTOSTERONE CYPIONATE 200 MG/ML
10 INJECTION INTRAMUSCULAR ONCE
Status: COMPLETED | OUTPATIENT
Start: 2020-08-24 | End: 2020-08-24

## 2020-08-24 RX ORDER — GABAPENTIN 400 MG/1
400 CAPSULE ORAL 3 TIMES DAILY
Qty: 270 CAPSULE | Refills: 0 | Status: SHIPPED | OUTPATIENT
Start: 2020-08-24 | End: 2021-01-14 | Stop reason: SDUPTHER

## 2020-08-24 RX ADMIN — TESTOSTERONE CYPIONATE 10 MG: 200 INJECTION INTRAMUSCULAR at 14:30

## 2020-08-24 ASSESSMENT — ENCOUNTER SYMPTOMS
DIARRHEA: 0
CONSTIPATION: 0
VOMITING: 0
NAUSEA: 0
SORE THROAT: 0
BACK PAIN: 1
ABDOMINAL PAIN: 0
RHINORRHEA: 0
TROUBLE SWALLOWING: 0
COUGH: 0
SHORTNESS OF BREATH: 0

## 2020-08-24 NOTE — PROGRESS NOTES
[address] [address], [address]  Phone [phone]   Fax [phone]      OFFICE VISIT: 8/24/2020    Dayron Fierro is a 78 y.o. male whopresents today for his medical conditions/complaints as noted below. Pillo Santos isc/o of Back Pain (mid to low back pain, radiates around to sides. no help with tramadol. )        :     HPI  Here for back pain  Mid to lower back pain  Radiates around sides. Onset couple of weeks. Tramadol didn't help  Denies any injury  No loss bowel or bladder, no saddle anesthesia  No urinary symptoms  Worse when laying down. aspercream helps  Have had this pain before. BS has been good. MRI lumbar spine without contrast 09/05/2018  Impression    Lumbar spondylosis as detailed above. No evidence of an HNP. A mild spinal stenosis at L1-2. Evidence of bilateral neural foraminal stenosis at L5-S1. The details    are given above.     Signed by Dr Jeanne Lewis on 9/6/2018 7:55 AM        Lab Review   Orders Only on 05/13/2020   Component Date Value    TSH 05/13/2020 0.247*    Sodium 05/13/2020 138     Potassium 05/13/2020 4.7     Chloride 05/13/2020 99     CO2 05/13/2020 26     Anion Gap 05/13/2020 13     Glucose 05/13/2020 123*    BUN 05/13/2020 22     CREATININE 05/13/2020 1.2     GFR Non- 05/13/2020 60*    Calcium 05/13/2020 9.2     Total Protein 05/13/2020 7.1     Alb 05/13/2020 4.5     Total Bilirubin 05/13/2020 0.6     Alkaline Phosphatase 05/13/2020 56     ALT 05/13/2020 16     AST 05/13/2020 15     WBC 05/13/2020 8.1     RBC 05/13/2020 4.81     Hemoglobin 05/13/2020 13.9*    Hematocrit 05/13/2020 42.1     MCV 05/13/2020 87.5     MCH 05/13/2020 28.9     MCHC 05/13/2020 33.0     RDW 05/13/2020 12.9     Platelets 82/65/0881 230     MPV 05/13/2020 9.9     Neutrophils % 05/13/2020 74.6*    Lymphocytes % 05/13/2020 15.4*    Monocytes % 05/13/2020 6.4     Eosinophils % 05/13/2020 2.7     Basophils % 05/13/2020 0.5     Neutrophils Absolute 05/13/2020 6.0     Immature Granulocytes # 05/13/2020 0.0     Lymphocytes Absolute 05/13/2020 1.3     Monocytes Absolute 05/13/2020 0.50     Eosinophils Absolute 05/13/2020 0.20     Basophils Absolute 05/13/2020 0.00     Hemoglobin A1C 05/13/2020 6.2*     Past Medical History:   Diagnosis Date    Allergic rhinitis     Cancer ([facility] [address] 2012    Thyroid  - Dr Argenis Brennan Hyperthyroidism     Prostate cancer ([facility]     Sleep apnea     Type II or unspecified type diabetes mellitus without mention of complication, not stated as uncontrolled       Past Surgical History:   Procedure Laterality Date    CHOLECYSTECTOMY      INCONTINENCE SURGERY  11/26/2018    dr gino Garcia  08/01/2017    removal, dr Juan C Marcial      removed by Dr Lyndsey Hagen        Family History   Problem Relation Age of Onset    Schizophrenia Mother     Heart Failure Mother     Heart Attack Mother     Cancer Father         Prostate & Leukemia     Heart Disease Father     Heart Attack Father     Thyroid Disease Sister        Social History     Tobacco Use    Smoking status: Never Smoker    Smokeless tobacco: Never Used   Substance Use Topics    Alcohol use: No      Current Outpatient Medications   Medication Sig Dispense Refill    gabapentin (NEURONTIN) 400 MG capsule Take 1 capsule by mouth 3 times daily for 90 days. 270 capsule 0    traMADol (ULTRAM) 50 MG tablet Take 1 tablet by mouth every 8 hours as needed for Pain for up to 30 days. 90 tablet 0    lidocaine (LIDODERM) 5 % Place 1 patch onto the skin daily 12 hours on, 12 hours off.  30 patch 0    metoprolol succinate (TOPROL XL) 25 MG extended release tablet Take 1 tablet by mouth daily 90 tablet 3    pantoprazole (PROTONIX) 20 MG tablet Take 1 tablet by mouth every morning (before breakfast) 30 tablet 5    allopurinol (ZYLOPRIM) 100 MG tablet Take 1 tablet by mouth daily 90 tablet 3    levothyroxine (SYNTHROID) 200 MCG tablet Take 1 tablet by mouth Daily Tube feeding (TF) interaction, obtain physician order to manage, recommend holding TF for 30 minutes before and after dose.  (Patient taking differently: Take 200 mcg by mouth Daily ) 90 tablet 3    citalopram (CELEXA) 20 MG tablet Take 1 tablet by mouth daily 30 tablet 11    glipiZIDE (GLUCOTROL) 5 MG tablet Take 1 tablet by mouth daily 30 tablet 11    atorvastatin (LIPITOR) 20 MG tablet Take 1 tablet by mouth daily 90 tablet 3    lisinopril (PRINIVIL;ZESTRIL) 40 MG tablet TAKE 1 TABLET BY MOUTH DAILY 90 tablet 3    rivaroxaban (XARELTO) 15 MG TABS tablet Take 15 mg by mouth daily      amLODIPine (NORVASC) 10 MG tablet Take 0.5 tablets by mouth daily 90 tablet 3    Cholecalciferol (VITAMIN D3) 2000 units TABS Take 1 tablet by mouth daily 30 tablet 11    COLCRYS 0.6 MG tablet TAKE 2 TABLETS THEN AN HOUR LATER TAKE 1 TABLET WHEN GOUT FLARES 12 tablet 5     Current Facility-Administered Medications   Medication Dose Route Frequency Provider Last Rate Last Dose    Dexamethasone Sodium Phosphate injection 10 mg  10 mg Intramuscular Once Pizarro Brennan, APRN         No Known Allergies    Health Maintenance   Topic Date Due    DTaP/Tdap/Td vaccine (1 - Tdap) 10/22/1968    PSA counseling  05/09/2019    Diabetic retinal exam  12/05/2019    Flu vaccine (1) 09/01/2020    Lipid screen  01/22/2021    Diabetic foot exam  05/13/2021    A1C test (Diabetic or Prediabetic)  05/13/2021    TSH testing  05/13/2021    Potassium monitoring  05/13/2021    Creatinine monitoring  05/13/2021    Annual Wellness Visit (AWV)  05/14/2021    Colon cancer screen colonoscopy  03/08/2022    Shingles Vaccine  Completed    Pneumococcal 65+ years Vaccine  Completed    Hepatitis C screen  Completed    Hepatitis A vaccine  Aged Out    Hib vaccine  Aged Out    Meningococcal (ACWY) vaccine  Aged Out        :     Review of Systems   Constitutional: Negative for activity change, appetite change, fatigue, fever and unexpected weight change. HENT: Negative for ear pain, rhinorrhea, sore throat and trouble swallowing. Eyes: Negative for visual disturbance. Respiratory: Negative for cough and shortness of breath. Cardiovascular: Negative for chest pain, palpitations and leg swelling. Gastrointestinal: Negative for abdominal pain, constipation, diarrhea, nausea and vomiting. Genitourinary: Negative for difficulty urinating and flank pain. Musculoskeletal: Positive for back pain. Negative for arthralgias, myalgias, neck pain and neck stiffness. Neurological: Negative for headaches. Psychiatric/Behavioral: Negative for decreased concentration and sleep disturbance. The patient is not nervous/anxious.        :     Physical Exam  Vitals signs reviewed. Constitutional:       Appearance: Normal appearance. HENT:      Head: Normocephalic and atraumatic. Neck:      Musculoskeletal: Normal range of motion and neck supple. Cardiovascular:      Rate and Rhythm: Normal rate and regular rhythm. Pulses: Normal pulses. Heart sounds: Normal heart sounds. Pulmonary:      Effort: Pulmonary effort is normal.      Breath sounds: Normal breath sounds. Musculoskeletal: Normal range of motion. Neurological:      Mental Status: He is alert and oriented to person, place, and time. Gait: Gait is intact. /84   Pulse 78   Temp 97.4 °F (36.3 °C) (Temporal)   Wt 235 lb (106.6 kg)   SpO2 97%   BMI 32.78 kg/m²     :        ICD-10-CM    1. Thoracic myofascial strain, initial encounter  S29.019A lidocaine (LIDODERM) 5 %     Dexamethasone Sodium Phosphate injection 10 mg   2. Arthralgia of multiple joints  M25.50 gabapentin (NEURONTIN) 400 MG capsule   3.  Chronic left-sided low back pain with left-sided sciatica  M54.42 gabapentin (NEURONTIN) 400 MG capsule    G89.29 traMADol (ULTRAM) 50 MG tablet     Dexamethasone Sodium Phosphate injection 10 mg 4. Primary osteoarthritis involving multiple joints  M15.0 traMADol (ULTRAM) 50 MG tablet     Dexamethasone Sodium Phosphate injection 10 mg       :      Return if symptoms worsen or fail to improve. No orders of the defined types were placed in this encounter. Orders Placed This Encounter   Medications    gabapentin (NEURONTIN) 400 MG capsule     Sig: Take 1 capsule by mouth 3 times daily for 90 days. Dispense:  270 capsule     Refill:  0    traMADol (ULTRAM) 50 MG tablet     Sig: Take 1 tablet by mouth every 8 hours as needed for Pain for up to 30 days. Dispense:  90 tablet     Refill:  0     Reduce doses taken as pain becomes manageable    lidocaine (LIDODERM) 5 %     Sig: Place 1 patch onto the skin daily 12 hours on, 12 hours off. Dispense:  30 patch     Refill:  0    Dexamethasone Sodium Phosphate injection 10 mg         Discussed use, benefit, and side effectsof prescribed medications. All patient questions answered. Pt voiced understanding. Reviewed health maintenance. .  Patient agreed with treatment plan. Follow up asdirected. There are no Patient Instructions on file for this visit. RX Monitoring 8/24/2020   Attestation The Prescription Monitoring Report for this patient was reviewed today. Periodic Controlled Substance Monitoring Possible medication side effects, risk of tolerance/dependence & alternative treatments discussed. ;No signs of potential drug abuse or diversion identified.        Electronically signed by MARY Daigle on8/24/2020 at 2:18 PM

## 2020-08-24 NOTE — PROGRESS NOTES
After obtaining consent from Nakia Bain, gave patient dexamethasone 10mg injection in Left upper quad. gluteus, patient tolerated well. Medication was not supplied by the patient.

## 2020-08-31 ENCOUNTER — TELEPHONE (OUTPATIENT)
Dept: VASCULAR SURGERY | Facility: CLINIC | Age: 70
End: 2020-08-31

## 2020-08-31 NOTE — TELEPHONE ENCOUNTER
Left message reminding Mr Kamara of his appointment for Tuesday, September 1st, 2020 at 130 pm with Dr León. Also advised Mr Kamara if he had any questions or needed to reschedule to please call the office at [phone].

## 2020-09-01 ENCOUNTER — OFFICE VISIT (OUTPATIENT)
Dept: VASCULAR SURGERY | Facility: CLINIC | Age: 70
End: 2020-09-01

## 2020-09-01 VITALS
WEIGHT: 236 LBS | OXYGEN SATURATION: 96 % | BODY MASS INDEX: 33.04 KG/M2 | HEIGHT: 71 IN | HEART RATE: 84 BPM | DIASTOLIC BLOOD PRESSURE: 60 MMHG | SYSTOLIC BLOOD PRESSURE: 110 MMHG

## 2020-09-01 DIAGNOSIS — I73.9 PAD (PERIPHERAL ARTERY DISEASE) (HCC): ICD-10-CM

## 2020-09-01 DIAGNOSIS — I10 ESSENTIAL HYPERTENSION: ICD-10-CM

## 2020-09-01 DIAGNOSIS — I65.23 BILATERAL CAROTID ARTERY STENOSIS: Primary | ICD-10-CM

## 2020-09-01 DIAGNOSIS — E78.2 MIXED HYPERLIPIDEMIA: ICD-10-CM

## 2020-09-01 PROCEDURE — 99214 OFFICE O/P EST MOD 30 MIN: CPT | Performed by: SURGERY

## 2020-09-01 NOTE — PROGRESS NOTES
"9/1/2020       Frankie Bradley, APRN  [address]  [place] [address]        Zay Kamara  1950    Chief Complaint   Patient presents with   • Follow-up     1 Year Follow For Bilateral Carotid Artery Stenosis and Peripheral Artery Disease. Test [id number] US pad carotid bilateral and US pad ankle / brach ind ext comp. Patient denies any stroke like symptoms.    • Smoker/Non-Smoker     Patient is Non-Smoker/Never Smoked   • Med Management     Verbally verified medications with patient. Mr Kamara verbalized all medications are correct and up to date. Mr Asad did verbalize he does not take 81 mg Aspirin.        Dear Frankie Bradley, APRN:    HPI     I had the pleasure of seeing you patient in the office today for follow up.  As you recall, the patient is a 69 y.o. male who we are currently following for asymptomatic carotid occlusive disease.  He denies any strokelike symptoms.  He denies any claudication to his lower extremities.  He is maintained on Xarelto, and Lipitor.  He did have noninvasive testing performed today, which I did review in office.      Review of Systems   Constitutional: Negative.    HENT: Negative.    Eyes: Negative.    Respiratory: Negative.    Cardiovascular: Negative.    Gastrointestinal: Negative.    Endocrine: Negative.    Genitourinary: Negative.    Musculoskeletal: Negative.    Skin: Negative.    Allergic/Immunologic: Negative.    Neurological: Negative.    Hematological: Negative.    Psychiatric/Behavioral: Negative.    All other systems reviewed and are negative.      /60 (BP Location: Right arm, Patient Position: Sitting, Cuff Size: Adult)   Pulse 84   Ht 180.3 cm (71\")   Wt 107 kg (236 lb)   SpO2 96%   BMI 32.92 kg/m²   Physical Exam   Constitutional: He is oriented to person, place, and time. He appears well-developed and well-nourished. No distress.   HENT:   Head: Normocephalic and atraumatic.   Mouth/Throat: Oropharynx is clear and moist and " mucous membranes are normal.   Eyes: Pupils are equal, round, and reactive to light. No scleral icterus.   Neck: Normal range of motion. Neck supple. No JVD present. Carotid bruit is not present. No thyromegaly present.   Cardiovascular: Normal rate, regular rhythm, S1 normal, S2 normal, normal heart sounds, intact distal pulses and normal pulses. Exam reveals no gallop and no friction rub.   No murmur heard.  Pulses:       Femoral pulses are 2+ on the right side, and 2+ on the left side.       Popliteal pulses are 2+ on the right side, and 2+ on the left side.        Dorsalis pedis pulses are 2+ on the right side, and 2+ on the left side.        Posterior tibial pulses are 2+ on the right side, and 2+ on the left side.   Pulmonary/Chest: Effort normal and breath sounds normal.   Abdominal: Soft. Normal appearance, normal aorta and bowel sounds are normal. He exhibits no abdominal bruit. There is no hepatosplenomegaly. There is no tenderness.   Musculoskeletal: Normal range of motion.     Vascular Status -  His right foot exhibits no edema. His left foot exhibits no edema.  Neurological: He is alert and oriented to person, place, and time. He has normal strength. No cranial nerve deficit.   Skin: Skin is warm, dry and intact. He is not diaphoretic.   Psychiatric: He has a normal mood and affect. His behavior is normal. Judgment and thought content normal. Cognition and memory are normal.   Nursing note and vitals reviewed.      DIAGNOSTIC DATA:    Us Carotid Bilateral    Result Date: 8/20/2020  Narrative: History: Carotid occlusive disease      Impression: Impression: 1. There is less than 50% stenosis of the right internal carotid artery. 2. There is less than 50% stenosis of the left internal carotid artery. 3. Antegrade flow is demonstrated in bilateral vertebral arteries.  Comments: Bilateral carotid vertebral arterial duplex scan was performed.  Grayscale imaging shows intimal thickening and calcified elements  at the carotid bifurcation. The right internal carotid artery peak systolic velocity is 97.3 cm/sec. The end-diastolic velocity is 26.4 cm/sec. The right ICA/CCA ratio is approximately 1.14 . These findings correlate with less than 50% stenosis of the right internal carotid artery.  Grayscale imaging shows intimal thickening and calcified elements at the carotid bifurcation. The left internal carotid artery peak systolic velocity is 83.3 cm/sec. The end-diastolic velocity is 25.8 cm/sec. The left ICA/CCA ratio is approximately 0.92 . These findings correlate with less than 50% stenosis of the left internal carotid artery.  Antegrade flow is demonstrated in bilateral vertebral arteries.  This report was finalized on 08/20/2020 10:20 by Dr. Luis Alberto Carias MD.    Us Ankle / Brachial Indices Extremity Complete    Result Date: 8/20/2020  Narrative:  History: PAD  Comments: Bilateral lower extremity arterial with multi-level pulse volume recordings and segmental pressures were performed at rest and stress.  The right ankle/brachial index is 1.04. Doppler waveforms are triphasic and PVR waveform at the ankle is normal-appearing with no significant dampening.These findings are consistent with no significant arterial insufficiency of the right lower extremity at rest.  Post exercise JUHI is 1.04.  The left ankle/brachial index is 1.25. Doppler waveforms are triphasic and PVR waveform at the ankle is normal-appearing with no significant dampening. These findings are consistent with no significant arterial insufficiency of the left lower extremity at rest.    Post exercise JUHI is 1.27.      Impression: Impression: 1. No significant arterial insufficiency of the right lower extremity at rest. 2. No significant arterial insufficiency of the left lower extremity at rest.   This report was finalized on 08/20/2020 10:21 by Dr. Luis Alberto Carias MD.      Patient Active Problem List   Diagnosis   • Hypogonadism in male   • ED (erectile  dysfunction)   • Prostate cancer (CMS/HCC)   • Diabetes (CMS/[facility])   • Hypertension   • Disease of thyroid gland   • History of prostate cancer   • Leukocytosis   • Class 1 obesity due to excess calories with serious comorbidity and body mass index (BMI) of 32.0 to 32.9 in adult   • Sensorineural hearing loss (SNHL) of both ears   • Asymmetric SNHL (sensorineural hearing loss)   • Sudden hearing loss, right   • Tinnitus of right ear   • Stress incontinence   • Bladder neck contracture   • Paroxysmal atrial fibrillation (CMS/[facility])   • Sleep apnea   • Mixed hyperlipidemia   • Bilateral carotid artery stenosis         ICD-10-CM ICD-9-CM   1. Bilateral carotid artery stenosis I65.23 433.10     433.30   2. PAD (peripheral artery disease) (CMS/[facility]) I73.9 443.9   3. Essential hypertension I10 401.9   4. Mixed hyperlipidemia E78.2 272.2         PLAN: After thoroughly evaluating Zay Kamara, I believe the best course of action is to remain conservative from vascular surgery standpoint.  Currently he is doing well and denies any strokelike symptoms or claudication to his lower extremities.  I did review his testing which showed less than 50% carotid stenosis bilaterally and no evidence of arterial insufficiency to his lower extremities.  We will see him back in 1 year with repeat noninvasive testing for continued surveillance, including ABIs and a carotid duplex.  I did discuss vascular risk factors as they pertain to the progression of vascular disease including controlling his hypertension and hyperlipidemia.  His blood pressure is stable on his current medication regimen.  He is maintained on Lipitor for his hyperlipidemia.  Patient's Body mass index is 32.92 kg/m². BMI is above normal parameters. Recommendations include: educational material. The patient is to continue taking their medications as previously discussed.   This was all discussed in full with complete understanding.  Thank you for allowing me to participate  in the care of your patient.  Please do not hesitate to call with any questions or concerns.  We will keep you aware of any further encounters with Zay Kamara.      Sincerely Yours,      LUIS Jurado

## 2020-10-07 ENCOUNTER — OFFICE VISIT (OUTPATIENT)
Dept: PRIMARY CARE CLINIC | Age: 70
End: 2020-10-07
Payer: MEDICARE

## 2020-10-07 VITALS
TEMPERATURE: 97.7 F | SYSTOLIC BLOOD PRESSURE: 122 MMHG | BODY MASS INDEX: 33.33 KG/M2 | HEART RATE: 78 BPM | WEIGHT: 239 LBS | OXYGEN SATURATION: 96 % | DIASTOLIC BLOOD PRESSURE: 74 MMHG

## 2020-10-07 PROCEDURE — 1123F ACP DISCUSS/DSCN MKR DOCD: CPT | Performed by: NURSE PRACTITIONER

## 2020-10-07 PROCEDURE — G8417 CALC BMI ABV UP PARAM F/U: HCPCS | Performed by: NURSE PRACTITIONER

## 2020-10-07 PROCEDURE — 1036F TOBACCO NON-USER: CPT | Performed by: NURSE PRACTITIONER

## 2020-10-07 PROCEDURE — 4040F PNEUMOC VAC/ADMIN/RCVD: CPT | Performed by: NURSE PRACTITIONER

## 2020-10-07 PROCEDURE — 2022F DILAT RTA XM EVC RTNOPTHY: CPT | Performed by: NURSE PRACTITIONER

## 2020-10-07 PROCEDURE — G8427 DOCREV CUR MEDS BY ELIG CLIN: HCPCS | Performed by: NURSE PRACTITIONER

## 2020-10-07 PROCEDURE — G0008 ADMIN INFLUENZA VIRUS VAC: HCPCS | Performed by: NURSE PRACTITIONER

## 2020-10-07 PROCEDURE — 3017F COLORECTAL CA SCREEN DOC REV: CPT | Performed by: NURSE PRACTITIONER

## 2020-10-07 PROCEDURE — 90694 VACC AIIV4 NO PRSRV 0.5ML IM: CPT | Performed by: NURSE PRACTITIONER

## 2020-10-07 PROCEDURE — G8484 FLU IMMUNIZE NO ADMIN: HCPCS | Performed by: NURSE PRACTITIONER

## 2020-10-07 PROCEDURE — 3044F HG A1C LEVEL LT 7.0%: CPT | Performed by: NURSE PRACTITIONER

## 2020-10-07 PROCEDURE — 99214 OFFICE O/P EST MOD 30 MIN: CPT | Performed by: NURSE PRACTITIONER

## 2020-10-07 RX ORDER — TRAMADOL HYDROCHLORIDE 50 MG/1
50 TABLET ORAL EVERY 8 HOURS PRN
Qty: 90 TABLET | Refills: 0 | Status: SHIPPED | OUTPATIENT
Start: 2020-10-07 | End: 2020-11-06

## 2020-10-07 ASSESSMENT — ENCOUNTER SYMPTOMS
SHORTNESS OF BREATH: 0
SORE THROAT: 0
CONSTIPATION: 0
NAUSEA: 0
VOMITING: 0
DIARRHEA: 0
COUGH: 0
RHINORRHEA: 0
BACK PAIN: 1
ABDOMINAL PAIN: 0
TROUBLE SWALLOWING: 0

## 2020-10-07 NOTE — PROGRESS NOTES
Vaccine Information Sheet, \"Influenza - Inactivated\"  given to Rocael Hollis, or parent/legal guardian of  Rocael Hollis and verbalized understanding. Patient responses:    Have you ever had a reaction to a flu vaccine? No  Are you able to eat eggs without adverse effects? No  Do you have any current illness? No  Have you ever had Gu[place] Syndrome? No    Flu vaccine given per order. Please see immunization tab.

## 2020-10-07 NOTE — PROGRESS NOTES
[place] [address], [address]  Phone [phone]   Fax [phone]      OFFICE VISIT: 10/7/2020    Geovanni Santos is a 78 y.o. male whopresents today for his medical conditions/complaints as noted below. Pillo Leopoldo Fern isc/o of Back Pain (still having issues with back, also states he had rash type area on back, using hydrocortisone. ); Flu Vaccine; Pain (has pain in left side of face and side of head. ); and Health Maintenance (due for psa )        :     HPI  Here for ongoing back pain  Mid to lower back pain  Radiates around sides. Feels like have a catch in it. Onset couple of months  Tramadol helps some  Denies any injury  No loss bowel or bladder, no saddle anesthesia  No urinary symptoms  Worse when laying down. aspercream helps  Have had this pain before. BS has been good. Lidoderm patches prescribed last visit. Didn't get prescription but got the otc patches. Didn't help. Had steroid injection and it didn't help.      Chronic lower back pain and arthritis  On ultram and it helps. MRI lumbar spine without contrast 09/05/2018  Impression    Lumbar spondylosis as detailed above. No evidence of an HNP. A mild spinal stenosis at L1-2. Evidence of bilateral neural foraminal stenosis at L5-S1. The details    are given above. Signed by Dr Madisyn Del Valle on 9/6/2018 7:55 AM      Having some pain in left side of head behind ear. Thinks it is coming from his neck. When turn head certain way it hurts more. Hurts when turn head either direction. Feels like weird feeling  Denies injury. Constant with intermittent worsening  Onset 1 week. Nothing makes it better  Occasional feels weird on left side of face, like numbness. Known cervical spine dz by CT done 05/28/2019  Normal head Ct done 05/28/2019    FINDINGS:   HEAD: Ventricles and cerebrospinal fluid spaces are normal in size and  configuration for the patient's age. There is no evidence of mass-effect  or midline shift.  No abnormal areas of attenuation. There is no  evidence of intracranial contusion, hemorrhage, or skull fracture. The  visualized portions of the paranasal sinuses and mastoid air cells are  unremarkable. CERVICAL SPINE: The odontoid process is well approximated with the  anterior body of C1 and well aligned between the lateral masses of C1. Straightening of the normal lordosis of the cervical spine. No acute  compression deformity. No dislocation. 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 There is no paraspinal hematoma. Suspected  remote injury to the supraspinatus ligaments and C5-C6. IMPRESSION:  CT head. No acute intracranial abnormalities. CT cervical spine. No acute osseous posttraumatic finding. This report was finalized on 05/28/2019 18:45 by Dr. Jenny Yusuf MD.    Also have a rash on lower back  Been putting hydrocortisone cream on it and it is better  Started after using biofreeze   It is getting better.        Lab Review   Office Visit on 08/24/2020   Component Date Value    Color, UA 08/24/2020 yellow     Clarity, UA 08/24/2020 clear     Glucose, UA POC 08/24/2020 neg     Bilirubin, UA 08/24/2020 neg     Ketones, UA 08/24/2020 neg     Spec Grav, UA 08/24/2020 1.025     Blood, UA POC 08/24/2020 neg     pH, UA 08/24/2020 6.0     Protein, UA POC 08/24/2020 neg     Urobilinogen, UA 08/24/2020 1.0     Leukocytes, UA 08/24/2020 neg     Nitrite, UA 08/24/2020 neg     Appearance, Fluid 08/24/2020 Clear    Orders Only on 05/13/2020   Component Date Value    TSH 05/13/2020 0.247*    Sodium 05/13/2020 138     Potassium 05/13/2020 4.7     Chloride 05/13/2020 99     CO2 05/13/2020 26     Anion Gap 05/13/2020 13     Glucose 05/13/2020 123*    BUN 05/13/2020 22     CREATININE 05/13/2020 1.2     GFR Non- 05/13/2020 60*    Calcium 05/13/2020 9.2     Total Protein 05/13/2020 7.1     Alb 05/13/2020 4.5     Total Bilirubin 05/13/2020 0.6     Alkaline Phosphatase 05/13/2020 56     ALT 05/13/2020 16     AST 05/13/2020 15     WBC 05/13/2020 8.1     RBC 05/13/2020 4.81     Hemoglobin 05/13/2020 13.9*    Hematocrit 05/13/2020 42.1     MCV 05/13/2020 87.5     MCH 05/13/2020 28.9     MCHC 05/13/2020 33.0     RDW 05/13/2020 12.9     Platelets 11/12/7454 230     MPV 05/13/2020 9.9     Neutrophils % 05/13/2020 74.6*    Lymphocytes % 05/13/2020 15.4*    Monocytes % 05/13/2020 6.4     Eosinophils % 05/13/2020 2.7     Basophils % 05/13/2020 0.5     Neutrophils Absolute 05/13/2020 6.0     Immature Granulocytes # 05/13/2020 0.0     Lymphocytes Absolute 05/13/2020 1.3     Monocytes Absolute 05/13/2020 0.50     Eosinophils Absolute 05/13/2020 0.20     Basophils Absolute 05/13/2020 0.00     Hemoglobin A1C 05/13/2020 6.2*     Past Medical History:   Diagnosis Date    Allergic rhinitis     Cancer ([facility] 2012    Thyroid  - Dr Jh Tejeda     Hyperthyroidism     Prostate cancer ([facility]     Sleep apnea     Type II or unspecified type diabetes mellitus without mention of complication, not stated as uncontrolled       Past Surgical History:   Procedure Laterality Date    CHOLECYSTECTOMY      INCONTINENCE SURGERY  11/26/2018    dr gino Henao  08/01/2017    removal, dr Chalino Bar      removed by Dr Jh Tejeda        Family History   Problem Relation Age of Onset    Schizophrenia Mother     Heart Failure Mother     Heart Attack Mother     Cancer Father         Prostate & Leukemia     Heart Disease Father     Heart Attack Father     Thyroid Disease Sister        Social History     Tobacco Use    Smoking status: Never Smoker    Smokeless tobacco: Never Used   Substance Use Topics    Alcohol use: No      Current Outpatient Medications   Medication Sig Dispense Refill    traMADol (ULTRAM) 50 MG tablet Take 1 tablet by mouth every 8 hours as needed for Pain for up to 30 days. 90 tablet 0    gabapentin (NEURONTIN) 400 MG capsule Take 1 capsule by mouth 3 times daily for 90 days. 270 capsule 0    metoprolol succinate (TOPROL XL) 25 MG extended release tablet Take 1 tablet by mouth daily 90 tablet 3    pantoprazole (PROTONIX) 20 MG tablet Take 1 tablet by mouth every morning (before breakfast) 30 tablet 5    allopurinol (ZYLOPRIM) 100 MG tablet Take 1 tablet by mouth daily 90 tablet 3    levothyroxine (SYNTHROID) 200 MCG tablet Take 1 tablet by mouth Daily Tube feeding (TF) interaction, obtain physician order to manage, recommend holding TF for 30 minutes before and after dose. (Patient taking differently: Take 200 mcg by mouth Daily ) 90 tablet 3    citalopram (CELEXA) 20 MG tablet Take 1 tablet by mouth daily 30 tablet 11    glipiZIDE (GLUCOTROL) 5 MG tablet Take 1 tablet by mouth daily 30 tablet 11    atorvastatin (LIPITOR) 20 MG tablet Take 1 tablet by mouth daily 90 tablet 3    lisinopril (PRINIVIL;ZESTRIL) 40 MG tablet TAKE 1 TABLET BY MOUTH DAILY 90 tablet 3    rivaroxaban (XARELTO) 15 MG TABS tablet Take 15 mg by mouth daily      amLODIPine (NORVASC) 10 MG tablet Take 0.5 tablets by mouth daily 90 tablet 3    Cholecalciferol (VITAMIN D3) 2000 units TABS Take 1 tablet by mouth daily 30 tablet 11    COLCRYS 0.6 MG tablet TAKE 2 TABLETS THEN AN HOUR LATER TAKE 1 TABLET WHEN GOUT FLARES 12 tablet 5     No current facility-administered medications for this visit.       No Known Allergies    Health Maintenance   Topic Date Due    DTaP/Tdap/Td vaccine (1 - Tdap) 10/22/1969    Diabetic retinal exam  12/05/2019    PSA counseling  10/07/2021 (Originally 5/9/2019)    Lipid screen  01/22/2021    Diabetic foot exam  05/13/2021    A1C test (Diabetic or Prediabetic)  05/13/2021    TSH testing  05/13/2021    Potassium monitoring  05/13/2021    Creatinine monitoring  05/13/2021    Annual Wellness Visit (AWV)  05/14/2021    Colon cancer screen colonoscopy  03/08/2022    Flu vaccine  Completed    Shingles Vaccine  Completed    Pneumococcal 65+ years Vaccine  Completed    Hepatitis C screen  Completed    Hepatitis A vaccine  Aged Out    Hib vaccine  Aged Out    Meningococcal (ACWY) vaccine  Aged Out        :     Review of Systems   Constitutional: Negative for activity change, appetite change, fatigue, fever and unexpected weight change. HENT: Negative for ear pain, rhinorrhea, sore throat and trouble swallowing. Eyes: Negative for visual disturbance. Respiratory: Negative for cough and shortness of breath. Cardiovascular: Negative for chest pain, palpitations and leg swelling. Gastrointestinal: Negative for abdominal pain, constipation, diarrhea, nausea and vomiting. Genitourinary: Negative for flank pain. Musculoskeletal: Positive for arthralgias, back pain and neck pain. Negative for myalgias and neck stiffness. Neurological: Negative for headaches. Psychiatric/Behavioral: Negative for decreased concentration and sleep disturbance. The patient is not nervous/anxious.        :     Physical Exam  HENT:      Head: Normocephalic and atraumatic. Nose: Nose normal.      Mouth/Throat:      Pharynx: Oropharynx is clear. Eyes:      Conjunctiva/sclera: Conjunctivae normal.   Neck:      Musculoskeletal: Normal range of motion and neck supple. Cardiovascular:      Rate and Rhythm: Normal rate and regular rhythm. Pulses: Normal pulses. Heart sounds: Normal heart sounds. Pulmonary:      Effort: Pulmonary effort is normal.   Abdominal:      General: Bowel sounds are normal. There is no distension. Palpations: Abdomen is soft. Tenderness: There is no abdominal tenderness. There is no guarding. Musculoskeletal:      Cervical back: He exhibits decreased range of motion (with flexion, extension and rotation in both directions) and tenderness. He exhibits no bony tenderness and no pain.       Thoracic back: He exhibits tenderness and bony tenderness. He exhibits normal range of motion and no deformity. Skin:     General: Skin is warm. Findings: No rash. Neurological:      General: No focal deficit present. Mental Status: He is alert. GCS: GCS eye subscore is 4. GCS verbal subscore is 5. GCS motor subscore is 6. Cranial Nerves: Cranial nerves are intact. Sensory: Sensation is intact. Motor: Motor function is intact. Coordination: Coordination is intact. Gait: Gait is intact. /74   Pulse 78   Temp 97.7 °F (36.5 °C)   Wt 239 lb (108.4 kg)   SpO2 96%   BMI 33.33 kg/m²     :        ICD-10-CM    1. Type 2 diabetes mellitus with stage 3a chronic kidney disease, without long-term current use of insulin (HCC)  E11.21 CBC Auto Differential    N18.31 Comprehensive Metabolic Panel     Lipid Panel     TSH without Reflex     T4, Free     Microalbumin / Creatinine Urine Ratio     Hemoglobin A1C     Vitamin B12   2. Essential hypertension  I10 Comprehensive Metabolic Panel     Lipid Panel     Microalbumin / Creatinine Urine Ratio   3. Mixed hyperlipidemia  E78.2 CBC Auto Differential     Comprehensive Metabolic Panel     Lipid Panel   4. Postoperative hypothyroidism  E89.0 TSH without Reflex     T4, Free   5. Need for influenza vaccination  Z23 INFLUENZA, QUADV, ADJUVANTED, 65 YRS =, IM, PF, PREFILL SYR, 0.5ML (FLUAD)   6. Chronic left-sided low back pain with left-sided sciatica  M54.42 traMADol (ULTRAM) 50 MG tablet    G89.29    7. Primary osteoarthritis involving multiple joints  M89.49 traMADol (ULTRAM) 50 MG tablet   8. Acute bilateral thoracic back pain  M54.6 MRI THORACIC SPINE WO CONTRAST   9. Cervical stenosis of spinal canal  M48.02 MRI CERVICAL SPINE WO CONTRAST       :      Return in about 4 weeks (around 11/4/2020) for diabetic follow up.     Orders Placed This Encounter   Procedures    MRI THORACIC SPINE WO CONTRAST     Standing Status:   Future

## 2020-10-21 ENCOUNTER — HOSPITAL ENCOUNTER (OUTPATIENT)
Dept: MRI IMAGING | Age: 70
Discharge: HOME | End: 2020-10-21
Payer: MEDICARE

## 2020-10-21 ENCOUNTER — TELEPHONE (OUTPATIENT)
Dept: PRIMARY CARE CLINIC | Age: 70
End: 2020-10-21

## 2020-10-21 PROCEDURE — 72146 MRI CHEST SPINE W/O DYE: CPT

## 2020-10-21 PROCEDURE — 72141 MRI NECK SPINE W/O DYE: CPT

## 2020-10-21 NOTE — TELEPHONE ENCOUNTER
----- Message from MARY Simpson sent at 10/21/2020 11:48 AM CDT -----  Please call patient and let them know results. MRI shows severe arthritis to the C-spine with some cervical stenosis. Recommend referral to neurosurgery (Dr Deon Mars)  Baljit Simpson [address] Clinical Staff               Please call patient and let them know results. MRI of the thoracic spine shows degenerative changes otherwise negative     Pt aware and voiced understanding. Informed patient of any recommendations from providers. Will call with any further questions.

## 2020-10-28 RX ORDER — CITALOPRAM 20 MG/1
20 TABLET ORAL DAILY
Qty: 90 TABLET | Refills: 3 | Status: SHIPPED | OUTPATIENT
Start: 2020-10-28 | End: 2021-11-02

## 2020-10-28 NOTE — TELEPHONE ENCOUNTER
Received fax from pharmacy requesting refill on pts medication(s). Pt was last seen in office on 10/7/2020  and has a follow up scheduled for 11/4/2020. Will send request to  Blank Muniz  for patient.      Requested Prescriptions     Pending Prescriptions Disp Refills    citalopram (CELEXA) 20 MG tablet 30 tablet 11     Sig: Take 1 tablet by mouth daily

## 2020-10-30 ENCOUNTER — TELEPHONE (OUTPATIENT)
Dept: NEUROSURGERY | Age: 70
End: 2020-10-30

## 2020-10-30 NOTE — TELEPHONE ENCOUNTER
[facility] Neurosurgery New Patient Questionnaire    1. Diagnosis/Reason for Referral?  Neck pain and thoracic pain. 2. Who is completing questionnaire? Patient  Caregiver Family      3. Has the patient had any previous spinal/brain surgeries? No      A. If yes, what is the name of the facility in which the surgery was performed? B. Procedure/Surgery performed? C. Who was the surgeon? D. When was the surgery? MM/YY       E. Did the patient improve after the surgery? 4. Is this a second opinion? - No   If yes, Dr. Humaira Kim would like to review patient first before making the appointment. 5. Have MRI Images been obtain within the last year? Yes  No      XR  CT     If yes, where was the imaging performed? - [facility]     If yes, what part of the body? Lumbar  Cervical  Thoracic  Brain     If yes, when was it obtained? 10/21/2020    Note: if the scan was performed at a facility other than [facility], the disc will need to be brought to the appointment or we need to reach out to obtain the disc. A. Was the patient instructed to provide the disc? Yes   No      8. Has the patient had a NCV/EMG within the last year? Yes  No     If yes, where was it performed and date? MM/YY  Location:      9. Has the patient been to Physical Therapy? Yes  No     If yes, what location, how long attended, and last visit? Location:        Therapy Lasted:    Date of Last Visit:      10. Has the patient been to Pain Management? Yes  No     If yes, what location and last visit     Location:   Last Visit:   Is it helping?

## 2020-11-02 ENCOUNTER — TELEPHONE (OUTPATIENT)
Dept: PRIMARY CARE CLINIC | Age: 70
End: 2020-11-02

## 2020-11-02 RX ORDER — HYDROCODONE BITARTRATE AND ACETAMINOPHEN 7.5; 325 MG/1; MG/1
1 TABLET ORAL EVERY 6 HOURS PRN
Qty: 12 TABLET | Refills: 0 | Status: SHIPPED | OUTPATIENT
Start: 2020-11-02 | End: 2020-11-11 | Stop reason: SDUPTHER

## 2020-11-02 NOTE — TELEPHONE ENCOUNTER
Left message for pt that med was sent in and other recommendations from [facility]

## 2020-11-02 NOTE — TELEPHONE ENCOUNTER
Pt called, he doesn't seen neurosurgeon until 11/17/20, he wants to know if you can give him anything stronger than Tramadol until then. He has an appt with you next week for his DM.

## 2020-11-04 RX ORDER — LISINOPRIL 40 MG/1
TABLET ORAL
Qty: 90 TABLET | Refills: 3 | Status: SHIPPED | OUTPATIENT
Start: 2020-11-04 | End: 2021-11-01

## 2020-11-04 NOTE — TELEPHONE ENCOUNTER
Received fax from pharmacy requesting refill on pts medication(s). Pt was last seen in office on 10/7/2020  and has a follow up scheduled for 11/11/2020. Will send request to  Elaine Pride  for patient.      Requested Prescriptions     Pending Prescriptions Disp Refills    lisinopril (PRINIVIL;ZESTRIL) 40 MG tablet 90 tablet 3     Sig: TAKE 1 TABLET BY MOUTH DAILY

## 2020-11-09 ENCOUNTER — TELEPHONE (OUTPATIENT)
Dept: PRIMARY CARE CLINIC | Age: 70
End: 2020-11-09

## 2020-11-09 DIAGNOSIS — I10 ESSENTIAL HYPERTENSION: ICD-10-CM

## 2020-11-09 DIAGNOSIS — E11.22 TYPE 2 DIABETES MELLITUS WITH STAGE 3A CHRONIC KIDNEY DISEASE, WITHOUT LONG-TERM CURRENT USE OF INSULIN (HCC): ICD-10-CM

## 2020-11-09 DIAGNOSIS — E89.0 POSTOPERATIVE HYPOTHYROIDISM: ICD-10-CM

## 2020-11-09 DIAGNOSIS — N18.31 TYPE 2 DIABETES MELLITUS WITH STAGE 3A CHRONIC KIDNEY DISEASE, WITHOUT LONG-TERM CURRENT USE OF INSULIN (HCC): ICD-10-CM

## 2020-11-09 DIAGNOSIS — E78.2 MIXED HYPERLIPIDEMIA: ICD-10-CM

## 2020-11-09 LAB
ALBUMIN SERPL-MCNC: 4.4 G/DL (ref 3.5–5.2)
ALP BLD-CCNC: 64 U/L (ref 40–130)
ALT SERPL-CCNC: 13 U/L (ref 5–41)
ANION GAP SERPL CALCULATED.3IONS-SCNC: 11 MMOL/L (ref 7–19)
AST SERPL-CCNC: 12 U/L (ref 5–40)
BASOPHILS ABSOLUTE: 0 K/UL (ref 0–0.2)
BASOPHILS RELATIVE PERCENT: 0.4 % (ref 0–1)
BILIRUB SERPL-MCNC: 0.4 MG/DL (ref 0.2–1.2)
BUN BLDV-MCNC: 23 MG/DL (ref 8–23)
CALCIUM SERPL-MCNC: 9.5 MG/DL (ref 8.8–10.2)
CHLORIDE BLD-SCNC: 102 MMOL/L (ref 98–111)
CHOLESTEROL, TOTAL: 91 MG/DL (ref 160–199)
CO2: 25 MMOL/L (ref 22–29)
CREAT SERPL-MCNC: 1.4 MG/DL (ref 0.5–1.2)
CREATININE URINE: 94.2 MG/DL (ref 4.2–622)
EOSINOPHILS ABSOLUTE: 0.3 K/UL (ref 0–0.6)
EOSINOPHILS RELATIVE PERCENT: 3.7 % (ref 0–5)
GFR AFRICAN AMERICAN: >59
GFR NON-AFRICAN AMERICAN: 50
GLUCOSE BLD-MCNC: 134 MG/DL (ref 74–109)
HBA1C MFR BLD: 6.3 % (ref 4–6)
HCT VFR BLD CALC: 42.4 % (ref 42–52)
HDLC SERPL-MCNC: 21 MG/DL (ref 55–121)
HEMOGLOBIN: 13.7 G/DL (ref 14–18)
IMMATURE GRANULOCYTES #: 0 K/UL
LDL CHOLESTEROL CALCULATED: 45 MG/DL
LYMPHOCYTES ABSOLUTE: 1.3 K/UL (ref 1.1–4.5)
LYMPHOCYTES RELATIVE PERCENT: 15 % (ref 20–40)
MCH RBC QN AUTO: 29.6 PG (ref 27–31)
MCHC RBC AUTO-ENTMCNC: 32.3 G/DL (ref 33–37)
MCV RBC AUTO: 91.6 FL (ref 80–94)
MICROALBUMIN UR-MCNC: <1.2 MG/DL (ref 0–19)
MICROALBUMIN/CREAT UR-RTO: NORMAL MG/G
MONOCYTES ABSOLUTE: 0.6 K/UL (ref 0–0.9)
MONOCYTES RELATIVE PERCENT: 7 % (ref 0–10)
NEUTROPHILS ABSOLUTE: 6.6 K/UL (ref 1.5–7.5)
NEUTROPHILS RELATIVE PERCENT: 73.5 % (ref 50–65)
PDW BLD-RTO: 13.3 % (ref 11.5–14.5)
PLATELET # BLD: 273 K/UL (ref 130–400)
PMV BLD AUTO: 9.7 FL (ref 9.4–12.4)
POTASSIUM SERPL-SCNC: 4.9 MMOL/L (ref 3.5–5)
RBC # BLD: 4.63 M/UL (ref 4.7–6.1)
SODIUM BLD-SCNC: 138 MMOL/L (ref 136–145)
T4 FREE: 1.96 NG/DL (ref 0.93–1.7)
TOTAL PROTEIN: 7.1 G/DL (ref 6.6–8.7)
TRIGL SERPL-MCNC: 124 MG/DL (ref 0–149)
TSH SERPL DL<=0.05 MIU/L-ACNC: 0.48 UIU/ML (ref 0.27–4.2)
VITAMIN B-12: 448 PG/ML (ref 211–946)
WBC # BLD: 8.9 K/UL (ref 4.8–10.8)

## 2020-11-09 NOTE — TELEPHONE ENCOUNTER
----- Message from MARY Cruz sent at 11/9/2020 12:57 PM CST -----  Please call patient and let them know results.    Normal b12

## 2020-11-09 NOTE — TELEPHONE ENCOUNTER
----- Message from MARY Paz sent at 11/9/2020 12:48 PM CST -----  Please call patient and let them know results.    Normal cholesterol  Metabolic panel shows stable kidney function otherwise negative  No pathologic protein In urine  Normal thyroid  Blood sugars well controlled with an A1c of 6.3

## 2020-11-11 ENCOUNTER — OFFICE VISIT (OUTPATIENT)
Dept: PRIMARY CARE CLINIC | Age: 70
End: 2020-11-11
Payer: MEDICARE

## 2020-11-11 VITALS
TEMPERATURE: 97.4 F | OXYGEN SATURATION: 95 % | SYSTOLIC BLOOD PRESSURE: 136 MMHG | HEIGHT: 71 IN | HEART RATE: 69 BPM | BODY MASS INDEX: 32.9 KG/M2 | DIASTOLIC BLOOD PRESSURE: 80 MMHG | WEIGHT: 235 LBS

## 2020-11-11 PROBLEM — I73.9 PAD (PERIPHERAL ARTERY DISEASE) (HCC): Status: ACTIVE | Noted: 2020-11-11

## 2020-11-11 PROCEDURE — G8417 CALC BMI ABV UP PARAM F/U: HCPCS | Performed by: NURSE PRACTITIONER

## 2020-11-11 PROCEDURE — G8484 FLU IMMUNIZE NO ADMIN: HCPCS | Performed by: NURSE PRACTITIONER

## 2020-11-11 PROCEDURE — 2022F DILAT RTA XM EVC RTNOPTHY: CPT | Performed by: NURSE PRACTITIONER

## 2020-11-11 PROCEDURE — 1036F TOBACCO NON-USER: CPT | Performed by: NURSE PRACTITIONER

## 2020-11-11 PROCEDURE — 4040F PNEUMOC VAC/ADMIN/RCVD: CPT | Performed by: NURSE PRACTITIONER

## 2020-11-11 PROCEDURE — 99214 OFFICE O/P EST MOD 30 MIN: CPT | Performed by: NURSE PRACTITIONER

## 2020-11-11 PROCEDURE — 3044F HG A1C LEVEL LT 7.0%: CPT | Performed by: NURSE PRACTITIONER

## 2020-11-11 PROCEDURE — G8428 CUR MEDS NOT DOCUMENT: HCPCS | Performed by: NURSE PRACTITIONER

## 2020-11-11 PROCEDURE — 3017F COLORECTAL CA SCREEN DOC REV: CPT | Performed by: NURSE PRACTITIONER

## 2020-11-11 PROCEDURE — 1123F ACP DISCUSS/DSCN MKR DOCD: CPT | Performed by: NURSE PRACTITIONER

## 2020-11-11 RX ORDER — HYDROCODONE BITARTRATE AND ACETAMINOPHEN 7.5; 325 MG/1; MG/1
1 TABLET ORAL EVERY 8 HOURS PRN
Qty: 90 TABLET | Refills: 0 | Status: SHIPPED | OUTPATIENT
Start: 2020-11-11 | End: 2020-12-11

## 2020-11-11 ASSESSMENT — ENCOUNTER SYMPTOMS
RHINORRHEA: 0
TROUBLE SWALLOWING: 0
DIARRHEA: 0
SHORTNESS OF BREATH: 0
VOMITING: 0
BACK PAIN: 1
COUGH: 0
ABDOMINAL PAIN: 0
NAUSEA: 0
SORE THROAT: 0
CONSTIPATION: 0

## 2020-11-11 NOTE — PATIENT INSTRUCTIONS
Continue to monitor blood sugars 2-3 times a week fasting    Keep appointment with neurosurgery. Fasting labs before next appointment.

## 2020-11-11 NOTE — PROGRESS NOTES
[address] [address], [address]  Phone [phone]   Fax [phone]      OFFICE VISIT: 11/11/2020    Cristhian Anne is a 69 y.o. male whopresents today for his medical conditions/complaints as noted below. Pillo Roa Ok isc/o of Back Pain (still having bad pain. norco did help . would like refill. has appt with neurosurg 17th)        :     HPI  Here for follow-up on neck and back pain  He has an appointment in neurosurgery on November 17  He takes tramadol as needed for pain. Since last visit he called in requesting something stronger and Edmar Lozano was called in. Patient reports pain is better controlled with the Norco. He states that he could bend over and pick something up. Pain is worse with bending, sitting and turning over in bed. Worst pain is in the mid back. MRI cervical spine dated 10/21/2020  Impression    The severe cervical spondylosis. Multilevel prominent disc osteophyte complexes, uncinate spurs and    facetal arthropathy and resultant neural foraminal or spinal canal    stenosis as detailed above. This is most pronounced at C3-4. Other findings as above. Signed by Dr Micha Ivan on 10/21/2020 11:11 AM      MRI thoracic spine dated 10/21/2020  Impression    1.  No acute findings. 2.  Mild multilevel thoracic spine degenerative change. No area of    severe central canal or neural foraminal stenosis. 3.  Incidentally noted, the spleen is enlarged measuring 15.8 cm. Signed by Dr Sachi Vergara on 10/21/2020 11:27 AM      MRI lumbar spine dated 9/6/2018  Impression    Lumbar spondylosis as detailed above. No evidence of an HNP. A mild spinal stenosis at L1-2. Evidence of bilateral neural foraminal stenosis at L5-S1. The details    are given above. Signed by Dr Micha Ivan on 9/6/2018 7:55 AM      PAD/carotid stenosis  Followed by vascular. Dr Victor M Renee. Last visit 09/01/20  Denies any stroke like symptoms or intermittent claudication.    He is on chronic anticoagulation due to PAF    Us Carotid Bilateral    Result Date: 8/20/2020  Narrative: History: Carotid occlusive disease     Impression: Impression: 1. There is less than 50% stenosis of the right internal carotid artery. 2. There is less than 50% stenosis of the left internal carotid artery. 3. Antegrade flow is demonstrated in bilateral vertebral arteries. Comments: Bilateral carotid vertebral arterial duplex scan was performed. Grayscale imaging shows intimal thickening and calcified elements at the carotid bifurcation. The right internal carotid artery peak systolic velocity is 58.1 cm/sec. The end-diastolic velocity is 12.1 cm/sec. The right ICA/CCA ratio is approximately 1.14 . These findings correlate with less than 50% stenosis of the right internal carotid artery. Grayscale imaging shows intimal thickening and calcified elements at the carotid bifurcation. The left internal carotid artery peak systolic velocity is 25.2 cm/sec. The end-diastolic velocity is 10.5 cm/sec. The left ICA/CCA ratio is approximately 0.92 . These findings correlate with less than 50% stenosis of the left internal carotid artery. Antegrade flow is demonstrated in bilateral vertebral arteries. This report was finalized on 08/20/2020 10:20 by Dr. Jqauelin Bradford MD.    Us Ankle / Brachial Indices Extremity Complete    Result Date: 8/20/2020  Narrative: History: PAD Comments: Bilateral lower extremity arterial with multi-level pulse volume recordings and segmental pressures were performed at rest and stress. The right ankle/brachial index is 1.04. Doppler waveforms are triphasic and PVR waveform at the ankle is normal-appearing with no significant dampening. These findings are consistent with no significant arterial insufficiency of the right lower extremity at rest. Post exercise RICHARD is 1.04. The left ankle/brachial index is 1.25.  Doppler waveforms are triphasic and PVR waveform at the ankle is normal-appearing with no significant dampening. These findings are consistent with no significant arterial insufficiency of the left lower extremity at rest. Post exercise RICHARD is 1.27. Impression: Impression: 1. No significant arterial insufficiency of the right lower extremity at rest. 2. No significant arterial insufficiency of the left lower extremity at rest. This report was finalized on 08/20/2020 10:21 by Dr. Lee Pool MD.     Diabetes Mellitus Type 2: Current symptoms/problems include none.     Home blood sugar records: patient tests 1 time(s) per week running lower 100s  Any episodes of hypoglycemia? no  Eye exam current (within one year): yes - last week  Tobacco history: He  reports that he has never smoked. He has never used smokeless tobacco.   Daily Aspirin? No: on xarelto     Hypertension:  Home blood pressure monitoring: No.  He is not adherent to a low sodium diet. Patient denies chest pain, shortness of breath, headache and lightheadedness. Antihypertensive medication side effects: no medication side effects noted. Use of agents associated with hypertension: none.      Hyperlipidemia:  No new myalgias or GI upset on atorvastatin (Lipitor).    PAF  Followed by [facility] and sees Oni Le  No concerns  On xarelto    SUSY  Uses CPAP therapy off/on.      Lab Review   Orders Only on 11/09/2020   Component Date Value    Vitamin B-12 11/09/2020 448     Hemoglobin A1C 11/09/2020 6.3*    Microalbumin, Random Uri* 11/09/2020 <1.20     Creatinine, Ur 11/09/2020 94.2     Microalbumin Creatinine * 11/09/2020 see below     T4 Free 11/09/2020 1.96*    TSH 11/09/2020 0.477     Cholesterol, Total 11/09/2020 91*    Triglycerides 11/09/2020 124     HDL 11/09/2020 21*    LDL Calculated 11/09/2020 45     Sodium 11/09/2020 138     Potassium 11/09/2020 4.9     Chloride 11/09/2020 102     CO2 11/09/2020 25     Anion Gap 11/09/2020 11     Glucose 11/09/2020 134*    BUN 11/09/2020 23     CREATININE 11/09/2020 1.4*  GFR Non- 11/09/2020 50*    GFR  11/09/2020 >59     Calcium 11/09/2020 9.5     Total Protein 11/09/2020 7.1     Alb 11/09/2020 4.4     Total Bilirubin 11/09/2020 0.4     Alkaline Phosphatase 11/09/2020 64     ALT 11/09/2020 13     AST 11/09/2020 12     WBC 11/09/2020 8.9     RBC 11/09/2020 4.63*    Hemoglobin 11/09/2020 13.7*    Hematocrit 11/09/2020 42.4     MCV 11/09/2020 91.6     MCH 11/09/2020 29.6     MCHC 11/09/2020 32.3*    RDW 11/09/2020 13.3     Platelets 80/45/8876 273     MPV 11/09/2020 9.7     Neutrophils % 11/09/2020 73.5*    Lymphocytes % 11/09/2020 15.0*    Monocytes % 11/09/2020 7.0     Eosinophils % 11/09/2020 3.7     Basophils % 11/09/2020 0.4     Neutrophils Absolute 11/09/2020 6.6     Immature Granulocytes # 11/09/2020 0.0     Lymphocytes Absolute 11/09/2020 1.3     Monocytes Absolute 11/09/2020 0.60     Eosinophils Absolute 11/09/2020 0.30     Basophils Absolute 11/09/2020 0.00    Office Visit on 08/24/2020   Component Date Value    Color, UA 08/24/2020 yellow     Clarity, UA 08/24/2020 clear     Glucose, UA POC 08/24/2020 neg     Bilirubin, UA 08/24/2020 neg     Ketones, UA 08/24/2020 neg     Spec Grav, UA 08/24/2020 1.025     Blood, UA POC 08/24/2020 neg     pH, UA 08/24/2020 6.0     Protein, UA POC 08/24/2020 neg     Urobilinogen, UA 08/24/2020 1.0     Leukocytes, UA 08/24/2020 neg     Nitrite, UA 08/24/2020 neg     Appearance, Fluid 08/24/2020 Clear    Orders Only on 05/13/2020   Component Date Value    TSH 05/13/2020 0.247*    Sodium 05/13/2020 138     Potassium 05/13/2020 4.7     Chloride 05/13/2020 99     CO2 05/13/2020 26     Anion Gap 05/13/2020 13     Glucose 05/13/2020 123*    BUN 05/13/2020 22     CREATININE 05/13/2020 1.2     GFR Non- 05/13/2020 60*    Calcium 05/13/2020 9.2     Total Protein 05/13/2020 7.1     Alb 05/13/2020 4.5     Total Bilirubin 05/13/2020 0.6     Alkaline Phosphatase 05/13/2020 56     ALT 05/13/2020 16     AST 05/13/2020 15     WBC 05/13/2020 8.1     RBC 05/13/2020 4.81     Hemoglobin 05/13/2020 13.9*    Hematocrit 05/13/2020 42.1     MCV 05/13/2020 87.5     MCH 05/13/2020 28.9     MCHC 05/13/2020 33.0     RDW 05/13/2020 12.9     Platelets 28/95/0283 230     MPV 05/13/2020 9.9     Neutrophils % 05/13/2020 74.6*    Lymphocytes % 05/13/2020 15.4*    Monocytes % 05/13/2020 6.4     Eosinophils % 05/13/2020 2.7     Basophils % 05/13/2020 0.5     Neutrophils Absolute 05/13/2020 6.0     Immature Granulocytes # 05/13/2020 0.0     Lymphocytes Absolute 05/13/2020 1.3     Monocytes Absolute 05/13/2020 0.50     Eosinophils Absolute 05/13/2020 0.20     Basophils Absolute 05/13/2020 0.00     Hemoglobin A1C 05/13/2020 6.2*     Past Medical History:   Diagnosis Date    Allergic rhinitis     Cancer ([facility] 2012    Thyroid  - Dr Lenora Griffith     Hyperthyroidism     Prostate cancer ([facility]     Sleep apnea     Type II or unspecified type diabetes mellitus without mention of complication, not stated as uncontrolled       Past Surgical History:   Procedure Laterality Date    CHOLECYSTECTOMY      INCONTINENCE SURGERY  11/26/2018    dr gino Mar  08/01/2017    removal, dr Radha Khan      removed by Dr Lenora Griffith        Family History   Problem Relation Age of Onset    Schizophrenia Mother     Heart Failure Mother     Heart Attack Mother     Cancer Father         Prostate & Leukemia     Heart Disease Father     Heart Attack Father     Thyroid Disease Sister        Social History     Tobacco Use    Smoking status: Never Smoker    Smokeless tobacco: Never Used   Substance Use Topics    Alcohol use: No      Current Outpatient Medications   Medication Sig Dispense Refill    HYDROcodone-acetaminophen (NORCO) 7.5-325 MG per tablet Take 1 tablet by mouth every 8 hours as needed for Pain for up to 30 days. Intended supply: 3 days. Take lowest dose possible to manage pain 90 tablet 0    lisinopril (PRINIVIL;ZESTRIL) 40 MG tablet TAKE 1 TABLET BY MOUTH DAILY 90 tablet 3    citalopram (CELEXA) 20 MG tablet Take 1 tablet by mouth daily 90 tablet 3    gabapentin (NEURONTIN) 400 MG capsule Take 1 capsule by mouth 3 times daily for 90 days. 270 capsule 0    metoprolol succinate (TOPROL XL) 25 MG extended release tablet Take 1 tablet by mouth daily 90 tablet 3    pantoprazole (PROTONIX) 20 MG tablet Take 1 tablet by mouth every morning (before breakfast) 30 tablet 5    allopurinol (ZYLOPRIM) 100 MG tablet Take 1 tablet by mouth daily 90 tablet 3    levothyroxine (SYNTHROID) 200 MCG tablet Take 1 tablet by mouth Daily Tube feeding (TF) interaction, obtain physician order to manage, recommend holding TF for 30 minutes before and after dose. (Patient taking differently: Take 200 mcg by mouth Daily ) 90 tablet 3    glipiZIDE (GLUCOTROL) 5 MG tablet Take 1 tablet by mouth daily 30 tablet 11    atorvastatin (LIPITOR) 20 MG tablet Take 1 tablet by mouth daily 90 tablet 3    rivaroxaban (XARELTO) 15 MG TABS tablet Take 15 mg by mouth daily      amLODIPine (NORVASC) 10 MG tablet Take 0.5 tablets by mouth daily 90 tablet 3    Cholecalciferol (VITAMIN D3) 2000 units TABS Take 1 tablet by mouth daily 30 tablet 11    COLCRYS 0.6 MG tablet TAKE 2 TABLETS THEN AN HOUR LATER TAKE 1 TABLET WHEN GOUT FLARES 12 tablet 5     No current facility-administered medications for this visit.       No Known Allergies    Health Maintenance   Topic Date Due    DTaP/Tdap/Td vaccine (1 - Tdap) 10/22/1969    Diabetic retinal exam  12/05/2019    PSA counseling  10/07/2021 (Originally 5/9/2019)    Diabetic foot exam  05/13/2021    Annual Wellness Visit (AWV)  05/14/2021    A1C test (Diabetic or Prediabetic)  11/09/2021    Lipid screen  11/09/2021    TSH testing  11/09/2021    Potassium monitoring  11/09/2021    Creatinine monitoring 11/09/2021    Colon cancer screen colonoscopy  03/08/2022    Flu vaccine  Completed    Shingles Vaccine  Completed    Pneumococcal 65+ years Vaccine  Completed    Hepatitis C screen  Completed    Hepatitis A vaccine  Aged Out    Hib vaccine  Aged Out    Meningococcal (ACWY) vaccine  Aged Out        :     Review of Systems   Constitutional: Negative for activity change, appetite change, fatigue, fever and unexpected weight change. HENT: Negative for ear pain, rhinorrhea, sore throat and trouble swallowing. Eyes: Negative for visual disturbance. Respiratory: Negative for cough and shortness of breath. Cardiovascular: Negative for chest pain, palpitations and leg swelling. Gastrointestinal: Negative for abdominal pain, constipation, diarrhea, nausea and vomiting. Genitourinary: Negative for flank pain. Musculoskeletal: Positive for back pain and neck pain. Negative for arthralgias, myalgias and neck stiffness. Neurological: Negative for headaches. Psychiatric/Behavioral: Negative for decreased concentration and sleep disturbance. The patient is not nervous/anxious.        :     Physical Exam  Vitals signs reviewed. Constitutional:       Appearance: Normal appearance. HENT:      Head: Normocephalic and atraumatic. Nose: Nose normal.   Eyes:      Conjunctiva/sclera: Conjunctivae normal.   Neck:      Musculoskeletal: Normal range of motion and neck supple. Cardiovascular:      Rate and Rhythm: Normal rate and regular rhythm. Pulses: Normal pulses. Heart sounds: Normal heart sounds. Pulmonary:      Effort: Pulmonary effort is normal.      Breath sounds: Normal breath sounds. Musculoskeletal:        Arms:    Skin:     General: Skin is warm. Neurological:      Mental Status: He is alert and oriented to person, place, and time.        /80   Pulse 69   Temp 97.4 °F (36.3 °C) (Temporal)   Ht 5' 11\" (1.803 m)   Wt 235 lb (106.6 kg)   SpO2 95%   BMI 32.78 kg/m² :        ICD-10-CM    1. Type 2 diabetes mellitus with stage 3a chronic kidney disease, without long-term current use of insulin ([facility])  E11.21     N18.31    2. Cervical stenosis of spine  M48.02 HYDROcodone-acetaminophen (NORCO) 7.5-325 MG per tablet  I discussed possible referral to pain management if neurosurgery does not think he would benefit from surgical correction. 3. PAD (peripheral artery disease) ([facility])  I73.9 HYDROcodone-acetaminophen (NORCO) 7.5-325 MG per tablet   4. Primary osteoarthritis involving multiple joints  M89.49 HYDROcodone-acetaminophen (NORCO) 7.5-325 MG per tablet   5. Paroxysmal atrial fibrillation ([facility])  I48.0    6. Essential hypertension  I10    7. Mixed hyperlipidemia  E78.2    8. SUSY on CPAP  G47.33     Z99.89        :      Return in about 6 months (around 5/11/2021). No orders of the defined types were placed in this encounter. Orders Placed This Encounter   Medications    HYDROcodone-acetaminophen (NORCO) 7.5-325 MG per tablet     Sig: Take 1 tablet by mouth every 8 hours as needed for Pain for up to 30 days. Intended supply: 3 days. Take lowest dose possible to manage pain     Dispense:  90 tablet     Refill:  0     Reduce doses taken as pain becomes manageable         Discussed use, benefit, and side effectsof prescribed medications. All patient questions answered. Pt voiced understanding. Reviewed health maintenance. .  Patient agreed with treatment plan. Follow up asdirected. Patient Instructions   Continue to monitor blood sugars 2-3 times a week fasting    Keep appointment with neurosurgery. RX Monitoring 8/24/2020   Attestation The Prescription Monitoring Report for this patient was reviewed today. Periodic Controlled Substance Monitoring Possible medication side effects, risk of tolerance/dependence & alternative treatments discussed. ;No signs of potential drug abuse or diversion identified.        Electronically signed by Gladies Clear Jamison Castro on11/11/2020 at 10:01 AM

## 2020-11-17 ENCOUNTER — TELEPHONE (OUTPATIENT)
Dept: NEUROSURGERY | Age: 70
End: 2020-11-17

## 2020-11-19 ENCOUNTER — RESULTS ENCOUNTER (OUTPATIENT)
Dept: UROLOGY | Facility: CLINIC | Age: 70
End: 2020-11-19

## 2020-11-19 DIAGNOSIS — C61 PROSTATE CANCER (HCC): ICD-10-CM

## 2020-12-10 ENCOUNTER — OFFICE VISIT (OUTPATIENT)
Dept: NEUROSURGERY | Age: 70
End: 2020-12-10
Payer: MEDICARE

## 2020-12-10 VITALS
HEIGHT: 71 IN | WEIGHT: 240 LBS | DIASTOLIC BLOOD PRESSURE: 66 MMHG | HEART RATE: 83 BPM | SYSTOLIC BLOOD PRESSURE: 146 MMHG | BODY MASS INDEX: 33.6 KG/M2

## 2020-12-10 PROCEDURE — 1036F TOBACCO NON-USER: CPT | Performed by: NEUROLOGICAL SURGERY

## 2020-12-10 PROCEDURE — 4040F PNEUMOC VAC/ADMIN/RCVD: CPT | Performed by: NEUROLOGICAL SURGERY

## 2020-12-10 PROCEDURE — 99204 OFFICE O/P NEW MOD 45 MIN: CPT | Performed by: NEUROLOGICAL SURGERY

## 2020-12-10 PROCEDURE — G8484 FLU IMMUNIZE NO ADMIN: HCPCS | Performed by: NEUROLOGICAL SURGERY

## 2020-12-10 PROCEDURE — 3017F COLORECTAL CA SCREEN DOC REV: CPT | Performed by: NEUROLOGICAL SURGERY

## 2020-12-10 PROCEDURE — G8427 DOCREV CUR MEDS BY ELIG CLIN: HCPCS | Performed by: NEUROLOGICAL SURGERY

## 2020-12-10 PROCEDURE — G8417 CALC BMI ABV UP PARAM F/U: HCPCS | Performed by: NEUROLOGICAL SURGERY

## 2020-12-10 PROCEDURE — 1123F ACP DISCUSS/DSCN MKR DOCD: CPT | Performed by: NEUROLOGICAL SURGERY

## 2020-12-10 ASSESSMENT — ENCOUNTER SYMPTOMS
GASTROINTESTINAL NEGATIVE: 1
BACK PAIN: 1
RESPIRATORY NEGATIVE: 1
EYES NEGATIVE: 1

## 2020-12-10 NOTE — PROGRESS NOTES
Review of Systems   Constitutional: Negative. HENT: Negative. Eyes: Negative. Respiratory: Negative. Cardiovascular: Negative. Gastrointestinal: Negative. Genitourinary: Negative. Musculoskeletal: Positive for back pain, joint pain and neck pain. Skin: Negative. Neurological: Negative. Endo/Heme/Allergies: Negative. Psychiatric/Behavioral: Negative.

## 2020-12-10 NOTE — PROGRESS NOTES
[facility] Neurosurgery  Office Visit      Chief Complaint   Patient presents with    Consultation     neck and back pain. HISTORY OF PRESENT ILLNESS:    Xu Plascencia is a 69 y.o. male who presents with with multiple complains that include lower thoracic and lumbar spine pain along with chronic neck pain. The back pain has been present for about 2 months. The neck or back pain does not radiate. The patient complains of numbness of his LLE below the knee. He does not have numbness in the fingertips, trouble using hands to perform fine motor tasks. He does feel unsteady on his feet occasionally. He does have some right hip pain and went to PT for this. His pain is not changed when going from a seated to standing position. His pain is worsened with walking. His pain is not changed when lying flat. Overall, indicative that the patient does not have a mechanical nature to their pain. The patient has underwent a non-operative treatment course that has included:  NSAIDs (does not take often due to kidney issues)  Gabapentin  Opiates (Tramadol, Norco)  IM steroid (not much help)  Physical Therapy (for the right hip)    Of note he does not use tobacco and does take blood thinning medications (xarelto) PAF.                Past Medical History:   Diagnosis Date    Allergic rhinitis     Cancer [facility]) 2012    Thyroid  - Dr Kelsey [place] Hyperthyroidism     Prostate cancer [facility])     Sleep apnea     Type II or unspecified type diabetes mellitus without mention of complication, not stated as uncontrolled        Past Surgical History:   Procedure Laterality Date    CHOLECYSTECTOMY      INCONTINENCE SURGERY  11/26/2018    dr gino Saleh  08/01/2017    removal, dr Omar Lr      removed by Dr Naomi Hussein        Current Outpatient Medications   Medication Sig Dispense Refill    HYDROcodone-acetaminophen ([address]) 7.5-325 MG per tablet Take 1 tablet by mouth every 8 hours as needed for Pain for up to 30 days. Intended supply: 3 days. Take lowest dose possible to manage pain 90 tablet 0    lisinopril (PRINIVIL;ZESTRIL) 40 MG tablet TAKE 1 TABLET BY MOUTH DAILY 90 tablet 3    citalopram (CELEXA) 20 MG tablet Take 1 tablet by mouth daily 90 tablet 3    metoprolol succinate (TOPROL XL) 25 MG extended release tablet Take 1 tablet by mouth daily 90 tablet 3    pantoprazole (PROTONIX) 20 MG tablet Take 1 tablet by mouth every morning (before breakfast) 30 tablet 5    allopurinol (ZYLOPRIM) 100 MG tablet Take 1 tablet by mouth daily 90 tablet 3    levothyroxine (SYNTHROID) 200 MCG tablet Take 1 tablet by mouth Daily Tube feeding (TF) interaction, obtain physician order to manage, recommend holding TF for 30 minutes before and after dose. (Patient taking differently: Take 200 mcg by mouth Daily ) 90 tablet 3    glipiZIDE (GLUCOTROL) 5 MG tablet Take 1 tablet by mouth daily 30 tablet 11    atorvastatin (LIPITOR) 20 MG tablet Take 1 tablet by mouth daily 90 tablet 3    rivaroxaban (XARELTO) 15 MG TABS tablet Take 15 mg by mouth daily      amLODIPine (NORVASC) 10 MG tablet Take 0.5 tablets by mouth daily 90 tablet 3    Cholecalciferol (VITAMIN D3) 2000 units TABS Take 1 tablet by mouth daily 30 tablet 11    COLCRYS 0.6 MG tablet TAKE 2 TABLETS THEN AN HOUR LATER TAKE 1 TABLET WHEN GOUT FLARES 12 tablet 5    gabapentin (NEURONTIN) 400 MG capsule Take 1 capsule by mouth 3 times daily for 90 days. 270 capsule 0     No current facility-administered medications for this visit. Allergies:  Patient has no known allergies.     Social History:   Social History     Tobacco Use   Smoking Status Never Smoker   Smokeless Tobacco Never Used     Social History     Substance and Sexual Activity   Alcohol Use No         Family History:   Family History   Problem Relation Age of Onset    Schizophrenia Mother     Heart Failure Mother     Heart Attack Mother     Cancer Father         Prostate & Leukemia     Heart Disease Father     Heart Attack Father     Thyroid Disease Sister        REVIEW OF SYSTEMS:  Constitutional: Negative. HENT: Negative. Eyes: Negative. Respiratory: Negative. Cardiovascular: Negative. Gastrointestinal: Negative. Genitourinary: Negative. Musculoskeletal: Positive for back pain, joint pain and neck pain. Skin: Negative. Neurological: Negative. Endo/Heme/Allergies: Negative. Psychiatric/Behavioral: Negative. PHYSICAL EXAM:  Vitals:    12/10/20 1409   BP: (!) 146/66   Pulse: 83     Constitutional: appears well-developed and well-nourished. Eyes - conjunctiva normal.  Pupils react to light  Ear, nose, throat - hearing intact to finger rub, No scars, masses, or lesions over external nose or ears, no atrophy oftongue  Neck- symmetric, no masses noted, no jugular vein distension  Respiration- chest wall appears symmetric, good expansion, normal effort without use of accessory muscles  Musculoskeletal - no significant wasting of muscles noted, no bony deformities, gait no gross ataxia  Extremities- no clubbing, cyanosis oredema  Skin - warm, dry, and intact. No rash, erythema, or pallor.   Psychiatric - mood, affect, and behavior appear normal.     Neurologic Examination  Awake, Alert and oriented x 4  Normal speech pattern, following commands    Motor:  RIGHT: hand grasp 5/5    finger extension 5/5    bicep 5/5    triceps 5/5    deltoid 5/5      iliopsoas 5/5    knee flexor 5/5    knee extension 5/5    EHL/dorsiflexion 5/5    plantar flexion 5/5    LEFT:   hand grasp 5/5    finger extension 5/5    bicep 5/5    triceps 5/5    deltoid 5/5      iliopsoas 5/5    knee flexor 5/5    knee extension 5/5    EHL/dorsiflexion 5/5    plantar flexion 5/5    No deficits to light touch or pinprick sensation  Reflexes are 2+ and symmetric  No clonus or Hoffmans sign  No myofacial tenderness to palpation  Normal Gait pattern        DATA and IMAGING:    Nursing/pcp notes, imaging, labs, and vitals reviewed. PT,OT and/or speech notes reviewed    Lab Results   Component Value Date    WBC 8.9 11/09/2020    HGB 13.7 (L) 11/09/2020    HCT 42.4 11/09/2020    MCV 91.6 11/09/2020     11/09/2020     Lab Results   Component Value Date     11/09/2020    K 4.9 11/09/2020     11/09/2020    CO2 25 11/09/2020    BUN 23 11/09/2020    CREATININE 1.4 (H) 11/09/2020    GLUCOSE 134 (H) 11/09/2020    CALCIUM 9.5 11/09/2020    PROT 7.1 11/09/2020    LABALBU 4.4 11/09/2020    BILITOT 0.4 11/09/2020    ALKPHOS 64 11/09/2020    AST 12 11/09/2020    ALT 13 11/09/2020    LABGLOM 50 (A) 11/09/2020    GFRAA >59 11/09/2020    GLOB 2.8 03/24/2017   No results found for: INR, PROTIME    Examination. MRI CERVICAL SPINE WO CONTRAST 10/21/2020 8:32 AM    History: Neck pain radiating to the head. The multiplanar, multisequence MR imaging of the cervical spine is    performed without intravenous contrast enhancement. There is no previous study for comparison or correlation. There is loss and reversal of cervical lordosis. There is a mild retrolisthesis is of C3 over C4. The vertebral body heights are normal.    The loss of height and signal of the intervertebral disks throughout    the cervical spine representing degeneration/desiccation. The bone marrow signal of the vertebral bodies and the posterior    elements are normal.    The atlantoaxial articulation is intact with moderate degenerative    arthropathy of the joint. The atlantodental space is maintained. The    ligaments are intact. C2-3: No disc bulging or herniation. There is moderate facetal    arthropathy bilaterally. The neural foramina and spinal canal are    patent. C3-4: There are large posterior osteophytes and moderate diffuse disc    bulging. There are uncinate spurs bilaterally, larger on the right    side. There is bilateral facet arthropathy.  There is moderate spinal    stenosis and bilateral neural foraminal stenosis, more on the right    side. C4-5: There are large posterior osteophytes and disc bulging, more    pronounced towards left. There is bilateral facetal arthropathy. There    is asymmetrical spinal stenosis and bilateral neural foraminal    stenosis. C5-6: There are posterior osteophytes and moderate diffuse disc    bulging more pronounced to the right of the midline. There is    bilateral facet arthropathy. There is moderate asymmetrical spinal    stenosis and bilateral neural foraminal stenosis. C6-7: There are posterior osteophytes and moderate diffuse disc    bulging. There is bilateral facet arthropathy. No significant spinal    stenosis. There is moderate bilateral neural foraminal stenosis. C7-T1: There are prominent posterior osteophytes and bilateral facet    arthropathy. There is bilateral neural foraminal stenosis. No    significant spinal stenosis. The size and signal of the cervical spinal cord appear normal.    Moderate circumferential narrowing is seen at level C3-4 due to    prominent osteophytes, disc bulging and facetal arthropathy. No    abnormal intrinsic signal. No focal enlargement or expansion. The visualized everette, medulla oblongata and cerebellum appear normal.    The prevertebral soft tissues appear normal.    Incidentally noted is a well-defined fluid signal nodule at the base    of the tongue, anterior wall of the vallecula and the midline,    measuring 1.2 cm in diameter. This may represent a thyroglossal duct    cyst or a mucous retention cyst of the lingual tonsil.         Impression    The severe cervical spondylosis. Multilevel prominent disc osteophyte complexes, uncinate spurs and    facetal arthropathy and resultant neural foraminal or spinal canal    stenosis as detailed above. This is most pronounced at C3-4. Other findings as above.     Signed by Dr Eugenie Boykin on 10/21/2020 11:11 AM I have personally reviewed these images and my interpretation is:  DDD throughout the cervical spine  C3-4 severe canal stenosis, measures 5-6 mm, severe right and moderate left foraminal stenosis  C4-5 severe bilateral foraminal stenosis  C5-6 moderate bilateral foraminal stenosis, L>R  C6-7 moderate left foraminal stenosis  C7-T1 moderate bilateral foraminal stenosis        Exam: MR of the thoracic spine without contrast    INDICATION: Mid thoracic spine pain, failed conservative therapy,    history of prostate cancer in 2017    COMPARISON: None    FINDINGS:    Thoracic kyphosis is maintained. There is subtle dextrocurvature of    the thoracic spine centered around T7-T8. No subluxations. Vertebral bodies demonstrate normal signal intensity. No marrow edema    or suspicious bone lesion. Hemangiomata and T7 and T8 vertebral bodies    are noted. Mild anterior wedging of the T4 vertebral body appears    chronic. No acute fracture. There is no abnormal signal within the thoracic cord. The    intervertebral discs are diffusely moderately desiccated. No greater    than trace disc bulge throughout the thoracic spine. No area of severe    central canal stenosis. No area of severe neural foraminal stenosis in    the thoracic spine. Mild to moderate neural foraminal narrowing is    present bilaterally at T2-T3, and on the LEFT at T11 and T10. Degenerative change in the cervical and lumbar spine is visualized on     numbering images. The spleen is enlarged measuring 15.8 cm.         Impression    1.  No acute findings. 2.  Mild multilevel thoracic spine degenerative change. No area of    severe central canal or neural foraminal stenosis. 3.  Incidentally noted, the spleen is enlarged measuring 15.8 cm.     Signed by Dr Patricia Quiroga on 10/21/2020 11:27 AM   I have personally reviewed the images and my interpretation is:  Mild degenerative changes throughout   No significant neural element compression noted.      ASSESSMENT:    Paola Yi is a 69 y.o. male with multiple complaints of neck pain, thoracic, and low back pain. ICD-10-CM    1. Cervical stenosis of spinal canal  M48.02 [address], [place], APRN, Pain Medicine, [place]   2. Foraminal stenosis of cervical region  M48.02 [address], [place], APRN, Pain Medicine, [place]   3. DDD (degenerative disc disease), cervical  M50.30 [address], [place], APRN, Pain Medicine, [place]   4. Neck pain  M54.2 [address], [place], APRN, Pain Medicine, [place]   5. DDD (degenerative disc disease), thoracic  M51.34 [address], [place], APRN, Pain Medicine, [place]   6. Chronic midline thoracic back pain  M54.6 [address], [place], APRN, Pain Medicine, [place]    U63.40    7. Chronic midline low back pain without sciatica  M54.5 [address], [place], APRN, Pain Medicine, [place]    F21.92        PLAN:  We have discussed and reviewed the results of the MRI cervical and thoracic spine with Mr. Karen Babin at length. We explained that he does have significant narrowing of the cervical spine, however, he does not have a myelopathy on exam, nor does he have many symptoms of myelopathy. That being said, we do not feel that he should undergo a neurosurgical intervention at this time. We do not feel that it would dramatically improve his current pain syndrome. The majority of his pain is associated with degenerative disease that would likely not do well from a surgical procedure. Given that his cervical cord compression is significant, we would like to keep a close eye on him.    -Refer to [facility]    -Follow up 3 months      Note: A total of >50% (23 minutes) of 45 minutes was spent discussing the pathophysiology and treatment and/or coordination of care of the above diagnoses.       Kaylin Cousins, DO

## 2020-12-30 ENCOUNTER — HOSPITAL ENCOUNTER (OUTPATIENT)
Dept: PAIN MANAGEMENT | Age: 70
Discharge: HOME | End: 2020-12-30
Payer: MEDICARE

## 2020-12-30 VITALS
HEIGHT: 71 IN | BODY MASS INDEX: 33.6 KG/M2 | HEART RATE: 56 BPM | OXYGEN SATURATION: 92 % | SYSTOLIC BLOOD PRESSURE: 143 MMHG | TEMPERATURE: 98 F | WEIGHT: 240 LBS | DIASTOLIC BLOOD PRESSURE: 65 MMHG

## 2020-12-30 PROBLEM — M62.830 MUSCLE SPASM OF BACK: Status: ACTIVE | Noted: 2020-12-30

## 2020-12-30 PROBLEM — M62.838 MUSCLE SPASMS OF NECK: Status: ACTIVE | Noted: 2020-12-30

## 2020-12-30 PROBLEM — R20.2 NUMBNESS AND TINGLING OF BOTH LEGS: Status: ACTIVE | Noted: 2020-12-30

## 2020-12-30 PROBLEM — M54.2 CERVICALGIA: Status: ACTIVE | Noted: 2020-12-30

## 2020-12-30 PROBLEM — M54.12 CERVICAL RADICULOPATHY: Status: ACTIVE | Noted: 2020-12-30

## 2020-12-30 PROBLEM — R20.0 NUMBNESS AND TINGLING OF BOTH LEGS: Status: ACTIVE | Noted: 2020-12-30

## 2020-12-30 PROBLEM — M79.18 MYOFASCIAL MUSCLE PAIN: Status: ACTIVE | Noted: 2020-12-30

## 2020-12-30 PROCEDURE — 99215 OFFICE O/P EST HI 40 MIN: CPT

## 2020-12-30 PROCEDURE — 99213 OFFICE O/P EST LOW 20 MIN: CPT | Performed by: NURSE PRACTITIONER

## 2020-12-30 RX ORDER — HYDROCODONE BITARTRATE AND ACETAMINOPHEN 7.5; 325 MG/1; MG/1
1 TABLET ORAL EVERY 8 HOURS PRN
Qty: 90 TABLET | Refills: 0 | Status: SHIPPED | OUTPATIENT
Start: 2020-12-30 | End: 2021-01-28 | Stop reason: SDUPTHER

## 2020-12-30 ASSESSMENT — PAIN SCALES - GENERAL: PAINLEVEL_OUTOF10: 9

## 2020-12-30 ASSESSMENT — PAIN DESCRIPTION - PAIN TYPE: TYPE: CHRONIC PAIN

## 2020-12-30 ASSESSMENT — PAIN DESCRIPTION - LOCATION: LOCATION: BACK;LEG

## 2020-12-30 NOTE — H&P
[facility] Physical and Pain Medicine    History and Physical    Patient Name: Susie Bills    MR #: [id number]    Account #: [de-identified]    : 1950    Age: 69 y.o. Sex: male    Date: 2020    PCP: MARY Zuñiga         Referring Provider:    Chief Complaint:   Chief Complaint   Patient presents with    Lower Back Pain       History of Present Illness:    Susie Bills is a 69 y.o. male who presents to the office with primary complaints of neck and low back pain. Says that he has had the neck pain for many years. He previously saw [facility], but it was years ago. Was told that he had arthritis in his neck and was placed on NSAIDS and then he was on it for so long that his kidneys shut down. He explains that turning his head makes the pain worse and just sitting straight helps to improve the pain. He has had a cervical MRI that shows pathology at C3-C4. He also complains of low back pain and it seems to go into his left hip and down his leg. He has had this pain for a while. Been placed on Gabapentin which worked in the beginning. Did PT for the pain, but did not help. Pain is worse going from sitting to standing and walking seems to help improve the pain. He has been diabetic going on 10 years and complains of numbness and tingling in both legs. Never had nerve conduction studies. He has had MRI of lumbar spine which showed very little pathology. Currently is taking the Gabapentin and also takes Norco which helps. He takes Xarelto, but only takes it when he gets samples which is not that often. Says that he cannot afford the cost of the medication. Employment: Retired []   Disabled  []   Works []     Does Not Work [x]     Previous Injury:  Yes  []   No [x]     Previous Surgery: Yes []   No [x]    Previous Physical Therapy In the last 6 months?   Yes  [x]    No [] Did Physical Therapy make thepain better or worse? Better []   Worse [x]  Unchanged []     MRI in the last two years? Yes [x]  No [] Cervical and Lumbar spine under imaging  Results reviewed with patient? Yes []   No []    CT Scan in the last two years? Yes  []   No [x]  Results reviewed with patient? Yes []   No []     X-ray in the last two years? Yes [x]   No  []  Results reviewed with patient? Yes  []  No []     Injections in the past?  Yes []   No [x]   Did the injections help relieve the pain? Yes []   No []     Do you have Depression? Yes  []    No [x]  Thinking of harming yourself or others? Yes  []   No [x]    Past Medical Histoy  Past Medical History:   Diagnosis Date    Allergic rhinitis     Cancer ([facility] [address] 2012    Thyroid  - Dr Chanel Montoya Hyperthyroidism     Prostate cancer [facility])     Sleep apnea     Type II or unspecified type diabetes mellitus without mention of complication, not stated as uncontrolled        Surgery History  Past Surgical History:   Procedure Laterality Date    CHOLECYSTECTOMY      INCONTINENCE SURGERY  11/26/2018    dr gino Lamar  08/01/2017    removal, dr Barry Stephens      removed by Dr Riya Driscoll  Patient has no known allergies. Current Medications  Current Outpatient Medications   Medication Sig Dispense Refill    HYDROcodone-acetaminophen (NORCO) 7.5-325 MG per tablet Take 1 tablet by mouth every 8 hours as needed for Pain for up to 30 days. May fill 12- 90 tablet 0    lisinopril (PRINIVIL;ZESTRIL) 40 MG tablet TAKE 1 TABLET BY MOUTH DAILY 90 tablet 3    citalopram (CELEXA) 20 MG tablet Take 1 tablet by mouth daily 90 tablet 3    gabapentin (NEURONTIN) 400 MG capsule Take 1 capsule by mouth 3 times daily for 90 days.  270 capsule 0    metoprolol succinate (TOPROL XL) 25 MG extended release tablet Take 1 tablet by mouth daily 90 tablet 3  allopurinol (ZYLOPRIM) 100 MG tablet Take 1 tablet by mouth daily 90 tablet 3    levothyroxine (SYNTHROID) 200 MCG tablet Take 1 tablet by mouth Daily Tube feeding (TF) interaction, obtain physician order to manage, recommend holding TF for 30 minutes before and after dose. (Patient taking differently: Take 200 mcg by mouth Daily ) 90 tablet 3    glipiZIDE (GLUCOTROL) 5 MG tablet Take 1 tablet by mouth daily 30 tablet 11    atorvastatin (LIPITOR) 20 MG tablet Take 1 tablet by mouth daily 90 tablet 3    rivaroxaban (XARELTO) 15 MG TABS tablet Take 15 mg by mouth daily      Cholecalciferol (VITAMIN D3) 2000 units TABS Take 1 tablet by mouth daily 30 tablet 11    COLCRYS 0.6 MG tablet TAKE 2 TABLETS THEN AN HOUR LATER TAKE 1 TABLET WHEN GOUT FLARES 12 tablet 5    amLODIPine (NORVASC) 10 MG tablet Take 0.5 tablets by mouth daily 90 tablet 3     No current facility-administered medications for this encounter. Social History    Social History     Tobacco Use    Smoking status: Never Smoker    Smokeless tobacco: Never Used   Substance Use Topics    Alcohol use: No         Family History  family history includes Cancer in his father; Heart Attack in his father and mother; Heart Disease in his father; Heart Failure in his mother; Schizophrenia in his mother; Thyroid Disease in his sister. Review of Systems:  Constitutional: denies fever, chills, fatigue, change in appetite, weight gain or weight loss  Head: Normocephalic  Skin: denies easy bruising, skin redness, skin rash, hives, sensitivity to sun exposure, tightness, nodules or bumps, hair loss, color changes in the hands or feet, or feeling of temperature change such as coldness  Eyes: denies pain, redness, loss of vision, double or blurred vision, eye drainage, or dryness.    ENT and Mouth: denies ringing in the ears, loss of hearing, nasal congestion, nasal discharge, no hoarseness, sore throat, or difficulty swallowing Respiratory: denies chronic dry cough, coughing up blood, coughing up mucus, waking at night coughing or choking, or wheezing  Cardiovascular: denies chest pain, irregular heartbeats, palpitations, shortness of breath, or edema in legs  Gastrointestinal: denies, nausea, vomiting, heartburn, diarrhea, constipation  Genitourinary: denies difficult urination, pain or burning with urination, blood in the urine, or cloudy urine  Musculoskeletal: denies arm, buttock, thigh or calf cramps. Has pain in neck and bilateral shoulders, low back, muscle spasms and tenderness in neck, bilateral shoulders and low back. No muscle weakness. No joint swelling. Neurologic: headache, dizziness, fainting, loss of consciousness, no sensitivity, no memory loss. .    Endocrine: denies intolerance to hot or cold temperature, night sweats, flushing, fingernail changes, increased thirst, or hairloss   Hematologic/ Lymphatic: denies anemia, bleeding tendency or clotting tendency, bruising easily. Allergic/ Immunologic: denies rhinitis, asthma, skin sensitivity, or Latex allergy  Psychiatric: denies depression or thoughts of suicide, or voices in head. Current Pain Assessment:   Pain Assessment  Pain Assessment: 0-10  Pain Level: 9  Patient's Stated Pain Goal: 4  Pain Type: Chronic pain  Pain Location: Back, Leg    Clinical Progression: gradually improving  Effect of Pain on Daily Activities: limits activity  Patient's Stated Pain Goal: No pain  Pain Intervention(s): Medication (see eMar), Repositioning, Rest, Ice    Current PE    ORT Score: 2    PHQ-9 Score: 2    Physical Exam:    Vitals:    20 0819   BP: (!) 143/65   Pulse: 56   Temp: 98 °F (36.7 °C)   TempSrc: Temporal   SpO2: 92%   Weight: 240 lb (108.9 kg)   Height: 5' 11\" (1.803 m)       Body mass index is 33.47 kg/m². General Appearance: No acute distress.  Appears to be well dressed  Skin Exam: Warm and dry, no jaundice, rashes or leasions Head Exam: NCAT, PERRLA, EOMI, scalp normal  Eye Exam: PERRLA, EOMI, conjunctivae clear  Ear Exam: Normal external auricles. No drainage from ear canals  Nose Exam: Normal alignment. Turbinates clear. No drainage  Mouth Exam: Oral mucosa pink and moist. Gums pink. Throat Exam: Posterior pharynx pink in color with no edema  Neck Exam: Supple, trachea midline. No masses palpated. Tender with palpation of neck and bilateral shoulders  Respiratory Exam: Clear to ausculation in all lobes anterior and posterior. Cardiovascular Exam: Regular rate and rhythm, no gallops, no rubs or murmurs. No edema in extremities  Gastrointestinal Exam: Bowel sounds in all quadrants, soft, non-distended, non-tender with palpation, no guarding   Musculoskeletal Exam: No joint swelling or deformity. Back Exam: Tender with palpation of Paraspinous muscles of lumbar spine  Hip Exam: Full rotation bilateral  Shoulder Exam: Full rotation bilateral  Knee Exam: Full flexion and extension bilateral  Extremities: No rash, cyanosis or bruising  Neurologic Exam: Gait and coordination normal, speech normal and clear  Reflexes: Normal brachialis, Negative Mariee's bilateral. Normal Patellar bilateral,   CN EXAM: II-XII intact, face symmetrical, tongue symmetrical, the trapezius and sternocleidomastoid muscle appearance and strength symmetrical, normal achilles bilateral, ankle clonus negative bilateral  Strength: 5/5 RUE Bi's/Tri's, 5/5 LUE Bi's/Tri's, 5/5 RLE knee flex/ext, 5/5 RLE DF/PF, 5/5 LLE knee flex/ext, 5/5 LLE DF/PF  Sensation: Equal and intact to fine touch in all extremities  Mood and affect: Normal limits  Nurses note reviewed along with current vital signs    Active Problem(s)  Active Problems:    Cervicalgia    Muscle spasms of neck    Myofascial muscle pain    Cervical radiculopathy    Muscle spasm of back    Numbness and tingling of both legs  Resolved Problems:    * No resolved hospital problems.  * PLAN:  1. Patient is to call the office with any questions or concerns that may arise prior to next appointment. 2. Norco 7.5 mg every 8 hours prn pain #90  3. Nerve conduction studies of bilateral lower extremities (Do at [facility]  4. Schedule patient for VANE level C3-C4  5. 1 week after the above schedule patient for bilateral cervical trigger point injections  6. Schedule patient for bilateral lumbar trigger point injection    Patient takes Xarelto will send blood thinner holding sheet to Dr. Alpesh Penny at [address] [place] [address] Today:   Yes  []    No  [x]     Discussion:  Discussed exam findings and plan of care with patient. Patient agreed with the current plan of care at this time. All questions from the patient were answered by the provider. Activity:   Discussed exercise as beneficial to pain reduction, encouraged stretching exercise with a focus on torso strengthening. Education Provided:  Review of Genie Forth [x]  Agreement Review  [x]  Reviewed PHQ-9  [x]      Reviewed ORT [x]  Review of Test  [x]  Compliance Issues Discussed [x]   Cognitive Behavior Needs  []  Exercise  [x]  Financial Issues  []   Tobacco/Alcohol Use  []  Teaching  [x]   New Patient Picture Obtained  [x]      [] Benzodiazapine's and Narcotics:  Patient educated on the possible effects of combining Benzodiazapine's and Opioids. Explained \"Black Box Warnings\" such as; possible suppressed breathing, hypoxia, anoxia, depressed cognition, heart arrhythmia, coma and possible death. Patient verbalized understanding concerning possible effects. Controlled Substance Monitoring:   Attestation: The ERLINDA report for this patient was reviewed today. Discussed with patient possible medication side effects, risk of tolerance, dependence and alternative treatments. Discussed thegrowing epidemic in the [place] with the overprescribing and at times the abuse of narcotics. Discussed the detrimental effects of long term narcotic use. Patient encouraged to set daily goals of exercising and decreasing dailynarcotic intake. Discussed with the patient about the development of hyperalgesia with long term narcotic intake. EMR dragon/transcription disclaimer: Much of this encounter note is electronic transcription/translation of spoken language to printed tach. Electronic translation of spoken language may be erroneous, or at times, nonsensical words or phrases may be inadvertently transcribed. Although, I have reviewed the note for such errors, some may still exist.    CC:  MARY Crews    Thank you for this kind referral and allowing me to participate    in your patients care.     1 [facility], APRN, 12/30/2020 at 9:49 AM

## 2020-12-30 NOTE — PROGRESS NOTES
Clinic Documentation      Education Provided:  [x] Review of Christian Crebebeto  [] Agreement Review  [x] PEG Score Calculated [x] PHQ Score Calculated [x] ORT Score Calculated    [] Compliance Issues Discussed [] Cognitive Behavior Needs [x] Exercise [] Review of Test [] Financial Issues  [x] Tobacco/Alcohol Use Reviewed [x] Teaching [x] New Patient [] Picture Obtained    Physician Plan:  [] Outgoing Referral  [] Pharmacy Consult  [x] Test Ordered [x] Prescription Ordered/Changed   [] Obtained Test Results / Consult Notes        Complete if patient is withholding blood thinner for procedure     Blood Thinner Patient is currently taking:      [] Plavix (Hold for 7 days)  [] Aspirin (Hold for 5 days)     [] Pletal (Hold for 2 days)  [] Pradaxa (Hold for 3 days)    [] Effient (Hold for 7 days)  [x] Xarelto (Hold for 2 days)    [] Eliquis (Hold for 2 days)  [] Brilinta (Hold for 7 days)    [] Coumadin (Hold for 5 days) - (INR needs to be drawn the day prior to procedure- INR < 2.0)    [] Aggrenox (Hold for 7 days)        [] Patient will stop medication on their own.    [] Blood Thinner Form Faxed for approval to hold.    Provider form faxed to:   Assessment Completed by:  Electronically signed by Rayo Carey on 12/30/2020 at 8:33 AM

## 2020-12-31 LAB — PSA SERPL-MCNC: <0.014 NG/ML (ref 0–4)

## 2021-01-08 DIAGNOSIS — E11.22 TYPE 2 DIABETES MELLITUS WITH STAGE 3 CHRONIC KIDNEY DISEASE, WITHOUT LONG-TERM CURRENT USE OF INSULIN (HCC): ICD-10-CM

## 2021-01-08 DIAGNOSIS — N18.30 TYPE 2 DIABETES MELLITUS WITH STAGE 3 CHRONIC KIDNEY DISEASE, WITHOUT LONG-TERM CURRENT USE OF INSULIN (HCC): ICD-10-CM

## 2021-01-08 RX ORDER — GLIPIZIDE 5 MG/1
5 TABLET ORAL DAILY
Qty: 90 TABLET | Refills: 3 | Status: SHIPPED | OUTPATIENT
Start: 2021-01-08 | End: 2022-01-28

## 2021-01-08 NOTE — TELEPHONE ENCOUNTER
Received fax from pharmacy requesting refill on pts medication(s). Pt was last seen in office on 11/11/2020  and has a follow up scheduled for 5/12/2021. Will send request to  Servando Garrett  for patient.      Requested Prescriptions     Pending Prescriptions Disp Refills    glipiZIDE (GLUCOTROL) 5 MG tablet [Pharmacy Med Name: GLIPIZIDE 5 MG TABLET] 90 tablet 3     Sig: TAKE 1 TABLET BY MOUTH EVERY DAY

## 2021-01-13 ENCOUNTER — HOSPITAL ENCOUNTER (OUTPATIENT)
Dept: NEUROLOGY | Age: 71
Discharge: HOME | End: 2021-01-13
Payer: MEDICARE

## 2021-01-13 DIAGNOSIS — R20.0 NUMBNESS AND TINGLING OF BOTH LEGS: ICD-10-CM

## 2021-01-13 DIAGNOSIS — R20.2 NUMBNESS AND TINGLING OF BOTH LEGS: ICD-10-CM

## 2021-01-13 PROCEDURE — 95886 MUSC TEST DONE W/N TEST COMP: CPT | Performed by: PSYCHIATRY & NEUROLOGY

## 2021-01-13 PROCEDURE — 95909 NRV CNDJ TST 5-6 STUDIES: CPT

## 2021-01-13 PROCEDURE — 95886 MUSC TEST DONE W/N TEST COMP: CPT

## 2021-01-13 PROCEDURE — 95909 NRV CNDJ TST 5-6 STUDIES: CPT | Performed by: PSYCHIATRY & NEUROLOGY

## 2021-01-14 ENCOUNTER — HOSPITAL ENCOUNTER (OUTPATIENT)
Dept: PAIN MANAGEMENT | Age: 71
Discharge: HOME | End: 2021-01-14
Payer: MEDICARE

## 2021-01-14 VITALS
OXYGEN SATURATION: 93 % | RESPIRATION RATE: 18 BRPM | TEMPERATURE: 97.6 F | DIASTOLIC BLOOD PRESSURE: 70 MMHG | SYSTOLIC BLOOD PRESSURE: 161 MMHG | HEART RATE: 75 BPM

## 2021-01-14 DIAGNOSIS — M54.42 CHRONIC LEFT-SIDED LOW BACK PAIN WITH LEFT-SIDED SCIATICA: ICD-10-CM

## 2021-01-14 DIAGNOSIS — G89.29 CHRONIC LEFT-SIDED LOW BACK PAIN WITH LEFT-SIDED SCIATICA: ICD-10-CM

## 2021-01-14 DIAGNOSIS — M25.50 ARTHRALGIA OF MULTIPLE JOINTS: ICD-10-CM

## 2021-01-14 PROCEDURE — 2500000003 HC RX 250 WO HCPCS

## 2021-01-14 PROCEDURE — 20553 NJX 1/MLT TRIGGER POINTS 3/>: CPT | Performed by: NURSE PRACTITIONER

## 2021-01-14 PROCEDURE — 20553 NJX 1/MLT TRIGGER POINTS 3/>: CPT

## 2021-01-14 RX ORDER — GABAPENTIN 400 MG/1
400 CAPSULE ORAL 3 TIMES DAILY
Qty: 270 CAPSULE | Refills: 0 | Status: SHIPPED | OUTPATIENT
Start: 2021-01-14 | End: 2021-03-16 | Stop reason: SDUPTHER

## 2021-01-14 RX ORDER — LIDOCAINE HYDROCHLORIDE 10 MG/ML
10 INJECTION, SOLUTION EPIDURAL; INFILTRATION; INTRACAUDAL; PERINEURAL ONCE
Status: DISCONTINUED | OUTPATIENT
Start: 2021-01-14 | End: 2021-01-16 | Stop reason: HOSPADM

## 2021-01-14 RX ORDER — BUPIVACAINE HYDROCHLORIDE 5 MG/ML
10 INJECTION, SOLUTION EPIDURAL; INTRACAUDAL ONCE
Status: DISCONTINUED | OUTPATIENT
Start: 2021-01-14 | End: 2021-01-16 | Stop reason: HOSPADM

## 2021-01-14 NOTE — PROGRESS NOTES
Procedure:  Level of Consciousness: [x]Alert [x]Oriented []Disoriented []Lethargic  Anxiety Level: [x]Calm []Anxious []Depressed []Other  Skin: [x]Warm [x]Dry []Cool []Moist []Intact []Other  Cardiovascular: []Palpitations: [x]Never []Occasionally []Frequently  Chest Pain: []No []Yes  Respiratory:  [x]Unlabored []Labored []Cough ([] Productive []Unproductive)  HCG Required: [x]No []Yes   Results: []Negative []Positive  Knowledge Level:        [x]Patient/Other verbalized understanding of pre-procedure instructions. [x]Assessment of post-op care needs (transportation, responsible caregiver)        [x]Able to discuss health care problems and how to deal with it.   Factors that Affect Teaching:        Language Barrier: [x]No []Yes - why:        Hearing Loss:        [x]No []Yes            Corrective Device:  []Yes []No        Vision Loss:           [x]No []Yes            Corrective Device:  []Yes []No        Memory Loss:       [x]No []Yes            []Short Term []Long Term  Motivational Level:  [x]Asks Questions                  []Extremely Anxious       [x]Seems Interested               []Seems Uninterested                  [x]Denies need for Education  Risk for Injury:  [x]Patient oriented to person, place and time  []History of frequent falls/loss of balance  Nutritional:  []Change in appetite   []Weight Gain   []Weight Loss  Functional:  []Requires assistance with ADL's

## 2021-01-14 NOTE — PROCEDURES
[facility] Physical and Pain Medicine      Patient Name: Stan Alves    : 1950                    Age: 69 y.o. Sex: male    Date: 2021    Pre-op Diagnosis: Myofascial Pain/ Muscle Spasms/ Cervicalgia    Post-op Diagnosis: Myofascial Pain/ Muscle Spasms/ Cervicalgia    Procedure: Cervical Trigger Point Injections    Performing Procedure: Oxford Poplar, MSN, APRN, FNP-C    Previously Had Procedure:  Yes []  No [x]     Patient Vitals for the past 24 hrs:   BP Temp Temp src Pulse Resp SpO2   21 0759 (!) 161/70 97.6 °F (36.4 °C) Temporal 75 18 93 %       Description of Procedure:     After a brief physical assessment and failure to improve with conservative measures the patient presented for Cervical Trigger Point Injections The indications, limitations and possible complications were discussed with the patient and the patient elected to proceed with the procedure. After voluntary, informed and signed consent obtained the patient was placed in a seated position. Appropriate time out was obtained per policy. The area of maximal tenderness was palpated over the [] Right   []  Left   [x]  Bilateral Cervical   [x] Splenius   [x] Trapezius  [x] Rhomboid. The skin overlying these areas was marked with a skin marker. The skin overlying the proposed injection sites were then sprayed with Gebauer's Solution while protecting patient eyes. The areas were then prepped in a sterile fashion with Prevantics swab. Each trigger point of the  [] Right   []   Left  [x]   Bilateral Cervical  [x] Splenius  [] Trapezius   [x] Rhomboid   was then injected after negative aspiration using a 25 gauge 1 1/2 in needle with approximately 2 ml of a solution of     [x] 5 ml of 1% Lidocaine Plain and 5 ml of 0.5% Marcaine Plain per 10 ml syringe  [] Toradol 0.5 ml (30 mg/ml) per 10 ml syringe    Sterile dressings applied.       Discharge: The patient tolerated the procedure well. There were no complications during the procedure and the patient was discharged home with discharge instructions. The patient has been instructed to contact the office should there be any complications or questions to arise between today and their next appointment.     Plan:  [x] Will return to the office in  [] 1 month  [x]  4 - 6 weeks  [] 2 months   [] 3 months for:  [] Planned Procedure [x] Procedure Follow-up  [] Office Visit      Gabapentin sent to MARY Campos, 1/14/2021 at 8:37 AM

## 2021-01-22 ENCOUNTER — HOSPITAL ENCOUNTER (OUTPATIENT)
Dept: PAIN MANAGEMENT | Age: 71
Discharge: HOME | End: 2021-01-22
Payer: MEDICARE

## 2021-01-22 VITALS
RESPIRATION RATE: 18 BRPM | SYSTOLIC BLOOD PRESSURE: 125 MMHG | HEART RATE: 64 BPM | TEMPERATURE: 97.4 F | DIASTOLIC BLOOD PRESSURE: 63 MMHG | OXYGEN SATURATION: 97 %

## 2021-01-22 DIAGNOSIS — M54.12 CERVICAL NEURALGIA: ICD-10-CM

## 2021-01-22 PROCEDURE — 6360000002 HC RX W HCPCS

## 2021-01-22 PROCEDURE — 2500000003 HC RX 250 WO HCPCS

## 2021-01-22 PROCEDURE — 3209999900 FLUORO FOR SURGICAL PROCEDURES

## 2021-01-22 PROCEDURE — 62321 NJX INTERLAMINAR CRV/THRC: CPT

## 2021-01-22 PROCEDURE — 2580000003 HC RX 258

## 2021-01-22 RX ORDER — METHYLPREDNISOLONE ACETATE 80 MG/ML
INJECTION, SUSPENSION INTRA-ARTICULAR; INTRALESIONAL; INTRAMUSCULAR; SOFT TISSUE
Status: COMPLETED | OUTPATIENT
Start: 2021-01-22 | End: 2021-01-22

## 2021-01-22 RX ORDER — LIDOCAINE HYDROCHLORIDE 10 MG/ML
INJECTION, SOLUTION EPIDURAL; INFILTRATION; INTRACAUDAL; PERINEURAL
Status: COMPLETED | OUTPATIENT
Start: 2021-01-22 | End: 2021-01-22

## 2021-01-22 RX ORDER — SODIUM CHLORIDE 9 MG/ML
INJECTION INTRAVENOUS
Status: COMPLETED | OUTPATIENT
Start: 2021-01-22 | End: 2021-01-22

## 2021-01-22 RX ADMIN — METHYLPREDNISOLONE ACETATE 80 MG: 80 INJECTION, SUSPENSION INTRA-ARTICULAR; INTRALESIONAL; INTRAMUSCULAR; SOFT TISSUE at 09:00

## 2021-01-22 RX ADMIN — LIDOCAINE HYDROCHLORIDE 5 ML: 10 INJECTION, SOLUTION EPIDURAL; INFILTRATION; INTRACAUDAL; PERINEURAL at 09:00

## 2021-01-22 RX ADMIN — SODIUM CHLORIDE 5 ML: 9 INJECTION INTRAVENOUS at 09:00

## 2021-01-22 ASSESSMENT — PAIN DESCRIPTION - DESCRIPTORS: DESCRIPTORS: CONSTANT

## 2021-01-22 ASSESSMENT — PAIN DESCRIPTION - PAIN TYPE: TYPE: CHRONIC PAIN

## 2021-01-22 ASSESSMENT — PAIN - FUNCTIONAL ASSESSMENT: PAIN_FUNCTIONAL_ASSESSMENT: 0-10

## 2021-01-22 ASSESSMENT — PAIN DESCRIPTION - LOCATION: LOCATION: NECK

## 2021-01-22 NOTE — INTERVAL H&P NOTE
Update History & Physical    The patient's History and Physical  was reviewed with the patient and I examined the patient. There was NO CHANGE:[id number]}. The surgical site was confirmed by the patient and me. Plan: The risks, benefits, expected outcome, and alternative to the recommended procedure have been discussed with the patient. Patient understands and wants to proceed with the procedure.      Electronically signed by Mimi Hays MD on 1/22/2021 at 9:10 AM

## 2021-01-28 ENCOUNTER — HOSPITAL ENCOUNTER (OUTPATIENT)
Dept: PAIN MANAGEMENT | Age: 71
Discharge: HOME | End: 2021-01-28
Payer: MEDICARE

## 2021-01-28 VITALS
HEART RATE: 65 BPM | SYSTOLIC BLOOD PRESSURE: 148 MMHG | OXYGEN SATURATION: 97 % | TEMPERATURE: 96.6 F | DIASTOLIC BLOOD PRESSURE: 72 MMHG | RESPIRATION RATE: 18 BRPM

## 2021-01-28 DIAGNOSIS — R20.2 NUMBNESS AND TINGLING OF BOTH LEGS: ICD-10-CM

## 2021-01-28 DIAGNOSIS — R20.0 NUMBNESS AND TINGLING OF BOTH LEGS: ICD-10-CM

## 2021-01-28 DIAGNOSIS — M54.12 CERVICAL RADICULOPATHY: ICD-10-CM

## 2021-01-28 PROCEDURE — 20553 NJX 1/MLT TRIGGER POINTS 3/>: CPT

## 2021-01-28 PROCEDURE — 2500000003 HC RX 250 WO HCPCS

## 2021-01-28 PROCEDURE — 20553 NJX 1/MLT TRIGGER POINTS 3/>: CPT | Performed by: NURSE PRACTITIONER

## 2021-01-28 RX ORDER — LIDOCAINE HYDROCHLORIDE 10 MG/ML
10 INJECTION, SOLUTION EPIDURAL; INFILTRATION; INTRACAUDAL; PERINEURAL ONCE
Status: DISCONTINUED | OUTPATIENT
Start: 2021-01-28 | End: 2021-01-30 | Stop reason: HOSPADM

## 2021-01-28 RX ORDER — HYDROCODONE BITARTRATE AND ACETAMINOPHEN 7.5; 325 MG/1; MG/1
1 TABLET ORAL EVERY 8 HOURS PRN
Qty: 90 TABLET | Refills: 0 | Status: SHIPPED | OUTPATIENT
Start: 2021-01-29 | End: 2021-03-08 | Stop reason: SDUPTHER

## 2021-01-28 RX ORDER — BUPIVACAINE HYDROCHLORIDE 5 MG/ML
10 INJECTION, SOLUTION EPIDURAL; INTRACAUDAL ONCE
Status: DISCONTINUED | OUTPATIENT
Start: 2021-01-28 | End: 2021-01-30 | Stop reason: HOSPADM

## 2021-01-29 NOTE — PROCEDURES
[facility] Physical and Pain Medicine      Patient Name: Mis Colbert    : 1950                    Age: 69 y.o. Sex: male    Date: 2021    Pre-op Diagnosis: Myofascial Pain/ Muscle Spasms    Post-op Diagnosis: Myofascial Pain/ Muscle Spasms    Procedure: Lumbar Trigger Point Injections    Performing Procedure: Palomo Paulson, MSN, APRN, FNP-C    Previously Had Procedure:  [x] Yes    [] No    Patient Vitals for the past 24 hrs:   BP Temp Temp src Pulse Resp SpO2   21 0908 (!) 148/72 96.6 °F (35.9 °C) Temporal 65 18 97 %       Description of Procedure:    After a brief physical assessment and failure to improve with conservative measures the patient presented for Lumbar Trigger Point Injections. The indications, limitations and possible complications were discussed with the patient and the patient elected to proceed with the procedure. After voluntary, informed and signed consent obtained the patient was placed in a   [x] Sitting  []  Prone position     Appropriate time out was obtained per policy. The area of maximal tenderness was palpated over the  [] Right   [] Left   [x] Bilateral Lower  [x] Latissimus  [x] Erector Spinae   [x] Lumbar Paraspinous. The skin overlying these areas was marked with a skin marker. The skin overlying the proposed injection sites were then sprayed with Gebauer's Solution and prepped in a sterile fashion with Prevantics swab. Each trigger point of the  [] Right   [] Left   [x] Bilateral Lower  [x] Latissimus  [x] Erector Spinae   [x] Lumbar Paraspinous  was then injected after negative aspiration using a 25 gauge 1 1/2 in needle with approximately 2 ml of a solution of     [x] 5 ml of 1% Lidocaine Plain and 5 ml of 0.5% Marcaine Plain per 10 ml syringe  [] Toradol 0.5 ml (30 mg/ml) per 10 ml syringe    Sterile dressings applied.       Discharge: The patient tolerated the procedure well. There were no complications during the procedure and the patient was discharged home with discharge instructions. The patient has been instructed to contact the office should there be any complications or questions to arise between today and their next appointment.     Plan:  [x] Will return to the office in  [] 1 month  [x]  4 - 6 weeks  [] 2 months   [] 3 months for:  [] Planned Procedure [x] Procedure Follow-up  [] Office Visit      1 [facility], 1/28/2021 at 7:21 PM

## 2021-02-02 ENCOUNTER — OFFICE VISIT (OUTPATIENT)
Dept: UROLOGY | Facility: CLINIC | Age: 71
End: 2021-02-02

## 2021-02-02 ENCOUNTER — TELEPHONE (OUTPATIENT)
Dept: PAIN MANAGEMENT | Age: 71
End: 2021-02-02

## 2021-02-02 VITALS — TEMPERATURE: 97.5 F | WEIGHT: 244.8 LBS | HEIGHT: 71 IN | BODY MASS INDEX: 34.27 KG/M2

## 2021-02-02 DIAGNOSIS — R39.15 URINARY URGENCY: ICD-10-CM

## 2021-02-02 DIAGNOSIS — C61 PROSTATE CANCER (HCC): Primary | ICD-10-CM

## 2021-02-02 DIAGNOSIS — N52.31 ERECTILE DYSFUNCTION AFTER RADICAL PROSTATECTOMY: ICD-10-CM

## 2021-02-02 PROCEDURE — 81003 URINALYSIS AUTO W/O SCOPE: CPT | Performed by: UROLOGY

## 2021-02-02 PROCEDURE — 99214 OFFICE O/P EST MOD 30 MIN: CPT | Performed by: UROLOGY

## 2021-02-02 NOTE — TELEPHONE ENCOUNTER
I CALLED THE PATIENT AS A FOLLOW UP FROM HIS BILATERAL LUMBAR TRIGGER POINT INJECTIONS THAT HE HAD ON Thursday January 28TH. HE DID NOT ANSWER SO I LEFT A MESSAGE.

## 2021-02-05 DIAGNOSIS — E03.9 ACQUIRED HYPOTHYROIDISM: ICD-10-CM

## 2021-02-05 RX ORDER — LEVOTHYROXINE SODIUM 0.2 MG/1
200 TABLET ORAL DAILY
Qty: 90 TABLET | Refills: 3 | Status: SHIPPED | OUTPATIENT
Start: 2021-02-05 | End: 2022-01-17 | Stop reason: DRUGHIGH

## 2021-02-05 NOTE — TELEPHONE ENCOUNTER
Received fax from pharmacy requesting refill on pts medication(s). Pt was last seen in office on 11/11/2020  and has a follow up scheduled for 5/12/2021. Will send request to  Payton Joyce  for patient. Requested Prescriptions     Pending Prescriptions Disp Refills    levothyroxine (SYNTHROID) 200 MCG tablet [Pharmacy Med Name: LEVOTHYROXINE 200 MCG TABLET] 90 tablet 3     Sig: TAKE 1 TABLET BY MOUTH DAILY TUBE FEEDING INTERACTION, OBTAIN PHYSICIAN ORDER TO MANAGE, RECOMMEND HOLDING TF FOR 30 MINUTES BEFORE AND AFTER DOSE.

## 2021-02-17 DIAGNOSIS — E78.2 MIXED HYPERLIPIDEMIA: ICD-10-CM

## 2021-02-17 RX ORDER — ATORVASTATIN CALCIUM 20 MG/1
20 TABLET, FILM COATED ORAL NIGHTLY
Qty: 90 TABLET | Refills: 3 | Status: SHIPPED | OUTPATIENT
Start: 2021-02-17 | End: 2022-03-16

## 2021-02-17 NOTE — TELEPHONE ENCOUNTER
Received fax from pharmacy requesting refill on pts medication(s). Pt was last seen in office on 11/11/2020  and has a follow up scheduled for 5/12/2021. Will send request to  [facility]  for patient.      Requested Prescriptions     Pending Prescriptions Disp Refills    atorvastatin (LIPITOR) 20 MG tablet 90 tablet 3     Sig: Take 1 tablet by mouth daily

## 2021-03-01 NOTE — PROGRESS NOTES
Subjective    Mr. Kamara is 70 y.o. male    Chief Complaint: Urinary urgency        History of Present Illness  Patient is a 70-year-old gentleman history of prostate cancer is here to follow-up on treatment response to Myrbetriq for his urinary urgency.  When he saw Dr. Hong last 02/02/2021 he was complaining of worsening urgency.  He denies any problems with slower stream.  He was started on 25 mg of Myrbetriq states he has had symptom improvement.     The following portions of the patient's history were reviewed and updated as appropriate: allergies, current medications, past family history, past medical history, past social history, past surgical history and problem list.    Review of Systems   Constitutional: Negative for appetite change and fever.   HENT: Negative for hearing loss and sore throat.    Eyes: Negative for pain and redness.   Respiratory: Negative for cough and shortness of breath.    Cardiovascular: Negative for chest pain and leg swelling.   Gastrointestinal: Negative for anal bleeding, nausea and vomiting.   Endocrine: Negative for cold intolerance and heat intolerance.   Genitourinary: Negative for dysuria, flank pain, frequency, hematuria and urgency.   Musculoskeletal: Negative for joint swelling and myalgias.   Skin: Negative for color change and rash.   Allergic/Immunologic: Negative for immunocompromised state.   Neurological: Negative for dizziness and speech difficulty.   Hematological: Negative for adenopathy. Does not bruise/bleed easily.   Psychiatric/Behavioral: Negative for dysphoric mood and suicidal ideas.         Current Outpatient Medications:   •  allopurinol (ZYLOPRIM) 100 MG tablet, Take 100 mg by mouth Daily., Disp: , Rfl:   •  amLODIPine (NORVASC) 5 MG tablet, Take 5 mg by mouth Daily., Disp: , Rfl:   •  atorvastatin (LIPITOR) 20 MG tablet, Take 20 mg by mouth Daily., Disp: , Rfl: 2  •  Cholecalciferol (VITAMIN D3) 2000 units tablet, Take 2,000 Units by mouth Daily.,  Disp: , Rfl:   •  citalopram (CeleXA) 20 MG tablet, Take 20 mg by mouth Daily., Disp: , Rfl:   •  colchicine 0.6 MG tablet, Take 0.6 mg by mouth As Needed for Muscle / Joint Pain. 2 tab then hour later 1 tab when gout flares , Disp: , Rfl:   •  gabapentin (NEURONTIN) 400 MG capsule, Take 400 mg by mouth 2 (Two) Times a Day. Can take up to TID but only takes BID due to side effects - sleepiness, Disp: , Rfl:   •  glipizide (GLUCOTROL) 5 MG tablet, Take 5 mg by mouth Daily., Disp: , Rfl:   •  HYDROcodone-acetaminophen (NORCO) 7.5-325 MG per tablet, Take 1 tablet by mouth., Disp: , Rfl:   •  levothyroxine (SYNTHROID, LEVOTHROID) 200 MCG tablet, Take 200 mcg by mouth Daily., Disp: , Rfl:   •  lisinopril (PRINIVIL,ZESTRIL) 40 MG tablet, Take 40 mg by mouth Daily., Disp: , Rfl: 11  •  metoprolol succinate XL (TOPROL-XL) 25 MG 24 hr tablet, Take 25 mg by mouth Daily., Disp: , Rfl:   •  Mirabegron ER (Myrbetriq) 25 MG tablet sustained-release 24 hour 24 hr tablet, Take 1 tablet by mouth Daily., Disp: 28 tablet, Rfl: 0  •  nystatin (MYCOSTATIN) 630482 UNIT/GM powder, Apply  topically to the appropriate area as directed 4 (Four) Times a Day., Disp: , Rfl:   •  rivaroxaban (XARELTO) 15 MG tablet, Take 1 tablet by mouth Daily., Disp: 30 tablet, Rfl: 5  •  tobramycin 0.3 % solution ophthalmic solution, Administer 2 drops to both eyes Every 4 (Four) Hours., Disp: 5 mL, Rfl: 0  •  Mirabegron ER (Myrbetriq) 25 MG tablet sustained-release 24 hour 24 hr tablet, Take 1 tablet by mouth Daily for 360 days., Disp: 30 tablet, Rfl: 11  •  rivaroxaban (XARELTO) 15 MG tablet, Take 1 tablet by mouth Daily With Dinner., Disp: 30 tablet, Rfl: 11  •  traMADol (ULTRAM) 50 MG tablet, Take 50 mg by mouth 2 (Two) Times a Day., Disp: , Rfl:     Past Medical History:   Diagnosis Date   • Arthritis    • Atrial fibrillation (CMS/HCC)     paroxysmal, on Xarelto   • BMI 31.0-31.9,adult 6/28/2018   • Depression    • Diabetes (CMS/HCC)    • Disease of  "thyroid gland    • Gout    • Hypercholesteremia    • Hypertension    • IBS (irritable bowel syndrome)    • Kidney disease    • Prostate cancer (CMS/HCC)     Dr. Hong following   • Sensorineural hearing loss    • Sleep apnea     CPAP   • Sudden hearing loss    • Syncope    • Tinnitus    • Urine incontinence        Past Surgical History:   Procedure Laterality Date   • BLADDER SUSPENSION N/A 11/26/2018    Procedure: CYSTOSCOPY BLADDER SUSPENSION MALE SLING;  Surgeon: Zaheer Rebolledo MD;  Location:  [facility];  Service: Urology   • CHOLECYSTECTOMY     • COLONOSCOPY N/A 3/7/2017    Procedure: COLONOSCOPY WITH ANESTHESIA;  Surgeon: Hector Alvarenga MD;  Location:  [facility];  Service:    • LAPAROSCOPIC RETROPUBIC PROSTATECTOMY     • PROSTATE SURGERY     • PROSTATECTOMY N/A 8/1/2017    Procedure: PROSTATECTOMY LAPAROSCOPIC WITH DAVINCI SI ROBOT ;  Surgeon: Hernesto Hong MD;  Location:  [facility];  Service:    • THYROID SURGERY      RADIATION THERAPY AFTER SURGERY       Social History     Socioeconomic History   • Marital status:      Spouse name: Not on file   • Number of children: Not on file   • Years of education: Not on file   • Highest education level: Not on file   Tobacco Use   • Smoking status: Never Smoker   • Smokeless tobacco: Never Used   Substance and Sexual Activity   • Alcohol use: Yes     Comment: occasionally 1-2 beers once or twice year   • Drug use: No   • Sexual activity: Defer       Family History   Problem Relation Age of Onset   • Prostate cancer Father    • Cancer Father    • Heart disease Father         CABG   • Heart attack Father 70   • Heart disease Mother         assumed MI at time of death - had cardiopulmonary arrest   • Sudden death Mother    • Diabetes Sister    • Arrhythmia Sister    • Stroke Paternal Grandfather    • Colon cancer Neg Hx    • Colon polyps Neg Hx        Objective    Temp 97.5 °F (36.4 °C) (Temporal)   Ht 180.3 cm (71\")   Wt 110 kg (243 lb)   BMI " 33.89 kg/m²     Physical Exam  Vitals signs reviewed.   Constitutional:       Appearance: Normal appearance.   HENT:      Head: Normocephalic.      Right Ear: External ear normal.      Left Ear: External ear normal.      Nose: No congestion.   Eyes:      Conjunctiva/sclera: Conjunctivae normal.   Neck:      Comments: I observed no obvious neck masses  Pulmonary:      Effort: Pulmonary effort is normal.   Skin:     Coloration: Skin is not pale.      Findings: No rash.      Comments: No obvious facial rash noted   Neurological:      Mental Status: He is alert and oriented to person, place, and time.   Psychiatric:         Mood and Affect: Mood normal.         Behavior: Behavior normal.             Results for orders placed or performed in visit on 03/03/21   POC Urinalysis Dipstick, Multipro    Specimen: Urine   Result Value Ref Range    Color Yellow Yellow, Straw, Dark Yellow, Cindi    Clarity, UA Clear Clear    Glucose, UA Negative Negative, 1000 mg/dL (3+) mg/dL    Bilirubin Negative Negative    Ketones, UA Negative Negative    Specific Gravity  1.020 1.005 - 1.030    Blood, UA Negative Negative    pH, Urine 6.0 5.0 - 8.0    Protein, POC Negative Negative mg/dL    Urobilinogen, UA Normal Normal    Nitrite, UA Negative Negative    Leukocytes Trace (A) Negative   I personally reviewed the urinalysis results there is no indication for microscopic evaluation.  Assessment and Plan    Diagnoses and all orders for this visit:    1. Prostate cancer (CMS/HCC) (Primary)  -     POC Urinalysis Dipstick, Multipro    2. Urgency of urination  -     Mirabegron ER (Myrbetriq) 25 MG tablet sustained-release 24 hour 24 hr tablet; Take 1 tablet by mouth Daily for 360 days.  Dispense: 30 tablet; Refill: 11    Patient has had some improvement on the Myrbetriq 25 I told him it does not reach full effect would continue and hopefully by 3 months he will have full effect and if he is to continue to have improvement in symptoms he can to  stay on the Myrbetriq 25 mg and follow-up as scheduled for his prostate cancer follow-up with PSA prior.

## 2021-03-03 ENCOUNTER — OFFICE VISIT (OUTPATIENT)
Dept: UROLOGY | Facility: CLINIC | Age: 71
End: 2021-03-03

## 2021-03-03 VITALS — WEIGHT: 243 LBS | TEMPERATURE: 97.5 F | BODY MASS INDEX: 34.02 KG/M2 | HEIGHT: 71 IN

## 2021-03-03 DIAGNOSIS — R39.15 URGENCY OF URINATION: ICD-10-CM

## 2021-03-03 DIAGNOSIS — C61 PROSTATE CANCER (HCC): Primary | ICD-10-CM

## 2021-03-03 LAB
BILIRUB BLD-MCNC: NEGATIVE MG/DL
CLARITY, POC: CLEAR
COLOR UR: YELLOW
GLUCOSE UR STRIP-MCNC: NEGATIVE MG/DL
KETONES UR QL: NEGATIVE
LEUKOCYTE EST, POC: ABNORMAL
NITRITE UR-MCNC: NEGATIVE MG/ML
PH UR: 6 [PH] (ref 5–8)
PROT UR STRIP-MCNC: NEGATIVE MG/DL
RBC # UR STRIP: NEGATIVE /UL
SP GR UR: 1.02 (ref 1–1.03)
UROBILINOGEN UR QL: NORMAL

## 2021-03-03 PROCEDURE — 99213 OFFICE O/P EST LOW 20 MIN: CPT | Performed by: PHYSICIAN ASSISTANT

## 2021-03-03 PROCEDURE — 81003 URINALYSIS AUTO W/O SCOPE: CPT | Performed by: PHYSICIAN ASSISTANT

## 2021-03-03 RX ORDER — HYDROCODONE BITARTRATE AND ACETAMINOPHEN 7.5; 325 MG/1; MG/1
1 TABLET ORAL EVERY 8 HOURS PRN
COMMUNITY
Start: 2021-01-29

## 2021-03-03 RX ORDER — CITALOPRAM 20 MG/1
20 TABLET ORAL DAILY
COMMUNITY
Start: 2021-01-29

## 2021-03-08 DIAGNOSIS — R20.0 NUMBNESS AND TINGLING OF BOTH LEGS: ICD-10-CM

## 2021-03-08 DIAGNOSIS — M54.12 CERVICAL RADICULOPATHY: ICD-10-CM

## 2021-03-08 DIAGNOSIS — R20.2 NUMBNESS AND TINGLING OF BOTH LEGS: ICD-10-CM

## 2021-03-08 RX ORDER — HYDROCODONE BITARTRATE AND ACETAMINOPHEN 7.5; 325 MG/1; MG/1
1 TABLET ORAL EVERY 8 HOURS PRN
Qty: 90 TABLET | Refills: 0 | Status: SHIPPED | OUTPATIENT
Start: 2021-03-08 | End: 2021-03-16 | Stop reason: SDUPTHER

## 2021-03-16 ENCOUNTER — HOSPITAL ENCOUNTER (OUTPATIENT)
Dept: PAIN MANAGEMENT | Age: 71
Discharge: HOME | End: 2021-03-16
Payer: MEDICARE

## 2021-03-16 VITALS
WEIGHT: 244 LBS | OXYGEN SATURATION: 97 % | HEIGHT: 71 IN | TEMPERATURE: 98 F | HEART RATE: 54 BPM | BODY MASS INDEX: 34.16 KG/M2

## 2021-03-16 DIAGNOSIS — R20.2 NUMBNESS AND TINGLING OF BOTH LEGS: ICD-10-CM

## 2021-03-16 DIAGNOSIS — M25.50 ARTHRALGIA OF MULTIPLE JOINTS: ICD-10-CM

## 2021-03-16 DIAGNOSIS — M54.12 CERVICAL RADICULOPATHY: ICD-10-CM

## 2021-03-16 DIAGNOSIS — M54.42 CHRONIC LEFT-SIDED LOW BACK PAIN WITH LEFT-SIDED SCIATICA: ICD-10-CM

## 2021-03-16 DIAGNOSIS — G89.29 CHRONIC LEFT-SIDED LOW BACK PAIN WITH LEFT-SIDED SCIATICA: ICD-10-CM

## 2021-03-16 DIAGNOSIS — R20.0 NUMBNESS AND TINGLING OF BOTH LEGS: ICD-10-CM

## 2021-03-16 PROCEDURE — 99213 OFFICE O/P EST LOW 20 MIN: CPT | Performed by: NURSE PRACTITIONER

## 2021-03-16 PROCEDURE — 99213 OFFICE O/P EST LOW 20 MIN: CPT

## 2021-03-16 RX ORDER — HYDROCODONE BITARTRATE AND ACETAMINOPHEN 7.5; 325 MG/1; MG/1
1 TABLET ORAL EVERY 8 HOURS PRN
Qty: 90 TABLET | Refills: 0 | Status: SHIPPED | OUTPATIENT
Start: 2021-04-07 | End: 2021-05-26 | Stop reason: SDUPTHER

## 2021-03-16 RX ORDER — GABAPENTIN 400 MG/1
400 CAPSULE ORAL 3 TIMES DAILY
Qty: 270 CAPSULE | Refills: 0 | Status: SHIPPED | OUTPATIENT
Start: 2021-04-14 | End: 2021-07-07 | Stop reason: SDUPTHER

## 2021-03-16 ASSESSMENT — PAIN DESCRIPTION - LOCATION: LOCATION: BACK;NECK

## 2021-03-16 ASSESSMENT — PAIN SCALES - GENERAL: PAINLEVEL_OUTOF10: 5

## 2021-03-16 NOTE — PROGRESS NOTES
[facility]    Office Progress Note    Patient Name: Terrell Arellano    MR #: [id number]    Account #: [de-identified]    : 1950    Age: 69 y.o. Sex: male    Date: 2021    PCP: MARY Hernandez         Referring Provider:    Chief Complaint:   Chief Complaint   Patient presents with    Lower Back Pain    Neck Pain       History of Present Illness:    Terrell Arellano is a 69 y.o. male who presents to the office for follow-up of VANE and bilateral cervical trigger point injections. He says the injections really helped his pain. Says that he obtained 85% relief for 6 weeks. He is wanting the injections repeated. He continues to take his Gabapentin and Norco with some relief. Last pain management HPI note read    Employment: Retired []   Disabled  []   Works []    Does Not Work [x]     Previous Injury:  Yes  []   No [x]     Previous Surgery: Yes []   No [x]     Previous Physical Therapy In the last 6 months? Yes  []     No [x]   Did Physical Therapy make thepain better or worse? Better []   Worse []  Unchanged []    MRI in the last two years? Yes [x]  No []   Results reviewed with patient? Yes []   No []    CT Scan in the last two years? Yes  []   No [x]   Results reviewed with patient? Yes []   No []    X-ray in the last two years? Yes [x]   No  []  Results reviewed with patient? Yes  []  No []    Injections in the past?  Yes [x]   No []   Did the injections help relieve the pain? Yes [x]   No []     Do you have Depression? Yes  []    No [x]   Thinking of harming yourself or others?   Yes  []   No [x]     Past Medical Histoy  Past Medical History:   Diagnosis Date    Allergic rhinitis     Arthritis     [facility] [address] 2012    Thyroid  - Dr Rehana Robertson     Hyperlipidemia     Hypertension     Hyperthyroidism     [facility]     Sleep apnea     Type II or unspecified type diabetes mellitus without mention of complication, not stated as uncontrolled        Surgery History  Past Surgical History:   Procedure Laterality Date    CHOLECYSTECTOMY      INCONTINENCE SURGERY  11/26/2018    dr young Courtney Lipschutz Dr Adelia Severin  08/01/2017    removal, dr Edward Mckeon      removed by Dr Ines Singh  Patient has no known allergies. Current Medications  Current Outpatient Medications   Medication Sig Dispense Refill    atorvastatin (LIPITOR) 20 MG tablet Take 1 tablet by mouth nightly 90 tablet 3    levothyroxine (SYNTHROID) 200 MCG tablet Take 1 tablet by mouth Daily 90 tablet 3    glipiZIDE (GLUCOTROL) 5 MG tablet Take 1 tablet by mouth daily 90 tablet 3    lisinopril (PRINIVIL;ZESTRIL) 40 MG tablet TAKE 1 TABLET BY MOUTH DAILY 90 tablet 3    citalopram (CELEXA) 20 MG tablet Take 1 tablet by mouth daily 90 tablet 3    metoprolol succinate (TOPROL XL) 25 MG extended release tablet Take 1 tablet by mouth daily 90 tablet 3    allopurinol (ZYLOPRIM) 100 MG tablet Take 1 tablet by mouth daily 90 tablet 3    rivaroxaban (XARELTO) 15 MG TABS tablet Take 15 mg by mouth daily      amLODIPine (NORVASC) 10 MG tablet Take 0.5 tablets by mouth daily 90 tablet 3    Cholecalciferol (VITAMIN D3) 2000 units TABS Take 1 tablet by mouth daily 30 tablet 11    COLCRYS 0.6 MG tablet TAKE 2 TABLETS THEN AN HOUR LATER TAKE 1 TABLET WHEN GOUT FLARES 12 tablet 5    [START ON 4/14/2021] gabapentin (NEURONTIN) 400 MG capsule Take 1 capsule by mouth 3 times daily for 90 days. 270 capsule 0    [START ON 4/7/2021] HYDROcodone-acetaminophen (NORCO) 7.5-325 MG per tablet Take 1 tablet by mouth every 8 hours as needed for Pain for up to 30 days. May fill 4-7-2021 90 tablet 0     No current facility-administered medications for this encounter.         Social History    Social History     Tobacco Use    Smoking status: Never Smoker    Smokeless tobacco: Never Used   Substance Use Topics    Alcohol use: No         Family History  family history includes Cancer in his father; Heart Attack in his father and mother; Heart Disease in his father; Heart Failure in his mother; Schizophrenia in his mother; Thyroid Disease in his sister. Review of Systems:  Constitutional: denies fever, chills, fatigue, change in appetite, weight gain or weight loss  Head: Normocephalic  Skin: denies easy bruising, skin redness, skin rash, hives, sensitivity to sun exposure, tightness, nodules or bumps, hair loss, color changes in the hands or feet with cold. Eyes: denies pain, redness, loss of vision, double or blurred vision, eye drainage, or dryness. ENT and Mouth: denies ringing in the ears, loss of hearing, nasal congestion, nasal discharge, no hoarseness, sore throat, or difficulty swallowing   Respiratory: denies chronic dry cough, coughing up blood, coughing up mucus, waking at night coughing or choking, or wheezing. Cardiovascular: denies chest pain, irregular heartbeats, palpitations, shortness of breath, or edema in legs  Gastrointestinal: denies, nausea, vomiting, heartburn, diarrhea, or constipation  Genitourinary: denies difficult urination, pain or burning with urination, blood in the urine, or cloudy urine  Musculoskeletal: denies arm, buttock, thigh or calf cramps. Has pain in neck and bilateral shoulders, muscle spasms in neck and bilateral shoulders and tenderness in neck and bilateral shoulders. No muscle weakness. No joint swelling. Neurologic: headache, dizziness, fainting, loss of consciousness, no memory loss. No sensitivity. Endocrine: denies intolerance to hot or cold temperature, night sweats, flushing, fingernail changes, increased thirst, or hairloss   Hematologic/ Lymphatic: denies anemia, bleeding tendency or clotting tendency, bruising easily.   Allergic/ Immunologic: denies rhinitis, asthma, skin sensitivity, or allergy to Latex   Psychiatric: denies depression or thoughts of suicide, or voices in head. Current Pain Assessment:   Pain Assessment  Pain Assessment: 0-10  Pain Level: 5  Patient's Stated Pain Goal: 3  Pain Type: Chronic pain  Pain Location: Back, Neck    Clinical Progression: gradually improving  Effect of Pain on Daily Activities: limits activity  Patient's Stated Pain Goal: No pain  Pain Intervention(s): Medication (see eMar), Repositioning, Rest, Ice    Current PE    ORT Score: 2    PHQ-9 Score: 2    Physical Exam:    Vitals:    21 0959   Pulse: 54   Temp: 98 °F (36.7 °C)   SpO2: 97%   Weight: 244 lb (110.7 kg)   Height: 5' 11\" (1.803 m)       Body mass index is 34.03 kg/m². General Appearance: no acute distress. Appears to be well dressed  Skin Exam: Warm and dry, no jaundice, rashes or lesions  Head Exam: NCAT, PERRLA, EOMI, moist mucus membranes, scalp normal  Neck Exam: Supple, no masses palpated, trachea midline. Pain with flexion and extension of head. Pain with palpation of muscles iin neck and bilateral shoulders  Lung: Clear to ausculation in all lobes anterior and posterior. Heart: Regular rate and rhythm, no gallops, rubs or murmurs, no edema  Abdomen:  Bowel sounds in all quadrants, soft, non-distended, non-tender with palpation, no guarding  Extremities: No rash, cyanosis or bruising  Musculoskeletal: No joint swelling or deformity   Back Exam: limited ROM  Hip Exam: Full rotation bilateral   Knee Exam: Full flexion and extension bilateral, no crepitus  Shoulder Exam: Full ROM bilateral  Neurologic Exam: Gait and coordination normal, speech normal  Reflexes: Normal brachialis, Negative Mariee's bilateral. Normal Patellar bilateral,   CN EXAM: II-XII intact, face symmetrical, tongue symmetrical, the trapezius and sternocleidomastoid muscle appearance and strength symmetrical, normal achilles bilateral, ankle clonus negative bilateral  Strength: 5/5 RUE Bi's/Tri's, 5/5 LUE Bi's/Tri's, 5/5 RLE knee flex/ext, 5/5 RLE DF/PF, 5/5 LLE knee flex/ext, 5/5 concerning possible effects. Controlled Substance Monitoring:  Attestation: The ERLINDA report for this patient was reviewed today. Discussed with patient possible medication side effects, risk of tolerance, dependence and alternative treatments. Discussed the growing epidemic in the [place] with the overprescribing and at times the abuse of narcotics. Discussed the detrimental effects of long term narcotic use. Patient encouraged to set daily goals of exercising and decreasing daily narcotic intake. Discussed with the patient about the development of hyperalgesia with long term narcotic intake. EMR dragon/transcription disclaimer: Much of this encounter note is electronic transcription/translation of spoken language to printed tach. Electronic translation of spoken language may be erroneous, or at times, nonsensical words or phrases may be inadvertently transcribed.  Although, I have reviewed the note for such errors, some may still exist.     CC:  MARY Eubanks APRN, 3/16/2021 at 10:24 AM

## 2021-04-27 ENCOUNTER — HOSPITAL ENCOUNTER (OUTPATIENT)
Dept: PAIN MANAGEMENT | Age: 71
Discharge: HOME | End: 2021-04-27
Payer: MEDICARE

## 2021-04-27 VITALS
DIASTOLIC BLOOD PRESSURE: 58 MMHG | RESPIRATION RATE: 18 BRPM | HEART RATE: 70 BPM | TEMPERATURE: 96.8 F | SYSTOLIC BLOOD PRESSURE: 126 MMHG | OXYGEN SATURATION: 93 %

## 2021-04-27 DIAGNOSIS — R52 PAIN MANAGEMENT: ICD-10-CM

## 2021-04-27 PROCEDURE — 62321 NJX INTERLAMINAR CRV/THRC: CPT

## 2021-04-27 ASSESSMENT — PAIN DESCRIPTION - PAIN TYPE: TYPE: CHRONIC PAIN

## 2021-04-27 ASSESSMENT — PAIN DESCRIPTION - LOCATION: LOCATION: NECK

## 2021-05-11 ENCOUNTER — HOSPITAL ENCOUNTER (OUTPATIENT)
Dept: PAIN MANAGEMENT | Age: 71
Discharge: HOME | End: 2021-05-11
Payer: MEDICARE

## 2021-05-11 VITALS
SYSTOLIC BLOOD PRESSURE: 148 MMHG | OXYGEN SATURATION: 95 % | DIASTOLIC BLOOD PRESSURE: 75 MMHG | TEMPERATURE: 96.9 F | RESPIRATION RATE: 18 BRPM | HEART RATE: 67 BPM

## 2021-05-11 DIAGNOSIS — R52 PAIN MANAGEMENT: ICD-10-CM

## 2021-05-11 PROCEDURE — 2500000003 HC RX 250 WO HCPCS

## 2021-05-11 PROCEDURE — 62321 NJX INTERLAMINAR CRV/THRC: CPT

## 2021-05-11 PROCEDURE — 6360000002 HC RX W HCPCS

## 2021-05-11 PROCEDURE — 3209999900 FLUORO FOR SURGICAL PROCEDURES

## 2021-05-11 PROCEDURE — 2580000003 HC RX 258

## 2021-05-11 RX ORDER — METHYLPREDNISOLONE ACETATE 80 MG/ML
INJECTION, SUSPENSION INTRA-ARTICULAR; INTRALESIONAL; INTRAMUSCULAR; SOFT TISSUE
Status: COMPLETED | OUTPATIENT
Start: 2021-05-11 | End: 2021-05-11

## 2021-05-11 RX ORDER — SODIUM CHLORIDE 9 MG/ML
INJECTION INTRAVENOUS
Status: COMPLETED | OUTPATIENT
Start: 2021-05-11 | End: 2021-05-11

## 2021-05-11 RX ORDER — LIDOCAINE HYDROCHLORIDE 10 MG/ML
INJECTION, SOLUTION EPIDURAL; INFILTRATION; INTRACAUDAL; PERINEURAL
Status: COMPLETED | OUTPATIENT
Start: 2021-05-11 | End: 2021-05-11

## 2021-05-11 RX ADMIN — METHYLPREDNISOLONE ACETATE 80 MG: 80 INJECTION, SUSPENSION INTRA-ARTICULAR; INTRALESIONAL; INTRAMUSCULAR; SOFT TISSUE at 10:57

## 2021-05-11 RX ADMIN — SODIUM CHLORIDE 5 ML: 9 INJECTION INTRAVENOUS at 10:57

## 2021-05-11 RX ADMIN — LIDOCAINE HYDROCHLORIDE 5 ML: 10 INJECTION, SOLUTION EPIDURAL; INFILTRATION; INTRACAUDAL; PERINEURAL at 10:57

## 2021-05-11 ASSESSMENT — PAIN DESCRIPTION - DESCRIPTORS: DESCRIPTORS: ACHING

## 2021-05-11 ASSESSMENT — PAIN - FUNCTIONAL ASSESSMENT: PAIN_FUNCTIONAL_ASSESSMENT: 0-10

## 2021-05-11 NOTE — INTERVAL H&P NOTE
Update History & Physical    The patient's History and Physical was reviewed with the patient and I examined the patient. There was NO CHANGE:[id number]}. The surgical site was confirmed by the patient and me. Plan: The risks, benefits, expected outcome, and alternative to the recommended procedure have been discussed with the patient. Patient understands and wants to proceed with the procedure.      Electronically signed by Luna Moore MD on 5/11/2021 at 10:53 AM

## 2021-05-18 ENCOUNTER — TELEPHONE (OUTPATIENT)
Dept: PRIMARY CARE CLINIC | Age: 71
End: 2021-05-18

## 2021-05-18 DIAGNOSIS — E78.2 MIXED HYPERLIPIDEMIA: ICD-10-CM

## 2021-05-18 DIAGNOSIS — N18.31 TYPE 2 DIABETES MELLITUS WITH STAGE 3A CHRONIC KIDNEY DISEASE, WITHOUT LONG-TERM CURRENT USE OF INSULIN (HCC): ICD-10-CM

## 2021-05-18 DIAGNOSIS — I10 ESSENTIAL HYPERTENSION: ICD-10-CM

## 2021-05-18 DIAGNOSIS — E11.22 TYPE 2 DIABETES MELLITUS WITH STAGE 3A CHRONIC KIDNEY DISEASE, WITHOUT LONG-TERM CURRENT USE OF INSULIN (HCC): ICD-10-CM

## 2021-05-18 DIAGNOSIS — I73.9 PAD (PERIPHERAL ARTERY DISEASE) (HCC): ICD-10-CM

## 2021-05-18 LAB
ALBUMIN SERPL-MCNC: 4.4 G/DL (ref 3.5–5.2)
ALP BLD-CCNC: 58 U/L (ref 40–130)
ALT SERPL-CCNC: 13 U/L (ref 5–41)
ANION GAP SERPL CALCULATED.3IONS-SCNC: 6 MMOL/L (ref 7–19)
AST SERPL-CCNC: 12 U/L (ref 5–40)
BILIRUB SERPL-MCNC: 0.6 MG/DL (ref 0.2–1.2)
BUN BLDV-MCNC: 24 MG/DL (ref 8–23)
CALCIUM SERPL-MCNC: 8.9 MG/DL (ref 8.8–10.2)
CHLORIDE BLD-SCNC: 103 MMOL/L (ref 98–111)
CHOLESTEROL, TOTAL: 90 MG/DL (ref 160–199)
CO2: 28 MMOL/L (ref 22–29)
CREAT SERPL-MCNC: 1.3 MG/DL (ref 0.5–1.2)
GFR AFRICAN AMERICAN: >59
GFR NON-AFRICAN AMERICAN: 54
GLUCOSE BLD-MCNC: 108 MG/DL (ref 74–109)
HBA1C MFR BLD: 6.3 % (ref 4–6)
HDLC SERPL-MCNC: 22 MG/DL (ref 55–121)
LDL CHOLESTEROL CALCULATED: 43 MG/DL
POTASSIUM SERPL-SCNC: 4.7 MMOL/L (ref 3.5–5)
SODIUM BLD-SCNC: 137 MMOL/L (ref 136–145)
TOTAL PROTEIN: 7 G/DL (ref 6.6–8.7)
TRIGL SERPL-MCNC: 125 MG/DL (ref 0–149)

## 2021-05-18 NOTE — TELEPHONE ENCOUNTER
----- Message from MARY Jimenez sent at 5/18/2021  2:26 PM CDT -----  Please call patient and let them know results. Normal cholesterol  Metabolic panel is normal, kidney function is stable.   Continue to avoid NSAIDs and maintain good hydration  Blood sugars well controlled and A1c is 6.3

## 2021-05-20 ENCOUNTER — HOSPITAL ENCOUNTER (OUTPATIENT)
Dept: PAIN MANAGEMENT | Age: 71
Discharge: HOME | End: 2021-05-20
Payer: MEDICARE

## 2021-05-20 VITALS
OXYGEN SATURATION: 94 % | SYSTOLIC BLOOD PRESSURE: 147 MMHG | TEMPERATURE: 96.6 F | DIASTOLIC BLOOD PRESSURE: 63 MMHG | RESPIRATION RATE: 20 BRPM | HEART RATE: 65 BPM

## 2021-05-20 PROCEDURE — 2500000003 HC RX 250 WO HCPCS

## 2021-05-20 PROCEDURE — 20553 NJX 1/MLT TRIGGER POINTS 3/>: CPT | Performed by: NURSE PRACTITIONER

## 2021-05-20 PROCEDURE — 20553 NJX 1/MLT TRIGGER POINTS 3/>: CPT

## 2021-05-20 RX ORDER — LIDOCAINE HYDROCHLORIDE 10 MG/ML
10 INJECTION, SOLUTION EPIDURAL; INFILTRATION; INTRACAUDAL; PERINEURAL ONCE
Status: DISCONTINUED | OUTPATIENT
Start: 2021-05-20 | End: 2021-05-22 | Stop reason: HOSPADM

## 2021-05-20 RX ORDER — BUPIVACAINE HYDROCHLORIDE 5 MG/ML
10 INJECTION, SOLUTION EPIDURAL; INTRACAUDAL ONCE
Status: DISCONTINUED | OUTPATIENT
Start: 2021-05-20 | End: 2021-05-22 | Stop reason: HOSPADM

## 2021-05-20 NOTE — PROGRESS NOTES
Procedure:  Level of Consciousness: [x]Alert []Oriented []Disoriented []Lethargic  Anxiety Level: [x]Calm []Anxious []Depressed []Other  Skin: [x]Warm [x]Dry []Cool []Moist []Intact []Other  Cardiovascular: [x]Palpitations: [x]Never []Occasionally []Frequently  Chest Pain: [x]No []Yes  Respiratory:  [x]Unlabored []Labored []Cough ([] Productive []Unproductive)  HCG Required: [x]No []Yes   Results: []Negative []Positive  Knowledge Level:        [x]Patient/Other verbalized understanding of pre-procedure instructions. [x]Assessment of post-op care needs (transportation, responsible caregiver)        [x]Able to discuss health care problems and how to deal with it.   Factors that Affect Teaching:        Language Barrier: [x]No []Yes - why:        Hearing Loss:        []No [x]Yes            Corrective Device:  [x]Yes []No        Vision Loss:           []No [x]Yes            Corrective Device:  [x]Yes []No        Memory Loss:       [x]No []Yes            []Short Term []Long Term  Motivational Level:  [x]Asks Questions                  []Extremely Anxious       [x]Seems Interested               []Seems Uninterested                  [x]Denies need for Education  Risk for Injury:  [x]Patient oriented to person, place and time  []History of frequent falls/loss of balance  Nutritional:  []Change in appetite   []Weight Gain   []Weight Loss  Functional:  []Requires assistance with ADL's

## 2021-05-20 NOTE — PROCEDURES
[facility]      Patient Name: Tino Valadez    : 1950                    Age: 69 y.o. Sex: male    Date: May 20, 2021    Pre-op Diagnosis: Myofascial Pain/ Muscle Spasms/ Cervicalgia    Post-op Diagnosis: Myofascial Pain/ Muscle Spasms/ Cervicalgia    Procedure: Cervical Trigger Point Injections    Performing Procedure: Javad Partida, MSN, APRN, FNP-C    Previously Had Procedure:  Yes [x]  No []     Patient Vitals for the past 24 hrs:   BP Temp Temp src Pulse Resp SpO2   21 0940 (!) 147/63 96.6 °F (35.9 °C) Temporal 65 20 94 %       Description of Procedure:     After a brief physical assessment and failure to improve with conservative measures the patient presented for Cervical Trigger Point Injections The indications, limitations and possible complications were discussed with the patient and the patient elected to proceed with the procedure. After voluntary, informed and signed consent obtained the patient was placed in a seated position. Appropriate time out was obtained per policy. The area of maximal tenderness was palpated over the [] Right   []  Left   [x]  Bilateral Cervical   [x] Splenius   [x] Trapezius  [x] Rhomboid. The skin overlying these areas was marked with a skin marker. The skin overlying the proposed injection sites were then sprayed with Gebauer's Solution while protecting patient eyes. The areas were then prepped in a sterile fashion with Prevantics swab. Each trigger point of the  [] Right   []   Left  [x]   Bilateral Cervical  [x] Splenius  [x] Trapezius   [x] Rhomboid   was then injected after negative aspiration using a 25 gauge 1 1/2 in needle with approximately 2 ml of a solution of     [x] 5 ml of 1% Lidocaine Plain and 5 ml of 0.5% Marcaine Plain per 10 ml syringe  [] Toradol 0.5 ml (30 mg/ml) per 10 ml syringe    Sterile dressings applied. Discharge: The patient tolerated the procedure well. There were no complications during the procedure and the patient was discharged home with discharge instructions. The patient has been instructed to contact the office should there be any complications or questions to arise between today and their next appointment.     Plan:  [x] Will return to the office in  [] 1 month  [x]  4 - 6 weeks  [] 2 months   [] 3 months for:  [] Planned Procedure [x] Procedure Follow-up  [] Office Visit      1 [facility], 5/20/2021 at 10:13 AM

## 2021-05-26 ENCOUNTER — OFFICE VISIT (OUTPATIENT)
Dept: PRIMARY CARE CLINIC | Age: 71
End: 2021-05-26
Payer: MEDICARE

## 2021-05-26 VITALS
OXYGEN SATURATION: 93 % | SYSTOLIC BLOOD PRESSURE: 130 MMHG | HEIGHT: 71 IN | WEIGHT: 245 LBS | TEMPERATURE: 97.6 F | BODY MASS INDEX: 34.3 KG/M2 | HEART RATE: 65 BPM | DIASTOLIC BLOOD PRESSURE: 78 MMHG

## 2021-05-26 DIAGNOSIS — I10 ESSENTIAL HYPERTENSION: ICD-10-CM

## 2021-05-26 DIAGNOSIS — F33.0 MILD EPISODE OF RECURRENT MAJOR DEPRESSIVE DISORDER (HCC): ICD-10-CM

## 2021-05-26 DIAGNOSIS — R20.2 NUMBNESS AND TINGLING OF BOTH LEGS: ICD-10-CM

## 2021-05-26 DIAGNOSIS — I48.0 PAROXYSMAL ATRIAL FIBRILLATION (HCC): ICD-10-CM

## 2021-05-26 DIAGNOSIS — I65.23 BILATERAL CAROTID ARTERY STENOSIS: ICD-10-CM

## 2021-05-26 DIAGNOSIS — M15.9 PRIMARY OSTEOARTHRITIS INVOLVING MULTIPLE JOINTS: ICD-10-CM

## 2021-05-26 DIAGNOSIS — G47.33 OSA ON CPAP: ICD-10-CM

## 2021-05-26 DIAGNOSIS — E78.2 MIXED HYPERLIPIDEMIA: ICD-10-CM

## 2021-05-26 DIAGNOSIS — G89.29 CHRONIC LEFT-SIDED LOW BACK PAIN WITH LEFT-SIDED SCIATICA: ICD-10-CM

## 2021-05-26 DIAGNOSIS — N18.31 TYPE 2 DIABETES MELLITUS WITH STAGE 3A CHRONIC KIDNEY DISEASE, WITHOUT LONG-TERM CURRENT USE OF INSULIN (HCC): Primary | ICD-10-CM

## 2021-05-26 DIAGNOSIS — G47.62 NOCTURNAL LEG CRAMPS: ICD-10-CM

## 2021-05-26 DIAGNOSIS — Z99.89 OSA ON CPAP: ICD-10-CM

## 2021-05-26 DIAGNOSIS — Z00.00 ROUTINE GENERAL MEDICAL EXAMINATION AT A HEALTH CARE FACILITY: ICD-10-CM

## 2021-05-26 DIAGNOSIS — M54.42 CHRONIC LEFT-SIDED LOW BACK PAIN WITH LEFT-SIDED SCIATICA: ICD-10-CM

## 2021-05-26 DIAGNOSIS — I73.9 PAD (PERIPHERAL ARTERY DISEASE) (HCC): ICD-10-CM

## 2021-05-26 DIAGNOSIS — R20.0 NUMBNESS AND TINGLING OF BOTH LEGS: ICD-10-CM

## 2021-05-26 DIAGNOSIS — C61 PROSTATE CANCER (HCC): ICD-10-CM

## 2021-05-26 DIAGNOSIS — E03.9 ACQUIRED HYPOTHYROIDISM: ICD-10-CM

## 2021-05-26 DIAGNOSIS — M54.12 CERVICAL RADICULOPATHY: ICD-10-CM

## 2021-05-26 DIAGNOSIS — E11.22 TYPE 2 DIABETES MELLITUS WITH STAGE 3A CHRONIC KIDNEY DISEASE, WITHOUT LONG-TERM CURRENT USE OF INSULIN (HCC): Primary | ICD-10-CM

## 2021-05-26 PROCEDURE — 99214 OFFICE O/P EST MOD 30 MIN: CPT | Performed by: NURSE PRACTITIONER

## 2021-05-26 PROCEDURE — 1123F ACP DISCUSS/DSCN MKR DOCD: CPT | Performed by: NURSE PRACTITIONER

## 2021-05-26 PROCEDURE — 4040F PNEUMOC VAC/ADMIN/RCVD: CPT | Performed by: NURSE PRACTITIONER

## 2021-05-26 PROCEDURE — 3044F HG A1C LEVEL LT 7.0%: CPT | Performed by: NURSE PRACTITIONER

## 2021-05-26 PROCEDURE — 3017F COLORECTAL CA SCREEN DOC REV: CPT | Performed by: NURSE PRACTITIONER

## 2021-05-26 PROCEDURE — G0439 PPPS, SUBSEQ VISIT: HCPCS | Performed by: NURSE PRACTITIONER

## 2021-05-26 RX ORDER — HYDROCODONE BITARTRATE AND ACETAMINOPHEN 7.5; 325 MG/1; MG/1
1 TABLET ORAL EVERY 8 HOURS PRN
Qty: 90 TABLET | Refills: 0 | Status: SHIPPED | OUTPATIENT
Start: 2021-05-26 | End: 2021-07-02 | Stop reason: SDUPTHER

## 2021-05-26 SDOH — ECONOMIC STABILITY: FOOD INSECURITY: WITHIN THE PAST 12 MONTHS, YOU WORRIED THAT YOUR FOOD WOULD RUN OUT BEFORE YOU GOT MONEY TO BUY MORE.: NEVER TRUE

## 2021-05-26 ASSESSMENT — ENCOUNTER SYMPTOMS
COUGH: 0
TROUBLE SWALLOWING: 0
DIARRHEA: 0
CONSTIPATION: 0
NAUSEA: 0
RHINORRHEA: 0
SORE THROAT: 0
SHORTNESS OF BREATH: 0
VOMITING: 0
BACK PAIN: 1
ABDOMINAL PAIN: 0

## 2021-05-26 ASSESSMENT — PATIENT HEALTH QUESTIONNAIRE - PHQ9: 1. LITTLE INTEREST OR PLEASURE IN DOING THINGS: 0

## 2021-05-26 NOTE — PROGRESS NOTES
14. Nocturnal leg cramps  G47.62        Return in 6 months (on 11/26/2021) for Medicare Annual Wellness Visit in 1 year. SUBJECTIVE/OBJECTIVE:  HPI    Here for AWV and f/u on health issues    Diabetes  On glipizide  Checks sugars 2-3 times a week. Running <145  Lab Results   Component Value Date    LABA1C 6.3 (H) 05/18/2021     HTN  On lisinopril, norvasc and toprol  BP controlled on medicine. HLP  On lipitor   No concerns  Lab Results   Component Value Date    CHOL 90 (L) 05/18/2021    CHOL 91 (L) 11/09/2020    CHOL 91 (L) 01/22/2020     Lab Results   Component Value Date    TRIG 125 05/18/2021    TRIG 124 11/09/2020    TRIG 153 (H) 01/22/2020     Lab Results   Component Value Date    HDL 22 (L) 05/18/2021    HDL 21 (L) 11/09/2020    HDL 20 (L) 01/22/2020     Lab Results   Component Value Date    LDLCALC 43 05/18/2021    [address] 45 11/09/2020    [address] 40 01/22/2020     PAD/Carotid stenosis  Followed by vascular at [facility]  On lipitor  Was on xarelto due to PAF. Been off of it for 6 months. RICHARD Impression 08/20/20  Impression:   1. No significant arterial insufficiency of the right lower extremity at   rest.   2. No significant arterial insufficiency of the left lower extremity at   rest.   This report was finalized on 08/20/2020 10:21 by Dr. Emi Pierre MD.    Carotid US 08/20/20  Impression:   1. There is less than 50% stenosis of the right internal carotid artery. 2. There is less than 50% stenosis of the left internal carotid artery. 3. Antegrade flow is demonstrated in bilateral vertebral arteries. PAF  Quit taking xarelto due to cost  Followed by Cardiology at [facility]  No concerns    chads vasc score 4.8%     SUSY  Uses CPAP off/on  Uses it twice a week    Depression  On celexa  Stable on medicine    Arthritis/chronic neck/back pain  Followed by pain mgmt. No concerns  Have gotten injections and they help.      Hypothyroid  On synthroid  No concerns  Lab Results   Component Value Date    TSH 0.477 11/09/2020     He has had first COVID vaccine     Having leg cramps  Calves will cramp at night. When get up and walk it goes away. /78   Pulse 65   Temp 97.6 °F (36.4 °C) (Temporal)   Ht 5' 11\" (1.803 m)   Wt 245 lb (111.1 kg)   SpO2 93%   BMI 34.17 kg/m²     Review of Systems   Constitutional: Negative for activity change, appetite change, fatigue, fever and unexpected weight change. HENT: Negative for ear pain, rhinorrhea, sore throat and trouble swallowing. Eyes: Negative for visual disturbance. Respiratory: Negative for cough and shortness of breath. Cardiovascular: Negative for chest pain, palpitations and leg swelling. Gastrointestinal: Negative for abdominal pain, constipation, diarrhea, nausea and vomiting. Genitourinary: Negative for flank pain. Musculoskeletal: Positive for back pain and neck pain. Negative for arthralgias, myalgias and neck stiffness. Leg cramps   Neurological: Negative for headaches. Psychiatric/Behavioral: Negative for decreased concentration and sleep disturbance. The patient is not nervous/anxious. Physical Exam  Vitals reviewed. Constitutional:       Appearance: Normal appearance. HENT:      Head: Normocephalic and atraumatic. Right Ear: Tympanic membrane, ear canal and external ear normal.      Left Ear: Tympanic membrane, ear canal and external ear normal.      Nose: Nose normal.      Mouth/Throat:      Mouth: Mucous membranes are moist.      Pharynx: Oropharynx is clear. Eyes:      Conjunctiva/sclera: Conjunctivae normal.   Cardiovascular:      Rate and Rhythm: Normal rate and regular rhythm. Pulses: Normal pulses. Heart sounds: Normal heart sounds. Pulmonary:      Effort: Pulmonary effort is normal.      Breath sounds: Normal breath sounds. Abdominal:      Palpations: Abdomen is soft. Musculoskeletal:         General: Normal range of motion.       Cervical back: Normal range of motion and neck

## 2021-05-26 NOTE — PROGRESS NOTES
Medicare Annual Wellness Visit  Name: David Denton Date: 2021   MRN: [id number] Sex: Male   Age: 69 y.o. Ethnicity: Non-/Non    : 1950 Race: Edward Ricardo is here for Medicare AWV (leg cramps at night in bed, waking him up, feet feel numb. when gets up to walk gets better. has been going to pain management at [facility]. stopped taking xarelto due to cost. made too much money for help. )    Screenings for behavioral, psychosocial and functional/safety risks, and cognitive dysfunction are all negative except as indicated below. These results, as well as other patient data from the [address] form, are documented in Flowsheets linked to this Encounter. No Known Allergies      Prior to Visit Medications    Medication Sig Taking? Authorizing Provider   HYDROcodone-acetaminophen (NORCO) 7.5-325 MG per tablet Take 1 tablet by mouth every 8 hours as needed for Pain for up to 30 days. Yes Cinthya MARY Crane   gabapentin (NEURONTIN) 400 MG capsule Take 1 capsule by mouth 3 times daily for 90 days.  Yes Cinthya G MARY Villanueva   atorvastatin (LIPITOR) 20 MG tablet Take 1 tablet by mouth nightly Yes MARY Cline   levothyroxine (SYNTHROID) 200 MCG tablet Take 1 tablet by mouth Daily Yes MARY Cline   glipiZIDE (GLUCOTROL) 5 MG tablet Take 1 tablet by mouth daily Yes MARY Cline   lisinopril (PRINIVIL;ZESTRIL) 40 MG tablet TAKE 1 TABLET BY MOUTH DAILY Yes MARY Cline   citalopram (CELEXA) 20 MG tablet Take 1 tablet by mouth daily Yes MARY Clien   metoprolol succinate (TOPROL XL) 25 MG extended release tablet Take 1 tablet by mouth daily Yes MARY Cline   allopurinol (ZYLOPRIM) 100 MG tablet Take 1 tablet by mouth daily Yes MARY Cline   amLODIPine (NORVASC) 10 MG tablet Take 0.5 tablets by mouth daily Yes González Arevalo MARY   Cholecalciferol (VITAMIN D3) 2000 units TABS Take 1 tablet by mouth daily Yes MARY Kim   COLCRYS 0.6 MG tablet TAKE 2 TABLETS THEN AN HOUR LATER TAKE 1 TABLET WHEN GOUT FLARES Yes MARY Kim         Past Medical History:   Diagnosis Date    Allergic rhinitis     Arthritis     Cancer ([facility] [address] 2012    Thyroid  - Dr Neal Fontenot     Hyperlipidemia     Hypertension     Hyperthyroidism     Prostate cancer ([facility]     Sleep apnea     Type II or unspecified type diabetes mellitus without mention of complication, not stated as uncontrolled        Past Surgical History:   Procedure Laterality Date    CHOLECYSTECTOMY      INCONTINENCE SURGERY  11/26/2018    dr gino Kolb Ano  08/01/2017    removal, dr Eleazar Cano      removed by Dr Neal Fontenot          Family History   Problem Relation Age of Onset    Schizophrenia Mother     Heart Failure Mother     Heart Attack Mother     Cancer Father         Prostate & Leukemia     Heart Disease Father     Heart Attack Father     Thyroid Disease Sister        CareTeam (Including outside providers/suppliers regularly involved in providing care):   Patient Care Team:  MARY Kim as PCP - General (Family Nurse Practitioner)  MARY Kim [facility]  MARY Duvall - CNP as Nurse Practitioner (Cardiology)  Esmer Lau MD as Consulting Physician (Urology)    Wt Readings from Last 3 Encounters:   05/26/21 245 lb (111.1 kg)   03/16/21 244 lb (110.7 kg)   12/30/20 240 lb (108.9 kg)     Vitals:    05/26/21 0928   BP: 130/78   Pulse: 65   Temp: 97.6 °F (36.4 °C)   TempSrc: Temporal   SpO2: 93%   Weight: 245 lb (111.1 kg)   Height: 5' 11\" (1.803 m)     Body mass index is 34.17 kg/m². Based upon direct observation of the patient, evaluation of cognition reveals recent and remote memory intact.     Patient's complete Health Risk Assessment and screening values have been reviewed and are found in Flowsheets. The following problems were reviewed today and where indicated follow up appointments were made and/or referrals ordered. Positive Risk Factor Screenings with Interventions:      Cognitive: Words recalled: 0 Words Recalled  Clock Drawing Test (CDT) Score: Normal  Total Score Interpretation: Positive Mini-Cog  Cognitive Impairment Interventions:  · recalled all 3 on re exam.         General Health and ACP:  General  In general, how would you say your health is?: Fair  In the past 7 days, have you experienced any of the following?  New or Increased Pain, New or Increased Fatigue, Loneliness, Social Isolation, Stress or Anger?: None of These  Do you get the social and emotional support that you need?: Yes  Do you have a Living Will?: (!) No (thiago quiñones - girlfriend/best friend- [phone])  Advance Directives     Power of [facility] Will ACP-Advance Directive ACP-Power of     Not on File Not on File Not on File Not on File      General Health Risk Interventions:  · No Living Will: Advance Care Planning addressed with patient today    Health Habits/Nutrition:  Health Habits/Nutrition  Do you exercise for at least 20 minutes 2-3 times per week?: (!) No  Have you lost any weight without trying in the past 3 months?: No  Do you eat only one meal per day?: No  Have you seen the dentist within the past year?: (!) No  Body mass index: (!) 34.17  Health Habits/Nutrition Interventions:  · Inadequate physical activity:  educational materials provided to promote increased physical activity    Hearing/Vision:  No exam data present  Hearing/Vision  Do you or your family notice any trouble with your hearing that hasn't been managed with hearing aids?: (!) Yes (has hearing aids, doesnt seem to help)  Do you have difficulty driving, watching TV, or doing any of your daily activities because of your eyesight?: No  Have you

## 2021-05-26 NOTE — PATIENT INSTRUCTIONS
Take full aspirin daily       Personalized Preventive Plan for Laron Hernadez - 5/26/2021  Medicare offers a range of preventive health benefits. Some of the tests and screenings are paid in full while other may be subject to a deductible, co-insurance, and/or copay. Some of these benefits include a comprehensive review of your medical history including lifestyle, illnesses that may run in your family, and various assessments and screenings as appropriate. After reviewing your medical record and screening and assessments performed today your provider may have ordered immunizations, labs, imaging, and/or referrals for you. A list of these orders (if applicable) as well as your Preventive Care list are included within your After Visit Summary for your review. Other Preventive Recommendations:    · A preventive eye exam performed by an eye specialist is recommended every 1-2 years to screen for glaucoma; cataracts, macular degeneration, and other eye disorders. · A preventive dental visit is recommended every 6 months. · Try to get at least 150 minutes of exercise per week or 10,000 steps per day on a pedometer . · Order or download the FREE \"Exercise & Physical Activity: Your Everyday Guide\" from [employer]. Call 6-420.805.9015 or search [employer] online. · You need 0920-7445 mg of calcium and 2584-6154 IU of vitamin D per day. It is possible to meet your calcium requirement with diet alone, but a vitamin D supplement is usually necessary to meet this goal.  · When exposed to the sun, use a sunscreen that protects against both UVA and UVB radiation with an SPF of 30 or greater. Reapply every 2 to 3 hours or after sweating, drying off with a towel, or swimming. · Always wear a seat belt when traveling in a car. Always wear a helmet when riding a bicycle or motorcycle.     Heart-Healthy Diet   Sodium, Fat, and Cholesterol Controlled Diet       What Is a Heart cholesterol  Also known as good cholesterol, this type of cholesterol actually carries cholesterol away from your arteries and may, therefore, help lower your risk of having a heart attack. You want this level to be high (ideally greater than 60). It is a risk to have a level less than 40. You can raise this good cholesterol by eating olive oil, canola oil, avocados, or nuts. Exercise raises this level, too. Fat    Fat is calorie dense and packs a lot of calories into a small amount of food. Even though fats should be limited due to their high calorie content, not all fats are bad. In fact, some fats are quite healthful. Fat can be broken down into four main types. The good-for-you fats are:   Monounsaturated fat  found in oils such as olive and canola, avocados, and nuts and natural nut butters; can decrease cholesterol levels, while keeping levels of HDL cholesterol high   Polyunsaturated fat  found in oils such as safflower, sunflower, soybean, corn, and sesame; can decrease total cholesterol and LDL cholesterol   Omega-3 fatty acids  particularly those found in fatty fish (such as salmon, trout, tuna, mackerel, herring, and sardines); can decrease risk of arrhythmias, decrease triglyceride levels, and slightly lower blood pressure   The fats that you want to limit are:   Saturated fat  found in animal products, many fast foods, and a few vegetables; increases total blood cholesterol, including LDL levels   Animal fats that are saturated include: butter, lard, whole-milk dairy products, meat fat, and poultry skin   Vegetable fats that are saturated include: hydrogenated shortening, palm oil, coconut oil, cocoa butter   Hydrogenated or trans fat  found in margarine and vegetable shortening, most shelf stable snack foods, and fried foods; increases LDL and decreases HDL     It is generally recommended that you limit your total fat for the day to less than 30% of your total calories.  If you follow an 1800-calorie heart healthy diet, for example, this would mean 60 grams of fat or less per day. Saturated fat and trans fat in your diet raises your blood cholesterol the most, much more than dietary cholesterol does. For this reason, on a heart-healthy diet, less than 7% of your calories should come from saturated fat and ideally 0% from trans fat. On an 1800-calorie diet, this translates into less than 14 grams of saturated fat per day, leaving 46 grams of fat to come from mono- and polyunsaturated fats.    Food Choices on a Heart Healthy Diet   Food Category   Foods Recommended   Foods to Avoid   Grains   Breads and rolls without salted tops Most dry and cooked cereals Unsalted crackers and breadsticks Low-sodium or homemade breadcrumbs or stuffing All rice and pastas   Breads, rolls, and crackers with salted tops High-fat baked goods (eg, muffins, donuts, pastries) Quick breads, self-rising flour, and biscuit mixes Regular bread crumbs Instant hot cereals Commercially prepared rice, pasta, or stuffing mixes   Vegetables   Most fresh, frozen, and low-sodium canned vegetables Low-sodium and salt-free vegetable juices Canned vegetables if unsalted or rinsed   Regular canned vegetables and juices, including sauerkraut and pickled vegetables Frozen vegetables with sauces Commercially prepared potato and vegetable mixes   Fruits   Most fresh, frozen, and canned fruits All fruit juices   Fruits processed with salt or sodium   Milk   Nonfat or low-fat (1%) milk Nonfat or low-fat yogurt Cottage cheese, low-fat ricotta, cheeses labeled as low-fat and low-sodium   Whole milk Reduced-fat (2%) milk Malted and chocolate milk Full fat yogurt Most cheeses (unless low-fat and low salt) Buttermilk (no more than 1 cup per week)   Meats and Beans   Lean cuts of fresh or frozen beef, veal, lamb, or pork (look for the word loin) Fresh or frozen poultry without the skin Fresh or frozen fish and some shellfish Egg whites and egg substitutes (Limit whole eggs to three per week) Tofu Nuts or seeds (unsalted, dry-roasted), low-sodium peanut butter Dried peas, beans, and lentils   Any smoked, cured, salted, or canned meat, fish, or poultry (including cordero, chipped beef, cold cuts, hot dogs, sausages, sardines, and anchovies) Poultry skins Breaded and/or fried fish or meats Canned peas, beans, and lentils Salted nuts   Fats and Oils   Olive oil and canola oil Low-sodium, low-fat salad dressings and mayonnaise   Butter, margarine, coconut and palm oils, cordero fat   Snacks, Sweets, and Condiments   Low-sodium or unsalted versions of broths, soups, soy sauce, and condiments Pepper, herbs, and spices; vinegar, lemon, or lime juice Low-fat frozen desserts (yogurt, sherbet, fruit bars) Sugar, cocoa powder, honey, syrup, jam, and preserves Low-fat, trans-fat free cookies, cakes, and pies Otilio and animal crackers, fig bars, rashawn snaps   High-fat desserts Broth, soups, gravies, and sauces, made from instant mixes or other high-sodium ingredients Salted snack foods Canned olives Meat tenderizers, seasoning salt, and most flavored vinegars   Beverages   Low-sodium carbonated beverages Tea and coffee in moderation Soy milk   Commercially softened water   Suggestions   Make whole grains, fruits, and vegetables the base of your diet. Choose heart-healthy fats such as canola, olive, and flaxseed oil, and foods high in heart-healthy fats, such as nuts, seeds, soybeans, tofu, and fish. Eat fish at least twice per week; the fish highest in omega-3 fatty acids and lowest in mercury include salmon, herring, mackerel, sardines, and canned chunk light tuna. If you eat fish less than twice per week or have high triglycerides, talk to your doctor about taking fish oil supplements. Read food labels.    For products low in fat and cholesterol, look for fat free, low-fat, cholesterol free, saturated fat free, and trans fat freeAlso scan the Nutrition Facts Label, which lists saturated fat, trans fat, and cholesterol amounts. For products low in sodium, look for sodium free, very low sodium, low sodium, no added salt, and unsalted   Skip the salt when cooking or at the table; if food needs more flavor, get creative and try out different herbs and spices. Garlic and onion also add substantial flavor to foods. Trim any visible fat off meat and poultry before cooking, and drain the fat off after chopra. Use cooking methods that require little or no added fat, such as grilling, boiling, baking, poaching, broiling, roasting, steaming, stir-frying, and sauting. Avoid fast food and convenience food. They tend to be high in saturated and trans fat and have a lot of added salt. Talk to a registered dietitian for individualized diet advice. Last Reviewed: March 2011 Laron Stuart MS, MPH, RD   Updated: 3/29/2011   ·     Keep Your Memory Suzanne Munc       Many factors can affect your ability to remembera hectic lifestyle, aging, stress, chronic disease, and certain medicines. But, there are steps you can take to sharpen your mind and help preserve your memory. Challenge Your Brain   Regularly challenging your mind may help keeps it in top shape. Good mental exercises include:   Crossword puzzlesUse a dictionary if you need it; you will learn more that way. Brainteasers Try some! Crafts, such as wood working and sewing   Hobbies, such as gardening and building model airplanes   SocializingVisit old friends or join groups to meet new ones.    Reading   Learning a new language   Taking a class, whether it be art history or eugenio chi   TravelingExperience the food, history, and culture of your destination   Learning to use a computer   Going to museums, the theater, or thought-provoking movies   Changing things in your daily life, such as reversing your pattern in the grocery store or brushing your teeth using your nondominant hand   Use Memory Aids   There is no need to remember every detail on your own. These memory aids can help:   Calendars and day planners   Electronic organizers to store all sorts of helpful informationThese devices can \"beep\" to remind you of appointments. A book of days to record birthdays, anniversaries, and other occasions that occur on the same date every year   Detailed \"to-do\" lists and strategically placed sticky notes   Quick \"study\" sessionsBefore a gathering, review who will be there so their names will be fresh in your mind. Establish routinesFor example, keep your keys, wallet, and umbrella in the same place all the time or take medicine with your 8:00 AM glass of juice   Live a Healthy Life   Many actions that will keep your body strong will do the same for your mind. For example:   Talk to Your Doctor About Herbs and Supplements    Malnutrition and vitamin deficiencies can impair your mental function. For example, vitamin B12 deficiency can cause a range of symptoms, including confusion. But, what if your nutritional needs are being met? Can herbs and supplements still offer a benefit? Researchers have investigated a range of natural remedies, such as ginkgo , ginseng , and the supplement phosphatidylserine (PS). So far, though, the evidence is inconsistent as to whether these products can improve memory or thinking. If you are interested in taking herbs and supplements, talk to your doctor first because they may interact with other medicines that you are taking. Exercise Regularly    Among the many benefits of regular exercise are increased blood flow to the brain and decreased risk of certain diseases that can interfere with memory function. One study found that even moderate exercise has a beneficial effect.  Examples of \"moderate\" exercise include:   Playing 18 holes of golf once a week, without a cart   Playing tennis twice a week   Walking one mile per day   Manage Stress    It can be tough to remember what is important when your mind is cluttered. Make time for relaxation. Choose activities that calm you down, and make it routine. Manage Chronic Conditions    Side effects of high blood pressure , diabetes, and heart disease can interfere with mental function. Many of the lifestyle steps discussed here can help manage these conditions. Strive to eat a healthy diet, exercise regularly, get stress under control, and follow your doctor's advice for your condition. Minimize Medications    Talk to your doctor about the medicines that you take. Some may be unnecessary. Also, healthy lifestyle habits may lower the need for certain drugs. Last Reviewed: April 2010 Kathe Gaspar MD   Updated: 4/13/2010   ·     [address]       As we get older, changes in balance, gait, strength, vision, hearing, and cognition make even the most youthful senior more prone to accidents. Falls are one of the leading health risks for older people. This increased risk of falling is related to:   Aging process (eg, decreased muscle strength, slowed reflexes)   Higher incidence of chronic health problems (eg, arthritis, diabetes) that may limit mobility, agility or sensory awareness   Side effects of medicine (eg, dizziness, blurred vision)especially medicines like prescription pain medicines and drugs used to treat mental health conditions   Depending on the brittleness of your bones, the consequences of a fall can be serious and long lasting. Home Life   Research by the Association of Aging [facility]) shows that some home accidents among older adults can be prevented by making simple lifestyle changes and basic modifications and repairs to the home environment. Here are some lifestyle changes that experts recommend:   Have your hearing and vision checked regularly. Be sure to wear prescription glasses that are right for you. Speak to your doctor or pharmacist about the possible side effects of your medicines.  A number of medicines can cause dizziness. If you have problems with sleep, talk to your doctor. Limit your intake of alcohol. If necessary, use a cane or walker to help maintain your balance. Wear supportive, rubber-soled shoes, even at home. If you live in a region that gets wintry weather, you may want to put special cleats on your shoes to prevent you from slipping on the snow and ice. Exercise regularly to help maintain muscle tone, agility, and balance. Always hold the banister when going up or down stairs. Also, use  bars when getting in or out of the bath or shower, or using the toilet. To avoid dizziness, get up slowly from a lying down position. Sit up first, dangling your legs for a minute or two before rising to a standing position. Overall Home Safety Check   According to the Consumer [employer] Safety Commision's \"Older Consumer Home Safety Checklist,\" it is important to check for potential hazards in each room. And remember, proper lighting is an essential factor in home safety. If you cannot see clearly, you are more likely to fall. Important questions to ask yourself include:   Are lamp, electric, extension, and telephone cords placed out of the flow of traffic and maintained in good condition? Have frayed cords been replaced? Are all small rugs and runners slip resistant? If not, you can secure them to the floor with a special double-sided carpet tape. Are smoke detectors properly locatedone on every floor of your home and one outside of every sleeping area? Are they in good working order? Are batteries replaced at least once a year? Do you have a well-maintained carbon monoxide detector outside every sleeping are in your home? Does your furniture layout leave plenty of space to maneuver between and around chairs, tables, beds, and sofas? Are hallways, stairs and passages between rooms well lit? Can you reach a lamp without getting out of bed? Are floor surfaces well maintained?  Shag rugs, high-pile carpeting, tile floors, and polished wood floors can be particularly slippery. Stairs should always have handrails and be carpeted or fitted with a non-skid tread. Is your telephone easily reachable. Is the cord safely tucked away? Room by Room   According to the Association of Aging, bathrooms and vinicio are the two most potentially hazardous rooms in your home. In the Kitchen    Be sure your stove is in proper working order and always make sure burners and the oven are off before you go out or go to sleep. Keep pots on the back burners, turn handles away from the front of the stove, and keep stove clean and free of grease build-up. Kitchen ventilation systems and range exhausts should be working properly. Keep flammable objects such as towels and pot holders away from the cooking area except when in use. Make sure kitchen curtains are tied back. Move cords and appliances away from the sink and hot surfaces. If extension cords are needed, install wiring guides so they do not hang over the sink, range, or working areas. Look for coffee pots, kettles and toaster ovens with automatic shut-offs. Keep a mop handy in the kitchen so you can wipe up spills instantly. You should also have a small fire extinguisher. Arrange your kitchen with frequently used items on lower shelves to avoid the need to stand on a stepstool to reach them. Make sure countertops are well-lit to avoid injuries while cutting and preparing food. In the Bathroom    Use a non-slip mat or decals in the tub and shower, since wet, soapy tile or porcelain surfaces are extremely slippery. Make sure bathroom rugs are non-skid or tape them firmly to the floor. Bathtubs should have at least one, preferably two, grab bars, firmly attached to structural supports in the wall.  (Do not use built-in soap holders or glass shower doors as grab bars.)    Tub seats fitted with non-slip material on the legs allow you to wash sitting down. For people with limited mobility, bathtub transfer benches allow you to slide safely into the tub. Raised toilet seats and toilet safety rails are helpful for those with knee or hip problems. In the [facility]    Make sure you use a nightlight and that the area around your bed is clear of potential obstacles. Be careful with electric blankets and never go to sleep with a heating pad, which can cause serious burns even if on a low setting. Use fire-resistant mattress covers and pillows, and NEVER smoke in bed. Keep a phone next to the bed that is programmed to dial 911 at the push of a button. If you have a chronic condition, you may want to sign on with an automatic call-in service. Typically the system includes a small pendant that connects directly to an emergency medical voice-response system. You should also make arrangements to stay in contact with someonefriend, neighbor, family memberon a regular schedule. Fire Prevention   According to the [employer]. (Smoke Alarms for Every) [address] [place] [address], senior citizens are one of the two highest risk groups for death and serious injuries due to residential fires. When cooking, wear short-sleeved items, never a bulky long-sleeved robe. The [facility]'s Safety Checklist for Older Consumers emphasizes the importance of checking basements, garages, workshops and storage areas for fire hazards, such as volatile liquids, piles of old rags or clothing and overloaded circuits. Never smoke in bed or when lying down on a couch or recliner chair. Small portable electric or kerosene heaters are responsible for many home fires and should be used cautiously if at all. If you do use one, be sure to keep them away from flammable materials. In case of fire, make sure you have a pre-established emergency exit plan. Have a professional check your fireplace and other fuel-burning appliances yearly.     Helping Hands   Baby boomers entering the that you can always change your mind. Ask your doctor about commonly used life-support measures. These include tube feedings, breathing machines, and fluids given through a vein (IV). Understanding these treatments will help you decide whether you want them. You may choose to have these life-supporting treatments for a limited time. This allows a trial period to see whether they will help you. You may also decide that you want your doctor to take only certain measures to keep you alive. It may help to think about the big picture, like what makes life worth living for you or what your values and goals are. Talk to your doctor about how long you are likely to live. Your doctor may be able to give you an idea of what usually happens with your specific illness. Think about preparing papers that state your wishes. These papers are called advance directives. If you do this early and review them often, there will not be any confusion about what you want. You can change your instructions at any time. Which papers should you prepare? Advance directives are legal papers that tell doctors how you want to be cared for at the end of your life. You do not need a  to write these papers. Ask your doctor or your state health department for information on how to write your advance directives. They may have the forms for each of these types of papers. Make sure your doctor has a copy of these on file, and give a copy to a family member or close friend. Consider a do-not-resuscitate order (DNR). This order asks that no extra treatments be done if your heart stops or you stop breathing. Extra treatments may include cardiopulmonary resuscitation (CPR), electrical shock to restart your heart, or a machine to breathe for you. If you decide to have a DNR order, ask your doctor to explain and write it. Place the order in your home where everyone can easily see it. Consider a living will.  A living will explains your wishes about life support and other treatments at the end of your life if you become unable to speak for yourself. Living jeong tell doctors to use or not use treatments that would keep you alive. You must have one or two witnesses or a notary present when you sign this form. A living will may be called something else in your state. Consider a medical power of . This form allows you to name a person to make decisions about your care if you are not able to. Most people ask a close friend or family member. Talk to this person about the kinds of treatments you want and those that you do not want. Make sure this person understands your wishes. A medical power of  may be called something else in your state. These legal papers are simple to change. Tell your doctor what you want to change, and ask him or her to make a note in your medical file. Give your family updated copies of the papers. Where can you learn more? Go to [email or web address] [email or web address] org and sign in to your [employer] account. Enter P184 in the Qire box to learn more about \"Advance Care Planning: Care Instructions. \"     If you do not have an account, please click on the \"Sign Up Now\" link. Current as of: July 17, 2020               Content Version: 12.8  © 2006-2021 Healthwise, Incorporated. Care instructions adapted under license by [facility] ([facility]). If you have questions about a medical condition or this instruction, always ask your healthcare professional. [place] [address] any warranty or liability for your use of this information. ·        Learning About Living Perroy  What is a living will? A living will, also called a declaration, is a legal form. It tells your family and your doctor your wishes when you can't speak for yourself. It's used by the health professionals who will treat you as you near the end of your life or if you get seriously hurt or ill.   If you put your wishes in writing, your doctors will respect your wishes even if you have a form from a different state. You might use a universal form that has been approved by many states. This kind of form can sometimes be filled out and stored online. Your digital copy will then be available wherever you have a connection to the internet. The doctors and nurses who need to treat you can find it right away. Your state may offer an online registry. This is another place where you can store your living will online. It's a good idea to get your living will notarized. This means using a person called a Treventis to watch two people sign, or witness, your living will. What should you know when you create a living will? Here are some questions to ask yourself as you make your living will:  Do you know enough about life support methods that might be used? If not, talk to your doctor so you know what might be done if you can't breathe on your own, your heart stops, or you can't swallow. What things would you still want to be able to do after you receive life-support methods? Would you want to be able to walk? To speak? To eat on your own? To live without the help of machines? Do you want certain Jain practices performed if you become very ill? If you have a choice, where do you want to be cared for? In your home? At a hospital or nursing home? If you have a choice at the end of your life, where would you prefer to die? At home? In a hospital or nursing home? Somewhere else? Would you prefer to be buried or cremated? Do you want your organs to be donated after you die? What should you do with your living will? Make sure that your family members and your health care agent have copies of your living will (also called a declaration). Give your doctor a copy of your living will. Ask him or her to keep it as part of your medical record. If you have more than one doctor, make sure that each one has a copy.   Put a copy of your living will where . \"     If you do not have an account, please click on the \"Sign Up Now\" link. Current as of: July 17, 2020               Content Version: 12.8  © 2006-2021 Healthwise, [employer]. Care instructions adapted under license by [facility] ([facility]). If you have questions about a medical condition or this instruction, always ask your healthcare professional. [facility] [address] any warranty or liability for your use of this information. ·   Patient Education        Well Visit, Over 72: Care Instructions  Overview     Well visits can help you stay healthy. Your doctor has checked your overall health and may have suggested ways to take good care of yourself. Your doctor also may have recommended tests. At home, you can help prevent illness with healthy eating, regular exercise, and other steps. Follow-up care is a key part of your treatment and safety. Be sure to make and go to all appointments, and call your doctor if you are having problems. It's also a good idea to know your test results and keep a list of the medicines you take. How can you care for yourself at home? Get screening tests that you and your doctor decide on. Screening helps find diseases before any symptoms appear. Eat healthy foods. Choose fruits, vegetables, whole grains, protein, and low-fat dairy foods. Limit fat, especially saturated fat. Reduce salt in your diet. Limit alcohol. If you are a man, have no more than 2 drinks a day or 14 drinks a week. If you are a woman, have no more than 1 drink a day or 7 drinks a week. Since alcohol affects older adults differently, you may want to limit alcohol even more. Or you may not want to drink at all. Get at least 30 minutes of exercise on most days of the week. Walking is a good choice. You also may want to do other activities, such as running, swimming, cycling, or playing tennis or team sports. Reach and stay at a healthy weight.  This will lower your risk for many problems, such as obesity, diabetes, heart disease, and high blood pressure. Do not smoke. Smoking can make health problems worse. If you need help quitting, talk to your doctor about stop-smoking programs and medicines. These can increase your chances of quitting for good. Care for your mental health. It is easy to get weighed down by worry and stress. Learn strategies to manage stress, like deep breathing and mindfulness, and stay connected with your family and community. If you find you often feel sad or hopeless, talk with your doctor. Treatment can help. Talk to your doctor about whether you have any risk factors for sexually transmitted infections (STIs). You can help prevent STIs if you wait to have sex with a new partner (or partners) until you've each been tested for STIs. It also helps if you use condoms (male or female condoms) and if you limit your sex partners to one person who only has sex with you. Vaccines are available for some STIs. If you think you may have a problem with alcohol or drug use, talk to your doctor. This includes prescription medicines (such as amphetamines and opioids) and illegal drugs (such as cocaine and methamphetamine). Your doctor can help you figure out what type of treatment is best for you. Protect your skin from too much sun. When you're outdoors from 10 a.m. to 4 p.m., stay in the shade or cover up with clothing and a hat with a wide brim. Wear sunglasses that block UV rays. Even when it's cloudy, put broad-spectrum sunscreen (SPF 30 or higher) on any exposed skin. See a dentist one or two times a year for checkups and to have your teeth cleaned. Wear a seat belt in the car. When should you call for help? Watch closely for changes in your health, and be sure to contact your doctor if you have any problems or symptoms that concern you. Where can you learn more? Go to https://manuela.health-partners. org and sign in to your [employer] account.  Enter H214 in the Search Health Information box to learn more about \"Well Visit, Over 65: Care Instructions. \"     If you do not have an account, please click on the \"Sign Up Now\" link. Current as of: May 27, 2020               Content Version: 12.8  © 3900-2245 [employer]. Care instructions adapted under license by [facility] ([facility]). If you have questions about a medical condition or this instruction, always ask your healthcare professional. [place] [address] any warranty or liability for your use of this information. Patient Education        Nighttime Leg Cramps: Care Instructions  Your Care Instructions     Nighttime leg cramps happen when a leg muscle tightens up suddenly. This most often happens in the calf. But cramps in the thigh or foot are also common. Cramps often occur just as you fall asleep or wake up. Leg cramps can be painful. They can last a few seconds to a few minutes. Though they are common, experts don't know exactly what causes them. To treat muscle cramps, you can stretch and massage the muscle. If cramps keep coming back, your doctor may prescribe medicine that relaxes your muscles. Follow-up care is a key part of your treatment and safety. Be sure to make and go to all appointments, and call your doctor if you are having problems. It's also a good idea to know your test results and keep a list of the medicines you take. How can you care for yourself at home? To stop a leg cramp, sit down and straighten your leg as you bend your foot up toward your knee. It may help to place a rolled towel under the ball of your foot and, while you hold the towel at both ends, gently pull the towel toward you while you keep your knee straight. This stretches the calf muscles. The cramp usually goes away after a few minutes. Take a warm shower or bath to relax the muscle. Some people find that a heating pad placed on the muscle can also help.  Others get relief by rubbing the calf with an ice pack.  Stretch your muscles every day, especially before and after exercise and at bedtime. Regular stretching can relax your muscles and may prevent cramps. Do not suddenly increase the amount of exercise you get. Increase your exercise a little each week. If your doctor prescribes medicine, take it exactly as prescribed. Call your doctor if you think you are having a problem with your medicine. Ask your doctor if you can take an over-the-counter pain medicine, such as acetaminophen (Tylenol), ibuprofen (Advil, Motrin), or naproxen (Aleve). Be safe with medicines. Read and follow all instructions on the label. Drink plenty of fluids. If you have kidney, heart, or liver disease and have to limit fluids, talk with your doctor before you increase the amount of fluids you drink. When should you call for help? Watch closely for changes in your health, and be sure to contact your doctor if:    You often have muscle cramps that do not go away after home treatment.     Your muscle cramps often wake you up at night.     You do not get better as expected. Where can you learn more? Go to [email or web address]://[email or web address] org and sign in to your [employer] account. Enter S910 in the [employer] box to learn more about \"Nighttime Leg Cramps: Care Instructions. \"     If you do not have an account, please click on the \"Sign Up Now\" link. Current as of: August 4, 2020               Content Version: 12.8  © 9472-3464 Healthwise, [employer]. Care instructions adapted under license by [facility] ([facility]). If you have questions about a medical condition or this instruction, always ask your healthcare professional. [address] any warranty or liability for your use of this information.

## 2021-05-27 ENCOUNTER — TELEPHONE (OUTPATIENT)
Dept: PAIN MANAGEMENT | Age: 71
End: 2021-05-27

## 2021-06-04 DIAGNOSIS — E79.0 HYPERURICEMIA: ICD-10-CM

## 2021-06-04 RX ORDER — ALLOPURINOL 100 MG/1
100 TABLET ORAL DAILY
Qty: 90 TABLET | Refills: 1 | Status: SHIPPED | OUTPATIENT
Start: 2021-06-04 | End: 2021-12-13

## 2021-06-04 NOTE — TELEPHONE ENCOUNTER
Received fax from pharmacy requesting refill on pts medication(s). Pt was last seen in office on 5/26/2021  and has a follow up scheduled for 12/1/2021. Will send request to  David Amanda  for patient.      Requested Prescriptions     Pending Prescriptions Disp Refills    allopurinol (ZYLOPRIM) 100 MG tablet [Pharmacy Med Name: ALLOPURINOL 100 MG TABLET] 90 tablet 3     Sig: TAKE 1 TABLET BY MOUTH EVERY DAY

## 2021-06-07 NOTE — PROGRESS NOTES
Left message to call back for: kamilla  Information to relay to patient:  Asked pt to return call and set up a telephone visit with brock for a medication check    Nephrology ([facility]) Progress Note      Patient:  Zay Kamara  YOB: 1950  Date of Service: 8/11/2017  MRN: [id number]   Acct: [id number]   Primary Care Physician: LUIS Souza  Advance Directive: Full Code  Admit Date: 8/1/2017       Hospital Day: 10  Referring Provider: Hernesto Hong MD      Patient personally seen and examined.  Complete chart including Consults, Notes, Operative Reports, Labs, Cardiology, and Radiology studies reviewed as able.        Subjective:  Zay Kamara is a 66 y.o. male  whom we were consulted for acute kidney injury.  No prior renal history.  He is status post prostatectomy on 8/01 for the treatment of prostate cancer.  Patient had episode of acute kidney injury related to prerenal azotemia, responded very well to IV fluid administration.  He also developed paralytic ileus.  Multiple bowel movements after small bowel follow through yesterday.  Diet is advancing today.  Hiccups have improved with Thorazine.    Allergies:  Review of patient's allergies indicates no known allergies.    Home Meds:  Prescriptions Prior to Admission   Medication Sig Dispense Refill Last Dose   • atorvastatin (LIPITOR) 10 MG tablet Take 20 mg by mouth Daily.   7/30/2017   • levothyroxine (SYNTHROID, LEVOTHROID) 200 MCG tablet Take 200 mcg by mouth Daily.   7/30/2017   • lisinopril (PRINIVIL,ZESTRIL) 40 MG tablet Take 40 mg by mouth Daily.   7/30/2017   • nabumetone (RELAFEN) 500 MG tablet Take 500 mg by mouth 2 (Two) Times a Day As Needed for Mild Pain (1-3) (arhritis and knee pain).   7/30/2017   • SITagliptin (JANUVIA) 100 MG tablet Take 100 mg by mouth Daily.   7/30/2017   • tamsulosin (FLOMAX) 0.4 MG capsule 24 hr capsule Take 1 capsule by mouth every night.   7/30/2017       Medicines:  Current Facility-Administered Medications   Medication Dose Route Frequency Provider Last Rate Last Dose   • dextrose (D50W) solution 25 g  25 g  Intravenous Q15 Min PRN Zaheer Rebolledo MD       • dextrose (GLUTOSE) oral gel 15 g  15 g Oral Q15 Min PRN Zaheer Rebolledo MD       • glucagon (human recombinant) (GLUCAGEN DIAGNOSTIC) injection 1 mg  1 mg Subcutaneous Q15 Min PRN Zaheer Rebolledo MD       • heparin (porcine) 5000 UNIT/ML injection 5,000 Units  5,000 Units Subcutaneous Q8H Zaheer Rebolledo MD   5,000 Units at 08/11/17 0625   • hydrALAZINE (APRESOLINE) injection 10 mg  10 mg Intravenous Q4H PRN Zaheer Rebolledo MD   10 mg at 08/07/17 2109   • insulin lispro (humaLOG) injection 0-9 Units  0-9 Units Subcutaneous 4x Daily With Meals & Nightly Zaheer Rebolledo MD   2 Units at 08/11/17 0843   • lactated ringers infusion  100 mL/hr Intravenous Continuous Andrea Savage  mL/hr at 08/11/17 0351 100 mL/hr at 08/11/17 0351   • Morphine injection 6 mg  6 mg Intravenous Q2H PRN Hernesto Hong MD   6 mg at 08/11/17 0350    And   • naloxone (NARCAN) injection 0.4 mg  0.4 mg Intravenous Q5 Min PRN Hernesto Hong MD       • ondansetron (ZOFRAN) injection 4 mg  4 mg Intravenous Q4H PRN Zaheer Rebolledo MD   4 mg at 08/04/17 2006    Or   • ondansetron (ZOFRAN) tablet 4 mg  4 mg Oral Q4H PRN Zaeher Rebolledo MD        Or   • ondansetron ODT (ZOFRAN-ODT) disintegrating tablet 4 mg  4 mg Oral Q4H PRN Zaheer Rebolledo MD           Past Medical History:  Past Medical History:   Diagnosis Date   • Arthritis    • Cancer     thyroid   • Diabetes    • Disease of thyroid gland    • Hypercholesteremia    • Hypertension    • IBS (irritable bowel syndrome)    • Prostate cancer    • Sleep apnea     NON COMPLIANT WITH C PAP       Past Surgical History:  Past Surgical History:   Procedure Laterality Date   • CHOLECYSTECTOMY     • COLONOSCOPY N/A 3/7/2017    Procedure: COLONOSCOPY WITH ANESTHESIA;  Surgeon: Hector Alvarenga MD;  Location: [facility] ENDOSCOPY;  Service:    • CYSTOSCOPY TRANSURETHRAL RESECTION OF PROSTATE     • PROSTATE  "SURGERY     • PROSTATECTOMY N/A 8/1/2017    Procedure: PROSTATECTOMY LAPAROSCOPIC WITH DAVINCI SI ROBOT ;  Surgeon: Hernesto Hong MD;  Location: [facility] OR;  Service:    • THYROID SURGERY      RADIATION THERAPY AFTER SURGERY       Family History  Family History   Problem Relation Age of Onset   • Family history unknown: Yes   • Prostate cancer Father        Social History  Social History     Social History   • Marital status:      Spouse name: N/A   • Number of children: N/A   • Years of education: N/A     Occupational History   • Not on file.     Social History Main Topics   • Smoking status: Never Smoker   • Smokeless tobacco: Never Used   • Alcohol use No   • Drug use: No   • Sexual activity: Defer     Other Topics Concern   • Not on file     Social History Narrative         Review of Systems:  History obtained from chart review and the patient  General ROS: No fever or chills  Respiratory ROS: No cough, shortness of breath, wheezing  Cardiovascular ROS: no chest pain or dyspnea on exertion  Gastrointestinal ROS: No abdominal pain or melena  Genito-Urinary ROS: No dysuria or hematuria  Neurological ROS: negative  14 point ROS reviewed with the patient and negative except as noted above and in the HPI unless unable to obtain.    Objective:  /47 (BP Location: Right arm, Patient Position: Lying)  Pulse 91  Temp 97.7 °F (36.5 °C) (Oral)   Resp 18  Ht 71\" (180.3 cm)  Wt 214 lb (97.1 kg)  SpO2 92%  BMI 29.85 kg/m2    Intake/Output Summary (Last 24 hours) at 08/11/17 0923  Last data filed at 08/11/17 0351   Gross per 24 hour   Intake             3489 ml   Output              975 ml   Net             2514 ml     General: awake/alert   HEENT: Normocephalic, atraumatic  Neck:supple, no JVD or carotid bruits.  Chest:  clear to auscultation bilaterally without respiratory distress  CVS: regular rate and rhythm  Abdominal: Distended, + bowel sounds, nontender  Extremities: no cyanosis or edema  Skin: " warm and dry without rash      Labs:    Results from last 7 days  Lab Units 08/11/17  0528 08/10/17  0507 08/09/17  0503   WBC 10*3/mm3 12.91* 14.27* 13.77*   HEMOGLOBIN g/dL 10.1* 10.6* 10.7*   HEMATOCRIT % 30.4* 32.1* 31.8*   PLATELETS 10*3/mm3 322 344 346           Results from last 7 days  Lab Units 08/11/17  0528 08/10/17  0925 08/09/17  0503  08/06/17  0456   SODIUM mmol/L 135 134* 134*  < > 138   POTASSIUM mmol/L 3.7 4.2 4.3  < > 4.6   CHLORIDE mmol/L 100 98 98  < > 101   CO2 mmol/L 28.0 30.0 28.0  < > 30.0   BUN mg/dL 16 15 12  < > 18   CREATININE mg/dL 1.06 0.99 0.89  < > 0.94   CALCIUM mg/dL 7.9* 8.2* 8.7  < > 7.8*   BILIRUBIN mg/dL  --   --  1.4*  --  1.5*   ALK PHOS U/L  --   --  40  --  36   ALT (SGPT) U/L  --   --  41  --  31   AST (SGOT) U/L  --   --  24  --  22   GLUCOSE mg/dL 124* 117* 120*  < > 125*   < > = values in this interval not displayed.    Radiology:   Imaging Results (last 72 hours)     Procedure Component Value Units Date/Time    US Renal Bilateral [[id number]] Collected:  08/02/17 1450     Updated:  08/02/17 1516    Narrative:       RENAL ULTRASOUND COMPLETE 8/2/2017 2:21 PM CDT     REASON FOR EXAM: Oliguria s/p prostatectomy; [id number] neoplasm of  prostate       COMPARISON: None       TECHNIQUE: Multiple longitudinal and transverse realtime sonographic  images of the kidneys and urinary bladder are obtained.      FINDINGS:      RIGHT KIDNEY: 5.6 x 5.1 x 12.1 cm. Normal in size, shape, contour and  position. Small 2.5 cm cyst is present upper pole the right kidney. The  central echo complex is compact with no evidence for hydronephrosis. No  nephrolithiasis or abnormal perinephric fluid collections . The  pelvicalyceal system is mildly prominent..      LEFT KIDNEY: 5.7 x 4.8 x 13.2 cm. Normal in size, shape, contour and  position. No solid or cystic masses. The central echo complex is compact  with no evidence for hydronephrosis. No nephrolithiasis or abnormal  perinephric fluid  collections . No hydroureter.      PELVIS: Jacobson catheter is present in the bladder. There is no  surrounding ascites.        Impression:       1. Slightly prominent right renal pelvis. These findings were reviewed  with Dr. Eliazar Hong at the time of this dictation.  2. Incidental note made of a right renal cyst.  3. Negative left renal ultrasound.        This report was finalized on 08/02/2017 14:58 by Dr. Ruiz Sandoval MD.          Culture:         Assessment   1.  Acute kidney injury secondary to prerenal azotemia--resolved  2.  Status post prostatectomy for the treatment of prostate cancer.  3.  Type II diabetes.  4.  Hypertension  5.  Hypocalcemia  6.  Paralytic ileus--improving  7.  Right renal simple cyst.    Plan:  1.  Continue IV fluid as creatinine remains mildly elevated  2.  Monitor electrolytes        Tigre Cabello, APRN  8/11/2017  9:23 AM

## 2021-07-02 DIAGNOSIS — R20.0 NUMBNESS AND TINGLING OF BOTH LEGS: ICD-10-CM

## 2021-07-02 DIAGNOSIS — M54.12 CERVICAL RADICULOPATHY: ICD-10-CM

## 2021-07-02 DIAGNOSIS — R20.2 NUMBNESS AND TINGLING OF BOTH LEGS: ICD-10-CM

## 2021-07-06 RX ORDER — HYDROCODONE BITARTRATE AND ACETAMINOPHEN 7.5; 325 MG/1; MG/1
1 TABLET ORAL EVERY 8 HOURS PRN
Qty: 90 TABLET | Refills: 0 | Status: SHIPPED | OUTPATIENT
Start: 2021-07-06 | End: 2021-07-07 | Stop reason: SDUPTHER

## 2021-07-07 ENCOUNTER — HOSPITAL ENCOUNTER (OUTPATIENT)
Dept: PAIN MANAGEMENT | Age: 71
Discharge: HOME | End: 2021-07-07
Payer: MEDICARE

## 2021-07-07 VITALS
DIASTOLIC BLOOD PRESSURE: 70 MMHG | OXYGEN SATURATION: 95 % | HEART RATE: 60 BPM | WEIGHT: 253 LBS | HEIGHT: 71 IN | SYSTOLIC BLOOD PRESSURE: 149 MMHG | BODY MASS INDEX: 35.42 KG/M2 | TEMPERATURE: 96 F

## 2021-07-07 DIAGNOSIS — R20.2 NUMBNESS AND TINGLING OF BOTH LEGS: ICD-10-CM

## 2021-07-07 DIAGNOSIS — R20.0 NUMBNESS AND TINGLING OF BOTH LEGS: ICD-10-CM

## 2021-07-07 DIAGNOSIS — G89.29 CHRONIC LEFT-SIDED LOW BACK PAIN WITH LEFT-SIDED SCIATICA: ICD-10-CM

## 2021-07-07 DIAGNOSIS — M54.42 CHRONIC LEFT-SIDED LOW BACK PAIN WITH LEFT-SIDED SCIATICA: ICD-10-CM

## 2021-07-07 DIAGNOSIS — M54.12 CERVICAL RADICULOPATHY: ICD-10-CM

## 2021-07-07 DIAGNOSIS — M25.50 ARTHRALGIA OF MULTIPLE JOINTS: ICD-10-CM

## 2021-07-07 PROCEDURE — 99213 OFFICE O/P EST LOW 20 MIN: CPT

## 2021-07-07 PROCEDURE — 99213 OFFICE O/P EST LOW 20 MIN: CPT | Performed by: NURSE PRACTITIONER

## 2021-07-07 RX ORDER — HYDROCODONE BITARTRATE AND ACETAMINOPHEN 7.5; 325 MG/1; MG/1
1 TABLET ORAL EVERY 8 HOURS PRN
Qty: 90 TABLET | Refills: 0 | Status: SHIPPED | OUTPATIENT
Start: 2021-08-05 | End: 2021-09-29 | Stop reason: SDUPTHER

## 2021-07-07 RX ORDER — GABAPENTIN 400 MG/1
400 CAPSULE ORAL 3 TIMES DAILY
Qty: 270 CAPSULE | Refills: 0 | Status: SHIPPED | OUTPATIENT
Start: 2021-07-18 | End: 2021-11-03 | Stop reason: SDUPTHER

## 2021-07-07 ASSESSMENT — PAIN DESCRIPTION - LOCATION: LOCATION: BACK;NECK

## 2021-07-07 ASSESSMENT — PAIN DESCRIPTION - PAIN TYPE: TYPE: CHRONIC PAIN

## 2021-07-07 ASSESSMENT — PAIN SCALES - GENERAL: PAINLEVEL_OUTOF10: 8

## 2021-07-07 NOTE — PROGRESS NOTES
Clinic Documentation      Education Provided:  [x] Review of Thor Rust  [] Agreement Review  [x] PEG Score Calculated [] PHQ Score Calculated [] ORT Score Calculated    [] Compliance Issues Discussed [] Cognitive Behavior Needs [x] Exercise [] Review of Test [] Financial Issues  [x] Tobacco/Alcohol Use Reviewed [x] Teaching [] New Patient [] Picture Obtained    Physician Plan:  [] Outgoing Referral  [] Pharmacy Consult  [] Test Ordered [x] Prescription Ordered/Changed   [] Obtained Test Results / Consult Notes        Complete if patient is withholding blood thinner for procedure     Blood Thinner Patient is currently taking:      [] Plavix (Hold for 7 days)  [] Aspirin (Hold for 5 days)     [] Pletal (Hold for 2 days)  [] Pradaxa (Hold for 3 days)    [] Effient (Hold for 7 days)  [] Xarelto (Hold for 2 days)    [] Eliquis (Hold for 2 days)  [] Brilinta (Hold for 7 days)    [] Coumadin (Hold for 5 days) - (INR needs to be drawn the day prior to procedure- INR < 2.0)    [] Aggrenox (Hold for 7 days)        [] Patient will stop medication on their own.    [] Blood Thinner Form Faxed for approval to hold.    Provider form faxed to:   Assessment Completed by:  Electronically signed by Ean Fraire on 7/7/2021 at 8:55 AM

## 2021-07-07 NOTE — PROGRESS NOTES
[facility]    Office Progress Note    Patient Name: Chalo Nettles    MR #: [id number]    Account #: [de-identified]    : 1950    Age: 69 y.o. Sex: male    Date: 2021    PCP: Claudeen Osmond, APRN         Referring Provider:    Chief Complaint:   Chief Complaint   Patient presents with    Neck Pain    Back Pain       History of Present Illness:    Chalo Nettles is a 69 y.o. male who presents to the office for follow-up of VANE and bilateral cervical trigger point injections. He says the injections really helped his pain. Says that he obtained 85% relief for 6 weeks. He is wanting the injections repeated. He continues to take his Gabapentin and Norco with some relief that allows him to complete ADL's. Last pain management HPI note read    Employment: Retired []   Disabled  []   Works []    Does Not Work [x]     Previous Injury:  Yes  []   No [x]     Previous Surgery: Yes []   No [x]     Previous Physical Therapy In the last 6 months? Yes  []     No [x]   Did Physical Therapy make thepain better or worse? Better []   Worse []  Unchanged []    MRI in the last two years? Yes [x]  No []   Results reviewed with patient? Yes []   No []    CT Scan in the last two years? Yes  []   No [x]   Results reviewed with patient? Yes []   No []    X-ray in the last two years? Yes [x]   No  []  Results reviewed with patient? Yes  []  No []    Injections in the past?  Yes [x]   No []   Did the injections help relieve the pain? Yes [x]   No []     Do you have Depression? Yes  []    No [x]   Thinking of harming yourself or others?   Yes  []   No [x]     Past Medical Histoy  Past Medical History:   Diagnosis Date    Allergic rhinitis     Arthritis     Cancer ([facility] [place] [facility] 2012    Thyroid  - Dr Rosamaria Mckeon     Hyperlipidemia     Hypertension     Hyperthyroidism     [facility]state cancer ([facility]     Sleep apnea     Type II or unspecified type diabetes mellitus without mention of complication, not stated as uncontrolled        Surgery History  Past Surgical History:   Procedure Laterality Date    CHOLECYSTECTOMY      INCONTINENCE SURGERY  11/26/2018    dr gino Lamar  08/01/2017    removal, dr Barry Stephens      removed by Dr Riya Driscoll  Patient has no known allergies. Current Medications  Current Outpatient Medications   Medication Sig Dispense Refill    [START ON 7/18/2021] gabapentin (NEURONTIN) 400 MG capsule Take 1 capsule by mouth 3 times daily for 90 days. 270 capsule 0    [START ON 8/5/2021] HYDROcodone-acetaminophen (NORCO) 7.5-325 MG per tablet Take 1 tablet by mouth every 8 hours as needed for Pain for up to 30 days. May fill 8-5-2021 90 tablet 0    allopurinol (ZYLOPRIM) 100 MG tablet Take 1 tablet by mouth daily 90 tablet 1    atorvastatin (LIPITOR) 20 MG tablet Take 1 tablet by mouth nightly 90 tablet 3    levothyroxine (SYNTHROID) 200 MCG tablet Take 1 tablet by mouth Daily 90 tablet 3    glipiZIDE (GLUCOTROL) 5 MG tablet Take 1 tablet by mouth daily 90 tablet 3    lisinopril (PRINIVIL;ZESTRIL) 40 MG tablet TAKE 1 TABLET BY MOUTH DAILY 90 tablet 3    citalopram (CELEXA) 20 MG tablet Take 1 tablet by mouth daily 90 tablet 3    metoprolol succinate (TOPROL XL) 25 MG extended release tablet Take 1 tablet by mouth daily 90 tablet 3    amLODIPine (NORVASC) 10 MG tablet Take 0.5 tablets by mouth daily 90 tablet 3    Cholecalciferol (VITAMIN D3) 2000 units TABS Take 1 tablet by mouth daily 30 tablet 11    COLCRYS 0.6 MG tablet TAKE 2 TABLETS THEN AN HOUR LATER TAKE 1 TABLET WHEN GOUT FLARES 12 tablet 5     No current facility-administered medications for this encounter.        Social History    Social History     Tobacco Use    Smoking status: Never Smoker    Smokeless tobacco: Never Used   Substance Use Topics    Alcohol use: No         Family History  family history includes Cancer in his father; Heart Attack in his father and mother; Heart Disease in his father; Heart Failure in his mother; Schizophrenia in his mother; Thyroid Disease in his sister. Review of Systems:  Constitutional: denies fever, chills, fatigue, change in appetite, weight gain or weight loss  Head: Normocephalic  Skin: denies easy bruising, skin redness, skin rash, hives, sensitivity to sun exposure, tightness, nodules or bumps, hair loss, color changes in the hands or feet with cold. Eyes: denies pain, redness, loss of vision, double or blurred vision, eye drainage, or dryness. ENT and Mouth: denies ringing in the ears, loss of hearing, nasal congestion, nasal discharge, no hoarseness, sore throat, or difficulty swallowing   Respiratory: denies chronic dry cough, coughing up blood, coughing up mucus, waking at night coughing or choking, or wheezing. Cardiovascular: denies chest pain, irregular heartbeats, palpitations, shortness of breath, or edema in legs  Gastrointestinal: denies, nausea, vomiting, heartburn, diarrhea, or constipation  Genitourinary: denies difficult urination, pain or burning with urination, blood in the urine, or cloudy urine  Musculoskeletal: denies arm, buttock, thigh or calf cramps. Has pain in neck and bilateral shoulders, muscle spasms in neck and bilateral shoulders and tenderness in neck and bilateral shoulders. No muscle weakness. No joint swelling. Neurologic: headache, dizziness, fainting, loss of consciousness, no memory loss. No sensitivity. Endocrine: denies intolerance to hot or cold temperature, night sweats, flushing, fingernail changes, increased thirst, or hairloss   Hematologic/ Lymphatic: denies anemia, bleeding tendency or clotting tendency, bruising easily. Allergic/ Immunologic: denies rhinitis, asthma, skin sensitivity, or allergy to Latex   Psychiatric: denies depression or thoughts of suicide, or voices in head.        Current Pain Assessment:   Pain Assessment  Pain Assessment: 0-10  Pain Level: 8  Patient's Stated Pain Goal: 4  Pain Type: Chronic pain  Pain Location: Back, Neck    Clinical Progression: gradually improving  Effect of Pain on Daily Activities: limits activity  Patient's Stated Pain Goal: No pain  Pain Intervention(s): Medication (see eMar), Repositioning, Rest, Ice    Current PE    ORT Score: 0    PHQ-9 Score: 2    Physical Exam:    Vitals:    21 0938   BP: (!) 149/70   Pulse: 60   Temp: 96 °F (35.6 °C)   TempSrc: Temporal   SpO2: 95%   Weight: 253 lb (114.8 kg)   Height: 5' 11\" (1.803 m)       Body mass index is 35.29 kg/m². General Appearance: no acute distress. Appears to be well dressed  Skin Exam: Warm and dry, no jaundice, rashes or lesions  Head Exam: NCAT, PERRLA, EOMI, moist mucus membranes, scalp normal  Neck Exam: Supple, no masses palpated, trachea midline. Pain with flexion and extension of head. Pain with palpation of muscles iin neck and bilateral shoulders  Lung: Clear to ausculation in all lobes anterior and posterior. Heart: Regular rate and rhythm, no gallops, rubs or murmurs, no edema  Abdomen:  Bowel sounds in all quadrants, soft, non-distended, non-tender with palpation, no guarding  Extremities: No rash, cyanosis or bruising  Musculoskeletal: No joint swelling or deformity   Back Exam: limited ROM  Hip Exam: Full rotation bilateral   Knee Exam: Full flexion and extension bilateral, no crepitus  Shoulder Exam: Full ROM bilateral  Neurologic Exam: Gait and coordination normal, speech normal  Reflexes: Normal brachialis, Negative Mariee's bilateral. Normal Patellar bilateral,   CN EXAM: II-XII intact, face symmetrical, tongue symmetrical, the trapezius and sternocleidomastoid muscle appearance and strength symmetrical, normal achilles bilateral, ankle clonus negative bilateral  Strength: 5/5 RUE Bi's/Tri's, 5/5 LUE Bi's/Tri's, 5/5 RLE knee flex/ext, 5/5 RLE DF/PF, 5/5 LLE knee flex/ext, 5/5 LLE DF/PF  Sensation: Equal and intact to fine touch in all extremities  Mood and affect: Normal   Nurses note reviewed along with current vital signs    Active Problem(s)  Active Problems:    Cervicalgia    Muscle spasms of neck    Myofascial muscle pain    Cervical radiculopathy  Resolved Problems:    * No resolved hospital problems. *                                                                                                                            PLAN:  1. Patient is to call the office with any questions or concerns that may arise prior to next appointment. 2. Continue Norco and Gabapentin  3. Schedule patient for VANE level C3-C4  4. Schedule patient for bilateral cervical trigger point injections    Patient knows to hold Xarelto for 2 days prior to the VANE    Urine Drug Screen Current/Today:  Yes  []  No [x]     Discussion:  Discussed exam findings and plan of care with patient. Patient agreed with the current plan of care at this time. All questions from the patient were answered by the provider. Activity:   Discussed exercise as beneficial to pain reduction, encouraged stretching exercise with a focus on torso strengthening. Education Provided:  Review of Cindy Hebert [x] Agreement Review [x]  Reviewed PHQ-9  [x]    Review of Test [] Compliance Issues Discussed []   Cognitive Behavior Needs [] Exercise [x]  Financial Issues []   Tobacco/Alcohol Use [] Teaching  [x]     Goal:  Pain Management Goals of Therapy:   []        Resolution in pain  [x]        Decrease in pain level  [x]        Improvement in ADL's  [x]        Increase in activities with less pain  [x]        Decrease in medication      [] Benzodiazapine's and Narcotics:  Patient educated on the possible effects of combining Benzodiazapine's and Opioids. Explained \"Black Box Warnings\" such as; possible suppressed breathing, hypoxia, anoxia, depressed cognition, heart arrhythmia, coma and possible death.   Patient verbalized understanding concerning possible effects. Controlled Substance Monitoring:  Attestation: The ERLINDA report for this patient was reviewed today. Discussed with patient possible medication side effects, risk of tolerance, dependence and alternative treatments. Discussed the growing epidemic in the [place] with the overprescribing and at times the abuse of narcotics. Discussed the detrimental effects of long term narcotic use. Patient encouraged to set daily goals of exercising and decreasing daily narcotic intake. Discussed with the patient about the development of hyperalgesia with long term narcotic intake. EMR dragon/transcription disclaimer: Much of this encounter note is electronic transcription/translation of spoken language to printed tach. Electronic translation of spoken language may be erroneous, or at times, nonsensical words or phrases may be inadvertently transcribed.  Although, I have reviewed the note for such errors, some may still exist.     CC:  MARY Perry APRN, 7/7/2021 at 9:56 AM

## 2021-08-02 ENCOUNTER — OFFICE VISIT (OUTPATIENT)
Dept: UROLOGY | Facility: CLINIC | Age: 71
End: 2021-08-02

## 2021-08-02 VITALS — WEIGHT: 248 LBS | TEMPERATURE: 97.1 F | BODY MASS INDEX: 34.72 KG/M2 | HEIGHT: 71 IN

## 2021-08-02 DIAGNOSIS — N52.31 ERECTILE DYSFUNCTION AFTER RADICAL PROSTATECTOMY: ICD-10-CM

## 2021-08-02 DIAGNOSIS — N32.81 OVERACTIVE BLADDER: ICD-10-CM

## 2021-08-02 DIAGNOSIS — C61 PROSTATE CANCER (HCC): Primary | ICD-10-CM

## 2021-08-02 LAB
BILIRUB BLD-MCNC: NEGATIVE MG/DL
CLARITY, POC: CLEAR
COLOR UR: YELLOW
GLUCOSE UR STRIP-MCNC: NEGATIVE MG/DL
KETONES UR QL: NEGATIVE
LEUKOCYTE EST, POC: NEGATIVE
NITRITE UR-MCNC: NEGATIVE MG/ML
PH UR: 5 [PH] (ref 5–8)
PROT UR STRIP-MCNC: NEGATIVE MG/DL
RBC # UR STRIP: NEGATIVE /UL
SP GR UR: 1.01 (ref 1–1.03)
UROBILINOGEN UR QL: NORMAL

## 2021-08-02 PROCEDURE — 99214 OFFICE O/P EST MOD 30 MIN: CPT | Performed by: UROLOGY

## 2021-08-02 PROCEDURE — 81001 URINALYSIS AUTO W/SCOPE: CPT | Performed by: UROLOGY

## 2021-08-16 ENCOUNTER — TELEPHONE (OUTPATIENT)
Dept: VASCULAR SURGERY | Facility: CLINIC | Age: 71
End: 2021-08-16

## 2021-08-16 NOTE — TELEPHONE ENCOUNTER
Spoke with patient about apt on 08/17. Patient stated he wanted to keep his testing for this day and time but needed to change his office visit. I gave him an apt on the next available day and time.

## 2021-08-17 ENCOUNTER — HOSPITAL ENCOUNTER (OUTPATIENT)
Dept: ULTRASOUND IMAGING | Facility: HOSPITAL | Age: 71
Discharge: HOME | End: 2021-08-17

## 2021-08-17 ENCOUNTER — HOSPITAL ENCOUNTER (OUTPATIENT)
Dept: PAIN MANAGEMENT | Age: 71
Discharge: HOME | End: 2021-08-17
Payer: MEDICARE

## 2021-08-17 VITALS
DIASTOLIC BLOOD PRESSURE: 61 MMHG | RESPIRATION RATE: 18 BRPM | SYSTOLIC BLOOD PRESSURE: 133 MMHG | HEART RATE: 61 BPM | OXYGEN SATURATION: 96 % | TEMPERATURE: 97.3 F

## 2021-08-17 DIAGNOSIS — I65.23 BILATERAL CAROTID ARTERY STENOSIS: ICD-10-CM

## 2021-08-17 DIAGNOSIS — R52 PAIN MANAGEMENT: ICD-10-CM

## 2021-08-17 DIAGNOSIS — I73.9 PAD (PERIPHERAL ARTERY DISEASE) (HCC): ICD-10-CM

## 2021-08-17 PROCEDURE — 93923 UPR/LXTR ART STDY 3+ LVLS: CPT | Performed by: SURGERY

## 2021-08-17 PROCEDURE — 93880 EXTRACRANIAL BILAT STUDY: CPT | Performed by: SURGERY

## 2021-08-17 PROCEDURE — 2500000003 HC RX 250 WO HCPCS

## 2021-08-17 PROCEDURE — 3209999900 FLUORO FOR SURGICAL PROCEDURES

## 2021-08-17 PROCEDURE — 93880 EXTRACRANIAL BILAT STUDY: CPT

## 2021-08-17 PROCEDURE — 62321 NJX INTERLAMINAR CRV/THRC: CPT

## 2021-08-17 PROCEDURE — 2580000003 HC RX 258

## 2021-08-17 PROCEDURE — 6360000002 HC RX W HCPCS

## 2021-08-17 PROCEDURE — 93923 UPR/LXTR ART STDY 3+ LVLS: CPT

## 2021-08-17 RX ORDER — METHYLPREDNISOLONE ACETATE 80 MG/ML
80 INJECTION, SUSPENSION INTRA-ARTICULAR; INTRALESIONAL; INTRAMUSCULAR; SOFT TISSUE ONCE
Status: DISCONTINUED | OUTPATIENT
Start: 2021-08-17 | End: 2021-08-19 | Stop reason: HOSPADM

## 2021-08-17 RX ORDER — LIDOCAINE HYDROCHLORIDE 10 MG/ML
5 INJECTION, SOLUTION EPIDURAL; INFILTRATION; INTRACAUDAL; PERINEURAL ONCE
Status: DISCONTINUED | OUTPATIENT
Start: 2021-08-17 | End: 2021-08-19 | Stop reason: HOSPADM

## 2021-08-17 RX ORDER — SODIUM CHLORIDE 9 MG/ML
5 INJECTION INTRAVENOUS ONCE
Status: DISCONTINUED | OUTPATIENT
Start: 2021-08-17 | End: 2021-08-19 | Stop reason: HOSPADM

## 2021-08-17 ASSESSMENT — PAIN - FUNCTIONAL ASSESSMENT: PAIN_FUNCTIONAL_ASSESSMENT: 0-10

## 2021-08-17 ASSESSMENT — PAIN DESCRIPTION - PAIN TYPE: TYPE: CHRONIC PAIN

## 2021-08-17 ASSESSMENT — PAIN SCALES - GENERAL: PAINLEVEL_OUTOF10: 7

## 2021-08-17 ASSESSMENT — PAIN DESCRIPTION - LOCATION: LOCATION: NECK

## 2021-08-18 ENCOUNTER — HOSPITAL ENCOUNTER (OUTPATIENT)
Dept: GENERAL RADIOLOGY | Age: 71
Discharge: HOME | End: 2021-08-18
Payer: MEDICARE

## 2021-08-18 ENCOUNTER — OFFICE VISIT (OUTPATIENT)
Dept: PRIMARY CARE CLINIC | Age: 71
End: 2021-08-18
Payer: MEDICARE

## 2021-08-18 VITALS
WEIGHT: 249 LBS | SYSTOLIC BLOOD PRESSURE: 138 MMHG | HEART RATE: 69 BPM | OXYGEN SATURATION: 96 % | DIASTOLIC BLOOD PRESSURE: 86 MMHG | BODY MASS INDEX: 34.73 KG/M2 | TEMPERATURE: 98.4 F

## 2021-08-18 DIAGNOSIS — M25.561 ACUTE PAIN OF RIGHT KNEE: ICD-10-CM

## 2021-08-18 DIAGNOSIS — M17.11 PRIMARY OSTEOARTHRITIS OF RIGHT KNEE: Primary | ICD-10-CM

## 2021-08-18 PROCEDURE — 1123F ACP DISCUSS/DSCN MKR DOCD: CPT | Performed by: NURSE PRACTITIONER

## 2021-08-18 PROCEDURE — 3017F COLORECTAL CA SCREEN DOC REV: CPT | Performed by: NURSE PRACTITIONER

## 2021-08-18 PROCEDURE — 99213 OFFICE O/P EST LOW 20 MIN: CPT | Performed by: NURSE PRACTITIONER

## 2021-08-18 PROCEDURE — 73562 X-RAY EXAM OF KNEE 3: CPT

## 2021-08-18 PROCEDURE — G8427 DOCREV CUR MEDS BY ELIG CLIN: HCPCS | Performed by: NURSE PRACTITIONER

## 2021-08-18 PROCEDURE — 4040F PNEUMOC VAC/ADMIN/RCVD: CPT | Performed by: NURSE PRACTITIONER

## 2021-08-18 PROCEDURE — G8417 CALC BMI ABV UP PARAM F/U: HCPCS | Performed by: NURSE PRACTITIONER

## 2021-08-18 PROCEDURE — 1036F TOBACCO NON-USER: CPT | Performed by: NURSE PRACTITIONER

## 2021-08-18 PROCEDURE — 20610 DRAIN/INJ JOINT/BURSA W/O US: CPT | Performed by: NURSE PRACTITIONER

## 2021-08-18 RX ORDER — METHYLPREDNISOLONE ACETATE 80 MG/ML
80 INJECTION, SUSPENSION INTRA-ARTICULAR; INTRALESIONAL; INTRAMUSCULAR; SOFT TISSUE ONCE
Status: COMPLETED | OUTPATIENT
Start: 2021-08-18 | End: 2021-08-18

## 2021-08-18 RX ADMIN — METHYLPREDNISOLONE ACETATE 80 MG: 80 INJECTION, SUSPENSION INTRA-ARTICULAR; INTRALESIONAL; INTRAMUSCULAR; SOFT TISSUE at 09:56

## 2021-08-18 ASSESSMENT — ENCOUNTER SYMPTOMS
NAUSEA: 0
CONSTIPATION: 0
TROUBLE SWALLOWING: 0
VOMITING: 0
SHORTNESS OF BREATH: 0
SORE THROAT: 0
DIARRHEA: 0
ABDOMINAL PAIN: 0
RHINORRHEA: 0
COUGH: 0

## 2021-08-18 NOTE — PROGRESS NOTES
Cruz Prado (:  1950) is a 69 y.o. male,Established patient, here for evaluation of the following chief complaint(s):  Knee Pain      ASSESSMENT/PLAN:    ICD-10-CM    1. Primary osteoarthritis of right knee  M17.11 methylPREDNISolone acetate (DEPO-MEDROL) injection 80 mg     AR DRAIN/INJECT LARGE JOINT/BURSA   2. Acute pain of right knee  M25.561 XR KNEE RIGHT (3 VIEWS)       Return if symptoms worsen or fail to improve. SUBJECTIVE/OBJECTIVE:  HPI  Here for right knee pain  Onset 1 week  Using aspercream and otc pain rub that hasn't helped. He has known arthritis on the left knee  No injury  Hurts to walk and bend. In bed hard to find comfortable position. Rates pain 10/10    Right knee xray 2021  Impression   Mild arthritic changes without acute bony abnormality   identified. Signed by Dr Jake Khan           /86   Pulse 69   Temp 98.4 °F (36.9 °C) (Temporal)   Wt 249 lb (112.9 kg)   SpO2 96%   BMI 34.73 kg/m²     Review of Systems   Constitutional: Negative for activity change, appetite change, fatigue, fever and unexpected weight change. HENT: Negative for ear pain, rhinorrhea, sore throat and trouble swallowing. Eyes: Negative for visual disturbance. Respiratory: Negative for cough and shortness of breath. Cardiovascular: Negative for chest pain, palpitations and leg swelling. Gastrointestinal: Negative for abdominal pain, constipation, diarrhea, nausea and vomiting. Genitourinary: Negative for flank pain. Musculoskeletal: Negative for arthralgias, myalgias, neck pain and neck stiffness. Right knee pain   Neurological: Negative for headaches. Psychiatric/Behavioral: Negative for decreased concentration and sleep disturbance. The patient is not nervous/anxious. Physical Exam  Vitals reviewed. Constitutional:       Appearance: Normal appearance. HENT:      Head: Normocephalic and atraumatic.    Eyes:      Conjunctiva/sclera: Conjunctivae normal.   Cardiovascular:      Rate and Rhythm: Normal rate and regular rhythm. Heart sounds: Normal heart sounds. Pulmonary:      Effort: Pulmonary effort is normal.      Breath sounds: Normal breath sounds. Abdominal:      Palpations: Abdomen is soft. Musculoskeletal:      Cervical back: Normal range of motion and neck supple. Right knee: No bony tenderness. Normal range of motion. Tenderness present over the medial joint line. Skin:     General: Skin is warm. Neurological:      Mental Status: He is alert and oriented to person, place, and time. An electronic signature was used to authenticate this note. --MARY Toney     Knee Arthrocentesis with Injection Procedure Note    Discussed risk and benefits of joint injection including infection. Pt wishes to proceed    Pre-operative Diagnosis: right knee osteoarthritis    Post-operative Diagnosis: same    Indications: Symptom relief from osteoarthritis    Anesthesia: ethyl chloride topical    Procedure Details     The right knee joint was prepped with chlorhexidine. A 22 gauge needle was inserted into the inferior aspect of the joint from a lateral approach. 5 ml 1% lidocaine and 5 ml of Depo-Medrol (methyprednisolone 80 mg/ml) was then injected into the joint through the same needle. Dressing applied. Complications:  None; patient tolerated the procedure well. Take it easy the first 2-3 days. Use ice and elevate the affected joint as needed. Call if you experience any fever or chills, spreading redness and rash from the injected area, or bleeding or large bruise or swelling or other abnormalities. Complications:  None; patient tolerated the procedure well.

## 2021-08-24 ENCOUNTER — TELEPHONE (OUTPATIENT)
Dept: PAIN MANAGEMENT | Age: 71
End: 2021-08-24

## 2021-09-01 ENCOUNTER — TELEPHONE (OUTPATIENT)
Dept: VASCULAR SURGERY | Facility: CLINIC | Age: 71
End: 2021-09-01

## 2021-09-01 NOTE — TELEPHONE ENCOUNTER
Left message reminding Mr Kamara of his appointment for Thursday, September 2nd, 2021 at 8 am with Jeri SMITH as Dr León will be in Surgery. Also advised Mr Kamara if he had any questions, concerns or needs to reschedule to please call the office at [phone].

## 2021-09-02 ENCOUNTER — OFFICE VISIT (OUTPATIENT)
Dept: VASCULAR SURGERY | Facility: CLINIC | Age: 71
End: 2021-09-02

## 2021-09-02 VITALS
WEIGHT: 244 LBS | DIASTOLIC BLOOD PRESSURE: 78 MMHG | HEART RATE: 60 BPM | OXYGEN SATURATION: 98 % | SYSTOLIC BLOOD PRESSURE: 136 MMHG | HEIGHT: 71 IN | BODY MASS INDEX: 34.16 KG/M2

## 2021-09-02 DIAGNOSIS — I73.9 PAD (PERIPHERAL ARTERY DISEASE) (HCC): ICD-10-CM

## 2021-09-02 DIAGNOSIS — E78.2 MIXED HYPERLIPIDEMIA: ICD-10-CM

## 2021-09-02 DIAGNOSIS — I65.23 BILATERAL CAROTID ARTERY STENOSIS: Primary | ICD-10-CM

## 2021-09-02 DIAGNOSIS — I10 ESSENTIAL HYPERTENSION: ICD-10-CM

## 2021-09-02 PROCEDURE — 99214 OFFICE O/P EST MOD 30 MIN: CPT | Performed by: SURGERY

## 2021-09-02 NOTE — PROGRESS NOTES
"9/2/2021       Frankie Bradley, APRN  [address]  [place] [id number]        Zay LANGSTON Crews  1950    Chief Complaint   Patient presents with   • Follow-up     1 Year Follow Up For Bilateral Carotid Artery Stenosis and Peripheral Artery Disease. Test [id number] US pad carotid bilateral and US pad ankle / brach ind ext comp. Patient denies any stroke like symptoms.    • Non Smoker     Patient is a Non Smoker    • Med Management     Verbally verified medications with patient        Dear Frankie Bradley, LUIS:    HPI     I had the pleasure of seeing you patient in the office today for follow up.  As you recall, the patient is a 70 y.o. male who we are currently following for asymptomatic carotid occlusive disease.  Currently he is doing well and denies any strokelike symptoms.  He also denies any claudication to his lower extremities.  He is maintained on Xarelto and aspirin.  He did have noninvasive testing performed today, which I did review in office.       Review of Systems   Constitutional: Negative.    HENT: Negative.    Eyes: Negative.    Respiratory: Negative.    Cardiovascular: Negative.    Gastrointestinal: Negative.    Endocrine: Negative.    Genitourinary: Negative.    Musculoskeletal: Negative.    Skin: Negative.    Allergic/Immunologic: Negative.    Neurological: Negative.    Hematological: Negative.    Psychiatric/Behavioral: Negative.    All other systems reviewed and are negative.       /78 (BP Location: Right arm, Patient Position: Sitting, Cuff Size: Adult)   Pulse 60   Ht 180.3 cm (71\")   Wt 111 kg (244 lb)   SpO2 98%   BMI 34.03 kg/m²     Physical Exam  Vitals and nursing note reviewed.   Constitutional:       General: He is not in acute distress.     Appearance: Normal appearance. He is well-developed. He is obese. He is not diaphoretic.   HENT:      Head: Normocephalic and atraumatic.   Neck:      Vascular: No carotid bruit or JVD.   Cardiovascular:      Rate " and Rhythm: Normal rate and regular rhythm.      Pulses: Normal pulses.           Femoral pulses are 2+ on the right side and 2+ on the left side.       Popliteal pulses are 2+ on the right side and 2+ on the left side.        Dorsalis pedis pulses are 2+ on the right side and 2+ on the left side.        Posterior tibial pulses are 2+ on the right side and 2+ on the left side.      Heart sounds: Normal heart sounds, S1 normal and S2 normal. No murmur heard.   No friction rub. No gallop.    Pulmonary:      Effort: Pulmonary effort is normal.      Breath sounds: Normal breath sounds.   Abdominal:      General: Bowel sounds are normal. There is no abdominal bruit.      Palpations: Abdomen is soft.      Tenderness: There is no abdominal tenderness.   Musculoskeletal:         General: Normal range of motion.      Cervical back: Normal range of motion and neck supple.   Skin:     General: Skin is warm and dry.   Neurological:      General: No focal deficit present.      Mental Status: He is alert and oriented to person, place, and time.      Cranial Nerves: No cranial nerve deficit.   Psychiatric:         Mood and Affect: Mood normal.         Behavior: Behavior normal.         Thought Content: Thought content normal.         Judgment: Judgment normal.         DIAGNOSTIC DATA:    US Carotid Bilateral    Result Date: 8/17/2021  Narrative: History: Carotid occlusive disease      Impression: Impression: 1. There is less than 50% stenosis of the right internal carotid artery. 2. There is less than 50% stenosis of the left internal carotid artery. 3. Antegrade flow is demonstrated in bilateral vertebral arteries.  Comments: Bilateral carotid vertebral arterial duplex scan was performed.  Grayscale imaging shows intimal thickening and calcified elements at the carotid bifurcation. The right internal carotid artery peak systolic velocity is 106 cm/sec. The end-diastolic velocity is 29.1 cm/sec. The right ICA/CCA ratio is  approximately 1.7 . These findings correlate with less than 50% stenosis of the right internal carotid artery.  Grayscale imaging shows intimal thickening and calcified elements at the carotid bifurcation. The left internal carotid artery peak systolic velocity is 98.3 cm/sec. The end-diastolic velocity is 25.2 cm/sec. The left ICA/CCA ratio is approximately 1.1 . These findings correlate with less than 50% stenosis of the left internal carotid artery.  Antegrade flow is demonstrated in bilateral vertebral arteries.  This report was finalized on 08/17/2021 15:26 by Dr. Torsten León MD.    US Ankle / Brachial Indices Extremity Complete    Result Date: 8/17/2021  Narrative:  History: PAD  Comments: Bilateral lower extremity arterial with multi-level pulse volume recordings and segmental pressures were performed at rest and stress.  The right ankle/brachial index is 1.07. The waveforms are biphasic without dampening.These findings are consistent with no significant arterial insufficiency of the right lower extremity at rest.  The postexercise ABIs 0.74.  The left ankle/brachial index is 1.28. The waveforms are biphasic without dampening. These findings are consistent with no significant arterial insufficiency of the left lower extremity at rest.    The postexercise ABIs 1.23.      Impression: Impression: 1. No significant arterial insufficiency of the right lower extremity at rest. 2. No significant arterial insufficiency of the left lower extremity at rest.   This report was finalized on 08/17/2021 15:20 by Dr. Torsten León MD.      Patient Active Problem List   Diagnosis   • Hypogonadism in male   • ED (erectile dysfunction)   • Prostate cancer (CMS/HCC)   • Diabetes (CMS/HCC)   • Hypertension   • Disease of thyroid gland   • History of prostate cancer   • Leukocytosis   • Class 1 obesity due to excess calories with serious comorbidity and body mass index (BMI) of 32.0 to 32.9 in adult   • Sensorineural  hearing loss (SNHL) of both ears   • Asymmetric SNHL (sensorineural hearing loss)   • Sudden hearing loss, right   • Tinnitus of right ear   • Stress incontinence   • Bladder neck contracture   • Paroxysmal atrial fibrillation (CMS/HCC)   • Sleep apnea   • Mixed hyperlipidemia   • Bilateral carotid artery stenosis         ICD-10-CM ICD-9-CM   1. Bilateral carotid artery stenosis  I65.23 433.10     433.30   2. PAD (peripheral artery disease) (CMS/HCC)  I73.9 443.9   3. Essential hypertension  I10 401.9   4. Mixed hyperlipidemia  E78.2 272.2         PLAN: After thoroughly evaluating Zay Kamara, I believe the best course of action is to remain conservative from vascular surgery standpoint.  Currently he is doing well denies any claudication to his lower extremities.  He also denies any strokelike symptoms.  I did review his testing which shows less than 50% carotid stenosis bilaterally.  His ABIs show no arterial insufficiency to his lower extremities.  We will see him back in 1 year with repeat noninvasive testing for continued surveillance, including carotid duplex and ABIs.  I did discuss vascular risk factors as they pertain to the progression of vascular disease including controlling his hypertension and hyperlipidemia.  His blood pressure is stable on his current medication regimen.  He is maintained on Lipitor for his hyperlipidemia.    Patient's Body mass index is 34.03 kg/m². BMI is above normal parameters. Recommendations include: educational material. The patient is to continue taking their medications as previously discussed.   This was all discussed in full with complete understanding.  Thank you for allowing me to participate in the care of your patient.  Please do not hesitate to call with any questions or concerns.  We will keep you aware of any further encounters with Zay Kamara.      Sincerely Yours,      LUIS Jurado

## 2021-09-11 DIAGNOSIS — I48.0 PAROXYSMAL ATRIAL FIBRILLATION (HCC): ICD-10-CM

## 2021-09-13 RX ORDER — METOPROLOL SUCCINATE 25 MG/1
25 TABLET, EXTENDED RELEASE ORAL DAILY
Qty: 90 TABLET | Refills: 3 | Status: SHIPPED | OUTPATIENT
Start: 2021-09-13 | End: 2022-03-21

## 2021-09-13 NOTE — TELEPHONE ENCOUNTER
Received fax from pharmacy requesting refill on pts medication(s). Pt was last seen in office on 8/18/2021  and has a follow up scheduled for 12/1/2021. Will send request to  [facility]his[facility]k Mercadoll  for authorization.      Requested Prescriptions     Pending Prescriptions Disp Refills    metoprolol succinate (TOPROL XL) 25 MG extended release tablet [Pharmacy Med Name: METOPROLOL SUCC ER 25 MG TAB] 90 tablet 3     Sig: Take 1 tablet by mouth daily

## 2021-09-16 ENCOUNTER — HOSPITAL ENCOUNTER (OUTPATIENT)
Dept: PAIN MANAGEMENT | Age: 71
Discharge: HOME | End: 2021-09-16
Payer: MEDICARE

## 2021-09-16 VITALS
RESPIRATION RATE: 20 BRPM | DIASTOLIC BLOOD PRESSURE: 66 MMHG | OXYGEN SATURATION: 95 % | SYSTOLIC BLOOD PRESSURE: 142 MMHG | HEART RATE: 71 BPM | TEMPERATURE: 96.5 F

## 2021-09-16 PROCEDURE — 20553 NJX 1/MLT TRIGGER POINTS 3/>: CPT | Performed by: NURSE PRACTITIONER

## 2021-09-16 PROCEDURE — 20553 NJX 1/MLT TRIGGER POINTS 3/>: CPT

## 2021-09-16 PROCEDURE — 2500000003 HC RX 250 WO HCPCS

## 2021-09-16 RX ORDER — LIDOCAINE HYDROCHLORIDE 10 MG/ML
10 INJECTION, SOLUTION EPIDURAL; INFILTRATION; INTRACAUDAL; PERINEURAL ONCE
Status: DISCONTINUED | OUTPATIENT
Start: 2021-09-16 | End: 2021-09-18 | Stop reason: HOSPADM

## 2021-09-16 RX ORDER — BUPIVACAINE HYDROCHLORIDE 5 MG/ML
10 INJECTION, SOLUTION EPIDURAL; INTRACAUDAL ONCE
Status: DISCONTINUED | OUTPATIENT
Start: 2021-09-16 | End: 2021-09-18 | Stop reason: HOSPADM

## 2021-09-18 NOTE — PROCEDURES
[facility] Physical and Pain Medicine      Patient Name: Geovanni Santos    : 1950                    Age: 69 y.o. Sex: male    Date: 2021    Pre-op Diagnosis: Myofascial Pain/ Muscle Spasms/ Cervicalgia    Post-op Diagnosis: Myofascial Pain/ Muscle Spasms/ Cervicalgia    Procedure: Cervical Trigger Point Injections    Performing Procedure: Norman Benites, MSN, APRN, FNP-C    Previously Had Procedure:  Yes [x]  No []     No data found. Description of Procedure:     After a brief physical assessment and failure to improve with conservative measures the patient presented for Cervical Trigger Point Injections The indications, limitations and possible complications were discussed with the patient and the patient elected to proceed with the procedure. After voluntary, informed and signed consent obtained the patient was placed in a seated position. Appropriate time out was obtained per policy. The area of maximal tenderness was palpated over the [] Right   []  Left   [x]  Bilateral Cervical   [x] Splenius   [x] Trapezius  [x] Rhomboid. The skin overlying these areas was marked with a skin marker. The skin overlying the proposed injection sites were then sprayed with Gebauer's Solution while protecting patient eyes. The areas were then prepped in a sterile fashion with Prevantics swab. Each trigger point of the  [] Right   []   Left  [x]   Bilateral Cervical  [x] Splenius  [x] Trapezius   [x] Rhomboid   was then injected after negative aspiration using a 25 gauge 1 1/2 in needle with approximately 2 ml of a solution of     [x] 5 ml of 1% Lidocaine Plain and 5 ml of 0.5% Marcaine Plain per 10 ml syringe  [] Toradol 0.5 ml (30 mg/ml) per 10 ml syringe    Sterile dressings applied. Discharge: The patient tolerated the procedure well. There were no complications during the procedure and the patient was discharged home with discharge instructions.     The patient has been instructed to contact the office should there be any complications or questions to arise between today and their next appointment.     Plan:  [x] Will return to the office in  [] 1 month  [x]  4 - 6 weeks  [] 2 months   [] 3 months for:  [] Planned Procedure [x] Procedure Follow-up  [] Office Visit      1 [facility], [facility], 9/17/2021 at 8:18 PM

## 2021-09-29 DIAGNOSIS — M54.12 CERVICAL RADICULOPATHY: ICD-10-CM

## 2021-09-29 DIAGNOSIS — R20.2 NUMBNESS AND TINGLING OF BOTH LEGS: ICD-10-CM

## 2021-09-29 DIAGNOSIS — R20.0 NUMBNESS AND TINGLING OF BOTH LEGS: ICD-10-CM

## 2021-09-29 RX ORDER — HYDROCODONE BITARTRATE AND ACETAMINOPHEN 7.5; 325 MG/1; MG/1
1 TABLET ORAL EVERY 8 HOURS PRN
Qty: 90 TABLET | Refills: 0 | Status: SHIPPED | OUTPATIENT
Start: 2021-09-29 | End: 2021-11-03 | Stop reason: SDUPTHER

## 2021-10-30 DIAGNOSIS — I10 ESSENTIAL HYPERTENSION: ICD-10-CM

## 2021-11-01 RX ORDER — LISINOPRIL 40 MG/1
TABLET ORAL
Qty: 90 TABLET | Refills: 3 | Status: SHIPPED | OUTPATIENT
Start: 2021-11-01 | End: 2022-01-18

## 2021-11-01 NOTE — TELEPHONE ENCOUNTER
Received fax from pharmacy requesting refill on pts medication(s). Pt was last seen in office on 8/18/2021  and has a follow up scheduled for 12/1/2021. Will send request to  Genevieve Munoz  for authorization.      Requested Prescriptions     Pending Prescriptions Disp Refills    lisinopril (PRINIVIL;ZESTRIL) 40 MG tablet [Pharmacy Med Name: LISINOPRIL 40 MG TABLET] 90 tablet 3     Sig: TAKE 1 TABLET BY MOUTH EVERY DAY

## 2021-11-02 DIAGNOSIS — F33.0 MILD EPISODE OF RECURRENT MAJOR DEPRESSIVE DISORDER (HCC): ICD-10-CM

## 2021-11-02 RX ORDER — CITALOPRAM 20 MG/1
20 TABLET ORAL DAILY
Qty: 90 TABLET | Refills: 3 | Status: SHIPPED | OUTPATIENT
Start: 2021-11-02

## 2021-11-02 NOTE — TELEPHONE ENCOUNTER
Received fax from pharmacy requesting refill on pts medication(s). Pt was last seen in office on 8/18/2021  and has a follow up scheduled for 12/1/2021. Will send request to  Carolin Cotton  for authorization.      Requested Prescriptions     Pending Prescriptions Disp Refills    citalopram (CELEXA) 20 MG tablet [Pharmacy Med Name: CITALOPRAM HBR 20 MG TABLET] 90 tablet 3     Sig: Take 1 tablet by mouth daily

## 2021-11-03 ENCOUNTER — HOSPITAL ENCOUNTER (OUTPATIENT)
Dept: PAIN MANAGEMENT | Age: 71
Discharge: HOME | End: 2021-11-03
Payer: MEDICARE

## 2021-11-03 VITALS
DIASTOLIC BLOOD PRESSURE: 62 MMHG | HEIGHT: 71 IN | HEART RATE: 89 BPM | OXYGEN SATURATION: 96 % | SYSTOLIC BLOOD PRESSURE: 140 MMHG | BODY MASS INDEX: 35.14 KG/M2 | TEMPERATURE: 97 F | WEIGHT: 251 LBS

## 2021-11-03 DIAGNOSIS — G89.29 CHRONIC LEFT-SIDED LOW BACK PAIN WITH LEFT-SIDED SCIATICA: ICD-10-CM

## 2021-11-03 DIAGNOSIS — M17.11 ARTHRITIS OF KNEE, RIGHT: ICD-10-CM

## 2021-11-03 DIAGNOSIS — M54.42 CHRONIC LEFT-SIDED LOW BACK PAIN WITH LEFT-SIDED SCIATICA: ICD-10-CM

## 2021-11-03 DIAGNOSIS — M54.12 CERVICAL RADICULOPATHY: ICD-10-CM

## 2021-11-03 DIAGNOSIS — M25.50 ARTHRALGIA OF MULTIPLE JOINTS: ICD-10-CM

## 2021-11-03 DIAGNOSIS — R20.2 NUMBNESS AND TINGLING OF BOTH LEGS: ICD-10-CM

## 2021-11-03 DIAGNOSIS — E79.0 HYPERURICEMIA: ICD-10-CM

## 2021-11-03 DIAGNOSIS — R20.0 NUMBNESS AND TINGLING OF BOTH LEGS: ICD-10-CM

## 2021-11-03 PROCEDURE — 99213 OFFICE O/P EST LOW 20 MIN: CPT

## 2021-11-03 PROCEDURE — 99213 OFFICE O/P EST LOW 20 MIN: CPT | Performed by: NURSE PRACTITIONER

## 2021-11-03 RX ORDER — GABAPENTIN 400 MG/1
400 CAPSULE ORAL 3 TIMES DAILY
Qty: 270 CAPSULE | Refills: 0 | Status: SHIPPED | OUTPATIENT
Start: 2021-11-03 | End: 2022-02-21 | Stop reason: SDUPTHER

## 2021-11-03 RX ORDER — HYDROCODONE BITARTRATE AND ACETAMINOPHEN 7.5; 325 MG/1; MG/1
1 TABLET ORAL EVERY 8 HOURS PRN
Qty: 90 TABLET | Refills: 0 | Status: SHIPPED | OUTPATIENT
Start: 2021-11-03 | End: 2021-12-15 | Stop reason: SDUPTHER

## 2021-11-03 ASSESSMENT — PAIN SCALES - GENERAL: PAINLEVEL_OUTOF10: 9

## 2021-11-03 ASSESSMENT — PAIN DESCRIPTION - PAIN TYPE: TYPE: CHRONIC PAIN

## 2021-11-03 ASSESSMENT — PAIN DESCRIPTION - LOCATION: LOCATION: BACK;NECK

## 2021-11-03 NOTE — PROGRESS NOTES
[facility] Physical and Pain Medicine    Office Progress Note    Patient Name: Irais Mehta    MR #: [id number]    Account #: [de-identified]    : 1950    Age: 66 y.o. Sex: male    Date: November 3, 2021    PCP: MARY De La Cruz         Referring Provider:    Chief Complaint:   Chief Complaint   Patient presents with    Lower Back Pain    Neck Pain       History of Present Illness:    Irais Mehta is a 66 y.o. male who presents to the office for follow-up of VANE and bilateral cervical trigger point injections. He says the injections really helped his pain. Says that he obtained 85% relief for 6 weeks. He is wanting the injections repeated. He also says that he is having pain in his right knee. Had x-ray per his PCP of the knee and it showed arthritis. He did get a steroid injection about 2 months ago to his knee. He is wanting to get another. He continues to take his Gabapentin and Norco with some relief that allows him to complete ADL's. Last pain management HPI note read    Employment: Retired []   Disabled  []   Works []    Does Not Work [x]     Previous Injury:  Yes  []   No [x]     Previous Surgery: Yes []   No [x]     Previous Physical Therapy In the last 6 months? Yes  []     No [x]   Did Physical Therapy make thepain better or worse? Better []   Worse []  Unchanged []    MRI in the last two years? Yes [x]  No []   Results reviewed with patient? Yes []   No []    CT Scan in the last two years? Yes  []   No [x]   Results reviewed with patient? Yes []   No []    X-ray in the last two years? Yes [x]   No  []  Results reviewed with patient? Yes  []  No []    Injections in the past?  Yes [x]   No []   Did the injections help relieve the pain? Yes [x]   No []     Do you have Depression? Yes  []    No [x]   Thinking of harming yourself or others?   Yes  []   No [x]     Past Medical Histoy  Past Medical History:   Diagnosis Date    Allergic rhinitis     Arthritis     Cancer [facility]) 2012    Thyroid  - Dr Argueta Inland Hyperlipidemia     Hypertension     Hyperthyroidism     [facility] ([facility] [address]     Sleep apnea     Type II or unspecified type diabetes mellitus without mention of complication, not stated as uncontrolled        Surgery History  Past Surgical History:   Procedure Laterality Date    CHOLECYSTECTOMY      INCONTINENCE SURGERY  11/26/2018    dr gino Lopez Bone      Dr Tamiko Macdonald  08/01/2017    removal, dr Usman Rea      removed by Dr Allen Damian  Patient has no known allergies. Current Medications  Current Outpatient Medications   Medication Sig Dispense Refill    gabapentin (NEURONTIN) 400 MG capsule Take 1 capsule by mouth 3 times daily for 90 days. May fill 11-3-2021 270 capsule 0    HYDROcodone-acetaminophen (NORCO) 7.5-325 MG per tablet Take 1 tablet by mouth every 8 hours as needed for Pain for up to 30 days.  May fill 11-3-2021 90 tablet 0    citalopram (CELEXA) 20 MG tablet Take 1 tablet by mouth daily 90 tablet 3    lisinopril (PRINIVIL;ZESTRIL) 40 MG tablet TAKE 1 TABLET BY MOUTH EVERY DAY 90 tablet 3    metoprolol succinate (TOPROL XL) 25 MG extended release tablet Take 1 tablet by mouth daily 90 tablet 3    allopurinol (ZYLOPRIM) 100 MG tablet Take 1 tablet by mouth daily 90 tablet 1    atorvastatin (LIPITOR) 20 MG tablet Take 1 tablet by mouth nightly 90 tablet 3    levothyroxine (SYNTHROID) 200 MCG tablet Take 1 tablet by mouth Daily 90 tablet 3    glipiZIDE (GLUCOTROL) 5 MG tablet Take 1 tablet by mouth daily 90 tablet 3    amLODIPine (NORVASC) 10 MG tablet Take 0.5 tablets by mouth daily 90 tablet 3    Cholecalciferol (VITAMIN D3) 2000 units TABS Take 1 tablet by mouth daily 30 tablet 11    COLCRYS 0.6 MG tablet TAKE 2 TABLETS THEN AN HOUR LATER TAKE 1 TABLET WHEN GOUT FLARES 12 tablet 5     No current facility-administered medications for this encounter. Social History    Social History     Tobacco Use    Smoking status: Never Smoker    Smokeless tobacco: Never Used   Substance Use Topics    Alcohol use: No         Family History  family history includes Cancer in his father; Heart Attack in his father and mother; Heart Disease in his father; Heart Failure in his mother; Schizophrenia in his mother; Thyroid Disease in his sister. Review of Systems:  Constitutional: denies fever, chills, fatigue, change in appetite, weight gain or weight loss  Head: Normocephalic  Skin: denies easy bruising, skin redness, skin rash, hives, sensitivity to sun exposure, tightness, nodules or bumps, hair loss, color changes in the hands or feet with cold. Eyes: denies pain, redness, loss of vision, double or blurred vision, eye drainage, or dryness. ENT and Mouth: denies ringing in the ears, loss of hearing, nasal congestion, nasal discharge, no hoarseness, sore throat, or difficulty swallowing   Respiratory: denies chronic dry cough, coughing up blood, coughing up mucus, waking at night coughing or choking, or wheezing. Cardiovascular: denies chest pain, irregular heartbeats, palpitations, shortness of breath, or edema in legs  Gastrointestinal: denies, nausea, vomiting, heartburn, diarrhea, or constipation  Genitourinary: denies difficult urination, pain or burning with urination, blood in the urine, or cloudy urine  Musculoskeletal: denies arm, buttock, thigh or calf cramps. Has pain in neck and bilateral shoulders, muscle spasms in neck and bilateral shoulders and tenderness in neck and bilateral shoulders. Pain in right knee. No muscle weakness. No joint swelling. Neurologic: headache, dizziness, fainting, loss of consciousness, no memory loss. No sensitivity.   Endocrine: denies intolerance to hot or cold temperature, night sweats, flushing, fingernail changes, increased thirst, or hairloss   Hematologic/ Lymphatic: denies anemia, bleeding tendency or clotting tendency, bruising easily. Allergic/ Immunologic: denies rhinitis, asthma, skin sensitivity, or allergy to Latex   Psychiatric: denies depression or thoughts of suicide, or voices in head. Current Pain Assessment:   Pain Assessment  Pain Assessment: 0-10  Pain Level: 9  Patient's Stated Pain Goal: 3  Pain Type: Chronic pain  Pain Location: Back, Neck    Clinical Progression: gradually improving  Effect of Pain on Daily Activities: limits activity  Patient's Stated Pain Goal: No pain  Pain Intervention(s): Medication (see eMar), Repositioning, Rest, Ice    Current PE    ORT Score: 0    PHQ-9 Score: 0    Physical Exam:    Vitals:    21 0844   BP: (!) 140/62   Pulse: 89   Temp: 97 °F (36.1 °C)   TempSrc: Temporal   SpO2: 96%   Weight: 251 lb (113.9 kg)   Height: 5' 11\" (1.803 m)       Body mass index is 35.01 kg/m². General Appearance: no acute distress. Appears to be well dressed  Skin Exam: Warm and dry, no jaundice, rashes or lesions  Head Exam: NCAT, PERRLA, EOMI, moist mucus membranes, scalp normal  Neck Exam: Supple, no masses palpated, trachea midline. Pain with flexion and extension of head. Pain with palpation of muscles iin neck and bilateral shoulders  Lung: Clear to ausculation in all lobes anterior and posterior. Heart: Regular rate and rhythm, no gallops, rubs or murmurs, no edema  Abdomen:  Bowel sounds in all quadrants, soft, non-distended, non-tender with palpation, no guarding  Extremities: No rash, cyanosis or bruising  Musculoskeletal: No joint swelling or deformity   Back Exam: limited ROM  Hip Exam: Full rotation bilateral   Knee Exam: Crepitus and pain in right knee with flexion and extension  Shoulder Exam: Full ROM bilateral  Neurologic Exam: Gait and coordination normal, speech normal  Reflexes: Normal brachialis, Negative Mariee's bilateral. Normal Patellar bilateral,   CN EXAM: II-XII intact, face symmetrical, tongue symmetrical, the trapezius and sternocleidomastoid muscle appearance and strength symmetrical, normal achilles bilateral, ankle clonus negative bilateral  Strength: 5/5 RUE Bi's/Tri's, 5/5 LUE Bi's/Tri's, 5/5 RLE knee flex/ext, 5/5 RLE DF/PF, 5/5 LLE knee flex/ext, 5/5 LLE DF/PF  Sensation: Equal and intact to fine touch in all extremities  Mood and affect: Normal   Nurses note reviewed along with current vital signs    Active Problem(s)  Active Problems:    Cervicalgia    Muscle spasms of neck    Myofascial muscle pain    Cervical radiculopathy    Arthritis of knee, right  Resolved Problems:    * No resolved hospital problems. *                                                                                                                            PLAN:  1. Patient is to call the office with any questions or concerns that may arise prior to next appointment. 2. Continue Norco and Gabapentin  3. Schedule patient for VANE level C3-C4  4. Schedule patient for bilateral cervical trigger point injections  4. Schedule patient for right knee Zilretta injections    Patient knows to hold Xarelto for 2 days prior to the VANE    Urine Drug Screen Current/Today:  Yes  []  No [x]     Discussion:  Discussed exam findings and plan of care with patient. Patient agreed with the current plan of care at this time. All questions from the patient were answered by the provider. Activity:   Discussed exercise as beneficial to pain reduction, encouraged stretching exercise with a focus on torso strengthening.     Education Provided:  Review of Pawan Gums [x] Agreement Review [x]  Reviewed PHQ-9  [x]    Review of Test [] Compliance Issues Discussed []   Cognitive Behavior Needs [] Exercise [x]  Financial Issues []   Tobacco/Alcohol Use [] Teaching  [x]     Goal:  Pain Management Goals of Therapy:   []        Resolution in pain  [x]        Decrease in pain level  [x]        Improvement in ADL's  [x]        Increase in activities with less pain  [x]        Decrease in medication      [] Benzodiazapine's and Narcotics:  Patient educated on the possible effects of combining Benzodiazapine's and Opioids. Explained \"Black Box Warnings\" such as; possible suppressed breathing, hypoxia, anoxia, depressed cognition, heart arrhythmia, coma and possible death. Patient verbalized understanding concerning possible effects. Controlled Substance Monitoring:  Attestation: The ERLINDA report for this patient was reviewed today. Discussed with patient possible medication side effects, risk of tolerance, dependence and alternative treatments. Discussed the growing epidemic in the [place] with the overprescribing and at times the abuse of narcotics. Discussed the detrimental effects of long term narcotic use. Patient encouraged to set daily goals of exercising and decreasing daily narcotic intake. Discussed with the patient about the development of hyperalgesia with long term narcotic intake. EMR dragon/transcription disclaimer: Much of this encounter note is electronic transcription/translation of spoken language to printed tach. Electronic translation of spoken language may be erroneous, or at times, nonsensical words or phrases may be inadvertently transcribed.  Although, I have reviewed the note for such errors, some may still exist.     CC:  MARY Jaime APRN, 11/3/2021 at 9:02 AM

## 2021-11-03 NOTE — PROGRESS NOTES
Clinic Documentation      Education Provided:  [x] Review of Pham Beyer  [] Agreement Review  [x] PEG Score Calculated [] PHQ Score Calculated [] ORT Score Calculated    [] Compliance Issues Discussed [] Cognitive Behavior Needs [x] Exercise [] Review of Test [] Financial Issues  [x] Tobacco/Alcohol Use Reviewed [x] Teaching [] New Patient [] Picture Obtained    Physician Plan:  [] Outgoing Referral  [] Pharmacy Consult  [] Test Ordered [x] Prescription Ordered/Changed   [] Obtained Test Results / Consult Notes        Complete if patient is withholding blood thinner for procedure     Blood Thinner Patient is currently taking:      [] Plavix (Hold for 7 days)  [] Aspirin (Hold for 5 days)     [] Pletal (Hold for 2 days)  [] Pradaxa (Hold for 3 days)    [] Effient (Hold for 7 days)  [] Xarelto (Hold for 2 days)    [] Eliquis (Hold for 2 days)  [] Brilinta (Hold for 7 days)    [] Coumadin (Hold for 5 days) - (INR needs to be drawn the day prior to procedure- INR < 2.0)    [] Aggrenox (Hold for 7 days)        [] Patient will stop medication on their own.    [] Blood Thinner Form Faxed for approval to hold.    Provider form faxed to:     Assessment Completed by:  Electronically signed by Juno Harmon on 11/3/2021 at 8:34 AM

## 2021-11-23 ENCOUNTER — HOSPITAL ENCOUNTER (OUTPATIENT)
Dept: PAIN MANAGEMENT | Age: 71
Discharge: HOME | End: 2021-11-23
Payer: MEDICARE

## 2021-11-23 VITALS
HEART RATE: 59 BPM | OXYGEN SATURATION: 99 % | RESPIRATION RATE: 18 BRPM | DIASTOLIC BLOOD PRESSURE: 73 MMHG | TEMPERATURE: 96.8 F | SYSTOLIC BLOOD PRESSURE: 150 MMHG

## 2021-11-23 DIAGNOSIS — R52 PAIN MANAGEMENT: ICD-10-CM

## 2021-11-23 PROCEDURE — 2500000003 HC RX 250 WO HCPCS

## 2021-11-23 PROCEDURE — 2580000003 HC RX 258

## 2021-11-23 PROCEDURE — 3209999900 FLUORO FOR SURGICAL PROCEDURES

## 2021-11-23 PROCEDURE — 6360000002 HC RX W HCPCS

## 2021-11-23 PROCEDURE — 62321 NJX INTERLAMINAR CRV/THRC: CPT

## 2021-11-23 RX ORDER — LIDOCAINE HYDROCHLORIDE 10 MG/ML
5 INJECTION, SOLUTION EPIDURAL; INFILTRATION; INTRACAUDAL; PERINEURAL ONCE
Status: DISCONTINUED | OUTPATIENT
Start: 2021-11-23 | End: 2021-11-25 | Stop reason: HOSPADM

## 2021-11-23 RX ORDER — SODIUM CHLORIDE 9 MG/ML
5 INJECTION INTRAVENOUS ONCE
Status: DISCONTINUED | OUTPATIENT
Start: 2021-11-23 | End: 2021-11-25 | Stop reason: HOSPADM

## 2021-11-23 RX ORDER — METHYLPREDNISOLONE ACETATE 80 MG/ML
80 INJECTION, SUSPENSION INTRA-ARTICULAR; INTRALESIONAL; INTRAMUSCULAR; SOFT TISSUE ONCE
Status: DISCONTINUED | OUTPATIENT
Start: 2021-11-23 | End: 2021-11-25 | Stop reason: HOSPADM

## 2021-11-23 ASSESSMENT — PAIN - FUNCTIONAL ASSESSMENT: PAIN_FUNCTIONAL_ASSESSMENT: 0-10

## 2021-11-23 ASSESSMENT — PAIN DESCRIPTION - LOCATION: LOCATION: NECK

## 2021-11-23 ASSESSMENT — PAIN DESCRIPTION - PAIN TYPE: TYPE: CHRONIC PAIN

## 2021-11-23 ASSESSMENT — PAIN SCALES - GENERAL: PAINLEVEL_OUTOF10: 9

## 2021-11-23 NOTE — PROGRESS NOTES
Procedure:  Level of Consciousness: [x]Alert [x]Oriented []Disoriented []Lethargic  Anxiety Level: [x]Calm []Anxious []Depressed []Other  Skin: [x]Warm [x]Dry []Cool []Moist []Intact []Other  Cardiovascular: [x]Palpitations: [x]Never []Occasionally []Frequently  Chest Pain: [x]No []Yes  Respiratory:  [x]Unlabored []Labored []Cough ([] Productive []Unproductive)  HCG Required: [x]No []Yes   Results: []Negative []Positive  Knowledge Level:        [x]Patient/Other verbalized understanding of pre-procedure instructions. [x]Assessment of post-op care needs (transportation, responsible caregiver)        [x]Able to discuss health care problems and how to deal with it.   Factors that Affect Teaching:        Language Barrier: [x]No []Yes - why:        Hearing Loss:        []No [x]Yes            Corrective Device:  [x]Yes []No        Vision Loss:           []No [x]Yes            Corrective Device:  [x]Yes []No        Memory Loss:       []No []Yes            []Short Term []Long Term  Motivational Level:  [x]Asks Questions                  []Extremely Anxious       [x]Seems Interested               []Seems Uninterested                  []Denies need for Education  Risk for Injury:  [x]Patient oriented to person, place and time  []History of frequent falls/loss of balance  Nutritional:  []Change in appetite   []Weight Gain   []Weight Loss  Functional:  []Requires assistance with ADL's

## 2021-11-23 NOTE — INTERVAL H&P NOTE
Update History & Physical    The patient's History and Physical  was reviewed with the patient and I examined the patient. There was  NO CHANGE:[id number]}. The surgical site was confirmed by the patient and me. Plan: The risks, benefits, expected outcome, and alternative to the recommended procedure have been discussed with the patient. Patient understands and wants to proceed with the procedure.      Electronically signed by Tamara Mac MD on 11/23/2021 at 10:37 AM

## 2021-12-01 ENCOUNTER — TELEPHONE (OUTPATIENT)
Dept: PAIN MANAGEMENT | Age: 71
End: 2021-12-01

## 2021-12-08 ENCOUNTER — OFFICE VISIT (OUTPATIENT)
Dept: CARDIOLOGY | Facility: CLINIC | Age: 71
End: 2021-12-08

## 2021-12-08 VITALS
HEIGHT: 71 IN | WEIGHT: 249.6 LBS | SYSTOLIC BLOOD PRESSURE: 168 MMHG | OXYGEN SATURATION: 98 % | DIASTOLIC BLOOD PRESSURE: 90 MMHG | BODY MASS INDEX: 34.94 KG/M2 | HEART RATE: 60 BPM

## 2021-12-08 DIAGNOSIS — I65.23 BILATERAL CAROTID ARTERY STENOSIS: Chronic | ICD-10-CM

## 2021-12-08 DIAGNOSIS — R53.83 FATIGUE, UNSPECIFIED TYPE: ICD-10-CM

## 2021-12-08 DIAGNOSIS — E78.2 MIXED HYPERLIPIDEMIA: Chronic | ICD-10-CM

## 2021-12-08 DIAGNOSIS — I48.0 PAROXYSMAL ATRIAL FIBRILLATION (HCC): Chronic | ICD-10-CM

## 2021-12-08 DIAGNOSIS — E66.09 CLASS 1 OBESITY DUE TO EXCESS CALORIES WITH SERIOUS COMORBIDITY AND BODY MASS INDEX (BMI) OF 34.0 TO 34.9 IN ADULT: Chronic | ICD-10-CM

## 2021-12-08 DIAGNOSIS — R68.89 DECREASED EXERCISE TOLERANCE: ICD-10-CM

## 2021-12-08 DIAGNOSIS — I10 PRIMARY HYPERTENSION: Chronic | ICD-10-CM

## 2021-12-08 DIAGNOSIS — R07.9 CHEST PAIN, UNSPECIFIED TYPE: Primary | ICD-10-CM

## 2021-12-08 DIAGNOSIS — G47.30 SLEEP APNEA, UNSPECIFIED TYPE: Chronic | ICD-10-CM

## 2021-12-08 PROBLEM — E66.811 CLASS 1 OBESITY DUE TO EXCESS CALORIES WITH SERIOUS COMORBIDITY AND BODY MASS INDEX (BMI) OF 34.0 TO 34.9 IN ADULT: Status: ACTIVE | Noted: 2018-06-28

## 2021-12-08 PROCEDURE — 93000 ELECTROCARDIOGRAM COMPLETE: CPT | Performed by: NURSE PRACTITIONER

## 2021-12-08 PROCEDURE — 99214 OFFICE O/P EST MOD 30 MIN: CPT | Performed by: NURSE PRACTITIONER

## 2021-12-08 RX ORDER — AMLODIPINE BESYLATE 2.5 MG/1
2.5 TABLET ORAL DAILY
Qty: 30 TABLET | Refills: 11 | Status: SHIPPED | OUTPATIENT
Start: 2021-12-08 | End: 2022-01-11 | Stop reason: HOSPADM

## 2021-12-08 RX ORDER — METOPROLOL SUCCINATE 25 MG/1
25 TABLET, EXTENDED RELEASE ORAL DAILY
COMMUNITY
End: 2022-01-11 | Stop reason: HOSPADM

## 2021-12-08 NOTE — ASSESSMENT & PLAN NOTE
Improved. Again encouraged healthy diet, BP, lipid, and glucose control, and routine aerobic exercise (after nuclear stress results known). Continue present therapy. Recheck per Dr. León/Jeri Chambers NP.

## 2021-12-08 NOTE — PATIENT INSTRUCTIONS
Advance Care Planning and Advance Directives     You make decisions on a daily basis - decisions about where you want to live, your career, your home, your life. Perhaps one of the most important decisions you face is your choice for future medical care. Take time to talk with your family and your healthcare team and start planning today.  Advance Care Planning is a process that can help you:  · Understand possible future healthcare decisions in light of your own experiences  · Reflect on those decision in light of your goals and values  · Discuss your decisions with those closest to you and the healthcare professionals that care for you  · Make a plan by creating a document that reflects your wishes    Surrogate Decision Maker  In the event of a medical emergency, which has left you unable to communicate or to make your own decisions, you would need someone to make decisions for you.  It is important to discuss your preferences for medical treatment with this person while you are in good health.     Qualities of a surrogate decision maker:  • Willing to take on this role and responsibility  • Knows what you want for future medical care  • Willing to follow your wishes even if they don't agree with them  • Able to make difficult medical decisions under stressful circumstances    Advance Directives  These are legal documents you can create that will guide your healthcare team and decision maker(s) when needed. These documents can be stored in the electronic medical record.    · Living Will - a legal document to guide your care if you have a terminal condition or a serious illness and are unable to communicate. States vary by statute in document names/types, but most forms may include one or more of the following:        -  Directions regarding life-prolonging treatments        -  Directions regarding artificially provided nutrition/hydration        -  Choosing a healthcare decision maker        -  Direction regarding  organ/tissue donation    · Durable Power of  for Healthcare - this document names an -in-fact to make medical decisions for you, but it may also allow this person to make personal and financial decisions for you. Please seek the advice of an  if you need this type of document.    **Advance Directives are not required and no one may discriminate against you if you do not sign one.    Medical Orders  Many states allow specific forms/orders signed by your physician to record your wishes for medical treatment in your current state of health. This form, signed in personal communication with your physician, addresses resuscitation and other medical interventions that you may or may not want.      For more information or to schedule a time with a [facility] Advance Care Planning Facilitator contact: [email or web address] or call [phone] and someone will contact you directly.

## 2021-12-08 NOTE — ASSESSMENT & PLAN NOTE
Patient's (Body mass index is 34.83 kg/m².) indicates that they are obese (BMI >30) with health conditions that include obstructive sleep apnea, hypertension, diabetes mellitus, dyslipidemias, peripheral vascular disease and osteoarthritis . Weight is worsening. BMI is is above average; BMI management plan is completed. We discussed portion control.

## 2021-12-08 NOTE — ASSESSMENT & PLAN NOTE
Somewhat more concerning for ischemia given recent decrease in exercise tolerance and exertional symptoms. Check nuclear perfusion since technically difficult SE in 2019. Go to ER if unrelieved with rest.

## 2021-12-08 NOTE — ASSESSMENT & PLAN NOTE
Well controlled except low HDL on atorvastatin per 5/2021 labs. Recommend changing to rosuvastatin since HDL hasn't improved to goal of 40 or above. Encouraged healthy diet and routine aerobic exercise (after nuclear stress test results known).

## 2021-12-08 NOTE — ASSESSMENT & PLAN NOTE
Change Xarelto to Eliquis 5 mg BID for better formulary coverage. Call if remains too expensive. Two boxes of samples and 30 day free trial card provided. Advised of when to call vs when to go to ER. Encouraged safety.

## 2021-12-08 NOTE — ASSESSMENT & PLAN NOTE
Uncontrolled per today's and reported home readings. Goal BP less than 130/80. Restart amlodipine at lower dose. Call home BP readings in 2 weeks. Encouraged use of CPAP.

## 2021-12-08 NOTE — PROGRESS NOTES
"Encounter Date:12/08/2021  Chief Complaint:   Subjective    Zay Kamara is a 71 y.o. male who presents to [facility] today for overdue yearly cardiac follow-up for chest pain, paroxysmal atrial fibrillation, hypertension, and sleep apnea. He was last seen in November 2019. We canceled his 11/2020 appointment and no one rescheduled. He is accompanied by his wife of 3 months. Had a couple of episodes of feeling cold all over and head felt warm with chills this summer while at work. Goes in and out. Hasn't happened since weather cooled down. 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 Last stress test was 7/2019 was technically difficult but overall felt to be low risk. No history of coronary artery disease. Has less than 50% carotid stenosis per most recent ultrasound which is improved.      History of Present Illness   HPI     Atrial Fibrillation      Additional comments: paroxysmal, no palpitations, hasn't been taking Xarelto - too expensive but wants to get back on it.              Hypertension      Additional comments: checks every once in a while - was \"normal\" last week at pain management - 145/70.               Chest Pain      Additional comments: as per chief complaint              Fatigue      Additional comments: for the last 6-9 months, a lot worse for the last month or two              Sleep Apnea      Additional comments: hasn't been wearing regularly - having trouble lying on his side and not leaking          Last edited by Letty Belcher APRN on 12/8/2021 11:40 AM. (History)        History: Past medical, surgical, family, and social history reviewed.    Outpatient Medications Marked as Taking for the 12/8/21 encounter (Office Visit) with Letty Belcher APRN   Medication Sig Dispense Refill   • allopurinol (ZYLOPRIM) 100 MG tablet Take 100 mg by mouth Daily.     • atorvastatin " "(LIPITOR) 20 MG tablet Take 20 mg by mouth Daily.  2   • Cholecalciferol (VITAMIN D3) 2000 units tablet Take 2,000 Units by mouth Daily.     • citalopram (CeleXA) 20 MG tablet Take 20 mg by mouth Daily.     • colchicine 0.6 MG tablet Take 0.6 mg by mouth As Needed for Muscle / Joint Pain. 2 tab then hour later 1 tab when gout flares      • gabapentin (NEURONTIN) 400 MG capsule Take 400 mg by mouth 2 (Two) Times a Day. Can take up to TID but only takes BID due to side effects - sleepiness     • glipizide (GLUCOTROL) 5 MG tablet Take 5 mg by mouth Daily.     • HYDROcodone-acetaminophen (NORCO) 7.5-325 MG per tablet Take 1 tablet by mouth Every 8 (Eight) Hours As Needed for Moderate Pain . Usually takes twice a day     • levothyroxine (SYNTHROID, LEVOTHROID) 200 MCG tablet Take 200 mcg by mouth Daily.     • lisinopril (PRINIVIL,ZESTRIL) 40 MG tablet Take 40 mg by mouth Daily.  11   • metoprolol succinate XL (TOPROL-XL) 25 MG 24 hr tablet Take 25 mg by mouth Daily.     • nystatin (MYCOSTATIN) 799342 UNIT/GM powder Apply  topically to the appropriate area as directed 4 (Four) Times a Day.          Objective     Vital Signs:   /90 (BP Location: Left arm, Patient Position: Sitting, Cuff Size: Adult)   Pulse 60   Ht 180.3 cm (70.98\")   Wt 113 kg (249 lb 9.6 oz)   SpO2 98%   BMI 34.83 kg/m²   Wt Readings from Last 3 Encounters:   12/08/21 113 kg (249 lb 9.6 oz)   09/02/21 111 kg (244 lb)   08/02/21 112 kg (248 lb)         Vitals reviewed.   Constitutional:       Appearance: Well-developed.   Eyes:      General: No scleral icterus.  HENT:      Right Ear: Decreased hearing noted.      Left Ear: Decreased hearing noted.   Neck:      Vascular: Normal carotid pulses. No carotid bruit or JVD.   Pulmonary:      Effort: Pulmonary effort is normal.      Breath sounds: Normal breath sounds.   Cardiovascular:      Normal rate. Regular rhythm.      No gallop.   Pulses:     Intact distal pulses.   Edema:     Peripheral edema " absent.   Skin:     General: Skin is warm and dry.   Neurological:      Mental Status: Alert and oriented to person, place, and time.         Result Review  Data Reviewed:  The following data was reviewed by: LUIS Garcia on 12/08/2021  US Carotid Bilateral (08/17/2021 07:33)  US Ankle / Brachial Indices Extremity Complete (08/17/2021 07:33)    Lab Results - Last 18 Months   Lab Units 07/28/21  1038 05/18/21  0824 12/30/20  0937 11/09/20  0820   BUN mg/dL  --  24*  --  23   CREATININE mg/dL  --  1.3*  --  1.4*   EGFR IF NONAFRICN AM   --  54*  --  50*   EGFR IF AFRICN AM   --  >59  --  >59   SODIUM mmol/L  --  137  --  138   POTASSIUM mmol/L  --  4.7  --  4.9   CHLORIDE mmol/L  --  103  --  102   CALCIUM mg/dL  --  8.9  --  9.5   ALBUMIN g/dL  --  4.4  --  4.4   BILIRUBIN mg/dL  --  0.6  --  0.4   ALK PHOS U/L  --  58  --  64   AST (SGOT) U/L  --  12  --  12   ALT (SGPT) U/L  --  13  --  13   CHOLESTEROL mg/dL  --  90*  --  91*   TRIGLYCERIDES mg/dL  --  125  --  124   HDL CHOL mg/dL  --  22*  --  21*   LDL CHOL mg/dL  --  43  --  45   HEMOGLOBIN A1C %  --  6.3*  --  6.3*   WBC K/uL  --   --   --  8.9   RBC M/uL  --   --   --  4.63*   HEMATOCRIT %  --   --   --  42.4   MCV fL  --   --   --  91.6   MCH pg  --   --   --  29.6   TSH uIU/mL  --   --   --  0.477   FREE T4 ng/dL  --   --   --  1.96*   PSA ng/mL <0.014  --  <0.014  --             ECG 12 Lead    Date/Time: 12/8/2021 1:43 PM  Performed by: Letty Belcher APRN  Authorized by: Gargus, Letty Bea, APRN   Comparison: compared with previous ECG from 8/9/2019  Similar to previous ECG  Rhythm: sinus bradycardia  BPM: 57  Conduction: right bundle branch block    Clinical impression: abnormal EKG             Assessment and Plan   Problem List Items Addressed This Visit        Cardiac and Vasculature    Bilateral carotid artery stenosis (Chronic)    Overview     Dr. León following  8/21/2019 US - less than 50% R ICA, 50-69% L ICA  stenosis  8/2021 - less than 50% L&R ICA         Current Assessment & Plan     Improved. Again encouraged healthy diet, BP, lipid, and glucose control, and routine aerobic exercise (after nuclear stress results known). Continue present therapy. Recheck per Dr. León/Jeri Chambers NP.         Chest pain - Primary (Chronic)    Current Assessment & Plan     Somewhat more concerning for ischemia given recent decrease in exercise tolerance and exertional symptoms. Check nuclear perfusion since technically difficult SE in 2019. Go to ER if unrelieved with rest.         Relevant Orders    Stress Test With Myocardial Perfusion One Day    Hypertension (Chronic)    Current Assessment & Plan     Uncontrolled per today's and reported home readings. Goal BP less than 130/80. Restart amlodipine at lower dose. Call home BP readings in 2 weeks. Encouraged use of CPAP.         Relevant Medications    metoprolol succinate XL (TOPROL-XL) 25 MG 24 hr tablet    amLODIPine (NORVASC) 2.5 MG tablet    Mixed hyperlipidemia (Chronic)    Current Assessment & Plan     Well controlled except low HDL on atorvastatin per 5/2021 labs. Recommend changing to rosuvastatin since HDL hasn't improved to goal of 40 or above. Encouraged healthy diet and routine aerobic exercise (after nuclear stress test results known).          Paroxysmal atrial fibrillation (HCC) (Chronic)    Overview     [id number] 3 = high risk  HAS-BLED 3 = high risk         Current Assessment & Plan     Change Xarelto to Eliquis 5 mg BID for better formulary coverage. Call if remains too expensive. Two boxes of samples and 30 day free trial card provided. Advised of when to call vs when to go to ER. Encouraged safety.         Relevant Medications    metoprolol succinate XL (TOPROL-XL) 25 MG 24 hr tablet    apixaban (Eliquis) 5 MG tablet tablet    amLODIPine (NORVASC) 2.5 MG tablet       Endocrine and Metabolic    Class 1 obesity due to excess calories with serious  comorbidity and body mass index (BMI) of 34.0 to 34.9 in adult (Chronic)    Current Assessment & Plan     Patient's (Body mass index is 34.83 kg/m².) indicates that they are obese (BMI >30) with health conditions that include obstructive sleep apnea, hypertension, diabetes mellitus, dyslipidemias, peripheral vascular disease and osteoarthritis . Weight is worsening. BMI is is above average; BMI management plan is completed. We discussed portion control.             Sleep    Sleep apnea (Chronic)    Overview     Previously noncompliant with CPAP         Current Assessment & Plan     Encouraged compliance with CPAP and try using a CPAP pillow to allow him to sleep on his side as preferred.Encouraged weight loss but continue CPAP until has significant amount of weight loss.           Other Visit Diagnoses     Fatigue, unspecified type        Relevant Orders    Vitamin D 1,25 Dihydroxy    Stress Test With Myocardial Perfusion One Day    Decreased exercise tolerance        Relevant Orders    Stress Test With Myocardial Perfusion One Day        Patient was given instructions and counseling regarding his condition or for health maintenance advice. Please see specific information pulled into the AVS if appropriate.    Advance Care Planning   ACP discussion was held with the patient during this visit. Patient does not have an advance directive, information provided.    Follow Up :   Return in about 3 months (around 3/8/2022) for Recheck at [facility].             LUIS Montero, ACNP-BC, CHFN-BC

## 2021-12-08 NOTE — ASSESSMENT & PLAN NOTE
Encouraged compliance with CPAP and try using a CPAP pillow to allow him to sleep on his side as preferred.Encouraged weight loss but continue CPAP until has significant amount of weight loss.

## 2021-12-09 ENCOUNTER — HOSPITAL ENCOUNTER (OUTPATIENT)
Dept: PAIN MANAGEMENT | Age: 71
Discharge: HOME | End: 2021-12-09
Payer: MEDICARE

## 2021-12-09 VITALS
DIASTOLIC BLOOD PRESSURE: 73 MMHG | TEMPERATURE: 96.8 F | SYSTOLIC BLOOD PRESSURE: 160 MMHG | OXYGEN SATURATION: 96 % | HEART RATE: 56 BPM | RESPIRATION RATE: 18 BRPM

## 2021-12-09 PROCEDURE — 20553 NJX 1/MLT TRIGGER POINTS 3/>: CPT | Performed by: NURSE PRACTITIONER

## 2021-12-09 PROCEDURE — 20553 NJX 1/MLT TRIGGER POINTS 3/>: CPT

## 2021-12-09 PROCEDURE — 2500000003 HC RX 250 WO HCPCS

## 2021-12-09 RX ORDER — LIDOCAINE HYDROCHLORIDE 10 MG/ML
10 INJECTION, SOLUTION EPIDURAL; INFILTRATION; INTRACAUDAL; PERINEURAL ONCE
Status: DISCONTINUED | OUTPATIENT
Start: 2021-12-09 | End: 2021-12-11 | Stop reason: HOSPADM

## 2021-12-09 RX ORDER — BUPIVACAINE HYDROCHLORIDE 5 MG/ML
10 INJECTION, SOLUTION EPIDURAL; INTRACAUDAL ONCE
Status: DISCONTINUED | OUTPATIENT
Start: 2021-12-09 | End: 2021-12-11 | Stop reason: HOSPADM

## 2021-12-09 NOTE — PROCEDURES
[facility]      Patient Name: Angelia Alberto    : 1950                    Age: 66 y.o. Sex: male    Date: 2021    Pre-op Diagnosis: Myofascial Pain/ Muscle Spasms/ Cervicalgia    Post-op Diagnosis: Myofascial Pain/ Muscle Spasms/ Cervicalgia    Procedure: Cervical Trigger Point Injections    Performing Procedure: Genesis Sol, MSN, APRN, FNP-C    Previously Had Procedure:  Yes [x]  No []     Patient Vitals for the past 24 hrs:   BP Temp Temp src Pulse Resp SpO2   21 0757 (!) 160/73 96.8 °F (36 °C) Temporal 56 18 96 %       Description of Procedure:     After a brief physical assessment and failure to improve with conservative measures the patient presented for Cervical Trigger Point Injections The indications, limitations and possible complications were discussed with the patient and the patient elected to proceed with the procedure. After voluntary, informed and signed consent obtained the patient was placed in a seated position. Appropriate time out was obtained per policy. The area of maximal tenderness was palpated over the [] Right   []  Left   []  Bilateral Cervical   [x] Splenius   [x] Trapezius  [x] Rhomboid. The skin overlying these areas was marked with a skin marker. The skin overlying the proposed injection sites were then sprayed with Gebauer's Solution while protecting patient eyes. The areas were then prepped in a sterile fashion with Prevantics swab. Each trigger point of the  [] Right   []   Left  [x]   Bilateral Cervical  [x] Splenius  [x] Trapezius   [x] Rhomboid   was then injected after negative aspiration using a 25 gauge 1 1/2 in needle with approximately 2 ml of a solution of     [x] 5 ml of 1% Lidocaine Plain and 5 ml of 0.5% Marcaine Plain per 10 ml syringe  [] Toradol 0.5 ml (30 mg/ml) per 10 ml syringe    Sterile dressings applied. Discharge: The patient tolerated the procedure well. There were no complications during the procedure and the patient was discharged home with discharge instructions. The patient has been instructed to contact the office should there be any complications or questions to arise between today and their next appointment.     Plan:  [x] Will return to the office in  [] 1 month  [x]  4 - 6 weeks  [] 2 months   [] 3 months for:  [] Planned Procedure [x] Procedure Follow-up  [] Office Visit      1 [facility], 12/9/2021 at 11:35 AM

## 2021-12-11 DIAGNOSIS — E79.0 HYPERURICEMIA: ICD-10-CM

## 2021-12-13 RX ORDER — ALLOPURINOL 100 MG/1
100 TABLET ORAL DAILY
Qty: 90 TABLET | Refills: 3 | Status: SHIPPED | OUTPATIENT
Start: 2021-12-13

## 2021-12-15 DIAGNOSIS — M54.12 CERVICAL RADICULOPATHY: ICD-10-CM

## 2021-12-15 DIAGNOSIS — R20.2 NUMBNESS AND TINGLING OF BOTH LEGS: ICD-10-CM

## 2021-12-15 DIAGNOSIS — R20.0 NUMBNESS AND TINGLING OF BOTH LEGS: ICD-10-CM

## 2021-12-15 RX ORDER — HYDROCODONE BITARTRATE AND ACETAMINOPHEN 7.5; 325 MG/1; MG/1
1 TABLET ORAL EVERY 8 HOURS PRN
Qty: 90 TABLET | Refills: 0 | Status: SHIPPED | OUTPATIENT
Start: 2021-12-15 | End: 2022-01-20 | Stop reason: SDUPTHER

## 2021-12-23 ENCOUNTER — HOSPITAL ENCOUNTER (OUTPATIENT)
Dept: CARDIOLOGY | Facility: HOSPITAL | Age: 71
Discharge: HOME | End: 2021-12-23

## 2021-12-23 VITALS
DIASTOLIC BLOOD PRESSURE: 63 MMHG | HEIGHT: 71 IN | WEIGHT: 240 LBS | SYSTOLIC BLOOD PRESSURE: 150 MMHG | HEART RATE: 58 BPM | BODY MASS INDEX: 33.6 KG/M2

## 2021-12-23 PROCEDURE — 93017 CV STRESS TEST TRACING ONLY: CPT

## 2021-12-23 PROCEDURE — 78452 HT MUSCLE IMAGE SPECT MULT: CPT | Performed by: INTERNAL MEDICINE

## 2021-12-23 PROCEDURE — 25010000002 REGADENOSON 0.4 MG/5ML SOLUTION: Performed by: INTERNAL MEDICINE

## 2021-12-23 PROCEDURE — A9500 TC99M SESTAMIBI: HCPCS | Performed by: NURSE PRACTITIONER

## 2021-12-23 PROCEDURE — 78452 HT MUSCLE IMAGE SPECT MULT: CPT

## 2021-12-23 PROCEDURE — 93018 CV STRESS TEST I&R ONLY: CPT | Performed by: INTERNAL MEDICINE

## 2021-12-23 PROCEDURE — 0 TECHNETIUM SESTAMIBI: Performed by: NURSE PRACTITIONER

## 2021-12-23 RX ADMIN — TECHNETIUM TC 99M SESTAMIBI 1 DOSE: 1 INJECTION INTRAVENOUS at 11:30

## 2021-12-23 RX ADMIN — TECHNETIUM TC 99M SESTAMIBI 1 DOSE: 1 INJECTION INTRAVENOUS at 10:00

## 2021-12-23 RX ADMIN — REGADENOSON 0.4 MG: 0.08 INJECTION, SOLUTION INTRAVENOUS at 11:31

## 2021-12-27 LAB
BH CV REST NUCLEAR ISOTOPE DOSE: 10.7 MCI
BH CV STRESS BP STAGE 1: NORMAL
BH CV STRESS COMMENTS STAGE 1: NORMAL
BH CV STRESS DOSE REGADENOSON STAGE 1: 0.4
BH CV STRESS DURATION MIN STAGE 1: 0
BH CV STRESS DURATION SEC STAGE 1: 10
BH CV STRESS HR STAGE 1: 72
BH CV STRESS NUCLEAR ISOTOPE DOSE: 35.6 MCI
BH CV STRESS PROTOCOL 1: NORMAL
BH CV STRESS RECOVERY BP: NORMAL MMHG
BH CV STRESS RECOVERY HR: 61 BPM
BH CV STRESS STAGE 1: 1
LV EF NUC BP: 68 %
MAXIMAL PREDICTED HEART RATE: 148 BPM
PERCENT MAX PREDICTED HR: 48.65 %
STRESS BASELINE BP: NORMAL MMHG
STRESS BASELINE HR: 58 BPM
STRESS PERCENT HR: 57 %
STRESS POST EXERCISE DUR SEC: 10 SEC
STRESS POST PEAK BP: NORMAL MMHG
STRESS POST PEAK HR: 72 BPM
STRESS TARGET HR: 126 BPM

## 2021-12-28 ENCOUNTER — PREP FOR SURGERY (OUTPATIENT)
Dept: OTHER | Facility: HOSPITAL | Age: 71
End: 2021-12-28

## 2021-12-28 DIAGNOSIS — R93.1 ABNORMAL NUCLEAR CARDIAC IMAGING TEST: ICD-10-CM

## 2021-12-28 DIAGNOSIS — I20.8 STABLE ANGINA PECTORIS (HCC): Primary | ICD-10-CM

## 2021-12-28 DIAGNOSIS — I20.0 UNSTABLE ANGINA (HCC): Primary | ICD-10-CM

## 2021-12-28 RX ORDER — ASPIRIN 81 MG/1
324 TABLET, CHEWABLE ORAL ONCE
Status: CANCELLED | OUTPATIENT
Start: 2021-12-28 | End: 2021-12-28

## 2021-12-28 RX ORDER — NITROGLYCERIN 0.4 MG/1
0.4 TABLET SUBLINGUAL
Qty: 25 TABLET | Refills: 11 | Status: SHIPPED | OUTPATIENT
Start: 2021-12-28 | End: 2023-01-11

## 2021-12-28 RX ORDER — ASPIRIN 81 MG/1
81 TABLET ORAL DAILY
COMMUNITY
Start: 2021-12-28

## 2021-12-28 RX ORDER — DIPHENHYDRAMINE HCL 25 MG
25 CAPSULE ORAL ONCE
Status: CANCELLED | OUTPATIENT
Start: 2021-12-28 | End: 2021-12-28

## 2021-12-28 RX ORDER — ASPIRIN 81 MG/1
81 TABLET ORAL DAILY
Status: CANCELLED | OUTPATIENT
Start: 2021-12-29

## 2021-12-28 RX ORDER — SODIUM CHLORIDE 9 MG/ML
125 INJECTION, SOLUTION INTRAVENOUS ONCE
Status: CANCELLED | OUTPATIENT
Start: 2021-12-28 | End: 2021-12-28

## 2021-12-28 RX ORDER — LIDOCAINE HYDROCHLORIDE 10 MG/ML
0.1 INJECTION, SOLUTION EPIDURAL; INFILTRATION; INTRACAUDAL; PERINEURAL ONCE AS NEEDED
Status: CANCELLED | OUTPATIENT
Start: 2021-12-28

## 2021-12-28 RX ORDER — SODIUM CHLORIDE 9 MG/ML
1-3 INJECTION, SOLUTION INTRAVENOUS CONTINUOUS
Status: CANCELLED | OUTPATIENT
Start: 2021-12-28

## 2021-12-28 NOTE — PROGRESS NOTES
Notified patient of high risk nuclear stress test and recommendations for heart cath with possible PCI. Advised him to avoid strenuous activity, how to take NTG, and when to go to ER. Risks, benefits, and alternatives discussed. He wishes to proceed and requests Dr. Fitzpatrick. I discussed with Dr. Fitzpatrick, and he can do his cath Thursday via radial approach. Case request/orders done.    Annika please try to get precert done today and notify patient to stop Eliquis today if able to schedule for Thursday. Otherwise have him stop Eliquis 2 days prior to cath. TX!

## 2021-12-30 ENCOUNTER — HOSPITAL ENCOUNTER (OUTPATIENT)
Facility: HOSPITAL | Age: 71
Setting detail: HOSPITAL OUTPATIENT SURGERY
Discharge: HOME | End: 2021-12-30
Attending: INTERNAL MEDICINE | Admitting: INTERNAL MEDICINE

## 2021-12-30 VITALS
BODY MASS INDEX: 36.13 KG/M2 | TEMPERATURE: 97.7 F | HEART RATE: 60 BPM | OXYGEN SATURATION: 94 % | DIASTOLIC BLOOD PRESSURE: 65 MMHG | HEIGHT: 71 IN | WEIGHT: 258.1 LBS | RESPIRATION RATE: 15 BRPM | SYSTOLIC BLOOD PRESSURE: 147 MMHG

## 2021-12-30 DIAGNOSIS — I20.8 STABLE ANGINA PECTORIS: ICD-10-CM

## 2021-12-30 DIAGNOSIS — I20.0 UNSTABLE ANGINA: ICD-10-CM

## 2021-12-30 DIAGNOSIS — R93.1 ABNORMAL NUCLEAR CARDIAC IMAGING TEST: ICD-10-CM

## 2021-12-30 LAB
ALBUMIN SERPL-MCNC: 4.8 G/DL (ref 3.5–5.2)
ALBUMIN/GLOB SERPL: 1.8 G/DL
ALP SERPL-CCNC: 61 U/L (ref 39–117)
ALT SERPL W P-5'-P-CCNC: 16 U/L (ref 1–41)
ANION GAP SERPL CALCULATED.3IONS-SCNC: 6 MMOL/L (ref 5–15)
AST SERPL-CCNC: 20 U/L (ref 1–40)
BASOPHILS # BLD AUTO: 0.04 10*3/MM3 (ref 0–0.2)
BASOPHILS NFR BLD AUTO: 0.4 % (ref 0–1.5)
BILIRUB SERPL-MCNC: 0.6 MG/DL (ref 0–1.2)
BUN SERPL-MCNC: 19 MG/DL (ref 8–23)
BUN/CREAT SERPL: 14.2 (ref 7–25)
CALCIUM SPEC-SCNC: 8.7 MG/DL (ref 8.6–10.5)
CHLORIDE SERPL-SCNC: 102 MMOL/L (ref 98–107)
CO2 SERPL-SCNC: 30 MMOL/L (ref 22–29)
CREAT SERPL-MCNC: 1.34 MG/DL (ref 0.76–1.27)
DEPRECATED RDW RBC AUTO: 44.9 FL (ref 37–54)
EOSINOPHIL # BLD AUTO: 0.16 10*3/MM3 (ref 0–0.4)
EOSINOPHIL NFR BLD AUTO: 1.6 % (ref 0.3–6.2)
ERYTHROCYTE [DISTWIDTH] IN BLOOD BY AUTOMATED COUNT: 13.9 % (ref 12.3–15.4)
GFR SERPL CREATININE-BSD FRML MDRD: 52 ML/MIN/1.73
GLOBULIN UR ELPH-MCNC: 2.6 GM/DL
GLUCOSE SERPL-MCNC: 163 MG/DL (ref 65–99)
HCT VFR BLD AUTO: 41.6 % (ref 37.5–51)
HGB BLD-MCNC: 14 G/DL (ref 13–17.7)
IMM GRANULOCYTES # BLD AUTO: 0.06 10*3/MM3 (ref 0–0.05)
IMM GRANULOCYTES NFR BLD AUTO: 0.6 % (ref 0–0.5)
LYMPHOCYTES # BLD AUTO: 1.4 10*3/MM3 (ref 0.7–3.1)
LYMPHOCYTES NFR BLD AUTO: 14.2 % (ref 19.6–45.3)
MCH RBC QN AUTO: 30 PG (ref 26.6–33)
MCHC RBC AUTO-ENTMCNC: 33.7 G/DL (ref 31.5–35.7)
MCV RBC AUTO: 89.1 FL (ref 79–97)
MONOCYTES # BLD AUTO: 0.58 10*3/MM3 (ref 0.1–0.9)
MONOCYTES NFR BLD AUTO: 5.9 % (ref 5–12)
NEUTROPHILS NFR BLD AUTO: 7.62 10*3/MM3 (ref 1.7–7)
NEUTROPHILS NFR BLD AUTO: 77.3 % (ref 42.7–76)
NRBC BLD AUTO-RTO: 0 /100 WBC (ref 0–0.2)
PLATELET # BLD AUTO: 226 10*3/MM3 (ref 140–450)
PMV BLD AUTO: 9.6 FL (ref 6–12)
POTASSIUM SERPL-SCNC: 4.4 MMOL/L (ref 3.5–5.2)
PROT SERPL-MCNC: 7.4 G/DL (ref 6–8.5)
RBC # BLD AUTO: 4.67 10*6/MM3 (ref 4.14–5.8)
SARS-COV-2 RNA PNL SPEC NAA+PROBE: NOT DETECTED
SODIUM SERPL-SCNC: 138 MMOL/L (ref 136–145)
WBC NRBC COR # BLD: 9.86 10*3/MM3 (ref 3.4–10.8)

## 2021-12-30 PROCEDURE — 93005 ELECTROCARDIOGRAM TRACING: CPT | Performed by: INTERNAL MEDICINE

## 2021-12-30 PROCEDURE — 93458 L HRT ARTERY/VENTRICLE ANGIO: CPT | Performed by: INTERNAL MEDICINE

## 2021-12-30 PROCEDURE — 25010000002 FENTANYL CITRATE (PF) 50 MCG/ML SOLUTION: Performed by: INTERNAL MEDICINE

## 2021-12-30 PROCEDURE — 99152 MOD SED SAME PHYS/QHP 5/>YRS: CPT | Performed by: INTERNAL MEDICINE

## 2021-12-30 PROCEDURE — 80053 COMPREHEN METABOLIC PANEL: CPT | Performed by: INTERNAL MEDICINE

## 2021-12-30 PROCEDURE — 93459 L HRT ART/GRFT ANGIO: CPT | Performed by: INTERNAL MEDICINE

## 2021-12-30 PROCEDURE — 25010000002 DIPHENHYDRAMINE PER 50 MG: Performed by: INTERNAL MEDICINE

## 2021-12-30 PROCEDURE — 25010000002 HEPARIN (PORCINE) 1000-0.9 UT/500ML-% SOLUTION: Performed by: INTERNAL MEDICINE

## 2021-12-30 PROCEDURE — 25010000002 HEPARIN (PORCINE) 2000-0.9 UNIT/L-% SOLUTION: Performed by: INTERNAL MEDICINE

## 2021-12-30 PROCEDURE — C1894 INTRO/SHEATH, NON-LASER: HCPCS | Performed by: INTERNAL MEDICINE

## 2021-12-30 PROCEDURE — 25010000002 MIDAZOLAM PER 1 MG: Performed by: INTERNAL MEDICINE

## 2021-12-30 PROCEDURE — 0 IOPAMIDOL PER 1 ML: Performed by: INTERNAL MEDICINE

## 2021-12-30 PROCEDURE — 99153 MOD SED SAME PHYS/QHP EA: CPT | Performed by: INTERNAL MEDICINE

## 2021-12-30 PROCEDURE — 93010 ELECTROCARDIOGRAM REPORT: CPT | Performed by: INTERNAL MEDICINE

## 2021-12-30 PROCEDURE — C1769 GUIDE WIRE: HCPCS | Performed by: INTERNAL MEDICINE

## 2021-12-30 PROCEDURE — 87635 SARS-COV-2 COVID-19 AMP PRB: CPT | Performed by: INTERNAL MEDICINE

## 2021-12-30 PROCEDURE — 85025 COMPLETE CBC W/AUTO DIFF WBC: CPT | Performed by: INTERNAL MEDICINE

## 2021-12-30 PROCEDURE — C9803 HOPD COVID-19 SPEC COLLECT: HCPCS

## 2021-12-30 PROCEDURE — C1760 CLOSURE DEV, VASC: HCPCS | Performed by: INTERNAL MEDICINE

## 2021-12-30 RX ORDER — HEPARIN SODIUM 200 [USP'U]/100ML
INJECTION, SOLUTION INTRAVENOUS AS NEEDED
Status: DISCONTINUED | OUTPATIENT
Start: 2021-12-30 | End: 2021-12-30 | Stop reason: HOSPADM

## 2021-12-30 RX ORDER — FENTANYL CITRATE 50 UG/ML
INJECTION, SOLUTION INTRAMUSCULAR; INTRAVENOUS AS NEEDED
Status: DISCONTINUED | OUTPATIENT
Start: 2021-12-30 | End: 2021-12-30 | Stop reason: HOSPADM

## 2021-12-30 RX ORDER — ASPIRIN 81 MG/1
TABLET, CHEWABLE ORAL
Status: COMPLETED
Start: 2021-12-30 | End: 2021-12-30

## 2021-12-30 RX ORDER — SODIUM CHLORIDE 9 MG/ML
100 INJECTION, SOLUTION INTRAVENOUS CONTINUOUS
Status: DISCONTINUED | OUTPATIENT
Start: 2021-12-30 | End: 2021-12-30 | Stop reason: HOSPADM

## 2021-12-30 RX ORDER — MIDAZOLAM HYDROCHLORIDE 1 MG/ML
INJECTION INTRAMUSCULAR; INTRAVENOUS AS NEEDED
Status: DISCONTINUED | OUTPATIENT
Start: 2021-12-30 | End: 2021-12-30 | Stop reason: HOSPADM

## 2021-12-30 RX ORDER — LIDOCAINE HYDROCHLORIDE 20 MG/ML
INJECTION, SOLUTION INFILTRATION; PERINEURAL AS NEEDED
Status: DISCONTINUED | OUTPATIENT
Start: 2021-12-30 | End: 2021-12-30 | Stop reason: HOSPADM

## 2021-12-30 RX ORDER — HYDROCODONE BITARTRATE AND ACETAMINOPHEN 5; 325 MG/1; MG/1
1 TABLET ORAL EVERY 4 HOURS PRN
Status: DISCONTINUED | OUTPATIENT
Start: 2021-12-30 | End: 2021-12-30 | Stop reason: HOSPADM

## 2021-12-30 RX ORDER — DIPHENHYDRAMINE HYDROCHLORIDE 50 MG/ML
INJECTION INTRAMUSCULAR; INTRAVENOUS AS NEEDED
Status: DISCONTINUED | OUTPATIENT
Start: 2021-12-30 | End: 2021-12-30 | Stop reason: HOSPADM

## 2021-12-30 RX ORDER — ACETAMINOPHEN 325 MG/1
650 TABLET ORAL EVERY 4 HOURS PRN
Status: DISCONTINUED | OUTPATIENT
Start: 2021-12-30 | End: 2021-12-30 | Stop reason: HOSPADM

## 2021-12-30 RX ORDER — ASPIRIN 81 MG/1
324 TABLET, CHEWABLE ORAL ONCE
Status: COMPLETED | OUTPATIENT
Start: 2021-12-30 | End: 2021-12-30

## 2021-12-30 RX ORDER — SODIUM CHLORIDE 9 MG/ML
125 INJECTION, SOLUTION INTRAVENOUS ONCE
Status: DISCONTINUED | OUTPATIENT
Start: 2021-12-30 | End: 2021-12-30 | Stop reason: HOSPADM

## 2021-12-30 RX ORDER — SODIUM CHLORIDE 9 MG/ML
1-3 INJECTION, SOLUTION INTRAVENOUS CONTINUOUS
Status: DISCONTINUED | OUTPATIENT
Start: 2021-12-30 | End: 2021-12-30 | Stop reason: HOSPADM

## 2021-12-30 RX ORDER — ASPIRIN 81 MG/1
81 TABLET ORAL DAILY
Status: DISCONTINUED | OUTPATIENT
Start: 2021-12-31 | End: 2021-12-30 | Stop reason: HOSPADM

## 2021-12-30 RX ADMIN — ASPIRIN 324 MG: 81 TABLET, CHEWABLE ORAL at 10:17

## 2021-12-30 RX ADMIN — SODIUM CHLORIDE 1 ML/KG/HR: 9 INJECTION, SOLUTION INTRAVENOUS at 10:14

## 2021-12-31 LAB
QT INTERVAL: 442 MS
QTC INTERVAL: 463 MS

## 2022-01-03 ENCOUNTER — TELEPHONE (OUTPATIENT)
Dept: CARDIAC SURGERY | Facility: CLINIC | Age: 72
End: 2022-01-03

## 2022-01-03 ENCOUNTER — TELEPHONE (OUTPATIENT)
Dept: CARDIOLOGY | Facility: CLINIC | Age: 72
End: 2022-01-03

## 2022-01-03 DIAGNOSIS — I20.0 UNSTABLE ANGINA PECTORIS: Primary | ICD-10-CM

## 2022-01-03 NOTE — TELEPHONE ENCOUNTER
Pt calling to check status on scheduling new pt consultation with Dr White.  Pt had been told he would hear from our office today.  Informed pt that Dr White is in surgery today and that his case is taking longer than anticipated so we have not seen him in the office yet to discuss an appt date for pt.  Advised pt I would call him as soon as I had this info from Dr White.  Pt aware this may be tomorrow if Dr White is not out of surgery before the end of business today.  Pt voiced understanding to all.  Pt is most anxious to be seen as soon as possible.  Can reach pt at #[phone]/amarjit

## 2022-01-03 NOTE — TELEPHONE ENCOUNTER
Patient called and left a voicemail stating he was told by Dr. Fitzpatrick to call our office if he had not heard from CT [facility] by noon today.  He had a heart cath and will need to have surgery.  Please call him back ASAP at [phone].  Thank you!

## 2022-01-03 NOTE — TELEPHONE ENCOUNTER
I called CT surgery to see if they had a referral yet.  They said there was not a referral in the chart so they did not know to call to schedule him with them.  Needs a referral. Please sing referral.  Jason Goldstein, CMA

## 2022-01-04 ENCOUNTER — OFFICE VISIT (OUTPATIENT)
Dept: CARDIAC SURGERY | Facility: CLINIC | Age: 72
End: 2022-01-04

## 2022-01-04 ENCOUNTER — PREP FOR SURGERY (OUTPATIENT)
Dept: OTHER | Facility: HOSPITAL | Age: 72
End: 2022-01-04

## 2022-01-04 VITALS
WEIGHT: 252.2 LBS | DIASTOLIC BLOOD PRESSURE: 62 MMHG | HEART RATE: 77 BPM | OXYGEN SATURATION: 93 % | BODY MASS INDEX: 35.31 KG/M2 | HEIGHT: 71 IN | SYSTOLIC BLOOD PRESSURE: 150 MMHG

## 2022-01-04 DIAGNOSIS — I25.10 CORONARY ARTERY DISEASE, UNSPECIFIED VESSEL OR LESION TYPE, UNSPECIFIED WHETHER ANGINA PRESENT, UNSPECIFIED WHETHER NATIVE OR TRANSPLANTED HEART: Primary | ICD-10-CM

## 2022-01-04 DIAGNOSIS — I25.110 CORONARY ARTERY DISEASE INVOLVING NATIVE CORONARY ARTERY OF NATIVE HEART WITH UNSTABLE ANGINA PECTORIS: ICD-10-CM

## 2022-01-04 DIAGNOSIS — Z20.822 ENCOUNTER FOR PREPROCEDURE SCREENING LABORATORY TESTING FOR COVID-19: Primary | ICD-10-CM

## 2022-01-04 DIAGNOSIS — R06.02 SHORTNESS OF BREATH ON EXERTION: ICD-10-CM

## 2022-01-04 DIAGNOSIS — Z01.812 ENCOUNTER FOR PREPROCEDURE SCREENING LABORATORY TESTING FOR COVID-19: Primary | ICD-10-CM

## 2022-01-04 DIAGNOSIS — Z20.822 ENCOUNTER FOR PREPROCEDURE SCREENING LABORATORY TESTING FOR COVID-19: ICD-10-CM

## 2022-01-04 DIAGNOSIS — C61 PROSTATE CANCER: ICD-10-CM

## 2022-01-04 DIAGNOSIS — E11.22 TYPE 2 DIABETES MELLITUS WITH STAGE 3 CHRONIC KIDNEY DISEASE, UNSPECIFIED WHETHER LONG TERM INSULIN USE, UNSPECIFIED WHETHER STAGE 3A OR 3B CKD: ICD-10-CM

## 2022-01-04 DIAGNOSIS — I48.0 PAROXYSMAL ATRIAL FIBRILLATION: Primary | Chronic | ICD-10-CM

## 2022-01-04 DIAGNOSIS — E11.65 TYPE 2 DIABETES MELLITUS WITH HYPERGLYCEMIA, UNSPECIFIED WHETHER LONG TERM INSULIN USE: ICD-10-CM

## 2022-01-04 DIAGNOSIS — N18.30 TYPE 2 DIABETES MELLITUS WITH STAGE 3 CHRONIC KIDNEY DISEASE, UNSPECIFIED WHETHER LONG TERM INSULIN USE, UNSPECIFIED WHETHER STAGE 3A OR 3B CKD: ICD-10-CM

## 2022-01-04 DIAGNOSIS — Z01.812 ENCOUNTER FOR PREPROCEDURE SCREENING LABORATORY TESTING FOR COVID-19: ICD-10-CM

## 2022-01-04 PROCEDURE — 99204 OFFICE O/P NEW MOD 45 MIN: CPT | Performed by: THORACIC SURGERY (CARDIOTHORACIC VASCULAR SURGERY)

## 2022-01-04 RX ORDER — BUPIVACAINE HCL/0.9 % NACL/PF 0.1 %
2 PLASTIC BAG, INJECTION (ML) EPIDURAL ONCE
Status: CANCELLED | OUTPATIENT
Start: 2022-01-06

## 2022-01-04 RX ORDER — TOBRAMYCIN 3 MG/ML
1 SOLUTION/ DROPS OPHTHALMIC EVERY 8 HOURS SCHEDULED
COMMUNITY
Start: 2021-12-29 | End: 2022-03-15 | Stop reason: ALTCHOICE

## 2022-01-04 RX ORDER — SODIUM CHLORIDE 0.9 % (FLUSH) 0.9 %
3-10 SYRINGE (ML) INJECTION AS NEEDED
Status: CANCELLED | OUTPATIENT
Start: 2022-01-04

## 2022-01-04 RX ORDER — SODIUM CHLORIDE 0.9 % (FLUSH) 0.9 %
3 SYRINGE (ML) INJECTION EVERY 12 HOURS SCHEDULED
Status: CANCELLED | OUTPATIENT
Start: 2022-01-04

## 2022-01-05 ENCOUNTER — HOSPITAL ENCOUNTER (OUTPATIENT)
Dept: GENERAL RADIOLOGY | Facility: HOSPITAL | Age: 72
Discharge: HOME | End: 2022-01-05

## 2022-01-05 ENCOUNTER — PRE-ADMISSION TESTING (OUTPATIENT)
Dept: PREADMISSION TESTING | Facility: HOSPITAL | Age: 72
End: 2022-01-05

## 2022-01-05 ENCOUNTER — HOSPITAL ENCOUNTER (OUTPATIENT)
Dept: CARDIOLOGY | Facility: HOSPITAL | Age: 72
Discharge: HOME | End: 2022-01-05

## 2022-01-05 ENCOUNTER — ANESTHESIA EVENT (OUTPATIENT)
Dept: PERIOP | Facility: HOSPITAL | Age: 72
End: 2022-01-05

## 2022-01-05 ENCOUNTER — HOSPITAL ENCOUNTER (OUTPATIENT)
Dept: CT IMAGING | Facility: HOSPITAL | Age: 72
Discharge: HOME | End: 2022-01-05

## 2022-01-05 ENCOUNTER — PATIENT ROUNDING (BHMG ONLY) (OUTPATIENT)
Dept: CARDIAC SURGERY | Facility: CLINIC | Age: 72
End: 2022-01-05

## 2022-01-05 ENCOUNTER — HOSPITAL ENCOUNTER (OUTPATIENT)
Dept: PULMONOLOGY | Facility: HOSPITAL | Age: 72
Discharge: HOME | End: 2022-01-05

## 2022-01-05 ENCOUNTER — LAB (OUTPATIENT)
Dept: LAB | Facility: HOSPITAL | Age: 72
End: 2022-01-05

## 2022-01-05 VITALS
SYSTOLIC BLOOD PRESSURE: 150 MMHG | WEIGHT: 252 LBS | BODY MASS INDEX: 35.28 KG/M2 | DIASTOLIC BLOOD PRESSURE: 62 MMHG | HEIGHT: 71 IN

## 2022-01-05 VITALS
HEART RATE: 64 BPM | DIASTOLIC BLOOD PRESSURE: 64 MMHG | RESPIRATION RATE: 16 BRPM | HEIGHT: 69 IN | BODY MASS INDEX: 37.45 KG/M2 | OXYGEN SATURATION: 99 % | WEIGHT: 252.87 LBS | SYSTOLIC BLOOD PRESSURE: 156 MMHG

## 2022-01-05 DIAGNOSIS — I25.10 CORONARY ARTERY DISEASE, UNSPECIFIED VESSEL OR LESION TYPE, UNSPECIFIED WHETHER ANGINA PRESENT, UNSPECIFIED WHETHER NATIVE OR TRANSPLANTED HEART: ICD-10-CM

## 2022-01-05 DIAGNOSIS — Z01.812 ENCOUNTER FOR PREPROCEDURE SCREENING LABORATORY TESTING FOR COVID-19: ICD-10-CM

## 2022-01-05 DIAGNOSIS — R06.02 SHORTNESS OF BREATH ON EXERTION: ICD-10-CM

## 2022-01-05 DIAGNOSIS — Z20.822 ENCOUNTER FOR PREPROCEDURE SCREENING LABORATORY TESTING FOR COVID-19: ICD-10-CM

## 2022-01-05 DIAGNOSIS — E11.65 TYPE 2 DIABETES MELLITUS WITH HYPERGLYCEMIA, UNSPECIFIED WHETHER LONG TERM INSULIN USE: ICD-10-CM

## 2022-01-05 LAB
ABO GROUP BLD: NORMAL
ALBUMIN SERPL-MCNC: 4.5 G/DL (ref 3.5–5.2)
ALBUMIN/GLOB SERPL: 1.3 G/DL
ALP SERPL-CCNC: 63 U/L (ref 39–117)
ALT SERPL W P-5'-P-CCNC: 16 U/L (ref 1–41)
ANION GAP SERPL CALCULATED.3IONS-SCNC: 7 MMOL/L (ref 5–15)
APTT PPP: 37.2 SECONDS (ref 24.1–35)
ARTERIAL PATENCY WRIST A: ABNORMAL
AST SERPL-CCNC: 22 U/L (ref 1–40)
ATMOSPHERIC PRESS: 750 MMHG
BASE EXCESS BLDA CALC-SCNC: 3 MMOL/L (ref 0–2)
BASOPHILS # BLD AUTO: 0.04 10*3/MM3 (ref 0–0.2)
BASOPHILS NFR BLD AUTO: 0.4 % (ref 0–1.5)
BDY SITE: ABNORMAL
BH CV ECHO MEAS - AO MAX PG (FULL): 2.7 MMHG
BH CV ECHO MEAS - AO MAX PG: 8.6 MMHG
BH CV ECHO MEAS - AO MEAN PG (FULL): 1 MMHG
BH CV ECHO MEAS - AO MEAN PG: 4 MMHG
BH CV ECHO MEAS - AO ROOT AREA (BSA CORRECTED): 1.6
BH CV ECHO MEAS - AO ROOT AREA: 11.3 CM^2
BH CV ECHO MEAS - AO ROOT DIAM: 3.8 CM
BH CV ECHO MEAS - AO V2 MAX: 147 CM/SEC
BH CV ECHO MEAS - AO V2 MEAN: 93.6 CM/SEC
BH CV ECHO MEAS - AO V2 VTI: 31.9 CM
BH CV ECHO MEAS - AVA(I,A): 3.3 CM^2
BH CV ECHO MEAS - AVA(I,D): 3.3 CM^2
BH CV ECHO MEAS - AVA(V,A): 2.9 CM^2
BH CV ECHO MEAS - AVA(V,D): 2.9 CM^2
BH CV ECHO MEAS - BSA(HAYCOCK): 2.4 M^2
BH CV ECHO MEAS - BSA: 2.3 M^2
BH CV ECHO MEAS - BZI_BMI: 35.1 KILOGRAMS/M^2
BH CV ECHO MEAS - BZI_METRIC_HEIGHT: 180.3 CM
BH CV ECHO MEAS - BZI_METRIC_WEIGHT: 114.3 KG
BH CV ECHO MEAS - EDV(CUBED): 54.4 ML
BH CV ECHO MEAS - EDV(MOD-SP4): 122 ML
BH CV ECHO MEAS - EDV(TEICH): 61.6 ML
BH CV ECHO MEAS - EF(CUBED): 76.2 %
BH CV ECHO MEAS - EF(TEICH): 68.9 %
BH CV ECHO MEAS - ESV(CUBED): 13 ML
BH CV ECHO MEAS - ESV(MOD-SP4): 58 ML
BH CV ECHO MEAS - ESV(TEICH): 19.1 ML
BH CV ECHO MEAS - FS: 38 %
BH CV ECHO MEAS - IVS/LVPW: 0.75
BH CV ECHO MEAS - IVSD: 0.96 CM
BH CV ECHO MEAS - LA DIMENSION: 3.8 CM
BH CV ECHO MEAS - LA/AO: 1
BH CV ECHO MEAS - LAT PEAK E' VEL: 8.7 CM/SEC
BH CV ECHO MEAS - LV DIASTOLIC VOL/BSA (35-75): 52.4 ML/M^2
BH CV ECHO MEAS - LV MASS(C)D: 136.6 GRAMS
BH CV ECHO MEAS - LV MASS(C)DI: 58.7 GRAMS/M^2
BH CV ECHO MEAS - LV MAX PG: 6 MMHG
BH CV ECHO MEAS - LV MEAN PG: 3 MMHG
BH CV ECHO MEAS - LV SYSTOLIC VOL/BSA (12-30): 24.9 ML/M^2
BH CV ECHO MEAS - LV V1 MAX: 122 CM/SEC
BH CV ECHO MEAS - LV V1 MEAN: 79.8 CM/SEC
BH CV ECHO MEAS - LV V1 VTI: 30.8 CM
BH CV ECHO MEAS - LVIDD: 3.8 CM
BH CV ECHO MEAS - LVIDS: 2.4 CM
BH CV ECHO MEAS - LVLD AP4: 8.2 CM
BH CV ECHO MEAS - LVLS AP4: 7 CM
BH CV ECHO MEAS - LVOT AREA (M): 3.5 CM^2
BH CV ECHO MEAS - LVOT AREA: 3.5 CM^2
BH CV ECHO MEAS - LVOT DIAM: 2.1 CM
BH CV ECHO MEAS - LVPWD: 1.3 CM
BH CV ECHO MEAS - MED PEAK E' VEL: 5.8 CM/SEC
BH CV ECHO MEAS - MV A MAX VEL: 67.4 CM/SEC
BH CV ECHO MEAS - MV DEC SLOPE: 248 CM/SEC^2
BH CV ECHO MEAS - MV DEC TIME: 0.21 SEC
BH CV ECHO MEAS - MV E MAX VEL: 64.5 CM/SEC
BH CV ECHO MEAS - MV E/A: 0.96
BH CV ECHO MEAS - MV P1/2T MAX VEL: 68.8 CM/SEC
BH CV ECHO MEAS - MV P1/2T: 81.3 MSEC
BH CV ECHO MEAS - MVA P1/2T LCG: 3.2 CM^2
BH CV ECHO MEAS - MVA(P1/2T): 2.7 CM^2
BH CV ECHO MEAS - PA MAX PG: 4.5 MMHG
BH CV ECHO MEAS - PA V2 MAX: 105 CM/SEC
BH CV ECHO MEAS - RAP SYSTOLE: 5 MMHG
BH CV ECHO MEAS - RVSP: 15.5 MMHG
BH CV ECHO MEAS - SI(AO): 155.5 ML/M^2
BH CV ECHO MEAS - SI(CUBED): 17.8 ML/M^2
BH CV ECHO MEAS - SI(LVOT): 45.9 ML/M^2
BH CV ECHO MEAS - SI(MOD-SP4): 27.5 ML/M^2
BH CV ECHO MEAS - SI(TEICH): 18.2 ML/M^2
BH CV ECHO MEAS - SV(AO): 361.8 ML
BH CV ECHO MEAS - SV(CUBED): 41.5 ML
BH CV ECHO MEAS - SV(LVOT): 106.7 ML
BH CV ECHO MEAS - SV(MOD-SP4): 64 ML
BH CV ECHO MEAS - SV(TEICH): 42.4 ML
BH CV ECHO MEAS - TR MAX VEL: 162 CM/SEC
BH CV ECHO MEASUREMENTS AVERAGE E/E' RATIO: 8.9
BILIRUB SERPL-MCNC: 0.7 MG/DL (ref 0–1.2)
BLD GP AB SCN SERPL QL: NEGATIVE
BODY TEMPERATURE: 37 C
BUN SERPL-MCNC: 24 MG/DL (ref 8–23)
BUN/CREAT SERPL: 18.2 (ref 7–25)
CALCIUM SPEC-SCNC: 9 MG/DL (ref 8.6–10.5)
CHLORIDE SERPL-SCNC: 100 MMOL/L (ref 98–107)
CO2 SERPL-SCNC: 29 MMOL/L (ref 22–29)
CREAT SERPL-MCNC: 1.32 MG/DL (ref 0.76–1.27)
DEPRECATED RDW RBC AUTO: 44.5 FL (ref 37–54)
EOSINOPHIL # BLD AUTO: 0.17 10*3/MM3 (ref 0–0.4)
EOSINOPHIL NFR BLD AUTO: 1.7 % (ref 0.3–6.2)
ERYTHROCYTE [DISTWIDTH] IN BLOOD BY AUTOMATED COUNT: 13.7 % (ref 12.3–15.4)
GFR SERPL CREATININE-BSD FRML MDRD: 53 ML/MIN/1.73
GLOBULIN UR ELPH-MCNC: 3.4 GM/DL
GLUCOSE SERPL-MCNC: 215 MG/DL (ref 65–99)
HBA1C MFR BLD: 6.7 % (ref 4.8–5.6)
HCO3 BLDA-SCNC: 28.8 MMOL/L (ref 20–26)
HCT VFR BLD AUTO: 40 % (ref 37.5–51)
HGB BLD-MCNC: 13.6 G/DL (ref 13–17.7)
IMM GRANULOCYTES # BLD AUTO: 0.04 10*3/MM3 (ref 0–0.05)
IMM GRANULOCYTES NFR BLD AUTO: 0.4 % (ref 0–0.5)
INR PPP: 1.21 (ref 0.91–1.09)
LYMPHOCYTES # BLD AUTO: 0.85 10*3/MM3 (ref 0.7–3.1)
LYMPHOCYTES NFR BLD AUTO: 8.7 % (ref 19.6–45.3)
Lab: ABNORMAL
MCH RBC QN AUTO: 30.2 PG (ref 26.6–33)
MCHC RBC AUTO-ENTMCNC: 34 G/DL (ref 31.5–35.7)
MCV RBC AUTO: 88.7 FL (ref 79–97)
MODALITY: ABNORMAL
MONOCYTES # BLD AUTO: 0.57 10*3/MM3 (ref 0.1–0.9)
MONOCYTES NFR BLD AUTO: 5.8 % (ref 5–12)
NEUTROPHILS NFR BLD AUTO: 8.12 10*3/MM3 (ref 1.7–7)
NEUTROPHILS NFR BLD AUTO: 83 % (ref 42.7–76)
NRBC BLD AUTO-RTO: 0 /100 WBC (ref 0–0.2)
PCO2 BLDA: 47.7 MM HG (ref 35–45)
PCO2 TEMP ADJ BLD: 47.7 MM HG (ref 35–45)
PH BLDA: 7.39 PH UNITS (ref 7.35–7.45)
PH, TEMP CORRECTED: 7.39 PH UNITS (ref 7.35–7.45)
PLATELET # BLD AUTO: 247 10*3/MM3 (ref 140–450)
PMV BLD AUTO: 9.3 FL (ref 6–12)
PO2 BLDA: 64.9 MM HG (ref 83–108)
PO2 TEMP ADJ BLD: 64.9 MM HG (ref 83–108)
POTASSIUM SERPL-SCNC: 4.8 MMOL/L (ref 3.5–5.2)
PROT SERPL-MCNC: 7.9 G/DL (ref 6–8.5)
PROTHROMBIN TIME: 14.8 SECONDS (ref 11.9–14.6)
RBC # BLD AUTO: 4.51 10*6/MM3 (ref 4.14–5.8)
RH BLD: POSITIVE
SAO2 % BLDCOA: 93.3 % (ref 94–99)
SARS-COV-2 RNA PNL SPEC NAA+PROBE: NOT DETECTED
SODIUM SERPL-SCNC: 136 MMOL/L (ref 136–145)
T&S EXPIRATION DATE: NORMAL
VENTILATOR MODE: ABNORMAL
WBC NRBC COR # BLD: 9.79 10*3/MM3 (ref 3.4–10.8)

## 2022-01-05 PROCEDURE — 86900 BLOOD TYPING SEROLOGIC ABO: CPT

## 2022-01-05 PROCEDURE — 85025 COMPLETE CBC W/AUTO DIFF WBC: CPT

## 2022-01-05 PROCEDURE — 93010 ELECTROCARDIOGRAM REPORT: CPT | Performed by: INTERNAL MEDICINE

## 2022-01-05 PROCEDURE — 94729 DIFFUSING CAPACITY: CPT

## 2022-01-05 PROCEDURE — 71046 X-RAY EXAM CHEST 2 VIEWS: CPT

## 2022-01-05 PROCEDURE — 36600 WITHDRAWAL OF ARTERIAL BLOOD: CPT

## 2022-01-05 PROCEDURE — 85610 PROTHROMBIN TIME: CPT

## 2022-01-05 PROCEDURE — 93306 TTE W/DOPPLER COMPLETE: CPT

## 2022-01-05 PROCEDURE — 86901 BLOOD TYPING SEROLOGIC RH(D): CPT

## 2022-01-05 PROCEDURE — 87635 SARS-COV-2 COVID-19 AMP PRB: CPT

## 2022-01-05 PROCEDURE — 82803 BLOOD GASES ANY COMBINATION: CPT

## 2022-01-05 PROCEDURE — 94726 PLETHYSMOGRAPHY LUNG VOLUMES: CPT | Performed by: INTERNAL MEDICINE

## 2022-01-05 PROCEDURE — C9803 HOPD COVID-19 SPEC COLLECT: HCPCS

## 2022-01-05 PROCEDURE — 71250 CT THORAX DX C-: CPT

## 2022-01-05 PROCEDURE — 86850 RBC ANTIBODY SCREEN: CPT

## 2022-01-05 PROCEDURE — 80053 COMPREHEN METABOLIC PANEL: CPT

## 2022-01-05 PROCEDURE — 94729 DIFFUSING CAPACITY: CPT | Performed by: INTERNAL MEDICINE

## 2022-01-05 PROCEDURE — 83036 HEMOGLOBIN GLYCOSYLATED A1C: CPT

## 2022-01-05 PROCEDURE — 36415 COLL VENOUS BLD VENIPUNCTURE: CPT

## 2022-01-05 PROCEDURE — 93306 TTE W/DOPPLER COMPLETE: CPT | Performed by: INTERNAL MEDICINE

## 2022-01-05 PROCEDURE — 25010000002 PERFLUTREN 6.52 MG/ML SUSPENSION: Performed by: INTERNAL MEDICINE

## 2022-01-05 PROCEDURE — 93005 ELECTROCARDIOGRAM TRACING: CPT

## 2022-01-05 PROCEDURE — 94060 EVALUATION OF WHEEZING: CPT

## 2022-01-05 PROCEDURE — 86923 COMPATIBILITY TEST ELECTRIC: CPT

## 2022-01-05 PROCEDURE — 85730 THROMBOPLASTIN TIME PARTIAL: CPT

## 2022-01-05 PROCEDURE — 94726 PLETHYSMOGRAPHY LUNG VOLUMES: CPT

## 2022-01-05 PROCEDURE — 94060 EVALUATION OF WHEEZING: CPT | Performed by: INTERNAL MEDICINE

## 2022-01-05 RX ORDER — ALBUTEROL SULFATE 2.5 MG/3ML
2.5 SOLUTION RESPIRATORY (INHALATION) ONCE
Status: COMPLETED | OUTPATIENT
Start: 2022-01-05 | End: 2022-01-05

## 2022-01-05 RX ADMIN — PERFLUTREN 8.48 MG: 6.52 INJECTION, SUSPENSION INTRAVENOUS at 10:52

## 2022-01-05 RX ADMIN — ALBUTEROL SULFATE 2.5 MG: 2.5 SOLUTION RESPIRATORY (INHALATION) at 13:09

## 2022-01-05 NOTE — DISCHARGE INSTRUCTIONS
DAY OF SURGERY INSTRUCTIONS          ARRIVAL TIME: AS DIRECTED BY OFFICE    YOU MAY TAKE THE FOLLOWING MEDICATION(S) THE MORNING OF SURGERY WITH A SIP OF WATER: NONE PER DR GARNER      ALL OTHER HOME MEDICATION CHECK WITH YOUR PHYSICIAN (ask your health care provider about changing or stopping your regular medicines, especially if you are taking diabetes medicines or blood thinners)      DO NOT TAKE ANY ERECTILE DYSFUNCTION MEDICATIONS (EX: CIALIS, VIAGRA) 24 HOURS PRIOR TO SURGERY                      MANAGING PAIN AFTER SURGERY    We know you are probably wondering what your pain will be like after surgery.  Following surgery it is unrealistic to expect you will not have pain.   Pain is how our bodies let us know that something is wrong or cautions us to be careful.  That said, our goal is to make your pain tolerable.    Methods we may use to treat your pain include (oral or IV medications, PCAs, epidurals, nerve blocks, etc.)   While some procedures require IV pain medications for a short time after surgery, transitioning to pain medications by mouth allows for better management of pain.   Your nurse will encourage you to take oral pain medications whenever possible.  IV medications work almost immediately, but only last a short while.  Taking medications by mouth allows for a more constant level of medication in your blood stream for a longer period of time.      Once your pain is out of control it is harder to get back under control.  It is important you are aware when your next dose of pain medication is due.  If you are admitted, your nurse may write the time of your next dose on the white board in your room to help you remember.      We are interested in your pain and encourage you to inform us about aggravating factors during your visit.   Many times a simple repositioning every few hours can make a big difference.    If your physician says it is okay, do not let your pain prevent you from getting out of  bed. Be sure to call your nurse for assistance prior to getting up so you do not fall.      Before surgery, please decide your tolerable pain goal.  These faces help describe the pain ratings we use on a 0-10 scale.   Be prepared to tell us your goal and whether or not you take pain or anxiety medications at home.          BEFORE YOU COME TO THE HOSPITAL  (Pre-op instructions)  • Do not eat, drink, smoke or chew gum after midnight the night before surgery.  This also includes no mints.  • Morning of surgery take only the medicines you have been instructed with a sip of water unless otherwise instructed  by your physician.  • Do not shave, wear makeup or dark nail polish.  • Remove all jewelry including rings.  • Leave anything you consider valuable at home.  • Leave your suitcase in the car until after your surgery.  • Bring the following with you if applicable:  o Picture ID and insurance, Medicare or Medicaid cards  o Co-pay/deductible required by insurance (cash, check, credit card)  o Copy of advance directive, living will or power-of- documents if not brought to pre-work  o CPAP or BIPAP mask and tubing  o Relaxation aids ( book, magazine), etc.  o Hearing aids                        ON THE DAY OF SURGERY  · On the day of surgery check in at registration located at the main entrance of the hospital. Only one family member or friend are allowed per patient.  ? You will be registered and given a beeper with instructions where to wait in the main lobby.  ? When your beeper lights up and vibrates a member of the Outpatient Surgery staff will meet you at the double doors under the stair steps and escort you to your preoperative room.   · You may have cloth compression devices placed on your legs. These help to prevent blood clots and reduce swelling in your legs.  · An IV may be inserted into one of your veins.  · In the operating room, you may be given one or more of the following:  ? A medicine to help you  "relax (sedative).  ? A medicine to numb the area (local anesthetic).  ? A medicine to make you fall asleep (general anesthetic).  ? A medicine that is injected into an area of your body to numb everything below the injection site (regional anesthetic).  · Your surgical site will be marked or identified.  · You may be given an antibiotic through your IV to help prevent infection.  Contact a health care provider if you:  · Develop a fever of more than 100.4°F (38°C) or other feelings of illness during the 48 hours before your surgery.  · Have symptoms that get worse.  Have questions or concerns about your surgery    General Anesthesia/Surgery, Adult  General anesthesia is the use of medicines to make a person \"go to sleep\" (unconscious) for a medical procedure. General anesthesia must be used for certain procedures, and is often recommended for procedures that:  · Last a long time.  · Require you to be still or in an unusual position.  · Are major and can cause blood loss.  The medicines used for general anesthesia are called general anesthetics. As well as making you unconscious for a certain amount of time, these medicines:  · Prevent pain.  · Control your blood pressure.  · Relax your muscles.  Tell a health care provider about:  · Any allergies you have.  · All medicines you are taking, including vitamins, herbs, eye drops, creams, and over-the-counter medicines.  · Any problems you or family members have had with anesthetic medicines.  · Types of anesthetics you have had in the past.  · Any blood disorders you have.  · Any surgeries you have had.  · Any medical conditions you have.  · Any recent upper respiratory, chest, or ear infections.  · Any history of:  ? Heart or lung conditions, such as heart failure, sleep apnea, asthma, or chronic obstructive pulmonary disease (COPD).  ?  service.  ? Depression or anxiety.  · Any tobacco or drug use, including marijuana or alcohol use.  · Whether you are " pregnant or may be pregnant.  What are the risks?  Generally, this is a safe procedure. However, problems may occur, including:  · Allergic reaction.  · Lung and heart problems.  · Inhaling food or liquid from the stomach into the lungs (aspiration).  · Nerve injury.  · Air in the bloodstream, which can lead to stroke.  · Extreme agitation or confusion (delirium) when you wake up from the anesthetic.  · Waking up during your procedure and being unable to move. This is rare.  These problems are more likely to develop if you are having a major surgery or if you have an advanced or serious medical condition. You can prevent some of these complications by answering all of your health care provider's questions thoroughly and by following all instructions before your procedure.  General anesthesia can cause side effects, including:  · Nausea or vomiting.  · A sore throat from the breathing tube.  · Hoarseness.  · Wheezing or coughing.  · Shaking chills.  · Tiredness.  · Body aches.  · Anxiety.  · Sleepiness or drowsiness.  · Confusion or agitation.  RISKS AND COMPLICATIONS OF SURGERY  Your health care provider will discuss possible risks and complications with you before surgery. Common risks and complications include:    · Problems due to the use of anesthetics.  · Blood loss and replacement (does not apply to minor surgical procedures).  · Temporary increase in pain due to surgery.  · Uncorrected pain or problems that the surgery was meant to correct.  · Infection.  · New damage.    What happens before the procedure?    Medicines  Ask your health care provider about:  · Changing or stopping your regular medicines. This is especially important if you are taking diabetes medicines or blood thinners.  · Taking medicines such as aspirin and ibuprofen. These medicines can thin your blood. Do not take these medicines unless your health care provider tells you to take them.  · Taking over-the-counter medicines, vitamins,  herbs, and supplements. Do not take these during the week before your procedure unless your health care provider approves them.  General instructions  · Starting 3-6 weeks before the procedure, do not use any products that contain nicotine or tobacco, such as cigarettes and e-cigarettes. If you need help quitting, ask your health care provider.  · If you brush your teeth on the morning of the procedure, make sure to spit out all of the toothpaste.  · Tell your health care provider if you become ill or develop a cold, cough, or fever.  · If instructed by your health care provider, bring your sleep apnea device with you on the day of your surgery (if applicable).  · Ask your health care provider if you will be going home the same day, the following day, or after a longer hospital stay.  ? Plan to have someone take you home from the hospital or clinic.  ? Plan to have a responsible adult care for you for at least 24 hours after you leave the hospital or clinic. This is important.  What happens during the procedure?  · You will be given anesthetics through both of the following:  ? A mask placed over your nose and mouth.  ? An IV in one of your veins.  · You may receive a medicine to help you relax (sedative).  · After you are unconscious, a breathing tube may be inserted down your throat to help you breathe. This will be removed before you wake up.  · An anesthesia specialist will stay with you throughout your procedure. He or she will:  ? Keep you comfortable and safe by continuing to give you medicines and adjusting the amount of medicine that you get.  ? Monitor your blood pressure, pulse, and oxygen levels to make sure that the anesthetics do not cause any problems.  The procedure may vary among health care providers and hospitals.  What happens after the procedure?  · Your blood pressure, temperature, heart rate, breathing rate, and blood oxygen level will be monitored until the medicines you were given have worn  off.  · You will wake up in a recovery area. You may wake up slowly.  · If you feel anxious or agitated, you may be given medicine to help you calm down.  · If you will be going home the same day, your health care provider may check to make sure you can walk, drink, and urinate.  · Your health care provider will treat any pain or side effects you have before you go home.  · Do not drive for 24 hours if you were given a sedative.  Summary  · General anesthesia is used to keep you still and prevent pain during a procedure.  · It is important to tell your healthcare provider about your medical history and any surgeries you have had, and previous experience with anesthesia.  · Follow your healthcare provider’s instructions about when to stop eating, drinking, or taking certain medicines before your procedure.  · Plan to have someone take you home from the hospital or clinic.  This information is not intended to replace advice given to you by your health care provider. Make sure you discuss any questions you have with your health care provider.  Document Released: 03/26/2009 Document Revised: 08/03/2018 Document Reviewed: 08/03/2018  [employer] Patient Education © 2019 [employer]       Fall Prevention in Hospitals, Adult  As a hospital patient, your condition and the treatments you receive can increase your risk for falls. Some additional risk factors for falls in a hospital include:  · Being in an unfamiliar environment.  · Being on bed rest.  · Your surgery.  · Taking certain medicines.  · Your tubing requirements, such as intravenous (IV) therapy or catheters.  It is important that you learn how to decrease fall risks while at the hospital. Below are important tips that can help prevent falls.  SAFETY TIPS FOR PREVENTING FALLS  Talk about your risk of falling.  · Ask your health care provider why you are at risk for falling. Is it your medicine, illness, tubing placement, or something else?  · Make a plan  with your health care provider to keep you safe from falls.  · Ask your health care provider or pharmacist about side effects of your medicines. Some medicines can make you dizzy or affect your coordination.  Ask for help.  · Ask for help before getting out of bed. You may need to press your call button.  · Ask for assistance in getting safely to the toilet.  · Ask for a walker or cane to be put at your bedside. Ask that most of the side rails on your bed be placed up before your health care provider leaves the room.  · Ask family or friends to sit with you.  · Ask for things that are out of your reach, such as your glasses, hearing aids, telephone, bedside table, or call button.  Follow these tips to avoid falling:  · Stay lying or seated, rather than standing, while waiting for help.  · Wear rubber-soled slippers or shoes whenever you walk in the hospital.  · Avoid quick, sudden movements.  ¨ Change positions slowly.  ¨ Sit on the side of your bed before standing.  ¨ Stand up slowly and wait before you start to walk.  · Let your health care provider know if there is a spill on the floor.  · Pay careful attention to the medical equipment, electrical cords, and tubes around you.  · When you need help, use your call button by your bed or in the bathroom. Wait for one of your health care providers to help you.  · If you feel dizzy or unsure of your footing, return to bed and wait for assistance.  · Avoid being distracted by the TV, telephone, or another person in your room.  · Do not lean or support yourself on rolling objects, such as IV poles or bedside tables.     This information is not intended to replace advice given to you by your health care provider. Make sure you discuss any questions you have with your health care provider.     Document Released: 12/15/2001 Document Revised: 01/08/2016 Document Reviewed: 08/25/2013  [employer] Interactive Patient Education ©2016 [employer]       [facility]  4% Patient Instruction Sheet    Chlorhexidine Before Surgery  Chlorhexidine gluconate (CHG) is a germ-killing (antiseptic) solution that is used to clean the skin. It gets rid of the bacteria that normally live on the skin. Cleaning your skin with CHG before surgery helps lower the risk for infection after surgery.    How to use CHG solution  · You will take 2 showers, one shower the night before surgery, the second shower the morning of surgery before coming to the hospital.  · Use CHG only as told by your health care provider, and follow the instructions on the label.  · Use CHG solution while taking a shower. Follow these steps when using CHG solution (unless your health care provider gives you different instructions):  1. Start the shower.  2. Use your normal soap and shampoo to wash your face and hair.  3. Turn off the shower or move out of the shower stream.  4. Pour the CHG onto a clean washcloth. Do not use any type of brush or rough-edged sponge.  5. Starting at your neck, lather your body down to your toes. Make sure you:  6. Pay special attention to the part of your body where you will be having surgery. Scrub this area for at least 1 minute.  7. Use the full amount of CHG as directed. Usually, this is one half bottle for each shower.  8. Do not use CHG on your head or face. If the solution gets into your ears or eyes, rinse them well with water.  9. Avoid your genital area.  10. Avoid any areas of skin that have broken skin, cuts, or scrapes.  11. Scrub your back and under your arms. Make sure to wash skin folds.  12. Let the lather sit on your skin for 1-2 minutes or as long as told by your health care  provider.  13. Thoroughly rinse your entire body in the shower. Make sure that all body creases and crevices are rinsed well.  14. Dry off with a clean towel. Do not put any substances on your body afterward, such as powder, lotion, or perfume.  15. Put on clean clothes or pajamas.  16. If it is the  night before your surgery, sleep in clean sheets.    What are the risks?  Risks of using CHG include:  · A skin reaction.  · Hearing loss, if CHG gets in your ears.  · Eye injury, if CHG gets in your eyes and is not rinsed out.  · The CHG product catching fire.  Make sure that you avoid smoking and flames after applying CHG to your skin.  Do not use CHG:  · If you have a chlorhexidine allergy or have previously reacted to chlorhexidine.  · On babies younger than 2 months of age.      On the day of surgery, when you are taken to your room in Outpatient Surgery you will be given a CHG prepackaged cloth to wipe the site for your surgery.  How to use CHG prepackaged cloths  · Follow the instructions on the label.  · Use the CHG cloth on clean, dry skin. Follow these steps when using a CHG cloth (unless your health care provider gives you different instructions):  1. Using the CHG cloth, vigorously scrub the part of your body where you will be having surgery. Scrub using a back-and-forth motion for 3 minutes. The area on your body should be completely wet with CHG when you are finished scrubbing.  2. Do not rinse. Discard the cloth and let the area air-dry for 1 minute. Do not put any substances on your body afterward, such as powder, lotion, or perfume.  Contact a health care provider if:  · Your skin gets irritated after scrubbing.  · You have questions about using your solution or cloth.  Get help right away if:  · Your eyes become very red or swollen.  · Your eyes itch badly.  · Your skin itches badly and is red or swollen.  · Your hearing changes.  · You have trouble seeing.  · You have swelling or tingling in your mouth or throat.  · You have trouble breathing.  · You swallow any chlorhexidine.  Summary  · Chlorhexidine gluconate (CHG) is a germ-killing (antiseptic) solution that is used to clean the skin. Cleaning your skin with CHG before surgery helps lower the risk for infection after surgery.  · You may be  given CHG to use at home. It may be in a bottle or in a prepackaged cloth to use on your skin. Carefully follow your health care provider's instructions and the instructions on the product label.  · Do not use CHG if you have a chlorhexidine allergy.  · Contact your health care provider if your skin gets irritated after scrubbing.  This information is not intended to replace advice given to you by your health care provider. Make sure you discuss any questions you have with your health care provider.  Document Released: 09/11/2013 Document Revised: 11/15/2018 Document Reviewed: 11/15/2018  [employer] Interactive Patient Education © 2019 [employer]          PATIENT/FAMILY/RESPONSIBLE PARTY VERBALIZES UNDERSTANDING OF ABOVE EDUCATION.  COPY OF PAIN SCALE GIVEN AND REVIEWED WITH VERBALIZED UNDERSTANDING.

## 2022-01-05 NOTE — PROGRESS NOTES
January 5, 2022    Hello, may I speak with Zay Kamara?    My name is Izabel Holman.     I am  with [facility]  [address], [address]  [place] [address] [phone].    Before we get started may I verify your date of birth? 1950    I am calling to officially welcome you to our practice and ask about your recent visit. Is this a good time to talk? yes    Tell me about your visit with us. What things went well? Once I got my appt settled, I was very happy with the office. Dr. White was very thorough.        We're always looking for ways to make our patients' experiences even better. Do you have recommendations on ways we may improve?  no    Overall were you satisfied with your first visit to our practice? yes       I appreciate you taking the time to speak with me today. Is there anything else I can do for you? no      Thank you, and have a great day.

## 2022-01-05 NOTE — ANESTHESIA PREPROCEDURE EVALUATION
Anesthesia Evaluation     Patient summary reviewed   no history of anesthetic complications:               Airway   Mallampati: II  TM distance: >3 FB  Neck ROM: full  Dental      Pulmonary    (+) sleep apnea on CPAP,   (-) COPD, not a smoker  Cardiovascular     ECG reviewed  PT is on anticoagulation therapy    (+) hypertension, CAD, dysrhythmias Atrial Fib, angina, hyperlipidemia,   (-) pacemaker, CHF, cardiac stents    ROS comment: Conclusion:  #1 99% ostial LAD stenosis and total occlusion of the distal LAD  #2 60% proximal RCA stenosis and 90% stenosis of the proximal PL branch  #3 dilated ascending aorta  #4 LVEF 55%      Neuro/Psych  GI/Hepatic/Renal/Endo    (+) obesity,  GERD,  renal disease CRI, diabetes mellitus, thyroid problem hypothyroidism    Musculoskeletal     Abdominal    Substance History      OB/GYN          Other                        Anesthesia Plan    ASA 4     general   (No ci to UMA)  intravenous induction     Anesthetic plan, all risks, benefits, and alternatives have been provided, discussed and informed consent has been obtained with: patient.  Use of blood products discussed with patient  Consented to blood products.

## 2022-01-06 ENCOUNTER — APPOINTMENT (OUTPATIENT)
Dept: CARDIOLOGY | Facility: HOSPITAL | Age: 72
End: 2022-01-06

## 2022-01-06 ENCOUNTER — HOSPITAL ENCOUNTER (INPATIENT)
Facility: HOSPITAL | Age: 72
LOS: 5 days | Discharge: HOME | End: 2022-01-11
Attending: THORACIC SURGERY (CARDIOTHORACIC VASCULAR SURGERY) | Admitting: THORACIC SURGERY (CARDIOTHORACIC VASCULAR SURGERY)

## 2022-01-06 ENCOUNTER — ANESTHESIA (OUTPATIENT)
Dept: PERIOP | Facility: HOSPITAL | Age: 72
End: 2022-01-06

## 2022-01-06 ENCOUNTER — APPOINTMENT (OUTPATIENT)
Dept: GENERAL RADIOLOGY | Facility: HOSPITAL | Age: 72
End: 2022-01-06

## 2022-01-06 ENCOUNTER — HOSPITAL ENCOUNTER (OUTPATIENT)
Dept: CARDIOLOGY | Facility: HOSPITAL | Age: 72
Setting detail: SURGERY ADMIT
Discharge: HOME | End: 2022-01-06

## 2022-01-06 DIAGNOSIS — Z74.09 IMPAIRED MOBILITY: ICD-10-CM

## 2022-01-06 DIAGNOSIS — I25.10 CORONARY ARTERY DISEASE, UNSPECIFIED VESSEL OR LESION TYPE, UNSPECIFIED WHETHER ANGINA PRESENT, UNSPECIFIED WHETHER NATIVE OR TRANSPLANTED HEART: ICD-10-CM

## 2022-01-06 DIAGNOSIS — Z74.09 DECREASED FUNCTIONAL MOBILITY AND ENDURANCE: ICD-10-CM

## 2022-01-06 DIAGNOSIS — I25.110 CORONARY ARTERY DISEASE INVOLVING NATIVE CORONARY ARTERY OF NATIVE HEART WITH UNSTABLE ANGINA PECTORIS: Primary | ICD-10-CM

## 2022-01-06 PROBLEM — D68.4 ACQUIRED COAGULATION FACTOR DEFICIENCY (HCC): Status: ACTIVE | Noted: 2022-01-06

## 2022-01-06 PROBLEM — E66.812 CLASS 2 SEVERE OBESITY DUE TO EXCESS CALORIES WITH SERIOUS COMORBIDITY AND BODY MASS INDEX (BMI) OF 37.0 TO 37.9 IN ADULT: Status: ACTIVE | Noted: 2018-06-28

## 2022-01-06 PROBLEM — E66.01 CLASS 2 SEVERE OBESITY DUE TO EXCESS CALORIES WITH SERIOUS COMORBIDITY AND BODY MASS INDEX (BMI) OF 37.0 TO 37.9 IN ADULT (HCC): Status: ACTIVE | Noted: 2018-06-28

## 2022-01-06 PROBLEM — N18.30 CKD (CHRONIC KIDNEY DISEASE) STAGE 3, GFR 30-59 ML/MIN (HCC): Status: ACTIVE | Noted: 2022-01-06

## 2022-01-06 PROBLEM — G89.4 CHRONIC PAIN SYNDROME: Status: ACTIVE | Noted: 2022-01-06

## 2022-01-06 LAB
A-A DO2: 170 MMHG
A-A DO2: 184.3 MMHG
A-A DO2: 297.5 MMHG
A-A DO2: 315.8 MMHG
A-A DO2: 445 MMHG
A-A DO2: 478.8 MMHG
ALBUMIN SERPL-MCNC: 3.3 G/DL (ref 3.5–5.2)
ALBUMIN SERPL-MCNC: 3.5 G/DL (ref 3.5–5.2)
ANION GAP SERPL CALCULATED.3IONS-SCNC: 5 MMOL/L (ref 5–15)
ANION GAP SERPL CALCULATED.3IONS-SCNC: 7 MMOL/L (ref 5–15)
APTT PPP: 40.4 SECONDS (ref 24.1–35)
ARTERIAL PATENCY WRIST A: ABNORMAL
ATMOSPHERIC PRESS: 754 MMHG
ATMOSPHERIC PRESS: 756 MMHG
ATMOSPHERIC PRESS: 756 MMHG
BASE EXCESS BLDA CALC-SCNC: 0.3 MMOL/L (ref 0–2)
BASE EXCESS BLDA CALC-SCNC: 0.6 MMOL/L (ref 0–2)
BASE EXCESS BLDA CALC-SCNC: 0.6 MMOL/L (ref 0–2)
BASE EXCESS BLDA CALC-SCNC: 0.9 MMOL/L (ref 0–2)
BASE EXCESS BLDA CALC-SCNC: 1.3 MMOL/L (ref 0–2)
BASE EXCESS BLDA CALC-SCNC: 2.3 MMOL/L (ref 0–2)
BASE EXCESS BLDA CALC-SCNC: 2.5 MMOL/L (ref 0–2)
BASE EXCESS BLDA CALC-SCNC: 2.9 MMOL/L (ref 0–2)
BASE EXCESS BLDV CALC-SCNC: 2 MMOL/L (ref 0–2)
BDY SITE: ABNORMAL
BODY TEMPERATURE: 37 C
BUN SERPL-MCNC: 25 MG/DL (ref 8–23)
BUN SERPL-MCNC: 26 MG/DL (ref 8–23)
BUN/CREAT SERPL: 15.3 (ref 7–25)
BUN/CREAT SERPL: 16.9 (ref 7–25)
CA-I BLD-MCNC: 4.55 MG/DL (ref 4.6–5.4)
CA-I BLD-MCNC: 4.79 MG/DL (ref 4.6–5.4)
CA-I BLD-MCNC: 4.87 MG/DL (ref 4.6–5.4)
CA-I BLD-MCNC: 4.88 MG/DL (ref 4.6–5.4)
CA-I BLD-MCNC: 5.43 MG/DL (ref 4.6–5.4)
CA-I BLD-MCNC: 5.71 MG/DL (ref 4.6–5.4)
CALCIUM SPEC-SCNC: 9.4 MG/DL (ref 8.6–10.5)
CALCIUM SPEC-SCNC: 9.5 MG/DL (ref 8.6–10.5)
CHLORIDE SERPL-SCNC: 106 MMOL/L (ref 98–107)
CHLORIDE SERPL-SCNC: 108 MMOL/L (ref 98–107)
CO2 SERPL-SCNC: 25 MMOL/L (ref 22–29)
CO2 SERPL-SCNC: 27 MMOL/L (ref 22–29)
COHGB MFR BLD: 1.1 % (ref 0–5)
COHGB MFR BLD: 1.2 % (ref 0–5)
COHGB MFR BLD: 1.6 % (ref 0–5)
CREAT SERPL-MCNC: 1.54 MG/DL (ref 0.76–1.27)
CREAT SERPL-MCNC: 1.63 MG/DL (ref 0.76–1.27)
DEPRECATED RDW RBC AUTO: 43.2 FL (ref 37–54)
DEPRECATED RDW RBC AUTO: 43.8 FL (ref 37–54)
ERYTHROCYTE [DISTWIDTH] IN BLOOD BY AUTOMATED COUNT: 13.8 % (ref 12.3–15.4)
ERYTHROCYTE [DISTWIDTH] IN BLOOD BY AUTOMATED COUNT: 13.8 % (ref 12.3–15.4)
GFR SERPL CREATININE-BSD FRML MDRD: 42 ML/MIN/1.73
GFR SERPL CREATININE-BSD FRML MDRD: 45 ML/MIN/1.73
GLUCOSE BLDC GLUCOMTR-MCNC: 102 MG/DL (ref 70–130)
GLUCOSE BLDC GLUCOMTR-MCNC: 104 MG/DL (ref 70–130)
GLUCOSE BLDC GLUCOMTR-MCNC: 114 MG/DL (ref 70–130)
GLUCOSE BLDC GLUCOMTR-MCNC: 127 MG/DL (ref 70–130)
GLUCOSE BLDC GLUCOMTR-MCNC: 136 MG/DL (ref 70–130)
GLUCOSE BLDC GLUCOMTR-MCNC: 157 MG/DL (ref 70–130)
GLUCOSE BLDC GLUCOMTR-MCNC: 179 MG/DL (ref 70–130)
GLUCOSE SERPL-MCNC: 101 MG/DL (ref 65–99)
GLUCOSE SERPL-MCNC: 104 MG/DL (ref 65–99)
HCO3 BLDA-SCNC: 26.6 MMOL/L (ref 20–26)
HCO3 BLDA-SCNC: 26.6 MMOL/L (ref 20–26)
HCO3 BLDA-SCNC: 27 MMOL/L (ref 20–26)
HCO3 BLDA-SCNC: 27.1 MMOL/L (ref 20–26)
HCO3 BLDA-SCNC: 27.3 MMOL/L (ref 20–26)
HCO3 BLDA-SCNC: 27.5 MMOL/L (ref 20–26)
HCO3 BLDA-SCNC: 28.1 MMOL/L (ref 20–26)
HCO3 BLDA-SCNC: 29.3 MMOL/L (ref 20–26)
HCO3 BLDV-SCNC: 29.1 MMOL/L (ref 22–28)
HCT VFR BLD AUTO: 30.2 % (ref 37.5–51)
HCT VFR BLD AUTO: 31.6 % (ref 37.5–51)
HCT VFR BLD CALC: 29.3 % (ref 38–51)
HCT VFR BLD CALC: 29.8 % (ref 38–51)
HCT VFR BLD CALC: 31.2 % (ref 38–51)
HCT VFR BLD CALC: 32.3 % (ref 38–51)
HCT VFR BLD CALC: 37.3 % (ref 38–51)
HCT VFR BLD CALC: 37.4 % (ref 38–51)
HGB BLD-MCNC: 10.1 G/DL (ref 13–17.7)
HGB BLD-MCNC: 10.6 G/DL (ref 13–17.7)
HGB BLDA-MCNC: 10.2 G/DL (ref 14–18)
HGB BLDA-MCNC: 10.5 G/DL (ref 14–18)
HGB BLDA-MCNC: 12.2 G/DL (ref 14–18)
HGB BLDA-MCNC: 12.2 G/DL (ref 14–18)
HGB BLDA-MCNC: 9.5 G/DL (ref 14–18)
HGB BLDA-MCNC: 9.7 G/DL (ref 14–18)
INHALED O2 CONCENTRATION: 100 %
INHALED O2 CONCENTRATION: 40 %
INHALED O2 CONCENTRATION: 40 %
INHALED O2 CONCENTRATION: 60 %
INHALED O2 CONCENTRATION: 85 %
INHALED O2 CONCENTRATION: 85 %
INR PPP: 1.23 (ref 0.91–1.09)
Lab: ABNORMAL
MCH RBC QN AUTO: 28.9 PG (ref 26.6–33)
MCH RBC QN AUTO: 28.9 PG (ref 26.6–33)
MCHC RBC AUTO-ENTMCNC: 33.4 G/DL (ref 31.5–35.7)
MCHC RBC AUTO-ENTMCNC: 33.5 G/DL (ref 31.5–35.7)
MCV RBC AUTO: 86.1 FL (ref 79–97)
MCV RBC AUTO: 86.5 FL (ref 79–97)
METHGB BLD QL: 1 % (ref 0–3)
METHGB BLD QL: 1 % (ref 0–3)
METHGB BLD QL: 1.1 % (ref 0–3)
METHGB BLD QL: 1.1 % (ref 0–3)
METHGB BLD QL: 1.2 % (ref 0–3)
METHGB BLD QL: 1.2 % (ref 0–3)
MODALITY: ABNORMAL
NOTE: ABNORMAL
OXYHGB MFR BLDV: 97.3 % (ref 94–99)
OXYHGB MFR BLDV: 97.5 % (ref 94–99)
OXYHGB MFR BLDV: 97.9 % (ref 94–99)
OXYHGB MFR BLDV: 97.9 % (ref 94–99)
OXYHGB MFR BLDV: 98 % (ref 94–99)
OXYHGB MFR BLDV: 98.2 % (ref 94–99)
PCO2 BLDA: 44.3 MM HG (ref 35–45)
PCO2 BLDA: 46.3 MM HG (ref 35–45)
PCO2 BLDA: 46.9 MM HG (ref 35–45)
PCO2 BLDA: 47.4 MM HG (ref 35–45)
PCO2 BLDA: 47.4 MM HG (ref 35–45)
PCO2 BLDA: 52.2 MM HG (ref 35–45)
PCO2 BLDA: 53 MM HG (ref 35–45)
PCO2 BLDA: 53.2 MM HG (ref 35–45)
PCO2 BLDV: 56.5 MM HG (ref 41–51)
PCO2 TEMP ADJ BLD: 44.3 MM HG (ref 35–45)
PCO2 TEMP ADJ BLD: 46.3 MM HG (ref 35–45)
PCO2 TEMP ADJ BLD: 46.9 MM HG (ref 35–45)
PCO2 TEMP ADJ BLD: 47.4 MM HG (ref 35–45)
PCO2 TEMP ADJ BLD: 47.4 MM HG (ref 35–45)
PCO2 TEMP ADJ BLD: 52.2 MM HG (ref 35–45)
PCO2 TEMP ADJ BLD: 53 MM HG (ref 35–45)
PCO2 TEMP ADJ BLD: 53.2 MM HG (ref 35–45)
PEEP RESPIRATORY: 8 CM[H2O]
PH BLDA: 7.32 PH UNITS (ref 7.35–7.45)
PH BLDA: 7.33 PH UNITS (ref 7.35–7.45)
PH BLDA: 7.35 PH UNITS (ref 7.35–7.45)
PH BLDA: 7.36 PH UNITS (ref 7.35–7.45)
PH BLDA: 7.37 PH UNITS (ref 7.35–7.45)
PH BLDA: 7.37 PH UNITS (ref 7.35–7.45)
PH BLDA: 7.38 PH UNITS (ref 7.35–7.45)
PH BLDA: 7.4 PH UNITS (ref 7.35–7.45)
PH BLDV: 7.32 PH UNITS (ref 7.32–7.42)
PH, TEMP CORRECTED: 7.32 PH UNITS (ref 7.35–7.45)
PH, TEMP CORRECTED: 7.33 PH UNITS (ref 7.35–7.45)
PH, TEMP CORRECTED: 7.35 PH UNITS (ref 7.35–7.45)
PH, TEMP CORRECTED: 7.36 PH UNITS (ref 7.35–7.45)
PH, TEMP CORRECTED: 7.37 PH UNITS (ref 7.35–7.45)
PH, TEMP CORRECTED: 7.37 PH UNITS (ref 7.35–7.45)
PH, TEMP CORRECTED: 7.38 PH UNITS (ref 7.35–7.45)
PH, TEMP CORRECTED: 7.4 PH UNITS (ref 7.35–7.45)
PHOSPHATE SERPL-MCNC: 2.9 MG/DL (ref 2.5–4.5)
PHOSPHATE SERPL-MCNC: 3.9 MG/DL (ref 2.5–4.5)
PLATELET # BLD AUTO: 213 10*3/MM3 (ref 140–450)
PLATELET # BLD AUTO: 227 10*3/MM3 (ref 140–450)
PMV BLD AUTO: 9.4 FL (ref 6–12)
PMV BLD AUTO: 9.5 FL (ref 6–12)
PO2 BLDA: 176 MM HG (ref 83–108)
PO2 BLDA: 215 MM HG (ref 83–108)
PO2 BLDA: 343 MM HG (ref 83–108)
PO2 BLDA: 357 MM HG (ref 83–108)
PO2 BLDA: 368 MM HG (ref 83–108)
PO2 BLDA: 386 MM HG (ref 83–108)
PO2 BLDA: 74.1 MM HG (ref 83–108)
PO2 BLDA: 85.9 MM HG (ref 83–108)
PO2 BLDV: 27.7 MM HG (ref 27–53)
PO2 TEMP ADJ BLD: 176 MM HG (ref 83–108)
PO2 TEMP ADJ BLD: 215 MM HG (ref 83–108)
PO2 TEMP ADJ BLD: 343 MM HG (ref 83–108)
PO2 TEMP ADJ BLD: 357 MM HG (ref 83–108)
PO2 TEMP ADJ BLD: 368 MM HG (ref 83–108)
PO2 TEMP ADJ BLD: 386 MM HG (ref 83–108)
PO2 TEMP ADJ BLD: 74.1 MM HG (ref 83–108)
PO2 TEMP ADJ BLD: 85.9 MM HG (ref 83–108)
POTASSIUM BLDA-SCNC: 4.4 MMOL/L (ref 3.5–5.2)
POTASSIUM BLDA-SCNC: 4.8 MMOL/L (ref 3.5–5.2)
POTASSIUM BLDA-SCNC: 4.8 MMOL/L (ref 3.5–5.2)
POTASSIUM BLDA-SCNC: 4.9 MMOL/L (ref 3.5–5.2)
POTASSIUM BLDA-SCNC: 4.9 MMOL/L (ref 3.5–5.2)
POTASSIUM BLDA-SCNC: 5.1 MMOL/L (ref 3.5–5.2)
POTASSIUM SERPL-SCNC: 4.8 MMOL/L (ref 3.5–5.2)
POTASSIUM SERPL-SCNC: 5.1 MMOL/L (ref 3.5–5.2)
PROTHROMBIN TIME: 15 SECONDS (ref 11.9–14.6)
PSV: 10 CMH2O
QT INTERVAL: 430 MS
QTC INTERVAL: 447 MS
RBC # BLD AUTO: 3.49 10*6/MM3 (ref 4.14–5.8)
RBC # BLD AUTO: 3.67 10*6/MM3 (ref 4.14–5.8)
SAO2 % BLDCOA: 100 % (ref 94–99)
SAO2 % BLDCOA: 94.8 % (ref 94–99)
SAO2 % BLDCOA: 97.5 % (ref 94–99)
SAO2 % BLDCOA: 99.7 % (ref 94–99)
SAO2 % BLDCOA: >100.1 % (ref 94–99)
SAO2 % BLDCOV: 50.1 % (ref 45–75)
SET MECH RESP RATE: 16
SET MECH RESP RATE: 20
SET MECH RESP RATE: 20
SODIUM BLDA-SCNC: 135 MMOL/L (ref 136–145)
SODIUM BLDA-SCNC: 136 MMOL/L (ref 136–145)
SODIUM BLDA-SCNC: 136 MMOL/L (ref 136–145)
SODIUM BLDA-SCNC: 137 MMOL/L (ref 136–145)
SODIUM SERPL-SCNC: 138 MMOL/L (ref 136–145)
SODIUM SERPL-SCNC: 140 MMOL/L (ref 136–145)
VENTILATOR MODE: ABNORMAL
VENTILATOR MODE: AC
VT ON VENT VENT: 600 ML
WBC NRBC COR # BLD: 11.63 10*3/MM3 (ref 3.4–10.8)
WBC NRBC COR # BLD: 12.94 10*3/MM3 (ref 3.4–10.8)

## 2022-01-06 PROCEDURE — 06BQ4ZZ EXCISION OF LEFT SAPHENOUS VEIN, PERCUTANEOUS ENDOSCOPIC APPROACH: ICD-10-PCS | Performed by: THORACIC SURGERY (CARDIOTHORACIC VASCULAR SURGERY)

## 2022-01-06 PROCEDURE — P9041 ALBUMIN (HUMAN),5%, 50ML: HCPCS | Performed by: THORACIC SURGERY (CARDIOTHORACIC VASCULAR SURGERY)

## 2022-01-06 PROCEDURE — 0 INSULIN REGULAR HUMAN PER 5 UNITS: Performed by: THORACIC SURGERY (CARDIOTHORACIC VASCULAR SURGERY)

## 2022-01-06 PROCEDURE — 25010000002 PHENYLEPHRINE 10 MG/ML SOLUTION

## 2022-01-06 PROCEDURE — 93005 ELECTROCARDIOGRAM TRACING: CPT | Performed by: THORACIC SURGERY (CARDIOTHORACIC VASCULAR SURGERY)

## 2022-01-06 PROCEDURE — 25010000002 PHENYLEPHRINE 10 MG/ML SOLUTION 1 ML VIAL: Performed by: NURSE ANESTHETIST, CERTIFIED REGISTERED

## 2022-01-06 PROCEDURE — 94799 UNLISTED PULMONARY SVC/PX: CPT

## 2022-01-06 PROCEDURE — 25010000002 ALBUMIN HUMAN 5% PER 50 ML: Performed by: NURSE ANESTHETIST, CERTIFIED REGISTERED

## 2022-01-06 PROCEDURE — C1751 CATH, INF, PER/CENT/MIDLINE: HCPCS | Performed by: ANESTHESIOLOGY

## 2022-01-06 PROCEDURE — 5A1221Z PERFORMANCE OF CARDIAC OUTPUT, CONTINUOUS: ICD-10-PCS | Performed by: THORACIC SURGERY (CARDIOTHORACIC VASCULAR SURGERY)

## 2022-01-06 PROCEDURE — 25010000002 ALBUMIN HUMAN 25% PER 50 ML

## 2022-01-06 PROCEDURE — 25010000002 VANCOMYCIN 10 G RECONSTITUTED SOLUTION: Performed by: THORACIC SURGERY (CARDIOTHORACIC VASCULAR SURGERY)

## 2022-01-06 PROCEDURE — P9041 ALBUMIN (HUMAN),5%, 50ML: HCPCS | Performed by: NURSE ANESTHETIST, CERTIFIED REGISTERED

## 2022-01-06 PROCEDURE — 94640 AIRWAY INHALATION TREATMENT: CPT

## 2022-01-06 PROCEDURE — 80069 RENAL FUNCTION PANEL: CPT | Performed by: THORACIC SURGERY (CARDIOTHORACIC VASCULAR SURGERY)

## 2022-01-06 PROCEDURE — 33268 EXCL LAA OPN OTH PX ANY METH: CPT | Performed by: THORACIC SURGERY (CARDIOTHORACIC VASCULAR SURGERY)

## 2022-01-06 PROCEDURE — 82962 GLUCOSE BLOOD TEST: CPT

## 2022-01-06 PROCEDURE — A4648 IMPLANTABLE TISSUE MARKER: HCPCS | Performed by: THORACIC SURGERY (CARDIOTHORACIC VASCULAR SURGERY)

## 2022-01-06 PROCEDURE — 25810000003 DEXTROSE 5 % WITH KCL 20 MEQ 20-5 MEQ/L-% SOLUTION: Performed by: THORACIC SURGERY (CARDIOTHORACIC VASCULAR SURGERY)

## 2022-01-06 PROCEDURE — 25010000002 POTASSIUM CHLORIDE PER 2 MEQ OF POTASSIUM

## 2022-01-06 PROCEDURE — 25010000002 CEFAZOLIN PER 500 MG: Performed by: THORACIC SURGERY (CARDIOTHORACIC VASCULAR SURGERY)

## 2022-01-06 PROCEDURE — 33534 CABG ARTERIAL TWO: CPT | Performed by: THORACIC SURGERY (CARDIOTHORACIC VASCULAR SURGERY)

## 2022-01-06 PROCEDURE — 25010000002 HEPARIN (PORCINE) PER 1000 UNITS

## 2022-01-06 PROCEDURE — 82375 ASSAY CARBOXYHB QUANT: CPT

## 2022-01-06 PROCEDURE — 82803 BLOOD GASES ANY COMBINATION: CPT

## 2022-01-06 PROCEDURE — C1713 ANCHOR/SCREW BN/BN,TIS/BN: HCPCS | Performed by: THORACIC SURGERY (CARDIOTHORACIC VASCULAR SURGERY)

## 2022-01-06 PROCEDURE — 0 METHYLENE BLUE 50 MG/10ML SOLUTION: Performed by: THORACIC SURGERY (CARDIOTHORACIC VASCULAR SURGERY)

## 2022-01-06 PROCEDURE — P9047 ALBUMIN (HUMAN), 25%, 50ML: HCPCS

## 2022-01-06 PROCEDURE — 25010000002 HEPARIN (PORCINE) PER 1000 UNITS: Performed by: NURSE ANESTHETIST, CERTIFIED REGISTERED

## 2022-01-06 PROCEDURE — 25010000002 PAPAVERINE PER 60 MG: Performed by: THORACIC SURGERY (CARDIOTHORACIC VASCULAR SURGERY)

## 2022-01-06 PROCEDURE — 83050 HGB METHEMOGLOBIN QUAN: CPT

## 2022-01-06 PROCEDURE — 85730 THROMBOPLASTIN TIME PARTIAL: CPT | Performed by: THORACIC SURGERY (CARDIOTHORACIC VASCULAR SURGERY)

## 2022-01-06 PROCEDURE — 0212093 BYPASS CORONARY ARTERY, THREE ARTERIES FROM CORONARY ARTERY WITH AUTOLOGOUS VENOUS TISSUE, OPEN APPROACH: ICD-10-PCS | Performed by: THORACIC SURGERY (CARDIOTHORACIC VASCULAR SURGERY)

## 2022-01-06 PROCEDURE — 02580ZZ DESTRUCTION OF CONDUCTION MECHANISM, OPEN APPROACH: ICD-10-PCS | Performed by: THORACIC SURGERY (CARDIOTHORACIC VASCULAR SURGERY)

## 2022-01-06 PROCEDURE — 63710000001 INSULIN REGULAR HUMAN PER 5 UNITS: Performed by: NURSE ANESTHETIST, CERTIFIED REGISTERED

## 2022-01-06 PROCEDURE — 33259 ABLATE ATRIA W/BYPASS ADD-ON: CPT | Performed by: THORACIC SURGERY (CARDIOTHORACIC VASCULAR SURGERY)

## 2022-01-06 PROCEDURE — 02100Z9 BYPASS CORONARY ARTERY, ONE ARTERY FROM LEFT INTERNAL MAMMARY, OPEN APPROACH: ICD-10-PCS | Performed by: THORACIC SURGERY (CARDIOTHORACIC VASCULAR SURGERY)

## 2022-01-06 PROCEDURE — 25010000002 MORPHINE SULFATE (PF) 2 MG/ML SOLUTION: Performed by: THORACIC SURGERY (CARDIOTHORACIC VASCULAR SURGERY)

## 2022-01-06 PROCEDURE — 25010000002 CEFAZOLIN PER 500 MG: Performed by: NURSE PRACTITIONER

## 2022-01-06 PROCEDURE — 25010000002 MANNITOL PER 50 ML

## 2022-01-06 PROCEDURE — 33508 ENDOSCOPIC VEIN HARVEST: CPT | Performed by: THORACIC SURGERY (CARDIOTHORACIC VASCULAR SURGERY)

## 2022-01-06 PROCEDURE — C1889 IMPLANT/INSERT DEVICE, NOC: HCPCS | Performed by: THORACIC SURGERY (CARDIOTHORACIC VASCULAR SURGERY)

## 2022-01-06 PROCEDURE — 02L70CK OCCLUSION OF LEFT ATRIAL APPENDAGE WITH EXTRALUMINAL DEVICE, OPEN APPROACH: ICD-10-PCS | Performed by: THORACIC SURGERY (CARDIOTHORACIC VASCULAR SURGERY)

## 2022-01-06 PROCEDURE — 33518 CABG ARTERY-VEIN TWO: CPT | Performed by: THORACIC SURGERY (CARDIOTHORACIC VASCULAR SURGERY)

## 2022-01-06 PROCEDURE — S0260 H&P FOR SURGERY: HCPCS | Performed by: THORACIC SURGERY (CARDIOTHORACIC VASCULAR SURGERY)

## 2022-01-06 PROCEDURE — 82805 BLOOD GASES W/O2 SATURATION: CPT

## 2022-01-06 PROCEDURE — 25010000002 MIDAZOLAM PER 1 MG: Performed by: NURSE ANESTHETIST, CERTIFIED REGISTERED

## 2022-01-06 PROCEDURE — 93325 DOPPLER ECHO COLOR FLOW MAPG: CPT | Performed by: INTERNAL MEDICINE

## 2022-01-06 PROCEDURE — 85027 COMPLETE CBC AUTOMATED: CPT | Performed by: THORACIC SURGERY (CARDIOTHORACIC VASCULAR SURGERY)

## 2022-01-06 PROCEDURE — 25010000002 HEPARIN (PORCINE) PER 1000 UNITS: Performed by: THORACIC SURGERY (CARDIOTHORACIC VASCULAR SURGERY)

## 2022-01-06 PROCEDURE — 94002 VENT MGMT INPAT INIT DAY: CPT

## 2022-01-06 PROCEDURE — C2618 PROBE/NEEDLE, CRYO: HCPCS | Performed by: THORACIC SURGERY (CARDIOTHORACIC VASCULAR SURGERY)

## 2022-01-06 PROCEDURE — 25010000002 VANCOMYCIN 1 G RECONSTITUTED SOLUTION: Performed by: NURSE ANESTHETIST, CERTIFIED REGISTERED

## 2022-01-06 PROCEDURE — 25010000002 ALBUMIN HUMAN-KJDA 5 % SOLUTION: Performed by: THORACIC SURGERY (CARDIOTHORACIC VASCULAR SURGERY)

## 2022-01-06 PROCEDURE — 25010000002 PROTAMINE SULFATE PER 10 MG

## 2022-01-06 PROCEDURE — 25010000002 AMIODARONE IN DEXTROSE 5% 360-4.14 MG/200ML-% SOLUTION: Performed by: THORACIC SURGERY (CARDIOTHORACIC VASCULAR SURGERY)

## 2022-01-06 PROCEDURE — 25010000002 PHENYLEPHRINE 10 MG/ML SOLUTION 5 ML VIAL: Performed by: THORACIC SURGERY (CARDIOTHORACIC VASCULAR SURGERY)

## 2022-01-06 PROCEDURE — 25010000002 FUROSEMIDE PER 20 MG

## 2022-01-06 PROCEDURE — 25010000002 VANCOMYCIN 1 G RECONSTITUTED SOLUTION: Performed by: THORACIC SURGERY (CARDIOTHORACIC VASCULAR SURGERY)

## 2022-01-06 PROCEDURE — 93312 ECHO TRANSESOPHAGEAL: CPT | Performed by: INTERNAL MEDICINE

## 2022-01-06 PROCEDURE — 93318 ECHO TRANSESOPHAGEAL INTRAOP: CPT | Performed by: ANESTHESIOLOGY

## 2022-01-06 PROCEDURE — 93010 ELECTROCARDIOGRAM REPORT: CPT | Performed by: INTERNAL MEDICINE

## 2022-01-06 PROCEDURE — 85610 PROTHROMBIN TIME: CPT | Performed by: THORACIC SURGERY (CARDIOTHORACIC VASCULAR SURGERY)

## 2022-01-06 PROCEDURE — 25010000002 PROPOFOL 10 MG/ML EMULSION: Performed by: NURSE ANESTHETIST, CERTIFIED REGISTERED

## 2022-01-06 PROCEDURE — 25010000002 PROTAMINE SULFATE PER 10 MG: Performed by: NURSE ANESTHETIST, CERTIFIED REGISTERED

## 2022-01-06 PROCEDURE — 71045 X-RAY EXAM CHEST 1 VIEW: CPT

## 2022-01-06 DEVICE — WR SUT NONABS MF SS CCS 1/2CIR CUT/CONV 5/0 18IN M657G: Type: IMPLANTABLE DEVICE | Site: STERNUM | Status: FUNCTIONAL

## 2022-01-06 DEVICE — PLEDGET INCISIONLINE REINF TFE SFT PTFE 1.5X3X7MM WHT: Type: IMPLANTABLE DEVICE | Site: HEART | Status: FUNCTIONAL

## 2022-01-06 DEVICE — WR SUT NONABS MF SS V40 1/2CIR TC 6/0 18IN M649G: Type: IMPLANTABLE DEVICE | Site: STERNUM | Status: FUNCTIONAL

## 2022-01-06 DEVICE — DISK-SHAPED STYLE, SILICONE (1 PER STERILE PKG)
Type: IMPLANTABLE DEVICE | Site: AORTA | Status: FUNCTIONAL
Brand: SCANLAN® RADIOMARK® GRAFT MARKERS

## 2022-01-06 DEVICE — APPL CLIP LAA ATRICLIP FLX 40MM: Type: IMPLANTABLE DEVICE | Site: HEART | Status: FUNCTIONAL

## 2022-01-06 RX ORDER — DEXTROSE MONOHYDRATE 50 MG/ML
30 INJECTION, SOLUTION INTRAVENOUS CONTINUOUS
Status: DISCONTINUED | OUTPATIENT
Start: 2022-01-06 | End: 2022-01-10

## 2022-01-06 RX ORDER — SODIUM CHLORIDE 0.9 % (FLUSH) 0.9 %
3-10 SYRINGE (ML) INJECTION AS NEEDED
Status: DISCONTINUED | OUTPATIENT
Start: 2022-01-06 | End: 2022-01-06 | Stop reason: HOSPADM

## 2022-01-06 RX ORDER — ROCURONIUM BROMIDE 10 MG/ML
INJECTION, SOLUTION INTRAVENOUS AS NEEDED
Status: DISCONTINUED | OUTPATIENT
Start: 2022-01-06 | End: 2022-01-06 | Stop reason: SURG

## 2022-01-06 RX ORDER — SODIUM CHLORIDE, SODIUM LACTATE, POTASSIUM CHLORIDE, CALCIUM CHLORIDE 600; 310; 30; 20 MG/100ML; MG/100ML; MG/100ML; MG/100ML
INJECTION, SOLUTION INTRAVENOUS CONTINUOUS PRN
Status: DISCONTINUED | OUTPATIENT
Start: 2022-01-06 | End: 2022-01-06 | Stop reason: SURG

## 2022-01-06 RX ORDER — SODIUM CHLORIDE 9 MG/ML
INJECTION, SOLUTION INTRAVENOUS AS NEEDED
Status: DISCONTINUED | OUTPATIENT
Start: 2022-01-06 | End: 2022-01-06 | Stop reason: HOSPADM

## 2022-01-06 RX ORDER — POTASSIUM CHLORIDE 29.8 MG/ML
20 INJECTION INTRAVENOUS
Status: DISCONTINUED | OUTPATIENT
Start: 2022-01-06 | End: 2022-01-10

## 2022-01-06 RX ORDER — LEVOTHYROXINE SODIUM 0.1 MG/1
200 TABLET ORAL
Status: DISCONTINUED | OUTPATIENT
Start: 2022-01-07 | End: 2022-01-11 | Stop reason: HOSPADM

## 2022-01-06 RX ORDER — SODIUM CHLORIDE, SODIUM LACTATE, POTASSIUM CHLORIDE, CALCIUM CHLORIDE 600; 310; 30; 20 MG/100ML; MG/100ML; MG/100ML; MG/100ML
1000 INJECTION, SOLUTION INTRAVENOUS CONTINUOUS
Status: DISCONTINUED | OUTPATIENT
Start: 2022-01-06 | End: 2022-01-06 | Stop reason: HOSPADM

## 2022-01-06 RX ORDER — SODIUM CHLORIDE 0.9 % (FLUSH) 0.9 %
3 SYRINGE (ML) INJECTION AS NEEDED
Status: DISCONTINUED | OUTPATIENT
Start: 2022-01-06 | End: 2022-01-06 | Stop reason: HOSPADM

## 2022-01-06 RX ORDER — ALLOPURINOL 100 MG/1
100 TABLET ORAL DAILY
Status: DISCONTINUED | OUTPATIENT
Start: 2022-01-06 | End: 2022-01-11 | Stop reason: HOSPADM

## 2022-01-06 RX ORDER — POTASSIUM CHLORIDE, DEXTROSE MONOHYDRATE 150; 5 MG/100ML; G/100ML
30 INJECTION, SOLUTION INTRAVENOUS CONTINUOUS
Status: DISCONTINUED | OUTPATIENT
Start: 2022-01-06 | End: 2022-01-06

## 2022-01-06 RX ORDER — PHENYLEPHRINE HCL IN 0.9% NACL 1 MG/10 ML
SYRINGE (ML) INTRAVENOUS AS NEEDED
Status: DISCONTINUED | OUTPATIENT
Start: 2022-01-06 | End: 2022-01-06 | Stop reason: SURG

## 2022-01-06 RX ORDER — ASPIRIN 81 MG/1
162 TABLET, CHEWABLE ORAL ONCE
Status: COMPLETED | OUTPATIENT
Start: 2022-01-07 | End: 2022-01-07

## 2022-01-06 RX ORDER — ALBUTEROL SULFATE 2.5 MG/3ML
2.5 SOLUTION RESPIRATORY (INHALATION) EVERY 4 HOURS PRN
Status: ACTIVE | OUTPATIENT
Start: 2022-01-06 | End: 2022-01-07

## 2022-01-06 RX ORDER — ONDANSETRON 2 MG/ML
4 INJECTION INTRAMUSCULAR; INTRAVENOUS EVERY 6 HOURS PRN
Status: DISCONTINUED | OUTPATIENT
Start: 2022-01-06 | End: 2022-01-11 | Stop reason: HOSPADM

## 2022-01-06 RX ORDER — OXYCODONE HYDROCHLORIDE AND ACETAMINOPHEN 5; 325 MG/1; MG/1
1 TABLET ORAL
Status: DISCONTINUED | OUTPATIENT
Start: 2022-01-06 | End: 2022-01-07

## 2022-01-06 RX ORDER — IPRATROPIUM BROMIDE AND ALBUTEROL SULFATE 2.5; .5 MG/3ML; MG/3ML
3 SOLUTION RESPIRATORY (INHALATION)
Status: DISCONTINUED | OUTPATIENT
Start: 2022-01-06 | End: 2022-01-10

## 2022-01-06 RX ORDER — LIDOCAINE HYDROCHLORIDE 20 MG/ML
INJECTION, SOLUTION EPIDURAL; INFILTRATION; INTRACAUDAL; PERINEURAL AS NEEDED
Status: DISCONTINUED | OUTPATIENT
Start: 2022-01-06 | End: 2022-01-06 | Stop reason: SURG

## 2022-01-06 RX ORDER — SODIUM CHLORIDE 0.9 % (FLUSH) 0.9 %
10 SYRINGE (ML) INJECTION AS NEEDED
Status: DISCONTINUED | OUTPATIENT
Start: 2022-01-06 | End: 2022-01-06 | Stop reason: HOSPADM

## 2022-01-06 RX ORDER — VANCOMYCIN HYDROCHLORIDE 1 G/20ML
INJECTION, POWDER, LYOPHILIZED, FOR SOLUTION INTRAVENOUS AS NEEDED
Status: DISCONTINUED | OUTPATIENT
Start: 2022-01-06 | End: 2022-01-06 | Stop reason: HOSPADM

## 2022-01-06 RX ORDER — AMIODARONE HYDROCHLORIDE 200 MG/1
400 TABLET ORAL 2 TIMES DAILY WITH MEALS
Status: DISCONTINUED | OUTPATIENT
Start: 2022-01-07 | End: 2022-01-11 | Stop reason: HOSPADM

## 2022-01-06 RX ORDER — BUPIVACAINE HCL/0.9 % NACL/PF 0.1 %
2 PLASTIC BAG, INJECTION (ML) EPIDURAL ONCE
Status: COMPLETED | OUTPATIENT
Start: 2022-01-06 | End: 2022-01-06

## 2022-01-06 RX ORDER — PROTAMINE SULFATE 10 MG/ML
INJECTION, SOLUTION INTRAVENOUS AS NEEDED
Status: DISCONTINUED | OUTPATIENT
Start: 2022-01-06 | End: 2022-01-06 | Stop reason: SURG

## 2022-01-06 RX ORDER — MAGNESIUM HYDROXIDE 1200 MG/15ML
LIQUID ORAL AS NEEDED
Status: DISCONTINUED | OUTPATIENT
Start: 2022-01-06 | End: 2022-01-06 | Stop reason: HOSPADM

## 2022-01-06 RX ORDER — DEXMEDETOMIDINE HYDROCHLORIDE 4 UG/ML
INJECTION, SOLUTION INTRAVENOUS CONTINUOUS PRN
Status: DISCONTINUED | OUTPATIENT
Start: 2022-01-06 | End: 2022-01-06 | Stop reason: SURG

## 2022-01-06 RX ORDER — SODIUM CHLORIDE 0.9 % (FLUSH) 0.9 %
3 SYRINGE (ML) INJECTION EVERY 12 HOURS SCHEDULED
Status: DISCONTINUED | OUTPATIENT
Start: 2022-01-06 | End: 2022-01-06 | Stop reason: HOSPADM

## 2022-01-06 RX ORDER — CALCIUM CHLORIDE 100 MG/ML
INJECTION INTRAVENOUS; INTRAVENTRICULAR AS NEEDED
Status: DISCONTINUED | OUTPATIENT
Start: 2022-01-06 | End: 2022-01-06 | Stop reason: SURG

## 2022-01-06 RX ORDER — BUPIVACAINE HCL/0.9 % NACL/PF 0.1 %
2 PLASTIC BAG, INJECTION (ML) EPIDURAL EVERY 8 HOURS
Status: COMPLETED | OUTPATIENT
Start: 2022-01-06 | End: 2022-01-08

## 2022-01-06 RX ORDER — OXYCODONE AND ACETAMINOPHEN 10; 325 MG/1; MG/1
1 TABLET ORAL
Status: DISCONTINUED | OUTPATIENT
Start: 2022-01-06 | End: 2022-01-07

## 2022-01-06 RX ORDER — ALBUMIN, HUMAN INJ 5% 5 %
SOLUTION INTRAVENOUS CONTINUOUS PRN
Status: DISCONTINUED | OUTPATIENT
Start: 2022-01-06 | End: 2022-01-06 | Stop reason: SURG

## 2022-01-06 RX ORDER — DEXMEDETOMIDINE HYDROCHLORIDE 4 UG/ML
.2-1.5 INJECTION, SOLUTION INTRAVENOUS
Status: DISCONTINUED | OUTPATIENT
Start: 2022-01-06 | End: 2022-01-07

## 2022-01-06 RX ORDER — METOPROLOL TARTRATE 5 MG/5ML
INJECTION INTRAVENOUS AS NEEDED
Status: DISCONTINUED | OUTPATIENT
Start: 2022-01-06 | End: 2022-01-06 | Stop reason: SURG

## 2022-01-06 RX ORDER — CHLORHEXIDINE GLUCONATE 0.12 MG/ML
15 RINSE ORAL EVERY 12 HOURS SCHEDULED
Status: DISCONTINUED | OUTPATIENT
Start: 2022-01-06 | End: 2022-01-06 | Stop reason: SDUPTHER

## 2022-01-06 RX ORDER — CITALOPRAM 20 MG/1
20 TABLET ORAL DAILY
Status: DISCONTINUED | OUTPATIENT
Start: 2022-01-06 | End: 2022-01-11 | Stop reason: HOSPADM

## 2022-01-06 RX ORDER — MORPHINE SULFATE 2 MG/ML
2 INJECTION, SOLUTION INTRAMUSCULAR; INTRAVENOUS
Status: DISCONTINUED | OUTPATIENT
Start: 2022-01-06 | End: 2022-01-07

## 2022-01-06 RX ORDER — ATORVASTATIN CALCIUM 10 MG/1
20 TABLET, FILM COATED ORAL NIGHTLY
Status: DISCONTINUED | OUTPATIENT
Start: 2022-01-07 | End: 2022-01-11 | Stop reason: HOSPADM

## 2022-01-06 RX ORDER — VECURONIUM BROMIDE 1 MG/ML
INJECTION, POWDER, LYOPHILIZED, FOR SOLUTION INTRAVENOUS AS NEEDED
Status: DISCONTINUED | OUTPATIENT
Start: 2022-01-06 | End: 2022-01-06 | Stop reason: SURG

## 2022-01-06 RX ORDER — ASPIRIN 81 MG/1
81 TABLET ORAL DAILY
Status: DISCONTINUED | OUTPATIENT
Start: 2022-01-08 | End: 2022-01-11 | Stop reason: HOSPADM

## 2022-01-06 RX ORDER — VANCOMYCIN HYDROCHLORIDE 1 G/20ML
INJECTION, POWDER, LYOPHILIZED, FOR SOLUTION INTRAVENOUS AS NEEDED
Status: DISCONTINUED | OUTPATIENT
Start: 2022-01-06 | End: 2022-01-06 | Stop reason: SURG

## 2022-01-06 RX ORDER — DEXTROSE MONOHYDRATE 25 G/50ML
25-50 INJECTION, SOLUTION INTRAVENOUS
Status: DISCONTINUED | OUTPATIENT
Start: 2022-01-06 | End: 2022-01-10

## 2022-01-06 RX ORDER — LIDOCAINE HYDROCHLORIDE 10 MG/ML
0.5 INJECTION, SOLUTION EPIDURAL; INFILTRATION; INTRACAUDAL; PERINEURAL ONCE AS NEEDED
Status: DISCONTINUED | OUTPATIENT
Start: 2022-01-06 | End: 2022-01-06 | Stop reason: HOSPADM

## 2022-01-06 RX ORDER — LIDOCAINE 50 MG/G
2 PATCH TOPICAL
Status: DISCONTINUED | OUTPATIENT
Start: 2022-01-06 | End: 2022-01-11 | Stop reason: HOSPADM

## 2022-01-06 RX ORDER — CHLORHEXIDINE GLUCONATE 0.12 MG/ML
15 RINSE ORAL EVERY 12 HOURS
Status: DISCONTINUED | OUTPATIENT
Start: 2022-01-06 | End: 2022-01-10

## 2022-01-06 RX ORDER — PROPOFOL 10 MG/ML
VIAL (ML) INTRAVENOUS AS NEEDED
Status: DISCONTINUED | OUTPATIENT
Start: 2022-01-06 | End: 2022-01-06 | Stop reason: SURG

## 2022-01-06 RX ORDER — CLOPIDOGREL BISULFATE 75 MG/1
75 TABLET ORAL DAILY
Status: DISCONTINUED | OUTPATIENT
Start: 2022-01-07 | End: 2022-01-11 | Stop reason: HOSPADM

## 2022-01-06 RX ORDER — MEPERIDINE HYDROCHLORIDE 25 MG/ML
25 INJECTION INTRAMUSCULAR; INTRAVENOUS; SUBCUTANEOUS
Status: DISCONTINUED | OUTPATIENT
Start: 2022-01-06 | End: 2022-01-07

## 2022-01-06 RX ORDER — GABAPENTIN 400 MG/1
400 CAPSULE ORAL 2 TIMES DAILY
Status: DISCONTINUED | OUTPATIENT
Start: 2022-01-06 | End: 2022-01-11 | Stop reason: HOSPADM

## 2022-01-06 RX ORDER — HEPARIN SODIUM 1000 [USP'U]/ML
INJECTION, SOLUTION INTRAVENOUS; SUBCUTANEOUS AS NEEDED
Status: DISCONTINUED | OUTPATIENT
Start: 2022-01-06 | End: 2022-01-06 | Stop reason: SURG

## 2022-01-06 RX ORDER — MIDAZOLAM HYDROCHLORIDE 1 MG/ML
INJECTION INTRAMUSCULAR; INTRAVENOUS AS NEEDED
Status: DISCONTINUED | OUTPATIENT
Start: 2022-01-06 | End: 2022-01-06 | Stop reason: SURG

## 2022-01-06 RX ORDER — NITROGLYCERIN 20 MG/100ML
5 INJECTION INTRAVENOUS CONTINUOUS
Status: DISCONTINUED | OUTPATIENT
Start: 2022-01-06 | End: 2022-01-10

## 2022-01-06 RX ADMIN — HEPARIN SODIUM 3000 UNITS: 1000 INJECTION, SOLUTION INTRAVENOUS; SUBCUTANEOUS at 08:40

## 2022-01-06 RX ADMIN — DEXMEDETOMIDINE HYDROCHLORIDE 0.8 MCG/KG/HR: 4 INJECTION, SOLUTION INTRAVENOUS at 17:18

## 2022-01-06 RX ADMIN — SODIUM CHLORIDE 5 UNITS: 9 INJECTION, SOLUTION INTRAVENOUS at 09:28

## 2022-01-06 RX ADMIN — MIDAZOLAM 5 MG: 1 INJECTION INTRAMUSCULAR; INTRAVENOUS at 12:34

## 2022-01-06 RX ADMIN — SUFENTANIL CITRATE 100 MCG: 50 INJECTION EPIDURAL; INTRAVENOUS at 07:32

## 2022-01-06 RX ADMIN — CALCIUM CHLORIDE 0.5 G: 100 INJECTION INTRAVENOUS; INTRAVENTRICULAR at 09:21

## 2022-01-06 RX ADMIN — SUFENTANIL CITRATE 50 MCG: 50 INJECTION EPIDURAL; INTRAVENOUS at 08:53

## 2022-01-06 RX ADMIN — SUFENTANIL CITRATE 50 MCG: 50 INJECTION EPIDURAL; INTRAVENOUS at 13:22

## 2022-01-06 RX ADMIN — CALCIUM CHLORIDE 0.5 G: 100 INJECTION INTRAVENOUS; INTRAVENTRICULAR at 13:33

## 2022-01-06 RX ADMIN — VECURONIUM BROMIDE 10 MG: 1 INJECTION, POWDER, LYOPHILIZED, FOR SOLUTION INTRAVENOUS at 09:50

## 2022-01-06 RX ADMIN — CEFAZOLIN 2 G: 10 INJECTION, POWDER, FOR SOLUTION INTRAVENOUS at 20:08

## 2022-01-06 RX ADMIN — SODIUM CHLORIDE 2 UNITS: 9 INJECTION, SOLUTION INTRAVENOUS at 09:33

## 2022-01-06 RX ADMIN — DEXMEDETOMIDINE HYDROCHLORIDE 0.8 MCG/KG/HR: 4 INJECTION, SOLUTION INTRAVENOUS at 13:33

## 2022-01-06 RX ADMIN — LIDOCAINE HYDROCHLORIDE 100 MG: 20 INJECTION, SOLUTION EPIDURAL; INFILTRATION; INTRACAUDAL; PERINEURAL at 07:32

## 2022-01-06 RX ADMIN — SODIUM CHLORIDE, SODIUM LACTATE, POTASSIUM CHLORIDE, CALCIUM CHLORIDE: 600; 310; 30; 20 INJECTION, SOLUTION INTRAVENOUS at 07:25

## 2022-01-06 RX ADMIN — SUFENTANIL CITRATE 25 MCG: 50 INJECTION EPIDURAL; INTRAVENOUS at 09:32

## 2022-01-06 RX ADMIN — SODIUM CHLORIDE, POTASSIUM CHLORIDE, SODIUM LACTATE AND CALCIUM CHLORIDE 1000 ML: 600; 310; 30; 20 INJECTION, SOLUTION INTRAVENOUS at 16:30

## 2022-01-06 RX ADMIN — SODIUM CHLORIDE, POTASSIUM CHLORIDE, SODIUM LACTATE AND CALCIUM CHLORIDE: 600; 310; 30; 20 INJECTION, SOLUTION INTRAVENOUS at 07:25

## 2022-01-06 RX ADMIN — DEXTROSE MONOHYDRATE 30 ML/HR: 50 INJECTION, SOLUTION INTRAVENOUS at 19:32

## 2022-01-06 RX ADMIN — MIDAZOLAM 2 MG: 1 INJECTION INTRAMUSCULAR; INTRAVENOUS at 07:29

## 2022-01-06 RX ADMIN — Medication 1750 MG: at 20:08

## 2022-01-06 RX ADMIN — MUPIROCIN 1 APPLICATION: 20 OINTMENT TOPICAL at 20:09

## 2022-01-06 RX ADMIN — AMINOCAPROIC ACID 5 G: 250 INJECTION, SOLUTION INTRAVENOUS at 07:41

## 2022-01-06 RX ADMIN — VASOPRESSIN 2 UNITS: 20 INJECTION INTRAVENOUS at 08:08

## 2022-01-06 RX ADMIN — VASOPRESSIN 2 UNITS: 20 INJECTION INTRAVENOUS at 07:38

## 2022-01-06 RX ADMIN — HEPARIN SODIUM 32000 UNITS: 1000 INJECTION, SOLUTION INTRAVENOUS; SUBCUTANEOUS at 09:18

## 2022-01-06 RX ADMIN — CHLORHEXIDINE GLUCONATE 15 ML: 1.2 RINSE ORAL at 20:09

## 2022-01-06 RX ADMIN — PROTAMINE SULFATE 250 MG: 10 INJECTION, SOLUTION INTRAVENOUS at 12:53

## 2022-01-06 RX ADMIN — PROPOFOL 60 MG: 10 INJECTION, EMULSION INTRAVENOUS at 07:32

## 2022-01-06 RX ADMIN — SODIUM CHLORIDE 1 UNITS/HR: 9 INJECTION, SOLUTION INTRAVENOUS at 09:03

## 2022-01-06 RX ADMIN — VECURONIUM BROMIDE 10 MG: 1 INJECTION, POWDER, LYOPHILIZED, FOR SOLUTION INTRAVENOUS at 08:49

## 2022-01-06 RX ADMIN — ALBUMIN HUMAN: 0.05 INJECTION, SOLUTION INTRAVENOUS at 13:22

## 2022-01-06 RX ADMIN — LIDOCAINE 2 PATCH: 50 PATCH CUTANEOUS at 20:08

## 2022-01-06 RX ADMIN — ALBUMIN HUMAN 500 ML: 0.05 SOLUTION INTRAVENOUS at 19:31

## 2022-01-06 RX ADMIN — CALCIUM CHLORIDE 0.5 G: 100 INJECTION INTRAVENOUS; INTRAVENTRICULAR at 08:24

## 2022-01-06 RX ADMIN — POTASSIUM CHLORIDE AND DEXTROSE MONOHYDRATE 30 ML/HR: 150; 5 INJECTION, SOLUTION INTRAVENOUS at 15:03

## 2022-01-06 RX ADMIN — SUFENTANIL CITRATE 50 MCG: 50 INJECTION EPIDURAL; INTRAVENOUS at 09:54

## 2022-01-06 RX ADMIN — SUFENTANIL CITRATE 50 MCG: 50 INJECTION EPIDURAL; INTRAVENOUS at 11:32

## 2022-01-06 RX ADMIN — DEXMEDETOMIDINE HYDROCHLORIDE 0.8 MCG/KG/HR: 4 INJECTION, SOLUTION INTRAVENOUS at 15:11

## 2022-01-06 RX ADMIN — POTASSIUM CHLORIDE AND DEXTROSE MONOHYDRATE 30 ML/HR: 150; 5 INJECTION, SOLUTION INTRAVENOUS at 15:10

## 2022-01-06 RX ADMIN — ROCURONIUM BROMIDE 50 MG: 10 INJECTION INTRAVENOUS at 07:31

## 2022-01-06 RX ADMIN — AMINOCAPROIC ACID 1 G/HR: 250 INJECTION, SOLUTION INTRAVENOUS at 07:45

## 2022-01-06 RX ADMIN — VANCOMYCIN HYDROCHLORIDE 1750 MG: 1 INJECTION, POWDER, LYOPHILIZED, FOR SOLUTION INTRAVENOUS at 08:04

## 2022-01-06 RX ADMIN — Medication 3 G: at 08:21

## 2022-01-06 RX ADMIN — METOPROLOL TARTRATE 1 MG: 1 INJECTION, SOLUTION INTRAVENOUS at 08:23

## 2022-01-06 RX ADMIN — Medication 3 G: at 12:21

## 2022-01-06 RX ADMIN — Medication 100 MCG: at 07:50

## 2022-01-06 RX ADMIN — PHENYLEPHRINE HYDROCHLORIDE 0.5 MCG/KG/MIN: 10 INJECTION INTRAVENOUS at 15:21

## 2022-01-06 RX ADMIN — SUFENTANIL CITRATE 50 MCG: 50 INJECTION EPIDURAL; INTRAVENOUS at 13:03

## 2022-01-06 RX ADMIN — Medication 200 MCG: at 07:37

## 2022-01-06 RX ADMIN — MORPHINE SULFATE 2 MG: 2 INJECTION, SOLUTION INTRAMUSCULAR; INTRAVENOUS at 20:36

## 2022-01-06 RX ADMIN — SUFENTANIL CITRATE 50 MCG: 50 INJECTION EPIDURAL; INTRAVENOUS at 13:38

## 2022-01-06 RX ADMIN — MIDAZOLAM 3 MG: 1 INJECTION INTRAMUSCULAR; INTRAVENOUS at 07:24

## 2022-01-06 RX ADMIN — IPRATROPIUM BROMIDE AND ALBUTEROL SULFATE 3 ML: 2.5; .5 SOLUTION RESPIRATORY (INHALATION) at 19:36

## 2022-01-06 RX ADMIN — AMIODARONE HYDROCHLORIDE 1 MG/MIN: 1.8 INJECTION, SOLUTION INTRAVENOUS at 15:05

## 2022-01-06 RX ADMIN — DEXMEDETOMIDINE HYDROCHLORIDE 0.8 MCG/KG/HR: 4 INJECTION, SOLUTION INTRAVENOUS at 21:53

## 2022-01-06 RX ADMIN — SODIUM CHLORIDE 2 UNITS/HR: 9 INJECTION, SOLUTION INTRAVENOUS at 09:02

## 2022-01-06 RX ADMIN — NITROGLYCERIN 5 MCG/MIN: 20 INJECTION INTRAVENOUS at 15:11

## 2022-01-06 RX ADMIN — SUFENTANIL CITRATE 25 MCG: 50 INJECTION EPIDURAL; INTRAVENOUS at 09:37

## 2022-01-06 RX ADMIN — SUFENTANIL CITRATE 50 MCG: 50 INJECTION EPIDURAL; INTRAVENOUS at 08:26

## 2022-01-06 RX ADMIN — VECURONIUM BROMIDE 5 MG: 1 INJECTION, POWDER, LYOPHILIZED, FOR SOLUTION INTRAVENOUS at 12:21

## 2022-01-06 RX ADMIN — PHENYLEPHRINE HYDROCHLORIDE 0.2 MCG/KG/MIN: 10 INJECTION INTRAVENOUS at 08:31

## 2022-01-06 RX ADMIN — SODIUM CHLORIDE 6 UNITS/HR: 9 INJECTION, SOLUTION INTRAVENOUS at 15:12

## 2022-01-06 NOTE — ANESTHESIA PROCEDURE NOTES
Arterial Line      Patient reassessed immediately prior to procedure    Patient location during procedure: OR   Performed By   CRNA: Krystian Landers CRNA  Preanesthetic Checklist  Completed: patient identified, IV checked, site marked, risks and benefits discussed, surgical consent, monitors and equipment checked, pre-op evaluation and timeout performed  Arterial Line Prep   Sterile Tech: cap, gloves and mask  Prep: alcohol swabs  Patient monitoring: continuous pulse oximetry and blood pressure monitoring  Arterial Line Procedure   Laterality:left  Location:  radial artery  Catheter size: 20 G   Guidance: palpation technique  Number of attempts: 1  Successful placement: yes  Post Assessment   Dressing Type: occlusive dressing applied, secured with tape and wrist guard applied.   Complications no  Patient Tolerance: patient tolerated the procedure well with no apparent complications

## 2022-01-06 NOTE — ANESTHESIA PROCEDURE NOTES
Airway  Urgency: elective    Date/Time: 1/6/2022 7:36 AM  Airway not difficult    General Information and Staff    Patient location during procedure: OR  CRNA: Krystian Landers CRNA    Indications and Patient Condition  Indications for airway management: airway protection    Preoxygenated: yes  MILS maintained throughout  Mask difficulty assessment: 1 - vent by mask    Final Airway Details  Final airway type: endotracheal airway      Successful airway: ETT  Cuffed: yes   Successful intubation technique: direct laryngoscopy  Facilitating devices/methods: cricoid pressure and intubating stylet  Endotracheal tube insertion site: oral  Blade: Bruce  Blade size: 4  ETT size (mm): 8.0  Cormack-Lehane Classification: grade I - full view of glottis  Placement verified by: capnometry   Measured from: lips  ETT/EBT  to lips (cm): 23  Number of attempts at approach: 1  Assessment: lips, teeth, and gum same as pre-op and atraumatic intubation

## 2022-01-06 NOTE — ANESTHESIA PROCEDURE NOTES
Central Line      Patient reassessed immediately prior to procedure    Patient location during procedure: OR  Start time: 1/6/2022 7:45 AM  Stop Time:1/6/2022 7:53 AM  Indications: vascular access, MD/Surgeon request and central pressure monitoring  Staff  Anesthesiologist: Viviana Vinson MD  Preanesthetic Checklist  Completed: patient identified, IV checked, site marked, risks and benefits discussed, surgical consent, monitors and equipment checked, pre-op evaluation and timeout performed  Central Line Prep  Sterile Tech:cap, gloves, gown, mask and sterile barriers  Prep: chloraprep  Patient monitoring: blood pressure monitoring, continuous pulse oximetry and EKG  Central Line Procedure  Laterality:right  Location:internal jugular  Catheter Type:MAC and Newcastle-Marisa  Catheter Size:9 Fr  Guidance:ultrasound guided  PROCEDURE NOTE/ULTRASOUND INTERPRETATION.  Using ultrasound guidance the potential vascular sites for insertion of the catheter were visualized to determine the patency of the vessel to be used for vascular access.  After selecting the appropriate site for insertion, the needle was visualized under ultrasound being inserted into the internal jugular vein, followed by ultrasound confirmation of wire and catheter placement. There were no abnormalities seen on ultrasound; an image was taken; and the patient tolerated the procedure with no complications.   Assessment  Post procedure:biopatch applied, line sutured and occlusive dressing applied  Assessement:blood return through all ports, chest x-ray ordered and free fluid flow  Complications:no  Patient Tolerance:patient tolerated the procedure well with no apparent complications  Additional Notes  [place] floated and secured to wedge at 58 cm

## 2022-01-06 NOTE — ANESTHESIA POSTPROCEDURE EVALUATION
Patient: Zay Kamara    Procedure Summary     Date: 01/06/22 Room / Location:  [facility] OR  /  [facility] OR    Anesthesia Start: 0719 Anesthesia Stop: 1444    Procedures:       CORONARY ARTERY BYPASS GRAFT X 4 WITH LEFT INTERNAL MAMMARY ARTERY, LEFT LOWER EXTREMITY ENDOSCOPIC VEIN HARVEST, AND PERFUSION (N/A Chest)      BILATERAL MAZE PROCEDURE WITH RADIOFREQUENCY AND CRYOABLATION (N/A )      LEFT ATRIAL APPENDAGE EXCLUSION WITH  40MM ATRICLIP; TRANSESOPHAGEAL ECHOCARDIOGRAM (Left Chest) Diagnosis:       Coronary artery disease, unspecified vessel or lesion type, unspecified whether angina present, unspecified whether native or transplanted heart      (Coronary artery disease, unspecified vessel or lesion type, unspecified whether angina present, unspecified whether native or transplanted heart [I25.10])    Surgeons: Chivo White MD Provider: Viviana Vinson MD    Anesthesia Type: general ASA Status: 4          Anesthesia Type: general    Vitals  Vitals Value Taken Time   /63 01/06/22 1446   Temp     Pulse 87 01/06/22 1446   Resp     SpO2 92 % 01/06/22 1446   Vitals shown include unvalidated device data.        Post Anesthesia Care and Evaluation    Patient location during evaluation: ICU  Patient participation: complete - patient participated  Level of consciousness: awake and alert and obtunded/minimal responses  Pain management: adequate  Airway patency: patent  Anesthetic complications: No anesthetic complications    Cardiovascular status: acceptable and stable  Respiratory status: acceptable, ETT and ventilator  Hydration status: acceptable    Comments: Blood pressure 147/80, pulse 65, temperature 97.8 °F (36.6 °C), temperature source Temporal, resp. rate 18, SpO2 94 %.    Pt discharged from PACU based on naheed score >8

## 2022-01-06 NOTE — ANESTHESIA PROCEDURE NOTES
Procedure Performed: Emergent/Open-Heart Anesthesia UMA       Start Time:  1/6/2022 8:00 AM       End Time:   1/6/2022 8:07 AM    Preanesthesia Checklist:  Patient identified, IV assessed, risks and benefits discussed, monitors and equipment assessed, procedure being performed at surgeon's request and anesthesia consent obtained.    General Procedure Information  UMA Placed for monitoring purposes only -- This is not a diagnostic UMA  Diagnostic Indications for Echo:  hemodynamic monitoring  Physician Requesting Echo: Chivo White MD  Location performed:  OR  Intubated  Bite block not placed  Heart visualized  Probe Insertion:  Easy  Probe Type:  Multiplane  Modalities:  2D only, color flow mapping and continuous wave Doppler        Anesthesia Information  Performed Personally  Anesthesiologist:  Viviana Vinson MD      Echocardiogram Comments:       UMA placed at surgeon request.     Please see cardiology diagnostic evaluation for complete report.

## 2022-01-06 NOTE — H&P
CC: chest pain    Subjective     History of Present Illness      72 yo male with history of PAF, carotid disease, chronic pain, diabetes, obesity, hypertension, hyperlipidemia, IBS and CKD stage III presents today for CABG.  He has had increasing and new chest pain recently.  LHC was performed by Dr. Fitzpatrick identifying complex multiple vessel disease.  He has been advised by me to consider CABG and MAZE with LAAE after a full assessment and workup.    He is an elevated but acceptable risk candidate.      Review of Systems   Constitutional: Positive for activity change and fatigue. Negative for appetite change, chills, diaphoresis, fever and unexpected weight change.   HENT: Negative for dental problem, hearing loss, nosebleeds, sore throat, trouble swallowing and voice change.    Eyes: Negative for photophobia, redness and visual disturbance.   Respiratory: Positive for chest tightness. Negative for apnea, cough, shortness of breath, wheezing and stridor.    Cardiovascular: Positive for chest pain. Negative for palpitations and leg swelling.   Gastrointestinal: Negative for abdominal distention, abdominal pain, blood in stool, constipation, diarrhea, nausea and vomiting.   Endocrine: Negative for cold intolerance, heat intolerance, polyphagia and polyuria.   Genitourinary: Negative for decreased urine volume, difficulty urinating, dysuria, flank pain, frequency, hematuria and urgency.   Musculoskeletal: Positive for arthralgias and back pain. Negative for gait problem, joint swelling, myalgias and neck pain.   Skin: Negative for pallor, rash and wound.   Allergic/Immunologic: Negative for immunocompromised state.   Neurological: Negative for dizziness, tremors, seizures, syncope, speech difficulty, weakness, light-headedness, numbness and headaches.   Hematological: Does not bruise/bleed easily.   Psychiatric/Behavioral: Negative for confusion, sleep disturbance and suicidal ideas. The patient is not nervous/anxious.            Past Medical History:   Diagnosis Date   • Arthritis    • Atrial fibrillation (HCC)     paroxysmal, on Xarelto   • BMI 31.0-31.9,adult 6/28/2018   • Carotid artery disease without cerebral infarction (HCC)    • Chronic knee pain    • Chronic neck and back pain    • Coronary artery disease 1/4/2022   • Depression    • Diabetes (HCC)    • Disease of thyroid gland    • Gout    • Hypercholesteremia    • Hypertension    • IBS (irritable bowel syndrome)    • Kidney disease    • Osteoarthritis     back, neck, and knees - goes to Pain Management   • Prostate cancer (HCC)     Dr. Hong following   • Sensorineural hearing loss    • Sleep apnea     CPAP   • Sudden hearing loss    • Syncope    • Tinnitus    • Urine incontinence      Past Surgical History:   Procedure Laterality Date   • BLADDER SUSPENSION N/A 11/26/2018    Procedure: CYSTOSCOPY BLADDER SUSPENSION MALE SLING;  Surgeon: Zaheer Rebolledo MD;  Location: [facility] OR;  Service: Urology   • CARDIAC CATHETERIZATION N/A 12/30/2021    Procedure: Coronary angiography right radial to follow my 9:30 case;  Surgeon: Mike Fitzpatrick MD;  Location: [facility] CATH INVASIVE LOCATION;  Service: Cardiology;  Laterality: N/A;   • CHOLECYSTECTOMY     • COLONOSCOPY N/A 3/7/2017    Procedure: COLONOSCOPY WITH ANESTHESIA;  Surgeon: Hector Alvarenga MD;  Location: [facility] ENDOSCOPY;  Service:    • PROSTATECTOMY N/A 8/1/2017    Procedure: PROSTATECTOMY LAPAROSCOPIC WITH DAVINCI SI ROBOT ;  Surgeon: Hernesto Hong MD;  Location: [facility] OR;  Service:    • THYROID SURGERY      RADIATION THERAPY AFTER SURGERY     Family History   Problem Relation Age of Onset   • Prostate cancer Father    • Cancer Father    • Heart disease Father         CABG   • Heart attack Father 70   • Heart disease Mother         assumed MI at time of death - had cardiopulmonary arrest   • Sudden death Mother    • Diabetes Sister    • Arrhythmia Sister    • Stroke Paternal Grandfather    • Colon cancer Neg Hx     • Colon polyps Neg Hx      Social History     Tobacco Use   • Smoking status: Never Smoker   • Smokeless tobacco: Never Used   Vaping Use   • Vaping Use: Never used   Substance Use Topics   • Alcohol use: Not Currently     Comment: occasionally 1-2 beers once or twice year   • Drug use: Never     Medications Prior to Admission   Medication Sig Dispense Refill Last Dose   • allopurinol (ZYLOPRIM) 100 MG tablet Take 100 mg by mouth Daily.   1/5/2022 at 2000   • amLODIPine (NORVASC) 2.5 MG tablet Take 1 tablet by mouth Daily. 30 tablet 11 1/5/2022 at 2000   • atorvastatin (LIPITOR) 20 MG tablet Take 20 mg by mouth Daily.  2 1/5/2022 at 2000   • Cholecalciferol (VITAMIN D3) 2000 units tablet Take 2,000 Units by mouth Daily.   1/5/2022 at 2000   • citalopram (CeleXA) 20 MG tablet Take 20 mg by mouth Daily.   1/5/2022 at 2000   • gabapentin (NEURONTIN) 400 MG capsule Take 400 mg by mouth 2 (Two) Times a Day. Can take up to TID but only takes BID due to side effects - sleepiness   1/5/2022 at 2000   • glipizide (GLUCOTROL) 5 MG tablet Take 5 mg by mouth Daily.   1/5/2022 at 0800   • HYDROcodone-acetaminophen (NORCO) 7.5-325 MG per tablet Take 1 tablet by mouth Every 8 (Eight) Hours As Needed for Moderate Pain . Usually takes twice a day   1/5/2022 at 0800   • levothyroxine (SYNTHROID, LEVOTHROID) 200 MCG tablet Take 200 mcg by mouth Daily.   1/5/2022 at 0800   • lisinopril (PRINIVIL,ZESTRIL) 40 MG tablet Take 40 mg by mouth Daily.  11 1/5/2022 at 2000   • metoprolol succinate XL (TOPROL-XL) 25 MG 24 hr tablet Take 25 mg by mouth Daily.   1/5/2022 at 2000   • tobramycin 0.3 % solution ophthalmic solution Administer 1 drop to both eyes 2 (Two) Times a Day.   1/5/2022 at 0800   • apixaban (ELIQUIS) 2.5 MG tablet tablet Take 2.5 mg by mouth 2 (Two) Times a Day.   12/29/2021   • aspirin (aspirin) 81 MG EC tablet Take 1 tablet by mouth Daily.   1/3/2022   • colchicine 0.6 MG tablet Take 0.6 mg by mouth As Needed for Muscle /  Joint Pain. 2 tab then hour later 1 tab when gout flares    More than a month at Unknown time   • nitroglycerin (NITROSTAT) 0.4 MG SL tablet Place 1 tablet under the tongue Every 5 (Five) Minutes As Needed for Chest Pain. 25 tablet 11    • nystatin (MYCOSTATIN) 532070 UNIT/GM powder Apply  topically to the appropriate area as directed As Needed.   More than a month at Unknown time     Allergies:  Patient has no known allergies.    Objective      Vital Signs  Temp:  [97.8 °F (36.6 °C)] 97.8 °F (36.6 °C)  Heart Rate:  [64-65] 65  Resp:  [16-18] 18  BP: (147-156)/(62-80) 147/80    Physical Exam  Constitutional:       Appearance: He is well-developed. He is obese.   HENT:      Head: Normocephalic and atraumatic.   Eyes:      Pupils: Pupils are equal, round, and reactive to light.   Neck:      Thyroid: No thyromegaly.      Vascular: No JVD.      Trachea: No tracheal deviation.   Cardiovascular:      Rate and Rhythm: Normal rate and regular rhythm.      Heart sounds: Normal heart sounds. No murmur heard.  No friction rub. No gallop.    Pulmonary:      Effort: Pulmonary effort is normal. No respiratory distress.      Breath sounds: Normal breath sounds. No wheezing or rales.   Chest:      Chest wall: No tenderness.   Abdominal:      General: There is no distension.      Palpations: Abdomen is soft.      Tenderness: There is no abdominal tenderness.   Musculoskeletal:         General: Normal range of motion.      Cervical back: Normal range of motion and neck supple.   Lymphadenopathy:      Cervical: No cervical adenopathy.   Skin:     General: Skin is warm and dry.   Neurological:      Mental Status: He is alert and oriented to person, place, and time.      Cranial Nerves: No cranial nerve deficit.         Results Review:   Adult Transthoracic Echo Complete W/ Cont if Necessary Per Protocol    Result Date: 1/5/2022  Narrative: · Left ventricular ejection fraction appears to be 56 - 60%. Left ventricular systolic function  is normal. · Left ventricular diastolic function was normal. · Estimated right ventricular systolic pressure from tricuspid regurgitation is normal (<35 mmHg). · Normal size and function of the right ventricle. · No significant valvular pathology.      Pulmonary Function Test Lung Volumes, Spirometry Pre & Post Bronchodilator, ABG, Other; NA; COR/HARPREET/JOSAFAT/ALONDRA/MAD/PAD/NINO - DLCO; COR/JOSAFAT/ALONDRA/MONA/[facility] - albuterol (PROVENTIL) nebulizer solution 0.083% 2.5 mg/3 mL    Result Date: 1/5/2022  Narrative: [facility] - Pulmonary Function Test [address] [facility]  [place]  [address] [phone] Patient : Zay Kamara MRN : [id number] CSN : [id number] Pulmonologist : Dakota Baez MD Date : 1/5/2022 ______________________________________________________________________ Interpretation : 1.  Spirometry is consistent with a moderate restrictive ventilatory defect.  Small airways function is actually supranormal. 2.  There is slight worsening of spirometry postbronchodilator but a moderate restrictive ventilatory defect still appears to be present.  There is a decrease in small airways function but it still remains supranormal. 3.  Lung volumes confirm a moderate restrictive ventilatory defect although the decrease in the patient's slow vital capacity is just into the moderate range at 69% of predicted. 4.  There is a mild bordering on moderate diffusion impairment which when corrected for alveolar volume is normalized. 5.  Arterial blood gases reveal mild hypoxemia and a mild respiratory acidosis with metabolic compensation on room air. Dakota Baez MD     CT Chest Without Contrast Diagnostic    Result Date: 1/5/2022  Narrative: CT CHEST WO CONTRAST DIAGNOSTIC- 1/5/2022 8:08 AM CST  HISTORY: CAD, Abnormal Heart Cath; I25.10-Atherosclerotic heart disease of native coronary artery without angina pectoris  COMPARISON: 5/28/2019  DOSE LENGTH PRODUCT: 637 mGy cm. Automated exposure control was also utilized to  decrease patient radiation dose.  TECHNIQUE: Serial helical tomographic images of the chest were acquired without contrast. Multiplanar reformatted images were provided for review.  FINDINGS:  Visualized neck base: Thyroid gland appears surgically absent.  Lungs: There are 2 4 mm right middle lobe subsolid nodules which are stable from 2019, considered benign. Thin linear scars in both lower lobes with mild cylindrical bronchiectasis. No consolidation. No pleural effusion. Airways are clear.  Heart: The heart is normal in size. There is no pericardial effusion. Advanced coronary artery atheromatous calcification.  Vasculature: Mild fusiform aneurysm of the ascending thoracic aorta, measuring 4.5 cm in greatest diameter. Minimal calcified plaque in the aortic arch. Left vertebral artery arises directly from the arch. Central pulmonary arteries are dilated, with main pulmonary artery measuring 3.6 cm in greatest axial diameter.  Mediastinum and lymph nodes: No suspicious hilar or mediastinal adenopathy. Incidental granulomatous calcification..  Skeletal and soft tissues: Chest wall soft tissues are unremarkable. No acute bony abnormality. Bridging anterior spinal osteophytes.  Upper abdomen: The imaged portion of the upper abdomen demonstrates no acute process. Nonobstructing left renal stone. Right renal cyst. Prior cholecystectomy.       Impression: 1. No evidence of acute cardiothoracic process. 2. Linear scarring with mild cylindrical bronchiectasis in both lower lobes, perhaps postinflammatory. No subpleural reticulation or honeycombing to suggest underlying fibrotic interstitial lung disease. 3. Advanced coronary atheromatous calcification. 4. There are 2 adjacent 4 mm right middle lobe nodules which are stable from 2019, both considered benign. 5. There is a mild fusiform dilation of the ascending thoracic aorta, measuring 4.5 cm diameter. This is slightly increased from 2019.   This report was finalized on  01/05/2022 08:54 by Dr Tylor Velazquez, .    Cardiac Catheterization/Vascular Study    Result Date: 12/30/2021  Narrative: Procedure: Conscious sedation, Left heart catheterization, AO root injection, ventriculography, selective coronary angiography, angiography of LIMA Risk, Benefits, and Alternatives discussed with the patient and/or family.  Plan is for moderate sedation, and the patient agrees to proceed with the procedure.  An immediate assessment was done prior to the administration of moderate sedation. Indications: unstable angina Type and site of entry: percutaneous right radial and femoral artery using the Seldinger technique Contrast used: Isovue Catheters used: 5 Kinyarwanda pigtail, JL4, JR4 Findings: Hemodynamics:    #1 left ventricular pressure 125/15        #2 aortic pressure 125/50        #3 There was no significant gradient on aortic valve pullback AO Root injection: Because of apparent tortuosity of the ascending aorta, diagnostic catheters could not be used to engage the coronary arteries nor could the pigtail catheter be advanced across the AV. AO root injection confirmed a dilated tortuous aorta and the decision was made to finish the case via femoral access Ventriculography: Left ventricular left ventricular ejection fraction is 55%.  1+ mitral regurgitation is observed. Selective Coronary Angiography: #1.  The left main coronary artery is an average size vessel with no significant disease #2.  The left anterior descending coronary artery is a type 3 vessel with a 99% ostial stenosis and is totally occluded in the distal portion #3 There is a large ramus intermediate vessel that is free of significant disease #4.  The left circumflex coronary artery is a small vessel with two obtuse marginals with mild irregularities. #5  The right coronary artery has a 60% proximal stenosis and a 90% stenosis in the proximal PL. Collaterals to the distal LAD are seen. Angiography of the LIMA: 1. The LIMA is a large  vessel suitable for a bypass conduit Conscious Sedation I supervised the administration of conscious sedation by the nursing staff. The first dose was given at 1059 and the end of the face to face encounter was at 1121. During this time, BP, HR, oximetry, and neurologic status were closely monitored. Estimated Blood Loss: None Conclusion: #1 99% ostial LAD stenosis and total occlusion of the distal LAD #2 60% proximal RCA stenosis and 90% stenosis of the proximal PL branch #3 dilated ascending aorta #4 LVEF 55% Final Comment: The patient tolerated the procedure well.  There were no obvious complications.  Hemostasis was achieved with a 6F angioseal hemostatic device in the groin and a TR band on the wrist. CT Surgery consult is pending     Stress Test With Myocardial Perfusion One Day    Result Date: 12/27/2021  Narrative: · Myocardial perfusion imaging indicates a medium-sized, moderately severe area of ischemia located in the anterior wall. · Left ventricular ejection fraction is normal. (Calculated EF = 68%). · Impressions are consistent with a high risk study.      Chest X-Ray PA & Lateral    Result Date: 1/5/2022  Narrative: Exam:   XR CHEST PA AND LATERAL-   Date:  1/5/2022  History:  Male, age  71 years;Pre-Op Cardiac Surgery; I25.10-Atherosclerotic heart disease of native coronary artery without angina pectoris  COMPARISON:  Chest x-ray dated 5/28/2019  Findings : Low lung volumes. The heart and mediastinum are normal in size. Lungs are without focal infiltrate, mass or effusions.  The bones show no acute pathology.       Impression: Impression:  No acute cardiopulmonary disease.  This report was finalized on 01/05/2022 13:29 by Dr. Claudia Meek MD.     I reviewed the patient's new clinical results.  Discussed with patient and wife      Assessment/Plan       Coronary artery disease  PAF  On oral anticoagulation- stopped last week.        I discussed the patients findings and my recommendations with  patient and wife. We discussed the options for treatment of coronary artery disease to include medical therapy, coronary stenting, and surgical revascularization.  We discussed the pros and cons of each option and how it pertains to the current case.  The STS Risk score was calculated using the STS Risk Calculator and discussed with the patient .  Coronary artery bypass grafting is best option given patient's findings and risk factors.  We discussed the operative conduct and expected hospital and outpatient recovery.  Risks were discussed to include but not limited to bleeding, infection, stroke, heart attack, need for additional procedures, anesthesia risk, organ dysfunction and/or failure,  prolonged mechanical ventilation, prolonged ICU stay, chronic pain syndromes, sternal nonunion, and/or death.  We discussed the need to utilize all medical treatments additionally prescribed post surgery. Concomitantly we proceed with MAZE procedure and LAAE.  We discussed the rationale, its indication, risks associated and benefits.  All questions have been answered.   The patient and wife understands the risks, benefits, and alternatives and he wishes to proceed forward with surgery.

## 2022-01-06 NOTE — PERIOPERATIVE NURSING NOTE
Patient identified before entering OR using armbands, surgical consent verified, and armbands removed.  Cerebral oximeter pads placed on patient's forehead just above the eyebrows.  Patient transferred to OR table without difficulty. 5 lead EKG and balloon pump leads placed and covered with tape, and fast patches applied with tegaderm covers applied. Cerebral oximeter pads connected to fore site and fast patches connected to defibrillator prior to induction.      ICU NOTIFICATION  SURGERY START-083  ON BYPASS-[id number]  OFF BYPASS-[id number]      VEIN HARVEST  START-0831  END-[id number]      VEIN HARVEST SITE-[facility]      PERFORMED BYDavid HOPKINS CST/CFA

## 2022-01-07 ENCOUNTER — APPOINTMENT (OUTPATIENT)
Dept: GENERAL RADIOLOGY | Facility: HOSPITAL | Age: 72
End: 2022-01-07

## 2022-01-07 LAB
ALBUMIN SERPL-MCNC: 3.6 G/DL (ref 3.5–5.2)
ANION GAP SERPL CALCULATED.3IONS-SCNC: 10 MMOL/L (ref 5–15)
ARTERIAL PATENCY WRIST A: ABNORMAL
ATMOSPHERIC PRESS: 758 MMHG
BASE EXCESS BLDA CALC-SCNC: -0.7 MMOL/L (ref 0–2)
BASOPHILS # BLD AUTO: 0.03 10*3/MM3 (ref 0–0.2)
BASOPHILS NFR BLD AUTO: 0.1 % (ref 0–1.5)
BDY SITE: ABNORMAL
BODY TEMPERATURE: 37 C
BUN SERPL-MCNC: 29 MG/DL (ref 8–23)
BUN/CREAT SERPL: 16.9 (ref 7–25)
CALCIUM SPEC-SCNC: 8.8 MG/DL (ref 8.6–10.5)
CHLORIDE SERPL-SCNC: 105 MMOL/L (ref 98–107)
CO2 SERPL-SCNC: 23 MMOL/L (ref 22–29)
CREAT SERPL-MCNC: 1.72 MG/DL (ref 0.76–1.27)
DEPRECATED RDW RBC AUTO: 44.1 FL (ref 37–54)
EOSINOPHIL # BLD AUTO: 0.07 10*3/MM3 (ref 0–0.4)
EOSINOPHIL NFR BLD AUTO: 0.3 % (ref 0.3–6.2)
ERYTHROCYTE [DISTWIDTH] IN BLOOD BY AUTOMATED COUNT: 13.9 % (ref 12.3–15.4)
GFR SERPL CREATININE-BSD FRML MDRD: 39 ML/MIN/1.73
GLUCOSE BLDC GLUCOMTR-MCNC: 102 MG/DL (ref 70–130)
GLUCOSE BLDC GLUCOMTR-MCNC: 113 MG/DL (ref 70–130)
GLUCOSE BLDC GLUCOMTR-MCNC: 116 MG/DL (ref 70–130)
GLUCOSE BLDC GLUCOMTR-MCNC: 120 MG/DL (ref 70–130)
GLUCOSE BLDC GLUCOMTR-MCNC: 123 MG/DL (ref 70–130)
GLUCOSE BLDC GLUCOMTR-MCNC: 127 MG/DL (ref 70–130)
GLUCOSE BLDC GLUCOMTR-MCNC: 128 MG/DL (ref 70–130)
GLUCOSE BLDC GLUCOMTR-MCNC: 134 MG/DL (ref 70–130)
GLUCOSE BLDC GLUCOMTR-MCNC: 141 MG/DL (ref 70–130)
GLUCOSE BLDC GLUCOMTR-MCNC: 146 MG/DL (ref 70–130)
GLUCOSE BLDC GLUCOMTR-MCNC: 149 MG/DL (ref 70–130)
GLUCOSE BLDC GLUCOMTR-MCNC: 156 MG/DL (ref 70–130)
GLUCOSE BLDC GLUCOMTR-MCNC: 174 MG/DL (ref 70–130)
GLUCOSE BLDC GLUCOMTR-MCNC: 175 MG/DL (ref 70–130)
GLUCOSE SERPL-MCNC: 141 MG/DL (ref 65–99)
HCO3 BLDA-SCNC: 25.2 MMOL/L (ref 20–26)
HCT VFR BLD AUTO: 29.7 % (ref 37.5–51)
HGB BLD-MCNC: 10 G/DL (ref 13–17.7)
IMM GRANULOCYTES # BLD AUTO: 0.11 10*3/MM3 (ref 0–0.05)
IMM GRANULOCYTES NFR BLD AUTO: 0.5 % (ref 0–0.5)
INHALED O2 CONCENTRATION: 30 %
INR PPP: 1.15 (ref 0.91–1.09)
LYMPHOCYTES # BLD AUTO: 0.27 10*3/MM3 (ref 0.7–3.1)
LYMPHOCYTES NFR BLD AUTO: 1.3 % (ref 19.6–45.3)
Lab: ABNORMAL
MAGNESIUM SERPL-MCNC: 2.1 MG/DL (ref 1.6–2.4)
MCH RBC QN AUTO: 29.3 PG (ref 26.6–33)
MCHC RBC AUTO-ENTMCNC: 33.7 G/DL (ref 31.5–35.7)
MCV RBC AUTO: 87.1 FL (ref 79–97)
MODALITY: ABNORMAL
MONOCYTES # BLD AUTO: 1.24 10*3/MM3 (ref 0.1–0.9)
MONOCYTES NFR BLD AUTO: 6 % (ref 5–12)
NEUTROPHILS NFR BLD AUTO: 18.85 10*3/MM3 (ref 1.7–7)
NEUTROPHILS NFR BLD AUTO: 91.8 % (ref 42.7–76)
NRBC BLD AUTO-RTO: 0 /100 WBC (ref 0–0.2)
PCO2 BLDA: 46.3 MM HG (ref 35–45)
PCO2 TEMP ADJ BLD: 46.3 MM HG (ref 35–45)
PEEP RESPIRATORY: 8 CM[H2O]
PH BLDA: 7.34 PH UNITS (ref 7.35–7.45)
PH, TEMP CORRECTED: 7.34 PH UNITS (ref 7.35–7.45)
PHOSPHATE SERPL-MCNC: 4.8 MG/DL (ref 2.5–4.5)
PLATELET # BLD AUTO: 263 10*3/MM3 (ref 140–450)
PMV BLD AUTO: 9.7 FL (ref 6–12)
PO2 BLDA: 60.1 MM HG (ref 83–108)
PO2 TEMP ADJ BLD: 60.1 MM HG (ref 83–108)
POTASSIUM SERPL-SCNC: 4.8 MMOL/L (ref 3.5–5.2)
PROTHROMBIN TIME: 14.3 SECONDS (ref 11.9–14.6)
PSV: 10 CMH2O
QT INTERVAL: 418 MS
QT INTERVAL: 424 MS
QTC INTERVAL: 424 MS
QTC INTERVAL: 454 MS
RBC # BLD AUTO: 3.41 10*6/MM3 (ref 4.14–5.8)
SAO2 % BLDCOA: 91.5 % (ref 94–99)
SODIUM SERPL-SCNC: 138 MMOL/L (ref 136–145)
VENTILATOR MODE: ABNORMAL
WBC NRBC COR # BLD: 20.57 10*3/MM3 (ref 3.4–10.8)

## 2022-01-07 PROCEDURE — 71045 X-RAY EXAM CHEST 1 VIEW: CPT

## 2022-01-07 PROCEDURE — 82962 GLUCOSE BLOOD TEST: CPT

## 2022-01-07 PROCEDURE — 85025 COMPLETE CBC W/AUTO DIFF WBC: CPT | Performed by: THORACIC SURGERY (CARDIOTHORACIC VASCULAR SURGERY)

## 2022-01-07 PROCEDURE — 83735 ASSAY OF MAGNESIUM: CPT | Performed by: THORACIC SURGERY (CARDIOTHORACIC VASCULAR SURGERY)

## 2022-01-07 PROCEDURE — 97116 GAIT TRAINING THERAPY: CPT

## 2022-01-07 PROCEDURE — 94799 UNLISTED PULMONARY SVC/PX: CPT

## 2022-01-07 PROCEDURE — 25010000002 PHENYLEPHRINE 10 MG/ML SOLUTION 5 ML VIAL: Performed by: THORACIC SURGERY (CARDIOTHORACIC VASCULAR SURGERY)

## 2022-01-07 PROCEDURE — 25010000002 VANCOMYCIN 10 G RECONSTITUTED SOLUTION: Performed by: THORACIC SURGERY (CARDIOTHORACIC VASCULAR SURGERY)

## 2022-01-07 PROCEDURE — 99024 POSTOP FOLLOW-UP VISIT: CPT | Performed by: NURSE PRACTITIONER

## 2022-01-07 PROCEDURE — 85610 PROTHROMBIN TIME: CPT | Performed by: THORACIC SURGERY (CARDIOTHORACIC VASCULAR SURGERY)

## 2022-01-07 PROCEDURE — 93010 ELECTROCARDIOGRAM REPORT: CPT | Performed by: INTERNAL MEDICINE

## 2022-01-07 PROCEDURE — 25010000002 ALBUMIN HUMAN-KJDA 5 % SOLUTION: Performed by: THORACIC SURGERY (CARDIOTHORACIC VASCULAR SURGERY)

## 2022-01-07 PROCEDURE — 25010000002 CEFAZOLIN PER 500 MG: Performed by: THORACIC SURGERY (CARDIOTHORACIC VASCULAR SURGERY)

## 2022-01-07 PROCEDURE — 82803 BLOOD GASES ANY COMBINATION: CPT

## 2022-01-07 PROCEDURE — 25010000002 ENOXAPARIN PER 10 MG: Performed by: THORACIC SURGERY (CARDIOTHORACIC VASCULAR SURGERY)

## 2022-01-07 PROCEDURE — 97163 PT EVAL HIGH COMPLEX 45 MIN: CPT | Performed by: PHYSICAL THERAPIST

## 2022-01-07 PROCEDURE — 93005 ELECTROCARDIOGRAM TRACING: CPT | Performed by: THORACIC SURGERY (CARDIOTHORACIC VASCULAR SURGERY)

## 2022-01-07 PROCEDURE — P9041 ALBUMIN (HUMAN),5%, 50ML: HCPCS | Performed by: THORACIC SURGERY (CARDIOTHORACIC VASCULAR SURGERY)

## 2022-01-07 PROCEDURE — 25010000002 MORPHINE SULFATE (PF) 2 MG/ML SOLUTION: Performed by: THORACIC SURGERY (CARDIOTHORACIC VASCULAR SURGERY)

## 2022-01-07 PROCEDURE — 80069 RENAL FUNCTION PANEL: CPT | Performed by: THORACIC SURGERY (CARDIOTHORACIC VASCULAR SURGERY)

## 2022-01-07 RX ORDER — BISACODYL 5 MG/1
10 TABLET, DELAYED RELEASE ORAL 2 TIMES DAILY
Status: DISCONTINUED | OUTPATIENT
Start: 2022-01-07 | End: 2022-01-11 | Stop reason: HOSPADM

## 2022-01-07 RX ORDER — ACETAMINOPHEN 325 MG/1
650 TABLET ORAL EVERY 8 HOURS
Status: DISCONTINUED | OUTPATIENT
Start: 2022-01-07 | End: 2022-01-11 | Stop reason: HOSPADM

## 2022-01-07 RX ORDER — OXYCODONE HYDROCHLORIDE AND ACETAMINOPHEN 5; 325 MG/1; MG/1
1 TABLET ORAL EVERY 4 HOURS PRN
Status: DISCONTINUED | OUTPATIENT
Start: 2022-01-07 | End: 2022-01-11 | Stop reason: HOSPADM

## 2022-01-07 RX ORDER — POLYETHYLENE GLYCOL 3350 17 G/17G
17 POWDER, FOR SOLUTION ORAL DAILY
Status: DISCONTINUED | OUTPATIENT
Start: 2022-01-07 | End: 2022-01-11 | Stop reason: HOSPADM

## 2022-01-07 RX ORDER — OXYCODONE AND ACETAMINOPHEN 10; 325 MG/1; MG/1
1 TABLET ORAL EVERY 4 HOURS PRN
Status: DISCONTINUED | OUTPATIENT
Start: 2022-01-07 | End: 2022-01-11 | Stop reason: HOSPADM

## 2022-01-07 RX ADMIN — LEVOTHYROXINE SODIUM 200 MCG: 100 TABLET ORAL at 06:16

## 2022-01-07 RX ADMIN — LIDOCAINE 2 PATCH: 50 PATCH CUTANEOUS at 09:27

## 2022-01-07 RX ADMIN — OXYCODONE HYDROCHLORIDE AND ACETAMINOPHEN 1 TABLET: 5; 325 TABLET ORAL at 16:08

## 2022-01-07 RX ADMIN — IPRATROPIUM BROMIDE AND ALBUTEROL SULFATE 3 ML: 2.5; .5 SOLUTION RESPIRATORY (INHALATION) at 06:08

## 2022-01-07 RX ADMIN — IPRATROPIUM BROMIDE AND ALBUTEROL SULFATE 3 ML: 2.5; .5 SOLUTION RESPIRATORY (INHALATION) at 20:28

## 2022-01-07 RX ADMIN — ENOXAPARIN SODIUM 40 MG: 40 INJECTION SUBCUTANEOUS at 09:27

## 2022-01-07 RX ADMIN — POLYETHYLENE GLYCOL 3350 17 G: 17 POWDER, FOR SOLUTION ORAL at 15:11

## 2022-01-07 RX ADMIN — GABAPENTIN 400 MG: 400 CAPSULE ORAL at 09:29

## 2022-01-07 RX ADMIN — CHLORHEXIDINE GLUCONATE 15 ML: 1.2 RINSE ORAL at 09:26

## 2022-01-07 RX ADMIN — ALLOPURINOL 100 MG: 100 TABLET ORAL at 09:26

## 2022-01-07 RX ADMIN — AMIODARONE HYDROCHLORIDE 400 MG: 200 TABLET ORAL at 09:26

## 2022-01-07 RX ADMIN — CLOPIDOGREL 75 MG: 75 TABLET, FILM COATED ORAL at 18:01

## 2022-01-07 RX ADMIN — AMIODARONE HYDROCHLORIDE 400 MG: 200 TABLET ORAL at 18:01

## 2022-01-07 RX ADMIN — ALBUMIN HUMAN 250 ML: 0.05 SOLUTION INTRAVENOUS at 05:06

## 2022-01-07 RX ADMIN — BISACODYL 10 MG: 5 TABLET ORAL at 15:12

## 2022-01-07 RX ADMIN — METOPROLOL TARTRATE 12.5 MG: 25 TABLET, FILM COATED ORAL at 21:02

## 2022-01-07 RX ADMIN — ACETAMINOPHEN 650 MG: 325 TABLET ORAL at 15:11

## 2022-01-07 RX ADMIN — SODIUM CHLORIDE, POTASSIUM CHLORIDE, SODIUM LACTATE AND CALCIUM CHLORIDE 1000 ML: 600; 310; 30; 20 INJECTION, SOLUTION INTRAVENOUS at 15:06

## 2022-01-07 RX ADMIN — GABAPENTIN 400 MG: 400 CAPSULE ORAL at 21:02

## 2022-01-07 RX ADMIN — CITALOPRAM 20 MG: 20 TABLET, FILM COATED ORAL at 09:26

## 2022-01-07 RX ADMIN — OXYCODONE AND ACETAMINOPHEN 1 TABLET: 325; 10 TABLET ORAL at 05:28

## 2022-01-07 RX ADMIN — CEFAZOLIN 2 G: 10 INJECTION, POWDER, FOR SOLUTION INTRAVENOUS at 21:02

## 2022-01-07 RX ADMIN — CEFAZOLIN 2 G: 10 INJECTION, POWDER, FOR SOLUTION INTRAVENOUS at 12:13

## 2022-01-07 RX ADMIN — METOPROLOL TARTRATE 12.5 MG: 25 TABLET, FILM COATED ORAL at 10:07

## 2022-01-07 RX ADMIN — BISACODYL 10 MG: 5 TABLET ORAL at 21:02

## 2022-01-07 RX ADMIN — Medication 1750 MG: at 09:37

## 2022-01-07 RX ADMIN — ACETAMINOPHEN 650 MG: 325 TABLET ORAL at 21:02

## 2022-01-07 RX ADMIN — MUPIROCIN 1 APPLICATION: 20 OINTMENT TOPICAL at 21:02

## 2022-01-07 RX ADMIN — PHENYLEPHRINE HYDROCHLORIDE 1.1 MCG/KG/MIN: 10 INJECTION INTRAVENOUS at 04:09

## 2022-01-07 RX ADMIN — MUPIROCIN: 20 OINTMENT TOPICAL at 10:13

## 2022-01-07 RX ADMIN — IPRATROPIUM BROMIDE AND ALBUTEROL SULFATE 3 ML: 2.5; .5 SOLUTION RESPIRATORY (INHALATION) at 09:14

## 2022-01-07 RX ADMIN — ATORVASTATIN CALCIUM 20 MG: 10 TABLET, FILM COATED ORAL at 21:02

## 2022-01-07 RX ADMIN — CHLORHEXIDINE GLUCONATE 15 ML: 1.2 RINSE ORAL at 21:02

## 2022-01-07 RX ADMIN — IPRATROPIUM BROMIDE AND ALBUTEROL SULFATE 3 ML: 2.5; .5 SOLUTION RESPIRATORY (INHALATION) at 13:00

## 2022-01-07 RX ADMIN — ASPIRIN 162 MG: 81 TABLET, CHEWABLE ORAL at 09:26

## 2022-01-07 RX ADMIN — CEFAZOLIN 2 G: 10 INJECTION, POWDER, FOR SOLUTION INTRAVENOUS at 03:20

## 2022-01-07 RX ADMIN — OXYCODONE AND ACETAMINOPHEN 1 TABLET: 325; 10 TABLET ORAL at 02:30

## 2022-01-07 RX ADMIN — MORPHINE SULFATE 2 MG: 2 INJECTION, SOLUTION INTRAMUSCULAR; INTRAVENOUS at 02:32

## 2022-01-07 RX ADMIN — OXYCODONE AND ACETAMINOPHEN 1 TABLET: 325; 10 TABLET ORAL at 21:07

## 2022-01-07 NOTE — PROGRESS NOTES
Coronary artery bypass graft x4, left lower extremity endoscopic vein harvest, bilateral Maze procedure with radiofrequency and cryoablation, left atrial appendage exclusion with 40 mm AtriClip    POD 1    Extubated overnight and up in the chair.  On 6 L nasal cannula with O2 sat 91%.  Incentive spirometer ~ 250-500 ml with much coaching.  Rates pain 7-8 out of 10 but has difficulty staying awake.  Reports pain is primarily in his sternum; he has chronic neck pain.  He did walk 200 feet with physical therapy this morning. No family present.     IV gtts: Neosynephrine, Insulin  Hemodynamics reviewed  Telemetry: sinus 70s, frequent PVC    Visit Vitals  /43   Pulse 78   Temp 98.3 °F (36.8 °C) (Oral)   Resp 24   Wt 115 kg (254 lb 9.6 oz)   SpO2 91%   BMI 37.28 kg/m²       Intake/Output Summary (Last 24 hours) at 1/7/2022 1126  Last data filed at 1/7/2022 0825  Gross per 24 hour   Intake 5115.45 ml   Output 2752 ml   Net 2363.45 ml     Mediastinal tube output: 232 ml/24 hours  Left Charles drain: 70 ml/24 hours     Labs:        Chest x-ray: Widened mediastinum-stable since post operative chest x-ray, bibasilar atelectasis, hypoventilation, no pneumothorax      Physical Exam:  General: No apparent distress.  Up in the chair.  Slightly drowsy.  Cardiovascular: Regular rate and rhythm without murmur, rubs, or gallops.  Pulmonary: Clear to auscultation bilaterally without wheezing, rubs, or rales.  Bases diminished.    Chest: Sternotomy incision clean, dry, and intact with Hypafix dressing. Sternum stable. No clicks. Mediastinal tubes x 2 and Charles drain x 1 with dressing clean, dry, and intact.   Abdomen: Soft, nondistended and nontender.  Obese.    Extremities: Warm, moves all extremities. Saphenectomy site clean, dry, and intact with Ace wrap.  Neurologic: Drowsy but awakes to voice and follows commands. Grossly intact with no focal deficits.         Impression:  Coronary artery disease  Paroxysmal atrial  fibrillation, current NSR   Acquired coagulation factor deficiency   Chronic pain syndrome   class II severe obesity  Stage III chronic kidney disease  Bilateral carotid artery stenosis      Plan:  Wean neosynephrine gtt  Multimodality pain control strategies  Encourage pulmonary toilet and ambulation  Wean supplemental O2-not ready for diuresis today  Routine post cardiac surgery regimen  Keep in unit for now  DW patient and nursing, no family present

## 2022-01-07 NOTE — CASE MANAGEMENT/SOCIAL WORK
Continued Stay Note   [place]     Patient Name: Zay Kamara  MRN: [id number]  Today's Date: 1/7/2022    Admit Date: 1/6/2022     Discharge Plan     Row Name 01/07/22 1254       Plan    Plan Comments VM to spouse Merary for DC screen; no return call at this time               Discharge Codes    No documentation.                     Sanjuana Colbert RN

## 2022-01-07 NOTE — CASE MANAGEMENT/SOCIAL WORK
Discharge Planning Assessment  [facility]     Patient Name: Zay Kamara  MRN: [id number]  Today's Date: 1/7/2022    Admit Date: 1/6/2022     Discharge Needs Assessment     Row Name 01/07/22 1408       Living Environment    Lives With spouse    Name(s) of Who Lives With Patient Merary    Current Living Arrangements home/apartment/condo    Primary Care Provided by spouse/significant other    Provides Primary Care For no one    Family Caregiver if Needed spouse    Family Caregiver Names Merary    Quality of Family Relationships helpful; involved; supportive    Able to Return to Prior Arrangements yes       Resource/Environmental Concerns    Resource/Environmental Concerns none    Transportation Concerns car, none       Transition Planning    Patient/Family Anticipates Transition to home with family    Patient/Family Anticipated Services at Transition none    Transportation Anticipated family or friend will provide       Discharge Needs Assessment    Readmission Within the Last 30 Days no previous admission in last 30 days    Equipment Currently Used at Home cpap    Concerns to be Addressed no discharge needs identified    Discharge Coordination/Progress spoke  to Elizabeth daughter; Patient lives with spouse Merary; has PCP and RX coverage; independent at home prior to surgery; daughter concerned about patient climbing steps to get into home;  PT will evaluate this activity prior to DC and the [place] for [facility] living recommended to call them and they will provide a ramp if needed at DC.               Discharge Plan     Row Name 01/07/22 7667       Plan    Plan Comments VM to spouse Merary for DC screen; no return call at this time              Continued Care and Services - Admitted Since 1/6/2022    Coordination has not been started for this encounter.          Demographic Summary    No documentation.                Functional Status    No documentation.                Psychosocial    No documentation.                 Abuse/Neglect    No documentation.                Legal    No documentation.                Substance Abuse    No documentation.                Patient Forms    No documentation.                   Sanjuana Colbert RN

## 2022-01-07 NOTE — THERAPY EVALUATION
Patient Name: Zay Kamara  : 1950    MRN: [id number]                              Today's Date: 2022       Admit Date: 2022    Visit Dx:     ICD-10-CM ICD-9-CM   1. Coronary artery disease, unspecified vessel or lesion type, unspecified whether angina present, unspecified whether native or transplanted heart  I25.10 414.00   2. Decreased functional mobility and endurance  Z74.09 780.99     Patient Active Problem List   Diagnosis   • Hypogonadism in male   • ED (erectile dysfunction)   • Prostate cancer ([facility])   • Diabetes ([facility])   • Hypertension   • Disease of thyroid gland   • History of prostate cancer   • Leukocytosis   • Class 2 severe obesity due to excess calories with serious comorbidity and body mass index (BMI) of 37.0 to 37.9 in adult ([facility])   • Sensorineural hearing loss (SNHL) of both ears   • Asymmetric SNHL (sensorineural hearing loss)   • Sudden hearing loss, right   • Tinnitus of right ear   • Stress incontinence   • Bladder neck contracture   • Paroxysmal atrial fibrillation ([facility])   • Sleep apnea   • Chest pain   • Mixed hyperlipidemia   • Bilateral carotid artery stenosis   • Unstable angina ([facility])   • Coronary artery disease involving native coronary artery of native heart with unstable angina pectoris ([facility])   • Acquired coagulation factor deficiency ([facility])   • CKD (chronic kidney disease) stage 3, GFR 30-59 ml/min ([facility])   • Chronic pain syndrome   • Coronary artery disease involving native heart     Past Medical History:   Diagnosis Date   • Arthritis    • Atrial fibrillation (HCC)     paroxysmal, on Xarelto   • BMI 31.0-31.9,adult 2018   • Carotid artery disease without cerebral infarction ([facility])    • Chronic knee pain    • Chronic neck and back pain    • Coronary artery disease 2022   • Depression    • Diabetes (HCC)    • Disease of thyroid gland    • Gout    • Hypercholesteremia    • Hypertension    • IBS (irritable bowel syndrome)    • Kidney disease    •  Osteoarthritis     back, neck, and knees - goes to Pain Management   • Prostate cancer (HCC)     Dr. Hong following   • Sensorineural hearing loss    • Sleep apnea     CPAP   • Sudden hearing loss    • Syncope    • Tinnitus    • Urine incontinence      Past Surgical History:   Procedure Laterality Date   • BLADDER SUSPENSION N/A 11/26/2018    Procedure: CYSTOSCOPY BLADDER SUSPENSION MALE SLING;  Surgeon: Zaheer Rebolledo MD;  Location:  [facility] OR;  Service: Urology   • CARDIAC CATHETERIZATION N/A 12/30/2021    Procedure: Coronary angiography right radial to follow my 9:30 case;  Surgeon: Mike Fitzpatrick MD;  Location:  [facility] CATH INVASIVE LOCATION;  Service: Cardiology;  Laterality: N/A;   • CHOLECYSTECTOMY     • COLONOSCOPY N/A 3/7/2017    Procedure: COLONOSCOPY WITH ANESTHESIA;  Surgeon: Hector Alvarenga MD;  Location:  [facility] ENDOSCOPY;  Service:    • PROSTATECTOMY N/A 8/1/2017    Procedure: PROSTATECTOMY LAPAROSCOPIC WITH Complex MediaINCI SI ROBOT ;  Surgeon: Hernesto Hong MD;  Location:  [facility] OR;  Service:    • THYROID SURGERY      RADIATION THERAPY AFTER SURGERY      General Information     Row Name 01/07/22 0734          Physical Therapy Time and Intention    Document Type evaluation  CC: increasing chest pain; PMH of PAF, carotid disease, chronic pain, diabetes, obesity, HTN, HLD, IBS and CKD stage III  -SB     Mode of Treatment physical therapy  s/p  CABG X 4 WITH LEFT INTERNAL MAMMARY ARTERY, LLE ENDOSCOPIC VEIN HARVEST, PERFUSION; BILATERAL MAZE PROCEDURE WITH RADIOFREQUENCY AND CRYOABLATION, LEFT ATRIAL APPENDAGE EXCLUSION WITH  40MM ATRICLIP; TRANSESOPHAGEAL ECHOCARDIOGRAM 1/6  -SB     Row Name 01/07/22 0734          General Information    Patient Profile Reviewed yes  -SB     Prior Level of Function independent:; all household mobility; ADL's; driving  -SB     Existing Precautions/Restrictions fall; cardiac; sternal  -SB     Barriers to Rehab medically complex  -SB     Row Name 01/07/22 0734           Living Environment    Lives With spouse  step over tub  -SB     Row Name 01/07/22 0734          Home Main Entrance    Number of Stairs, Main Entrance three  -SB     Stair Railings, Main Entrance none  -SB     Row Name 01/07/22 0734          Stairs Within Home, Primary    Stairs, Within Home, Primary has upstairs but doesn't have to use it  -SB     Number of Stairs, Within Home, Primary none  -SB     Row Name 01/07/22 0734          Cognition    Orientation Status (Cognition) oriented x 4  -SB     Row Name 01/07/22 0734          Safety Issues, Functional Mobility    Safety Issues Affecting Function (Mobility) safety precaution awareness; safety precautions follow-through/compliance  -SB     Impairments Affecting Function (Mobility) pain; shortness of breath; endurance/activity tolerance  -SB           User Key  (r) = Recorded By, (t) = Taken By, (c) = Cosigned By    Initials Name Provider Type    Nadege Urrutia PT DPT Physical Therapist               Mobility     Row Name 01/07/22 0734          Bed Mobility    Comment (Bed Mobility) pt up in chair upon arrival  -SB     Row Name 01/07/22 0734          Sit-Stand Transfer    Sit-Stand Clinch (Transfers) standby assist; verbal cues  -SB     Row Name 01/07/22 0734          Gait/Stairs (Locomotion)    Clinch Level (Gait) contact guard; verbal cues  -SB     Distance in Feet (Gait) 200  -SB     Deviations/Abnormal Patterns (Gait) base of support, wide  -SB     Comment (Gait/Stairs) cues to slow down and to narrow ANNIE for safety  -SB           User Key  (r) = Recorded By, (t) = Taken By, (c) = Cosigned By    Initials Name Provider Type    Nadege Urrutia PT DPT Physical Therapist               Obj/Interventions     Row Name 01/07/22 0734          Range of Motion Comprehensive    General Range of Motion bilateral lower extremity ROM WFL  -SB     Row Name 01/07/22 0734          Strength Comprehensive (MMT)    General Manual Muscle Testing (MMT) Assessment no  strength deficits identified  -SB     Row Name 01/07/22 0734          Balance    Balance Assessment sitting static balance; sitting dynamic balance; standing static balance; standing dynamic balance  -SB     Static Sitting Balance WFL; sitting in chair  -SB     Dynamic Sitting Balance WFL; sitting in chair  -SB     Static Standing Balance WFL; supported; standing  -SB     Dynamic Standing Balance mild impairment; supported; standing  -SB     Row Name 01/07/22 0734          Sensory Assessment (Somatosensory)    Sensory Assessment (Somatosensory) LE sensation intact  -SB           User Key  (r) = Recorded By, (t) = Taken By, (c) = Cosigned By    Initials Name Provider Type    SB Nadege Angulo, PT DPT Physical Therapist               Goals/Plan     Row Name 01/07/22 0734          Bed Mobility Goal 1 (PT)    Activity/Assistive Device (Bed Mobility Goal 1, PT) sit to supine; supine to sit; rolling to right; rolling to left; bridging  -SB     [place] Level/Cues Needed (Bed Mobility Goal 1, PT) independent  -SB     Time Frame (Bed Mobility Goal 1, PT) long term goal (LTG)  -SB     Progress/Outcomes (Bed Mobility Goal 1, PT) goal ongoing  -SB     Row Name 01/07/22 0734          Transfer Goal 1 (PT)    Activity/Assistive Device (Transfer Goal 1, PT) sit-to-stand/stand-to-sit; bed-to-chair/chair-to-bed  -SB     [place] Level/Cues Needed (Transfer Goal 1, PT) independent  -SB     Time Frame (Transfer Goal 1, PT) long term goal (LTG)  -SB     Progress/Outcome (Transfer Goal 1, PT) goal ongoing  -SB     Row Name 01/07/22 0734          Gait Training Goal 1 (PT)    Activity/Assistive Device (Gait Training Goal 1, PT) gait (walking locomotion); increase endurance/gait distance; increase energy conservation; decrease fall risk  -SB     [place] Level (Gait Training Goal 1, PT) supervision required  -SB     Distance (Gait Training Goal 1, PT) 500 with one standing rest break  -SB     Time Frame (Gait Training Goal 1,  PT) long term goal (LTG)  -SB     Progress/Outcome (Gait Training Goal 1, PT) goal ongoing  -SB     Row Name 01/07/22 0734          Stairs Goal 1 (PT)    Activity/Assistive Device (Stairs Goal 1, PT) stairs, all skills; ascending stairs; descending stairs  -SB     [place] Level/Cues Needed (Stairs Goal 1, PT) supervision required  -SB     Number of Stairs (Stairs Goal 1, PT) 3  -SB     Time Frame (Stairs Goal 1, PT) long term goal (LTG)  -SB     Progress/Outcome (Stairs Goal 1, PT) goal ongoing  -SB     Row Name 01/07/22 0734          Problem Specific Goal 1 (PT)    Problem Specific Goal 1 (PT) Pt will maintain sternal precautions with all mobility  -SB     Time Frame (Problem Specific Goal 1, PT) long-term goal (LTG)  -SB     Progress/Outcome (Problem Specific Goal 1, PT) goal ongoing  -SB           User Key  (r) = Recorded By, (t) = Taken By, (c) = Cosigned By    Initials Name Provider Type    Nadege Urrutia, PT DPT Physical Therapist               Clinical Impression     Row Name 01/07/22 0734          Pain    Additional Documentation Pain Scale: Numbers Pre/Post-Treatment (Group)  -SB     Row Name 01/07/22 0734          Pain Scale: Numbers Pre/Post-Treatment    Pretreatment Pain Rating 10/10  -SB     Posttreatment Pain Rating 6/10  -SB     Pain Location - Orientation incisional  -SB     Pain Location chest  -SB     Pain Intervention(s) Medication (See MAR); Repositioned; Ambulation/increased activity  -SB     Row Name 01/07/22 0734          Plan of Care Review    Plan of Care Reviewed With patient  -SB     Progress no change  -SB     Outcome Summary PT eval completed. Pt alert and oriented x4 and up in chair upon arrival, with complaints of 10/10 incisional pain. Pt on 6L O2 via NC with sats in 90s. Pt edu on sternal precautions and verbalized understanding. Pt performed cardiac protocol pre and post activity. Pt did very well with mobility- able to stand with only SBA and VC, and performed gait training  200 feet. Pt needed cues for gait mechanics due to wide ANNIE and increased gait speed, as well as cues for deeper breathing, as patient tends to hold breath due to pain. Pt reports decreased pain levels at end of session. Pt will benefit from skilled PT to improve gait, balance, endurance and to reinforce precautions. Recommend d/c home with assist and cardiac rehab.  -SB     Row Name 01/07/22 0734          Therapy Assessment/Plan (PT)    Patient/Family Therapy Goals Statement (PT) return to PLOF  -SB     Rehab Potential (PT) good, to achieve stated therapy goals  -SB     Criteria for Skilled Interventions Met (PT) yes; meets criteria; skilled treatment is necessary  -SB     Predicted Duration of Therapy Intervention (PT) until d/c or goals achieved  -SB     Row Name 01/07/22 0734          Vital Signs    Pre Systolic BP Rehab 116  -SB     Pre Treatment Diastolic BP 52  -SB     Post Systolic BP Rehab 113  -SB     Post Treatment Diastolic BP 50  -SB     Pretreatment Heart Rate (beats/min) 74  -SB     Intratreatment Heart Rate (beats/min) 94  -SB     Posttreatment Heart Rate (beats/min) 77  -SB     Pre SpO2 (%) 91  -SB     O2 Delivery Pre Treatment nasal cannula  6L  -SB     O2 Delivery Intra Treatment nasal cannula  -SB     Post SpO2 (%) 90  -SB     O2 Delivery Post Treatment nasal cannula  -SB     Row Name 01/07/22 0734          Positioning and Restraints    Pre-Treatment Position sitting in chair/recliner  -SB     Post Treatment Position chair  -SB     In Chair reclined; call light within reach; encouraged to call for assist  -SB           User Key  (r) = Recorded By, (t) = Taken By, (c) = Cosigned By    Initials Name Provider Type    Nadege Urrutia, PT DPT Physical Therapist               Outcome Measures     Row Name 01/07/22 0734          How much help from another person do you currently need...    Turning from your back to your side while in flat bed without using bedrails? 3  -SB     Moving from lying on  back to sitting on the side of a flat bed without bedrails? 2  -SB     Moving to and from a bed to a chair (including a wheelchair)? 2  -SB     Standing up from a chair using your arms (e.g., wheelchair, bedside chair)? 3  -SB     Climbing 3-5 steps with a railing? 3  -SB     To walk in hospital room? 3  -SB     AM-PAC 6 Clicks Score (PT) 16  -SB     Row Name 01/07/22 0734          Functional Assessment    Outcome Measure Options AM-PAC 6 Clicks Basic Mobility (PT)  -SB           User Key  (r) = Recorded By, (t) = Taken By, (c) = Cosigned By    Initials Name Provider Type    SB Nadege Angulo, PT DPT Physical Therapist                             Physical Therapy Education                 Title: PT OT SLP Therapies (In Progress)     Topic: Physical Therapy (In Progress)     Point: Mobility training (Done)     Learning Progress Summary           Patient Acceptance, E, VU,NR by SB at 1/7/2022 0755    Comment: pt edu on cardiac protocol, sternal precautions, POC, benefits of act and d/c plans                   Point: Home exercise program (Not Started)     Learner Progress:  Not documented in this visit.          Point: Body mechanics (Done)     Learning Progress Summary           Patient Acceptance, E, VU,NR by SB at 1/7/2022 0755    Comment: pt edu on cardiac protocol, sternal precautions, POC, benefits of act and d/c plans                   Point: Precautions (Done)     Learning Progress Summary           Patient Acceptance, E, VU,NR by SB at 1/7/2022 0755    Comment: pt edu on cardiac protocol, sternal precautions, POC, benefits of act and d/c plans                               User Key     Initials Effective Dates Name Provider Type Discipline    SB 06/16/21 -  Nadege Angulo PT DPT Physical Therapist PT              PT Recommendation and Plan  Planned Therapy Interventions (PT): balance training, bed mobility training, gait training, patient/family education, home exercise program, transfer training, stair  training, strengthening  Plan of Care Reviewed With: patient  Progress: no change  Outcome Summary: PT eval completed. Pt alert and oriented x4 and up in chair upon arrival, with complaints of 10/10 incisional pain. Pt on 6L O2 via NC with sats in 90s. Pt edu on sternal precautions and verbalized understanding. Pt performed cardiac protocol pre and post activity. Pt did very well with mobility- able to stand with only SBA and VC, and performed gait training 200 feet. Pt needed cues for gait mechanics due to wide ANNIE and increased gait speed, as well as cues for deeper breathing, as patient tends to hold breath due to pain. Pt reports decreased pain levels at end of session. Pt will benefit from skilled PT to improve gait, balance, endurance and to reinforce precautions. Recommend d/c home with assist and cardiac rehab.     Time Calculation:    PT Charges     Row Name 01/07/22 1020             Time Calculation    Start Time 0734  -SB      Stop Time 0827  -SB      Time Calculation (min) 53 min  -SB      PT Received On 01/07/22  -SB      PT Goal Re-Cert Due Date 01/17/22  -SB            User Key  (r) = Recorded By, (t) = Taken By, (c) = Cosigned By    Initials Name Provider Type    SB Nadeeg Angulo, PT DPT Physical Therapist              Therapy Charges for Today     Code Description Service Date Service Provider Modifiers Qty    [id number] HC PT EVAL HIGH COMPLEXITY 4 1/7/2022 Nadege Angulo PT DPT GP 1          PT G-Codes  Outcome Measure Options: AM-PAC 6 Clicks Basic Mobility (PT)  AM-PAC 6 Clicks Score (PT): 16    Nadege Angulo PT DPT  1/7/2022

## 2022-01-07 NOTE — SIGNIFICANT NOTE
01/07/22 0142   Spontaneous Breathing Trial   $ SBT Readiness to Wean yes   Vt Spontaneous (mL) 611 mL   Vital Capacity (mL) 852   Effort (VC) good   RSBI 31   Negative Inspiratory Force (cm H2O) -28   Effort (NIF) good   Resp Rate (Obs) 19   Pt awake, alert, and able to follow commands. Good gag and cough reflex with positive leak test. Extubated at 0203, placed on 4lpm NC

## 2022-01-07 NOTE — TELEPHONE ENCOUNTER
I talked to Christopher Friday and he told me they would see him last Monday and operate this week. I put in a consult for CT Surgery but they did not see him    Message text from Dr. Fitzpatrick     Pt was seen by Dr. White on 1/4/22 by Dr. White and had surgery 1/6/22.  Jason Goldstein, CMA

## 2022-01-07 NOTE — THERAPY TREATMENT NOTE
Acute Care - Physical Therapy Treatment Note  [facility]     Patient Name: Zay Kamara  : 1950  MRN: [id number]  Today's Date: 2022      Visit Dx:     ICD-10-CM ICD-9-CM   1. Coronary artery disease, unspecified vessel or lesion type, unspecified whether angina present, unspecified whether native or transplanted heart  I25.10 414.00   2. Decreased functional mobility and endurance  Z74.09 780.99     Patient Active Problem List   Diagnosis   • Hypogonadism in male   • ED (erectile dysfunction)   • Prostate cancer (HCC)   • Diabetes ([facility])   • Hypertension   • Disease of thyroid gland   • History of prostate cancer   • Leukocytosis   • Class 2 severe obesity due to excess calories with serious comorbidity and body mass index (BMI) of 37.0 to 37.9 in adult ([facility])   • Sensorineural hearing loss (SNHL) of both ears   • Asymmetric SNHL (sensorineural hearing loss)   • Sudden hearing loss, right   • Tinnitus of right ear   • Stress incontinence   • Bladder neck contracture   • Paroxysmal atrial fibrillation ([facility])   • Sleep apnea   • Chest pain   • Mixed hyperlipidemia   • Bilateral carotid artery stenosis   • Unstable angina ([facility])   • Coronary artery disease involving native coronary artery of native heart with unstable angina pectoris ([facility])   • Acquired coagulation factor deficiency ([facility])   • CKD (chronic kidney disease) stage 3, GFR 30-59 ml/min ([facility])   • Chronic pain syndrome   • Coronary artery disease involving native heart     Past Medical History:   Diagnosis Date   • Arthritis    • Atrial fibrillation (HCC)     paroxysmal, on Xarelto   • BMI 31.0-31.9,adult 2018   • Carotid artery disease without cerebral infarction ([facility])    • Chronic knee pain    • Chronic neck and back pain    • Coronary artery disease 2022   • Depression    • Diabetes (HCC)    • Disease of thyroid gland    • Gout    • Hypercholesteremia    • Hypertension    • IBS (irritable bowel syndrome)    • Kidney disease    •  Osteoarthritis     back, neck, and knees - goes to Pain Management   • Prostate cancer (HCC)     Dr. Hong following   • Sensorineural hearing loss    • Sleep apnea     CPAP   • Sudden hearing loss    • Syncope    • Tinnitus    • Urine incontinence      Past Surgical History:   Procedure Laterality Date   • BLADDER SUSPENSION N/A 11/26/2018    Procedure: CYSTOSCOPY BLADDER SUSPENSION MALE SLING;  Surgeon: Zaheer Rebolledo MD;  Location:  [facility] OR;  Service: Urology   • CARDIAC CATHETERIZATION N/A 12/30/2021    Procedure: Coronary angiography right radial to follow my 9:30 case;  Surgeon: Mike Fitzpatrick MD;  Location:  [facility] CATH INVASIVE LOCATION;  Service: Cardiology;  Laterality: N/A;   • CHOLECYSTECTOMY     • COLONOSCOPY N/A 3/7/2017    Procedure: COLONOSCOPY WITH ANESTHESIA;  Surgeon: Hector Alvarenga MD;  Location:  [facility] ENDOSCOPY;  Service:    • PROSTATECTOMY N/A 8/1/2017    Procedure: PROSTATECTOMY LAPAROSCOPIC WITH Nascent SurgicalINCI SI ROBOT ;  Surgeon: Hernesto Hong MD;  Location:  [facility] OR;  Service:    • THYROID SURGERY      RADIATION THERAPY AFTER SURGERY     PT Assessment (last 12 hours)     PT Evaluation and Treatment     Row Name 01/07/22 1350          Physical Therapy Time and Intention    Subjective Information no complaints  -TB     Document Type therapy note (daily note)  -TB     Mode of Treatment physical therapy  -TB     Patient Effort good  -TB     Row Name 01/07/22 1350          General Information    Existing Precautions/Restrictions fall; cardiac; sternal  -TB     Row Name 01/07/22 1350          Bed Mobility    Comment (Bed Mobility) Up in chair  -TB     Row Name 01/07/22 1350          Transfers    Transfers sit-stand transfer; stand-sit transfer  -TB     Sit-Stand [place] (Transfers) standby assist; verbal cues  -TB     Stand-Sit [place] (Transfers) standby assist; verbal cues  -TB     Row Name 01/07/22 1350          Gait/Stairs (Locomotion)    [place] Level (Gait) contact  guard; verbal cues  -TB     Distance in Feet (Gait) 200  -TB     Deviations/Abnormal Patterns (Gait) base of support, wide  -TB     Comment (Gait/Stairs) Cues to slow down  -TB     Row Name 01/07/22 1350          Motor Skills    Therapeutic Exercise --  Cardiac protocol 2x15  -TB     Row Name             Wound 01/06/22 0942 Left leg Incision    Wound - Properties Group Placement Date: 01/06/22  -KR Placement Time: 0942  -KR Present on Hospital Admission: N  -KR Side: Left  -KR Location: leg  -KR Primary Wound Type: Incision  -KR     Retired Wound - Properties Group Date first assessed: 01/06/22  -KR Time first assessed: 0942  -KR Present on Hospital Admission: N  -KR Side: Left  -KR Location: leg  -KR Primary Wound Type: Incision  -KR     Row Name             Wound 01/06/22 0943 anterior sternal Incision    Wound - Properties Group Placement Date: 01/06/22  -KR Placement Time: 0943  -KR Present on Hospital Admission: N  -KR Orientation: anterior  -KR Location: sternal  -KR Primary Wound Type: Incision  -KR     Retired Wound - Properties Group Date first assessed: 01/06/22  -KR Time first assessed: 0943  -KR Present on Hospital Admission: N  -KR Location: sternal  -KR Primary Wound Type: Incision  -KR     Row Name 01/07/22 1350          Positioning and Restraints    Pre-Treatment Position sitting in chair/recliner  -TB     Post Treatment Position chair  -TB     In Chair reclined; sitting; call light within reach; encouraged to call for assist; legs elevated  -TB           User Key  (r) = Recorded By, (t) = Taken By, (c) = Cosigned By    Initials Name Provider Type    TB Lanie Rodrigez, PTA Physical Therapy Assistant    Jimmie Randolph --                Physical Therapy Education                 Title: PT OT SLP Therapies (In Progress)     Topic: Physical Therapy (In Progress)     Point: Mobility training (Done)     Learning Progress Summary           Patient Acceptance, E, VU,NR by SB at 1/7/2022 1240     Comment: pt edu on cardiac protocol, sternal precautions, POC, benefits of act and d/c plans                   Point: Home exercise program (Not Started)     Learner Progress:  Not documented in this visit.          Point: Body mechanics (Done)     Learning Progress Summary           Patient Acceptance, E, VU,NR by SB at 1/7/2022 0755    Comment: pt edu on cardiac protocol, sternal precautions, POC, benefits of act and d/c plans                   Point: Precautions (Done)     Learning Progress Summary           Patient Acceptance, E, VU,NR by SB at 1/7/2022 0755    Comment: pt edu on cardiac protocol, sternal precautions, POC, benefits of act and d/c plans                               User Key     Initials Effective Dates Name Provider Type Discipline     06/16/21 -  Nadege Angulo, PT DPT Physical Therapist PT              PT Recommendation and Plan             Time Calculation:    PT Charges     Row Name 01/07/22 1502 01/07/22 1020          Time Calculation    Start Time 1350  -TB 0734  -SB     Stop Time 1415  -TB 0827  -SB     Time Calculation (min) 25 min  -TB 53 min  -SB     PT Received On 01/07/22  -TB 01/07/22  -SB     PT Goal Re-Cert Due Date -- 01/17/22  -SB            Time Calculation- PT    Total Timed Code Minutes- PT 25 minute(s)  -TB --           User Key  (r) = Recorded By, (t) = Taken By, (c) = Cosigned By    Initials Name Provider Type    TB Lanie Rodrigez PTA Physical Therapy Assistant    SB Nadege Angulo, PT DPT Physical Therapist              Therapy Charges for Today     Code Description Service Date Service Provider Modifiers Qty    [id number] HC GAIT TRAINING EA 15 MIN 1/7/2022 Lanie Rodrigez PTA GP 2          PT G-Codes  Outcome Measure Options: AM-PAC 6 Clicks Basic Mobility (PT)  AM-PAC 6 Clicks Score (PT): 16    Lanie Rodrigez PTA  1/7/2022

## 2022-01-08 ENCOUNTER — APPOINTMENT (OUTPATIENT)
Dept: GENERAL RADIOLOGY | Facility: HOSPITAL | Age: 72
End: 2022-01-08

## 2022-01-08 LAB
ANION GAP SERPL CALCULATED.3IONS-SCNC: 7 MMOL/L (ref 5–15)
BUN SERPL-MCNC: 37 MG/DL (ref 8–23)
BUN/CREAT SERPL: 22 (ref 7–25)
CALCIUM SPEC-SCNC: 8.4 MG/DL (ref 8.6–10.5)
CHLORIDE SERPL-SCNC: 102 MMOL/L (ref 98–107)
CO2 SERPL-SCNC: 23 MMOL/L (ref 22–29)
CREAT SERPL-MCNC: 1.68 MG/DL (ref 0.76–1.27)
DEPRECATED RDW RBC AUTO: 47.1 FL (ref 37–54)
ERYTHROCYTE [DISTWIDTH] IN BLOOD BY AUTOMATED COUNT: 14.6 % (ref 12.3–15.4)
GFR SERPL CREATININE-BSD FRML MDRD: 40 ML/MIN/1.73
GLUCOSE BLDC GLUCOMTR-MCNC: 205 MG/DL (ref 70–130)
GLUCOSE BLDC GLUCOMTR-MCNC: 243 MG/DL (ref 70–130)
GLUCOSE SERPL-MCNC: 178 MG/DL (ref 65–99)
HCT VFR BLD AUTO: 26.3 % (ref 37.5–51)
HGB BLD-MCNC: 8.7 G/DL (ref 13–17.7)
MCH RBC QN AUTO: 29.2 PG (ref 26.6–33)
MCHC RBC AUTO-ENTMCNC: 33.1 G/DL (ref 31.5–35.7)
MCV RBC AUTO: 88.3 FL (ref 79–97)
PLATELET # BLD AUTO: 224 10*3/MM3 (ref 140–450)
PMV BLD AUTO: 10.1 FL (ref 6–12)
POTASSIUM SERPL-SCNC: 4.7 MMOL/L (ref 3.5–5.2)
QT INTERVAL: 430 MS
QTC INTERVAL: 464 MS
RBC # BLD AUTO: 2.98 10*6/MM3 (ref 4.14–5.8)
SODIUM SERPL-SCNC: 132 MMOL/L (ref 136–145)
WBC NRBC COR # BLD: 17.58 10*3/MM3 (ref 3.4–10.8)

## 2022-01-08 PROCEDURE — 25010000002 PHENYLEPHRINE 10 MG/ML SOLUTION 5 ML VIAL: Performed by: THORACIC SURGERY (CARDIOTHORACIC VASCULAR SURGERY)

## 2022-01-08 PROCEDURE — 25010000002 ENOXAPARIN PER 10 MG: Performed by: THORACIC SURGERY (CARDIOTHORACIC VASCULAR SURGERY)

## 2022-01-08 PROCEDURE — 25010000002 FUROSEMIDE PER 20 MG: Performed by: SURGERY

## 2022-01-08 PROCEDURE — 93010 ELECTROCARDIOGRAM REPORT: CPT | Performed by: INTERNAL MEDICINE

## 2022-01-08 PROCEDURE — 63710000001 INSULIN LISPRO (HUMAN) PER 5 UNITS: Performed by: SURGERY

## 2022-01-08 PROCEDURE — 97116 GAIT TRAINING THERAPY: CPT

## 2022-01-08 PROCEDURE — 71045 X-RAY EXAM CHEST 1 VIEW: CPT

## 2022-01-08 PROCEDURE — 25010000002 CEFAZOLIN PER 500 MG: Performed by: THORACIC SURGERY (CARDIOTHORACIC VASCULAR SURGERY)

## 2022-01-08 PROCEDURE — 82962 GLUCOSE BLOOD TEST: CPT

## 2022-01-08 PROCEDURE — 85027 COMPLETE CBC AUTOMATED: CPT | Performed by: THORACIC SURGERY (CARDIOTHORACIC VASCULAR SURGERY)

## 2022-01-08 PROCEDURE — 94799 UNLISTED PULMONARY SVC/PX: CPT

## 2022-01-08 PROCEDURE — 99024 POSTOP FOLLOW-UP VISIT: CPT | Performed by: SURGERY

## 2022-01-08 PROCEDURE — 93005 ELECTROCARDIOGRAM TRACING: CPT | Performed by: THORACIC SURGERY (CARDIOTHORACIC VASCULAR SURGERY)

## 2022-01-08 PROCEDURE — 80048 BASIC METABOLIC PNL TOTAL CA: CPT | Performed by: THORACIC SURGERY (CARDIOTHORACIC VASCULAR SURGERY)

## 2022-01-08 RX ORDER — DEXTROSE MONOHYDRATE 25 G/50ML
25 INJECTION, SOLUTION INTRAVENOUS
Status: DISCONTINUED | OUTPATIENT
Start: 2022-01-08 | End: 2022-01-11 | Stop reason: HOSPADM

## 2022-01-08 RX ORDER — FUROSEMIDE 10 MG/ML
20 INJECTION INTRAMUSCULAR; INTRAVENOUS ONCE
Status: COMPLETED | OUTPATIENT
Start: 2022-01-08 | End: 2022-01-08

## 2022-01-08 RX ORDER — NICOTINE POLACRILEX 4 MG
15 LOZENGE BUCCAL
Status: DISCONTINUED | OUTPATIENT
Start: 2022-01-08 | End: 2022-01-11 | Stop reason: HOSPADM

## 2022-01-08 RX ADMIN — IPRATROPIUM BROMIDE AND ALBUTEROL SULFATE 3 ML: 2.5; .5 SOLUTION RESPIRATORY (INHALATION) at 07:16

## 2022-01-08 RX ADMIN — INSULIN LISPRO 3 UNITS: 100 INJECTION, SOLUTION INTRAVENOUS; SUBCUTANEOUS at 12:07

## 2022-01-08 RX ADMIN — GABAPENTIN 400 MG: 400 CAPSULE ORAL at 08:08

## 2022-01-08 RX ADMIN — METOPROLOL TARTRATE 12.5 MG: 25 TABLET, FILM COATED ORAL at 08:03

## 2022-01-08 RX ADMIN — ASPIRIN 81 MG: 81 TABLET, COATED ORAL at 08:03

## 2022-01-08 RX ADMIN — IPRATROPIUM BROMIDE AND ALBUTEROL SULFATE 3 ML: 2.5; .5 SOLUTION RESPIRATORY (INHALATION) at 10:29

## 2022-01-08 RX ADMIN — LEVOTHYROXINE SODIUM 200 MCG: 100 TABLET ORAL at 05:00

## 2022-01-08 RX ADMIN — BISACODYL 10 MG: 5 TABLET ORAL at 08:03

## 2022-01-08 RX ADMIN — ATORVASTATIN CALCIUM 20 MG: 10 TABLET, FILM COATED ORAL at 20:53

## 2022-01-08 RX ADMIN — MUPIROCIN 1 APPLICATION: 20 OINTMENT TOPICAL at 08:08

## 2022-01-08 RX ADMIN — CLOPIDOGREL 75 MG: 75 TABLET, FILM COATED ORAL at 17:10

## 2022-01-08 RX ADMIN — OXYCODONE HYDROCHLORIDE AND ACETAMINOPHEN 1 TABLET: 5; 325 TABLET ORAL at 08:44

## 2022-01-08 RX ADMIN — IPRATROPIUM BROMIDE AND ALBUTEROL SULFATE 3 ML: 2.5; .5 SOLUTION RESPIRATORY (INHALATION) at 19:56

## 2022-01-08 RX ADMIN — CHLORHEXIDINE GLUCONATE 15 ML: 1.2 RINSE ORAL at 08:03

## 2022-01-08 RX ADMIN — OXYCODONE AND ACETAMINOPHEN 1 TABLET: 325; 10 TABLET ORAL at 20:53

## 2022-01-08 RX ADMIN — GABAPENTIN 400 MG: 400 CAPSULE ORAL at 20:53

## 2022-01-08 RX ADMIN — AMIODARONE HYDROCHLORIDE 400 MG: 200 TABLET ORAL at 17:09

## 2022-01-08 RX ADMIN — ACETAMINOPHEN 650 MG: 325 TABLET ORAL at 13:21

## 2022-01-08 RX ADMIN — BISACODYL 10 MG: 5 TABLET ORAL at 20:53

## 2022-01-08 RX ADMIN — ENOXAPARIN SODIUM 40 MG: 40 INJECTION SUBCUTANEOUS at 08:08

## 2022-01-08 RX ADMIN — IPRATROPIUM BROMIDE AND ALBUTEROL SULFATE 3 ML: 2.5; .5 SOLUTION RESPIRATORY (INHALATION) at 14:33

## 2022-01-08 RX ADMIN — OXYCODONE HYDROCHLORIDE AND ACETAMINOPHEN 1 TABLET: 5; 325 TABLET ORAL at 04:03

## 2022-01-08 RX ADMIN — LIDOCAINE 2 PATCH: 50 PATCH CUTANEOUS at 08:09

## 2022-01-08 RX ADMIN — ALLOPURINOL 100 MG: 100 TABLET ORAL at 08:02

## 2022-01-08 RX ADMIN — AMIODARONE HYDROCHLORIDE 400 MG: 200 TABLET ORAL at 08:02

## 2022-01-08 RX ADMIN — ACETAMINOPHEN 650 MG: 325 TABLET ORAL at 20:53

## 2022-01-08 RX ADMIN — INSULIN LISPRO 3 UNITS: 100 INJECTION, SOLUTION INTRAVENOUS; SUBCUTANEOUS at 17:09

## 2022-01-08 RX ADMIN — ACETAMINOPHEN 650 MG: 325 TABLET ORAL at 04:58

## 2022-01-08 RX ADMIN — METOPROLOL TARTRATE 12.5 MG: 25 TABLET, FILM COATED ORAL at 20:53

## 2022-01-08 RX ADMIN — CITALOPRAM 20 MG: 20 TABLET, FILM COATED ORAL at 08:03

## 2022-01-08 RX ADMIN — CEFAZOLIN 2 G: 10 INJECTION, POWDER, FOR SOLUTION INTRAVENOUS at 04:02

## 2022-01-08 RX ADMIN — PHENYLEPHRINE HYDROCHLORIDE 0.5 MCG/KG/MIN: 10 INJECTION INTRAVENOUS at 02:00

## 2022-01-08 RX ADMIN — POLYETHYLENE GLYCOL 3350 17 G: 17 POWDER, FOR SOLUTION ORAL at 08:03

## 2022-01-08 RX ADMIN — FUROSEMIDE 20 MG: 10 INJECTION, SOLUTION INTRAMUSCULAR; INTRAVENOUS at 14:51

## 2022-01-08 RX ADMIN — CHLORHEXIDINE GLUCONATE 15 ML: 1.2 RINSE ORAL at 20:53

## 2022-01-08 NOTE — PLAN OF CARE
Goal Outcome Evaluation:      Pt rested well most of the night, Marcell gtt at 0.3 titrated by 0.1 as ordered, unable to discontinue due to hypotension, walked one lap around unit, urine output at least 40ml/hr, drinking water, labs showed Na 132 and elevated kidney function tests Creat 1.68, H/H 8.7/26.3, percocet given x2 as well as ordered po meds, 7L saltzer NC, continue to monitor

## 2022-01-08 NOTE — PROGRESS NOTES
[facility] Cardiothoracic Surgery  PROGRESS NOTE   CC: Coronary disease status post CABG    Subjective:  Doing better today.  He walked 200 feet with a break.  His oxygen is been weaned down to 2 L nasal cannula.  We remove the mediastinal tubes without difficulty this morning.  His weight is up 8 pounds at 262, his baseline is 252    ROS: Hemodynamically stable is remained in sinus rhythm, no fevers or chills, breathing is improved    Objective:      /58   Pulse 76   Temp 98.1 °F (36.7 °C) (Oral)   Resp 20   Wt 119 kg (262 lb 3.2 oz)   SpO2 91%   BMI 38.40 kg/m²       Intake/Output Summary (Last 24 hours) at 1/8/2022 1436  Last data filed at 1/8/2022 1234  Gross per 24 hour   Intake 1713.33 ml   Output 1230 ml   Net 483.33 ml         PE:  Vitals:    01/08/22 1430   BP:    Pulse: 76   Resp:    Temp:    SpO2: 91%     GENERAL: NAD, resting comfortably, normal color, obese  CARDIOVASCULAR: regular, regular rate, sinus, dressing and incision are clean dry and intact and sternum stable  PULMONARY: Normal bilateral breath sounds, no labored breathing  ABDOMEN: soft, nontender/nondistended  EXTREMITIES: mild to moderate peripheral edema, normal pulses, normal ROM        Lab Results (last 72 hours)     Procedure Component Value Units Date/Time    POC Glucose Once [[id number]]  (Abnormal) Collected: 01/08/22 1154    Specimen: Blood Updated: 01/08/22 1205     Glucose 205 mg/dL      Comment: : [id number] English SethMeter ID: [id number]       Basic Metabolic Panel [[id number]]  (Abnormal) Collected: 01/08/22 0421    Specimen: Blood Updated: 01/08/22 0450     Glucose 178 mg/dL      BUN 37 mg/dL      Creatinine 1.68 mg/dL      Sodium 132 mmol/L      Potassium 4.7 mmol/L      Chloride 102 mmol/L      CO2 23.0 mmol/L      Calcium 8.4 mg/dL      eGFR Non African Amer 40 mL/min/1.73      BUN/Creatinine Ratio 22.0     Anion Gap 7.0 mmol/L     Narrative:      GFR Normal >60  Chronic Kidney Disease  <60  Kidney Failure <15      CBC (No Diff) [[id number]]  (Abnormal) Collected: 01/08/22 0421    Specimen: Blood Updated: 01/08/22 0434     WBC 17.58 10*3/mm3      RBC 2.98 10*6/mm3      Hemoglobin 8.7 g/dL      Hematocrit 26.3 %      MCV 88.3 fL      MCH 29.2 pg      MCHC 33.1 g/dL      RDW 14.6 %      RDW-SD 47.1 fl      MPV 10.1 fL      Platelets 224 10*3/mm3     POC Glucose Once [[id number]]  (Normal) Collected: 01/07/22 1451    Specimen: Blood Updated: 01/07/22 1512     Glucose 128 mg/dL      Comment: : [id number] Ray (Darnall) KatelynMeter ID: [id number]       POC Glucose Once [[id number]]  (Normal) Collected: 01/07/22 1423    Specimen: Blood Updated: 01/07/22 1435     Glucose 123 mg/dL      Comment: : [id number] Ray (Darnall) KatelynMeter ID: [id number]       POC Glucose Once [[id number]]  (Normal) Collected: 01/07/22 1324    Specimen: Blood Updated: 01/07/22 1342     Glucose 102 mg/dL      Comment: : [id number] Ray (Darnall) KatelynMeter ID: [id number]       POC Glucose Once [[id number]]  (Normal) Collected: 01/07/22 1203    Specimen: Blood Updated: 01/07/22 1215     Glucose 113 mg/dL      Comment: : [id number] Gloria Oliver ID: [id number]       POC Glucose Once [[id number]]  (Normal) Collected: 01/07/22 1112    Specimen: Blood Updated: 01/07/22 1128     Glucose 127 mg/dL      Comment: : [id number] Ray (Darnall) KatelynMeter ID: [id number]       POC Glucose Once [[id number]]  (Abnormal) Collected: 01/07/22 1009    Specimen: Blood Updated: 01/07/22 1024     Glucose 146 mg/dL      Comment: : [id number] Ray (Darnall) KatelynMeter ID: [id number]       POC Glucose Once [[id number]]  (Abnormal) Collected: 01/07/22 0923    Specimen: Blood Updated: 01/07/22 0941     Glucose 149 mg/dL      Comment: : [id number] Dmitry (Medardo) Caleb ID: [id number]       POC Glucose Once [[id number]]  (Abnormal) Collected: 01/07/22 0821    Specimen: Blood Updated: 01/07/22 0840     Glucose 175 mg/dL       Comment: : [id number] Ray (Darnall) KatelynMeter ID: [id number]       POC Glucose Once [[id number]]  (Abnormal) Collected: 01/07/22 0727    Specimen: Blood Updated: 01/07/22 0748     Glucose 174 mg/dL      Comment: : [id number] Ray (Darnall) KatelynMeter ID: [id number]       POC Glucose Once [[id number]]  (Abnormal) Collected: 01/07/22 0618    Specimen: Blood Updated: 01/07/22 0629     Glucose 156 mg/dL      Comment: : [id number] Atkins HaleyMeter ID: [id number]       POC Glucose Once [[id number]]  (Abnormal) Collected: 01/07/22 0459    Specimen: Blood Updated: 01/07/22 0510     Glucose 134 mg/dL      Comment: : [id number] Atkins HaleyMeter ID: [id number]       Renal Function Panel [[id number]]  (Abnormal) Collected: 01/07/22 0313    Specimen: Blood Updated: 01/07/22 0340     Glucose 141 mg/dL      BUN 29 mg/dL      Creatinine 1.72 mg/dL      Sodium 138 mmol/L      Potassium 4.8 mmol/L      Chloride 105 mmol/L      CO2 23.0 mmol/L      Calcium 8.8 mg/dL      Albumin 3.60 g/dL      Phosphorus 4.8 mg/dL      Anion Gap 10.0 mmol/L      BUN/Creatinine Ratio 16.9     eGFR Non African Amer 39 mL/min/1.73     Narrative:      GFR Normal >60  Chronic Kidney Disease <60  Kidney Failure <15      Magnesium [[id number]]  (Normal) Collected: 01/07/22 0313    Specimen: Blood Updated: 01/07/22 0340     Magnesium 2.1 mg/dL     Protime-INR [[id number]]  (Abnormal) Collected: 01/07/22 0313    Specimen: Blood Updated: 01/07/22 0332     Protime 14.3 Seconds      INR 1.15    POC Glucose Once [[id number]]  (Abnormal) Collected: 01/07/22 0316    Specimen: Blood Updated: 01/07/22 0327     Glucose 141 mg/dL      Comment: : [id number] Atkins HaleyMeter ID: [id number]       CBC & Differential [[id number]]  (Abnormal) Collected: 01/07/22 0313    Specimen: Blood Updated: 01/07/22 0325    Narrative:      The following orders were created for panel order CBC & Differential.  Procedure                               Abnormality          Status                     ---------                               -----------         ------                     CBC Auto Differential[[id number]]        Abnormal            Final result                 Please view results for these tests on the individual orders.    CBC Auto Differential [[id number]]  (Abnormal) Collected: 01/07/22 0313    Specimen: Blood Updated: 01/07/22 0325     WBC 20.57 10*3/mm3      RBC 3.41 10*6/mm3      Hemoglobin 10.0 g/dL      Hematocrit 29.7 %      MCV 87.1 fL      MCH 29.3 pg      MCHC 33.7 g/dL      RDW 13.9 %      RDW-SD 44.1 fl      MPV 9.7 fL      Platelets 263 10*3/mm3      Neutrophil % 91.8 %      Lymphocyte % 1.3 %      Monocyte % 6.0 %      Eosinophil % 0.3 %      Basophil % 0.1 %      Immature Grans % 0.5 %      Neutrophils, Absolute 18.85 10*3/mm3      Lymphocytes, Absolute 0.27 10*3/mm3      Monocytes, Absolute 1.24 10*3/mm3      Eosinophils, Absolute 0.07 10*3/mm3      Basophils, Absolute 0.03 10*3/mm3      Immature Grans, Absolute 0.11 10*3/mm3      nRBC 0.0 /100 WBC     Blood Gas, Arterial - [[id number]]  (Abnormal) Collected: 01/07/22 0158    Specimen: Arterial Blood Updated: 01/07/22 0200     Site Arterial Line     Donald's Test N/A     pH, Arterial 7.344 pH units      Comment: 84 Value below reference range        pCO2, Arterial 46.3 mm Hg      Comment: 83 Value above reference range        pO2, Arterial 60.1 mm Hg      Comment: 84 Value below reference range        HCO3, Arterial 25.2 mmol/L      Base Excess, Arterial -0.7 mmol/L      Comment: 84 Value below reference range        O2 Saturation, Arterial 91.5 %      Comment: 84 Value below reference range        Temperature 37.0 C      Barometric Pressure for Blood Gas 758 mmHg      Modality Ventilator     FIO2 30 %      Ventilator Mode PSV     PEEP 8.0     PSV 10.0 cmH2O      Collected by [id number]     Comment: Meter: [id number]     :  [id number]        pCO2, Temperature Corrected 46.3 mm Hg      pH, Temp  Corrected 7.344 pH Units      pO2, Temperature Corrected 60.1 mm Hg     POC Glucose Once [[id number]]  (Normal) Collected: 01/07/22 0111    Specimen: Blood Updated: 01/07/22 0121     Glucose 116 mg/dL      Comment: : [id number] Abad HaleyMeter ID: [id number]       POC Glucose Once [[id number]]  (Normal) Collected: 01/07/22 0006    Specimen: Blood Updated: 01/07/22 0017     Glucose 120 mg/dL      Comment: : [id number] Atkins HaleyMeter ID: [id number]       POC Glucose Once [[id number]]  (Normal) Collected: 01/06/22 2314    Specimen: Blood Updated: 01/06/22 2325     Glucose 127 mg/dL      Comment: : [id number] Atkins HaleyMeter ID: [id number]       POC Glucose Once [[id number]]  (Abnormal) Collected: 01/06/22 2207    Specimen: Blood Updated: 01/06/22 2218     Glucose 136 mg/dL      Comment: : [id number] Atkins HaleyMeter ID: [id number]       POC Glucose Once [[id number]]  (Abnormal) Collected: 01/06/22 2041    Specimen: Blood Updated: 01/06/22 2052     Glucose 157 mg/dL      Comment: : [id number] Atkins HaleyMeter ID: [id number]       POC Glucose Once [[id number]]  (Normal) Collected: 01/06/22 1855    Specimen: Blood Updated: 01/06/22 1908     Glucose 114 mg/dL      Comment: : [id number] Chadavid RosemarydavidMeter ID: [id number]       Renal Function Panel [[id number]]  (Abnormal) Collected: 01/06/22 1813    Specimen: Blood Updated: 01/06/22 1900     Glucose 104 mg/dL      BUN 26 mg/dL      Creatinine 1.54 mg/dL      Sodium 138 mmol/L      Potassium 5.1 mmol/L      Chloride 106 mmol/L      CO2 25.0 mmol/L      Calcium 9.4 mg/dL      Albumin 3.50 g/dL      Phosphorus 3.9 mg/dL      Anion Gap 7.0 mmol/L      BUN/Creatinine Ratio 16.9     eGFR Non African Amer 45 mL/min/1.73     Narrative:      GFR Normal >60  Chronic Kidney Disease <60  Kidney Failure <15      POC Glucose Once [[id number]]  (Normal) Collected: 01/06/22 1817    Specimen: Blood Updated: 01/06/22 1829     Glucose 104 mg/dL      Comment: :  [id number] Juwan Deutsch ID: [id number]       CBC (No Diff) [[id number]]  (Abnormal) Collected: 01/06/22 1813    Specimen: Blood Updated: 01/06/22 1829     WBC 11.63 10*3/mm3      RBC 3.67 10*6/mm3      Hemoglobin 10.6 g/dL      Hematocrit 31.6 %      MCV 86.1 fL      MCH 28.9 pg      MCHC 33.5 g/dL      RDW 13.8 %      RDW-SD 43.2 fl      MPV 9.4 fL      Platelets 227 10*3/mm3     Blood Gas, Arterial - [[id number]]  (Abnormal) Collected: 01/06/22 1816    Specimen: Arterial Blood Updated: 01/06/22 1820     Site Arterial Line     Donald's Test N/A     pH, Arterial 7.368 pH units      pCO2, Arterial 46.3 mm Hg      Comment: 83 Value above reference range        pO2, Arterial 85.9 mm Hg      HCO3, Arterial 26.6 mmol/L      Comment: 83 Value above reference range        Base Excess, Arterial 0.9 mmol/L      O2 Saturation, Arterial 97.5 %      Temperature 37.0 C      Barometric Pressure for Blood Gas 756 mmHg      Modality Ventilator     FIO2 40 %      Ventilator Mode SIMV     Set Tidal Volume 600     Set Mech Resp Rate 20.0     PEEP 8.0     PSV 10.0 cmH2O      Collected by [id number]     Comment: Meter: [id number]     :  [id number]        pCO2, Temperature Corrected 46.3 mm Hg      pH, Temp Corrected 7.368 pH Units      pO2, Temperature Corrected 85.9 mm Hg     Blood Gas, Venous - [[id number]]  (Abnormal) Collected: 01/06/22 1816    Specimen: Venous Blood Updated: 01/06/22 1819     Site OTHER     pH, Venous 7.320 pH Units      pCO2, Venous 56.5 mm Hg      Comment: 83 Value above reference range        pO2, Venous 27.7 mm Hg      HCO3, Venous 29.1 mmol/L      Comment: 83 Value above reference range        Base Excess, Venous 2.0 mmol/L      O2 Saturation, Venous 50.1 %      Temperature 37.0 C      Barometric Pressure for Blood Gas 756 mmHg      Modality Ventilator     FIO2 40 %      Ventilator Mode SIMV     Set Tidal Volume 600     Set Mech Resp Rate 20.0     PEEP 8.0     PSV 10.0 cmH2O      Collected by [id number]      Comment: Meter: [id number]     :  [id number]       POC Glucose Once [[id number]]  (Normal) Collected: 01/06/22 1649    Specimen: Blood Updated: 01/06/22 1705     Glucose 102 mg/dL      Comment: : [id number] Juwan DowneyMeter ID: [id number]       Renal Function Panel [[id number]]  (Abnormal) Collected: 01/06/22 1453    Specimen: Blood Updated: 01/06/22 1521     Glucose 101 mg/dL      BUN 25 mg/dL      Creatinine 1.63 mg/dL      Sodium 140 mmol/L      Potassium 4.8 mmol/L      Chloride 108 mmol/L      CO2 27.0 mmol/L      Calcium 9.5 mg/dL      Albumin 3.30 g/dL      Phosphorus 2.9 mg/dL      Anion Gap 5.0 mmol/L      BUN/Creatinine Ratio 15.3     eGFR Non African Amer 42 mL/min/1.73     Narrative:      GFR Normal >60  Chronic Kidney Disease <60  Kidney Failure <15      aPTT [[id number]]  (Abnormal) Collected: 01/06/22 1453    Specimen: Blood Updated: 01/06/22 1518     PTT 40.4 seconds     Protime-INR [[id number]]  (Abnormal) Collected: 01/06/22 1453    Specimen: Blood Updated: 01/06/22 1518     Protime 15.0 Seconds      INR 1.23    CBC (No Diff) [[id number]]  (Abnormal) Collected: 01/06/22 1453    Specimen: Blood Updated: 01/06/22 1509     WBC 12.94 10*3/mm3      RBC 3.49 10*6/mm3      Hemoglobin 10.1 g/dL      Hematocrit 30.2 %      MCV 86.5 fL      MCH 28.9 pg      MCHC 33.4 g/dL      RDW 13.8 %      RDW-SD 43.8 fl      MPV 9.5 fL      Platelets 213 10*3/mm3     Blood Gas, Arterial - [[id number]]  (Abnormal) Collected: 01/06/22 1456    Specimen: Arterial Blood Updated: 01/06/22 1501     Site Arterial Line     Donald's Test N/A     pH, Arterial 7.316 pH units      Comment: 84 Value below reference range        pCO2, Arterial 53.0 mm Hg      Comment: 83 Value above reference range        pO2, Arterial 74.1 mm Hg      Comment: 84 Value below reference range        HCO3, Arterial 27.1 mmol/L      Comment: 83 Value above reference range        Base Excess, Arterial 0.3 mmol/L      O2 Saturation, Arterial 94.8  %      Temperature 37.0 C      Barometric Pressure for Blood Gas 754 mmHg      Modality Ventilator     FIO2 60 %      Ventilator Mode SIMV     Set Tidal Volume 600     Set Mech Resp Rate 16.0     PEEP 8.0     PSV 10.0 cmH2O      Collected by [id number]     Comment: Meter: [id number]     :  [id number]        pCO2, Temperature Corrected 53.0 mm Hg      pH, Temp Corrected 7.316 pH Units      pO2, Temperature Corrected 74.1 mm Hg     Blood Gas, Arterial With Co-Ox [[id number]]  (Abnormal) Collected: 01/06/22 1300    Specimen: Arterial Blood Updated: 01/06/22 1304     Site Arterial Line     Donald's Test N/A     pH, Arterial 7.368 pH units      pCO2, Arterial 46.9 mm Hg      Comment: 83 Value above reference range        pO2, Arterial 215.0 mm Hg      Comment: 83 Value above reference range        HCO3, Arterial 27.0 mmol/L      Comment: 83 Value above reference range        Base Excess, Arterial 1.3 mmol/L      O2 Saturation, Arterial 100.0 %      Comment: 83 Value above reference range        Hemoglobin, Blood Gas 9.7 g/dL      Comment: 84 Value below reference range        Hematocrit, Blood Gas 29.8 %      Comment: 84 Value below reference range        Oxyhemoglobin 97.5 %      Methemoglobin 1.00 %      Carboxyhemoglobin 1.6 %      A-a Gradiant 445.0 mmHg      Temperature 37.0 C      Sodium, Arterial 137 mmol/L      Potassium, Arterial 4.9 mmol/L      Ionized Calcium 5.43 mg/dL      Comment: 83 Value above reference range        Barometric Pressure for Blood Gas 754 mmHg      Modality Ventilator     FIO2 100 %      Ventilator Mode AC     Note --     Collected by [id number]     Comment: Meter: [id number]     :  [id number]        pH, Temp Corrected 7.368 pH Units      pCO2, Temperature Corrected 46.9 mm Hg      pO2, Temperature Corrected 215 mm Hg     Blood Gas, Arterial With Co-Ox [[id number]]  (Abnormal) Collected: 01/06/22 1135    Specimen: Arterial Blood Updated: 01/06/22 1138     Site Arterial Line      Donald's Test N/A     pH, Arterial 7.401 pH units      pCO2, Arterial 44.3 mm Hg      pO2, Arterial 386.0 mm Hg      Comment: 83 Value above reference range        HCO3, Arterial 27.5 mmol/L      Comment: 83 Value above reference range        Base Excess, Arterial 2.3 mmol/L      Comment: 83 Value above reference range        O2 Saturation, Arterial >100.1 %      Comment: 93 Value above reportable range > 100.1        Hemoglobin, Blood Gas 9.5 g/dL      Comment: 84 Value below reference range        Hematocrit, Blood Gas 29.3 %      Comment: 84 Value below reference range        Oxyhemoglobin 98.0 %      Methemoglobin 1.10 %      Carboxyhemoglobin 1.2 %      A-a Gradiant 170.0 mmHg      Temperature 37.0 C      Sodium, Arterial 136 mmol/L      Comment: 84 Value below reference range        Potassium, Arterial 5.1 mmol/L      Ionized Calcium 4.79 mg/dL      Barometric Pressure for Blood Gas 754 mmHg      Modality Ventilator     FIO2 85 %      Ventilator Mode AC     Note --     Collected by [id number]     Comment: Meter: [id number]     :  [id number]        pH, Temp Corrected 7.401 pH Units      pCO2, Temperature Corrected 44.3 mm Hg      pO2, Temperature Corrected 386 mm Hg     Blood Gas, Arterial With Co-Ox [[id number]]  (Abnormal) Collected: 01/06/22 1045    Specimen: Arterial Blood Updated: 01/06/22 1050     Site Arterial Line     Donald's Test N/A     pH, Arterial 7.382 pH units      pCO2, Arterial 47.4 mm Hg      Comment: 83 Value above reference range        pO2, Arterial 368.0 mm Hg      Comment: 83 Value above reference range        HCO3, Arterial 28.1 mmol/L      Comment: 83 Value above reference range        Base Excess, Arterial 2.5 mmol/L      Comment: 83 Value above reference range        O2 Saturation, Arterial >100.1 %      Comment: 93 Value above reportable range > 100.1        Hemoglobin, Blood Gas 10.5 g/dL      Comment: 84 Value below reference range        Hematocrit, Blood Gas 32.3 %       Comment: 84 Value below reference range        Oxyhemoglobin 97.9 %      Methemoglobin 1.20 %      Carboxyhemoglobin 1.2 %      A-a Gradiant 184.3 mmHg      Temperature 37.0 C      Sodium, Arterial 136 mmol/L      Potassium, Arterial 4.8 mmol/L      Ionized Calcium 4.87 mg/dL      Barometric Pressure for Blood Gas 754 mmHg      Modality Ventilator     FIO2 85 %      Ventilator Mode AC     Note --     Collected by [id number]     Comment: Meter: [id number]     :  [id number]        pH, Temp Corrected 7.382 pH Units      pCO2, Temperature Corrected 47.4 mm Hg      pO2, Temperature Corrected 368 mm Hg     Blood Gas, Arterial With Co-Ox [[id number]]  (Abnormal) Collected: 01/06/22 1010    Specimen: Arterial Blood Updated: 01/06/22 1015     Site Arterial Line     Donald's Test N/A     pH, Arterial 7.349 pH units      Comment: 84 Value below reference range        pCO2, Arterial 53.2 mm Hg      Comment: 83 Value above reference range        pO2, Arterial 357.0 mm Hg      Comment: 83 Value above reference range        HCO3, Arterial 29.3 mmol/L      Comment: 83 Value above reference range        Base Excess, Arterial 2.9 mmol/L      Comment: 83 Value above reference range        O2 Saturation, Arterial >100.1 %      Comment: 93 Value above reportable range > 100.1        Hemoglobin, Blood Gas 10.2 g/dL      Comment: 84 Value below reference range        Hematocrit, Blood Gas 31.2 %      Comment: 84 Value below reference range        Oxyhemoglobin 98.2 %      Methemoglobin 1.00 %      Carboxyhemoglobin 1.1 %      A-a Gradiant 297.5 mmHg      Temperature 37.0 C      Sodium, Arterial 137 mmol/L      Potassium, Arterial 4.4 mmol/L      Ionized Calcium 4.88 mg/dL      Barometric Pressure for Blood Gas 754 mmHg      Modality Ventilator     FIO2 100 %      Ventilator Mode AC     Note --     Collected by [id number]     Comment: Meter: [id number]     :  [id number]        pH, Temp Corrected 7.349 pH Units      pCO2,  Temperature Corrected 53.2 mm Hg      pO2, Temperature Corrected 357 mm Hg     Blood Gas, Arterial With Co-Ox [[id number]]  (Abnormal) Collected: 01/06/22 0925    Specimen: Arterial Blood Updated: 01/06/22 0927     Site Arterial Line     Donald's Test N/A     pH, Arterial 7.327 pH units      Comment: 84 Value below reference range        pCO2, Arterial 52.2 mm Hg      Comment: 83 Value above reference range        pO2, Arterial 176.0 mm Hg      Comment: 83 Value above reference range        HCO3, Arterial 27.3 mmol/L      Comment: 83 Value above reference range        Base Excess, Arterial 0.6 mmol/L      O2 Saturation, Arterial 99.7 %      Comment: 83 Value above reference range        Hemoglobin, Blood Gas 12.2 g/dL      Comment: 84 Value below reference range        Hematocrit, Blood Gas 37.4 %      Comment: 84 Value below reference range        Oxyhemoglobin 97.3 %      Methemoglobin 1.20 %      Carboxyhemoglobin 1.2 %      A-a Gradiant 478.8 mmHg      Temperature 37.0 C      Sodium, Arterial 135 mmol/L      Comment: 84 Value below reference range        Potassium, Arterial 4.9 mmol/L      Ionized Calcium 5.71 mg/dL      Comment: 83 Value above reference range        Barometric Pressure for Blood Gas 754 mmHg      Modality Ventilator     FIO2 100 %      Ventilator Mode AC     Note --     Collected by [id number]     Comment: Meter: [id number]     :  [id number]        pH, Temp Corrected 7.327 pH Units      pCO2, Temperature Corrected 52.2 mm Hg      pO2, Temperature Corrected 176 mm Hg     Blood Gas, Arterial With Co-Ox [[id number]]  (Abnormal) Collected: 01/06/22 0805    Specimen: Arterial Blood Updated: 01/06/22 0810     Site Arterial Line     Donald's Test N/A     pH, Arterial 7.357 pH units      pCO2, Arterial 47.4 mm Hg      Comment: 83 Value above reference range        pO2, Arterial 343.0 mm Hg      Comment: 83 Value above reference range        HCO3, Arterial 26.6 mmol/L      Comment: 83 Value above  reference range        Base Excess, Arterial 0.6 mmol/L      O2 Saturation, Arterial >100.1 %      Comment: 93 Value above reportable range > 100.1        Hemoglobin, Blood Gas 12.2 g/dL      Comment: 84 Value below reference range        Hematocrit, Blood Gas 37.3 %      Comment: 84 Value below reference range        Oxyhemoglobin 97.9 %      Methemoglobin 1.10 %      Carboxyhemoglobin 1.2 %      A-a Gradiant 315.8 mmHg      Temperature 37.0 C      Sodium, Arterial 137 mmol/L      Potassium, Arterial 4.8 mmol/L      Ionized Calcium 4.55 mg/dL      Comment: 84 Value below reference range        Barometric Pressure for Blood Gas 754 mmHg      Modality Ventilator     FIO2 100 %      Ventilator Mode AC     Note --     Collected by [id number]     Comment: Meter: [id number]     :  [id number]        pH, Temp Corrected 7.357 pH Units      pCO2, Temperature Corrected 47.4 mm Hg      pO2, Temperature Corrected 343 mm Hg     POC Glucose Once [[id number]]  (Abnormal) Collected: 01/06/22 0619    Specimen: Blood Updated: 01/06/22 0630     Glucose 179 mg/dL      Comment: : [id number] Warren JessicaMeter ID: [id number]                 CXR: Rotated image, widened mediastinum but stable, decreased lung volumes bilaterally    Assessment/Plan     Mr. Kamara is a 71-year-old male who is postop day 2 from CABG x4 maze and atrial clip procedure.  He has chronic kidney disease.  His creatinine is stable today.  He did better with walking today and is down to 2 L nasal cannula.    Mediastinal tubes removed today without difficulty  We will start easy diuresis today with 1 dose  Continue out of bed is much as possible  In sinus rhythm status post maze now, continue twice daily amiodarone  Likely out of unit tomorrow    Joaquin Lima M.D.  Cardiothoracic Surgeon

## 2022-01-08 NOTE — PLAN OF CARE
Goal Outcome Evaluation:              Outcome Summary: Patient stands and walks 200' with CGA. SAT on 6lpm 95-92%  Remained at 92% until he talked on tele and dropped to 86%. Will benefit from safet education and increased ambulation.

## 2022-01-09 ENCOUNTER — APPOINTMENT (OUTPATIENT)
Dept: GENERAL RADIOLOGY | Facility: HOSPITAL | Age: 72
End: 2022-01-09

## 2022-01-09 LAB
ANION GAP SERPL CALCULATED.3IONS-SCNC: 7 MMOL/L (ref 5–15)
BH BB BLOOD EXPIRATION DATE: NORMAL
BH BB BLOOD EXPIRATION DATE: NORMAL
BH BB BLOOD TYPE BARCODE: 5100
BH BB BLOOD TYPE BARCODE: 5100
BH BB DISPENSE STATUS: NORMAL
BH BB DISPENSE STATUS: NORMAL
BH BB PRODUCT CODE: NORMAL
BH BB PRODUCT CODE: NORMAL
BH BB UNIT NUMBER: NORMAL
BH BB UNIT NUMBER: NORMAL
BH CV ECHO MEAS - AO MAX PG: 6.1 MMHG
BH CV ECHO MEAS - AO MEAN PG: 3 MMHG
BH CV ECHO MEAS - AO V2 MAX: 123 CM/SEC
BH CV ECHO MEAS - AO V2 MEAN: 79.4 CM/SEC
BH CV ECHO MEAS - AO V2 VTI: 27.6 CM
BUN SERPL-MCNC: 47 MG/DL (ref 8–23)
BUN/CREAT SERPL: 28.8 (ref 7–25)
CALCIUM SPEC-SCNC: 8.1 MG/DL (ref 8.6–10.5)
CHLORIDE SERPL-SCNC: 103 MMOL/L (ref 98–107)
CO2 SERPL-SCNC: 24 MMOL/L (ref 22–29)
CREAT SERPL-MCNC: 1.63 MG/DL (ref 0.76–1.27)
CROSSMATCH INTERPRETATION: NORMAL
CROSSMATCH INTERPRETATION: NORMAL
DEPRECATED RDW RBC AUTO: 45.6 FL (ref 37–54)
ERYTHROCYTE [DISTWIDTH] IN BLOOD BY AUTOMATED COUNT: 14.2 % (ref 12.3–15.4)
GFR SERPL CREATININE-BSD FRML MDRD: 42 ML/MIN/1.73
GLUCOSE BLDC GLUCOMTR-MCNC: 174 MG/DL (ref 70–130)
GLUCOSE BLDC GLUCOMTR-MCNC: 178 MG/DL (ref 70–130)
GLUCOSE BLDC GLUCOMTR-MCNC: 181 MG/DL (ref 70–130)
GLUCOSE BLDC GLUCOMTR-MCNC: 225 MG/DL (ref 70–130)
GLUCOSE SERPL-MCNC: 180 MG/DL (ref 65–99)
HCT VFR BLD AUTO: 23.6 % (ref 37.5–51)
HGB BLD-MCNC: 7.9 G/DL (ref 13–17.7)
MAXIMAL PREDICTED HEART RATE: 148 BPM
MCH RBC QN AUTO: 29.3 PG (ref 26.6–33)
MCHC RBC AUTO-ENTMCNC: 33.5 G/DL (ref 31.5–35.7)
MCV RBC AUTO: 87.4 FL (ref 79–97)
PLATELET # BLD AUTO: 165 10*3/MM3 (ref 140–450)
PMV BLD AUTO: 10 FL (ref 6–12)
POTASSIUM SERPL-SCNC: 4.5 MMOL/L (ref 3.5–5.2)
RBC # BLD AUTO: 2.7 10*6/MM3 (ref 4.14–5.8)
SODIUM SERPL-SCNC: 134 MMOL/L (ref 136–145)
STRESS TARGET HR: 126 BPM
UNIT  ABO: NORMAL
UNIT  ABO: NORMAL
UNIT  RH: NORMAL
UNIT  RH: NORMAL
WBC NRBC COR # BLD: 11.16 10*3/MM3 (ref 3.4–10.8)

## 2022-01-09 PROCEDURE — 71046 X-RAY EXAM CHEST 2 VIEWS: CPT

## 2022-01-09 PROCEDURE — 97116 GAIT TRAINING THERAPY: CPT

## 2022-01-09 PROCEDURE — 63710000001 INSULIN LISPRO (HUMAN) PER 5 UNITS: Performed by: SURGERY

## 2022-01-09 PROCEDURE — 25010000002 FUROSEMIDE PER 20 MG: Performed by: SURGERY

## 2022-01-09 PROCEDURE — 94799 UNLISTED PULMONARY SVC/PX: CPT

## 2022-01-09 PROCEDURE — 25010000002 ENOXAPARIN PER 10 MG: Performed by: THORACIC SURGERY (CARDIOTHORACIC VASCULAR SURGERY)

## 2022-01-09 PROCEDURE — 82962 GLUCOSE BLOOD TEST: CPT

## 2022-01-09 PROCEDURE — 99024 POSTOP FOLLOW-UP VISIT: CPT | Performed by: SURGERY

## 2022-01-09 PROCEDURE — 80048 BASIC METABOLIC PNL TOTAL CA: CPT | Performed by: THORACIC SURGERY (CARDIOTHORACIC VASCULAR SURGERY)

## 2022-01-09 PROCEDURE — 85027 COMPLETE CBC AUTOMATED: CPT | Performed by: THORACIC SURGERY (CARDIOTHORACIC VASCULAR SURGERY)

## 2022-01-09 RX ORDER — FUROSEMIDE 10 MG/ML
20 INJECTION INTRAMUSCULAR; INTRAVENOUS
Status: DISCONTINUED | OUTPATIENT
Start: 2022-01-09 | End: 2022-01-11

## 2022-01-09 RX ORDER — POTASSIUM CHLORIDE 750 MG/1
20 CAPSULE, EXTENDED RELEASE ORAL 2 TIMES DAILY WITH MEALS
Status: DISCONTINUED | OUTPATIENT
Start: 2022-01-09 | End: 2022-01-11 | Stop reason: HOSPADM

## 2022-01-09 RX ORDER — FUROSEMIDE 10 MG/ML
20 INJECTION INTRAMUSCULAR; INTRAVENOUS ONCE
Status: COMPLETED | OUTPATIENT
Start: 2022-01-09 | End: 2022-01-09

## 2022-01-09 RX ADMIN — AMIODARONE HYDROCHLORIDE 400 MG: 200 TABLET ORAL at 08:09

## 2022-01-09 RX ADMIN — ASPIRIN 81 MG: 81 TABLET, COATED ORAL at 08:08

## 2022-01-09 RX ADMIN — GABAPENTIN 400 MG: 400 CAPSULE ORAL at 22:38

## 2022-01-09 RX ADMIN — ATORVASTATIN CALCIUM 20 MG: 10 TABLET, FILM COATED ORAL at 22:38

## 2022-01-09 RX ADMIN — INSULIN LISPRO 2 UNITS: 100 INJECTION, SOLUTION INTRAVENOUS; SUBCUTANEOUS at 08:07

## 2022-01-09 RX ADMIN — FUROSEMIDE 20 MG: 10 INJECTION INTRAMUSCULAR; INTRAVENOUS at 11:15

## 2022-01-09 RX ADMIN — POLYETHYLENE GLYCOL 3350 17 G: 17 POWDER, FOR SOLUTION ORAL at 08:11

## 2022-01-09 RX ADMIN — ENOXAPARIN SODIUM 40 MG: 40 INJECTION SUBCUTANEOUS at 08:07

## 2022-01-09 RX ADMIN — IPRATROPIUM BROMIDE AND ALBUTEROL SULFATE 3 ML: 2.5; .5 SOLUTION RESPIRATORY (INHALATION) at 19:57

## 2022-01-09 RX ADMIN — IPRATROPIUM BROMIDE AND ALBUTEROL SULFATE 3 ML: 2.5; .5 SOLUTION RESPIRATORY (INHALATION) at 07:22

## 2022-01-09 RX ADMIN — GABAPENTIN 400 MG: 400 CAPSULE ORAL at 08:11

## 2022-01-09 RX ADMIN — FUROSEMIDE 20 MG: 10 INJECTION, SOLUTION INTRAVENOUS at 17:24

## 2022-01-09 RX ADMIN — IPRATROPIUM BROMIDE AND ALBUTEROL SULFATE 3 ML: 2.5; .5 SOLUTION RESPIRATORY (INHALATION) at 11:15

## 2022-01-09 RX ADMIN — CLOPIDOGREL 75 MG: 75 TABLET, FILM COATED ORAL at 17:16

## 2022-01-09 RX ADMIN — INSULIN LISPRO 2 UNITS: 100 INJECTION, SOLUTION INTRAVENOUS; SUBCUTANEOUS at 17:31

## 2022-01-09 RX ADMIN — CHLORHEXIDINE GLUCONATE 15 ML: 1.2 RINSE ORAL at 22:38

## 2022-01-09 RX ADMIN — ACETAMINOPHEN 650 MG: 325 TABLET ORAL at 05:51

## 2022-01-09 RX ADMIN — POTASSIUM CHLORIDE 20 MEQ: 10 CAPSULE, COATED, EXTENDED RELEASE ORAL at 17:24

## 2022-01-09 RX ADMIN — CHLORHEXIDINE GLUCONATE 15 ML: 1.2 RINSE ORAL at 08:11

## 2022-01-09 RX ADMIN — ACETAMINOPHEN 650 MG: 325 TABLET ORAL at 14:10

## 2022-01-09 RX ADMIN — LIDOCAINE 2 PATCH: 50 PATCH CUTANEOUS at 08:12

## 2022-01-09 RX ADMIN — BISACODYL 10 MG: 5 TABLET ORAL at 22:38

## 2022-01-09 RX ADMIN — POTASSIUM CHLORIDE 20 MEQ: 10 CAPSULE, COATED, EXTENDED RELEASE ORAL at 11:14

## 2022-01-09 RX ADMIN — OXYCODONE AND ACETAMINOPHEN 1 TABLET: 325; 10 TABLET ORAL at 20:03

## 2022-01-09 RX ADMIN — CITALOPRAM 20 MG: 20 TABLET, FILM COATED ORAL at 08:10

## 2022-01-09 RX ADMIN — ALLOPURINOL 100 MG: 100 TABLET ORAL at 08:08

## 2022-01-09 RX ADMIN — LEVOTHYROXINE SODIUM 200 MCG: 100 TABLET ORAL at 05:51

## 2022-01-09 RX ADMIN — OXYCODONE AND ACETAMINOPHEN 1 TABLET: 325; 10 TABLET ORAL at 02:55

## 2022-01-09 RX ADMIN — AMIODARONE HYDROCHLORIDE 400 MG: 200 TABLET ORAL at 17:16

## 2022-01-09 RX ADMIN — INSULIN LISPRO 3 UNITS: 100 INJECTION, SOLUTION INTRAVENOUS; SUBCUTANEOUS at 11:15

## 2022-01-09 RX ADMIN — METOPROLOL TARTRATE 12.5 MG: 25 TABLET, FILM COATED ORAL at 08:10

## 2022-01-09 RX ADMIN — METOPROLOL TARTRATE 12.5 MG: 25 TABLET, FILM COATED ORAL at 22:38

## 2022-01-09 RX ADMIN — BISACODYL 10 MG: 5 TABLET ORAL at 08:08

## 2022-01-09 NOTE — PLAN OF CARE
Pt is A/O x4, off lashawn @ 0815. Ambulated lap x1 two times today. GETACHEW has had minimal drainage, MST removed earlier in the shift. BG covered with sliding scale insulin. UOP adequate throughout the shift. Currently on 1L NC. 1x 5 mg Percocet. Will continue to monitor.    Problem: Adult Inpatient Plan of Care  Goal: Plan of Care Review  Outcome: Ongoing, Progressing  Goal: Patient-Specific Goal (Individualized)  Outcome: Ongoing, Progressing  Goal: Absence of Hospital-Acquired Illness or Injury  Outcome: Ongoing, Progressing  Intervention: Identify and Manage Fall Risk  Recent Flowsheet Documentation  Taken 1/8/2022 1802 by Anand Stafford RN  Safety Promotion/Fall Prevention:   safety round/check completed   fall prevention program maintained  Taken 1/8/2022 1653 by Anand Stafford RN  Safety Promotion/Fall Prevention:   safety round/check completed   fall prevention program maintained  Taken 1/8/2022 1613 by Anand Stafford RN  Safety Promotion/Fall Prevention:   safety round/check completed   fall prevention program maintained  Taken 1/8/2022 1454 by Anand Stafford RN  Safety Promotion/Fall Prevention:   safety round/check completed   fall prevention program maintained  Taken 1/8/2022 1351 by Anand Stafford RN  Safety Promotion/Fall Prevention:   safety round/check completed   fall prevention program maintained  Taken 1/8/2022 1259 by Anand Stafford RN  Safety Promotion/Fall Prevention:   safety round/check completed   fall prevention program maintained  Taken 1/8/2022 1220 by Anand Stafford RN  Safety Promotion/Fall Prevention:   safety round/check completed   fall prevention program maintained  Taken 1/8/2022 1056 by Anand Stafford RN  Safety Promotion/Fall Prevention:   safety round/check completed   fall prevention program maintained  Taken 1/8/2022 0957 by Anand Stafford RN  Safety Promotion/Fall Prevention:   safety round/check completed   fall prevention program maintained  Taken 1/8/2022 0902 by Anand Stafford  RN  Safety Promotion/Fall Prevention:   safety round/check completed   fall prevention program maintained  Taken 1/8/2022 0803 by Anand Stafford RN  Safety Promotion/Fall Prevention:   safety round/check completed   fall prevention program maintained  Taken 1/8/2022 0700 by Aannd Stafford RN  Safety Promotion/Fall Prevention:   safety round/check completed   fall prevention program maintained  Intervention: Prevent Skin Injury  Recent Flowsheet Documentation  Taken 1/8/2022 1802 by Anand Stafford RN  Body Position: (up in chair)   other (see comments)   weight shift assistance provided  Taken 1/8/2022 1613 by Anand Stafford RN  Body Position: (up in chair)   other (see comments)   weight shift assistance provided  Skin Protection:   adhesive use limited   incontinence pads utilized   tubing/devices free from skin contact   skin-to-skin areas padded   skin-to-device areas padded  Taken 1/8/2022 1351 by Anand Stafford RN  Body Position: (up in chair)   other (see comments)   weight shift assistance provided  Taken 1/8/2022 1220 by Anand Stafford RN  Body Position: (up in chair)   other (see comments)   weight shift assistance provided  Skin Protection:   adhesive use limited   incontinence pads utilized   tubing/devices free from skin contact   skin-to-device areas padded   skin-to-skin areas padded  Taken 1/8/2022 0957 by Anand Stafford RN  Body Position: (up in chair)   other (see comments)   weight shift assistance provided  Taken 1/8/2022 0803 by Anand Stafford RN  Body Position: (up in chair)   other (see comments)   weight shift assistance provided  Skin Protection:   adhesive use limited   incontinence pads utilized   tubing/devices free from skin contact   skin-to-skin areas padded   skin-to-device areas padded  Intervention: Prevent and Manage VTE (venous thromboembolism) Risk  Recent Flowsheet Documentation  Taken 1/8/2022 1613 by Anand Stafford RN  VTE Prevention/Management:   bilateral   sequential  compression devices on  Taken 1/8/2022 1220 by Anand Stafford RN  VTE Prevention/Management:   bilateral   foot pump device off  Taken 1/8/2022 0803 by Anand Stafford RN  VTE Prevention/Management: sequential compression devices on  Goal: Optimal Comfort and Wellbeing  Outcome: Ongoing, Progressing  Intervention: Provide Person-Centered Care  Recent Flowsheet Documentation  Taken 1/8/2022 1613 by Anand Stafford RN  Trust Relationship/Rapport:   care explained   choices provided   thoughts/feelings acknowledged  Taken 1/8/2022 1220 by Anand Stafford RN  Trust Relationship/Rapport:   care explained   choices provided   thoughts/feelings acknowledged  Taken 1/8/2022 0803 by Anand Stafford RN  Trust Relationship/Rapport:   care explained   choices provided   thoughts/feelings acknowledged  Goal: Readiness for Transition of Care  Outcome: Ongoing, Progressing   Goal Outcome Evaluation:

## 2022-01-09 NOTE — PLAN OF CARE
Problem: Adult Inpatient Plan of Care  Goal: Plan of Care Review  Recent Flowsheet Documentation  Taken 1/9/2022 1248 by Mariama Villanueva, PTA  Outcome Summary: Stands and ambulates 250' with CGA. SAT on 2 lpm 92%. Reviewed sternal precautions   Goal Outcome Evaluation:              Outcome Summary: Stands and ambulates 250' with CGA. SAT on 2 lpm 92%. Reviewed sternal precautions

## 2022-01-09 NOTE — PROGRESS NOTES
[facility] Cardiothoracic Surgery  PROGRESS NOTE   CC: Coronary artery disease status post CABG    Subjective:  Continues to improve, doing better today.  He walked over 200 feet.  His oxygen is at 2 L nasal cannula.  His hemoglobin is down slightly at 7.9 from 8.7.  His creatinine is stable.  He feels better after mediastinal's were removed yesterday.  His weight is down 2 pounds from yesterday at 260 his baseline is 250    ROS: No fevers or chills overnight hemodynamically stable in sinus rhythm    Objective:      /57   Pulse 77   Temp 98 °F (36.7 °C) (Oral)   Resp 17   Wt 118 kg (260 lb)   SpO2 92%   BMI 38.07 kg/m²       Intake/Output Summary (Last 24 hours) at 1/9/2022 1521  Last data filed at 1/9/2022 1440  Gross per 24 hour   Intake 360 ml   Output 2185 ml   Net -1825 ml       CT Output: Left pleural minimal    PE:  Vitals:    01/09/22 1515   BP:    Pulse: 77   Resp:    Temp:    SpO2: 92%     GENERAL: NAD, resting comfortably, normal color  CARDIOVASCULAR: regular, regular rate, sinus, incision clean dry intact dressing in place  PULMONARY: Normal bilateral breath sounds, no labored breathing  ABDOMEN: soft, nontender/nondistended  EXTREMITIES: mild peripheral edema, normal pulses, normal ROM        Lab Results (last 72 hours)     Procedure Component Value Units Date/Time    POC Glucose Once [[id number]]  (Abnormal) Collected: 01/09/22 1108    Specimen: Blood Updated: 01/09/22 1119     Glucose 225 mg/dL      Comment: : [id number] K121ieMeter ID: [id number]       POC Glucose Once [[id number]]  (Abnormal) Collected: 01/09/22 0752    Specimen: Blood Updated: 01/09/22 0804     Glucose 178 mg/dL      Comment: : [id number] Airpost.ioenzieMeter ID: [id number]       Basic Metabolic Panel [[id number]]  (Abnormal) Collected: 01/09/22 0252    Specimen: Blood Updated: 01/09/22 0318     Glucose 180 mg/dL      BUN 47 mg/dL      Creatinine 1.63 mg/dL      Sodium 134 mmol/L       Potassium 4.5 mmol/L      Chloride 103 mmol/L      CO2 24.0 mmol/L      Calcium 8.1 mg/dL      eGFR Non African Amer 42 mL/min/1.73      BUN/Creatinine Ratio 28.8     Anion Gap 7.0 mmol/L     Narrative:      GFR Normal >60  Chronic Kidney Disease <60  Kidney Failure <15      CBC (No Diff) [[id number]]  (Abnormal) Collected: 01/09/22 0252    Specimen: Blood Updated: 01/09/22 0300     WBC 11.16 10*3/mm3      RBC 2.70 10*6/mm3      Hemoglobin 7.9 g/dL      Hematocrit 23.6 %      MCV 87.4 fL      MCH 29.3 pg      MCHC 33.5 g/dL      RDW 14.2 %      RDW-SD 45.6 fl      MPV 10.0 fL      Platelets 165 10*3/mm3     POC Glucose Once [[id number]]  (Abnormal) Collected: 01/08/22 1706    Specimen: Blood Updated: 01/08/22 1717     Glucose 243 mg/dL      Comment: : [id number] English SethMeter ID: [id number]       POC Glucose Once [[id number]]  (Abnormal) Collected: 01/08/22 1154    Specimen: Blood Updated: 01/08/22 1205     Glucose 205 mg/dL      Comment: : [id number] English SethMeter ID: [id number]       Basic Metabolic Panel [[id number]]  (Abnormal) Collected: 01/08/22 0421    Specimen: Blood Updated: 01/08/22 0450     Glucose 178 mg/dL      BUN 37 mg/dL      Creatinine 1.68 mg/dL      Sodium 132 mmol/L      Potassium 4.7 mmol/L      Chloride 102 mmol/L      CO2 23.0 mmol/L      Calcium 8.4 mg/dL      eGFR Non African Amer 40 mL/min/1.73      BUN/Creatinine Ratio 22.0     Anion Gap 7.0 mmol/L     Narrative:      GFR Normal >60  Chronic Kidney Disease <60  Kidney Failure <15      CBC (No Diff) [[id number]]  (Abnormal) Collected: 01/08/22 0421    Specimen: Blood Updated: 01/08/22 0434     WBC 17.58 10*3/mm3      RBC 2.98 10*6/mm3      Hemoglobin 8.7 g/dL      Hematocrit 26.3 %      MCV 88.3 fL      MCH 29.2 pg      MCHC 33.1 g/dL      RDW 14.6 %      RDW-SD 47.1 fl      MPV 10.1 fL      Platelets 224 10*3/mm3     POC Glucose Once [[id number]]  (Normal) Collected: 01/07/22 1451    Specimen: Blood Updated: 01/07/22 1518      Glucose 128 mg/dL      Comment: : [id number] Ray (Darnall) KatelynMeter ID: [id number]       POC Glucose Once [[id number]]  (Normal) Collected: 01/07/22 1423    Specimen: Blood Updated: 01/07/22 1435     Glucose 123 mg/dL      Comment: : [id number] Ray (Darnall) KatelynMeter ID: [id number]       POC Glucose Once [[id number]]  (Normal) Collected: 01/07/22 1324    Specimen: Blood Updated: 01/07/22 1342     Glucose 102 mg/dL      Comment: : [id number] Ray (Darnall) KatelynMeter ID: [id number]       POC Glucose Once [[id number]]  (Normal) Collected: 01/07/22 1203    Specimen: Blood Updated: 01/07/22 1215     Glucose 113 mg/dL      Comment: : [id number] Gloria Mourater ID: [id number]       POC Glucose Once [[id number]]  (Normal) Collected: 01/07/22 1112    Specimen: Blood Updated: 01/07/22 1128     Glucose 127 mg/dL      Comment: : [id number] Ray (Darnall) KatelynMeter ID: [id number]       POC Glucose Once [[id number]]  (Abnormal) Collected: 01/07/22 1009    Specimen: Blood Updated: 01/07/22 1024     Glucose 146 mg/dL      Comment: : [id number] Ray (Darnall) KatelynMeter ID: [id number]       POC Glucose Once [[id number]]  (Abnormal) Collected: 01/07/22 0923    Specimen: Blood Updated: 01/07/22 0941     Glucose 149 mg/dL      Comment: : [id number] Ray (Darnall) KatelynMeter ID: [id number]       POC Glucose Once [[id number]]  (Abnormal) Collected: 01/07/22 0821    Specimen: Blood Updated: 01/07/22 0840     Glucose 175 mg/dL      Comment: : [id number] Ray (Darnall) KatelynMeter ID: [id number]       POC Glucose Once [[id number]]  (Abnormal) Collected: 01/07/22 0727    Specimen: Blood Updated: 01/07/22 0748     Glucose 174 mg/dL      Comment: : [id number] Ray (Darnall) SheanMeter ID: [id number]       POC Glucose Once [[id number]]  (Abnormal) Collected: 01/07/22 0618    Specimen: Blood Updated: 01/07/22 0629     Glucose 156 mg/dL      Comment: : [id number] Abad HernandezMeter ID: [id number]        POC Glucose Once [[id number]]  (Abnormal) Collected: 01/07/22 0459    Specimen: Blood Updated: 01/07/22 0510     Glucose 134 mg/dL      Comment: : [id number] Ativette HaleyMeter ID: [id number]       Renal Function Panel [[id number]]  (Abnormal) Collected: 01/07/22 0313    Specimen: Blood Updated: 01/07/22 0340     Glucose 141 mg/dL      BUN 29 mg/dL      Creatinine 1.72 mg/dL      Sodium 138 mmol/L      Potassium 4.8 mmol/L      Chloride 105 mmol/L      CO2 23.0 mmol/L      Calcium 8.8 mg/dL      Albumin 3.60 g/dL      Phosphorus 4.8 mg/dL      Anion Gap 10.0 mmol/L      BUN/Creatinine Ratio 16.9     eGFR Non African Amer 39 mL/min/1.73     Narrative:      GFR Normal >60  Chronic Kidney Disease <60  Kidney Failure <15      Magnesium [[id number]]  (Normal) Collected: 01/07/22 0313    Specimen: Blood Updated: 01/07/22 0340     Magnesium 2.1 mg/dL     Protime-INR [[id number]]  (Abnormal) Collected: 01/07/22 0313    Specimen: Blood Updated: 01/07/22 0332     Protime 14.3 Seconds      INR 1.15    POC Glucose Once [[id number]]  (Abnormal) Collected: 01/07/22 0316    Specimen: Blood Updated: 01/07/22 0327     Glucose 141 mg/dL      Comment: : [id number] Atkins HaleyMeter ID: [id number]       CBC & Differential [[id number]]  (Abnormal) Collected: 01/07/22 0313    Specimen: Blood Updated: 01/07/22 0325    Narrative:      The following orders were created for panel order CBC & Differential.  Procedure                               Abnormality         Status                     ---------                               -----------         ------                     CBC Auto Differential[[id number]]        Abnormal            Final result                 Please view results for these tests on the individual orders.    CBC Auto Differential [[id number]]  (Abnormal) Collected: 01/07/22 0313    Specimen: Blood Updated: 01/07/22 0325     WBC 20.57 10*3/mm3      RBC 3.41 10*6/mm3      Hemoglobin 10.0 g/dL      Hematocrit 29.7 %       MCV 87.1 fL      MCH 29.3 pg      MCHC 33.7 g/dL      RDW 13.9 %      RDW-SD 44.1 fl      MPV 9.7 fL      Platelets 263 10*3/mm3      Neutrophil % 91.8 %      Lymphocyte % 1.3 %      Monocyte % 6.0 %      Eosinophil % 0.3 %      Basophil % 0.1 %      Immature Grans % 0.5 %      Neutrophils, Absolute 18.85 10*3/mm3      Lymphocytes, Absolute 0.27 10*3/mm3      Monocytes, Absolute 1.24 10*3/mm3      Eosinophils, Absolute 0.07 10*3/mm3      Basophils, Absolute 0.03 10*3/mm3      Immature Grans, Absolute 0.11 10*3/mm3      nRBC 0.0 /100 WBC     Blood Gas, Arterial - [[id number]]  (Abnormal) Collected: 01/07/22 0158    Specimen: Arterial Blood Updated: 01/07/22 0200     Site Arterial Line     Donald's Test N/A     pH, Arterial 7.344 pH units      Comment: 84 Value below reference range        pCO2, Arterial 46.3 mm Hg      Comment: 83 Value above reference range        pO2, Arterial 60.1 mm Hg      Comment: 84 Value below reference range        HCO3, Arterial 25.2 mmol/L      Base Excess, Arterial -0.7 mmol/L      Comment: 84 Value below reference range        O2 Saturation, Arterial 91.5 %      Comment: 84 Value below reference range        Temperature 37.0 C      Barometric Pressure for Blood Gas 758 mmHg      Modality Ventilator     FIO2 30 %      Ventilator Mode PSV     PEEP 8.0     PSV 10.0 cmH2O      Collected by [id number]     Comment: Meter: [id number]     :  [id number]        pCO2, Temperature Corrected 46.3 mm Hg      pH, Temp Corrected 7.344 pH Units      pO2, Temperature Corrected 60.1 mm Hg     POC Glucose Once [[id number]]  (Normal) Collected: 01/07/22 0111    Specimen: Blood Updated: 01/07/22 0121     Glucose 116 mg/dL      Comment: : [id number] MobspireMeter ID: [id number]       POC Glucose Once [[id number]]  (Normal) Collected: 01/07/22 0006    Specimen: Blood Updated: 01/07/22 0017     Glucose 120 mg/dL      Comment: : [id number] 1DayLatereyMeter ID: [id number]       POC Glucose  Once [[id number]]  (Normal) Collected: 01/06/22 2314    Specimen: Blood Updated: 01/06/22 2325     Glucose 127 mg/dL      Comment: : [id number] PkVertical Acuity HaleyMeter ID: [id number]       POC Glucose Once [[id number]]  (Abnormal) Collected: 01/06/22 2207    Specimen: Blood Updated: 01/06/22 2218     Glucose 136 mg/dL      Comment: : [id number] Atkins HaleyMeter ID: [id number]       POC Glucose Once [[id number]]  (Abnormal) Collected: 01/06/22 2041    Specimen: Blood Updated: 01/06/22 2052     Glucose 157 mg/dL      Comment: : [id number] AtVertical Acuity HaleyMeter ID: [id number]       POC Glucose Once [[id number]]  (Normal) Collected: 01/06/22 1855    Specimen: Blood Updated: 01/06/22 1908     Glucose 114 mg/dL      Comment: : [id number] Tyrdavid RosemaryieMeter ID: [id number]       Renal Function Panel [[id number]]  (Abnormal) Collected: 01/06/22 1813    Specimen: Blood Updated: 01/06/22 1900     Glucose 104 mg/dL      BUN 26 mg/dL      Creatinine 1.54 mg/dL      Sodium 138 mmol/L      Potassium 5.1 mmol/L      Chloride 106 mmol/L      CO2 25.0 mmol/L      Calcium 9.4 mg/dL      Albumin 3.50 g/dL      Phosphorus 3.9 mg/dL      Anion Gap 7.0 mmol/L      BUN/Creatinine Ratio 16.9     eGFR Non African Amer 45 mL/min/1.73     Narrative:      GFR Normal >60  Chronic Kidney Disease <60  Kidney Failure <15      POC Glucose Once [[id number]]  (Normal) Collected: 01/06/22 1817    Specimen: Blood Updated: 01/06/22 1829     Glucose 104 mg/dL      Comment: : [id number] Juwan RileyieMeter ID: [id number]       CBC (No Diff) [[id number]]  (Abnormal) Collected: 01/06/22 1813    Specimen: Blood Updated: 01/06/22 1829     WBC 11.63 10*3/mm3      RBC 3.67 10*6/mm3      Hemoglobin 10.6 g/dL      Hematocrit 31.6 %      MCV 86.1 fL      MCH 28.9 pg      MCHC 33.5 g/dL      RDW 13.8 %      RDW-SD 43.2 fl      MPV 9.4 fL      Platelets 227 10*3/mm3     Blood Gas, Arterial - [[id number]]  (Abnormal) Collected: 01/06/22 1816    Specimen: Arterial  Blood Updated: 01/06/22 1820     Site Arterial Line     Donald's Test N/A     pH, Arterial 7.368 pH units      pCO2, Arterial 46.3 mm Hg      Comment: 83 Value above reference range        pO2, Arterial 85.9 mm Hg      HCO3, Arterial 26.6 mmol/L      Comment: 83 Value above reference range        Base Excess, Arterial 0.9 mmol/L      O2 Saturation, Arterial 97.5 %      Temperature 37.0 C      Barometric Pressure for Blood Gas 756 mmHg      Modality Ventilator     FIO2 40 %      Ventilator Mode SIMV     Set Tidal Volume 600     Set Mech Resp Rate 20.0     PEEP 8.0     PSV 10.0 cmH2O      Collected by [id number]     Comment: Meter: [id number]     :  [id number]        pCO2, Temperature Corrected 46.3 mm Hg      pH, Temp Corrected 7.368 pH Units      pO2, Temperature Corrected 85.9 mm Hg     Blood Gas, Venous - [[id number]]  (Abnormal) Collected: 01/06/22 1816    Specimen: Venous Blood Updated: 01/06/22 1819     Site OTHER     pH, Venous 7.320 pH Units      pCO2, Venous 56.5 mm Hg      Comment: 83 Value above reference range        pO2, Venous 27.7 mm Hg      HCO3, Venous 29.1 mmol/L      Comment: 83 Value above reference range        Base Excess, Venous 2.0 mmol/L      O2 Saturation, Venous 50.1 %      Temperature 37.0 C      Barometric Pressure for Blood Gas 756 mmHg      Modality Ventilator     FIO2 40 %      Ventilator Mode SIMV     Set Tidal Volume 600     Set Mech Resp Rate 20.0     PEEP 8.0     PSV 10.0 cmH2O      Collected by [id number]     Comment: Meter: [id number]     :  [id number]       POC Glucose Once [[id number]]  (Normal) Collected: 01/06/22 1649    Specimen: Blood Updated: 01/06/22 1705     Glucose 102 mg/dL      Comment: : [id number] Juwan RileyieMeter ID: [id number]       Renal Function Panel [[id number]]  (Abnormal) Collected: 01/06/22 1453    Specimen: Blood Updated: 01/06/22 1521     Glucose 101 mg/dL      BUN 25 mg/dL      Creatinine 1.63 mg/dL      Sodium 140 mmol/L      Potassium  4.8 mmol/L      Chloride 108 mmol/L      CO2 27.0 mmol/L      Calcium 9.5 mg/dL      Albumin 3.30 g/dL      Phosphorus 2.9 mg/dL      Anion Gap 5.0 mmol/L      BUN/Creatinine Ratio 15.3     eGFR Non African Amer 42 mL/min/1.73     Narrative:      GFR Normal >60  Chronic Kidney Disease <60  Kidney Failure <15                CXR: Stable cardiac silhouette, possible right middle lobe atelectasis but is improving otherwise good pulmonary expansion    Assessment/Plan     Mr. Kamara is a 71-year-old male who is postop day 3 from CABG x4 maze and atrial clip procedure.  He has chronic kidney disease.  His creatinine is stable today.    Transfer to floor today  Start twice daily Lasix  Continue twice daily amiodarone  Out of bed is much as possible  Anticipate he will be ready for home in 1 to 2 days    Joaquin Lima M.D.  Cardiothoracic Surgeon

## 2022-01-09 NOTE — PLAN OF CARE
Goal Outcome Evaluation:              Outcome Summary: stood and ambulated 240' with CGA and 1 standing rest. SAT on 2lpm 95-92%.

## 2022-01-10 LAB
ALBUMIN SERPL-MCNC: 3.6 G/DL (ref 3.5–5.2)
ALBUMIN/GLOB SERPL: 1.3 G/DL
ALP SERPL-CCNC: 43 U/L (ref 39–117)
ALT SERPL W P-5'-P-CCNC: 7 U/L (ref 1–41)
ANION GAP SERPL CALCULATED.3IONS-SCNC: 7 MMOL/L (ref 5–15)
AST SERPL-CCNC: 19 U/L (ref 1–40)
BILIRUB SERPL-MCNC: 0.5 MG/DL (ref 0–1.2)
BUN SERPL-MCNC: 46 MG/DL (ref 8–23)
BUN/CREAT SERPL: 29.9 (ref 7–25)
CALCIUM SPEC-SCNC: 8.3 MG/DL (ref 8.6–10.5)
CHLORIDE SERPL-SCNC: 99 MMOL/L (ref 98–107)
CO2 SERPL-SCNC: 28 MMOL/L (ref 22–29)
CREAT SERPL-MCNC: 1.54 MG/DL (ref 0.76–1.27)
DEPRECATED RDW RBC AUTO: 45.7 FL (ref 37–54)
ERYTHROCYTE [DISTWIDTH] IN BLOOD BY AUTOMATED COUNT: 13.9 % (ref 12.3–15.4)
GFR SERPL CREATININE-BSD FRML MDRD: 45 ML/MIN/1.73
GLOBULIN UR ELPH-MCNC: 2.8 GM/DL
GLUCOSE BLDC GLUCOMTR-MCNC: 127 MG/DL (ref 70–130)
GLUCOSE BLDC GLUCOMTR-MCNC: 167 MG/DL (ref 70–130)
GLUCOSE BLDC GLUCOMTR-MCNC: 169 MG/DL (ref 70–130)
GLUCOSE BLDC GLUCOMTR-MCNC: 176 MG/DL (ref 70–130)
GLUCOSE SERPL-MCNC: 174 MG/DL (ref 65–99)
HCT VFR BLD AUTO: 27 % (ref 37.5–51)
HGB BLD-MCNC: 9 G/DL (ref 13–17.7)
MCH RBC QN AUTO: 29.7 PG (ref 26.6–33)
MCHC RBC AUTO-ENTMCNC: 33.3 G/DL (ref 31.5–35.7)
MCV RBC AUTO: 89.1 FL (ref 79–97)
PLATELET # BLD AUTO: 265 10*3/MM3 (ref 140–450)
PMV BLD AUTO: 9.9 FL (ref 6–12)
POTASSIUM SERPL-SCNC: 4.8 MMOL/L (ref 3.5–5.2)
PROT SERPL-MCNC: 6.4 G/DL (ref 6–8.5)
RBC # BLD AUTO: 3.03 10*6/MM3 (ref 4.14–5.8)
SODIUM SERPL-SCNC: 134 MMOL/L (ref 136–145)
WBC NRBC COR # BLD: 11.13 10*3/MM3 (ref 3.4–10.8)

## 2022-01-10 PROCEDURE — 25010000002 ENOXAPARIN PER 10 MG: Performed by: SURGERY

## 2022-01-10 PROCEDURE — 94799 UNLISTED PULMONARY SVC/PX: CPT

## 2022-01-10 PROCEDURE — 97116 GAIT TRAINING THERAPY: CPT

## 2022-01-10 PROCEDURE — 80053 COMPREHEN METABOLIC PANEL: CPT | Performed by: NURSE PRACTITIONER

## 2022-01-10 PROCEDURE — 25010000002 FUROSEMIDE PER 20 MG: Performed by: SURGERY

## 2022-01-10 PROCEDURE — 82962 GLUCOSE BLOOD TEST: CPT

## 2022-01-10 PROCEDURE — 85027 COMPLETE CBC AUTOMATED: CPT | Performed by: NURSE PRACTITIONER

## 2022-01-10 PROCEDURE — 63710000001 INSULIN LISPRO (HUMAN) PER 5 UNITS: Performed by: SURGERY

## 2022-01-10 RX ADMIN — OXYCODONE AND ACETAMINOPHEN 1 TABLET: 325; 10 TABLET ORAL at 20:35

## 2022-01-10 RX ADMIN — ACETAMINOPHEN 650 MG: 325 TABLET ORAL at 13:07

## 2022-01-10 RX ADMIN — LEVOTHYROXINE SODIUM 200 MCG: 100 TABLET ORAL at 05:46

## 2022-01-10 RX ADMIN — METOPROLOL TARTRATE 25 MG: 25 TABLET, FILM COATED ORAL at 08:27

## 2022-01-10 RX ADMIN — BISACODYL 10 MG: 5 TABLET ORAL at 08:28

## 2022-01-10 RX ADMIN — BISACODYL 10 MG: 5 TABLET ORAL at 20:35

## 2022-01-10 RX ADMIN — FUROSEMIDE 20 MG: 10 INJECTION, SOLUTION INTRAVENOUS at 08:28

## 2022-01-10 RX ADMIN — ASPIRIN 81 MG: 81 TABLET, COATED ORAL at 08:28

## 2022-01-10 RX ADMIN — POLYETHYLENE GLYCOL 3350 17 G: 17 POWDER, FOR SOLUTION ORAL at 08:29

## 2022-01-10 RX ADMIN — METOPROLOL TARTRATE 25 MG: 25 TABLET, FILM COATED ORAL at 20:35

## 2022-01-10 RX ADMIN — POTASSIUM CHLORIDE 20 MEQ: 10 CAPSULE, COATED, EXTENDED RELEASE ORAL at 08:28

## 2022-01-10 RX ADMIN — AMIODARONE HYDROCHLORIDE 400 MG: 200 TABLET ORAL at 17:22

## 2022-01-10 RX ADMIN — LIDOCAINE 2 PATCH: 50 PATCH CUTANEOUS at 08:29

## 2022-01-10 RX ADMIN — ATORVASTATIN CALCIUM 20 MG: 10 TABLET, FILM COATED ORAL at 20:35

## 2022-01-10 RX ADMIN — OXYCODONE HYDROCHLORIDE AND ACETAMINOPHEN 1 TABLET: 5; 325 TABLET ORAL at 05:46

## 2022-01-10 RX ADMIN — FUROSEMIDE 20 MG: 10 INJECTION, SOLUTION INTRAVENOUS at 17:22

## 2022-01-10 RX ADMIN — ALLOPURINOL 100 MG: 100 TABLET ORAL at 08:29

## 2022-01-10 RX ADMIN — AMIODARONE HYDROCHLORIDE 400 MG: 200 TABLET ORAL at 08:27

## 2022-01-10 RX ADMIN — GABAPENTIN 400 MG: 400 CAPSULE ORAL at 20:35

## 2022-01-10 RX ADMIN — ACETAMINOPHEN 650 MG: 325 TABLET ORAL at 21:36

## 2022-01-10 RX ADMIN — OXYCODONE AND ACETAMINOPHEN 1 TABLET: 325; 10 TABLET ORAL at 00:37

## 2022-01-10 RX ADMIN — CLOPIDOGREL 75 MG: 75 TABLET, FILM COATED ORAL at 17:22

## 2022-01-10 RX ADMIN — IPRATROPIUM BROMIDE AND ALBUTEROL SULFATE 3 ML: 2.5; .5 SOLUTION RESPIRATORY (INHALATION) at 05:53

## 2022-01-10 RX ADMIN — ENOXAPARIN SODIUM 40 MG: 40 INJECTION SUBCUTANEOUS at 08:43

## 2022-01-10 RX ADMIN — INSULIN LISPRO 2 UNITS: 100 INJECTION, SOLUTION INTRAVENOUS; SUBCUTANEOUS at 13:06

## 2022-01-10 RX ADMIN — POTASSIUM CHLORIDE 20 MEQ: 10 CAPSULE, COATED, EXTENDED RELEASE ORAL at 17:22

## 2022-01-10 RX ADMIN — GABAPENTIN 400 MG: 400 CAPSULE ORAL at 08:43

## 2022-01-10 RX ADMIN — INSULIN LISPRO 2 UNITS: 100 INJECTION, SOLUTION INTRAVENOUS; SUBCUTANEOUS at 08:43

## 2022-01-10 RX ADMIN — CITALOPRAM 20 MG: 20 TABLET, FILM COATED ORAL at 08:27

## 2022-01-10 NOTE — PLAN OF CARE
Goal Outcome Evaluation:  Plan of Care Reviewed With: patient        Progress: no change  Outcome Summary: VSS, c/o chest and back pain overnight, medicated per mar with frequent position changes for relief, ambulated x2 this shift, moderate BM x1, monitor for changes.

## 2022-01-10 NOTE — PLAN OF CARE
Goal Outcome Evaluation:              Outcome Summary: Patient stands and ambulates 250' with CGA and 1 standing Rest. RA SAT 92%. Having difficulty advancing didtance as he fatigues. Also patient is not able to lean forward in chair and requires min assist . Worked on this but just can't lean forward. Will continue to benefit from safety education and increased ambulation.

## 2022-01-10 NOTE — ADDENDUM NOTE
Addendum  created 01/10/22 1247 by Zaheer Christian, CRNA    Intraprocedure Meds edited       

## 2022-01-10 NOTE — PLAN OF CARE
Goal Outcome Evaluation:  Plan of Care Reviewed With: patient, spouse, daughter        Progress: improving  Outcome Summary: VSS, NSR, All incisions D/C/I, Pt is now on RA sat's now WNL, ambulate x 4, IS improved to 1000 with lots of encouragement. Hoping to dc in the next few days, safety maintained

## 2022-01-10 NOTE — PROGRESS NOTES
"Coronary artery bypass graft x4, left lower extremity endoscopic vein harvest, bilateral Maze procedure with radiofrequency and cryoablation, left atrial appendage exclusion with 40 mm AtriClip    POD 4    Sitting up in the chair, eating breakfast. On 1 liter nasal cannula with O2 sat 92%. IS ~ 750-1000 ml with encouragement. Has walked once in the hallway this morning. Abdomen less tight and distended. (+) BM yesterday. Rates pain 5/10. Weight down 5 pounds today. Daughter present.     Telemetry: sinus 71-92 bpm     Visit Vitals  /52 (BP Location: Left arm, Patient Position: Lying)   Pulse 71   Temp 97.8 °F (36.6 °C) (Oral)   Resp 16   Ht 177.8 cm (70\")   Wt 116 kg (256 lb 12.8 oz)   SpO2 96%   BMI 36.85 kg/m²       Intake/Output Summary (Last 24 hours) at 1/10/2022 0911  Last data filed at 1/10/2022 0600  Gross per 24 hour   Intake 60 ml   Output 1485 ml   Net -1425 ml     Left Charles drain: 70 ml/24 hours     Physical Exam:  General: No apparent distress.  Up in the chair.    Cardiovascular: Regular rate and rhythm without murmur, rubs, or gallops.  Pulmonary: Clear to auscultation bilaterally without wheezing, rubs, or rales.  Bases diminished.    Chest: Sternotomy incision clean, dry, and intact with Hypafix dressing. Sternum stable. No clicks. Charles drain x 1 with dressing clean, dry, and intact.   Abdomen: Soft, mildly distended and nontender.  Obese.    Extremities: Warm, moves all extremities. Saphenectomy site clean, dry, and intact with hypafix. Mild LE edema.   Neurologic: Grossly intact with no focal deficits.         Impression:  Coronary artery disease  Paroxysmal atrial fibrillation, current NSR   Acquired coagulation factor deficiency   Chronic pain syndrome   class II severe obesity  Stage III chronic kidney disease  Bilateral carotid artery stenosis      Plan:  CBC, CMP today stat  Continue diuresis, wean supplemental O2  Encourage pulmonary toilet and ambulation  Routine post cardiac surgery " regimen  DW patient, daughter and nursing  Anticipate DC home once off supplemental oxygen

## 2022-01-10 NOTE — PLAN OF CARE
Goal Outcome Evaluation:              Outcome Summary: Patient leaned forward and stood this afternoon without cues. CGA to [id number] with 1 standing rest. RA SAT 95%. Reviewed sternal precautions. Worked on incentive spirometer and coughed up good amount of sputum. Will continue to benefit from increased walking.

## 2022-01-11 ENCOUNTER — APPOINTMENT (OUTPATIENT)
Dept: GENERAL RADIOLOGY | Facility: HOSPITAL | Age: 72
End: 2022-01-11

## 2022-01-11 VITALS
RESPIRATION RATE: 16 BRPM | HEIGHT: 70 IN | SYSTOLIC BLOOD PRESSURE: 125 MMHG | WEIGHT: 251.3 LBS | BODY MASS INDEX: 35.98 KG/M2 | HEART RATE: 70 BPM | TEMPERATURE: 98 F | OXYGEN SATURATION: 95 % | DIASTOLIC BLOOD PRESSURE: 50 MMHG

## 2022-01-11 PROBLEM — N18.30 TYPE 2 DIABETES MELLITUS WITH STAGE 3 CHRONIC KIDNEY DISEASE: Status: ACTIVE | Noted: 2022-01-11

## 2022-01-11 PROBLEM — E11.22 TYPE 2 DIABETES MELLITUS WITH STAGE 3 CHRONIC KIDNEY DISEASE: Status: ACTIVE | Noted: 2022-01-11

## 2022-01-11 PROBLEM — K58.9 IBS (IRRITABLE BOWEL SYNDROME): Status: ACTIVE | Noted: 2022-01-11

## 2022-01-11 LAB
ANION GAP SERPL CALCULATED.3IONS-SCNC: 7 MMOL/L (ref 5–15)
BUN SERPL-MCNC: 48 MG/DL (ref 8–23)
BUN/CREAT SERPL: 34 (ref 7–25)
CALCIUM SPEC-SCNC: 8.1 MG/DL (ref 8.6–10.5)
CHLORIDE SERPL-SCNC: 99 MMOL/L (ref 98–107)
CO2 SERPL-SCNC: 30 MMOL/L (ref 22–29)
CREAT SERPL-MCNC: 1.41 MG/DL (ref 0.76–1.27)
DEPRECATED RDW RBC AUTO: 46.3 FL (ref 37–54)
ERYTHROCYTE [DISTWIDTH] IN BLOOD BY AUTOMATED COUNT: 13.9 % (ref 12.3–15.4)
GFR SERPL CREATININE-BSD FRML MDRD: 49 ML/MIN/1.73
GLUCOSE BLDC GLUCOMTR-MCNC: 161 MG/DL (ref 70–130)
GLUCOSE BLDC GLUCOMTR-MCNC: 179 MG/DL (ref 70–130)
GLUCOSE SERPL-MCNC: 156 MG/DL (ref 65–99)
HCT VFR BLD AUTO: 29.8 % (ref 37.5–51)
HGB BLD-MCNC: 9.5 G/DL (ref 13–17.7)
MCH RBC QN AUTO: 29.1 PG (ref 26.6–33)
MCHC RBC AUTO-ENTMCNC: 31.9 G/DL (ref 31.5–35.7)
MCV RBC AUTO: 91.1 FL (ref 79–97)
PLATELET # BLD AUTO: 324 10*3/MM3 (ref 140–450)
PMV BLD AUTO: 9.4 FL (ref 6–12)
POTASSIUM SERPL-SCNC: 5.1 MMOL/L (ref 3.5–5.2)
RBC # BLD AUTO: 3.27 10*6/MM3 (ref 4.14–5.8)
SODIUM SERPL-SCNC: 136 MMOL/L (ref 136–145)
WBC NRBC COR # BLD: 13.02 10*3/MM3 (ref 3.4–10.8)

## 2022-01-11 PROCEDURE — 94799 UNLISTED PULMONARY SVC/PX: CPT

## 2022-01-11 PROCEDURE — 71046 X-RAY EXAM CHEST 2 VIEWS: CPT

## 2022-01-11 PROCEDURE — 85027 COMPLETE CBC AUTOMATED: CPT | Performed by: NURSE PRACTITIONER

## 2022-01-11 PROCEDURE — 25010000002 FUROSEMIDE PER 20 MG: Performed by: THORACIC SURGERY (CARDIOTHORACIC VASCULAR SURGERY)

## 2022-01-11 PROCEDURE — 80048 BASIC METABOLIC PNL TOTAL CA: CPT | Performed by: NURSE PRACTITIONER

## 2022-01-11 PROCEDURE — 97116 GAIT TRAINING THERAPY: CPT

## 2022-01-11 PROCEDURE — 82962 GLUCOSE BLOOD TEST: CPT

## 2022-01-11 PROCEDURE — 63710000001 INSULIN LISPRO (HUMAN) PER 5 UNITS: Performed by: THORACIC SURGERY (CARDIOTHORACIC VASCULAR SURGERY)

## 2022-01-11 PROCEDURE — 25010000002 ENOXAPARIN PER 10 MG: Performed by: SURGERY

## 2022-01-11 RX ORDER — LISINOPRIL 5 MG/1
5 TABLET ORAL DAILY
Qty: 30 TABLET | Refills: 2 | Status: SHIPPED | OUTPATIENT
Start: 2022-01-11 | End: 2022-02-11 | Stop reason: SDUPTHER

## 2022-01-11 RX ORDER — FUROSEMIDE 10 MG/ML
20 INJECTION INTRAMUSCULAR; INTRAVENOUS
Status: DISCONTINUED | OUTPATIENT
Start: 2022-01-11 | End: 2022-01-11 | Stop reason: HOSPADM

## 2022-01-11 RX ORDER — AMIODARONE HYDROCHLORIDE 200 MG/1
400 TABLET ORAL DAILY
Qty: 60 TABLET | Refills: 0 | Status: SHIPPED | OUTPATIENT
Start: 2022-01-11 | End: 2022-02-11 | Stop reason: SDUPTHER

## 2022-01-11 RX ORDER — LISINOPRIL 5 MG/1
5 TABLET ORAL
Status: DISCONTINUED | OUTPATIENT
Start: 2022-01-11 | End: 2022-01-11 | Stop reason: HOSPADM

## 2022-01-11 RX ADMIN — ACETAMINOPHEN 650 MG: 325 TABLET ORAL at 05:52

## 2022-01-11 RX ADMIN — LISINOPRIL 5 MG: 5 TABLET ORAL at 13:33

## 2022-01-11 RX ADMIN — POTASSIUM CHLORIDE 20 MEQ: 10 CAPSULE, COATED, EXTENDED RELEASE ORAL at 09:34

## 2022-01-11 RX ADMIN — METOPROLOL TARTRATE 25 MG: 25 TABLET, FILM COATED ORAL at 09:35

## 2022-01-11 RX ADMIN — CITALOPRAM 20 MG: 20 TABLET, FILM COATED ORAL at 09:34

## 2022-01-11 RX ADMIN — BISACODYL 10 MG: 5 TABLET ORAL at 09:35

## 2022-01-11 RX ADMIN — ALLOPURINOL 100 MG: 100 TABLET ORAL at 09:35

## 2022-01-11 RX ADMIN — AMIODARONE HYDROCHLORIDE 400 MG: 200 TABLET ORAL at 09:34

## 2022-01-11 RX ADMIN — INSULIN LISPRO 2 UNITS: 100 INJECTION, SOLUTION INTRAVENOUS; SUBCUTANEOUS at 09:31

## 2022-01-11 RX ADMIN — LEVOTHYROXINE SODIUM 200 MCG: 100 TABLET ORAL at 05:51

## 2022-01-11 RX ADMIN — ENOXAPARIN SODIUM 40 MG: 40 INJECTION SUBCUTANEOUS at 09:34

## 2022-01-11 RX ADMIN — GABAPENTIN 400 MG: 400 CAPSULE ORAL at 09:43

## 2022-01-11 RX ADMIN — ASPIRIN 81 MG: 81 TABLET, COATED ORAL at 09:34

## 2022-01-11 RX ADMIN — FUROSEMIDE 20 MG: 10 INJECTION, SOLUTION INTRAVENOUS at 09:34

## 2022-01-11 RX ADMIN — POLYETHYLENE GLYCOL 3350 17 G: 17 POWDER, FOR SOLUTION ORAL at 09:36

## 2022-01-11 NOTE — PLAN OF CARE
Goal Outcome Evaluation:           Progress: no change  Outcome Summary: VSS.  S 67-79 BBB PVC Coup.  Pt remains on room air.  Patient requires lots of encouragement from nurising staff for everything.  He is doing better and does want to ho home.  Cont. to monitor.  call for concerns.

## 2022-01-11 NOTE — DISCHARGE SUMMARY
[facility] Cardiothoracic Surgery  DISCHARGE SUMMARY        Date of Admission: 1/6/2022  Date of Discharge:  1/11/2022  Primary Care Physician: Frankie Bradley APRN    Discharge Diagnoses:  Active Hospital Problems    Diagnosis    • **Coronary artery disease involving native coronary artery of native heart with unstable angina pectoris (HCC)      Added automatically from request for surgery [id number]     • Type 2 diabetes mellitus with stage 3 chronic kidney disease (formerly [facility])    • IBS (irritable bowel syndrome)    • Acquired coagulation factor deficiency (formerly [facility])    • CKD (chronic kidney disease) stage 3, GFR 30-59 ml/min (formerly [facility])    • Chronic pain syndrome    • Coronary artery disease involving native heart    • Bilateral carotid artery stenosis      Dr. León following  8/21/2019 US - less than 50% R ICA, 50-69% L ICA stenosis  8/2021 - less than 50% L&R ICA     • Mixed hyperlipidemia    • Sleep apnea      Previously noncompliant with CPAP     • Paroxysmal atrial fibrillation (formerly [facility])      [id number] 3 = high risk  HAS-BLED 3 = high risk     • Class 2 severe obesity due to excess calories with serious comorbidity and body mass index (BMI) of 37.0 to 37.9 in adult (formerly [facility])    • Hypertension    • History of prostate cancer      Procedures Performed: Coronary artery bypass graft x 4 (left internal mammary artery/sequencing the dominant diagonal artery and left anterior descending artery, reverse saphenous vein graft/distal right coronary artery, and reverse saphenous vein graft/posterior descending artery), Right and left MAZE procedure with radiofrequency and cryoablation, Left atrial appendage exclusion (Atriclip 40 mm device), Left endoscopic vein harvest performed on 1/6/2022 by Dr. White.     HPI:  Mr. Zay Kamara is a 71 y.o. male history of paroxysmal atrial fibrillation, carotid artery disease, chronic neck pain, diabetes well controlled with hemoglobin A1c 6.7%, obesity,  hypertension, hyperlipidemia, IBS and CKD stage III who presents for evaluation for CABG.  He has had increasing and new chest pain recently.  LHC was performed by Dr. Fitzpatrick identifying complex multiple vessel disease.  He has been advised to consider CABG and MAZE with LAAE after a full assessment and workup. He is an elevated but acceptable risk candidate. He stopped oral anticoagulation the week prior.      Hospital Course: Mr. Kamara was admitted on the morning of surgery on 1/6/2022. Please see a separate op note. Following surgery, he was transferred to the ICU in stable guarded condition. He remained hemodynamically stable. He was extubated overnight and demonstrated a neurologically intact exam. On post op day 1, he was up to the chair and ambulating with PT. He required O2 at 6 liters per minute. Pulmonary toilet was pushed. Multimodality pain control strategies were utilized. Low dose neosynephrine gtt was weaned off per routine. Mediastinal tubes were removed on post op day 2. He was started on diuresis. On post op day 3, he was transferred to  for continued recovery. The remaining stay of his hospitalization was remarkable for continued diuresis, encouraging pulmonary toilet and ambulation. Charles drain was removed on post op day 5. He is saturating appropriately on room air. He is 1 pound below baseline weight. He has remained in normal sinus rhythm during the duration of his hospitalization. He meets criteria for discharge home on post op day 5 and is agreeable to do so with his spouse.      Condition on Discharge:  Neurologically intact and has good pain control.  He is eating well and has demonstrable good bowel function.  The sternum is stable without clicks and the saphenectomy incisions are healing nicely.  The heart is in normal sinus rhythm.  He has met all physical therapy criteria and verbalizes understanding of sternotomy precautions.   He verbalizes understanding of a separately handed out  cardiac surgery handout.       Discharge Disposition:  Home     Discharge Medications:     Discharge Medications      New Medications      Instructions Start Date   amiodarone 400 MG tablet  Commonly known as: PACERONE   400 mg, Oral, Daily      metoprolol tartrate 25 MG tablet  Commonly known as: LOPRESSOR   25 mg, Oral, Every 12 Hours Scheduled         Changes to Medications      Instructions Start Date   apixaban 5 MG tablet tablet  Commonly known as: ELIQUIS  What changed: additional instructions   5 mg, Oral, 2 Times Daily      lisinopril 5 MG tablet  Commonly known as: PRINIVILZESTRIL  What changed:   · medication strength  · how much to take   5 mg, Oral, Daily         Continue These Medications      Instructions Start Date   allopurinol 100 MG tablet  Commonly known as: ZYLOPRIM   100 mg, Oral, Daily      aspirin 81 MG EC tablet   81 mg, Oral, Daily      atorvastatin 20 MG tablet  Commonly known as: LIPITOR   20 mg, Oral, Daily      citalopram 20 MG tablet  Commonly known as: CeleXA   20 mg, Oral, Daily      gabapentin 400 MG capsule  Commonly known as: NEURONTIN   400 mg, Oral, 3 Times Daily, Can take up to TID but only takes BID due to side effects - sleepiness      glipizide 5 MG tablet  Commonly known as: GLUCOTROL   5 mg, Oral, Daily      HYDROcodone-acetaminophen 7.5-325 MG per tablet  Commonly known as: NORCO   1 tablet, Oral, Every 8 Hours PRN, Usually takes twice a day      levothyroxine 200 MCG tablet  Commonly known as: SYNTHROID, LEVOTHROID   200 mcg, Oral, Daily      nitroglycerin 0.4 MG SL tablet  Commonly known as: NITROSTAT   0.4 mg, Sublingual, Every 5 Minutes PRN      tobramycin 0.3 % solution ophthalmic solution   1 drop, Both Eyes, Every 8 Hours Scheduled      Vitamin D3 50 MCG (2000 UT) tablet   2,000 Units, Oral, Daily         Stop These Medications    amLODIPine 2.5 MG tablet  Commonly known as: NORVASC     metoprolol succinate XL 25 MG 24 hr tablet  Commonly known as: TOPROL-XL         He has pre-existing pain prescription for Norco from pain management physician therefore will not be discharged home with additional prescriptions.   On moderate intensity statin therapy due to concurrent use of amiodarone.     Discharge Diet: No concentrated sweets diet with an effort to maintain acceptable glycemic control.      Discharge Care Plan / Instructions: Please see the separately handed out cardiac surgery handout. Cardiac rehab deferred at this time due to sternotomy precautions-will reassess at formal office visit.     Activity at Discharge:   No heavy lifting greater than 5 pounds or a gallon of milk while maintaining sternal precautions.  Zay Kamara has been instructed on an exercise regimen as detailed in a handed out cardiac surgery handout.    Tobacco:  The patient does not use tobacco products and therefore does not need tobacco cessation education.    BMI: Patient's Body mass index is 36.06 kg/m². indicating that he is obese (BMI >30). Obesity-related health conditions include the following: hypertension, coronary heart disease, diabetes mellitus and dyslipidemias. Obesity is unchanged. BMI is is above average; BMI management plan is completed. We discussed portion control and increasing exercise.    Follow-up Appointments: Zay Kamara  is requested to see Frankie Bradley APRN within 1-2 weeks from time of discharge, to see Sanjuana SMITH in 1 week, to follow-up with Dr. White in 4 weeks,  and to follow-up with Dr. Fitzpatrick of the cardiology service in 6 weeks.

## 2022-01-11 NOTE — PLAN OF CARE
Goal Outcome Evaluation:  Plan of Care Reviewed With: patient, spouse        Progress: no change  Outcome Summary: pt in chair, declined to practice bed mobility, pt and wife feel comfortable about bed mobility, did request to perform stair training, pt performed cardiac protocol warm up ex x 20reps, sit-stand sba, pt amb 300 feet, stair training up/down 5 steps with one handrail cga, trans back to chair sba, performed cool down ex

## 2022-01-11 NOTE — OP NOTE
Patient Name:  Zay Kamara  Date of Procedure:  1/6/2022    Surgeon:  Chivo White MD      Procedures Performed:          1. Coronary artery bypass grafting-4 vessel (left internal mammary artery/sequencing the dominant diagonal artery and left anterior descending artery, reverse saphenous vein graft/distal right coronary artery, and reverse saphenous vein graft/posterior descending artery)          2. Right and left MAZE procedure with radiofrequency and cryoablation          3. Left atrial appendage exclusion (Atriclip 40 mm device)          4. Left endoscopic vein harvest      Preoperative Diagnosis:     Coronary artery disease  Paroxysmal atrial fibrillation  Obesity, Class II, BMI 36      Postoperative Diagnosis:  Same     Assistants:  Ramya Vivar  Anesthesia Staff:  Anesthesiologist: Viviana Vinson MD  CRNA: Krystian Landers CRNA  Student Nurse Anesthetist: Chivo Frederick SRNA  Anesthesia Type:  General    Estimated Blood Loss  Minimal (Cell Saver)  Drains:          1. 19 Somali Charles drain-left pleural space          2. 24 Somali Charles drains-anterior and posterior mediastinum    Specimens:   None     Operative findings:  Excellent arterial conduit. Good-excellent venous conduit. The diagonal artery at site of grafting measured 1.5 mm in size and had moderate atherosclerotic disease burden.  Post bypass grafting had excellent arterial runoff.  The LAD at site of grafting measured 2.3 mm in size and had mild atherosclerotic disease burden.  Post bypass grafting had excellent arterial runoff. The dRCA at site of grafting measured 2 mm in size and had mild atherosclerotic disease burden.  Post bypass grafting had excellent arterial runoff.  The posterior descending artery measured 1.8 mm in size and had excellent arterial runoff with mild atherosclerotic disease burden.  Transesophageal echocardiography salient findings include preserved left ventricular function with no significant  valvular dysfunction.  The left atrial appendage was satisfactorily excluded.  He was in NSR at the completion of the case       Total aortic cross-clamp time:  89 minutes  Total cardiac bypass time:  168 minutes     Operative Description in Detail:  The patient was taken to the operative suite where he  was placed in a supine position.  Induction of general anesthesia and placement of a single-lumen endotracheal tube was performed without remark.  Appropriate arterial and venous access was established without remark.  Through the previously placed right internal jugular central venous line, a pulmonary artery catheter was floated into position.  The patient was then prepped and draped in the usual and sterile surgical fashion . A timeout was performed.  Perioperative antibiotics were administered.  Beta blocker was given.  A two team approach was utilized to harvest both the left internal mammary artery and the left greater saphenous vein.  Briefly the greater saphenous vein was taken at the level of the knee medially and taken in a prograde fashion for an anticipated length of vein to the fossa ovalis.  Blunt dissection was performed and each branch was taken using the [employer] device.   A counter stab incision was made with both proximal and distal control obtained.  This was extracted from a hemostatic tunnel.  The leg was closed in a layered fashion with Vicryl suture.  The vein was prepared without remark.    While the vein was being harvested,  median sternotomy was performed by me. Pericardial fat in midline from the level of the innominate vein to the level of the diaphragm was divided in midline.  A Rultract device was used to expose the posterior sternal table.  The left internal mammary artery was taken down using a standard pedicle technique with a combination of electrocautery and/or clips to control all side branches.  At that time, he was systemically anticoagulated with IV heparin.  A 19 German  Charles drain was placed in the left pleural space.  After suitable time of circulating heparin, clips were placed doubly onto the mammary artery distally and it was divided proximal to the previously placed clips.  It had excellent arterial inflow.  The mammary artery was controlled distally.  The mammary artery harvest bed was hemostatic.  The Rultract device was removed from the sterile field and a Genesse retractor was used for exposure.  The mammary artery was prepared for bypass grafting and deemed excellent.  A pericardial well was created.  I elected to cannulate the heart centrally accessing distally the ascending aorta and the right atrial appendage.  Each cannula was placed in continuity with the appropriate pump line.  Retrograde autologous prime was completed as indicated.  A combined cardioplegia/aortic root vent set was secured with a horizontal mattress 4-0 Prolene suture.  With an appropriate ACT and all in readiness, cardiopulmonary bypass was commenced.    With that and good drainage of the heart, I proceeded to dissect out circumferentially the right superior and right inferior pulmonary veins.  The pericardial reflection was also opened widely.  The AtriCure radiofrequency clamp was introduced onto the field and 3 paired separate ablation lesions were performed onto a cuff of left atrium adjacent to the orifices of the pulmonary veins with confirmation of transmurality using the 's algorithm.  With that the ligament of Peter was fully divided. The left superior and inferior pulmonary veins were isolated once again and in a mirrored fashion 3 additional pulmonary vein ablation lines were placed with confirmation of transmurality via  algorithm.  I dissected out the diagonal, dRCA, PDA, and the LAD for suitable sites for bypass grafting.   With that, I proceeded to apply the aortic cross-clamp and administered regular cardioplegia antegrade warm until electrical-mechanical  arrest was achieved.  The remainder of cardioplegia was administered cold antegrade.  We implement systemic mild hypothermia. Topical hypothermia was also implemented.  At appropriate intervals, throughout the remainder of the case, cardioplegia was administered.    The SVC was mobilized while being mindful of the lay of the phrenic nerve.  The transverse sinus was widely opened.  Using a cryo probe from Atricure system, superior and inferior box lesions were performed using a 3-minute protocol per ablation line.  Pratt of the left atrial appendage was opened.  An RF clamp was applied to perform the left atrial ablation line.  This was placed in continuity with the box lesion set.   The left atrial appendage was measured and excluded with an AtriCure clamp.    I directed my attention towards the PDA.  A coronary arteriotomy was made and augmented to size with De Santiago scissors.  It is as per operative findings. The anastomosis was constructed in an end-to-side orientation with running 7-0 Prolene suture.  Wth that its proximal anastomosis was  constructed following aortotomy with 11 blade and augmented size with 4 mm arterial punch subsequently.  This was constructed in a side aorta end vein graft fashion with running 6-0 Prolene suture. An AC  was placed.  The graft was assessed for lay which was excellent.  It is without tension or torsion.     To that end I directed my attention towards the dRCA.  A coronary arteriotomy was made and augmented to size with De Santiago scissors.  It is as per operative findings.  The anastomosis was constructed in an end-to-side orientation with running 7-0 Prolene suture.  Wth that its proximal anastomosis was constructed following aortotomy with 11 blade and augmented size with 4 mm arterial punch subsequently.  This was constructed in a side aorta end vein graft fashion with running 6-0 Prolene suture.  An AC  was placed.   The graft was assessed for lay which was excellent.   It is without tension or torsion.     During a dose of cardioplegia, a pericardial slit left lateral was made while dividing associate pericardiophrenic fat and vasculature while being mindful of the lay of the phrenic nerve.  After determining the anticipated lay of the mammary artery for planned sequential of a diagonal and LAD a coronary arteriotomy was made onto the diagonal artery as well as the TRISH.  This anastomosis was constructed with running 8-0 Prolene suture.  It is as per operative findings.  As such, the mammary artery was spatulated distally.  I grafted this onto the LAD following coronary arteriotomy using previous described techniques.  The anastomosis was constructed in an end-to-side orientation with running 7-0 Prolene suture.  It is as per operative findings and the anastomosis was hemostatic.  I did tack the mammary artery pedicle to the anterior aspect heart with 6-0 Prolene sutures.    With that being accomplished, a terminal hotshot was administered.  The patient was placed in trendelenburg position. Upon completion of terminal hotshot and placement of temporary epicardial pacing wires, with the aortic vent on high and pump flows diminished, the aortic cross-clamp was released.  With that, full support was implemented.  A perfusable rhythm spontaneously returned.  A nonworking beating phase was implemented.  During this time 3 additional epicardial ablation lines were placed onto the right atrium.  The SVC and IVC ablation lines were placed.  A right atrial appendage ablation line was also placed.  Each of these ablations were performed with cryoprobe using a 3-minute protocol.  Ventilation restored.  I surveyed each graft and each anastomosis was hemostatic and had excellent lay.  With all in readiness, the heart was allowed to fill.  De-aring maneuvers were performed as guided by transesophageal echocardiography.  With that the heart was decompressed and the aortic root vent/cardioplegia  cannula set was removed.  Its associated pursestring suture was tied securely and this was reinforced as per matter of routine. The table was now placed in neutral position.  With all in readiness, the patient proceeded to be weaned and separate from cardiopulmonary bypass.  I did decannulate the venous line and snared down its associated pursestring suture.  Systemic intravenous protamine was administered.  All associated blood volume was returned to the patient.  With continued good hemodynamics, I decannulate the arterial line and tied down it associated pursestring sutures.  At this time I did tie down the previously snared pursestring suture.  The mediastinum was drained with 24 Macanese Charles drains placed anteriorly and posteriorly.  I surveyed the chest and hemostasis was pristine.  With that I impregnated the sternal edges with vancomycin, thrombin, and Gelfoam paste.  The sternum was reapproximated with stainless sterile wires placed in an interrupted fashion.  In layers anatomically the soft tissue planes were reapproximated. Instruments, sharps, and sponge counts were reported as correct.      Complications: None     Disposition: Transferred to ICU in stable and guarded condition.

## 2022-01-11 NOTE — PROGRESS NOTES
Continued Stay Note  [facility]     Patient Name: Zay Kamara  MRN: [id number]  Today's Date: 1/11/2022    Admit Date: 1/6/2022     Discharge Plan     Row Name 01/11/22 1356       Plan    Plan Home    Patient/Family in Agreement with Plan yes    Final Discharge Disposition Code 01 - home or self-care    Final Note Patient is discharged today with no discharge planning needs / orders.                       Expected Discharge Date and Time     Expected Discharge Date Expected Discharge Time    Jan 11, 2022             LENA Soto

## 2022-01-11 NOTE — PROGRESS NOTES
"Coronary artery bypass graft x4, left lower extremity endoscopic vein harvest, bilateral Maze procedure with radiofrequency and cryoablation, left atrial appendage exclusion with 40 mm AtriClip    POD 5    Sitting up in the chair. On room air. IS ~1000 ml. Weight down 5 pounds and he is 1 pound below baseline weight. Appetite good, ate 100% of breakfast this morning.   Wife was present this morning and we talked a bit about post operative expectations in regards to ambulation and sternal restrictions.     Telemetry: sinus 67-79 bpm     Visit Vitals  /55 (BP Location: Left arm, Patient Position: Sitting)   Pulse 71   Temp 97.8 °F (36.6 °C) (Oral)   Resp 16   Ht 177.8 cm (70\")   Wt 114 kg (251 lb 4.8 oz)   SpO2 92%   BMI 36.06 kg/m²     Preoperative weight 252 pounds     Intake/Output Summary (Last 24 hours) at 1/11/2022 0914  Last data filed at 1/11/2022 0846  Gross per 24 hour   Intake 420 ml   Output 1445 ml   Net -1025 ml     Left Charles drain: 40 ml/24 hours           CXR: improved expansion and bibasilar atelectasis, no pneumothorax     Physical Exam:  General: No apparent distress.  Up in the chair.    Cardiovascular: Regular rate and rhythm without murmur, rubs, or gallops.  Pulmonary: Clear to auscultation bilaterally without wheezing, rubs, or rales.  Bases diminished.    Chest: Sternotomy incision clean, dry, and intact with Hypafix dressing. Sternum stable. No clicks. Charles drain x 1 with dressing clean, dry, and intact.   Abdomen: Soft, mildly distended and nontender.  Obese.    Extremities: Warm, moves all extremities. Saphenectomy site clean, dry, and intact with hypafix. Mild LE edema.   Neurologic: Grossly intact with no focal deficits.         Impression:  Coronary artery disease  Paroxysmal atrial fibrillation, current NSR   Acquired coagulation factor deficiency   Chronic pain syndrome   class II severe obesity  Stage III chronic kidney disease  Bilateral carotid artery stenosis      Plan:  DC " kalee drain  Encourage pulmonary toilet and ambulation  Routine post cardiac surgery regimen  DW patient, wife and nursing  Anticipate DC home later today

## 2022-01-11 NOTE — THERAPY TREATMENT NOTE
Acute Care - Physical Therapy Treatment Note  [facility]     Patient Name: Zay Kamara  : 1950  MRN: [id number]  Today's Date: 2022      Visit Dx:     ICD-10-CM ICD-9-CM   1. Coronary artery disease, unspecified vessel or lesion type, unspecified whether angina present, unspecified whether native or transplanted heart  I25.10 414.00   2. Decreased functional mobility and endurance  Z74.09 780.99   3. Impaired mobility  Z74.09 799.89     Patient Active Problem List   Diagnosis   • Hypogonadism in male   • ED (erectile dysfunction)   • Prostate cancer ([facility])   • Diabetes ([facility])   • Hypertension   • Disease of thyroid gland   • History of prostate cancer   • Leukocytosis   • Class 2 severe obesity due to excess calories with serious comorbidity and body mass index (BMI) of 37.0 to 37.9 in adult ([facility])   • Sensorineural hearing loss (SNHL) of both ears   • Asymmetric SNHL (sensorineural hearing loss)   • Sudden hearing loss, right   • Tinnitus of right ear   • Stress incontinence   • Bladder neck contracture   • Paroxysmal atrial fibrillation ([facility])   • Sleep apnea   • Chest pain   • Mixed hyperlipidemia   • Bilateral carotid artery stenosis   • Unstable angina ([facility])   • Coronary artery disease involving native coronary artery of native heart with unstable angina pectoris ([facility])   • Acquired coagulation factor deficiency ([facility])   • CKD (chronic kidney disease) stage 3, GFR 30-59 ml/min ([facility])   • Chronic pain syndrome   • Coronary artery disease involving native heart   • Type 2 diabetes mellitus with stage 3 chronic kidney disease ([facility])   • IBS (irritable bowel syndrome)     Past Medical History:   Diagnosis Date   • Arthritis    • Atrial fibrillation ([facility])     paroxysmal, on Xarelto   • BMI 31.0-31.9,adult 2018   • Carotid artery disease without cerebral infarction ([facility])    • Chronic knee pain    • Chronic neck and back pain    • Coronary artery disease 2022   • Depression    • Diabetes ([facility])    •  Disease of thyroid gland    • Gout    • Hypercholesteremia    • Hypertension    • IBS (irritable bowel syndrome)    • Kidney disease    • Osteoarthritis     back, neck, and knees - goes to Pain Management   • Prostate cancer ([facility])     Dr. Hong following   • Sensorineural hearing loss    • Sleep apnea     CPAP   • Sudden hearing loss    • Syncope    • Tinnitus    • Urine incontinence      Past Surgical History:   Procedure Laterality Date   • ATRIAL APPENDAGE EXCLUSION LEFT WITH TRANSESOPHAGEAL ECHOCARDIOGRAM Left 1/6/2022    Procedure: LEFT ATRIAL APPENDAGE EXCLUSION WITH  40MM ATRICLIP; TRANSESOPHAGEAL ECHOCARDIOGRAM;  Surgeon: Chivo White MD;  Location:  [facility] OR;  Service: Cardiothoracic;  Laterality: Left;   • BLADDER SUSPENSION N/A 11/26/2018    Procedure: CYSTOSCOPY BLADDER SUSPENSION MALE SLING;  Surgeon: Zaheer Rebolledo MD;  Location:  [facility] OR;  Service: Urology   • CARDIAC CATHETERIZATION N/A 12/30/2021    Procedure: Coronary angiography right radial to follow my 9:30 case;  Surgeon: Mike Fitzpatrick MD;  Location: [facility] CATH INVASIVE LOCATION;  Service: Cardiology;  Laterality: N/A;   • CHOLECYSTECTOMY     • COLONOSCOPY N/A 3/7/2017    Procedure: COLONOSCOPY WITH ANESTHESIA;  Surgeon: Hector Alvarenga MD;  Location: [facility] ENDOSCOPY;  Service:    • CORONARY ARTERY BYPASS GRAFT N/A 1/6/2022    Procedure: CORONARY ARTERY BYPASS GRAFT X 4 WITH LEFT INTERNAL MAMMARY ARTERY, LEFT LOWER EXTREMITY ENDOSCOPIC VEIN HARVEST, AND PERFUSION;  Surgeon: Chivo White MD;  Location:  [facility];  Service: Cardiothoracic;  Laterality: N/A;   • MAZE PROCEDURE N/A 1/6/2022    Procedure: BILATERAL MAZE PROCEDURE WITH RADIOFREQUENCY AND CRYOABLATION;  Surgeon: Chivo White MD;  Location:  [facility];  Service: Cardiothoracic;  Laterality: N/A;   • PROSTATECTOMY N/A 8/1/2017    Procedure: PROSTATECTOMY LAPAROSCOPIC WITH DAVINCI SI ROBOT ;  Surgeon: Hernesto Hong MD;  Location:  [facility] OR;  Service:    •  THYROID SURGERY      RADIATION THERAPY AFTER SURGERY     PT Assessment (last 12 hours)     PT Evaluation and Treatment     [facility] Name 01/11/22 0951 01/11/22 0859       Physical Therapy Time and Intention    Subjective Information complains of; pain; fatigue; weakness  - --    Document Type therapy note (daily note)  - --    Mode of Treatment physical therapy  - --    Session Not Performed -- other (see comments)  -    Comment, Session Not Performed -- gone for xray  -    Comment pt declined to practice bed mobility, pt and wife state they are comfortable about getting in/out bed  - --    [facility] Name 01/11/22 0951          General Information    Existing Precautions/Restrictions fall; sternal; cardiac  -[facility] Name 01/11/22 0951          Pain Scale: Numbers Pre/Post-Treatment    Pretreatment Pain Rating 5/10  -     Posttreatment Pain Rating 5/10  -     Pain Location - Orientation incisional  -     Pain Location chest  -[facility] Name 01/11/22 0951          Pain Scale: Word Pre/Post-Treatment    Pain Intervention(s) Medication (See MAR); Repositioned; Ambulation/increased activity  -[facility] Name 01/11/22 0951          Transfers    Sit-Stand [place] (Transfers) supervision  -     Stand-Sit [place] (Transfers) supervision  -AH     Row Name 01/11/22 0951          Gait/Stairs (Locomotion)    [place] Level (Gait) standby assist  -     Distance in Feet (Gait) 300  -     [place] Level (Stairs) contact guard; verbal cues  -     Handrail Location (Stairs) right side (ascending); left side (descending)  -     Number of Steps (Stairs) 5  -     Ascending Technique (Stairs) step-over-step  -     Descending Technique (Stairs) step-to-step  -[facility] Name             Wound 01/06/22 0942 Left leg Incision    Wound - Properties Group Placement Date: 01/06/22  -KR Placement Time: 0942  -KR Present on Hospital Admission: N  -KR Side: Left  -KR Location: leg  -KR Primary Wound  Type: Incision  -KR     Retired Wound - Properties Group Date first assessed: 01/06/22  -KR Time first assessed: 0942 -KR Present on Hospital Admission: N  -KR Side: Left  -KR Location: leg  -KR Primary Wound Type: Incision  -KR     Row Name             Wound 01/06/22 0943 anterior sternal Incision    Wound - Properties Group Placement Date: 01/06/22  -KR Placement Time: 0943  -KR Present on Hospital Admission: N  -KR Orientation: anterior  -KR Location: sternal  -KR Primary Wound Type: Incision  -KR     Retired Wound - Properties Group Date first assessed: 01/06/22  -KR Time first assessed: 0943  -KR Present on Hospital Admission: N  -KR Location: sternal  -KR Primary Wound Type: Incision  -KR     Row Name 01/11/22 0951          Plan of Care Review    Plan of Care Reviewed With patient; spouse  -     Progress no change  -     Outcome Summary pt in chair, declined to practice bed mobility, pt and wife feel comfortable about bed mobility, did request to perform stair training, pt performed cardiac protocol warm up ex x 20reps, sit-stand sba, pt amb 300 feet, stair training up/down 5 steps with one handrail cga, trans back to chair sba, performed cool down ex  -AH     Row Name 01/11/22 0951          Vital Signs    Pre SpO2 (%) 93  -AH     O2 Delivery Pre Treatment room air  -AH     Post SpO2 (%) 95  -AH     O2 Delivery Post Treatment room air  -AH     Row Name 01/11/22 0951          Positioning and Restraints    Pre-Treatment Position sitting in chair/recliner  -AH     Post Treatment Position chair  -AH     In Chair reclined; call light within reach; encouraged to call for assist  -           User Key  (r) = Recorded By, (t) = Taken By, (c) = Cosigned By    Initials Name Provider Type    Christine Beltrán PTA Physical Therapy Assistant    Jimmie Randolph --                Physical Therapy Education                 Title: PT OT SLP Therapies (In Progress)     Topic: Physical Therapy (In Progress)     Point:  Mobility training (Done)     Learning Progress Summary           Patient Acceptance, E,TB,D, VU,DU by  at 1/11/2022 1013    Comment: stair training    Acceptance, E, VU,NR by SB at 1/7/2022 0755    Comment: pt edu on cardiac protocol, sternal precautions, POC, benefits of act and d/c plans                   Point: Home exercise program (Not Started)     Learner Progress:  Not documented in this visit.          Point: Body mechanics (Done)     Learning Progress Summary           Patient Acceptance, E, VU,NR by SB at 1/7/2022 0755    Comment: pt edu on cardiac protocol, sternal precautions, POC, benefits of act and d/c plans                   Point: Precautions (Done)     Learning Progress Summary           Patient Acceptance, E,D, VU,DU by GETACHEW at 1/9/2022 0800    Comment: Reviewed sternal precautions    Acceptance, E, VU,NR by SB at 1/7/2022 0755    Comment: pt edu on cardiac protocol, sternal precautions, POC, benefits of act and d/c plans   Significant Other Acceptance, E,D, VU,DU by GETACHEW at 1/9/2022 0800    Comment: Reviewed sternal precautions                               User Key     Initials Effective Dates Name Provider Type Discipline     06/16/21 -  Christine Gallego PTA Physical Therapy Assistant PT    GETACHEW 08/02/16 -  Mariama Villanueva PTA Physical Therapy Assistant PT     06/16/21 -  Nadege Angulo, PT DPT Physical Therapist PT              PT Recommendation and Plan     Plan of Care Reviewed With: patient, spouse  Progress: no change  Outcome Summary: pt in chair, declined to practice bed mobility, pt and wife feel comfortable about bed mobility, did request to perform stair training, pt performed cardiac protocol warm up ex x 20reps, sit-stand sba, pt amb 300 feet, stair training up/down 5 steps with one handrail cga, trans back to chair sba, performed cool down ex       Time Calculation:    PT Charges     Row Name 01/11/22 1014             Time Calculation    Start Time 0951  -      Stop Time 1014   -      Time Calculation (min) 23 min  -      PT Received On 01/11/22  -              Time Calculation- PT    Total Timed Code Minutes- PT 23 minute(s)  -              Timed Charges    21292 - Gait Training Minutes  23  -AH              Total Minutes    Timed Charges Total Minutes 23  -AH       Total Minutes 23  -AH            User Key  (r) = Recorded By, (t) = Taken By, (c) = Cosigned By    Initials Name Provider Type     Christine Gallego PTA Physical Therapy Assistant              Therapy Charges for Today     Code Description Service Date Service Provider Modifiers Qty    08296411664  GAIT TRAINING EA 15 MIN 1/11/2022 Christine Gallego PTA GP 2          PT G-Codes  Outcome Measure Options: AM-PAC 6 Clicks Basic Mobility (PT)  AM-PAC 6 Clicks Score (PT): 16    Christine Gallego PTA  1/11/2022

## 2022-01-12 ENCOUNTER — READMISSION MANAGEMENT (OUTPATIENT)
Dept: CALL CENTER | Facility: HOSPITAL | Age: 72
End: 2022-01-12

## 2022-01-12 NOTE — OUTREACH NOTE
Prep Survey      Responses   Hindu facility patient discharged from? [facility]   Is LACE score < 7 ? No   Emergency Room discharge w/ pulse ox? No   Eligibility Readm Mgmt   Discharge diagnosis Coronary artery disease involving native coronary artery of native heart with unstable angina pectoris ([facility]CC)   Does the patient have one of the following disease processes/diagnoses(primary or secondary)? Other   Does the patient have Home health ordered? No   Is there a DME ordered? No   Prep survey completed? Yes          Amalia Sanders RN

## 2022-01-12 NOTE — THERAPY DISCHARGE NOTE
Acute Care - Physical Therapy Discharge Summary  [facility]       Patient Name: Zay Kamara  : 1950  MRN: [id number]    Today's Date: 2022                 Admit Date: 2022      PT Recommendation and Plan    Visit Dx:    ICD-10-CM ICD-9-CM   1. Coronary artery disease, unspecified vessel or lesion type, unspecified whether angina present, unspecified whether native or transplanted heart  I25.10 414.00   2. Decreased functional mobility and endurance  Z74.09 780.99   3. Impaired mobility  Z74.09 799.89                PT Rehab Goals     Row Name 22 0653             Bed Mobility Goal 1 (PT)    Activity/Assistive Device (Bed Mobility Goal 1, PT) sit to supine; supine to sit; rolling to right; rolling to left; bridging  -AB      [place] Level/Cues Needed (Bed Mobility Goal 1, PT) independent  -AB      Time Frame (Bed Mobility Goal 1, PT) long term goal (LTG)  -AB      Progress/Outcomes (Bed Mobility Goal 1, PT) goal not met  -AB              Transfer Goal 1 (PT)    Activity/Assistive Device (Transfer Goal 1, PT) sit-to-stand/stand-to-sit; bed-to-chair/chair-to-bed  -AB      [place] Level/Cues Needed (Transfer Goal 1, PT) independent  -AB      Time Frame (Transfer Goal 1, PT) long term goal (LTG)  -AB      Progress/Outcome (Transfer Goal 1, PT) goal not met  -AB              Gait Training Goal 1 (PT)    Activity/Assistive Device (Gait Training Goal 1, PT) gait (walking locomotion); increase endurance/gait distance; increase energy conservation; decrease fall risk  -AB      [place] Level (Gait Training Goal 1, PT) supervision required  -AB      Distance (Gait Training Goal 1, PT) 500 with one standing rest break  -AB      Time Frame (Gait Training Goal 1, PT) long term goal (LTG)  -AB      Progress/Outcome (Gait Training Goal 1, PT) goal not met  -AB              Stairs Goal 1 (PT)    Activity/Assistive Device (Stairs Goal 1, PT) stairs, all skills; ascending stairs; descending  stairs  -AB      [place] Level/Cues Needed (Stairs Goal 1, PT) supervision required  -AB      Number of Stairs (Stairs Goal 1, PT) 3  -AB      Time Frame (Stairs Goal 1, PT) long term goal (LTG)  -AB      Progress/Outcome (Stairs Goal 1, PT) goal not met  -AB              Problem Specific Goal 1 (PT)    Problem Specific Goal 1 (PT) Pt will maintain sternal precautions with all mobility  -AB      Time Frame (Problem Specific Goal 1, PT) long-term goal (LTG)  -AB      Progress/Outcome (Problem Specific Goal 1, PT) goal not met  -AB            User Key  (r) = Recorded By, (t) = Taken By, (c) = Cosigned By    Initials Name Provider Type Discipline    Abbey Phoenix, PTA Physical Therapy Assistant PT                    PT Discharge Summary  Anticipated Discharge Disposition (PT): home with assist, other (see comments)  Reason for Discharge: Discharge from facility  Outcomes Achieved: Refer to plan of care for updates on goals achieved  Discharge Destination: Home with assist      Abbey Ballard PTA   1/12/2022

## 2022-01-14 DIAGNOSIS — N18.30 TYPE 2 DIABETES MELLITUS WITH STAGE 3 CHRONIC KIDNEY DISEASE, WITHOUT LONG-TERM CURRENT USE OF INSULIN, UNSPECIFIED WHETHER STAGE 3A OR 3B CKD (HCC): ICD-10-CM

## 2022-01-14 DIAGNOSIS — E11.22 TYPE 2 DIABETES MELLITUS WITH STAGE 3 CHRONIC KIDNEY DISEASE, WITHOUT LONG-TERM CURRENT USE OF INSULIN, UNSPECIFIED WHETHER STAGE 3A OR 3B CKD (HCC): ICD-10-CM

## 2022-01-14 DIAGNOSIS — Z00.00 ROUTINE GENERAL MEDICAL EXAMINATION AT A HEALTH CARE FACILITY: ICD-10-CM

## 2022-01-14 DIAGNOSIS — E78.2 MIXED HYPERLIPIDEMIA: ICD-10-CM

## 2022-01-14 DIAGNOSIS — I10 ESSENTIAL HYPERTENSION: ICD-10-CM

## 2022-01-14 DIAGNOSIS — I10 ESSENTIAL HYPERTENSION: Primary | ICD-10-CM

## 2022-01-14 LAB
ALBUMIN SERPL-MCNC: 3.4 G/DL (ref 3.5–5.2)
ALP BLD-CCNC: 51 U/L (ref 40–130)
ALT SERPL-CCNC: 15 U/L (ref 5–41)
ANION GAP SERPL CALCULATED.3IONS-SCNC: 8 MMOL/L (ref 7–19)
AST SERPL-CCNC: 22 U/L (ref 5–40)
BASOPHILS ABSOLUTE: 0.1 K/UL (ref 0–0.2)
BASOPHILS RELATIVE PERCENT: 0.4 % (ref 0–1)
BILIRUB SERPL-MCNC: 0.4 MG/DL (ref 0.2–1.2)
BUN BLDV-MCNC: 31 MG/DL (ref 8–23)
CALCIUM SERPL-MCNC: 9.3 MG/DL (ref 8.8–10.2)
CHLORIDE BLD-SCNC: 99 MMOL/L (ref 98–111)
CHOLESTEROL, FASTING: 90 MG/DL (ref 160–199)
CO2: 30 MMOL/L (ref 22–29)
CREAT SERPL-MCNC: 1.2 MG/DL (ref 0.5–1.2)
CREATININE URINE: 79.6 MG/DL (ref 4.2–622)
EOSINOPHILS ABSOLUTE: 1.1 K/UL (ref 0–0.6)
EOSINOPHILS RELATIVE PERCENT: 7.4 % (ref 0–5)
GFR AFRICAN AMERICAN: >59
GFR NON-AFRICAN AMERICAN: 59
GLUCOSE BLD-MCNC: 122 MG/DL (ref 74–109)
HCT VFR BLD CALC: 30 % (ref 42–52)
HDLC SERPL-MCNC: 17 MG/DL (ref 55–121)
HEMOGLOBIN: 9.2 G/DL (ref 14–18)
IMMATURE GRANULOCYTES #: 0.6 K/UL
LDL CHOLESTEROL CALCULATED: 43 MG/DL
LYMPHOCYTES ABSOLUTE: 1.3 K/UL (ref 1.1–4.5)
LYMPHOCYTES RELATIVE PERCENT: 8.6 % (ref 20–40)
MCH RBC QN AUTO: 29.3 PG (ref 27–31)
MCHC RBC AUTO-ENTMCNC: 30.7 G/DL (ref 33–37)
MCV RBC AUTO: 95.5 FL (ref 80–94)
MICROALBUMIN UR-MCNC: 1.3 MG/DL (ref 0–19)
MICROALBUMIN/CREAT UR-RTO: 16.3 MG/G
MONOCYTES ABSOLUTE: 0.7 K/UL (ref 0–0.9)
MONOCYTES RELATIVE PERCENT: 4.7 % (ref 0–10)
NEUTROPHILS ABSOLUTE: 11.2 K/UL (ref 1.5–7.5)
NEUTROPHILS RELATIVE PERCENT: 75.1 % (ref 50–65)
PDW BLD-RTO: 14.6 % (ref 11.5–14.5)
PLATELET # BLD: 391 K/UL (ref 130–400)
PMV BLD AUTO: 9.3 FL (ref 9.4–12.4)
POTASSIUM SERPL-SCNC: 4.9 MMOL/L (ref 3.5–5)
RBC # BLD: 3.14 M/UL (ref 4.7–6.1)
SODIUM BLD-SCNC: 137 MMOL/L (ref 136–145)
T4 FREE: 1.13 NG/DL (ref 0.93–1.7)
TOTAL PROTEIN: 6.2 G/DL (ref 6.6–8.7)
TRIGLYCERIDE, FASTING: 148 MG/DL (ref 0–149)
TSH SERPL DL<=0.05 MIU/L-ACNC: 5.26 UIU/ML (ref 0.27–4.2)
WBC # BLD: 15 K/UL (ref 4.8–10.8)

## 2022-01-17 ENCOUNTER — TELEPHONE (OUTPATIENT)
Dept: PRIMARY CARE CLINIC | Age: 72
End: 2022-01-17

## 2022-01-17 ENCOUNTER — READMISSION MANAGEMENT (OUTPATIENT)
Dept: CALL CENTER | Facility: HOSPITAL | Age: 72
End: 2022-01-17

## 2022-01-17 DIAGNOSIS — E03.9 ACQUIRED HYPOTHYROIDISM: Primary | ICD-10-CM

## 2022-01-17 RX ORDER — LEVOTHYROXINE SODIUM 0.2 MG/1
200 TABLET ORAL DAILY
Qty: 90 TABLET | Refills: 1 | Status: SHIPPED | OUTPATIENT
Start: 2022-01-17 | End: 2022-08-03 | Stop reason: SDUPTHER

## 2022-01-17 RX ORDER — LEVOTHYROXINE SODIUM 0.03 MG/1
25 TABLET ORAL DAILY
Qty: 90 TABLET | Refills: 1 | Status: SHIPPED | OUTPATIENT
Start: 2022-01-17 | End: 2022-08-03 | Stop reason: SDUPTHER

## 2022-01-17 NOTE — TELEPHONE ENCOUNTER
----- Message from MARY Alcantara sent at 1/14/2022  4:36 PM CST -----  Please call patient and let them know results. No pathologic protein in urine. Anemia noted, patient had recent open heart surgery and blood counts are stable from recent labs from [facility]  Thyroid testing is slightly abnormal. Question whether or not patient has been taking his thyroid as prescribed  Normal cholesterol  Metabolic panel is normal except for mild elevated blood sugar and mild dehydration.  Recommend increasing fluids

## 2022-01-17 NOTE — OUTREACH NOTE
Medical Week 1 Survey      Responses   [facility] patient discharged from? [place]   Does the patient have one of the following disease processes/diagnoses(primary or secondary)? Other   Week 1 attempt successful? Yes   Call start time 1414   Call end time 1419   Discharge diagnosis Coronary artery disease involving native coronary artery of native heart with unstable angina pectoris (HCC)   Person spoke with today (if not patient) and relationship patient   Does the patient have a primary care provider?  Yes   Does the patient have an appointment with their PCP within 7 days of discharge? Yes   Comments regarding PCP Pt will see NP(Surgery) on wednesday   Has the patient kept scheduled appointments due by today? Yes   Psychosocial issues? No   Did the patient receive a copy of their discharge instructions? Yes   Nursing interventions Reviewed instructions with patient   What is the patient's perception of their health status since discharge? Improving   Is the patient/caregiver able to teach back signs and symptoms related to disease process for when to call PCP? Yes   Is the patient/caregiver able to teach back signs and symptoms related to disease process for when to call 911? Yes   Is the patient/caregiver able to teach back the hierarchy of who to call/visit for symptoms/problems? PCP, Specialist, Home health nurse, Urgent Care, ED, 911 Yes   If the patient is a current smoker, are they able to teach back resources for cessation? Not a smoker   Additional teach back comments Pt reports clear drainage from leg incision.  Educated on S/sx of infection   Week 1 call completed? Yes   Wrap up additional comments Pt reports he is doing ok.  Denies needs.          Samina Bennett RN

## 2022-01-18 ENCOUNTER — OFFICE VISIT (OUTPATIENT)
Dept: PRIMARY CARE CLINIC | Age: 72
End: 2022-01-18
Payer: MEDICARE

## 2022-01-18 VITALS
OXYGEN SATURATION: 95 % | SYSTOLIC BLOOD PRESSURE: 118 MMHG | BODY MASS INDEX: 35.01 KG/M2 | WEIGHT: 251 LBS | HEART RATE: 74 BPM | DIASTOLIC BLOOD PRESSURE: 68 MMHG | TEMPERATURE: 96.2 F

## 2022-01-18 DIAGNOSIS — I25.10 CORONARY ARTERY DISEASE INVOLVING NATIVE HEART WITHOUT ANGINA PECTORIS, UNSPECIFIED VESSEL OR LESION TYPE: Primary | ICD-10-CM

## 2022-01-18 DIAGNOSIS — M15.9 PRIMARY OSTEOARTHRITIS INVOLVING MULTIPLE JOINTS: ICD-10-CM

## 2022-01-18 DIAGNOSIS — I65.23 BILATERAL CAROTID ARTERY STENOSIS: ICD-10-CM

## 2022-01-18 DIAGNOSIS — G47.33 OSA ON CPAP: ICD-10-CM

## 2022-01-18 DIAGNOSIS — E78.2 MIXED HYPERLIPIDEMIA: ICD-10-CM

## 2022-01-18 DIAGNOSIS — R60.9 PERIPHERAL EDEMA: ICD-10-CM

## 2022-01-18 DIAGNOSIS — I48.0 PAROXYSMAL ATRIAL FIBRILLATION (HCC): ICD-10-CM

## 2022-01-18 DIAGNOSIS — C61 PROSTATE CANCER (HCC): ICD-10-CM

## 2022-01-18 DIAGNOSIS — Z23 NEED FOR INFLUENZA VACCINATION: ICD-10-CM

## 2022-01-18 DIAGNOSIS — F33.0 MILD EPISODE OF RECURRENT MAJOR DEPRESSIVE DISORDER (HCC): ICD-10-CM

## 2022-01-18 DIAGNOSIS — E66.01 SEVERE OBESITY (BMI 35.0-35.9 WITH COMORBIDITY) (HCC): ICD-10-CM

## 2022-01-18 DIAGNOSIS — I73.9 PAD (PERIPHERAL ARTERY DISEASE) (HCC): ICD-10-CM

## 2022-01-18 DIAGNOSIS — G47.01 INSOMNIA DUE TO MEDICAL CONDITION: ICD-10-CM

## 2022-01-18 DIAGNOSIS — E03.9 ACQUIRED HYPOTHYROIDISM: ICD-10-CM

## 2022-01-18 DIAGNOSIS — N18.31 TYPE 2 DIABETES MELLITUS WITH STAGE 3A CHRONIC KIDNEY DISEASE, WITHOUT LONG-TERM CURRENT USE OF INSULIN (HCC): ICD-10-CM

## 2022-01-18 DIAGNOSIS — Z99.89 OSA ON CPAP: ICD-10-CM

## 2022-01-18 DIAGNOSIS — I10 ESSENTIAL HYPERTENSION: ICD-10-CM

## 2022-01-18 DIAGNOSIS — E11.22 TYPE 2 DIABETES MELLITUS WITH STAGE 3A CHRONIC KIDNEY DISEASE, WITHOUT LONG-TERM CURRENT USE OF INSULIN (HCC): ICD-10-CM

## 2022-01-18 PROCEDURE — 4040F PNEUMOC VAC/ADMIN/RCVD: CPT | Performed by: NURSE PRACTITIONER

## 2022-01-18 PROCEDURE — 99214 OFFICE O/P EST MOD 30 MIN: CPT | Performed by: NURSE PRACTITIONER

## 2022-01-18 PROCEDURE — 2022F DILAT RTA XM EVC RTNOPTHY: CPT | Performed by: NURSE PRACTITIONER

## 2022-01-18 PROCEDURE — 1036F TOBACCO NON-USER: CPT | Performed by: NURSE PRACTITIONER

## 2022-01-18 PROCEDURE — G0008 ADMIN INFLUENZA VIRUS VAC: HCPCS | Performed by: NURSE PRACTITIONER

## 2022-01-18 PROCEDURE — 1123F ACP DISCUSS/DSCN MKR DOCD: CPT | Performed by: NURSE PRACTITIONER

## 2022-01-18 PROCEDURE — 1111F DSCHRG MED/CURRENT MED MERGE: CPT | Performed by: NURSE PRACTITIONER

## 2022-01-18 PROCEDURE — G8427 DOCREV CUR MEDS BY ELIG CLIN: HCPCS | Performed by: NURSE PRACTITIONER

## 2022-01-18 PROCEDURE — G8484 FLU IMMUNIZE NO ADMIN: HCPCS | Performed by: NURSE PRACTITIONER

## 2022-01-18 PROCEDURE — 3017F COLORECTAL CA SCREEN DOC REV: CPT | Performed by: NURSE PRACTITIONER

## 2022-01-18 PROCEDURE — G8417 CALC BMI ABV UP PARAM F/U: HCPCS | Performed by: NURSE PRACTITIONER

## 2022-01-18 PROCEDURE — 3046F HEMOGLOBIN A1C LEVEL >9.0%: CPT | Performed by: NURSE PRACTITIONER

## 2022-01-18 PROCEDURE — 90694 VACC AIIV4 NO PRSRV 0.5ML IM: CPT | Performed by: NURSE PRACTITIONER

## 2022-01-18 RX ORDER — AMIODARONE HYDROCHLORIDE 200 MG/1
100 TABLET ORAL DAILY
COMMUNITY
End: 2022-03-21

## 2022-01-18 RX ORDER — FUROSEMIDE 40 MG/1
40 TABLET ORAL DAILY
Qty: 30 TABLET | Refills: 1 | Status: SHIPPED | OUTPATIENT
Start: 2022-01-18 | End: 2022-02-09

## 2022-01-18 RX ORDER — LORAZEPAM 0.5 MG/1
0.5 TABLET ORAL NIGHTLY PRN
Qty: 15 TABLET | Refills: 0 | Status: SHIPPED | OUTPATIENT
Start: 2022-01-18 | End: 2022-02-02

## 2022-01-18 RX ORDER — LISINOPRIL 5 MG/1
5 TABLET ORAL DAILY
COMMUNITY

## 2022-01-18 NOTE — PATIENT INSTRUCTIONS
Only take lasix once a day until swelling has improved then take it as needed for swelling    Keep follow up with Cardiology and surgery. Fasting labs prior to follow up appointment. Weight daily and keep log. If gain 3 or more pounds over night take a lasix.      Wear CPAP nightly

## 2022-01-18 NOTE — PROGRESS NOTES
Vaccine Information Sheet, \"Influenza - Inactivated\"  given to Shahzad Heller, or parent/legal guardian of  Shahzad Heller and verbalized understanding. Patient responses:    Have you ever had a reaction to a flu vaccine? No  Are you able to eat eggs without adverse effects? Yes  Do you have any current illness? No  Have you ever had Guillian Ovid Syndrome? No    Flu vaccine given per order. Please see immunization tab.

## 2022-01-18 NOTE — PROGRESS NOTES
Melody Moody (:  1950) is a 66 y.o. male,Established patient, here for evaluation of the following chief complaint(s):  Follow-Up from Hospital (here for follow up after open heart Dr Walter Alicea . has follow up tomorrow with NP. has had to use pull for urine issues since surgery. also having issues with constipation. taking 4 stool softeners a day and has also used laxative and only had one BM. issues with sleeping. can go to sleep but wakes up around midnight. swelling in left lower ext. ) and Health Maintenance (would like flu shot. discuss covid booster. )      ASSESSMENT/PLAN:    ICD-10-CM    1. Coronary artery disease involving native heart without angina pectoris, unspecified vessel or lesion type  I25.10 DE DISCHARGE MEDS RECONCILED W/ CURRENT OUTPATIENT MED LIST     furosemide (LASIX) 40 MG tablet     Basic Metabolic Panel    Stable, advised to take stool softener and as needed laxative to keep bowels moving. 2. Mild episode of recurrent major depressive disorder ([facility])  F33.0 The current medical regimen is effective;  continue present plan and medications. 3. Type 2 diabetes mellitus with stage 3a chronic kidney disease, without long-term current use of insulin ([facility])  E11.22 The current medical regimen is effective;  continue present plan and medications. N18.31    4. PAD (peripheral artery disease) ([facility])  I73.9 The current medical regimen is effective;  continue present plan and medications. 5. Paroxysmal atrial fibrillation ([facility])  I48.0 The current medical regimen is effective;  continue present plan and medications. 6. Prostate cancer ([facility] [address]  C61 stable   7. SUSY on CPAP  G47.33 Discussed need to wear nightly    Z99.89    8. Bilateral carotid artery stenosis  I65.23 stable   9. Primary osteoarthritis involving multiple joints  M89.49 stable   10. Mixed hyperlipidemia  E78.2 LDL goal <70, on statin   11.  Essential hypertension  I10 The current medical regimen is effective; continue present plan and medications. 12. Acquired hypothyroidism  E03.9    13. Severe obesity (BMI 35.0-35.9 with comorbidity) ([facility])  E66.01     Z68.35    14. Peripheral edema  R60.9 furosemide (LASIX) 40 MG tablet     Basic Metabolic Panel    Adding lasix, advised to take daily until swelling better then daily weights and prn. 15. Need for influenza vaccination  Z23 INFLUENZA, QUADV, ADJUVANTED, 65 YRS =, IM, PF, PREFILL SYR, 0.5ML (FLUAD)   16. Insomnia due to medical condition  G47.01 LORazepam (ATIVAN) 0.5 MG tablet    Temporary ativan rx to help with sleep. Stressed importance of CPAP usage. Return in 4 weeks (on 2/15/2022) for F/U ON SLEEP AND SWELLING. .    SUBJECTIVE/OBJECTIVE:  HPI  Here for hospital follow up and f/u on health issues    CAD/PAF  On amiodarone, eliquis, metoprolol  01/06/2022, Pt had CABG x 4 done at [facility] by Dr Dinorah Winters. He had his routine appt with cardiology and was noted to c/o intermittent exertional CP with decrease exercise tolerance. They did nuclear stress testing which was positive. 12/23/2021  · Myocardial perfusion imaging indicates a medium-sized, moderately severe   area of ischemia located in the anterior wall. · Left ventricular ejection fraction is normal. (Calculated EF = 68%). · Impressions are consistent with a high risk study. Study Impression   Myocardial perfusion imaging indicates a medium-sized, moderately severe area of ischemia located in the anterior wall. Impressions are consistent with a high risk study. Heart Cath 12/30/2021  #1 99% ostial LAD stenosis and total occlusion of the distal LAD   #2 60% proximal RCA stenosis and 90% stenosis of the proximal PL branch   #3 dilated ascending aorta   #4 LVEF 55%     CT chest w/o 01/05/2021  1. No evidence of acute cardiothoracic process. 2. Linear scarring with mild cylindrical bronchiectasis in both lower   lobes, perhaps postinflammatory.  No subpleural reticulation or   honeycombing to suggest underlying fibrotic interstitial lung disease. 3. Advanced coronary atheromatous calcification. 4. There are 2 adjacent 4 mm right middle lobe nodules which are stable   from 2019, both considered benign. 5. There is a mild fusiform dilation of the ascending thoracic aorta,   measuring 4.5 cm diameter. This is slightly increased from 2019. Echo 01/05/2021  · Left ventricular ejection fraction appears to be 56 - 60%. Left   ventricular systolic function is normal.   · Left ventricular diastolic function was normal.   · Estimated right ventricular systolic pressure from tricuspid   regurgitation is normal (<35 mmHg). · Normal size and function of the right ventricle. · No significant valvular pathology. PFT  1.  Spirometry is consistent with a moderate restrictive ventilatory   defect.  Small airways function is actually supranormal.   2.  There is slight worsening of spirometry postbronchodilator but a   moderate restrictive ventilatory defect still appears to be present.     There is a decrease in small airways function but it still remains   supranormal.   3.  Lung volumes confirm a moderate restrictive ventilatory defect   although the decrease in the patient's slow vital capacity is just into   the moderate range at 69% of predicted. 4. Warren Sack is a mild bordering on moderate diffusion impairment which when   corrected for alveolar volume is normalized. 5.  Arterial blood gases reveal mild hypoxemia and a mild respiratory   acidosis with metabolic compensation on room air. Isabella Cunha MD    Diabetes  a1c 6.7 (01/05/2022)  On glipizide  No lows, checks 2-3 times a week    HTN  On lisinopril, hctz and toprol  BP controlled on medicine.     HLP  On lipitor  Lab Results   Component Value Date    CHOL 90 (L) 05/18/2021    CHOL 91 (L) 11/09/2020    CHOL 91 (L) 01/22/2020     Lab Results   Component Value Date    TRIG 125 05/18/2021    TRIG 124 11/09/2020    TRIG 153 (H) 01/22/2020     Lab Results   Component Value Date    HDL 17 (L) 01/14/2022    HDL 22 (L) 05/18/2021    HDL 21 (L) 11/09/2020     Lab Results   Component Value Date    LDLCALC 43 01/14/2022    [address] 43 05/18/2021    [address] 45 11/09/2020     PAD/Carotid stenosis  Followed by Dr Julio Rivers at [facility]    Carotid doppler 08/17/2021  Impression:   1. There is less than 50% stenosis of the right internal carotid artery. 2. There is less than 50% stenosis of the left internal carotid artery. 3. Antegrade flow is demonstrated in bilateral vertebral arteries. RICHARD 08/17/2021  Impression:   1. No significant arterial insufficiency of the right lower extremity at   rest.   2. No significant arterial insufficiency of the left lower extremity at   rest.     SUSY  Uses CPAP   He has not been using it since surgery.      Depression  On celexa  Stable on medicine     Arthritis/chronic neck/back pain  Followed by pain mgmt. No concerns    Hypothyroid  On synthroid, recommended increasing synthroid to 225mcg daily after last labs. Lab Results   Component Value Date    TSH 5.260 (H) 01/14/2022     Hx prostate cancer  Followed by Dr Christiano Aguillon per pt report. Since surgery having problems staying asleep  Taking melatonin. /68   Pulse 74   Temp 96.2 °F (35.7 °C) (Temporal)   Wt 251 lb (113.9 kg)   SpO2 95%   BMI 35.01 kg/m²     Review of Systems    Physical Exam  Vitals reviewed. Constitutional:       Appearance: Normal appearance. HENT:      Head: Normocephalic and atraumatic. Nose:      Comments: masked     Mouth/Throat:      Comments: masked  Eyes:      Conjunctiva/sclera: Conjunctivae normal.   Cardiovascular:      Rate and Rhythm: Normal rate and regular rhythm. Pulses: Normal pulses. Heart sounds: Normal heart sounds. Pulmonary:      Effort: Pulmonary effort is normal.      Breath sounds: Normal breath sounds.    Chest:       Abdominal:      General: Bowel sounds are normal. There is no distension. Palpations: Abdomen is soft. Tenderness: There is no abdominal tenderness. There is no guarding. Musculoskeletal:      Cervical back: Normal range of motion and neck supple. Right lower leg: Edema (2+) present. Left lower leg: Edema (2+) present. Skin:     General: Skin is warm. Neurological:      Mental Status: He is alert and oriented to person, place, and time. An electronic signature was used to authenticate this note.     --MARY Almanza

## 2022-01-19 ENCOUNTER — OFFICE VISIT (OUTPATIENT)
Dept: CARDIAC SURGERY | Facility: CLINIC | Age: 72
End: 2022-01-19

## 2022-01-19 VITALS
WEIGHT: 253.4 LBS | DIASTOLIC BLOOD PRESSURE: 56 MMHG | OXYGEN SATURATION: 94 % | SYSTOLIC BLOOD PRESSURE: 128 MMHG | HEART RATE: 82 BPM | BODY MASS INDEX: 36.28 KG/M2 | HEIGHT: 70 IN

## 2022-01-19 DIAGNOSIS — D68.4 ACQUIRED COAGULATION FACTOR DEFICIENCY: ICD-10-CM

## 2022-01-19 DIAGNOSIS — N18.30 STAGE 3 CHRONIC KIDNEY DISEASE, UNSPECIFIED WHETHER STAGE 3A OR 3B CKD: ICD-10-CM

## 2022-01-19 DIAGNOSIS — I48.0 PAROXYSMAL ATRIAL FIBRILLATION: Chronic | ICD-10-CM

## 2022-01-19 DIAGNOSIS — G89.4 CHRONIC PAIN SYNDROME: ICD-10-CM

## 2022-01-19 DIAGNOSIS — I25.110 CORONARY ARTERY DISEASE INVOLVING NATIVE CORONARY ARTERY OF NATIVE HEART WITH UNSTABLE ANGINA PECTORIS: Primary | ICD-10-CM

## 2022-01-19 DIAGNOSIS — E66.01 CLASS 2 SEVERE OBESITY DUE TO EXCESS CALORIES WITH SERIOUS COMORBIDITY AND BODY MASS INDEX (BMI) OF 36.0 TO 36.9 IN ADULT: ICD-10-CM

## 2022-01-19 PROCEDURE — 99024 POSTOP FOLLOW-UP VISIT: CPT | Performed by: NURSE PRACTITIONER

## 2022-01-19 RX ORDER — LORAZEPAM 0.5 MG/1
0.5 TABLET ORAL
COMMUNITY
Start: 2022-01-18 | End: 2022-02-02

## 2022-01-19 RX ORDER — LEVOTHYROXINE SODIUM 0.03 MG/1
25 TABLET ORAL DAILY
COMMUNITY
Start: 2022-01-17

## 2022-01-19 RX ORDER — FUROSEMIDE 40 MG/1
1 TABLET ORAL DAILY
COMMUNITY
Start: 2022-01-18 | End: 2022-03-15

## 2022-01-19 NOTE — PROGRESS NOTES
Subjective   Chief Complaint   Patient presents with   • Post-op Follow-up     Pt had CABGx4 on 01/06/2022       Patient ID: Zay Kamara is a 71 y.o. male who is here for follow-up having had Coronary artery bypass graft x 4 (left internal mammary artery/sequencing the dominant diagonal artery and left anterior descending artery, reverse saphenous vein graft/distal right coronary artery, and reverse saphenous vein graft/posterior descending artery), Right and left MAZE procedure with radiofrequency and cryoablation, Left atrial appendage exclusion (Atriclip 40 mm device), Left endoscopic vein harvest performed on 1/6/2022 by Dr. White.       History of Present Illness  Post operative recovery was uneventful without any major complications.  He returns today for 1 week follow-up.  Sleep habits are fair. Pain control has been good-follows with pain management.  No fevers/sweats/chills. No drainage from incisions.  No sternal clicks. No chest pain or shortness of breath. Appetite is fair and is improving. Glycemic control is fair with blood sugars ranging from 121-200.  Wife reports patient has not been checking this daily but they have recently started checking more frequently.  Denies tobacco use. Ambulating 5 minutes twice daily.  Was seen by his primary care provider yesterday and given prescription for Lasix x5 days and then as needed for weight gain/lower extremity edema.  He is still performing his incentive spirometer use daily reaching a little more than 1000 mL.  He was given his Moderna booster and flu shot yesterday.  He does note urinary leakage and frequency that improved after surgery but is now worse after restarting Lasix.  He has issues with constipation and takes 4 stool softeners daily.  He was given MiraLAX by his PCP yesterday.  Wife is giving him a protein shake every other day.  Of note, he does violate sternal restrictions during the office visit.    The following portions of the patient's  "history were reviewed and updated as appropriate: allergies, current medications, past family history, past medical history, past social history, past surgical history and problem list.    Review of Systems   Constitutional: Negative for chills, diaphoresis and fever.   Respiratory: Positive for shortness of breath (With exertion). Negative for wheezing.    Cardiovascular: Positive for chest pain (Incisional) and leg swelling (Mild).       Objective   Visit Vitals  /56 (BP Location: Right arm, Patient Position: Sitting, Cuff Size: Adult)   Pulse 82   Ht 177.8 cm (70\")   Wt 115 kg (253 lb 6.4 oz)   SpO2 94%   BMI 36.36 kg/m²       Physical Exam  Vitals reviewed.   Constitutional:       General: He is not in acute distress.     Appearance: He is well-developed. He is obese. He is not diaphoretic.   HENT:      Head: Normocephalic and atraumatic.   Eyes:      Pupils: Pupils are equal, round, and reactive to light.   Cardiovascular:      Rate and Rhythm: Normal rate and regular rhythm.      Heart sounds: Normal heart sounds. No murmur heard.  No friction rub.   Pulmonary:      Effort: Pulmonary effort is normal. No respiratory distress.      Breath sounds: Normal breath sounds. No wheezing or rales.   Abdominal:      General: There is no distension.      Palpations: Abdomen is soft.      Tenderness: There is no abdominal tenderness. There is no guarding.      Comments: Looks distended but wife says this is normal belly for him   Musculoskeletal:      Cervical back: Normal range of motion and neck supple.      Right lower leg: No edema.      Left lower leg: Edema (Mild) present.   Skin:     General: Skin is warm and dry.      Coloration: Skin is not pale.      Findings: No rash.      Comments: Hypafix dressing removed from sternal incision.  Sternotomy site clean, dry, and intact. Sternum stable. No clicks.  Saphenectomy site clean, dry, and intact.    Neurological:      Mental Status: He is alert and oriented to " person, place, and time.   Psychiatric:         Behavior: Behavior normal.             Assessment/Plan         Diagnoses and all orders for this visit:    1. Coronary artery disease involving native coronary artery of native heart with unstable angina pectoris (HCC) (Primary)    2. Paroxysmal atrial fibrillation (HCC)    3. Acquired coagulation factor deficiency (HCC)    4. Class 2 severe obesity due to excess calories with serious comorbidity and body mass index (BMI) of 36.0 to 36.9 in adult ([facility])    5. Stage 3 chronic kidney disease, unspecified whether stage 3a or 3b CKD ([facility])    6. Chronic pain syndrome           Overall, Zay Kamara is doing well.  Surgical incisions are clean and dry without evidence of infection. We discussed current sternotomy precautions and how these will not be advanced yet. Continue post op surgical wound care: shower daily with antibacterial soap and water, avoid use of lotions, creams, ointments, powders etc. Encouraged good glycemic control and to reach out to PCP if blood sugars are continued in the 200 range.  Following post op cardiac surgery home instructions. Provided support and encouragement. All questions have been answered to the best of my ability. Will discuss starting cardiac rehabilitation at official 1 month post op visit. Patient has follow up with Dr. White in a few weeks. Patient has follow up with Cardiology in a few weeks. Patient has been instructed to contact our office with any questions or concerns should they arise prior to the next office visit.  Return to clinic in 2 to 3 weeks for wound recheck.    Patient's Body mass index is 36.36 kg/m². indicating that he is obese (BMI >30). Obesity-related health conditions include the following: hypertension, coronary heart disease, diabetes mellitus and dyslipidemias. Obesity is unchanged. BMI is is above average; BMI management plan is completed. We discussed portion control and increasing exercise.      Zay  IVIS Kamara  reports that he has never smoked. He has never used smokeless tobacco.         Advance Care Planning   ACP discussion was declined by the patient. Patient does not have an advance directive, declines further assistance.

## 2022-01-20 DIAGNOSIS — R20.2 NUMBNESS AND TINGLING OF BOTH LEGS: ICD-10-CM

## 2022-01-20 DIAGNOSIS — R20.0 NUMBNESS AND TINGLING OF BOTH LEGS: ICD-10-CM

## 2022-01-20 DIAGNOSIS — M54.12 CERVICAL RADICULOPATHY: ICD-10-CM

## 2022-01-24 ENCOUNTER — TELEPHONE (OUTPATIENT)
Dept: CARDIAC SURGERY | Facility: CLINIC | Age: 72
End: 2022-01-24

## 2022-01-24 RX ORDER — HYDROCODONE BITARTRATE AND ACETAMINOPHEN 7.5; 325 MG/1; MG/1
1 TABLET ORAL EVERY 8 HOURS PRN
Qty: 90 TABLET | Refills: 0 | Status: SHIPPED | OUTPATIENT
Start: 2022-01-24 | End: 2022-02-21 | Stop reason: SDUPTHER

## 2022-01-24 NOTE — PROGRESS NOTES
PATIENT NAME:  Zay Kamara    Chief Complaint   Patient presents with   • Coronary Artery Disease     Pt referred by Dr. Fitzpatrick for an abnormal heart cath         Subjective     History of Present Illness    71-year-old male with salient past medical history of known carotid artery disease, chronic pain syndrome, diabetes mellitus, obesity, hypertension, hyperlipidemia, chronic kidney disease stage III, paroxysmal atrial fibrillation, past tobacco use as well as acquired coagulation factor deficiency presents today for the possibility of surgical revascularization with atrial fibrillation procedure and left atrial appendage exclusion.  He has been followed by [place] [facility] recently Letty Belcher seeing Mr. Kamara with worsening chest pain.  He has had increasing severity with less and less activity.  Rest does resolve his chest pain.  He has not had any chest pain at rest.  No lower extremity swelling.  No orthopnea or paroxysmal nocturnal dyspnea.  He does auscultate.  He has had no difficulty with anticoagulation.  As such and a significantly positive stress test Dr. Fitzpatrick did perform left heart catheterization done finding complex multivessel coronary disease.  We did discuss the case prior to today's clinic and we are both in agreement that he merited an expedited evaluation and time toward surgery.  The patient remains stable at this time.  He has had no previous ablation procedures.  He has had no strokes.  He has had no complications with anticoagulation.    Review of Systems   Constitutional: Positive for activity change and fatigue. Negative for appetite change, chills, diaphoresis, fever and unexpected weight change.   HENT: Negative for dental problem, hearing loss, nosebleeds, sore throat, trouble swallowing and voice change.    Eyes: Negative for photophobia, redness and visual disturbance.   Respiratory: Positive for chest tightness and shortness of breath. Negative for apnea, cough, wheezing and  stridor.    Cardiovascular: Positive for chest pain. Negative for palpitations and leg swelling.   Gastrointestinal: Negative for abdominal distention, abdominal pain, blood in stool, constipation, diarrhea, nausea and vomiting.   Endocrine: Negative for cold intolerance, heat intolerance, polyphagia and polyuria.   Genitourinary: Negative for decreased urine volume, difficulty urinating, dysuria, flank pain, frequency, hematuria and urgency.   Musculoskeletal: Positive for arthralgias. Negative for back pain, gait problem, joint swelling, myalgias and neck pain.   Skin: Negative for pallor, rash and wound.   Allergic/Immunologic: Negative for immunocompromised state.   Neurological: Negative for dizziness, tremors, seizures, syncope, speech difficulty, weakness, light-headedness, numbness and headaches.   Hematological: Does not bruise/bleed easily.   Psychiatric/Behavioral: Negative for confusion, sleep disturbance and suicidal ideas. The patient is not nervous/anxious.           Past Medical History:   Diagnosis Date   • Arthritis    • Atrial fibrillation (HCC)     paroxysmal, on Xarelto   • BMI 31.0-31.9,adult 6/28/2018   • Carotid artery disease without cerebral infarction (HCC)    • Chronic knee pain    • Chronic neck and back pain    • Coronary artery disease 1/4/2022   • Depression    • Diabetes (HCC)    • Disease of thyroid gland    • Gout    • Hypercholesteremia    • Hypertension    • IBS (irritable bowel syndrome)    • Kidney disease    • Osteoarthritis     back, neck, and knees - goes to Pain Management   • Prostate cancer (HCC)     Dr. Hong following   • Sensorineural hearing loss    • Sleep apnea     CPAP   • Sudden hearing loss    • Syncope    • Tinnitus    • Urine incontinence      Past Surgical History:   Procedure Laterality Date   • ATRIAL APPENDAGE EXCLUSION LEFT WITH TRANSESOPHAGEAL ECHOCARDIOGRAM Left 1/6/2022    Procedure: LEFT ATRIAL APPENDAGE EXCLUSION WITH  40MM ATRICLIP; TRANSESOPHAGEAL  ECHOCARDIOGRAM;  Surgeon: Chivo White MD;  Location:  [facility] OR;  Service: Cardiothoracic;  Laterality: Left;   • BLADDER SUSPENSION N/A 11/26/2018    Procedure: CYSTOSCOPY BLADDER SUSPENSION MALE SLING;  Surgeon: Zaheer Rebolledo MD;  Location:  [facility] OR;  Service: Urology   • CARDIAC CATHETERIZATION N/A 12/30/2021    Procedure: Coronary angiography right radial to follow my 9:30 case;  Surgeon: Mike Fitzpatrick MD;  Location: [facility] CATH INVASIVE LOCATION;  Service: Cardiology;  Laterality: N/A;   • CHOLECYSTECTOMY     • COLONOSCOPY N/A 3/7/2017    Procedure: COLONOSCOPY WITH ANESTHESIA;  Surgeon: Hector Alvarenga MD;  Location: [facility] ENDOSCOPY;  Service:    • CORONARY ARTERY BYPASS GRAFT N/A 1/6/2022    Procedure: CORONARY ARTERY BYPASS GRAFT X 4 WITH LEFT INTERNAL MAMMARY ARTERY, LEFT LOWER EXTREMITY ENDOSCOPIC VEIN HARVEST, AND PERFUSION;  Surgeon: Chivo White MD;  Location:  [facility] OR;  Service: Cardiothoracic;  Laterality: N/A;   • MAZE PROCEDURE N/A 1/6/2022    Procedure: BILATERAL MAZE PROCEDURE WITH RADIOFREQUENCY AND CRYOABLATION;  Surgeon: Chivo White MD;  Location:  [facility] OR;  Service: Cardiothoracic;  Laterality: N/A;   • PROSTATECTOMY N/A 8/1/2017    Procedure: PROSTATECTOMY LAPAROSCOPIC WITH DAVINCI SI ROBOT ;  Surgeon: Hernesto Hong MD;  Location:  [facility] OR;  Service:    • THYROID SURGERY      RADIATION THERAPY AFTER SURGERY     Family History   Problem Relation Age of Onset   • Prostate cancer Father    • Cancer Father    • Heart disease Father         CABG   • Heart attack Father 70   • Heart disease Mother         assumed MI at time of death - had cardiopulmonary arrest   • Sudden death Mother    • Diabetes Sister    • Arrhythmia Sister    • Stroke Paternal Grandfather    • Colon cancer Neg Hx    • Colon polyps Neg Hx      Social History     Tobacco Use   • Smoking status: Never Smoker   • Smokeless tobacco: Never Used   Vaping Use   • Vaping Use: Never used   Substance  Use Topics   • Alcohol use: Not Currently     Comment: occasionally 1-2 beers once or twice year   • Drug use: Never     Current Outpatient Medications   Medication Sig Dispense Refill   • allopurinol (ZYLOPRIM) 100 MG tablet Take 100 mg by mouth Daily.     • aspirin (aspirin) 81 MG EC tablet Take 1 tablet by mouth Daily.     • atorvastatin (LIPITOR) 20 MG tablet Take 20 mg by mouth Daily.  2   • Cholecalciferol (VITAMIN D3) 2000 units tablet Take 50 mcg by mouth Daily.     • citalopram (CeleXA) 20 MG tablet Take 20 mg by mouth Daily.     • gabapentin (NEURONTIN) 400 MG capsule Take 400 mg by mouth 3 (Three) Times a Day. Can take up to TID but only takes BID due to side effects - sleepiness     • glipizide (GLUCOTROL) 5 MG tablet Take 5 mg by mouth Daily.     • HYDROcodone-acetaminophen (NORCO) 7.5-325 MG per tablet Take 1 tablet by mouth Every 8 (Eight) Hours As Needed for Moderate Pain . Usually takes twice a day     • levothyroxine (SYNTHROID, LEVOTHROID) 200 MCG tablet Take 200 mcg by mouth Daily.     • nitroglycerin (NITROSTAT) 0.4 MG SL tablet Place 1 tablet under the tongue Every 5 (Five) Minutes As Needed for Chest Pain. 25 tablet 11   • tobramycin 0.3 % solution ophthalmic solution Administer 1 drop to both eyes Every 8 (Eight) Hours.     • amiodarone (PACERONE) 200 MG tablet Take 2 tablets by mouth Daily. 60 tablet 0   • apixaban (ELIQUIS) 5 MG tablet tablet Take 1 tablet by mouth 2 (Two) Times a Day. 60 tablet 2   • furosemide (LASIX) 40 MG tablet Take 1 tablet by mouth Daily.     • levothyroxine (SYNTHROID, LEVOTHROID) 25 MCG tablet Take 25 mcg by mouth Daily.     • lisinopril (PRINIVIL,ZESTRIL) 5 MG tablet Take 1 tablet by mouth Daily. 30 tablet 2   • LORazepam (ATIVAN) 0.5 MG tablet Take 0.5 mg by mouth.     • metoprolol tartrate (LOPRESSOR) 25 MG tablet Take 1 tablet by mouth Every 12 (Twelve) Hours. 60 tablet 2     No current facility-administered medications for this visit.     Allergies:  Patient  "has no known allergies.    Objective      Vital Signs  /62 (BP Location: Right arm, Patient Position: Sitting, Cuff Size: Adult)   Pulse 77   Ht 180.3 cm (71\")   Wt 114 kg (252 lb 3.2 oz)   SpO2 93%   BMI 35.17 kg/m²     Physical Exam  Constitutional:       Appearance: He is well-developed.   HENT:      Head: Normocephalic and atraumatic.   Eyes:      Pupils: Pupils are equal, round, and reactive to light.   Neck:      Thyroid: No thyromegaly.      Vascular: No JVD.      Trachea: No tracheal deviation.   Cardiovascular:      Rate and Rhythm: Normal rate and regular rhythm.      Heart sounds: Normal heart sounds. No murmur heard.  No friction rub. No gallop.    Pulmonary:      Effort: Pulmonary effort is normal. No respiratory distress.      Breath sounds: Normal breath sounds. No wheezing or rales.   Chest:      Chest wall: No tenderness.   Abdominal:      General: There is no distension.      Palpations: Abdomen is soft.      Tenderness: There is no abdominal tenderness.   Musculoskeletal:         General: Normal range of motion.      Cervical back: Normal range of motion and neck supple.   Lymphadenopathy:      Cervical: No cervical adenopathy.   Skin:     General: Skin is warm and dry.   Neurological:      Mental Status: He is alert and oriented to person, place, and time.      Cranial Nerves: No cranial nerve deficit.         Results Review:   Conclusion    Procedure: Conscious sedation, Left heart catheterization, AO root injection, ventriculography, selective coronary angiography, angiography of LIMA     Risk, Benefits, and Alternatives discussed with the patient and/or family.  Plan is for moderate sedation, and the patient agrees to proceed with the procedure.  An immediate assessment was done prior to the administration of moderate sedation.     Indications: unstable angina  Type and site of entry: percutaneous right radial and femoral artery using the Seldinger technique   Contrast used: " Isovue  Catheters used: 5 Iranian pigtail, JL4, JR4  Findings:   Hemodynamics:    #1 left ventricular pressure 125/15                                   #2 aortic pressure 125/50                                   #3 There was no significant gradient on aortic valve pullback  AO Root injection:  Because of apparent tortuosity of the ascending aorta, diagnostic catheters could not be used to engage the coronary arteries nor could the pigtail catheter be advanced across the AV. AO root injection confirmed a dilated tortuous aorta and the decision was made to finish the case via femoral access     Ventriculography: Left ventricular left ventricular ejection fraction is 55%.  1+ mitral regurgitation is observed.     Selective Coronary Angiography:  #1.  The left main coronary artery is an average size vessel with no significant disease  #2.  The left anterior descending coronary artery is a type 3 vessel with a 99% ostial stenosis and is totally occluded in the distal portion  #3 There is a large ramus intermediate vessel that is free of significant disease  #4.  The left circumflex coronary artery is a small vessel with two obtuse marginals with mild irregularities.  #5  The right coronary artery has a 60% proximal stenosis and a 90% stenosis in the proximal PL. Collaterals to the distal LAD are seen.     Angiography of the LIMA:  1. The LIMA is a large vessel suitable for a bypass conduit     Conscious Sedation  I supervised the administration of conscious sedation by the nursing staff. The first dose was given at 1059 and the end of the face to face encounter was at 1121. During this time, BP, HR, oximetry, and neurologic status were closely monitored.     Estimated Blood Loss: None        Conclusion:  #1 99% ostial LAD stenosis and total occlusion of the distal LAD  #2 60% proximal RCA stenosis and 90% stenosis of the proximal PL branch  #3 dilated ascending aorta  #4 LVEF 55%     Final Comment:  The patient tolerated  the procedure well.  There were no obvious complications.  Hemostasis was achieved with a 6F angioseal hemostatic device in the groin and a TR band on the wrist. CT Surgery consult is pending       I reviewed the patient's new clinical results.  Discussed with patient.        Assessment/Plan     Diagnoses and all orders for this visit:    1. Paroxysmal atrial fibrillation (HCC) (Primary)    2. Coronary artery disease involving native coronary artery of native heart with unstable angina pectoris (HCC)    3. Type 2 diabetes mellitus with stage 3 chronic kidney disease, unspecified whether long term insulin use, unspecified whether stage 3a or 3b CKD (HCC)    4. Prostate cancer (HCC)          Assessment & Plan    I discussed the patient's findings and my recommendations with patient.  We discussed the options for treatment of coronary artery disease to include medical therapy, coronary stenting, and surgical revascularization.  We discussed the pros and cons of each option and how it pertains to the current case.  Coronary artery bypass grafting is best option given patient's findings and risk factors.  In fact the severity of his coronary artery disease complexity is sufficiently high that potential hybrid strategies will need to be considered depending on the intraoperative findings.  Dr. Fitzpatrick and I have discussed scenarios already preparing for such a scenario.  Additionally I do believe he is an excellent candidate for a left and right maze procedure with left atrial appendage exclusion.  We discussed the rationale for this as well as the options for treatment alternatively.  Since he is already undergoing sternotomy this will afford sufficient access to both atria.  Therefore I proposed concomitant left and right maze procedure with left atrial appendage exclusion.  The operative conduct of aforementioned planned operation of coronary artery bypass grafting, left and right Maze procedure, and left atrial appendage  exclusion were discussed including the postoperative course of care.  We discussed the operative conduct and expected hospital and outpatient recovery.  Risks were discussed to include but not limited to bleeding, infection, stroke, heart attack, need for additional procedures, anesthesia risk, organ dysfunction and/or failure, prolonged mechanical ventilation, prolonged ICU stay, chronic pain syndromes, sternal nonunion, and/or death.  We discussed the need to utilize all medical treatments additionally prescribed post surgery.         I discussed that at this time he appears to have unstable angina.  We can proceed with elective work-up for coronary artery bypass grafting; however, if his symptoms were to progress or new additional symptoms were to occur he is to contact Dr. Fitzpatrick or myself for prompt evaluation.  This may escalate his work-up to an urgent status including requiring an admission to the hospital.  All question answered the best my ability and he is agreeable to proceed forward with proposed cardiac surgery on Thursday.    Is a non-smoker.    Many thanks for the opportunity care for your patient.  I will continue to keep you apprised of his care and work-up.

## 2022-01-24 NOTE — TELEPHONE ENCOUNTER
Wife calling to check status on FMLA form for her.  She is aware Dr White was out last week but states she was told to call today to check on this.  Can reach her at #[phone]/amarjit   Cellulitis and abscess of foot

## 2022-01-25 ENCOUNTER — READMISSION MANAGEMENT (OUTPATIENT)
Dept: CALL CENTER | Facility: HOSPITAL | Age: 72
End: 2022-01-25

## 2022-01-25 NOTE — OUTREACH NOTE
Medical Week 2 Survey      Responses   [facility] patient discharged from? [place]   Does the patient have one of the following disease processes/diagnoses(primary or secondary)? Other   Week 2 attempt successful? No   Unsuccessful attempts Attempt 1          Debbie Wong RN

## 2022-01-25 NOTE — TELEPHONE ENCOUNTER
HERBERT Reagan to return call re: this. I got it signed and scanned in. I have it ready to mail to them.

## 2022-01-25 NOTE — TELEPHONE ENCOUNTER
Wife called back and was informed as directed.  She is requesting to pick this up instead as she has to return to work tomorrow.  Informed her we will have this at the  for her to  today.  She voiced understanding to all/amarjit

## 2022-01-27 ENCOUNTER — READMISSION MANAGEMENT (OUTPATIENT)
Dept: CALL CENTER | Facility: HOSPITAL | Age: 72
End: 2022-01-27

## 2022-01-27 NOTE — OUTREACH NOTE
Medical Week 2 Survey      Responses   [facility] patient discharged from? [place]   Does the patient have one of the following disease processes/diagnoses(primary or secondary)? Other   Week 2 attempt successful? No   Unsuccessful attempts Attempt 2          Sanjuana Donahue RN

## 2022-01-28 DIAGNOSIS — E11.22 TYPE 2 DIABETES MELLITUS WITH STAGE 3 CHRONIC KIDNEY DISEASE, WITHOUT LONG-TERM CURRENT USE OF INSULIN (HCC): ICD-10-CM

## 2022-01-28 DIAGNOSIS — N18.30 TYPE 2 DIABETES MELLITUS WITH STAGE 3 CHRONIC KIDNEY DISEASE, WITHOUT LONG-TERM CURRENT USE OF INSULIN (HCC): ICD-10-CM

## 2022-01-28 RX ORDER — GLIPIZIDE 5 MG/1
5 TABLET ORAL DAILY
Qty: 90 TABLET | Refills: 3 | Status: SHIPPED | OUTPATIENT
Start: 2022-01-28

## 2022-01-28 NOTE — TELEPHONE ENCOUNTER
Received fax from pharmacy requesting refill on pts medication(s). Pt was last seen in office on 1/18/2022  and has a follow up scheduled for 2/16/2022. Will send request to  Jody Suresh  for authorization.      Requested Prescriptions     Pending Prescriptions Disp Refills    glipiZIDE (GLUCOTROL) 5 MG tablet [Pharmacy Med Name: GLIPIZIDE 5 MG TABLET] 90 tablet 3     Sig: Take 1 tablet by mouth daily

## 2022-02-03 ENCOUNTER — READMISSION MANAGEMENT (OUTPATIENT)
Dept: CALL CENTER | Facility: HOSPITAL | Age: 72
End: 2022-02-03

## 2022-02-03 NOTE — OUTREACH NOTE
Medical Week 3 Survey      Responses   [facility] patient discharged from? [place]   Does the patient have one of the following disease processes/diagnoses(primary or secondary)? Other   Week 3 attempt successful? Yes   Call start time 0830   Rescheduled Rescheduled-pt requested   Call end time 0831   General alerts for this patient Call around 11am    Discharge diagnosis Coronary artery disease involving native coronary artery of native heart with unstable angina pectoris (HCC)   Wrap up additional comments Reached pt's daughter who states to call around 11. call rescheduled          Jacquie Fischer RN

## 2022-02-07 ENCOUNTER — READMISSION MANAGEMENT (OUTPATIENT)
Dept: CALL CENTER | Facility: HOSPITAL | Age: 72
End: 2022-02-07

## 2022-02-07 NOTE — OUTREACH NOTE
Medical Week 3 Survey      Responses   [facility] patient discharged from? [place]   Does the patient have one of the following disease processes/diagnoses(primary or secondary)? Other   Week 3 attempt successful? No   Unsuccessful attempts Attempt 1          Sanjuana Donahue RN

## 2022-02-08 ENCOUNTER — READMISSION MANAGEMENT (OUTPATIENT)
Dept: CALL CENTER | Facility: HOSPITAL | Age: 72
End: 2022-02-08

## 2022-02-08 NOTE — OUTREACH NOTE
Medical Week 3 Survey      Responses   [facility] [place] patient discharged from? [place]   Does the patient have one of the following disease processes/diagnoses(primary or secondary)? Other   Week 3 attempt successful? Yes   Call start time 1044   Call end time 1047   Discharge diagnosis Coronary artery disease involving native coronary artery of native heart with unstable angina pectoris (HCC)   Is the patient taking all medications as directed (includes completed medication regime)? Yes   Does the patient have a primary care provider?  Yes   Does the patient have an appointment with their PCP within 7 days of discharge? Yes   Has the patient kept scheduled appointments due by today? Yes   Comments Will see Sanjuana SMITH at 1pm tomorrow   Psychosocial issues? No   Did the patient receive a copy of their discharge instructions? Yes   Nursing interventions Reviewed instructions with patient   What is the patient's perception of their health status since discharge? Improving   Is the patient/caregiver able to teach back signs and symptoms related to disease process for when to call PCP? Yes   Is the patient/caregiver able to teach back signs and symptoms related to disease process for when to call 911? Yes   Is the patient/caregiver able to teach back the hierarchy of who to call/visit for symptoms/problems? PCP, Specialist, Home health nurse, Urgent Care, ED, 911 Yes   If the patient is a current smoker, are they able to teach back resources for cessation? Not a smoker   Additional teach back comments Still having incisional pain. States he is following weight restriction.   Week 3 Call Completed? Yes   Wrap up additional comments Pt states he is improving, still has some incisional pain, states he still has pain meds to take, will see APRN at [facility] tomorrow          Jacquie Fischer RN

## 2022-02-09 ENCOUNTER — TELEPHONE (OUTPATIENT)
Dept: CARDIAC SURGERY | Facility: CLINIC | Age: 72
End: 2022-02-09

## 2022-02-09 ENCOUNTER — DOCUMENTATION (OUTPATIENT)
Dept: CARDIAC SURGERY | Facility: CLINIC | Age: 72
End: 2022-02-09

## 2022-02-09 DIAGNOSIS — K62.5 BRBPR (BRIGHT RED BLOOD PER RECTUM): Primary | ICD-10-CM

## 2022-02-09 DIAGNOSIS — R60.9 PERIPHERAL EDEMA: ICD-10-CM

## 2022-02-09 DIAGNOSIS — I25.10 CORONARY ARTERY DISEASE INVOLVING NATIVE HEART WITHOUT ANGINA PECTORIS, UNSPECIFIED VESSEL OR LESION TYPE: ICD-10-CM

## 2022-02-09 RX ORDER — FUROSEMIDE 40 MG/1
40 TABLET ORAL DAILY
Qty: 30 TABLET | Refills: 1 | Status: SHIPPED | OUTPATIENT
Start: 2022-02-09 | End: 2022-02-16 | Stop reason: SDUPTHER

## 2022-02-09 NOTE — TELEPHONE ENCOUNTER
Reviewed records and discussed with Dr. White. Called patient and advised to continue daily aspirin 81 mg per day but stop taking eliquis due to lower GI bleeding. He has seen Dr. Alvarenga before for colonoscopy so will place referral to his office for follow up. Also discussed that if ok with him we could move his follow up with Dr. White to this Friday at 1030 and he is agreeable to this. Please cancel appt with me this afternoon and schedule with Dr. White on 2/11 at 1030.

## 2022-02-09 NOTE — TELEPHONE ENCOUNTER
Wife called to report pt was seen in [place] ER last night.  Pt has appt with Sanjuana SMITH today at 1pm.  Called [place] to request records./amarjit

## 2022-02-09 NOTE — PROGRESS NOTES
Patient was seen in ER last night due to gi bleeding. Advised him to continue daily aspirin 81 mg but STOP eliquis. He verbalizes understanding. Referral placed to GI services, he has seen Dr. Alvarenga previously.

## 2022-02-09 NOTE — TELEPHONE ENCOUNTER
Received fax from pharmacy requesting refill on pts medication(s). Pt was last seen in office on 1/18/2022  and has a follow up scheduled for 2/16/2022. Will send request to  Ralf Cabrales  for patient.      Requested Prescriptions     Pending Prescriptions Disp Refills    furosemide (LASIX) 40 MG tablet [Pharmacy Med Name: FUROSEMIDE 40 MG TABLET] 30 tablet 1     Sig: TAKE 1 TABLET BY MOUTH EVERY DAY

## 2022-02-11 ENCOUNTER — OFFICE VISIT (OUTPATIENT)
Dept: CARDIAC SURGERY | Facility: CLINIC | Age: 72
End: 2022-02-11

## 2022-02-11 VITALS
HEIGHT: 70 IN | SYSTOLIC BLOOD PRESSURE: 130 MMHG | HEART RATE: 72 BPM | WEIGHT: 247.8 LBS | OXYGEN SATURATION: 95 % | BODY MASS INDEX: 35.48 KG/M2 | DIASTOLIC BLOOD PRESSURE: 60 MMHG

## 2022-02-11 DIAGNOSIS — I71.20 THORACIC AORTIC ANEURYSM WITHOUT RUPTURE: ICD-10-CM

## 2022-02-11 DIAGNOSIS — I25.10 CORONARY ARTERY DISEASE, UNSPECIFIED VESSEL OR LESION TYPE, UNSPECIFIED WHETHER ANGINA PRESENT, UNSPECIFIED WHETHER NATIVE OR TRANSPLANTED HEART: ICD-10-CM

## 2022-02-11 DIAGNOSIS — I25.110 CORONARY ARTERY DISEASE INVOLVING NATIVE CORONARY ARTERY OF NATIVE HEART WITH UNSTABLE ANGINA PECTORIS: ICD-10-CM

## 2022-02-11 DIAGNOSIS — N18.30 STAGE 3 CHRONIC KIDNEY DISEASE, UNSPECIFIED WHETHER STAGE 3A OR 3B CKD: ICD-10-CM

## 2022-02-11 DIAGNOSIS — I48.0 PAROXYSMAL ATRIAL FIBRILLATION: Primary | ICD-10-CM

## 2022-02-11 DIAGNOSIS — D68.4 ACQUIRED COAGULATION FACTOR DEFICIENCY: ICD-10-CM

## 2022-02-11 DIAGNOSIS — E66.01 CLASS 2 SEVERE OBESITY DUE TO EXCESS CALORIES WITH SERIOUS COMORBIDITY AND BODY MASS INDEX (BMI) OF 36.0 TO 36.9 IN ADULT: ICD-10-CM

## 2022-02-11 PROCEDURE — 99024 POSTOP FOLLOW-UP VISIT: CPT | Performed by: THORACIC SURGERY (CARDIOTHORACIC VASCULAR SURGERY)

## 2022-02-11 RX ORDER — LISINOPRIL 5 MG/1
5 TABLET ORAL DAILY
Qty: 30 TABLET | Refills: 2 | Status: SHIPPED | OUTPATIENT
Start: 2022-02-11 | End: 2022-05-23 | Stop reason: SDUPTHER

## 2022-02-11 RX ORDER — AMIODARONE HYDROCHLORIDE 200 MG/1
200 TABLET ORAL DAILY
Qty: 30 TABLET | Refills: 0 | Status: SHIPPED | OUTPATIENT
Start: 2022-02-11 | End: 2022-03-15

## 2022-02-15 DIAGNOSIS — I25.10 CORONARY ARTERY DISEASE INVOLVING NATIVE HEART WITHOUT ANGINA PECTORIS, UNSPECIFIED VESSEL OR LESION TYPE: ICD-10-CM

## 2022-02-15 DIAGNOSIS — E03.9 ACQUIRED HYPOTHYROIDISM: ICD-10-CM

## 2022-02-15 DIAGNOSIS — R60.9 PERIPHERAL EDEMA: ICD-10-CM

## 2022-02-15 LAB
ANION GAP SERPL CALCULATED.3IONS-SCNC: 15 MMOL/L (ref 7–19)
BUN BLDV-MCNC: 28 MG/DL (ref 8–23)
CALCIUM SERPL-MCNC: 9.5 MG/DL (ref 8.8–10.2)
CHLORIDE BLD-SCNC: 101 MMOL/L (ref 98–111)
CO2: 22 MMOL/L (ref 22–29)
CREAT SERPL-MCNC: 1.5 MG/DL (ref 0.5–1.2)
GFR AFRICAN AMERICAN: 56
GFR NON-AFRICAN AMERICAN: 46
GLUCOSE BLD-MCNC: 94 MG/DL (ref 74–109)
POTASSIUM SERPL-SCNC: 4.9 MMOL/L (ref 3.5–5)
SODIUM BLD-SCNC: 138 MMOL/L (ref 136–145)
TSH SERPL DL<=0.05 MIU/L-ACNC: 0.48 UIU/ML (ref 0.27–4.2)

## 2022-02-15 NOTE — PROGRESS NOTES
Alex Fermin (:  1950) is a 66 y.o. male,Established patient, here for evaluation of the following chief complaint(s):  Follow-up (one month follow up on sleep and swelling ) and Constipation (started about 5 days ago)      ASSESSMENT/PLAN:    ICD-10-CM    1. Peripheral edema  R60.9  resolved   2. Insomnia due to medical condition  G47.01  improved   3. Gastrointestinal hemorrhage, unspecified gastrointestinal hemorrhage type  K92.2 CBC with Auto Differential   4. Syncope, unspecified syncope type  R55 EKG 12 lead     CBC with Auto Differential     [place] ELECTROCARDIOGRAM, COMPLETE    Sinus rhythm with a right bundle branch block rate of 73    Patient was orthostatic lying 128/76 heart rate 74, sitting 124/74 heart rate 73, and standing 106/62 heart rate 93. Advised to change positions slowly. Increase fluids. Wear compression socks. Monitor blood pressure and follow-up if symptoms continue    Getting labs to make sure with complaints of GI bleeding that his blood counts have not changed. 5. Dysphagia, unspecified type  R13.10  has appointment with GI advised to discuss swallowing problems with them for possible EGD       Return in about 3 months (around 2022). SUBJECTIVE/OBJECTIVE:  HPI  Here for f/u on swelling and sleep    Peripheral edema  Last visit was s/p CABGx4  He had swelling in bilateral lower extremities, started on lasix and advised to do daily weights. Swelling has improved. Denies SOA. Echo 2021  · Left ventricular ejection fraction appears to be 56 - 60%. Left   ventricular systolic function is normal.   · Left ventricular diastolic function was normal.   · Estimated right ventricular systolic pressure from tricuspid   regurgitation is normal (<35 mmHg). · Normal size and function of the right ventricle. · No significant valvular pathology.      Insomnia  Was given ativan to help with sleep  He was instructed to use CPAP nightly.    He has not been using his CPAP. I need a new CPAP. Have had the one I have years ago. He states his machine is acting up. Sleep is better. He passed out a few nights ago, got up to go to the bathroom  He relates he was dizzy, light headed. Feel in floor. Was only passed out for a second. No seizure activity. No incontinence or tongue biting. He has not felt dizzy or light headed since. After eating cough a lot. Spouse wonders if needs a swallowing study. Soup is ok but hamburger  This has been going on for a long time (years)  Denies reflux   Chest hurts at times when coughing. Everything goes down. Spouse is concerned about aspiration. Last week had commode full of blood. Went to [facility] and they said everything was ok. The ER doctor didn't find anything  Saw Dr Rica Oquendo and he referred to GI and discontinued eliquis. Have appt with Dr Nicholas Gilbert office on 02/23/2022  Stools - no more blood but stools are sticky. Telephone Encounter - MARY Wall - 02/09/2022 10:13 AM CST  Reviewed records and discussed with Dr. Rica Oquendo. Called patient and advised to continue daily aspirin 81 mg per day but stop taking eliquis due to lower GI bleeding. He has seen Dr. Hank Mahmood before for colonoscopy so will place referral to his office for follow up. Also discussed that if ok with him we could move his follow up with Dr. Rica Oquendo to this Friday at [phone] and he is agreeable to this. Please cancel appt with me this afternoon and schedule with Dr. Rica Oquendo on 2/11 at 21 [phone].     Electronically signed by MARY Wall at 02/09/2022 11:13 AM EST             /62 (Position: Standing)   Pulse 93   Temp 96.9 °F (36.1 °C) (Temporal)   Ht 5' 10\" (1.778 m)   Wt 249 lb 4 oz (113.1 kg)   SpO2 98%   BMI 35.76 kg/m²     Review of Systems   Constitutional: Negative for activity change, appetite change, fatigue, fever and unexpected weight change. HENT: Positive for trouble swallowing.  Negative for ear pain, rhinorrhea and sore throat. Eyes: Negative for visual disturbance. Respiratory: Negative for cough and shortness of breath. Cardiovascular: Negative for chest pain, palpitations and leg swelling. Gastrointestinal: Positive for blood in stool. Negative for abdominal pain, constipation, diarrhea, nausea and vomiting. Genitourinary: Negative for flank pain. Musculoskeletal: Negative for arthralgias, myalgias, neck pain and neck stiffness. Neurological: Positive for syncope and light-headedness. Negative for headaches. Psychiatric/Behavioral: Negative for decreased concentration and sleep disturbance. The patient is not nervous/anxious. Physical Exam  Vitals reviewed. Constitutional:       Appearance: Normal appearance. HENT:      Head: Normocephalic and atraumatic. Nose:      Comments: masked     Mouth/Throat:      Comments: masked  Eyes:      Conjunctiva/sclera: Conjunctivae normal.   Cardiovascular:      Rate and Rhythm: Normal rate and regular rhythm. Pulses: Normal pulses. Heart sounds: Normal heart sounds. Pulmonary:      Effort: Pulmonary effort is normal.      Breath sounds: Normal breath sounds. Abdominal:      General: Bowel sounds are normal. There is no distension. Palpations: Abdomen is soft. Tenderness: There is no abdominal tenderness. There is no guarding. Musculoskeletal:         General: Normal range of motion. Cervical back: Normal range of motion and neck supple. Skin:     General: Skin is warm. Neurological:      Mental Status: He is alert and oriented to person, place, and time. An electronic signature was used to authenticate this note.     --MARY Martino

## 2022-02-16 ENCOUNTER — OFFICE VISIT (OUTPATIENT)
Dept: PRIMARY CARE CLINIC | Age: 72
End: 2022-02-16
Payer: MEDICARE

## 2022-02-16 ENCOUNTER — READMISSION MANAGEMENT (OUTPATIENT)
Dept: CALL CENTER | Facility: HOSPITAL | Age: 72
End: 2022-02-16

## 2022-02-16 VITALS
WEIGHT: 249.25 LBS | TEMPERATURE: 96.9 F | SYSTOLIC BLOOD PRESSURE: 106 MMHG | HEART RATE: 93 BPM | DIASTOLIC BLOOD PRESSURE: 62 MMHG | OXYGEN SATURATION: 98 % | HEIGHT: 70 IN | BODY MASS INDEX: 35.68 KG/M2

## 2022-02-16 DIAGNOSIS — R60.9 PERIPHERAL EDEMA: ICD-10-CM

## 2022-02-16 DIAGNOSIS — K92.2 GASTROINTESTINAL HEMORRHAGE, UNSPECIFIED GASTROINTESTINAL HEMORRHAGE TYPE: ICD-10-CM

## 2022-02-16 DIAGNOSIS — R13.10 DYSPHAGIA, UNSPECIFIED TYPE: ICD-10-CM

## 2022-02-16 DIAGNOSIS — R55 SYNCOPE, UNSPECIFIED SYNCOPE TYPE: ICD-10-CM

## 2022-02-16 DIAGNOSIS — R60.9 PERIPHERAL EDEMA: Primary | ICD-10-CM

## 2022-02-16 DIAGNOSIS — I25.10 CORONARY ARTERY DISEASE INVOLVING NATIVE HEART WITHOUT ANGINA PECTORIS, UNSPECIFIED VESSEL OR LESION TYPE: ICD-10-CM

## 2022-02-16 DIAGNOSIS — G47.01 INSOMNIA DUE TO MEDICAL CONDITION: ICD-10-CM

## 2022-02-16 LAB
BASOPHILS ABSOLUTE: 0.1 K/UL (ref 0–0.2)
BASOPHILS RELATIVE PERCENT: 0.5 % (ref 0–1)
EOSINOPHILS ABSOLUTE: 0.5 K/UL (ref 0–0.6)
EOSINOPHILS RELATIVE PERCENT: 4.2 % (ref 0–5)
HCT VFR BLD CALC: 39.7 % (ref 42–52)
HEMOGLOBIN: 11.9 G/DL (ref 14–18)
IMMATURE GRANULOCYTES #: 0.1 K/UL
LYMPHOCYTES ABSOLUTE: 1.3 K/UL (ref 1.1–4.5)
LYMPHOCYTES RELATIVE PERCENT: 11.5 % (ref 20–40)
MCH RBC QN AUTO: 28.8 PG (ref 27–31)
MCHC RBC AUTO-ENTMCNC: 30 G/DL (ref 33–37)
MCV RBC AUTO: 96.1 FL (ref 80–94)
MONOCYTES ABSOLUTE: 0.9 K/UL (ref 0–0.9)
MONOCYTES RELATIVE PERCENT: 8 % (ref 0–10)
NEUTROPHILS ABSOLUTE: 8.2 K/UL (ref 1.5–7.5)
NEUTROPHILS RELATIVE PERCENT: 75.2 % (ref 50–65)
PDW BLD-RTO: 14.4 % (ref 11.5–14.5)
PLATELET # BLD: 316 K/UL (ref 130–400)
PMV BLD AUTO: 9.5 FL (ref 9.4–12.4)
RBC # BLD: 4.13 M/UL (ref 4.7–6.1)
WBC # BLD: 10.9 K/UL (ref 4.8–10.8)

## 2022-02-16 PROCEDURE — 4040F PNEUMOC VAC/ADMIN/RCVD: CPT | Performed by: NURSE PRACTITIONER

## 2022-02-16 PROCEDURE — 1036F TOBACCO NON-USER: CPT | Performed by: NURSE PRACTITIONER

## 2022-02-16 PROCEDURE — 1123F ACP DISCUSS/DSCN MKR DOCD: CPT | Performed by: NURSE PRACTITIONER

## 2022-02-16 PROCEDURE — 93000 ELECTROCARDIOGRAM COMPLETE: CPT | Performed by: NURSE PRACTITIONER

## 2022-02-16 PROCEDURE — G8484 FLU IMMUNIZE NO ADMIN: HCPCS | Performed by: NURSE PRACTITIONER

## 2022-02-16 PROCEDURE — 3017F COLORECTAL CA SCREEN DOC REV: CPT | Performed by: NURSE PRACTITIONER

## 2022-02-16 PROCEDURE — 99214 OFFICE O/P EST MOD 30 MIN: CPT | Performed by: NURSE PRACTITIONER

## 2022-02-16 PROCEDURE — G8427 DOCREV CUR MEDS BY ELIG CLIN: HCPCS | Performed by: NURSE PRACTITIONER

## 2022-02-16 PROCEDURE — G8417 CALC BMI ABV UP PARAM F/U: HCPCS | Performed by: NURSE PRACTITIONER

## 2022-02-16 RX ORDER — FUROSEMIDE 40 MG/1
40 TABLET ORAL DAILY
Qty: 90 TABLET | Refills: 0 | Status: SHIPPED | OUTPATIENT
Start: 2022-02-16 | End: 2022-03-21

## 2022-02-16 ASSESSMENT — ENCOUNTER SYMPTOMS
BLOOD IN STOOL: 1
SHORTNESS OF BREATH: 0
VOMITING: 0
NAUSEA: 0
DIARRHEA: 0
SORE THROAT: 0
RHINORRHEA: 0
ABDOMINAL PAIN: 0
COUGH: 0
TROUBLE SWALLOWING: 1
CONSTIPATION: 0

## 2022-02-16 NOTE — OUTREACH NOTE
Medical Week 4 Survey      Responses   [facility] patient discharged from? [facility]   Does the patient have one of the following disease processes/diagnoses(primary or secondary)? Other   Week 4 attempt successful? No          Maria Del Carmen Spaulding RN

## 2022-02-21 ENCOUNTER — HOSPITAL ENCOUNTER (OUTPATIENT)
Dept: PAIN MANAGEMENT | Age: 72
Discharge: HOME | End: 2022-02-21
Payer: MEDICARE

## 2022-02-21 VITALS
DIASTOLIC BLOOD PRESSURE: 69 MMHG | HEIGHT: 71 IN | OXYGEN SATURATION: 100 % | SYSTOLIC BLOOD PRESSURE: 142 MMHG | WEIGHT: 251 LBS | HEART RATE: 87 BPM | TEMPERATURE: 97 F | BODY MASS INDEX: 35.14 KG/M2

## 2022-02-21 DIAGNOSIS — R20.0 NUMBNESS AND TINGLING OF BOTH LEGS: ICD-10-CM

## 2022-02-21 DIAGNOSIS — G89.29 CHRONIC LEFT-SIDED LOW BACK PAIN WITH LEFT-SIDED SCIATICA: ICD-10-CM

## 2022-02-21 DIAGNOSIS — M25.50 ARTHRALGIA OF MULTIPLE JOINTS: ICD-10-CM

## 2022-02-21 DIAGNOSIS — R20.2 NUMBNESS AND TINGLING OF BOTH LEGS: ICD-10-CM

## 2022-02-21 DIAGNOSIS — M54.42 CHRONIC LEFT-SIDED LOW BACK PAIN WITH LEFT-SIDED SCIATICA: ICD-10-CM

## 2022-02-21 DIAGNOSIS — M54.12 CERVICAL RADICULOPATHY: ICD-10-CM

## 2022-02-21 PROCEDURE — 99213 OFFICE O/P EST LOW 20 MIN: CPT | Performed by: NURSE PRACTITIONER

## 2022-02-21 PROCEDURE — 99215 OFFICE O/P EST HI 40 MIN: CPT

## 2022-02-21 RX ORDER — ASPIRIN 81 MG/1
81 TABLET ORAL DAILY
COMMUNITY
Start: 2021-12-28

## 2022-02-21 RX ORDER — GABAPENTIN 400 MG/1
400 CAPSULE ORAL 3 TIMES DAILY
Qty: 270 CAPSULE | Refills: 0 | Status: SHIPPED | OUTPATIENT
Start: 2022-02-21 | End: 2022-05-23 | Stop reason: SDUPTHER

## 2022-02-21 RX ORDER — NITROGLYCERIN 0.4 MG/1
0.4 TABLET SUBLINGUAL
COMMUNITY
Start: 2021-12-28 | End: 2022-12-28

## 2022-02-21 RX ORDER — AMLODIPINE BESYLATE 2.5 MG/1
TABLET ORAL
COMMUNITY
Start: 2021-12-08

## 2022-02-21 RX ORDER — HYDROCODONE BITARTRATE AND ACETAMINOPHEN 7.5; 325 MG/1; MG/1
1 TABLET ORAL EVERY 8 HOURS PRN
Qty: 90 TABLET | Refills: 0 | Status: SHIPPED | OUTPATIENT
Start: 2022-02-23 | End: 2022-04-04 | Stop reason: SDUPTHER

## 2022-02-21 ASSESSMENT — PAIN SCALES - GENERAL: PAINLEVEL_OUTOF10: 8

## 2022-02-21 ASSESSMENT — PAIN DESCRIPTION - PAIN TYPE: TYPE: CHRONIC PAIN

## 2022-02-21 ASSESSMENT — PAIN DESCRIPTION - LOCATION: LOCATION: BACK;NECK

## 2022-02-21 NOTE — PROGRESS NOTES
[address] [place] [address] Drive Physical [facility] Pain Medicine    Office Progress Note    Patient Name: Marcus Welch    MR #: [id number]    Account #: [de-identified]    : 1950    Age: 66 y.o. Sex: male    Date: 2022    PCP: MARY Almanza         Referring Provider:    Chief Complaint:   Chief Complaint   Patient presents with    Neck Pain    Lower Back Pain       History of Present Illness:    Marcus Welch is a 66 y.o. male who presents to the office for follow-up of VANE and bilateral cervical trigger point injections. He says the injections really helped his pain. He says that he had 4 vessel CABG on 2022. Chest is still healing. Has not started cardiac rehab yet, but is allowed to drive. His Xarelto was discontinued due to GI bleed, but continues to be on ASA daily. Says that he is still weak from the surgery. Continues to take his Gabapentin and Norco with some relief in his pain that allows him to complete ADL's. Will hold off on injections for now and consider in the future. Last pain management HPI note read    Employment: Retired []   Disabled  []   Works []    Does Not Work [x]     Previous Injury:  Yes  []   No [x]     Previous Surgery: Yes []   No [x]     Previous Physical Therapy In the last 6 months? Yes  []     No [x]   Did Physical Therapy make thepain better or worse? Better []   Worse []  Unchanged []    MRI in the last two years? Yes [x]  No []   Results reviewed with patient? Yes []   No []    CT Scan in the last two years? Yes  []   No [x]   Results reviewed with patient? Yes []   No []    X-ray in the last two years? Yes [x]   No  []  Results reviewed with patient? Yes  []  No []    Injections in the past?  Yes [x]   No []   Did the injections help relieve the pain? Yes [x]   No []     Do you have Depression? Yes  []    No [x]   Thinking of harming yourself or others?   Yes  []   No [x]     Past Medical Histoy  Past Medical History:   Diagnosis Date    Allergic rhinitis     Arthritis     Cancer [facility]) 2012    Thyroid  - Dr Jimenez Briseno Coronary artery disease involving native heart without angina pectoris 1/18/2022    Hyperlipidemia     Hypertension     Hyperthyroidism     Prostate cancer ([facility] [address]     Sleep apnea     Type II or unspecified type diabetes mellitus without mention of complication, not stated as uncontrolled        Surgery History  Past Surgical History:   Procedure Laterality Date    CHOLECYSTECTOMY      INCONTINENCE SURGERY  11/26/2018    dr gino Hernandez  08/01/2017    removal, dr Karen Lauren      removed by Dr Sachin Coronel  Patient has no known allergies. Current Medications  Current Outpatient Medications   Medication Sig Dispense Refill    aspirin 81 MG EC tablet Take 81 mg by mouth daily      nitroGLYCERIN (NITROSTAT) 0.4 MG SL tablet Place 0.4 mg under the tongue      amLODIPine (NORVASC) 2.5 MG tablet       metoprolol tartrate (LOPRESSOR) 25 MG tablet TAKE 1 TABLET BY MOUTH EVERY 12 HOURS      gabapentin (NEURONTIN) 400 MG capsule Take 1 capsule by mouth 3 times daily for 90 days. May fill 2- 270 capsule 0    [START ON 2/23/2022] HYDROcodone-acetaminophen (NORCO) 7.5-325 MG per tablet Take 1 tablet by mouth every 8 hours as needed for Pain for up to 30 days.  May fill 2- 90 tablet 0    furosemide (LASIX) 40 MG tablet Take 1 tablet by mouth daily 90 tablet 0    glipiZIDE (GLUCOTROL) 5 MG tablet Take 1 tablet by mouth daily 90 tablet 3    lisinopril (PRINIVIL;ZESTRIL) 5 MG tablet Take 5 mg by mouth daily      amiodarone (CORDARONE) 200 MG tablet Take 100 mg by mouth daily       apixaban (ELIQUIS) 5 MG TABS tablet Take 5 mg by mouth 2 times daily      levothyroxine (SYNTHROID) 25 MCG tablet Take 1 tablet by mouth daily Total of 225mcg 90 tablet 1    levothyroxine (SYNTHROID) 200 MCG tablet Take 1 tablet by mouth daily Total of 225mcg 90 tablet 1    allopurinol (ZYLOPRIM) 100 MG tablet Take 1 tablet by mouth daily 90 tablet 3    citalopram (CELEXA) 20 MG tablet Take 1 tablet by mouth daily 90 tablet 3    metoprolol succinate (TOPROL XL) 25 MG extended release tablet Take 1 tablet by mouth daily (Patient taking differently: Take 25 mg by mouth 2 times daily ) 90 tablet 3    atorvastatin (LIPITOR) 20 MG tablet Take 1 tablet by mouth nightly 90 tablet 3    Cholecalciferol (VITAMIN D3) 2000 units TABS Take 1 tablet by mouth daily 30 tablet 11    COLCRYS 0.6 MG tablet TAKE 2 TABLETS THEN AN HOUR LATER TAKE 1 TABLET WHEN GOUT FLARES (Patient not taking: Reported on 1/18/2022) 12 tablet 5     No current facility-administered medications for this encounter. Social History    Social History     Tobacco Use    Smoking status: Never Smoker    Smokeless tobacco: Never Used   Substance Use Topics    Alcohol use: No         Family History  family history includes Cancer in his father; Heart Attack in his father and mother; Heart Disease in his father; Heart Failure in his mother; Schizophrenia in his mother; Thyroid Disease in his sister. Review of Systems:  Constitutional: denies fever, chills, fatigue, change in appetite, weight gain or weight loss  Head: Normocephalic  Skin: denies easy bruising, skin redness, skin rash, hives, sensitivity to sun exposure, tightness, nodules or bumps, hair loss, color changes in the hands or feet with cold. Eyes: denies pain, redness, loss of vision, double or blurred vision, eye drainage, or dryness. ENT and Mouth: denies ringing in the ears, loss of hearing, nasal congestion, nasal discharge, no hoarseness, sore throat, or difficulty swallowing   Respiratory: denies chronic dry cough, coughing up blood, coughing up mucus, waking at night coughing or choking, or wheezing.    Cardiovascular: denies chest pain, irregular heartbeats, palpitations, shortness of breath, or edema neck and bilateral shoulders  Lung: Clear to ausculation in all lobes anterior and posterior. Heart: Regular rate and rhythm, no gallops, rubs or murmurs, no edema  Abdomen: Bowel sounds in all quadrants, soft, non-distended, non-tender with palpation, no guarding  Extremities: No rash, cyanosis or bruising  Musculoskeletal: No joint swelling or deformity. Chest healing   Back Exam: limited ROM  Hip Exam: Full rotation bilateral   Knee Exam: Crepitus and pain in right knee with flexion and extension  Shoulder Exam: Full ROM bilateral  Neurologic Exam: Gait and coordination normal, speech normal  Reflexes: Normal brachialis, Negative Mariee's bilateral. Normal Patellar bilateral,   CN EXAM: II-XII intact, face symmetrical, tongue symmetrical, the trapezius and sternocleidomastoid muscle appearance and strength symmetrical, normal achilles bilateral, ankle clonus negative bilateral  Strength: 5/5 RUE Bi's/Tri's, 5/5 LUE Bi's/Tri's, 5/5 RLE knee flex/ext, 5/5 RLE DF/PF, 5/5 LLE knee flex/ext, 5/5 LLE DF/PF  Sensation: Equal and intact to fine touch in all extremities  Mood and affect: Normal   Nurses note reviewed along with current vital signs    Active Problem(s)  Active Problems:    Cervicalgia    Muscle spasms of neck    Myofascial muscle pain    Cervical radiculopathy  Resolved Problems:    * No resolved hospital problems. *                                                                                                                            PLAN:  1. Patient is to call the office with any questions or concerns that may arise prior to next appointment. 2. Continue Norco and Gabapentin  3. Follow-up in 2 months    Urine Drug Screen Current/Today:  Yes  [x]  No []     Discussion:  Discussed exam findings and plan of care with patient. Patient agreed with the current plan of care at this time. All questions from the patient were answered by the provider.     Activity:   Discussed exercise as beneficial to pain reduction, encouraged stretching exercise with a focus on torso strengthening. Education Provided:  Review of Boone Johnson [x] Agreement Review [x]  Reviewed PHQ-9  [x]    Review of Test [] Compliance Issues Discussed []   Cognitive Behavior Needs [] Exercise [x]  Financial Issues []   Tobacco/Alcohol Use [] Teaching  [x]     Goal:  Pain Management Goals of Therapy:   []        Resolution in pain  [x]        Decrease in pain level  [x]        Improvement in ADL's  [x]        Increase in activities with less pain  [x]        Decrease in medication      [] Benzodiazapine's and Narcotics:  Patient educated on the possible effects of combining Benzodiazapine's and Opioids. Explained \"Black Box Warnings\" such as; possible suppressed breathing, hypoxia, anoxia, depressed cognition, heart arrhythmia, coma and possible death. Patient verbalized understanding concerning possible effects. Controlled Substance Monitoring:  Attestation: The ERLINDA report for this patient was reviewed today. Discussed with patient possible medication side effects, risk of tolerance, dependence and alternative treatments. Discussed the growing epidemic in the [place] with the overprescribing and at times the abuse of narcotics. Discussed the detrimental effects of long term narcotic use. Patient encouraged to set daily goals of exercising and decreasing daily narcotic intake. Discussed with the patient about the development of hyperalgesia with long term narcotic intake. EMR dragon/transcription disclaimer: Much of this encounter note is electronic transcription/translation of spoken language to printed tach. Electronic translation of spoken language may be erroneous, or at times, nonsensical words or phrases may be inadvertently transcribed.  Although, I have reviewed the note for such errors, some may still exist.     CC:  MARY Boland APRN, 2/21/2022 at 8:45 AM

## 2022-02-21 NOTE — PROGRESS NOTES
Clinic Documentation      Education Provided:  [x] Review of Rhonda Rodriguez  [x] Agreement Review  [x] PEG Score Calculated [x] PHQ Score Calculated [x] ORT Score Calculated    [] Compliance Issues Discussed [] Cognitive Behavior Needs [x] Exercise [] Review of Test [] Financial Issues  [x] Tobacco/Alcohol Use Reviewed [x] Teaching [] New Patient [] Picture Obtained    Physician Plan:  [] Outgoing Referral  [] Pharmacy Consult  [] Test Ordered [x] Prescription Ordered/Changed   [] Obtained Test Results / Consult Notes        Complete if patient is withholding blood thinner for procedure     Blood Thinner Patient is currently taking:      [] Plavix (Hold for 7 days)  [] Aspirin (Hold for 5 days)     [] Pletal (Hold for 2 days)  [] Pradaxa (Hold for 3 days)    [] Effient (Hold for 7 days)  [] Xarelto (Hold for 2 days)    [] Eliquis (Hold for 2 days)  [] Brilinta (Hold for 7 days)    [] Coumadin (Hold for 5 days) - (INR needs to be drawn the day prior to procedure- INR < 2.0)    [] Aggrenox (Hold for 7 days)        [] Patient will stop medication on their own.    [] Blood Thinner Form Faxed for approval to hold.    Provider form faxed to:   Assessment Completed by:  Electronically signed by Praveena Corado MA on 2/21/2022 at 8:05 AM

## 2022-02-23 ENCOUNTER — OFFICE VISIT (OUTPATIENT)
Dept: GASTROENTEROLOGY | Facility: CLINIC | Age: 72
End: 2022-02-23

## 2022-02-23 VITALS
TEMPERATURE: 97.1 F | DIASTOLIC BLOOD PRESSURE: 78 MMHG | HEART RATE: 78 BPM | WEIGHT: 252 LBS | SYSTOLIC BLOOD PRESSURE: 142 MMHG | BODY MASS INDEX: 35.28 KG/M2 | OXYGEN SATURATION: 100 % | HEIGHT: 71 IN

## 2022-02-23 DIAGNOSIS — K62.5 BRBPR (BRIGHT RED BLOOD PER RECTUM): Primary | ICD-10-CM

## 2022-02-23 DIAGNOSIS — K59.00 CONSTIPATION, UNSPECIFIED CONSTIPATION TYPE: ICD-10-CM

## 2022-02-23 PROCEDURE — 99214 OFFICE O/P EST MOD 30 MIN: CPT | Performed by: NURSE PRACTITIONER

## 2022-02-23 NOTE — PROGRESS NOTES
[facility] GASTROENTEROLOGY - OFFICE NOTE    2/23/2022    Zay Kamara   1950    Primary Physician: Frankie Bradley APRN    Chief Complaint   Patient presents with   • GI Bleeding   • Constipation         HISTORY OF PRESENT ILLNESS:     Zay Kamara is a 71 y.o. male presents with bright red blood per rectum and constipated. He passed bright red blood per rectum on 2/8 when he strained to have bm so went to the [facility] Emergency Room. He did have rectal pain with the straining.  He was taken off eliquis but continues on aspirin. He reports that Dr. White told him he may not have to go back on the eliquis. He has had no rectal bleeding since then.        He has tried stool softeners and xlax that do not help for his constipation. He is moving his bowels 2-3 times per week. He takes narcotics daily for chronic pain.  No further rectal bleeding. No fever. No abdominal pain.     [facility] ER February 8 records reviewed. Hemoglobin was 11.8.  Repeat labs on February 16 hemoglobin 11.9. In the ER they documented a large hard stool that was brown.    He also had a CT scan of the abdomen and pelvis at [facility] February 8 which noted a large quantity of stool in the rectum and sigmoid colon. He had a digital rectal exam that showed no gross blood, internal hemorrhoids noted.    He is s/p CABG x 4 on 1-6-22 by Dr. White. He was on eliquis that has been stopped due to gi bleed. He continues on aspirin 81 mg.     Last colonoscopy 3/2017 noting a colon polyp ( path hyperplastic) and diverticulosis. Biopsy for microscopic colitis was negative. Recommended recall 5 years.         Past Medical History:   Diagnosis Date   • Arthritis    • Atrial fibrillation (HCC)     paroxysmal, on Xarelto   • BMI 31.0-31.9,adult 6/28/2018   • Carotid artery disease without cerebral infarction (HCC)    • Chronic knee pain    • Chronic neck and back pain    • Coronary artery disease 1/4/2022   •  Depression    • Diabetes (HCC)    • Disease of thyroid gland    • Gout    • Hypercholesteremia    • Hypertension    • IBS (irritable bowel syndrome)    • Kidney disease    • Osteoarthritis     back, neck, and knees - goes to Pain Management   • Prostate cancer (HCC)     Dr. Hong following   • Sensorineural hearing loss    • Sleep apnea     CPAP   • Sudden hearing loss    • Syncope    • Tinnitus    • Urine incontinence        Past Surgical History:   Procedure Laterality Date   • ATRIAL APPENDAGE EXCLUSION LEFT WITH TRANSESOPHAGEAL ECHOCARDIOGRAM Left 1/6/2022    Procedure: LEFT ATRIAL APPENDAGE EXCLUSION WITH  40MM ATRICLIP; TRANSESOPHAGEAL ECHOCARDIOGRAM;  Surgeon: Chivo White MD;  Location:  [facility] OR;  Service: Cardiothoracic;  Laterality: Left;   • BLADDER SUSPENSION N/A 11/26/2018    Procedure: CYSTOSCOPY BLADDER SUSPENSION MALE SLING;  Surgeon: Zaheer Rebolledo MD;  Location:  [facility] OR;  Service: Urology   • CARDIAC CATHETERIZATION N/A 12/30/2021    Procedure: Coronary angiography right radial to follow my 9:30 case;  Surgeon: Mike Fitzpatrick MD;  Location: [facility] CATH INVASIVE LOCATION;  Service: Cardiology;  Laterality: N/A;   • CHOLECYSTECTOMY     • COLONOSCOPY N/A 3/7/2017    Procedure: COLONOSCOPY WITH ANESTHESIA;  Surgeon: Hector Alvarenga MD;  Location: [facility] ENDOSCOPY;  Service:    • CORONARY ARTERY BYPASS GRAFT N/A 1/6/2022    Procedure: CORONARY ARTERY BYPASS GRAFT X 4 WITH LEFT INTERNAL MAMMARY ARTERY, LEFT LOWER EXTREMITY ENDOSCOPIC VEIN HARVEST, AND PERFUSION;  Surgeon: Chivo White MD;  Location:  [facility] OR;  Service: Cardiothoracic;  Laterality: N/A;   • MAZE PROCEDURE N/A 1/6/2022    Procedure: BILATERAL MAZE PROCEDURE WITH RADIOFREQUENCY AND CRYOABLATION;  Surgeon: Chivo White MD;  Location:  [facility] OR;  Service: Cardiothoracic;  Laterality: N/A;   • PROSTATECTOMY N/A 8/1/2017    Procedure: PROSTATECTOMY LAPAROSCOPIC WITH DAVINCI SI ROBOT ;  Surgeon: Hernesto Hong,  MD;  Location: [facility] OR;  Service:    • THYROID SURGERY      RADIATION THERAPY AFTER SURGERY       Outpatient Medications Marked as Taking for the 2/23/22 encounter (Office Visit) with Kati Galvan APRN   Medication Sig Dispense Refill   • allopurinol (ZYLOPRIM) 100 MG tablet Take 100 mg by mouth Daily.     • amiodarone (PACERONE) 200 MG tablet Take 1 tablet by mouth Daily. 30 tablet 0   • aspirin (aspirin) 81 MG EC tablet Take 1 tablet by mouth Daily.     • atorvastatin (LIPITOR) 20 MG tablet Take 20 mg by mouth Daily.  2   • Cholecalciferol (VITAMIN D3) 2000 units tablet Take 50 mcg by mouth Daily.     • citalopram (CeleXA) 20 MG tablet Take 20 mg by mouth Daily.     • gabapentin (NEURONTIN) 400 MG capsule Take 400 mg by mouth 3 (Three) Times a Day. Can take up to TID but only takes BID due to side effects - sleepiness     • glipizide (GLUCOTROL) 5 MG tablet Take 5 mg by mouth Daily.     • HYDROcodone-acetaminophen (NORCO) 7.5-325 MG per tablet Take 1 tablet by mouth Every 8 (Eight) Hours As Needed for Moderate Pain . Usually takes twice a day     • levothyroxine (SYNTHROID, LEVOTHROID) 200 MCG tablet Take 200 mcg by mouth Daily.     • levothyroxine (SYNTHROID, LEVOTHROID) 25 MCG tablet Take 25 mcg by mouth Daily.     • lisinopril (PRINIVIL,ZESTRIL) 5 MG tablet Take 1 tablet by mouth Daily. 30 tablet 2   • metoprolol tartrate (LOPRESSOR) 25 MG tablet Take 1 tablet by mouth Every 12 (Twelve) Hours. 60 tablet 2   • nitroglycerin (NITROSTAT) 0.4 MG SL tablet Place 1 tablet under the tongue Every 5 (Five) Minutes As Needed for Chest Pain. 25 tablet 11       No Known Allergies    Social History     Socioeconomic History   • Marital status:    Tobacco Use   • Smoking status: Never Smoker   • Smokeless tobacco: Never Used   Vaping Use   • Vaping Use: Never used   Substance and Sexual Activity   • Alcohol use: Yes     Comment: rare   • Drug use: Never   • Sexual activity: Defer     Partners: Female       Family  "History   Problem Relation Age of Onset   • Prostate cancer Father    • Cancer Father    • Heart disease Father         CABG   • Heart attack Father 70   • Heart disease Mother         assumed MI at time of death - had cardiopulmonary arrest   • Sudden death Mother    • Diabetes Sister    • Arrhythmia Sister    • Stroke Paternal Grandfather    • Colon cancer Neg Hx    • Colon polyps Neg Hx        Review of Systems   Constitutional: Negative for chills, fever and unexpected weight change.   Respiratory: Negative for cough, shortness of breath and wheezing.    Cardiovascular: Negative for chest pain and palpitations.   Gastrointestinal: Positive for blood in stool and constipation. Negative for abdominal distention, abdominal pain, diarrhea, nausea and vomiting.        Vitals:    02/23/22 0829   BP: 142/78   Pulse: 78   Temp: 97.1 °F (36.2 °C)   SpO2: 100%   Weight: 114 kg (252 lb)   Height: 180.3 cm (71\")      Body mass index is 35.15 kg/m².    Physical Exam  Vitals reviewed.   Constitutional:       General: He is not in acute distress.  Cardiovascular:      Rate and Rhythm: Normal rate and regular rhythm.      Heart sounds: Normal heart sounds.   Pulmonary:      Effort: Pulmonary effort is normal.      Breath sounds: Normal breath sounds.   Abdominal:      General: Bowel sounds are normal. There is no distension.      Palpations: Abdomen is soft.      Tenderness: There is no abdominal tenderness.   Skin:     General: Skin is warm and dry.   Neurological:      Mental Status: He is alert.                 ASSESSMENT AND PLAN    Assessment/Plan     Diagnoses and all orders for this visit:    1. BRBPR (bright red blood per rectum) (Primary)    2. Constipation, unspecified constipation type      Regards to bright red blood per rectum, that has resolved. He was constipated and straining with bowel movement. I recommended increasing water intake and fiber supplement of at least 10 g a day. I recommended trial of MiraLAX and " adjust accordingly and he verbalized understanding. I recommend ER if has worsening bleeding. He did have recent CABG and was on Eliquis which has been stopped. He continues on aspirin. We will see him back in the office in 1 month for reevaluation. We will discuss repeat colonoscopy at that time if he is doing well.      His last colonoscopy was March 2017 and had a polyp and diverticulosis. He was recommended to have a repeat colonoscopy in 5 years. He has an appointment with his cardiologist and cardiothoracic surgeon soon. He will discuss with him having a colonoscopy in the near future.             Return in about 1 month (around 3/23/2022).        LUIS Weir

## 2022-03-02 ENCOUNTER — TELEPHONE (OUTPATIENT)
Dept: CARDIAC SURGERY | Facility: CLINIC | Age: 72
End: 2022-03-02

## 2022-03-02 NOTE — TELEPHONE ENCOUNTER
Letter faxed to Deloris Galvan @ [employer] fax #[phone]. Informed patient that cardiac rehab was already ordered 2/11 and that it might be a little while before he is contacted as [facility] is still behind due to covid. He voiced understanding.

## 2022-03-02 NOTE — TELEPHONE ENCOUNTER
Pt calling to check status on his paperwork to return to work.  States he brought in a job description a while back but his employer has not rec'd any paperwork from us with a date for pt to return.  Pt also wondering when he is to start cardiac rehab.  Can reach pt at #[phone]/amarjit

## 2022-03-07 ENCOUNTER — TELEPHONE (OUTPATIENT)
Dept: CARDIAC SURGERY | Facility: CLINIC | Age: 72
End: 2022-03-07

## 2022-03-07 RX ORDER — AMIODARONE HYDROCHLORIDE 200 MG/1
TABLET ORAL
Qty: 30 TABLET | Refills: 0 | OUTPATIENT
Start: 2022-03-07

## 2022-03-07 NOTE — TELEPHONE ENCOUNTER
"Pt calling c/o pain on the R side of his chest \"between my incision and my tit\".  Pt calling to find out if this something to be concerned about.  States has had this pain ever since surgery.  He thought it would go away by now but it hasn't.  Wondering if he needs to be seen before he starts cardiac rehab or what to do.  States it's OK when lying down but it hurts when he turns over or when he sits up or coughs.  Can reach him at #[phone]/amarjit  "

## 2022-03-07 NOTE — TELEPHONE ENCOUNTER
Called patient back. Says he has right pain since surgery, thinks overall it may be getting better. He has pain when he coughs or sneezes too but this was all over his chest and is now isolated to the right chest. No popping or sternal clicks. Rest and sitting relieves the pain. He can walk around and not have pain. He has not started cardiac rehab yet as he has not heard from [facility] cardiac rehab. Ok to start cardiac rehab. Advised to be patient and allow more time for healing but if this worsens he should let us know. He verbalizes understanding.

## 2022-03-14 NOTE — PROGRESS NOTES
"Subjective   Chief Complaint   Patient presents with   • Post-op Follow-up     Pt had CABGx4 on 01/06/2022       Patient ID: Zay Kamara is a 71 y.o. male who is here for follow-up having had Coronary artery bypass graft x 4 (left internal mammary artery/sequencing the dominant diagonal artery and left anterior descending artery, reverse saphenous vein graft/distal right coronary artery, and reverse saphenous vein graft/posterior descending artery), Right and left MAZE procedure with radiofrequency and cryoablation, Left atrial appendage exclusion (Atriclip 40 mm device), Left endoscopic vein harvest performed on 1/6/2022 by Dr. White.       History of Present Illness  Post operative recovery was uneventful without any major complications.  Overall he is doing quite well.  His stamina is improving.  His pain control is excellent and that he only has chest soreness.  No palpitations.  No fevers, sweats, or chills.  No drainage from incisions.  No sternal clicks.  From a surgical point of view he appears to be quite well.  He is eating well.    Unfortunately he did have gastrointestinal bleeding prompted an evaluation outside ER.  He has seen GI med here and a referral has been made.  He has been asked to stop his Eliquis at this time.    He  has follow-up with Dr. Fitzpatrick    The following portions of the patient's history were reviewed and updated as appropriate: allergies, current medications, past family history, past medical history, past social history, past surgical history and problem list.    Review of Systems   Constitutional: Negative for chills, diaphoresis and fever.   Respiratory: Negative for shortness of breath and wheezing.    Cardiovascular: Negative for chest pain and leg swelling.       Objective   Visit Vitals  /60 (BP Location: Right arm, Patient Position: Sitting, Cuff Size: Adult)   Pulse 72   Ht 177.8 cm (70\")   Wt 112 kg (247 lb 12.8 oz)   SpO2 95%   BMI 35.56 kg/m²       Physical " Exam  Vitals reviewed.   Constitutional:       General: He is not in acute distress.     Appearance: He is well-developed. He is obese. He is not diaphoretic.   HENT:      Head: Normocephalic and atraumatic.   Eyes:      Pupils: Pupils are equal, round, and reactive to light.   Cardiovascular:      Rate and Rhythm: Normal rate and regular rhythm.      Heart sounds: Normal heart sounds. No murmur heard.    No friction rub.   Pulmonary:      Effort: Pulmonary effort is normal. No respiratory distress.      Breath sounds: Normal breath sounds. No wheezing or rales.   Abdominal:      General: There is no distension.      Palpations: Abdomen is soft.      Tenderness: There is no abdominal tenderness. There is no guarding.   Musculoskeletal:      Cervical back: Normal range of motion and neck supple.      Right lower leg: No edema.      Left lower leg: No edema.   Skin:     General: Skin is warm and dry.      Coloration: Skin is not pale.      Findings: No rash.      Comments: Sternotomy site well-healed.  Sternum stable. No clicks.  Saphenectomy site well-healed.     Neurological:      Mental Status: He is alert and oriented to person, place, and time.   Psychiatric:         Behavior: Behavior normal.             Assessment/Plan         Diagnoses and all orders for this visit:    1. Paroxysmal atrial fibrillation (HCC) (Primary)  -     Ambulatory Referral to Cardiac Rehab    2. Coronary artery disease, unspecified vessel or lesion type, unspecified whether angina present, unspecified whether native or transplanted heart  -     Ambulatory Referral to Cardiac Rehab    3. Thoracic aortic aneurysm without rupture (HCC)  -     CT angiogram chest w contrast; Future    4. Coronary artery disease involving native coronary artery of native heart with unstable angina pectoris (HCC)    5. Acquired coagulation factor deficiency (HCC)    6. Class 2 severe obesity due to excess calories with serious comorbidity and body mass index  (BMI) of 36.0 to 36.9 in adult ([facility])    7. Stage 3 chronic kidney disease, unspecified whether stage 3a or 3b CKD ([facility])    Other orders  -     amiodarone (PACERONE) 200 MG tablet; Take 1 tablet by mouth Daily.  Dispense: 30 tablet; Refill: 0  -     metoprolol tartrate (LOPRESSOR) 25 MG tablet; Take 1 tablet by mouth Every 12 (Twelve) Hours.  Dispense: 60 tablet; Refill: 2  -     lisinopril (PRINIVIL,ZESTRIL) 5 MG tablet; Take 1 tablet by mouth Daily.  Dispense: 30 tablet; Refill: 2           Overall, Zay Kamara is doing well.      We discussed sternotomy precautions and how those will evolve over the next couple months.  He is now eligible for cardiac rehabilitation and orders have been placed.  The importance of a regular and progressive lifelong exercise program has been strongly advised.  Ultimately this will be the endpoint that is desired to be achieved.  We discussed the importance of ongoing risk factor modification.  Ultimately it will be critical for durable and sustained weight loss.  We discussed the importance of developing a regular and progressive exercise program with an active lifestyle as paramount to achieve that goal.  We discussed the importance of a heart healthy diet and education has been provided today verbally and with a handout.  Strict adherence to his prescribed medical regimen is strongly encouraged as well as continued evaluation by primary care and cardiology providers.      Provided support and encouragement. All questions have been answered to the best of my ability. Patient has follow up with Dr. Fitzpatrick in a few weeks. Patient has been instructed to contact our office with any questions or concerns at any time.    He has had no further bouts of GI bleeding since discontinuing the NOAC.  Await GI medicine recommendations  He does have follow-up with Dr. Fitzpatrick and has been encouraged to keep that appointment.  A couple refills have been provided.  I did renew his amiodarone at a  lower dose for 1 additional month and then if he has no further palpitations from my perspective he can discontinue amiodarone and we can evaluate the efficacy of his Maze procedure.  He does have exclusion of left atrial appendage.    His work-up identified an incidental  thoracic aortic aneurysm.  I will plan to see him back in 6 months for further follow-up.  We discussed signs and symptoms of acute aortic pathology.  We discussed importance of presented to the emergency department if he has any chest pain.  All question been answered to best my ability and he is agreeable to the forementioned plan.        Patient's Body mass index is 35.56 kg/m². indicating that he is obese (BMI >30). Obesity-related health conditions include the following: hypertension, coronary heart disease, diabetes mellitus and dyslipidemias. Obesity is unchanged. BMI is is above average; BMI management plan is completed. We discussed portion control and increasing exercise.      Zay Kamara  reports that he has never smoked. He has never used smokeless tobacco.         Advance Care Planning   ACP discussion was declined by the patient. Patient does not have an advance directive, declines further assistance.

## 2022-03-15 ENCOUNTER — OFFICE VISIT (OUTPATIENT)
Dept: CARDIOLOGY | Facility: CLINIC | Age: 72
End: 2022-03-15

## 2022-03-15 VITALS
DIASTOLIC BLOOD PRESSURE: 88 MMHG | WEIGHT: 246.6 LBS | OXYGEN SATURATION: 98 % | HEART RATE: 74 BPM | HEIGHT: 71 IN | SYSTOLIC BLOOD PRESSURE: 168 MMHG | BODY MASS INDEX: 34.52 KG/M2

## 2022-03-15 DIAGNOSIS — I25.10 CORONARY ARTERY DISEASE INVOLVING NATIVE CORONARY ARTERY OF NATIVE HEART WITHOUT ANGINA PECTORIS: Primary | ICD-10-CM

## 2022-03-15 DIAGNOSIS — E66.01 CLASS 2 SEVERE OBESITY DUE TO EXCESS CALORIES WITH SERIOUS COMORBIDITY AND BODY MASS INDEX (BMI) OF 36.0 TO 36.9 IN ADULT: ICD-10-CM

## 2022-03-15 DIAGNOSIS — Z95.1 HX OF CABG: ICD-10-CM

## 2022-03-15 DIAGNOSIS — G47.30 SLEEP APNEA, UNSPECIFIED TYPE: Chronic | ICD-10-CM

## 2022-03-15 DIAGNOSIS — Z86.79 S/P MAZE OPERATION FOR ATRIAL FIBRILLATION: ICD-10-CM

## 2022-03-15 DIAGNOSIS — Z98.890 S/P MAZE OPERATION FOR ATRIAL FIBRILLATION: ICD-10-CM

## 2022-03-15 DIAGNOSIS — E78.2 MIXED HYPERLIPIDEMIA: Chronic | ICD-10-CM

## 2022-03-15 DIAGNOSIS — I65.23 BILATERAL CAROTID ARTERY STENOSIS: Chronic | ICD-10-CM

## 2022-03-15 DIAGNOSIS — I48.0 PAROXYSMAL ATRIAL FIBRILLATION: Chronic | ICD-10-CM

## 2022-03-15 DIAGNOSIS — I10 PRIMARY HYPERTENSION: Chronic | ICD-10-CM

## 2022-03-15 PROBLEM — E66.812 CLASS 2 SEVERE OBESITY DUE TO EXCESS CALORIES WITH SERIOUS COMORBIDITY AND BODY MASS INDEX (BMI) OF 36.0 TO 36.9 IN ADULT: Chronic | Status: ACTIVE | Noted: 2018-06-28

## 2022-03-15 PROCEDURE — 99214 OFFICE O/P EST MOD 30 MIN: CPT | Performed by: NURSE PRACTITIONER

## 2022-03-15 RX ORDER — AMIODARONE HYDROCHLORIDE 200 MG/1
100 TABLET ORAL DAILY
Qty: 30 TABLET | Refills: 0 | Status: ON HOLD
Start: 2022-03-15 | End: 2022-05-20

## 2022-03-15 RX ORDER — BISACODYL 5 MG/1
5 TABLET, DELAYED RELEASE ORAL DAILY PRN
COMMUNITY

## 2022-03-15 RX ORDER — POLYETHYLENE GLYCOL 3350 17 G/17G
17 POWDER, FOR SOLUTION ORAL DAILY PRN
COMMUNITY

## 2022-03-15 NOTE — ASSESSMENT & PLAN NOTE
Patient's (Body mass index is 34.41 kg/m².) indicates that they are obese (BMI >30) with health conditions that include obstructive sleep apnea, hypertension, coronary heart disease, diabetes mellitus and dyslipidemias . Weight is improving with lifestyle modifications. BMI is is above average; BMI management plan is completed. We discussed low calorie, low carb based diet program, portion control and increasing exercise.

## 2022-03-15 NOTE — ASSESSMENT & PLAN NOTE
Encouraged compliance with CPAP. Encouraged continued weight loss but continue CPAP until has significant amount of weight loss.

## 2022-03-15 NOTE — ASSESSMENT & PLAN NOTE
Stable/improved s/p CABG x 4 1/2022. To start cardiac rehab soon. Call if R sided chest discomfort doesn't continue to improve. No evidence of sternal malunion. Consider changing glipizide to Jardiance or Farxiga given CV benefits. Discussed lifelong aspirin.

## 2022-03-15 NOTE — ASSESSMENT & PLAN NOTE
Improved. Again encouraged healthy diet, BP, lipid, and glucose control, and routine aerobic exercise. Continue present therapy. Recheck per Dr. León/Jeri Chambers NP.

## 2022-03-15 NOTE — ASSESSMENT & PLAN NOTE
Uncontrolled per today's but better controlled per reported home readings. Goal BP less than 130/80. Call if home BP readings uncontrolled - goal less than 130/80. Encouraged continued use of CPAP. Continue lisinopril and metoprolol.

## 2022-03-15 NOTE — PROGRESS NOTES
Encounter Date:03/15/2022  Chief Complaint:   Subjective    Zay Kamara is a 71 y.o. male who presents to [facility] [place] today for three month cardiac follow-up. Since his last visit his nuclear stress test was high risk for ischemia and he underwent CABG and MAZE after abnormal heart cath. He is waiting to hear back from cardiac rehab. He was also found to have an incidental ascending thoracic aortic aneurysm of 4.5 cm that had increased slightly compared to 2019. Dr. White is following.      History of Present Illness   HPI     Post-op Open Heart Surgery      Additional comments: Right sided chest pain/ left shoulder pain when stands up or rolls over in bed.               Hypertension      Additional comments: Running 130s/60s at home. No headaches or nosebleeds. Some dizziness when first stands up. Had an episode of near syncope about a month ago - stood up and tried to walk too quickly.              Atrial Fibrillation      Additional comments: Paroxysmal, onset before CABG with MAZE and WILLIAM occlusion 1/2022. Off Eliquis now - GI bleed?              Hyperlipidemia      Additional comments: Low HDL per recent labs              Coronary Artery Disease      Additional comments: Complains of some L arm and shoulder discomfort since CABG but more positional - not exertional. Fatigue is improving.              Follow-up      Additional comments: 3 MO           Last edited by Letty Belcher APRN on 3/15/2022 11:18 AM. (History)        History: Past medical, surgical, family, and social history reviewed.    Outpatient Medications Marked as Taking for the 3/15/22 encounter (Office Visit) with Letty Belcher APRN   Medication Sig Dispense Refill   • allopurinol (ZYLOPRIM) 100 MG tablet Take 100 mg by mouth Daily.     • amiodarone (PACERONE) 200 MG tablet Take 0.5 tablets by mouth Daily. 30 tablet 0   • aspirin (aspirin) 81 MG EC tablet Take 1 tablet by mouth Daily.     •  "atorvastatin (LIPITOR) 20 MG tablet Take 20 mg by mouth Daily.  2   • bisacodyl (DULCOLAX) 5 MG EC tablet Take 5 mg by mouth Daily As Needed for Constipation.     • Cholecalciferol (VITAMIN D3) 2000 units tablet Take 50 mcg by mouth Daily.     • citalopram (CeleXA) 20 MG tablet Take 20 mg by mouth Daily.     • Docusate Calcium (STOOL SOFTENER PO) Take 1 capsule by mouth 2 (Two) Times a Day.     • gabapentin (NEURONTIN) 400 MG capsule Take 400 mg by mouth 3 (Three) Times a Day. Can take up to TID but only takes BID due to side effects - sleepiness     • glipizide (GLUCOTROL) 5 MG tablet Take 5 mg by mouth Daily.     • HYDROcodone-acetaminophen (NORCO) 7.5-325 MG per tablet Take 1 tablet by mouth Every 8 (Eight) Hours As Needed for Moderate Pain . Usually takes twice a day     • levothyroxine (SYNTHROID, LEVOTHROID) 200 MCG tablet Take 200 mcg by mouth Daily. Takes with 25 mcg = 225 mcg     • levothyroxine (SYNTHROID, LEVOTHROID) 25 MCG tablet Take 25 mcg by mouth Daily. Takes with 200 mcg = 225 mcg     • lisinopril (PRINIVIL,ZESTRIL) 5 MG tablet Take 1 tablet by mouth Daily. 30 tablet 2   • metoprolol tartrate (LOPRESSOR) 25 MG tablet Take 1 tablet by mouth Every 12 (Twelve) Hours. 60 tablet 2   • nitroglycerin (NITROSTAT) 0.4 MG SL tablet Place 1 tablet under the tongue Every 5 (Five) Minutes As Needed for Chest Pain. 25 tablet 11   • [DISCONTINUED] amiodarone (PACERONE) 200 MG tablet Take 1 tablet by mouth Daily. 30 tablet 0   • [DISCONTINUED] tobramycin 0.3 % solution ophthalmic solution Administer 1 drop to both eyes Every 8 (Eight) Hours.          Objective     Vital Signs:   /88 (BP Location: Left arm, Patient Position: Sitting, Cuff Size: Adult)   Pulse 74   Ht 180.3 cm (70.98\")   Wt 112 kg (246 lb 9.6 oz)   SpO2 98%   BMI 34.41 kg/m²   Wt Readings from Last 3 Encounters:   03/15/22 112 kg (246 lb 9.6 oz)   02/23/22 114 kg (252 lb)   02/11/22 112 kg (247 lb 12.8 oz)         Vitals reviewed. "   Constitutional:       Appearance: Well-developed.   Eyes:      General: No scleral icterus.  HENT:      Right Ear: Decreased hearing noted.      Left Ear: Decreased hearing noted.   Neck:      Vascular: Normal carotid pulses. No carotid bruit or JVD.   Pulmonary:      Effort: Pulmonary effort is normal.      Breath sounds: Normal breath sounds.   Cardiovascular:      Normal rate. Regular rhythm.      No gallop.   Pulses:     Intact distal pulses.   Edema:     Peripheral edema absent.   Skin:     General: Skin is warm and dry.      Comments: Midline sternal incision well healed   Neurological:      Mental Status: Alert and oriented to person, place, and time.         Result Review  Data Reviewed:  The following data was reviewed by: LUIS Garcia on 03/15/2022  Lab Results - Last 18 Months   Lab Units 02/16/22  1222 02/15/22  1133 01/14/22  1046 01/11/22  0821 01/10/22  0840 01/08/22  0421 01/07/22  0313 01/06/22  1813 01/06/22  1453 01/06/22  0805 01/05/22  1328 12/30/21  0956 07/28/21  1038 05/18/21  0824 12/30/20  0937 11/09/20  0820   BUN mg/dL  --  28* 31* 48* 46*   < > 29*   < > 25*  --  24*   < >  --  24*  --  23   CREATININE mg/dL  --  1.5* 1.2 1.41* 1.54*   < > 1.72*   < > 1.63*  --  1.32*   < >  --  1.3*  --  1.4*   EGFR IF NONAFRICN AM   --  46* 59* 49* 45*   < > 39*   < > 42*  --  53*   < >  --  54*  --  50*   EGFR IF AFRICN AM   --  56* >59  --   --   --   --   --   --   --   --   --   --  >59  --  >59   SODIUM mmol/L  --  138 137 136 134*   < > 138   < > 140  --  136   < >  --  137  --  138   SODIUM, ARTERIAL   --   --   --   --   --   --   --   --   --    < >  --   --   --   --   --   --    POTASSIUM mmol/L  --  4.9 4.9 5.1 4.8   < > 4.8   < > 4.8  --  4.8   < >  --  4.7  --  4.9   CHLORIDE mmol/L  --  101 99 99 99   < > 105   < > 108*  --  100   < >  --  103  --  102   CALCIUM mg/dL  --  9.5 9.3 8.1* 8.3*   < > 8.8   < > 9.5  --  9.0   < >  --  8.9  --  9.5   ALBUMIN g/dL  --   --  3.4*   --  3.60  --  3.60   < > 3.30*  --  4.50   < >  --  4.4  --  4.4   BILIRUBIN mg/dL  --   --  0.4  --  0.5  --   --   --   --   --  0.7   < >  --  0.6  --  0.4   ALK PHOS U/L  --   --  51  --  43  --   --   --   --   --  63   < >  --  58  --  64   AST (SGOT) U/L  --   --  22  --  19  --   --   --   --   --  22   < >  --  12  --  12   ALT (SGPT) U/L  --   --  15  --  7  --   --   --   --   --  16   < >  --  13  --  13   CHOLESTEROL mg/dL  --   --   --   --   --   --   --   --   --   --   --   --   --  90*  --  91*   TRIGLYCERIDES mg/dL  --   --  148  --   --   --   --   --   --   --   --   --   --  125  --  124   HDL CHOL mg/dL  --   --  17*  --   --   --   --   --   --   --   --   --   --  22*  --  21*   LDL CHOL mg/dL  --   --  43  --   --   --   --   --   --   --   --   --   --  43  --  45   HEMOGLOBIN A1C %  --   --   --   --   --   --   --   --   --   --  6.70*  --   --  6.3*  --  6.3*   WBC K/uL 10.9*  --  15.0* 13.02* 11.13*   < > 20.57*   < > 12.94*  --  9.79   < >  --   --   --  8.9   RBC M/uL 4.13*  --  3.14* 3.27* 3.03*   < > 3.41*   < > 3.49*  --  4.51   < >  --   --   --  4.63*   HEMATOCRIT % 39.7*  --  30.0* 29.8* 27.0*   < > 29.7*   < > 30.2*  --  40.0   < >  --   --   --  42.4   MCV fL 96.1*  --  95.5* 91.1 89.1   < > 87.1   < > 86.5  --  88.7   < >  --   --   --  91.6   MCH pg 28.8  --  29.3 29.1 29.7   < > 29.3   < > 28.9  --  30.2   < >  --   --   --  29.6   TSH uIU/mL  --  0.485 5.260*  --   --   --   --   --   --   --   --   --   --   --   --  0.477   FREE T4 ng/dL  --   --  1.13  --   --   --   --   --   --   --   --   --   --   --   --  1.96*   PSA ng/mL  --   --   --   --   --   --   --   --   --   --   --   --  <0.014  --  <0.014  --    INR   --   --   --   --   --   --  1.15*  --  1.23*  --  1.21*  --   --   --   --   --     < > = values in this interval not displayed.   Cardiac Catheterization/Vascular Study (12/30/2021 11:50)  Adult Transthoracic Echo Complete W/ Cont if Necessary Per  Protocol (01/05/2022 10:53)  ECG 12 Lead (01/08/2022 04:16)             Assessment and Plan   Problem List Items Addressed This Visit        Cardiac and Vasculature    Coronary artery disease involving native heart - Primary (Chronic)    Overview     #1 99% ostial LAD stenosis and total occlusion of the distal LAD  #2 60% proximal RCA stenosis and 90% stenosis of the proximal PL branch  #3 dilated ascending aorta  #4 LVEF 55%    Coronary artery bypass graft x 4 (left internal mammary artery/sequencing the dominant diagonal artery and left anterior descending artery, reverse saphenous vein graft/distal right coronary artery, and reverse saphenous vein graft/posterior descending artery), Right and left MAZE procedure with radiofrequency and cryoablation, Left atrial appendage exclusion (Atriclip 40 mm device), Left endoscopic vein harvest performed on 1/6/2022 by Dr. White.              Current Assessment & Plan     Stable/improved s/p CABG x 4 1/2022. To start cardiac rehab soon. Call if R sided chest discomfort doesn't continue to improve. No evidence of sternal malunion. Consider changing glipizide to Jardiance or Farxiga given CV benefits. Discussed lifelong aspirin.           Paroxysmal atrial fibrillation (HCC) (Chronic)    Overview     [id number] 3 = high risk  HAS-BLED 3 = high risk  s/p MAZE, left atrial appendage occlusion 1/2022           Current Assessment & Plan     Reasonable to remain off anticoagulation since s/p MAZE and left atrial appendage occlusion.           Relevant Medications    amiodarone (PACERONE) 200 MG tablet    Bilateral carotid artery stenosis (Chronic)    Overview     Dr. León following  8/21/2019 US - less than 50% R ICA, 50-69% L ICA stenosis  8/2021 - less than 50% L&R ICA           Current Assessment & Plan     Improved. Again encouraged healthy diet, BP, lipid, and glucose control, and routine aerobic exercise. Continue present therapy. Recheck per Dr. León/Jeri  KOMAL Chambers.           Hypertension (Chronic)    Current Assessment & Plan     Uncontrolled per today's but better controlled per reported home readings. Goal BP less than 130/80. Call if home BP readings uncontrolled - goal less than 130/80. Encouraged continued use of CPAP. Continue lisinopril and metoprolol.            Mixed hyperlipidemia (Chronic)    Current Assessment & Plan     Well controlled except low HDL per 5/2021 and 1/2022 labs on atorvastatin. Consider changing to rosuvastatin. Encouraged healthy diet and gradual increase in activity. Strongly encouraged cardiac rehab.           Hx of CABG    Overview     1/2022           S/P Maze operation for atrial fibrillation    Overview     1/2022              Endocrine and Metabolic    Class 2 severe obesity due to excess calories with serious comorbidity and body mass index (BMI) of 36.0 to 36.9 in adult (HCC) (Chronic)    Current Assessment & Plan     Patient's (Body mass index is 34.41 kg/m².) indicates that they are obese (BMI >30) with health conditions that include obstructive sleep apnea, hypertension, coronary heart disease, diabetes mellitus and dyslipidemias . Weight is improving with lifestyle modifications. BMI is is above average; BMI management plan is completed. We discussed low calorie, low carb based diet program, portion control and increasing exercise.               Sleep    Sleep apnea (Chronic)    Overview     Previously noncompliant with CPAP           Current Assessment & Plan     Encouraged compliance with CPAP. Encouraged continued weight loss but continue CPAP until has significant amount of weight loss.               Patient was given instructions and counseling regarding his condition or for health maintenance advice. Please see specific information pulled into the AVS if appropriate.    Advance Care Planning   ACP discussion was held with the patient during this visit. Patient does not have an advance directive, information  provided.       Follow Up :   Return in about 2 months (around 5/15/2022) for Recheck at [facility].             Letty Belcher, APRN, ACNP-BC, CHFN-BC

## 2022-03-15 NOTE — ASSESSMENT & PLAN NOTE
Well controlled except low HDL per 5/2021 and 1/2022 labs on atorvastatin. Consider changing to rosuvastatin. Encouraged healthy diet and gradual increase in activity. Strongly encouraged cardiac rehab.

## 2022-03-16 DIAGNOSIS — E78.2 MIXED HYPERLIPIDEMIA: ICD-10-CM

## 2022-03-16 RX ORDER — ATORVASTATIN CALCIUM 20 MG/1
20 TABLET, FILM COATED ORAL NIGHTLY
Qty: 90 TABLET | Refills: 3 | Status: SHIPPED | OUTPATIENT
Start: 2022-03-16

## 2022-03-16 NOTE — TELEPHONE ENCOUNTER
Received fax from pharmacy requesting refill on pts medication(s). Pt was last seen in office on 2/16/2022  and has a follow up scheduled for 5/16/2022. Will send request to  [facility]  for authorization.      Requested Prescriptions     Pending Prescriptions Disp Refills    atorvastatin (LIPITOR) 20 MG tablet [Pharmacy Med Name: ATORVASTATIN 20 MG TABLET] 90 tablet 3     Sig: Take 1 tablet by mouth at bedtime

## 2022-03-21 ENCOUNTER — OFFICE VISIT (OUTPATIENT)
Dept: PRIMARY CARE CLINIC | Age: 72
End: 2022-03-21
Payer: MEDICARE

## 2022-03-21 VITALS
SYSTOLIC BLOOD PRESSURE: 132 MMHG | WEIGHT: 251 LBS | DIASTOLIC BLOOD PRESSURE: 78 MMHG | TEMPERATURE: 98.4 F | OXYGEN SATURATION: 98 % | HEART RATE: 77 BPM | HEIGHT: 71 IN | BODY MASS INDEX: 35.14 KG/M2

## 2022-03-21 DIAGNOSIS — M54.50 ACUTE LEFT-SIDED LOW BACK PAIN WITHOUT SCIATICA: Primary | ICD-10-CM

## 2022-03-21 PROCEDURE — 96372 THER/PROPH/DIAG INJ SC/IM: CPT | Performed by: NURSE PRACTITIONER

## 2022-03-21 PROCEDURE — 4040F PNEUMOC VAC/ADMIN/RCVD: CPT | Performed by: NURSE PRACTITIONER

## 2022-03-21 PROCEDURE — 99213 OFFICE O/P EST LOW 20 MIN: CPT | Performed by: NURSE PRACTITIONER

## 2022-03-21 PROCEDURE — 1123F ACP DISCUSS/DSCN MKR DOCD: CPT | Performed by: NURSE PRACTITIONER

## 2022-03-21 PROCEDURE — 3017F COLORECTAL CA SCREEN DOC REV: CPT | Performed by: NURSE PRACTITIONER

## 2022-03-21 PROCEDURE — G8484 FLU IMMUNIZE NO ADMIN: HCPCS | Performed by: NURSE PRACTITIONER

## 2022-03-21 PROCEDURE — 1036F TOBACCO NON-USER: CPT | Performed by: NURSE PRACTITIONER

## 2022-03-21 PROCEDURE — G8417 CALC BMI ABV UP PARAM F/U: HCPCS | Performed by: NURSE PRACTITIONER

## 2022-03-21 PROCEDURE — G8427 DOCREV CUR MEDS BY ELIG CLIN: HCPCS | Performed by: NURSE PRACTITIONER

## 2022-03-21 RX ORDER — TRIAMCINOLONE ACETONIDE 40 MG/ML
40 INJECTION, SUSPENSION INTRA-ARTICULAR; INTRAMUSCULAR ONCE
Status: COMPLETED | OUTPATIENT
Start: 2022-03-21 | End: 2022-03-21

## 2022-03-21 RX ORDER — BACLOFEN 10 MG/1
10 TABLET ORAL 3 TIMES DAILY
Qty: 30 TABLET | Refills: 0 | Status: SHIPPED | OUTPATIENT
Start: 2022-03-21 | End: 2022-08-03

## 2022-03-21 RX ORDER — TESTOSTERONE CYPIONATE 200 MG/ML
4 INJECTION INTRAMUSCULAR ONCE
Status: COMPLETED | OUTPATIENT
Start: 2022-03-21 | End: 2022-03-21

## 2022-03-21 RX ADMIN — TRIAMCINOLONE ACETONIDE 40 MG: 40 INJECTION, SUSPENSION INTRA-ARTICULAR; INTRAMUSCULAR at 10:44

## 2022-03-21 RX ADMIN — TESTOSTERONE CYPIONATE 4 MG: 200 INJECTION INTRAMUSCULAR at 10:43

## 2022-03-21 ASSESSMENT — ENCOUNTER SYMPTOMS
VOMITING: 0
BACK PAIN: 1
RHINORRHEA: 0
NAUSEA: 0
SHORTNESS OF BREATH: 0
COUGH: 0
SORE THROAT: 0
DIARRHEA: 0
TROUBLE SWALLOWING: 0
ABDOMINAL PAIN: 0
CONSTIPATION: 0

## 2022-03-21 NOTE — PROGRESS NOTES
Claus Luna (:  1950) is a 66 y.o. male,Established patient, here for evaluation of the following chief complaint(s):  Back Pain (felt a snap in left hip area at the end of last week, tried heating pad with little relief. Continued to get worse throughout the weekend. )      ASSESSMENT/PLAN:    ICD-10-CM    1. Acute left-sided low back pain without sciatica  M54.50 Dexamethasone Sodium Phosphate injection 4 mg     triamcinolone acetonide (KENALOG-40) injection 40 mg     baclofen (LIORESAL) 10 MG tablet       Return if symptoms worsen or fail to improve. SUBJECTIVE/OBJECTIVE:  HPI  Here for lower back pain  Denies any injury  Bent over last week and felt a pop  Been using heating pad with little relief. Has been taking norco for pain. Hurts to walk and get up  Pain is in left lower back. Non radiating. Diabetes  Lab Results   Component Value Date    LABA1C 6.3 (H) 2021     /78 (Site: Left Upper Arm, Position: Sitting, Cuff Size: Large Adult)   Pulse 77   Temp 98.4 °F (36.9 °C) (Temporal)   Ht 5' 11\" (1.803 m)   Wt 251 lb (113.9 kg)   SpO2 98%   BMI 35.01 kg/m²     Review of Systems   Constitutional: Negative for activity change, appetite change, fatigue, fever and unexpected weight change. HENT: Negative for ear pain, rhinorrhea, sore throat and trouble swallowing. Eyes: Negative for visual disturbance. Respiratory: Negative for cough and shortness of breath. Cardiovascular: Negative for chest pain, palpitations and leg swelling. Gastrointestinal: Negative for abdominal pain, constipation, diarrhea, nausea and vomiting. Genitourinary: Negative for flank pain. Musculoskeletal: Positive for back pain. Negative for arthralgias, myalgias, neck pain and neck stiffness. Neurological: Negative for headaches. Psychiatric/Behavioral: Negative for decreased concentration and sleep disturbance. The patient is not nervous/anxious.         Physical Exam  Vitals reviewed. Constitutional:       Appearance: Normal appearance. HENT:      Head: Normocephalic and atraumatic. Cardiovascular:      Rate and Rhythm: Normal rate and regular rhythm. Pulses: Normal pulses. Heart sounds: Normal heart sounds. Pulmonary:      Effort: Pulmonary effort is normal.      Breath sounds: Normal breath sounds. Abdominal:      Palpations: Abdomen is soft. Musculoskeletal:      Cervical back: Normal range of motion. Lumbar back: Tenderness present. Decreased range of motion. Positive left straight leg raise test. Negative right straight leg raise test.   Skin:     General: Skin is warm. Neurological:      Mental Status: He is alert. An electronic signature was used to authenticate this note.     --MARY Winter

## 2022-03-21 NOTE — PATIENT INSTRUCTIONS
Patient Education   ice for 20 minutes every couple of hours while awake. Follow up with some heat. Avoid painful motion  No lifting, pulling or tugging for 1 week. Learning About Low Back Pain  What is low back pain? Low back pain is pain that can occur anywhere below the ribs and above the legs. It is very common. Almost everyone has it at one time or another. Low back pain can be:  · Acute. This is new pain that can last a few days to a few weeks--at the most a few months. · Chronic. This pain can last for more than a few months. Sometimes it can last for years. What are some myths about low back pain? Here are some common myths about low back pain--and the facts:  Myth: \"I need to rest my back when I have back pain. \"   Fact: Staying active won't hurt you. It may help you get better faster. Myth: \"I need prescription pain medicine. \"   Fact: It's best to try to let time and being active heal your back. Opioid pain medicines--such as hydrocodone or oxycodone--usually don't work any better than over-the-counter medicines like ibuprofen or naproxen. And opioids can cause serious problems like opioid use disorder or overdose. Moderate to severe opioid use disorder is sometimes called addiction. Myth: \"I need a test like an X-ray or an MRI to diagnose my low back pain. \"   Fact: Getting a test right away won't help you get better faster. And it could lead you down a treatment path you may not need, since most people get better on their own. What causes low back pain? In most cases, there isn't a clear cause. This can be frustrating, because your back hurts and there's no obvious reason. Your back pain can be caused by:  Overuse or muscle strain. This can happen from playing sports, lifting heavy things, or not being physically fit. A herniated disc. This is a problem with the cushion between the bones in your back. Arthritis.   With age, you may have changes in your bones that can narrow the space around your nerves. Other causes. In rare cases, the cause is a serious illness like an infection or cancer. But there are usually other symptoms too. What are the symptoms? Back pain can come on quickly or over time. You may feel:  · Pain in your hips or buttock. · Leg pain, numbness, tingling, or weakness. When a nerve gets squeezed--such as from a disc problem or arthritis--you may have symptoms in your leg or foot. You can even have leg symptoms from a back problem without having any pain in your back. · Pain that's sharp or dull, sometimes with stiffness or muscle spasms. It may be in one small area or over a broad area. But even bad pain doesn't mean that it's caused by something serious. How is low back pain diagnosed? A physical exam is the main way to diagnose low back pain. Your doctor may examine your back, check your nerves by testing your reflexes, and make sure that your muscles are strong. Your doctor also will ask questions about your back and overall health. Most people don't need any tests right away. Tests often don't show the reason for your pain. If your pain lasts more than 6 weeks or you have symptoms that your doctor is more concerned about, then your doctor may order tests. These may include an X-ray, a CT scan, or an MRI. Sometimes other tests such as a bone scan or nerve conduction test may be done. How is low back pain treated? Most acute low back pain gets better on its own within a few weeks, no matter what the cause. Time and doing usual activities are all that most people need to feel better. Using heat or ice and taking over-the-counter pain medicine also can help while your body heals. If you aren't getting better on your own or your pain is very bad, your doctor may recommend:  · Physical therapy. · Spinal manipulation, such as by a chiropractor. · Acupuncture. · Massage.   · Injections of steroid medicine in your back (especially for pain that involves your legs).  If you have chronic low back pain, treatment will help you understand and manage your pain. Treatment may include:  · Staying active. This may include walking or doing back exercises. · Physical therapy. · Medicines. Some of these medicines are also used for other problems, like depression. · Pain management. Your doctor may have you see a pain specialist.  · Counseling. Having chronic pain can be hard. It may help to talk to someone who can help you cope with your pain. Surgery isn't needed for most people. But it may help some types of low back pain. Follow-up care is a key part of your treatment and safety. Be sure to make and go to all appointments, and call your doctor if you are having problems. It's also a good idea to know your test results and keep a list of the medicines you take. When should you call for help? Call 911 anytime you think you may need emergency care. For example, call if:  · You can't move a leg at all. Call your doctor now or seek immediate medical care if:  · You have new or worse symptoms in your legs, belly, or buttocks. Symptoms may include:  ? Numbness or tingling. ? Weakness. ? Pain. · You lose bladder or bowel control. Watch closely for changes in your health, and be sure to contact your doctor if:  · Along with the back pain, you have a fever, lose weight, or don't feel well. · You do not get better as expected. Where can you learn more? Go to https://Rent.comlindaLeiyoo.Invieo. org and sign in to your [employer] account. Enter A007 in the [facility] box to learn more about \"Learning About Low Back Pain. \"     If you do not have an account, please click on the \"Sign Up Now\" link. Current as of: July 1, 2021               Content Version: 13.1  © 5635-2502 Healthwise, Incorporated. Care instructions adapted under license by [facility] ([facility]).  If you have questions about a medical condition or this instruction, always ask your healthcare professional. [employer], [facility] disclaims any warranty or liability for your use of this information. Patient Education        Acute Low Back Pain: Exercises  Introduction  Here are some examples of typical rehabilitation exercises for your condition. Start each exercise slowly. Ease off the exercise if you start to have pain. Your doctor or physical therapist will tell you when you can start these exercises and which ones will work best for you. When you are not being active, find a comfortable position for rest. Some people are comfortable on the floor or a medium-firm bed with a small pillow under their head and another under their knees. Some people prefer to lie on their side with a pillow between their knees. Don't stay in one position for too long. Take short walks (10 to 20 minutes) every 2 to 3 hours. Avoid slopes, hills, and stairs until you feel better. Walk only distances you can manage without pain, especially leg pain. How to do the exercises  Back stretches    1. Get down on your hands and knees on the floor. 2. Relax your head and allow it to droop. Round your back up toward the ceiling until you feel a nice stretch in your upper, middle, and lower back. Hold this stretch for as long as it feels comfortable, or about 15 to 30 seconds. 3. Return to the starting position with a flat back while you are on your hands and knees. 4. Let your back sway by pressing your stomach toward the floor. Lift your buttocks toward the ceiling. 5. Hold this position for 15 to 30 seconds. 6. Repeat 2 to 4 times. Follow-up care is a key part of your treatment and safety. Be sure to make and go to all appointments, and call your doctor if you are having problems. It's also a good idea to know your test results and keep a list of the medicines you take. Where can you learn more? Go to https://manuela.[employer]. org and sign in to your [employer] account.  Enter H768 in the [employer] box to learn more about \"Acute Low Back Pain: Exercises. \"     If you do not have an account, please click on the \"Sign Up Now\" link. Current as of: September 8, 2021               Content Version: 13.1  © 1012-7687 Healthwise, Incorporated. Care instructions adapted under license by [facility] ([facility]). If you have questions about a medical condition or this instruction, always ask your healthcare professional. [address] any warranty or liability for your use of this information.

## 2022-03-25 ENCOUNTER — TELEPHONE (OUTPATIENT)
Dept: PRIMARY CARE CLINIC | Age: 72
End: 2022-03-25

## 2022-03-28 ENCOUNTER — HOSPITAL ENCOUNTER (OUTPATIENT)
Dept: GENERAL RADIOLOGY | Age: 72
Discharge: HOME | End: 2022-03-28
Payer: MEDICARE

## 2022-03-28 ENCOUNTER — TELEPHONE (OUTPATIENT)
Dept: PRIMARY CARE CLINIC | Age: 72
End: 2022-03-28

## 2022-03-28 ENCOUNTER — OFFICE VISIT (OUTPATIENT)
Dept: PRIMARY CARE CLINIC | Age: 72
End: 2022-03-28
Payer: MEDICARE

## 2022-03-28 VITALS
WEIGHT: 250 LBS | HEART RATE: 61 BPM | OXYGEN SATURATION: 94 % | DIASTOLIC BLOOD PRESSURE: 70 MMHG | BODY MASS INDEX: 34.87 KG/M2 | TEMPERATURE: 96.2 F | SYSTOLIC BLOOD PRESSURE: 128 MMHG

## 2022-03-28 DIAGNOSIS — M54.50 ACUTE LEFT-SIDED LOW BACK PAIN WITHOUT SCIATICA: ICD-10-CM

## 2022-03-28 DIAGNOSIS — M54.50 ACUTE LEFT-SIDED LOW BACK PAIN WITHOUT SCIATICA: Primary | ICD-10-CM

## 2022-03-28 PROCEDURE — G8417 CALC BMI ABV UP PARAM F/U: HCPCS | Performed by: NURSE PRACTITIONER

## 2022-03-28 PROCEDURE — 1036F TOBACCO NON-USER: CPT | Performed by: NURSE PRACTITIONER

## 2022-03-28 PROCEDURE — G8427 DOCREV CUR MEDS BY ELIG CLIN: HCPCS | Performed by: NURSE PRACTITIONER

## 2022-03-28 PROCEDURE — 1123F ACP DISCUSS/DSCN MKR DOCD: CPT | Performed by: NURSE PRACTITIONER

## 2022-03-28 PROCEDURE — 99214 OFFICE O/P EST MOD 30 MIN: CPT | Performed by: NURSE PRACTITIONER

## 2022-03-28 PROCEDURE — 72100 X-RAY EXAM L-S SPINE 2/3 VWS: CPT

## 2022-03-28 PROCEDURE — G8484 FLU IMMUNIZE NO ADMIN: HCPCS | Performed by: NURSE PRACTITIONER

## 2022-03-28 PROCEDURE — 3017F COLORECTAL CA SCREEN DOC REV: CPT | Performed by: NURSE PRACTITIONER

## 2022-03-28 PROCEDURE — 4040F PNEUMOC VAC/ADMIN/RCVD: CPT | Performed by: NURSE PRACTITIONER

## 2022-03-28 ASSESSMENT — ENCOUNTER SYMPTOMS
NAUSEA: 0
VOMITING: 0
RHINORRHEA: 0
SORE THROAT: 0
DIARRHEA: 0
ABDOMINAL PAIN: 0
BACK PAIN: 1
CONSTIPATION: 0
SHORTNESS OF BREATH: 0
COUGH: 0
TROUBLE SWALLOWING: 0

## 2022-03-28 NOTE — PROGRESS NOTES
Keshia Lepe (:  1950) is a 66 y.o. male,Established patient, here for evaluation of the following chief complaint(s):  Back Pain (pain shooting across back. finished steroids. denies leg pain. takes norco which helps for pain post heart surgery but no help for back. had tried at home exercise, heat and ice. exercises make pain worse. )      ASSESSMENT/PLAN:    ICD-10-CM    1. Acute left-sided low back pain without sciatica  M54.50 XR LUMBAR SPINE (2-3 VIEWS)     External Referral To Physical Therapy       Return in about 6 weeks (around 2022) for back pain. SUBJECTIVE/OBJECTIVE:  HPI  Here for follow-up on back pain. Patient was seen 1 week ago for acute low back pain without injury. Patient related he had bent over and felt a pop. Heating pad and ice gave little relief. Heat helps enough that can sleep. Hurts to roll over or get up. Pain was in the left lower back and non-radiating  Today patient states he is not any better. No loss of bladder or bowel control    /70   Pulse 61   Temp 96.2 °F (35.7 °C) (Temporal)   Wt 250 lb (113.4 kg)   SpO2 94%   BMI 34.87 kg/m²     Review of Systems   Constitutional: Negative for activity change, appetite change, fatigue, fever and unexpected weight change. HENT: Negative for ear pain, rhinorrhea, sore throat and trouble swallowing. Eyes: Negative for visual disturbance. Respiratory: Negative for cough and shortness of breath. Cardiovascular: Negative for chest pain, palpitations and leg swelling. Gastrointestinal: Negative for abdominal pain, constipation, diarrhea, nausea and vomiting. Genitourinary: Negative for flank pain. Musculoskeletal: Positive for back pain. Negative for arthralgias, myalgias, neck pain and neck stiffness. Neurological: Negative for headaches. Psychiatric/Behavioral: Negative for decreased concentration and sleep disturbance. The patient is not nervous/anxious.         Physical Exam  Vitals reviewed. Constitutional:       Appearance: Normal appearance. HENT:      Head: Normocephalic and atraumatic. Nose:      Comments: masked     Mouth/Throat:      Comments: masked  Cardiovascular:      Rate and Rhythm: Normal rate and regular rhythm. Pulses: Normal pulses. Heart sounds: Normal heart sounds. Pulmonary:      Effort: Pulmonary effort is normal.      Breath sounds: Normal breath sounds. Abdominal:      Palpations: Abdomen is soft. Musculoskeletal:      Cervical back: Normal range of motion. Lumbar back: Tenderness present. Decreased range of motion. Positive left straight leg raise test. Negative right straight leg raise test.   Skin:     General: Skin is warm. Neurological:      Mental Status: He is alert and oriented to person, place, and time. An electronic signature was used to authenticate this note.     --Elver Domingo, APRN

## 2022-03-28 NOTE — PATIENT INSTRUCTIONS
Patient Education        Learning About Low Back Pain  What is low back pain? Low back pain is pain that can occur anywhere below the ribs and above the legs. It is very common. Almost everyone has it at one time or another. Low back pain can be:  · Acute. This is new pain that can last a few days to a few weeks--at the most a few months. · Chronic. This pain can last for more than a few months. Sometimes it can last for years. What are some myths about low back pain? Here are some common myths about low back pain--and the facts:  Myth: \"I need to rest my back when I have back pain. \"   Fact: Staying active won't hurt you. It may help you get better faster. Myth: \"I need prescription pain medicine. \"   Fact: It's best to try to let time and being active heal your back. Opioid pain medicines--such as hydrocodone or oxycodone--usually don't work any better than over-the-counter medicines like ibuprofen or naproxen. And opioids can cause serious problems like opioid use disorder or overdose. Moderate to severe opioid use disorder is sometimes called addiction. Myth: \"I need a test like an X-ray or an MRI to diagnose my low back pain. \"   Fact: Getting a test right away won't help you get better faster. And it could lead you down a treatment path you may not need, since most people get better on their own. What causes low back pain? In most cases, there isn't a clear cause. This can be frustrating, because your back hurts and there's no obvious reason. Your back pain can be caused by:  Overuse or muscle strain. This can happen from playing sports, lifting heavy things, or not being physically fit. A herniated disc. This is a problem with the cushion between the bones in your back. Arthritis. With age, you may have changes in your bones that can narrow the space around your nerves. Other causes. In rare cases, the cause is a serious illness like an infection or cancer.  But there are usually other symptoms too.  What are the symptoms? Back pain can come on quickly or over time. You may feel:  · Pain in your hips or buttock. · Leg pain, numbness, tingling, or weakness. When a nerve gets squeezed--such as from a disc problem or arthritis--you may have symptoms in your leg or foot. You can even have leg symptoms from a back problem without having any pain in your back. · Pain that's sharp or dull, sometimes with stiffness or muscle spasms. It may be in one small area or over a broad area. But even bad pain doesn't mean that it's caused by something serious. How is low back pain diagnosed? A physical exam is the main way to diagnose low back pain. Your doctor may examine your back, check your nerves by testing your reflexes, and make sure that your muscles are strong. Your doctor also will ask questions about your back and overall health. Most people don't need any tests right away. Tests often don't show the reason for your pain. If your pain lasts more than 6 weeks or you have symptoms that your doctor is more concerned about, then your doctor may order tests. These may include an X-ray, a CT scan, or an MRI. Sometimes other tests such as a bone scan or nerve conduction test may be done. How is low back pain treated? Most acute low back pain gets better on its own within a few weeks, no matter what the cause. Time and doing usual activities are all that most people need to feel better. Using heat or ice and taking over-the-counter pain medicine also can help while your body heals. If you aren't getting better on your own or your pain is very bad, your doctor may recommend:  · Physical therapy. · Spinal manipulation, such as by a chiropractor. · Acupuncture. · Massage. · Injections of steroid medicine in your back (especially for pain that involves your legs). If you have chronic low back pain, treatment will help you understand and manage your pain. Treatment may include:  · Staying active.  This may include walking or doing back exercises. · Physical therapy. · Medicines. Some of these medicines are also used for other problems, like depression. · Pain management. Your doctor may have you see a pain specialist.  · Counseling. Having chronic pain can be hard. It may help to talk to someone who can help you cope with your pain. Surgery isn't needed for most people. But it may help some types of low back pain. Follow-up care is a key part of your treatment and safety. Be sure to make and go to all appointments, and call your doctor if you are having problems. It's also a good idea to know your test results and keep a list of the medicines you take. When should you call for help? Call 911 anytime you think you may need emergency care. For example, call if:  · You can't move a leg at all. Call your doctor now or seek immediate medical care if:  · You have new or worse symptoms in your legs, belly, or buttocks. Symptoms may include:  ? Numbness or tingling. ? Weakness. ? Pain. · You lose bladder or bowel control. Watch closely for changes in your health, and be sure to contact your doctor if:  · Along with the back pain, you have a fever, lose weight, or don't feel well. · You do not get better as expected. Where can you learn more? Go to [email or web address] org and sign in to your [employer] account. Enter A007 in the [facility] box to learn more about \"Learning About Low Back Pain. \"     If you do not have an account, please click on the \"Sign Up Now\" link. Current as of: July 1, 2021               Content Version: 13.1  © 0021-5345 [employer]wise, Incorporated. Care instructions adapted under license by [facility] ([facility]). If you have questions about a medical condition or this instruction, always ask your healthcare professional. [address] any warranty or liability for your use of this information.

## 2022-03-28 NOTE — TELEPHONE ENCOUNTER
Called patient, spoke with: Patient regarding the results of the patients most recent xrays. I advised Patient of Jacquelyn Randolph recommendations.    Patient did voice understanding

## 2022-03-28 NOTE — TELEPHONE ENCOUNTER
----- Message from MARY Lopez sent at 3/28/2022 10:35 AM CDT -----  Please call patient and let them know results. Xray shows degenerative changes. Physical therapy as discussed.

## 2022-04-01 ENCOUNTER — TREATMENT (OUTPATIENT)
Dept: CARDIAC REHAB | Facility: HOSPITAL | Age: 72
End: 2022-04-01

## 2022-04-01 DIAGNOSIS — Z95.1 S/P CABG (CORONARY ARTERY BYPASS GRAFT): Primary | ICD-10-CM

## 2022-04-01 PROCEDURE — 93798 PHYS/QHP OP CAR RHAB W/ECG: CPT

## 2022-04-04 DIAGNOSIS — R20.0 NUMBNESS AND TINGLING OF BOTH LEGS: ICD-10-CM

## 2022-04-04 DIAGNOSIS — R20.2 NUMBNESS AND TINGLING OF BOTH LEGS: ICD-10-CM

## 2022-04-04 DIAGNOSIS — M54.12 CERVICAL RADICULOPATHY: ICD-10-CM

## 2022-04-04 RX ORDER — HYDROCODONE BITARTRATE AND ACETAMINOPHEN 7.5; 325 MG/1; MG/1
1 TABLET ORAL EVERY 8 HOURS PRN
Qty: 90 TABLET | Refills: 0 | Status: SHIPPED | OUTPATIENT
Start: 2022-04-05 | End: 2022-05-23 | Stop reason: SDUPTHER

## 2022-04-05 ENCOUNTER — TREATMENT (OUTPATIENT)
Dept: CARDIAC REHAB | Facility: HOSPITAL | Age: 72
End: 2022-04-05

## 2022-04-05 DIAGNOSIS — Z95.1 S/P CABG (CORONARY ARTERY BYPASS GRAFT): Primary | ICD-10-CM

## 2022-04-05 PROCEDURE — 93798 PHYS/QHP OP CAR RHAB W/ECG: CPT

## 2022-04-06 ENCOUNTER — APPOINTMENT (OUTPATIENT)
Dept: CARDIAC REHAB | Facility: HOSPITAL | Age: 72
End: 2022-04-06

## 2022-04-07 ENCOUNTER — TREATMENT (OUTPATIENT)
Dept: CARDIAC REHAB | Facility: HOSPITAL | Age: 72
End: 2022-04-07

## 2022-04-07 DIAGNOSIS — Z95.1 S/P CABG (CORONARY ARTERY BYPASS GRAFT): Primary | ICD-10-CM

## 2022-04-07 PROCEDURE — 93798 PHYS/QHP OP CAR RHAB W/ECG: CPT

## 2022-04-08 ENCOUNTER — APPOINTMENT (OUTPATIENT)
Dept: CARDIAC REHAB | Facility: HOSPITAL | Age: 72
End: 2022-04-08

## 2022-04-12 ENCOUNTER — TREATMENT (OUTPATIENT)
Dept: CARDIAC REHAB | Facility: HOSPITAL | Age: 72
End: 2022-04-12

## 2022-04-12 DIAGNOSIS — Z95.1 S/P CABG (CORONARY ARTERY BYPASS GRAFT): Primary | ICD-10-CM

## 2022-04-12 PROCEDURE — 93798 PHYS/QHP OP CAR RHAB W/ECG: CPT

## 2022-04-13 ENCOUNTER — APPOINTMENT (OUTPATIENT)
Dept: CARDIAC REHAB | Facility: HOSPITAL | Age: 72
End: 2022-04-13

## 2022-04-14 ENCOUNTER — TREATMENT (OUTPATIENT)
Dept: CARDIAC REHAB | Facility: HOSPITAL | Age: 72
End: 2022-04-14

## 2022-04-14 DIAGNOSIS — Z95.1 S/P CABG (CORONARY ARTERY BYPASS GRAFT): Primary | ICD-10-CM

## 2022-04-14 PROCEDURE — 93798 PHYS/QHP OP CAR RHAB W/ECG: CPT

## 2022-04-15 ENCOUNTER — APPOINTMENT (OUTPATIENT)
Dept: CARDIAC REHAB | Facility: HOSPITAL | Age: 72
End: 2022-04-15

## 2022-04-19 ENCOUNTER — TREATMENT (OUTPATIENT)
Dept: CARDIAC REHAB | Facility: HOSPITAL | Age: 72
End: 2022-04-19

## 2022-04-19 DIAGNOSIS — Z95.1 S/P CABG (CORONARY ARTERY BYPASS GRAFT): Primary | ICD-10-CM

## 2022-04-19 PROCEDURE — 93798 PHYS/QHP OP CAR RHAB W/ECG: CPT

## 2022-04-20 ENCOUNTER — APPOINTMENT (OUTPATIENT)
Dept: CARDIAC REHAB | Facility: HOSPITAL | Age: 72
End: 2022-04-20

## 2022-04-21 ENCOUNTER — TREATMENT (OUTPATIENT)
Dept: CARDIAC REHAB | Facility: HOSPITAL | Age: 72
End: 2022-04-21

## 2022-04-21 DIAGNOSIS — Z95.1 S/P CABG (CORONARY ARTERY BYPASS GRAFT): Primary | ICD-10-CM

## 2022-04-21 PROCEDURE — 93798 PHYS/QHP OP CAR RHAB W/ECG: CPT

## 2022-04-22 ENCOUNTER — APPOINTMENT (OUTPATIENT)
Dept: CARDIAC REHAB | Facility: HOSPITAL | Age: 72
End: 2022-04-22

## 2022-04-26 ENCOUNTER — TREATMENT (OUTPATIENT)
Dept: CARDIAC REHAB | Facility: HOSPITAL | Age: 72
End: 2022-04-26

## 2022-04-26 DIAGNOSIS — Z95.1 S/P CABG (CORONARY ARTERY BYPASS GRAFT): Primary | ICD-10-CM

## 2022-04-26 PROCEDURE — 93798 PHYS/QHP OP CAR RHAB W/ECG: CPT

## 2022-04-27 ENCOUNTER — APPOINTMENT (OUTPATIENT)
Dept: CARDIAC REHAB | Facility: HOSPITAL | Age: 72
End: 2022-04-27

## 2022-04-28 ENCOUNTER — TREATMENT (OUTPATIENT)
Dept: CARDIAC REHAB | Facility: HOSPITAL | Age: 72
End: 2022-04-28

## 2022-04-28 DIAGNOSIS — Z95.1 S/P CABG (CORONARY ARTERY BYPASS GRAFT): Primary | ICD-10-CM

## 2022-04-28 PROCEDURE — 93798 PHYS/QHP OP CAR RHAB W/ECG: CPT

## 2022-04-29 ENCOUNTER — APPOINTMENT (OUTPATIENT)
Dept: CARDIAC REHAB | Facility: HOSPITAL | Age: 72
End: 2022-04-29

## 2022-05-03 ENCOUNTER — TREATMENT (OUTPATIENT)
Dept: CARDIAC REHAB | Facility: HOSPITAL | Age: 72
End: 2022-05-03

## 2022-05-03 DIAGNOSIS — Z95.1 S/P CABG (CORONARY ARTERY BYPASS GRAFT): Primary | ICD-10-CM

## 2022-05-03 PROCEDURE — 93798 PHYS/QHP OP CAR RHAB W/ECG: CPT

## 2022-05-04 ENCOUNTER — APPOINTMENT (OUTPATIENT)
Dept: CARDIAC REHAB | Facility: HOSPITAL | Age: 72
End: 2022-05-04

## 2022-05-05 ENCOUNTER — TREATMENT (OUTPATIENT)
Dept: CARDIAC REHAB | Facility: HOSPITAL | Age: 72
End: 2022-05-05

## 2022-05-05 DIAGNOSIS — Z95.1 S/P CABG (CORONARY ARTERY BYPASS GRAFT): Primary | ICD-10-CM

## 2022-05-05 PROCEDURE — 93798 PHYS/QHP OP CAR RHAB W/ECG: CPT

## 2022-05-06 ENCOUNTER — APPOINTMENT (OUTPATIENT)
Dept: CARDIAC REHAB | Facility: HOSPITAL | Age: 72
End: 2022-05-06

## 2022-05-09 ENCOUNTER — HOSPITAL ENCOUNTER (OUTPATIENT)
Dept: GENERAL RADIOLOGY | Age: 72
Discharge: HOME | End: 2022-05-09
Payer: MEDICARE

## 2022-05-09 ENCOUNTER — TELEPHONE (OUTPATIENT)
Dept: PRIMARY CARE CLINIC | Age: 72
End: 2022-05-09

## 2022-05-09 ENCOUNTER — OFFICE VISIT (OUTPATIENT)
Dept: PRIMARY CARE CLINIC | Age: 72
End: 2022-05-09
Payer: MEDICARE

## 2022-05-09 VITALS
TEMPERATURE: 96.8 F | HEART RATE: 79 BPM | OXYGEN SATURATION: 96 % | DIASTOLIC BLOOD PRESSURE: 76 MMHG | WEIGHT: 248 LBS | SYSTOLIC BLOOD PRESSURE: 128 MMHG | BODY MASS INDEX: 34.59 KG/M2

## 2022-05-09 DIAGNOSIS — M25.562 ACUTE PAIN OF LEFT KNEE: ICD-10-CM

## 2022-05-09 DIAGNOSIS — K92.1 HEMATOCHEZIA: ICD-10-CM

## 2022-05-09 DIAGNOSIS — K64.8 INTERNAL HEMORRHOID: ICD-10-CM

## 2022-05-09 DIAGNOSIS — K92.1 HEMATOCHEZIA: Primary | ICD-10-CM

## 2022-05-09 LAB
BASOPHILS ABSOLUTE: 0 K/UL (ref 0–0.2)
BASOPHILS RELATIVE PERCENT: 0.4 % (ref 0–1)
EOSINOPHILS ABSOLUTE: 0.2 K/UL (ref 0–0.6)
EOSINOPHILS RELATIVE PERCENT: 2.2 % (ref 0–5)
HCT VFR BLD CALC: 38.7 % (ref 42–52)
HEMOGLOBIN: 12 G/DL (ref 14–18)
IMMATURE GRANULOCYTES #: 0.1 K/UL
LYMPHOCYTES ABSOLUTE: 0.8 K/UL (ref 1.1–4.5)
LYMPHOCYTES RELATIVE PERCENT: 7.8 % (ref 20–40)
MCH RBC QN AUTO: 28.8 PG (ref 27–31)
MCHC RBC AUTO-ENTMCNC: 31 G/DL (ref 33–37)
MCV RBC AUTO: 93 FL (ref 80–94)
MONOCYTES ABSOLUTE: 0.7 K/UL (ref 0–0.9)
MONOCYTES RELATIVE PERCENT: 6.5 % (ref 0–10)
NEUTROPHILS ABSOLUTE: 8.2 K/UL (ref 1.5–7.5)
NEUTROPHILS RELATIVE PERCENT: 82.5 % (ref 50–65)
PDW BLD-RTO: 14.8 % (ref 11.5–14.5)
PLATELET # BLD: 250 K/UL (ref 130–400)
PMV BLD AUTO: 10 FL (ref 9.4–12.4)
RBC # BLD: 4.16 M/UL (ref 4.7–6.1)
WBC # BLD: 10 K/UL (ref 4.8–10.8)

## 2022-05-09 PROCEDURE — 1036F TOBACCO NON-USER: CPT | Performed by: NURSE PRACTITIONER

## 2022-05-09 PROCEDURE — G8417 CALC BMI ABV UP PARAM F/U: HCPCS | Performed by: NURSE PRACTITIONER

## 2022-05-09 PROCEDURE — 3017F COLORECTAL CA SCREEN DOC REV: CPT | Performed by: NURSE PRACTITIONER

## 2022-05-09 PROCEDURE — 4040F PNEUMOC VAC/ADMIN/RCVD: CPT | Performed by: NURSE PRACTITIONER

## 2022-05-09 PROCEDURE — 1123F ACP DISCUSS/DSCN MKR DOCD: CPT | Performed by: NURSE PRACTITIONER

## 2022-05-09 PROCEDURE — 73562 X-RAY EXAM OF KNEE 3: CPT

## 2022-05-09 PROCEDURE — 99214 OFFICE O/P EST MOD 30 MIN: CPT | Performed by: NURSE PRACTITIONER

## 2022-05-09 PROCEDURE — G8428 CUR MEDS NOT DOCUMENT: HCPCS | Performed by: NURSE PRACTITIONER

## 2022-05-09 RX ORDER — HYDROCORTISONE ACETATE 25 MG/1
25 SUPPOSITORY RECTAL EVERY 12 HOURS
Qty: 14 SUPPOSITORY | Refills: 0 | Status: SHIPPED | OUTPATIENT
Start: 2022-05-09 | End: 2022-08-03

## 2022-05-09 RX ORDER — LISINOPRIL 5 MG/1
TABLET ORAL
Qty: 90 TABLET | OUTPATIENT
Start: 2022-05-09

## 2022-05-09 ASSESSMENT — ENCOUNTER SYMPTOMS
CONSTIPATION: 0
COUGH: 0
RHINORRHEA: 0
DIARRHEA: 0
TROUBLE SWALLOWING: 0
SORE THROAT: 0
SHORTNESS OF BREATH: 0
BLOOD IN STOOL: 1
NAUSEA: 0
ABDOMINAL PAIN: 0
VOMITING: 0

## 2022-05-09 NOTE — PROGRESS NOTES
Josee Barraza (:  1950) is a 66 y.o. male,Established patient, here for evaluation of the following chief complaint(s):  Rectal Bleeding (blood with BMs. red blood. happens with every BM. started saturday. last colon around 5 years ago dr Jenn Parkinson. only blood thinner asa 81.) and Knee Pain (left knee pain, fell a few days ago. when pushes on knee feels \"soft\" )      ASSESSMENT/PLAN:    ICD-10-CM    1. Hematochezia  K92.1 CBC with Auto Differential     External Referral To Gastroenterology   2. Internal hemorrhoid  K64.8 hydrocortisone (ANUSOL-HC) 25 MG suppository   3. Acute pain of left knee  M25.562 XR KNEE LEFT (3 VIEWS)       Return if symptoms worsen or fail to improve. SUBJECTIVE/OBJECTIVE:  HPI  Here for rectal bleeding  When wipe it is pure blood. Onset Saturday  Last colonoscopy approx 5 years ago Dr Mingo Alva  On baby aspirin daily. No dizziness or lightheadedness  No hx of hemorrhoids, have had a fissure before. Will have some constipation at times due to pain medicine. Left knee pain  Fell landing on knee last week. Doesn't hurt too bad but it is soft. Denies any head injury or LOC  Tripped falling landing on asphalt. /76   Pulse 79   Temp 96.8 °F (36 °C) (Temporal)   Wt 248 lb (112.5 kg)   SpO2 96%   BMI 34.59 kg/m²     Review of Systems   Constitutional: Negative for activity change, appetite change, fatigue, fever and unexpected weight change. HENT: Negative for ear pain, rhinorrhea, sore throat and trouble swallowing. Eyes: Negative for visual disturbance. Respiratory: Negative for cough and shortness of breath. Cardiovascular: Negative for chest pain, palpitations and leg swelling. Gastrointestinal: Positive for blood in stool. Negative for abdominal pain, constipation, diarrhea, nausea and vomiting. Genitourinary: Negative for flank pain. Musculoskeletal: Negative for arthralgias, myalgias, neck pain and neck stiffness.         Left knee pain Neurological: Negative for headaches. Psychiatric/Behavioral: Negative for decreased concentration and sleep disturbance. The patient is not nervous/anxious. Physical Exam  Vitals reviewed. Constitutional:       Appearance: Normal appearance. HENT:      Head: Normocephalic and atraumatic. Nose:      Comments: masked     Mouth/Throat:      Comments: masked  Eyes:      Conjunctiva/sclera: Conjunctivae normal.   Cardiovascular:      Rate and Rhythm: Normal rate and regular rhythm. Pulses: Normal pulses. Heart sounds: Normal heart sounds. Pulmonary:      Effort: Pulmonary effort is normal.      Breath sounds: Normal breath sounds. Abdominal:      General: Bowel sounds are normal. There is no distension. Palpations: Abdomen is soft. Tenderness: There is no abdominal tenderness. There is no guarding. Genitourinary:     Rectum: Guaiac result positive. Internal hemorrhoid (bluish color - non thrombosed) present. No tenderness or external hemorrhoid. Musculoskeletal:      Cervical back: Normal range of motion and neck supple. Right knee: Normal.      Left knee: Effusion and bony tenderness (mild) present. Normal range of motion. Comments: No warmth or redness to knee noted   Skin:     General: Skin is warm. Neurological:      General: No focal deficit present. Mental Status: He is alert. An electronic signature was used to authenticate this note.     --MARY Perry

## 2022-05-09 NOTE — TELEPHONE ENCOUNTER
----- Message from MARY Perry sent at 5/9/2022 12:55 PM CDT -----  Please call patient and let them know results. X-ray of the knee is normal except for some arthritic changes.

## 2022-05-09 NOTE — TELEPHONE ENCOUNTER
Please inform patient that refills need to come from cardiology. Looks like he sees Letty Belcher in [place]

## 2022-05-10 ENCOUNTER — TELEPHONE (OUTPATIENT)
Dept: PRIMARY CARE CLINIC | Age: 72
End: 2022-05-10

## 2022-05-10 ENCOUNTER — TREATMENT (OUTPATIENT)
Dept: CARDIAC REHAB | Facility: HOSPITAL | Age: 72
End: 2022-05-10

## 2022-05-10 DIAGNOSIS — Z95.1 S/P CABG (CORONARY ARTERY BYPASS GRAFT): Primary | ICD-10-CM

## 2022-05-10 PROCEDURE — 93798 PHYS/QHP OP CAR RHAB W/ECG: CPT

## 2022-05-10 NOTE — TELEPHONE ENCOUNTER
----- Message from MARY José sent at 5/10/2022  7:15 AM CDT -----  Please call patient and let them know results.    Stable blood counts from previous check

## 2022-05-11 ENCOUNTER — APPOINTMENT (OUTPATIENT)
Dept: GENERAL RADIOLOGY | Facility: HOSPITAL | Age: 72
End: 2022-05-11

## 2022-05-11 ENCOUNTER — APPOINTMENT (OUTPATIENT)
Dept: CT IMAGING | Facility: HOSPITAL | Age: 72
End: 2022-05-11

## 2022-05-11 ENCOUNTER — APPOINTMENT (OUTPATIENT)
Dept: CARDIAC REHAB | Facility: HOSPITAL | Age: 72
End: 2022-05-11

## 2022-05-11 ENCOUNTER — HOSPITAL ENCOUNTER (EMERGENCY)
Facility: HOSPITAL | Age: 72
Discharge: HOME | End: 2022-05-11
Admitting: EMERGENCY MEDICINE

## 2022-05-11 ENCOUNTER — NURSE TRIAGE (OUTPATIENT)
Dept: CALL CENTER | Facility: HOSPITAL | Age: 72
End: 2022-05-11

## 2022-05-11 VITALS
SYSTOLIC BLOOD PRESSURE: 135 MMHG | BODY MASS INDEX: 35.79 KG/M2 | HEART RATE: 69 BPM | DIASTOLIC BLOOD PRESSURE: 67 MMHG | WEIGHT: 250 LBS | RESPIRATION RATE: 20 BRPM | HEIGHT: 70 IN | OXYGEN SATURATION: 97 % | TEMPERATURE: 97.8 F

## 2022-05-11 DIAGNOSIS — K92.2 LOWER GI BLEEDING: Primary | ICD-10-CM

## 2022-05-11 LAB
ALBUMIN SERPL-MCNC: 3.9 G/DL (ref 3.5–5.2)
ALBUMIN/GLOB SERPL: 1.4 G/DL
ALP SERPL-CCNC: 54 U/L (ref 39–117)
ALT SERPL W P-5'-P-CCNC: 13 U/L (ref 1–41)
AMYLASE SERPL-CCNC: 63 U/L (ref 28–100)
ANION GAP SERPL CALCULATED.3IONS-SCNC: 7 MMOL/L (ref 5–15)
AST SERPL-CCNC: 17 U/L (ref 1–40)
BASOPHILS # BLD AUTO: 0.03 10*3/MM3 (ref 0–0.2)
BASOPHILS NFR BLD AUTO: 0.4 % (ref 0–1.5)
BILIRUB SERPL-MCNC: 0.6 MG/DL (ref 0–1.2)
BILIRUB UR QL STRIP: NEGATIVE
BUN SERPL-MCNC: 26 MG/DL (ref 8–23)
BUN/CREAT SERPL: 16.8 (ref 7–25)
CALCIUM SPEC-SCNC: 8.8 MG/DL (ref 8.6–10.5)
CHLORIDE SERPL-SCNC: 100 MMOL/L (ref 98–107)
CLARITY UR: CLEAR
CO2 SERPL-SCNC: 28 MMOL/L (ref 22–29)
COLOR UR: YELLOW
CREAT SERPL-MCNC: 1.55 MG/DL (ref 0.76–1.27)
DEPRECATED RDW RBC AUTO: 46.9 FL (ref 37–54)
DEVELOPER EXPIRATION DATE: ABNORMAL
DEVELOPER LOT NUMBER: 215
EGFRCR SERPLBLD CKD-EPI 2021: 47.3 ML/MIN/1.73
EOSINOPHIL # BLD AUTO: 0.17 10*3/MM3 (ref 0–0.4)
EOSINOPHIL NFR BLD AUTO: 2 % (ref 0.3–6.2)
ERYTHROCYTE [DISTWIDTH] IN BLOOD BY AUTOMATED COUNT: 14.6 % (ref 12.3–15.4)
EXPIRATION DATE: ABNORMAL
FECAL OCCULT BLOOD SCREEN, POC: POSITIVE
GLOBULIN UR ELPH-MCNC: 2.7 GM/DL
GLUCOSE SERPL-MCNC: 115 MG/DL (ref 65–99)
GLUCOSE UR STRIP-MCNC: NEGATIVE MG/DL
HCT VFR BLD AUTO: 32.7 % (ref 37.5–51)
HGB BLD-MCNC: 10.6 G/DL (ref 13–17.7)
HGB UR QL STRIP.AUTO: NEGATIVE
HOLD SPECIMEN: NORMAL
HOLD SPECIMEN: NORMAL
IMM GRANULOCYTES # BLD AUTO: 0.05 10*3/MM3 (ref 0–0.05)
IMM GRANULOCYTES NFR BLD AUTO: 0.6 % (ref 0–0.5)
INR PPP: 1.01 (ref 0.91–1.09)
KETONES UR QL STRIP: NEGATIVE
LEUKOCYTE ESTERASE UR QL STRIP.AUTO: NEGATIVE
LIPASE SERPL-CCNC: 72 U/L (ref 13–60)
LYMPHOCYTES # BLD AUTO: 0.87 10*3/MM3 (ref 0.7–3.1)
LYMPHOCYTES NFR BLD AUTO: 10.2 % (ref 19.6–45.3)
Lab: 215
MAGNESIUM SERPL-MCNC: 2.2 MG/DL (ref 1.6–2.4)
MCH RBC QN AUTO: 28.6 PG (ref 26.6–33)
MCHC RBC AUTO-ENTMCNC: 32.4 G/DL (ref 31.5–35.7)
MCV RBC AUTO: 88.1 FL (ref 79–97)
MONOCYTES # BLD AUTO: 0.54 10*3/MM3 (ref 0.1–0.9)
MONOCYTES NFR BLD AUTO: 6.3 % (ref 5–12)
NEGATIVE CONTROL: NEGATIVE
NEUTROPHILS NFR BLD AUTO: 6.87 10*3/MM3 (ref 1.7–7)
NEUTROPHILS NFR BLD AUTO: 80.5 % (ref 42.7–76)
NITRITE UR QL STRIP: NEGATIVE
NRBC BLD AUTO-RTO: 0 /100 WBC (ref 0–0.2)
PH UR STRIP.AUTO: 6.5 [PH] (ref 5–8)
PLATELET # BLD AUTO: 218 10*3/MM3 (ref 140–450)
PMV BLD AUTO: 9.6 FL (ref 6–12)
POSITIVE CONTROL: POSITIVE
POTASSIUM SERPL-SCNC: 4.6 MMOL/L (ref 3.5–5.2)
PROT SERPL-MCNC: 6.6 G/DL (ref 6–8.5)
PROT UR QL STRIP: NEGATIVE
PROTHROMBIN TIME: 12.9 SECONDS (ref 11.9–14.6)
RBC # BLD AUTO: 3.71 10*6/MM3 (ref 4.14–5.8)
SODIUM SERPL-SCNC: 135 MMOL/L (ref 136–145)
SP GR UR STRIP: 1.01 (ref 1–1.03)
TROPONIN T SERPL-MCNC: <0.01 NG/ML (ref 0–0.03)
UROBILINOGEN UR QL STRIP: NORMAL
WBC NRBC COR # BLD: 8.53 10*3/MM3 (ref 3.4–10.8)
WHOLE BLOOD HOLD COAG: NORMAL
WHOLE BLOOD HOLD SPECIMEN: NORMAL

## 2022-05-11 PROCEDURE — 83690 ASSAY OF LIPASE: CPT | Performed by: NURSE PRACTITIONER

## 2022-05-11 PROCEDURE — 81003 URINALYSIS AUTO W/O SCOPE: CPT

## 2022-05-11 PROCEDURE — 84484 ASSAY OF TROPONIN QUANT: CPT

## 2022-05-11 PROCEDURE — 36415 COLL VENOUS BLD VENIPUNCTURE: CPT

## 2022-05-11 PROCEDURE — 93005 ELECTROCARDIOGRAM TRACING: CPT

## 2022-05-11 PROCEDURE — 74177 CT ABD & PELVIS W/CONTRAST: CPT

## 2022-05-11 PROCEDURE — 85610 PROTHROMBIN TIME: CPT | Performed by: NURSE PRACTITIONER

## 2022-05-11 PROCEDURE — 80053 COMPREHEN METABOLIC PANEL: CPT

## 2022-05-11 PROCEDURE — 82150 ASSAY OF AMYLASE: CPT | Performed by: NURSE PRACTITIONER

## 2022-05-11 PROCEDURE — 71045 X-RAY EXAM CHEST 1 VIEW: CPT

## 2022-05-11 PROCEDURE — 99283 EMERGENCY DEPT VISIT LOW MDM: CPT

## 2022-05-11 PROCEDURE — 82270 OCCULT BLOOD FECES: CPT | Performed by: EMERGENCY MEDICINE

## 2022-05-11 PROCEDURE — 83735 ASSAY OF MAGNESIUM: CPT

## 2022-05-11 PROCEDURE — 25010000002 IOPAMIDOL 61 % SOLUTION: Performed by: NURSE PRACTITIONER

## 2022-05-11 PROCEDURE — 93010 ELECTROCARDIOGRAM REPORT: CPT | Performed by: INTERNAL MEDICINE

## 2022-05-11 PROCEDURE — 85025 COMPLETE CBC W/AUTO DIFF WBC: CPT

## 2022-05-11 RX ORDER — SODIUM CHLORIDE 0.9 % (FLUSH) 0.9 %
10 SYRINGE (ML) INJECTION AS NEEDED
Status: DISCONTINUED | OUTPATIENT
Start: 2022-05-11 | End: 2022-05-11 | Stop reason: HOSPADM

## 2022-05-11 RX ADMIN — IOPAMIDOL 100 ML: 612 INJECTION, SOLUTION INTRAVENOUS at 13:37

## 2022-05-11 NOTE — TELEPHONE ENCOUNTER
"Was evaluated at the ER today and had a CT scan. Was recommended to see a GI and have a colonoscopy. He sees Dr. Alvarenga.  He called Dr. Alvarenga no appt until June. He wants the colonoscopy to be done and doesnt understand why they didn't do the colonoscopy while he was at the hospital.  Triage completed and advice per guideline.      Reason for Disposition  • [1] MODERATE rectal bleeding (small blood clots, passing blood without stool, or toilet water turns red) AND [2] more than once a day    Additional Information  • Negative: Shock suspected (e.g., cold/pale/clammy skin, too weak to stand, low BP, rapid pulse)  • Negative: Difficult to awaken or acting confused (e.g., disoriented, slurred speech)  • Negative: Passed out (i.e., lost consciousness, collapsed and was not responding)  • Negative: [1] Vomiting AND [2] contains red blood or black (\"coffee ground\") material  (Exception: few red streaks in vomit that only happened once)  • Negative: Sounds like a life-threatening emergency to the triager  • Negative: Diarrhea is main symptom  • Negative: Stool color other than brown or tan is main concern  (no bleeding and no melena)  • Negative: SEVERE rectal bleeding (large blood clots; on and off, or constant bleeding)  • Negative: SEVERE dizziness (e.g., unable to stand, requires support to walk, feels like passing out now)    Answer Assessment - Initial Assessment Questions  1. APPEARANCE of BLOOD: \"What color is it?\" \"Is it passed separately, on the surface of the stool, or mixed in with the stool?\"      Bright red  2. AMOUNT: \"How much blood was passed?\"   Over a cup  3. FREQUENCY: \"How many times has blood been passed with the stools?\"      Stool and blood  4. ONSET: \"When was the blood first seen in the stools?\" (Days or weeks)   Since Saturday  5. DIARRHEA: \"Is there also some diarrhea?\" If Yes, ask: \"How many diarrhea stools were passed in past 24 hours?\"       *No Answer*  6. CONSTIPATION: \"Do you have " "constipation?\" If Yes, ask: \"How bad is it?\"      *No Answer*  7. RECURRENT SYMPTOMS: \"Have you had blood in your stools before?\" If Yes, ask: \"When was the last time?\" and \"What happened that time?\"   today  8. BLOOD THINNERS: \"Do you take any blood thinners?\" (e.g., Coumadin/warfarin, Pradaxa/dabigatran, aspirin)  Baby ASA  9. OTHER SYMPTOMS: \"Do you have any other symptoms?\"  (e.g., abdominal pain, vomiting, dizziness, fever)  Lightheaded   10. PREGNANCY: \"Is there any chance you are pregnant?\" \"When was your last menstrual period?\"      na    Protocols used: RECTAL BLEEDING-ADULT-AH      "

## 2022-05-12 ENCOUNTER — TELEPHONE (OUTPATIENT)
Dept: PRIMARY CARE CLINIC | Age: 72
End: 2022-05-12

## 2022-05-12 LAB
QT INTERVAL: 442 MS
QTC INTERVAL: 480 MS

## 2022-05-12 NOTE — TELEPHONE ENCOUNTER
----- Message from Jett Ball sent at 5/12/2022  2:54 PM CDT -----  Subject: Message to Provider    QUESTIONS  Information for Provider? Pt was seen on 5/9/2022, Pt is still bleeding   out of his rectum, Pt stated he went to [facility] on   5/11/2022, Pt gave him a referral to have a colonoscopy however he can not   get in to have that completed until June 9th. Pt stated he is bleeding and   would like to know if there is a place he can get in sooner. ---------------------------------------------------------------------------  --------------  Joe LIVINGSTON  What is the best way for the office to contact you? OK to leave message on   voicemail  Preferred Call Back Phone Number? [phone]  ---------------------------------------------------------------------------  --------------  SCRIPT ANSWERS  Relationship to Patient?  Self

## 2022-05-16 ENCOUNTER — TELEPHONE (OUTPATIENT)
Dept: PRIMARY CARE CLINIC | Age: 72
End: 2022-05-16

## 2022-05-16 NOTE — TELEPHONE ENCOUNTER
Called and spoke with Cleveland Keita at [facility] about patient ongoing rectal bleeding and lab results. Per patient chart message referral isnt until June 9th. She is going to discuss with Dr Maribel Braun and let me know what they want to do.      GI called back and they are going to see Mr Bre Estrella tomorrow at [facility]

## 2022-05-17 ENCOUNTER — TREATMENT (OUTPATIENT)
Dept: CARDIAC REHAB | Facility: HOSPITAL | Age: 72
End: 2022-05-17

## 2022-05-17 ENCOUNTER — OFFICE VISIT (OUTPATIENT)
Dept: GASTROENTEROLOGY | Facility: CLINIC | Age: 72
End: 2022-05-17

## 2022-05-17 ENCOUNTER — LAB (OUTPATIENT)
Dept: LAB | Facility: HOSPITAL | Age: 72
End: 2022-05-17

## 2022-05-17 VITALS
WEIGHT: 251 LBS | BODY MASS INDEX: 35.14 KG/M2 | HEART RATE: 74 BPM | OXYGEN SATURATION: 98 % | HEIGHT: 71 IN | TEMPERATURE: 96.9 F

## 2022-05-17 DIAGNOSIS — Z95.1 S/P CABG (CORONARY ARTERY BYPASS GRAFT): Primary | ICD-10-CM

## 2022-05-17 DIAGNOSIS — K62.5 BRBPR (BRIGHT RED BLOOD PER RECTUM): Primary | ICD-10-CM

## 2022-05-17 DIAGNOSIS — I71.20 THORACIC AORTIC ANEURYSM WITHOUT RUPTURE: ICD-10-CM

## 2022-05-17 DIAGNOSIS — K62.5 BRBPR (BRIGHT RED BLOOD PER RECTUM): ICD-10-CM

## 2022-05-17 LAB
DEPRECATED RDW RBC AUTO: 49.2 FL (ref 37–54)
ERYTHROCYTE [DISTWIDTH] IN BLOOD BY AUTOMATED COUNT: 15 % (ref 12.3–15.4)
HCT VFR BLD AUTO: 32.5 % (ref 37.5–51)
HGB BLD-MCNC: 10.2 G/DL (ref 13–17.7)
MCH RBC QN AUTO: 28.3 PG (ref 26.6–33)
MCHC RBC AUTO-ENTMCNC: 31.4 G/DL (ref 31.5–35.7)
MCV RBC AUTO: 90.3 FL (ref 79–97)
PLATELET # BLD AUTO: 276 10*3/MM3 (ref 140–450)
PMV BLD AUTO: 9.5 FL (ref 6–12)
RBC # BLD AUTO: 3.6 10*6/MM3 (ref 4.14–5.8)
WBC NRBC COR # BLD: 8.92 10*3/MM3 (ref 3.4–10.8)

## 2022-05-17 PROCEDURE — 99214 OFFICE O/P EST MOD 30 MIN: CPT | Performed by: NURSE PRACTITIONER

## 2022-05-17 PROCEDURE — 36415 COLL VENOUS BLD VENIPUNCTURE: CPT

## 2022-05-17 PROCEDURE — 85027 COMPLETE CBC AUTOMATED: CPT

## 2022-05-17 PROCEDURE — 93798 PHYS/QHP OP CAR RHAB W/ECG: CPT

## 2022-05-17 NOTE — H&P (VIEW-ONLY)
[facility] GASTROENTEROLOGY - OFFICE NOTE    5/17/2022    Zay Kamara   1950    Primary Physician: Frankie Bradley APRN    Chief Complaint   Patient presents with   • GI Bleeding     Recent ER visit         HISTORY OF PRESENT ILLNESS:     Zay Kamara is a 71 y.o. male presents with rectal bleeding. This started around 10 days ago with bm. He typically has one bm daily.  The blood is red and moderate amount in commode. Stool is soft however he has had straining. Has had some lightheadedness. No dizziness.  No rectal pain. No weight loss. No syncopal episode. No abdominal pain.               He was seen in the Emergency Room for rectal bleeding 5-11-22  Comprehensive Metabolic Panel (05/11/2022 11:38)  CBC & Differential (05/11/2022 11:38)  POC Occult Blood Stool (05/11/2022 11:52)  CT Abdomen Pelvis With Contrast (05/11/2022 13:38)            COLONOSCOPY (03/07/2017 08:16) polyp ( hyperplastic) and diverticulosis. Biopsies left and right colon path benign. Recall 5 years.       Past Medical History:   Diagnosis Date   • Arthritis    • Atrial fibrillation (HCC)     paroxysmal, on Xarelto   • BMI 31.0-31.9,adult 6/28/2018   • Carotid artery disease without cerebral infarction (HCC)    • Chronic knee pain    • Chronic neck and back pain    • Coronary artery disease 1/4/2022   • Depression    • Diabetes (HCC)    • Disease of thyroid gland    • Gout    • Hypercholesteremia    • Hypertension    • IBS (irritable bowel syndrome)    • Kidney disease    • Osteoarthritis     back, neck, and knees - goes to Pain Management   • Prostate cancer (HCC)     Dr. Hong following   • Sensorineural hearing loss    • Sleep apnea     CPAP   • Sudden hearing loss    • Syncope    • Tinnitus    • Urine incontinence        Past Surgical History:   Procedure Laterality Date   • ATRIAL APPENDAGE EXCLUSION LEFT WITH TRANSESOPHAGEAL ECHOCARDIOGRAM Left 1/6/2022    Procedure: LEFT ATRIAL APPENDAGE EXCLUSION WITH  40MM  ATRICLIP; TRANSESOPHAGEAL ECHOCARDIOGRAM;  Surgeon: Chivo White MD;  Location:  [facility] OR;  Service: Cardiothoracic;  Laterality: Left;   • BLADDER SUSPENSION N/A 11/26/2018    Procedure: CYSTOSCOPY BLADDER SUSPENSION MALE SLING;  Surgeon: Zaheer Rebolledo MD;  Location:  [facility] OR;  Service: Urology   • CARDIAC CATHETERIZATION N/A 12/30/2021    Procedure: Coronary angiography right radial to follow my 9:30 case;  Surgeon: Mike Fitzpatrick MD;  Location:  [facility] CATH INVASIVE LOCATION;  Service: Cardiology;  Laterality: N/A;   • CHOLECYSTECTOMY     • COLONOSCOPY N/A 3/7/2017    Procedure: COLONOSCOPY WITH ANESTHESIA;  Surgeon: Hector Alvarenga MD;  Location: [facility] ENDOSCOPY;  Service:    • CORONARY ARTERY BYPASS GRAFT N/A 1/6/2022    Procedure: CORONARY ARTERY BYPASS GRAFT X 4 WITH LEFT INTERNAL MAMMARY ARTERY, LEFT LOWER EXTREMITY ENDOSCOPIC VEIN HARVEST, AND PERFUSION;  Surgeon: Chivo White MD;  Location:  [facility] OR;  Service: Cardiothoracic;  Laterality: N/A;   • MAZE PROCEDURE N/A 1/6/2022    Procedure: BILATERAL MAZE PROCEDURE WITH RADIOFREQUENCY AND CRYOABLATION;  Surgeon: Chivo White MD;  Location:  [facility] OR;  Service: Cardiothoracic;  Laterality: N/A;   • PROSTATECTOMY N/A 8/1/2017    Procedure: PROSTATECTOMY LAPAROSCOPIC WITH DAVINCI SI ROBOT ;  Surgeon: Hernesto Hong MD;  Location:  [facility] OR;  Service:    • THYROID SURGERY      RADIATION THERAPY AFTER SURGERY       Outpatient Medications Marked as Taking for the 5/17/22 encounter (Office Visit) with Kati Galvan APRN   Medication Sig Dispense Refill   • allopurinol (ZYLOPRIM) 100 MG tablet Take 100 mg by mouth Daily.     • aspirin (aspirin) 81 MG EC tablet Take 1 tablet by mouth Daily.     • atorvastatin (LIPITOR) 20 MG tablet Take 20 mg by mouth Daily.  2   • Cholecalciferol (VITAMIN D3) 2000 units tablet Take 50 mcg by mouth Daily.     • citalopram (CeleXA) 20 MG tablet Take 20 mg by mouth Daily.     • Docusate Calcium (STOOL  SOFTENER PO) Take 1 capsule by mouth 2 (Two) Times a Day.     • gabapentin (NEURONTIN) 400 MG capsule Take 400 mg by mouth 3 (Three) Times a Day. Can take up to TID but only takes BID due to side effects - sleepiness     • glipizide (GLUCOTROL) 5 MG tablet Take 5 mg by mouth Daily.     • HYDROcodone-acetaminophen (NORCO) 7.5-325 MG per tablet Take 1 tablet by mouth Every 8 (Eight) Hours As Needed for Moderate Pain . Usually takes twice a day     • levothyroxine (SYNTHROID, LEVOTHROID) 200 MCG tablet Take 200 mcg by mouth Daily. Takes with 25 mcg = 225 mcg     • levothyroxine (SYNTHROID, LEVOTHROID) 25 MCG tablet Take 25 mcg by mouth Daily. Takes with 200 mcg = 225 mcg     • lisinopril (PRINIVIL,ZESTRIL) 5 MG tablet Take 1 tablet by mouth Daily. 30 tablet 2   • metoprolol tartrate (LOPRESSOR) 25 MG tablet Take 1 tablet by mouth Every 12 (Twelve) Hours. 60 tablet 2   • nitroglycerin (NITROSTAT) 0.4 MG SL tablet Place 1 tablet under the tongue Every 5 (Five) Minutes As Needed for Chest Pain. 25 tablet 11       No Known Allergies    Social History     Socioeconomic History   • Marital status:    Tobacco Use   • Smoking status: Passive Smoke Exposure - Never Smoker   • Smokeless tobacco: Never Used   • Tobacco comment: wife smokes in the house   Vaping Use   • Vaping Use: Never used   Substance and Sexual Activity   • Alcohol use: Yes     Comment: rare   • Drug use: Never   • Sexual activity: Defer     Partners: Female       Family History   Problem Relation Age of Onset   • Prostate cancer Father    • Cancer Father    • Heart disease Father         CABG   • Heart attack Father 70   • Heart disease Mother         assumed MI at time of death - had cardiopulmonary arrest   • Sudden death Mother    • Diabetes Sister    • Arrhythmia Sister    • Stroke Paternal Grandfather    • Colon cancer Neg Hx    • Colon polyps Neg Hx        Review of Systems   Constitutional: Negative for chills, fever and unexpected weight  "change.   Respiratory: Negative for shortness of breath and wheezing.    Cardiovascular: Negative for chest pain and palpitations.   Gastrointestinal: Positive for blood in stool. Negative for abdominal distention, abdominal pain, constipation, diarrhea, nausea and vomiting.   Neurological: Positive for light-headedness.        Vitals:    05/17/22 0901   Pulse: 74   Temp: 96.9 °F (36.1 °C)   SpO2: 98%   Weight: 114 kg (251 lb)   Height: 179.1 cm (70.5\")      Body mass index is 35.51 kg/m².    Physical Exam  Vitals reviewed.   Constitutional:       General: He is not in acute distress.  Cardiovascular:      Rate and Rhythm: Normal rate and regular rhythm.      Heart sounds: Normal heart sounds.   Pulmonary:      Effort: Pulmonary effort is normal.      Breath sounds: Normal breath sounds.   Abdominal:      General: Bowel sounds are normal. There is no distension.      Palpations: Abdomen is soft.      Tenderness: There is no abdominal tenderness.   Skin:     General: Skin is warm and dry.   Neurological:      Mental Status: He is alert.         Results for orders placed or performed during the hospital encounter of 05/11/22   Comprehensive Metabolic Panel    Specimen: Blood   Result Value Ref Range    Glucose 115 (H) 65 - 99 mg/dL    BUN 26 (H) 8 - 23 mg/dL    Creatinine 1.55 (H) 0.76 - 1.27 mg/dL    Sodium 135 (L) 136 - 145 mmol/L    Potassium 4.6 3.5 - 5.2 mmol/L    Chloride 100 98 - 107 mmol/L    CO2 28.0 22.0 - 29.0 mmol/L    Calcium 8.8 8.6 - 10.5 mg/dL    Total Protein 6.6 6.0 - 8.5 g/dL    Albumin 3.90 3.50 - 5.20 g/dL    ALT (SGPT) 13 1 - 41 U/L    AST (SGOT) 17 1 - 40 U/L    Alkaline Phosphatase 54 39 - 117 U/L    Total Bilirubin 0.6 0.0 - 1.2 mg/dL    Globulin 2.7 gm/dL    A/G Ratio 1.4 g/dL    BUN/Creatinine Ratio 16.8 7.0 - 25.0    Anion Gap 7.0 5.0 - 15.0 mmol/L    eGFR 47.3 (L) >60.0 mL/min/1.73   Troponin    Specimen: Blood   Result Value Ref Range    Troponin T <0.010 0.000 - 0.030 ng/mL   Magnesium "    Specimen: Blood   Result Value Ref Range    Magnesium 2.2 1.6 - 2.4 mg/dL   Urinalysis With Microscopic If Indicated (No Culture) - Urine, Clean Catch    Specimen: Urine, Clean Catch   Result Value Ref Range    Color, UA Yellow Yellow, Straw    Appearance, UA Clear Clear    pH, UA 6.5 5.0 - 8.0    Specific Gravity, UA 1.015 1.005 - 1.030    Glucose, UA Negative Negative    Ketones, UA Negative Negative    Bilirubin, UA Negative Negative    Blood, UA Negative Negative    Protein, UA Negative Negative    Leuk Esterase, UA Negative Negative    Nitrite, UA Negative Negative    Urobilinogen, UA 1.0 E.U./dL 0.2 - 1.0 E.U./dL   CBC Auto Differential    Specimen: Blood   Result Value Ref Range    WBC 8.53 3.40 - 10.80 10*3/mm3    RBC 3.71 (L) 4.14 - 5.80 10*6/mm3    Hemoglobin 10.6 (L) 13.0 - 17.7 g/dL    Hematocrit 32.7 (L) 37.5 - 51.0 %    MCV 88.1 79.0 - 97.0 fL    MCH 28.6 26.6 - 33.0 pg    MCHC 32.4 31.5 - 35.7 g/dL    RDW 14.6 12.3 - 15.4 %    RDW-SD 46.9 37.0 - 54.0 fl    MPV 9.6 6.0 - 12.0 fL    Platelets 218 140 - 450 10*3/mm3    Neutrophil % 80.5 (H) 42.7 - 76.0 %    Lymphocyte % 10.2 (L) 19.6 - 45.3 %    Monocyte % 6.3 5.0 - 12.0 %    Eosinophil % 2.0 0.3 - 6.2 %    Basophil % 0.4 0.0 - 1.5 %    Immature Grans % 0.6 (H) 0.0 - 0.5 %    Neutrophils, Absolute 6.87 1.70 - 7.00 10*3/mm3    Lymphocytes, Absolute 0.87 0.70 - 3.10 10*3/mm3    Monocytes, Absolute 0.54 0.10 - 0.90 10*3/mm3    Eosinophils, Absolute 0.17 0.00 - 0.40 10*3/mm3    Basophils, Absolute 0.03 0.00 - 0.20 10*3/mm3    Immature Grans, Absolute 0.05 0.00 - 0.05 10*3/mm3    nRBC 0.0 0.0 - 0.2 /100 WBC   Protime-INR    Specimen: Blood   Result Value Ref Range    Protime 12.9 11.9 - 14.6 Seconds    INR 1.01 0.91 - 1.09   Lipase    Specimen: Blood   Result Value Ref Range    Lipase 72 (H) 13 - 60 U/L   Amylase    Specimen: Blood   Result Value Ref Range    Amylase 63 28 - 100 U/L   POC Occult Blood Stool    Specimen: Per Rectum; Stool   Result Value Ref  Range    Fecal Occult Blood Positive (A) Negative    Lot Number 215     Expiration Date 10/31/2022     DEVELOPER LOT NUMBER 215     DEVELOPER EXPIRATION DATE 10/31/2022     Positive Control Positive Positive    Negative Control Negative Negative   ECG 12 Lead   Result Value Ref Range    QT Interval 442 ms    QTC Interval 480 ms   Green Top (Gel)   Result Value Ref Range    Extra Tube Hold for add-ons.    Lavender Top   Result Value Ref Range    Extra Tube hold for add-on    Red Top   Result Value Ref Range    Extra Tube Hold for add-ons.    Light Blue Top   Result Value Ref Range    Extra Tube Hold for add-ons.            ASSESSMENT AND PLAN    Assessment & Plan     Diagnoses and all orders for this visit:    1. BRBPR (bright red blood per rectum) (Primary)  -     CBC (No Diff); Future  -     Case Request; Standing  -     Case Request    Other orders  -     Follow Anesthesia Guidelines / Protocol; Future  -     Obtain Informed Consent; Future         Differential diagnoses discussed.   Repeat cbc. Recommend colonoscopy. I recommend Emergency Room if worsening symptoms and he verbalizes understanding.               COLONOSCOPY WITH ANESTHESIA (N/A)  All risks, benefits, alternatives, and indications of colonoscopy procedure have been discussed with the patient. Risks to include perforation of the colon requiring possible surgery or colostomy, risk of bleeding from biopsies or removal of colon tissue, possibility of missing a colon polyp or cancer, or adverse drug reaction.  Benefits to include the diagnosis and management of disease of the colon and rectum. Alternatives to include barium enema, radiographic evaluation, lab testing or no intervention. Pt verbalizes understanding and agrees.                    Kati Galvan, APRN

## 2022-05-17 NOTE — PROGRESS NOTES
[facility] GASTROENTEROLOGY - OFFICE NOTE    5/17/2022    Zay Kamara   1950    Primary Physician: Frankie Bradley APRN    Chief Complaint   Patient presents with   • GI Bleeding     Recent ER visit         HISTORY OF PRESENT ILLNESS:     Zay Kamara is a 71 y.o. male presents with rectal bleeding. This started around 10 days ago with bm. He typically has one bm daily.  The blood is red and moderate amount in commode. Stool is soft however he has had straining. Has had some lightheadedness. No dizziness.  No rectal pain. No weight loss. No syncopal episode. No abdominal pain.               He was seen in the Emergency Room for rectal bleeding 5-11-22  Comprehensive Metabolic Panel (05/11/2022 11:38)  CBC & Differential (05/11/2022 11:38)  POC Occult Blood Stool (05/11/2022 11:52)  CT Abdomen Pelvis With Contrast (05/11/2022 13:38)            COLONOSCOPY (03/07/2017 08:16) polyp ( hyperplastic) and diverticulosis. Biopsies left and right colon path benign. Recall 5 years.       Past Medical History:   Diagnosis Date   • Arthritis    • Atrial fibrillation (HCC)     paroxysmal, on Xarelto   • BMI 31.0-31.9,adult 6/28/2018   • Carotid artery disease without cerebral infarction (HCC)    • Chronic knee pain    • Chronic neck and back pain    • Coronary artery disease 1/4/2022   • Depression    • Diabetes (HCC)    • Disease of thyroid gland    • Gout    • Hypercholesteremia    • Hypertension    • IBS (irritable bowel syndrome)    • Kidney disease    • Osteoarthritis     back, neck, and knees - goes to Pain Management   • Prostate cancer (HCC)     Dr. Hong following   • Sensorineural hearing loss    • Sleep apnea     CPAP   • Sudden hearing loss    • Syncope    • Tinnitus    • Urine incontinence        Past Surgical History:   Procedure Laterality Date   • ATRIAL APPENDAGE EXCLUSION LEFT WITH TRANSESOPHAGEAL ECHOCARDIOGRAM Left 1/6/2022    Procedure: LEFT ATRIAL APPENDAGE EXCLUSION WITH  40MM  ATRICLIP; TRANSESOPHAGEAL ECHOCARDIOGRAM;  Surgeon: Chivo White MD;  Location:  [facility] OR;  Service: Cardiothoracic;  Laterality: Left;   • BLADDER SUSPENSION N/A 11/26/2018    Procedure: CYSTOSCOPY BLADDER SUSPENSION MALE SLING;  Surgeon: Zaheer Rebolledo MD;  Location:  [facility] OR;  Service: Urology   • CARDIAC CATHETERIZATION N/A 12/30/2021    Procedure: Coronary angiography right radial to follow my 9:30 case;  Surgeon: Mike Fitzpatrick MD;  Location:  [facility] CATH INVASIVE LOCATION;  Service: Cardiology;  Laterality: N/A;   • CHOLECYSTECTOMY     • COLONOSCOPY N/A 3/7/2017    Procedure: COLONOSCOPY WITH ANESTHESIA;  Surgeon: Hector Alvarenga MD;  Location: [facility] ENDOSCOPY;  Service:    • CORONARY ARTERY BYPASS GRAFT N/A 1/6/2022    Procedure: CORONARY ARTERY BYPASS GRAFT X 4 WITH LEFT INTERNAL MAMMARY ARTERY, LEFT LOWER EXTREMITY ENDOSCOPIC VEIN HARVEST, AND PERFUSION;  Surgeon: Chivo White MD;  Location:  [facility] OR;  Service: Cardiothoracic;  Laterality: N/A;   • MAZE PROCEDURE N/A 1/6/2022    Procedure: BILATERAL MAZE PROCEDURE WITH RADIOFREQUENCY AND CRYOABLATION;  Surgeon: Chivo White MD;  Location:  [facility] OR;  Service: Cardiothoracic;  Laterality: N/A;   • PROSTATECTOMY N/A 8/1/2017    Procedure: PROSTATECTOMY LAPAROSCOPIC WITH DAVINCI SI ROBOT ;  Surgeon: Hernesto Hong MD;  Location:  [facility] OR;  Service:    • THYROID SURGERY      RADIATION THERAPY AFTER SURGERY       Outpatient Medications Marked as Taking for the 5/17/22 encounter (Office Visit) with Kati Galvan APRN   Medication Sig Dispense Refill   • allopurinol (ZYLOPRIM) 100 MG tablet Take 100 mg by mouth Daily.     • aspirin (aspirin) 81 MG EC tablet Take 1 tablet by mouth Daily.     • atorvastatin (LIPITOR) 20 MG tablet Take 20 mg by mouth Daily.  2   • Cholecalciferol (VITAMIN D3) 2000 units tablet Take 50 mcg by mouth Daily.     • citalopram (CeleXA) 20 MG tablet Take 20 mg by mouth Daily.     • Docusate Calcium (STOOL  SOFTENER PO) Take 1 capsule by mouth 2 (Two) Times a Day.     • gabapentin (NEURONTIN) 400 MG capsule Take 400 mg by mouth 3 (Three) Times a Day. Can take up to TID but only takes BID due to side effects - sleepiness     • glipizide (GLUCOTROL) 5 MG tablet Take 5 mg by mouth Daily.     • HYDROcodone-acetaminophen (NORCO) 7.5-325 MG per tablet Take 1 tablet by mouth Every 8 (Eight) Hours As Needed for Moderate Pain . Usually takes twice a day     • levothyroxine (SYNTHROID, LEVOTHROID) 200 MCG tablet Take 200 mcg by mouth Daily. Takes with 25 mcg = 225 mcg     • levothyroxine (SYNTHROID, LEVOTHROID) 25 MCG tablet Take 25 mcg by mouth Daily. Takes with 200 mcg = 225 mcg     • lisinopril (PRINIVIL,ZESTRIL) 5 MG tablet Take 1 tablet by mouth Daily. 30 tablet 2   • metoprolol tartrate (LOPRESSOR) 25 MG tablet Take 1 tablet by mouth Every 12 (Twelve) Hours. 60 tablet 2   • nitroglycerin (NITROSTAT) 0.4 MG SL tablet Place 1 tablet under the tongue Every 5 (Five) Minutes As Needed for Chest Pain. 25 tablet 11       No Known Allergies    Social History     Socioeconomic History   • Marital status:    Tobacco Use   • Smoking status: Passive Smoke Exposure - Never Smoker   • Smokeless tobacco: Never Used   • Tobacco comment: wife smokes in the house   Vaping Use   • Vaping Use: Never used   Substance and Sexual Activity   • Alcohol use: Yes     Comment: rare   • Drug use: Never   • Sexual activity: Defer     Partners: Female       Family History   Problem Relation Age of Onset   • Prostate cancer Father    • Cancer Father    • Heart disease Father         CABG   • Heart attack Father 70   • Heart disease Mother         assumed MI at time of death - had cardiopulmonary arrest   • Sudden death Mother    • Diabetes Sister    • Arrhythmia Sister    • Stroke Paternal Grandfather    • Colon cancer Neg Hx    • Colon polyps Neg Hx        Review of Systems   Constitutional: Negative for chills, fever and unexpected weight  "change.   Respiratory: Negative for shortness of breath and wheezing.    Cardiovascular: Negative for chest pain and palpitations.   Gastrointestinal: Positive for blood in stool. Negative for abdominal distention, abdominal pain, constipation, diarrhea, nausea and vomiting.   Neurological: Positive for light-headedness.        Vitals:    05/17/22 0901   Pulse: 74   Temp: 96.9 °F (36.1 °C)   SpO2: 98%   Weight: 114 kg (251 lb)   Height: 179.1 cm (70.5\")      Body mass index is 35.51 kg/m².    Physical Exam  Vitals reviewed.   Constitutional:       General: He is not in acute distress.  Cardiovascular:      Rate and Rhythm: Normal rate and regular rhythm.      Heart sounds: Normal heart sounds.   Pulmonary:      Effort: Pulmonary effort is normal.      Breath sounds: Normal breath sounds.   Abdominal:      General: Bowel sounds are normal. There is no distension.      Palpations: Abdomen is soft.      Tenderness: There is no abdominal tenderness.   Skin:     General: Skin is warm and dry.   Neurological:      Mental Status: He is alert.         Results for orders placed or performed during the hospital encounter of 05/11/22   Comprehensive Metabolic Panel    Specimen: Blood   Result Value Ref Range    Glucose 115 (H) 65 - 99 mg/dL    BUN 26 (H) 8 - 23 mg/dL    Creatinine 1.55 (H) 0.76 - 1.27 mg/dL    Sodium 135 (L) 136 - 145 mmol/L    Potassium 4.6 3.5 - 5.2 mmol/L    Chloride 100 98 - 107 mmol/L    CO2 28.0 22.0 - 29.0 mmol/L    Calcium 8.8 8.6 - 10.5 mg/dL    Total Protein 6.6 6.0 - 8.5 g/dL    Albumin 3.90 3.50 - 5.20 g/dL    ALT (SGPT) 13 1 - 41 U/L    AST (SGOT) 17 1 - 40 U/L    Alkaline Phosphatase 54 39 - 117 U/L    Total Bilirubin 0.6 0.0 - 1.2 mg/dL    Globulin 2.7 gm/dL    A/G Ratio 1.4 g/dL    BUN/Creatinine Ratio 16.8 7.0 - 25.0    Anion Gap 7.0 5.0 - 15.0 mmol/L    eGFR 47.3 (L) >60.0 mL/min/1.73   Troponin    Specimen: Blood   Result Value Ref Range    Troponin T <0.010 0.000 - 0.030 ng/mL   Magnesium "    Specimen: Blood   Result Value Ref Range    Magnesium 2.2 1.6 - 2.4 mg/dL   Urinalysis With Microscopic If Indicated (No Culture) - Urine, Clean Catch    Specimen: Urine, Clean Catch   Result Value Ref Range    Color, UA Yellow Yellow, Straw    Appearance, UA Clear Clear    pH, UA 6.5 5.0 - 8.0    Specific Gravity, UA 1.015 1.005 - 1.030    Glucose, UA Negative Negative    Ketones, UA Negative Negative    Bilirubin, UA Negative Negative    Blood, UA Negative Negative    Protein, UA Negative Negative    Leuk Esterase, UA Negative Negative    Nitrite, UA Negative Negative    Urobilinogen, UA 1.0 E.U./dL 0.2 - 1.0 E.U./dL   CBC Auto Differential    Specimen: Blood   Result Value Ref Range    WBC 8.53 3.40 - 10.80 10*3/mm3    RBC 3.71 (L) 4.14 - 5.80 10*6/mm3    Hemoglobin 10.6 (L) 13.0 - 17.7 g/dL    Hematocrit 32.7 (L) 37.5 - 51.0 %    MCV 88.1 79.0 - 97.0 fL    MCH 28.6 26.6 - 33.0 pg    MCHC 32.4 31.5 - 35.7 g/dL    RDW 14.6 12.3 - 15.4 %    RDW-SD 46.9 37.0 - 54.0 fl    MPV 9.6 6.0 - 12.0 fL    Platelets 218 140 - 450 10*3/mm3    Neutrophil % 80.5 (H) 42.7 - 76.0 %    Lymphocyte % 10.2 (L) 19.6 - 45.3 %    Monocyte % 6.3 5.0 - 12.0 %    Eosinophil % 2.0 0.3 - 6.2 %    Basophil % 0.4 0.0 - 1.5 %    Immature Grans % 0.6 (H) 0.0 - 0.5 %    Neutrophils, Absolute 6.87 1.70 - 7.00 10*3/mm3    Lymphocytes, Absolute 0.87 0.70 - 3.10 10*3/mm3    Monocytes, Absolute 0.54 0.10 - 0.90 10*3/mm3    Eosinophils, Absolute 0.17 0.00 - 0.40 10*3/mm3    Basophils, Absolute 0.03 0.00 - 0.20 10*3/mm3    Immature Grans, Absolute 0.05 0.00 - 0.05 10*3/mm3    nRBC 0.0 0.0 - 0.2 /100 WBC   Protime-INR    Specimen: Blood   Result Value Ref Range    Protime 12.9 11.9 - 14.6 Seconds    INR 1.01 0.91 - 1.09   Lipase    Specimen: Blood   Result Value Ref Range    Lipase 72 (H) 13 - 60 U/L   Amylase    Specimen: Blood   Result Value Ref Range    Amylase 63 28 - 100 U/L   POC Occult Blood Stool    Specimen: Per Rectum; Stool   Result Value Ref  Range    Fecal Occult Blood Positive (A) Negative    Lot Number 215     Expiration Date 10/31/2022     DEVELOPER LOT NUMBER 215     DEVELOPER EXPIRATION DATE 10/31/2022     Positive Control Positive Positive    Negative Control Negative Negative   ECG 12 Lead   Result Value Ref Range    QT Interval 442 ms    QTC Interval 480 ms   Green Top (Gel)   Result Value Ref Range    Extra Tube Hold for add-ons.    Lavender Top   Result Value Ref Range    Extra Tube hold for add-on    Red Top   Result Value Ref Range    Extra Tube Hold for add-ons.    Light Blue Top   Result Value Ref Range    Extra Tube Hold for add-ons.            ASSESSMENT AND PLAN    Assessment & Plan     Diagnoses and all orders for this visit:    1. BRBPR (bright red blood per rectum) (Primary)  -     CBC (No Diff); Future  -     Case Request; Standing  -     Case Request    Other orders  -     Follow Anesthesia Guidelines / Protocol; Future  -     Obtain Informed Consent; Future         Differential diagnoses discussed.   Repeat cbc. Recommend colonoscopy. I recommend Emergency Room if worsening symptoms and he verbalizes understanding.               COLONOSCOPY WITH ANESTHESIA (N/A)  All risks, benefits, alternatives, and indications of colonoscopy procedure have been discussed with the patient. Risks to include perforation of the colon requiring possible surgery or colostomy, risk of bleeding from biopsies or removal of colon tissue, possibility of missing a colon polyp or cancer, or adverse drug reaction.  Benefits to include the diagnosis and management of disease of the colon and rectum. Alternatives to include barium enema, radiographic evaluation, lab testing or no intervention. Pt verbalizes understanding and agrees.                    Kati Galvan, APRN

## 2022-05-18 ENCOUNTER — TELEPHONE (OUTPATIENT)
Dept: GASTROENTEROLOGY | Facility: CLINIC | Age: 72
End: 2022-05-18

## 2022-05-18 RX ORDER — LISINOPRIL 5 MG/1
TABLET ORAL
Qty: 90 TABLET | OUTPATIENT
Start: 2022-05-18

## 2022-05-19 ENCOUNTER — TREATMENT (OUTPATIENT)
Dept: CARDIAC REHAB | Facility: HOSPITAL | Age: 72
End: 2022-05-19

## 2022-05-19 DIAGNOSIS — Z95.1 S/P CABG (CORONARY ARTERY BYPASS GRAFT): Primary | ICD-10-CM

## 2022-05-19 PROCEDURE — 93798 PHYS/QHP OP CAR RHAB W/ECG: CPT

## 2022-05-20 ENCOUNTER — ANESTHESIA (OUTPATIENT)
Dept: GASTROENTEROLOGY | Facility: HOSPITAL | Age: 72
End: 2022-05-20

## 2022-05-20 ENCOUNTER — ANESTHESIA EVENT (OUTPATIENT)
Dept: GASTROENTEROLOGY | Facility: HOSPITAL | Age: 72
End: 2022-05-20

## 2022-05-20 ENCOUNTER — HOSPITAL ENCOUNTER (OUTPATIENT)
Facility: HOSPITAL | Age: 72
Setting detail: HOSPITAL OUTPATIENT SURGERY
Discharge: HOME | End: 2022-05-20
Attending: INTERNAL MEDICINE | Admitting: INTERNAL MEDICINE

## 2022-05-20 VITALS
SYSTOLIC BLOOD PRESSURE: 123 MMHG | HEART RATE: 69 BPM | OXYGEN SATURATION: 100 % | DIASTOLIC BLOOD PRESSURE: 65 MMHG | TEMPERATURE: 96.8 F | WEIGHT: 243 LBS | BODY MASS INDEX: 34.02 KG/M2 | RESPIRATION RATE: 21 BRPM | HEIGHT: 71 IN

## 2022-05-20 DIAGNOSIS — K62.5 BRBPR (BRIGHT RED BLOOD PER RECTUM): ICD-10-CM

## 2022-05-20 LAB — GLUCOSE BLDC GLUCOMTR-MCNC: 174 MG/DL (ref 70–130)

## 2022-05-20 PROCEDURE — 82962 GLUCOSE BLOOD TEST: CPT

## 2022-05-20 PROCEDURE — 25010000002 PROPOFOL 10 MG/ML EMULSION

## 2022-05-20 PROCEDURE — 45385 COLONOSCOPY W/LESION REMOVAL: CPT | Performed by: INTERNAL MEDICINE

## 2022-05-20 PROCEDURE — 88305 TISSUE EXAM BY PATHOLOGIST: CPT | Performed by: INTERNAL MEDICINE

## 2022-05-20 DEVICE — DEV CLIP ENDO RESOLUTION360 CONTRL ROT 235CM: Type: IMPLANTABLE DEVICE | Site: COLON | Status: FUNCTIONAL

## 2022-05-20 RX ORDER — ONDANSETRON 2 MG/ML
4 INJECTION INTRAMUSCULAR; INTRAVENOUS ONCE AS NEEDED
Status: DISCONTINUED | OUTPATIENT
Start: 2022-05-20 | End: 2022-05-20 | Stop reason: HOSPADM

## 2022-05-20 RX ORDER — LIDOCAINE HYDROCHLORIDE 20 MG/ML
INJECTION, SOLUTION EPIDURAL; INFILTRATION; INTRACAUDAL; PERINEURAL AS NEEDED
Status: DISCONTINUED | OUTPATIENT
Start: 2022-05-20 | End: 2022-05-20 | Stop reason: SURG

## 2022-05-20 RX ORDER — SODIUM CHLORIDE 0.9 % (FLUSH) 0.9 %
10 SYRINGE (ML) INJECTION AS NEEDED
Status: CANCELLED | OUTPATIENT
Start: 2022-05-20

## 2022-05-20 RX ORDER — LIDOCAINE HYDROCHLORIDE 10 MG/ML
0.5 INJECTION, SOLUTION EPIDURAL; INFILTRATION; INTRACAUDAL; PERINEURAL ONCE AS NEEDED
Status: DISCONTINUED | OUTPATIENT
Start: 2022-05-20 | End: 2022-05-20 | Stop reason: HOSPADM

## 2022-05-20 RX ORDER — SODIUM CHLORIDE 9 MG/ML
100 INJECTION, SOLUTION INTRAVENOUS CONTINUOUS
Status: CANCELLED | OUTPATIENT
Start: 2022-05-20

## 2022-05-20 RX ORDER — PROPOFOL 10 MG/ML
VIAL (ML) INTRAVENOUS AS NEEDED
Status: DISCONTINUED | OUTPATIENT
Start: 2022-05-20 | End: 2022-05-20 | Stop reason: SURG

## 2022-05-20 RX ORDER — SODIUM CHLORIDE 0.9 % (FLUSH) 0.9 %
10 SYRINGE (ML) INJECTION AS NEEDED
Status: DISCONTINUED | OUTPATIENT
Start: 2022-05-20 | End: 2022-05-20 | Stop reason: HOSPADM

## 2022-05-20 RX ORDER — SODIUM CHLORIDE 0.9 % (FLUSH) 0.9 %
10 SYRINGE (ML) INJECTION EVERY 12 HOURS SCHEDULED
Status: CANCELLED | OUTPATIENT
Start: 2022-05-20

## 2022-05-20 RX ORDER — SODIUM CHLORIDE 9 MG/ML
500 INJECTION, SOLUTION INTRAVENOUS CONTINUOUS PRN
Status: DISCONTINUED | OUTPATIENT
Start: 2022-05-20 | End: 2022-05-20 | Stop reason: HOSPADM

## 2022-05-20 RX ADMIN — LIDOCAINE HYDROCHLORIDE 100 MG: 20 INJECTION, SOLUTION EPIDURAL; INFILTRATION; INTRACAUDAL; PERINEURAL at 09:21

## 2022-05-20 RX ADMIN — SODIUM CHLORIDE: 0.9 INJECTION, SOLUTION INTRAVENOUS at 09:12

## 2022-05-20 RX ADMIN — PROPOFOL 500 MG: 10 INJECTION, EMULSION INTRAVENOUS at 09:21

## 2022-05-20 NOTE — ANESTHESIA PREPROCEDURE EVALUATION
Anesthesia Evaluation     Patient summary reviewed   no history of anesthetic complications:  NPO Solid Status: > 6 hours  NPO Liquid Status: > 4 hours           Airway   Mallampati: II  TM distance: >3 FB  Neck ROM: full  Dental      Pulmonary    (+) sleep apnea on CPAP,   (-) COPD, not a smoker  Cardiovascular     ECG reviewed  PT is on anticoagulation therapy    (+) hypertension, CAD, CABG, dysrhythmias Atrial Fib, angina, hyperlipidemia,   (-) pacemaker, CHF, cardiac stents    ROS comment: Conclusion:  #1 99% ostial LAD stenosis and total occlusion of the distal LAD  #2 60% proximal RCA stenosis and 90% stenosis of the proximal PL branch  #3 dilated ascending aorta  #4 LVEF 55%      Neuro/Psych  GI/Hepatic/Renal/Endo    (+) obesity,  GERD, GI bleeding , renal disease CRI, diabetes mellitus, thyroid problem hypothyroidism    Musculoskeletal     Abdominal   (+) obese,    Substance History      OB/GYN          Other                          Anesthesia Plan    ASA 3     MAC     intravenous induction     Anesthetic plan, all risks, benefits, and alternatives have been provided, discussed and informed consent has been obtained with: patient and spouse/significant other.

## 2022-05-20 NOTE — ANESTHESIA POSTPROCEDURE EVALUATION
"Patient: Zay Kamara    Procedure Summary     Date: 05/20/22 Room / Location: [facility] ENDOSCOPY 5 / [facility] ENDOSCOPY    Anesthesia Start: 0915 Anesthesia Stop: 0954    Procedure: COLONOSCOPY WITH ANESTHESIA (N/A ) Diagnosis:       BRBPR (bright red blood per rectum)      (BRBPR (bright red blood per rectum) [K62.5])    Surgeons: Hector Alvarenga MD Provider: Zaina Charles CRNA    Anesthesia Type: MAC ASA Status: 3          Anesthesia Type: MAC    Vitals  Vitals Value Taken Time   /65 05/20/22 1011   Temp     Pulse 66 05/20/22 1013   Resp 21 05/20/22 1010   SpO2 99 % 05/20/22 1013   Vitals shown include unvalidated device data.        Post Anesthesia Care and Evaluation    Patient location during evaluation: PHASE II  Patient participation: complete - patient participated  Level of consciousness: awake  Pain management: adequate  Airway patency: patent  Anesthetic complications: No anesthetic complications    Cardiovascular status: acceptable  Respiratory status: acceptable  Hydration status: acceptable    Comments: Blood pressure 123/65, pulse 69, temperature 96.8 °F (36 °C), temperature source Temporal, resp. rate 21, height 179.1 cm (70.5\"), weight 110 kg (243 lb), SpO2 100 %.    Pt discharged from PACU based on naheed score >8      "

## 2022-05-23 ENCOUNTER — HOSPITAL ENCOUNTER (OUTPATIENT)
Dept: PAIN MANAGEMENT | Age: 72
Discharge: HOME | End: 2022-05-23
Payer: MEDICARE

## 2022-05-23 VITALS
HEART RATE: 75 BPM | OXYGEN SATURATION: 91 % | SYSTOLIC BLOOD PRESSURE: 132 MMHG | DIASTOLIC BLOOD PRESSURE: 72 MMHG | TEMPERATURE: 97 F | BODY MASS INDEX: 34.72 KG/M2 | WEIGHT: 248 LBS | HEIGHT: 71 IN

## 2022-05-23 DIAGNOSIS — G89.29 CHRONIC LEFT-SIDED LOW BACK PAIN WITH LEFT-SIDED SCIATICA: ICD-10-CM

## 2022-05-23 DIAGNOSIS — R20.0 NUMBNESS AND TINGLING OF BOTH LEGS: ICD-10-CM

## 2022-05-23 DIAGNOSIS — M25.50 ARTHRALGIA OF MULTIPLE JOINTS: ICD-10-CM

## 2022-05-23 DIAGNOSIS — R20.2 NUMBNESS AND TINGLING OF BOTH LEGS: ICD-10-CM

## 2022-05-23 DIAGNOSIS — M54.12 CERVICAL RADICULOPATHY: ICD-10-CM

## 2022-05-23 DIAGNOSIS — M54.42 CHRONIC LEFT-SIDED LOW BACK PAIN WITH LEFT-SIDED SCIATICA: ICD-10-CM

## 2022-05-23 PROCEDURE — 99213 OFFICE O/P EST LOW 20 MIN: CPT

## 2022-05-23 PROCEDURE — 99213 OFFICE O/P EST LOW 20 MIN: CPT | Performed by: NURSE PRACTITIONER

## 2022-05-23 RX ORDER — LISINOPRIL 5 MG/1
5 TABLET ORAL DAILY
Qty: 90 TABLET | Refills: 3 | Status: SHIPPED | OUTPATIENT
Start: 2022-05-23 | End: 2023-05-23

## 2022-05-23 RX ORDER — GABAPENTIN 400 MG/1
400 CAPSULE ORAL 3 TIMES DAILY
Qty: 270 CAPSULE | Refills: 0 | Status: SHIPPED | OUTPATIENT
Start: 2022-05-23 | End: 2022-08-21

## 2022-05-23 RX ORDER — HYDROCODONE BITARTRATE AND ACETAMINOPHEN 7.5; 325 MG/1; MG/1
1 TABLET ORAL EVERY 8 HOURS PRN
Qty: 90 TABLET | Refills: 0 | Status: SHIPPED | OUTPATIENT
Start: 2022-05-23 | End: 2022-07-13 | Stop reason: SDUPTHER

## 2022-05-23 ASSESSMENT — PAIN DESCRIPTION - LOCATION: LOCATION: BACK;NECK

## 2022-05-23 ASSESSMENT — PAIN SCALES - GENERAL: PAINLEVEL_OUTOF10: 7

## 2022-05-23 NOTE — PROGRESS NOTES
[facility] Physical and Pain Medicine    Office Progress Note    Patient Name: Lew Quinteros    MR #: [id number]    Account #: [de-identified]    : 1950    Age: 66 y.o. Sex: male    Date: May 23, 2022    PCP: MARY Toney         Referring Provider:    Chief Complaint:   Chief Complaint   Patient presents with    Neck Pain    Lower Back Pain       History of Present Illness:    Lew Quinteros is a 66 y.o. male who presents to the office for follow-up of neck and low back pain. He says that his back did start bothering him, so he went to a chiropractor and says it did help. Is having an increase in the muscle spasms in his neck. Is wanting to get cervical trigger point injections scheduled. He has had them in the past with 80% relief for 6 weeks. Continues to take his Gabapentin and Norco with some relief in his pain that allows him to complete ADL's. Last pain management HPI note read    Employment: Retired []   Disabled  []   Works []    Does Not Work [x]     Previous Injury:  Yes  []   No [x]     Previous Surgery: Yes []   No [x]     Previous Physical Therapy In the last 6 months? Yes  []     No [x]   Did Physical Therapy make thepain better or worse? Better []   Worse []  Unchanged []    MRI in the last two years? Yes [x]  No []   Results reviewed with patient? Yes []   No []    CT Scan in the last two years? Yes  []   No [x]   Results reviewed with patient? Yes []   No []    X-ray in the last two years? Yes [x]   No  []  Results reviewed with patient? Yes  []  No []    Injections in the past?  Yes [x]   No []   Did the injections help relieve the pain? Yes [x]   No []     Do you have Depression? Yes  []    No [x]   Thinking of harming yourself or others?   Yes  []   No [x]     Past Medical Histoy  Past Medical History:   Diagnosis Date    Allergic rhinitis     Arthritis     Cancer ([facility] [address] 2012    Thyroid  - Dr Renaldo Garcia Coronary artery disease involving native heart without angina pectoris 1/18/2022    Hyperlipidemia     Hypertension     Hyperthyroidism     Prostate cancer ([facility] [address]     Sleep apnea     Type II or unspecified type diabetes mellitus without mention of complication, not stated as uncontrolled        Surgery History  Past Surgical History:   Procedure Laterality Date    CHOLECYSTECTOMY      INCONTINENCE SURGERY  11/26/2018    dr gino Bedolla  08/01/2017    removal, dr Chiara Koo      removed by Dr Ivana Buerger  Patient has no known allergies. Current Medications  Current Outpatient Medications   Medication Sig Dispense Refill    HYDROcodone-acetaminophen (NORCO) 7.5-325 MG per tablet Take 1 tablet by mouth every 8 hours as needed for Pain for up to 30 days. May fill 5/23/2022 90 tablet 0    hydrocortisone (ANUSOL-HC) 25 MG suppository Place 1 suppository rectally every 12 hours 14 suppository 0    baclofen (LIORESAL) 10 MG tablet Take 1 tablet by mouth 3 times daily 30 tablet 0    atorvastatin (LIPITOR) 20 MG tablet Take 1 tablet by mouth at bedtime 90 tablet 3    amLODIPine (NORVASC) 2.5 MG tablet       aspirin 81 MG EC tablet Take 81 mg by mouth daily      metoprolol tartrate (LOPRESSOR) 25 MG tablet TAKE 1 TABLET BY MOUTH EVERY 12 HOURS      nitroGLYCERIN (NITROSTAT) 0.4 MG SL tablet Place 0.4 mg under the tongue      gabapentin (NEURONTIN) 400 MG capsule Take 1 capsule by mouth 3 times daily for 90 days.  May fill 2- 270 capsule 0    glipiZIDE (GLUCOTROL) 5 MG tablet Take 1 tablet by mouth daily 90 tablet 3    lisinopril (PRINIVIL;ZESTRIL) 5 MG tablet Take 5 mg by mouth daily      levothyroxine (SYNTHROID) 25 MCG tablet Take 1 tablet by mouth daily Total of 225mcg 90 tablet 1    levothyroxine (SYNTHROID) 200 MCG tablet Take 1 tablet by mouth daily Total of 225mcg 90 tablet 1    allopurinol (ZYLOPRIM) 100 MG tablet Take 1 tablet by mouth daily 90 tablet 3    citalopram (CELEXA) 20 MG tablet Take 1 tablet by mouth daily 90 tablet 3    Cholecalciferol (VITAMIN D3) 2000 units TABS Take 1 tablet by mouth daily 30 tablet 11    COLCRYS 0.6 MG tablet TAKE 2 TABLETS THEN AN HOUR LATER TAKE 1 TABLET WHEN GOUT FLARES 12 tablet 5     No current facility-administered medications for this encounter. Social History    Social History     Tobacco Use    Smoking status: Never Smoker    Smokeless tobacco: Never Used   Substance Use Topics    Alcohol use: No         Family History  family history includes Cancer in his father; Heart Attack in his father and mother; Heart Disease in his father; Heart Failure in his mother; Schizophrenia in his mother; Thyroid Disease in his sister. Review of Systems:  Constitutional: denies fever, chills, fatigue, change in appetite, weight gain or weight loss  Head: Normocephalic  Skin: denies easy bruising, skin redness, skin rash, hives, sensitivity to sun exposure, tightness, nodules or bumps, hair loss, color changes in the hands or feet with cold. Eyes: denies pain, redness, loss of vision, double or blurred vision, eye drainage, or dryness. ENT and Mouth: denies ringing in the ears, loss of hearing, nasal congestion, nasal discharge, no hoarseness, sore throat, or difficulty swallowing   Respiratory: denies chronic dry cough, coughing up blood, coughing up mucus, waking at night coughing or choking, or wheezing. Cardiovascular: denies chest pain, irregular heartbeats, palpitations, shortness of breath, or edema in legs  Gastrointestinal: denies, nausea, vomiting, heartburn, diarrhea, or constipation  Genitourinary: denies difficult urination, pain or burning with urination, blood in the urine, or cloudy urine  Musculoskeletal: denies arm, buttock, thigh or calf cramps.  Has pain in neck and bilateral shoulders, muscle spasms in neck and bilateral shoulders and tenderness in neck and bilateral shoulders. Pain in right knee. No muscle weakness. No joint swelling. Healing chest  Neurologic: headache, dizziness, fainting, loss of consciousness, no memory loss. No sensitivity. Endocrine: denies intolerance to hot or cold temperature, night sweats, flushing, fingernail changes, increased thirst, or hairloss   Hematologic/ Lymphatic: denies anemia, bleeding tendency or clotting tendency, bruising easily. Allergic/ Immunologic: denies rhinitis, asthma, skin sensitivity, or allergy to Latex   Psychiatric: denies depression or thoughts of suicide, or voices in head. Current Pain Assessment:   Pain Assessment  Pain Assessment: 0-10  Pain Level: 7  Patient's Stated Pain Goal: 2  Pain Location: Back,Neck    Clinical Progression: gradually improving  Effect of Pain on Daily Activities: limits activity  Patient's Stated Pain Goal: No pain  Pain Intervention(s): Medication (see eMar), Repositioning, Rest, Ice    Current PE.3    ORT Score: 1    PHQ-9 Score: 7    Physical Exam:    Vitals:    22 0754   BP: 132/72   Pulse: 75   Temp: 97 °F (36.1 °C)   TempSrc: Temporal   SpO2: 91%   Weight: 248 lb (112.5 kg)   Height: 5' 11\" (1.803 m)       Body mass index is 34.59 kg/m². General Appearance: no acute distress. Appears to be well dressed  Skin Exam: Warm and dry, no jaundice, rashes or lesions  Head Exam: NCAT, PERRLA, EOMI, moist mucus membranes, scalp normal  Neck Exam: Supple, no masses palpated, trachea midline. Pain with flexion and extension of head. Pain with palpation of muscles iin neck and bilateral shoulders  Lung: Clear to ausculation in all lobes anterior and posterior. Heart: Regular rate and rhythm, no gallops, rubs or murmurs, no edema  Abdomen: Bowel sounds in all quadrants, soft, non-distended, non-tender with palpation, no guarding  Extremities: No rash, cyanosis or bruising  Musculoskeletal: No joint swelling or deformity.  Chest healing   Back Exam: limited ROM  Hip Exam: Full rotation bilateral   Knee Exam: Crepitus and pain in right knee with flexion and extension  Shoulder Exam: Full ROM bilateral  Neurologic Exam: Gait and coordination normal, speech normal  Reflexes: Normal brachialis, Negative Mariee's bilateral. Normal Patellar bilateral,   CN EXAM: II-XII intact, face symmetrical, tongue symmetrical, the trapezius and sternocleidomastoid muscle appearance and strength symmetrical, normal achilles bilateral, ankle clonus negative bilateral  Strength: 5/5 RUE Bi's/Tri's, 5/5 LUE Bi's/Tri's, 5/5 RLE knee flex/ext, 5/5 RLE DF/PF, 5/5 LLE knee flex/ext, 5/5 LLE DF/PF  Sensation: Equal and intact to fine touch in all extremities  Mood and affect: Normal   Nurses note reviewed along with current vital signs    Active Problem(s)  Active Problems:    Cervicalgia    Muscle spasms of neck    Myofascial muscle pain    Muscle spasm of back  Resolved Problems:    * No resolved hospital problems. *                                                                                                                            PLAN:  1. Patient is to call the office with any questions or concerns that may arise prior to next appointment. 2. Continue Norco and Gabapentin  3. Schedule patient for bilateral cervical trigger point injections    May need to be scheduled for VANE C3-C4    Urine Drug Screen Current/Today:  Yes  []  No [x]     Discussion:  Discussed exam findings and plan of care with patient. Patient agreed with the current plan of care at this time. All questions from the patient were answered by the provider. Activity:   Discussed exercise as beneficial to pain reduction, encouraged stretching exercise with a focus on torso strengthening.     Education Provided:  Review of Thor Rust [x] Agreement Review [x]  Reviewed PHQ-9  [x]    Review of Test [] Compliance Issues Discussed []   Cognitive Behavior Needs [] Exercise [x]  Financial Issues []   Tobacco/Alcohol Use [] Teaching  [x] Goal:  Pain Management Goals of Therapy:   []        Resolution in pain  [x]        Decrease in pain level  [x]        Improvement in ADL's  [x]        Increase in activities with less pain  [x]        Decrease in medication      [] Benzodiazapine's and Narcotics:  Patient educated on the possible effects of combining Benzodiazapine's and Opioids. Explained \"Black Box Warnings\" such as; possible suppressed breathing, hypoxia, anoxia, depressed cognition, heart arrhythmia, coma and possible death. Patient verbalized understanding concerning possible effects. Controlled Substance Monitoring:  Attestation: The ERLINDA report for this patient was reviewed today. Discussed with patient possible medication side effects, risk of tolerance, dependence and alternative treatments. Discussed the growing epidemic in the [place] with the overprescribing and at times the abuse of narcotics. Discussed the detrimental effects of long term narcotic use. Patient encouraged to set daily goals of exercising and decreasing daily narcotic intake. Discussed with the patient about the development of hyperalgesia with long term narcotic intake. EMR dragon/transcription disclaimer: Much of this encounter note is electronic transcription/translation of spoken language to printed tach. Electronic translation of spoken language may be erroneous, or at times, nonsensical words or phrases may be inadvertently transcribed.  Although, I have reviewed the note for such errors, some may still exist.     CC:  MARY Goyal APRN, 5/23/2022 at 8:06 AM

## 2022-05-23 NOTE — PROGRESS NOTES
Clinic Documentation      Education Provided:  [x] Review of Ramin Levi  [] Agreement Review  [x] PEG Score Calculated [] PHQ Score Calculated [] ORT Score Calculated    [] Compliance Issues Discussed [] Cognitive Behavior Needs [x] Exercise [] Review of Test [] Financial Issues  [x] Tobacco/Alcohol Use Reviewed [x] Teaching [] New Patient [] Picture Obtained    Physician Plan:  [] Outgoing Referral  [] Pharmacy Consult  [] Test Ordered [x] Prescription Ordered/Changed   [] Obtained Test Results / Consult Notes        Complete if patient is withholding blood thinner for procedure     Blood Thinner Patient is currently taking:      [] Plavix (Hold for 7 days)  [] Aspirin (Hold for 5 days)     [] Pletal (Hold for 2 days)  [] Pradaxa (Hold for 3 days)    [] Effient (Hold for 7 days)  [] Xarelto (Hold for 2 days)    [] Eliquis (Hold for 2 days)  [] Brilinta (Hold for 7 days)    [] Coumadin (Hold for 5 days) - (INR needs to be drawn the day prior to procedure- INR < 2.0)    [] Aggrenox (Hold for 7 days)        [] Patient will stop medication on their own.    [] Blood Thinner Form Faxed for approval to hold.    Provider form faxed to:    Assessment Completed by:  Electronically signed by Flaquito Munoz MA on 5/23/2022 at 7:53 AM

## 2022-05-24 ENCOUNTER — TREATMENT (OUTPATIENT)
Dept: CARDIAC REHAB | Facility: HOSPITAL | Age: 72
End: 2022-05-24

## 2022-05-24 DIAGNOSIS — Z95.1 S/P CABG (CORONARY ARTERY BYPASS GRAFT): Primary | ICD-10-CM

## 2022-05-24 LAB
CYTO UR: NORMAL
LAB AP CASE REPORT: NORMAL
Lab: NORMAL
PATH REPORT.FINAL DX SPEC: NORMAL
PATH REPORT.GROSS SPEC: NORMAL

## 2022-05-24 PROCEDURE — 93798 PHYS/QHP OP CAR RHAB W/ECG: CPT

## 2022-05-25 ENCOUNTER — OFFICE VISIT (OUTPATIENT)
Dept: PRIMARY CARE CLINIC | Age: 72
End: 2022-05-25
Payer: MEDICARE

## 2022-05-25 VITALS
DIASTOLIC BLOOD PRESSURE: 80 MMHG | TEMPERATURE: 98 F | HEART RATE: 85 BPM | OXYGEN SATURATION: 98 % | WEIGHT: 247 LBS | SYSTOLIC BLOOD PRESSURE: 136 MMHG | BODY MASS INDEX: 34.58 KG/M2 | HEIGHT: 71 IN

## 2022-05-25 DIAGNOSIS — I10 ESSENTIAL HYPERTENSION: ICD-10-CM

## 2022-05-25 DIAGNOSIS — G47.33 OSA ON CPAP: ICD-10-CM

## 2022-05-25 DIAGNOSIS — H61.23 BILATERAL IMPACTED CERUMEN: ICD-10-CM

## 2022-05-25 DIAGNOSIS — K92.2 GASTROINTESTINAL HEMORRHAGE, UNSPECIFIED GASTROINTESTINAL HEMORRHAGE TYPE: ICD-10-CM

## 2022-05-25 DIAGNOSIS — N18.31 TYPE 2 DIABETES MELLITUS WITH STAGE 3A CHRONIC KIDNEY DISEASE, WITHOUT LONG-TERM CURRENT USE OF INSULIN (HCC): Primary | ICD-10-CM

## 2022-05-25 DIAGNOSIS — E11.22 TYPE 2 DIABETES MELLITUS WITH STAGE 3A CHRONIC KIDNEY DISEASE, WITHOUT LONG-TERM CURRENT USE OF INSULIN (HCC): Primary | ICD-10-CM

## 2022-05-25 DIAGNOSIS — Z99.89 OSA ON CPAP: ICD-10-CM

## 2022-05-25 PROCEDURE — 99214 OFFICE O/P EST MOD 30 MIN: CPT | Performed by: NURSE PRACTITIONER

## 2022-05-25 PROCEDURE — 1036F TOBACCO NON-USER: CPT | Performed by: NURSE PRACTITIONER

## 2022-05-25 PROCEDURE — G8417 CALC BMI ABV UP PARAM F/U: HCPCS | Performed by: NURSE PRACTITIONER

## 2022-05-25 PROCEDURE — 3046F HEMOGLOBIN A1C LEVEL >9.0%: CPT | Performed by: NURSE PRACTITIONER

## 2022-05-25 PROCEDURE — 3017F COLORECTAL CA SCREEN DOC REV: CPT | Performed by: NURSE PRACTITIONER

## 2022-05-25 PROCEDURE — 69210 REMOVE IMPACTED EAR WAX UNI: CPT | Performed by: NURSE PRACTITIONER

## 2022-05-25 PROCEDURE — G8427 DOCREV CUR MEDS BY ELIG CLIN: HCPCS | Performed by: NURSE PRACTITIONER

## 2022-05-25 PROCEDURE — 2022F DILAT RTA XM EVC RTNOPTHY: CPT | Performed by: NURSE PRACTITIONER

## 2022-05-25 PROCEDURE — 1123F ACP DISCUSS/DSCN MKR DOCD: CPT | Performed by: NURSE PRACTITIONER

## 2022-05-25 ASSESSMENT — ENCOUNTER SYMPTOMS
TROUBLE SWALLOWING: 0
NAUSEA: 0
CONSTIPATION: 0
SORE THROAT: 0
RHINORRHEA: 0
ABDOMINAL PAIN: 0
COUGH: 0
VOMITING: 0
DIARRHEA: 0
SHORTNESS OF BREATH: 0

## 2022-05-25 NOTE — PROGRESS NOTES
Geovanni Santos (:  1950) is a 66 y.o. male,Established patient, here for evaluation of the following chief complaint(s):  Follow-up (c pap doing well ) and Diabetes Care Management (needs a foot exam for new shoes )      ASSESSMENT/PLAN:    ICD-10-CM    1. Type 2 diabetes mellitus with stage 3a chronic kidney disease, without long-term current use of insulin (HCC)  E11.22 Diabetic Foot Exam    N18.31 Comprehensive Metabolic Panel     Hemoglobin A1C            2. Essential hypertension  I10 Comprehensive Metabolic Panel     Hemoglobin A1C        3. SUSY on CPAP  G47.33 The current medical regimen is effective;  continue present plan and medications. Z99.89    4. Gastrointestinal hemorrhage, unspecified gastrointestinal hemorrhage type  K92.2 Comprehensive Metabolic Panel     Hemoglobin A1C     CBC with Auto Differential        5. Bilateral impacted cerumen  H61.23 [id number] - NC REMOVE IMPACTED EAR WAX     Procedures   Cerumen removed using warm water irrigation without complications. Post removal alcohol drops used in canal.     Return in about 8 weeks (around 2022) for awv. SUBJECTIVE/OBJECTIVE:  HPI  Here for follow up on health issues    SUSY/CPAP   Need compliance report on CPAP for DME company. Uses every night  Benefits from therapy. Diabetes  Needs diabetic foot exam for diabetic shoes. BS running good when checked. Lab Results   Component Value Date    LABA1C 6.3 (H) 2021     Having hearing problems  [facility]ent to [facility] and they said have too much wax in ears and need cleaned out. GI bleeding  Had colonoscopy with Dr Reinier Esteves  Had 5 polyps and had metal clips to control bleeding after they removed them.        /80 (Site: Right Upper Arm, Position: Sitting, Cuff Size: Large Adult)   Pulse 85   Temp 98 °F (36.7 °C) (Temporal)   Ht 5' 11\" (1.803 m)   Wt 247 lb (112 kg)   SpO2 98%   BMI 34.45 kg/m²     Review of Systems   Constitutional: Negative for activity change, appetite change, fatigue, fever and unexpected weight change. HENT: Positive for hearing loss. Negative for ear pain, rhinorrhea, sore throat and trouble swallowing. Eyes: Negative for visual disturbance. Respiratory: Negative for cough and shortness of breath. Cardiovascular: Negative for chest pain, palpitations and leg swelling. Gastrointestinal: Negative for abdominal pain, constipation, diarrhea, nausea and vomiting. Genitourinary: Negative for flank pain. Musculoskeletal: Negative for arthralgias, myalgias, neck pain and neck stiffness. Neurological: Negative for headaches. Psychiatric/Behavioral: Negative for decreased concentration and sleep disturbance. The patient is not nervous/anxious. Physical Exam  Vitals reviewed. Constitutional:       Appearance: Normal appearance. He is obese. HENT:      Head: Normocephalic and atraumatic. Right Ear: There is impacted cerumen. Left Ear: There is impacted cerumen. Nose:      Comments: masked     Mouth/Throat:      Comments: masked  Eyes:      Conjunctiva/sclera: Conjunctivae normal.   Cardiovascular:      Rate and Rhythm: Normal rate and regular rhythm. Pulses: Normal pulses. Heart sounds: Normal heart sounds. Pulmonary:      Effort: Pulmonary effort is normal.      Breath sounds: Normal breath sounds. Abdominal:      General: There is no distension. Palpations: Abdomen is soft. Tenderness: There is no abdominal tenderness. There is no guarding. Musculoskeletal:         General: Normal range of motion. Cervical back: Normal range of motion and neck supple. Skin:     General: Skin is warm. Neurological:      General: No focal deficit present. Mental Status: He is alert.          Monofilament Exam Reveals:  Pulses: normal  Edema:normal  Skin Lesions:normal  Right Foot:    Left Foot:  Normal sensation at all but 4  Normal sensation at all but 3   No sensation at 4    No sensation at 3               An electronic signature was used to authenticate this note.     --Olivia Munoz, APRN

## 2022-05-26 ENCOUNTER — TREATMENT (OUTPATIENT)
Dept: CARDIAC REHAB | Facility: HOSPITAL | Age: 72
End: 2022-05-26

## 2022-05-26 DIAGNOSIS — Z99.89 OSA ON CPAP: Primary | ICD-10-CM

## 2022-05-26 DIAGNOSIS — G47.33 OSA ON CPAP: Primary | ICD-10-CM

## 2022-05-26 DIAGNOSIS — Z95.1 S/P CABG (CORONARY ARTERY BYPASS GRAFT): Primary | ICD-10-CM

## 2022-05-26 PROCEDURE — 93798 PHYS/QHP OP CAR RHAB W/ECG: CPT

## 2022-05-31 ENCOUNTER — TREATMENT (OUTPATIENT)
Dept: CARDIAC REHAB | Facility: HOSPITAL | Age: 72
End: 2022-05-31

## 2022-05-31 DIAGNOSIS — Z95.1 S/P CABG (CORONARY ARTERY BYPASS GRAFT): Primary | ICD-10-CM

## 2022-05-31 PROCEDURE — 93798 PHYS/QHP OP CAR RHAB W/ECG: CPT

## 2022-06-02 ENCOUNTER — APPOINTMENT (OUTPATIENT)
Dept: CARDIAC REHAB | Facility: HOSPITAL | Age: 72
End: 2022-06-02

## 2022-06-07 ENCOUNTER — TREATMENT (OUTPATIENT)
Dept: CARDIAC REHAB | Facility: HOSPITAL | Age: 72
End: 2022-06-07

## 2022-06-07 DIAGNOSIS — Z95.1 S/P CABG (CORONARY ARTERY BYPASS GRAFT): Primary | ICD-10-CM

## 2022-06-07 PROCEDURE — 93798 PHYS/QHP OP CAR RHAB W/ECG: CPT

## 2022-06-09 ENCOUNTER — TREATMENT (OUTPATIENT)
Dept: CARDIAC REHAB | Facility: HOSPITAL | Age: 72
End: 2022-06-09

## 2022-06-09 ENCOUNTER — HOSPITAL ENCOUNTER (OUTPATIENT)
Dept: PAIN MANAGEMENT | Age: 72
Discharge: HOME | End: 2022-06-09
Payer: MEDICARE

## 2022-06-09 VITALS
OXYGEN SATURATION: 94 % | HEART RATE: 69 BPM | SYSTOLIC BLOOD PRESSURE: 109 MMHG | RESPIRATION RATE: 18 BRPM | DIASTOLIC BLOOD PRESSURE: 49 MMHG | TEMPERATURE: 96.7 F

## 2022-06-09 DIAGNOSIS — Z95.1 S/P CABG (CORONARY ARTERY BYPASS GRAFT): Primary | ICD-10-CM

## 2022-06-09 PROCEDURE — 20553 NJX 1/MLT TRIGGER POINTS 3/>: CPT | Performed by: NURSE PRACTITIONER

## 2022-06-09 PROCEDURE — 20553 NJX 1/MLT TRIGGER POINTS 3/>: CPT

## 2022-06-09 PROCEDURE — 2500000003 HC RX 250 WO HCPCS

## 2022-06-09 PROCEDURE — 93798 PHYS/QHP OP CAR RHAB W/ECG: CPT

## 2022-06-09 RX ORDER — LIDOCAINE HYDROCHLORIDE 10 MG/ML
10 INJECTION, SOLUTION EPIDURAL; INFILTRATION; INTRACAUDAL; PERINEURAL ONCE
Status: DISCONTINUED | OUTPATIENT
Start: 2022-06-09 | End: 2022-06-11 | Stop reason: HOSPADM

## 2022-06-09 RX ORDER — BUPIVACAINE HYDROCHLORIDE 5 MG/ML
10 INJECTION, SOLUTION EPIDURAL; INTRACAUDAL ONCE
Status: DISCONTINUED | OUTPATIENT
Start: 2022-06-09 | End: 2022-06-11 | Stop reason: HOSPADM

## 2022-06-09 NOTE — PROCEDURES
[facility] Physical and Pain Medicine      Patient Name: Brigida Vila    : 1950                    Age: 66 y.o. Sex: male    Date: 2022    Pre-op Diagnosis: Myofascial Pain/ Muscle Spasms/ Cervicalgia    Post-op Diagnosis: Myofascial Pain/ Muscle Spasms/ Cervicalgia    Procedure: Cervical Trigger Point Injections    Performing Procedure: Herve Weems, MSN, APRN, FNP-C    Previously Had Procedure:  Yes [x]  No []     Patient Vitals for the past 24 hrs:   BP Temp Temp src Pulse Resp SpO2   22 0951 (!) 109/49 (!) 96.7 °F (35.9 °C) Temporal 69 18 94 %       Description of Procedure:     After a brief physical assessment and failure to improve with conservative measures the patient presented for Cervical Trigger Point Injections The indications, limitations and possible complications were discussed with the patient and the patient elected to proceed with the procedure. After voluntary, informed and signed consent obtained the patient was placed in a seated position. Appropriate time out was obtained per policy. The area of maximal tenderness was palpated over the [] Right   []  Left   [x]  Bilateral Cervical   [x] Splenius   [x] Trapezius  [x] Rhomboid. The skin overlying these areas was marked with a skin marker. The skin overlying the proposed injection sites were then sprayed with Gebauer's Solution while protecting patient eyes. The areas were then prepped in a sterile fashion with Prevantics swab. Each trigger point of the  [] Right   []   Left  [x]   Bilateral Cervical  [x] Splenius  [x] Trapezius   [x] Rhomboid   was then injected after negative aspiration using a 25 gauge 1 1/2 in needle with approximately 2 ml of a solution of     [x] 5 ml of 1% Lidocaine Plain and 5 ml of 0.5% Marcaine Plain per 10 ml syringe  [] Toradol 0.5 ml (30 mg/ml) per 10 ml syringe    Sterile dressings applied. Discharge:   The patient tolerated the procedure well. There were no complications during the procedure and the patient was discharged home with discharge instructions. The patient has been instructed to contact the office should there be any complications or questions to arise between today and their next appointment.     Plan:  [x] Will return to the office in  [] 1 month  [x]  4 - 6 weeks  [] 2 months   [] 3 months for:  [] Planned Procedure [x] Procedure Follow-up  [] Office Visit      1 [facility], [facility], 6/9/2022 at 12:38 PM

## 2022-06-14 ENCOUNTER — TREATMENT (OUTPATIENT)
Dept: CARDIAC REHAB | Facility: HOSPITAL | Age: 72
End: 2022-06-14

## 2022-06-14 DIAGNOSIS — Z95.1 S/P CABG (CORONARY ARTERY BYPASS GRAFT): Primary | ICD-10-CM

## 2022-06-14 PROCEDURE — 93798 PHYS/QHP OP CAR RHAB W/ECG: CPT

## 2022-06-16 ENCOUNTER — TREATMENT (OUTPATIENT)
Dept: CARDIAC REHAB | Facility: HOSPITAL | Age: 72
End: 2022-06-16

## 2022-06-16 DIAGNOSIS — Z95.1 S/P CABG (CORONARY ARTERY BYPASS GRAFT): Primary | ICD-10-CM

## 2022-06-16 PROCEDURE — 93798 PHYS/QHP OP CAR RHAB W/ECG: CPT

## 2022-06-20 ENCOUNTER — TELEPHONE (OUTPATIENT)
Dept: PRIMARY CARE CLINIC | Age: 72
End: 2022-06-20

## 2022-06-20 NOTE — TELEPHONE ENCOUNTER
[facility] called, they remembered getting the paperwork on pt and he was scheduled. He was a returning pt. She said she cant remember why, but he cancelled his appt for some reason and they decided to not reschedule it.

## 2022-06-21 ENCOUNTER — TELEPHONE (OUTPATIENT)
Dept: PAIN MANAGEMENT | Age: 72
End: 2022-06-21

## 2022-06-21 ENCOUNTER — TREATMENT (OUTPATIENT)
Dept: CARDIAC REHAB | Facility: HOSPITAL | Age: 72
End: 2022-06-21

## 2022-06-21 DIAGNOSIS — Z95.1 S/P CABG (CORONARY ARTERY BYPASS GRAFT): Primary | ICD-10-CM

## 2022-06-21 DIAGNOSIS — I71.20 THORACIC AORTIC ANEURYSM WITHOUT RUPTURE: Primary | ICD-10-CM

## 2022-06-21 PROCEDURE — 93798 PHYS/QHP OP CAR RHAB W/ECG: CPT

## 2022-06-23 ENCOUNTER — TREATMENT (OUTPATIENT)
Dept: CARDIAC REHAB | Facility: HOSPITAL | Age: 72
End: 2022-06-23

## 2022-06-23 DIAGNOSIS — Z95.1 S/P CABG (CORONARY ARTERY BYPASS GRAFT): Primary | ICD-10-CM

## 2022-06-23 PROCEDURE — 93798 PHYS/QHP OP CAR RHAB W/ECG: CPT

## 2022-06-28 ENCOUNTER — TREATMENT (OUTPATIENT)
Dept: CARDIAC REHAB | Facility: HOSPITAL | Age: 72
End: 2022-06-28

## 2022-06-28 DIAGNOSIS — Z95.1 S/P CABG (CORONARY ARTERY BYPASS GRAFT): Primary | ICD-10-CM

## 2022-06-28 PROCEDURE — 93798 PHYS/QHP OP CAR RHAB W/ECG: CPT

## 2022-06-30 ENCOUNTER — TREATMENT (OUTPATIENT)
Dept: CARDIAC REHAB | Facility: HOSPITAL | Age: 72
End: 2022-06-30

## 2022-06-30 DIAGNOSIS — Z95.1 S/P CABG (CORONARY ARTERY BYPASS GRAFT): Primary | ICD-10-CM

## 2022-06-30 PROCEDURE — 93798 PHYS/QHP OP CAR RHAB W/ECG: CPT

## 2022-07-07 ENCOUNTER — TREATMENT (OUTPATIENT)
Dept: CARDIAC REHAB | Facility: HOSPITAL | Age: 72
End: 2022-07-07

## 2022-07-07 DIAGNOSIS — Z95.1 S/P CABG (CORONARY ARTERY BYPASS GRAFT): Primary | ICD-10-CM

## 2022-07-07 PROCEDURE — 93798 PHYS/QHP OP CAR RHAB W/ECG: CPT

## 2022-07-12 ENCOUNTER — TREATMENT (OUTPATIENT)
Dept: CARDIAC REHAB | Facility: HOSPITAL | Age: 72
End: 2022-07-12

## 2022-07-12 DIAGNOSIS — Z95.1 S/P CABG (CORONARY ARTERY BYPASS GRAFT): Primary | ICD-10-CM

## 2022-07-12 PROCEDURE — 93798 PHYS/QHP OP CAR RHAB W/ECG: CPT

## 2022-07-13 ENCOUNTER — OFFICE VISIT (OUTPATIENT)
Dept: CARDIOLOGY | Facility: CLINIC | Age: 72
End: 2022-07-13

## 2022-07-13 VITALS
WEIGHT: 244.8 LBS | DIASTOLIC BLOOD PRESSURE: 62 MMHG | HEIGHT: 71 IN | OXYGEN SATURATION: 98 % | BODY MASS INDEX: 34.27 KG/M2 | HEART RATE: 79 BPM | SYSTOLIC BLOOD PRESSURE: 128 MMHG

## 2022-07-13 DIAGNOSIS — Z98.890 S/P MAZE OPERATION FOR ATRIAL FIBRILLATION: ICD-10-CM

## 2022-07-13 DIAGNOSIS — E78.2 MIXED HYPERLIPIDEMIA: Chronic | ICD-10-CM

## 2022-07-13 DIAGNOSIS — M54.12 CERVICAL RADICULOPATHY: ICD-10-CM

## 2022-07-13 DIAGNOSIS — G47.30 SLEEP APNEA, UNSPECIFIED TYPE: Chronic | ICD-10-CM

## 2022-07-13 DIAGNOSIS — R20.0 NUMBNESS AND TINGLING OF BOTH LEGS: ICD-10-CM

## 2022-07-13 DIAGNOSIS — R20.2 NUMBNESS AND TINGLING OF BOTH LEGS: ICD-10-CM

## 2022-07-13 DIAGNOSIS — Z86.79 S/P MAZE OPERATION FOR ATRIAL FIBRILLATION: ICD-10-CM

## 2022-07-13 DIAGNOSIS — I10 PRIMARY HYPERTENSION: Chronic | ICD-10-CM

## 2022-07-13 DIAGNOSIS — I48.0 PAROXYSMAL ATRIAL FIBRILLATION: Chronic | ICD-10-CM

## 2022-07-13 DIAGNOSIS — I65.23 BILATERAL CAROTID ARTERY STENOSIS: Chronic | ICD-10-CM

## 2022-07-13 DIAGNOSIS — I25.10 CORONARY ARTERY DISEASE INVOLVING NATIVE CORONARY ARTERY OF NATIVE HEART WITHOUT ANGINA PECTORIS: Primary | Chronic | ICD-10-CM

## 2022-07-13 DIAGNOSIS — Z95.1 HX OF CABG: ICD-10-CM

## 2022-07-13 DIAGNOSIS — E66.01 CLASS 2 SEVERE OBESITY DUE TO EXCESS CALORIES WITH SERIOUS COMORBIDITY AND BODY MASS INDEX (BMI) OF 36.0 TO 36.9 IN ADULT: Chronic | ICD-10-CM

## 2022-07-13 PROCEDURE — 99214 OFFICE O/P EST MOD 30 MIN: CPT | Performed by: NURSE PRACTITIONER

## 2022-07-13 RX ORDER — AMLODIPINE BESYLATE 2.5 MG/1
2.5 TABLET ORAL DAILY
COMMUNITY
Start: 2022-06-22 | End: 2022-07-13 | Stop reason: ALTCHOICE

## 2022-07-13 NOTE — ASSESSMENT & PLAN NOTE
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 Continue cardiac rehab and encouraged him to exercise when not going to rehab. Call if chest discomfort worsens or doesn't improve. No evidence of sternal malunion on exam. Consider changing glipizide to Jardiance or Farxiga given CV benefits - defer to PCP. Discussed lifelong aspirin.

## 2022-07-13 NOTE — PROGRESS NOTES
"Encounter Date:07/13/2022  Chief Complaint:   Subjective    Zay Kamara is a 71 y.o. male who presents to [facility] today for three month cardiac follow-up. Had some GI bleeding in May - \"polyps\". Going to cardiac rehab at [facility] which he enjoys.       History of Present Illness   HPI     Coronary Artery Disease      Additional comments: No chest discomfort similar to previous angina - still having some chest soreness from CABG 1/2022. Going to cardiac rehab twice a week. Working part time at hardware store.              Follow-up      Additional comments: LAST SEEN 3-15-22              Hypertension      Additional comments: Goes up and down but usually controlled at rest - doesn't know numbers. No headaches or nosebleeds. Sometimes a little dizziness if gets up too quickly.              Atrial Fibrillation      Additional comments: Paroxysmal. s/p MAZE and L atrial appendage occlusion. No bleeding on low dose aspirin.              Hyperlipidemia      Additional comments: Controlled except very low HDL. Trying to eat healthy. Not exercising on days he doesn't go to cardiac rehab but constantly moving at his job at the [employer] store.           Last edited by Letty Belcher APRN on 7/13/2022  2:39 PM. (History)        History: Past medical, surgical, family, and social history reviewed.    Outpatient Medications Marked as Taking for the 7/13/22 encounter (Office Visit) with Letty Belcher APRN   Medication Sig Dispense Refill   • allopurinol (ZYLOPRIM) 100 MG tablet Take 100 mg by mouth Daily.     • aspirin (aspirin) 81 MG EC tablet Take 1 tablet by mouth Daily.     • atorvastatin (LIPITOR) 20 MG tablet Take 20 mg by mouth Daily.  2   • bisacodyl (DULCOLAX) 5 MG EC tablet Take 5 mg by mouth Daily As Needed for Constipation.     • Cholecalciferol (VITAMIN D3) 2000 units tablet Take 50 mcg by mouth Daily.     • citalopram (CeleXA) 20 MG tablet Take 20 mg by mouth " "Daily.     • Docusate Calcium (STOOL SOFTENER PO) Take 1 capsule by mouth 2 (Two) Times a Day.     • gabapentin (NEURONTIN) 400 MG capsule Take 400 mg by mouth 3 (Three) Times a Day. Can take up to TID but only takes BID due to side effects - sleepiness     • glipizide (GLUCOTROL) 5 MG tablet Take 5 mg by mouth Daily.     • HYDROcodone-acetaminophen (NORCO) 7.5-325 MG per tablet Take 1 tablet by mouth Every 8 (Eight) Hours As Needed for Moderate Pain . Usually takes twice a day     • levothyroxine (SYNTHROID, LEVOTHROID) 200 MCG tablet Take 200 mcg by mouth Daily. Takes with 25 mcg = 225 mcg     • levothyroxine (SYNTHROID, LEVOTHROID) 25 MCG tablet Take 25 mcg by mouth Daily. Takes with 200 mcg = 225 mcg     • lisinopril (PRINIVIL,ZESTRIL) 5 MG tablet Take 1 tablet by mouth Daily. 90 tablet 3   • metoprolol tartrate (LOPRESSOR) 25 MG tablet Take 1 tablet by mouth 2 (Two) Times a Day. 180 tablet 3   • nitroglycerin (NITROSTAT) 0.4 MG SL tablet Place 1 tablet under the tongue Every 5 (Five) Minutes As Needed for Chest Pain. 25 tablet 11   • polyethylene glycol (MIRALAX) 17 g packet Take 17 g by mouth Daily As Needed.          Objective     Vital Signs:   /62 (BP Location: Left arm, Patient Position: Sitting, Cuff Size: Adult)   Pulse 79   Ht 179.1 cm (70.51\")   Wt 111 kg (244 lb 12.8 oz)   SpO2 98%   BMI 34.62 kg/m²   Wt Readings from Last 3 Encounters:   07/13/22 111 kg (244 lb 12.8 oz)   05/20/22 110 kg (243 lb)   05/17/22 114 kg (251 lb)         Vitals reviewed.   Constitutional:       Appearance: Well-developed.   Eyes:      General: No scleral icterus.  HENT:      Right Ear: Decreased hearing noted.      Left Ear: Decreased hearing noted.   Neck:      Vascular: Normal carotid pulses. No carotid bruit or JVD.   Pulmonary:      Effort: Pulmonary effort is normal.      Breath sounds: Normal breath sounds.   Chest:      Comments: Mild anterior chest wall tenderness  Cardiovascular:      Normal rate. " Regular rhythm.      No gallop.   Pulses:     Intact distal pulses.   Edema:     Peripheral edema absent.   Skin:     General: Skin is warm and dry.      Comments: Midline sternal incision well healed   Neurological:      Mental Status: Alert and oriented to person, place, and time.         Result Review  Data Reviewed:  The following data was reviewed by: LUIS Garcia on 07/13/2022  Lab Results - Last 18 Months   Lab Units 05/17/22  1026 05/11/22  1138 05/09/22  1228 02/16/22  1222 02/15/22  1133 01/14/22  1046 01/11/22  0821 01/10/22  0840 01/08/22  0421 01/07/22  0313 01/06/22  1813 01/06/22  1453 01/06/22  0805 01/05/22  1328 12/30/21  0956 07/28/21  1038 05/18/21  0824   BUN mg/dL  --  26*  --   --  28* 31* 48* 46*   < > 29*   < > 25*  --  24*   < >  --  24*   CREATININE mg/dL  --  1.55*  --   --  1.5* 1.2 1.41* 1.54*   < > 1.72*   < > 1.63*  --  1.32*   < >  --  1.3*   EGFR IF NONAFRICN AM   --   --   --   --  46* 59* 49* 45*   < > 39*   < > 42*  --  53*   < >  --  54*   EGFR IF AFRICN AM   --   --   --   --  56* >59  --   --   --   --   --   --   --   --   --   --  >59   SODIUM mmol/L  --  135*  --   --  138 137 136 134*   < > 138   < > 140  --  136   < >  --  137   SODIUM, ARTERIAL   --   --   --   --   --   --   --   --   --   --   --   --    < >  --   --   --   --    POTASSIUM mmol/L  --  4.6  --   --  4.9 4.9 5.1 4.8   < > 4.8   < > 4.8  --  4.8   < >  --  4.7   CHLORIDE mmol/L  --  100  --   --  101 99 99 99   < > 105   < > 108*  --  100   < >  --  103   CALCIUM mg/dL  --  8.8  --   --  9.5 9.3 8.1* 8.3*   < > 8.8   < > 9.5  --  9.0   < >  --  8.9   ALBUMIN g/dL  --  3.90  --   --   --  3.4*  --  3.60  --  3.60   < > 3.30*  --  4.50   < >  --  4.4   BILIRUBIN mg/dL  --  0.6  --   --   --  0.4  --  0.5  --   --   --   --   --  0.7   < >  --  0.6   ALK PHOS U/L  --  54  --   --   --  51  --  43  --   --   --   --   --  63   < >  --  58   AST (SGOT) U/L  --  17  --   --   --  22  --  19  --    --   --   --   --  22   < >  --  12   ALT (SGPT) U/L  --  13  --   --   --  15  --  7  --   --   --   --   --  16   < >  --  13   CHOLESTEROL mg/dL  --   --   --   --   --   --   --   --   --   --   --   --   --   --   --   --  90*   TRIGLYCERIDES mg/dL  --   --   --   --   --  148  --   --   --   --   --   --   --   --   --   --  125   HDL CHOL mg/dL  --   --   --   --   --  17*  --   --   --   --   --   --   --   --   --   --  22*   LDL CHOL mg/dL  --   --   --   --   --  43  --   --   --   --   --   --   --   --   --   --  43   HEMOGLOBIN A1C %  --   --   --   --   --   --   --   --   --   --   --   --   --  6.70*  --   --  6.3*   WBC 10*3/mm3 8.92 8.53 10.0   < >  --  15.0* 13.02* 11.13*   < > 20.57*   < > 12.94*  --  9.79   < >  --   --    RBC 10*6/mm3 3.60* 3.71* 4.16*   < >  --  3.14* 3.27* 3.03*   < > 3.41*   < > 3.49*  --  4.51   < >  --   --    HEMATOCRIT % 32.5* 32.7* 38.7*   < >  --  30.0* 29.8* 27.0*   < > 29.7*   < > 30.2*  --  40.0   < >  --   --    MCV fL 90.3 88.1 93.0   < >  --  95.5* 91.1 89.1   < > 87.1   < > 86.5  --  88.7   < >  --   --    MCH pg 28.3 28.6 28.8   < >  --  29.3 29.1 29.7   < > 29.3   < > 28.9  --  30.2   < >  --   --    TSH uIU/mL  --   --   --   --  0.485 5.260*  --   --   --   --   --   --   --   --   --   --   --    FREE T4 ng/dL  --   --   --   --   --  1.13  --   --   --   --   --   --   --   --   --   --   --    PSA ng/mL  --   --   --   --   --   --   --   --   --   --   --   --   --   --   --  <0.014  --    INR   --  1.01  --   --   --   --   --   --   --  1.15*  --  1.23*  --  1.21*   < >  --   --     < > = values in this interval not displayed.                  Assessment and Plan   Problem List Items Addressed This Visit        Cardiac and Vasculature    Coronary artery disease involving native heart - Primary (Chronic)    Overview     #1 99% ostial LAD stenosis and total occlusion of the distal LAD  #2 60% proximal RCA stenosis and 90% stenosis of the proximal PL  branch  #3 dilated ascending aorta  #4 LVEF 55%    Coronary artery bypass graft x 4 (left internal mammary artery/sequencing the dominant diagonal artery and left anterior descending artery, reverse saphenous vein graft/distal right coronary artery, and reverse saphenous vein graft/posterior descending artery), Right and left MAZE procedure with radiofrequency and cryoablation, Left atrial appendage exclusion (Atriclip 40 mm device), Left endoscopic vein harvest performed on 1/6/2022 by Dr. White.              Current Assessment & Plan     Remains stable/improved s/p CABG x 4 1/2022. Continue cardiac rehab and encouraged him to exercise when not going to rehab. Call if chest discomfort worsens or doesn't improve. No evidence of sternal malunion on exam. Consider changing glipizide to Jardiance or Farxiga given CV benefits - defer to PCP. Discussed lifelong aspirin.           Paroxysmal atrial fibrillation (HCC) (Chronic)    Overview     [id number] 3 = high risk  HAS-BLED 3 = high risk  s/p MAZE, left atrial appendage occlusion 1/2022           Current Assessment & Plan     Maintaining sinus rhythm. Reasonable to remain off anticoagulation since s/p MAZE and left atrial appendage occlusion 1/2022.           Bilateral carotid artery stenosis (Chronic)    Overview     Dr. León following  8/21/2019 US - less than 50% R ICA, 50-69% L ICA stenosis  8/2021 - less than 50% L&R ICA           Current Assessment & Plan     Remains stable. Again encouraged healthy diet, BP, lipid, and glucose control, and routine aerobic exercise. Continue present therapy. Recheck per Dr. León/Jeri Chambers NP.           Hypertension (Chronic)    Current Assessment & Plan     Well controlled per today's and reported home readings. Goal BP less than 130/80. Call if home BP readings uncontrolled - goal less than 130/80. Encouraged continued use of CPAP. Continue lisinopril and metoprolol.            Mixed hyperlipidemia (Chronic)     Current Assessment & Plan     Well controlled except very low HDL (17) per 1/2022 labs on atorvastatin. Consider changing to rosuvastatin if HDL remains less than 40. Encouraged weight loss, healthy diet, and continued gradual increase in activity.           Hx of CABG    Overview     1/2022           S/P Maze operation for atrial fibrillation    Overview     1/2022              Endocrine and Metabolic    Class 2 severe obesity due to excess calories with serious comorbidity and body mass index (BMI) of 36.0 to 36.9 in adult (HCC) (Chronic)    Current Assessment & Plan     Patient's (Body mass index is 34.62 kg/m².) indicates that they are obese (BMI >30) with health conditions that include obstructive sleep apnea, hypertension, coronary heart disease, diabetes mellitus, dyslipidemias, peripheral vascular disease and osteoarthritis . Weight is improving with lifestyle modifications. BMI is is above average; BMI management plan is completed. We discussed increasing exercise.               Sleep    Sleep apnea (Chronic)    Overview     Previously noncompliant with CPAP           Current Assessment & Plan     Encouraged compliance with CPAP. Encouraged continued weight loss but continue CPAP until has significant amount of weight loss and then re-evaluate need for CPAP.               Patient was given instructions and counseling regarding his condition or for health maintenance advice. Please see specific information pulled into the AVS if appropriate.    Follow Up :   Return in about 6 months (around 1/13/2023) for Recheck.             Letty Belcehr, APRN, ACNP-BC, CHFN-BC

## 2022-07-13 NOTE — ASSESSMENT & PLAN NOTE
Remains stable. Again encouraged healthy diet, BP, lipid, and glucose control, and routine aerobic exercise. Continue present therapy. Recheck per Dr. León/Jeri Chambers NP.

## 2022-07-13 NOTE — ASSESSMENT & PLAN NOTE
Well controlled per today's and reported home readings. Goal BP less than 130/80. Call if home BP readings uncontrolled - goal less than 130/80. Encouraged continued use of CPAP. Continue lisinopril and metoprolol.

## 2022-07-13 NOTE — ASSESSMENT & PLAN NOTE
Encouraged compliance with CPAP. Encouraged continued weight loss but continue CPAP until has significant amount of weight loss and then re-evaluate need for CPAP.

## 2022-07-13 NOTE — ASSESSMENT & PLAN NOTE
Maintaining sinus rhythm. Reasonable to remain off anticoagulation since s/p MAZE and left atrial appendage occlusion 1/2022.

## 2022-07-13 NOTE — ASSESSMENT & PLAN NOTE
Patient's (Body mass index is 34.62 kg/m².) indicates that they are obese (BMI >30) with health conditions that include obstructive sleep apnea, hypertension, coronary heart disease, diabetes mellitus, dyslipidemias, peripheral vascular disease and osteoarthritis . Weight is improving with lifestyle modifications. BMI is is above average; BMI management plan is completed. We discussed increasing exercise.

## 2022-07-13 NOTE — ASSESSMENT & PLAN NOTE
Well controlled except very low HDL (17) per 1/2022 labs on atorvastatin. Consider changing to rosuvastatin if HDL remains less than 40. Encouraged weight loss, healthy diet, and continued gradual increase in activity.

## 2022-07-14 ENCOUNTER — TREATMENT (OUTPATIENT)
Dept: CARDIAC REHAB | Facility: HOSPITAL | Age: 72
End: 2022-07-14

## 2022-07-14 DIAGNOSIS — Z95.1 S/P CABG (CORONARY ARTERY BYPASS GRAFT): Primary | ICD-10-CM

## 2022-07-14 PROCEDURE — 93798 PHYS/QHP OP CAR RHAB W/ECG: CPT

## 2022-07-16 RX ORDER — HYDROCODONE BITARTRATE AND ACETAMINOPHEN 7.5; 325 MG/1; MG/1
1 TABLET ORAL EVERY 8 HOURS PRN
Qty: 90 TABLET | Refills: 0 | Status: SHIPPED | OUTPATIENT
Start: 2022-07-16 | End: 2022-08-10 | Stop reason: SDUPTHER

## 2022-07-19 ENCOUNTER — TREATMENT (OUTPATIENT)
Dept: CARDIAC REHAB | Facility: HOSPITAL | Age: 72
End: 2022-07-19

## 2022-07-19 DIAGNOSIS — Z95.1 S/P CABG (CORONARY ARTERY BYPASS GRAFT): Primary | ICD-10-CM

## 2022-07-19 PROCEDURE — 93798 PHYS/QHP OP CAR RHAB W/ECG: CPT

## 2022-07-20 ENCOUNTER — OFFICE VISIT (OUTPATIENT)
Dept: CARDIAC SURGERY | Facility: CLINIC | Age: 72
End: 2022-07-20

## 2022-07-20 ENCOUNTER — HOSPITAL ENCOUNTER (OUTPATIENT)
Dept: CT IMAGING | Facility: HOSPITAL | Age: 72
Discharge: HOME | End: 2022-07-20
Admitting: NURSE PRACTITIONER

## 2022-07-20 VITALS
HEIGHT: 71 IN | BODY MASS INDEX: 34.66 KG/M2 | SYSTOLIC BLOOD PRESSURE: 121 MMHG | WEIGHT: 247.6 LBS | DIASTOLIC BLOOD PRESSURE: 72 MMHG | HEART RATE: 79 BPM | OXYGEN SATURATION: 94 %

## 2022-07-20 DIAGNOSIS — Z86.79 S/P MAZE OPERATION FOR ATRIAL FIBRILLATION: ICD-10-CM

## 2022-07-20 DIAGNOSIS — I71.20 THORACIC AORTIC ANEURYSM WITHOUT RUPTURE: ICD-10-CM

## 2022-07-20 DIAGNOSIS — I71.20 THORACIC AORTIC ANEURYSM WITHOUT RUPTURE: Primary | ICD-10-CM

## 2022-07-20 DIAGNOSIS — Z98.890 S/P MAZE OPERATION FOR ATRIAL FIBRILLATION: ICD-10-CM

## 2022-07-20 DIAGNOSIS — Z95.1 HX OF CABG: ICD-10-CM

## 2022-07-20 LAB — CREAT BLDA-MCNC: 1.4 MG/DL (ref 0.6–1.3)

## 2022-07-20 PROCEDURE — 82565 ASSAY OF CREATININE: CPT

## 2022-07-20 PROCEDURE — 0 IOPAMIDOL PER 1 ML: Performed by: NURSE PRACTITIONER

## 2022-07-20 PROCEDURE — 99213 OFFICE O/P EST LOW 20 MIN: CPT | Performed by: THORACIC SURGERY (CARDIOTHORACIC VASCULAR SURGERY)

## 2022-07-20 PROCEDURE — 71275 CT ANGIOGRAPHY CHEST: CPT

## 2022-07-20 RX ADMIN — IOPAMIDOL 100 ML: 755 INJECTION, SOLUTION INTRAVENOUS at 08:14

## 2022-07-21 ENCOUNTER — TREATMENT (OUTPATIENT)
Dept: CARDIAC REHAB | Facility: HOSPITAL | Age: 72
End: 2022-07-21

## 2022-07-21 ENCOUNTER — TELEPHONE (OUTPATIENT)
Dept: CARDIAC SURGERY | Facility: CLINIC | Age: 72
End: 2022-07-21

## 2022-07-21 DIAGNOSIS — Z95.1 S/P CABG (CORONARY ARTERY BYPASS GRAFT): Primary | ICD-10-CM

## 2022-07-21 PROCEDURE — 93798 PHYS/QHP OP CAR RHAB W/ECG: CPT

## 2022-07-21 NOTE — TELEPHONE ENCOUNTER
Patient was seen in the office by Dr. White with CT scan of the chest for known thoracic aneurysm.  Incidental finding of incompletely visualized and evaluated low-density mass of the right kidney.  Dr. White recommends patient follow-up with primary care provider regarding this finding.  Attempted to call patient to discuss but no answer.  Left voicemail asking him to return our call.  Please fax CT scan report to his PCP so they may review and follow.

## 2022-07-26 ENCOUNTER — TREATMENT (OUTPATIENT)
Dept: CARDIAC REHAB | Facility: HOSPITAL | Age: 72
End: 2022-07-26

## 2022-07-26 DIAGNOSIS — Z95.1 S/P CABG (CORONARY ARTERY BYPASS GRAFT): Primary | ICD-10-CM

## 2022-07-26 PROCEDURE — 93798 PHYS/QHP OP CAR RHAB W/ECG: CPT

## 2022-07-28 ENCOUNTER — TREATMENT (OUTPATIENT)
Dept: CARDIAC REHAB | Facility: HOSPITAL | Age: 72
End: 2022-07-28

## 2022-07-28 DIAGNOSIS — Z95.1 S/P CABG (CORONARY ARTERY BYPASS GRAFT): Primary | ICD-10-CM

## 2022-07-28 PROCEDURE — 93798 PHYS/QHP OP CAR RHAB W/ECG: CPT

## 2022-08-01 DIAGNOSIS — E11.22 TYPE 2 DIABETES MELLITUS WITH STAGE 3A CHRONIC KIDNEY DISEASE, WITHOUT LONG-TERM CURRENT USE OF INSULIN (HCC): ICD-10-CM

## 2022-08-01 DIAGNOSIS — K92.2 GASTROINTESTINAL HEMORRHAGE, UNSPECIFIED GASTROINTESTINAL HEMORRHAGE TYPE: ICD-10-CM

## 2022-08-01 DIAGNOSIS — N18.31 TYPE 2 DIABETES MELLITUS WITH STAGE 3A CHRONIC KIDNEY DISEASE, WITHOUT LONG-TERM CURRENT USE OF INSULIN (HCC): ICD-10-CM

## 2022-08-01 DIAGNOSIS — I10 ESSENTIAL HYPERTENSION: ICD-10-CM

## 2022-08-01 LAB
ALBUMIN SERPL-MCNC: 4.2 G/DL (ref 3.5–5.2)
ALP BLD-CCNC: 63 U/L (ref 40–130)
ALT SERPL-CCNC: 16 U/L (ref 5–41)
ANION GAP SERPL CALCULATED.3IONS-SCNC: 9 MMOL/L (ref 7–19)
AST SERPL-CCNC: 17 U/L (ref 5–40)
BASOPHILS ABSOLUTE: 0 K/UL (ref 0–0.2)
BASOPHILS RELATIVE PERCENT: 0.4 % (ref 0–1)
BILIRUB SERPL-MCNC: 0.3 MG/DL (ref 0.2–1.2)
BUN BLDV-MCNC: 24 MG/DL (ref 8–23)
CALCIUM SERPL-MCNC: 9.1 MG/DL (ref 8.8–10.2)
CHLORIDE BLD-SCNC: 102 MMOL/L (ref 98–111)
CO2: 26 MMOL/L (ref 22–29)
CREAT SERPL-MCNC: 1.6 MG/DL (ref 0.5–1.2)
EOSINOPHILS ABSOLUTE: 0.2 K/UL (ref 0–0.6)
EOSINOPHILS RELATIVE PERCENT: 2.4 % (ref 0–5)
GFR AFRICAN AMERICAN: 52
GFR NON-AFRICAN AMERICAN: 43
GLUCOSE BLD-MCNC: 134 MG/DL (ref 74–109)
HBA1C MFR BLD: 7 % (ref 4–6)
HCT VFR BLD CALC: 38.3 % (ref 42–52)
HEMOGLOBIN: 12.5 G/DL (ref 14–18)
IMMATURE GRANULOCYTES #: 0 K/UL
LYMPHOCYTES ABSOLUTE: 0.8 K/UL (ref 1.1–4.5)
LYMPHOCYTES RELATIVE PERCENT: 9.6 % (ref 20–40)
MCH RBC QN AUTO: 29.1 PG (ref 27–31)
MCHC RBC AUTO-ENTMCNC: 32.6 G/DL (ref 33–37)
MCV RBC AUTO: 89.3 FL (ref 80–94)
MONOCYTES ABSOLUTE: 0.6 K/UL (ref 0–0.9)
MONOCYTES RELATIVE PERCENT: 6.8 % (ref 0–10)
NEUTROPHILS ABSOLUTE: 6.5 K/UL (ref 1.5–7.5)
NEUTROPHILS RELATIVE PERCENT: 80.4 % (ref 50–65)
PDW BLD-RTO: 13.9 % (ref 11.5–14.5)
PLATELET # BLD: 237 K/UL (ref 130–400)
PMV BLD AUTO: 10.3 FL (ref 9.4–12.4)
POTASSIUM SERPL-SCNC: 4.4 MMOL/L (ref 3.5–5)
RBC # BLD: 4.29 M/UL (ref 4.7–6.1)
SODIUM BLD-SCNC: 137 MMOL/L (ref 136–145)
TOTAL PROTEIN: 7 G/DL (ref 6.6–8.7)
WBC # BLD: 8.1 K/UL (ref 4.8–10.8)

## 2022-08-02 ENCOUNTER — TELEPHONE (OUTPATIENT)
Dept: PRIMARY CARE CLINIC | Age: 72
End: 2022-08-02

## 2022-08-02 ENCOUNTER — TREATMENT (OUTPATIENT)
Dept: CARDIAC REHAB | Facility: HOSPITAL | Age: 72
End: 2022-08-02

## 2022-08-02 DIAGNOSIS — N18.31 TYPE 2 DIABETES MELLITUS WITH STAGE 3A CHRONIC KIDNEY DISEASE, WITHOUT LONG-TERM CURRENT USE OF INSULIN (HCC): ICD-10-CM

## 2022-08-02 DIAGNOSIS — I25.10 CORONARY ARTERY DISEASE INVOLVING NATIVE HEART WITHOUT ANGINA PECTORIS, UNSPECIFIED VESSEL OR LESION TYPE: Primary | ICD-10-CM

## 2022-08-02 DIAGNOSIS — E11.22 TYPE 2 DIABETES MELLITUS WITH STAGE 3A CHRONIC KIDNEY DISEASE, WITHOUT LONG-TERM CURRENT USE OF INSULIN (HCC): ICD-10-CM

## 2022-08-02 DIAGNOSIS — Z95.1 S/P CABG (CORONARY ARTERY BYPASS GRAFT): Primary | ICD-10-CM

## 2022-08-02 LAB
CHOLESTEROL, FASTING: 98 MG/DL (ref 160–199)
HDLC SERPL-MCNC: 19 MG/DL (ref 55–121)
LDL CHOLESTEROL CALCULATED: 31 MG/DL
TRIGLYCERIDE, FASTING: 238 MG/DL (ref 0–149)

## 2022-08-02 PROCEDURE — 93798 PHYS/QHP OP CAR RHAB W/ECG: CPT

## 2022-08-03 ENCOUNTER — TELEPHONE (OUTPATIENT)
Dept: PRIMARY CARE CLINIC | Age: 72
End: 2022-08-03

## 2022-08-03 ENCOUNTER — OFFICE VISIT (OUTPATIENT)
Dept: PRIMARY CARE CLINIC | Age: 72
End: 2022-08-03
Payer: MEDICARE

## 2022-08-03 VITALS
DIASTOLIC BLOOD PRESSURE: 88 MMHG | SYSTOLIC BLOOD PRESSURE: 138 MMHG | HEART RATE: 81 BPM | BODY MASS INDEX: 34.44 KG/M2 | WEIGHT: 246 LBS | OXYGEN SATURATION: 93 % | TEMPERATURE: 97.2 F | HEIGHT: 71 IN

## 2022-08-03 DIAGNOSIS — I10 ESSENTIAL HYPERTENSION: ICD-10-CM

## 2022-08-03 DIAGNOSIS — G47.33 OSA ON CPAP: ICD-10-CM

## 2022-08-03 DIAGNOSIS — E03.9 ACQUIRED HYPOTHYROIDISM: ICD-10-CM

## 2022-08-03 DIAGNOSIS — M15.9 PRIMARY OSTEOARTHRITIS INVOLVING MULTIPLE JOINTS: ICD-10-CM

## 2022-08-03 DIAGNOSIS — I71.20 THORACIC AORTIC ANEURYSM WITHOUT RUPTURE: ICD-10-CM

## 2022-08-03 DIAGNOSIS — Z00.00 MEDICARE ANNUAL WELLNESS VISIT, SUBSEQUENT: Primary | ICD-10-CM

## 2022-08-03 DIAGNOSIS — I25.10 CORONARY ARTERY DISEASE INVOLVING NATIVE HEART WITHOUT ANGINA PECTORIS, UNSPECIFIED VESSEL OR LESION TYPE: ICD-10-CM

## 2022-08-03 DIAGNOSIS — I73.9 PAD (PERIPHERAL ARTERY DISEASE) (HCC): ICD-10-CM

## 2022-08-03 DIAGNOSIS — I65.23 BILATERAL CAROTID ARTERY STENOSIS: ICD-10-CM

## 2022-08-03 DIAGNOSIS — N28.89 RIGHT KIDNEY MASS: ICD-10-CM

## 2022-08-03 DIAGNOSIS — C61 PROSTATE CANCER (HCC): ICD-10-CM

## 2022-08-03 DIAGNOSIS — E11.22 TYPE 2 DIABETES MELLITUS WITH STAGE 3A CHRONIC KIDNEY DISEASE, WITHOUT LONG-TERM CURRENT USE OF INSULIN (HCC): ICD-10-CM

## 2022-08-03 DIAGNOSIS — N18.31 TYPE 2 DIABETES MELLITUS WITH STAGE 3A CHRONIC KIDNEY DISEASE, WITHOUT LONG-TERM CURRENT USE OF INSULIN (HCC): ICD-10-CM

## 2022-08-03 DIAGNOSIS — I48.0 PAROXYSMAL ATRIAL FIBRILLATION (HCC): ICD-10-CM

## 2022-08-03 DIAGNOSIS — Z99.89 OSA ON CPAP: ICD-10-CM

## 2022-08-03 DIAGNOSIS — E78.2 MIXED HYPERLIPIDEMIA: ICD-10-CM

## 2022-08-03 PROCEDURE — 2022F DILAT RTA XM EVC RTNOPTHY: CPT | Performed by: NURSE PRACTITIONER

## 2022-08-03 PROCEDURE — G8427 DOCREV CUR MEDS BY ELIG CLIN: HCPCS | Performed by: NURSE PRACTITIONER

## 2022-08-03 PROCEDURE — 3017F COLORECTAL CA SCREEN DOC REV: CPT | Performed by: NURSE PRACTITIONER

## 2022-08-03 PROCEDURE — G8417 CALC BMI ABV UP PARAM F/U: HCPCS | Performed by: NURSE PRACTITIONER

## 2022-08-03 PROCEDURE — 3051F HG A1C>EQUAL 7.0%<8.0%: CPT | Performed by: NURSE PRACTITIONER

## 2022-08-03 PROCEDURE — 1123F ACP DISCUSS/DSCN MKR DOCD: CPT | Performed by: NURSE PRACTITIONER

## 2022-08-03 PROCEDURE — 99213 OFFICE O/P EST LOW 20 MIN: CPT | Performed by: NURSE PRACTITIONER

## 2022-08-03 PROCEDURE — 1036F TOBACCO NON-USER: CPT | Performed by: NURSE PRACTITIONER

## 2022-08-03 PROCEDURE — G0439 PPPS, SUBSEQ VISIT: HCPCS | Performed by: NURSE PRACTITIONER

## 2022-08-03 RX ORDER — LEVOTHYROXINE SODIUM 0.2 MG/1
200 TABLET ORAL DAILY
Qty: 90 TABLET | Refills: 3 | Status: SHIPPED | OUTPATIENT
Start: 2022-08-03

## 2022-08-03 RX ORDER — LEVOTHYROXINE SODIUM 0.03 MG/1
25 TABLET ORAL DAILY
Qty: 90 TABLET | Refills: 3 | Status: SHIPPED | OUTPATIENT
Start: 2022-08-03

## 2022-08-03 SDOH — ECONOMIC STABILITY: FOOD INSECURITY: WITHIN THE PAST 12 MONTHS, THE FOOD YOU BOUGHT JUST DIDN'T LAST AND YOU DIDN'T HAVE MONEY TO GET MORE.: NEVER TRUE

## 2022-08-03 SDOH — ECONOMIC STABILITY: FOOD INSECURITY: WITHIN THE PAST 12 MONTHS, YOU WORRIED THAT YOUR FOOD WOULD RUN OUT BEFORE YOU GOT MONEY TO BUY MORE.: NEVER TRUE

## 2022-08-03 ASSESSMENT — PATIENT HEALTH QUESTIONNAIRE - PHQ9
SUM OF ALL RESPONSES TO PHQ QUESTIONS 1-9: 4
SUM OF ALL RESPONSES TO PHQ9 QUESTIONS 1 & 2: 0
1. LITTLE INTEREST OR PLEASURE IN DOING THINGS: 0
7. TROUBLE CONCENTRATING ON THINGS, SUCH AS READING THE NEWSPAPER OR WATCHING TELEVISION: 1
4. FEELING TIRED OR HAVING LITTLE ENERGY: 1
9. THOUGHTS THAT YOU WOULD BE BETTER OFF DEAD, OR OF HURTING YOURSELF: 0
10. IF YOU CHECKED OFF ANY PROBLEMS, HOW DIFFICULT HAVE THESE PROBLEMS MADE IT FOR YOU TO DO YOUR WORK, TAKE CARE OF THINGS AT HOME, OR GET ALONG WITH OTHER PEOPLE: 1
8. MOVING OR SPEAKING SO SLOWLY THAT OTHER PEOPLE COULD HAVE NOTICED. OR THE OPPOSITE, BEING SO FIGETY OR RESTLESS THAT YOU HAVE BEEN MOVING AROUND A LOT MORE THAN USUAL: 0
5. POOR APPETITE OR OVEREATING: 1
3. TROUBLE FALLING OR STAYING ASLEEP: 1
SUM OF ALL RESPONSES TO PHQ QUESTIONS 1-9: 4
SUM OF ALL RESPONSES TO PHQ QUESTIONS 1-9: 4
6. FEELING BAD ABOUT YOURSELF - OR THAT YOU ARE A FAILURE OR HAVE LET YOURSELF OR YOUR FAMILY DOWN: 0
2. FEELING DOWN, DEPRESSED OR HOPELESS: 0
SUM OF ALL RESPONSES TO PHQ QUESTIONS 1-9: 4

## 2022-08-03 ASSESSMENT — LIFESTYLE VARIABLES: HOW OFTEN DO YOU HAVE A DRINK CONTAINING ALCOHOL: NEVER

## 2022-08-03 ASSESSMENT — SOCIAL DETERMINANTS OF HEALTH (SDOH): HOW HARD IS IT FOR YOU TO PAY FOR THE VERY BASICS LIKE FOOD, HOUSING, MEDICAL CARE, AND HEATING?: NOT HARD AT ALL

## 2022-08-03 NOTE — PROGRESS NOTES
Medicare Annual Wellness Visit    Rosalind Ornelas is here for Medicare AWV (Patient is not fasting today. )    Assessment & Plan   Medicare annual wellness visit, subsequent  Type 2 diabetes mellitus with stage 3a chronic kidney disease, without long-term current use of insulin ([facility] [address]  Coronary artery disease involving native heart without angina pectoris, unspecified vessel or lesion type  Essential hypertension  SUSY on CPAP  Mixed hyperlipidemia  Paroxysmal atrial fibrillation (HCC)  Acquired hypothyroidism  -     levothyroxine (SYNTHROID) 25 MCG tablet; Take 1 tablet by mouth in the morning. Total of 225mcg., Disp-90 tablet, R-3Normal  -     levothyroxine (SYNTHROID) 200 MCG tablet; Take 1 tablet by mouth in the morning. Total of 225mcg., Disp-90 tablet, R-3Normal  Bilateral carotid artery stenosis  PAD (peripheral artery disease) (HCC)  Primary osteoarthritis involving multiple joints  Prostate cancer ([facility] [address]  Thoracic aortic aneurysm without rupture (HCC)  Right kidney mass  -     US RENAL COMPLETE; Future    Recommendations for Preventive Services Due: see orders and patient instructions/AVS.  Recommended screening schedule for the next 5-10 years is provided to the patient in written form: see Patient Instructions/AVS.     Return in about 6 months (around 2/3/2023) for diabetic follow up. Subjective   The following acute and/or chronic problems were also addressed today:    Diabetes  BS running good when checked. Running <140  Lab Results   Component Value Date    LABA1C 7.0 (H) 08/01/2022     HLP/CAD  Hx CABG 01/06/2022  No chest pain or problems. Still have some soreness from the surgery.    Followed by Angélica Avalos with [facility] cardiology  Lab Results   Component Value Date    CHOL 90 (L) 05/18/2021    CHOL 91 (L) 11/09/2020    CHOL 91 (L) 01/22/2020     Lab Results   Component Value Date    TRIG 125 05/18/2021    TRIG 124 11/09/2020    TRIG 153 (H) 01/22/2020     Lab Results   Component Value Date HDL 19 (L) 08/01/2022    HDL 17 (L) 01/14/2022    HDL 22 (L) 05/18/2021     Lab Results   Component Value Date    LDLCALC 31 08/01/2022    [address] 43 01/14/2022    LDLCALC 43 05/18/2021     PAD/Carotid stenosis  Followed by Dr Daija Lee   Denies claudication symptoms    RICHARD 08/17/2021  Impression    Impression:   1. No significant arterial insufficiency of the right lower extremity at   rest.   2. No significant arterial insufficiency of the left lower extremity at   rest.     Carotid US 08/17/2021  Impression    Impression:   1. There is less than 50% stenosis of the right internal carotid artery. 2. There is less than 50% stenosis of the left internal carotid artery. 3. Antegrade flow is demonstrated in bilateral vertebral arteries. SUSY/CPAP   Uses every night  Benefits from therapy. Colon polyps  Had colonoscopy with Dr Bethany Farias 05/20/2022    Hx Prostate cancer  No concerns except problems with erections. He is thinking about getting an implant. Followed by urology Dr Kina Singh per patient report. Can not take NSAIDS due to CAD and CKD  Will take occasion norco, followed by pain mgmt. Hypothyroid  On synthroid  No concerns  Lab Results   Component Value Date    TSH 0.485 02/15/2022     Moods  On celexa   No concerns, stable per patient report. Insomnia  Taking melatonin and helping. Aortic aneurysm  Had eval done by Dr Hollie Peterson    CTA chest  Impression     1. No evidence of pulmonary embolism. 2. 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 No dissection. 3. The mediastinal or hilar lymphadenopathy. Etiology and clinical   significance is not certain. 4. Stable tiny nodules in the right middle lobe. 5. Splenomegaly. 6. And atrophic small left kidney. Nonobstructing left renal calculi. Incompletely visualized and evaluated low-density mass in the right   kidney.     Patient's complete Health Risk Assessment and screening values have been reviewed and are found in [facility]. The following problems were reviewed today and where indicated follow up appointments were made and/or referrals ordered. Positive Risk Factor Screenings with Interventions:             Opioid Risk: (Low risk score <55) Opioid risk score: 20    Patient is low risk for opioid use disorder or overdose. Last PDMP Marylee Liter as Reviewed:  Review User Review Instant Review Result   Micky Lott 1/18/2022 12:03 PM Reviewed PDMP [1]     Last Controlled Substance Monitoring Documentation      [address] Office Visit from 1/18/2022 in [address] [place]   Periodic Controlled Substance Monitoring Possible medication side effects, risk of tolerance/dependence & alternative treatments discussed., No signs of potential drug abuse or diversion identified.  filed at 01/18/2022 [address] and ACP:  General  In general, how would you say your health is?: Good  In the past 7 days, have you experienced any of the following: New or Increased Pain, New or Increased Fatigue, Loneliness, Social Isolation, Stress or Anger?: No  Do you get the social and emotional support that you need?: Yes  Do you have a Living Will?: (!) No    Advance Directives       Power of [address] [place] [address] Will ACP-Advance Directive ACP-Power of     Not on File Not on File Not on File Not on File        General Health Risk Interventions:  No Living Will: Advance Care Planning addressed with patient today    Health Habits/Nutrition:  Physical Activity: Insufficiently Active    Days of Exercise per Week: 2 days    Minutes of Exercise per Session: 40 min     Have you lost any weight without trying in the past 3 months?: (!) Yes  Body mass index: (!) 34.31  Have you seen the dentist within the past year?: (!) No  Health Habits/Nutrition Interventions:  Weight loss most likely from recent CABG surgery and cardiac rehab twice a week    Hearing/Vision:  Do you or your family notice any trouble with your hearing that hasn't been managed with hearing aids?: No (has aids)  Do you have difficulty driving, watching TV, or doing any of your daily activities because of your eyesight?: No  Have you had an eye exam within the past year?: (!) No  No results found. Hearing/Vision Interventions:  Vision concerns:  patient encouraged to make appointment with his/her eye specialist  Has bilateral hearing aides    Safety:  Do you have working smoke detectors?: Yes  Do you have any tripping hazards - loose or unsecured carpets or rugs?: No  Do you have any tripping hazards - clutter in doorways, halls, or stairs?: No  Do you have either shower bars, grab bars, non-slip mats or non-slip surfaces in your shower or bathtub?: (!) No  Do all of your stairways have a railing or banister?: (!) No  Safety Interventions:  Home safety tips provided           Objective   Vitals:    08/03/22 0826   BP: 138/88   Pulse: 81   Temp: 97.2 °F (36.2 °C)   TempSrc: Temporal   SpO2: 93%   Weight: 246 lb (111.6 kg)   Height: 5' 11\" (1.803 m)      Body mass index is 34.31 kg/m². General Appearance: alert and oriented to person, place and time, well-developed and well-nourished, in no acute distress  Skin: warm and dry, no rash or erythema  Head: normocephalic and atraumatic  Eyes: pupils equal, round, and reactive to light, extraocular eye movements intact, conjunctivae normal  ENT: tympanic membrane, external ear and ear canal normal bilaterally, oropharynx clear and moist with normal mucous membranes  Neck: neck supple and non tender without mass, no thyromegaly or thyroid nodules, no cervical lymphadenopathy   Pulmonary/Chest: clear to auscultation bilaterally- no wheezes, rales or rhonchi, normal air movement, no respiratory distress  Cardiovascular: normal rate, normal S1 and S2, no gallops, intact distal pulses, and no carotid bruits  Abdomen: soft, non-tender, non-distended, normal bowel sounds, no masses or organomegaly.  Diastasis recti vs hernia noted.   Extremities: no cyanosis and no clubbing  Musculoskeletal: normal range of motion, no joint swelling, deformity or tenderness  Neurologic: gait and coordination normal and speech normal       No Known Allergies  Prior to Visit Medications    Medication Sig Taking? Authorizing Provider   levothyroxine (SYNTHROID) 25 MCG tablet Take 1 tablet by mouth in the morning. Total of 225mcg. Yes MARY Arndt   levothyroxine (SYNTHROID) 200 MCG tablet Take 1 tablet by mouth in the morning. Total of 225mcg. Yes MARY Arndt   HYDROcodone-acetaminophen (NORCO) 7.5-325 MG per tablet Take 1 tablet by mouth every 8 hours as needed for Pain for up to 30 days. Yes MARY Romero   gabapentin (NEURONTIN) 400 MG capsule Take 1 capsule by mouth 3 times daily for 90 days.  May fill 3/86/1346 Yes MARY Morris   atorvastatin (LIPITOR) 20 MG tablet Take 1 tablet by mouth at bedtime Yes MARY Villa   amLODIPine (NORVASC) 2.5 MG tablet  Yes Historical Provider, MD   aspirin 81 MG EC tablet Take 81 mg by mouth daily Yes Historical Provider, MD   metoprolol tartrate (LOPRESSOR) 25 MG tablet TAKE 1 TABLET BY MOUTH EVERY 12 HOURS Yes Historical Provider, MD   glipiZIDE (GLUCOTROL) 5 MG tablet Take 1 tablet by mouth daily Yes MARY Villa   lisinopril (PRINIVIL;ZESTRIL) 5 MG tablet Take 5 mg by mouth daily Yes Historical Provider, MD   allopurinol (ZYLOPRIM) 100 MG tablet Take 1 tablet by mouth daily Yes MARY Tomlinson   citalopram (CELEXA) 20 MG tablet Take 1 tablet by mouth daily Yes MARY Villa   Cholecalciferol (VITAMIN D3) 2000 units TABS Take 1 tablet by mouth daily Yes MARY Villa   nitroGLYCERIN (NITROSTAT) 0.4 MG SL tablet Place 0.4 mg under the tongue  Patient not taking: Reported on 8/3/2022  Historical Provider, MD   COLCRYS 0.6 MG tablet TAKE 2 TABLETS THEN AN HOUR LATER TAKE 1 TABLET WHEN GOUT FLARES  Patient not taking: Reported on 8/3/2022  MARY [place]       CareTeam (Including outside providers/suppliers regularly involved in providing care):   Patient Care Team:  MARY [place] as PCP - General (Family Nurse Practitioner)  MRAY [place] as PCP - [facility]  MARY Brady - CNP as Nurse Practitioner (Cardiology)  Darrol Baumgarten, MD as Consulting Physician (Urology)  Nikole Batista DO (Vascular Surgery)     Reviewed and updated this visit:  Allergies  Meds  Med Hx  Surg Hx  Soc Hx  Fam Hx

## 2022-08-03 NOTE — TELEPHONE ENCOUNTER
CT Angiogram Chest    Anatomical Region Laterality Modality   Chest -- Computed Tomography   Vascular -- --       Impression    1. No evidence of pulmonary embolism. 2. 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 No dissection. 3. The mediastinal or hilar lymphadenopathy. Etiology and clinical   significance is not certain. 4. Stable tiny nodules in the right middle lobe. 5. Splenomegaly. 6. And atrophic small left kidney. Nonobstructing left renal calculi. Incompletely visualized and evaluated low-density mass in the right   kidney.

## 2022-08-03 NOTE — PATIENT INSTRUCTIONS
Personalized Preventive Plan for [place] - 8/3/2022  Medicare offers a range of preventive health benefits. Some of the tests and screenings are paid in full while other may be subject to a deductible, co-insurance, and/or copay. Some of these benefits include a comprehensive review of your medical history including lifestyle, illnesses that may run in your family, and various assessments and screenings as appropriate. After reviewing your medical record and screening and assessments performed today your provider may have ordered immunizations, labs, imaging, and/or referrals for you. A list of these orders (if applicable) as well as your Preventive Care list are included within your After Visit Summary for your review. Other Preventive Recommendations:    A preventive eye exam performed by an eye specialist is recommended every 1-2 years to screen for glaucoma; cataracts, macular degeneration, and other eye disorders. A preventive dental visit is recommended every 6 months. Try to get at least 150 minutes of exercise per week or 10,000 steps per day on a pedometer . Order or download the FREE \"Exercise & Physical Activity: Your Everyday Guide\" from [employer]. Call 1-906.273.9566 or search [employer] online. You need 4125-0113 mg of calcium and 0457-7049 IU of vitamin D per day. It is possible to meet your calcium requirement with diet alone, but a vitamin D supplement is usually necessary to meet this goal.  When exposed to the sun, use a sunscreen that protects against both UVA and UVB radiation with an SPF of 30 or greater. Reapply every 2 to 3 hours or after sweating, drying off with a towel, or swimming. Always wear a seat belt when traveling in a car. Always wear a helmet when riding a bicycle or motorcycle. Heart-Healthy Diet   Sodium, Fat, and Cholesterol Controlled Diet       What Is a Heart Healthy Diet?    A heart-healthy diet is one that limits sodium , certain types of fat , and cholesterol . This type of diet is recommended for:   People with any form of cardiovascular disease (eg, coronary heart disease , peripheral vascular disease , previous heart attack , previous stroke )   People with risk factors for cardiovascular disease, such as high blood pressure , high cholesterol , or diabetes   Anyone who wants to lower their risk of developing cardiovascular disease   Sodium    Sodium is a mineral found in many foods. In general, most people consume much more sodium than they need. Diets high in sodium can increase blood pressure and lead to edema (water retention). On a heart-healthy diet, you should consume no more than 2,300 mg (milligrams) of sodium per dayabout the amount in one teaspoon of table salt. The foods highest in sodium include table salt (about 50% sodium), processed foods, convenience foods, and preserved foods. Cholesterol    Cholesterol is a fat-like, waxy substance in your blood. Our bodies make some cholesterol. It is also found in animal products, with the highest amounts in fatty meat, egg yolks, whole milk, cheese, shellfish, and organ meats. On a heart-healthy diet, you should limit your cholesterol intake to less than 200 mg per day. It is normal and important to have some cholesterol in your bloodstream. But too much cholesterol can cause plaque to build up within your arteries, which can eventually lead to a heart attack or stroke. The two types of cholesterol that are most commonly referred to are:   Low-density lipoprotein (LDL) cholesterol  Also known as bad cholesterol, this is the cholesterol that tends to build up along your arteries. Bad cholesterol levels are increased by eating fats that are saturated or hydrogenated. Optimal level of this cholesterol is less than 100. Over 130 starts to get risky for heart disease.    High-density lipoprotein (HDL) cholesterol  Also known as good cholesterol, this type of cholesterol actually carries cholesterol away from your arteries and may, therefore, help lower your risk of having a heart attack. You want this level to be high (ideally greater than 60). It is a risk to have a level less than 40. You can raise this good cholesterol by eating olive oil, canola oil, avocados, or nuts. Exercise raises this level, too. Fat    Fat is calorie dense and packs a lot of calories into a small amount of food. Even though fats should be limited due to their high calorie content, not all fats are bad. In fact, some fats are quite healthful. Fat can be broken down into four main types. The good-for-you fats are:   Monounsaturated fat  found in oils such as olive and canola, avocados, and nuts and natural nut butters; can decrease cholesterol levels, while keeping levels of HDL cholesterol high   Polyunsaturated fat  found in oils such as safflower, sunflower, soybean, corn, and sesame; can decrease total cholesterol and LDL cholesterol   Omega-3 fatty acids  particularly those found in fatty fish (such as salmon, trout, tuna, mackerel, herring, and sardines); can decrease risk of arrhythmias, decrease triglyceride levels, and slightly lower blood pressure   The fats that you want to limit are:   Saturated fat  found in animal products, many fast foods, and a few vegetables; increases total blood cholesterol, including LDL levels   Animal fats that are saturated include: butter, lard, whole-milk dairy products, meat fat, and poultry skin   Vegetable fats that are saturated include: hydrogenated shortening, palm oil, coconut oil, cocoa butter   Hydrogenated or trans fat  found in margarine and vegetable shortening, most shelf stable snack foods, and fried foods; increases LDL and decreases HDL     It is generally recommended that you limit your total fat for the day to less than 30% of your total calories.  If you follow an 1800-calorie heart healthy diet, for example, this would mean 60 grams of fat or less per day. Saturated fat and trans fat in your diet raises your blood cholesterol the most, much more than dietary cholesterol does. For this reason, on a heart-healthy diet, less than 7% of your calories should come from saturated fat and ideally 0% from trans fat. On an 1800-calorie diet, this translates into less than 14 grams of saturated fat per day, leaving 46 grams of fat to come from mono- and polyunsaturated fats.    Food Choices on a Heart Healthy Diet   Food Category   Foods Recommended   Foods to Avoid   Grains   Breads and rolls without salted tops Most dry and cooked cereals Unsalted crackers and breadsticks Low-sodium or homemade breadcrumbs or stuffing All rice and pastas   Breads, rolls, and crackers with salted tops High-fat baked goods (eg, muffins, donuts, pastries) Quick breads, self-rising flour, and biscuit mixes Regular bread crumbs Instant hot cereals Commercially prepared rice, pasta, or stuffing mixes   Vegetables   Most fresh, frozen, and low-sodium canned vegetables Low-sodium and salt-free vegetable juices Canned vegetables if unsalted or rinsed   Regular canned vegetables and juices, including sauerkraut and pickled vegetables Frozen vegetables with sauces Commercially prepared potato and vegetable mixes   Fruits   Most fresh, frozen, and canned fruits All fruit juices   Fruits processed with salt or sodium   Milk   Nonfat or low-fat (1%) milk Nonfat or low-fat yogurt Cottage cheese, low-fat ricotta, cheeses labeled as low-fat and low-sodium   Whole milk Reduced-fat (2%) milk Malted and chocolate milk Full fat yogurt Most cheeses (unless low-fat and low salt) Buttermilk (no more than 1 cup per week)   Meats and Beans   Lean cuts of fresh or frozen beef, veal, lamb, or pork (look for the word loin) Fresh or frozen poultry without the skin Fresh or frozen fish and some shellfish Egg whites and egg substitutes (Limit whole eggs to three per week) Tofu Nuts or seeds (unsalted, dry-roasted), low-sodium peanut butter Dried peas, beans, and lentils   Any smoked, cured, salted, or canned meat, fish, or poultry (including cordero, chipped beef, cold cuts, hot dogs, sausages, sardines, and anchovies) Poultry skins Breaded and/or fried fish or meats Canned peas, beans, and lentils Salted nuts   Fats and Oils   Olive oil and canola oil Low-sodium, low-fat salad dressings and mayonnaise   Butter, margarine, coconut and palm oils, cordero fat   Snacks, Sweets, and Condiments   Low-sodium or unsalted versions of broths, soups, soy sauce, and condiments Pepper, herbs, and spices; vinegar, lemon, or lime juice Low-fat frozen desserts (yogurt, sherbet, fruit bars) Sugar, cocoa powder, honey, syrup, jam, and preserves Low-fat, trans-fat free cookies, cakes, and pies Otilio and animal crackers, fig bars, rashawn snaps   High-fat desserts Broth, soups, gravies, and sauces, made from instant mixes or other high-sodium ingredients Salted snack foods Canned olives Meat tenderizers, seasoning salt, and most flavored vinegars   Beverages   Low-sodium carbonated beverages Tea and coffee in moderation Soy milk   Commercially softened water   Suggestions   Make whole grains, fruits, and vegetables the base of your diet. Choose heart-healthy fats such as canola, olive, and flaxseed oil, and foods high in heart-healthy fats, such as nuts, seeds, soybeans, tofu, and fish. Eat fish at least twice per week; the fish highest in omega-3 fatty acids and lowest in mercury include salmon, herring, mackerel, sardines, and canned chunk light tuna. If you eat fish less than twice per week or have high triglycerides, talk to your doctor about taking fish oil supplements. Read food labels. For products low in fat and cholesterol, look for fat free, low-fat, cholesterol free, saturated fat free, and trans fat freeAlso scan the Nutrition Facts Label, which lists saturated fat, trans fat, and cholesterol amounts. For products low in sodium, look for sodium free, very low sodium, low sodium, no added salt, and unsalted   Skip the salt when cooking or at the table; if food needs more flavor, get creative and try out different herbs and spices. Garlic and onion also add substantial flavor to foods. Trim any visible fat off meat and poultry before cooking, and drain the fat off after chopra. Use cooking methods that require little or no added fat, such as grilling, boiling, baking, poaching, broiling, roasting, steaming, stir-frying, and sauting. Avoid fast food and convenience food. They tend to be high in saturated and trans fat and have a lot of added salt. Talk to a registered dietitian for individualized diet advice. Last Reviewed: March 2011 Lacey Zepeda MS, MPH, RD   Updated: 3/29/2011     Keep Your Memory Sherl Em       Many factors can affect your ability to remembera hectic lifestyle, aging, stress, chronic disease, and certain medicines. But, there are steps you can take to sharpen your mind and help preserve your memory. Challenge Your Brain   Regularly challenging your mind may help keeps it in top shape. Good mental exercises include:   Crossword puzzlesUse a dictionary if you need it; you will learn more that way. Brainteasers Try some! Crafts, such as wood working and sewing   Hobbies, such as gardening and building model airplanes   SocializingVisit old friends or join groups to meet new ones. Reading   Learning a new language   Taking a class, whether it be art history or eugenio chi   TravelingExperience the food, history, and culture of your destination   Learning to use a computer   Going to museums, the theater, or thought-provoking movies   Changing things in your daily life, such as reversing your pattern in the grocery store or brushing your teeth using your nondominant hand   Use Memory Aids   There is no need to remember every detail on your own.  These memory aids can help: Calendars and day planners   Electronic organizers to store all sorts of helpful informationThese devices can \"beep\" to remind you of appointments. A book of days to record birthdays, anniversaries, and other occasions that occur on the same date every year   Detailed \"to-do\" lists and strategically placed sticky notes   Quick \"study\" sessionsBefore a gathering, review who will be there so their names will be fresh in your mind. Establish routinesFor example, keep your keys, wallet, and umbrella in the same place all the time or take medicine with your 8:00 AM glass of juice   Live a Healthy Life   Many actions that will keep your body strong will do the same for your mind. For example:   Talk to Your Doctor About Herbs and Supplements    Malnutrition and vitamin deficiencies can impair your mental function. For example, vitamin B12 deficiency can cause a range of symptoms, including confusion. But, what if your nutritional needs are being met? Can herbs and supplements still offer a benefit? Researchers have investigated a range of natural remedies, such as ginkgo , ginseng , and the supplement phosphatidylserine (PS). So far, though, the evidence is inconsistent as to whether these products can improve memory or thinking. If you are interested in taking herbs and supplements, talk to your doctor first because they may interact with other medicines that you are taking. Exercise Regularly    Among the many benefits of regular exercise are increased blood flow to the brain and decreased risk of certain diseases that can interfere with memory function. One study found that even moderate exercise has a beneficial effect. Examples of \"moderate\" exercise include:   Playing 18 holes of golf once a week, without a cart   Playing tennis twice a week   Walking one mile per day   Manage Stress    It can be tough to remember what is important when your mind is cluttered. Make time for relaxation.  Choose activities that calm you down, and make it routine. Manage Chronic Conditions    Side effects of high blood pressure , diabetes, and heart disease can interfere with mental function. Many of the lifestyle steps discussed here can help manage these conditions. Strive to eat a healthy diet, exercise regularly, get stress under control, and follow your doctor's advice for your condition. Minimize Medications    Talk to your doctor about the medicines that you take. Some may be unnecessary. Also, healthy lifestyle habits may lower the need for certain drugs. Last Reviewed: April 2010 Luzma Boone MD   Updated: 4/13/2010     Keeping Home a [facility]       As we get older, changes in balance, gait, strength, vision, hearing, and cognition make even the most youthful senior more prone to accidents. Falls are one of the leading health risks for older people. This increased risk of falling is related to:   Aging process (eg, decreased muscle strength, slowed reflexes)   Higher incidence of chronic health problems (eg, arthritis, diabetes) that may limit mobility, agility or sensory awareness   Side effects of medicine (eg, dizziness, blurred vision)especially medicines like prescription pain medicines and drugs used to treat mental health conditions   Depending on the brittleness of your bones, the consequences of a fall can be serious and long lasting. Home Life   Research by the Association of Aging [facility]) shows that some home accidents among older adults can be prevented by making simple lifestyle changes and basic modifications and repairs to the home environment. Here are some lifestyle changes that experts recommend:   Have your hearing and vision checked regularly. Be sure to wear prescription glasses that are right for you. Speak to your doctor or pharmacist about the possible side effects of your medicines. A number of medicines can cause dizziness. If you have problems with sleep, talk to your doctor.    Limit your intake of alcohol. If necessary, use a cane or walker to help maintain your balance. Wear supportive, rubber-soled shoes, even at home. If you live in a region that gets wintry weather, you may want to put special cleats on your shoes to prevent you from slipping on the snow and ice. Exercise regularly to help maintain muscle tone, agility, and balance. Always hold the banister when going up or down stairs. Also, use  bars when getting in or out of the bath or shower, or using the toilet. To avoid dizziness, get up slowly from a lying down position. Sit up first, dangling your legs for a minute or two before rising to a standing position. Overall Home Safety Check   According to the Consumer [employer] Safety Commision's \"Older Consumer Home Safety Checklist,\" it is important to check for potential hazards in each room. And remember, proper lighting is an essential factor in home safety. If you cannot see clearly, you are more likely to fall. Important questions to ask yourself include:   Are lamp, electric, extension, and telephone cords placed out of the flow of traffic and maintained in good condition? Have frayed cords been replaced? Are all small rugs and runners slip resistant? If not, you can secure them to the floor with a special double-sided carpet tape. Are smoke detectors properly locatedone on every floor of your home and one outside of every sleeping area? Are they in good working order? Are batteries replaced at least once a year? Do you have a well-maintained carbon monoxide detector outside every sleeping are in your home? Does your furniture layout leave plenty of space to maneuver between and around chairs, tables, beds, and sofas? Are hallways, stairs and passages between rooms well lit? Can you reach a lamp without getting out of bed? Are floor surfaces well maintained?  Shag rugs, high-pile carpeting, tile floors, and polished wood floors can be particularly slippery. Stairs should always have handrails and be carpeted or fitted with a non-skid tread. Is your telephone easily reachable. Is the cord safely tucked away? Room by Room   According to the Association of Aging, bathrooms and vinicio are the two most potentially hazardous rooms in your home. In the Kitchen    Be sure your stove is in proper working order and always make sure burners and the oven are off before you go out or go to sleep. Keep pots on the back burners, turn handles away from the front of the stove, and keep stove clean and free of grease build-up. Kitchen ventilation systems and range exhausts should be working properly. Keep flammable objects such as towels and pot holders away from the cooking area except when in use. Make sure kitchen curtains are tied back. Move cords and appliances away from the sink and hot surfaces. If extension cords are needed, install wiring guides so they do not hang over the sink, range, or working areas. Look for coffee pots, kettles and toaster ovens with automatic shut-offs. Keep a mop handy in the kitchen so you can wipe up spills instantly. You should also have a small fire extinguisher. Arrange your kitchen with frequently used items on lower shelves to avoid the need to stand on a stepstool to reach them. Make sure countertops are well-lit to avoid injuries while cutting and preparing food. In the Bathroom    Use a non-slip mat or decals in the tub and shower, since wet, soapy tile or porcelain surfaces are extremely slippery. Make sure bathroom rugs are non-skid or tape them firmly to the floor. Bathtubs should have at least one, preferably two, grab bars, firmly attached to structural supports in the wall. (Do not use built-in soap holders or glass shower doors as grab bars.)    Tub seats fitted with non-slip material on the legs allow you to wash sitting down.  For people with limited mobility, bathtub transfer benches allow you to slide safely into the tub. Raised toilet seats and toilet safety rails are helpful for those with knee or hip problems. In the [facility]    Make sure you use a nightlight and that the area around your bed is clear of potential obstacles. Be careful with electric blankets and never go to sleep with a heating pad, which can cause serious burns even if on a low setting. Use fire-resistant mattress covers and pillows, and NEVER smoke in bed. Keep a phone next to the bed that is programmed to dial 911 at the push of a button. If you have a chronic condition, you may want to sign on with an automatic call-in service. Typically the system includes a small pendant that connects directly to an emergency medical voice-response system. You should also make arrangements to stay in contact with someonefriend, neighbor, family memberon a regular schedule. Fire Prevention   According to the [employer] (Smoke Alarms for Every) [address] [place] [address], senior citizens are one of the two highest risk groups for death and serious injuries due to residential fires. When cooking, wear short-sleeved items, never a bulky long-sleeved robe. The [facility]'s Safety Checklist for Older Consumers emphasizes the importance of checking basements, garages, workshops and storage areas for fire hazards, such as volatile liquids, piles of old rags or clothing and overloaded circuits. Never smoke in bed or when lying down on a couch or recliner chair. Small portable electric or kerosene heaters are responsible for many home fires and should be used cautiously if at all. If you do use one, be sure to keep them away from flammable materials. In case of fire, make sure you have a pre-established emergency exit plan. Have a professional check your fireplace and other fuel-burning appliances yearly.     Helping Hands   Baby boomers entering the whitaker years will continue to see the development of new products to unable to speak for yourself. Living jeong tell doctors to use or not use treatments that would keep you alive. You must have one or two witnesses or a notary present when you sign this form. A living will may be called something else in your state. Consider a medical power of . This form allows you to name a person to make decisions about your care if you are not able to. Most people ask a close friend or family member. Talk to this person about the kinds of treatments you want and those that you do not want. Make sure this person understands your wishes. A medical power of  may be called something else in your state. These legal papers are simple to change. Tell your doctor what you want to change, and ask him or her to make a note in your medical file. Give yourfamily updated copies of the papers. Where can you learn more? Go to [email or web address] and sign in to your [employer] account. Enter P184 in the [employer]Span box to learn more about \"Advance Care Planning: Care Instructions. \"     If you do not have an account, please click on the \"Sign Up Now\" link. Current as of: October 18, 2021               Content Version: 13.3  © 2006-2022 [employer]wise, [employer]ial[employer]. Care instructions adapted under license by [facility] [place] ([facility]). If you have questions about a medical condition or this instruction, always ask your healthcare professional. [place] [address] any warranty or liability for your use of this information. Learning About Living Alethealahao Benson  What is a living will? A living will, also called a declaration, is a legal form. It tells your family and your doctor your wishes when you can't speak for yourself. It's used by the health professionals who will treat you as you near the end of your life or ifyou get seriously hurt or ill. If you put your wishes in writing, your loved ones and others will know what kind of care you want.  They won't need to guess. This can ease your mind and behelpful to others. And you can change or cancel your living will at any time. A living will is not the same as an estate or property will. An estate willexplains what you want to happen with your money and property after you die. How do you use it? Keep these facts in mind about how a living will is used. Your living will is used only if you can't speak or make decisions for yourself. Most often, one or more doctors must certify that you can't speak or decide for yourself before your living will takes effect. If you get better and can speak for yourself again, you can accept or refuse any treatment. It doesn't matter what you said in your living will. Some states may limit your right to refuse treatment in certain cases. For example, you may need to clearly state in your living will that you don't want artificial hydration and nutrition, such as being fed through a tube. Is a living will a legal document? A living will is a legal document. Each state has its own laws about livingwills. And a living will may be called something else in your state. Here are some things to know about living jeong. You don't need an  to complete a living will. But legal advice can be helpful if your state's laws are unclear. It can also help if your health history is complicated or your family can't agree on what should be in your living will. You can change your living will at any time. Some people find that their wishes about end-of-life care change as their health changes. If you make big changes to your living will, complete a new form. If you move to another state, make sure that your living will is legal in the state where you now live. In most cases, doctors will respect your wishes even if you have a form from a different state. You might use a universal form that has been approved by many states. This kind of form can sometimes be filled out and stored online. Your digital copy will then be available wherever you have a connection to the internet. The doctors and nurses who need to treat you can find it right away. Your state may offer an online registry. This is another place where you can store your living will online. It's a good idea to get your living will notarized. This means using a person called a Familiar to watch two people sign, or witness, your living will. What should you know when you create a living will? Here are some questions to ask yourself as you make your living will. Do you know enough about life support methods that might be used? If not, talk to your doctor so you know what might be done if you can't breathe on your own, your heart stops, or you can't swallow. What things would you still want to be able to do after you receive life-support methods? Would you want to be able to walk? To speak? To eat on your own? To live without the help of machines? Do you want certain Sabianist practices performed if you become very ill? If you have a choice, where do you want to be cared for? In your home? At a hospital or nursing home? If you have a choice at the end of your life, where would you prefer to die? At home? In a hospital or nursing home? Somewhere else? Would you prefer to be buried or cremated? Do you want your organs to be donated after you die? What should you do with your living will? Make sure that your family members and your health care agent have copies of your living will (also called a declaration). Give your doctor a copy of your living will. Ask to have it kept as part of your medical record. If you have more than one doctor, make sure that each one has a copy. Put a copy of your living will where it can be easily found. For example, some people may put a copy on their refrigerator door. If you are using a digital copy, be sure your doctor, family members, and health care agent know how to find and access it.   Where can you learn more? Go to [email or web address] and sign in to your [employer] account. Enter N409 in the [facility] box to learn more about \"Learning About Living Perroy. \"     If you do not have an account, please click on the \"Sign Up Now\" link. Current as of: October 18, 2021               Content Version: 13.3  © 2006-2022 [employer]. Care instructions adapted under license by [facility] [place] ([facility]). If you have questions about a medical condition or this instruction, always ask your healthcare professional. [place] [address] any warranty or liability for your use of this information. Learning About Medical Power of   What is a medical power of ? A medical power of , also called a durable power of  for health care, is one type of the legal forms called advance directives. It lets you name the person you want to make treatment decisions for you if you can't speak or decide for yourself. The person you choose is called your health care agent. This person is also called a health care proxy or health care surrogate. A medical power of  may be called something else in your state. How do you choose a health care agent? Choose your health care agent carefully. This person may or may not be a familymember. Talk to the person before you make your final decision. Make sure he or she iscomfortable with this responsibility. It's a good idea to choose someone who:  Is at least 25years old. Knows you well and understands what makes life meaningful for you. Understands your Worship and moral values. Will do what you want, not what he or she wants. Will be able to make difficult choices at a stressful time. Will be able to refuse or stop treatment, if that is what you would want, even if you could die. Will be firm and confident with health professionals if needed.   Will ask questions to get needed under license by [facility] ([facility]). If you have questions about a medical condition or this instruction, always ask your healthcare professional. [address] any warranty or liability for your use of this information.

## 2022-08-03 NOTE — TELEPHONE ENCOUNTER
----- Message from Sylvia Claude sent at 8/3/2022 10:19 AM CDT -----  Subject: Message to Provider    QUESTIONS  Information for Provider? patient had a cat scan a couple weeks ago and it   showed there is a mass on kidney, would like to know if he should get   testing done on it  ---------------------------------------------------------------------------  --------------  Blanca LIVINGSTON  [phone]; OK to leave message on voicemail  ---------------------------------------------------------------------------  --------------  SCRIPT ANSWERS  Relationship to Patient?  Self

## 2022-08-04 ENCOUNTER — TREATMENT (OUTPATIENT)
Dept: CARDIAC REHAB | Facility: HOSPITAL | Age: 72
End: 2022-08-04

## 2022-08-04 DIAGNOSIS — Z95.1 S/P CABG (CORONARY ARTERY BYPASS GRAFT): Primary | ICD-10-CM

## 2022-08-04 PROCEDURE — 93798 PHYS/QHP OP CAR RHAB W/ECG: CPT

## 2022-08-08 PROBLEM — I71.20 THORACIC AORTIC ANEURYSM WITHOUT RUPTURE (HCC): Status: ACTIVE | Noted: 2022-08-08

## 2022-08-08 NOTE — PROGRESS NOTES
Subjective   Patient ID: Zay Kamara is a 71 y.o. male who is here for follow-up of a known aneurysm.   Chief Complaint   Patient presents with   • Thoracic Aneurysm     Pt is here for follow up w/CT     Patient ID: Zay Kamara is a 71 y.o. male who is here for follow-up having had Coronary artery bypass graft x 4 (left internal mammary artery/sequencing the dominant diagonal artery and left anterior descending artery, reverse saphenous vein graft/distal right coronary artery, and reverse saphenous vein graft/posterior descending artery), Right and left MAZE procedure with radiofrequency and cryoablation, Left atrial appendage exclusion (Atriclip 40 mm device), Left endoscopic vein harvest performed on 1/6/2022 by Dr. White.       History of Present Illness    Mr. Mcintyre returns today for a follow-up of a known thoracic aortic aneurysm identified during preoperative work-up.  No chest pain.  No shortness of breath.  Overall feels quite well.  He has some GI bleeding earlier this year.  None thereafter.  He is on aspirin.    He still sore from the sternotomy incision but it is getting better.    The following portions of the patient's history were reviewed and updated as appropriate: allergies, current medications, past family history, past medical history, past social history, past surgical history and problem list.       Objective   Physical Exam  Constitutional:       Appearance: He is well-developed.   HENT:      Head: Normocephalic and atraumatic.   Eyes:      Pupils: Pupils are equal, round, and reactive to light.   Neck:      Thyroid: No thyromegaly.      Vascular: No JVD.      Trachea: No tracheal deviation.   Cardiovascular:      Rate and Rhythm: Normal rate and regular rhythm.      Heart sounds: Normal heart sounds. No murmur heard.    No friction rub. No gallop.   Pulmonary:      Effort: Pulmonary effort is normal. No respiratory distress.      Breath sounds: Normal breath sounds. No wheezing or  rales.   Chest:      Chest wall: No tenderness.   Abdominal:      General: There is no distension.      Palpations: Abdomen is soft.      Tenderness: There is no abdominal tenderness.   Musculoskeletal:         General: Normal range of motion.      Cervical back: Normal range of motion and neck supple.   Lymphadenopathy:      Cervical: No cervical adenopathy.   Skin:     General: Skin is warm and dry.   Neurological:      Mental Status: He is alert and oriented to person, place, and time.      Cranial Nerves: No cranial nerve deficit.           CT Angiogram Chest    Result Date: 7/20/2022  Narrative: EXAMINATION: CT ANGIOGRAM CHEST-   7/20/2022 8:03 AM CDT  HISTORY: Thoracic aortic aneurysm; I71.2-Thoracic aortic aneurysm, without rupture  In order to have a CT radiation dose as low as reasonably achievable Automated Exposure Control was utilized for adjustment of the mA and/or KV according to patient size.  DLP in mGycm= 612  The CT angiography of the chest is performed after intravenous contrast enhancement.  Images are acquired in axial plane and subsequent 2-D reconstructions coronal and sagittal planes and 3-D maximum intensity projection reconstruction.  Comparison is made with the previous study dated 1/5/2022.  There is normal opacification of the pulmonary arteries and branches bilaterally. There are no filling defects in the opacified pulmonary arterial bed.  Atheromatous changes thoracic aorta is seen. There is mild ectasia of the ascending thoracic aorta which measures 4.5 cm above the aortic root. No dissection.  Severe atheromatous changes of the coronary arteries are noted.  There is evidence of mediastinal or hilar lymphadenopathy which was not obvious in the previous study due to lack of contrast enhancement. A level 4R lymph node measures 1.3 cm in short axis. This is similar to the previous study. A right hilar lymph node, level R 10, measures 2.2 cm in short axis. A lymph node in a similar  location on the left measures 1.4 cm in short axis. A level 7, subcarinal lymph node measures 1.5 cm in short axis.  Limited visualized soft tissues of the neck are unremarkable. Thyroid gland is not visualized or evaluated. No abnormal enlarged lymph nodes are noted.  The lungs are poorly expanded with the respiratory motion artifacts. This the tiny noncalcified nodules in the right middle lobe, image #83 and series 6, are similar to the previous study. No change. A few calcified granulomas are seen in the lungs bilaterally.  There are atelectatic changes in the lower lungs bilaterally. There is mild-to-moderate bronchiectatic atelectatic changes more pronounced in the right lower lung.  The limited visualized liver appears unremarkable. There is moderate splenomegaly. The adrenal glands are normal. A relatively small atrophic left kidney is incompletely included in the study. A low density mass in the right kidney is incompletely included and not well evaluated. There are left renal calculi. No hydronephrosis in the visualized part of the left kidney. There is prior cholecystectomy.  Images are reviewed in bone window show chronic degenerative changes of the thoracic spine. No focal bony lesion. Sternotomy sutures are in place.      Impression: 1. No evidence of pulmonary embolism. 2. 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 No dissection. 3. The mediastinal or hilar lymphadenopathy. Etiology and clinical significance is not certain. 4. Stable tiny nodules in the right middle lobe. 5. Splenomegaly. 6. And atrophic small left kidney. Nonobstructing left renal calculi. Incompletely visualized and evaluated low-density mass in the right kidney.  This report was finalized on 07/20/2022 08:42 by Dr. Stef Dee MD.      Diagnoses and all orders for this visit:    1. Thoracic aortic aneurysm without rupture (HCC) (Primary)  -     CT angiogram chest w contrast; Future    2. S/P Maze operation for atrial  fibrillation    3. Hx of CABG          Assessment & Plan   Independent Review of Radiographic Studies: There is an ascending aortic aneurysm measuring 4.6 cm in size in the short axis in greatest dimension.  It is fusiform in morphology.  Lymphadenopathy is noted upon review of the report.  This is also reviewed with previous study.  Essentially all mediastinal lymphadenopathy is stable.  It does persist.  Noted on report tiny lung nodules in the right middle lobe.      I discussed the findings on imaging today.  These are essentially stable.  Discussed recommendations to proceed to the emergency department if he has any chest pain or other symptoms suggestive of acute aortic pathology.  Discussed the importance of exercise while being mindful of the presence of an aortic aneurysm.  We discussed to avoid isometric, strenuous, or heavy lifting exercise.  Parameters were discussed.    Plan to have him return in 6 months to evaluate his adenopathy and also his aneurysm.  Many thanks for the opportunity to care for your patient.    I will continue to keep you apprised of provided care as it ensues.

## 2022-08-09 ENCOUNTER — TREATMENT (OUTPATIENT)
Dept: CARDIAC REHAB | Facility: HOSPITAL | Age: 72
End: 2022-08-09

## 2022-08-09 DIAGNOSIS — Z95.1 S/P CABG (CORONARY ARTERY BYPASS GRAFT): Primary | ICD-10-CM

## 2022-08-09 PROCEDURE — 93798 PHYS/QHP OP CAR RHAB W/ECG: CPT

## 2022-08-10 ENCOUNTER — HOSPITAL ENCOUNTER (OUTPATIENT)
Dept: ULTRASOUND IMAGING | Age: 72
Discharge: HOME | End: 2022-08-10
Payer: MEDICARE

## 2022-08-10 ENCOUNTER — HOSPITAL ENCOUNTER (OUTPATIENT)
Dept: PAIN MANAGEMENT | Age: 72
Discharge: HOME | End: 2022-08-10
Payer: MEDICARE

## 2022-08-10 VITALS
TEMPERATURE: 97.8 F | BODY MASS INDEX: 35.02 KG/M2 | HEIGHT: 71 IN | HEART RATE: 78 BPM | RESPIRATION RATE: 20 BRPM | OXYGEN SATURATION: 93 % | SYSTOLIC BLOOD PRESSURE: 151 MMHG | WEIGHT: 250.13 LBS | DIASTOLIC BLOOD PRESSURE: 72 MMHG

## 2022-08-10 DIAGNOSIS — M54.12 CERVICAL RADICULOPATHY: ICD-10-CM

## 2022-08-10 DIAGNOSIS — R20.0 NUMBNESS AND TINGLING OF BOTH LEGS: ICD-10-CM

## 2022-08-10 DIAGNOSIS — N28.89 RIGHT KIDNEY MASS: ICD-10-CM

## 2022-08-10 DIAGNOSIS — R20.2 NUMBNESS AND TINGLING OF BOTH LEGS: ICD-10-CM

## 2022-08-10 PROCEDURE — 76770 US EXAM ABDO BACK WALL COMP: CPT | Performed by: RADIOLOGY

## 2022-08-10 PROCEDURE — 76770 US EXAM ABDO BACK WALL COMP: CPT

## 2022-08-10 PROCEDURE — 99213 OFFICE O/P EST LOW 20 MIN: CPT

## 2022-08-10 PROCEDURE — 99213 OFFICE O/P EST LOW 20 MIN: CPT | Performed by: NURSE PRACTITIONER

## 2022-08-10 RX ORDER — NALDEMEDINE 0.2 MG/1
1 TABLET ORAL DAILY
Qty: 30 TABLET | Refills: 2 | Status: SHIPPED | OUTPATIENT
Start: 2022-08-10 | End: 2022-11-08

## 2022-08-10 RX ORDER — HYDROCODONE BITARTRATE AND ACETAMINOPHEN 7.5; 325 MG/1; MG/1
1 TABLET ORAL EVERY 8 HOURS PRN
Qty: 90 TABLET | Refills: 0 | Status: SHIPPED | OUTPATIENT
Start: 2022-08-15 | End: 2022-09-19 | Stop reason: SDUPTHER

## 2022-08-10 ASSESSMENT — PAIN DESCRIPTION - ORIENTATION: ORIENTATION: OTHER (COMMENT)

## 2022-08-10 ASSESSMENT — PAIN - FUNCTIONAL ASSESSMENT: PAIN_FUNCTIONAL_ASSESSMENT: PREVENTS OR INTERFERES SOME ACTIVE ACTIVITIES AND ADLS

## 2022-08-10 ASSESSMENT — PAIN DESCRIPTION - LOCATION: LOCATION: NECK;SHOULDER

## 2022-08-10 ASSESSMENT — PAIN DESCRIPTION - FREQUENCY: FREQUENCY: CONTINUOUS

## 2022-08-10 ASSESSMENT — PAIN DESCRIPTION - PAIN TYPE: TYPE: CHRONIC PAIN

## 2022-08-10 ASSESSMENT — PAIN SCALES - GENERAL: PAINLEVEL_OUTOF10: 5

## 2022-08-10 ASSESSMENT — PAIN DESCRIPTION - ONSET: ONSET: ON-GOING

## 2022-08-10 ASSESSMENT — PAIN DESCRIPTION - DESCRIPTORS: DESCRIPTORS: ACHING

## 2022-08-10 ASSESSMENT — PAIN DESCRIPTION - DIRECTION: RADIATING_TOWARDS: INTO SHOULDERS

## 2022-08-10 NOTE — PROGRESS NOTES
Hyperlipidemia     Hypertension     Hyperthyroidism     Prostate cancer ([facility] [address]     Sleep apnea     Type II or unspecified type diabetes mellitus without mention of complication, not stated as uncontrolled        Surgery History  Past Surgical History:   Procedure Laterality Date    CHOLECYSTECTOMY      INCONTINENCE SURGERY  11/26/2018    dr gino Drew  08/01/2017    removal, dr Laina Lau      removed by Dr Samira Renteria  Patient has no known allergies. Current Medications  Current Outpatient Medications   Medication Sig Dispense Refill    Naldemedine Tosylate (SYMPROIC) 0.2 MG TABS Take 1 tablet by mouth daily 30 tablet 2    [START ON 8/15/2022] HYDROcodone-acetaminophen (NORCO) 7.5-325 MG per tablet Take 1 tablet by mouth every 8 hours as needed for Pain for up to 30 days. May fill 8- 90 tablet 0    levothyroxine (SYNTHROID) 25 MCG tablet Take 1 tablet by mouth in the morning. Total of 225mcg. 90 tablet 3    levothyroxine (SYNTHROID) 200 MCG tablet Take 1 tablet by mouth in the morning. Total of 225mcg. 90 tablet 3    gabapentin (NEURONTIN) 400 MG capsule Take 1 capsule by mouth 3 times daily for 90 days.  May fill 5/23/2022 270 capsule 0    atorvastatin (LIPITOR) 20 MG tablet Take 1 tablet by mouth at bedtime 90 tablet 3    amLODIPine (NORVASC) 2.5 MG tablet       aspirin 81 MG EC tablet Take 81 mg by mouth daily      metoprolol tartrate (LOPRESSOR) 25 MG tablet TAKE 1 TABLET BY MOUTH EVERY 12 HOURS      nitroGLYCERIN (NITROSTAT) 0.4 MG SL tablet Place 0.4 mg under the tongue (Patient not taking: Reported on 8/3/2022)      glipiZIDE (GLUCOTROL) 5 MG tablet Take 1 tablet by mouth daily 90 tablet 3    lisinopril (PRINIVIL;ZESTRIL) 5 MG tablet Take 5 mg by mouth daily      allopurinol (ZYLOPRIM) 100 MG tablet Take 1 tablet by mouth daily 90 tablet 3    citalopram (CELEXA) 20 MG tablet Take 1 tablet by mouth daily 90 tablet 3 Healing chest  Neurologic: headache, dizziness, fainting, loss of consciousness, no memory loss. No sensitivity. Endocrine: denies intolerance to hot or cold temperature, night sweats, flushing, fingernail changes, increased thirst, or hairloss   Hematologic/ Lymphatic: denies anemia, bleeding tendency or clotting tendency, bruising easily. Allergic/ Immunologic: denies rhinitis, asthma, skin sensitivity, or allergy to Latex   Psychiatric: denies depression or thoughts of suicide, or voices in head. Current Pain Assessment:   Pain Assessment  Pain Assessment: 0-10  Pain Level: 5  Patient's Stated Pain Goal: 1  Pain Location: Neck, Shoulder  Pain Orientation: Other (Comment)  Pain Descriptors: Aching  Functional Pain Assessment: Prevents or interferes some active activities and ADLs  Pain Type: Chronic pain  Pain Radiating Towards: into shoulders  Pain Frequency: Continuous  Pain Onset: On-going    Clinical Progression: gradually improving  Effect of Pain on Daily Activities: limits activity  Patient's Stated Pain Goal: No pain  Pain Intervention(s): Medication (see eMar), Repositioning, Rest, Ice    Current PE.3    ORT Score: 1    PHQ-9 Score: 7    Physical Exam:    Vitals:    08/10/22 0817   BP: (!) 151/72   Pulse: 78   Resp: 20   Temp: 97.8 °F (36.6 °C)   TempSrc: Skin   SpO2: 93%   Weight: 250 lb 2 oz (113.5 kg)   Height: 5' 11\" (1.803 m)       Body mass index is 34.89 kg/m². General Appearance: no acute distress. Appears to be well dressed  Skin Exam: Warm and dry, no jaundice, rashes or lesions  Head Exam: NCAT, PERRLA, EOMI, moist mucus membranes, scalp normal  Neck Exam: Supple, no masses palpated, trachea midline. Pain with flexion and extension of head. Pain with palpation of muscles iin neck and bilateral shoulders  Lung: Clear to ausculation in all lobes anterior and posterior. Heart: Regular rate and rhythm, no gallops, rubs or murmurs, no edema  Abdomen:  Bowel sounds in all quadrants, soft, non-distended, non-tender with palpation, no guarding  Extremities: No rash, cyanosis or bruising  Musculoskeletal: No joint swelling or deformity. Back Exam: Tenderness with palpation of muscles in mid back  Hip Exam: Full rotation bilateral   Knee Exam: Crepitus and pain in right knee with flexion and extension  Shoulder Exam: Full ROM bilateral  Neurologic Exam: Gait and coordination normal, speech normal  Reflexes: Normal brachialis, Negative Mariee's bilateral. Normal Patellar bilateral,   CN EXAM: II-XII intact, face symmetrical, tongue symmetrical, the trapezius and sternocleidomastoid muscle appearance and strength symmetrical, normal achilles bilateral, ankle clonus negative bilateral  Strength: 5/5 RUE Bi's/Tri's, 5/5 LUE Bi's/Tri's, 5/5 RLE knee flex/ext, 5/5 RLE DF/PF, 5/5 LLE knee flex/ext, 5/5 LLE DF/PF  Sensation: Equal and intact to fine touch in all extremities  Mood and affect: Normal   Nurses note reviewed along with current vital signs    Active Problem(s)  Active Problems:    Cervicalgia    Muscle spasms of neck    Myofascial muscle pain    Muscle spasm of back  Resolved Problems:    * No resolved hospital problems. *                                                                                                                            PLAN:  1. Patient is to call the office with any questions or concerns that may arise prior to next appointment. 2. Continue Norco and Gabapentin (says that he does not need a refill at this time on his Gabapentin)  3. Schedule patient for bilateral cervical and Thoracic trigger point injections    May need to be scheduled for VANE C3-C4    Urine Drug Screen Current/Today:  Yes  []  No [x]     Discussion:  Discussed exam findings and plan of care with patient. Patient agreed with the current plan of care at this time. All questions from the patient were answered by the provider.     Activity:   Discussed exercise as beneficial to pain reduction,

## 2022-08-11 ENCOUNTER — TELEPHONE (OUTPATIENT)
Dept: VASCULAR SURGERY | Facility: CLINIC | Age: 72
End: 2022-08-11

## 2022-08-11 ENCOUNTER — TREATMENT (OUTPATIENT)
Dept: CARDIAC REHAB | Facility: HOSPITAL | Age: 72
End: 2022-08-11

## 2022-08-11 ENCOUNTER — TELEPHONE (OUTPATIENT)
Dept: PAIN MANAGEMENT | Age: 72
End: 2022-08-11

## 2022-08-11 DIAGNOSIS — Z95.1 S/P CABG (CORONARY ARTERY BYPASS GRAFT): Primary | ICD-10-CM

## 2022-08-11 PROCEDURE — 93798 PHYS/QHP OP CAR RHAB W/ECG: CPT

## 2022-08-11 NOTE — TELEPHONE ENCOUNTER
LM for pt in regards to the appointment for 08/12/22 with Jeri.  No answer at this time.  Pt is to be at testing at 630 for 7 [facility].

## 2022-08-12 ENCOUNTER — HOSPITAL ENCOUNTER (OUTPATIENT)
Dept: ULTRASOUND IMAGING | Facility: HOSPITAL | Age: 72
Discharge: HOME | End: 2022-08-12

## 2022-08-12 ENCOUNTER — TELEPHONE (OUTPATIENT)
Dept: PRIMARY CARE CLINIC | Age: 72
End: 2022-08-12

## 2022-08-12 ENCOUNTER — OFFICE VISIT (OUTPATIENT)
Dept: VASCULAR SURGERY | Facility: CLINIC | Age: 72
End: 2022-08-12

## 2022-08-12 VITALS
SYSTOLIC BLOOD PRESSURE: 140 MMHG | BODY MASS INDEX: 34.72 KG/M2 | WEIGHT: 248 LBS | OXYGEN SATURATION: 96 % | DIASTOLIC BLOOD PRESSURE: 66 MMHG | RESPIRATION RATE: 18 BRPM | HEIGHT: 71 IN | HEART RATE: 82 BPM

## 2022-08-12 DIAGNOSIS — I65.23 BILATERAL CAROTID ARTERY STENOSIS: Primary | ICD-10-CM

## 2022-08-12 DIAGNOSIS — E78.2 MIXED HYPERLIPIDEMIA: ICD-10-CM

## 2022-08-12 DIAGNOSIS — N28.1 RENAL CYST: Primary | ICD-10-CM

## 2022-08-12 DIAGNOSIS — I10 ESSENTIAL HYPERTENSION: ICD-10-CM

## 2022-08-12 DIAGNOSIS — I65.23 BILATERAL CAROTID ARTERY STENOSIS: ICD-10-CM

## 2022-08-12 DIAGNOSIS — I73.9 PAD (PERIPHERAL ARTERY DISEASE): ICD-10-CM

## 2022-08-12 PROCEDURE — 93880 EXTRACRANIAL BILAT STUDY: CPT | Performed by: SURGERY

## 2022-08-12 PROCEDURE — 99214 OFFICE O/P EST MOD 30 MIN: CPT | Performed by: NURSE PRACTITIONER

## 2022-08-12 PROCEDURE — 93923 UPR/LXTR ART STDY 3+ LVLS: CPT | Performed by: SURGERY

## 2022-08-12 PROCEDURE — 93923 UPR/LXTR ART STDY 3+ LVLS: CPT

## 2022-08-12 PROCEDURE — 93880 EXTRACRANIAL BILAT STUDY: CPT

## 2022-08-12 NOTE — TELEPHONE ENCOUNTER
----- Message from MARY Pina sent at 8/12/2022 10:26 AM CDT -----  Renal US:  Right kidney shows a exophytic renal cyst, 3.8 x 3.2 cm  Right kidney measures 12.1 x 5.6 cm    Left kidney shows a hemorrhagic or/or proteinaceous cyst, 1.1 x 1.2 cm. Left kidney measures 11.7 x 4.7 cm.     Recommend repeat renal US in 6 months to ensure stability

## 2022-08-12 NOTE — TELEPHONE ENCOUNTER
Called patient, spoke with: Patient regarding the results of the patients most recent US. I advised Patient of Zoë Lyles recommendations.    Patient did voice understanding    Patient asked if we want an US or CT in 6 months, looks like CT was recommended

## 2022-08-12 NOTE — PROGRESS NOTES
"8/12/2022       Frankie Bradley, APRN  [address]  [place] [address]        Zay LANGSTON Crews  1950    Chief Complaint   Patient presents with   • Carotid Artery Disease     1 year follow-up with US pad carotid bilateral 8/12/22.  Pt denies any stroke like symptoms.     • Peripheral Vascular Disease     1 year follow-up with US pad ankle/brach ind ext comp 8/12/22.  Pt states that both legs become very tired as he walks and at night he has cramping in legs and feet.         Dear Frankie Bradley, APRN:    HPI     I had the pleasure of seeing you patient in the office today for follow up.  As you recall, the patient is a 71 y.o. male who we are currently following for asymptomatic carotid occlusive disease and lower extremity PAD.  Currently he is doing well and denies any strokelike symptoms.  He reports fatigue in his legs as he walks.  He also has complaints of cramping at night.  He is maintained on aspirin and Lipitor.  He did have noninvasive testing performed today, which I did review in office.    Review of Systems   Constitutional: Negative.    HENT: Negative.    Eyes: Negative.    Respiratory: Negative.    Cardiovascular: Negative.    Gastrointestinal: Negative.    Endocrine: Negative.    Genitourinary: Negative.    Musculoskeletal: Negative.    Skin: Negative.    Allergic/Immunologic: Negative.    Neurological: Negative.    Hematological: Negative.    Psychiatric/Behavioral: Negative.    All other systems reviewed and are negative.        /66 (BP Location: Left arm, Patient Position: Sitting, Cuff Size: Adult)   Pulse 82   Resp 18   Ht 179.1 cm (70.51\")   Wt 112 kg (248 lb)   SpO2 96%   BMI 35.07 kg/m²     Physical Exam  Vitals and nursing note reviewed.   Constitutional:       Appearance: Normal appearance. He is well-developed. He is obese.   HENT:      Head: Normocephalic and atraumatic.   Eyes:      General: No scleral icterus.     Pupils: Pupils are equal, round, " and reactive to light.   Neck:      Thyroid: No thyromegaly.   Cardiovascular:      Rate and Rhythm: Normal rate and regular rhythm.      Heart sounds: Normal heart sounds.   Pulmonary:      Effort: Pulmonary effort is normal.      Breath sounds: Normal breath sounds.   Abdominal:      General: Bowel sounds are normal.      Palpations: Abdomen is soft.   Musculoskeletal:         General: Normal range of motion.      Cervical back: Normal range of motion and neck supple.   Skin:     General: Skin is warm and dry.   Neurological:      General: No focal deficit present.      Mental Status: He is alert and oriented to person, place, and time.   Psychiatric:         Mood and Affect: Mood normal.         Behavior: Behavior normal.         Thought Content: Thought content normal.         Judgment: Judgment normal.            DIAGNOSTIC DATA:  Noninvasive testing including ABIs show right JUHI of 0.99 and a left JUHI of 1.19.  Noninvasive testing including a carotid duplex shows less than 50% carotid stenosis bilaterally with bilateral antegrade vertebral flow.      Patient Active Problem List   Diagnosis   • Hypogonadism in male   • ED (erectile dysfunction)   • Prostate cancer ([facility])   • Diabetes ([facility])   • Hypertension   • Disease of thyroid gland   • History of prostate cancer   • Leukocytosis   • Class 2 severe obesity due to excess calories with serious comorbidity and body mass index (BMI) of 36.0 to 36.9 in adult ([facility])   • Sensorineural hearing loss (SNHL) of both ears   • Asymmetric SNHL (sensorineural hearing loss)   • Sudden hearing loss, right   • Tinnitus of right ear   • Stress incontinence   • Bladder neck contracture   • Paroxysmal atrial fibrillation ([facility])   • Sleep apnea   • Chest pain   • Mixed hyperlipidemia   • Bilateral carotid artery stenosis   • Unstable angina ([facility])   • Coronary artery disease involving native coronary artery of native heart with unstable angina pectoris ([facility])   • Acquired coagulation  factor deficiency ([facility])   • CKD (chronic kidney disease) stage 3, GFR 30-59 ml/min ([facility])   • Chronic pain syndrome   • Coronary artery disease involving native heart   • Type 2 diabetes mellitus with stage 3 chronic kidney disease ([facility])   • IBS (irritable bowel syndrome)   • Hx of CABG   • S/P Maze operation for atrial fibrillation   • BRBPR (bright red blood per rectum)   • Thoracic aortic aneurysm without rupture ([facility])         ICD-10-CM ICD-9-CM   1. Bilateral carotid artery stenosis  I65.23 433.10     433.30   2. PAD (peripheral artery disease) ([facility])  I73.9 443.9   3. Essential hypertension  I10 401.9   4. Mixed hyperlipidemia  E78.2 272.2         PLAN: After thoroughly evaluating Zay Kamara, I believe the best course of action is to remain conservative from vascular surgery standpoint.  Currently he is doing well and denies any claudication to his lower extremities.  He also denies any strokelike symptoms.  I did review his testing which shows less than 50% carotid stenosis bilaterally.  His ABIs show no arterial insufficiency to his lower extremities.  I will see him back in 1 year with repeat noninvasive testing for continued surveillance, including a carotid duplex and ABIs.  I did discuss vascular risk factors as they pertain to the progression of vascular disease including controlling his hypertension and hyperlipidemia.  His blood pressure is stable on his current medications.  He is maintained on Lipitor for his hyperlipidemia.  The patient is to continue taking their medications as previously discussed.   This was all discussed in full with complete understanding.  Thank you for allowing me to participate in the care of your patient.  Please do not hesitate to call with any questions or concerns.  We will keep you aware of any further encounters with Zay Kamara.      Sincerely Yours,      LUIS Jurado

## 2022-08-12 NOTE — TELEPHONE ENCOUNTER
We will have to see where his renal function is at.   He already has a decline in his renal fxn and the CT Scan would need to be with IV contrast.    You can order a CT scan of abdomen and pelvis with IV contrast but if renal fxn is still declined may not be able to proceed with contrast

## 2022-08-12 NOTE — TELEPHONE ENCOUNTER
Called and LM for pt that we have ordered the repeat CT in 6 months but will have to do a stat cmp first to see where kidney status is at

## 2022-08-16 DIAGNOSIS — N28.1 RENAL CYST: Primary | ICD-10-CM

## 2022-08-18 ENCOUNTER — OFFICE VISIT (OUTPATIENT)
Dept: CARDIAC REHAB | Facility: HOSPITAL | Age: 72
End: 2022-08-18

## 2022-08-18 VITALS — DIASTOLIC BLOOD PRESSURE: 62 MMHG | BODY MASS INDEX: 34.79 KG/M2 | SYSTOLIC BLOOD PRESSURE: 144 MMHG | WEIGHT: 246 LBS

## 2022-08-18 DIAGNOSIS — Z95.1 S/P CABG (CORONARY ARTERY BYPASS GRAFT): Primary | ICD-10-CM

## 2022-08-23 ENCOUNTER — OFFICE VISIT (OUTPATIENT)
Dept: CARDIAC REHAB | Facility: HOSPITAL | Age: 72
End: 2022-08-23

## 2022-08-23 VITALS — SYSTOLIC BLOOD PRESSURE: 152 MMHG | DIASTOLIC BLOOD PRESSURE: 58 MMHG

## 2022-08-23 DIAGNOSIS — Z95.1 S/P CABG (CORONARY ARTERY BYPASS GRAFT): Primary | ICD-10-CM

## 2022-08-25 ENCOUNTER — OFFICE VISIT (OUTPATIENT)
Dept: CARDIAC REHAB | Facility: HOSPITAL | Age: 72
End: 2022-08-25

## 2022-08-25 VITALS — DIASTOLIC BLOOD PRESSURE: 68 MMHG | SYSTOLIC BLOOD PRESSURE: 150 MMHG

## 2022-08-25 DIAGNOSIS — Z95.1 S/P CABG (CORONARY ARTERY BYPASS GRAFT): Primary | ICD-10-CM

## 2022-08-30 ENCOUNTER — OFFICE VISIT (OUTPATIENT)
Dept: CARDIAC REHAB | Facility: HOSPITAL | Age: 72
End: 2022-08-30

## 2022-08-30 VITALS — SYSTOLIC BLOOD PRESSURE: 160 MMHG | DIASTOLIC BLOOD PRESSURE: 76 MMHG

## 2022-08-30 DIAGNOSIS — Z95.1 S/P CABG (CORONARY ARTERY BYPASS GRAFT): Primary | ICD-10-CM

## 2022-09-01 ENCOUNTER — APPOINTMENT (OUTPATIENT)
Dept: CARDIAC REHAB | Facility: HOSPITAL | Age: 72
End: 2022-09-01

## 2022-09-06 ENCOUNTER — OFFICE VISIT (OUTPATIENT)
Dept: CARDIAC REHAB | Facility: HOSPITAL | Age: 72
End: 2022-09-06

## 2022-09-06 VITALS — SYSTOLIC BLOOD PRESSURE: 126 MMHG | DIASTOLIC BLOOD PRESSURE: 64 MMHG

## 2022-09-06 DIAGNOSIS — Z95.1 S/P CABG (CORONARY ARTERY BYPASS GRAFT): Primary | ICD-10-CM

## 2022-09-08 ENCOUNTER — APPOINTMENT (OUTPATIENT)
Dept: CARDIAC REHAB | Facility: HOSPITAL | Age: 72
End: 2022-09-08

## 2022-09-13 ENCOUNTER — OFFICE VISIT (OUTPATIENT)
Dept: CARDIAC REHAB | Facility: HOSPITAL | Age: 72
End: 2022-09-13

## 2022-09-13 VITALS — DIASTOLIC BLOOD PRESSURE: 72 MMHG | SYSTOLIC BLOOD PRESSURE: 158 MMHG

## 2022-09-13 DIAGNOSIS — Z95.1 S/P CABG (CORONARY ARTERY BYPASS GRAFT): Primary | ICD-10-CM

## 2022-09-15 ENCOUNTER — APPOINTMENT (OUTPATIENT)
Dept: CARDIAC REHAB | Facility: HOSPITAL | Age: 72
End: 2022-09-15

## 2022-09-19 DIAGNOSIS — R20.2 NUMBNESS AND TINGLING OF BOTH LEGS: ICD-10-CM

## 2022-09-19 DIAGNOSIS — M54.12 CERVICAL RADICULOPATHY: ICD-10-CM

## 2022-09-19 DIAGNOSIS — R20.0 NUMBNESS AND TINGLING OF BOTH LEGS: ICD-10-CM

## 2022-09-19 RX ORDER — HYDROCODONE BITARTRATE AND ACETAMINOPHEN 7.5; 325 MG/1; MG/1
1 TABLET ORAL EVERY 8 HOURS PRN
Qty: 90 TABLET | Refills: 0 | Status: SHIPPED | OUTPATIENT
Start: 2022-09-19 | End: 2022-10-19 | Stop reason: SDUPTHER

## 2022-09-20 ENCOUNTER — OFFICE VISIT (OUTPATIENT)
Dept: CARDIAC REHAB | Facility: HOSPITAL | Age: 72
End: 2022-09-20

## 2022-09-20 VITALS — DIASTOLIC BLOOD PRESSURE: 64 MMHG | SYSTOLIC BLOOD PRESSURE: 130 MMHG

## 2022-09-20 DIAGNOSIS — Z95.1 S/P CABG (CORONARY ARTERY BYPASS GRAFT): Primary | ICD-10-CM

## 2022-09-22 ENCOUNTER — OFFICE VISIT (OUTPATIENT)
Dept: CARDIAC REHAB | Facility: HOSPITAL | Age: 72
End: 2022-09-22

## 2022-09-22 ENCOUNTER — HOSPITAL ENCOUNTER (OUTPATIENT)
Dept: PAIN MANAGEMENT | Age: 72
Discharge: HOME | End: 2022-09-22
Payer: MEDICARE

## 2022-09-22 VITALS
OXYGEN SATURATION: 97 % | DIASTOLIC BLOOD PRESSURE: 57 MMHG | TEMPERATURE: 96.4 F | HEART RATE: 72 BPM | RESPIRATION RATE: 18 BRPM | SYSTOLIC BLOOD PRESSURE: 122 MMHG

## 2022-09-22 VITALS — DIASTOLIC BLOOD PRESSURE: 70 MMHG | SYSTOLIC BLOOD PRESSURE: 138 MMHG

## 2022-09-22 DIAGNOSIS — Z95.1 S/P CABG (CORONARY ARTERY BYPASS GRAFT): Primary | ICD-10-CM

## 2022-09-22 DIAGNOSIS — I48.0 PAROXYSMAL ATRIAL FIBRILLATION (HCC): ICD-10-CM

## 2022-09-22 PROCEDURE — 2500000003 HC RX 250 WO HCPCS

## 2022-09-22 PROCEDURE — 20553 NJX 1/MLT TRIGGER POINTS 3/>: CPT | Performed by: NURSE PRACTITIONER

## 2022-09-22 PROCEDURE — 20553 NJX 1/MLT TRIGGER POINTS 3/>: CPT

## 2022-09-22 RX ORDER — BUPIVACAINE HYDROCHLORIDE 5 MG/ML
15 INJECTION, SOLUTION EPIDURAL; INTRACAUDAL ONCE
Status: DISCONTINUED | OUTPATIENT
Start: 2022-09-22 | End: 2022-09-24 | Stop reason: HOSPADM

## 2022-09-22 RX ORDER — LIDOCAINE HYDROCHLORIDE 10 MG/ML
15 INJECTION, SOLUTION EPIDURAL; INFILTRATION; INTRACAUDAL; PERINEURAL ONCE
Status: DISCONTINUED | OUTPATIENT
Start: 2022-09-22 | End: 2022-09-24 | Stop reason: HOSPADM

## 2022-09-22 NOTE — PROCEDURES
[facility] Physical and Pain Medicine      Patient Name: Hieu Navarrete    : 1950                    Age: 66 y.o. Sex: male    Date: 2022    Pre-op Diagnosis: Myofascial Pain/ Muscle Spasms/ Cervicalgia    Post-op Diagnosis: Myofascial Pain/ Muscle Spasms/ Cervicalgia    Procedure: Cervical Trigger Point Injections    Performing Procedure: Sterling Oviedo, MSN, APRN, FNP-C    Previously Had Procedure:  Yes [x]  No []     Patient Vitals for the past 24 hrs:   BP Temp Temp src Pulse Resp SpO2   22 1106 (!) 122/57 (!) 96.4 °F (35.8 °C) Temporal 72 18 97 %       Description of Procedure:     After a brief physical assessment and failure to improve with conservative measures the patient presented for Cervical Trigger Point Injections The indications, limitations and possible complications were discussed with the patient and the patient elected to proceed with the procedure. After voluntary, informed and signed consent obtained the patient was placed in a seated position. Appropriate time out was obtained per policy. The area of maximal tenderness was palpated over the [] Right   []  Left   [x]  Bilateral Cervical   [x] Splenius   [x] Trapezius  [x] Rhomboid. The skin overlying these areas was marked with a skin marker. The skin overlying the proposed injection sites were then sprayed with Gebauer's Solution while protecting patient eyes. The areas were then prepped in a sterile fashion with Prevantics swab. Each trigger point of the  [] Right   []   Left  [x]   Bilateral Cervical  [x] Splenius  [x] Trapezius   [x] Rhomboid   was then injected after negative aspiration using a 25 gauge 1 1/2 in needle with approximately 2 ml of a solution of     [x] 5 ml of 1% Lidocaine Plain and 5 ml of 0.5% Marcaine Plain per 10 ml syringe  [] Toradol 0.5 ml (30 mg/ml) per 10 ml syringe    Sterile dressings applied.           [facility] Physical and Pain Medicine    Patient Name: Genet Bravo    : 1950                    Age: 66 y.o. Sex: male    Date: 2022    Pre-op Diagnosis: Myofascial Pain/ Muscle Spasms    Post-op Diagnosis: Myofascial Pain/ Muscle Spasms    Procedure: Thoracic Trigger Point Injections     Performing Procedure: Tonya Sanchez, MSN, APRN, FNP-C    Previously Had Procedure:   [] Yes   [x] No    Patient Vitals for the past 24 hrs:   BP Temp Temp src Pulse Resp SpO2   22 1106 (!) 122/57 (!) 96.4 °F (35.8 °C) Temporal 72 18 97 %       Description of Procedure:    After a brief physical assessment and failure to improve with conservative measures the patient presented for Thoracic Trigger Point Injections The indications, limitations and possible complications were discussed with the patient and the patient elected to proceed with the procedure. After voluntary, informed and signed consent the patient was placed in a  [x] Sitting    [] Prone position. Appropriate time out was obtained per policy. The area of maximal tenderness was palpated over the  [] Right  [] Left  [x] Bilateral  [x] Erector Spinae  [x] Upper/Mid Latissimus  [x] Rhomboid Minor [x] Rhomboid Major. The skin overlying these areas was marked with a skin marker. The skin overlying the proposed injection sites were then sprayed with Gebauer's Solution and prepped in a sterile fashion with Prevantics swab. Each trigger point of the  [] Right   [] Left  [x] Bilateral   [x] Erector Spinae   [x] Upper/Mid Latissimus  [x] Rhomboid Minor   [x]  Rhomboid Major was then injected after negative aspiration using a 25 gauge 1 1/2 in needle with approximately 2 ml of a solution of     [x] 5 ml of 1% Lidocaine Plain and 5 ml of 0.5% Marcaine Plain per 10 ml syringe    [] Toradol 0.5 ml (30 mg/ml) per 10 ml syringe    Sterile dressing applied. Discharge: The patient tolerated the procedure well.  There were no complications during the procedure and the patient was discharged home with discharge instructions. The patient has been instructed to contact the office should there be any complications or questions to arise between today and their next appointment.     Plan:  [x] Will return to the office in   [] 1 month  [x] 4 - 6 weeks   [] 2 months   [] 3 months for:  [] Planned Procedure  [x] Procedure Follow-up   [] Office Visit      1 [facility], 9/22/2022 at 11:23 AM

## 2022-09-22 NOTE — TELEPHONE ENCOUNTER
Call placed to pt to make sure exactly how he was taking this medication. No answer.  Left msg on vm to call office back to clarify if pt is still taking this medication and how he is taking it         Requested Prescriptions     Pending Prescriptions Disp Refills    metoprolol succinate (TOPROL XL) 25 MG extended release tablet [Pharmacy Med Name: METOPROLOL SUCC ER 25 MG TAB] 90 tablet 3     Sig: TAKE 1 TABLET BY MOUTH EVERY DAY

## 2022-09-22 NOTE — DISCHARGE INSTRUCTIONS
[facility] Physical [facility] Pain Medicine  Post Procedure Discharge Instructions        YOU HAVE HAD THE FOLLOWING PROCEDURE:                                  [] Occipital Nerve Blocks  [] CTS wrist injection(s)  [] Knee Injection(s)         [] Shoulder Injection(s)   [] Elbow Injection(s)     [] Botox Injection  [x] Cervical Trigger Point Injections    [x] Thoracic Trigger Point Injections    [] Lumbar Trigger Point Injections  [] Piriformis Trigger Point Injections  [] SI Joint Injection(s)     [] Trochanteric Bursa Injection(s)       [] Ankle Injection(s)   [] Plantar Fasciitis   []  ______________  Injection(s) [] Botox []  Migraines [] Spasticity    YOU HAVE RECEIVED THE FOLLOWING MEDICATIONS IN YOUR INJECTION(s)  [x] Lidocaine [x] Bupivacaine   [] DepoMedrol (steroid) [] Decadron (steroid)  []  Kenalog (steroid)   [] Toradol  [] Supartz [] Raydell Reining    [] Botox        PATIENT INFORMATION:   You may experience the following symptoms after your procedure. These symptoms are normal and should not cause concern: You may have an increase in your pain. This may last 24 - 48 hours after your procedure. You may have no change in the pain that you had prior to your injection(s). You may have weakness or numbness in your affected extremity. If this occurs, this may last until numbing the medication wears off. REPORT THE FOLLOWING SYMPTOMS TO YOUR DOCTOR:  Redness, swelling or drainage at the injection site(s)  Unusual pain that interferes with your normal activities of daily living. OTHER INSTRUCTIONS:    [x] I will apply ice to the injection site(s) for at least 24 hours after the procedure. I will rotate the ice on for 20 minutes and off for 20 minutes for at least 24 hours. [x] I will not apply heat for at least 48 hours and I will not take a hot bath or shower for at least 24 hours.      [x] I understand that if Lidocaine or Bupivacaine was used in my injection(s) that the injection site(s) will be numb for a few hours after the procedure    [] I understand if a steroid was used it will take effect in 3 - 7 days. I understand that as the numbing medication wears off, the pain may return until the steroids start working. [x] I understand that today I will rest for the next 24 hours and drink plenty of water. [] For Botox for Migraines please do not wear anything constricting around the forehead for 7-10 days post injection. Examples headband, hats, or bandana    [] For Botox for Spasticity start therapy for the affected limb in two weeks. [] Additional instructions: ______________________________________________ ___________________________________________________________________    Sedation:  Was oral sedation given? [] Yes  [x] No    I understand that if I took an oral nerve calming medication I will not drive for  [] 24 hours after taking the medication.     [x] I have received a copy of my discharge instructions and understand the above instructions to the best of my knowledge    Patient Discharged:  [x] Home  [] Hospital  [] Other  ____________________________________________    Via:  [x] Ambulatory  [] Wheelchair   [] Stretcher [] EMS       Accompanied By:   [x] Significant other  [] Family Member  [] Friend   [] Hospital Staff  []  Ambulance Staff  [] Other_______________________________________________    Plan:  [x] Will return to the office in   [] 1 month   [] 3 months for:  [] Procedure Follow-up  [] Office Visit   [] Planned Procedure      Patient Signature: _____________________________________________________    Staff Signature: _______________________________________________________        If you have questions, problems or concerns you may call [phone] and follow the prompts    TEETEE Serna, [facility]-BC

## 2022-09-22 NOTE — PROGRESS NOTES
Procedure:  Level of Consciousness: [x]Alert [x]Oriented []Disoriented []Lethargic  Anxiety Level: []Calm []Anxious []Depressed []Other  Skin: [x]Warm [x]Dry []Cool []Moist []Intact []Other  Cardiovascular: [x]Palpitations: [x]Never []Occasionally []Frequently  Chest Pain: [x]No []Yes  Respiratory:  [x]Unlabored []Labored []Cough ([] Productive []Unproductive)  HCG Required: [x]No []Yes   Results: []Negative []Positive  Knowledge Level:        [x]Patient/Other verbalized understanding of pre-procedure instructions. [x]Assessment of post-op care needs (transportation, responsible caregiver)        [x]Able to discuss health care problems and how to deal with it.   Factors that Affect Teaching:        Language Barrier: [x]No []Yes - why:        Hearing Loss:        []No [x]Yes            Corrective Device:  [x]Yes []No        Vision Loss:           []No [x]Yes            Corrective Device:  [x]Yes []No        Memory Loss:       [x]No []Yes            []Short Term []Long Term  Motivational Level:  [x]Asks Questions                  []Extremely Anxious       [x]Seems Interested               []Seems Uninterested                  []Denies need for Education  Risk for Injury:  [x]Patient oriented to person, place and time  []History of frequent falls/loss of balance  Nutritional:  []Change in appetite   []Weight Gain   []Weight Loss  Functional:  []Requires assistance with ADL's

## 2022-09-27 ENCOUNTER — OFFICE VISIT (OUTPATIENT)
Dept: CARDIAC REHAB | Facility: HOSPITAL | Age: 72
End: 2022-09-27

## 2022-09-27 DIAGNOSIS — Z95.1 S/P CABG (CORONARY ARTERY BYPASS GRAFT): Primary | ICD-10-CM

## 2022-09-27 RX ORDER — METOPROLOL SUCCINATE 25 MG/1
25 TABLET, EXTENDED RELEASE ORAL DAILY
Qty: 90 TABLET | Refills: 3 | OUTPATIENT
Start: 2022-09-27

## 2022-09-29 ENCOUNTER — OFFICE VISIT (OUTPATIENT)
Dept: CARDIAC REHAB | Facility: HOSPITAL | Age: 72
End: 2022-09-29

## 2022-09-29 VITALS — SYSTOLIC BLOOD PRESSURE: 144 MMHG | DIASTOLIC BLOOD PRESSURE: 86 MMHG

## 2022-09-29 DIAGNOSIS — Z95.1 S/P CABG (CORONARY ARTERY BYPASS GRAFT): Primary | ICD-10-CM

## 2022-10-04 ENCOUNTER — APPOINTMENT (OUTPATIENT)
Dept: CARDIAC REHAB | Facility: HOSPITAL | Age: 72
End: 2022-10-04

## 2022-10-05 ENCOUNTER — TELEPHONE (OUTPATIENT)
Dept: PAIN MANAGEMENT | Age: 72
End: 2022-10-05

## 2022-10-06 ENCOUNTER — OFFICE VISIT (OUTPATIENT)
Dept: CARDIAC REHAB | Facility: HOSPITAL | Age: 72
End: 2022-10-06

## 2022-10-06 VITALS — BODY MASS INDEX: 34.51 KG/M2 | SYSTOLIC BLOOD PRESSURE: 144 MMHG | WEIGHT: 244 LBS | DIASTOLIC BLOOD PRESSURE: 64 MMHG

## 2022-10-06 DIAGNOSIS — Z95.1 S/P CABG (CORONARY ARTERY BYPASS GRAFT): Primary | ICD-10-CM

## 2022-10-11 ENCOUNTER — APPOINTMENT (OUTPATIENT)
Dept: CARDIAC REHAB | Facility: HOSPITAL | Age: 72
End: 2022-10-11

## 2022-10-13 ENCOUNTER — OFFICE VISIT (OUTPATIENT)
Dept: CARDIAC REHAB | Facility: HOSPITAL | Age: 72
End: 2022-10-13

## 2022-10-13 VITALS — SYSTOLIC BLOOD PRESSURE: 162 MMHG | DIASTOLIC BLOOD PRESSURE: 70 MMHG

## 2022-10-13 DIAGNOSIS — Z95.1 S/P CABG (CORONARY ARTERY BYPASS GRAFT): Primary | ICD-10-CM

## 2022-10-18 ENCOUNTER — OFFICE VISIT (OUTPATIENT)
Dept: CARDIAC REHAB | Facility: HOSPITAL | Age: 72
End: 2022-10-18

## 2022-10-18 VITALS — SYSTOLIC BLOOD PRESSURE: 144 MMHG | DIASTOLIC BLOOD PRESSURE: 76 MMHG

## 2022-10-18 DIAGNOSIS — Z95.1 S/P CABG (CORONARY ARTERY BYPASS GRAFT): Primary | ICD-10-CM

## 2022-10-18 NOTE — TELEPHONE ENCOUNTER
Received fax from pharmacy requesting refill on pts medication(s). Pt was last seen in office on 8/3/2022  and has a follow up scheduled for 2/1/2023. Will send request to  Fermin Ling  for authorization.      Requested Prescriptions     Pending Prescriptions Disp Refills    metoprolol tartrate (LOPRESSOR) 25 MG tablet 60 tablet 11     Sig: Take 1 tablet by mouth 2 times daily

## 2022-10-19 DIAGNOSIS — M54.12 CERVICAL RADICULOPATHY: ICD-10-CM

## 2022-10-19 DIAGNOSIS — R20.2 NUMBNESS AND TINGLING OF BOTH LEGS: ICD-10-CM

## 2022-10-19 DIAGNOSIS — R20.0 NUMBNESS AND TINGLING OF BOTH LEGS: ICD-10-CM

## 2022-10-19 RX ORDER — HYDROCODONE BITARTRATE AND ACETAMINOPHEN 7.5; 325 MG/1; MG/1
1 TABLET ORAL EVERY 8 HOURS PRN
Qty: 90 TABLET | Refills: 0 | Status: SHIPPED | OUTPATIENT
Start: 2022-10-19 | End: 2022-11-18

## 2022-10-20 ENCOUNTER — APPOINTMENT (OUTPATIENT)
Dept: CARDIAC REHAB | Facility: HOSPITAL | Age: 72
End: 2022-10-20

## 2022-10-25 ENCOUNTER — OFFICE VISIT (OUTPATIENT)
Dept: CARDIAC REHAB | Facility: HOSPITAL | Age: 72
End: 2022-10-25

## 2022-10-25 VITALS — SYSTOLIC BLOOD PRESSURE: 142 MMHG | DIASTOLIC BLOOD PRESSURE: 80 MMHG | BODY MASS INDEX: 35.21 KG/M2 | WEIGHT: 249 LBS

## 2022-10-25 DIAGNOSIS — Z95.1 S/P CABG (CORONARY ARTERY BYPASS GRAFT): Primary | ICD-10-CM

## 2022-10-27 ENCOUNTER — APPOINTMENT (OUTPATIENT)
Dept: CARDIAC REHAB | Facility: HOSPITAL | Age: 72
End: 2022-10-27

## 2022-11-01 ENCOUNTER — APPOINTMENT (OUTPATIENT)
Dept: CARDIAC REHAB | Facility: HOSPITAL | Age: 72
End: 2022-11-01

## 2022-11-03 ENCOUNTER — APPOINTMENT (OUTPATIENT)
Dept: CARDIAC REHAB | Facility: HOSPITAL | Age: 72
End: 2022-11-03

## 2022-11-06 DIAGNOSIS — F33.0 MILD EPISODE OF RECURRENT MAJOR DEPRESSIVE DISORDER (HCC): ICD-10-CM

## 2022-11-07 RX ORDER — CITALOPRAM 20 MG/1
20 TABLET ORAL DAILY
Qty: 90 TABLET | Refills: 3 | Status: SHIPPED | OUTPATIENT
Start: 2022-11-07

## 2022-11-07 NOTE — TELEPHONE ENCOUNTER
Received fax from pharmacy requesting refill on pts medication(s). Pt was last seen in office on 8/3/2022  and has a follow up scheduled for 2/1/2023. Will send request to  Tal Lizarraga  for authorization.      Requested Prescriptions     Pending Prescriptions Disp Refills    citalopram (CELEXA) 20 MG tablet [Pharmacy Med Name: CITALOPRAM HBR 20 MG TABLET] 90 tablet 3     Sig: Take 1 tablet by mouth daily

## 2022-11-08 ENCOUNTER — APPOINTMENT (OUTPATIENT)
Dept: CARDIAC REHAB | Facility: HOSPITAL | Age: 72
End: 2022-11-08

## 2022-11-10 ENCOUNTER — APPOINTMENT (OUTPATIENT)
Dept: CARDIAC REHAB | Facility: HOSPITAL | Age: 72
End: 2022-11-10

## 2022-11-10 ENCOUNTER — TELEPHONE (OUTPATIENT)
Dept: CARDIAC SURGERY | Facility: CLINIC | Age: 72
End: 2022-11-10

## 2022-11-10 NOTE — TELEPHONE ENCOUNTER
Discussed the above with Dr. White.  It sounds like his nerves are waking up from sternotomy.  Would advise watchful waiting at this time and keep follow-up with Dr. White in February.  It should get better.  If this worsens and he needs to be seen sooner, he needs to call our office.  The shortness of breath should not be related and he needs to address this with primary care and/or cardiology.

## 2022-11-10 NOTE — TELEPHONE ENCOUNTER
"Pt calling today to report that he had CABG x 4 on 01/06/2022 and for the last 3-4 weeks he has felt pain on both sides of his incision and \"it feels like needles are sticking in me from the inside out\" and \"when I sneeze, it hurts just like when I was healing after surgery.\" Pt also adds that he feels short of breath at times. [phone].  "

## 2022-11-15 ENCOUNTER — APPOINTMENT (OUTPATIENT)
Dept: CARDIAC REHAB | Facility: HOSPITAL | Age: 72
End: 2022-11-15

## 2022-11-16 ENCOUNTER — HOSPITAL ENCOUNTER (OUTPATIENT)
Dept: PAIN MANAGEMENT | Age: 72
Discharge: HOME | End: 2022-11-16
Payer: MEDICARE

## 2022-11-16 VITALS
SYSTOLIC BLOOD PRESSURE: 132 MMHG | BODY MASS INDEX: 35.14 KG/M2 | HEIGHT: 71 IN | TEMPERATURE: 97 F | HEART RATE: 74 BPM | WEIGHT: 251 LBS | OXYGEN SATURATION: 91 % | DIASTOLIC BLOOD PRESSURE: 67 MMHG

## 2022-11-16 DIAGNOSIS — R20.2 NUMBNESS AND TINGLING OF BOTH LEGS: ICD-10-CM

## 2022-11-16 DIAGNOSIS — R20.0 NUMBNESS AND TINGLING OF BOTH LEGS: ICD-10-CM

## 2022-11-16 DIAGNOSIS — M54.12 CERVICAL RADICULOPATHY: ICD-10-CM

## 2022-11-16 PROCEDURE — 99213 OFFICE O/P EST LOW 20 MIN: CPT

## 2022-11-16 PROCEDURE — 99213 OFFICE O/P EST LOW 20 MIN: CPT | Performed by: NURSE PRACTITIONER

## 2022-11-16 RX ORDER — HYDROCODONE BITARTRATE AND ACETAMINOPHEN 7.5; 325 MG/1; MG/1
1 TABLET ORAL EVERY 8 HOURS PRN
Qty: 90 TABLET | Refills: 0 | Status: SHIPPED | OUTPATIENT
Start: 2022-11-18 | End: 2022-12-18

## 2022-11-16 ASSESSMENT — PAIN DESCRIPTION - PAIN TYPE: TYPE: CHRONIC PAIN

## 2022-11-16 ASSESSMENT — PAIN SCALES - GENERAL: PAINLEVEL_OUTOF10: 6

## 2022-11-16 ASSESSMENT — PAIN DESCRIPTION - LOCATION: LOCATION: BACK;NECK

## 2022-11-16 NOTE — PROGRESS NOTES
Clinic Documentation      Education Provided:  [x] Review of Sarmad Pena  [] Agreement Review  [x] PEG Score Calculated [] PHQ Score Calculated [] ORT Score Calculated    [] Compliance Issues Discussed [] Cognitive Behavior Needs [x] Exercise [] Review of Test [] Financial Issues  [x] Tobacco/Alcohol Use Reviewed [x] Teaching [] New Patient [] Picture Obtained    Physician Plan:  [] Outgoing Referral  [] Pharmacy Consult  [] Test Ordered [x] Prescription Ordered/Changed   [] Obtained Test Results / Consult Notes        Complete if patient is withholding blood thinner for procedure     Blood Thinner Patient is currently taking:      [] Plavix (Hold for 7 days)  [] Aspirin (Hold for 5 days)     [] Pletal (Hold for 2 days)  [] Pradaxa (Hold for 3 days)    [] Effient (Hold for 7 days)  [] Xarelto (Hold for 2 days)    [] Eliquis (Hold for 2 days)  [] Brilinta (Hold for 7 days)    [] Coumadin (Hold for 5 days) - (INR needs to be drawn the day prior to procedure- INR < 2.0)    [] Aggrenox (Hold for 7 days)        [] Patient will stop medication on their own.    [] Blood Thinner Form Faxed for approval to hold.    Provider form faxed to:     Assessment Completed by:  Electronically signed by Quinn Mooney MA on 11/16/2022 at 8:13 AM

## 2022-11-16 NOTE — PROGRESS NOTES
[facility] Physical and Pain Medicine    Office Progress Note    Patient Name: Denise Almodovar    MR #: [id number]    Account #: [de-identified]    : 1950    Age: 67 y.o. Sex: male    Date: 2022    PCP: MARY Amaro         Referring Provider:    Chief Complaint:   Chief Complaint   Patient presents with    Back Pain       History of Present Illness:    Denise Almodovar is a 67 y.o. male who presents to the office for follow-up of bilateral cervical and thoracic trigger point injections. He says that he obtained 80% relief for 6 weeks and is wanting the injections repeated. He continues to take his Norco and Gabapentin (as needed) which gives him enough relief to complete ADL's. Last pain management HPI note read    Employment: Retired []   Disabled  []   Works []    Does Not Work [x]     Previous Injury:  Yes  []   No [x]     Previous Surgery: Yes []   No [x]     Previous Physical Therapy In the last 6 months? Yes  []     No [x]   Did Physical Therapy make thepain better or worse? Better []   Worse []  Unchanged []    MRI in the last two years? Yes [x]  No []   Results reviewed with patient? Yes [x]   No []    CT Scan in the last two years? Yes  []   No [x]   Results reviewed with patient? Yes []   No []    X-ray in the last two years? Yes [x]   No  []  Results reviewed with patient? Yes  []  No []    Injections in the past?  Yes [x]   No []   Did the injections help relieve the pain? Yes [x]   No []     Do you have Depression? Yes  []    No [x]   Thinking of harming yourself or others?   Yes  []   No [x]     Past Medical Histoy  Past Medical History:   Diagnosis Date    Allergic rhinitis     Arthritis     [facility]    Thyroid  - Dr Lazaro Kline     Coronary artery disease involving native heart without angina pectoris 2022    Hyperlipidemia     Hypertension     Hyperthyroidism     [facility]     Sleep apnea     Type II or unspecified type diabetes mellitus without mention of complication, not stated as uncontrolled        Surgery History  Past Surgical History:   Procedure Laterality Date    CHOLECYSTECTOMY      INCONTINENCE SURGERY  11/26/2018    dr gino Rucker  08/01/2017    removal, dr Velazquez Pump      removed by Dr Refugio Alvares  Patient has no known allergies. Current Medications  Current Outpatient Medications   Medication Sig Dispense Refill    [START ON 11/18/2022] HYDROcodone-acetaminophen (NORCO) 7.5-325 MG per tablet Take 1 tablet by mouth every 8 hours as needed for Pain for up to 30 days. May fill 11- 90 tablet 0    citalopram (CELEXA) 20 MG tablet Take 1 tablet by mouth daily 90 tablet 3    metoprolol tartrate (LOPRESSOR) 25 MG tablet Take 1 tablet by mouth 2 times daily 60 tablet 11    levothyroxine (SYNTHROID) 25 MCG tablet Take 1 tablet by mouth in the morning. Total of 225mcg. 90 tablet 3    levothyroxine (SYNTHROID) 200 MCG tablet Take 1 tablet by mouth in the morning. Total of 225mcg. 90 tablet 3    gabapentin (NEURONTIN) 400 MG capsule Take 1 capsule by mouth 3 times daily for 90 days.  May fill 5/23/2022 270 capsule 0    atorvastatin (LIPITOR) 20 MG tablet Take 1 tablet by mouth at bedtime 90 tablet 3    amLODIPine (NORVASC) 2.5 MG tablet       aspirin 81 MG EC tablet Take 81 mg by mouth daily      nitroGLYCERIN (NITROSTAT) 0.4 MG SL tablet Place 0.4 mg under the tongue (Patient not taking: Reported on 8/3/2022)      glipiZIDE (GLUCOTROL) 5 MG tablet Take 1 tablet by mouth daily 90 tablet 3    lisinopril (PRINIVIL;ZESTRIL) 5 MG tablet Take 5 mg by mouth daily      allopurinol (ZYLOPRIM) 100 MG tablet Take 1 tablet by mouth daily 90 tablet 3    Cholecalciferol (VITAMIN D3) 2000 units TABS Take 1 tablet by mouth daily 30 tablet 11    COLCRYS 0.6 MG tablet TAKE 2 TABLETS THEN AN HOUR LATER TAKE 1 TABLET WHEN GOUT FLARES (Patient not taking: Reported on 8/3/2022) 12 tablet 5     No current facility-administered medications for this encounter. Social History    Social History     Tobacco Use    Smoking status: Never    Smokeless tobacco: Never   Substance Use Topics    Alcohol use: No         Family History  family history includes Cancer in his father; Heart Attack in his father and mother; Heart Disease in his father; Heart Failure in his mother; Schizophrenia in his mother; Thyroid Disease in his sister. Review of Systems:  Constitutional: denies fever, chills, fatigue, change in appetite, weight gain or weight loss  Head: Normocephalic  Skin: denies easy bruising, skin redness, skin rash, hives, sensitivity to sun exposure, tightness, nodules or bumps, hair loss, color changes in the hands or feet with cold. Eyes: denies pain, redness, loss of vision, double or blurred vision, eye drainage, or dryness. ENT and Mouth: denies ringing in the ears, loss of hearing, nasal congestion, nasal discharge, no hoarseness, sore throat, or difficulty swallowing   Respiratory: denies chronic dry cough, coughing up blood, coughing up mucus, waking at night coughing or choking, or wheezing. Cardiovascular: denies chest pain, irregular heartbeats, palpitations, shortness of breath, or edema in legs  Gastrointestinal: denies, nausea, vomiting, heartburn, diarrhea, or constipation  Genitourinary: denies difficult urination, pain or burning with urination, blood in the urine, or cloudy urine  Musculoskeletal: denies arm, buttock, thigh or calf cramps. Has pain in neck and bilateral shoulders and mid back, muscle spasms in neck and bilateral shoulders and mid back and tenderness in neck and bilateral shoulders in mid back. No muscle weakness. No joint swelling. Healing chest  Neurologic: headache, dizziness, fainting, loss of consciousness, no memory loss. No sensitivity.   Endocrine: denies intolerance to hot or cold temperature, night sweats, flushing, fingernail changes, increased thirst, or hairloss   Hematologic/ Lymphatic: denies anemia, bleeding tendency or clotting tendency, bruising easily. Allergic/ Immunologic: denies rhinitis, asthma, skin sensitivity, or allergy to Latex   Psychiatric: denies depression or thoughts of suicide, or voices in head. Current Pain Assessment:   Pain Assessment  Pain Assessment: 0-10  Pain Level: 6  Patient's Stated Pain Goal: 3  Pain Location: Back, Neck  Pain Type: Chronic pain    Clinical Progression: gradually improving  Effect of Pain on Daily Activities: limits activity  Patient's Stated Pain Goal: No pain  Pain Intervention(s): Medication (see eMar), Repositioning, Rest, Ice    Current PE.3    ORT Score: 1    PHQ-9 Score: 0    Physical Exam:    Vitals:    22 0756   BP: 132/67   Pulse: 74   Temp: 97 °F (36.1 °C)   TempSrc: Temporal   SpO2: 91%   Weight: 251 lb (113.9 kg)   Height: 5' 11\" (1.803 m)       Body mass index is 35.01 kg/m². General Appearance: no acute distress. Appears to be well dressed  Skin Exam: Warm and dry, no jaundice, rashes or lesions  Head Exam: NCAT, PERRLA, EOMI, moist mucus membranes, scalp normal  Neck Exam: Supple, no masses palpated, trachea midline. Pain with flexion and extension of head. Pain with palpation of muscles iin neck and bilateral shoulders  Lung: Clear to ausculation in all lobes anterior and posterior. Heart: Regular rate and rhythm, no gallops, rubs or murmurs, no edema  Abdomen: Bowel sounds in all quadrants, soft, non-distended, non-tender with palpation, no guarding  Extremities: No rash, cyanosis or bruising  Musculoskeletal: No joint swelling or deformity.     Back Exam: Tenderness with palpation of muscles in mid back  Hip Exam: Full rotation bilateral   Knee Exam: Crepitus and pain in right knee with flexion and extension  Shoulder Exam: Full ROM bilateral  Neurologic Exam: Gait and coordination normal, speech normal  Reflexes: Normal brachialis, Negative Mariee's bilateral. Normal Patellar bilateral,   CN EXAM: II-XII intact, face symmetrical, tongue symmetrical, the trapezius and sternocleidomastoid muscle appearance and strength symmetrical, normal achilles bilateral, ankle clonus negative bilateral  Strength: 5/5 RUE Bi's/Tri's, 5/5 LUE Bi's/Tri's, 5/5 RLE knee flex/ext, 5/5 RLE DF/PF, 5/5 LLE knee flex/ext, 5/5 LLE DF/PF  Sensation: Equal and intact to fine touch in all extremities  Mood and affect: Normal   Nurses note reviewed along with current vital signs    Active Problem(s)  Active Problems:    Cervicalgia    Muscle spasms of neck    Myofascial muscle pain    Muscle spasm of back  Resolved Problems:    * No resolved hospital problems. *                                                                                                                            PLAN:  1. Patient is to call the office with any questions or concerns that may arise prior to next appointment. 2. Continue Norco and Gabapentin (says that he does not need a refill at this time on his Gabapentin)  3. Schedule patient for bilateral cervical and Thoracic trigger point injections    May need to be scheduled for VANE C3-C4    Urine Drug Screen Current/Today:  Yes  []  No [x]     Discussion:  Discussed exam findings and plan of care with patient. Patient agreed with the current plan of care at this time. All questions from the patient were answered by the provider. Activity:   Discussed exercise as beneficial to pain reduction, encouraged stretching exercise with a focus on torso strengthening.     Education Provided:  Review of Feliciano Asper [x] Agreement Review [x]  Reviewed PHQ-9  [x]    Review of Test [] Compliance Issues Discussed []   Cognitive Behavior Needs [] Exercise [x]  Financial Issues []   Tobacco/Alcohol Use [] Teaching  [x]     Goal:  Pain Management Goals of Therapy:   []        Resolution in pain  [x]        Decrease in pain level  [x]        Improvement in ADL's  [x]        Increase in activities with less pain  [x]        Decrease in medication      [] Benzodiazapine's and Narcotics:  Patient educated on the possible effects of combining Benzodiazapine's and Opioids. Explained \"Black Box Warnings\" such as; possible suppressed breathing, hypoxia, anoxia, depressed cognition, heart arrhythmia, coma and possible death. Patient verbalized understanding concerning possible effects. Controlled Substance Monitoring:  Attestation: The ERLINDA report for this patient was reviewed today. Discussed with patient possible medication side effects, risk of tolerance, dependence and alternative treatments. Discussed the growing epidemic in the [place] with the overprescribing and at times the abuse of narcotics. Discussed the detrimental effects of long term narcotic use. Patient encouraged to set daily goals of exercising and decreasing daily narcotic intake. Discussed with the patient about the development of hyperalgesia with long term narcotic intake. EMR dragon/transcription disclaimer: Much of this encounter note is electronic transcription/translation of spoken language to printed tach. Electronic translation of spoken language may be erroneous, or at times, nonsensical words or phrases may be inadvertently transcribed.  Although, I have reviewed the note for such errors, some may still exist.     CC:  Marylee Sandifer, APRN Evia Carder, APRN, 11/16/2022 at 8:27 AM

## 2022-11-17 ENCOUNTER — APPOINTMENT (OUTPATIENT)
Dept: CARDIAC REHAB | Facility: HOSPITAL | Age: 72
End: 2022-11-17

## 2022-11-22 ENCOUNTER — APPOINTMENT (OUTPATIENT)
Dept: CARDIAC REHAB | Facility: HOSPITAL | Age: 72
End: 2022-11-22

## 2022-11-29 ENCOUNTER — APPOINTMENT (OUTPATIENT)
Dept: CARDIAC REHAB | Facility: HOSPITAL | Age: 72
End: 2022-11-29

## 2022-12-03 ENCOUNTER — HOSPITAL ENCOUNTER (OUTPATIENT)
Dept: GENERAL RADIOLOGY | Facility: HOSPITAL | Age: 72
Discharge: HOME | End: 2022-12-03

## 2022-12-03 PROCEDURE — 73110 X-RAY EXAM OF WRIST: CPT

## 2022-12-03 PROCEDURE — 73130 X-RAY EXAM OF HAND: CPT

## 2022-12-19 DIAGNOSIS — R20.0 NUMBNESS AND TINGLING OF BOTH LEGS: ICD-10-CM

## 2022-12-19 DIAGNOSIS — M54.12 CERVICAL RADICULOPATHY: ICD-10-CM

## 2022-12-19 DIAGNOSIS — R20.2 NUMBNESS AND TINGLING OF BOTH LEGS: ICD-10-CM

## 2022-12-19 RX ORDER — HYDROCODONE BITARTRATE AND ACETAMINOPHEN 7.5; 325 MG/1; MG/1
1 TABLET ORAL EVERY 8 HOURS PRN
Qty: 90 TABLET | Refills: 0 | Status: SHIPPED | OUTPATIENT
Start: 2022-12-19 | End: 2023-01-18

## 2022-12-28 DIAGNOSIS — E79.0 HYPERURICEMIA: ICD-10-CM

## 2022-12-28 NOTE — TELEPHONE ENCOUNTER
Received fax from pharmacy requesting refill on pts medication(s). Pt was last seen in office on 8/3/2022  and has a follow up scheduled for 2/1/2023. Will send request to  Shannon Hurtado  for authorization.      Requested Prescriptions     Pending Prescriptions Disp Refills    allopurinol (ZYLOPRIM) 100 MG tablet [Pharmacy Med Name: ALLOPURINOL 100 MG TABLET] 90 tablet 3     Sig: Take 1 tablet by mouth daily

## 2022-12-29 RX ORDER — ALLOPURINOL 100 MG/1
100 TABLET ORAL DAILY
Qty: 90 TABLET | Refills: 3 | Status: SHIPPED | OUTPATIENT
Start: 2022-12-29

## 2023-01-03 DIAGNOSIS — G89.29 CHRONIC LEFT-SIDED LOW BACK PAIN WITH LEFT-SIDED SCIATICA: ICD-10-CM

## 2023-01-03 DIAGNOSIS — M54.42 CHRONIC LEFT-SIDED LOW BACK PAIN WITH LEFT-SIDED SCIATICA: ICD-10-CM

## 2023-01-03 DIAGNOSIS — M25.50 ARTHRALGIA OF MULTIPLE JOINTS: ICD-10-CM

## 2023-01-03 RX ORDER — GABAPENTIN 400 MG/1
400 CAPSULE ORAL 3 TIMES DAILY
Qty: 270 CAPSULE | Refills: 0 | Status: SHIPPED | OUTPATIENT
Start: 2023-01-03 | End: 2023-04-03

## 2023-01-05 ENCOUNTER — HOSPITAL ENCOUNTER (OUTPATIENT)
Dept: PAIN MANAGEMENT | Age: 73
Discharge: HOME | End: 2023-01-05
Payer: MEDICARE

## 2023-01-05 VITALS
OXYGEN SATURATION: 94 % | TEMPERATURE: 97.3 F | RESPIRATION RATE: 18 BRPM | SYSTOLIC BLOOD PRESSURE: 143 MMHG | DIASTOLIC BLOOD PRESSURE: 66 MMHG | HEART RATE: 78 BPM

## 2023-01-05 PROCEDURE — 2500000003 HC RX 250 WO HCPCS

## 2023-01-05 PROCEDURE — 20553 NJX 1/MLT TRIGGER POINTS 3/>: CPT

## 2023-01-05 RX ORDER — LIDOCAINE HYDROCHLORIDE 10 MG/ML
15 INJECTION, SOLUTION EPIDURAL; INFILTRATION; INTRACAUDAL; PERINEURAL ONCE
Status: DISCONTINUED | OUTPATIENT
Start: 2023-01-05 | End: 2023-01-07 | Stop reason: HOSPADM

## 2023-01-05 RX ORDER — BUPIVACAINE HYDROCHLORIDE 5 MG/ML
15 INJECTION, SOLUTION EPIDURAL; INTRACAUDAL ONCE
Status: DISCONTINUED | OUTPATIENT
Start: 2023-01-05 | End: 2023-01-07 | Stop reason: HOSPADM

## 2023-01-05 NOTE — DISCHARGE INSTRUCTIONS
[facility] Physical And Pain Medicine  Post Procedure Discharge Instructions        YOU HAVE HAD THE FOLLOWING PROCEDURE:                                  [] Occipital Nerve Blocks  [] CTS wrist injection(s)  [] Knee Injection(s)         [] Shoulder Injection(s)   [] Elbow Injection(s)     [] Botox Injection  [x] Cervical Trigger Point Injections    [x] Thoracic Trigger Point Injections    [] Lumbar Trigger Point Injections  [] Piriformis Trigger Point Injections  [] SI Joint Injection(s)     [] Trochanteric Bursa Injection(s)       [] Ankle Injection(s)   [] Plantar Fasciitis   []  ______________  Injection(s) [] Botox []  Migraines [] Spasticity    YOU HAVE RECEIVED THE FOLLOWING MEDICATIONS IN YOUR INJECTION(s)  [x] Lidocaine [x] Bupivacaine   [] DepoMedrol (steroid) [] Decadron (steroid)  []  Kenalog (steroid)   [] Toradol  [] Supartz [] Riki Kenning    [] Botox        PATIENT INFORMATION:   You may experience the following symptoms after your procedure. These symptoms are normal and should not cause concern: You may have an increase in your pain. This may last 24 - 48 hours after your procedure. You may have no change in the pain that you had prior to your injection(s). You may have weakness or numbness in your affected extremity. If this occurs, this may last until numbing the medication wears off. REPORT THE FOLLOWING SYMPTOMS TO YOUR DOCTOR:  Redness, swelling or drainage at the injection site(s)  Unusual pain that interferes with your normal activities of daily living. OTHER INSTRUCTIONS:    [x] I will apply ice to the injection site(s) for at least 24 hours after the procedure. I will rotate the ice on for 20 minutes and off for 20 minutes for at least 24 hours. [x] I will not apply heat for at least 48 hours and I will not take a hot bath or shower for at least 24 hours.      [x] I understand that if Lidocaine or Bupivacaine was used in my injection(s) that the injection site(s) will be numb for a few hours after the procedure    [] I understand if a steroid was used it will take effect in 3 - 7 days. I understand that as the numbing medication wears off, the pain may return until the steroids start working. [x] I understand that today I will rest for the next 24 hours and drink plenty of water. [] For Botox for Migraines please do not wear anything constricting around the forehead for 7-10 days post injection. Examples headband, hats, or bandana    [] For Botox for Spasticity start therapy for the affected limb in two weeks. [] Additional instructions: ______________________________________________ ___________________________________________________________________    Sedation:  Was oral sedation given? [] Yes  [x] No    I understand that if I took an oral nerve calming medication I will not drive for  [] 24 hours after taking the medication.     [x] I have received a copy of my discharge instructions and understand the above instructions to the best of my knowledge    Patient Discharged:  [x] Home  [] Hospital  [] Other  ____________________________________________    Via:  [x] Ambulatory  [] Wheelchair   [] Stretcher [] EMS       Accompanied By:   [] Significant other  [] Family Member  [] Friend   [] Hospital Staff  []  Ambulance Staff  [] Other_______________________________________________    Plan:  [x] Will return to the office in   [] 1 month   [] 3 months for:  [] Procedure Follow-up  [] Office Visit   [] Planned Procedure      Patient Signature: _____________________________________________________    Staff Signature: _______________________________________________________        If you have questions, problems or concerns you may call [phone] and follow the prompts

## 2023-01-05 NOTE — PROGRESS NOTES
Procedure:  Level of Consciousness: [x]Alert [x]Oriented []Disoriented []Lethargic  Anxiety Level: [x]Calm []Anxious []Depressed []Other  Skin: [x]Warm [x]Dry []Cool []Moist []Intact []Other  Cardiovascular: [x]Palpitations: [x]Never []Occasionally []Frequently  Chest Pain: [x]No []Yes  Respiratory:  [x]Unlabored []Labored []Cough ([] Productive []Unproductive)  HCG Required: [x]No []Yes   Results: []Negative []Positive  Knowledge Level:        [x]Patient/Other verbalized understanding of pre-procedure instructions. [x]Assessment of post-op care needs (transportation, responsible caregiver)        [x]Able to discuss health care problems and how to deal with it.   Factors that Affect Teaching:        Language Barrier: []No []Yes - why:        Hearing Loss:        []No [x]Yes            Corrective Device:  [x]Yes []No        Vision Loss:           []No [x]Yes            Corrective Device:  [x]Yes []No        Memory Loss:       []No []Yes            []Short Term []Long Term  Motivational Level:  [x]Asks Questions                  []Extremely Anxious       [x]Seems Interested               []Seems Uninterested                  []Denies need for Education  Risk for Injury:  [x]Patient oriented to person, place and time  []History of frequent falls/loss of balance  Nutritional:  []Change in appetite   []Weight Gain   []Weight Loss  Functional:  []Requires assistance with ADL's

## 2023-01-11 ENCOUNTER — OFFICE VISIT (OUTPATIENT)
Dept: CARDIOLOGY | Facility: CLINIC | Age: 73
End: 2023-01-11
Payer: MEDICARE

## 2023-01-11 VITALS
HEART RATE: 83 BPM | OXYGEN SATURATION: 98 % | WEIGHT: 250 LBS | SYSTOLIC BLOOD PRESSURE: 152 MMHG | HEIGHT: 71 IN | BODY MASS INDEX: 35 KG/M2 | DIASTOLIC BLOOD PRESSURE: 64 MMHG

## 2023-01-11 DIAGNOSIS — Z95.1 HX OF CABG: ICD-10-CM

## 2023-01-11 DIAGNOSIS — Z98.890 S/P MAZE OPERATION FOR ATRIAL FIBRILLATION: ICD-10-CM

## 2023-01-11 DIAGNOSIS — E78.2 MIXED HYPERLIPIDEMIA: Chronic | ICD-10-CM

## 2023-01-11 DIAGNOSIS — E11.22 TYPE 2 DIABETES MELLITUS WITH STAGE 3A CHRONIC KIDNEY DISEASE, WITHOUT LONG-TERM CURRENT USE OF INSULIN: Chronic | ICD-10-CM

## 2023-01-11 DIAGNOSIS — I10 PRIMARY HYPERTENSION: Chronic | ICD-10-CM

## 2023-01-11 DIAGNOSIS — I25.10 CORONARY ARTERY DISEASE INVOLVING NATIVE CORONARY ARTERY OF NATIVE HEART WITHOUT ANGINA PECTORIS: Primary | Chronic | ICD-10-CM

## 2023-01-11 DIAGNOSIS — I48.0 PAROXYSMAL ATRIAL FIBRILLATION: Chronic | ICD-10-CM

## 2023-01-11 DIAGNOSIS — Z86.79 S/P MAZE OPERATION FOR ATRIAL FIBRILLATION: ICD-10-CM

## 2023-01-11 DIAGNOSIS — I65.23 BILATERAL CAROTID ARTERY STENOSIS: Chronic | ICD-10-CM

## 2023-01-11 DIAGNOSIS — N18.31 TYPE 2 DIABETES MELLITUS WITH STAGE 3A CHRONIC KIDNEY DISEASE, WITHOUT LONG-TERM CURRENT USE OF INSULIN: Chronic | ICD-10-CM

## 2023-01-11 DIAGNOSIS — G47.30 SLEEP APNEA, UNSPECIFIED TYPE: Chronic | ICD-10-CM

## 2023-01-11 DIAGNOSIS — E66.09 CLASS 1 OBESITY DUE TO EXCESS CALORIES WITH SERIOUS COMORBIDITY AND BODY MASS INDEX (BMI) OF 34.0 TO 34.9 IN ADULT: ICD-10-CM

## 2023-01-11 PROBLEM — N18.30 TYPE 2 DIABETES MELLITUS WITH STAGE 3 CHRONIC KIDNEY DISEASE (HCC): Chronic | Status: ACTIVE | Noted: 2022-01-11

## 2023-01-11 PROBLEM — I20.0 UNSTABLE ANGINA (HCC): Status: RESOLVED | Noted: 2021-12-28 | Resolved: 2023-01-11

## 2023-01-11 PROCEDURE — 99214 OFFICE O/P EST MOD 30 MIN: CPT | Performed by: NURSE PRACTITIONER

## 2023-01-11 PROCEDURE — 93000 ELECTROCARDIOGRAM COMPLETE: CPT | Performed by: NURSE PRACTITIONER

## 2023-01-11 RX ORDER — METOPROLOL TARTRATE 50 MG/1
50 TABLET, FILM COATED ORAL 2 TIMES DAILY
Qty: 180 TABLET | Refills: 3 | Status: SHIPPED | OUTPATIENT
Start: 2023-01-11 | End: 2024-01-11

## 2023-01-11 NOTE — ASSESSMENT & PLAN NOTE
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 Continue cardiac rehab and encouraged him to exercise when not going to rehab. Call if chest discomfort worsens or doesn't improve. No evidence of sternal malunion on exam. Consider changing glipizide to Jardiance or Farxiga given CV benefits if has affordable coverage - defer to PCP - provided written information about Jardiance for patient to review and take to upcoming visit with PCP. Discussed benefits. Continue lifelong aspirin. Thoracic aortic ectasia followed by Dr. White.

## 2023-01-11 NOTE — ASSESSMENT & PLAN NOTE
Followed by PCP. Consider addition of SGLT2i for CV and nephro benefits if coverage is affordable - might need/qualify for patient assistance. Provided written information for him to review and take with him to PCP visit next month.

## 2023-01-11 NOTE — PROGRESS NOTES
"Encounter Date:01/11/2023  Chief Complaint:   Subjective    Zay Kamara is a 72 y.o. male who presents to [facility] [place] today for six month cardiac follow-up. He has been doing fairly well.      History of Present Illness   HPI     Coronary Artery Disease     Additional comments: Doing well. Some chest wall soreness that \"moves around\". Pins and needles has resolved. Finished cardiac rehab. Was walking once a week on his treadmill but hasn't been using lately. 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           Atrial Fibrillation     Additional comments: No palpitations. Paroxysmal. S/p MAZE and L atrial appendage occlusion during CABG 1/22. No significant bleeding on low dose aspirin.           Hypertension     Additional comments: Usually 140s/60s at home.No headaches, dizziness, nosebleeds, or swelling.            Hyperlipidemia     Additional comments: Sees his PCP next month and will get labs the week before. Trying to eat healthy but struggles - eats hamburgers twice a week and fried chicken and fish about once a week. Eats hot dogs. Eats green beans, carrots, and corn. Eats canned fruit and bananas. Only eats strawberries when in season.           Follow-up     Additional comments: 6 mo fu          Last edited by Letty Belcher APRN on 1/11/2023 10:28 AM.        History: Past medical, surgical, family, and social history reviewed.    Outpatient Medications Marked as Taking for the 1/11/23 encounter (Office Visit) with Letty Belcher APRN   Medication Sig Dispense Refill   • allopurinol (ZYLOPRIM) 100 MG tablet Take 100 mg by mouth Daily.     • aspirin (aspirin) 81 MG EC tablet Take 1 tablet by mouth Daily.     • atorvastatin (LIPITOR) 20 MG tablet Take 20 mg by mouth Daily.  2   • bisacodyl (DULCOLAX) 5 MG EC tablet Take 5 mg by mouth Daily As Needed for Constipation.     • Cholecalciferol (VITAMIN D3) 2000 units tablet Take 50 mcg by mouth Daily.     • " "citalopram (CeleXA) 20 MG tablet Take 20 mg by mouth Daily.     • Docusate Calcium (STOOL SOFTENER PO) Take 1 capsule by mouth 2 (Two) Times a Day.     • gabapentin (NEURONTIN) 400 MG capsule Take 400 mg by mouth 3 (Three) Times a Day. Can take up to TID but only takes BID due to side effects - sleepiness     • glipizide (GLUCOTROL) 5 MG tablet Take 5 mg by mouth Daily.     • HYDROcodone-acetaminophen (NORCO) 7.5-325 MG per tablet Take 1 tablet by mouth Every 8 (Eight) Hours As Needed for Moderate Pain . Usually takes twice a day     • levothyroxine (SYNTHROID, LEVOTHROID) 200 MCG tablet Take 200 mcg by mouth Daily. Takes with 25 mcg = 225 mcg     • levothyroxine (SYNTHROID, LEVOTHROID) 25 MCG tablet Take 25 mcg by mouth Daily. Takes with 200 mcg = 225 mcg     • lisinopril (PRINIVIL,ZESTRIL) 5 MG tablet Take 1 tablet by mouth Daily. 90 tablet 3   • metoprolol tartrate (LOPRESSOR) 50 MG tablet Take 1 tablet by mouth 2 (Two) Times a Day. 180 tablet 3   • nitroglycerin (NITROSTAT) 0.4 MG SL tablet Place 1 tablet under the tongue Every 5 (Five) Minutes As Needed for Chest Pain. 25 tablet 11   • polyethylene glycol (MIRALAX) 17 g packet Take 17 g by mouth Daily As Needed.     • [DISCONTINUED] metoprolol tartrate (LOPRESSOR) 25 MG tablet Take 1 tablet by mouth 2 (Two) Times a Day. 180 tablet 3        Objective     Vital Signs:   /64 (BP Location: Left arm, Patient Position: Sitting, Cuff Size: Adult)   Pulse 83   Ht 180.3 cm (70.98\")   Wt 113 kg (250 lb)   SpO2 98%   BMI 34.88 kg/m²   Wt Readings from Last 3 Encounters:   01/11/23 113 kg (250 lb)   12/03/22 114 kg (252 lb)   10/25/22 113 kg (249 lb)         Vitals reviewed.   Constitutional:       Appearance: Well-developed. Obese.   Eyes:      General: No scleral icterus.  HENT:      Right Ear: Decreased hearing noted.      Left Ear: Decreased hearing noted.      Ears:      Comments: Wears hearing aid  Neck:      Vascular: Normal carotid pulses. No carotid " bruit or JVD.   Pulmonary:      Effort: Pulmonary effort is normal.      Breath sounds: Normal breath sounds.   Chest:   Breasts:     Left: No tenderness.   Cardiovascular:      Normal rate. Regular rhythm.      No gallop.   Pulses:     Intact distal pulses.   Edema:     Peripheral edema absent.   Skin:     General: Skin is warm and dry.      Comments: Midline sternal incision well healed   Neurological:      Mental Status: Alert and oriented to person, place, and time.   Psychiatric:         Behavior: Behavior is cooperative.         Result Review  Data Reviewed:  The following data was reviewed by: LUIS Garcia on 01/11/2023  Lab Results - Last 18 Months   Lab Units 07/20/22  0759 05/17/22  1026 05/11/22  1138 05/09/22  1228 02/16/22  1222 02/15/22  1133 01/14/22  1046 01/14/22  1046 01/11/22  0821 01/10/22  0840 01/08/22  0421 01/07/22  0313 01/06/22  1813 01/06/22  1453 01/06/22  0805 01/05/22  1328 12/30/21  0956 07/28/21  1038   BUN mg/dL  --   --  26*  --   --  28*  --  31* 48* 46*   < > 29*   < > 25*  --  24*   < >  --    CREATININE mg/dL 1.40*  --  1.55*  --   --  1.5*   < > 1.2 1.41* 1.54*   < > 1.72*   < > 1.63*  --  1.32*   < >  --    EGFR IF NONAFRICN AM   --   --   --   --   --  46*  --  59* 49* 45*   < > 39*   < > 42*  --  53*   < >  --    EGFR IF AFRICN AM   --   --   --   --   --  56*  --  >59  --   --   --   --   --   --   --   --   --   --    SODIUM mmol/L  --   --  135*  --   --  138  --  137 136 134*   < > 138   < > 140  --  136   < >  --    SODIUM, ARTERIAL   --   --   --   --   --   --   --   --   --   --   --   --   --   --    < >  --   --   --    POTASSIUM mmol/L  --   --  4.6  --   --  4.9  --  4.9 5.1 4.8   < > 4.8   < > 4.8  --  4.8   < >  --    CHLORIDE mmol/L  --   --  100  --   --  101  --  99 99 99   < > 105   < > 108*  --  100   < >  --    CALCIUM mg/dL  --   --  8.8  --   --  9.5  --  9.3 8.1* 8.3*   < > 8.8   < > 9.5  --  9.0   < >  --    ALBUMIN g/dL  --   --  3.90   --   --   --   --  3.4*  --  3.60  --  3.60   < > 3.30*  --  4.50   < >  --    BILIRUBIN mg/dL  --   --  0.6  --   --   --   --  0.4  --  0.5  --   --   --   --   --  0.7   < >  --    ALK PHOS U/L  --   --  54  --   --   --   --  51  --  43  --   --   --   --   --  63   < >  --    AST (SGOT) U/L  --   --  17  --   --   --   --  22  --  19  --   --   --   --   --  22   < >  --    ALT (SGPT) U/L  --   --  13  --   --   --   --  15  --  7  --   --   --   --   --  16   < >  --    TRIGLYCERIDES mg/dL  --   --   --   --   --   --   --  148  --   --   --   --   --   --   --   --   --   --    HDL CHOL mg/dL  --   --   --   --   --   --   --  17*  --   --   --   --   --   --   --   --   --   --    LDL CHOL mg/dL  --   --   --   --   --   --   --  43  --   --   --   --   --   --   --   --   --   --    HEMOGLOBIN A1C %  --   --   --   --   --   --   --   --   --   --   --   --   --   --   --  6.70*  --   --    WBC 10*3/mm3  --  8.92 8.53 10.0   < >  --   --  15.0* 13.02* 11.13*   < > 20.57*   < > 12.94*  --  9.79   < >  --    RBC 10*6/mm3  --  3.60* 3.71* 4.16*   < >  --   --  3.14* 3.27* 3.03*   < > 3.41*   < > 3.49*  --  4.51   < >  --    HEMATOCRIT %  --  32.5* 32.7* 38.7*   < >  --   --  30.0* 29.8* 27.0*   < > 29.7*   < > 30.2*  --  40.0   < >  --    MCV fL  --  90.3 88.1 93.0   < >  --   --  95.5* 91.1 89.1   < > 87.1   < > 86.5  --  88.7   < >  --    MCH pg  --  28.3 28.6 28.8   < >  --   --  29.3 29.1 29.7   < > 29.3   < > 28.9  --  30.2   < >  --    TSH uIU/mL  --   --   --   --   --  0.485  --  5.260*  --   --   --   --   --   --   --   --   --   --    FREE T4 ng/dL  --   --   --   --   --   --   --  1.13  --   --   --   --   --   --   --   --   --   --    PSA ng/mL  --   --   --   --   --   --   --   --   --   --   --   --   --   --   --   --   --  <0.014   INR   --   --  1.01  --   --   --   --   --   --   --   --  1.15*  --  1.23*  --  1.21*   < >  --     < > = values in this interval not displayed.            ECG  12 Lead    Date/Time: 1/11/2023 10:32 AM  Performed by: Letty Belcher APRN  Authorized by: Letty Belcher APRN   Comparison: compared with previous ECG from 5/11/2022  Comparison to previous ECG: PACs are new, otherwise no significant changes  Rhythm: sinus rhythm  Ectopy: atrial premature contractions  BPM: 83  Conduction: right bundle branch block    Clinical impression: abnormal EKG               Assessment and Plan   Problem List Items Addressed This Visit        Cardiac and Vasculature    Coronary artery disease involving native heart - Primary (Chronic)    Overview     #1 99% ostial LAD stenosis and total occlusion of the distal LAD  #2 60% proximal RCA stenosis and 90% stenosis of the proximal PL branch  #3 dilated ascending aorta  #4 LVEF 55%    Coronary artery bypass graft x 4 (left internal mammary artery/sequencing the dominant diagonal artery and left anterior descending artery, reverse saphenous vein graft/distal right coronary artery, and reverse saphenous vein graft/posterior descending artery), Right and left MAZE procedure with radiofrequency and cryoablation, Left atrial appendage exclusion (Atriclip 40 mm device), Left endoscopic vein harvest performed on 1/6/2022 by Dr. White.            Current Assessment & Plan     Remains stable/improved s/p CABG x 4 1/2022. Continue cardiac rehab and encouraged him to exercise when not going to rehab. Call if chest discomfort worsens or doesn't improve. No evidence of sternal malunion on exam. Consider changing glipizide to Jardiance or Farxiga given CV benefits if has affordable coverage - defer to PCP - provided written information about Jardiance for patient to review and take to upcoming visit with PCP. Discussed benefits. Continue lifelong aspirin. Thoracic aortic ectasia followed by Dr. White.         Relevant Medications    metoprolol tartrate (LOPRESSOR) 50 MG tablet    Other Relevant Orders    ECG 12 Lead    Paroxysmal atrial  fibrillation (HCC) (Chronic)    Overview     [id number] 3 = high risk  HAS-BLED 3 = high risk  s/p MAZE, left atrial appendage occlusion 1/2022         Current Assessment & Plan     Maintaining sinus rhythm. Reasonable to remain off anticoagulation since s/p MAZE and left atrial appendage occlusion 1/2022.         Relevant Medications    metoprolol tartrate (LOPRESSOR) 50 MG tablet    Bilateral carotid artery stenosis (Chronic)    Overview     Dr. León following  8/21/2019 US - less than 50% R ICA, 50-69% L ICA stenosis  8/2021 - less than 50% L&R ICA         Current Assessment & Plan     Remains stable. Again encouraged healthy diet, BP, lipid, and glucose control, and routine aerobic exercise. Continue present therapy. Recheck per Dr. León/Jeri Chambers NP.         Hypertension (Chronic)    Current Assessment & Plan     Previously well controlled but uncontrolled per today's and home readings - most likely due to weight gain and being off amlodipine. Increase metoprolol tartrate to 50 mg BID. Call BP and HR readings in 2 weeks - sooner if SBP less than 100 or HR less than 60. Goal -120s/60-70s.         Relevant Medications    metoprolol tartrate (LOPRESSOR) 50 MG tablet    Other Relevant Orders    ECG 12 Lead    Mixed hyperlipidemia (Chronic)    Current Assessment & Plan     Well controlled except very low HDL (17) per 1/2022 labs on atorvastatin. Consider changing to rosuvastatin if HDL remains less than 40. Encouraged weight loss, healthy diet, and gradual increase in activity. Due for repeat labs next month before sees PCP. Requested copy for review when done.         Hx of CABG    Overview     1/2022         S/P Maze operation for atrial fibrillation    Overview     1/2022            Endocrine and Metabolic    Class 1 obesity due to excess calories with serious comorbidity and body mass index (BMI) of 34.0 to 34.9 in adult (Chronic)    Current Assessment & Plan     Patient's (Body mass index  is 34.88 kg/m².) indicates that they are obese (BMI >30) with health conditions that include obstructive sleep apnea, hypertension, coronary heart disease, diabetes mellitus, dyslipidemias, peripheral vascular disease and osteoarthritis . Weight is worsening. BMI is is above average; BMI management plan is completed. We discussed portion control and increasing exercise.          Type 2 diabetes mellitus with stage 3 chronic kidney disease (HCC) (Chronic)    Current Assessment & Plan     Followed by PCP. Consider addition of SGLT2i for CV and nephro benefits if coverage is affordable - might need/qualify for patient assistance. Provided written information for him to review and take with him to PCP visit next month.            Sleep    Sleep apnea (Chronic)    Overview     Previously noncompliant with CPAP         Current Assessment & Plan     Encouraged compliance with CPAP. Encouraged continued weight loss but continue CPAP until has significant amount of weight loss and then re-evaluate need for CPAP.          Patient was given instructions and counseling regarding his condition or for health maintenance advice. Please see specific information pulled into the AVS if appropriate.    Follow Up :   Return in about 6 months (around 7/11/2023) for Recheck.             Letty Belcher, APRN, ACNP-BC, CHFN-BC

## 2023-01-11 NOTE — ASSESSMENT & PLAN NOTE
Well controlled except very low HDL (17) per 1/2022 labs on atorvastatin. Consider changing to rosuvastatin if HDL remains less than 40. Encouraged weight loss, healthy diet, and gradual increase in activity. Due for repeat labs next month before sees PCP. Requested copy for review when done.

## 2023-01-11 NOTE — ASSESSMENT & PLAN NOTE
Patient's (Body mass index is 34.88 kg/m².) indicates that they are obese (BMI >30) with health conditions that include obstructive sleep apnea, hypertension, coronary heart disease, diabetes mellitus, dyslipidemias, peripheral vascular disease and osteoarthritis . Weight is worsening. BMI is is above average; BMI management plan is completed. We discussed portion control and increasing exercise.

## 2023-01-11 NOTE — ASSESSMENT & PLAN NOTE
Previously well controlled but uncontrolled per today's and home readings - most likely due to weight gain and being off amlodipine. Increase metoprolol tartrate to 50 mg BID. Call BP and HR readings in 2 weeks - sooner if SBP less than 100 or HR less than 60. Goal -120s/60-70s.

## 2023-01-16 ENCOUNTER — TELEPHONE (OUTPATIENT)
Dept: PAIN MANAGEMENT | Age: 73
End: 2023-01-16

## 2023-01-18 DIAGNOSIS — R20.0 NUMBNESS AND TINGLING OF BOTH LEGS: ICD-10-CM

## 2023-01-18 DIAGNOSIS — M54.12 CERVICAL RADICULOPATHY: ICD-10-CM

## 2023-01-18 DIAGNOSIS — R20.2 NUMBNESS AND TINGLING OF BOTH LEGS: ICD-10-CM

## 2023-01-18 RX ORDER — HYDROCODONE BITARTRATE AND ACETAMINOPHEN 7.5; 325 MG/1; MG/1
1 TABLET ORAL EVERY 8 HOURS PRN
Qty: 90 TABLET | Refills: 0 | Status: SHIPPED | OUTPATIENT
Start: 2023-01-18 | End: 2023-02-17

## 2023-01-31 DIAGNOSIS — N18.30 TYPE 2 DIABETES MELLITUS WITH STAGE 3 CHRONIC KIDNEY DISEASE, WITHOUT LONG-TERM CURRENT USE OF INSULIN (HCC): ICD-10-CM

## 2023-01-31 DIAGNOSIS — E11.22 TYPE 2 DIABETES MELLITUS WITH STAGE 3 CHRONIC KIDNEY DISEASE, WITHOUT LONG-TERM CURRENT USE OF INSULIN (HCC): ICD-10-CM

## 2023-02-01 RX ORDER — GLIPIZIDE 5 MG/1
TABLET ORAL
Qty: 90 TABLET | Refills: 0 | Status: SHIPPED | OUTPATIENT
Start: 2023-02-01

## 2023-02-01 NOTE — TELEPHONE ENCOUNTER
Received fax from pharmacy requesting refill on pts medication(s). Pt was last seen in office on 8/3/2022  and has a follow up scheduled for Visit date not found. Will send request to  [facility]  for authorization.      Requested Prescriptions     Pending Prescriptions Disp Refills    glipiZIDE (GLUCOTROL) 5 MG tablet [Pharmacy Med Name: GLIPIZIDE 5 MG TABLET] 90 tablet 3     Sig: TAKE 1 TABLET BY MOUTH EVERY DAY

## 2023-02-08 ENCOUNTER — OFFICE VISIT (OUTPATIENT)
Dept: FAMILY MEDICINE CLINIC | Age: 73
End: 2023-02-08
Payer: MEDICARE

## 2023-02-08 VITALS
HEART RATE: 66 BPM | WEIGHT: 246 LBS | TEMPERATURE: 97.3 F | BODY MASS INDEX: 34.31 KG/M2 | DIASTOLIC BLOOD PRESSURE: 79 MMHG | OXYGEN SATURATION: 95 % | SYSTOLIC BLOOD PRESSURE: 136 MMHG

## 2023-02-08 DIAGNOSIS — I10 ESSENTIAL HYPERTENSION: ICD-10-CM

## 2023-02-08 DIAGNOSIS — E89.0 POSTOPERATIVE HYPOTHYROIDISM: ICD-10-CM

## 2023-02-08 DIAGNOSIS — N18.31 TYPE 2 DIABETES MELLITUS WITH STAGE 3A CHRONIC KIDNEY DISEASE, WITHOUT LONG-TERM CURRENT USE OF INSULIN (HCC): Primary | ICD-10-CM

## 2023-02-08 DIAGNOSIS — F33.0 MILD EPISODE OF RECURRENT MAJOR DEPRESSIVE DISORDER (HCC): ICD-10-CM

## 2023-02-08 DIAGNOSIS — I25.10 CORONARY ARTERY DISEASE INVOLVING NATIVE HEART WITHOUT ANGINA PECTORIS, UNSPECIFIED VESSEL OR LESION TYPE: ICD-10-CM

## 2023-02-08 DIAGNOSIS — Z99.89 OSA ON CPAP: ICD-10-CM

## 2023-02-08 DIAGNOSIS — C61 PROSTATE CANCER (HCC): ICD-10-CM

## 2023-02-08 DIAGNOSIS — E78.2 MIXED HYPERLIPIDEMIA: ICD-10-CM

## 2023-02-08 DIAGNOSIS — G47.33 OSA ON CPAP: ICD-10-CM

## 2023-02-08 DIAGNOSIS — I48.0 PAROXYSMAL ATRIAL FIBRILLATION (HCC): ICD-10-CM

## 2023-02-08 DIAGNOSIS — Z85.46 HISTORY OF PROSTATE CANCER: ICD-10-CM

## 2023-02-08 DIAGNOSIS — E79.0 HYPERURICEMIA: ICD-10-CM

## 2023-02-08 DIAGNOSIS — I65.23 BILATERAL CAROTID ARTERY STENOSIS: ICD-10-CM

## 2023-02-08 DIAGNOSIS — E11.22 TYPE 2 DIABETES MELLITUS WITH STAGE 3A CHRONIC KIDNEY DISEASE, WITHOUT LONG-TERM CURRENT USE OF INSULIN (HCC): Primary | ICD-10-CM

## 2023-02-08 DIAGNOSIS — I73.9 PAD (PERIPHERAL ARTERY DISEASE) (HCC): ICD-10-CM

## 2023-02-08 PROCEDURE — G8427 DOCREV CUR MEDS BY ELIG CLIN: HCPCS | Performed by: NURSE PRACTITIONER

## 2023-02-08 PROCEDURE — 3075F SYST BP GE 130 - 139MM HG: CPT | Performed by: NURSE PRACTITIONER

## 2023-02-08 PROCEDURE — 1123F ACP DISCUSS/DSCN MKR DOCD: CPT | Performed by: NURSE PRACTITIONER

## 2023-02-08 PROCEDURE — 3017F COLORECTAL CA SCREEN DOC REV: CPT | Performed by: NURSE PRACTITIONER

## 2023-02-08 PROCEDURE — 99214 OFFICE O/P EST MOD 30 MIN: CPT | Performed by: NURSE PRACTITIONER

## 2023-02-08 PROCEDURE — G8417 CALC BMI ABV UP PARAM F/U: HCPCS | Performed by: NURSE PRACTITIONER

## 2023-02-08 PROCEDURE — 3051F HG A1C>EQUAL 7.0%<8.0%: CPT | Performed by: NURSE PRACTITIONER

## 2023-02-08 PROCEDURE — G8484 FLU IMMUNIZE NO ADMIN: HCPCS | Performed by: NURSE PRACTITIONER

## 2023-02-08 PROCEDURE — 1036F TOBACCO NON-USER: CPT | Performed by: NURSE PRACTITIONER

## 2023-02-08 PROCEDURE — 2022F DILAT RTA XM EVC RTNOPTHY: CPT | Performed by: NURSE PRACTITIONER

## 2023-02-08 PROCEDURE — 3078F DIAST BP <80 MM HG: CPT | Performed by: NURSE PRACTITIONER

## 2023-02-08 RX ORDER — BLOOD-GLUCOSE SENSOR
EACH MISCELLANEOUS
Qty: 3 EACH | Refills: 11 | Status: SHIPPED | OUTPATIENT
Start: 2023-02-08

## 2023-02-08 RX ORDER — BLOOD-GLUCOSE TRANSMITTER
EACH MISCELLANEOUS
Qty: 1 EACH | Refills: 3 | Status: SHIPPED | OUTPATIENT
Start: 2023-02-08

## 2023-02-08 RX ORDER — AMLODIPINE BESYLATE 2.5 MG/1
2.5 TABLET ORAL DAILY
Qty: 30 TABLET | Refills: 11 | Status: SHIPPED | OUTPATIENT
Start: 2023-02-08

## 2023-02-08 RX ORDER — LISINOPRIL 5 MG/1
5 TABLET ORAL DAILY
Qty: 30 TABLET | Refills: 11 | Status: SHIPPED | OUTPATIENT
Start: 2023-02-08

## 2023-02-08 RX ORDER — METOPROLOL TARTRATE 50 MG/1
50 TABLET, FILM COATED ORAL 2 TIMES DAILY
Qty: 60 TABLET | Refills: 11 | Status: SHIPPED | OUTPATIENT
Start: 2023-02-08

## 2023-02-08 RX ORDER — BLOOD-GLUCOSE,RECEIVER,CONT
EACH MISCELLANEOUS
Qty: 1 EACH | Refills: 0 | Status: SHIPPED | OUTPATIENT
Start: 2023-02-08

## 2023-02-08 SDOH — ECONOMIC STABILITY: FOOD INSECURITY: WITHIN THE PAST 12 MONTHS, THE FOOD YOU BOUGHT JUST DIDN'T LAST AND YOU DIDN'T HAVE MONEY TO GET MORE.: NEVER TRUE

## 2023-02-08 SDOH — ECONOMIC STABILITY: HOUSING INSECURITY
IN THE LAST 12 MONTHS, WAS THERE A TIME WHEN YOU DID NOT HAVE A STEADY PLACE TO SLEEP OR SLEPT IN A SHELTER (INCLUDING NOW)?: NO

## 2023-02-08 SDOH — ECONOMIC STABILITY: FOOD INSECURITY: WITHIN THE PAST 12 MONTHS, YOU WORRIED THAT YOUR FOOD WOULD RUN OUT BEFORE YOU GOT MONEY TO BUY MORE.: NEVER TRUE

## 2023-02-08 SDOH — ECONOMIC STABILITY: INCOME INSECURITY: HOW HARD IS IT FOR YOU TO PAY FOR THE VERY BASICS LIKE FOOD, HOUSING, MEDICAL CARE, AND HEATING?: NOT HARD AT ALL

## 2023-02-08 ASSESSMENT — PATIENT HEALTH QUESTIONNAIRE - PHQ9
SUM OF ALL RESPONSES TO PHQ QUESTIONS 1-9: 0
SUM OF ALL RESPONSES TO PHQ9 QUESTIONS 1 & 2: 0
6. FEELING BAD ABOUT YOURSELF - OR THAT YOU ARE A FAILURE OR HAVE LET YOURSELF OR YOUR FAMILY DOWN: 0
SUM OF ALL RESPONSES TO PHQ QUESTIONS 1-9: 0
7. TROUBLE CONCENTRATING ON THINGS, SUCH AS READING THE NEWSPAPER OR WATCHING TELEVISION: 0
4. FEELING TIRED OR HAVING LITTLE ENERGY: 0
10. IF YOU CHECKED OFF ANY PROBLEMS, HOW DIFFICULT HAVE THESE PROBLEMS MADE IT FOR YOU TO DO YOUR WORK, TAKE CARE OF THINGS AT HOME, OR GET ALONG WITH OTHER PEOPLE: 0
2. FEELING DOWN, DEPRESSED OR HOPELESS: 0
9. THOUGHTS THAT YOU WOULD BE BETTER OFF DEAD, OR OF HURTING YOURSELF: 0
8. MOVING OR SPEAKING SO SLOWLY THAT OTHER PEOPLE COULD HAVE NOTICED. OR THE OPPOSITE, BEING SO FIGETY OR RESTLESS THAT YOU HAVE BEEN MOVING AROUND A LOT MORE THAN USUAL: 0
5. POOR APPETITE OR OVEREATING: 0
1. LITTLE INTEREST OR PLEASURE IN DOING THINGS: 0
SUM OF ALL RESPONSES TO PHQ QUESTIONS 1-9: 0
3. TROUBLE FALLING OR STAYING ASLEEP: 0
SUM OF ALL RESPONSES TO PHQ QUESTIONS 1-9: 0

## 2023-02-08 ASSESSMENT — ENCOUNTER SYMPTOMS
DIARRHEA: 0
TROUBLE SWALLOWING: 0
CONSTIPATION: 0
ABDOMINAL PAIN: 0
RHINORRHEA: 0
SHORTNESS OF BREATH: 0
COUGH: 0
SORE THROAT: 0
NAUSEA: 0
VOMITING: 0

## 2023-02-08 NOTE — PROGRESS NOTES
Parisa Gibbons (:  1950) is a 67 y.o. male,Established patient, here for evaluation of the following chief complaint(s):  Diabetes (Cardiologist has told him that his medication needs to be increased and also his blood pressure medication as well. Wants to speak about Dexcom/Stinging and burning in toes and feet. Miguel Fuentesy been having issues swallowing and coughing while eating./)      ASSESSMENT/PLAN:    ICD-10-CM    1. Type 2 diabetes mellitus with stage 3a chronic kidney disease, without long-term current use of insulin ([facility])  E11.22 lisinopril (PRINIVIL;ZESTRIL) 5 MG tablet    N18.31 Continuous Blood Gluc  (DEXCOM G6 ) ROSEANN     Continuous Blood Gluc Sensor (DEXCOM G6 SENSOR) MISC     Continuous Blood Gluc Transmit (DEXCOM G6 TRANSMITTER) MISC     Comprehensive Metabolic Panel     Lipid Panel     Hemoglobin A1C     CBC with Auto Differential     Diabetic Foot Exam     TSH     empagliflozin (JARDIANCE) 10 MG tablet      2. Essential hypertension  I10 metoprolol tartrate (LOPRESSOR) 50 MG tablet     amLODIPine (NORVASC) 2.5 MG tablet     lisinopril (PRINIVIL;ZESTRIL) 5 MG tablet     Comprehensive Metabolic Panel     Lipid Panel     Hemoglobin A1C     CBC with Auto Differential      3. SUSY on CPAP  G47.33     Z99.89       4. Mixed hyperlipidemia  E78.2 Comprehensive Metabolic Panel     Lipid Panel      5. Paroxysmal atrial fibrillation (HCC)  I48.0       6. History of prostate cancer  Z85.46       7. Bilateral carotid artery stenosis  I65.23 Comprehensive Metabolic Panel     Lipid Panel      8. Postoperative hypothyroidism  E89.0 TSH      9. Prostate cancer ([facility] [address]  C61       10. Coronary artery disease involving native heart without angina pectoris, unspecified vessel or lesion type  I25.10 Comprehensive Metabolic Panel     Lipid Panel      11. Mild episode of recurrent major depressive disorder (HCC)  F33.0       12. PAD (peripheral artery disease) ([facility])  I73.9       13.  Hyperuricemia  E79.0 Uric Acid          Return for AWV. SUBJECTIVE/OBJECTIVE:  HPI  Here for follow up on health issues    Diabetes Mellitus Type 2:   Medications:   Glipizide   Would like dexcom  Home blood sugar records: patient tests 3 time(s) per week  Any episodes of hypoglycemia? no  Eye exam current (within one year): yes  Tobacco history: He  reports that he has never smoked. He has never used smokeless tobacco.   Daily Aspirin? Yes  Weight trend: stable  Current symptoms/problems include none. Hypertension:   Medications:   Metoprolol    Home blood pressure monitoring: No.  He is not adherent to a low sodium diet. Patient denies chest pain and shortness of breath. Antihypertensive medication side effects: no medication side effects noted. Use of agents associated with hypertension: none. Hyperlipidemia/CAD/PAD:    Medications:   atorvastatin (Lipitor)   No new myalgias or GI upset on atorvastatin (Lipitor). Denies any CP. Followed by [facility] cardiology and Dr Emma Dunbar. Also sees vascular (Dr Yusra York)  No concerns. Lab Results   Component Value Date    LABA1C 7.7 (H) 02/08/2023    LABA1C 7.0 (H) 08/01/2022    LABA1C 6.3 (H) 05/18/2021     Lab Results   Component Value Date    LABMICR 1.30 01/14/2022    CREATININE 1.4 (H) 02/08/2023     Lab Results   Component Value Date    ALT 17 02/08/2023    AST 14 02/08/2023     Lab Results   Component Value Date    CHOL 104 (L) 02/08/2023    TRIG 175 (H) 02/08/2023    HDL 22 (L) 02/08/2023    LDLCALC 47 02/08/2023    LDLDIRECT 81 (L) 11/02/2015          /79 (Site: Right Upper Arm, Position: Sitting, Cuff Size: Large Adult)   Pulse 66   Temp 97.3 °F (36.3 °C) (Temporal)   Wt 246 lb (111.6 kg)   SpO2 95%   BMI 34.31 kg/m²     Review of Systems   Constitutional:  Negative for activity change, appetite change, fatigue, fever and unexpected weight change. HENT:  Negative for ear pain, rhinorrhea, sore throat and trouble swallowing.     Eyes:  Negative for visual disturbance. Respiratory:  Negative for cough and shortness of breath. Cardiovascular:  Negative for chest pain, palpitations and leg swelling. Gastrointestinal:  Negative for abdominal pain, constipation, diarrhea, nausea and vomiting. Genitourinary:  Negative for flank pain. Musculoskeletal:  Negative for arthralgias, myalgias, neck pain and neck stiffness. Neurological:  Negative for headaches. Psychiatric/Behavioral:  Negative for decreased concentration and sleep disturbance. The patient is not nervous/anxious. Physical Exam  Vitals reviewed. Constitutional:       Appearance: Normal appearance. HENT:      Head: Normocephalic and atraumatic. Right Ear: Tympanic membrane, ear canal and external ear normal.      Left Ear: Tympanic membrane, ear canal and external ear normal.      Nose: Nose normal.      Mouth/Throat:      Mouth: Mucous membranes are moist.      Pharynx: Oropharynx is clear. Eyes:      Conjunctiva/sclera: Conjunctivae normal.   Cardiovascular:      Rate and Rhythm: Normal rate and regular rhythm. Pulses: Normal pulses. Heart sounds: Normal heart sounds. Pulmonary:      Effort: Pulmonary effort is normal.      Breath sounds: Normal breath sounds. Abdominal:      General: Bowel sounds are normal. There is no distension. Palpations: Abdomen is soft. Tenderness: There is no abdominal tenderness. There is no guarding. Musculoskeletal:         General: Normal range of motion. Cervical back: Normal range of motion and neck supple. Skin:     General: Skin is warm. Neurological:      Mental Status: He is alert and oriented to person, place, and time. Psychiatric:         Mood and Affect: Mood normal.         Behavior: Behavior normal.         Thought Content:  Thought content normal.         Judgment: Judgment normal.     Monofilament Exam Reveals:  Pulses: normal  Edema:normal  Skin Lesions:normal  Right Foot:    Left Foot:  Normal sensation at all   Normal sensation at all                 An electronic signature was used to authenticate this note.     --Karthik Lennon, APRN

## 2023-02-09 ENCOUNTER — TELEPHONE (OUTPATIENT)
Dept: FAMILY MEDICINE CLINIC | Age: 73
End: 2023-02-09

## 2023-02-09 NOTE — TELEPHONE ENCOUNTER
----- Message from MARY Carmona sent at 2/8/2023  5:21 PM CST -----  Please call patient and let them know results. Do not change patient's jardiance dose as he was just prescribed this today.

## 2023-02-09 NOTE — TELEPHONE ENCOUNTER
----- Message from MARY Holt sent at 2/8/2023  4:44 PM CST -----  TSH is too low. This means the thyroid is overstimulated. According to medication list patient is taking Synthroid 225 mcg. Please verify this. If patient is taking this then recommend decreasing down to Synthroid 200 mcg and rechecking TSH and free T4 in 6 weeks  Uric acid is normal  Cholesterol is well controlled  CMP: Normal sodium. Potassium is a little elevated at 5.2. Is patient taking any over-the-counter potassium supplement? If so recommend stopping. Recommend increasing water. Recommend rechecking BMP in 1 week. Blood sugar is 153. Renal function is declined but improved from previous blood draw. This is good news. Liver enzymes are normal  A1c is a little higher than 6 months ago. It is 7.7. Would like to see tighter control of this. Recommend increasing Jardiance from 10 mg up to 25 mg daily. Recommend ADA diet.   Recommend exercise as tolerated  CBC is within normal limits

## 2023-02-20 ENCOUNTER — HOSPITAL ENCOUNTER (OUTPATIENT)
Dept: PAIN MANAGEMENT | Age: 73
Discharge: HOME | End: 2023-02-20
Payer: MEDICARE

## 2023-02-20 VITALS
BODY MASS INDEX: 35.31 KG/M2 | DIASTOLIC BLOOD PRESSURE: 68 MMHG | HEART RATE: 69 BPM | TEMPERATURE: 98.2 F | RESPIRATION RATE: 18 BRPM | WEIGHT: 252.25 LBS | HEIGHT: 71 IN | OXYGEN SATURATION: 94 % | SYSTOLIC BLOOD PRESSURE: 154 MMHG

## 2023-02-20 DIAGNOSIS — R20.0 NUMBNESS AND TINGLING OF BOTH LEGS: ICD-10-CM

## 2023-02-20 DIAGNOSIS — M54.12 CERVICAL RADICULOPATHY: ICD-10-CM

## 2023-02-20 DIAGNOSIS — R20.2 NUMBNESS AND TINGLING OF BOTH LEGS: ICD-10-CM

## 2023-02-20 PROCEDURE — 99213 OFFICE O/P EST LOW 20 MIN: CPT | Performed by: NURSE PRACTITIONER

## 2023-02-20 PROCEDURE — 99214 OFFICE O/P EST MOD 30 MIN: CPT

## 2023-02-20 RX ORDER — HYDROCODONE BITARTRATE AND ACETAMINOPHEN 7.5; 325 MG/1; MG/1
1 TABLET ORAL EVERY 8 HOURS PRN
Qty: 90 TABLET | Refills: 0 | Status: SHIPPED | OUTPATIENT
Start: 2023-02-20 | End: 2023-03-22

## 2023-02-20 ASSESSMENT — PAIN DESCRIPTION - PAIN TYPE: TYPE: CHRONIC PAIN

## 2023-02-20 ASSESSMENT — PAIN DESCRIPTION - DIRECTION: RADIATING_TOWARDS: INTO SHOULDERS

## 2023-02-20 ASSESSMENT — PAIN DESCRIPTION - FREQUENCY: FREQUENCY: CONTINUOUS

## 2023-02-20 ASSESSMENT — PAIN DESCRIPTION - DESCRIPTORS: DESCRIPTORS: ACHING

## 2023-02-20 ASSESSMENT — PAIN DESCRIPTION - ONSET: ONSET: ON-GOING

## 2023-02-20 ASSESSMENT — PAIN SCALES - GENERAL: PAINLEVEL_OUTOF10: 8

## 2023-02-20 ASSESSMENT — PAIN DESCRIPTION - ORIENTATION: ORIENTATION: LOWER

## 2023-02-20 ASSESSMENT — PAIN - FUNCTIONAL ASSESSMENT: PAIN_FUNCTIONAL_ASSESSMENT: PREVENTS OR INTERFERES SOME ACTIVE ACTIVITIES AND ADLS

## 2023-02-20 ASSESSMENT — PAIN DESCRIPTION - LOCATION: LOCATION: NECK;BACK

## 2023-02-20 NOTE — PROGRESS NOTES
[facility] [place] Physical and Pain Medicine    Office Progress Note    Patient Name: Genevieve Phillips    MR #: [id number]    Account #: [de-identified]    : 1950    Age: 67 y.o. Sex: male    Date: 2023    PCP: MARY Ibanez         Referring Provider:    Chief Complaint:   Chief Complaint   Patient presents with    Back Pain       History of Present Illness:    Genevieve Phillips is a 67 y.o. male who presents to the office for follow-up of bilateral cervical and thoracic trigger point injections. He says that he obtained 80% relief for 6 weeks and is wanting the injections repeated. He is wanting medication that may last longer. He continues to take his Norco and Gabapentin (as needed) which gives him enough relief to complete ADL's. Last pain management HPI note read    Employment: Retired []   Disabled  []   Works []    Does Not Work [x]     Previous Injury:  Yes  []   No [x]     Previous Surgery: Yes []   No [x]     Previous Physical Therapy In the last 6 months? Yes  []     No [x]   Did Physical Therapy make thepain better or worse? Better []   Worse []  Unchanged []    MRI in the last two years? Yes [x]  No []   Results reviewed with patient? Yes [x]   No []    CT Scan in the last two years? Yes  []   No [x]   Results reviewed with patient? Yes []   No []    X-ray in the last two years? Yes [x]   No  []  Results reviewed with patient? Yes  []  No []    Injections in the past?  Yes [x]   No []   Did the injections help relieve the pain? Yes [x]   No []     Do you have Depression? Yes  []    No [x]   Thinking of harming yourself or others?   Yes  []   No [x]     Past Medical Histoy  Past Medical History:   Diagnosis Date    Allergic rhinitis     Arthritis     Cancer ([facility] [address]     Thyroid  - Dr Julius Bowles     Coronary artery disease involving native heart without angina pectoris 2022    Hyperlipidemia     Hypertension     Hyperthyroidism Prostate cancer ([facility] [address]     Sleep apnea     Type II or unspecified type diabetes mellitus without mention of complication, not stated as uncontrolled        Surgery History  Past Surgical History:   Procedure Laterality Date    CHOLECYSTECTOMY      INCONTINENCE SURGERY  11/26/2018    dr gino Rucker  08/01/2017    removal, dr Velazquez Pump      removed by Dr Refugio Alvares  Patient has no known allergies. Current Medications  Current Outpatient Medications   Medication Sig Dispense Refill    HYDROcodone-acetaminophen (NORCO) 7.5-325 MG per tablet Take 1 tablet by mouth every 8 hours as needed for Pain for up to 30 days. May fill 2- 90 tablet 0    metoprolol tartrate (LOPRESSOR) 50 MG tablet Take 1 tablet by mouth 2 times daily 60 tablet 11    amLODIPine (NORVASC) 2.5 MG tablet Take 1 tablet by mouth daily 30 tablet 11    lisinopril (PRINIVIL;ZESTRIL) 5 MG tablet Take 1 tablet by mouth daily 30 tablet 11    Continuous Blood Gluc  (DEXCOM G6 ) ROSEANN To monitor sugars 1 each 0    Continuous Blood Gluc Sensor (DEXCOM G6 SENSOR) MISC Change sensor every 10 days 3 each 11    Continuous Blood Gluc Transmit (DEXCOM G6 TRANSMITTER) MISC Change transmitter every 3 months. 1 each 3    empagliflozin (JARDIANCE) 10 MG tablet Take 1 tablet by mouth daily 30 tablet 11    glipiZIDE (GLUCOTROL) 5 MG tablet TAKE 1 TABLET BY MOUTH EVERY DAY 90 tablet 0    gabapentin (NEURONTIN) 400 MG capsule Take 1 capsule by mouth 3 times daily for 90 days. 270 capsule 0    allopurinol (ZYLOPRIM) 100 MG tablet Take 1 tablet by mouth daily 90 tablet 3    citalopram (CELEXA) 20 MG tablet Take 1 tablet by mouth daily 90 tablet 3    levothyroxine (SYNTHROID) 25 MCG tablet Take 1 tablet by mouth in the morning. Total of 225mcg. 90 tablet 3    levothyroxine (SYNTHROID) 200 MCG tablet Take 1 tablet by mouth in the morning. Total of 225mcg.  90 tablet 3 atorvastatin (LIPITOR) 20 MG tablet Take 1 tablet by mouth at bedtime 90 tablet 3    aspirin 81 MG EC tablet Take 81 mg by mouth daily      nitroGLYCERIN (NITROSTAT) 0.4 MG SL tablet Place 0.4 mg under the tongue (Patient not taking: Reported on 8/3/2022)      Cholecalciferol (VITAMIN D3) 2000 units TABS Take 1 tablet by mouth daily 30 tablet 11    COLCRYS 0.6 MG tablet TAKE 2 TABLETS THEN AN HOUR LATER TAKE 1 TABLET WHEN GOUT FLARES 12 tablet 5     No current facility-administered medications for this encounter. Social History    Social History     Tobacco Use    Smoking status: Never    Smokeless tobacco: Never   Substance Use Topics    Alcohol use: No         Family History  family history includes Cancer in his father; Heart Attack in his father and mother; Heart Disease in his father; Heart Failure in his mother; Schizophrenia in his mother; Thyroid Disease in his sister. Review of Systems:  Constitutional: denies fever, chills, fatigue, change in appetite, weight gain or weight loss  Head: Normocephalic  Skin: denies easy bruising, skin redness, skin rash, hives, sensitivity to sun exposure, tightness, nodules or bumps, hair loss, color changes in the hands or feet with cold. Eyes: denies pain, redness, loss of vision, double or blurred vision, eye drainage, or dryness. ENT and Mouth: denies ringing in the ears, loss of hearing, nasal congestion, nasal discharge, no hoarseness, sore throat, or difficulty swallowing   Respiratory: denies chronic dry cough, coughing up blood, coughing up mucus, waking at night coughing or choking, or wheezing.    Cardiovascular: denies chest pain, irregular heartbeats, palpitations, shortness of breath, or edema in legs  Gastrointestinal: denies, nausea, vomiting, heartburn, diarrhea, or constipation  Genitourinary: denies difficult urination, pain or burning with urination, blood in the urine, or cloudy urine  Musculoskeletal: denies arm, buttock, thigh or calf cramps. Has pain in neck and bilateral shoulders and mid back, muscle spasms in neck and bilateral shoulders and mid back and tenderness in neck and bilateral shoulders in mid back. No muscle weakness. No joint swelling. Healing chest  Neurologic: headache, dizziness, fainting, loss of consciousness, no memory loss. No sensitivity. Endocrine: denies intolerance to hot or cold temperature, night sweats, flushing, fingernail changes, increased thirst, or hairloss   Hematologic/ Lymphatic: denies anemia, bleeding tendency or clotting tendency, bruising easily. Allergic/ Immunologic: denies rhinitis, asthma, skin sensitivity, or allergy to Latex   Psychiatric: denies depression or thoughts of suicide, or voices in head. Current Pain Assessment:   Pain Assessment  Pain Assessment: 0-10  Pain Level: 8  Patient's Stated Pain Goal: 3  Pain Location: Neck, Back  Pain Orientation: Lower  Pain Descriptors: Aching  Functional Pain Assessment: Prevents or interferes some active activities and ADLs  Pain Type: Chronic pain  Pain Radiating Towards: into shoulders  Pain Frequency: Continuous  Pain Onset: On-going    Clinical Progression: gradually improving  Effect of Pain on Daily Activities: limits activity  Patient's Stated Pain Goal: No pain  Pain Intervention(s): Medication (see eMar), Repositioning, Rest, Ice    Current PE.3    ORT Score: 1    PHQ-9 Score: 0    Physical Exam:    Vitals:    23 0751   BP: (!) 154/68   Pulse: 69   Resp: 18   Temp: 98.2 °F (36.8 °C)   TempSrc: Skin   SpO2: 94%   Weight: 252 lb 4 oz (114.4 kg)   Height: 5' 11\" (1.803 m)       Body mass index is 35.18 kg/m². General Appearance: no acute distress. Appears to be well dressed  Skin Exam: Warm and dry, no jaundice, rashes or lesions  Head Exam: NCAT, PERRLA, EOMI, moist mucus membranes, scalp normal  Neck Exam: Supple, no masses palpated, trachea midline. Pain with flexion and extension of head.  Pain with palpation of muscles iin neck and bilateral shoulders  Lung: Clear to ausculation in all lobes anterior and posterior. Heart: Regular rate and rhythm, no gallops, rubs or murmurs, no edema  Abdomen: Bowel sounds in all quadrants, soft, non-distended, non-tender with palpation, no guarding  Extremities: No rash, cyanosis or bruising  Musculoskeletal: No joint swelling or deformity. Back Exam: Tenderness with palpation of muscles in mid back  Hip Exam: Full rotation bilateral   Knee Exam: Crepitus and pain in right knee with flexion and extension  Shoulder Exam: Full ROM bilateral  Neurologic Exam: Gait and coordination normal, speech normal  Reflexes: Normal brachialis, Negative Mariee's bilateral. Normal Patellar bilateral,   CN EXAM: II-XII intact, face symmetrical, tongue symmetrical, the trapezius and sternocleidomastoid muscle appearance and strength symmetrical, normal achilles bilateral, ankle clonus negative bilateral  Strength: 5/5 RUE Bi's/Tri's, 5/5 LUE Bi's/Tri's, 5/5 RLE knee flex/ext, 5/5 RLE DF/PF, 5/5 LLE knee flex/ext, 5/5 LLE DF/PF  Sensation: Equal and intact to fine touch in all extremities  Mood and affect: Normal   Nurses note reviewed along with current vital signs    Active Problem(s)  Active Problems:    Cervicalgia    Muscle spasms of neck    Myofascial muscle pain    Muscle spasm of back  Resolved Problems:    * No resolved hospital problems. *                                                                                                                            PLAN:  1. Patient is to call the office with any questions or concerns that may arise prior to next appointment. 2. Continue Norco and Gabapentin (says that he does not need a refill at this time on his Gabapentin)  3.  Schedule patient for bilateral cervical and Thoracic trigger point injections with Toradol    May need to be scheduled for VANE C3-C4    Urine Drug Screen Current/Today:  Yes  [x]  No []     Discussion:  Discussed exam findings and plan of care with patient. Patient agreed with the current plan of care at this time. All questions from the patient were answered by the provider. Activity:   Discussed exercise as beneficial to pain reduction, encouraged stretching exercise with a focus on torso strengthening. Education Provided:  Review of Lonney Niece [x] Agreement Review [x]  Reviewed PHQ-9  [x]    Review of Test [] Compliance Issues Discussed []   Cognitive Behavior Needs [] Exercise [x]  Financial Issues []   Tobacco/Alcohol Use [] Teaching  [x]     Goal:  Pain Management Goals of Therapy:   []        Resolution in pain  [x]        Decrease in pain level  [x]        Improvement in ADL's  [x]        Increase in activities with less pain  [x]        Decrease in medication      [] Benzodiazapine's and Narcotics:  Patient educated on the possible effects of combining Benzodiazapine's and Opioids. Explained \"Black Box Warnings\" such as; possible suppressed breathing, hypoxia, anoxia, depressed cognition, heart arrhythmia, coma and possible death. Patient verbalized understanding concerning possible effects. Controlled Substance Monitoring:  Attestation: The ERLINDA report for this patient was reviewed today. Discussed with patient possible medication side effects, risk of tolerance, dependence and alternative treatments. Discussed the growing epidemic in the [place] with the overprescribing and at times the abuse of narcotics. Discussed the detrimental effects of long term narcotic use. Patient encouraged to set daily goals of exercising and decreasing daily narcotic intake. Discussed with the patient about the development of hyperalgesia with long term narcotic intake. EMR dragon/transcription disclaimer: Much of this encounter note is electronic transcription/translation of spoken language to printed tach.  Electronic translation of spoken language may be erroneous, or at times, nonsensical words or phrases may be inadvertently transcribed.  Although, I have reviewed the note for such errors, some may still exist.     CC:  MARY Tirado APRN, 2/20/2023 at 8:07 AM

## 2023-02-20 NOTE — PROGRESS NOTES
Clinic Documentation      Education Provided:  [x] Review of Jefm Goes  [] Agreement Review  [x] PEG Score Calculated [] PHQ Score Calculated [] ORT Score Calculated    [] Compliance Issues Discussed [] Cognitive Behavior Needs [x] Exercise [] Review of Test [] Financial Issues  [x] Tobacco/Alcohol Use Reviewed [x] Teaching [] New Patient [] Picture Obtained    Physician Plan:  [] Outgoing Referral  [] Pharmacy Consult  [x] Test Ordered [x] Prescription Ordered/Changed   [] Obtained Test Results / Consult Notes        Complete if patient is withholding blood thinner for procedure     Blood Thinner Patient is currently taking:      [] Plavix (Hold for 7 days)  [] Aspirin (Hold for 5 days)     [] Pletal (Hold for 2 days)  [] Pradaxa (Hold for 3 days)    [] Effient (Hold for 7 days)  [] Xarelto (Hold for 2 days)    [] Eliquis (Hold for 2 days)  [] Brilinta (Hold for 7 days)    [] Coumadin (Hold for 5 days) - (INR needs to be drawn the day prior to procedure- INR < 2.0)    [] Aggrenox (Hold for 7 days)        [] Patient will stop medication on their own.    [] Blood Thinner Form Faxed for approval to hold.    Provider form faxed to:     Assessment Completed by:  Electronically signed by Evelina Zapata RN on 2/20/2023 at 7:54 AM

## 2023-02-22 ENCOUNTER — OFFICE VISIT (OUTPATIENT)
Dept: CARDIAC SURGERY | Facility: CLINIC | Age: 73
End: 2023-02-22
Payer: MEDICARE

## 2023-02-22 ENCOUNTER — HOSPITAL ENCOUNTER (OUTPATIENT)
Dept: CT IMAGING | Facility: HOSPITAL | Age: 73
Discharge: HOME | End: 2023-02-22
Admitting: NURSE PRACTITIONER
Payer: MEDICARE

## 2023-02-22 VITALS
WEIGHT: 253 LBS | HEART RATE: 68 BPM | DIASTOLIC BLOOD PRESSURE: 64 MMHG | BODY MASS INDEX: 35.42 KG/M2 | OXYGEN SATURATION: 98 % | HEIGHT: 71 IN | SYSTOLIC BLOOD PRESSURE: 132 MMHG

## 2023-02-22 DIAGNOSIS — I71.20 THORACIC AORTIC ANEURYSM WITHOUT RUPTURE, UNSPECIFIED PART: Primary | ICD-10-CM

## 2023-02-22 DIAGNOSIS — I71.20 THORACIC AORTIC ANEURYSM WITHOUT RUPTURE: ICD-10-CM

## 2023-02-22 LAB — CREAT BLDA-MCNC: 1.4 MG/DL (ref 0.6–1.3)

## 2023-02-22 PROCEDURE — 0 IOPAMIDOL PER 1 ML: Performed by: NURSE PRACTITIONER

## 2023-02-22 PROCEDURE — 99213 OFFICE O/P EST LOW 20 MIN: CPT | Performed by: THORACIC SURGERY (CARDIOTHORACIC VASCULAR SURGERY)

## 2023-02-22 PROCEDURE — 82565 ASSAY OF CREATININE: CPT

## 2023-02-22 PROCEDURE — 71275 CT ANGIOGRAPHY CHEST: CPT

## 2023-02-22 RX ORDER — AMLODIPINE BESYLATE 2.5 MG/1
1 TABLET ORAL DAILY
COMMUNITY
Start: 2023-02-08

## 2023-02-22 RX ADMIN — IOPAMIDOL 100 ML: 755 INJECTION, SOLUTION INTRAVENOUS at 08:16

## 2023-02-28 ENCOUNTER — TELEPHONE (OUTPATIENT)
Dept: FAMILY MEDICINE CLINIC | Age: 73
End: 2023-02-28

## 2023-02-28 DIAGNOSIS — N28.9 KIDNEY DISEASE: Primary | ICD-10-CM

## 2023-02-28 NOTE — TELEPHONE ENCOUNTER
Patient was due for 6 month follow up ct abd pel without for kidney dz. Made appt and left message for patient with appointment information and told to call if this does not work for him.

## 2023-03-08 DIAGNOSIS — N28.1 KIDNEY CYSTS: Primary | ICD-10-CM

## 2023-03-09 ENCOUNTER — HOSPITAL ENCOUNTER (OUTPATIENT)
Dept: GENERAL RADIOLOGY | Age: 73
Discharge: HOME | End: 2023-03-09
Payer: MEDICARE

## 2023-03-09 DIAGNOSIS — N28.1 KIDNEY CYSTS: ICD-10-CM

## 2023-03-09 PROCEDURE — 74178 CT ABD&PLV WO CNTR FLWD CNTR: CPT

## 2023-03-09 PROCEDURE — 6360000004 HC RX CONTRAST MEDICATION: Performed by: NURSE PRACTITIONER

## 2023-03-09 RX ADMIN — IOPAMIDOL 85 ML: 755 INJECTION, SOLUTION INTRAVENOUS at 11:39

## 2023-03-10 ENCOUNTER — TELEPHONE (OUTPATIENT)
Dept: FAMILY MEDICINE CLINIC | Age: 73
End: 2023-03-10

## 2023-03-10 DIAGNOSIS — R93.5 ABNORMAL CT OF THE ABDOMEN: Primary | ICD-10-CM

## 2023-03-10 NOTE — TELEPHONE ENCOUNTER
----- Message from MARY Lane sent at 3/9/2023 12:55 PM CST -----  Please call patient and let them know results. CT of the abdomen pelvis shows a 4 cm right renal cyst and a small cyst in the left kidney. There is also some kidney stones. 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 He also has some fat-containing hernias. Would recommend surgical consult as recommended per radiology. Please refer patient to general surgery.   He will most likely want general surgeon from [facility].

## 2023-03-20 DIAGNOSIS — R20.0 NUMBNESS AND TINGLING OF BOTH LEGS: ICD-10-CM

## 2023-03-20 DIAGNOSIS — M54.12 CERVICAL RADICULOPATHY: ICD-10-CM

## 2023-03-20 DIAGNOSIS — R20.2 NUMBNESS AND TINGLING OF BOTH LEGS: ICD-10-CM

## 2023-03-20 RX ORDER — HYDROCODONE BITARTRATE AND ACETAMINOPHEN 7.5; 325 MG/1; MG/1
1 TABLET ORAL EVERY 8 HOURS PRN
Qty: 90 TABLET | Refills: 0 | Status: SHIPPED | OUTPATIENT
Start: 2023-03-21 | End: 2023-04-20

## 2023-03-24 ENCOUNTER — OFFICE VISIT (OUTPATIENT)
Dept: SURGERY | Age: 73
End: 2023-03-24

## 2023-03-24 VITALS
WEIGHT: 248.6 LBS | BODY MASS INDEX: 34.8 KG/M2 | TEMPERATURE: 98.2 F | OXYGEN SATURATION: 94 % | HEIGHT: 71 IN | HEART RATE: 69 BPM

## 2023-03-24 DIAGNOSIS — K43.9 VENTRAL HERNIA WITHOUT OBSTRUCTION OR GANGRENE: Primary | ICD-10-CM

## 2023-03-24 DIAGNOSIS — R14.0 BLOATING: ICD-10-CM

## 2023-03-24 ASSESSMENT — ENCOUNTER SYMPTOMS
BACK PAIN: 1
ABDOMINAL DISTENTION: 1
FACIAL SWELLING: 0
EYE PAIN: 1
COUGH: 1
EYE REDNESS: 1
EYE DISCHARGE: 0
APNEA: 1
ABDOMINAL PAIN: 0
CONSTIPATION: 1
CHEST TIGHTNESS: 0
SHORTNESS OF BREATH: 0
NAUSEA: 0
DIARRHEA: 0
WHEEZING: 0

## 2023-03-24 NOTE — PROGRESS NOTES
mouth daily 30 tablet 11    Continuous Blood Gluc  (DEXCOM G6 ) ROSEANN To monitor sugars 1 each 0    Continuous Blood Gluc Sensor (DEXCOM G6 SENSOR) MISC Change sensor every 10 days 3 each 11    Continuous Blood Gluc Transmit (DEXCOM G6 TRANSMITTER) MISC Change transmitter every 3 months. 1 each 3    empagliflozin (JARDIANCE) 10 MG tablet Take 1 tablet by mouth daily 30 tablet 11    glipiZIDE (GLUCOTROL) 5 MG tablet TAKE 1 TABLET BY MOUTH EVERY DAY 90 tablet 0    gabapentin (NEURONTIN) 400 MG capsule Take 1 capsule by mouth 3 times daily for 90 days. 270 capsule 0    allopurinol (ZYLOPRIM) 100 MG tablet Take 1 tablet by mouth daily 90 tablet 3    citalopram (CELEXA) 20 MG tablet Take 1 tablet by mouth daily 90 tablet 3    levothyroxine (SYNTHROID) 25 MCG tablet Take 1 tablet by mouth in the morning. Total of 225mcg. 90 tablet 3    levothyroxine (SYNTHROID) 200 MCG tablet Take 1 tablet by mouth in the morning. Total of 225mcg. 90 tablet 3    atorvastatin (LIPITOR) 20 MG tablet Take 1 tablet by mouth at bedtime 90 tablet 3    aspirin 81 MG EC tablet Take 81 mg by mouth daily      Cholecalciferol (VITAMIN D3) 2000 units TABS Take 1 tablet by mouth daily 30 tablet 11    COLCRYS 0.6 MG tablet TAKE 2 TABLETS THEN AN HOUR LATER TAKE 1 TABLET WHEN GOUT FLARES 12 tablet 5    nitroGLYCERIN (NITROSTAT) 0.4 MG SL tablet Place 0.4 mg under the tongue (Patient not taking: Reported on 8/3/2022)       No current facility-administered medications for this visit. Allergies: Patient has no known allergies.     Family History   Problem Relation Age of Onset    Schizophrenia Mother     Heart Failure Mother     Heart Attack Mother     Cancer Father         Prostate & Leukemia     Heart Disease Father     Heart Attack Father     Thyroid Disease Sister        Social History     Tobacco Use    Smoking status: Never    Smokeless tobacco: Never   Substance Use Topics    Alcohol use: No       Review of Systems

## 2023-03-25 DIAGNOSIS — E78.2 MIXED HYPERLIPIDEMIA: ICD-10-CM

## 2023-03-27 NOTE — TELEPHONE ENCOUNTER
Received fax from pharmacy requesting refill on pts medication(s). Pt was last seen in office on 2/8/2023  and has a follow up scheduled for 8/9/2023. Will send request to  Myrna Garcia  for authorization.      Requested Prescriptions     Pending Prescriptions Disp Refills    atorvastatin (LIPITOR) 20 MG tablet [Pharmacy Med Name: ATORVASTATIN 20 MG TABLET] 90 tablet 3     Sig: TAKE 1 TABLET BY MOUTH EVERYDAY AT BEDTIME

## 2023-03-28 RX ORDER — ATORVASTATIN CALCIUM 20 MG/1
20 TABLET, FILM COATED ORAL DAILY
Qty: 90 TABLET | Refills: 3 | Status: SHIPPED | OUTPATIENT
Start: 2023-03-28

## 2023-03-30 ENCOUNTER — HOSPITAL ENCOUNTER (OUTPATIENT)
Dept: PAIN MANAGEMENT | Age: 73
Discharge: HOME | End: 2023-03-30
Payer: MEDICARE

## 2023-03-30 VITALS
DIASTOLIC BLOOD PRESSURE: 58 MMHG | RESPIRATION RATE: 18 BRPM | HEART RATE: 58 BPM | SYSTOLIC BLOOD PRESSURE: 123 MMHG | TEMPERATURE: 96.8 F | OXYGEN SATURATION: 94 %

## 2023-03-30 PROCEDURE — 20553 NJX 1/MLT TRIGGER POINTS 3/>: CPT

## 2023-03-30 PROCEDURE — 6360000002 HC RX W HCPCS: Performed by: NURSE PRACTITIONER

## 2023-03-30 PROCEDURE — 2500000003 HC RX 250 WO HCPCS

## 2023-03-30 PROCEDURE — 6360000002 HC RX W HCPCS

## 2023-03-30 RX ORDER — KETOROLAC TROMETHAMINE 30 MG/ML
45 INJECTION, SOLUTION INTRAMUSCULAR; INTRAVENOUS ONCE
Status: DISCONTINUED | OUTPATIENT
Start: 2023-03-30 | End: 2023-04-01 | Stop reason: HOSPADM

## 2023-03-30 RX ORDER — KETOROLAC TROMETHAMINE 30 MG/ML
45 INJECTION, SOLUTION INTRAMUSCULAR; INTRAVENOUS ONCE
Status: DISCONTINUED | OUTPATIENT
Start: 2023-03-30 | End: 2023-03-30 | Stop reason: SDUPTHER

## 2023-03-30 RX ORDER — LIDOCAINE HYDROCHLORIDE 10 MG/ML
15 INJECTION, SOLUTION EPIDURAL; INFILTRATION; INTRACAUDAL; PERINEURAL ONCE
Status: DISCONTINUED | OUTPATIENT
Start: 2023-03-30 | End: 2023-04-01 | Stop reason: HOSPADM

## 2023-03-30 RX ORDER — BUPIVACAINE HYDROCHLORIDE 5 MG/ML
15 INJECTION, SOLUTION EPIDURAL; INTRACAUDAL ONCE
Status: DISCONTINUED | OUTPATIENT
Start: 2023-03-30 | End: 2023-04-01 | Stop reason: HOSPADM

## 2023-03-30 NOTE — DISCHARGE INSTRUCTIONS
will be numb for a few hours after the procedure    [] I understand if a steroid was used it will take effect in 3 - 7 days. I understand that as the numbing medication wears off, the pain may return until the steroids start working. [x] I understand that today I will rest for the next 24 hours and drink plenty of water. [] For Botox for Migraines please do not wear anything constricting around the forehead for 7-10 days post injection. Examples headband, hats, or bandana    [] For Botox for Spasticity start therapy for the affected limb in two weeks. [] Additional instructions: ______________________________________________ ___________________________________________________________________    Sedation:  Was oral sedation given? [] Yes  [x] No    I understand that if I took an oral nerve calming medication I will not drive for  [] 24 hours after taking the medication.     [x] I have received a copy of my discharge instructions and understand the above instructions to the best of my knowledge    Patient Discharged:  [x] Home  [] Hospital  [] Other  ____________________________________________    Via:  [x] Ambulatory  [] Wheelchair   [] Stretcher [] EMS       Accompanied By:   [] Significant other  [] Family Member  [] Friend   [] Hospital Staff  []  Ambulance Staff  [x] Other_______________________________________________    Plan:  [] Will return to the office in   [] 1 month   [] 3 months for:  [] Procedure Follow-up  [x] Office Visit   [x] Planned Procedure      Patient Signature: _____________________________________________________    Staff Signature: _______________________________________________________        If you have questions, problems or concerns you may call [phone] and follow the prompts

## 2023-03-30 NOTE — PROCEDURES
[facility] Physical and Pain Medicine      Patient Name: Terence Caro    : 1950                    Age: 67 y.o. Sex: male    Date: 2023    Pre-op Diagnosis: Myofascial Pain/ Muscle Spasms/ Cervicalgia    Post-op Diagnosis: Myofascial Pain/ Muscle Spasms/ Cervicalgia    Procedure: Cervical Trigger Point Injections    Performing Procedure: Santos Goodrich, MSN, APRN, FNP-C    Previously Had Procedure:  Yes [x]  No []     Patient Vitals for the past 24 hrs:   BP Temp Temp src Pulse Resp SpO2   23 0811 (!) 123/58 96.8 °F (36 °C) Temporal 58 18 94 %       Description of Procedure:     After a brief physical assessment and failure to improve with conservative measures the patient presented for Cervical Trigger Point Injections The indications, limitations and possible complications were discussed with the patient and the patient elected to proceed with the procedure. After voluntary, informed and signed consent obtained the patient was placed in a seated position. Appropriate time out was obtained per policy. The area of maximal tenderness was palpated over the [] Right   []  Left   [x]  Bilateral Cervical   [x] Splenius   [x] Trapezius  [x] Rhomboid. The skin overlying these areas was marked with a skin marker if needed. The areas were then cleansed with Chloro-prep. The skin overlying the proposed injection sites were then sprayed with Gebauer's Solution (if requested by patient) while protecting patient eyes.     Each trigger point of the  [] Right      [] Left     [x]   Bilateral Cervical  [x] Splenius  [x] Trapezius   [x] Rhomboid was then injected after negative aspiration using a 25 gauge 1 1/2 in needle with approximately 2 ml of a solution of     [x] 5 ml of 1% Lidocaine Plain and 5 ml of 0.5% Marcaine Plain per 10 ml syringe  [x] Toradol 0.5 ml (30 mg/ml) per 10 ml syringe  [] Kenalog 0.5 ml (40 mg/ml) per 10 ml syringe  [] Depo-medrol 0.5 ml

## 2023-04-11 ENCOUNTER — TELEPHONE (OUTPATIENT)
Dept: PAIN MANAGEMENT | Age: 73
End: 2023-04-11

## 2023-04-20 DIAGNOSIS — R20.2 NUMBNESS AND TINGLING OF BOTH LEGS: ICD-10-CM

## 2023-04-20 DIAGNOSIS — M54.42 CHRONIC LEFT-SIDED LOW BACK PAIN WITH LEFT-SIDED SCIATICA: ICD-10-CM

## 2023-04-20 DIAGNOSIS — G89.29 CHRONIC LEFT-SIDED LOW BACK PAIN WITH LEFT-SIDED SCIATICA: ICD-10-CM

## 2023-04-20 DIAGNOSIS — R20.0 NUMBNESS AND TINGLING OF BOTH LEGS: ICD-10-CM

## 2023-04-20 DIAGNOSIS — M54.12 CERVICAL RADICULOPATHY: ICD-10-CM

## 2023-04-20 DIAGNOSIS — M25.50 ARTHRALGIA OF MULTIPLE JOINTS: ICD-10-CM

## 2023-04-23 RX ORDER — HYDROCODONE BITARTRATE AND ACETAMINOPHEN 7.5; 325 MG/1; MG/1
1 TABLET ORAL EVERY 8 HOURS PRN
Qty: 90 TABLET | Refills: 0 | Status: SHIPPED | OUTPATIENT
Start: 2023-04-23 | End: 2023-05-23

## 2023-04-23 RX ORDER — GABAPENTIN 400 MG/1
400 CAPSULE ORAL 3 TIMES DAILY
Qty: 270 CAPSULE | Refills: 0 | Status: SHIPPED | OUTPATIENT
Start: 2023-04-23 | End: 2023-07-22

## 2023-04-24 DIAGNOSIS — M54.12 CERVICAL RADICULOPATHY: ICD-10-CM

## 2023-04-24 DIAGNOSIS — R20.2 NUMBNESS AND TINGLING OF BOTH LEGS: ICD-10-CM

## 2023-04-24 DIAGNOSIS — R20.0 NUMBNESS AND TINGLING OF BOTH LEGS: ICD-10-CM

## 2023-04-24 RX ORDER — HYDROCODONE BITARTRATE AND ACETAMINOPHEN 7.5; 325 MG/1; MG/1
1 TABLET ORAL EVERY 8 HOURS PRN
Qty: 90 TABLET | Refills: 0 | Status: SHIPPED | OUTPATIENT
Start: 2023-04-24 | End: 2023-05-24

## 2023-05-02 DIAGNOSIS — N18.30 TYPE 2 DIABETES MELLITUS WITH STAGE 3 CHRONIC KIDNEY DISEASE, WITHOUT LONG-TERM CURRENT USE OF INSULIN (HCC): ICD-10-CM

## 2023-05-02 DIAGNOSIS — E11.22 TYPE 2 DIABETES MELLITUS WITH STAGE 3 CHRONIC KIDNEY DISEASE, WITHOUT LONG-TERM CURRENT USE OF INSULIN (HCC): ICD-10-CM

## 2023-05-02 RX ORDER — GLIPIZIDE 5 MG/1
5 TABLET ORAL DAILY
Qty: 90 TABLET | Refills: 3 | Status: SHIPPED | OUTPATIENT
Start: 2023-05-02

## 2023-05-02 NOTE — TELEPHONE ENCOUNTER
Received fax from pharmacy requesting refill on pts medication(s). Pt was last seen in office on 2/8/2023  and has a follow up scheduled for 8/9/2023. Will send request to  Henrik Sarmiento  for authorization.      Requested Prescriptions     Pending Prescriptions Disp Refills    glipiZIDE (GLUCOTROL) 5 MG tablet [Pharmacy Med Name: GLIPIZIDE 5 MG TABLET] 90 tablet 3     Sig: Take 1 tablet by mouth daily

## 2023-05-31 ENCOUNTER — OFFICE VISIT (OUTPATIENT)
Dept: FAMILY MEDICINE CLINIC | Age: 73
End: 2023-05-31
Payer: MEDICARE

## 2023-05-31 ENCOUNTER — HOSPITAL ENCOUNTER (OUTPATIENT)
Dept: GENERAL RADIOLOGY | Age: 73
Discharge: HOME | End: 2023-05-31
Payer: MEDICARE

## 2023-05-31 ENCOUNTER — HOSPITAL ENCOUNTER (OUTPATIENT)
Dept: PAIN MANAGEMENT | Age: 73
Discharge: HOME | End: 2023-05-31
Payer: MEDICARE

## 2023-05-31 VITALS
HEART RATE: 60 BPM | DIASTOLIC BLOOD PRESSURE: 70 MMHG | SYSTOLIC BLOOD PRESSURE: 132 MMHG | HEIGHT: 71 IN | OXYGEN SATURATION: 98 % | WEIGHT: 246 LBS | TEMPERATURE: 98.1 F | BODY MASS INDEX: 34.44 KG/M2

## 2023-05-31 DIAGNOSIS — R20.0 NUMBNESS AND TINGLING OF BOTH LEGS: ICD-10-CM

## 2023-05-31 DIAGNOSIS — R20.2 NUMBNESS AND TINGLING OF BOTH LEGS: ICD-10-CM

## 2023-05-31 DIAGNOSIS — M54.12 CERVICAL RADICULOPATHY: ICD-10-CM

## 2023-05-31 DIAGNOSIS — M79.672 INTRACTABLE LEFT HEEL PAIN: Primary | ICD-10-CM

## 2023-05-31 DIAGNOSIS — M79.672 INTRACTABLE LEFT HEEL PAIN: ICD-10-CM

## 2023-05-31 DIAGNOSIS — M76.62 ACHILLES TENDINITIS OF LEFT LOWER EXTREMITY: ICD-10-CM

## 2023-05-31 PROCEDURE — 3078F DIAST BP <80 MM HG: CPT | Performed by: NURSE PRACTITIONER

## 2023-05-31 PROCEDURE — G8417 CALC BMI ABV UP PARAM F/U: HCPCS | Performed by: NURSE PRACTITIONER

## 2023-05-31 PROCEDURE — 1036F TOBACCO NON-USER: CPT | Performed by: NURSE PRACTITIONER

## 2023-05-31 PROCEDURE — 73650 X-RAY EXAM OF HEEL: CPT | Performed by: RADIOLOGY

## 2023-05-31 PROCEDURE — 1123F ACP DISCUSS/DSCN MKR DOCD: CPT | Performed by: NURSE PRACTITIONER

## 2023-05-31 PROCEDURE — G8427 DOCREV CUR MEDS BY ELIG CLIN: HCPCS | Performed by: NURSE PRACTITIONER

## 2023-05-31 PROCEDURE — 99213 OFFICE O/P EST LOW 20 MIN: CPT | Performed by: NURSE PRACTITIONER

## 2023-05-31 PROCEDURE — 3017F COLORECTAL CA SCREEN DOC REV: CPT | Performed by: NURSE PRACTITIONER

## 2023-05-31 PROCEDURE — 73650 X-RAY EXAM OF HEEL: CPT

## 2023-05-31 PROCEDURE — 3075F SYST BP GE 130 - 139MM HG: CPT | Performed by: NURSE PRACTITIONER

## 2023-05-31 RX ORDER — PREDNISONE 1 MG/1
5 TABLET ORAL DAILY
Qty: 7 TABLET | Refills: 0 | Status: SHIPPED | OUTPATIENT
Start: 2023-05-31 | End: 2023-06-07

## 2023-05-31 RX ORDER — HYDROCODONE BITARTRATE AND ACETAMINOPHEN 7.5; 325 MG/1; MG/1
1 TABLET ORAL EVERY 8 HOURS PRN
Qty: 90 TABLET | Refills: 0 | Status: SHIPPED | OUTPATIENT
Start: 2023-05-31 | End: 2023-06-30

## 2023-05-31 ASSESSMENT — ENCOUNTER SYMPTOMS
RESPIRATORY NEGATIVE: 1
EYES NEGATIVE: 1
GASTROINTESTINAL NEGATIVE: 1

## 2023-05-31 NOTE — PROGRESS NOTES
formerly [facility]  [facility]  [address]  [address] [address]  Dept: [phone]  Dept Fax: [phone]  Loc: [phone]    Farhana Rodrigues is a 67 y.o. male who presents today for his medical conditions/complaints as noted below. Farhana Rodrigues is c/o of Foot Pain (L)      Chief Complaint   Patient presents with    Foot Pain     L       HPI:     HPI  Patient presents today with complaints of knot on L heel. He states that this area is painful and difficult to walk. He reports that it has been present about a week and is not getting any better. Past Medical History:   Diagnosis Date    Allergic rhinitis     Arthritis     Cancer ([facility] [address] 2012    Thyroid  - Dr Terrence Castillo     Coronary artery disease involving native heart without angina pectoris 1/18/2022    Hyperlipidemia     Hypertension     Hyperthyroidism     Prostate cancer ([facility] [address]     Sleep apnea     Type II or unspecified type diabetes mellitus without mention of complication, not stated as uncontrolled         Past Surgical History:   Procedure Laterality Date    CHOLECYSTECTOMY      INCONTINENCE SURGERY  11/26/2018    dr gino Pedersen  08/01/2017    removal, dr Carol Covarrubias      removed by Dr Marilin Ott History     Tobacco Use    Smoking status: Never    Smokeless tobacco: Never   Substance Use Topics    Alcohol use: No        Current Outpatient Medications   Medication Sig Dispense Refill    predniSONE (DELTASONE) 5 MG tablet Take 1 tablet by mouth daily for 7 days 7 tablet 0    glipiZIDE (GLUCOTROL) 5 MG tablet Take 1 tablet by mouth daily 90 tablet 3    gabapentin (NEURONTIN) 400 MG capsule Take 1 capsule by mouth 3 times daily for 90 days.  270 capsule 0    atorvastatin (LIPITOR) 20 MG tablet Take 1 tablet by mouth daily 90 tablet 3    metoprolol tartrate (LOPRESSOR) 50 MG tablet Take 1 tablet by mouth 2 times daily 60 tablet 11    amLODIPine (NORVASC) 2.5

## 2023-05-31 NOTE — TELEPHONE ENCOUNTER
Patient cancelled appt on 5/31/23. Rescheduled for 6/21/23 with Karo.   Sending to [place] per patient's request.

## 2023-06-01 ENCOUNTER — TELEPHONE (OUTPATIENT)
Dept: FAMILY MEDICINE CLINIC | Age: 73
End: 2023-06-01

## 2023-06-05 NOTE — TELEPHONE ENCOUNTER
Pt called back and I went over his results. He is somewhat better, but still has a little bit of pain. Will call back in a few days if still having pain for a referral at that time.

## 2023-06-21 ENCOUNTER — HOSPITAL ENCOUNTER (OUTPATIENT)
Dept: PAIN MANAGEMENT | Age: 73
Discharge: HOME | End: 2023-06-21
Payer: MEDICARE

## 2023-06-21 VITALS
RESPIRATION RATE: 18 BRPM | HEIGHT: 71 IN | HEART RATE: 74 BPM | WEIGHT: 246.25 LBS | BODY MASS INDEX: 34.48 KG/M2 | SYSTOLIC BLOOD PRESSURE: 121 MMHG | OXYGEN SATURATION: 95 % | DIASTOLIC BLOOD PRESSURE: 67 MMHG | TEMPERATURE: 96.8 F

## 2023-06-21 DIAGNOSIS — M54.12 CERVICAL RADICULOPATHY: ICD-10-CM

## 2023-06-21 DIAGNOSIS — R20.2 NUMBNESS AND TINGLING OF BOTH LEGS: ICD-10-CM

## 2023-06-21 DIAGNOSIS — R20.0 NUMBNESS AND TINGLING OF BOTH LEGS: ICD-10-CM

## 2023-06-21 PROCEDURE — 99213 OFFICE O/P EST LOW 20 MIN: CPT

## 2023-06-21 PROCEDURE — 99213 OFFICE O/P EST LOW 20 MIN: CPT | Performed by: NURSE PRACTITIONER

## 2023-06-21 RX ORDER — HYDROCODONE BITARTRATE AND ACETAMINOPHEN 7.5; 325 MG/1; MG/1
1 TABLET ORAL EVERY 8 HOURS PRN
Qty: 90 TABLET | Refills: 0 | Status: SHIPPED | OUTPATIENT
Start: 2023-06-30 | End: 2023-07-30

## 2023-06-21 ASSESSMENT — PAIN DESCRIPTION - ONSET: ONSET: ON-GOING

## 2023-06-21 ASSESSMENT — PAIN DESCRIPTION - FREQUENCY: FREQUENCY: CONTINUOUS

## 2023-06-21 ASSESSMENT — PAIN DESCRIPTION - DESCRIPTORS: DESCRIPTORS: ACHING

## 2023-06-21 ASSESSMENT — PAIN DESCRIPTION - PAIN TYPE: TYPE: CHRONIC PAIN

## 2023-06-21 ASSESSMENT — PAIN DESCRIPTION - ORIENTATION: ORIENTATION: LOWER;MID

## 2023-06-21 ASSESSMENT — PAIN - FUNCTIONAL ASSESSMENT: PAIN_FUNCTIONAL_ASSESSMENT: PREVENTS OR INTERFERES SOME ACTIVE ACTIVITIES AND ADLS

## 2023-06-21 ASSESSMENT — PAIN DESCRIPTION - LOCATION: LOCATION: NECK;BACK;FOOT

## 2023-06-21 NOTE — PROGRESS NOTES
Clinic Documentation      Education Provided:  [x] Review of Jolene Bingham  [] Agreement Review  [x] PEG Score Calculated [] PHQ Score Calculated [] ORT Score Calculated    [] Compliance Issues Discussed [] Cognitive Behavior Needs [x] Exercise [] Review of Test [] Financial Issues  [x] Tobacco/Alcohol Use Reviewed [x] Teaching [] New Patient [] Picture Obtained    Physician Plan:  [] Outgoing Referral  [] Pharmacy Consult  [] Test Ordered [x] Prescription Ordered/Changed   [] Obtained Test Results / Consult Notes        Complete if patient is withholding blood thinner for procedure     Blood Thinner Patient is currently taking:      [] Plavix (Hold for 7 days)  [] Aspirin (Hold for 5 days)     [] Pletal (Hold for 2 days)  [] Pradaxa (Hold for 3 days)    [] Effient (Hold for 7 days)  [] Xarelto (Hold for 2 days)    [] Eliquis (Hold for 2 days)  [] Brilinta (Hold for 7 days)    [] Coumadin (Hold for 5 days) - (INR needs to be drawn the day prior to procedure- INR < 2.0)    [] Aggrenox (Hold for 7 days)        [] Patient will stop medication on their own.    [] Blood Thinner Form Faxed for approval to hold.    Provider form faxed to:     Assessment Completed by:  Electronically signed by Ford Peres RN on 6/21/2023 at 8:48 AM
strengthening. Education Provided:  Review of Gurney Smack [x] Agreement Review [x]  Reviewed PHQ-9  [x]    Review of Test [] Compliance Issues Discussed []   Cognitive Behavior Needs [] Exercise [x]  Financial Issues []   Tobacco/Alcohol Use [] Teaching  [x]     Goal:  Pain Management Goals of Therapy:   []        Resolution in pain  [x]        Decrease in pain level  [x]        Improvement in ADL's  [x]        Increase in activities with less pain  [x]        Decrease in medication      [] Benzodiazapine's and Narcotics:  Patient educated on the possible effects of combining Benzodiazapine's and Opioids. Explained \"Black Box Warnings\" such as; possible suppressed breathing, hypoxia, anoxia, depressed cognition, heart arrhythmia, coma and possible death. Patient verbalized understanding concerning possible effects. Controlled Substance Monitoring:  Attestation: The RELINDA report for this patient was reviewed today. Discussed with patient possible medication side effects, risk of tolerance, dependence and alternative treatments. Discussed the growing epidemic in the [place] with the overprescribing and at times the abuse of narcotics. Discussed the detrimental effects of long term narcotic use. Patient encouraged to set daily goals of exercising and decreasing daily narcotic intake. Discussed with the patient about the development of hyperalgesia with long term narcotic intake. EMR dragon/transcription disclaimer: Much of this encounter note is electronic transcription/translation of spoken language to printed tach. Electronic translation of spoken language may be erroneous, or at times, nonsensical words or phrases may be inadvertently transcribed.  Although, I have reviewed the note for such errors, some may still exist.     CC:  MARY Randle APRN, 6/21/2023 at 9:09 AM

## 2023-06-29 ENCOUNTER — HOSPITAL ENCOUNTER (OUTPATIENT)
Dept: PAIN MANAGEMENT | Age: 73
Discharge: HOME | End: 2023-06-29
Payer: MEDICARE

## 2023-06-29 VITALS
TEMPERATURE: 96.2 F | RESPIRATION RATE: 18 BRPM | HEART RATE: 57 BPM | OXYGEN SATURATION: 96 % | SYSTOLIC BLOOD PRESSURE: 137 MMHG | DIASTOLIC BLOOD PRESSURE: 63 MMHG

## 2023-06-29 PROCEDURE — 20553 NJX 1/MLT TRIGGER POINTS 3/>: CPT

## 2023-06-29 PROCEDURE — 20553 NJX 1/MLT TRIGGER POINTS 3/>: CPT | Performed by: NURSE PRACTITIONER

## 2023-06-29 PROCEDURE — 6360000002 HC RX W HCPCS

## 2023-06-29 PROCEDURE — 2500000003 HC RX 250 WO HCPCS

## 2023-06-29 RX ORDER — KETOROLAC TROMETHAMINE 30 MG/ML
45 INJECTION, SOLUTION INTRAMUSCULAR; INTRAVENOUS ONCE
Status: DISCONTINUED | OUTPATIENT
Start: 2023-06-29 | End: 2023-07-01 | Stop reason: HOSPADM

## 2023-06-29 RX ORDER — BUPIVACAINE HYDROCHLORIDE 5 MG/ML
15 INJECTION, SOLUTION EPIDURAL; INTRACAUDAL ONCE
Status: DISCONTINUED | OUTPATIENT
Start: 2023-06-29 | End: 2023-07-01 | Stop reason: HOSPADM

## 2023-06-29 RX ORDER — LIDOCAINE HYDROCHLORIDE 10 MG/ML
15 INJECTION, SOLUTION EPIDURAL; INFILTRATION; INTRACAUDAL; PERINEURAL ONCE
Status: DISCONTINUED | OUTPATIENT
Start: 2023-06-29 | End: 2023-07-01 | Stop reason: HOSPADM

## 2023-07-03 DIAGNOSIS — M54.12 CERVICAL RADICULOPATHY: ICD-10-CM

## 2023-07-03 DIAGNOSIS — R20.0 NUMBNESS AND TINGLING OF BOTH LEGS: ICD-10-CM

## 2023-07-03 DIAGNOSIS — R20.2 NUMBNESS AND TINGLING OF BOTH LEGS: ICD-10-CM

## 2023-07-05 RX ORDER — HYDROCODONE BITARTRATE AND ACETAMINOPHEN 7.5; 325 MG/1; MG/1
1 TABLET ORAL EVERY 8 HOURS PRN
Qty: 90 TABLET | Refills: 0 | Status: SHIPPED | OUTPATIENT
Start: 2023-07-05 | End: 2023-08-04

## 2023-07-12 ENCOUNTER — TELEPHONE (OUTPATIENT)
Dept: CARDIOLOGY | Facility: CLINIC | Age: 73
End: 2023-07-12

## 2023-07-12 NOTE — TELEPHONE ENCOUNTER
Caller: Zay Kamara    Relationship to patient: Self    Best call back number: [phone]    Chief complaint: MISSED APPT DUE TO TIRE BLOW OUT    Type of visit: FOLLOW UP    Requested date: WHEN APPLICABLE     If rescheduling, when is the original appointment: 09.13.23     Additional notes:HUB RESCHEDULED NET AVAILABLE AND ADDED TO WAITLIST. PLEASE ADVISE ON APPT AS IT IS SCHEDULED OUTSIDE OF THE TIMEFRAME.

## 2023-07-25 ENCOUNTER — TELEPHONE (OUTPATIENT)
Dept: VASCULAR SURGERY | Facility: CLINIC | Age: 73
End: 2023-07-25
Payer: MEDICARE

## 2023-07-26 ENCOUNTER — HOSPITAL ENCOUNTER (OUTPATIENT)
Dept: ULTRASOUND IMAGING | Facility: HOSPITAL | Age: 73
Discharge: HOME | End: 2023-07-26
Payer: MEDICARE

## 2023-07-26 ENCOUNTER — OFFICE VISIT (OUTPATIENT)
Dept: VASCULAR SURGERY | Facility: CLINIC | Age: 73
End: 2023-07-26
Payer: MEDICARE

## 2023-07-26 VITALS
SYSTOLIC BLOOD PRESSURE: 106 MMHG | OXYGEN SATURATION: 99 % | HEIGHT: 71 IN | HEART RATE: 64 BPM | WEIGHT: 240 LBS | BODY MASS INDEX: 33.6 KG/M2 | DIASTOLIC BLOOD PRESSURE: 64 MMHG

## 2023-07-26 DIAGNOSIS — E78.2 MIXED HYPERLIPIDEMIA: ICD-10-CM

## 2023-07-26 DIAGNOSIS — I65.23 BILATERAL CAROTID ARTERY STENOSIS: ICD-10-CM

## 2023-07-26 DIAGNOSIS — I73.9 PAD (PERIPHERAL ARTERY DISEASE): ICD-10-CM

## 2023-07-26 DIAGNOSIS — I65.23 BILATERAL CAROTID ARTERY STENOSIS: Primary | ICD-10-CM

## 2023-07-26 DIAGNOSIS — I10 ESSENTIAL HYPERTENSION: ICD-10-CM

## 2023-07-26 PROCEDURE — 1159F MED LIST DOCD IN RCRD: CPT | Performed by: NURSE PRACTITIONER

## 2023-07-26 PROCEDURE — 93880 EXTRACRANIAL BILAT STUDY: CPT

## 2023-07-26 PROCEDURE — 3078F DIAST BP <80 MM HG: CPT | Performed by: NURSE PRACTITIONER

## 2023-07-26 PROCEDURE — 1160F RVW MEDS BY RX/DR IN RCRD: CPT | Performed by: NURSE PRACTITIONER

## 2023-07-26 PROCEDURE — 3074F SYST BP LT 130 MM HG: CPT | Performed by: NURSE PRACTITIONER

## 2023-07-26 PROCEDURE — 93923 UPR/LXTR ART STDY 3+ LVLS: CPT

## 2023-07-26 PROCEDURE — 99214 OFFICE O/P EST MOD 30 MIN: CPT | Performed by: NURSE PRACTITIONER

## 2023-07-26 NOTE — LETTER
"July 26, 2023     LUIS Souza  [address]  [place] [address]    Patient: Zay Kamara   YOB: 1950   Date of Visit: 7/26/2023     Dear LUIS Souza:       Thank you for referring Zay Kamara to me for evaluation. Below are the relevant portions of my assessment and plan of care.    If you have questions, please do not hesitate to call me. I look forward to following Zay along with you.         Sincerely,        LUIS Jurado        CC: No Recipients    Jeri Chambers APRN  07/26/23 0857  Sign when Signing Visit  7/26/2023       Frankie Bradley APRN  [address] [place] [address]        Zay Kamara  1950    Chief Complaint   Patient presents with   • Follow-up     1 year follow up w/ testing. Last seen 8/12/22. Patient denies any stroke type symptoms.       Dear Frankie Bradley APRN:    HPI     I had the pleasure of seeing you patient in the office today for follow up.  As you recall, the patient is a 72 y.o. male who we are currently following for asymptomatic carotid occlusive disease and lower extremity PAD.   He reports fatigue in his legs as he walks.  He denies any strokelike symptoms.  He is maintained on aspirin and Lipitor.  He did have noninvasive testing performed today, which I did review in office.      Review of Systems   Constitutional: Negative.    HENT: Negative.     Eyes: Negative.    Respiratory: Negative.     Cardiovascular: Negative.    Gastrointestinal: Negative.    Endocrine: Negative.    Genitourinary: Negative.    Musculoskeletal: Negative.    Skin: Negative.    Allergic/Immunologic: Negative.    Neurological: Negative.    Hematological: Negative.    Psychiatric/Behavioral: Negative.          /64   Pulse 64   Ht 180.3 cm (71\")   Wt 109 kg (240 lb)   SpO2 99%   BMI 33.47 kg/m²     Physical Exam  Vitals and nursing note reviewed.   Constitutional:       General: He is not " in acute distress.     Appearance: Normal appearance. He is well-developed. He is not diaphoretic.   HENT:      Head: Normocephalic and atraumatic.   Neck:      Vascular: No carotid bruit or JVD.   Cardiovascular:      Rate and Rhythm: Normal rate and regular rhythm.      Pulses: Normal pulses.           Femoral pulses are 2+ on the right side and 2+ on the left side.       Popliteal pulses are 2+ on the right side and 2+ on the left side.        Dorsalis pedis pulses are 2+ on the right side and 2+ on the left side.        Posterior tibial pulses are 2+ on the right side and 2+ on the left side.      Heart sounds: Normal heart sounds, S1 normal and S2 normal. No murmur heard.    No friction rub. No gallop.   Pulmonary:      Effort: Pulmonary effort is normal.      Breath sounds: Normal breath sounds.   Abdominal:      General: Bowel sounds are normal. There is no abdominal bruit.      Palpations: Abdomen is soft.      Tenderness: There is no abdominal tenderness.   Musculoskeletal:         General: Normal range of motion.   Skin:     General: Skin is warm and dry.   Neurological:      Mental Status: He is alert and oriented to person, place, and time.      Cranial Nerves: No cranial nerve deficit.   Psychiatric:         Mood and Affect: Mood normal.         Behavior: Behavior normal.         Thought Content: Thought content normal.         Judgment: Judgment normal.           DIAGNOSTIC DATA:  Noninvasive testing including ABIs show no arterial insufficiency to bilateral lower extremities.  Carotid duplex shows less than 50% carotid stenosis bilaterally with bilateral antegrade vertebral flow.    Patient Active Problem List   Diagnosis   • Hypogonadism in male   • ED (erectile dysfunction)   • Prostate cancer   • Diabetes   • Hypertension   • Disease of thyroid gland   • History of prostate cancer   • Leukocytosis   • Class 1 obesity due to excess calories with serious comorbidity and body mass index (BMI) of 34.0  to 34.9 in adult   • Sensorineural hearing loss (SNHL) of both ears   • Asymmetric SNHL (sensorineural hearing loss)   • Sudden hearing loss, right   • Tinnitus of right ear   • Stress incontinence   • Bladder neck contracture   • Paroxysmal atrial fibrillation   • Sleep apnea   • Chest pain   • Mixed hyperlipidemia   • Bilateral carotid artery stenosis   • Coronary artery disease involving native coronary artery of native heart with unstable angina pectoris   • Acquired coagulation factor deficiency   • CKD (chronic kidney disease) stage 3, GFR 30-59 ml/min   • Chronic pain syndrome   • Coronary artery disease involving native heart   • Type 2 diabetes mellitus with stage 3 chronic kidney disease   • IBS (irritable bowel syndrome)   • Hx of CABG   • S/P Maze operation for atrial fibrillation   • BRBPR (bright red blood per rectum)   • Thoracic aortic aneurysm without rupture         ICD-10-CM ICD-9-CM   1. Bilateral carotid artery stenosis  I65.23 433.10     433.30   2. PAD (peripheral artery disease)  I73.9 443.9   3. Essential hypertension  I10 401.9   4. Mixed hyperlipidemia  E78.2 272.2         PLAN: After thoroughly evaluating Zay Kamara, I believe the best course of action is to remain conservative from vascular surgery standpoint.  Currently he is doing well and denies any claudication to his lower extremities.  He also denies any strokelike symptoms.  I did review his testing which shows no arterial insufficiency to his lower extremities and less than 50% carotid stenosis bilaterally.  We will see him back in 1 year with repeat noninvasive testing for continued surveillance, including ABIs and a carotid duplex.  I did discuss vascular risk factors as they pertain to the progression of vascular disease including controlling his hypertension, hyperlipidemia, and diabetes.  He should continue on his aspirin 81 mg daily and Lipitor 20 mg daily in addition to his other medications.  This was all discussed  in full with complete understanding.  Thank you for allowing me to participate in the care of your patient.  Please do not hesitate to call with any questions or concerns.  We will keep you aware of any further encounters with Zay Kamara.      Sincerely Yours,      LUIS Jurado

## 2023-07-26 NOTE — PROGRESS NOTES
"7/26/2023       Frankie Bradley, APRN  [address]  [place] [address]        Zay LANGSTON Crews  1950    Chief Complaint   Patient presents with    Follow-up     1 year follow up w/ testing. Last seen 8/12/22. Patient denies any stroke type symptoms.       Dear Frankie Bradley, APRN:    HPI     I had the pleasure of seeing you patient in the office today for follow up.  As you recall, the patient is a 72 y.o. male who we are currently following for asymptomatic carotid occlusive disease and lower extremity PAD.   He reports fatigue in his legs as he walks.  He denies any strokelike symptoms.  He is maintained on aspirin and Lipitor.  He did have noninvasive testing performed today, which I did review in office.      Review of Systems   Constitutional: Negative.    HENT: Negative.     Eyes: Negative.    Respiratory: Negative.     Cardiovascular: Negative.    Gastrointestinal: Negative.    Endocrine: Negative.    Genitourinary: Negative.    Musculoskeletal: Negative.    Skin: Negative.    Allergic/Immunologic: Negative.    Neurological: Negative.    Hematological: Negative.    Psychiatric/Behavioral: Negative.          /64   Pulse 64   Ht 180.3 cm (71\")   Wt 109 kg (240 lb)   SpO2 99%   BMI 33.47 kg/m²     Physical Exam  Vitals and nursing note reviewed.   Constitutional:       General: He is not in acute distress.     Appearance: Normal appearance. He is well-developed. He is not diaphoretic.   HENT:      Head: Normocephalic and atraumatic.   Neck:      Vascular: No carotid bruit or JVD.   Cardiovascular:      Rate and Rhythm: Normal rate and regular rhythm.      Pulses: Normal pulses.           Femoral pulses are 2+ on the right side and 2+ on the left side.       Popliteal pulses are 2+ on the right side and 2+ on the left side.        Dorsalis pedis pulses are 2+ on the right side and 2+ on the left side.        Posterior tibial pulses are 2+ on the right side and 2+ on the left " side.      Heart sounds: Normal heart sounds, S1 normal and S2 normal. No murmur heard.    No friction rub. No gallop.   Pulmonary:      Effort: Pulmonary effort is normal.      Breath sounds: Normal breath sounds.   Abdominal:      General: Bowel sounds are normal. There is no abdominal bruit.      Palpations: Abdomen is soft.      Tenderness: There is no abdominal tenderness.   Musculoskeletal:         General: Normal range of motion.   Skin:     General: Skin is warm and dry.   Neurological:      Mental Status: He is alert and oriented to person, place, and time.      Cranial Nerves: No cranial nerve deficit.   Psychiatric:         Mood and Affect: Mood normal.         Behavior: Behavior normal.         Thought Content: Thought content normal.         Judgment: Judgment normal.           DIAGNOSTIC DATA:  Noninvasive testing including ABIs show no arterial insufficiency to bilateral lower extremities.  Carotid duplex shows less than 50% carotid stenosis bilaterally with bilateral antegrade vertebral flow.    Patient Active Problem List   Diagnosis    Hypogonadism in male    ED (erectile dysfunction)    Prostate cancer    Diabetes    Hypertension    Disease of thyroid gland    History of prostate cancer    Leukocytosis    Class 1 obesity due to excess calories with serious comorbidity and body mass index (BMI) of 34.0 to 34.9 in adult    Sensorineural hearing loss (SNHL) of both ears    Asymmetric SNHL (sensorineural hearing loss)    Sudden hearing loss, right    Tinnitus of right ear    Stress incontinence    Bladder neck contracture    Paroxysmal atrial fibrillation    Sleep apnea    Chest pain    Mixed hyperlipidemia    Bilateral carotid artery stenosis    Coronary artery disease involving native coronary artery of native heart with unstable angina pectoris    Acquired coagulation factor deficiency    CKD (chronic kidney disease) stage 3, GFR 30-59 ml/min    Chronic pain syndrome    Coronary artery disease  involving native heart    Type 2 diabetes mellitus with stage 3 chronic kidney disease    IBS (irritable bowel syndrome)    Hx of CABG    S/P Maze operation for atrial fibrillation    BRBPR (bright red blood per rectum)    Thoracic aortic aneurysm without rupture         ICD-10-CM ICD-9-CM   1. Bilateral carotid artery stenosis  I65.23 433.10     433.30   2. PAD (peripheral artery disease)  I73.9 443.9   3. Essential hypertension  I10 401.9   4. Mixed hyperlipidemia  E78.2 272.2         PLAN: After thoroughly evaluating Zay Kamara, I believe the best course of action is to remain conservative from vascular surgery standpoint.  Currently he is doing well and denies any claudication to his lower extremities.  He also denies any strokelike symptoms.  I did review his testing which shows no arterial insufficiency to his lower extremities and less than 50% carotid stenosis bilaterally.  We will see him back in 1 year with repeat noninvasive testing for continued surveillance, including ABIs and a carotid duplex.  I did discuss vascular risk factors as they pertain to the progression of vascular disease including controlling his hypertension, hyperlipidemia, and diabetes.  He should continue on his aspirin 81 mg daily and Lipitor 20 mg daily in addition to his other medications.  This was all discussed in full with complete understanding.  Thank you for allowing me to participate in the care of your patient.  Please do not hesitate to call with any questions or concerns.  We will keep you aware of any further encounters with Zay Kamara.      Sincerely Yours,      LUIS Jurado

## 2023-08-07 ENCOUNTER — TELEPHONE (OUTPATIENT)
Dept: FAMILY MEDICINE CLINIC | Age: 73
End: 2023-08-07

## 2023-08-07 DIAGNOSIS — E11.22 TYPE 2 DIABETES MELLITUS WITH STAGE 3A CHRONIC KIDNEY DISEASE, WITHOUT LONG-TERM CURRENT USE OF INSULIN (HCC): Primary | ICD-10-CM

## 2023-08-07 DIAGNOSIS — N18.31 TYPE 2 DIABETES MELLITUS WITH STAGE 3A CHRONIC KIDNEY DISEASE, WITHOUT LONG-TERM CURRENT USE OF INSULIN (HCC): Primary | ICD-10-CM

## 2023-08-07 DIAGNOSIS — E78.2 MIXED HYPERLIPIDEMIA: ICD-10-CM

## 2023-08-07 DIAGNOSIS — E89.0 POSTOPERATIVE HYPOTHYROIDISM: ICD-10-CM

## 2023-08-07 DIAGNOSIS — I25.10 CORONARY ARTERY DISEASE INVOLVING NATIVE HEART WITHOUT ANGINA PECTORIS, UNSPECIFIED VESSEL OR LESION TYPE: ICD-10-CM

## 2023-08-07 NOTE — TELEPHONE ENCOUNTER
----- Message from Joel Francois sent at 8/7/2023  3:06 PM CDT -----  Subject: Message to Provider    QUESTIONS  Information for Provider? Patient is wanting to verify with Dr. Mathew Stephen   if he should have lab work drawn before his AWV on 8/9/23 at [address] [place] [address]. Patient   does not have orders. Please call patient to advise.  ---------------------------------------------------------------------------  --------------  Angel LIVINGSTON  [phone]; OK to leave message on voicemail  ---------------------------------------------------------------------------  --------------  SCRIPT ANSWERS  Relationship to Patient?  Self

## 2023-08-08 DIAGNOSIS — R20.2 NUMBNESS AND TINGLING OF BOTH LEGS: ICD-10-CM

## 2023-08-08 DIAGNOSIS — R20.0 NUMBNESS AND TINGLING OF BOTH LEGS: ICD-10-CM

## 2023-08-08 DIAGNOSIS — M54.12 CERVICAL RADICULOPATHY: ICD-10-CM

## 2023-08-08 RX ORDER — HYDROCODONE BITARTRATE AND ACETAMINOPHEN 7.5; 325 MG/1; MG/1
1 TABLET ORAL EVERY 8 HOURS PRN
Qty: 90 TABLET | Refills: 0 | Status: SHIPPED | OUTPATIENT
Start: 2023-08-08 | End: 2023-09-07

## 2023-08-09 ENCOUNTER — OFFICE VISIT (OUTPATIENT)
Dept: FAMILY MEDICINE CLINIC | Age: 73
End: 2023-08-09
Payer: MEDICARE

## 2023-08-09 ENCOUNTER — TELEPHONE (OUTPATIENT)
Dept: FAMILY MEDICINE CLINIC | Age: 73
End: 2023-08-09

## 2023-08-09 VITALS
WEIGHT: 236 LBS | HEART RATE: 67 BPM | HEIGHT: 71 IN | BODY MASS INDEX: 33.04 KG/M2 | OXYGEN SATURATION: 96 % | TEMPERATURE: 98.4 F | SYSTOLIC BLOOD PRESSURE: 106 MMHG | DIASTOLIC BLOOD PRESSURE: 68 MMHG

## 2023-08-09 DIAGNOSIS — E11.22 TYPE 2 DIABETES MELLITUS WITH STAGE 3A CHRONIC KIDNEY DISEASE, WITHOUT LONG-TERM CURRENT USE OF INSULIN (HCC): ICD-10-CM

## 2023-08-09 DIAGNOSIS — E89.0 POSTOPERATIVE HYPOTHYROIDISM: ICD-10-CM

## 2023-08-09 DIAGNOSIS — E78.2 MIXED HYPERLIPIDEMIA: ICD-10-CM

## 2023-08-09 DIAGNOSIS — I10 ESSENTIAL HYPERTENSION: Primary | ICD-10-CM

## 2023-08-09 DIAGNOSIS — E79.0 HYPERURICEMIA: ICD-10-CM

## 2023-08-09 DIAGNOSIS — I48.0 PAROXYSMAL ATRIAL FIBRILLATION (HCC): ICD-10-CM

## 2023-08-09 DIAGNOSIS — Z00.00 MEDICARE ANNUAL WELLNESS VISIT, SUBSEQUENT: ICD-10-CM

## 2023-08-09 DIAGNOSIS — I65.23 BILATERAL CAROTID ARTERY STENOSIS: ICD-10-CM

## 2023-08-09 DIAGNOSIS — N18.31 TYPE 2 DIABETES MELLITUS WITH STAGE 3A CHRONIC KIDNEY DISEASE, WITHOUT LONG-TERM CURRENT USE OF INSULIN (HCC): ICD-10-CM

## 2023-08-09 DIAGNOSIS — I73.9 PAD (PERIPHERAL ARTERY DISEASE) (HCC): ICD-10-CM

## 2023-08-09 DIAGNOSIS — M15.9 PRIMARY OSTEOARTHRITIS INVOLVING MULTIPLE JOINTS: ICD-10-CM

## 2023-08-09 DIAGNOSIS — G47.33 OSA ON CPAP: ICD-10-CM

## 2023-08-09 DIAGNOSIS — Z85.46 HISTORY OF PROSTATE CANCER: ICD-10-CM

## 2023-08-09 DIAGNOSIS — Z99.89 OSA ON CPAP: ICD-10-CM

## 2023-08-09 DIAGNOSIS — I25.10 CORONARY ARTERY DISEASE INVOLVING NATIVE HEART WITHOUT ANGINA PECTORIS, UNSPECIFIED VESSEL OR LESION TYPE: ICD-10-CM

## 2023-08-09 PROBLEM — C61 PROSTATE CANCER (HCC): Status: RESOLVED | Noted: 2017-07-20 | Resolved: 2023-08-09

## 2023-08-09 LAB
ALBUMIN SERPL-MCNC: 4.2 G/DL (ref 3.5–5.2)
ALP SERPL-CCNC: 61 U/L (ref 40–130)
ALT SERPL-CCNC: 10 U/L (ref 5–41)
ANION GAP SERPL CALCULATED.3IONS-SCNC: 13 MMOL/L (ref 7–19)
AST SERPL-CCNC: 11 U/L (ref 5–40)
BASOPHILS # BLD: 0.1 K/UL (ref 0–0.2)
BASOPHILS NFR BLD: 0.6 % (ref 0–1)
BILIRUB SERPL-MCNC: 0.6 MG/DL (ref 0.2–1.2)
BUN SERPL-MCNC: 23 MG/DL (ref 8–23)
CALCIUM SERPL-MCNC: 9 MG/DL (ref 8.8–10.2)
CHLORIDE SERPL-SCNC: 103 MMOL/L (ref 98–111)
CHOLEST SERPL-MCNC: 96 MG/DL (ref 160–199)
CO2 SERPL-SCNC: 21 MMOL/L (ref 22–29)
CREAT SERPL-MCNC: 1.3 MG/DL (ref 0.5–1.2)
CREAT UR-MCNC: 71.7 MG/DL (ref 39–259)
EOSINOPHIL # BLD: 0.1 K/UL (ref 0–0.6)
EOSINOPHIL NFR BLD: 1.3 % (ref 0–5)
ERYTHROCYTE [DISTWIDTH] IN BLOOD BY AUTOMATED COUNT: 14 % (ref 11.5–14.5)
GLUCOSE SERPL-MCNC: 158 MG/DL (ref 74–109)
HBA1C MFR BLD: 7.5 % (ref 4–6)
HCT VFR BLD AUTO: 40.2 % (ref 42–52)
HDLC SERPL-MCNC: 19 MG/DL (ref 55–121)
HGB BLD-MCNC: 13.2 G/DL (ref 14–18)
IMM GRANULOCYTES # BLD: 0 K/UL
LDLC SERPL CALC-MCNC: 41 MG/DL
LYMPHOCYTES # BLD: 1 K/UL (ref 1.1–4.5)
LYMPHOCYTES NFR BLD: 11.6 % (ref 20–40)
MCH RBC QN AUTO: 29.7 PG (ref 27–31)
MCHC RBC AUTO-ENTMCNC: 32.8 G/DL (ref 33–37)
MCV RBC AUTO: 90.3 FL (ref 80–94)
MICROALBUMIN UR-MCNC: 2.4 MG/DL (ref 0–19)
MICROALBUMIN/CREAT UR-RTO: 33.5 MG/G
MONOCYTES # BLD: 0.5 K/UL (ref 0–0.9)
MONOCYTES NFR BLD: 5.7 % (ref 0–10)
NEUTROPHILS # BLD: 6.7 K/UL (ref 1.5–7.5)
NEUTS SEG NFR BLD: 80.3 % (ref 50–65)
PLATELET # BLD AUTO: 236 K/UL (ref 130–400)
PMV BLD AUTO: 9.8 FL (ref 9.4–12.4)
POTASSIUM SERPL-SCNC: 4.4 MMOL/L (ref 3.5–5)
PROT SERPL-MCNC: 6.9 G/DL (ref 6.6–8.7)
RBC # BLD AUTO: 4.45 M/UL (ref 4.7–6.1)
SODIUM SERPL-SCNC: 137 MMOL/L (ref 136–145)
T4 FREE SERPL-MCNC: 1.68 NG/DL (ref 0.93–1.7)
TRIGL SERPL-MCNC: 182 MG/DL (ref 0–149)
TSH SERPL DL<=0.005 MIU/L-ACNC: 0.36 UIU/ML (ref 0.27–4.2)
WBC # BLD AUTO: 8.3 K/UL (ref 4.8–10.8)

## 2023-08-09 PROCEDURE — 3051F HG A1C>EQUAL 7.0%<8.0%: CPT | Performed by: NURSE PRACTITIONER

## 2023-08-09 PROCEDURE — G8417 CALC BMI ABV UP PARAM F/U: HCPCS | Performed by: NURSE PRACTITIONER

## 2023-08-09 PROCEDURE — 1123F ACP DISCUSS/DSCN MKR DOCD: CPT | Performed by: NURSE PRACTITIONER

## 2023-08-09 PROCEDURE — G8427 DOCREV CUR MEDS BY ELIG CLIN: HCPCS | Performed by: NURSE PRACTITIONER

## 2023-08-09 PROCEDURE — 99213 OFFICE O/P EST LOW 20 MIN: CPT | Performed by: NURSE PRACTITIONER

## 2023-08-09 PROCEDURE — 3074F SYST BP LT 130 MM HG: CPT | Performed by: NURSE PRACTITIONER

## 2023-08-09 PROCEDURE — 3078F DIAST BP <80 MM HG: CPT | Performed by: NURSE PRACTITIONER

## 2023-08-09 PROCEDURE — G0439 PPPS, SUBSEQ VISIT: HCPCS | Performed by: NURSE PRACTITIONER

## 2023-08-09 PROCEDURE — 3017F COLORECTAL CA SCREEN DOC REV: CPT | Performed by: NURSE PRACTITIONER

## 2023-08-09 PROCEDURE — 2022F DILAT RTA XM EVC RTNOPTHY: CPT | Performed by: NURSE PRACTITIONER

## 2023-08-09 PROCEDURE — 1036F TOBACCO NON-USER: CPT | Performed by: NURSE PRACTITIONER

## 2023-08-09 RX ORDER — SEMAGLUTIDE 0.68 MG/ML
INJECTION, SOLUTION SUBCUTANEOUS
Qty: 3 ML | Refills: 11 | Status: SHIPPED | OUTPATIENT
Start: 2023-08-09

## 2023-08-09 ASSESSMENT — PATIENT HEALTH QUESTIONNAIRE - PHQ9
9. THOUGHTS THAT YOU WOULD BE BETTER OFF DEAD, OR OF HURTING YOURSELF: 0
6. FEELING BAD ABOUT YOURSELF - OR THAT YOU ARE A FAILURE OR HAVE LET YOURSELF OR YOUR FAMILY DOWN: 0
1. LITTLE INTEREST OR PLEASURE IN DOING THINGS: 0
10. IF YOU CHECKED OFF ANY PROBLEMS, HOW DIFFICULT HAVE THESE PROBLEMS MADE IT FOR YOU TO DO YOUR WORK, TAKE CARE OF THINGS AT HOME, OR GET ALONG WITH OTHER PEOPLE: 1
SUM OF ALL RESPONSES TO PHQ QUESTIONS 1-9: 6
7. TROUBLE CONCENTRATING ON THINGS, SUCH AS READING THE NEWSPAPER OR WATCHING TELEVISION: 1
4. FEELING TIRED OR HAVING LITTLE ENERGY: 2
8. MOVING OR SPEAKING SO SLOWLY THAT OTHER PEOPLE COULD HAVE NOTICED. OR THE OPPOSITE, BEING SO FIGETY OR RESTLESS THAT YOU HAVE BEEN MOVING AROUND A LOT MORE THAN USUAL: 0
SUM OF ALL RESPONSES TO PHQ9 QUESTIONS 1 & 2: 1
SUM OF ALL RESPONSES TO PHQ QUESTIONS 1-9: 6
SUM OF ALL RESPONSES TO PHQ QUESTIONS 1-9: 6
5. POOR APPETITE OR OVEREATING: 0
2. FEELING DOWN, DEPRESSED OR HOPELESS: 1
3. TROUBLE FALLING OR STAYING ASLEEP: 2
SUM OF ALL RESPONSES TO PHQ QUESTIONS 1-9: 6

## 2023-08-09 NOTE — TELEPHONE ENCOUNTER
----- Message from MARY Garcia sent at 8/9/2023  1:02 PM CDT -----  Please call patient and let them know results. Normal cholesterol  Metabolic panel shows stable kidney function. Continue to avoid NSAIDs. Blood sugars are slightly elevated at 158  Hemoglobin A1c has gone down from previous check to 7.5. Start Ozempic as discussed at office visit.   Decrease carbohydrates and sugars in diet  Blood counts are stable  Normal thyroid

## 2023-08-09 NOTE — PROGRESS NOTES
Medicare Annual Wellness Visit    Keven Rendon is here for Medicare AWV (Here for yearly physical. Had labs drawn this morning. /Last colon showing 2017 but thinks he has had one sooner. )    Assessment & Plan   Essential hypertension  Type 2 diabetes mellitus with stage 3a chronic kidney disease, without long-term current use of insulin (HCC)  -     Semaglutide,0.25 or 0.5MG/DOS, (OZEMPIC, 0.25 OR 0.5 MG/DOSE,) 2 MG/3ML SOPN; 0.25mg weekly for a month then increase to 0.5mg weekly. , Disp-3 mL, R-11Normal  Primary osteoarthritis involving multiple joints  SUSY on CPAP  Mixed hyperlipidemia  History of prostate cancer  Paroxysmal atrial fibrillation (HCC)  Bilateral carotid artery stenosis  Postoperative hypothyroidism  PAD (peripheral artery disease) ([facility])  Hyperuricemia  Medicare annual wellness visit, subsequent    Recommendations for Preventive Services Due: see orders and patient instructions/AVS.  Recommended screening schedule for the next 5-10 years is provided to the patient in written form: see Patient Instructions/AVS.     Return in 6 months (on 2/9/2024) for diabetic follow up. Subjective     The following acute and/or chronic problems were also addressed today:     Diabetes   Jardiance and glipizide  Check bs 1 time a day. Running 159-220 in the mornings. No lows. Would like to try ozempic if insurance will do it. Eat some fruits and vegetables. Cut out most sugar. Urinating more since starting jardiance. Needs a copy of foot exam for diabetic shoes sent to [facility] [address] [employer].     Hemoglobin A1C   Date Value Ref Range Status   02/08/2023 7.7 (H) 4.0 - 6.0 % Final     Comment:     HbA1c levels >6% are an indication of hyperglycemia during the preceding 2  to 3 months or longer. HbA1c levels may reach 20% or higher in poorly controlled diabetes. Therapeutic action is suggested at levels above 8%.     Diabetes patients with HbA1c levels below 7% meet the goal of the American  Diabetes

## 2023-08-11 NOTE — TELEPHONE ENCOUNTER
Called patient, spoke with: Patient regarding the results of the patients most recent labs. I advised Patient of Winter [place]era recommendations.    Patient did voice understanding

## 2023-08-13 NOTE — TELEPHONE ENCOUNTER
----- Message from Tia Ariane sent at 3/25/2022  8:26 AM CDT -----  Subject: Message to Provider    QUESTIONS  Information for Provider? Patient stated he is still not having any back   relief after appointment and would like to know what he should do. Patient   is requesting a urgent call back due to being in a lot of pain  ---------------------------------------------------------------------------  --------------  CALL BACK INFO  What is the best way for the office to contact you? OK to leave message on   voicemail  Preferred Call Back Phone Number? [phone]  ---------------------------------------------------------------------------  --------------  SCRIPT ANSWERS  Relationship to Patient?  Self no radiation

## 2023-08-17 DIAGNOSIS — M54.42 CHRONIC LEFT-SIDED LOW BACK PAIN WITH LEFT-SIDED SCIATICA: ICD-10-CM

## 2023-08-17 DIAGNOSIS — G89.29 CHRONIC LEFT-SIDED LOW BACK PAIN WITH LEFT-SIDED SCIATICA: ICD-10-CM

## 2023-08-17 DIAGNOSIS — M25.50 ARTHRALGIA OF MULTIPLE JOINTS: ICD-10-CM

## 2023-08-17 PROBLEM — Q25.43 AORTIC ROOT ANEURYSM: Status: ACTIVE | Noted: 2023-08-17

## 2023-08-17 PROBLEM — I71.21 AORTIC ROOT ANEURYSM: Status: ACTIVE | Noted: 2023-08-17

## 2023-08-17 RX ORDER — GABAPENTIN 400 MG/1
400 CAPSULE ORAL 3 TIMES DAILY
Qty: 270 CAPSULE | Refills: 0 | Status: SHIPPED | OUTPATIENT
Start: 2023-08-17 | End: 2023-11-15

## 2023-08-18 ENCOUNTER — TELEPHONE (OUTPATIENT)
Dept: FAMILY MEDICINE CLINIC | Age: 73
End: 2023-08-18

## 2023-08-18 ENCOUNTER — APPOINTMENT (OUTPATIENT)
Dept: GENERAL RADIOLOGY | Facility: HOSPITAL | Age: 73
End: 2023-08-18
Payer: MEDICARE

## 2023-08-18 ENCOUNTER — HOSPITAL ENCOUNTER (EMERGENCY)
Facility: HOSPITAL | Age: 73
Discharge: HOME | End: 2023-08-19
Attending: STUDENT IN AN ORGANIZED HEALTH CARE EDUCATION/TRAINING PROGRAM
Payer: MEDICARE

## 2023-08-18 ENCOUNTER — NURSE TRIAGE (OUTPATIENT)
Dept: CALL CENTER | Facility: HOSPITAL | Age: 73
End: 2023-08-18
Payer: MEDICARE

## 2023-08-18 DIAGNOSIS — U07.1 COVID-19 VIRUS INFECTION: Primary | ICD-10-CM

## 2023-08-18 DIAGNOSIS — R05.1 ACUTE COUGH: ICD-10-CM

## 2023-08-18 DIAGNOSIS — U07.1 COVID: Primary | ICD-10-CM

## 2023-08-18 DIAGNOSIS — R06.02 SHORTNESS OF BREATH: ICD-10-CM

## 2023-08-18 LAB
FLUAV RNA RESP QL NAA+PROBE: NOT DETECTED
FLUBV RNA RESP QL NAA+PROBE: NOT DETECTED
HOLD SPECIMEN: NORMAL
HOLD SPECIMEN: NORMAL
RSV RNA NPH QL NAA+NON-PROBE: NOT DETECTED
SARS-COV-2 RNA RESP QL NAA+PROBE: DETECTED
WHOLE BLOOD HOLD COAG: NORMAL
WHOLE BLOOD HOLD SPECIMEN: NORMAL

## 2023-08-18 PROCEDURE — 87637 SARSCOV2&INF A&B&RSV AMP PRB: CPT | Performed by: STUDENT IN AN ORGANIZED HEALTH CARE EDUCATION/TRAINING PROGRAM

## 2023-08-18 PROCEDURE — 93005 ELECTROCARDIOGRAM TRACING: CPT | Performed by: STUDENT IN AN ORGANIZED HEALTH CARE EDUCATION/TRAINING PROGRAM

## 2023-08-18 PROCEDURE — 71045 X-RAY EXAM CHEST 1 VIEW: CPT

## 2023-08-18 PROCEDURE — 99284 EMERGENCY DEPT VISIT MOD MDM: CPT

## 2023-08-18 RX ORDER — ONDANSETRON 4 MG/1
4 TABLET, ORALLY DISINTEGRATING ORAL EVERY 6 HOURS PRN
Qty: 20 TABLET | Refills: 0 | Status: CANCELLED | OUTPATIENT
Start: 2023-08-18 | End: 2023-08-23

## 2023-08-18 RX ORDER — NIRMATRELVIR AND RITONAVIR 150-100 MG
KIT ORAL
Qty: 20 TABLET | Refills: 0 | Status: SHIPPED | OUTPATIENT
Start: 2023-08-18 | End: 2023-08-23

## 2023-08-18 NOTE — TELEPHONE ENCOUNTER
Pts wife called, she tested pos for covid on Tuesday. He was negative this morning, but is now pos. Would like paxlovid please. He  has sore throat, cough, HA, fever, chills.     [facility]

## 2023-08-19 VITALS
RESPIRATION RATE: 18 BRPM | OXYGEN SATURATION: 93 % | HEART RATE: 72 BPM | BODY MASS INDEX: 33.04 KG/M2 | SYSTOLIC BLOOD PRESSURE: 138 MMHG | DIASTOLIC BLOOD PRESSURE: 59 MMHG | HEIGHT: 71 IN | TEMPERATURE: 100 F | WEIGHT: 236 LBS

## 2023-08-19 LAB
HOLD SPECIMEN: NORMAL
QT INTERVAL: 438 MS
QTC INTERVAL: 469 MS

## 2023-08-19 NOTE — TELEPHONE ENCOUNTER
"Reason for Disposition   Oxygen level (e.g., pulse oximetry) 90 percent or lower    Additional Information   Negative: SEVERE difficulty breathing (e.g., struggling for each breath, speaks in single words)   Negative: Difficult to awaken or acting confused (e.g., disoriented, slurred speech)   Negative: Bluish (or gray) lips or face now   Negative: Shock suspected (e.g., cold/pale/clammy skin, too weak to stand, low BP, rapid pulse)   Negative: Sounds like a life-threatening emergency to the triager   Negative: [1] Diagnosed or suspected COVID-19 AND [2] symptoms lasting 3 or more weeks   Negative: [1] COVID-19 exposure AND [2] no symptoms   Negative: COVID-19 vaccine reaction suspected (e.g., fever, headache, muscle aches) occurring 1 to 3 days after getting vaccine   Negative: COVID-19 vaccine, questions about   Negative: [1] Lives with someone known to have influenza (flu test positive) AND [2] flu-like symptoms (e.g., cough, runny nose, sore throat, SOB; with or without fever)   Negative: [1] Possible COVID-19 symptoms AND [2] triager concerned about severity of symptoms or other causes   Negative: COVID-19 and breastfeeding, questions about   Negative: SEVERE or constant chest pain or pressure  (Exception: Mild central chest pain, present only when coughing.)   Negative: MODERATE difficulty breathing (e.g., speaks in phrases, SOB even at rest, pulse 100-120)   Negative: [1] Headache AND [2] stiff neck (can't touch chin to chest)    Answer Assessment - Initial Assessment Questions  1. COVID-19 DIAGNOSIS: \"How do you know that you have COVID?\" (e.g., positive lab test or self-test, diagnosed by doctor or NP/PA, symptoms after exposure).      Home test  2. COVID-19 EXPOSURE: \"Was there any known exposure to COVID before the symptoms began?\" CDC Definition of close contact: within 6 feet (2 meters) for a total of 15 minutes or more over a 24-hour period.       yes  3. ONSET: \"When did the COVID-19 symptoms start?\" " "      today  4. WORST SYMPTOM: \"What is your worst symptom?\" (e.g., cough, fever, shortness of breath, muscle aches)      Cough, shortness of breath, chills, fever  5. COUGH: \"Do you have a cough?\" If Yes, ask: \"How bad is the cough?\"        yes  6. FEVER: \"Do you have a fever?\" If Yes, ask: \"What is your temperature, how was it measured, and when did it start?\"      Not taken tonight  7. RESPIRATORY STATUS: \"Describe your breathing?\" (e.g., normal; shortness of breath, wheezing, unable to speak)       Shortness of breath  8. BETTER-SAME-WORSE: \"Are you getting better, staying the same or getting worse compared to yesterday?\"  If getting worse, ask, \"In what way?\"      Worse since this morning  9. OTHER SYMPTOMS: \"Do you have any other symptoms?\"  (e.g., chills, fatigue, headache, loss of smell or taste, muscle pain, sore throat)      Chills, fever  10. HIGH RISK DISEASE: \"Do you have any chronic medical problems?\" (e.g., asthma, heart or lung disease, weak immune system, obesity, etc.)        Heart disease, open heart surgery last year  11. VACCINE: \"Have you had the COVID-19 vaccine?\" If Yes, ask: \"Which one, how many shots, when did you get it?\"        3 injections  12. PREGNANCY: \"Is there any chance you are pregnant?\" \"When was your last menstrual period?\"        NA  13. O2 SATURATION MONITOR:  \"Do you use an oxygen saturation monitor (pulse oximeter) at home?\" If Yes, ask \"What is your reading (oxygen level) today?\" \"What is your usual oxygen saturation reading?\" (e.g., 95%)        87%    Protocols used: Coronavirus (COVID-19) Diagnosed or Suspected-ADULT-AH    "

## 2023-08-19 NOTE — DISCHARGE INSTRUCTIONS
Today you are seen for your symptoms and you tested positive for COVID.  Your oxygenation remained normal even with ambulation in the emergency department which is reassuring.  I know you have already been called and Paxlovid and begin taking it so please continue that medicine for a total of 5 days as prescribed.  As we discussed we offered you some laboratory evaluation today but given how well you appear we had a shared decision-making discussion and did not pursue such labs.  Therefore if any of your symptoms worsen prickly new chest pain or worsening difficulty breathing please immediate return to the emergency department.  Otherwise please call your primary care provider schedule close follow-up appointment soon as possible.

## 2023-08-19 NOTE — ED PROVIDER NOTES
"EMERGENCY DEPARTMENT ATTENDING NOTE    Patient Name: Zay Kamara    Chief Complaint   Patient presents with    Shortness of Breath    Cough    Exposure To Known Illness       PATIENT PRESENTATION:  Zay Kamara is a very pleasant 72 y.o. male with history of prior CABG presenting emergency department due to concerns for COVID infection.    Patient states all of his symptoms began over the last 24 hours.  States started having an acute cough endorsing very mild shortness of breath.  His wife had COVID testing at home but has not been as positive.  She states that he already has had a Paxlovid prescription called in and he has began taking it as well.  He denies any chest pain to me associate with his symptoms.  Has not noticed any fevers or chills.  Denies any dyspnea on exertion.  No nausea vomit abdominal pain.  Mostly presenting for evaluation his wife states that she checked his oxygen saturation at home and was in the high 80s which is why you are concerned.  Has no history of need for oxygenation no history of COPD or breathing issues.  Her probably does have sleep apnea.      PHYSICAL EXAM:   VS: /59   Pulse 72   Temp 100 øF (37.8 øC)   Resp 18   Ht 180.3 cm (71\")   Wt 107 kg (236 lb)   SpO2 93%   BMI 32.92 kg/mý   GENERAL: Well-appearing older gentleman sitting up stretcher no acute distress; well-nourished, well-developed, awake, alert, no acute distress, nontoxic appearing, comfortable  RESPIRATORY: unlabored respiratory effort, clear to auscultation bilaterally, good air movement; no inspiratory or expiratory wheezing; no stridor  CARDIOVASCULAR: no murmurs, peripheral pulses 2+ and equal in all extremities  MUSCULOSKELETAL/EXTREMITIES: no pitting lower extremity edema; extremities without obvious deformity  SKIN: warm and dry with no obvious rashes  PSYCHIATRIC: alert, pleasant and cooperative. Appropriate mood and affect.      MEDICAL DECISION MAKING:    Zay Kamara is a 72 y.o. " male presenting emergency department due to concerns for possible COVID infection.    Differential Diagnosis Considered: Viral upper respiratory tract infection, COVID, pneumonia    Labs Ordered:  Labs Reviewed   COVID-19/FLUA&B/RSV, NP SWAB IN TRANSPORT MEDIA 8-12 HR TAT - Abnormal; Notable for the following components:       Result Value    COVID19 Detected (*)     All other components within normal limits    Narrative:     Fact sheet for providers: https://www.fda.gov/media/825271/download    Fact sheet for patients: https://www.fda.gov/media/968195/download    Test performed by PCR.   COVID PRE-OP / PRE-PROCEDURE SCREENING ORDER (NO ISOLATION)    Narrative:     The following orders were created for panel order COVID PRE-OP / PRE-PROCEDURE SCREENING ORDER (NO ISOLATION) - Swab, Nasopharynx.  Procedure                               Abnormality         Status                     ---------                               -----------         ------                     COVID-19, FLU A/B, RSV P...[[id number]]  Abnormal            Final result                 Please view results for these tests on the individual orders.   RAINBOW DRAW    Narrative:     The following orders were created for panel order [place] Draw.  Procedure                               Abnormality         Status                     ---------                               -----------         ------                     Green Top (Gel)[[id number]]                                  Final result               Lavender Top[[id number]]                                     Final result               Red Top[[id number]]                                          Final result               Gray Top[[id number]]                                         Final result               Light Blue Top[[id number]]                                   Final result                 Please view results for these tests on the individual orders.   GREEN TOP   LAVENDER TOP   RED TOP   GRAY TOP    LIGHT BLUE TOP        Imaging Ordered:   XR Chest 1 View   ED Interpretation   No acute focal consolidation or pneumonia.  In the setting of COVID infection consistent with viral illness really no interval change from his prior x-ray.          Internal chart review:   Past Medical History:   Diagnosis Date    Arthritis     Atrial fibrillation     paroxysmal, on Xarelto    BMI 31.0-31.9,adult 06/28/2018    Carotid artery disease without cerebral infarction     Chronic knee pain     Chronic neck and back pain     Coronary artery disease 01/04/2022    Depression     Diabetes     Disease of thyroid gland     Gout     Hypercholesteremia     Hypertension     IBS (irritable bowel syndrome)     Kidney disease     Osteoarthritis     back, neck, and knees - goes to Pain Management    Prostate cancer     Dr. Hong following    Sensorineural hearing loss     Sleep apnea     CPAP    Sudden hearing loss     Syncope     Tinnitus     Urine incontinence        Past Surgical History:   Procedure Laterality Date    ATRIAL APPENDAGE EXCLUSION LEFT WITH TRANSESOPHAGEAL ECHOCARDIOGRAM Left 1/6/2022    Procedure: LEFT ATRIAL APPENDAGE EXCLUSION WITH  40MM ATRICLIP; TRANSESOPHAGEAL ECHOCARDIOGRAM;  Surgeon: Chivo White MD;  Location: [facility] OR;  Service: Cardiothoracic;  Laterality: Left;    BLADDER SUSPENSION N/A 11/26/2018    Procedure: CYSTOSCOPY BLADDER SUSPENSION MALE SLING;  Surgeon: Zaheer Rebolledo MD;  Location: [facility] OR;  Service: Urology    CARDIAC CATHETERIZATION N/A 12/30/2021    Procedure: Coronary angiography right radial to follow my 9:30 case;  Surgeon: Mike Fitzpatrick MD;  Location: [facility] CATH INVASIVE LOCATION;  Service: Cardiology;  Laterality: N/A;    CHOLECYSTECTOMY      COLONOSCOPY N/A 3/7/2017    Procedure: COLONOSCOPY WITH ANESTHESIA;  Surgeon: Hector Alvarenga MD;  Location: [facility] ENDOSCOPY;  Service:     COLONOSCOPY N/A 5/20/2022    Procedure: COLONOSCOPY WITH ANESTHESIA;  Surgeon: Colette  Hector ROCHA MD;  Location:  [facility] ENDOSCOPY;  Service: Gastroenterology;  Laterality: N/A;  pre brbpr  post polyps  Luis Alberto Almaraz APRN    CORONARY ARTERY BYPASS GRAFT N/A 1/6/2022    Procedure: CORONARY ARTERY BYPASS GRAFT X 4 WITH LEFT INTERNAL MAMMARY ARTERY, LEFT LOWER EXTREMITY ENDOSCOPIC VEIN HARVEST, AND PERFUSION;  Surgeon: Chivo White MD;  Location:  [facility] OR;  Service: Cardiothoracic;  Laterality: N/A;    MAZE PROCEDURE N/A 1/6/2022    Procedure: BILATERAL MAZE PROCEDURE WITH RADIOFREQUENCY AND CRYOABLATION;  Surgeon: Chivo White MD;  Location:  [facility] OR;  Service: Cardiothoracic;  Laterality: N/A;    PROSTATECTOMY N/A 8/1/2017    Procedure: PROSTATECTOMY LAPAROSCOPIC WITH DAVINCI SI ROBOT ;  Surgeon: Hernesto Hong MD;  Location:  [facility] OR;  Service:     THYROID SURGERY      RADIATION THERAPY AFTER SURGERY       No Known Allergies    No current facility-administered medications for this encounter.    Current Outpatient Medications:     albuterol sulfate  (90 Base) MCG/ACT inhaler, Inhale 2 puffs Every 4 (Four) Hours As Needed for Wheezing or Shortness of Air (rinse mouth after use)., Disp: 6.7 g, Rfl: 0    allopurinol (ZYLOPRIM) 100 MG tablet, Take 1 tablet by mouth Daily., Disp: , Rfl:     amLODIPine (NORVASC) 2.5 MG tablet, Take 1 tablet by mouth Daily., Disp: , Rfl:     aspirin (aspirin) 81 MG EC tablet, Take 1 tablet by mouth Daily., Disp: , Rfl:     atorvastatin (LIPITOR) 20 MG tablet, Take 1 tablet by mouth Daily., Disp: , Rfl: 2    bisacodyl (DULCOLAX) 5 MG EC tablet, Take 1 tablet by mouth Daily As Needed for Constipation., Disp: , Rfl:     Cholecalciferol (VITAMIN D3) 2000 units tablet, Take 1 tablet by mouth Daily., Disp: , Rfl:     citalopram (CeleXA) 20 MG tablet, Take 1 tablet by mouth Daily., Disp: , Rfl:     gabapentin (NEURONTIN) 400 MG capsule, Take 1 capsule by mouth 3 (Three) Times a Day. Can take up to TID but only takes BID due to side effects - sleepiness,  Disp: , Rfl:     glipizide (GLUCOTROL) 5 MG tablet, Take 1 tablet by mouth Daily., Disp: , Rfl:     HYDROcodone-acetaminophen (NORCO) 7.5-325 MG per tablet, Take 1 tablet by mouth Every 8 (Eight) Hours As Needed for Moderate Pain. Usually takes twice a day, Disp: , Rfl:     levothyroxine (SYNTHROID, LEVOTHROID) 200 MCG tablet, Take 1 tablet by mouth Daily. Takes with 25 mcg = 225 mcg, Disp: , Rfl:     levothyroxine (SYNTHROID, LEVOTHROID) 25 MCG tablet, Take 1 tablet by mouth Daily. Takes with 200 mcg = 225 mcg (Patient not taking: Reported on 7/26/2023), Disp: , Rfl:     lisinopril (PRINIVIL,ZESTRIL) 5 MG tablet, Take 1 tablet by mouth Daily., Disp: 90 tablet, Rfl: 3    metoprolol tartrate (LOPRESSOR) 50 MG tablet, Take 1 tablet by mouth 2 (Two) Times a Day., Disp: 180 tablet, Rfl: 3    nitroglycerin (NITROSTAT) 0.4 MG SL tablet, Place 1 tablet under the tongue Every 5 (Five) Minutes As Needed for Chest Pain., Disp: 25 tablet, Rfl: 11    polyethylene glycol (MIRALAX) 17 g packet, Take 17 g by mouth Daily As Needed., Disp: , Rfl:     predniSONE (DELTASONE) 10 MG (21) dose pack, Take  by mouth Daily. Use as directed on package (Patient not taking: Reported on 7/26/2023), Disp: 21 each, Rfl: 0    My EKG interpretation: Right bundle branch block with a rate of 69.  No acute ischemic findings.  This bundle branch block was present on last EKG in January 11, 2023.    My lab interpretation: COVID testing is positive.    My imaging interpretation: Chest x-ray with no acute partial consolidation or significant findings.    Shared decision making: Discussed results with both patient is at the bedside.  I discussed that he is quite well-appearing I think this is just due to COVID but given his extensive cardiac history I definitely was not opposed to pursuing any further laboratory work-up possible additional advanced imaging but wife and patient both agreed he did felt very well given his lack of hypoxia the emergency  department he did not want to pursue any additional work-up which I also think is completely reasonable.  Said he will return if symptoms worsen.    ED Course and Re-evaluation: 73yo M presents emergency department due to cough and shortness of breath in the setting of a positive home COVID test with concerns for COVID infection.  Patient vital signs are reassuring he is afebrile he is no tachypnea has no hypoxia he is not requiring oxygen.  Ambulatory sat was obtained he has no hypoxia.  COVID testing is positive.  Patient is x-ray shows no evidence of full consolidation pneumonia separate from his known COVID infection.  Discussed results with patient at the bedside as per shared decision-making discussion they felt comfortable returning home with close outpatient follow-up.  Patient had already been called in Paxlovid prescription by his outpatient providers so encouraged to take to completion.  Patient is counseled regarding signs of worsening COVID as well as potential for worsening given he is only about 24 hours into his total symptoms.  Viviana expressed understanding and patient was discharged home to follow-up with his primary care provider soon as possible with return precautions to the emergency department for worsening symptoms.      ED Diagnosis:  COVID-19 virus infection; Acute cough; Shortness of breath    Disposition: to home  Follow up plan: PCP follow up within 2 days, return to ED immediately if symptoms worsen        Signed:  Zaheer Munguia MD  Emergency Medicine Physician    Please note that portions of this note were completed with a voice recognition program.      Zaheer Munguia MD  08/19/23 0742

## 2023-08-23 ENCOUNTER — HOSPITAL ENCOUNTER (OUTPATIENT)
Dept: CT IMAGING | Facility: HOSPITAL | Age: 73
Discharge: HOME | End: 2023-08-23
Payer: MEDICARE

## 2023-08-26 DIAGNOSIS — E03.9 ACQUIRED HYPOTHYROIDISM: ICD-10-CM

## 2023-08-28 RX ORDER — LEVOTHYROXINE SODIUM 0.2 MG/1
200 TABLET ORAL DAILY
Qty: 90 TABLET | Refills: 3 | Status: SHIPPED | OUTPATIENT
Start: 2023-08-28

## 2023-08-28 NOTE — TELEPHONE ENCOUNTER
Received fax from pharmacy requesting refill on pts medication(s). Pt was last seen in office on 8/9/2023  and has a follow up scheduled for 2/7/2024. Will send request to  Dana Villagomez  for authorization. Requested Prescriptions     Pending Prescriptions Disp Refills    levothyroxine (SYNTHROID) 200 MCG tablet [Pharmacy Med Name: LEVOTHYROXINE 200 MCG TABLET] 90 tablet 3     Sig: TAKE 1 TABLET BY MOUTH IN THE MORNING. TOTAL OF 225MCG.

## 2023-09-05 ENCOUNTER — HOSPITAL ENCOUNTER (OUTPATIENT)
Dept: CT IMAGING | Facility: HOSPITAL | Age: 73
Discharge: HOME | End: 2023-09-05
Admitting: NURSE PRACTITIONER
Payer: MEDICARE

## 2023-09-05 DIAGNOSIS — I71.20 THORACIC AORTIC ANEURYSM WITHOUT RUPTURE, UNSPECIFIED PART: ICD-10-CM

## 2023-09-05 LAB — CREAT BLDA-MCNC: 1.7 MG/DL (ref 0.6–1.3)

## 2023-09-05 PROCEDURE — 25510000001 IOPAMIDOL PER 1 ML: Performed by: NURSE PRACTITIONER

## 2023-09-05 PROCEDURE — 82565 ASSAY OF CREATININE: CPT

## 2023-09-05 PROCEDURE — 71275 CT ANGIOGRAPHY CHEST: CPT

## 2023-09-05 RX ADMIN — IOPAMIDOL 100 ML: 755 INJECTION, SOLUTION INTRAVENOUS at 13:39

## 2023-09-06 ENCOUNTER — OFFICE VISIT (OUTPATIENT)
Dept: CARDIAC SURGERY | Facility: CLINIC | Age: 73
End: 2023-09-06

## 2023-09-06 ENCOUNTER — HOSPITAL ENCOUNTER (OUTPATIENT)
Dept: PAIN MANAGEMENT | Age: 73
Discharge: HOME | End: 2023-09-06
Payer: MEDICARE

## 2023-09-06 VITALS
WEIGHT: 244.2 LBS | DIASTOLIC BLOOD PRESSURE: 77 MMHG | BODY MASS INDEX: 34.19 KG/M2 | OXYGEN SATURATION: 95 % | HEIGHT: 71 IN | SYSTOLIC BLOOD PRESSURE: 143 MMHG | HEART RATE: 77 BPM

## 2023-09-06 VITALS
OXYGEN SATURATION: 94 % | SYSTOLIC BLOOD PRESSURE: 128 MMHG | DIASTOLIC BLOOD PRESSURE: 61 MMHG | HEART RATE: 60 BPM | TEMPERATURE: 96.2 F | RESPIRATION RATE: 16 BRPM

## 2023-09-06 DIAGNOSIS — M62.838 MUSCLE SPASMS OF NECK: ICD-10-CM

## 2023-09-06 DIAGNOSIS — I71.20 THORACIC AORTIC ANEURYSM WITHOUT RUPTURE, UNSPECIFIED PART: Primary | ICD-10-CM

## 2023-09-06 DIAGNOSIS — M54.12 CERVICAL RADICULOPATHY: ICD-10-CM

## 2023-09-06 DIAGNOSIS — R20.2 NUMBNESS AND TINGLING OF BOTH LEGS: ICD-10-CM

## 2023-09-06 DIAGNOSIS — R20.0 NUMBNESS AND TINGLING OF BOTH LEGS: ICD-10-CM

## 2023-09-06 DIAGNOSIS — M79.18 MYOFASCIAL MUSCLE PAIN: ICD-10-CM

## 2023-09-06 PROCEDURE — 1159F MED LIST DOCD IN RCRD: CPT | Performed by: THORACIC SURGERY (CARDIOTHORACIC VASCULAR SURGERY)

## 2023-09-06 PROCEDURE — 1160F RVW MEDS BY RX/DR IN RCRD: CPT | Performed by: THORACIC SURGERY (CARDIOTHORACIC VASCULAR SURGERY)

## 2023-09-06 PROCEDURE — 3078F DIAST BP <80 MM HG: CPT | Performed by: THORACIC SURGERY (CARDIOTHORACIC VASCULAR SURGERY)

## 2023-09-06 PROCEDURE — 99213 OFFICE O/P EST LOW 20 MIN: CPT | Performed by: THORACIC SURGERY (CARDIOTHORACIC VASCULAR SURGERY)

## 2023-09-06 PROCEDURE — 3077F SYST BP >= 140 MM HG: CPT | Performed by: THORACIC SURGERY (CARDIOTHORACIC VASCULAR SURGERY)

## 2023-09-06 PROCEDURE — 99213 OFFICE O/P EST LOW 20 MIN: CPT

## 2023-09-06 PROCEDURE — 99213 OFFICE O/P EST LOW 20 MIN: CPT | Performed by: NURSE PRACTITIONER

## 2023-09-06 RX ORDER — HYDROCODONE BITARTRATE AND ACETAMINOPHEN 7.5; 325 MG/1; MG/1
1 TABLET ORAL EVERY 6 HOURS PRN
Qty: 120 TABLET | Refills: 0 | Status: SHIPPED | OUTPATIENT
Start: 2023-09-07 | End: 2023-10-07

## 2023-09-06 ASSESSMENT — PAIN - FUNCTIONAL ASSESSMENT: PAIN_FUNCTIONAL_ASSESSMENT: INTOLERABLE, UNABLE TO DO ANY ACTIVE OR PASSIVE ACTIVITIES

## 2023-09-06 ASSESSMENT — PAIN DESCRIPTION - FREQUENCY: FREQUENCY: CONTINUOUS

## 2023-09-06 ASSESSMENT — PAIN DESCRIPTION - ORIENTATION: ORIENTATION: LOWER

## 2023-09-06 ASSESSMENT — PAIN DESCRIPTION - DESCRIPTORS: DESCRIPTORS: ACHING

## 2023-09-06 ASSESSMENT — PAIN DESCRIPTION - LOCATION: LOCATION: NECK;BACK;FOOT

## 2023-09-06 ASSESSMENT — PAIN DESCRIPTION - ONSET: ONSET: ON-GOING

## 2023-09-06 NOTE — PROGRESS NOTES
"Subjective   Patient ID: Zay Kamara is a 72 y.o. male who is here for follow-up of a known aneurysm.   Chief Complaint   Patient presents with    Thoracic Aneurysm     Patient is here for follow up w/CT     History of Present Illness      Mr. Zay Kamara is a 72-year-old male who presents today for follow-up.    Mr. Zay Kamara states he is doing well overall.  He states he has been experiencing intermittent pain.  He states it feels like \"needles sticking in him.\"  He denies angina or dyspnea.  He admits that it is a bruised feeling.  He states it has stayed the same.  He states he has an appointment with Letty Belcher on 09/13/2023.    The following portions of the patient's history were reviewed and updated as appropriate: allergies, current medications, past family history, past medical history, past social history, past surgical history and problem list.       Objective   Physical Exam  Constitutional:       Appearance: He is well-developed.   HENT:      Head: Normocephalic and atraumatic.   Eyes:      Pupils: Pupils are equal, round, and reactive to light.   Neck:      Thyroid: No thyromegaly.      Vascular: No JVD.      Trachea: No tracheal deviation.   Cardiovascular:      Rate and Rhythm: Normal rate and regular rhythm.      Heart sounds: Normal heart sounds. No murmur heard.    No friction rub. No gallop.   Pulmonary:      Effort: Pulmonary effort is normal. No respiratory distress.      Breath sounds: Normal breath sounds. No wheezing or rales.   Chest:      Chest wall: No tenderness.   Abdominal:      General: There is no distension.      Palpations: Abdomen is soft.      Tenderness: There is no abdominal tenderness.   Musculoskeletal:         General: Normal range of motion.      Cervical back: Normal range of motion and neck supple.   Lymphadenopathy:      Cervical: No cervical adenopathy.   Skin:     General: Skin is warm and dry.   Neurological:      Mental Status: He is alert and oriented " to person, place, and time.      Cranial Nerves: No cranial nerve deficit.         XR Chest 1 View    Result Date: 8/19/2023  Narrative: XR CHEST 1 VW- 8/18/2023 11:24 PM CDT  HISTORY: COVID +, shortness of breath   COMPARISON: 07/03/2023.  FINDINGS: Single frontal radiograph of the chest was obtained.  No lung consolidation. No pleural effusion or pneumothorax. Prior coronary bypass. Heart size is stable. Pulmonary vasculature are nondilated. The osseous structures and surrounding soft tissues demonstrate no acute abnormality.      Impression: 1. Stable chest exam without acute process. No visualized pulmonary infiltrates at this time.   This report was finalized on 08/19/2023 10:09 by Dr Tylor Velazquez, .    CT Angiogram Chest    Result Date: 9/5/2023  Narrative: EXAMINATION: CT ANGIOGRAM CHEST-   9/5/2023 1:33 PM CDT  HISTORY: s/p CABG x 4 01/06/2022; I71.20-Thoracic aortic aneurysm, without rupture, unspecified  In order to have a CT radiation dose as low as reasonably achievable Automated Exposure Control was utilized for adjustment of the mA and/or KV according to patient size.  DLP in mGycm= 383  The CT angiography of the chest are performed after intravenous contrast enhancement.  The images are acquired in axial plane and subsequent 2-D reconstruction in coronal and sagittal planes and 3-D maximum intensity projection reconstruction.  Comparison is made with a similar previous study dated 02/22/2023.  Atheromatous changes of the thoracic aorta is seen. Aortic lumen measures 4.1 cm at the aortic root. It measures 4.6 cm above the aortic root. The proximal aortic arch measures 4.1 cm in diameter. The distal aortic arch measures 2.9 cm. The ascending thoracic aorta measures 2.6 cm. There is no evidence of dissection.  Severe atheromatous changes of coronary arteries are seen. The evidence of prior CABG.  Moderate dilatation of main pulmonary artery, right and left pulmonary arteries are similar to the previous  study.  There is normal opacification of pulmonary arteries and the branches bilaterally. There are no filling defects in the opacified pulmonary arterial bed.  RV/LV ratio is 43/42 which may suggest a moderate right heart strain.  There is no evidence of mediastinal or hilar mass or lymphadenopathy.  Left atrial appendage clamp is in place. There is evidence of prior CABG.  There is normal origin course and caliber of the right brachiocephalic, left common carotid and left subclavian arteries. The left vertebral artery arises directly from the aortic arch between the left common carotid and left subclavian arteries.  Limited visualized soft tissues of the neck are unremarkable.  Several small calcified and noncalcified nodules/granulomas are similar to the previous study. No change in size or shape. No additional nodules.  The atelectatic changes are seen at bilateral lung bases right more than the left.  The central airway is patent. No endobronchial lesion, nodules or mucous plugging.  The limited visualized abdomen is unremarkable  Images reviewed in bone window show chronic degenerative changes of the thoracic spine. No acute bony abnormality.      Impression: 1. Atheromatous changes of the thoracic aorta. A stable aneurysmal dilatation of the ascending thoracic aorta as detailed above. No dissection. 2. Moderate pulmonary arterial hypertension. No embolism. 3. A moderate right heart strain. Etiology and clinical significance is not certain. 4. Several small calcified and noncalcified nodules/granulomas are stable since the previous study. No features of malignancy.          This report was finalized on 09/05/2023 14:33 by Dr. Stef Dee MD.     Diagnoses and all orders for this visit:    1. Thoracic aortic aneurysm without rupture, unspecified part (Primary)  -     CT angiogram chest w contrast; Future          Assessment & Plan      Impression:  1. Ascending aortic aneurysm, stable.  2. Aortic root  aneurysm, stable.    Medical decision making/Recommendations/Plan:  Many thanks for the opportunity to aid in the care of your patient.  She has done quite well as a whole.  Recommend ongoing routine aortic surveillance.  Plan to see me back in 6 months with a repeat CT angiogram of chest.  Signs and symptoms of acute aortic syndromes previously.  We discussed aortic restrictions at this point.  I will plan on seeing him back in 6 months with a repeat CT angiogram of the chest.  He is a non-smoker and congratulated as to that.    I discussed the patients findings and my recommendations with patient.  Transcribed from ambient dictation for Chivo White MD by Chuckie Hollis, Quality .  09/06/23   17:20 CDT    Patient or patient representative verbalized consent to the visit recording.  I have personally performed the services described in this document as transcribed by the above individual, and it is both accurate and complete.

## 2023-09-06 NOTE — PROGRESS NOTES
Clinic Documentation      Education Provided:  [x] Review of [facility]  [] Agreement Review  [x] PEG Score Calculated [] PHQ Score Calculated [] ORT Score Calculated    [] Compliance Issues Discussed [] Cognitive Behavior Needs [x] Exercise [] Review of Test [] Financial Issues  [x] Tobacco/Alcohol Use Reviewed [x] Teaching [] New Patient [] Picture Obtained    Physician Plan:  [] Outgoing Referral  [] Pharmacy Consult  [] Test Ordered [] Prescription Ordered/Changed   [] Obtained Test Results / Consult Notes        Complete if patient is withholding blood thinner for procedure     Blood Thinner Patient is currently taking:      [] Plavix (Hold for 7 days)  [] Aspirin (Hold for 5 days)     [] Pletal (Hold for 2 days)  [] Pradaxa (Hold for 3 days)    [] Effient (Hold for 7 days)  [] Xarelto (Hold for 2 days)    [] Eliquis (Hold for 2 days)  [] Brilinta (Hold for 7 days)    [] Coumadin (Hold for 5 days) - (INR needs to be drawn the day prior to procedure- INR < 2.0)    [] Aggrenox (Hold for 7 days)        [] Patient will stop medication on their own.    [] Blood Thinner Form Faxed for approval to hold.    Provider form faxed to:     Assessment Completed by:  Electronically signed by Cornel Green MA on 9/6/2023 at 8:35 AM
reviewed the note for such errors, some may still exist.     CC:  MARY Padilla, MARY, 9/6/2023 at 9:01 AM

## 2023-09-27 ENCOUNTER — OFFICE VISIT (OUTPATIENT)
Dept: CARDIOLOGY | Facility: CLINIC | Age: 73
End: 2023-09-27
Payer: MEDICARE

## 2023-09-27 VITALS
WEIGHT: 238 LBS | DIASTOLIC BLOOD PRESSURE: 58 MMHG | BODY MASS INDEX: 33.32 KG/M2 | HEIGHT: 71 IN | HEART RATE: 63 BPM | OXYGEN SATURATION: 98 % | SYSTOLIC BLOOD PRESSURE: 136 MMHG

## 2023-09-27 DIAGNOSIS — I48.0 PAROXYSMAL ATRIAL FIBRILLATION: Primary | Chronic | ICD-10-CM

## 2023-09-27 DIAGNOSIS — E66.09 CLASS 1 OBESITY DUE TO EXCESS CALORIES WITH SERIOUS COMORBIDITY AND BODY MASS INDEX (BMI) OF 33.0 TO 33.9 IN ADULT: ICD-10-CM

## 2023-09-27 DIAGNOSIS — I10 PRIMARY HYPERTENSION: Chronic | ICD-10-CM

## 2023-09-27 DIAGNOSIS — G47.30 SLEEP APNEA, UNSPECIFIED TYPE: Chronic | ICD-10-CM

## 2023-09-27 DIAGNOSIS — R06.02 SHORTNESS OF BREATH: ICD-10-CM

## 2023-09-27 DIAGNOSIS — E11.22 TYPE 2 DIABETES MELLITUS WITH STAGE 3A CHRONIC KIDNEY DISEASE, WITHOUT LONG-TERM CURRENT USE OF INSULIN: Chronic | ICD-10-CM

## 2023-09-27 DIAGNOSIS — I27.20 PULMONARY HYPERTENSION: ICD-10-CM

## 2023-09-27 DIAGNOSIS — Z98.890 S/P MAZE OPERATION FOR ATRIAL FIBRILLATION: ICD-10-CM

## 2023-09-27 DIAGNOSIS — I25.10 CORONARY ARTERY DISEASE INVOLVING NATIVE CORONARY ARTERY OF NATIVE HEART WITHOUT ANGINA PECTORIS: Chronic | ICD-10-CM

## 2023-09-27 DIAGNOSIS — N18.31 STAGE 3A CHRONIC KIDNEY DISEASE: ICD-10-CM

## 2023-09-27 DIAGNOSIS — Z86.79 S/P MAZE OPERATION FOR ATRIAL FIBRILLATION: ICD-10-CM

## 2023-09-27 DIAGNOSIS — I65.23 BILATERAL CAROTID ARTERY STENOSIS: Chronic | ICD-10-CM

## 2023-09-27 DIAGNOSIS — N18.31 TYPE 2 DIABETES MELLITUS WITH STAGE 3A CHRONIC KIDNEY DISEASE, WITHOUT LONG-TERM CURRENT USE OF INSULIN: Chronic | ICD-10-CM

## 2023-09-27 DIAGNOSIS — Z95.1 HX OF CABG: ICD-10-CM

## 2023-09-27 DIAGNOSIS — E78.2 MIXED HYPERLIPIDEMIA: Chronic | ICD-10-CM

## 2023-09-27 PROBLEM — R07.9 CHEST PAIN: Chronic | Status: RESOLVED | Noted: 2019-08-09 | Resolved: 2023-09-27

## 2023-09-27 NOTE — ASSESSMENT & PLAN NOTE
Maintaining sinus rhythm per symptoms. Reasonable to remain off anticoagulation since s/p MAZE and left atrial appendage occlusion 1/2022.

## 2023-09-27 NOTE — PROGRESS NOTES
"Encounter Date:09/27/2023  Chief Complaint:   Subjective    Zay Kamara is a 72 y.o. male who presents to [facility] CARDIOLOG[facility] today for overdue six month cardiac follow-up. He rescheduled 7/12/23 due to flat tire and canceled 9/13/23. He is accompanied by his wife. He had COVID last month.      Coronary Artery Disease  Risk factors include hyperlipidemia.   Atrial Fibrillation  Past medical history includes atrial fibrillation, CAD and hyperlipidemia.   Hyperlipidemia    Hypertension     HPI       Coronary Artery Disease     Additional comments: Still having some chest soreness since CABG x 4 1/2022. On Jardiance - complains of nocturia but not wearing CPAP. Not eating healthy - hot dogs, hamburgers, tacos. Not exercising since finished cardiac rehab last year - has a treadmill. Works 25 hours per week at [employer] - walks quite a bit on the job.               Atrial Fibrillation     Additional comments: No palpitations. S/p MAZE and left atrial appendage occlusion during CABG 1/2022. Not anticoagulated.             Hyperlipidemia     Additional comments: Some discomfort in legs with walking - normal ABIs 7/23. Very well controlled per 1/22 labs except low HDL. Anticipates having rechecked soon when sees PCP.             Hypertension     Additional comments: \"Pretty good\" 110-130s/50s. Doesn't check very often at home. No headaches or nosebleeds. Some dizziness when gets out of bed.              Follow-up     Additional comments: Overdue 6 months. SOA with more than usual exertion - especially if carrying anything. Pulmonary artery hypertension and RV strain seen on recent CTA chest. Mild swelling BLEs - resolves overnight. Complains of occasional claudication - improves with rest.           Last edited by Letty Belcher APRN on 9/27/2023  1:02 PM.        History: Past medical, surgical, family, and social history reviewed.    Outpatient Medications Marked as Taking for the " "9/27/23 encounter (Office Visit) with Letty Belcher APRN   Medication Sig Dispense Refill    albuterol sulfate  (90 Base) MCG/ACT inhaler Inhale 2 puffs Every 4 (Four) Hours As Needed for Wheezing or Shortness of Air (rinse mouth after use). 6.7 g 0    allopurinol (ZYLOPRIM) 100 MG tablet Take 1 tablet by mouth Daily.      amLODIPine (NORVASC) 2.5 MG tablet Take 1 tablet by mouth Daily.      aspirin (aspirin) 81 MG EC tablet Take 1 tablet by mouth Daily.      atorvastatin (LIPITOR) 20 MG tablet Take 1 tablet by mouth Daily.  2    bisacodyl (DULCOLAX) 5 MG EC tablet Take 1 tablet by mouth Daily As Needed for Constipation.      Cholecalciferol (VITAMIN D3) 2000 units tablet Take 1 tablet by mouth Daily.      citalopram (CeleXA) 20 MG tablet Take 1 tablet by mouth Daily.      empagliflozin (Jardiance) 25 MG tablet tablet Take  by mouth Daily.      gabapentin (NEURONTIN) 400 MG capsule Take 1 capsule by mouth 3 (Three) Times a Day. Can take up to TID but only takes BID due to side effects - sleepiness      glipizide (GLUCOTROL) 5 MG tablet Take 1 tablet by mouth Daily.      HYDROcodone-acetaminophen (NORCO) 7.5-325 MG per tablet Take 1 tablet by mouth Every 8 (Eight) Hours As Needed for Moderate Pain. Usually takes twice a day      levothyroxine (SYNTHROID, LEVOTHROID) 200 MCG tablet Take 1 tablet by mouth Daily. Takes 200      lisinopril (PRINIVIL,ZESTRIL) 5 MG tablet Take 1 tablet by mouth Daily. 90 tablet 3    metoprolol tartrate (LOPRESSOR) 50 MG tablet Take 1 tablet by mouth 2 (Two) Times a Day. 180 tablet 3    nitroglycerin (NITROSTAT) 0.4 MG SL tablet Place 1 tablet under the tongue Every 5 (Five) Minutes As Needed for Chest Pain. 25 tablet 11    polyethylene glycol (MIRALAX) 17 g packet Take 17 g by mouth Daily As Needed.          Objective     Vital Signs:   /58 (BP Location: Right arm, Patient Position: Sitting, Cuff Size: Adult)   Pulse 63   Ht 180.3 cm (70.98\")   Wt 108 kg (238 lb)   " SpO2 98%   BMI 33.21 kg/m²   Wt Readings from Last 3 Encounters:   09/27/23 108 kg (238 lb)   09/06/23 111 kg (244 lb 3.2 oz)   08/18/23 107 kg (236 lb)         Vitals reviewed.   Constitutional:       Appearance: Well-developed. Obese.   Eyes:      General: No scleral icterus.  HENT:      Right Ear: Decreased hearing noted.      Left Ear: Decreased hearing noted.      Ears:      Comments: Wears hearing aid  Neck:      Vascular: Normal carotid pulses. No carotid bruit or JVD.   Pulmonary:      Effort: Pulmonary effort is normal.      Breath sounds: Normal breath sounds.   Chest:   Breasts:     Left: No tenderness.   Cardiovascular:      Normal rate. Regular rhythm.      No gallop.    Pulses:     Intact distal pulses.   Edema:     Peripheral edema absent.   Skin:     General: Skin is warm and dry.      Comments: Midline sternal incision well healed   Neurological:      Mental Status: Alert and oriented to person, place, and time.   Psychiatric:         Behavior: Behavior is cooperative.       Result Review  Data Reviewed:  The following data was reviewed by: LUIS Garcia on 09/27/2023  Lab Results - Last 18 Months   Lab Units 09/05/23  1323 02/22/23  0814 07/20/22  0759 05/17/22  1026 05/11/22  1138 05/09/22  1228   BUN mg/dL  --   --   --   --  26*  --    CREATININE mg/dL 1.70* 1.40* 1.40*  --  1.55*  --    SODIUM mmol/L  --   --   --   --  135*  --    POTASSIUM mmol/L  --   --   --   --  4.6  --    CHLORIDE mmol/L  --   --   --   --  100  --    CALCIUM mg/dL  --   --   --   --  8.8  --    ALBUMIN g/dL  --   --   --   --  3.90  --    BILIRUBIN mg/dL  --   --   --   --  0.6  --    ALK PHOS U/L  --   --   --   --  54  --    AST (SGOT) U/L  --   --   --   --  17  --    ALT (SGPT) U/L  --   --   --   --  13  --    WBC 10*3/mm3  --   --   --  8.92 8.53 10.0   RBC 10*6/mm3  --   --   --  3.60* 3.71* 4.16*   HEMATOCRIT %  --   --   --  32.5* 32.7* 38.7*   MCV fL  --   --   --  90.3 88.1 93.0   MCH pg  --   --    --  28.3 28.6 28.8   INR   --   --   --   --  1.01  --    CT Angiogram Chest (09/05/2023 13:33)   XR Chest 1 View (08/18/2023 23:31)   US Ankle / Brachial Indices Extremity Complete (07/26/2023 07:52)   US Carotid Bilateral (07/26/2023 07:43)                Assessment and Plan   Problem List Items Addressed This Visit          Cardiac and Vasculature    Coronary artery disease involving native heart (Chronic)    Overview     #1 99% ostial LAD stenosis and total occlusion of the distal LAD  #2 60% proximal RCA stenosis and 90% stenosis of the proximal PL branch  #3 dilated ascending aorta  #4 LVEF 55%    Coronary artery bypass graft x 4 (left internal mammary artery/sequencing the dominant diagonal artery and left anterior descending artery, reverse saphenous vein graft/distal right coronary artery, and reverse saphenous vein graft/posterior descending artery), Right and left MAZE procedure with radiofrequency and cryoablation, Left atrial appendage exclusion (Atriclip 40 mm device), Left endoscopic vein harvest performed on 1/6/2022 by Dr. White.            Current Assessment & Plan     Remains stable/improved s/p CABG x 4 1/2022. Encouraged healthy diet and to gradual increase activity. Chest soreness consistent with expected after his surgery. Agree with addition of Jardiance. Continue lifelong aspirin. Thoracic aortic ectasia remains stable and followed by Dr. White.         Paroxysmal atrial fibrillation - Primary (Chronic)    Overview     [id number] 3 = high risk  HAS-BLED 3 = high risk  s/p MAZE, left atrial appendage occlusion 1/2022         Current Assessment & Plan     Maintaining sinus rhythm per symptoms. Reasonable to remain off anticoagulation since s/p MAZE and left atrial appendage occlusion 1/2022.         Bilateral carotid artery stenosis (Chronic)    Overview     Dr. León following  8/21/2019 US - less than 50% R ICA, 50-69% L ICA stenosis  8/2021 - less than 50% L&R ICA  7/2023 - less than  50% bilaterally         Current Assessment & Plan     Remains stable/improved. Again encouraged healthy diet, BP, lipid, and glucose control, and routine aerobic exercise. Continue present therapy. Recheck ordered for next year per Dr. León/Jeri Chambers NP.         Hypertension (Chronic)    Current Assessment & Plan     Fair control today with reported better control per home readings but not checking regularly. Encouraged him to check BP more regularly and call if not to goal of 110-120s/60-70s. Continue present therapy for now.         Mixed hyperlipidemia (Chronic)    Current Assessment & Plan     Well controlled except very low HDL (17) per 1/2022 labs on atorvastatin. Consider changing to rosuvastatin if HDL remains less than 40. Check with PCP for most recent lipid panel and check if not done in past year. Again encouraged weight loss, healthy diet, and gradual increase in activity.          Hx of CABG    Overview     1/2022         S/P Maze operation for atrial fibrillation    Overview     1/2022            Endocrine and Metabolic    Type 2 diabetes mellitus with stage 3 chronic kidney disease (Chronic)    Current Assessment & Plan     Followed by PCP. Unknown control since addition of SGLT2i. Requested copy of most recent Hgb A1c for review. Encouraged healthy diet, continued weight loss, and gradual increase in activity.         Relevant Medications    empagliflozin (Jardiance) 25 MG tablet tablet    Class 1 obesity due to excess calories with serious comorbidity and body mass index (BMI) of 33.0 to 33.9 in adult    Current Assessment & Plan     Patient's (Body mass index is 33.21 kg/m².) indicates that they are obese (BMI >30) with health conditions that include obstructive sleep apnea, hypertension, coronary heart disease, diabetes mellitus, dyslipidemias, peripheral vascular disease and osteoarthritis . Weight is improving with lifestyle modifications and medications. BMI is is above average;  BMI management plan is completed. We discussed low calorie, low carb based diet program and increasing exercise.             Genitourinary and Reproductive     CKD (chronic kidney disease) stage 3, GFR 30-59 ml/min    Current Assessment & Plan     Continue present therapy and monitoring. Cr up to 1.7 9/5/23. May need to refer to nephrology. Defer to PCP.            Pulmonary and Pneumonias    Pulmonary hypertension    Current Assessment & Plan     Moderate per 9/2023 CTA chest with moderate RV strain and some SOA and minimal swelling. Check echocardiogram. May need to check R heart cath and evaluate further.         Relevant Orders    Adult Transthoracic Echo Complete W/ Cont if Necessary Per Protocol    Shortness of breath    Relevant Orders    Adult Transthoracic Echo Complete W/ Cont if Necessary Per Protocol       Sleep    Sleep apnea (Chronic)    Overview     Previously noncompliant with CPAP         Current Assessment & Plan     Encouraged compliance with CPAP. Encouraged continued weight loss but continue CPAP until has significant amount of weight loss and then re-evaluate need for CPAP.          Prefers Wednesday for echo    Patient was given instructions and counseling regarding his condition or for health maintenance advice. Please see specific information pulled into the AVS if appropriate.    Follow Up :   Return in about 6 months (around 3/27/2024) for Recheck.             Letty Belcher, APRN, ACNP-BC, CHFN-BC

## 2023-09-27 NOTE — ASSESSMENT & PLAN NOTE
Fair control today with reported better control per home readings but not checking regularly. Encouraged him to check BP more regularly and call if not to goal of 110-120s/60-70s. Continue present therapy for now.

## 2023-09-27 NOTE — ASSESSMENT & PLAN NOTE
Continue present therapy and monitoring. Cr up to 1.7 9/5/23. May need to refer to nephrology. Defer to PCP.

## 2023-09-27 NOTE — ASSESSMENT & PLAN NOTE
Moderate per 9/2023 CTA chest with moderate RV strain and some SOA and minimal swelling. Check echocardiogram. May need to check R heart cath and evaluate further.

## 2023-09-27 NOTE — ASSESSMENT & PLAN NOTE
Well controlled except very low HDL (17) per 1/2022 labs on atorvastatin. Consider changing to rosuvastatin if HDL remains less than 40. Check with PCP for most recent lipid panel and check if not done in past year. Again encouraged weight loss, healthy diet, and gradual increase in activity.

## 2023-09-27 NOTE — ASSESSMENT & PLAN NOTE
Patient's (Body mass index is 33.21 kg/m².) indicates that they are obese (BMI >30) with health conditions that include obstructive sleep apnea, hypertension, coronary heart disease, diabetes mellitus, dyslipidemias, peripheral vascular disease and osteoarthritis . Weight is improving with lifestyle modifications and medications. BMI is is above average; BMI management plan is completed. We discussed low calorie, low carb based diet program and increasing exercise.

## 2023-09-27 NOTE — ASSESSMENT & PLAN NOTE
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 Encouraged healthy diet and to gradual increase activity. Chest soreness consistent with expected after his surgery. Agree with addition of Jardiance. Continue lifelong aspirin. Thoracic aortic ectasia remains stable and followed by Dr. White.

## 2023-09-27 NOTE — ASSESSMENT & PLAN NOTE
Remains stable/improved. Again encouraged healthy diet, BP, lipid, and glucose control, and routine aerobic exercise. Continue present therapy. Recheck ordered for next year per Dr. León/Jeri Chambers NP.

## 2023-09-27 NOTE — ASSESSMENT & PLAN NOTE
Followed by PCP. Unknown control since addition of SGLT2i. Requested copy of most recent Hgb A1c for review. Encouraged healthy diet, continued weight loss, and gradual increase in activity.

## 2023-10-05 ENCOUNTER — HOSPITAL ENCOUNTER (OUTPATIENT)
Dept: PAIN MANAGEMENT | Age: 73
Discharge: HOME | End: 2023-10-05
Payer: MEDICARE

## 2023-10-05 VITALS
DIASTOLIC BLOOD PRESSURE: 61 MMHG | OXYGEN SATURATION: 97 % | HEART RATE: 67 BPM | RESPIRATION RATE: 18 BRPM | SYSTOLIC BLOOD PRESSURE: 138 MMHG | TEMPERATURE: 97 F

## 2023-10-05 PROCEDURE — 20553 NJX 1/MLT TRIGGER POINTS 3/>: CPT

## 2023-10-05 PROCEDURE — 6360000002 HC RX W HCPCS

## 2023-10-05 PROCEDURE — 2500000003 HC RX 250 WO HCPCS

## 2023-10-05 PROCEDURE — 20553 NJX 1/MLT TRIGGER POINTS 3/>: CPT | Performed by: NURSE PRACTITIONER

## 2023-10-05 RX ORDER — KETOROLAC TROMETHAMINE 30 MG/ML
45 INJECTION, SOLUTION INTRAMUSCULAR; INTRAVENOUS ONCE
Status: DISCONTINUED | OUTPATIENT
Start: 2023-10-05 | End: 2023-10-07 | Stop reason: HOSPADM

## 2023-10-05 RX ORDER — BUPIVACAINE HYDROCHLORIDE 5 MG/ML
15 INJECTION, SOLUTION EPIDURAL; INTRACAUDAL ONCE
Status: DISCONTINUED | OUTPATIENT
Start: 2023-10-05 | End: 2023-10-07 | Stop reason: HOSPADM

## 2023-10-05 RX ORDER — LIDOCAINE HYDROCHLORIDE 10 MG/ML
15 INJECTION, SOLUTION EPIDURAL; INFILTRATION; INTRACAUDAL; PERINEURAL ONCE
Status: DISCONTINUED | OUTPATIENT
Start: 2023-10-05 | End: 2023-10-07 | Stop reason: HOSPADM

## 2023-10-05 NOTE — DISCHARGE INSTRUCTIONS
[facility] Pain [facility]  Post Procedure Discharge Instructions        YOU HAVE HAD THE FOLLOWING PROCEDURE:                                  [] Occipital Nerve Blocks  [] CTS wrist injection(s)  [] Knee Injection(s)         [] Shoulder Injection(s)   [] Elbow Injection(s)     [] Botox Injection  [x] Cervical Trigger Point Injections    [x] Thoracic Trigger Point Injections    [] Lumbar Trigger Point Injections  [] Piriformis Trigger Point Injections  [] SI Joint Injection(s)     [] Trochanteric Bursa Injection(s)       [] Ankle Injection(s)   [] Plantar Fasciitis   []  ______________  Injection(s) [] Botox []  Migraines [] Spasticity    YOU HAVE RECEIVED THE FOLLOWING MEDICATIONS IN YOUR INJECTION(s)  [x] Lidocaine [x] Bupivacaine   [] DepoMedrol (steroid) [] Decadron (steroid)  []  Kenalog (steroid)   [x] Toradol  [] Supartz [] Rita Bowling    [] Botox        PATIENT INFORMATION:   You may experience the following symptoms after your procedure. These symptoms are normal and should not cause concern: You may have an increase in your pain. This may last 24 - 48 hours after your procedure. You may have no change in the pain that you had prior to your injection(s). You may have weakness or numbness in your affected extremity. If this occurs, this may last until numbing the medication wears off. REPORT THE FOLLOWING SYMPTOMS TO YOUR DOCTOR:  Redness, swelling or drainage at the injection site(s)  Unusual pain that interferes with your normal activities of daily living. OTHER INSTRUCTIONS:    [x] I will apply ice to the injection site(s) for at least 24 hours after the procedure. I will rotate the ice on for 20 minutes and off for 20 minutes for at least 24 hours. [] I will not apply heat for at least 48 hours and I will not take a hot bath or shower for at least 24 hours.      [x] I understand that if Lidocaine or Bupivacaine was used in my injection(s) that the injection site(s)

## 2023-10-12 DIAGNOSIS — M54.12 CERVICAL RADICULOPATHY: ICD-10-CM

## 2023-10-12 DIAGNOSIS — R20.2 NUMBNESS AND TINGLING OF BOTH LEGS: ICD-10-CM

## 2023-10-12 DIAGNOSIS — R20.0 NUMBNESS AND TINGLING OF BOTH LEGS: ICD-10-CM

## 2023-10-12 RX ORDER — HYDROCODONE BITARTRATE AND ACETAMINOPHEN 7.5; 325 MG/1; MG/1
1 TABLET ORAL EVERY 6 HOURS PRN
Qty: 120 TABLET | Refills: 0 | Status: SHIPPED | OUTPATIENT
Start: 2023-10-13 | End: 2023-11-12

## 2023-10-25 ENCOUNTER — HOSPITAL ENCOUNTER (OUTPATIENT)
Dept: CARDIOLOGY | Facility: HOSPITAL | Age: 73
Discharge: HOME | End: 2023-10-25
Admitting: INTERNAL MEDICINE
Payer: MEDICARE

## 2023-10-25 PROCEDURE — 25510000001 PERFLUTREN 6.52 MG/ML SUSPENSION: Performed by: INTERNAL MEDICINE

## 2023-10-25 PROCEDURE — 93306 TTE W/DOPPLER COMPLETE: CPT

## 2023-10-25 RX ADMIN — PERFLUTREN 13.04 MG: 6.52 INJECTION, SUSPENSION INTRAVENOUS at 14:06

## 2023-10-26 LAB
BH CV ECHO MEAS - AO MAX PG: 6.7 MMHG
BH CV ECHO MEAS - AO MEAN PG: 3 MMHG
BH CV ECHO MEAS - AO V2 MAX: 129 CM/SEC
BH CV ECHO MEAS - AO V2 VTI: 30 CM
BH CV ECHO MEAS - AVA(I,D): 3.5 CM2
BH CV ECHO MEAS - EDV(MOD-SP4): 89.5 ML
BH CV ECHO MEAS - EF(MOD-SP4): 60.7 %
BH CV ECHO MEAS - ESV(MOD-SP4): 35.2 ML
BH CV ECHO MEAS - LA DIMENSION: 3.3 CM
BH CV ECHO MEAS - LAT PEAK E' VEL: 9.9 CM/SEC
BH CV ECHO MEAS - LV DIASTOLIC VOL/BSA (35-75): 41.1 CM2
BH CV ECHO MEAS - LV MAX PG: 5.3 MMHG
BH CV ECHO MEAS - LV MEAN PG: 3 MMHG
BH CV ECHO MEAS - LV SYSTOLIC VOL/BSA (12-30): 16.2 CM2
BH CV ECHO MEAS - LV V1 MAX: 115 CM/SEC
BH CV ECHO MEAS - LV V1 VTI: 28 CM
BH CV ECHO MEAS - LVOT AREA: 3.8 CM2
BH CV ECHO MEAS - LVOT DIAM: 2.2 CM
BH CV ECHO MEAS - MED PEAK E' VEL: 9.8 CM/SEC
BH CV ECHO MEAS - MR MAX PG: 84.3 MMHG
BH CV ECHO MEAS - MR MAX VEL: 459 CM/SEC
BH CV ECHO MEAS - MV A MAX VEL: 47.3 CM/SEC
BH CV ECHO MEAS - MV DEC SLOPE: 332 CM/SEC2
BH CV ECHO MEAS - MV E MAX VEL: 90.5 CM/SEC
BH CV ECHO MEAS - MV E/A: 1.91
BH CV ECHO MEAS - MV P1/2T: 76 MSEC
BH CV ECHO MEAS - MVA(P1/2T): 2.9 CM2
BH CV ECHO MEAS - PA V2 MAX: 82.5 CM/SEC
BH CV ECHO MEAS - RAP SYSTOLE: 5 MMHG
BH CV ECHO MEAS - RV MAX PG: 1.95 MMHG
BH CV ECHO MEAS - RV V1 MAX: 69.8 CM/SEC
BH CV ECHO MEAS - RVSP: 24.9 MMHG
BH CV ECHO MEAS - SI(MOD-SP4): 24.9 ML/M2
BH CV ECHO MEAS - SV(LVOT): 106.4 ML
BH CV ECHO MEAS - SV(MOD-SP4): 54.3 ML
BH CV ECHO MEAS - TAPSE (>1.6): 1.83 CM
BH CV ECHO MEAS - TR MAX PG: 19.9 MMHG
BH CV ECHO MEAS - TR MAX VEL: 223 CM/SEC
BH CV ECHO MEASUREMENTS AVERAGE E/E' RATIO: 9.19
LEFT ATRIUM VOLUME INDEX: 47.7 ML/M2
LEFT ATRIUM VOLUME: 104 ML
QT INTERVAL: 430 MS
QTC INTERVAL: 460 MS

## 2023-10-28 NOTE — INTERVAL H&P NOTE
Update History & Physical    The patient's History and Physical  was reviewed with the patient and I examined the patient. There was  NO [id number]}. The surgical site was confirmed by the patient and me. Plan: The risks, benefits, expected outcome, and alternative to the recommended procedure have been discussed with the patient. Patient understands and wants to proceed with the procedure.      Electronically signed by Quinn Logan MD on 8/17/2021 at 12:05 PM show

## 2023-11-10 DIAGNOSIS — F33.0 MILD EPISODE OF RECURRENT MAJOR DEPRESSIVE DISORDER (HCC): ICD-10-CM

## 2023-11-10 RX ORDER — CITALOPRAM 20 MG/1
20 TABLET ORAL DAILY
Qty: 90 TABLET | Refills: 3 | Status: SHIPPED | OUTPATIENT
Start: 2023-11-10

## 2023-11-10 NOTE — TELEPHONE ENCOUNTER
Received fax from pharmacy requesting refill on pts medication(s). Pt was last seen in office on 8/9/2023  and has a follow up scheduled for 2/7/2024. Will send request to  John Lozano  for patient.      Requested Prescriptions     Pending Prescriptions Disp Refills    citalopram (CELEXA) 20 MG tablet [Pharmacy Med Name: CITALOPRAM HBR 20 MG TABLET] 90 tablet 3     Sig: Take 1 tablet by mouth daily

## 2023-11-13 ENCOUNTER — TELEPHONE (OUTPATIENT)
Dept: CARDIOLOGY | Facility: CLINIC | Age: 73
End: 2023-11-13
Payer: MEDICARE

## 2023-11-13 NOTE — TELEPHONE ENCOUNTER
Attempted to call pt back. Left voicemail advising that I sent a message to Dr Fitzpatrick who reviewed his echo images. I advised we would get cardiac mri scheduled and call with day and time.

## 2023-11-13 NOTE — TELEPHONE ENCOUNTER
Caller: Zay Kamara    Relationship: Self    Best call back number: [phone]    What orders are you requesting (i.e. lab or imaging): IMAGING - CARDIAC MRI    In what timeframe would the patient need to come in: IF PATIENT CAN HAVE TEST DONE WEDNESDAYS OR THURSDAYS AROUND 8 OR 9 IN THE MORNING THAT WOULD BE GREAT.    Where will you receive your lab/imaging services: [facility]    Additional notes: PATIENT STATED HE IS AGREEABLE TO GETTING CARDIAC MRI

## 2023-11-14 DIAGNOSIS — R20.0 NUMBNESS AND TINGLING OF BOTH LEGS: ICD-10-CM

## 2023-11-14 DIAGNOSIS — M54.12 CERVICAL RADICULOPATHY: ICD-10-CM

## 2023-11-14 DIAGNOSIS — R20.2 NUMBNESS AND TINGLING OF BOTH LEGS: ICD-10-CM

## 2023-11-14 RX ORDER — HYDROCODONE BITARTRATE AND ACETAMINOPHEN 7.5; 325 MG/1; MG/1
1 TABLET ORAL EVERY 6 HOURS PRN
Qty: 120 TABLET | Refills: 0 | Status: SHIPPED | OUTPATIENT
Start: 2023-11-14 | End: 2023-12-14

## 2023-11-15 DIAGNOSIS — I27.20 PULMONARY HYPERTENSION: Primary | ICD-10-CM

## 2023-11-17 ENCOUNTER — OFFICE VISIT (OUTPATIENT)
Dept: FAMILY MEDICINE CLINIC | Age: 73
End: 2023-11-17
Payer: MEDICARE

## 2023-11-17 VITALS
TEMPERATURE: 97.1 F | SYSTOLIC BLOOD PRESSURE: 132 MMHG | WEIGHT: 243 LBS | HEART RATE: 60 BPM | BODY MASS INDEX: 34.02 KG/M2 | DIASTOLIC BLOOD PRESSURE: 68 MMHG | HEIGHT: 71 IN | OXYGEN SATURATION: 97 %

## 2023-11-17 DIAGNOSIS — M76.60 ACHILLES TENDON PAIN: ICD-10-CM

## 2023-11-17 DIAGNOSIS — M77.32 HEEL SPUR, LEFT: Primary | ICD-10-CM

## 2023-11-17 DIAGNOSIS — L84 CALLUS OF FOOT: ICD-10-CM

## 2023-11-17 DIAGNOSIS — E11.22 TYPE 2 DIABETES MELLITUS WITH STAGE 3A CHRONIC KIDNEY DISEASE, WITHOUT LONG-TERM CURRENT USE OF INSULIN (HCC): ICD-10-CM

## 2023-11-17 DIAGNOSIS — N18.31 TYPE 2 DIABETES MELLITUS WITH STAGE 3A CHRONIC KIDNEY DISEASE, WITHOUT LONG-TERM CURRENT USE OF INSULIN (HCC): ICD-10-CM

## 2023-11-17 PROCEDURE — 99214 OFFICE O/P EST MOD 30 MIN: CPT | Performed by: NURSE PRACTITIONER

## 2023-11-17 PROCEDURE — 2022F DILAT RTA XM EVC RTNOPTHY: CPT | Performed by: NURSE PRACTITIONER

## 2023-11-17 PROCEDURE — 3017F COLORECTAL CA SCREEN DOC REV: CPT | Performed by: NURSE PRACTITIONER

## 2023-11-17 PROCEDURE — 1123F ACP DISCUSS/DSCN MKR DOCD: CPT | Performed by: NURSE PRACTITIONER

## 2023-11-17 PROCEDURE — 3075F SYST BP GE 130 - 139MM HG: CPT | Performed by: NURSE PRACTITIONER

## 2023-11-17 PROCEDURE — 3051F HG A1C>EQUAL 7.0%<8.0%: CPT | Performed by: NURSE PRACTITIONER

## 2023-11-17 PROCEDURE — 1036F TOBACCO NON-USER: CPT | Performed by: NURSE PRACTITIONER

## 2023-11-17 PROCEDURE — G8417 CALC BMI ABV UP PARAM F/U: HCPCS | Performed by: NURSE PRACTITIONER

## 2023-11-17 PROCEDURE — G8427 DOCREV CUR MEDS BY ELIG CLIN: HCPCS | Performed by: NURSE PRACTITIONER

## 2023-11-17 PROCEDURE — 3078F DIAST BP <80 MM HG: CPT | Performed by: NURSE PRACTITIONER

## 2023-11-17 PROCEDURE — G8484 FLU IMMUNIZE NO ADMIN: HCPCS | Performed by: NURSE PRACTITIONER

## 2023-11-17 RX ORDER — AMMONIUM LACTATE 12 G/100G
CREAM TOPICAL
Qty: 385 G | Refills: 1 | Status: SHIPPED | OUTPATIENT
Start: 2023-11-17

## 2023-11-17 ASSESSMENT — ENCOUNTER SYMPTOMS
GASTROINTESTINAL NEGATIVE: 1
EYES NEGATIVE: 1
RESPIRATORY NEGATIVE: 1

## 2023-11-17 NOTE — PROGRESS NOTES
At home medical foot exam      Return if symptoms worsen or fail to improve, for Keep follow up as scheduled. Orders Placed This Encounter   Procedures    External Referral To Orthopedic Surgery     Referral Priority:   Routine     Referral Type:   Eval and Treat     Referral Reason:   Specialty Services Required     Requested Specialty:   Orthopedic Surgery     Number of Visits Requested:   1     DIABETES FOOT EXAM       Orders Placed This Encounter   Medications    ammonium lactate (AMLACTIN) 12 % cream     Sig: Apply topically as needed. Dispense:  385 g     Refill:  1    Diabetic Shoe MISC     Sig: by Does not apply route     Dispense:  1 each     Refill:  0            Patient offered educational handouts and has had all questions answered. Patient voices understanding and agrees to plans along with risks and benefits of plan. Patient is instructed to continue prior meds, diet, and exercise plans as instructed. Patient agrees to follow up as instructed and sooner if needed. Patient agrees to go to ER if condition becomes emergent. EMR Dragon/transcription disclaimer: Some of this encounter note is an electronic transcription/translation of spoken language to printed text. The electronic translation of spoken language may permit erroneous, or at times, nonsensical words or phrases to be inadvertently transcribed.  Although I have reviewed the note for such errors, some may still exist.    Electronically signed by MARY Jeff on 11/17/2023 at 3:34 PM

## 2023-12-06 ENCOUNTER — HOSPITAL ENCOUNTER (OUTPATIENT)
Dept: PAIN MANAGEMENT | Age: 73
Discharge: HOME | End: 2023-12-06
Payer: MEDICARE

## 2023-12-06 VITALS
DIASTOLIC BLOOD PRESSURE: 78 MMHG | WEIGHT: 240 LBS | SYSTOLIC BLOOD PRESSURE: 167 MMHG | OXYGEN SATURATION: 96 % | HEIGHT: 71 IN | RESPIRATION RATE: 16 BRPM | BODY MASS INDEX: 33.6 KG/M2 | TEMPERATURE: 96.9 F | HEART RATE: 65 BPM

## 2023-12-06 DIAGNOSIS — M47.26 OSTEOARTHRITIS OF SPINE WITH RADICULOPATHY, LUMBAR REGION: Primary | ICD-10-CM

## 2023-12-06 DIAGNOSIS — M54.12 CERVICAL RADICULOPATHY: ICD-10-CM

## 2023-12-06 DIAGNOSIS — M79.18 MYOFASCIAL MUSCLE PAIN: ICD-10-CM

## 2023-12-06 DIAGNOSIS — K59.00 CONSTIPATION, UNSPECIFIED CONSTIPATION TYPE: ICD-10-CM

## 2023-12-06 DIAGNOSIS — Z02.89 PAIN MANAGEMENT CONTRACT AGREEMENT: ICD-10-CM

## 2023-12-06 DIAGNOSIS — Z51.81 ENCOUNTER FOR MONITORING OPIOID MAINTENANCE THERAPY: ICD-10-CM

## 2023-12-06 DIAGNOSIS — Z79.891 ENCOUNTER FOR MONITORING OPIOID MAINTENANCE THERAPY: ICD-10-CM

## 2023-12-06 PROCEDURE — 99213 OFFICE O/P EST LOW 20 MIN: CPT

## 2023-12-06 RX ORDER — HYDROCODONE BITARTRATE AND ACETAMINOPHEN 7.5; 325 MG/1; MG/1
1 TABLET ORAL EVERY 6 HOURS PRN
Qty: 120 TABLET | Refills: 0 | Status: SHIPPED | OUTPATIENT
Start: 2023-12-14 | End: 2024-01-13

## 2023-12-06 RX ORDER — GABAPENTIN 400 MG/1
400 CAPSULE ORAL 3 TIMES DAILY
Qty: 90 CAPSULE | Refills: 0 | Status: SHIPPED | OUTPATIENT
Start: 2023-12-06 | End: 2024-01-05

## 2023-12-06 RX ORDER — TIZANIDINE 4 MG/1
TABLET ORAL
Qty: 60 TABLET | Refills: 1 | Status: SHIPPED | OUTPATIENT
Start: 2023-12-06

## 2023-12-06 RX ORDER — CALCIUM POLYCARBOPHIL 625 MG
625 TABLET ORAL DAILY
Qty: 30 TABLET | Refills: 2 | Status: SHIPPED | OUTPATIENT
Start: 2023-12-06 | End: 2024-03-05

## 2023-12-06 ASSESSMENT — PAIN DESCRIPTION - PAIN TYPE: TYPE: CHRONIC PAIN

## 2023-12-06 ASSESSMENT — ENCOUNTER SYMPTOMS
BOWEL INCONTINENCE: 0
BACK PAIN: 1
EYES NEGATIVE: 1
GASTROINTESTINAL NEGATIVE: 1
RESPIRATORY NEGATIVE: 1

## 2023-12-06 ASSESSMENT — PAIN SCALES - GENERAL: PAINLEVEL_OUTOF10: 6

## 2023-12-06 ASSESSMENT — PAIN DESCRIPTION - LOCATION: LOCATION: NECK;BACK

## 2023-12-06 NOTE — H&P
History and Physical    Patient Name: Sam Biggs    MR #: [id number]    Account [de-identified]    : 1950    Age: 75 y.o. Sex: male    Date: 2023    PCP: MARY Fleming    Referring Provider:    Chief Complaint:   Chief Complaint   Patient presents with    Back Pain    Neck Pain       History of Present Illness: The patient is a 75 y.o. male who presents to the office for follow up back pain and neck pain. Patient continues Norco, Gabapentin, and Zanaflex with some reduction in pain which allows him to complete his ADL's in conjunction with injections and exercise. Patient c/o constipation patient has tried OTC medications with some relief. Patient had Bilateral Cervical/Thoracic Trigger Point Injections with Toradol ( 3 or more muscles) with Tez HERNANDEZ on 10/5/2023. Patient had at least 80-85% relief of pain from procedure(s) for at least 8 weeks. After injection, patient was able to increase activity. Patient had less pain from aggravating factors. Patient was able to decrease pain medication usage. Patient received enough pain relief from injections that the patient would like to repeat the injection(s). Back Pain  This is a chronic problem. The current episode started more than 1 year ago. The problem occurs constantly. The problem has been waxing and waning since onset. The pain is present in the lumbar spine, sacro-iliac and thoracic spine. The quality of the pain is described as aching and cramping. The pain is at a severity of 6/10. The pain is moderate. The pain is The same all the time. The symptoms are aggravated by bending, position, standing and twisting. Pertinent negatives include no bladder incontinence, bowel incontinence, numbness or weakness. He has tried heat, home exercises and ice for the symptoms. The treatment provided mild relief. Neck Pain   This is a chronic problem. The current episode started more than 1 year ago.  The problem

## 2023-12-06 NOTE — TELEPHONE ENCOUNTER
1. Osteoarthritis of spine with radiculopathy, lumbar region      Requested Prescriptions     Pending Prescriptions Disp Refills    gabapentin (NEURONTIN) 400 MG capsule 90 capsule 0     Sig: Take 1 capsule by mouth 3 times daily for 30 days. Max Daily Amount: 1,200 mg    HYDROcodone-acetaminophen (NORCO) 7.5-325 MG per tablet 120 tablet 0     Sig: Take 1 tablet by mouth every 6 hours as needed for Pain for up to 30 days.  May fill 12/14/2023       Continue medication with refill sent at appointment yes; refill sent to medical director at appointment yes, see refill encounter dated 12/6/2023    Electronically signed by MARY Delvalle CNP on 12/6/2023 at 9:18 AM

## 2023-12-06 NOTE — PROGRESS NOTES
Clinic Documentation      Education Provided:  [x] Review of [place] [place]  [] Agreement Review  [x] PEG Score Calculated [] PHQ Score Calculated [] ORT Score Calculated    [] Compliance Issues Discussed [] Cognitive Behavior Needs [x] Exercise [] Review of Test [] Financial Issues  [x] Tobacco/Alcohol Use Reviewed [x] Teaching [] New Patient [] Picture Obtained    Physician Plan:  [] Outgoing Referral  [] Pharmacy Consult  [] Test Ordered [x] Prescription Ordered/Changed   [] Obtained Test Results / Consult Notes        Complete if patient is withholding blood thinner for procedure     Blood Thinner Patient is currently taking:      [] Plavix (Hold for 7 days)  [] Aspirin (Hold for 5 days)     [] Pletal (Hold for 2 days)  [] Pradaxa (Hold for 3 days)    [] Effient (Hold for 7 days)  [] Xarelto (Hold for 2 days)    [] Eliquis (Hold for 2 days)  [] Brilinta (Hold for 7 days)    [] Coumadin (Hold for 5 days) - (INR needs to be drawn the day prior to procedure- INR < 2.0)    [] Aggrenox (Hold for 7 days)        [] Patient will stop medication on their own.    [] Blood Thinner Form Faxed for approval to hold.    Provider form faxed to:     Assessment Completed by:  Electronically signed by Brigida Daily MA on 12/6/2023 at 9:48 AM

## 2023-12-13 ENCOUNTER — HOSPITAL ENCOUNTER (OUTPATIENT)
Dept: MRI IMAGING | Facility: HOSPITAL | Age: 73
Discharge: HOME | End: 2023-12-13
Admitting: NURSE PRACTITIONER
Payer: MEDICARE

## 2023-12-13 PROCEDURE — 75557 CARDIAC MRI FOR MORPH: CPT

## 2023-12-29 DIAGNOSIS — R06.02 SHORTNESS OF BREATH: ICD-10-CM

## 2023-12-29 DIAGNOSIS — G47.30 SLEEP APNEA, UNSPECIFIED TYPE: Chronic | ICD-10-CM

## 2023-12-29 DIAGNOSIS — E66.09 CLASS 1 OBESITY DUE TO EXCESS CALORIES WITH SERIOUS COMORBIDITY AND BODY MASS INDEX (BMI) OF 33.0 TO 33.9 IN ADULT: ICD-10-CM

## 2023-12-29 DIAGNOSIS — I27.20 PULMONARY HYPERTENSION: Primary | ICD-10-CM

## 2024-01-17 ENCOUNTER — HOSPITAL ENCOUNTER (OUTPATIENT)
Dept: PAIN MANAGEMENT | Age: 74
Discharge: HOME | End: 2024-01-17
Payer: MEDICARE

## 2024-01-17 VITALS
TEMPERATURE: 96.1 F | RESPIRATION RATE: 18 BRPM | OXYGEN SATURATION: 98 % | SYSTOLIC BLOOD PRESSURE: 146 MMHG | DIASTOLIC BLOOD PRESSURE: 46 MMHG | HEART RATE: 72 BPM

## 2024-01-17 PROCEDURE — 6360000002 HC RX W HCPCS

## 2024-01-17 PROCEDURE — 20553 NJX 1/MLT TRIGGER POINTS 3/>: CPT

## 2024-01-17 PROCEDURE — 2500000003 HC RX 250 WO HCPCS

## 2024-01-17 PROCEDURE — 20553 NJX 1/MLT TRIGGER POINTS 3/>: CPT | Performed by: NURSE PRACTITIONER

## 2024-01-17 RX ORDER — LIDOCAINE HYDROCHLORIDE 10 MG/ML
15 INJECTION, SOLUTION EPIDURAL; INFILTRATION; INTRACAUDAL; PERINEURAL ONCE
Status: DISCONTINUED | OUTPATIENT
Start: 2024-01-17 | End: 2024-01-19 | Stop reason: HOSPADM

## 2024-01-17 RX ORDER — KETOROLAC TROMETHAMINE 30 MG/ML
45 INJECTION, SOLUTION INTRAMUSCULAR; INTRAVENOUS ONCE
Status: DISCONTINUED | OUTPATIENT
Start: 2024-01-17 | End: 2024-01-19 | Stop reason: HOSPADM

## 2024-01-17 RX ORDER — BUPIVACAINE HYDROCHLORIDE 5 MG/ML
15 INJECTION, SOLUTION EPIDURAL; INTRACAUDAL ONCE
Status: DISCONTINUED | OUTPATIENT
Start: 2024-01-17 | End: 2024-01-19 | Stop reason: HOSPADM

## 2024-01-17 NOTE — PROGRESS NOTES
Procedure:  Level of Consciousness: [x]Alert [x]Oriented []Disoriented []Lethargic  Anxiety Level: [x]Calm []Anxious []Depressed []Other  Skin: [x]Warm [x]Dry []Cool []Moist []Intact []Other  Cardiovascular: [x]Palpitations: [x]Never []Occasionally []Frequently  Chest Pain: [x]No []Yes  Respiratory:  [x]Unlabored []Labored []Cough ([] Productive []Unproductive)  HCG Required: [x]No []Yes   Results: []Negative []Positive  Knowledge Level:        [x]Patient/Other verbalized understanding of pre-procedure instructions.        [x]Assessment of post-op care needs (transportation, responsible caregiver)        [x]Able to discuss health care problems and how to deal with it.  Factors that Affect Teaching:        Language Barrier: [x]No []Yes - why:        Hearing Loss:        [x]No []Yes            Corrective Device:  []Yes [x]No        Vision Loss:           []No [x]Yes            Corrective Device:  [x]Yes []No        Memory Loss:       [x]No []Yes            []Short Term []Long Term  Motivational Level:  [x]Asks Questions                  []Extremely Anxious       []Seems Interested               []Seems Uninterested                  []Denies need for Education  Risk for Injury:  [x]Patient oriented to person, place and time  []History of frequent falls/loss of balance  Nutritional:  []Change in appetite   []Weight Gain   []Weight Loss  Functional:  []Requires assistance with ADL's

## 2024-01-17 NOTE — DISCHARGE INSTRUCTIONS
[facility]  Post Procedure Discharge Instructions        YOU HAVE HAD THE FOLLOWING PROCEDURE:                                  [] Occipital Nerve Blocks  [] CTS wrist injection(s)  [] Knee Injection(s)         [] Shoulder Injection(s)   [] Elbow Injection(s)     [] Botox Injection  [x] Cervical Trigger Point Injections    [x] Thoracic Trigger Point Injections    [] Lumbar Trigger Point Injections  [] Piriformis Trigger Point Injections  [] SI Joint Injection(s)     [] Trochanteric Bursa Injection(s)       [] Ankle Injection(s)   [] Plantar Fasciitis   []  ______________  Injection(s) [] Botox []  Migraines [] Spasticity    YOU HAVE RECEIVED THE FOLLOWING MEDICATIONS IN YOUR INJECTION(s)  [x] Lidocaine [x] Bupivacaine   [] DepoMedrol (steroid) [] Decadron (steroid)  []  Kenalog (steroid)   [x] Toradol  [] Supartz [] Zilretta    [] Botox        PATIENT INFORMATION:   You may experience the following symptoms after your procedure. These symptoms are normal and should not cause concern:    You may have an increase in your pain. This may last 24 - 48 hours after your procedure.  You may have no change in the pain that you had prior to your injection(s).  You may have weakness or numbness in your affected extremity. If this occurs, this may last until numbing the medication wears off.     REPORT THE FOLLOWING SYMPTOMS TO YOUR DOCTOR:  Redness, swelling or drainage at the injection site(s)  Unusual pain that interferes with your normal activities of daily living.    OTHER INSTRUCTIONS:    [x] I will apply ice to the injection site(s) for at least 24 hours after the procedure. I will rotate the ice on for 20 minutes and off for 20 minutes for at least 24 hours.    [x] I will not apply heat for at least 48 hours and I will not take a hot bath or shower for at least 24 hours.     [x] I understand that if Lidocaine or Bupivacaine was used in my injection(s) that the injection site(s)

## 2024-01-17 NOTE — PROCEDURES
[facility]    Patient Name: Pillo Ortega    : 1950                    Age: 73 y.o.    Sex: male    Date: 2024    Pre-op Diagnosis: Myofascial Pain/ Muscle Spasms/ Cervicalgia    Post-op Diagnosis: Myofascial Pain/ Muscle Spasms/ Cervicalgia    Procedure: Cervical Trigger Point Injections    Performing Procedure: TEETEE Guerrero    Patient Vitals for the past 24 hrs:   BP Temp Temp src Pulse Resp SpO2   24 0950 (!) 146/46 (!) 96.1 °F (35.6 °C) Temporal 72 18 98 %       Description of Procedure:    After a brief physical assessment and failure to improve with conservative measures the patient presented for Cervical Trigger Point Injections The indications, limitations and possible complications were discussed with the patient and the patient elected to proceed with the procedure.    After voluntary, informed and signed consent obtained the patient was placed in a seated position.     Appropriate time out was obtained per policy.     The area of maximal tenderness was palpated over the  bilaterally Cervical muscles - Splenius  Trapezius  Rhomboid The skin overlying these areas was marked with a skin marker. The skin overlying the proposed injection sites were then sprayed with Gebauer's Solution while protecting patient eyes. The areas were prepped using aseptic technique with CHG prep. Each trigger point of the Cervical muscles - Splenius  Trapezius  Rhomboid was injected with approximately 1- 2 ml of a solution of 5 ml of 1% Lidocaine Plain and 5 ml of 0.5% Marcaine Plain after negative aspiration    Toradol 0.5 ml (30mg/ml) added to each syringed.      Pre-op Diagnosis: Myofascial Pain/ Muscle Spasms    Post-op Diagnosis: Myofascial Pain/ Muscle Spasms    Procedure: Thoracic Trigger Point Injections     Performing Procedure: TEETEE Guerrero    Patient Vitals for the past 24 hrs:   BP Temp Temp src Pulse Resp SpO2   24

## 2024-01-22 DIAGNOSIS — M47.26 OSTEOARTHRITIS OF SPINE WITH RADICULOPATHY, LUMBAR REGION: ICD-10-CM

## 2024-01-22 RX ORDER — GABAPENTIN 400 MG/1
400 CAPSULE ORAL 3 TIMES DAILY
Qty: 90 CAPSULE | Refills: 0 | Status: SHIPPED | OUTPATIENT
Start: 2024-01-22 | End: 2024-02-21

## 2024-01-22 RX ORDER — HYDROCODONE BITARTRATE AND ACETAMINOPHEN 7.5; 325 MG/1; MG/1
1 TABLET ORAL EVERY 6 HOURS PRN
Qty: 120 TABLET | Refills: 0 | Status: SHIPPED | OUTPATIENT
Start: 2024-01-22 | End: 2024-02-21

## 2024-01-23 ENCOUNTER — TELEPHONE (OUTPATIENT)
Dept: PAIN MANAGEMENT | Age: 74
End: 2024-01-23

## 2024-02-01 ENCOUNTER — OFFICE VISIT (OUTPATIENT)
Dept: PULMONOLOGY | Facility: CLINIC | Age: 74
End: 2024-02-01
Payer: MEDICARE

## 2024-02-01 VITALS
SYSTOLIC BLOOD PRESSURE: 136 MMHG | HEART RATE: 62 BPM | DIASTOLIC BLOOD PRESSURE: 80 MMHG | HEIGHT: 70 IN | WEIGHT: 243 LBS | BODY MASS INDEX: 34.79 KG/M2 | OXYGEN SATURATION: 95 %

## 2024-02-01 DIAGNOSIS — G47.30 SLEEP APNEA, UNSPECIFIED TYPE: Chronic | ICD-10-CM

## 2024-02-01 DIAGNOSIS — I28.8 ENLARGED PULMONARY ARTERY: Primary | ICD-10-CM

## 2024-02-01 NOTE — PROCEDURES
Spirometry with Diffusion Capacity    Performed by: Elizabeth Bradley, RRT  Authorized by: Jimmie Wei MD     Pre Drug % Predicted    FVC: 65%   FEV1: 77%   FEF 25-75%: 178%   FEV1/FVC: 89%   DLCO: 109%   D/VAsb: 135%    Interpretation   Spirometry   Spirometry shows moderate restriction. midflow is normal.  Diffusion Capacity  The patient's diffusion capacity is normal.  Diffusion capacity is normal when corrected for alveolar volume.   Electronically signed by Jimmie Wei MD, 2/1/2024, 17:15 CST

## 2024-02-01 NOTE — PROGRESS NOTES
Background:  Pt w Pulmonary hypertension, jose   Chief Complaint  pulmonary hypertension and Shortness of Breath    Subjective    History of Present Illness    Zay Kamara presents to [facility].  History of Present Illness  I am asked to see him for abnormal findings on ct scan including enlarged pulmonary arteries and apparent right heart strain, suggesting pulmonary hypertension  he has had echocardiogram for eval as well.  Pt denies shortness of breath, denies chest pain, wheezing or cough.  No history of pulmonary embolism, hiv, connective tissue disease.       has a past medical history of Arthritis, Atrial fibrillation, BMI 31.0-31.9,adult (06/28/2018), Carotid artery disease without cerebral infarction, Chronic knee pain, Chronic neck and back pain, Coronary artery disease (01/04/2022), COVID-19 (08/18/2023), Depression, Diabetes, Disease of thyroid gland, Gout, Hypercholesteremia, Hypertension, IBS (irritable bowel syndrome), Kidney disease, Osteoarthritis, Prostate cancer, Sensorineural hearing loss, Sleep apnea, Sudden hearing loss, Syncope, Tinnitus, and Urine incontinence.   has a past surgical history that includes Cholecystectomy; Thyroid surgery; Colonoscopy (N/A, 3/7/2017); Prostatectomy (N/A, 8/1/2017); Bladder suspension (N/A, 11/26/2018); Cardiac catheterization (N/A, 12/30/2021); Coronary artery bypass graft (N/A, 1/6/2022); Maze Procedure (N/A, 1/6/2022); Atrial Appendage Exclusion Left (Left, 1/6/2022); and Colonoscopy (N/A, 5/20/2022).  family history includes Arrhythmia in his sister; Cancer in his father; Diabetes in his sister; Heart attack (age of onset: 70) in his father; Heart disease in his father and mother; Prostate cancer in his father; Stroke in his paternal grandfather; Sudden death in his mother.   reports that he has never smoked. He has been exposed to tobacco smoke. He has never used smokeless tobacco. He reports that he  "does not currently use alcohol. He reports that he does not use drugs.  No Known Allergies  Current Outpatient Medications   Medication Instructions    albuterol sulfate  (90 Base) MCG/ACT inhaler 2 puffs, Inhalation, Every 4 Hours PRN    allopurinol (ZYLOPRIM) 100 mg, Oral, Daily    amLODIPine (NORVASC) 2.5 MG tablet 1 tablet, Oral, Daily    aspirin 81 mg, Oral, Daily    atorvastatin (LIPITOR) 20 mg, Oral, Daily    bisacodyl (DULCOLAX) 5 mg, Oral, Daily PRN    citalopram (CELEXA) 20 mg, Oral, Daily    empagliflozin (Jardiance) 25 MG tablet tablet Oral, Daily    gabapentin (NEURONTIN) 400 mg, Oral, 3 Times Daily, Can take up to TID but only takes BID due to side effects - sleepiness    glipizide (GLUCOTROL) 5 mg, Oral, Daily    HYDROcodone-acetaminophen (NORCO) 7.5-325 MG per tablet 1 tablet, Oral, Every 8 Hours PRN, Usually takes twice a day    levothyroxine (SYNTHROID, LEVOTHROID) 200 mcg, Oral, Daily, Takes 200    nitroglycerin (NITROSTAT) 0.4 mg, Sublingual, Every 5 Minutes PRN    polyethylene glycol (MIRALAX) 17 g, Oral, Daily PRN    RSVPreF3 Vac Recomb Adjuvanted (AREXVY) 120 mcg, Intramuscular, Once    Vitamin D3 50 mcg, Oral, Daily      Objective     Vital Signs:   /80   Pulse 62   Ht 177.8 cm (70\")   Wt 110 kg (243 lb)   SpO2 95% Comment: RA  BMI 34.87 kg/m²   Physical Exam  Cardiovascular:      Comments: Physiologic split s2       Result Review  Data Reviewed:    CT Angiogram Chest (09/05/2023 13:33)   1. Atheromatous changes of the thoracic aorta. A stable aneurysmal  dilatation of the ascending thoracic aorta as detailed above. No  dissection.  2. Moderate pulmonary arterial hypertension. No embolism.  3. A moderate right heart strain. Etiology and clinical significance is  not certain.  4. Several small calcified and noncalcified nodules/granulomas are  stable since the previous study. No features of malignancy.    Adult Transthoracic Echo Complete W/ Cont if Necessary Per Protocol " (10/25/2023 13:56)   Left Ventricle Left ventricle not well visualized. Left ventricular cavity is poorly visualized even with the use of echo contrast.  Left ventricle appears to be grossly normal in size with low normal systolic function.  Regional wall motion is difficult to assess given the limitations of the study.   Right Ventricle Right ventricle not well visualized.   Left Atrium Normal left atrial cavity size noted. Left atrial volume is moderately increased.   Right Atrium Normal right atrial cavity size noted.   Aortic Valve The aortic valve is abnormal in structure. There is calcification of the aortic valve. There is thickening of the aortic valve. No significant aortic valve regurgitation is present. No hemodynamically significant aortic valve stenosis is present.   Mitral Valve The mitral valve is grossly normal in structure. Trace mitral valve regurgitation is present. No significant mitral valve stenosis is present.   Tricuspid Valve Tricuspid valve not well visualized. The tricuspid valve is grossly normal in structure. Trace tricuspid valve regurgitation is present. Estimated right ventricular systolic pressure from tricuspid regurgitation is normal (<35 mmHg). No evidence of significant tricuspid valve stenosis is present.   Pulmonic Valve The pulmonic valve is not well visualized. The pulmonic valve is grossly normal in structure. There is no significant pulmonic valve regurgitation present. There is no pulmonic valve stenosis present.   Greater Vessels No dilation of the aortic root is present. No dilation of the sinuses of Valsalva is present.   Pericardium The pericardium is normal. There is no evidence of pericardial effusion. .         PFT Values          2/1/2024    13:15   Pre Drug PFT Results   FVC 65   FEV1 77   FEF 25-75% 178   FEV1/FVC 89   Other Tests PFT Results   DLCO 109   D/VAsb 135                Assessment and Plan   Diagnoses and all orders for this visit:    1. Enlarged  pulmonary artery (Primary)  -     Spirometry with Diffusion Capacity  -     CT Chest Without Contrast Diagnostic; Future    2. Sleep apnea, unspecified type    Other orders  -     RSVPreF3 Vac Recomb Adjuvanted (AREXVY) 120 MCG/0.5ML reconstituted suspension injection; Inject 0.5 mL into the appropriate muscle as directed by prescriber 1 (One) Time for 1 dose.  Dispense: 1 each; Refill: 0    We reviewed his ct report. Pt has suggestion of pulm htn with right heart strain.  Echocardiogram is discordant with ct, which I can't explain  We checked PFT above  Will plan follow up ct to reassess in a few months  Call prn in meantime.  No specific rx for now.    Follow Up   Return in about 4 months (around 6/1/2024).  Patient was given instructions and counseling regarding his condition or for health maintenance advice. Please see specific information pulled into the AVS if appropriate.    Electronically signed by Jimmie Wei MD, 2/1/2024, 22:42 CST

## 2024-02-01 NOTE — PROGRESS NOTES
Patient had a negative screening prior to PFT.  Performed PFT. See scanned results for additional information.

## 2024-02-06 ENCOUNTER — TELEPHONE (OUTPATIENT)
Dept: CARDIAC SURGERY | Facility: CLINIC | Age: 74
End: 2024-02-06
Payer: MEDICARE

## 2024-02-06 ENCOUNTER — TELEPHONE (OUTPATIENT)
Dept: FAMILY MEDICINE CLINIC | Age: 74
End: 2024-02-06

## 2024-02-06 DIAGNOSIS — N18.31 TYPE 2 DIABETES MELLITUS WITH STAGE 3A CHRONIC KIDNEY DISEASE, WITHOUT LONG-TERM CURRENT USE OF INSULIN (HCC): Primary | ICD-10-CM

## 2024-02-06 DIAGNOSIS — Z00.00 MEDICARE ANNUAL WELLNESS VISIT, SUBSEQUENT: ICD-10-CM

## 2024-02-06 DIAGNOSIS — E03.9 ACQUIRED HYPOTHYROIDISM: ICD-10-CM

## 2024-02-06 DIAGNOSIS — I10 ESSENTIAL HYPERTENSION: ICD-10-CM

## 2024-02-06 DIAGNOSIS — E78.2 MIXED HYPERLIPIDEMIA: ICD-10-CM

## 2024-02-06 DIAGNOSIS — E11.22 TYPE 2 DIABETES MELLITUS WITH STAGE 3A CHRONIC KIDNEY DISEASE, WITHOUT LONG-TERM CURRENT USE OF INSULIN (HCC): Primary | ICD-10-CM

## 2024-02-07 ENCOUNTER — TELEPHONE (OUTPATIENT)
Dept: FAMILY MEDICINE CLINIC | Age: 74
End: 2024-02-07

## 2024-02-07 ENCOUNTER — OFFICE VISIT (OUTPATIENT)
Dept: FAMILY MEDICINE CLINIC | Age: 74
End: 2024-02-07
Payer: MEDICARE

## 2024-02-07 VITALS
HEART RATE: 78 BPM | TEMPERATURE: 96.4 F | SYSTOLIC BLOOD PRESSURE: 130 MMHG | WEIGHT: 239 LBS | DIASTOLIC BLOOD PRESSURE: 76 MMHG | OXYGEN SATURATION: 95 % | BODY MASS INDEX: 33.33 KG/M2

## 2024-02-07 DIAGNOSIS — E11.22 TYPE 2 DIABETES MELLITUS WITH STAGE 3A CHRONIC KIDNEY DISEASE, WITHOUT LONG-TERM CURRENT USE OF INSULIN (HCC): ICD-10-CM

## 2024-02-07 DIAGNOSIS — I73.9 PAD (PERIPHERAL ARTERY DISEASE) (HCC): ICD-10-CM

## 2024-02-07 DIAGNOSIS — E78.2 MIXED HYPERLIPIDEMIA: ICD-10-CM

## 2024-02-07 DIAGNOSIS — I48.0 PAROXYSMAL ATRIAL FIBRILLATION (HCC): ICD-10-CM

## 2024-02-07 DIAGNOSIS — N18.31 TYPE 2 DIABETES MELLITUS WITH STAGE 3A CHRONIC KIDNEY DISEASE, WITHOUT LONG-TERM CURRENT USE OF INSULIN (HCC): ICD-10-CM

## 2024-02-07 DIAGNOSIS — F33.0 MILD EPISODE OF RECURRENT MAJOR DEPRESSIVE DISORDER (HCC): ICD-10-CM

## 2024-02-07 DIAGNOSIS — I25.10 CORONARY ARTERY DISEASE INVOLVING NATIVE HEART WITHOUT ANGINA PECTORIS, UNSPECIFIED VESSEL OR LESION TYPE: ICD-10-CM

## 2024-02-07 DIAGNOSIS — R30.0 DYSURIA: Primary | ICD-10-CM

## 2024-02-07 DIAGNOSIS — E03.9 ACQUIRED HYPOTHYROIDISM: ICD-10-CM

## 2024-02-07 DIAGNOSIS — M79.18 MYOFASCIAL MUSCLE PAIN: ICD-10-CM

## 2024-02-07 DIAGNOSIS — I10 ESSENTIAL HYPERTENSION: ICD-10-CM

## 2024-02-07 DIAGNOSIS — Z85.46 HISTORY OF PROSTATE CANCER: ICD-10-CM

## 2024-02-07 PROBLEM — C73 PAPILLARY CARCINOMA OF THYROID (HCC): Status: RESOLVED | Noted: 2020-02-12 | Resolved: 2024-02-07

## 2024-02-07 LAB
ALBUMIN SERPL-MCNC: 4.6 G/DL (ref 3.5–5.2)
ALP SERPL-CCNC: 59 U/L (ref 40–130)
ALT SERPL-CCNC: 13 U/L (ref 5–41)
ANION GAP SERPL CALCULATED.3IONS-SCNC: 15 MMOL/L (ref 7–19)
APPEARANCE FLUID: NORMAL
AST SERPL-CCNC: 12 U/L (ref 5–40)
BASOPHILS # BLD: 0 K/UL (ref 0–0.2)
BASOPHILS NFR BLD: 0.3 % (ref 0–1)
BILIRUB SERPL-MCNC: 0.8 MG/DL (ref 0.2–1.2)
BILIRUBIN, POC: NORMAL
BLOOD URINE, POC: NORMAL
BUN SERPL-MCNC: 33 MG/DL (ref 8–23)
CALCIUM SERPL-MCNC: 9.5 MG/DL (ref 8.8–10.2)
CHLORIDE SERPL-SCNC: 102 MMOL/L (ref 98–111)
CHOLEST SERPL-MCNC: 106 MG/DL (ref 160–199)
CLARITY, POC: CLEAR
CO2 SERPL-SCNC: 24 MMOL/L (ref 22–29)
COLOR, POC: YELLOW
CREAT SERPL-MCNC: 1.5 MG/DL (ref 0.5–1.2)
EOSINOPHIL # BLD: 0.2 K/UL (ref 0–0.6)
EOSINOPHIL NFR BLD: 2.1 % (ref 0–5)
ERYTHROCYTE [DISTWIDTH] IN BLOOD BY AUTOMATED COUNT: 13.4 % (ref 11.5–14.5)
GLUCOSE SERPL-MCNC: 161 MG/DL (ref 74–109)
GLUCOSE URINE, POC: NORMAL
HBA1C MFR BLD: 7.1 % (ref 4–6)
HCT VFR BLD AUTO: 41.5 % (ref 42–52)
HDLC SERPL-MCNC: 25 MG/DL (ref 55–121)
HGB BLD-MCNC: 13.6 G/DL (ref 14–18)
IMM GRANULOCYTES # BLD: 0.1 K/UL
KETONES, POC: NORMAL
LDLC SERPL CALC-MCNC: 58 MG/DL
LEUKOCYTE EST, POC: NORMAL
LYMPHOCYTES # BLD: 0.9 K/UL (ref 1.1–4.5)
LYMPHOCYTES NFR BLD: 10 % (ref 20–40)
MCH RBC QN AUTO: 29.4 PG (ref 27–31)
MCHC RBC AUTO-ENTMCNC: 32.8 G/DL (ref 33–37)
MCV RBC AUTO: 89.8 FL (ref 80–94)
MONOCYTES # BLD: 0.5 K/UL (ref 0–0.9)
MONOCYTES NFR BLD: 5.9 % (ref 0–10)
NEUTROPHILS # BLD: 7 K/UL (ref 1.5–7.5)
NEUTS SEG NFR BLD: 81.1 % (ref 50–65)
NITRITE, POC: NORMAL
PH, POC: 6
PLATELET # BLD AUTO: 200 K/UL (ref 130–400)
PMV BLD AUTO: 9.8 FL (ref 9.4–12.4)
POTASSIUM SERPL-SCNC: 4.7 MMOL/L (ref 3.5–5)
PROT SERPL-MCNC: 7.1 G/DL (ref 6.6–8.7)
PROTEIN, POC: NORMAL
RBC # BLD AUTO: 4.62 M/UL (ref 4.7–6.1)
SODIUM SERPL-SCNC: 141 MMOL/L (ref 136–145)
SPECIFIC GRAVITY, POC: 1.02
T4 FREE SERPL-MCNC: 1.81 NG/DL (ref 0.93–1.7)
TRIGL SERPL-MCNC: 115 MG/DL (ref 0–149)
TSH SERPL DL<=0.005 MIU/L-ACNC: 0.56 UIU/ML (ref 0.27–4.2)
UROBILINOGEN, POC: 0.2
WBC # BLD AUTO: 8.6 K/UL (ref 4.8–10.8)

## 2024-02-07 PROCEDURE — 99214 OFFICE O/P EST MOD 30 MIN: CPT | Performed by: NURSE PRACTITIONER

## 2024-02-07 PROCEDURE — G8417 CALC BMI ABV UP PARAM F/U: HCPCS | Performed by: NURSE PRACTITIONER

## 2024-02-07 PROCEDURE — G8427 DOCREV CUR MEDS BY ELIG CLIN: HCPCS | Performed by: NURSE PRACTITIONER

## 2024-02-07 PROCEDURE — 3017F COLORECTAL CA SCREEN DOC REV: CPT | Performed by: NURSE PRACTITIONER

## 2024-02-07 PROCEDURE — 2022F DILAT RTA XM EVC RTNOPTHY: CPT | Performed by: NURSE PRACTITIONER

## 2024-02-07 PROCEDURE — 1123F ACP DISCUSS/DSCN MKR DOCD: CPT | Performed by: NURSE PRACTITIONER

## 2024-02-07 PROCEDURE — G8484 FLU IMMUNIZE NO ADMIN: HCPCS | Performed by: NURSE PRACTITIONER

## 2024-02-07 PROCEDURE — 3075F SYST BP GE 130 - 139MM HG: CPT | Performed by: NURSE PRACTITIONER

## 2024-02-07 PROCEDURE — 3046F HEMOGLOBIN A1C LEVEL >9.0%: CPT | Performed by: NURSE PRACTITIONER

## 2024-02-07 PROCEDURE — 81002 URINALYSIS NONAUTO W/O SCOPE: CPT | Performed by: NURSE PRACTITIONER

## 2024-02-07 PROCEDURE — 1036F TOBACCO NON-USER: CPT | Performed by: NURSE PRACTITIONER

## 2024-02-07 PROCEDURE — 3078F DIAST BP <80 MM HG: CPT | Performed by: NURSE PRACTITIONER

## 2024-02-07 RX ORDER — TIZANIDINE 4 MG/1
TABLET ORAL
Qty: 60 TABLET | Refills: 1 | Status: SHIPPED | OUTPATIENT
Start: 2024-02-07

## 2024-02-07 ASSESSMENT — PATIENT HEALTH QUESTIONNAIRE - PHQ9
SUM OF ALL RESPONSES TO PHQ QUESTIONS 1-9: 6
5. POOR APPETITE OR OVEREATING: 0
2. FEELING DOWN, DEPRESSED OR HOPELESS: 2
4. FEELING TIRED OR HAVING LITTLE ENERGY: 2
SUM OF ALL RESPONSES TO PHQ QUESTIONS 1-9: 6
SUM OF ALL RESPONSES TO PHQ QUESTIONS 1-9: 6
8. MOVING OR SPEAKING SO SLOWLY THAT OTHER PEOPLE COULD HAVE NOTICED. OR THE OPPOSITE, BEING SO FIGETY OR RESTLESS THAT YOU HAVE BEEN MOVING AROUND A LOT MORE THAN USUAL: 0
10. IF YOU CHECKED OFF ANY PROBLEMS, HOW DIFFICULT HAVE THESE PROBLEMS MADE IT FOR YOU TO DO YOUR WORK, TAKE CARE OF THINGS AT HOME, OR GET ALONG WITH OTHER PEOPLE: 1
SUM OF ALL RESPONSES TO PHQ QUESTIONS 1-9: 6
9. THOUGHTS THAT YOU WOULD BE BETTER OFF DEAD, OR OF HURTING YOURSELF: 0
SUM OF ALL RESPONSES TO PHQ9 QUESTIONS 1 & 2: 3
6. FEELING BAD ABOUT YOURSELF - OR THAT YOU ARE A FAILURE OR HAVE LET YOURSELF OR YOUR FAMILY DOWN: 0
1. LITTLE INTEREST OR PLEASURE IN DOING THINGS: 1
3. TROUBLE FALLING OR STAYING ASLEEP: 1
7. TROUBLE CONCENTRATING ON THINGS, SUCH AS READING THE NEWSPAPER OR WATCHING TELEVISION: 0

## 2024-02-07 ASSESSMENT — ENCOUNTER SYMPTOMS
CONSTIPATION: 0
DIARRHEA: 0
ABDOMINAL PAIN: 0
VOMITING: 0
TROUBLE SWALLOWING: 0
COUGH: 0
SORE THROAT: 0
RHINORRHEA: 0
NAUSEA: 0
SHORTNESS OF BREATH: 0

## 2024-02-07 NOTE — PROGRESS NOTES
Pillo Ortega (:  1950) is a 73 y.o. male,Established patient, here for evaluation of the following chief complaint(s):  Diabetes (6 mo fu), Dysuria (Has had some burning come and go with urination. Gets up a lot during the night to pee. Had prostate removed a few years ago and sling. Got better for a while but now is saturating a pad during the day and night even when going to the bathroom regularly. ), and Foot Pain (Left heel pain. Went to [facility]bin at [facility] and was given meloxicam - didn't help. Mentioned phys there, wondering if that would help. )      ASSESSMENT/PLAN:    ICD-10-CM    1. Dysuria  R30.0 POCT Urinalysis no Micro      2. Type 2 diabetes mellitus with stage 3a chronic kidney disease, without long-term current use of insulin ([facility])  E11.22 empagliflozin (JARDIANCE) 10 MG tablet    Adding jardiance for renal, cardiac protection.   A1C goal <7    N18.31       3. Mild episode of recurrent major depressive disorder ([facility])  F33.0       4. PAD (peripheral artery disease) ([facility])  I73.9       5. Paroxysmal atrial fibrillation ([facility])  I48.0       6. History of prostate cancer  Z85.46       7. Mixed hyperlipidemia  E78.2       8. Coronary artery disease involving native heart without angina pectoris, unspecified vessel or lesion type  I25.10           Return in about 3 months (around 2024).    SUBJECTIVE/OBJECTIVE:  HPI  Here for follow up on health issues    Diabetes              glipizide   Check bs 1 time a day.  Running <200 in the mornings.  No lows.     Hemoglobin A1C   Date Value Ref Range Status   2023 7.5 (H) 4.0 - 6.0 % Final     Comment:     HbA1c levels >6% are an indication of hyperglycemia during the preceding 2  to 3 months or longer.    HbA1c levels may reach 20% or higher in poorly controlled diabetes.  Therapeutic action is suggested at levels above 8%.    Diabetes patients with HbA1c levels below 7% meet the goal of the American  Diabetes Association.    HbA1c levels below

## 2024-02-07 NOTE — TELEPHONE ENCOUNTER
----- Message from MARY Rowland sent at 2/7/2024  2:30 PM CST -----  Please call patient and let them know results.   Metabolic panel shows elevated blood sugar at 161.  Kidney function is stable.  Continue to avoid NSAIDs, ensure good hydration, and blood pressure and blood sugar control  Normal cholesterol  Blood sugars have improved with an A1c of 7.1.  Recommend starting jardiance as discussed at office visit and follow-up in 3 months as discussed.  Mild anemia but stable blood counts  Normal thyroid

## 2024-02-10 DIAGNOSIS — E79.0 HYPERURICEMIA: ICD-10-CM

## 2024-02-10 DIAGNOSIS — I10 ESSENTIAL HYPERTENSION: ICD-10-CM

## 2024-02-11 DIAGNOSIS — I10 ESSENTIAL HYPERTENSION: ICD-10-CM

## 2024-02-12 RX ORDER — AMLODIPINE BESYLATE 2.5 MG/1
2.5 TABLET ORAL DAILY
Qty: 90 TABLET | Refills: 3 | Status: SHIPPED | OUTPATIENT
Start: 2024-02-12

## 2024-02-12 RX ORDER — ALLOPURINOL 100 MG/1
100 TABLET ORAL DAILY
Qty: 90 TABLET | Refills: 3 | Status: SHIPPED | OUTPATIENT
Start: 2024-02-12

## 2024-02-12 RX ORDER — METOPROLOL TARTRATE 50 MG/1
50 TABLET, FILM COATED ORAL 2 TIMES DAILY
Qty: 180 TABLET | Refills: 3 | Status: SHIPPED | OUTPATIENT
Start: 2024-02-12

## 2024-02-12 NOTE — TELEPHONE ENCOUNTER
Received fax from pharmacy requesting refill on pts medication(s). Pt was last seen in office on 2/7/2024  and has a follow up scheduled for 2/10/2024. Will send request to  Paulo Hoyt  for authorization.     Requested Prescriptions     Pending Prescriptions Disp Refills    allopurinol (ZYLOPRIM) 100 MG tablet [Pharmacy Med Name: ALLOPURINOL 100 MG TABLET] 90 tablet 3     Sig: TAKE 1 TABLET BY MOUTH EVERY DAY       Detail Level: Detailed Detail Level: Zone

## 2024-02-12 NOTE — TELEPHONE ENCOUNTER
Received fax from pharmacy requesting refill on pts medication(s). Pt was last seen in office on 2/7/2024  and has a follow up scheduled for 5/8/2024. Will send request to  Paulo Hoyt  for authorization.     Requested Prescriptions     Pending Prescriptions Disp Refills    metoprolol tartrate (LOPRESSOR) 50 MG tablet [Pharmacy Med Name: METOPROLOL TARTRATE 50 MG TAB] 180 tablet 3     Sig: TAKE 1 TABLET BY MOUTH TWICE A DAY

## 2024-02-12 NOTE — TELEPHONE ENCOUNTER
Received fax from pharmacy requesting refill on pts medication(s). Pt was last seen in office on 2/7/2024  and has a follow up scheduled for 2/11/2024. Will send request to  Paulo Hoyt  for authorization.     Requested Prescriptions     Pending Prescriptions Disp Refills    amLODIPine (NORVASC) 2.5 MG tablet [Pharmacy Med Name: AMLODIPINE BESYLATE 2.5 MG TAB] 90 tablet 3     Sig: TAKE 1 TABLET BY MOUTH EVERY DAY

## 2024-02-22 DIAGNOSIS — M47.26 OSTEOARTHRITIS OF SPINE WITH RADICULOPATHY, LUMBAR REGION: ICD-10-CM

## 2024-02-22 RX ORDER — GABAPENTIN 400 MG/1
400 CAPSULE ORAL 3 TIMES DAILY
Qty: 90 CAPSULE | Refills: 0 | Status: SHIPPED | OUTPATIENT
Start: 2024-02-22 | End: 2024-03-23

## 2024-02-22 RX ORDER — HYDROCODONE BITARTRATE AND ACETAMINOPHEN 7.5; 325 MG/1; MG/1
1 TABLET ORAL EVERY 6 HOURS PRN
Qty: 120 TABLET | Refills: 0 | Status: SHIPPED | OUTPATIENT
Start: 2024-02-22 | End: 2024-03-23

## 2024-03-05 DIAGNOSIS — N18.31 TYPE 2 DIABETES MELLITUS WITH STAGE 3A CHRONIC KIDNEY DISEASE, WITHOUT LONG-TERM CURRENT USE OF INSULIN (HCC): ICD-10-CM

## 2024-03-05 DIAGNOSIS — I10 ESSENTIAL HYPERTENSION: ICD-10-CM

## 2024-03-05 DIAGNOSIS — E11.22 TYPE 2 DIABETES MELLITUS WITH STAGE 3A CHRONIC KIDNEY DISEASE, WITHOUT LONG-TERM CURRENT USE OF INSULIN (HCC): ICD-10-CM

## 2024-03-06 ENCOUNTER — HOSPITAL ENCOUNTER (OUTPATIENT)
Dept: CT IMAGING | Facility: HOSPITAL | Age: 74
Discharge: HOME | End: 2024-03-06
Admitting: THORACIC SURGERY (CARDIOTHORACIC VASCULAR SURGERY)
Payer: MEDICARE

## 2024-03-06 ENCOUNTER — OFFICE VISIT (OUTPATIENT)
Dept: CARDIAC SURGERY | Facility: CLINIC | Age: 74
End: 2024-03-06
Payer: MEDICARE

## 2024-03-06 VITALS
HEIGHT: 70 IN | OXYGEN SATURATION: 96 % | SYSTOLIC BLOOD PRESSURE: 119 MMHG | BODY MASS INDEX: 35.33 KG/M2 | DIASTOLIC BLOOD PRESSURE: 72 MMHG | HEART RATE: 61 BPM | WEIGHT: 246.8 LBS

## 2024-03-06 DIAGNOSIS — I71.20 THORACIC AORTIC ANEURYSM WITHOUT RUPTURE, UNSPECIFIED PART: ICD-10-CM

## 2024-03-06 DIAGNOSIS — I71.20 THORACIC AORTIC ANEURYSM WITHOUT RUPTURE, UNSPECIFIED PART: Primary | ICD-10-CM

## 2024-03-06 LAB — CREAT BLDA-MCNC: 1.5 MG/DL (ref 0.6–1.3)

## 2024-03-06 PROCEDURE — 82565 ASSAY OF CREATININE: CPT

## 2024-03-06 PROCEDURE — 3074F SYST BP LT 130 MM HG: CPT | Performed by: THORACIC SURGERY (CARDIOTHORACIC VASCULAR SURGERY)

## 2024-03-06 PROCEDURE — 99213 OFFICE O/P EST LOW 20 MIN: CPT | Performed by: THORACIC SURGERY (CARDIOTHORACIC VASCULAR SURGERY)

## 2024-03-06 PROCEDURE — 71275 CT ANGIOGRAPHY CHEST: CPT

## 2024-03-06 PROCEDURE — 25510000001 IOPAMIDOL PER 1 ML: Performed by: THORACIC SURGERY (CARDIOTHORACIC VASCULAR SURGERY)

## 2024-03-06 PROCEDURE — 1160F RVW MEDS BY RX/DR IN RCRD: CPT | Performed by: THORACIC SURGERY (CARDIOTHORACIC VASCULAR SURGERY)

## 2024-03-06 PROCEDURE — 3078F DIAST BP <80 MM HG: CPT | Performed by: THORACIC SURGERY (CARDIOTHORACIC VASCULAR SURGERY)

## 2024-03-06 PROCEDURE — 1159F MED LIST DOCD IN RCRD: CPT | Performed by: THORACIC SURGERY (CARDIOTHORACIC VASCULAR SURGERY)

## 2024-03-06 RX ORDER — METOPROLOL TARTRATE 50 MG/1
TABLET, FILM COATED ORAL
COMMUNITY
Start: 2024-03-05

## 2024-03-06 RX ORDER — LISINOPRIL 5 MG/1
5 TABLET ORAL DAILY
Qty: 90 TABLET | Refills: 3 | Status: SHIPPED | OUTPATIENT
Start: 2024-03-06

## 2024-03-06 RX ORDER — TIZANIDINE 4 MG/1
TABLET ORAL
COMMUNITY
Start: 2024-02-07

## 2024-03-06 RX ADMIN — IOPAMIDOL 100 ML: 755 INJECTION, SOLUTION INTRAVENOUS at 09:02

## 2024-03-06 NOTE — PROGRESS NOTES
Subjective   Patient ID: Zay Kamara is a 73 y.o. male who is here for follow-up of a known aneurysm.   Chief Complaint   Patient presents with    Thoracic Aneurysm     Patient is here for follow up w/CT      History of Present Illness    Zay Kamara is a 73- year old male who returns today for interval follow-up of a known thoracic aneurysmal disease. He is accompanied by his wife    Has been doing well  Denies any chest pain  Denies feeling like pins sticking in him from surgery previously  Plans for the rest of the year is to get old    The following portions of the patient's history were reviewed and updated as appropriate: allergies, current medications, past family history, past medical history, past social history, past surgical history and problem list.       Objective   Physical Exam  Constitutional:       Appearance: He is well-developed.   HENT:      Head: Normocephalic and atraumatic.   Eyes:      Pupils: Pupils are equal, round, and reactive to light.   Neck:      Thyroid: No thyromegaly.      Vascular: No JVD.      Trachea: No tracheal deviation.   Cardiovascular:      Rate and Rhythm: Normal rate and regular rhythm.      Heart sounds: Normal heart sounds. No murmur heard.     No friction rub. No gallop.   Pulmonary:      Effort: Pulmonary effort is normal. No respiratory distress.      Breath sounds: Normal breath sounds. No wheezing or rales.   Chest:      Chest wall: No tenderness.   Abdominal:      General: There is no distension.      Palpations: Abdomen is soft.      Tenderness: There is no abdominal tenderness.   Musculoskeletal:         General: Normal range of motion.      Cervical back: Normal range of motion and neck supple.   Lymphadenopathy:      Cervical: No cervical adenopathy.   Skin:     General: Skin is warm and dry.   Neurological:      Mental Status: He is alert and oriented to person, place, and time.      Cranial Nerves: No cranial nerve deficit.           XR Chest 1  View    Result Date: 8/19/2023  Narrative: XR CHEST 1 VW- 8/18/2023 11:24 PM CDT  HISTORY: COVID +, shortness of breath   COMPARISON: 07/03/2023.  FINDINGS: Single frontal radiograph of the chest was obtained.  No lung consolidation. No pleural effusion or pneumothorax. Prior coronary bypass. Heart size is stable. Pulmonary vasculature are nondilated. The osseous structures and surrounding soft tissues demonstrate no acute abnormality.      Impression: 1. Stable chest exam without acute process. No visualized pulmonary infiltrates at this time.   This report was finalized on 08/19/2023 10:09 by Dr Tylor Velazquez, .    CT Angiogram Chest    Result Date: 9/5/2023  Narrative: EXAMINATION: CT ANGIOGRAM CHEST-   9/5/2023 1:33 PM CDT  HISTORY: s/p CABG x 4 01/06/2022; I71.20-Thoracic aortic aneurysm, without rupture, unspecified  In order to have a CT radiation dose as low as reasonably achievable Automated Exposure Control was utilized for adjustment of the mA and/or KV according to patient size.  DLP in mGycm= 383  The CT angiography of the chest are performed after intravenous contrast enhancement.  The images are acquired in axial plane and subsequent 2-D reconstruction in coronal and sagittal planes and 3-D maximum intensity projection reconstruction.  Comparison is made with a similar previous study dated 02/22/2023.  Atheromatous changes of the thoracic aorta is seen. Aortic lumen measures 4.1 cm at the aortic root. It measures 4.6 cm above the aortic root. The proximal aortic arch measures 4.1 cm in diameter. The distal aortic arch measures 2.9 cm. The ascending thoracic aorta measures 2.6 cm. There is no evidence of dissection.  Severe atheromatous changes of coronary arteries are seen. The evidence of prior CABG.  Moderate dilatation of main pulmonary artery, right and left pulmonary arteries are similar to the previous study.  There is normal opacification of pulmonary arteries and the branches bilaterally.  There are no filling defects in the opacified pulmonary arterial bed.  RV/LV ratio is 43/42 which may suggest a moderate right heart strain.  There is no evidence of mediastinal or hilar mass or lymphadenopathy.  Left atrial appendage clamp is in place. There is evidence of prior CABG.  There is normal origin course and caliber of the right brachiocephalic, left common carotid and left subclavian arteries. The left vertebral artery arises directly from the aortic arch between the left common carotid and left subclavian arteries.  Limited visualized soft tissues of the neck are unremarkable.  Several small calcified and noncalcified nodules/granulomas are similar to the previous study. No change in size or shape. No additional nodules.  The atelectatic changes are seen at bilateral lung bases right more than the left.  The central airway is patent. No endobronchial lesion, nodules or mucous plugging.  The limited visualized abdomen is unremarkable  Images reviewed in bone window show chronic degenerative changes of the thoracic spine. No acute bony abnormality.      Impression: 1. Atheromatous changes of the thoracic aorta. A stable aneurysmal dilatation of the ascending thoracic aorta as detailed above. No dissection. 2. Moderate pulmonary arterial hypertension. No embolism. 3. A moderate right heart strain. Etiology and clinical significance is not certain. 4. Several small calcified and noncalcified nodules/granulomas are stable since the previous study. No features of malignancy.          This report was finalized on 09/05/2023 14:33 by Dr. Stef Dee MD.     Diagnoses and all orders for this visit:    1. Thoracic aortic aneurysm without rupture, unspecified part (Primary)  -     CT angiogram chest w contrast; Future          Assessment & Plan      Impression:  1. Ascending aortic aneurysm.  2. Aortic root dilation.    Medical decision making/Recommendations/Plan:  - The patient returns today for interval  follow-up of a known thoracic aneurysmal disease. We discussed the findings today. I recommend remaining conservative from an aneurysm point of view. We discussed recent change in practice structure with interval follow-ups alternating with potentially with a nurse practitioner for a low-risk aneurysm patients. He has previously met Sanjuana Mcnamara and therefore, he will see Sanjuana Mcnamara next interval follow-up, which is recommended in 6 months with a repeat CT of the chest. We previously discussed signs and symptoms of acute air syndrome. We previously discussed his aortic restrictions. All questions answered to the best of my ability and he is agreeable to the aforementioned plan.   - He is a non-smoker.   - He has remained with follow-up with Letty Tirado.   - I will continue to keep you apprised of care as it ensues.    Transcribed from ambient dictation for Chivo White MD by Margarita Garay.  03/06/24   12:27 CST    Patient or patient representative verbalized consent to the visit recording.  I have personally performed the services described in this document as transcribed by the above individual, and it is both accurate and complete.

## 2024-03-06 NOTE — TELEPHONE ENCOUNTER
Received fax from pharmacy requesting refill on pts medication(s). Pt was last seen in office on 2/7/2024  and has a follow up scheduled for 5/8/2024. Will send request to  Paulo Hoyt  for authorization.     Requested Prescriptions     Pending Prescriptions Disp Refills    lisinopril (PRINIVIL;ZESTRIL) 5 MG tablet [Pharmacy Med Name: LISINOPRIL 5 MG TABLET] 90 tablet 3     Sig: TAKE 1 TABLET BY MOUTH EVERY DAY

## 2024-03-13 ENCOUNTER — OFFICE VISIT (OUTPATIENT)
Dept: FAMILY MEDICINE CLINIC | Age: 74
End: 2024-03-13

## 2024-03-13 ENCOUNTER — HOSPITAL ENCOUNTER (OUTPATIENT)
Dept: GENERAL RADIOLOGY | Age: 74
Discharge: HOME | End: 2024-03-13
Payer: MEDICARE

## 2024-03-13 VITALS
OXYGEN SATURATION: 96 % | WEIGHT: 248.38 LBS | SYSTOLIC BLOOD PRESSURE: 124 MMHG | HEART RATE: 67 BPM | TEMPERATURE: 98.2 F | DIASTOLIC BLOOD PRESSURE: 56 MMHG | BODY MASS INDEX: 34.64 KG/M2

## 2024-03-13 DIAGNOSIS — M54.6 ACUTE MIDLINE THORACIC BACK PAIN: Primary | ICD-10-CM

## 2024-03-13 DIAGNOSIS — M54.6 ACUTE MIDLINE THORACIC BACK PAIN: ICD-10-CM

## 2024-03-13 DIAGNOSIS — E11.22 TYPE 2 DIABETES MELLITUS WITH STAGE 3A CHRONIC KIDNEY DISEASE, WITHOUT LONG-TERM CURRENT USE OF INSULIN (HCC): ICD-10-CM

## 2024-03-13 DIAGNOSIS — N18.31 TYPE 2 DIABETES MELLITUS WITH STAGE 3A CHRONIC KIDNEY DISEASE, WITHOUT LONG-TERM CURRENT USE OF INSULIN (HCC): ICD-10-CM

## 2024-03-13 PROCEDURE — 72072 X-RAY EXAM THORAC SPINE 3VWS: CPT

## 2024-03-13 RX ORDER — DEXAMETHASONE SODIUM PHOSPHATE 10 MG/ML
4 INJECTION, SOLUTION INTRAMUSCULAR; INTRAVENOUS ONCE
Status: COMPLETED | OUTPATIENT
Start: 2024-03-13 | End: 2024-03-13

## 2024-03-13 RX ORDER — DEXAMETHASONE SODIUM PHOSPHATE 4 MG/ML
4 INJECTION, SOLUTION INTRA-ARTICULAR; INTRALESIONAL; INTRAMUSCULAR; INTRAVENOUS; SOFT TISSUE ONCE
Status: SHIPPED | OUTPATIENT
Start: 2024-03-13

## 2024-03-13 RX ORDER — TRIAMCINOLONE ACETONIDE 40 MG/ML
40 INJECTION, SUSPENSION INTRA-ARTICULAR; INTRAMUSCULAR ONCE
Status: COMPLETED | OUTPATIENT
Start: 2024-03-13 | End: 2024-03-13

## 2024-03-13 RX ADMIN — DEXAMETHASONE SODIUM PHOSPHATE 4 MG: 10 INJECTION, SOLUTION INTRAMUSCULAR; INTRAVENOUS at 14:13

## 2024-03-13 RX ADMIN — TRIAMCINOLONE ACETONIDE 40 MG: 40 INJECTION, SUSPENSION INTRA-ARTICULAR; INTRAMUSCULAR at 14:14

## 2024-03-13 SDOH — ECONOMIC STABILITY: INCOME INSECURITY: HOW HARD IS IT FOR YOU TO PAY FOR THE VERY BASICS LIKE FOOD, HOUSING, MEDICAL CARE, AND HEATING?: NOT HARD AT ALL

## 2024-03-13 SDOH — ECONOMIC STABILITY: FOOD INSECURITY: WITHIN THE PAST 12 MONTHS, YOU WORRIED THAT YOUR FOOD WOULD RUN OUT BEFORE YOU GOT MONEY TO BUY MORE.: NEVER TRUE

## 2024-03-13 SDOH — ECONOMIC STABILITY: FOOD INSECURITY: WITHIN THE PAST 12 MONTHS, THE FOOD YOU BOUGHT JUST DIDN'T LAST AND YOU DIDN'T HAVE MONEY TO GET MORE.: NEVER TRUE

## 2024-03-13 ASSESSMENT — ENCOUNTER SYMPTOMS
SINUS PAIN: 0
RHINORRHEA: 0
BACK PAIN: 1
CONSTIPATION: 0
DIARRHEA: 0
SINUS PRESSURE: 0
COUGH: 0
NAUSEA: 0

## 2024-03-13 NOTE — PROGRESS NOTES
disease involving native heart without angina pectoris 1/18/2022    Hyperlipidemia     Hypertension     Hyperthyroidism     Prostate cancer (HCC)     Prostate cancer (HCC) 7/20/2017    Overview:  Added automatically from request for surgery [id number]    Sleep apnea     Type II or unspecified type diabetes mellitus without mention of complication, not stated as uncontrolled        Past Surgical History:   Procedure Laterality Date    CHOLECYSTECTOMY      INCONTINENCE SURGERY  11/26/2018    dr christian    PROSTATE SURGERY      Dr Wu    PROSTATE SURGERY  08/01/2017    removal, dr sawant     THYROID SURGERY      removed by Dr Muhammad        Social History     Tobacco Use    Smoking status: Never    Smokeless tobacco: Never   Substance Use Topics    Alcohol use: No       Family History   Problem Relation Age of Onset    Schizophrenia Mother     Heart Failure Mother     Heart Attack Mother     Cancer Father         Prostate & Leukemia     Heart Disease Father     Heart Attack Father     Thyroid Disease Sister        Review of Systems   Constitutional:  Negative for fatigue and unexpected weight change.   HENT:  Negative for congestion, postnasal drip, rhinorrhea, sinus pressure and sinus pain.    Eyes:  Negative for visual disturbance.   Respiratory:  Negative for cough.    Cardiovascular:  Negative for chest pain and palpitations.   Gastrointestinal:  Negative for constipation, diarrhea and nausea.   Endocrine: Negative for cold intolerance and heat intolerance.   Genitourinary:  Negative for difficulty urinating, frequency and urgency.   Musculoskeletal:  Positive for back pain.   Skin:  Negative for rash.   Allergic/Immunologic: Negative for environmental allergies.   Neurological:  Negative for dizziness and headaches.   Psychiatric/Behavioral:  Positive for sleep disturbance.        Physical Exam  Constitutional:       Appearance: He is obese.   HENT:      Head: Normocephalic.      Right Ear: Tympanic membrane, ear canal

## 2024-03-14 ENCOUNTER — HOSPITAL ENCOUNTER (OUTPATIENT)
Dept: PAIN MANAGEMENT | Age: 74
Discharge: HOME | End: 2024-03-14
Payer: MEDICARE

## 2024-03-14 VITALS
WEIGHT: 248 LBS | TEMPERATURE: 98.3 F | OXYGEN SATURATION: 97 % | BODY MASS INDEX: 34.59 KG/M2 | DIASTOLIC BLOOD PRESSURE: 60 MMHG | HEART RATE: 69 BPM | SYSTOLIC BLOOD PRESSURE: 134 MMHG | RESPIRATION RATE: 18 BRPM

## 2024-03-14 DIAGNOSIS — M79.18 MYOFASCIAL MUSCLE PAIN: ICD-10-CM

## 2024-03-14 DIAGNOSIS — G89.29 CHRONIC BILATERAL THORACIC BACK PAIN: Primary | ICD-10-CM

## 2024-03-14 DIAGNOSIS — M54.6 CHRONIC BILATERAL THORACIC BACK PAIN: Primary | ICD-10-CM

## 2024-03-14 DIAGNOSIS — M47.26 OSTEOARTHRITIS OF SPINE WITH RADICULOPATHY, LUMBAR REGION: ICD-10-CM

## 2024-03-14 DIAGNOSIS — M54.12 CERVICAL RADICULOPATHY: ICD-10-CM

## 2024-03-14 PROBLEM — M54.50 CHRONIC BILATERAL LOW BACK PAIN WITHOUT SCIATICA: Status: RESOLVED | Noted: 2024-03-14 | Resolved: 2024-03-14

## 2024-03-14 PROBLEM — M54.50 CHRONIC BILATERAL LOW BACK PAIN WITHOUT SCIATICA: Status: ACTIVE | Noted: 2024-03-14

## 2024-03-14 PROCEDURE — 99213 OFFICE O/P EST LOW 20 MIN: CPT

## 2024-03-14 RX ORDER — GABAPENTIN 400 MG/1
400 CAPSULE ORAL 3 TIMES DAILY
Qty: 90 CAPSULE | Refills: 0 | Status: SHIPPED | OUTPATIENT
Start: 2024-03-14 | End: 2024-04-13

## 2024-03-14 RX ORDER — TIZANIDINE 4 MG/1
TABLET ORAL
Qty: 60 TABLET | Refills: 1 | Status: SHIPPED | OUTPATIENT
Start: 2024-03-14

## 2024-03-14 RX ORDER — HYDROCODONE BITARTRATE AND ACETAMINOPHEN 7.5; 325 MG/1; MG/1
1 TABLET ORAL EVERY 6 HOURS PRN
Qty: 120 TABLET | Refills: 0 | Status: SHIPPED | OUTPATIENT
Start: 2024-03-24 | End: 2024-04-23

## 2024-03-14 ASSESSMENT — PAIN - FUNCTIONAL ASSESSMENT: PAIN_FUNCTIONAL_ASSESSMENT: PREVENTS OR INTERFERES SOME ACTIVE ACTIVITIES AND ADLS

## 2024-03-14 ASSESSMENT — ENCOUNTER SYMPTOMS
RESPIRATORY NEGATIVE: 1
GASTROINTESTINAL NEGATIVE: 1
BOWEL INCONTINENCE: 0
EYES NEGATIVE: 1
BACK PAIN: 1

## 2024-03-14 ASSESSMENT — PAIN DESCRIPTION - DIRECTION: RADIATING_TOWARDS: INTO SHOULDERS

## 2024-03-14 ASSESSMENT — PAIN DESCRIPTION - ORIENTATION: ORIENTATION: LOWER

## 2024-03-14 ASSESSMENT — PAIN SCALES - GENERAL: PAINLEVEL_OUTOF10: 7

## 2024-03-14 ASSESSMENT — PAIN DESCRIPTION - FREQUENCY: FREQUENCY: CONTINUOUS

## 2024-03-14 ASSESSMENT — PAIN DESCRIPTION - DESCRIPTORS: DESCRIPTORS: ACHING

## 2024-03-14 ASSESSMENT — PAIN DESCRIPTION - LOCATION: LOCATION: BACK;NECK

## 2024-03-14 ASSESSMENT — PAIN DESCRIPTION - ONSET: ONSET: ON-GOING

## 2024-03-14 ASSESSMENT — PAIN DESCRIPTION - PAIN TYPE: TYPE: CHRONIC PAIN

## 2024-03-14 NOTE — PROGRESS NOTES
Office Note    Patient Name: Pillo Ortega    MR #: [id number]    Account #:[id number]    : 1950    Age: 73 y.o.    Sex: male    Date: 3/14/2024    PCP: Mihir Hoyt APRN    Referring Provider:    Chief Complaint:   Chief Complaint   Patient presents with    Follow Up After Procedure     Neck and back pain  Bilateral CTP and TTP (3 or more muscles) with toradol       History of Present Illness:   The patient is a 73 y.o. male who presents to the office for a follow up procedure.     Patient had Bilateral Cervical/Thoracic Trigger Point Injections with Toradol on 2024. Patient had at least 80-85% relief of pain from procedure(s) for at least 8 weeks. After injection, patient was able to increase activity. Patient had less pain from aggravating factors. Patient was able to decrease pain medication usage. Patient received enough pain relief from injections that the patient would like to repeat the injection(s).     Since last visit, patient has had a flare up of thoracic back pain. Patient was seen by his PCP and received a shot of steroids and xray thoracic spine ordered. Completed today results not available at this time.     Back Pain  This is a chronic problem. The current episode started more than 1 year ago. The problem occurs constantly. The problem has been waxing and waning since onset. The pain is present in the lumbar spine, sacro-iliac and thoracic spine. The quality of the pain is described as aching and cramping. The pain is at a severity of 7/10. The pain is moderate. The pain is The same all the time. The symptoms are aggravated by bending, position, standing and twisting. Pertinent negatives include no bladder incontinence, bowel incontinence, numbness or weakness. He has tried heat, home exercises and ice for the symptoms. The treatment provided mild relief.   Neck Pain   This is a chronic problem. The current episode started more than 1 year ago. The problem occurs

## 2024-03-14 NOTE — TELEPHONE ENCOUNTER
1. Osteoarthritis of spine with radiculopathy, lumbar region    2. Myofascial muscle pain      Requested Prescriptions     Pending Prescriptions Disp Refills    HYDROcodone-acetaminophen (NORCO) 7.5-325 MG per tablet 120 tablet 0     Sig: Take 1 tablet by mouth every 6 hours as needed for Pain for up to 30 days. May fill 3/24/2024    tiZANidine (ZANAFLEX) 4 MG tablet 60 tablet 1     Si/4 tablet with meals 1/2 to whole tablet at bedtime    gabapentin (NEURONTIN) 400 MG capsule 90 capsule 0     Sig: Take 1 capsule by mouth 3 times daily for 30 days.       Continue medication with refill sent at appointment yes; refill sent to medical director at appointment no, see refill encounter dated 3/14/2024    Electronically signed by MARY Wade CNP on 3/14/2024 at 9:43 AM

## 2024-03-14 NOTE — PROGRESS NOTES
Clinic Documentation      Education Provided:  [x] Review of Chris  [] Agreement Review  [x] PEG Score Calculated [] PHQ Score Calculated [] ORT Score Calculated    [] Compliance Issues Discussed [] Cognitive Behavior Needs [x] Exercise [] Review of Test [] Financial Issues  [x] Tobacco/Alcohol Use Reviewed [x] Teaching [] New Patient [] Picture Obtained    Physician Plan:  [] Outgoing Referral  [] Pharmacy Consult  [] Test Ordered [x] Prescription Ordered/Changed   [] Obtained Test Results / Consult Notes        Complete if patient is withholding blood thinner for procedure     Blood Thinner Patient is currently taking:      [] Plavix (Hold for 7 days)  [] Aspirin (Hold for 5 days)     [] Pletal (Hold for 2 days)  [] Pradaxa (Hold for 3 days)    [] Effient (Hold for 7 days)  [] Xarelto (Hold for 2 days)    [] Eliquis (Hold for 2 days)  [] Brilinta (Hold for 7 days)    [] Coumadin (Hold for 5 days) - (INR needs to be drawn the day prior to procedure- INR < 2.0)    [] Aggrenox (Hold for 7 days)        [] Patient will stop medication on their own.    [] Blood Thinner Form Faxed for approval to hold.   Provider form faxed to:     Assessment Completed by:  Electronically signed by Fernanda Austin RN on 3/14/2024 at 9:01 AM

## 2024-03-18 ENCOUNTER — TELEPHONE (OUTPATIENT)
Dept: FAMILY MEDICINE CLINIC | Age: 74
End: 2024-03-18

## 2024-03-18 DIAGNOSIS — M54.6 ACUTE MIDLINE THORACIC BACK PAIN: Primary | ICD-10-CM

## 2024-03-18 NOTE — TELEPHONE ENCOUNTER
----- Message from MARY Rowland sent at 3/18/2024  1:44 PM CDT -----  Please call patient and let them know results.   X-ray shows degenerative changes of thoracic spine otherwise negative back x-ray

## 2024-03-19 ENCOUNTER — TELEPHONE (OUTPATIENT)
Dept: FAMILY MEDICINE CLINIC | Age: 74
End: 2024-03-19

## 2024-03-19 NOTE — TELEPHONE ENCOUNTER
----- Message from MARY Fisher sent at 3/18/2024  4:33 PM CDT -----  Please see if patient's back pain improved with steroids

## 2024-03-19 NOTE — TELEPHONE ENCOUNTER
Spoke with: Patient regarding the results of the patients most recent Xray.  I advised Patient of Monse Caputo recommendations.   Patient did voice understanding      Pt reports back pain isn't any better. Will send to provider for recommendations.

## 2024-03-19 NOTE — TELEPHONE ENCOUNTER
----- Message from MRAY Fisher sent at 3/18/2024  4:33 PM CDT -----  Please see if patient's back pain improved with steroids

## 2024-03-27 ENCOUNTER — OFFICE VISIT (OUTPATIENT)
Dept: CARDIOLOGY | Facility: CLINIC | Age: 74
End: 2024-03-27
Payer: MEDICARE

## 2024-03-27 VITALS
OXYGEN SATURATION: 98 % | WEIGHT: 245.6 LBS | BODY MASS INDEX: 35.16 KG/M2 | DIASTOLIC BLOOD PRESSURE: 58 MMHG | HEIGHT: 70 IN | HEART RATE: 64 BPM | SYSTOLIC BLOOD PRESSURE: 142 MMHG

## 2024-03-27 DIAGNOSIS — I48.0 PAROXYSMAL ATRIAL FIBRILLATION: Primary | Chronic | ICD-10-CM

## 2024-03-27 DIAGNOSIS — I10 PRIMARY HYPERTENSION: Chronic | ICD-10-CM

## 2024-03-27 DIAGNOSIS — I25.10 CORONARY ARTERY DISEASE INVOLVING NATIVE CORONARY ARTERY OF NATIVE HEART WITHOUT ANGINA PECTORIS: Chronic | ICD-10-CM

## 2024-03-27 DIAGNOSIS — E11.22 TYPE 2 DIABETES MELLITUS WITH STAGE 3A CHRONIC KIDNEY DISEASE, WITHOUT LONG-TERM CURRENT USE OF INSULIN: Chronic | ICD-10-CM

## 2024-03-27 DIAGNOSIS — N18.31 TYPE 2 DIABETES MELLITUS WITH STAGE 3A CHRONIC KIDNEY DISEASE, WITHOUT LONG-TERM CURRENT USE OF INSULIN: Chronic | ICD-10-CM

## 2024-03-27 DIAGNOSIS — G47.30 SLEEP APNEA, UNSPECIFIED TYPE: Chronic | ICD-10-CM

## 2024-03-27 DIAGNOSIS — E78.2 MIXED HYPERLIPIDEMIA: Chronic | ICD-10-CM

## 2024-03-27 DIAGNOSIS — E66.01 CLASS 2 SEVERE OBESITY DUE TO EXCESS CALORIES WITH SERIOUS COMORBIDITY AND BODY MASS INDEX (BMI) OF 35.0 TO 35.9 IN ADULT: ICD-10-CM

## 2024-03-27 DIAGNOSIS — Z86.79 S/P MAZE OPERATION FOR ATRIAL FIBRILLATION: ICD-10-CM

## 2024-03-27 DIAGNOSIS — I65.23 BILATERAL CAROTID ARTERY STENOSIS: Chronic | ICD-10-CM

## 2024-03-27 DIAGNOSIS — Z98.890 S/P MAZE OPERATION FOR ATRIAL FIBRILLATION: ICD-10-CM

## 2024-03-27 DIAGNOSIS — Z95.1 HX OF CABG: ICD-10-CM

## 2024-03-27 RX ORDER — LISINOPRIL 5 MG/1
5 TABLET ORAL DAILY
COMMUNITY

## 2024-03-27 RX ORDER — BISACODYL 5 MG/1
5 TABLET, DELAYED RELEASE ORAL DAILY PRN
COMMUNITY

## 2024-03-27 NOTE — ASSESSMENT & PLAN NOTE
Remains well controlled except HDL remains very low (19) and triglycerides remain elevated (182) per 8/2023 labs on atorvastatin and fenofibrate. Recommend changing to rosuvastatin since HDL remains less than 40. Recheck at least yearly with liver enzymes. Again encouraged weight loss, healthy diet, and gradual increase in activity.

## 2024-03-27 NOTE — ASSESSMENT & PLAN NOTE
Remains stable/improved. Again encouraged healthy diet, BP, lipid, and glucose control, and routine aerobic exercise. Continue present therapy. Recheck ordered for 7/2024 per Dr. León/Jeri Chambers NP.

## 2024-03-27 NOTE — ASSESSMENT & PLAN NOTE
Patient's (Body mass index is 35.24 kg/m².) indicates that they are obese (BMI >30) with health conditions that include obstructive sleep apnea, hypertension, coronary heart disease, diabetes mellitus, dyslipidemias, peripheral vascular disease and osteoarthritis . Weight is worsening and medications. BMI is is above average; BMI management plan is completed. We discussed low calorie, low carb based diet program and increasing exercise.

## 2024-03-27 NOTE — ASSESSMENT & PLAN NOTE
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 Again encouraged healthy diet and to gradual increase activity. Chest soreness has resolved and was most consistent with post-surgical soreness. Recommended he restart Jardiance (not taking for past 3 weeks due to cost). Continue lifelong aspirin. Thoracic aortic ectasia remains stable and followed by Dr. White.

## 2024-03-27 NOTE — ASSESSMENT & PLAN NOTE
Maintaining sinus rhythm per symptoms. Reasonable to remain off anticoagulation since s/p MAZE and left atrial appendage occlusion 1/2022. Call if any palpitations.

## 2024-03-27 NOTE — ASSESSMENT & PLAN NOTE
Reportedly better control per home readings. Goal -120s/60-70s. Should improve with resumption of Jardiance. Continue present therapy with regular home monitoring.

## 2024-03-27 NOTE — ASSESSMENT & PLAN NOTE
Followed by PCP. Unknown control - nonadherence with SGLT2i due to cost (had to meet deductible). Encouraged him to restart Jardiance - reviewed CV, DM, and renal benefits. Requested copy of most recent Hgb A1c for review. Encouraged healthy diet, continued weight loss, and gradual increase in activity.

## 2024-03-27 NOTE — PROGRESS NOTES
"Encounter Date:03/27/2024  Chief Complaint:   Subjective    Zay Kamara is a 73 y.o. male who presents to [facility] [place] today for six month cardiac follow-up. He is accompanied by his wife.      Hypertension    Atrial Fibrillation  Past medical history includes atrial fibrillation, CAD and hyperlipidemia.   Hyperlipidemia    Coronary Artery Disease  Risk factors include hyperlipidemia.   Aortic Aneurysm       HPI       Hypertension     Additional comments: BP has been \"pretty good\" 120s-130s/? Up to 140s but not very often. No headaches. A little dizziness if gets out of bed too quickly. No nosebleeds. Having some swelling in feet. Having some leg cramps at night.             Atrial Fibrillation     Additional comments: No palpitations or irregular heart beat.  S/p MAZE and left atrial appendage occlusion during CABG 1/2022. Not anticoagulated.             Hyperlipidemia     Additional comments: Fair control on atorvastatin per 8/2-23. No muscle pain or weakness, no yellowing of skin or eyes, no RUQ abdominal pain. Not eating healthy or exercising.             Coronary Artery Disease     Additional comments: Chest soreness or discomfort. Hx CABG x 4 1/2022. Not eating healthy or exercising. Still working at [employer] store.             Follow-up     Additional comments: 6 month             Aortic Aneurysm     Additional comments: 4.2 cm ascending TAA. Dr. White following.             Sleep Apnea     Additional comments: Wearing CPAP every night and feels like it helps.          Last edited by Letty Belcher APRN on 3/27/2024  1:07 PM.        History: Past medical, surgical, family, and social history reviewed.    Outpatient Medications Marked as Taking for the 3/27/24 encounter (Office Visit) with Letty Belcher APRN   Medication Sig Dispense Refill    albuterol sulfate  (90 Base) MCG/ACT inhaler Inhale 2 puffs Every 4 (Four) Hours As Needed for Wheezing " "or Shortness of Air (rinse mouth after use). 6.7 g 0    allopurinol (ZYLOPRIM) 100 MG tablet Take 1 tablet by mouth Daily.      amLODIPine (NORVASC) 2.5 MG tablet Take 1 tablet by mouth Daily.      aspirin (aspirin) 81 MG EC tablet Take 1 tablet by mouth Daily.      atorvastatin (LIPITOR) 20 MG tablet Take 1 tablet by mouth Every Night.  2    bisacodyl (DULCOLAX) 5 MG EC tablet Take 1 tablet by mouth Daily As Needed for Constipation. Has to take about twice a month      Cholecalciferol (VITAMIN D3) 2000 units tablet Take 1 tablet by mouth Daily.      citalopram (CeleXA) 20 MG tablet Take 1 tablet by mouth Daily.      docusate sodium (COLACE) 50 MG capsule Take 2 capsules by mouth 2 (Two) Times a Day. Unsure of dosage      gabapentin (NEURONTIN) 400 MG capsule Take 1 capsule by mouth 3 (Three) Times a Day.      glipizide (GLUCOTROL) 5 MG tablet Take 1 tablet by mouth Daily.      HYDROcodone-acetaminophen (NORCO) 7.5-325 MG per tablet Take 1 tablet by mouth Every 8 (Eight) Hours As Needed for Moderate Pain. Usually takes twice a day      levothyroxine (SYNTHROID, LEVOTHROID) 200 MCG tablet Take 1 tablet by mouth Daily. Takes 200      lisinopril (PRINIVIL,ZESTRIL) 5 MG tablet Take 1 tablet by mouth Daily.      metoprolol tartrate (LOPRESSOR) 50 MG tablet Take 1 tablet by mouth 2 (Two) Times a Day.      nitroglycerin (NITROSTAT) 0.4 MG SL tablet Place 1 tablet under the tongue Every 5 (Five) Minutes As Needed for Chest Pain. 25 tablet 11    polyethylene glycol (MIRALAX) 17 g packet Take 17 g by mouth Daily As Needed.      tiZANidine (ZANAFLEX) 4 MG tablet TAKE A 1/4 TABLET BY MOUTH 3 TIMES A DAY WITH MEALS, THEN TAKE A 1/2-1 TABLET AT BEDTIME          Objective     Vital Signs:   /58 (BP Location: Left arm, Patient Position: Sitting, Cuff Size: Adult)   Pulse 64   Ht 177.8 cm (70\")   Wt 111 kg (245 lb 9.6 oz)   SpO2 98%   BMI 35.24 kg/m²   Wt Readings from Last 3 Encounters:   03/27/24 111 kg (245 lb 9.6 oz) "   03/06/24 112 kg (246 lb 12.8 oz)   02/01/24 110 kg (243 lb)         Vitals reviewed.   Constitutional:       Appearance: Well-developed. Morbidly obese.   Eyes:      General: No scleral icterus.  HENT:      Right Ear: Decreased hearing noted.      Left Ear: Decreased hearing noted.      Ears:      Comments: Wears hearing aid  Neck:      Vascular: Normal carotid pulses. No carotid bruit or JVD.   Pulmonary:      Effort: Pulmonary effort is normal.      Breath sounds: Normal breath sounds.   Chest:   Breasts:     Left: No tenderness.   Cardiovascular:      Normal rate. Regular rhythm.      No gallop.    Pulses:     Intact distal pulses.   Edema:     Peripheral edema absent.   Skin:     General: Skin is warm and dry.      Comments: Midline sternal incision well healed   Neurological:      Mental Status: Alert and oriented to person, place, and time.   Psychiatric:         Behavior: Behavior is cooperative.         Result Review  Data Reviewed:  The following data was reviewed by: LUIS Garcia on 03/27/2024  Lab Results - Last 18 Months   Lab Units 03/06/24  0839 09/05/23  1323 02/22/23  0814   CREATININE mg/dL 1.50* 1.70* 1.40*   SCANNED - LABS (08/09/2023 00:00) Lipid panel - TC 96, , HDL 19, LDL (calc) 41               Assessment and Plan   Problem List Items Addressed This Visit          Cardiac and Vasculature    Coronary artery disease involving native heart (Chronic)    Overview     #1 99% ostial LAD stenosis and total occlusion of the distal LAD  #2 60% proximal RCA stenosis and 90% stenosis of the proximal PL branch  #3 dilated ascending aorta  #4 LVEF 55%    Coronary artery bypass graft x 4 (left internal mammary artery/sequencing the dominant diagonal artery and left anterior descending artery, reverse saphenous vein graft/distal right coronary artery, and reverse saphenous vein graft/posterior descending artery), Right and left MAZE procedure with radiofrequency and cryoablation, Left  atrial appendage exclusion (Atriclip 40 mm device), Left endoscopic vein harvest performed on 1/6/2022 by Dr. White.            Current Assessment & Plan     Remains stable/improved s/p CABG x 4 1/2022. Again encouraged healthy diet and to gradual increase activity. Chest soreness has resolved and was most consistent with post-surgical soreness. Recommended he restart Jardiance (not taking for past 3 weeks due to cost). Continue lifelong aspirin. Thoracic aortic ectasia remains stable and followed by Dr. White.         Paroxysmal atrial fibrillation - Primary (Chronic)    Overview     [id number] 3 = high risk  HAS-BLED 3 = high risk  s/p MAZE, left atrial appendage occlusion 1/2022         Current Assessment & Plan     Maintaining sinus rhythm per symptoms. Reasonable to remain off anticoagulation since s/p MAZE and left atrial appendage occlusion 1/2022. Call if any palpitations.         Bilateral carotid artery stenosis (Chronic)    Overview     Dr. León following  8/21/2019 US - less than 50% R ICA, 50-69% L ICA stenosis  8/2021 - less than 50% L&R ICA  7/2023 - less than 50% bilaterally         Current Assessment & Plan     Remains stable/improved. Again encouraged healthy diet, BP, lipid, and glucose control, and routine aerobic exercise. Continue present therapy. Recheck ordered for 7/2024 per Dr. León/Jeri Chambers NP.         Hypertension (Chronic)    Current Assessment & Plan     Reportedly better control per home readings. Goal -120s/60-70s. Should improve with resumption of Jardiance. Continue present therapy with regular home monitoring.         Relevant Medications    lisinopril (PRINIVIL,ZESTRIL) 5 MG tablet    Mixed hyperlipidemia (Chronic)    Current Assessment & Plan     Remains well controlled except HDL remains very low (19) and triglycerides remain elevated (182) per 8/2023 labs on atorvastatin and fenofibrate. Recommend changing to rosuvastatin since HDL remains less than 40.  Recheck at least yearly with liver enzymes. Again encouraged weight loss, healthy diet, and gradual increase in activity.          Hx of CABG    Overview     1/2022         S/P Maze operation for atrial fibrillation    Overview     1/2022            Endocrine and Metabolic    Type 2 diabetes mellitus with stage 3 chronic kidney disease (Chronic)    Current Assessment & Plan     Followed by PCP. Unknown control - nonadherence with SGLT2i due to cost (had to meet deductible). Encouraged him to restart Jardiance - reviewed CV, DM, and renal benefits. Requested copy of most recent Hgb A1c for review. Encouraged healthy diet, continued weight loss, and gradual increase in activity.         Class 2 severe obesity due to excess calories with serious comorbidity and body mass index (BMI) of 35.0 to 35.9 in adult    Current Assessment & Plan     Patient's (Body mass index is 35.24 kg/m².) indicates that they are obese (BMI >30) with health conditions that include obstructive sleep apnea, hypertension, coronary heart disease, diabetes mellitus, dyslipidemias, peripheral vascular disease and osteoarthritis . Weight is worsening and medications. BMI is is above average; BMI management plan is completed. We discussed low calorie, low carb based diet program and increasing exercise.             Sleep    Sleep apnea (Chronic)    Overview     Previously noncompliant with CPAP         Current Assessment & Plan     Encouraged compliance with CPAP. Encouraged continued weight loss but continue CPAP until has significant amount of weight loss and then re-evaluate need for CPAP.            Patient was given instructions and counseling regarding his condition or for health maintenance advice. Please see specific information pulled into the AVS if appropriate.    Advance Care Planning   ACP discussion was held with the patient during this visit. Patient does not have an advance directive, declines further assistance.         Follow Up :    Return in about 6 months (around 9/27/2024) for Recheck.                  Letty Belcher, APRN, ACNP-BC, CHFN-BC

## 2024-04-01 ENCOUNTER — HOSPITAL ENCOUNTER (EMERGENCY)
Facility: HOSPITAL | Age: 74
Discharge: HOME | End: 2024-04-01
Admitting: EMERGENCY MEDICINE
Payer: OTHER MISCELLANEOUS

## 2024-04-01 ENCOUNTER — APPOINTMENT (OUTPATIENT)
Dept: CT IMAGING | Facility: HOSPITAL | Age: 74
End: 2024-04-01
Payer: OTHER MISCELLANEOUS

## 2024-04-01 VITALS
SYSTOLIC BLOOD PRESSURE: 173 MMHG | OXYGEN SATURATION: 95 % | RESPIRATION RATE: 18 BRPM | HEART RATE: 70 BPM | DIASTOLIC BLOOD PRESSURE: 67 MMHG | WEIGHT: 235 LBS | HEIGHT: 70 IN | TEMPERATURE: 98 F | BODY MASS INDEX: 33.64 KG/M2

## 2024-04-01 DIAGNOSIS — S00.83XA CONTUSION OF FACE, INITIAL ENCOUNTER: Primary | ICD-10-CM

## 2024-04-01 DIAGNOSIS — S01.81XA FACIAL LACERATION, INITIAL ENCOUNTER: ICD-10-CM

## 2024-04-01 PROCEDURE — 99284 EMERGENCY DEPT VISIT MOD MDM: CPT

## 2024-04-01 PROCEDURE — 70486 CT MAXILLOFACIAL W/O DYE: CPT

## 2024-04-01 PROCEDURE — 90715 TDAP VACCINE 7 YRS/> IM: CPT | Performed by: NURSE PRACTITIONER

## 2024-04-01 PROCEDURE — 90471 IMMUNIZATION ADMIN: CPT | Performed by: NURSE PRACTITIONER

## 2024-04-01 PROCEDURE — 25010000002 TETANUS-DIPHTH-ACELL PERTUSSIS 5-2.5-18.5 LF-MCG/0.5 SUSPENSION PREFILLED SYRINGE: Performed by: NURSE PRACTITIONER

## 2024-04-01 RX ADMIN — TETANUS TOXOID, REDUCED DIPHTHERIA TOXOID AND ACELLULAR PERTUSSIS VACCINE, ADSORBED 0.5 ML: 5; 2.5; 8; 8; 2.5 SUSPENSION INTRAMUSCULAR at 13:33

## 2024-04-01 NOTE — ED PROVIDER NOTES
Subjective   History of Present Illness  Patient is a 73-year-old male presents the emergency department after sustaining a fall at work just prior to arrival and hitting his face on some shovels.  He denies any syncope or chest pain prior to the fall.  He states he just lost his balance and fell.  He sustained a laceration across the nasal bridge.  There was no epistaxis.  He has some pain across the nasal bridge.  No dental injury.  No difficulty opening closing mouth.  No chest pain or shortness of breath.  No neck pain.  No other complaints.  No loss of consciousness.    History provided by:  Patient   used: No        Review of Systems   Constitutional: Negative.    HENT:          Patient is a 73-year-old male presents the emergency department after sustaining a fall at work just prior to arrival and hitting his face on some shovels.  He denies any syncope or chest pain prior to the fall.  He states he just lost his balance and fell.  He sustained a laceration across the nasal bridge.  There was no epistaxis.  He has some pain across the nasal bridge.  No dental injury.  No difficulty opening closing mouth.  No chest pain or shortness of breath.  No neck pain.  No other complaints.  No loss of consciousness.     Eyes: Negative.    Respiratory: Negative.     Cardiovascular: Negative.    Gastrointestinal: Negative.    Endocrine: Negative.    Genitourinary: Negative.    Musculoskeletal: Negative.    Skin: Negative.    Allergic/Immunologic: Negative.    Neurological: Negative.    Hematological: Negative.    Psychiatric/Behavioral: Negative.     All other systems reviewed and are negative.      Past Medical History:   Diagnosis Date    Arthritis     Atrial fibrillation     paroxysmal, on Xarelto    BMI 31.0-31.9,adult 06/28/2018    Carotid artery disease without cerebral infarction     Chronic knee pain     Chronic neck and back pain     Coronary artery disease 01/04/2022    COVID-19 08/18/2023     treated with Paxlovid    Depression     Diabetes     Disease of thyroid gland     Gout     Hypercholesteremia     Hypertension     IBS (irritable bowel syndrome)     Kidney disease     Osteoarthritis     back, neck, and knees - goes to Pain Management    Prostate cancer     Dr. Hong following    Sensorineural hearing loss     Sleep apnea     CPAP    Sudden hearing loss     Syncope     Tinnitus     Urine incontinence        No Known Allergies    Past Surgical History:   Procedure Laterality Date    ATRIAL APPENDAGE EXCLUSION LEFT WITH TRANSESOPHAGEAL ECHOCARDIOGRAM Left 1/6/2022    Procedure: LEFT ATRIAL APPENDAGE EXCLUSION WITH  40MM ATRICLIP; TRANSESOPHAGEAL ECHOCARDIOGRAM;  Surgeon: Chivo White MD;  Location: [facility] OR;  Service: Cardiothoracic;  Laterality: Left;    BLADDER SUSPENSION N/A 11/26/2018    Procedure: CYSTOSCOPY BLADDER SUSPENSION MALE SLING;  Surgeon: Zaheer Rebolledo MD;  Location: [facility] OR;  Service: Urology    CARDIAC CATHETERIZATION N/A 12/30/2021    Procedure: Coronary angiography right radial to follow my 9:30 case;  Surgeon: Mike Fitzpatrick MD;  Location: [facility] CATH INVASIVE LOCATION;  Service: Cardiology;  Laterality: N/A;    CHOLECYSTECTOMY      COLONOSCOPY N/A 3/7/2017    Procedure: COLONOSCOPY WITH ANESTHESIA;  Surgeon: Hector Alvarenga MD;  Location: [facility] ENDOSCOPY;  Service:     COLONOSCOPY N/A 5/20/2022    Procedure: COLONOSCOPY WITH ANESTHESIA;  Surgeon: Hector Alvarenga MD;  Location: [facility] ENDOSCOPY;  Service: Gastroenterology;  Laterality: N/A;  pre brbpr  post polyps  Luis Alberto SMITH    CORONARY ARTERY BYPASS GRAFT N/A 1/6/2022    Procedure: CORONARY ARTERY BYPASS GRAFT X 4 WITH LEFT INTERNAL MAMMARY ARTERY, LEFT LOWER EXTREMITY ENDOSCOPIC VEIN HARVEST, AND PERFUSION;  Surgeon: Chivo White MD;  Location: [facility] OR;  Service: Cardiothoracic;  Laterality: N/A;    MAZE PROCEDURE N/A 1/6/2022    Procedure: BILATERAL MAZE PROCEDURE WITH RADIOFREQUENCY AND  CRYOABLATION;  Surgeon: Chivo White MD;  Location:  [facility] OR;  Service: Cardiothoracic;  Laterality: N/A;    PROSTATECTOMY N/A 8/1/2017    Procedure: PROSTATECTOMY LAPAROSCOPIC WITH DAVINCI SI ROBOT ;  Surgeon: Hernesto Hong MD;  Location:  [facility] OR;  Service:     THYROID SURGERY      RADIATION THERAPY AFTER SURGERY       Family History   Problem Relation Age of Onset    Heart disease Mother         assumed MI at time of death - had cardiopulmonary arrest    Sudden death Mother     Prostate cancer Father     Cancer Father     Heart disease Father         CABG    Heart attack Father 70    Diabetes Sister     Arrhythmia Sister     Arrhythmia Maternal Aunt     Stroke Paternal Grandfather     Colon cancer Neg Hx     Colon polyps Neg Hx        Social History     Socioeconomic History    Marital status:    Tobacco Use    Smoking status: Never     Passive exposure: Yes    Smokeless tobacco: Never    Tobacco comments:     wife smokes in the house and vehicle for years - tries to smoke outside or use vent dumont over stove   Vaping Use    Vaping status: Never Used   Substance and Sexual Activity    Alcohol use: Not Currently     Comment: rare, none in over a year    Drug use: Never    Sexual activity: Defer     Partners: Female       Prior to Admission medications    Medication Sig Start Date End Date Taking? Authorizing Provider   albuterol sulfate  (90 Base) MCG/ACT inhaler Inhale 2 puffs Every 4 (Four) Hours As Needed for Wheezing or Shortness of Air (rinse mouth after use). 7/3/23  Yes Amber Alex APRN   allopurinol (ZYLOPRIM) 100 MG tablet Take 1 tablet by mouth Daily. 8/22/18  Yes ProviderSuzanne MD   amLODIPine (NORVASC) 2.5 MG tablet Take 1 tablet by mouth Daily. 2/8/23  Yes ProviderSuzanne MD   aspirin (aspirin) 81 MG EC tablet Take 1 tablet by mouth Daily. 12/28/21  Yes Letty Belcher APRN   atorvastatin (LIPITOR) 20 MG tablet Take 1 tablet by mouth Every Night. 5/10/18  " Yes Emergency, Nurse Epic, RN   bisacodyl (DULCOLAX) 5 MG EC tablet Take 1 tablet by mouth Daily As Needed for Constipation. Has to take about twice a month   Yes Suzanne Syed MD   Cholecalciferol (VITAMIN D3) 2000 units tablet Take 1 tablet by mouth Daily.   Yes Suzanne Syed MD   citalopram (CeleXA) 20 MG tablet Take 1 tablet by mouth Daily. 1/29/21  Yes Suzanne Syed MD   docusate sodium (COLACE) 50 MG capsule Take 2 capsules by mouth 2 (Two) Times a Day. Unsure of dosage   Yes Suzanne Syed MD   empagliflozin (Jardiance) 25 MG tablet tablet Take  by mouth Daily.   Yes Suzanne Syed MD   gabapentin (NEURONTIN) 400 MG capsule Take 1 capsule by mouth 3 (Three) Times a Day.   Yes Suzanne Syed MD   glipizide (GLUCOTROL) 5 MG tablet Take 1 tablet by mouth Daily. 5/24/20  Yes Suzanne Syed MD   HYDROcodone-acetaminophen (NORCO) 7.5-325 MG per tablet Take 1 tablet by mouth Every 8 (Eight) Hours As Needed for Moderate Pain. Usually takes twice a day 1/29/21  Yes Suzanne Syed MD   levothyroxine (SYNTHROID, LEVOTHROID) 200 MCG tablet Take 1 tablet by mouth Daily. Takes 200   Yes Suzanne Syed MD   lisinopril (PRINIVIL,ZESTRIL) 5 MG tablet Take 1 tablet by mouth Daily.   Yes Suzanne Syed MD   metoprolol tartrate (LOPRESSOR) 50 MG tablet Take 1 tablet by mouth 2 (Two) Times a Day. 3/5/24  Yes Suzanne Syed MD   nitroglycerin (NITROSTAT) 0.4 MG SL tablet Place 1 tablet under the tongue Every 5 (Five) Minutes As Needed for Chest Pain. 12/28/21 4/1/24 Yes Letty Belcher APRN   polyethylene glycol (MIRALAX) 17 g packet Take 17 g by mouth Daily As Needed.   Yes Suzanne Syed MD   tiZANidine (ZANAFLEX) 4 MG tablet TAKE A 1/4 TABLET BY MOUTH 3 TIMES A DAY WITH MEALS, THEN TAKE A 1/2-1 TABLET AT BEDTIME 2/7/24  Yes Suzanne Syed MD       /82   Pulse 73   Temp 98.3 °F (36.8 °C)   Resp 19   Ht 177.8 cm (70\")   " Wt 107 kg (235 lb)   SpO2 96%   BMI 33.72 kg/m²     Objective   Physical Exam  Vitals and nursing note reviewed.   Constitutional:       Appearance: He is well-developed.      Comments: Nontoxic appearing.  No acute distress.   HENT:      Head: Normocephalic.      Comments: He has a very superficial laceration across the nasal bridge that measures approximately 1.5 cm in length.  There is a pinpoint puncture wound noted to the left nasal bridge as well.  Bleeding is controlled.  There is no epistaxis noted.  No trismus or malocclusion noted.  No dental injury.  Eyes:      Conjunctiva/sclera: Conjunctivae normal.      Pupils: Pupils are equal, round, and reactive to light.   Cardiovascular:      Rate and Rhythm: Normal rate and regular rhythm.      Heart sounds: Normal heart sounds.   Pulmonary:      Effort: Pulmonary effort is normal.      Breath sounds: Normal breath sounds.   Abdominal:      General: Bowel sounds are normal.      Palpations: Abdomen is soft.   Musculoskeletal:         General: Normal range of motion.      Cervical back: Normal range of motion and neck supple.   Skin:     General: Skin is warm and dry.   Neurological:      Mental Status: He is alert and oriented to person, place, and time.      Deep Tendon Reflexes: Reflexes are normal and symmetric.   Psychiatric:         Behavior: Behavior normal.         Thought Content: Thought content normal.         Judgment: Judgment normal.         Laceration Repair    Date/Time: 4/1/2024 2:01 PM    Performed by: Mariia Rey APRN  Authorized by: Mariia Rey APRN    Consent:     Consent obtained:  Verbal    Consent given by:  Patient    Risks, benefits, and alternatives were discussed: yes      Risks discussed:  Infection, pain, need for additional repair, poor cosmetic result, nerve damage and poor wound healing    Alternatives discussed:  No treatment, delayed treatment and observation  Universal protocol:     Procedure explained  and questions answered to patient or proxy's satisfaction: yes      Relevant documents present and verified: yes      Test results available: yes      Imaging studies available: yes      Patient identity confirmed:  Verbally with patient, arm band and provided demographic data  Anesthesia:     Anesthesia method:  None  Laceration details:     Location:  Face    Face location:  Nose    Length (cm):  0.5  Exploration:     Limited defect created (wound extended): no      Imaging outcome: foreign body not noted      Wound extent: no areolar tissue violation noted, no fascia violation noted, no foreign bodies/material noted, no muscle damage noted, no nerve damage noted, no tendon damage noted, no underlying fracture noted and no vascular damage noted    Treatment:     Area cleansed with:  Antonio-Marita    Amount of cleaning:  Standard    Irrigation method:  Syringe    Visualized foreign bodies/material removed: no      Debridement:  None  Skin repair:     Repair method:  Tissue adhesive  Post-procedure details:     Dressing:  Open (no dressing)    Procedure completion:  Tolerated well, no immediate complications           Lab Results (last 24 hours)       ** No results found for the last 24 hours. **            CT Facial Bones Without Contrast   Final Result   1. No evidence of facial bone fracture.   2. Other nonacute findings as above.                                                       This report was signed and finalized on 4/1/2024 1:44 PM by Dr. Stef Dee MD.              ED Course  ED Course as of 04/01/24 1405   Mon Apr 01, 2024   1402 CT of the facial bones was negative for any acute fracture.  Repaired the laceration to the nose with skin adhesive.  Cleanse the puncture wound to the face as well.  Advised the patient to apply ice to the area.  He was updated on his tetanus.  Patient be discharged shortly in stable condition.  Advised reasons to return emergency department.  He verbalizes understanding  of instructions.  Patient be discharged shortly in stable condition. [CW]      ED Course User Index  [CW] Mariia Rey APRN        Medical Decision Making  Patient is a 73-year-old male presents the emergency department after sustaining a fall at work just prior to arrival and hitting his face on some shovels.  He denies any syncope or chest pain prior to the fall.  He states he just lost his balance and fell.  He sustained a laceration across the nasal bridge.  There was no epistaxis.  He has some pain across the nasal bridge.  No dental injury.  No difficulty opening closing mouth.  No chest pain or shortness of breath.  No neck pain.  No other complaints.  No loss of consciousness.  Course of treatment in ED: Nontoxic-appearing 73-year-old male presents emergency department after stating a head injury.  He states he tripped while at work and hit his head on some shovels.  He denies any loss of consciousness.  He sustained a laceration across the nasal bridge.  There is no epistaxis.  No loss of consciousness.  No neck pain.  No chest pain or shortness of breath or syncope prior to the fall.  He states he just tripped and fell.  This happened about 20 minutes prior to arrival.  He is unsure of his last tetanus.  He has a very superficial laceration across the nasal bridge that measures approximately 2.5 cm in length.  Bleeding is controlled.  There is a pinpoint puncture wound noted across the nasal bridge as well.  Bleeding is controlled.  There is no epistaxis noted.  No trismus or malocclusion noted.  CV sinus rhythm.  Lungs clear to auscultation.  Patient denies any pain elsewhere.  No neck pain on palpation.  No step-off or laxity noted.  CT of the facial bones, tetanus have been ordered.  Will repair the laceration with skin adhesive.  Differential diagnosis: Facial fracture; facial laceration; mandible fracture; nasal bone fracture  CT Facial Bones Without Contrast   Final Result    1. No evidence  of facial bone fracture.    2. Other nonacute findings as above.                                                                     This report was signed and finalized on 4/1/2024 1:44 PM by Dr. Stef Dee MD.        CT of the facial bones was negative for any acute fracture.  Repaired the laceration to the nose with skin adhesive.  Cleanse the puncture wound to the face as well.  Advised the patient to apply ice to the area.  He was updated on his tetanus.  Patient be discharged shortly in stable condition.  Advised reasons to return emergency department.  He verbalizes understanding of instructions.  Patient be discharged shortly in stable condition.        Amount and/or Complexity of Data Reviewed  Radiology: ordered. Decision-making details documented in ED Course.    Risk  Prescription drug management.         Final diagnoses:   Contusion of face, initial encounter   Facial laceration, initial encounter          Mariia Rey, APRN  04/01/24 [id number]

## 2024-04-17 ENCOUNTER — HOSPITAL ENCOUNTER (OUTPATIENT)
Dept: PAIN MANAGEMENT | Age: 74
Discharge: HOME | End: 2024-04-17
Payer: MEDICARE

## 2024-04-17 VITALS
OXYGEN SATURATION: 94 % | TEMPERATURE: 97.6 F | SYSTOLIC BLOOD PRESSURE: 134 MMHG | RESPIRATION RATE: 18 BRPM | HEART RATE: 67 BPM | DIASTOLIC BLOOD PRESSURE: 52 MMHG

## 2024-04-17 PROCEDURE — 20553 NJX 1/MLT TRIGGER POINTS 3/>: CPT

## 2024-04-17 PROCEDURE — 6360000002 HC RX W HCPCS

## 2024-04-17 PROCEDURE — 20553 NJX 1/MLT TRIGGER POINTS 3/>: CPT | Performed by: NURSE PRACTITIONER

## 2024-04-17 PROCEDURE — 2500000003 HC RX 250 WO HCPCS

## 2024-04-17 RX ORDER — KETOROLAC TROMETHAMINE 30 MG/ML
45 INJECTION, SOLUTION INTRAMUSCULAR; INTRAVENOUS ONCE
Status: DISCONTINUED | OUTPATIENT
Start: 2024-04-17 | End: 2024-04-19 | Stop reason: HOSPADM

## 2024-04-17 RX ORDER — LIDOCAINE HYDROCHLORIDE 10 MG/ML
15 INJECTION, SOLUTION EPIDURAL; INFILTRATION; INTRACAUDAL; PERINEURAL ONCE
Status: DISCONTINUED | OUTPATIENT
Start: 2024-04-17 | End: 2024-04-19 | Stop reason: HOSPADM

## 2024-04-17 RX ORDER — BUPIVACAINE HYDROCHLORIDE 5 MG/ML
15 INJECTION, SOLUTION EPIDURAL; INTRACAUDAL ONCE
Status: DISCONTINUED | OUTPATIENT
Start: 2024-04-17 | End: 2024-04-19 | Stop reason: HOSPADM

## 2024-04-17 NOTE — PROCEDURES
[facility]    Patient Name: Pillo Ortega    : 1950                    Age: 73 y.o.    Sex: male    Date: 2024    Pre-op Diagnosis: Myofascial Pain/ Muscle Spasms/ Cervicalgia    Post-op Diagnosis: Myofascial Pain/ Muscle Spasms/ Cervicalgia    Procedure: Cervical Trigger Point Injections    Performing Procedure: TEETEE Guerrero    Patient Vitals for the past 24 hrs:   BP Temp Temp src Pulse Resp SpO2   24 0748 (!) 134/52 97.6 °F (36.4 °C) Temporal 67 18 94 %       Description of Procedure:    After a brief physical assessment and failure to improve with conservative measures the patient presented for Cervical Trigger Point Injections The indications, limitations and possible complications were discussed with the patient and the patient elected to proceed with the procedure.    After voluntary, informed and signed consent obtained the patient was placed in a seated position.     Appropriate time out was obtained per policy.     The area of maximal tenderness was palpated over the  bilaterally Cervical muscles - Splenius  Trapezius  Rhomboid The skin overlying these areas was marked with a skin marker. The skin overlying the proposed injection sites were then sprayed with Gebauer's Solution while protecting patient eyes. The areas were prepped using aseptic technique with CHG prep. Each trigger point of the Cervical muscles - Splenius  Trapezius  Rhomboid was injected with approximately 1- 2 ml of a solution of 5 ml of 1% Lidocaine Plain and 5 ml of 0.5% Marcaine Plain after negative aspiration    Toradol 0.5 ml (30mg/ml) added to each syringed.      Pre-op Diagnosis: Myofascial Pain/ Muscle Spasms    Post-op Diagnosis: Myofascial Pain/ Muscle Spasms    Procedure: Thoracic Trigger Point Injections     Performing Procedure: TEETEE Guerrero    Patient Vitals for the past 24 hrs:   BP Temp Temp src Pulse Resp SpO2   24 0748

## 2024-04-17 NOTE — DISCHARGE INSTRUCTIONS
[facility]  Post Procedure Discharge Instructions        YOU HAVE HAD THE FOLLOWING PROCEDURE:                                  [] Occipital Nerve Blocks  [] CTS wrist injection(s)  [] Knee Injection(s)         [] Shoulder Injection(s)   [] Elbow Injection(s)     [] Botox Injection  [x] Cervical Trigger Point Injections    [x] Thoracic Trigger Point Injections    [] Lumbar Trigger Point Injections  [] Piriformis Trigger Point Injections  [] SI Joint Injection(s)     [] Trochanteric Bursa Injection(s)       [] Ankle Injection(s)   [] Plantar Fasciitis   []  ______________  Injection(s) [] Botox []  Migraines [] Spasticity    YOU HAVE RECEIVED THE FOLLOWING MEDICATIONS IN YOUR INJECTION(s)  [x] Lidocaine [x] Bupivacaine   [] DepoMedrol (steroid) [] Decadron (steroid)  []  Kenalog (steroid)   [x] Toradol  [] Supartz [] Zilretta    [] Botox        PATIENT INFORMATION:   You may experience the following symptoms after your procedure. These symptoms are normal and should not cause concern:    You may have an increase in your pain. This may last 24 - 48 hours after your procedure.  You may have no change in the pain that you had prior to your injection(s).  You may have weakness or numbness in your affected extremity. If this occurs, this may last until numbing the medication wears off.     REPORT THE FOLLOWING SYMPTOMS TO YOUR DOCTOR:  Redness, swelling or drainage at the injection site(s)  Unusual pain that interferes with your normal activities of daily living.    OTHER INSTRUCTIONS:    [x] I will apply ice to the injection site(s) for at least 24 hours after the procedure. I will rotate the ice on for 20 minutes and off for 20 minutes for at least 24 hours.    [x] I will not apply heat for at least 48 hours and I will not take a hot bath or shower for at least 24 hours.     [x] I understand that if Lidocaine or Bupivacaine was used in my injection(s) that the injection site(s)

## 2024-04-17 NOTE — PROGRESS NOTES
Procedure:  Level of Consciousness: [x]Alert [x]Oriented []Disoriented []Lethargic  Anxiety Level: [x]Calm []Anxious []Depressed []Other  Skin: []Warm [x]Dry []Cool []Moist []Intact []Other  Cardiovascular: [x]Palpitations: [x]Never []Occasionally []Frequently  Chest Pain: [x]No []Yes  Respiratory:  [x]Unlabored []Labored []Cough ([] Productive []Unproductive)  HCG Required: []No []Yes   Results: []Negative []Positive  Knowledge Level:        [x]Patient/Other verbalized understanding of pre-procedure instructions.        [x]Assessment of post-op care needs (transportation, responsible caregiver)        [x]Able to discuss health care problems and how to deal with it.  Factors that Affect Teaching:        Language Barrier: [x]No []Yes - why:        Hearing Loss:        [x]No []Yes            Corrective Device:  []Yes []No        Vision Loss:           []No [x]Yes            Corrective Device:  [x]Yes []No        Memory Loss:       [x]No []Yes            []Short Term []Long Term  Motivational Level:  [x]Asks Questions                  []Extremely Anxious       [x]Seems Interested               []Seems Uninterested                  []Denies need for Education  Risk for Injury:  [x]Patient oriented to person, place and time  []History of frequent falls/loss of balance  Nutritional:  []Change in appetite   []Weight Gain   []Weight Loss  Functional:  []Requires assistance with ADL's

## 2024-04-23 ENCOUNTER — TELEPHONE (OUTPATIENT)
Dept: PAIN MANAGEMENT | Age: 74
End: 2024-04-23

## 2024-04-25 DIAGNOSIS — M47.26 OSTEOARTHRITIS OF SPINE WITH RADICULOPATHY, LUMBAR REGION: ICD-10-CM

## 2024-04-25 RX ORDER — HYDROCODONE BITARTRATE AND ACETAMINOPHEN 7.5; 325 MG/1; MG/1
1 TABLET ORAL EVERY 6 HOURS PRN
Qty: 120 TABLET | Refills: 0 | Status: SHIPPED | OUTPATIENT
Start: 2024-04-25 | End: 2024-05-25

## 2024-04-25 RX ORDER — GABAPENTIN 400 MG/1
400 CAPSULE ORAL 3 TIMES DAILY
Qty: 90 CAPSULE | Refills: 0 | Status: SHIPPED | OUTPATIENT
Start: 2024-05-03 | End: 2024-06-02

## 2024-04-30 ENCOUNTER — TELEPHONE (OUTPATIENT)
Dept: FAMILY MEDICINE CLINIC | Age: 74
End: 2024-04-30

## 2024-04-30 ENCOUNTER — HOSPITAL ENCOUNTER (EMERGENCY)
Facility: HOSPITAL | Age: 74
Discharge: HOME | End: 2024-04-30
Attending: EMERGENCY MEDICINE
Payer: MEDICARE

## 2024-04-30 ENCOUNTER — APPOINTMENT (OUTPATIENT)
Dept: GENERAL RADIOLOGY | Facility: HOSPITAL | Age: 74
End: 2024-04-30
Payer: MEDICARE

## 2024-04-30 VITALS
HEART RATE: 64 BPM | HEIGHT: 70 IN | RESPIRATION RATE: 22 BRPM | SYSTOLIC BLOOD PRESSURE: 116 MMHG | TEMPERATURE: 98 F | DIASTOLIC BLOOD PRESSURE: 76 MMHG | OXYGEN SATURATION: 97 % | WEIGHT: 241 LBS | BODY MASS INDEX: 34.5 KG/M2

## 2024-04-30 DIAGNOSIS — I95.1 ORTHOSTATIC HYPOTENSION: ICD-10-CM

## 2024-04-30 DIAGNOSIS — R55 NEAR SYNCOPE: Primary | ICD-10-CM

## 2024-04-30 DIAGNOSIS — R42 LIGHTHEADEDNESS: ICD-10-CM

## 2024-04-30 LAB
ALBUMIN SERPL-MCNC: 3.8 G/DL (ref 3.5–5.2)
ALBUMIN/GLOB SERPL: 1.6 G/DL
ALP SERPL-CCNC: 61 U/L (ref 39–117)
ALT SERPL W P-5'-P-CCNC: 11 U/L (ref 1–41)
ANION GAP SERPL CALCULATED.3IONS-SCNC: 10 MMOL/L (ref 5–15)
AST SERPL-CCNC: 12 U/L (ref 1–40)
BASOPHILS # BLD AUTO: 0.03 10*3/MM3 (ref 0–0.2)
BASOPHILS NFR BLD AUTO: 0.4 % (ref 0–1.5)
BILIRUB SERPL-MCNC: 0.5 MG/DL (ref 0–1.2)
BUN SERPL-MCNC: 32 MG/DL (ref 8–23)
BUN/CREAT SERPL: 20.3 (ref 7–25)
CALCIUM SPEC-SCNC: 8.5 MG/DL (ref 8.6–10.5)
CHLORIDE SERPL-SCNC: 104 MMOL/L (ref 98–107)
CO2 SERPL-SCNC: 26 MMOL/L (ref 22–29)
CREAT SERPL-MCNC: 1.58 MG/DL (ref 0.76–1.27)
DEPRECATED RDW RBC AUTO: 43.5 FL (ref 37–54)
EGFRCR SERPLBLD CKD-EPI 2021: 45.6 ML/MIN/1.73
EOSINOPHIL # BLD AUTO: 0.17 10*3/MM3 (ref 0–0.4)
EOSINOPHIL NFR BLD AUTO: 2 % (ref 0.3–6.2)
ERYTHROCYTE [DISTWIDTH] IN BLOOD BY AUTOMATED COUNT: 13.3 % (ref 12.3–15.4)
GLOBULIN UR ELPH-MCNC: 2.4 GM/DL
GLUCOSE SERPL-MCNC: 282 MG/DL (ref 65–99)
HCT VFR BLD AUTO: 33 % (ref 37.5–51)
HGB BLD-MCNC: 11.1 G/DL (ref 13–17.7)
IMM GRANULOCYTES # BLD AUTO: 0.04 10*3/MM3 (ref 0–0.05)
IMM GRANULOCYTES NFR BLD AUTO: 0.5 % (ref 0–0.5)
LYMPHOCYTES # BLD AUTO: 1.27 10*3/MM3 (ref 0.7–3.1)
LYMPHOCYTES NFR BLD AUTO: 15.3 % (ref 19.6–45.3)
MAGNESIUM SERPL-MCNC: 2.1 MG/DL (ref 1.6–2.4)
MCH RBC QN AUTO: 29.8 PG (ref 26.6–33)
MCHC RBC AUTO-ENTMCNC: 33.6 G/DL (ref 31.5–35.7)
MCV RBC AUTO: 88.5 FL (ref 79–97)
MONOCYTES # BLD AUTO: 0.57 10*3/MM3 (ref 0.1–0.9)
MONOCYTES NFR BLD AUTO: 6.9 % (ref 5–12)
NEUTROPHILS NFR BLD AUTO: 6.24 10*3/MM3 (ref 1.7–7)
NEUTROPHILS NFR BLD AUTO: 74.9 % (ref 42.7–76)
NRBC BLD AUTO-RTO: 0 /100 WBC (ref 0–0.2)
PLATELET # BLD AUTO: 193 10*3/MM3 (ref 140–450)
PMV BLD AUTO: 9.4 FL (ref 6–12)
POTASSIUM SERPL-SCNC: 4.4 MMOL/L (ref 3.5–5.2)
PROT SERPL-MCNC: 6.2 G/DL (ref 6–8.5)
RBC # BLD AUTO: 3.73 10*6/MM3 (ref 4.14–5.8)
SODIUM SERPL-SCNC: 140 MMOL/L (ref 136–145)
TSH SERPL DL<=0.05 MIU/L-ACNC: 0.38 UIU/ML (ref 0.27–4.2)
WBC NRBC COR # BLD AUTO: 8.32 10*3/MM3 (ref 3.4–10.8)

## 2024-04-30 PROCEDURE — 83735 ASSAY OF MAGNESIUM: CPT

## 2024-04-30 PROCEDURE — 80053 COMPREHEN METABOLIC PANEL: CPT

## 2024-04-30 PROCEDURE — 93005 ELECTROCARDIOGRAM TRACING: CPT

## 2024-04-30 PROCEDURE — 25810000003 SODIUM CHLORIDE 0.9 % SOLUTION

## 2024-04-30 PROCEDURE — 93005 ELECTROCARDIOGRAM TRACING: CPT | Performed by: EMERGENCY MEDICINE

## 2024-04-30 PROCEDURE — 84443 ASSAY THYROID STIM HORMONE: CPT

## 2024-04-30 PROCEDURE — 71045 X-RAY EXAM CHEST 1 VIEW: CPT

## 2024-04-30 PROCEDURE — 99284 EMERGENCY DEPT VISIT MOD MDM: CPT

## 2024-04-30 PROCEDURE — 93010 ELECTROCARDIOGRAM REPORT: CPT | Performed by: EMERGENCY MEDICINE

## 2024-04-30 PROCEDURE — 85025 COMPLETE CBC W/AUTO DIFF WBC: CPT

## 2024-04-30 RX ORDER — SODIUM CHLORIDE 0.9 % (FLUSH) 0.9 %
10 SYRINGE (ML) INJECTION AS NEEDED
Status: DISCONTINUED | OUTPATIENT
Start: 2024-04-30 | End: 2024-05-01 | Stop reason: HOSPADM

## 2024-04-30 RX ORDER — CHOLECALCIFEROL (VITAMIN D3) 125 MCG
5 CAPSULE ORAL
COMMUNITY

## 2024-04-30 RX ADMIN — SODIUM CHLORIDE 1000 ML: 9 INJECTION, SOLUTION INTRAVENOUS at 22:54

## 2024-04-30 NOTE — TELEPHONE ENCOUNTER
[facility] called, they reached out to pt to set up his PT and he said that he wanted to hold off for now. They just wanted you to be aware.

## 2024-05-01 LAB
QT INTERVAL: 470 MS
QTC INTERVAL: 484 MS

## 2024-05-01 NOTE — DISCHARGE INSTRUCTIONS
Today you are seen in the ER for your symptoms.  Your lab work was reassuring.  Chest x-ray was negative for infection.  Your blood pressure did drop whenever you stand up.  You were given IV fluids for this.  Please stay well hydrated.  Please follow-up with primary care provider soon as possible to reassess symptoms.  Please return the ER for any new or worsening symptoms.

## 2024-05-05 DIAGNOSIS — E78.2 MIXED HYPERLIPIDEMIA: ICD-10-CM

## 2024-05-06 RX ORDER — ATORVASTATIN CALCIUM 20 MG/1
20 TABLET, FILM COATED ORAL DAILY
Qty: 90 TABLET | Refills: 3 | Status: SHIPPED | OUTPATIENT
Start: 2024-05-06

## 2024-05-06 NOTE — TELEPHONE ENCOUNTER
Received fax from pharmacy requesting refill on pts medication(s). Pt was last seen in office on 3/13/2024  and has a follow up scheduled for 5/8/2024. Will send request to  Paulo Hoyt  for authorization.     Requested Prescriptions     Pending Prescriptions Disp Refills    atorvastatin (LIPITOR) 20 MG tablet [Pharmacy Med Name: ATORVASTATIN 20 MG TABLET] 90 tablet 3     Sig: TAKE 1 TABLET BY MOUTH EVERY DAY

## 2024-05-07 DIAGNOSIS — M79.18 MYOFASCIAL MUSCLE PAIN: ICD-10-CM

## 2024-05-07 DIAGNOSIS — M47.26 OSTEOARTHRITIS OF SPINE WITH RADICULOPATHY, LUMBAR REGION: ICD-10-CM

## 2024-05-07 RX ORDER — TIZANIDINE 4 MG/1
TABLET ORAL
Qty: 60 TABLET | Refills: 1 | Status: SHIPPED | OUTPATIENT
Start: 2024-05-07

## 2024-05-07 RX ORDER — HYDROCODONE BITARTRATE AND ACETAMINOPHEN 7.5; 325 MG/1; MG/1
1 TABLET ORAL EVERY 6 HOURS PRN
Qty: 120 TABLET | Refills: 0 | Status: SHIPPED | OUTPATIENT
Start: 2024-05-25 | End: 2024-06-24

## 2024-05-08 ENCOUNTER — TELEPHONE (OUTPATIENT)
Dept: FAMILY MEDICINE CLINIC | Age: 74
End: 2024-05-08

## 2024-05-08 ENCOUNTER — OFFICE VISIT (OUTPATIENT)
Dept: FAMILY MEDICINE CLINIC | Age: 74
End: 2024-05-08
Payer: MEDICARE

## 2024-05-08 VITALS
TEMPERATURE: 97.3 F | OXYGEN SATURATION: 96 % | WEIGHT: 240 LBS | HEART RATE: 63 BPM | DIASTOLIC BLOOD PRESSURE: 60 MMHG | SYSTOLIC BLOOD PRESSURE: 120 MMHG | BODY MASS INDEX: 33.47 KG/M2

## 2024-05-08 DIAGNOSIS — E78.2 MIXED HYPERLIPIDEMIA: ICD-10-CM

## 2024-05-08 DIAGNOSIS — I10 ESSENTIAL HYPERTENSION: ICD-10-CM

## 2024-05-08 DIAGNOSIS — N39.41 URGE INCONTINENCE OF URINE: ICD-10-CM

## 2024-05-08 DIAGNOSIS — E79.0 HYPERURICEMIA: ICD-10-CM

## 2024-05-08 DIAGNOSIS — Z85.46 HISTORY OF PROSTATE CANCER: ICD-10-CM

## 2024-05-08 DIAGNOSIS — E11.22 TYPE 2 DIABETES MELLITUS WITH STAGE 3A CHRONIC KIDNEY DISEASE, WITHOUT LONG-TERM CURRENT USE OF INSULIN (HCC): ICD-10-CM

## 2024-05-08 DIAGNOSIS — I48.0 PAROXYSMAL ATRIAL FIBRILLATION (HCC): ICD-10-CM

## 2024-05-08 DIAGNOSIS — I65.23 BILATERAL CAROTID ARTERY STENOSIS: ICD-10-CM

## 2024-05-08 DIAGNOSIS — N18.31 TYPE 2 DIABETES MELLITUS WITH STAGE 3A CHRONIC KIDNEY DISEASE, WITHOUT LONG-TERM CURRENT USE OF INSULIN (HCC): ICD-10-CM

## 2024-05-08 DIAGNOSIS — G47.33 OSA ON CPAP: ICD-10-CM

## 2024-05-08 DIAGNOSIS — E03.9 ACQUIRED HYPOTHYROIDISM: ICD-10-CM

## 2024-05-08 DIAGNOSIS — I25.10 CORONARY ARTERY DISEASE INVOLVING NATIVE HEART WITHOUT ANGINA PECTORIS, UNSPECIFIED VESSEL OR LESION TYPE: ICD-10-CM

## 2024-05-08 DIAGNOSIS — E11.22 TYPE 2 DIABETES MELLITUS WITH STAGE 3A CHRONIC KIDNEY DISEASE, WITHOUT LONG-TERM CURRENT USE OF INSULIN (HCC): Primary | ICD-10-CM

## 2024-05-08 DIAGNOSIS — N18.31 TYPE 2 DIABETES MELLITUS WITH STAGE 3A CHRONIC KIDNEY DISEASE, WITHOUT LONG-TERM CURRENT USE OF INSULIN (HCC): Primary | ICD-10-CM

## 2024-05-08 DIAGNOSIS — I73.9 PAD (PERIPHERAL ARTERY DISEASE) (HCC): ICD-10-CM

## 2024-05-08 LAB
ALBUMIN SERPL-MCNC: 4.3 G/DL (ref 3.5–5.2)
ALP SERPL-CCNC: 70 U/L (ref 40–130)
ALT SERPL-CCNC: 12 U/L (ref 5–41)
ANION GAP SERPL CALCULATED.3IONS-SCNC: 9 MMOL/L (ref 7–19)
AST SERPL-CCNC: 12 U/L (ref 5–40)
BILIRUB SERPL-MCNC: 0.7 MG/DL (ref 0.2–1.2)
BUN SERPL-MCNC: 27 MG/DL (ref 8–23)
CALCIUM SERPL-MCNC: 9.2 MG/DL (ref 8.8–10.2)
CHLORIDE SERPL-SCNC: 100 MMOL/L (ref 98–111)
CO2 SERPL-SCNC: 25 MMOL/L (ref 22–29)
CREAT SERPL-MCNC: 1.3 MG/DL (ref 0.5–1.2)
GLUCOSE SERPL-MCNC: 142 MG/DL (ref 74–109)
HBA1C MFR BLD: 7.6 % (ref 4–6)
POTASSIUM SERPL-SCNC: 4.7 MMOL/L (ref 3.5–5)
PROT SERPL-MCNC: 7.1 G/DL (ref 6.6–8.7)
SODIUM SERPL-SCNC: 134 MMOL/L (ref 136–145)
URATE SERPL-MCNC: 5.5 MG/DL (ref 3.4–7)

## 2024-05-08 PROCEDURE — 3017F COLORECTAL CA SCREEN DOC REV: CPT | Performed by: NURSE PRACTITIONER

## 2024-05-08 PROCEDURE — 1036F TOBACCO NON-USER: CPT | Performed by: NURSE PRACTITIONER

## 2024-05-08 PROCEDURE — 1123F ACP DISCUSS/DSCN MKR DOCD: CPT | Performed by: NURSE PRACTITIONER

## 2024-05-08 PROCEDURE — G8427 DOCREV CUR MEDS BY ELIG CLIN: HCPCS | Performed by: NURSE PRACTITIONER

## 2024-05-08 PROCEDURE — 2022F DILAT RTA XM EVC RTNOPTHY: CPT | Performed by: NURSE PRACTITIONER

## 2024-05-08 PROCEDURE — 99214 OFFICE O/P EST MOD 30 MIN: CPT | Performed by: NURSE PRACTITIONER

## 2024-05-08 PROCEDURE — 3078F DIAST BP <80 MM HG: CPT | Performed by: NURSE PRACTITIONER

## 2024-05-08 PROCEDURE — 3051F HG A1C>EQUAL 7.0%<8.0%: CPT | Performed by: NURSE PRACTITIONER

## 2024-05-08 PROCEDURE — 3074F SYST BP LT 130 MM HG: CPT | Performed by: NURSE PRACTITIONER

## 2024-05-08 PROCEDURE — G8417 CALC BMI ABV UP PARAM F/U: HCPCS | Performed by: NURSE PRACTITIONER

## 2024-05-08 SDOH — ECONOMIC STABILITY: INCOME INSECURITY: HOW HARD IS IT FOR YOU TO PAY FOR THE VERY BASICS LIKE FOOD, HOUSING, MEDICAL CARE, AND HEATING?: VERY HARD

## 2024-05-08 SDOH — ECONOMIC STABILITY: FOOD INSECURITY: WITHIN THE PAST 12 MONTHS, THE FOOD YOU BOUGHT JUST DIDN'T LAST AND YOU DIDN'T HAVE MONEY TO GET MORE.: SOMETIMES TRUE

## 2024-05-08 SDOH — ECONOMIC STABILITY: FOOD INSECURITY: WITHIN THE PAST 12 MONTHS, YOU WORRIED THAT YOUR FOOD WOULD RUN OUT BEFORE YOU GOT MONEY TO BUY MORE.: SOMETIMES TRUE

## 2024-05-08 ASSESSMENT — ENCOUNTER SYMPTOMS
VOMITING: 0
DIARRHEA: 0
COUGH: 0
CONSTIPATION: 0
SORE THROAT: 0
NAUSEA: 0
TROUBLE SWALLOWING: 0
SHORTNESS OF BREATH: 0
ABDOMINAL PAIN: 0
RHINORRHEA: 0

## 2024-05-08 ASSESSMENT — PATIENT HEALTH QUESTIONNAIRE - PHQ9
6. FEELING BAD ABOUT YOURSELF - OR THAT YOU ARE A FAILURE OR HAVE LET YOURSELF OR YOUR FAMILY DOWN: NOT AT ALL
SUM OF ALL RESPONSES TO PHQ QUESTIONS 1-9: 7
SUM OF ALL RESPONSES TO PHQ QUESTIONS 1-9: 7
5. POOR APPETITE OR OVEREATING: NOT AT ALL
4. FEELING TIRED OR HAVING LITTLE ENERGY: MORE THAN HALF THE DAYS
10. IF YOU CHECKED OFF ANY PROBLEMS, HOW DIFFICULT HAVE THESE PROBLEMS MADE IT FOR YOU TO DO YOUR WORK, TAKE CARE OF THINGS AT HOME, OR GET ALONG WITH OTHER PEOPLE: NOT DIFFICULT AT ALL
8. MOVING OR SPEAKING SO SLOWLY THAT OTHER PEOPLE COULD HAVE NOTICED. OR THE OPPOSITE, BEING SO FIGETY OR RESTLESS THAT YOU HAVE BEEN MOVING AROUND A LOT MORE THAN USUAL: NOT AT ALL
7. TROUBLE CONCENTRATING ON THINGS, SUCH AS READING THE NEWSPAPER OR WATCHING TELEVISION: SEVERAL DAYS
9. THOUGHTS THAT YOU WOULD BE BETTER OFF DEAD, OR OF HURTING YOURSELF: NOT AT ALL
SUM OF ALL RESPONSES TO PHQ QUESTIONS 1-9: 7
3. TROUBLE FALLING OR STAYING ASLEEP: MORE THAN HALF THE DAYS
SUM OF ALL RESPONSES TO PHQ9 QUESTIONS 1 & 2: 2
SUM OF ALL RESPONSES TO PHQ QUESTIONS 1-9: 7
1. LITTLE INTEREST OR PLEASURE IN DOING THINGS: NOT AT ALL
2. FEELING DOWN, DEPRESSED OR HOPELESS: MORE THAN HALF THE DAYS

## 2024-05-08 NOTE — PROGRESS NOTES
Bilateral carotid artery stenosis  I65.23 Followed by Dr Galan.       12. Urge incontinence of urine  N39.41 External Referral To Urology          Return in about 3 months (around 8/8/2024) for AWV.    SUBJECTIVE/OBJECTIVE:  HPI  Here for follow up on health issues    Diabetes with CKD              glipizide, and lisinopril  Not on jardiance any more. Could not afford.   Check bs 1 time a day.  Running <200 in the mornings.  No lows.     Hemoglobin A1C   Date Value Ref Range Status   02/07/2024 7.1 (H) 4.0 - 6.0 % Final     Comment:     HbA1c levels >6% are an indication of hyperglycemia during the preceding 2  to 3 months or longer.    HbA1c levels may reach 20% or higher in poorly controlled diabetes.  Therapeutic action is suggested at levels above 8%.    Diabetes patients with HbA1c levels below 7% meet the goal of the American  Diabetes Association.    HbA1c levels below the established reference range may indicate recent  episodes of hypoglycemia, the presence of Hb variants, or shortened lifetime  of erythrocytes.         Lab Results   Component Value Date    CREATININE 1.5 (H) 02/07/2024     HTN   Amlodipine, metoprolol and lisinopril  BP controlled on medicine. No headache or chest pain.    HLP/CAD/PAF             lipitor  Hx CABG 01/06/2022  No chest pain, swelling, shortness of breath or palpitations.   Followed by Trinh Villanueva with [facility]  Lab Results   Component Value Date    CHOL 96 (L) 08/09/2023    TRIG 182 (H) 08/09/2023    HDL 25 (L) 02/07/2024    LDL 58 02/07/2024       PAD/Carotid stenosis   Lipitor and aspirin  Followed by Dr Galan   Denies claudication symptoms     RICHARD 07/26/2023   Impression:   1. No significant arterial insufficiency of the right lower extremity at   rest.   2. No significant arterial insufficiency of the left lower extremity at   rest.      Carotid US 07/26/2023  Impression:   1. There is less than 50% stenosis of the right internal carotid artery.   2.

## 2024-05-08 NOTE — TELEPHONE ENCOUNTER
Called patient, spoke with: Patient regarding the results of the patients most recent labs.  I advised Patient of Paulo Hoyt recommendations.   Patient did voice understanding

## 2024-05-08 NOTE — TELEPHONE ENCOUNTER
----- Message from MARY Rowland sent at 5/8/2024 12:50 PM CDT -----  Please call patient and let them know results.   Metabolic panel shows stable kidney function and mild elevated blood sugar at 142 otherwise negative  Blood sugars are not controlled and A1c is 7.6.  Recommend starting jardiance as discussed and prescribed at office visit  Normal uric acid level    We will get repeat labs in 3 months at follow-up visit

## 2024-05-09 DIAGNOSIS — M47.26 OSTEOARTHRITIS OF SPINE WITH RADICULOPATHY, LUMBAR REGION: ICD-10-CM

## 2024-05-09 RX ORDER — GABAPENTIN 400 MG/1
400 CAPSULE ORAL 3 TIMES DAILY
Qty: 90 CAPSULE | Refills: 0 | Status: SHIPPED | OUTPATIENT
Start: 2024-05-09 | End: 2024-06-08

## 2024-05-23 DIAGNOSIS — M47.26 OSTEOARTHRITIS OF SPINE WITH RADICULOPATHY, LUMBAR REGION: ICD-10-CM

## 2024-05-23 RX ORDER — GABAPENTIN 400 MG/1
400 CAPSULE ORAL 3 TIMES DAILY
Qty: 90 CAPSULE | Refills: 0 | Status: SHIPPED | OUTPATIENT
Start: 2024-06-08 | End: 2024-07-08

## 2024-05-24 DIAGNOSIS — E11.22 TYPE 2 DIABETES MELLITUS WITH STAGE 3A CHRONIC KIDNEY DISEASE, WITHOUT LONG-TERM CURRENT USE OF INSULIN (HCC): Primary | ICD-10-CM

## 2024-05-24 DIAGNOSIS — N18.31 TYPE 2 DIABETES MELLITUS WITH STAGE 3A CHRONIC KIDNEY DISEASE, WITHOUT LONG-TERM CURRENT USE OF INSULIN (HCC): Primary | ICD-10-CM

## 2024-05-24 RX ORDER — GLUCOSAMINE HCL/CHONDROITIN SU 500-400 MG
CAPSULE ORAL
Qty: 100 STRIP | Refills: 0 | Status: SHIPPED | OUTPATIENT
Start: 2024-05-24

## 2024-05-24 NOTE — TELEPHONE ENCOUNTER
Received fax from pharmacy requesting refill on pts medication(s). Pt was last seen in office on 5/8/2024  and has a follow up scheduled for 8/21/2024. Will send request to  Paulo Hoyt  for patient.     Requested Prescriptions     Pending Prescriptions Disp Refills    blood glucose monitor strips 100 strip 3     Sig: Test 3 times a day & as needed for symptoms of irregular blood glucose. Dispense sufficient amount for indicated testing frequency plus additional to accommodate PRN testing needs.    Gluco guard Expression

## 2024-05-28 DIAGNOSIS — M47.26 OSTEOARTHRITIS OF SPINE WITH RADICULOPATHY, LUMBAR REGION: ICD-10-CM

## 2024-05-28 NOTE — PROGRESS NOTES
Chief Complaint  Enlarged pulmonary artery    Subjective    History of Present Illness {CC  Problem List  Visit Diagnosis   Encounters  Notes  Medications  Labs  Result Review Imaging  Media     Zay Kamara presents to [facility] for:    History of Present Illness  Mr. Kamara is here for follow up and management of PAH and mediastinal lymphadenopathy.  He tells me he has been good since last visit.  He tells me had a syncopal episode due to low blood pressure since seeing Dr. Wei last.  He denies any pulmonary issues.  No new dyspnea, cough, wheezing.  He had a CT chest in March 2024 he is here to review.       Prior to Admission medications    Medication Sig Start Date End Date Taking? Authorizing Provider   albuterol sulfate  (90 Base) MCG/ACT inhaler Inhale 2 puffs Every 4 (Four) Hours As Needed for Wheezing or Shortness of Air (rinse mouth after use). 7/3/23   Amber Alex APRN   allopurinol (ZYLOPRIM) 100 MG tablet Take 1 tablet by mouth Daily. 8/22/18   Suzanne Syed MD   amLODIPine (NORVASC) 2.5 MG tablet Take 1 tablet by mouth Every Night. 2/8/23   Suzanne Syed MD   aspirin (aspirin) 81 MG EC tablet Take 1 tablet by mouth Daily. 12/28/21   Letty Belcher APRN   atorvastatin (LIPITOR) 20 MG tablet Take 1 tablet by mouth Every Night. 5/10/18   Emergency, Nurse Epic, RN   cephalexin (Keflex) 500 MG capsule Take 1 capsule by mouth 3 (Three) Times a Day for 10 days. 5/22/24 6/1/24  Yuly Fam APRN   Cholecalciferol (VITAMIN D3) 2000 units tablet Take 1 tablet by mouth Daily.    ProviderSuzanne MD   citalopram (CeleXA) 20 MG tablet Take 1 tablet by mouth Daily. 1/29/21   Suzanne Syed MD   docusate sodium (COLACE) 50 MG capsule Take 2 capsules by mouth 2 (Two) Times a Day. Unsure of dosage    Suzanne Syed MD   gabapentin (NEURONTIN) 400 MG capsule Take 1 capsule by mouth 2  "(Two) Times a Day.    Suzanne Syed MD   glipizide (GLUCOTROL) 5 MG tablet Take 1 tablet by mouth Every Morning. 5/24/20   Suzanne Seyd MD   HYDROcodone-acetaminophen (NORCO) 7.5-325 MG per tablet Take 1 tablet by mouth Every 8 (Eight) Hours As Needed for Moderate Pain. Usually takes twice a day 1/29/21   Suzanne Syed MD   levothyroxine (SYNTHROID, LEVOTHROID) 200 MCG tablet Take 1 tablet by mouth Daily. Takes 200    Suzanne Syed MD   lisinopril (PRINIVIL,ZESTRIL) 5 MG tablet Take 1 tablet by mouth Every Night.    Suzanne Syed MD   melatonin 5 MG tablet tablet Take 1 tablet by mouth.    Suzanne Syed MD   metoprolol tartrate (LOPRESSOR) 50 MG tablet Take 1 tablet by mouth 2 (Two) Times a Day. 3/5/24   Suzanne Syed MD   nitroglycerin (NITROSTAT) 0.4 MG SL tablet Place 1 tablet under the tongue Every 5 (Five) Minutes As Needed for Chest Pain. 12/28/21 5/22/24  Letty Belcher APRN   polyethylene glycol (MIRALAX) 17 g packet Take 17 g by mouth Daily As Needed.    Suzanne Syed MD   tiZANidine (ZANAFLEX) 4 MG tablet TAKE A 1/4 TABLET BY MOUTH 3 TIMES A DAY WITH MEALS, THEN TAKE A 1/2-1 TABLET AT BEDTIME 2/7/24   Suzanne Syed MD       Social History     Socioeconomic History    Marital status:    Tobacco Use    Smoking status: Never     Passive exposure: Yes    Smokeless tobacco: Never    Tobacco comments:     wife smokes in the house and vehicle for years - tries to smoke outside or use vent dumont over stove   Vaping Use    Vaping status: Never Used   Substance and Sexual Activity    Alcohol use: Not Currently     Comment: rare, none in over a year    Drug use: Never    Sexual activity: Defer     Partners: Female       Objective   Vital Signs:   /72   Pulse 65   Ht 180.3 cm (71\")   Wt 110 kg (243 lb)   SpO2 94% Comment: RA  BMI 33.89 kg/m²     Physical Exam  Constitutional:       General: He is not in acute " distress.  HENT:      Head: Normocephalic.      Nose: Nose normal.      Mouth/Throat:      Mouth: Mucous membranes are moist.   Eyes:      General: No scleral icterus.  Cardiovascular:      Rate and Rhythm: Normal rate.   Pulmonary:      Effort: No respiratory distress.   Abdominal:      General: There is no distension.   Neurological:      Mental Status: He is alert and oriented to person, place, and time.   Psychiatric:         Mood and Affect: Mood normal.         Behavior: Behavior is cooperative.        Result Review :{ Labs  Result Review  Imaging  Med Tab  Media :    PFT Values          2/1/2024    13:15   Pre Drug PFT Results   FVC 65   FEV1 77   FEF 25-75% 178   FEV1/FVC 89   Other Tests PFT Results   DLCO 109   D/VAsb 135         Results for orders placed in visit on 02/01/24    Spirometry with Diffusion Capacity    Narrative  Spirometry with Diffusion Capacity    Performed by: Elizabeth Bradley, RRT  Authorized by: Jimmie Wei MD  Pre Drug % Predicted  FVC: 65%  FEV1: 77%  FEF 25-75%: 178%  FEV1/FVC: 89%  DLCO: 109%  D/VAsb: 135%    Interpretation  Spirometry  Spirometry shows moderate restriction. midflow is normal.  Diffusion Capacity  The patient's diffusion capacity is normal.  Diffusion capacity is normal when corrected for alveolar volume.  Electronically signed by Jimmie Wei MD, 2/1/2024, 17:15 CST      Results for orders placed during the hospital encounter of 01/05/22    Pulmonary Function Test Lung Volumes, Spirometry Pre & Post Bronchodilator, ABG, Other; NA; COR/HARPREET/JOSAFAT/ALONDRA/MAD/PAD/NINO - DLCO; COR/JOSAFAT/ALONDRA/MONA/PAD - albuterol (PROVENTIL) nebulizer solution 0.083% 2.5 mg/3 mL    [facility] - Pulmonary Function Test    [address] [place] [address]  [place]  [address]  [phone]    Patient : Zay Kamara  MRN : [id number]  CSN : [id number]  Pulmonologist : Dakota Baez MD  Date :  1/5/2022    ______________________________________________________________________    Interpretation :  1.  Spirometry is consistent with a moderate restrictive ventilatory defect.  Small airways function is actually supranormal.  2.  There is slight worsening of spirometry postbronchodilator but a moderate restrictive ventilatory defect still appears to be present.  There is a decrease in small airways function but it still remains supranormal.  3.  Lung volumes confirm a moderate restrictive ventilatory defect although the decrease in the patient's slow vital capacity is just into the moderate range at 69% of predicted.  4.  There is a mild bordering on moderate diffusion impairment which when corrected for alveolar volume is normalized.  5.  Arterial blood gases reveal mild hypoxemia and a mild respiratory acidosis with metabolic compensation on room air.      Dakota Baez MD          CT Angiogram Chest (03/06/2024 09:01)        My interpretation of imaging: Moderately ectatic pulmonary arteries, stable.  Calcified nodules stable.  Right middle lobe nodules, stable.  Slight increase in size of mediastinal/hilar nodes.        Assessment and Plan {CC Problem List  Visit Diagnosis  ROS  Review (Popup)  Health Maintenance  Quality  BestPractice  Medications  SmartSets  SnapShot Encounters  Media      Diagnoses and all orders for this visit:    1. Enlarged pulmonary artery (Primary)    2. Sleep apnea, unspecified type    3. Mediastinal adenopathy  -     CT Chest With Contrast Diagnostic; Future  -     Creatinine, Serum; Future           We reviewed CT showing a slight increase in size of mediastinal and hilar nodes.  Uncertain of significance.  Favor short-term contrasted CT follow-up which will be due this month.  Orders placed.  Check kidney function prior to scan.  Will follow CT up by phone.  Continue CPAP for JESSE.  We discussed pulmonary hypertension and testing needed for accurate diagnosis  which would be a right heart cath.  Given lack of symptoms he is not interested in invasive testing currently.  If symptoms arise he will let us know.  He will continue to follow with cardiology as well.  Office follow-up in 6 months or so.  Call sooner if needed.    LUIS Curry  6/10/2024  15:06 CDT    Follow Up {Instructions Charge Capture  Follow-up Communications   Return in about 6 months (around 12/10/2024) for with doc k .    Patient was given instructions and counseling regarding his condition or for health maintenance advice. Please see specific information pulled into the AVS if appropriate.

## 2024-05-29 RX ORDER — HYDROCODONE BITARTRATE AND ACETAMINOPHEN 7.5; 325 MG/1; MG/1
1 TABLET ORAL EVERY 6 HOURS PRN
Qty: 120 TABLET | Refills: 0 | Status: SHIPPED | OUTPATIENT
Start: 2024-05-29 | End: 2024-06-28

## 2024-06-01 DIAGNOSIS — N18.30 TYPE 2 DIABETES MELLITUS WITH STAGE 3 CHRONIC KIDNEY DISEASE, WITHOUT LONG-TERM CURRENT USE OF INSULIN (HCC): ICD-10-CM

## 2024-06-01 DIAGNOSIS — E11.22 TYPE 2 DIABETES MELLITUS WITH STAGE 3 CHRONIC KIDNEY DISEASE, WITHOUT LONG-TERM CURRENT USE OF INSULIN (HCC): ICD-10-CM

## 2024-06-03 RX ORDER — GLIPIZIDE 5 MG/1
5 TABLET ORAL DAILY
Qty: 90 TABLET | Refills: 3 | Status: SHIPPED | OUTPATIENT
Start: 2024-06-03

## 2024-06-03 NOTE — TELEPHONE ENCOUNTER
Received fax from pharmacy requesting refill on pts medication(s). Pt was last seen in office on 5/8/2024  and has a follow up scheduled for 8/21/2024. Will send request to  Paulo Hoyt  for authorization.     Requested Prescriptions     Pending Prescriptions Disp Refills    glipiZIDE (GLUCOTROL) 5 MG tablet [Pharmacy Med Name: GLIPIZIDE 5 MG TABLET] 90 tablet 3     Sig: TAKE 1 TABLET BY MOUTH EVERY DAY

## 2024-06-10 ENCOUNTER — OFFICE VISIT (OUTPATIENT)
Dept: PULMONOLOGY | Facility: CLINIC | Age: 74
End: 2024-06-10
Payer: MEDICARE

## 2024-06-10 VITALS
OXYGEN SATURATION: 94 % | BODY MASS INDEX: 34.02 KG/M2 | HEART RATE: 65 BPM | WEIGHT: 243 LBS | DIASTOLIC BLOOD PRESSURE: 72 MMHG | HEIGHT: 71 IN | SYSTOLIC BLOOD PRESSURE: 132 MMHG

## 2024-06-10 DIAGNOSIS — I28.8 ENLARGED PULMONARY ARTERY: Primary | Chronic | ICD-10-CM

## 2024-06-10 DIAGNOSIS — G47.30 SLEEP APNEA, UNSPECIFIED TYPE: Chronic | ICD-10-CM

## 2024-06-10 DIAGNOSIS — R59.0 MEDIASTINAL ADENOPATHY: ICD-10-CM

## 2024-06-10 PROCEDURE — 3078F DIAST BP <80 MM HG: CPT | Performed by: NURSE PRACTITIONER

## 2024-06-10 PROCEDURE — 99214 OFFICE O/P EST MOD 30 MIN: CPT | Performed by: NURSE PRACTITIONER

## 2024-06-10 PROCEDURE — 3075F SYST BP GE 130 - 139MM HG: CPT | Performed by: NURSE PRACTITIONER

## 2024-06-11 DIAGNOSIS — M47.26 OSTEOARTHRITIS OF SPINE WITH RADICULOPATHY, LUMBAR REGION: ICD-10-CM

## 2024-06-19 ENCOUNTER — HOSPITAL ENCOUNTER (OUTPATIENT)
Dept: PAIN MANAGEMENT | Age: 74
Discharge: HOME | End: 2024-06-19
Payer: MEDICARE

## 2024-06-19 VITALS
DIASTOLIC BLOOD PRESSURE: 58 MMHG | HEART RATE: 58 BPM | WEIGHT: 241 LBS | BODY MASS INDEX: 33.74 KG/M2 | RESPIRATION RATE: 18 BRPM | OXYGEN SATURATION: 94 % | HEIGHT: 71 IN | SYSTOLIC BLOOD PRESSURE: 132 MMHG | TEMPERATURE: 97.4 F

## 2024-06-19 DIAGNOSIS — G89.29 CHRONIC BILATERAL THORACIC BACK PAIN: ICD-10-CM

## 2024-06-19 DIAGNOSIS — M54.6 CHRONIC BILATERAL THORACIC BACK PAIN: ICD-10-CM

## 2024-06-19 DIAGNOSIS — M47.26 OSTEOARTHRITIS OF SPINE WITH RADICULOPATHY, LUMBAR REGION: ICD-10-CM

## 2024-06-19 DIAGNOSIS — M79.18 MYOFASCIAL MUSCLE PAIN: ICD-10-CM

## 2024-06-19 DIAGNOSIS — M47.26 OSTEOARTHRITIS OF SPINE WITH RADICULOPATHY, LUMBAR REGION: Primary | ICD-10-CM

## 2024-06-19 PROCEDURE — 99214 OFFICE O/P EST MOD 30 MIN: CPT

## 2024-06-19 PROCEDURE — 99213 OFFICE O/P EST LOW 20 MIN: CPT

## 2024-06-19 RX ORDER — UREA 10 %
5 LOTION (ML) TOPICAL
COMMUNITY

## 2024-06-19 RX ORDER — ALBUTEROL SULFATE 90 UG/1
2 AEROSOL, METERED RESPIRATORY (INHALATION) EVERY 4 HOURS PRN
COMMUNITY
Start: 2023-07-03

## 2024-06-19 RX ORDER — HYDROCODONE BITARTRATE AND ACETAMINOPHEN 7.5; 325 MG/1; MG/1
1 TABLET ORAL EVERY 6 HOURS PRN
Qty: 120 TABLET | Refills: 0 | Status: SHIPPED | OUTPATIENT
Start: 2024-06-24 | End: 2024-07-24

## 2024-06-19 RX ORDER — GABAPENTIN 400 MG/1
400 CAPSULE ORAL 3 TIMES DAILY
Qty: 90 CAPSULE | Refills: 0 | Status: SHIPPED | OUTPATIENT
Start: 2024-07-11 | End: 2024-08-10

## 2024-06-19 ASSESSMENT — PAIN DESCRIPTION - ORIENTATION
ORIENTATION: LOWER
ORIENTATION_2: LOWER

## 2024-06-19 ASSESSMENT — ENCOUNTER SYMPTOMS
BACK PAIN: 1
BOWEL INCONTINENCE: 0
EYES NEGATIVE: 1
GASTROINTESTINAL NEGATIVE: 1
RESPIRATORY NEGATIVE: 1

## 2024-06-19 ASSESSMENT — PAIN DESCRIPTION - PAIN TYPE: TYPE: CHRONIC PAIN

## 2024-06-19 ASSESSMENT — PAIN DESCRIPTION - LOCATION
LOCATION: BACK
LOCATION_2: NECK

## 2024-06-19 ASSESSMENT — PAIN DESCRIPTION - INTENSITY: RATING_2: 5

## 2024-06-19 ASSESSMENT — PAIN SCALES - GENERAL: PAINLEVEL_OUTOF10: 5

## 2024-06-19 NOTE — PROGRESS NOTES
Office Note    Patient Name: Pillo Ortega    MR #: [id number]    Account #:[id number]    : 1950    Age: 73 y.o.    Sex: male    Date: 2024    PCP: Mihir Hoyt APRN    Referring Provider:    Chief Complaint:   Chief Complaint   Patient presents with    Back Pain     5/10    Neck Pain     5/10       History of Present Illness:   The patient is a 73 y.o. male who presents to the office for a follow up procedure.     Patient had Bilateral Cervical & Thoracic Trigger Point Injections with Toradol on 2024. Patient had at least 80-85% relief of pain from procedure(s) for at least 8 weeks. After injection, patient was able to increase activity. Patient had less pain from aggravating factors. Patient was able to decrease pain medication usage. Patient received enough pain relief from injections that the patient would like to repeat the injection(s).     The patient continues Norco and Gabapentin.     Back Pain  This is a chronic problem. The current episode started more than 1 year ago. The problem occurs constantly. The problem has been waxing and waning since onset. The pain is present in the lumbar spine, sacro-iliac and thoracic spine. The quality of the pain is described as aching and cramping. The pain is at a severity of 5/10. The pain is moderate. The pain is The same all the time. The symptoms are aggravated by bending, position, standing and twisting. Pertinent negatives include no bladder incontinence, bowel incontinence, numbness or weakness. He has tried heat, home exercises and ice for the symptoms. The treatment provided mild relief.   Neck Pain   This is a chronic problem. The current episode started more than 1 year ago. The problem occurs constantly. The problem has been waxing and waning. The pain is present in the midline. The pain is at a severity of 5/10. The pain is moderate. The symptoms are aggravated by bending, position, twisting and stress. The pain is Same all

## 2024-06-19 NOTE — TELEPHONE ENCOUNTER
1. Osteoarthritis of spine with radiculopathy, lumbar region      Requested Prescriptions     Pending Prescriptions Disp Refills    gabapentin (NEURONTIN) 400 MG capsule 90 capsule 0     Sig: Take 1 capsule by mouth 3 times daily for 30 days. May fill 7/11/2024    HYDROcodone-acetaminophen (NORCO) 7.5-325 MG per tablet 120 tablet 0     Sig: Take 1 tablet by mouth every 6 hours as needed for Pain for up to 30 days. May fill 6/24/2024       Continue medication with refill sent at appointment yes; refill sent to medical director at appointment no, see refill encounter dated 6/19/2024    Electronically signed by MARY Wade CNP on 6/19/2024 at 8:21 AM

## 2024-06-19 NOTE — PROGRESS NOTES
Clinic Documentation      Education Provided:  [x] Review of Chris  [] Agreement Review  [x] PEG Score Calculated [] PHQ Score Calculated [] ORT Score Calculated    [] Compliance Issues Discussed [] Cognitive Behavior Needs [x] Exercise [] Review of Test [] Financial Issues  [x] Tobacco/Alcohol Use Reviewed [x] Teaching [] New Patient [] Picture Obtained    Physician Plan:  [] Outgoing Referral  [] Pharmacy Consult  [] Test Ordered [x] Prescription Ordered/Changed   [] Obtained Test Results / Consult Notes        Complete if patient is withholding blood thinner for procedure     Blood Thinner Patient is currently taking:      [] Plavix (Hold for 7 days)  [] Aspirin (Hold for 5 days)     [] Pletal (Hold for 2 days)  [] Pradaxa (Hold for 3 days)    [] Effient (Hold for 7 days)  [] Xarelto (Hold for 2 days)    [] Eliquis (Hold for 2 days)  [] Brilinta (Hold for 7 days)    [] Coumadin (Hold for 5 days) - (INR needs to be drawn the day prior to procedure- INR < 2.0)    [] Aggrenox (Hold for 7 days)        [] Patient will stop medication on their own.    [] Blood Thinner Form Faxed for approval to hold.   Provider form faxed to:     Assessment Completed by:  Electronically signed by Kellie Dorado RN on 6/19/2024 at 8:27 AM

## 2024-06-21 ENCOUNTER — TELEPHONE (OUTPATIENT)
Dept: FAMILY MEDICINE CLINIC | Age: 74
End: 2024-06-21

## 2024-06-21 NOTE — TELEPHONE ENCOUNTER
The supplies were sent to [facility] by Olinda in May to check 3 times a day.

## 2024-06-21 NOTE — TELEPHONE ENCOUNTER
Received a call from [employer] regarding fax we had faxed over 6/18/24  for diabetic supplies - they stated they only received the cover page and pt reported he is supposed to check sugars 2x per day     Please advise

## 2024-06-26 ENCOUNTER — HOSPITAL ENCOUNTER (OUTPATIENT)
Dept: GENERAL RADIOLOGY | Age: 74
Discharge: HOME | End: 2024-06-26
Payer: MEDICARE

## 2024-06-26 ENCOUNTER — OFFICE VISIT (OUTPATIENT)
Dept: FAMILY MEDICINE CLINIC | Age: 74
End: 2024-06-26
Payer: MEDICARE

## 2024-06-26 VITALS
TEMPERATURE: 96.9 F | HEIGHT: 71 IN | SYSTOLIC BLOOD PRESSURE: 136 MMHG | WEIGHT: 243 LBS | OXYGEN SATURATION: 95 % | HEART RATE: 62 BPM | BODY MASS INDEX: 34.02 KG/M2 | DIASTOLIC BLOOD PRESSURE: 70 MMHG

## 2024-06-26 DIAGNOSIS — M79.672 LEFT FOOT PAIN: ICD-10-CM

## 2024-06-26 DIAGNOSIS — N18.31 TYPE 2 DIABETES MELLITUS WITH STAGE 3A CHRONIC KIDNEY DISEASE, WITHOUT LONG-TERM CURRENT USE OF INSULIN (HCC): ICD-10-CM

## 2024-06-26 DIAGNOSIS — M25.572 ACUTE LEFT ANKLE PAIN: ICD-10-CM

## 2024-06-26 DIAGNOSIS — E11.22 TYPE 2 DIABETES MELLITUS WITH STAGE 3A CHRONIC KIDNEY DISEASE, WITHOUT LONG-TERM CURRENT USE OF INSULIN (HCC): ICD-10-CM

## 2024-06-26 DIAGNOSIS — M79.672 LEFT FOOT PAIN: Primary | ICD-10-CM

## 2024-06-26 PROCEDURE — 2022F DILAT RTA XM EVC RTNOPTHY: CPT | Performed by: NURSE PRACTITIONER

## 2024-06-26 PROCEDURE — 73630 X-RAY EXAM OF FOOT: CPT

## 2024-06-26 PROCEDURE — 3051F HG A1C>EQUAL 7.0%<8.0%: CPT | Performed by: NURSE PRACTITIONER

## 2024-06-26 PROCEDURE — 3075F SYST BP GE 130 - 139MM HG: CPT | Performed by: NURSE PRACTITIONER

## 2024-06-26 PROCEDURE — G8417 CALC BMI ABV UP PARAM F/U: HCPCS | Performed by: NURSE PRACTITIONER

## 2024-06-26 PROCEDURE — 1123F ACP DISCUSS/DSCN MKR DOCD: CPT | Performed by: NURSE PRACTITIONER

## 2024-06-26 PROCEDURE — 73610 X-RAY EXAM OF ANKLE: CPT

## 2024-06-26 PROCEDURE — G8427 DOCREV CUR MEDS BY ELIG CLIN: HCPCS | Performed by: NURSE PRACTITIONER

## 2024-06-26 PROCEDURE — 3078F DIAST BP <80 MM HG: CPT | Performed by: NURSE PRACTITIONER

## 2024-06-26 PROCEDURE — 99214 OFFICE O/P EST MOD 30 MIN: CPT | Performed by: NURSE PRACTITIONER

## 2024-06-26 PROCEDURE — 1036F TOBACCO NON-USER: CPT | Performed by: NURSE PRACTITIONER

## 2024-06-26 PROCEDURE — 3017F COLORECTAL CA SCREEN DOC REV: CPT | Performed by: NURSE PRACTITIONER

## 2024-06-26 RX ORDER — GLUCOSAMINE HCL/CHONDROITIN SU 500-400 MG
CAPSULE ORAL
Qty: 100 STRIP | Refills: 5 | Status: SHIPPED | OUTPATIENT
Start: 2024-06-26

## 2024-06-26 RX ORDER — GLIPIZIDE 10 MG/1
10 TABLET ORAL DAILY
Qty: 30 TABLET | Refills: 5 | Status: SHIPPED | OUTPATIENT
Start: 2024-06-26

## 2024-06-26 ASSESSMENT — ENCOUNTER SYMPTOMS
DIARRHEA: 0
COUGH: 0
SHORTNESS OF BREATH: 0
VOMITING: 0
RHINORRHEA: 0
NAUSEA: 0
SORE THROAT: 0

## 2024-06-26 NOTE — PROGRESS NOTES
Pillo Ortega (:  1950) is a 73 y.o. male,Established patient, here for evaluation of the following chief complaint(s):  Foot Pain (Left foot and ankle) and Ankle Pain      ASSESSMENT/PLAN:    ICD-10-CM    1. Left foot pain  M79.672 XR FOOT LEFT (MIN 3 VIEWS)  Discussed MDP if x-ray is negative. Patient will need to closely monitor blood sugars while on steroids.  Patient cannot take NSAID's due to renal disease      2. Acute left ankle pain  M25.572 XR ANKLE LEFT (MIN 3 VIEWS)      3. Type 2 diabetes mellitus with stage 3a chronic kidney disease, without long-term current use of insulin ([facility])  E11.22 blood glucose monitor strips    N18.31 glipiZIDE (GLUCOTROL) 10 MG tablet (increased from 5 mg to 10 mg)  Recommend ADA diet  Recommend exercise as tolerated        Keep scheduled follow-up.    Return if symptoms worsen or fail to improve.    SUBJECTIVE/OBJECTIVE:  HPI    FOOT PAIN:  Reports left foot and ankle pain.  Reports pain on the top of his foot and bottom of his foot.  Reports left ankle pain.  \"It hurts when I stand up.\"  He takes Hydrocodone prn.   \"It doesn't seem to help this.\"  Denies know injury  \"If I raised my foot, it helped.\"  This pain started about a week ago.    Calcaneus x-ray, 2023:  IMPRESSION:  Calcaneal spurs with no acute osseous abnormality.  (He was seen at orthopaedics. He got a steroid shot in his buttock. She prescribed a boot but it did not seem to help.)    DM:  A1c: 7.6 (2024)  He is taking glipizide 5 mg daily  He was not able to afford Jardiance 10 mg daily  Reports blood sugars are running 180-190's.  Denies any blood sugar lows    /70 (Site: Left Upper Arm, Position: Sitting, Cuff Size: Medium Adult)   Pulse 62   Temp 96.9 °F (36.1 °C) (Temporal)   Ht 1.803 m (5' 11\")   Wt 110.2 kg (243 lb)   SpO2 95%   BMI 33.89 kg/m²     Review of Systems   HENT:  Negative for congestion, ear pain, rhinorrhea and sore throat.    Respiratory:  Negative for cough

## 2024-06-27 ENCOUNTER — TELEPHONE (OUTPATIENT)
Dept: FAMILY MEDICINE CLINIC | Age: 74
End: 2024-06-27

## 2024-06-27 DIAGNOSIS — M79.672 LEFT FOOT PAIN: Primary | ICD-10-CM

## 2024-06-27 DIAGNOSIS — M25.572 ACUTE LEFT ANKLE PAIN: ICD-10-CM

## 2024-06-27 NOTE — TELEPHONE ENCOUNTER
Spoke with: Patient regarding the results of the patients most recent xray.   I advised Patient of Monse Caputo recommendations. Patient did voice understanding     Pt states MM was going to either give him a steroid shot or send in some steroids to the pharmacy. I do not see this. Will send to provider for recommendations.

## 2024-06-27 NOTE — TELEPHONE ENCOUNTER
----- Message from MARY Fisher sent at 6/27/2024  8:30 AM CDT -----  Left ankle x-ray shows no fracture or dislocation. No soft tissue abnormality.

## 2024-06-27 NOTE — TELEPHONE ENCOUNTER
I am still awaiting foot x-ray results.  But we will go ahead and treat his pain with anti-inflammatories.    Medrol dose forrest  Disp: 1  Take as directed.  Refills: 0

## 2024-06-28 ENCOUNTER — HOSPITAL ENCOUNTER (OUTPATIENT)
Dept: CT IMAGING | Facility: HOSPITAL | Age: 74
Discharge: HOME | End: 2024-06-28
Payer: MEDICARE

## 2024-06-28 DIAGNOSIS — R59.0 MEDIASTINAL ADENOPATHY: ICD-10-CM

## 2024-06-28 LAB — CREAT BLDA-MCNC: 1.7 MG/DL (ref 0.6–1.3)

## 2024-06-28 PROCEDURE — 82565 ASSAY OF CREATININE: CPT

## 2024-06-28 PROCEDURE — 71260 CT THORAX DX C+: CPT

## 2024-06-28 PROCEDURE — 25510000001 IOPAMIDOL 61 % SOLUTION: Performed by: NURSE PRACTITIONER

## 2024-06-28 RX ORDER — METHYLPREDNISOLONE 4 MG/1
TABLET ORAL
Qty: 1 KIT | Refills: 0 | Status: SHIPPED | OUTPATIENT
Start: 2024-06-28 | End: 2024-07-03

## 2024-06-28 RX ADMIN — IOPAMIDOL 100 ML: 612 INJECTION, SOLUTION INTRAVENOUS at 08:18

## 2024-06-28 NOTE — TELEPHONE ENCOUNTER
I advised Patient of Monse Caputo recommendations.   Patient did voice understanding  Sent rx to pharmacy for pt    Requested Prescriptions     Signed Prescriptions Disp Refills    methylPREDNISolone (MEDROL DOSEPACK) 4 MG tablet 1 kit 0     Sig: Take by mouth.     Authorizing Provider: SANDI HARE     Ordering User: CASSIE SEBASTIAN

## 2024-07-01 ENCOUNTER — TELEPHONE (OUTPATIENT)
Dept: PULMONOLOGY | Facility: CLINIC | Age: 74
End: 2024-07-01
Payer: MEDICARE

## 2024-07-01 DIAGNOSIS — R59.0 MEDIASTINAL ADENOPATHY: Primary | ICD-10-CM

## 2024-07-01 NOTE — TELEPHONE ENCOUNTER
----- Message from Mari Jon sent at 7/1/2024  7:02 AM CDT -----  Let him know CT showed improvement in those nodes in his chest. Kidney function was a little elevated and he will need to hydrate well over the next few days to help flush out the contrast from the scan. We will follow up labs in 2 weeks. Those orders have been placed. I will call and review labs with him. Nothing else to do for now, keep follow up as planned.

## 2024-07-02 ENCOUNTER — TELEPHONE (OUTPATIENT)
Dept: FAMILY MEDICINE CLINIC | Age: 74
End: 2024-07-02

## 2024-07-02 NOTE — TELEPHONE ENCOUNTER
Relayed test results to the patient and he voiced understanding. He will get his labs repeated in 2 weeks.

## 2024-07-02 NOTE — TELEPHONE ENCOUNTER
Message    CT chest shows lymph nodes resolved. Likely reactive.   Paulo      ----- Message -----   From: Christine Gottlieb   Sent: 7/1/2024  11:20 AM CDT   To: MARY Rowland   Subject: Import                                            Imported by Christine Gottlieb on 7/1/2024 at 11:20 AM to the following: Abstract on 7/1/2024 with Mihir Hoyt APRN [[id number]]       Results of ct chest with contrast as above.      Unable to reach patient with results. Will try back again at later time.

## 2024-07-08 ENCOUNTER — TELEPHONE (OUTPATIENT)
Dept: FAMILY MEDICINE CLINIC | Age: 74
End: 2024-07-08

## 2024-07-08 DIAGNOSIS — M25.572 ACUTE LEFT ANKLE PAIN: ICD-10-CM

## 2024-07-08 DIAGNOSIS — M79.672 LEFT FOOT PAIN: Primary | ICD-10-CM

## 2024-07-08 NOTE — TELEPHONE ENCOUNTER
Pt said it pain did resolve with steroids but has returned. Will put in referral to [facility] for pt.

## 2024-07-08 NOTE — TELEPHONE ENCOUNTER
----- Message from MARY Rowland sent at 7/8/2024  7:34 AM CDT -----  Please call patient and let them know results.   Left foot x-ray is negative except for some arthritic changes.  There is a questionable Achilles abnormality however this is not where patient is complaining of his pain according to Olinda's note. If pain persisting would recommend follow-up with orthopedics

## 2024-07-08 NOTE — TELEPHONE ENCOUNTER
Called patient, spoke with: Patient regarding the results of the patients most recent CT.  I advised Patient of Paulo Hoyt recommendations.   Patient did voice understanding

## 2024-07-09 ENCOUNTER — TELEPHONE (OUTPATIENT)
Dept: VASCULAR SURGERY | Facility: CLINIC | Age: 74
End: 2024-07-09
Payer: MEDICARE

## 2024-07-09 NOTE — TELEPHONE ENCOUNTER
Call placede to wife and reminded of appointment and testing beginning at 0630 on 7/10/2024 and she has voiced understanding.

## 2024-07-10 ENCOUNTER — OFFICE VISIT (OUTPATIENT)
Dept: VASCULAR SURGERY | Facility: CLINIC | Age: 74
End: 2024-07-10
Payer: MEDICARE

## 2024-07-10 ENCOUNTER — HOSPITAL ENCOUNTER (OUTPATIENT)
Dept: ULTRASOUND IMAGING | Facility: HOSPITAL | Age: 74
Discharge: HOME | End: 2024-07-10
Payer: MEDICARE

## 2024-07-10 VITALS
SYSTOLIC BLOOD PRESSURE: 122 MMHG | HEART RATE: 63 BPM | WEIGHT: 243 LBS | HEIGHT: 71 IN | DIASTOLIC BLOOD PRESSURE: 68 MMHG | BODY MASS INDEX: 34.02 KG/M2 | OXYGEN SATURATION: 97 %

## 2024-07-10 DIAGNOSIS — I10 PRIMARY HYPERTENSION: Chronic | ICD-10-CM

## 2024-07-10 DIAGNOSIS — I65.23 BILATERAL CAROTID ARTERY STENOSIS: ICD-10-CM

## 2024-07-10 DIAGNOSIS — E78.2 MIXED HYPERLIPIDEMIA: Chronic | ICD-10-CM

## 2024-07-10 DIAGNOSIS — I73.9 PAD (PERIPHERAL ARTERY DISEASE): Primary | ICD-10-CM

## 2024-07-10 DIAGNOSIS — I73.9 PAD (PERIPHERAL ARTERY DISEASE): ICD-10-CM

## 2024-07-10 PROCEDURE — 1159F MED LIST DOCD IN RCRD: CPT | Performed by: NURSE PRACTITIONER

## 2024-07-10 PROCEDURE — 93880 EXTRACRANIAL BILAT STUDY: CPT

## 2024-07-10 PROCEDURE — 3078F DIAST BP <80 MM HG: CPT | Performed by: NURSE PRACTITIONER

## 2024-07-10 PROCEDURE — 93923 UPR/LXTR ART STDY 3+ LVLS: CPT

## 2024-07-10 PROCEDURE — 3074F SYST BP LT 130 MM HG: CPT | Performed by: NURSE PRACTITIONER

## 2024-07-10 PROCEDURE — 99214 OFFICE O/P EST MOD 30 MIN: CPT | Performed by: NURSE PRACTITIONER

## 2024-07-10 PROCEDURE — 1160F RVW MEDS BY RX/DR IN RCRD: CPT | Performed by: NURSE PRACTITIONER

## 2024-07-10 NOTE — LETTER
"July 10, 2024     LUIS Souza  [address]  [place] [address]    Patient: Zay Kamara   YOB: 1950   Date of Visit: 7/10/2024     Dear LUIS Souza:       Thank you for referring Zay Kamara to me for evaluation. Below are the relevant portions of my assessment and plan of care.    If you have questions, please do not hesitate to call me. I look forward to following Zay along with you.         Sincerely,        LUIS Jurado        CC: No Recipients    Jeri Chambers APRN  07/10/24 0923  Sign when Signing Visit  7/10/2024       Frankie Bradley APRN  [address] [place] [address]        Zay Kamara  1950    Chief Complaint   Patient presents with   • Bilateral carotid artery stenosis     Testing done this am, no complaints, no stroke symptoms. No chnanges       Dear Frankie Bradley APRN:    HPI     I had the pleasure of seeing you patient in the office today for follow up.  As you recall, the patient is a 73 y.o. male who we are currently following for asymptomatic carotid occlusive disease and lower extremity PAD.  He denies any strokelike symptoms or changes to his lower extremities.  He is maintained on aspirin and Lipitor.  He did have noninvasive testing performed today, which I did review in office.      Review of Systems   Constitutional: Negative.    HENT: Negative.     Eyes: Negative.    Respiratory: Negative.     Cardiovascular: Negative.    Gastrointestinal: Negative.    Endocrine: Negative.    Genitourinary: Negative.    Musculoskeletal: Negative.    Skin: Negative.    Allergic/Immunologic: Negative.    Neurological: Negative.    Hematological: Negative.    Psychiatric/Behavioral: Negative.     All other systems reviewed and are negative.          /68   Pulse 63   Ht 180.3 cm (71\")   Wt 110 kg (243 lb)   SpO2 97%   BMI 33.89 kg/m²     Physical Exam  Vitals and nursing note reviewed. "   Constitutional:       General: He is not in acute distress.     Appearance: Normal appearance. He is well-developed. He is obese. He is not diaphoretic.   HENT:      Head: Normocephalic and atraumatic.   Neck:      Vascular: No carotid bruit or JVD.   Cardiovascular:      Rate and Rhythm: Normal rate and regular rhythm.      Pulses: Normal pulses.           Femoral pulses are 2+ on the right side and 2+ on the left side.       Popliteal pulses are 2+ on the right side and 2+ on the left side.        Dorsalis pedis pulses are 2+ on the right side and 2+ on the left side.        Posterior tibial pulses are 2+ on the right side and 2+ on the left side.      Heart sounds: Normal heart sounds, S1 normal and S2 normal. No murmur heard.     No friction rub. No gallop.   Pulmonary:      Effort: Pulmonary effort is normal.      Breath sounds: Normal breath sounds.   Abdominal:      General: Bowel sounds are normal. There is no abdominal bruit.      Palpations: Abdomen is soft.      Tenderness: There is no abdominal tenderness.   Musculoskeletal:         General: Normal range of motion.   Skin:     General: Skin is warm and dry.   Neurological:      General: No focal deficit present.      Mental Status: He is alert and oriented to person, place, and time.      Cranial Nerves: No cranial nerve deficit.   Psychiatric:         Mood and Affect: Mood normal.         Behavior: Behavior normal.         Thought Content: Thought content normal.         Judgment: Judgment normal.                DIAGNOSTIC DATA:  Noninvasive testing performed including ABIs shows no arterial insufficiency to bilateral lower extremities.  Carotid duplex shows less than 50% carotid stenosis bilaterally with bilateral antegrade vertebral flow.    Patient Active Problem List   Diagnosis   • Hypogonadism in male   • ED (erectile dysfunction)   • Prostate cancer   • Diabetes   • Hypertension   • Disease of thyroid gland   • History of prostate cancer   •  Leukocytosis   • Class 2 severe obesity due to excess calories with serious comorbidity and body mass index (BMI) of 35.0 to 35.9 in adult   • Sensorineural hearing loss (SNHL) of both ears   • Asymmetric SNHL (sensorineural hearing loss)   • Sudden hearing loss, right   • Tinnitus of right ear   • Stress incontinence   • Bladder neck contracture   • Paroxysmal atrial fibrillation   • Sleep apnea   • Mixed hyperlipidemia   • Bilateral carotid artery stenosis   • Coronary artery disease involving native coronary artery of native heart with unstable angina pectoris   • Acquired coagulation factor deficiency   • CKD (chronic kidney disease) stage 3, GFR 30-59 ml/min   • Chronic pain syndrome   • Coronary artery disease involving native heart   • Type 2 diabetes mellitus with stage 3 chronic kidney disease   • IBS (irritable bowel syndrome)   • Hx of CABG   • S/P Maze operation for atrial fibrillation   • BRBPR (bright red blood per rectum)   • Thoracic aortic aneurysm without rupture   • Aortic root aneurysm   • Shortness of breath   • Pulmonary hypertension         ICD-10-CM ICD-9-CM   1. PAD (peripheral artery disease)  I73.9 443.9   2. Bilateral carotid artery stenosis  I65.23 433.10     433.30   3. Primary hypertension  I10 401.9   4. Mixed hyperlipidemia  E78.2 272.2           PLAN: After thoroughly evaluating Zay Kamara, I believe the best course of action is to remain conservative from vascular surgery standpoint.  Currently he is doing well and denies any strokelike symptoms.  He also denies any claudication to his lower extremities.  I did review his testing which shows less than 50% carotid stenosis bilaterally.  His ABIs show no evidence of arterial insufficiency to his lower extremities.  We will see him back in 1 year with repeat noninvasive testing for continued surveillance, including a carotid duplex.  I did discuss vascular risk factors as they pertain to the progression of vascular disease  including controlling his hypertension, hyperlipidemia, and diabetes.  His blood pressure is stable on his current medications.  He should continue his aspirin 81 mg daily and Lipitor 20 mg daily in addition to his other medications.  His diabetes is slightly uncontrolled with most recent hemoglobin A1c of 7.6%.  This was all discussed in full with complete understanding.  Thank you for allowing me to participate in the care of your patient.  Please do not hesitate to call with any questions or concerns.  We will keep you aware of any further encounters with Zay Kamara.      Sincerely Yours,      LUIS Jurado

## 2024-07-10 NOTE — PROGRESS NOTES
"7/10/2024       Frankie Bradley, LUIS  [address]  [place] [address]        Zay LANGSTON Crews  1950    Chief Complaint   Patient presents with    Bilateral carotid artery stenosis     Testing done this am, no complaints, no stroke symptoms. No chnanges       Dear Frankie Bradley, LUIS:    HPI     I had the pleasure of seeing you patient in the office today for follow up.  As you recall, the patient is a 73 y.o. male who we are currently following for asymptomatic carotid occlusive disease and lower extremity PAD.  He denies any strokelike symptoms or changes to his lower extremities.  He is maintained on aspirin and Lipitor.  He did have noninvasive testing performed today, which I did review in office.      Review of Systems   Constitutional: Negative.    HENT: Negative.     Eyes: Negative.    Respiratory: Negative.     Cardiovascular: Negative.    Gastrointestinal: Negative.    Endocrine: Negative.    Genitourinary: Negative.    Musculoskeletal: Negative.    Skin: Negative.    Allergic/Immunologic: Negative.    Neurological: Negative.    Hematological: Negative.    Psychiatric/Behavioral: Negative.     All other systems reviewed and are negative.          /68   Pulse 63   Ht 180.3 cm (71\")   Wt 110 kg (243 lb)   SpO2 97%   BMI 33.89 kg/m²     Physical Exam  Vitals and nursing note reviewed.   Constitutional:       General: He is not in acute distress.     Appearance: Normal appearance. He is well-developed. He is obese. He is not diaphoretic.   HENT:      Head: Normocephalic and atraumatic.   Neck:      Vascular: No carotid bruit or JVD.   Cardiovascular:      Rate and Rhythm: Normal rate and regular rhythm.      Pulses: Normal pulses.           Femoral pulses are 2+ on the right side and 2+ on the left side.       Popliteal pulses are 2+ on the right side and 2+ on the left side.        Dorsalis pedis pulses are 2+ on the right side and 2+ on the left side.        Posterior " tibial pulses are 2+ on the right side and 2+ on the left side.      Heart sounds: Normal heart sounds, S1 normal and S2 normal. No murmur heard.     No friction rub. No gallop.   Pulmonary:      Effort: Pulmonary effort is normal.      Breath sounds: Normal breath sounds.   Abdominal:      General: Bowel sounds are normal. There is no abdominal bruit.      Palpations: Abdomen is soft.      Tenderness: There is no abdominal tenderness.   Musculoskeletal:         General: Normal range of motion.   Skin:     General: Skin is warm and dry.   Neurological:      General: No focal deficit present.      Mental Status: He is alert and oriented to person, place, and time.      Cranial Nerves: No cranial nerve deficit.   Psychiatric:         Mood and Affect: Mood normal.         Behavior: Behavior normal.         Thought Content: Thought content normal.         Judgment: Judgment normal.                DIAGNOSTIC DATA:  Noninvasive testing performed including ABIs shows no arterial insufficiency to bilateral lower extremities.  Carotid duplex shows less than 50% carotid stenosis bilaterally with bilateral antegrade vertebral flow.    Patient Active Problem List   Diagnosis    Hypogonadism in male    ED (erectile dysfunction)    Prostate cancer    Diabetes    Hypertension    Disease of thyroid gland    History of prostate cancer    Leukocytosis    Class 2 severe obesity due to excess calories with serious comorbidity and body mass index (BMI) of 35.0 to 35.9 in adult    Sensorineural hearing loss (SNHL) of both ears    Asymmetric SNHL (sensorineural hearing loss)    Sudden hearing loss, right    Tinnitus of right ear    Stress incontinence    Bladder neck contracture    Paroxysmal atrial fibrillation    Sleep apnea    Mixed hyperlipidemia    Bilateral carotid artery stenosis    Coronary artery disease involving native coronary artery of native heart with unstable angina pectoris    Acquired coagulation factor deficiency    CKD  (chronic kidney disease) stage 3, GFR 30-59 ml/min    Chronic pain syndrome    Coronary artery disease involving native heart    Type 2 diabetes mellitus with stage 3 chronic kidney disease    IBS (irritable bowel syndrome)    Hx of CABG    S/P Maze operation for atrial fibrillation    BRBPR (bright red blood per rectum)    Thoracic aortic aneurysm without rupture    Aortic root aneurysm    Shortness of breath    Pulmonary hypertension         ICD-10-CM ICD-9-CM   1. PAD (peripheral artery disease)  I73.9 443.9   2. Bilateral carotid artery stenosis  I65.23 433.10     433.30   3. Primary hypertension  I10 401.9   4. Mixed hyperlipidemia  E78.2 272.2           PLAN: After thoroughly evaluating Zay Kamara, I believe the best course of action is to remain conservative from vascular surgery standpoint.  Currently he is doing well and denies any strokelike symptoms.  He also denies any claudication to his lower extremities.  I did review his testing which shows less than 50% carotid stenosis bilaterally.  His ABIs show no evidence of arterial insufficiency to his lower extremities.  We will see him back in 1 year with repeat noninvasive testing for continued surveillance, including a carotid duplex.  I did discuss vascular risk factors as they pertain to the progression of vascular disease including controlling his hypertension, hyperlipidemia, and diabetes.  His blood pressure is stable on his current medications.  He should continue his aspirin 81 mg daily and Lipitor 20 mg daily in addition to his other medications.  His diabetes is slightly uncontrolled with most recent hemoglobin A1c of 7.6%.  This was all discussed in full with complete understanding.  Thank you for allowing me to participate in the care of your patient.  Please do not hesitate to call with any questions or concerns.  We will keep you aware of any further encounters with Zay Kamara.      Sincerely Yours,      Jeri Chambers,  APRN

## 2024-07-18 ENCOUNTER — HOSPITAL ENCOUNTER (OUTPATIENT)
Dept: PAIN MANAGEMENT | Age: 74
Discharge: HOME | End: 2024-07-18
Payer: MEDICARE

## 2024-07-18 VITALS
HEART RATE: 70 BPM | OXYGEN SATURATION: 95 % | RESPIRATION RATE: 18 BRPM | TEMPERATURE: 96.8 F | SYSTOLIC BLOOD PRESSURE: 133 MMHG | DIASTOLIC BLOOD PRESSURE: 57 MMHG

## 2024-07-18 DIAGNOSIS — M62.830 MUSCLE SPASM OF BACK: Primary | ICD-10-CM

## 2024-07-18 DIAGNOSIS — M62.838 MUSCLE SPASMS OF NECK: ICD-10-CM

## 2024-07-18 PROCEDURE — 2500000003 HC RX 250 WO HCPCS

## 2024-07-18 PROCEDURE — 20553 NJX 1/MLT TRIGGER POINTS 3/>: CPT

## 2024-07-18 PROCEDURE — 6360000002 HC RX W HCPCS

## 2024-07-18 RX ORDER — LIDOCAINE HYDROCHLORIDE 10 MG/ML
15 INJECTION, SOLUTION EPIDURAL; INFILTRATION; INTRACAUDAL; PERINEURAL ONCE
Status: DISCONTINUED | OUTPATIENT
Start: 2024-07-18 | End: 2024-07-20 | Stop reason: HOSPADM

## 2024-07-18 RX ORDER — KETOROLAC TROMETHAMINE 30 MG/ML
45 INJECTION, SOLUTION INTRAMUSCULAR; INTRAVENOUS ONCE
Status: DISCONTINUED | OUTPATIENT
Start: 2024-07-18 | End: 2024-07-20 | Stop reason: HOSPADM

## 2024-07-18 RX ORDER — BUPIVACAINE HYDROCHLORIDE 5 MG/ML
15 INJECTION, SOLUTION EPIDURAL; INTRACAUDAL ONCE
Status: DISCONTINUED | OUTPATIENT
Start: 2024-07-18 | End: 2024-07-20 | Stop reason: HOSPADM

## 2024-07-18 NOTE — PROGRESS NOTES
Procedure:  Level of Consciousness: [x]Alert [x]Oriented []Disoriented []Lethargic  Anxiety Level: [x]Calm []Anxious []Depressed []Other  Skin: [x]Warm [x]Dry []Cool []Moist []Intact []Other  Cardiovascular: []Palpitations: [x]Never []Occasionally []Frequently  Chest Pain: [x]No []Yes  Respiratory:  [x]Unlabored []Labored []Cough ([] Productive []Unproductive)  HCG Required: [x]No []Yes   Results: []Negative []Positive  Knowledge Level:        [x]Patient/Other verbalized understanding of pre-procedure instructions.        [x]Assessment of post-op care needs (transportation, responsible caregiver)        [x]Able to discuss health care problems and how to deal with it.  Factors that Affect Teaching:        Language Barrier: [x]No []Yes - why:        Hearing Loss:        []No [x]Yes            Corrective Device:  [x]Yes []No        Vision Loss:           []No [x]Yes            Corrective Device:  [x]Yes []No        Memory Loss:       [x]No []Yes            []Short Term []Long Term  Motivational Level:  [x]Asks Questions                  []Extremely Anxious       [x]Seems Interested               []Seems Uninterested                  [x]Denies need for Education  Risk for Injury:  [x]Patient oriented to person, place and time  []History of frequent falls/loss of balance  Nutritional:  []Change in appetite   []Weight Gain   []Weight Loss  Functional:  []Requires assistance with ADL's

## 2024-07-18 NOTE — PROCEDURES
[facility] Physical & Pain Medicine    Patient Name: Pillo Ortega    : 1950    Age: 73 y.o.    Sex: male    Date: 2024    Pre-op Diagnosis: Myofascial Pain/ Muscle Spasms/ Cervicalgia    Post-op Diagnosis: Myofascial Pain/ Muscle Spasms/ Cervicalgia    Procedure: Cervical Trigger Point Injections    Performing Procedure: Madisyn HERNANDEZ    Patient Vitals for the past 24 hrs:   BP Temp Temp src Pulse Resp SpO2   24 1458 (!) 133/57 96.8 °F (36 °C) Temporal 70 18 95 %       Description of Procedure:    After a brief physical assessment and failure to improve with conservative measures the patient presented for Cervical Trigger Point Injections The indications, limitations and possible complications were discussed with the patient and the patient elected to proceed with the procedure.    After voluntary, informed and signed consent obtained the patient was placed in a seated position.     Appropriate time out was obtained per policy.     The area of maximal tenderness was palpated over the   bilaterally Cervical muscles - Splenius  Trapezius  Rhomboid The skin overlying these areas was marked with a skin marker. The skin overlying the proposed injection sites were then sprayed with Gebauer's Solution while protecting patient eyes. The areas were prepped using aseptic technique with CHG prep. Each trigger point of the Cervical muscles - Splenius  Trapezius  Rhomboid was injected with approximately 2 ml of a solution of 5 ml of 1% Lidocaine Plain and 5 ml of 0.5% Marcaine Plain    [facility] Physical & Pain Medicine    Patient Name: Pillo Ortega    : 1950    Age: 73 y.o.    Sex: male    Date: 2024    Pre-op Diagnosis: Myofascial Pain/ Muscle Spasms    Post-op Diagnosis: Myofascial Pain/ Muscle Spasms    Procedure: Thoracic Trigger Point Injections    Performing Procedure: Madisyn HERNANDEZ    Patient Vitals for the past 24 hrs:   BP Temp Temp src

## 2024-07-18 NOTE — DISCHARGE INSTRUCTIONS
[facility] [place] [facility] Physical And Pain Medicine  Post Procedure Discharge Instructions        YOU HAVE HAD THE FOLLOWING PROCEDURE:                                  [] Occipital Nerve Blocks  [] CTS wrist injection(s)  [] Knee Injection(s)         [] Shoulder Injection(s)   [] Elbow Injection(s)     [] Botox Injection  [x] Cervical Trigger Point Injections    [x] Thoracic Trigger Point Injections    [] Lumbar Trigger Point Injections  [] Piriformis Trigger Point Injections  [] SI Joint Injection(s)     [] Trochanteric Bursa Injection(s)       [] Ankle Injection(s)   [] Plantar Fasciitis   []  ______________  Injection(s) [] Botox []  Migraines [] Spasticity    YOU HAVE RECEIVED THE FOLLOWING MEDICATIONS IN YOUR INJECTION(s)  [x] Lidocaine [x] Bupivacaine   [] DepoMedrol (steroid) [] Decadron (steroid)  []  Kenalog (steroid)   [] Toradol  [] Supartz [] Zilretta    [] Botox        PATIENT INFORMATION:   You may experience the following symptoms after your procedure. These symptoms are normal and should not cause concern:    You may have an increase in your pain. This may last 24 - 48 hours after your procedure.  You may have no change in the pain that you had prior to your injection(s).  You may have weakness or numbness in your affected extremity. If this occurs, this may last until numbing the medication wears off.     REPORT THE FOLLOWING SYMPTOMS TO YOUR DOCTOR:  Redness, swelling or drainage at the injection site(s)  Unusual pain that interferes with your normal activities of daily living.    OTHER INSTRUCTIONS:    [x] I will apply ice to the injection site(s) for at least 24 hours after the procedure. I will rotate the ice on for 20 minutes and off for 20 minutes for at least 24 hours.    [x] I will not apply heat for at least 48 hours and I will not take a hot bath or shower for at least 24 hours.     [x] I understand that if Lidocaine or Bupivacaine was used in my injection(s) that the injection site(s)

## 2024-07-24 ENCOUNTER — OFFICE VISIT (OUTPATIENT)
Dept: FAMILY MEDICINE CLINIC | Age: 74
End: 2024-07-24
Payer: MEDICARE

## 2024-07-24 ENCOUNTER — TELEPHONE (OUTPATIENT)
Dept: FAMILY MEDICINE CLINIC | Age: 74
End: 2024-07-24

## 2024-07-24 VITALS
WEIGHT: 243 LBS | OXYGEN SATURATION: 96 % | TEMPERATURE: 97.3 F | DIASTOLIC BLOOD PRESSURE: 62 MMHG | HEART RATE: 62 BPM | BODY MASS INDEX: 33.89 KG/M2 | SYSTOLIC BLOOD PRESSURE: 126 MMHG

## 2024-07-24 DIAGNOSIS — N18.31 TYPE 2 DIABETES MELLITUS WITH STAGE 3A CHRONIC KIDNEY DISEASE, WITHOUT LONG-TERM CURRENT USE OF INSULIN (HCC): ICD-10-CM

## 2024-07-24 DIAGNOSIS — N39.41 URGE INCONTINENCE OF URINE: ICD-10-CM

## 2024-07-24 DIAGNOSIS — M47.816 LUMBAR SPONDYLOSIS: ICD-10-CM

## 2024-07-24 DIAGNOSIS — E11.22 TYPE 2 DIABETES MELLITUS WITH STAGE 3A CHRONIC KIDNEY DISEASE, WITHOUT LONG-TERM CURRENT USE OF INSULIN (HCC): ICD-10-CM

## 2024-07-24 DIAGNOSIS — M54.50 LUMBAR BACK PAIN: Primary | ICD-10-CM

## 2024-07-24 LAB
ANION GAP SERPL CALCULATED.3IONS-SCNC: 11 MMOL/L (ref 7–19)
BUN SERPL-MCNC: 23 MG/DL (ref 8–23)
CALCIUM SERPL-MCNC: 9.3 MG/DL (ref 8.8–10.2)
CHLORIDE SERPL-SCNC: 103 MMOL/L (ref 98–111)
CO2 SERPL-SCNC: 26 MMOL/L (ref 22–29)
CREAT SERPL-MCNC: 1.2 MG/DL (ref 0.5–1.2)
GLUCOSE SERPL-MCNC: 145 MG/DL (ref 74–109)
POTASSIUM SERPL-SCNC: 4.5 MMOL/L (ref 3.5–5)
SODIUM SERPL-SCNC: 140 MMOL/L (ref 136–145)

## 2024-07-24 PROCEDURE — 3078F DIAST BP <80 MM HG: CPT | Performed by: NURSE PRACTITIONER

## 2024-07-24 PROCEDURE — 99214 OFFICE O/P EST MOD 30 MIN: CPT | Performed by: NURSE PRACTITIONER

## 2024-07-24 PROCEDURE — 1036F TOBACCO NON-USER: CPT | Performed by: NURSE PRACTITIONER

## 2024-07-24 PROCEDURE — G8427 DOCREV CUR MEDS BY ELIG CLIN: HCPCS | Performed by: NURSE PRACTITIONER

## 2024-07-24 PROCEDURE — 2022F DILAT RTA XM EVC RTNOPTHY: CPT | Performed by: NURSE PRACTITIONER

## 2024-07-24 PROCEDURE — G8417 CALC BMI ABV UP PARAM F/U: HCPCS | Performed by: NURSE PRACTITIONER

## 2024-07-24 PROCEDURE — 3017F COLORECTAL CA SCREEN DOC REV: CPT | Performed by: NURSE PRACTITIONER

## 2024-07-24 PROCEDURE — 3074F SYST BP LT 130 MM HG: CPT | Performed by: NURSE PRACTITIONER

## 2024-07-24 PROCEDURE — 1123F ACP DISCUSS/DSCN MKR DOCD: CPT | Performed by: NURSE PRACTITIONER

## 2024-07-24 PROCEDURE — 3051F HG A1C>EQUAL 7.0%<8.0%: CPT | Performed by: NURSE PRACTITIONER

## 2024-07-24 RX ORDER — BLOOD-GLUCOSE METER
1 KIT MISCELLANEOUS DAILY
Qty: 1 KIT | Refills: 0 | Status: SHIPPED | OUTPATIENT
Start: 2024-07-24

## 2024-07-24 RX ORDER — METFORMIN HYDROCHLORIDE 500 MG/1
500 TABLET, EXTENDED RELEASE ORAL
Qty: 30 TABLET | Refills: 5 | Status: SHIPPED | OUTPATIENT
Start: 2024-07-24

## 2024-07-24 ASSESSMENT — ENCOUNTER SYMPTOMS
SORE THROAT: 0
SHORTNESS OF BREATH: 0
TROUBLE SWALLOWING: 0
BACK PAIN: 1
NAUSEA: 0
VOMITING: 0
COUGH: 0
ABDOMINAL PAIN: 0
RHINORRHEA: 0
CONSTIPATION: 0
DIARRHEA: 0

## 2024-07-24 NOTE — PATIENT INSTRUCTIONS
Ice/heat to lower back  Avoid painful motion      [facility]   Financial Resources*      Crisis Resources    [facility] Family Support [facility]  [address] [place], [place] [address]  [phone]  [facility]   [address], [place] [address]  Website: [email or web address]  [phone]  [facility]   [address] [place], [place] [address]  Website: [email or web address]/  [phone]  [facility]  [address], [place] [address]  Website: [email or web address]  [phone]  [facility]  [address] [place], [place] [address]  [phone]  [facility]  [address] [place], [place] [address]  [phone]  [facility]  [address] [place], [place] [address]  [phone]    [facility] Family Support Office  [address] [address]  [phone] or [phone]  [facility]  [address] [place], [place] [address]  Website: [email or web address]  [phone]  [facility]  [address] [place], [place] [address]  [phone]    [facility]  [address] [place], [place] [address]  Website: [email or web address]  [phone]  [facility] Family Support Office  [address] [address]  [phone]    [facility] Allied Services  [address] [place], [place] [address]  Website: [email or web address]  [phone]  [facility] Family Support Office  [address] [place] [address]  [phone]    [facility] Allied Services  [address] [place], [place] [address]  Website: [email or web address]  [phone]  [facility] Family Support Office  [address] [place], [place] [address]  [phone]     [facility] Family Support Office  [address] [place] [address]

## 2024-07-24 NOTE — PROGRESS NOTES
Subjective    Mr. Kamara is 73 y.o. male    Chief Complaint: Prostate cancer, stress incontinence    History of Present Illness    73-year-old male established patient in for follow-up regarding history of prostate cancer diagnosed 2017.  Minerva 3+4, T2c with perineural invasion.  10% gland involvement.  Found during evaluation of elevated PSA 4.87.  RALP 8/2017 Dr. Hong.  Developed stress urinary incontinence ultimately leading to male sling placement Dr. Rebolledo 2018.  Reports had been doing well until the last 6 months to 1 year is now back to requiring 2-3 thick pads daily.  Ongoing complaint of erectile dysfunction.  PDE 5 inhibitors not effective.  Not interested in injections.  Interested in penile implant.    History of overactive bladder as well.  Was tried on Myrbetriq.  Is no longer taking as did not see improvement in symptoms.  Patient denies any possible systemic symptoms of prostate cancer such as weight loss, lower extremity edema, or skeletal pain that could be worrisome for metastases.     No PSA since 2021 at which time was undetectable at <0.014  Hx related to this:   08/2017- RALP     Final Diagnosis   Prostate, radical prostatectomy:       A.  Prostatic adenocarcinoma (Minerva's grade 3+4 = 7).       B.  Tumor is present bilaterally in the gland.       C.  Perineural invasion is present.       D.  Largest tumor focus measures 1.3 cm in greatest dimension.       E.  Tumor occupies approximately 10% of the evaluated gland.       F.  Negative for evidence of extraprostatic extension.       G.  Negative for evidence of vas deferens or seminal vesicle invasion.       H.  Surgical excision margins are negative for evidence of malignancy.     AJCC STAGE:  pT2c, pNx, pMx      Electronically signed by Ej Schroeder MD on 8/3/2017 at 1511           The following portions of the patient's history were reviewed and updated as appropriate: allergies, current medications, past family history, past  medical history, past social history, past surgical history and problem list.    Review of Systems   Constitutional:  Negative for chills, fatigue and fever.   Gastrointestinal:  Negative for nausea and vomiting.   Genitourinary:  Negative for flank pain, frequency, hematuria and urgency.         Current Outpatient Medications:     albuterol sulfate  (90 Base) MCG/ACT inhaler, Inhale 2 puffs Every 4 (Four) Hours As Needed for Wheezing or Shortness of Air (rinse mouth after use)., Disp: 6.7 g, Rfl: 0    allopurinol (ZYLOPRIM) 100 MG tablet, Take 1 tablet by mouth Daily., Disp: , Rfl:     amLODIPine (NORVASC) 2.5 MG tablet, Take 1 tablet by mouth Every Night., Disp: , Rfl:     aspirin (aspirin) 81 MG EC tablet, Take 1 tablet by mouth Daily., Disp: , Rfl:     atorvastatin (LIPITOR) 20 MG tablet, Take 1 tablet by mouth Every Night., Disp: , Rfl: 2    Cholecalciferol (VITAMIN D3) 2000 units tablet, Take 1 tablet by mouth Daily., Disp: , Rfl:     citalopram (CeleXA) 20 MG tablet, Take 1 tablet by mouth Daily., Disp: , Rfl:     gabapentin (NEURONTIN) 400 MG capsule, Take 1 capsule by mouth 2 (Two) Times a Day., Disp: , Rfl:     glipizide (GLUCOTROL) 5 MG tablet, Take 1 tablet by mouth Every Morning., Disp: , Rfl:     HYDROcodone-acetaminophen (NORCO) 7.5-325 MG per tablet, Take 1 tablet by mouth Every 8 (Eight) Hours As Needed for Moderate Pain. Usually takes twice a day, Disp: , Rfl:     levothyroxine (SYNTHROID, LEVOTHROID) 200 MCG tablet, Take 1 tablet by mouth Daily. Takes 200, Disp: , Rfl:     lisinopril (PRINIVIL,ZESTRIL) 5 MG tablet, Take 1 tablet by mouth Every Night., Disp: , Rfl:     melatonin 5 MG tablet tablet, Take 1 tablet by mouth., Disp: , Rfl:     metFORMIN ER (GLUCOPHAGE-XR) 500 MG 24 hr tablet, Take 1 tablet by mouth Daily With Breakfast., Disp: , Rfl:     metoprolol tartrate (LOPRESSOR) 50 MG tablet, Take 1 tablet by mouth 2 (Two) Times a Day., Disp: , Rfl:     polyethylene glycol (MIRALAX) 17 g  packet, Take 17 g by mouth Daily As Needed., Disp: , Rfl:     tiZANidine (ZANAFLEX) 4 MG tablet, TAKE A 1/4 TABLET BY MOUTH 3 TIMES A DAY WITH MEALS, THEN TAKE A 1/2-1 TABLET AT BEDTIME, Disp: , Rfl:     nitroglycerin (NITROSTAT) 0.4 MG SL tablet, Place 1 tablet under the tongue Every 5 (Five) Minutes As Needed for Chest Pain., Disp: 25 tablet, Rfl: 11    Past Medical History:   Diagnosis Date    Arthritis     Atrial fibrillation     paroxysmal, on Xarelto    BMI 31.0-31.9,adult 06/28/2018    Carotid artery disease without cerebral infarction     Chronic knee pain     Chronic neck and back pain     Coronary artery disease 01/04/2022    COVID-19 08/18/2023    treated with Paxlovid    Depression     Diabetes     Disease of thyroid gland     Gout     Hypercholesteremia     Hypertension     IBS (irritable bowel syndrome)     Kidney disease     Osteoarthritis     back, neck, and knees - goes to Pain Management    Prostate cancer     Dr. Hong following    Sensorineural hearing loss     Sleep apnea     CPAP    Sudden hearing loss     Syncope     Tinnitus     Urine incontinence        Past Surgical History:   Procedure Laterality Date    ATRIAL APPENDAGE EXCLUSION LEFT WITH TRANSESOPHAGEAL ECHOCARDIOGRAM Left 1/6/2022    Procedure: LEFT ATRIAL APPENDAGE EXCLUSION WITH  40MM ATRICLIP; TRANSESOPHAGEAL ECHOCARDIOGRAM;  Surgeon: Chivo White MD;  Location: [facility] OR;  Service: Cardiothoracic;  Laterality: Left;    BLADDER SUSPENSION N/A 11/26/2018    Procedure: CYSTOSCOPY BLADDER SUSPENSION MALE SLING;  Surgeon: Zaheer Rebolledo MD;  Location:  [facility] OR;  Service: Urology    CARDIAC CATHETERIZATION N/A 12/30/2021    Procedure: Coronary angiography right radial to follow my 9:30 case;  Surgeon: Mike Fitzpatrick MD;  Location:  [facility]ATH INVASIVE LOCATION;  Service: Cardiology;  Laterality: N/A;    CHOLECYSTECTOMY      COLONOSCOPY N/A 3/7/2017    Procedure: COLONOSCOPY WITH ANESTHESIA;  Surgeon: Hector Alvarenga MD;   Location:  [facility]COPY;  Service:     COLONOSCOPY N/A 5/20/2022    Procedure: COLONOSCOPY WITH ANESTHESIA;  Surgeon: Hector Alvarenga MD;  Location:  [facility];  Service: Gastroenterology;  Laterality: N/A;  pre brbpr  post polyps  Luis Alberto Almaraz APRN    CORONARY ARTERY BYPASS GRAFT N/A 1/6/2022    Procedure: CORONARY ARTERY BYPASS GRAFT X 4 WITH LEFT INTERNAL MAMMARY ARTERY, LEFT LOWER EXTREMITY ENDOSCOPIC VEIN HARVEST, AND PERFUSION;  Surgeon: Chivo White MD;  Location:  [facility] OR;  Service: Cardiothoracic;  Laterality: N/A;    MAZE PROCEDURE N/A 1/6/2022    Procedure: BILATERAL MAZE PROCEDURE WITH RADIOFREQUENCY AND CRYOABLATION;  Surgeon: Chivo White MD;  Location:  [facility] OR;  Service: Cardiothoracic;  Laterality: N/A;    PROSTATECTOMY N/A 8/1/2017    Procedure: PROSTATECTOMY LAPAROSCOPIC WITH DAVINCI SI ROBOT ;  Surgeon: Hernesto Hong MD;  Location:  [facility] OR;  Service:     THYROID SURGERY      RADIATION THERAPY AFTER SURGERY       Social History     Socioeconomic History    Marital status:    Tobacco Use    Smoking status: Never     Passive exposure: Yes    Smokeless tobacco: Never    Tobacco comments:     wife smokes in the house and vehicle for years - tries to smoke outside or use vent dumont over stove   Vaping Use    Vaping status: Never Used   Substance and Sexual Activity    Alcohol use: Not Currently     Comment: rare, none in over a year    Drug use: Never    Sexual activity: Defer     Partners: Female       Family History   Problem Relation Age of Onset    Heart disease Mother         assumed MI at time of death - had cardiopulmonary arrest    Sudden death Mother     Prostate cancer Father     Cancer Father     Heart disease Father         CABG    Heart attack Father 70    Diabetes Sister     Arrhythmia Sister     Arrhythmia Maternal Aunt     Stroke Paternal Grandfather     Colon cancer Neg Hx     Colon polyps Neg Hx        Objective    Temp 97.8 °F (36.6 °C)   Ht  "180.3 cm (71\")   Wt 109 kg (240 lb 10.4 oz)   BMI 33.56 kg/m²     Physical Exam  Constitutional:       Appearance: Normal appearance.   Abdominal:      Tenderness: There is no right CVA tenderness or left CVA tenderness.   Genitourinary:     Comments: Prostate surgically absent  Skin:     General: Skin is warm and dry.   Neurological:      Mental Status: He is alert and oriented to person, place, and time.   Psychiatric:         Mood and Affect: Mood normal.         Behavior: Behavior normal.             Results for orders placed or performed in visit on 07/30/24   POC Urinalysis Dipstick, Multipro    Specimen: Urine   Result Value Ref Range    Color Yellow Yellow, Straw, Dark Yellow, Cindi    Clarity, UA Clear Clear    Glucose, UA Negative Negative mg/dL    Bilirubin Negative Negative    Ketones, UA Negative Negative    Specific Gravity  1.025 1.005 - 1.030    Blood, UA Negative Negative    pH, Urine 5.5 5.0 - 8.0    Protein, POC Negative Negative mg/dL    Urobilinogen, UA 0.2 E.U./dL Normal, 0.2 E.U./dL    Nitrite, UA Negative Negative    Leukocytes Negative Negative   Estimation of residual urine via abdominal ultrasound  Residual Urine: 118 ml  Indication: Urge incontinence  Position: Supine  Examination: Incremental scanning of the suprapubic area using 3 MHz transducer using copious amounts of acoustic gel.   Findings: An anechoic area was demonstrated which represented the bladder, with measurement of residual urine as noted. I inspected this myself. In that the residual urine was stable or insignificant, no treatment will be necessary at this time.      Assessment and Plan    Diagnoses and all orders for this visit:    1. Urinary urgency (Primary)  -     POC Urinalysis Dipstick, Multipro    2. Prostate CA  -     PSA DIAGNOSTIC      No recent PSA since 2021 will have patient go today to obtain lab    Current complaint being stress incontinence reports sling is no longer effective and is interested in " further options regarding his erectile dysfunction as PDE 5 inhibitors not effective and is not interested in injections.  Is interested in a penile prosthesis.    Will get in for consultation with Dr. Kiser have informed patient Dr. Kiser will likely want to undergo cystoscopy first before consideration for any procedure patient voiced understanding

## 2024-07-24 NOTE — TELEPHONE ENCOUNTER
----- Message from MARY Rowland sent at 7/24/2024 12:55 PM CDT -----  Please call patient and let them know results.   Kidney function is normal. Going to add metformin as discussed at visit. Med sent to pharmacy.

## 2024-07-24 NOTE — PROGRESS NOTES
Head: Normocephalic and atraumatic.   Eyes:      Conjunctiva/sclera: Conjunctivae normal.   Cardiovascular:      Rate and Rhythm: Normal rate and regular rhythm.      Pulses: Normal pulses.      Heart sounds: Normal heart sounds.   Pulmonary:      Effort: Pulmonary effort is normal.      Breath sounds: Normal breath sounds.   Abdominal:      Palpations: Abdomen is soft.   Musculoskeletal:      Cervical back: Normal range of motion and neck supple.      Lumbar back: Tenderness (right lumbar paraspinal muscles) present. No bony tenderness. Decreased range of motion (secondary to pain). Negative right straight leg raise test and negative left straight leg raise test.   Skin:     General: Skin is warm.   Neurological:      Mental Status: He is alert and oriented to person, place, and time.                 An electronic signature was used to authenticate this note.    --MARY Rowland

## 2024-07-25 DIAGNOSIS — M47.26 OSTEOARTHRITIS OF SPINE WITH RADICULOPATHY, LUMBAR REGION: ICD-10-CM

## 2024-07-25 RX ORDER — HYDROCODONE BITARTRATE AND ACETAMINOPHEN 7.5; 325 MG/1; MG/1
1 TABLET ORAL EVERY 6 HOURS PRN
Qty: 120 TABLET | Refills: 0 | Status: SHIPPED | OUTPATIENT
Start: 2024-07-29 | End: 2024-08-28

## 2024-07-29 DIAGNOSIS — N18.31 TYPE 2 DIABETES MELLITUS WITH STAGE 3A CHRONIC KIDNEY DISEASE, WITHOUT LONG-TERM CURRENT USE OF INSULIN (HCC): Primary | ICD-10-CM

## 2024-07-29 DIAGNOSIS — E11.22 TYPE 2 DIABETES MELLITUS WITH STAGE 3A CHRONIC KIDNEY DISEASE, WITHOUT LONG-TERM CURRENT USE OF INSULIN (HCC): Primary | ICD-10-CM

## 2024-07-29 RX ORDER — GLUCOSAMINE HCL/CHONDROITIN SU 500-400 MG
CAPSULE ORAL
Qty: 100 STRIP | Refills: 3 | Status: SHIPPED | OUTPATIENT
Start: 2024-07-29

## 2024-07-29 RX ORDER — LANCETS 30 GAUGE
1 EACH MISCELLANEOUS DAILY
Qty: 100 EACH | Refills: 3 | Status: SHIPPED | OUTPATIENT
Start: 2024-07-29

## 2024-07-29 NOTE — TELEPHONE ENCOUNTER
Pt called stating his pharmacy has an order for glucose monitor but did not get orders for strips and lancets. Sent these to pharmacy for pt    Requested Prescriptions     Signed Prescriptions Disp Refills    Lancets MISC 100 each 3     Si each by Does not apply route daily     Authorizing Provider: SANDI HARE     Ordering User: CASSIE SEBASTIAN    blood glucose monitor strips 100 strip 3     Sig: Test once a day     Authorizing Provider: SANDI HARE     Ordering User: CASSIE SEBASTIAN

## 2024-07-30 ENCOUNTER — TELEPHONE (OUTPATIENT)
Dept: PULMONOLOGY | Facility: CLINIC | Age: 74
End: 2024-07-30
Payer: MEDICARE

## 2024-07-30 ENCOUNTER — LAB (OUTPATIENT)
Dept: LAB | Facility: HOSPITAL | Age: 74
End: 2024-07-30
Payer: MEDICARE

## 2024-07-30 ENCOUNTER — OFFICE VISIT (OUTPATIENT)
Dept: UROLOGY | Facility: CLINIC | Age: 74
End: 2024-07-30
Payer: MEDICARE

## 2024-07-30 ENCOUNTER — TELEPHONE (OUTPATIENT)
Dept: UROLOGY | Facility: CLINIC | Age: 74
End: 2024-07-30
Payer: MEDICARE

## 2024-07-30 VITALS — TEMPERATURE: 97.8 F | WEIGHT: 240.65 LBS | HEIGHT: 71 IN | BODY MASS INDEX: 33.69 KG/M2

## 2024-07-30 DIAGNOSIS — C61 PROSTATE CA: ICD-10-CM

## 2024-07-30 DIAGNOSIS — R59.0 MEDIASTINAL ADENOPATHY: ICD-10-CM

## 2024-07-30 DIAGNOSIS — R39.15 URINARY URGENCY: Primary | ICD-10-CM

## 2024-07-30 LAB
ANION GAP SERPL CALCULATED.3IONS-SCNC: 6 MMOL/L (ref 5–15)
BILIRUB BLD-MCNC: NEGATIVE MG/DL
BUN SERPL-MCNC: 34 MG/DL (ref 8–23)
BUN/CREAT SERPL: 21.7 (ref 7–25)
CALCIUM SPEC-SCNC: 9.7 MG/DL (ref 8.6–10.5)
CHLORIDE SERPL-SCNC: 104 MMOL/L (ref 98–107)
CLARITY, POC: CLEAR
CO2 SERPL-SCNC: 30 MMOL/L (ref 22–29)
COLOR UR: YELLOW
CREAT SERPL-MCNC: 1.57 MG/DL (ref 0.76–1.27)
EGFRCR SERPLBLD CKD-EPI 2021: 46 ML/MIN/1.73
GLUCOSE SERPL-MCNC: 119 MG/DL (ref 65–99)
GLUCOSE UR STRIP-MCNC: NEGATIVE MG/DL
KETONES UR QL: NEGATIVE
LEUKOCYTE EST, POC: NEGATIVE
NITRITE UR-MCNC: NEGATIVE MG/ML
PH UR: 5.5 [PH] (ref 5–8)
POTASSIUM SERPL-SCNC: 5.2 MMOL/L (ref 3.5–5.2)
PROT UR STRIP-MCNC: NEGATIVE MG/DL
PSA SERPL-MCNC: <0.014 NG/ML (ref 0–4)
RBC # UR STRIP: NEGATIVE /UL
SODIUM SERPL-SCNC: 140 MMOL/L (ref 136–145)
SP GR UR: 1.02 (ref 1–1.03)
UROBILINOGEN UR QL: NORMAL

## 2024-07-30 PROCEDURE — 1159F MED LIST DOCD IN RCRD: CPT

## 2024-07-30 PROCEDURE — 36415 COLL VENOUS BLD VENIPUNCTURE: CPT

## 2024-07-30 PROCEDURE — 51798 US URINE CAPACITY MEASURE: CPT

## 2024-07-30 PROCEDURE — 84153 ASSAY OF PSA TOTAL: CPT

## 2024-07-30 PROCEDURE — 99214 OFFICE O/P EST MOD 30 MIN: CPT

## 2024-07-30 PROCEDURE — 81001 URINALYSIS AUTO W/SCOPE: CPT

## 2024-07-30 PROCEDURE — 1160F RVW MEDS BY RX/DR IN RCRD: CPT

## 2024-07-30 PROCEDURE — 80048 BASIC METABOLIC PNL TOTAL CA: CPT

## 2024-07-30 RX ORDER — METFORMIN HYDROCHLORIDE 500 MG/1
500 TABLET, EXTENDED RELEASE ORAL
COMMUNITY
Start: 2024-07-24

## 2024-07-30 NOTE — TELEPHONE ENCOUNTER
----- Message from Mari Jon sent at 7/30/2024 10:59 AM CDT -----  Let him know kidney labs look better than 1 month ago. More at his baseline now. Nothing else to do for now, keep follow up as planned.

## 2024-07-30 NOTE — TELEPHONE ENCOUNTER
----- Message from Carol Del Real sent at 7/30/2024 11:50 AM CDT -----  Regarding: RE: stress incontinence and ED  Will you please let patient know that I spoke with Dr. Kiser and he does want to do a cystoscopy prior to evaluation for stress leakage and addressing his ED problem.  Dr. Kiser said he can scope him either tomorrow or next Wednesday.  ----- Message -----  From: Paramjit Kiser MD  Sent: 7/30/2024  11:39 AM CDT  To: LUIS Pradhan  Subject: RE: stress incontinence and ED                   Yes, cysto-appt with me, please- I can see him either tomorrow or next Wednesday, thx  ----- Message -----  From: Carol Del Real APRN  Sent: 7/30/2024   9:49 AM CDT  To: Parmajit Kiser MD  Subject: stress incontinence and ED                       Pt of dr akers, hx of prostate ca. Had a sling put in by dr ivey. Reports is now back to leaking is using 2-3 think pads daily. Pt is also interested in implant. Wanted to see if you wanted a cysto first? If so you want to do it or do I need to get benji?    Thanks,  Carol

## 2024-07-31 ENCOUNTER — TELEPHONE (OUTPATIENT)
Dept: UROLOGY | Facility: CLINIC | Age: 74
End: 2024-07-31
Payer: MEDICARE

## 2024-07-31 NOTE — TELEPHONE ENCOUNTER
Spoke with patient to let him know his PSA remains undetectable. Patient verbalized understanding.

## 2024-08-07 ENCOUNTER — PROCEDURE VISIT (OUTPATIENT)
Dept: UROLOGY | Facility: CLINIC | Age: 74
End: 2024-08-07
Payer: MEDICARE

## 2024-08-07 DIAGNOSIS — R39.15 URINARY URGENCY: Primary | ICD-10-CM

## 2024-08-07 DIAGNOSIS — N52.31 ERECTILE DYSFUNCTION AFTER RADICAL PROSTATECTOMY: ICD-10-CM

## 2024-08-07 RX ORDER — GENTAMICIN 40 MG/ML
5 INJECTION, SOLUTION INTRAMUSCULAR; INTRAVENOUS ONCE
OUTPATIENT
Start: 2024-08-07

## 2024-08-07 NOTE — PROGRESS NOTES
Estimation of residual urine via abdominal ultrasound  Voided amount: 150 ml   Residual Urine: 306 ml  Indication: incomplete emptying   Position: Supine  Examination: Incremental scanning of the suprapubic area using 3 MHz transducer using copious amounts of acoustic gel.   Findings: An anechoic area was demonstrated which represented the bladder, with measurement of residual urine as noted. I inspected this myself. In that the residual urine was stable or insignificant, no treatment will be necessary at this time.

## 2024-08-07 NOTE — PROGRESS NOTES
Pre- operative diagnosis:  incontinence, leakage at night and also some leakage underwear during the day as well as stress incontinence with physical activity after previous male urethral sling placed by Dr. Rebolledo November 2018 for post prostatectomy stress incontinence.  He also has longstanding post-prostatectomy erectile dysfunction refractory to all previous medical therapy.  I previously saw him November 2019 for post prostatectomy ED and he said no erections since failing all previous PDE inhibitor therapy.    Post operative diagnosis:  Same    Procedure:  The patient was prepped and draped in a normal sterile fashion.  The urethra was anesthetized with 2% lidocaine jelly.  A flexible cystoscope was introduced per urethra.      Urethra:  Normal    Bladder:  Normal mucosa, No bladder tumor, and No bladder neck contracture    Ureteral orifices:  Normal position bilaterally and Clear efflux bilaterally    Prostate:  Surgically absent    Standing examination with full bladder reveals very minimal leak with cough.  He does have a PVR of 300 cc after voiding after cystoscopy today.    Testicles normal bilaterally.  No inguinal hernia appreciable.  Normal uncircumcised penis.    Patient tolerated the procedure well    Complications: none    Blood loss: minimal    Follow up:    Worsening organic erectile dysfunction refractory to PDE inhibitor therapy.  We had a long discussion regarding other treatment options including penile injections, intraurethral medications, SHANELLE, and penile implant.  He is afraid of needles and does not want to do penile injections.  Patient would like to proceed with three-piece inflatable penile implant.  We discussed risks of the procedure including but not limited to infection, bleeding, need for additional procedures, injury to urethra and/or adjacent structures, mechanical malfunction, device migration, chronic pain, complications of anesthesia.  We also discussed that penile length with  an implant would be no longer than current flaccid stretched penile length.  Patient voices understanding and provided informed consent to proceed with  three-piece applicable penile implant.  He will call back to schedule.  He will continue his aspirin.    Minimal KIRT after previous male sling with mild postvoid residual.  We discussed likely diabetic bladder dysfunction as cause for his mild incontinence likely from overflow/incomplete emptying.  I did not recommend artificial sphincter.  We did discuss that three-piece implantable penile implant should hopefully assist his male sling with his incontinence.      This document has been signed by RAMON Kiser MD on August 7, 2024 11:34 CDT

## 2024-08-15 DIAGNOSIS — M47.26 OSTEOARTHRITIS OF SPINE WITH RADICULOPATHY, LUMBAR REGION: ICD-10-CM

## 2024-08-15 RX ORDER — GABAPENTIN 400 MG/1
400 CAPSULE ORAL 3 TIMES DAILY
Qty: 90 CAPSULE | Refills: 0 | Status: SHIPPED | OUTPATIENT
Start: 2024-08-15 | End: 2024-09-14

## 2024-08-28 DIAGNOSIS — M47.26 OSTEOARTHRITIS OF SPINE WITH RADICULOPATHY, LUMBAR REGION: ICD-10-CM

## 2024-08-28 RX ORDER — HYDROCODONE BITARTRATE AND ACETAMINOPHEN 7.5; 325 MG/1; MG/1
1 TABLET ORAL EVERY 6 HOURS PRN
Qty: 120 TABLET | Refills: 0 | Status: SHIPPED | OUTPATIENT
Start: 2024-08-28 | End: 2024-09-27

## 2024-08-30 DIAGNOSIS — E03.9 ACQUIRED HYPOTHYROIDISM: ICD-10-CM

## 2024-08-30 NOTE — TELEPHONE ENCOUNTER
Received fax from pharmacy requesting refill on pts medication(s). Pt was last seen in office on 7/24/2024  and has a follow up scheduled for 9/13/2024. Will send request to  Dr. Stubbs  for authorization.     Requested Prescriptions     Pending Prescriptions Disp Refills    levothyroxine (SYNTHROID) 200 MCG tablet [Pharmacy Med Name: LEVOTHYROXINE 200 MCG TABLET] 90 tablet 3     Sig: TAKE 1 TABLET BY MOUTH IN THE MORNING. TOTAL OF 225MCG.

## 2024-09-03 RX ORDER — LEVOTHYROXINE SODIUM 200 UG/1
200 TABLET ORAL DAILY
Qty: 90 TABLET | Refills: 3 | Status: SHIPPED | OUTPATIENT
Start: 2024-09-03

## 2024-09-11 ENCOUNTER — OFFICE VISIT (OUTPATIENT)
Dept: CARDIAC SURGERY | Facility: CLINIC | Age: 74
End: 2024-09-11
Payer: MEDICARE

## 2024-09-11 ENCOUNTER — HOSPITAL ENCOUNTER (OUTPATIENT)
Dept: CT IMAGING | Facility: HOSPITAL | Age: 74
Discharge: HOME | End: 2024-09-11
Admitting: NURSE PRACTITIONER
Payer: MEDICARE

## 2024-09-11 VITALS
SYSTOLIC BLOOD PRESSURE: 129 MMHG | WEIGHT: 243.6 LBS | DIASTOLIC BLOOD PRESSURE: 65 MMHG | OXYGEN SATURATION: 96 % | HEART RATE: 64 BPM | HEIGHT: 71 IN | BODY MASS INDEX: 34.1 KG/M2

## 2024-09-11 DIAGNOSIS — I71.20 THORACIC AORTIC ANEURYSM WITHOUT RUPTURE, UNSPECIFIED PART: ICD-10-CM

## 2024-09-11 DIAGNOSIS — E66.09 CLASS 1 OBESITY DUE TO EXCESS CALORIES WITH SERIOUS COMORBIDITY AND BODY MASS INDEX (BMI) OF 33.0 TO 33.9 IN ADULT: ICD-10-CM

## 2024-09-11 DIAGNOSIS — I71.21 ANEURYSM OF ASCENDING AORTA WITHOUT RUPTURE: Primary | ICD-10-CM

## 2024-09-11 DIAGNOSIS — I71.21 AORTIC ROOT ANEURYSM: ICD-10-CM

## 2024-09-11 LAB — CREAT BLDA-MCNC: 1.4 MG/DL (ref 0.6–1.3)

## 2024-09-11 PROCEDURE — 1159F MED LIST DOCD IN RCRD: CPT | Performed by: NURSE PRACTITIONER

## 2024-09-11 PROCEDURE — 99214 OFFICE O/P EST MOD 30 MIN: CPT | Performed by: NURSE PRACTITIONER

## 2024-09-11 PROCEDURE — 82565 ASSAY OF CREATININE: CPT

## 2024-09-11 PROCEDURE — 1160F RVW MEDS BY RX/DR IN RCRD: CPT | Performed by: NURSE PRACTITIONER

## 2024-09-11 PROCEDURE — 3074F SYST BP LT 130 MM HG: CPT | Performed by: NURSE PRACTITIONER

## 2024-09-11 PROCEDURE — 25510000001 IOPAMIDOL PER 1 ML: Performed by: NURSE PRACTITIONER

## 2024-09-11 PROCEDURE — 71275 CT ANGIOGRAPHY CHEST: CPT

## 2024-09-11 PROCEDURE — 3078F DIAST BP <80 MM HG: CPT | Performed by: NURSE PRACTITIONER

## 2024-09-11 RX ORDER — IOPAMIDOL 755 MG/ML
100 INJECTION, SOLUTION INTRAVASCULAR
Status: COMPLETED | OUTPATIENT
Start: 2024-09-11 | End: 2024-09-11

## 2024-09-11 RX ADMIN — IOPAMIDOL 100 ML: 755 INJECTION, SOLUTION INTRAVENOUS at 12:23

## 2024-09-11 NOTE — PROGRESS NOTES
Chief Complaint   Patient presents with    Thoracic Aneurysm     Patient is here for follow up w/CT      Subjective     History of Present Illness  He is previously known to our service having undergone coronary artery bypass graft x 4 (left internal mammary artery/sequencing the dominant diagonal artery and left anterior descending artery, reverse saphenous vein graft/distal right coronary artery, and reverse saphenous vein graft/posterior descending artery), Right and left MAZE procedure with radiofrequency and cryoablation, Left atrial appendage exclusion (Atriclip 40 mm device), Left endoscopic vein harvest performed on 1/6/2022 by Dr. White.  Salient medical history includes chronic kidney disease, prostate cancer, known CAD, atrial fibrillation, chronic neck back and knee pain, hypertension, obesity, and diabetes. Preoperative cardiac surgery testing identified an ascending aortic aneurysm and aortic root aneurysm. He returns today for routine surveillance with CTA chest. Reports well controlled BP.  He does report occasional right and left sided chest pain with certain activities-says this has happened ever since cardiac surgery and sternotomy.  Pain is brief and mild.  He believes it is becoming less frequent.  He is working on losing weight.  He is also working on optimal glycemic control.  Reports his blood sugars have mostly been in the 180s to 190s.    He follows with Letty SMITH of cardiology services.  He does see the pulmonary service for lung nodules and mediastinal adenopathy which both have been stable.  He did see LUIS Wild in June with a contrasted CT scan of the chest.      Review of Systems   Constitutional:  Negative for chills, diaphoresis, fever and unexpected weight change.   HENT:  Negative for trouble swallowing and voice change.    Respiratory:  Negative for shortness of breath and wheezing.    Cardiovascular:  Positive for chest pain (occasional right and left chest  pain wtih heavy lifting). Negative for leg swelling.   Musculoskeletal:  Positive for arthralgias.          Past Medical History:   Diagnosis Date    Arthritis     Atrial fibrillation     paroxysmal, on Xarelto    BMI 31.0-31.9,adult 06/28/2018    Carotid artery disease without cerebral infarction     Chronic knee pain     Chronic neck and back pain     Coronary artery disease 01/04/2022    COVID-19 08/18/2023    treated with Paxlovid    Depression     Diabetes     Disease of thyroid gland     Gout     Hypercholesteremia     Hypertension     IBS (irritable bowel syndrome)     Kidney disease     Osteoarthritis     back, neck, and knees - goes to Pain Management    Prostate cancer     Dr. Hong following    Sensorineural hearing loss     Sleep apnea     CPAP    Sudden hearing loss     Syncope     Tinnitus     Urine incontinence      Past Surgical History:   Procedure Laterality Date    ATRIAL APPENDAGE EXCLUSION LEFT WITH TRANSESOPHAGEAL ECHOCARDIOGRAM Left 1/6/2022    Procedure: LEFT ATRIAL APPENDAGE EXCLUSION WITH  40MM ATRICLIP; TRANSESOPHAGEAL ECHOCARDIOGRAM;  Surgeon: Chivo White MD;  Location: [facility] OR;  Service: Cardiothoracic;  Laterality: Left;    BLADDER SUSPENSION N/A 11/26/2018    Procedure: CYSTOSCOPY BLADDER SUSPENSION MALE SLING;  Surgeon: Zaheer Rebolledo MD;  Location: [facility] OR;  Service: Urology    CARDIAC CATHETERIZATION N/A 12/30/2021    Procedure: Coronary angiography right radial to follow my 9:30 case;  Surgeon: Mike Fitzpatrick MD;  Location: [facility] CATH INVASIVE LOCATION;  Service: Cardiology;  Laterality: N/A;    CHOLECYSTECTOMY      COLONOSCOPY N/A 3/7/2017    Procedure: COLONOSCOPY WITH ANESTHESIA;  Surgeon: Hector Alvarenga MD;  Location: [facility] ENDOSCOPY;  Service:     COLONOSCOPY N/A 5/20/2022    Procedure: COLONOSCOPY WITH ANESTHESIA;  Surgeon: Hector Alvarenga MD;  Location: [facility] ENDOSCOPY;  Service: Gastroenterology;  Laterality: N/A;  pre brbpr  post polyps  Luis Alberto  Rufina LUIS    CORONARY ARTERY BYPASS GRAFT N/A 1/6/2022    Procedure: CORONARY ARTERY BYPASS GRAFT X 4 WITH LEFT INTERNAL MAMMARY ARTERY, LEFT LOWER EXTREMITY ENDOSCOPIC VEIN HARVEST, AND PERFUSION;  Surgeon: Chivo White MD;  Location:  [facility] OR;  Service: Cardiothoracic;  Laterality: N/A;    MAZE PROCEDURE N/A 1/6/2022    Procedure: BILATERAL MAZE PROCEDURE WITH RADIOFREQUENCY AND CRYOABLATION;  Surgeon: Chivo White MD;  Location:  [facility] OR;  Service: Cardiothoracic;  Laterality: N/A;    PROSTATECTOMY N/A 8/1/2017    Procedure: PROSTATECTOMY LAPAROSCOPIC WITH [employer] SI ROBOT ;  Surgeon: Hernesto Hong MD;  Location:  [facility] OR;  Service:     THYROID SURGERY      RADIATION THERAPY AFTER SURGERY     Family History   Problem Relation Age of Onset    Heart disease Mother         assumed MI at time of death - had cardiopulmonary arrest    Sudden death Mother     Prostate cancer Father     Cancer Father     Heart disease Father         CABG    Heart attack Father 70    Diabetes Sister     Arrhythmia Sister     Arrhythmia Maternal Aunt     Stroke Paternal Grandfather     Colon cancer Neg Hx     Colon polyps Neg Hx      Social History     Tobacco Use    Smoking status: Never     Passive exposure: Yes    Smokeless tobacco: Never    Tobacco comments:     wife smokes in the house and vehicle for years - tries to smoke outside or use vent dumont over stove   Vaping Use    Vaping status: Never Used   Substance Use Topics    Alcohol use: Not Currently     Comment: rare, none in over a year    Drug use: Never     Current Outpatient Medications   Medication Sig Dispense Refill    albuterol sulfate  (90 Base) MCG/ACT inhaler Inhale 2 puffs Every 4 (Four) Hours As Needed for Wheezing or Shortness of Air (rinse mouth after use). 6.7 g 0    allopurinol (ZYLOPRIM) 100 MG tablet Take 1 tablet by mouth Daily.      amLODIPine (NORVASC) 2.5 MG tablet Take 1 tablet by mouth Every Night.      aspirin (aspirin) 81 MG  "EC tablet Take 1 tablet by mouth Daily.      atorvastatin (LIPITOR) 20 MG tablet Take 1 tablet by mouth Every Night.  2    Cholecalciferol (VITAMIN D3) 2000 units tablet Take 1 tablet by mouth Daily.      citalopram (CeleXA) 20 MG tablet Take 1 tablet by mouth Daily.      gabapentin (NEURONTIN) 400 MG capsule Take 1 capsule by mouth 2 (Two) Times a Day.      glipizide (GLUCOTROL) 5 MG tablet Take 1 tablet by mouth Every Morning.      HYDROcodone-acetaminophen (NORCO) 7.5-325 MG per tablet Take 1 tablet by mouth Every 8 (Eight) Hours As Needed for Moderate Pain. Usually takes twice a day      levothyroxine (SYNTHROID, LEVOTHROID) 200 MCG tablet Take 1 tablet by mouth Daily. Takes 200      lisinopril (PRINIVIL,ZESTRIL) 5 MG tablet Take 1 tablet by mouth Every Night.      melatonin 5 MG tablet tablet Take 1 tablet by mouth.      metFORMIN ER (GLUCOPHAGE-XR) 500 MG 24 hr tablet Take 1 tablet by mouth Daily With Breakfast.      metoprolol tartrate (LOPRESSOR) 50 MG tablet Take 1 tablet by mouth 2 (Two) Times a Day.      polyethylene glycol (MIRALAX) 17 g packet Take 17 g by mouth Daily As Needed.      tiZANidine (ZANAFLEX) 4 MG tablet TAKE A 1/4 TABLET BY MOUTH 3 TIMES A DAY WITH MEALS, THEN TAKE A 1/2-1 TABLET AT BEDTIME      nitroglycerin (NITROSTAT) 0.4 MG SL tablet Place 1 tablet under the tongue Every 5 (Five) Minutes As Needed for Chest Pain. 25 tablet 11     No current facility-administered medications for this visit.     Allergies:  Patient has no known allergies.    Objective      Vital Signs  Visit Vitals  /65 (BP Location: Right arm, Patient Position: Sitting, Cuff Size: Adult)   Pulse 64   Ht 180.3 cm (71\")   Wt 110 kg (243 lb 9.6 oz)   SpO2 96%   BMI 33.98 kg/m²         Physical Exam  Vitals reviewed.   Constitutional:       General: He is not in acute distress.     Appearance: Normal appearance. He is well-developed. He is obese. He is not diaphoretic.   HENT:      Head: Normocephalic and atraumatic. "   Eyes:      Pupils: Pupils are equal, round, and reactive to light.   Cardiovascular:      Rate and Rhythm: Normal rate and regular rhythm.      Heart sounds: Normal heart sounds. No murmur heard.     No friction rub.   Pulmonary:      Effort: Pulmonary effort is normal. No respiratory distress.      Breath sounds: Normal breath sounds. No wheezing or rales.   Abdominal:      General: There is no distension.      Palpations: Abdomen is soft.      Tenderness: There is no abdominal tenderness. There is no guarding.   Musculoskeletal:      Cervical back: Normal range of motion and neck supple.      Right lower leg: No edema.      Left lower leg: No edema.   Skin:     General: Skin is warm and dry.      Coloration: Skin is not pale.      Findings: No rash.   Neurological:      Mental Status: He is alert and oriented to person, place, and time.   Psychiatric:         Behavior: Behavior normal.         Results Review:   CT Angiogram Chest    Result Date: 9/11/2024  Narrative: EXAMINATION: CT ANGIOGRAM CHEST-   9/11/2024 11:03 AM  HISTORY: Aortic aneurysm, known or suspected; I71.20-Thoracic aortic aneurysm, without rupture, unspecified  In order to have a CT radiation dose as low as reasonably achievable Automated Exposure Control was utilized for adjustment of the mA and/or KV according to patient size.  Total DLP = 477.42 mGy.cm  The CT angiography of the chest tube performed after intravenous contrast enhancement.  The images are acquired in axial plane and subsequent reconstruction in coronal and sagittal planes.  The comparison is made with the previous study dated 6/28/2024.  Atheromatous change of thoracic aorta are noted. There is aneurysmal dilatation of the ascending thoracic aorta which measures 4.4 cm in the mid ascending level. No change. Aortic root measures 3.7 cm in diameter similar to the previous study. The sinus of Valsalva measures 3.7 cm similar to the previous study.  No dissection.  Severe  atheromatous changes of the coronary arteries are similar to the previous study. There is evidence of prior CABG.  Left atrial appendage clamp is in place.  Normal opacification of the pulmonary arteries and branches bilaterally. There are no filling defects in the visualized/opacified pulmonary arterial bed.  No finding to suggest right heart strain.  Moderately enlarged left atrium is seen.  There is no evidence of mediastinal or hilar mass or lymphadenopathy. A level 11 R lymph node measures 13 mm in short axis similar to the previous study. This is a nonspecific finding. A fatty infiltrated level or 4 lymph node measures 1.2 cm in short axis. It is fatty infiltrated. No change in the previous study.  Limited visualized soft tissues of the neck are unremarkable. Thyroid gland is not visualized or evaluated in this study.  No axillary lymphadenopathy.  The lungs are moderately well-expanded.  There are areas of discoid atelectasis in the lower lungs bilaterally similar to the previous study.  There are small subpleural nodules in the right middle lobe, images #87 and 88 in series 5, measures 3-4 mm in diameter.. These are similar to the previous study in the February 2023. No additional noncalcified nodules.  There are a few scattered small calcified granulomas in both lungs.  The central airway is patent and clear. No endobronchial abnormality or lesion.  Limited visualized abdomen is unremarkable. A small atrophic left kidney is seen. There is 5 mm calculus in the left kidney similar to the previous study. There is an exophytic mass from the lateral margin of the upper pole of the right kidney similar to the previous study.  Images reviewed in bone window show chronic degenerative changes of the thoracic spine with a mild dextroscoliosis. No acute bony abnormality.      Impression: 1. Stable mild aneurysmal dilatation of the ascending thoracic aorta. No dissection. 2. Normal central pulmonary arteries. No  finding to suggest pulmonary embolism. 3. No evidence of mediastinal lymphadenopathy. Fatty infiltrated mediastinal lymph nodes appear benign and are unchanged. 4. Stable tiny subpleural nodules in the right middle lobe. No change in size or shape over the past 18 months. No further follow-up is recommended. 5. Nonobstructing left nephrolithiasis.             This report was signed and finalized on 9/11/2024 1:11 PM by Dr. Stef Dee MD.       I reviewed the patient's new clinical results.  Independent review of imaging today demonstrates aortic root of 3.7 cm (cross sectional and coronal), ascending aortic aneurysm measuring 4.6 cm.  No dissection.  Stable.  Previously this measured 4.6 cm ascending aortic aneurysm, 4.1 cm aortic root   Discussed with patient      Assessment & Plan       Diagnoses and all orders for this visit:    1. Aneurysm of ascending aorta without rupture (Primary)  -     CT angiogram chest w contrast; Future    2. Aortic root aneurysm  -     CT angiogram chest w contrast; Future    3. Class 1 obesity due to excess calories with serious comorbidity and body mass index (BMI) of 33.0 to 33.9 in adult          I discussed the patients findings and my recommendations with patient. We reviewed the natural course history of aortic aneurysmal disease. We discussed the specific size of aneurysm today and potential risk of aortic complications. We discussed the operative treatment of aneursymal disease broadly. At this time, will continue every 6 month surveillance. He will see Dr. White next time. Will try to coincide obtaining imaging with the pulmonology service visits as to limit CT scans. He is agreeable to this plan. We reviewed the signs and symptoms of acute aortic pathology and the need to present to the emergency department for further evaluation. Lastly, we discussed the value of exercise while being mindful of a known aneurysm and the potential risk that high intensity, isometric, or  valsalva type exercises presents.     Separately, we discussed his report of occasional chest pain after sternotomy.  It sounds as if these twinges of pain are improving and brief in nature.  He does think he may be lifting objects that are too heavy for him.  He will work on this more.    Keep planned follow-up with pulmonology services for lung nodules and mediastinal adenopathy.  Keep planned follow-up with cardiology services for CAD.      Zay Kamara  reports that he has never smoked. He has been exposed to tobacco smoke. He has never used smokeless tobacco.       Body mass index is 33.98 kg/m².  Encouraged him to continue to follow with primary care to establish durable lifestyle changes to accomplish a weight acceptable for age and height.  He is working on weight loss.      Advance Care Planning   ACP discussion was declined by the patient. Patient does not have an advance directive, declines further assistance.

## 2024-09-13 ENCOUNTER — OFFICE VISIT (OUTPATIENT)
Dept: FAMILY MEDICINE CLINIC | Age: 74
End: 2024-09-13

## 2024-09-13 VITALS
HEART RATE: 78 BPM | BODY MASS INDEX: 33.88 KG/M2 | SYSTOLIC BLOOD PRESSURE: 138 MMHG | WEIGHT: 242 LBS | OXYGEN SATURATION: 95 % | TEMPERATURE: 96.8 F | DIASTOLIC BLOOD PRESSURE: 70 MMHG | HEIGHT: 71 IN

## 2024-09-13 DIAGNOSIS — E78.2 MIXED HYPERLIPIDEMIA: ICD-10-CM

## 2024-09-13 DIAGNOSIS — I73.9 PAD (PERIPHERAL ARTERY DISEASE) (HCC): ICD-10-CM

## 2024-09-13 DIAGNOSIS — F33.0 MILD EPISODE OF RECURRENT MAJOR DEPRESSIVE DISORDER (HCC): ICD-10-CM

## 2024-09-13 DIAGNOSIS — I10 ESSENTIAL HYPERTENSION: ICD-10-CM

## 2024-09-13 DIAGNOSIS — K59.03 DRUG-INDUCED CONSTIPATION: ICD-10-CM

## 2024-09-13 DIAGNOSIS — E89.0 POSTOPERATIVE HYPOTHYROIDISM: ICD-10-CM

## 2024-09-13 DIAGNOSIS — G47.33 OSA ON CPAP: ICD-10-CM

## 2024-09-13 DIAGNOSIS — N18.31 TYPE 2 DIABETES MELLITUS WITH STAGE 3A CHRONIC KIDNEY DISEASE, WITHOUT LONG-TERM CURRENT USE OF INSULIN (HCC): ICD-10-CM

## 2024-09-13 DIAGNOSIS — I25.10 CORONARY ARTERY DISEASE INVOLVING NATIVE HEART WITHOUT ANGINA PECTORIS, UNSPECIFIED VESSEL OR LESION TYPE: ICD-10-CM

## 2024-09-13 DIAGNOSIS — I48.0 PAROXYSMAL ATRIAL FIBRILLATION (HCC): ICD-10-CM

## 2024-09-13 DIAGNOSIS — N18.31 TYPE 2 DIABETES MELLITUS WITH STAGE 3A CHRONIC KIDNEY DISEASE, WITHOUT LONG-TERM CURRENT USE OF INSULIN (HCC): Primary | ICD-10-CM

## 2024-09-13 DIAGNOSIS — E79.0 HYPERURICEMIA: ICD-10-CM

## 2024-09-13 DIAGNOSIS — E87.5 POTASSIUM (K) EXCESS: ICD-10-CM

## 2024-09-13 DIAGNOSIS — E03.9 ACQUIRED HYPOTHYROIDISM: ICD-10-CM

## 2024-09-13 DIAGNOSIS — E11.22 TYPE 2 DIABETES MELLITUS WITH STAGE 3A CHRONIC KIDNEY DISEASE, WITHOUT LONG-TERM CURRENT USE OF INSULIN (HCC): Primary | ICD-10-CM

## 2024-09-13 DIAGNOSIS — E03.9 ACQUIRED HYPOTHYROIDISM: Primary | ICD-10-CM

## 2024-09-13 DIAGNOSIS — I71.20 THORACIC AORTIC ANEURYSM WITHOUT RUPTURE, UNSPECIFIED PART (HCC): ICD-10-CM

## 2024-09-13 DIAGNOSIS — E11.22 TYPE 2 DIABETES MELLITUS WITH STAGE 3A CHRONIC KIDNEY DISEASE, WITHOUT LONG-TERM CURRENT USE OF INSULIN (HCC): ICD-10-CM

## 2024-09-13 DIAGNOSIS — Z00.00 MEDICARE ANNUAL WELLNESS VISIT, SUBSEQUENT: ICD-10-CM

## 2024-09-13 LAB
ALBUMIN SERPL-MCNC: 4.5 G/DL (ref 3.5–5.2)
ALP SERPL-CCNC: 66 U/L (ref 40–129)
ALT SERPL-CCNC: 21 U/L (ref 5–41)
ANION GAP SERPL CALCULATED.3IONS-SCNC: 7 MMOL/L (ref 7–19)
AST SERPL-CCNC: 15 U/L (ref 5–40)
BASOPHILS # BLD: 0.1 K/UL (ref 0–0.2)
BASOPHILS NFR BLD: 0.6 % (ref 0–1)
BILIRUB SERPL-MCNC: 0.6 MG/DL (ref 0.2–1.2)
BUN SERPL-MCNC: 23 MG/DL (ref 8–23)
CALCIUM SERPL-MCNC: 9.2 MG/DL (ref 8.8–10.2)
CHLORIDE SERPL-SCNC: 102 MMOL/L (ref 98–111)
CHOLEST SERPL-MCNC: 90 MG/DL (ref 0–199)
CO2 SERPL-SCNC: 30 MMOL/L (ref 22–29)
CREAT SERPL-MCNC: 1.2 MG/DL (ref 0.7–1.2)
CREAT UR-MCNC: 147 MG/DL (ref 39–259)
EOSINOPHIL # BLD: 0.2 K/UL (ref 0–0.6)
EOSINOPHIL NFR BLD: 2.5 % (ref 0–5)
ERYTHROCYTE [DISTWIDTH] IN BLOOD BY AUTOMATED COUNT: 13.4 % (ref 11.5–14.5)
GLUCOSE SERPL-MCNC: 160 MG/DL (ref 70–99)
HBA1C MFR BLD: 7.4 % (ref 4–5.6)
HCT VFR BLD AUTO: 39.5 % (ref 42–52)
HDLC SERPL-MCNC: 21 MG/DL (ref 40–60)
HGB BLD-MCNC: 13 G/DL (ref 14–18)
IMM GRANULOCYTES # BLD: 0 K/UL
LDLC SERPL CALC-MCNC: 41 MG/DL
LYMPHOCYTES # BLD: 1.1 K/UL (ref 1.1–4.5)
LYMPHOCYTES NFR BLD: 12 % (ref 20–40)
MCH RBC QN AUTO: 29.9 PG (ref 27–31)
MCHC RBC AUTO-ENTMCNC: 32.9 G/DL (ref 33–37)
MCV RBC AUTO: 90.8 FL (ref 80–94)
MICROALBUMIN UR-MCNC: 3.3 MG/DL (ref 0–1.99)
MICROALBUMIN/CREAT UR-RTO: 22.4 MG/G
MONOCYTES # BLD: 0.5 K/UL (ref 0–0.9)
MONOCYTES NFR BLD: 5.7 % (ref 0–10)
NEUTROPHILS # BLD: 7.2 K/UL (ref 1.5–7.5)
NEUTS SEG NFR BLD: 78.9 % (ref 50–65)
PLATELET # BLD AUTO: 233 K/UL (ref 130–400)
PMV BLD AUTO: 9.8 FL (ref 9.4–12.4)
POTASSIUM SERPL-SCNC: 5.3 MMOL/L (ref 3.5–5)
PROT SERPL-MCNC: 6.9 G/DL (ref 6.4–8.3)
RBC # BLD AUTO: 4.35 M/UL (ref 4.7–6.1)
SODIUM SERPL-SCNC: 139 MMOL/L (ref 136–145)
T4 FREE SERPL-MCNC: 1.7 NG/DL (ref 0.93–1.7)
TRIGL SERPL-MCNC: 142 MG/DL (ref 0–149)
TSH SERPL DL<=0.005 MIU/L-ACNC: 0.07 UIU/ML (ref 0.27–4.2)
URATE SERPL-MCNC: 6.3 MG/DL (ref 3.4–7)
WBC # BLD AUTO: 9.1 K/UL (ref 4.8–10.8)

## 2024-09-13 ASSESSMENT — PATIENT HEALTH QUESTIONNAIRE - PHQ9
2. FEELING DOWN, DEPRESSED OR HOPELESS: NOT AT ALL
SUM OF ALL RESPONSES TO PHQ QUESTIONS 1-9: 0
3. TROUBLE FALLING OR STAYING ASLEEP: NOT AT ALL
SUM OF ALL RESPONSES TO PHQ9 QUESTIONS 1 & 2: 0
4. FEELING TIRED OR HAVING LITTLE ENERGY: NOT AT ALL
7. TROUBLE CONCENTRATING ON THINGS, SUCH AS READING THE NEWSPAPER OR WATCHING TELEVISION: NOT AT ALL
9. THOUGHTS THAT YOU WOULD BE BETTER OFF DEAD, OR OF HURTING YOURSELF: NOT AT ALL
8. MOVING OR SPEAKING SO SLOWLY THAT OTHER PEOPLE COULD HAVE NOTICED. OR THE OPPOSITE, BEING SO FIGETY OR RESTLESS THAT YOU HAVE BEEN MOVING AROUND A LOT MORE THAN USUAL: NOT AT ALL
SUM OF ALL RESPONSES TO PHQ QUESTIONS 1-9: 0
10. IF YOU CHECKED OFF ANY PROBLEMS, HOW DIFFICULT HAVE THESE PROBLEMS MADE IT FOR YOU TO DO YOUR WORK, TAKE CARE OF THINGS AT HOME, OR GET ALONG WITH OTHER PEOPLE: NOT DIFFICULT AT ALL
6. FEELING BAD ABOUT YOURSELF - OR THAT YOU ARE A FAILURE OR HAVE LET YOURSELF OR YOUR FAMILY DOWN: NOT AT ALL
SUM OF ALL RESPONSES TO PHQ QUESTIONS 1-9: 0
SUM OF ALL RESPONSES TO PHQ QUESTIONS 1-9: 0
1. LITTLE INTEREST OR PLEASURE IN DOING THINGS: NOT AT ALL
5. POOR APPETITE OR OVEREATING: NOT AT ALL

## 2024-09-13 ASSESSMENT — LIFESTYLE VARIABLES
HOW OFTEN DO YOU HAVE A DRINK CONTAINING ALCOHOL: NEVER
HOW MANY STANDARD DRINKS CONTAINING ALCOHOL DO YOU HAVE ON A TYPICAL DAY: PATIENT DOES NOT DRINK

## 2024-09-19 DIAGNOSIS — M47.26 OSTEOARTHRITIS OF SPINE WITH RADICULOPATHY, LUMBAR REGION: ICD-10-CM

## 2024-09-19 RX ORDER — HYDROCODONE BITARTRATE AND ACETAMINOPHEN 7.5; 325 MG/1; MG/1
1 TABLET ORAL EVERY 6 HOURS PRN
Qty: 120 TABLET | Refills: 0 | Status: SHIPPED | OUTPATIENT
Start: 2024-09-29 | End: 2024-10-29

## 2024-09-19 RX ORDER — GABAPENTIN 400 MG/1
400 CAPSULE ORAL 3 TIMES DAILY
Qty: 90 CAPSULE | Refills: 0 | Status: SHIPPED | OUTPATIENT
Start: 2024-09-19 | End: 2024-10-19

## 2024-09-20 RX ORDER — LEVOTHYROXINE SODIUM 175 UG/1
175 TABLET ORAL DAILY
Qty: 90 TABLET | Refills: 1 | Status: SHIPPED | OUTPATIENT
Start: 2024-09-20

## 2024-09-23 ENCOUNTER — TELEPHONE (OUTPATIENT)
Dept: CARDIOLOGY | Facility: CLINIC | Age: 74
End: 2024-09-23
Payer: MEDICARE

## 2024-10-01 ENCOUNTER — TELEPHONE (OUTPATIENT)
Dept: PAIN MANAGEMENT | Age: 74
End: 2024-10-01

## 2024-10-01 NOTE — TELEPHONE ENCOUNTER
Pt called in his refills for Gabapentin and Norco on 09/19/24 pt requested [facility], refills were sent that day.  Pt called again for his Norco on 09/30/24 requested [facility], a phone call was placed to the patient asking which pharmacy his [place] needed to go to, pt did not answer a voicemail was left.  Pt called again today to the  to cancel his appointment for 10/02/24 and asking about his Norco since it was still not at the pharmacy.  A phone call was placed to the pt again and pt did not answer, a voicemail was left stating his fill was at [facility] since 09/19/24 and if it needed to go to [facility] to please call us back.  I also notified the pt that his rescheduled appointment will put him past 4 months and the provider could list him as no meds until seen and that I would give his information to the  if we could get him in sooner than 11/06/24.

## 2024-10-02 ENCOUNTER — HOSPITAL ENCOUNTER (OUTPATIENT)
Dept: PAIN MANAGEMENT | Age: 74
Discharge: HOME | End: 2024-10-02

## 2024-10-17 DIAGNOSIS — M47.26 OSTEOARTHRITIS OF SPINE WITH RADICULOPATHY, LUMBAR REGION: ICD-10-CM

## 2024-10-17 RX ORDER — GABAPENTIN 400 MG/1
400 CAPSULE ORAL 3 TIMES DAILY
Qty: 54 CAPSULE | Refills: 0 | Status: SHIPPED | OUTPATIENT
Start: 2024-10-19 | End: 2024-11-06

## 2024-11-04 ENCOUNTER — HOSPITAL ENCOUNTER (OUTPATIENT)
Dept: PAIN MANAGEMENT | Age: 74
Discharge: HOME | End: 2024-11-04
Payer: MEDICARE

## 2024-11-04 VITALS
BODY MASS INDEX: 33.46 KG/M2 | HEART RATE: 65 BPM | HEIGHT: 71 IN | RESPIRATION RATE: 16 BRPM | SYSTOLIC BLOOD PRESSURE: 144 MMHG | TEMPERATURE: 97.7 F | DIASTOLIC BLOOD PRESSURE: 67 MMHG | WEIGHT: 239 LBS | OXYGEN SATURATION: 97 %

## 2024-11-04 DIAGNOSIS — M47.26 OSTEOARTHRITIS OF SPINE WITH RADICULOPATHY, LUMBAR REGION: ICD-10-CM

## 2024-11-04 PROCEDURE — 99214 OFFICE O/P EST MOD 30 MIN: CPT

## 2024-11-04 PROCEDURE — 99214 OFFICE O/P EST MOD 30 MIN: CPT | Performed by: STUDENT IN AN ORGANIZED HEALTH CARE EDUCATION/TRAINING PROGRAM

## 2024-11-04 RX ORDER — GABAPENTIN 400 MG/1
400 CAPSULE ORAL 3 TIMES DAILY
Qty: 90 CAPSULE | Refills: 2 | Status: SHIPPED | OUTPATIENT
Start: 2024-11-04 | End: 2025-02-02

## 2024-11-04 RX ORDER — HYDROCODONE BITARTRATE AND ACETAMINOPHEN 7.5; 325 MG/1; MG/1
1 TABLET ORAL EVERY 6 HOURS PRN
Qty: 120 TABLET | Refills: 0 | Status: SHIPPED | OUTPATIENT
Start: 2024-11-04 | End: 2024-12-04

## 2024-11-04 ASSESSMENT — PAIN DESCRIPTION - ORIENTATION
ORIENTATION: MID
ORIENTATION_2: LOWER

## 2024-11-04 ASSESSMENT — PAIN DESCRIPTION - LOCATION
LOCATION_2: NECK
LOCATION: BACK

## 2024-11-04 ASSESSMENT — PAIN DESCRIPTION - INTENSITY: RATING_2: 8

## 2024-11-04 ASSESSMENT — PAIN SCALES - GENERAL: PAINLEVEL_OUTOF10: 8

## 2024-11-04 NOTE — PROGRESS NOTES
Clinic Documentation      Education Provided:  [x] Review of Chris  [] Agreement Review  [x] PEG Score Calculated [] PHQ Score Calculated [] ORT Score Calculated    [] Compliance Issues Discussed [] Cognitive Behavior Needs [x] Exercise [] Review of Test [] Financial Issues  [x] Tobacco/Alcohol Use Reviewed [x] Teaching [] New Patient [] Picture Obtained    Physician Plan:  [] Outgoing Referral  [] Pharmacy Consult  [x] Test Ordered [x] Prescription Ordered/Changed   [] Obtained Test Results / Consult Notes        Complete if patient is withholding blood thinner for procedure     Blood Thinner Patient is currently taking:      [] Plavix (Hold for 7 days)  [] Aspirin (Hold for 5 days)     [] Pletal (Hold for 2 days)  [] Pradaxa (Hold for 3 days)    [] Effient (Hold for 7 days)  [] Xarelto (Hold for 2 days)    [] Eliquis (Hold for 2 days)  [] Brilinta (Hold for 7 days)    [] Coumadin (Hold for 5 days) - (INR needs to be drawn the day prior to procedure- INR < 2.0)    [] Aggrenox (Hold for 7 days)        [] Patient will stop medication on their own.    [] Blood Thinner Form Faxed for approval to hold.   Provider form faxed to:     Assessment Completed by:  Electronically signed by Christina Leon MA on 11/4/2024 at 8:49 AM  
There is bilateral neural foraminal stenosis. No  significant spinal stenosis.  The size and signal of the cervical spinal cord appear normal.  Moderate circumferential narrowing is seen at level C3-4 due to  prominent osteophytes, disc bulging and facetal arthropathy. No  abnormal intrinsic signal. No focal enlargement or expansion.  The visualized everette, medulla oblongata and cerebellum appear normal.  The prevertebral soft tissues appear normal.  Incidentally noted is a well-defined fluid signal nodule at the base  of the tongue, anterior wall of the vallecula and the midline,  measuring 1.2 cm in diameter. This may represent a thyroglossal duct  cyst or a mucous retention cyst of the lingual tonsil.  IMPRESSION:  The severe cervical spondylosis.  Multilevel prominent disc osteophyte complexes, uncinate spurs and  facetal arthropathy and resultant neural foraminal or spinal canal  stenosis as detailed above. This is most pronounced at C3-4.  Other findings as above.    Impression & Plan:  Mr. Pillo Ortega is 74 y.o. male with hx of elevated BMI (34), depression,  SUSY, DM, HTN/HLD, CAD s/p CABG x 4, , atrial fibrillation s/p MAZE on chronic anticoagulation (xarelto), carotid stenosis, and sarcoidosis. He was referred to Pain Management by Neurosurgery for treatment of his chronic neck pain.    1. Pain Generators / Etiology:   -cervical facet arthropathy -   - lumbar facet arthrosis  -chronic continuous use of opioids    2. Failed Pain Medications:           3. Previous Interventions:  7/18/24 Bilateral Cervical TPI 80-85% relief for at least 8 weeks    4. Physical Therapy (most recent): did PT 1 month ago for the back    5. Psychological comorbidities: depression    6. Anticoagulants / Antiplatelets: ASA    7. Opioid risk screening:      ORT:       Oral morphine equivalent (OME) daily:        PLAN:  Our mission at the [facility] is to improve the lives of our patients by improving function and

## 2024-11-04 NOTE — TELEPHONE ENCOUNTER
1. Osteoarthritis of spine with radiculopathy, lumbar region      Requested Prescriptions     Pending Prescriptions Disp Refills    gabapentin (NEURONTIN) 400 MG capsule 90 capsule 2     Sig: Take 1 capsule by mouth 3 times daily for 90 days. May fill 11/4/2024 Max Daily Amount: 1,200 mg    HYDROcodone-acetaminophen (NORCO) 7.5-325 MG per tablet 120 tablet 0     Sig: Take 1 tablet by mouth every 6 hours as needed for Pain for up to 30 days. May fill 11/4/2024       Continue medication with refill sent at appointment yes; refill sent to medical director at appointment no, see refill encounter dated 11/4/2024    Electronically signed by MARY Wade CNP on 11/4/2024 at 8:57 AM

## 2024-11-14 ENCOUNTER — TELEPHONE (OUTPATIENT)
Dept: PAIN MANAGEMENT | Age: 74
End: 2024-11-14

## 2024-11-14 NOTE — TELEPHONE ENCOUNTER
Called patient to give him his new procedure times. 1st medial branch block is now 12/11/24 at  8am and the 2nd one is 1/8/25 at 8am. He will then follow up with Dr. Spaulding 4/8/25 at 8am.

## 2024-11-26 DIAGNOSIS — F33.0 MILD EPISODE OF RECURRENT MAJOR DEPRESSIVE DISORDER (HCC): ICD-10-CM

## 2024-11-26 RX ORDER — CITALOPRAM HYDROBROMIDE 20 MG/1
20 TABLET ORAL DAILY
Qty: 90 TABLET | Refills: 3 | Status: SHIPPED | OUTPATIENT
Start: 2024-11-26

## 2024-11-26 NOTE — TELEPHONE ENCOUNTER
Received fax from pharmacy requesting refill on pts medication(s). Pt was last seen in office on 9/13/2024  and has a follow up scheduled for 12/13/2024. Will send request to MARY Lozada for authorization.     Requested Prescriptions     Pending Prescriptions Disp Refills    citalopram (CELEXA) 20 MG tablet [Pharmacy Med Name: CITALOPRAM HBR 20 MG TABLET] 90 tablet 3     Sig: TAKE 1 TABLET BY MOUTH EVERY DAY

## 2024-12-03 DIAGNOSIS — M47.26 OSTEOARTHRITIS OF SPINE WITH RADICULOPATHY, LUMBAR REGION: ICD-10-CM

## 2024-12-06 ENCOUNTER — OFFICE VISIT (OUTPATIENT)
Dept: CARDIOLOGY | Facility: CLINIC | Age: 74
End: 2024-12-06
Payer: MEDICARE

## 2024-12-06 VITALS
BODY MASS INDEX: 33.74 KG/M2 | SYSTOLIC BLOOD PRESSURE: 138 MMHG | WEIGHT: 241 LBS | DIASTOLIC BLOOD PRESSURE: 82 MMHG | HEIGHT: 71 IN

## 2024-12-06 DIAGNOSIS — I48.0 PAROXYSMAL ATRIAL FIBRILLATION: Primary | Chronic | ICD-10-CM

## 2024-12-06 DIAGNOSIS — N18.31 TYPE 2 DIABETES MELLITUS WITH STAGE 3A CHRONIC KIDNEY DISEASE, WITHOUT LONG-TERM CURRENT USE OF INSULIN: Chronic | ICD-10-CM

## 2024-12-06 DIAGNOSIS — I10 PRIMARY HYPERTENSION: Chronic | ICD-10-CM

## 2024-12-06 DIAGNOSIS — G47.30 SLEEP APNEA, UNSPECIFIED TYPE: Chronic | ICD-10-CM

## 2024-12-06 DIAGNOSIS — E78.2 MIXED HYPERLIPIDEMIA: Chronic | ICD-10-CM

## 2024-12-06 DIAGNOSIS — Z98.890 S/P MAZE OPERATION FOR ATRIAL FIBRILLATION: Chronic | ICD-10-CM

## 2024-12-06 DIAGNOSIS — E66.09 CLASS 1 OBESITY DUE TO EXCESS CALORIES WITH SERIOUS COMORBIDITY AND BODY MASS INDEX (BMI) OF 33.0 TO 33.9 IN ADULT: Chronic | ICD-10-CM

## 2024-12-06 DIAGNOSIS — I25.10 CORONARY ARTERY DISEASE INVOLVING NATIVE CORONARY ARTERY OF NATIVE HEART WITHOUT ANGINA PECTORIS: Chronic | ICD-10-CM

## 2024-12-06 DIAGNOSIS — Z86.79 S/P MAZE OPERATION FOR ATRIAL FIBRILLATION: Chronic | ICD-10-CM

## 2024-12-06 DIAGNOSIS — I71.21 ANEURYSM OF ASCENDING AORTA WITHOUT RUPTURE: ICD-10-CM

## 2024-12-06 DIAGNOSIS — E66.811 CLASS 1 OBESITY DUE TO EXCESS CALORIES WITH SERIOUS COMORBIDITY AND BODY MASS INDEX (BMI) OF 33.0 TO 33.9 IN ADULT: Chronic | ICD-10-CM

## 2024-12-06 DIAGNOSIS — Z95.1 HX OF CABG: ICD-10-CM

## 2024-12-06 DIAGNOSIS — E11.22 TYPE 2 DIABETES MELLITUS WITH STAGE 3A CHRONIC KIDNEY DISEASE, WITHOUT LONG-TERM CURRENT USE OF INSULIN: Chronic | ICD-10-CM

## 2024-12-06 DIAGNOSIS — I65.23 BILATERAL CAROTID ARTERY STENOSIS: Chronic | ICD-10-CM

## 2024-12-06 PROBLEM — I71.20 THORACIC AORTIC ANEURYSM WITHOUT RUPTURE: Chronic | Status: ACTIVE | Noted: 2022-08-08

## 2024-12-06 RX ORDER — HYDROCODONE BITARTRATE AND ACETAMINOPHEN 7.5; 325 MG/1; MG/1
1 TABLET ORAL EVERY 6 HOURS PRN
Qty: 120 TABLET | Refills: 0 | Status: SHIPPED | OUTPATIENT
Start: 2024-12-06 | End: 2025-01-05

## 2024-12-06 NOTE — ASSESSMENT & PLAN NOTE
Remains well controlled except low HDL per 2/2024 labs. Could consider changing atorvastatin to rosuvastatin to see if any improvement in HDL. Also encouraged healthy diet, weight loss, and gradual increase in routine aerobic activity. Recheck at least yearly.

## 2024-12-06 NOTE — ASSESSMENT & PLAN NOTE
Continues to maintain sinus rhythm per symptoms. Reasonable to remain off anticoagulation since s/p MAZE and left atrial appendage occlusion 1/2022. Call if any palpitations.

## 2024-12-06 NOTE — ASSESSMENT & PLAN NOTE
Fair control per today's and reported home readings. Goal -120s/60-70s. Continue present therapy with regular home monitoring but may need to consider increasing lisinopril or amlodipine.

## 2024-12-06 NOTE — ASSESSMENT & PLAN NOTE
Encouraged continued compliance with CPAP. Again encouraged continued weight loss but continue CPAP until has significant amount of weight loss and then re-evaluate need for CPAP.

## 2024-12-06 NOTE — ASSESSMENT & PLAN NOTE
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 Again encouraged healthy diet and to gradual increase activity. Chest soreness remains resolved and was most consistent with post-surgical soreness. Again recommended he consider restarting Jardiance or Farxiga and if too expensive check into discount cards and/or patient assistance programs. He will discuss with PCP. Continue lifelong aspirin. Thoracic aortic ectasia remains stable and followed by Dr. White.

## 2024-12-06 NOTE — ASSESSMENT & PLAN NOTE
Remains stable/improved. Again encouraged healthy diet, BP, lipid, and glucose control, and routine aerobic exercise. Continue present therapy. Recheck ordered for 7/2025 per Dr. León/Jeri Chambers NP.

## 2024-12-06 NOTE — ASSESSMENT & PLAN NOTE
Patient's (Body mass index is 33.63 kg/m².) indicates that they are obese (BMI >30) with health conditions that include obstructive sleep apnea, hypertension, coronary heart disease, diabetes mellitus, dyslipidemias, peripheral vascular disease and osteoarthritis . Weight is unchanged and medications. BMI is is above average; BMI management plan is completed. We discussed low calorie, low carb based diet program and increasing exercise.

## 2024-12-06 NOTE — ASSESSMENT & PLAN NOTE
Followed by PCP. Not quite to goal per 5/2024 labs (A1c 7.6%). Unable to afford SGLT2i? Again encouraged him to explore all options for Jardiance or Farxiga for CV, DM, and renal benefits. Again encouraged healthy diet, weight loss, and gradual increase in activity.

## 2024-12-06 NOTE — PROGRESS NOTES
Encounter Date:12/06/2024  Chief Complaint:   Subjective    Zay Kamara is a 74 y.o. male who presents to [facility] today for six month cardiac follow-up. He is accompanied by his wife.      Atrial Fibrillation  Past medical history includes atrial fibrillation, CAD and hyperlipidemia.   Hypertension  Identifiable causes of hypertension include sleep apnea.   Hyperlipidemia    Coronary Artery Disease  Risk factors include hyperlipidemia.   Aortic Aneurysm  Sleep Apnea     HPI       Atrial Fibrillation     Additional comments: No palpitations. S/p MAZE and left atrial appendage occlusion during CABG 1/2022. Not anticoagulated.             Hypertension     Additional comments: Running 130s/60s. No headaches, dizziness, nosebleeds, or swelling.             Hyperlipidemia     Additional comments: Last checked 2/2024 and controlled except low HDL.             Coronary Artery Disease     Additional comments: Continues to complain of feeling like needles are poking him since his CABG 1/2022. No chest discomfort or anginal equivalents. Previous angina was severe decrease in exercise tolerance - weakness. Working part time at [employer]. Not exercising. Has a treadmill but doesn't use it.              Follow-up     Additional comments: Six month, almost fainted a few times a few months ago after taking tizanidine which made his BP drop too low. Hasn't recurred since stopping.             Loss of Consciousness     Additional comments: Found out this was related to his tizanidine. In the morning his bp dropped pretty low             Aortic Aneurysm     Additional comments: Thoracic ascending 4.4 cm 6/2024 CTA chest, Dr. White following. Due for repeat CTA chest 3/2025.             Sleep Apnea     Additional comments: Wearing CPAP every night and feels like it helps.          Last edited by Letty Belcher APRN on 12/6/2024  2:35 PM.        History: Past medical, surgical, family,  "and social history reviewed.    Outpatient Medications Marked as Taking for the 12/6/24 encounter (Office Visit) with Letty Belcher APRN   Medication Sig Dispense Refill    albuterol sulfate  (90 Base) MCG/ACT inhaler Inhale 2 puffs Every 4 (Four) Hours As Needed for Wheezing or Shortness of Air (rinse mouth after use). 6.7 g 0    allopurinol (ZYLOPRIM) 100 MG tablet Take 1 tablet by mouth Daily.      amLODIPine (NORVASC) 2.5 MG tablet Take 1 tablet by mouth Every Night.      aspirin (aspirin) 81 MG EC tablet Take 1 tablet by mouth Daily.      atorvastatin (LIPITOR) 20 MG tablet Take 1 tablet by mouth Every Night.  2    Cholecalciferol (VITAMIN D3) 2000 units tablet Take 1 tablet by mouth Daily.      citalopram (CeleXA) 20 MG tablet Take 1 tablet by mouth Daily.      gabapentin (NEURONTIN) 400 MG capsule Take 1 capsule by mouth 2 (Two) Times a Day.      glipizide (GLUCOTROL) 5 MG tablet Take 2 tablets by mouth Every Morning.      HYDROcodone-acetaminophen (NORCO) 7.5-325 MG per tablet Take 1 tablet by mouth Every 8 (Eight) Hours As Needed for Moderate Pain. Usually takes twice a day      levothyroxine (SYNTHROID, LEVOTHROID) 175 MCG tablet Take 200 mcg by mouth Daily. Takes 200      lisinopril (PRINIVIL,ZESTRIL) 5 MG tablet Take 1 tablet by mouth Every Night.      melatonin 5 MG tablet tablet Take 1 tablet by mouth.      metFORMIN ER (GLUCOPHAGE-XR) 500 MG 24 hr tablet Take 1 tablet by mouth Daily With Breakfast.      metoprolol tartrate (LOPRESSOR) 50 MG tablet Take 1 tablet by mouth 2 (Two) Times a Day.      nitroglycerin (NITROSTAT) 0.4 MG SL tablet Place 1 tablet under the tongue Every 5 (Five) Minutes As Needed for Chest Pain. 25 tablet 11    polyethylene glycol (MIRALAX) 17 g packet Take 17 g by mouth Daily As Needed.          Objective     Vital Signs:   /82 (BP Location: Left arm, Patient Position: Sitting, Cuff Size: Adult)   Ht 180.3 cm (70.98\")   Wt 109 kg (241 lb)   BMI 33.63 " kg/m²   Wt Readings from Last 3 Encounters:   12/06/24 109 kg (241 lb)   09/11/24 110 kg (243 lb 9.6 oz)   07/30/24 109 kg (240 lb 10.4 oz)         Vitals reviewed.   Constitutional:       Appearance: Well-developed. Morbidly obese.   Eyes:      General: No scleral icterus.  HENT:      Right Ear: Decreased hearing noted.      Left Ear: Decreased hearing noted.      Ears:      Comments: Wears hearing aid  Neck:      Vascular: Normal carotid pulses. No carotid bruit or JVD.   Pulmonary:      Effort: Pulmonary effort is normal.      Breath sounds: Normal breath sounds.   Chest:   Breasts:     Left: No tenderness.   Cardiovascular:      Normal rate. Regular rhythm.      No gallop.    Pulses:     Intact distal pulses.   Edema:     Peripheral edema absent.   Skin:     General: Skin is warm and dry.      Comments: Midline sternal incision well healed   Neurological:      Mental Status: Alert and oriented to person, place, and time.   Psychiatric:         Behavior: Behavior is cooperative.         Result Review  Data Reviewed:  The following data was reviewed by: LUIS Garcia on 12/06/2024  Lab Results - Last 18 Months   Lab Units 09/11/24  1216 07/30/24  1026 06/28/24  0757 05/08/24  1048 04/30/24  2251   BUN mg/dL  --  34*  --  27* 32*   CREATININE mg/dL 1.40* 1.57* 1.70* 1.3* 1.58*   SODIUM mmol/L  --  140  --  134* 140   POTASSIUM mmol/L  --  5.2  --  4.7 4.4   CHLORIDE mmol/L  --  104  --  100 104   CALCIUM mg/dL  --  9.7  --  9.2 8.5*   ALBUMIN g/dL  --   --   --  4.3 3.8   BILIRUBIN mg/dL  --   --   --  0.7 0.5   ALK PHOS U/L  --   --   --  70 61   AST (SGOT) U/L  --   --   --  12 12   ALT (SGPT) U/L  --   --   --  12 11   HEMOGLOBIN A1C %  --   --   --  7.6*  --    WBC 10*3/mm3  --   --   --   --  8.32   RBC 10*6/mm3  --   --   --   --  3.73*   HEMATOCRIT %  --   --   --   --  33.0*   MCV fL  --   --   --   --  88.5   MCH pg  --   --   --   --  29.8   TSH uIU/mL  --   --   --   --  0.380   PSA ng/mL  --   <0.014  --   --   --    URIC ACID mg/dL  --   --   --  5.5  --             ECG 12 Lead    Date/Time: 12/6/2024 1:36 PM  Performed by: Letty Belcher APRN    Authorized by: Letty Belcher APRN  Comparison: compared with previous ECG from 4/30/2024  Similar to previous ECG  Rhythm: sinus rhythm  Rate: normal  BPM: 65  Conduction: right bundle branch block    Clinical impression: abnormal EKG             Assessment and Plan   Problem List Items Addressed This Visit          Cardiac and Vasculature    Coronary artery disease involving native heart (Chronic)    Overview     #1 99% ostial LAD stenosis and total occlusion of the distal LAD  #2 60% proximal RCA stenosis and 90% stenosis of the proximal PL branch  #3 dilated ascending aorta  #4 LVEF 55%    Coronary artery bypass graft x 4 (left internal mammary artery/sequencing the dominant diagonal artery and left anterior descending artery, reverse saphenous vein graft/distal right coronary artery, and reverse saphenous vein graft/posterior descending artery), Right and left MAZE procedure with radiofrequency and cryoablation, Left atrial appendage exclusion (Atriclip 40 mm device), Left endoscopic vein harvest performed on 1/6/2022 by Dr. White.            Current Assessment & Plan     Remains stable/improved s/p CABG x 4 1/2022. Again encouraged healthy diet and to gradual increase activity. Chest soreness remains resolved and was most consistent with post-surgical soreness. Again recommended he consider restarting Jardiance or Farxiga and if too expensive check into discount cards and/or patient assistance programs. He will discuss with PCP. Continue lifelong aspirin. Thoracic aortic ectasia remains stable and followed by Dr. White.         Relevant Orders    ECG 12 Lead (Completed)    Paroxysmal atrial fibrillation - Primary (Chronic)    Overview     [id number] SCORE   [id number] Score: 5 (12/6/2024  3:09 PM)    HAS-BLED SCORE   Total Risk Score (>5  Very High, 3-5 High, 2 Moderate, 1 Low): 3 (12/6/2024  3:10 PM)    s/p MAZE, left atrial appendage occlusion 1/2022         Current Assessment & Plan     Continues to maintain sinus rhythm per symptoms. Reasonable to remain off anticoagulation since s/p MAZE and left atrial appendage occlusion 1/2022. Call if any palpitations.         Relevant Orders    ECG 12 Lead (Completed)    Bilateral carotid artery stenosis (Chronic)    Overview     Dr. León following  8/21/2019 US - less than 50% R ICA, 50-69% L ICA stenosis  8/2021 - less than 50% L&R ICA  7/2023 - less than 50% bilaterally  7/2024 - less than 50% bilaterally         Current Assessment & Plan     Remains stable/improved. Again encouraged healthy diet, BP, lipid, and glucose control, and routine aerobic exercise. Continue present therapy. Recheck ordered for 7/2025 per Dr. León/Jeri Chambers NP.         Hypertension (Chronic)    Current Assessment & Plan     Fair control per today's and reported home readings. Goal -120s/60-70s. Continue present therapy with regular home monitoring but may need to consider increasing lisinopril or amlodipine.         Relevant Orders    ECG 12 Lead (Completed)    Mixed hyperlipidemia (Chronic)    Current Assessment & Plan     Remains well controlled except low HDL per 2/2024 labs. Could consider changing atorvastatin to rosuvastatin to see if any improvement in HDL. Also encouraged healthy diet, weight loss, and gradual increase in routine aerobic activity. Recheck at least yearly.         Hx of CABG (Chronic)    Overview     1/2022         S/P Maze operation for atrial fibrillation (Chronic)    Overview     1/2022         Thoracic aortic aneurysm without rupture (Chronic)    Current Assessment & Plan     Remains stable/unchanged. Dr. White following.            Endocrine and Metabolic    Class 1 obesity due to excess calories with serious comorbidity and body mass index (BMI) of 33.0 to 33.9 in adult  (Chronic)    Current Assessment & Plan     Patient's (Body mass index is 33.63 kg/m².) indicates that they are obese (BMI >30) with health conditions that include obstructive sleep apnea, hypertension, coronary heart disease, diabetes mellitus, dyslipidemias, peripheral vascular disease and osteoarthritis . Weight is unchanged and medications. BMI is is above average; BMI management plan is completed. We discussed low calorie, low carb based diet program and increasing exercise.          Type 2 diabetes mellitus with stage 3 chronic kidney disease (Chronic)    Current Assessment & Plan     Followed by PCP. Not quite to goal per 5/2024 labs (A1c 7.6%). Unable to afford SGLT2i? Again encouraged him to explore all options for Jardiance or Farxiga for CV, DM, and renal benefits. Again encouraged healthy diet, weight loss, and gradual increase in activity.            Sleep    Sleep apnea (Chronic)    Overview     Previously noncompliant with CPAP         Current Assessment & Plan     Encouraged continued compliance with CPAP. Again encouraged continued weight loss but continue CPAP until has significant amount of weight loss and then re-evaluate need for CPAP.          Advance Care Planning   ACP discussion was held with the patient during this visit. Patient does not have an advance directive, information provided.         Patient was given instructions and counseling regarding his condition or for health maintenance advice. Please see specific information pulled into the AVS if appropriate.    Follow Up :   Return in about 6 months (around 6/6/2025) for Recheck.                  LUIS Montero, ACNP-BC, CHFN-BC

## 2024-12-11 ENCOUNTER — OFFICE VISIT (OUTPATIENT)
Dept: PULMONOLOGY | Facility: CLINIC | Age: 74
End: 2024-12-11
Payer: MEDICARE

## 2024-12-11 VITALS
DIASTOLIC BLOOD PRESSURE: 62 MMHG | HEART RATE: 67 BPM | SYSTOLIC BLOOD PRESSURE: 124 MMHG | HEIGHT: 71 IN | WEIGHT: 249.4 LBS | OXYGEN SATURATION: 98 % | BODY MASS INDEX: 34.92 KG/M2

## 2024-12-11 DIAGNOSIS — J98.4 RESTRICTIVE LUNG DISEASE: ICD-10-CM

## 2024-12-11 DIAGNOSIS — R06.02 SHORTNESS OF BREATH: Primary | ICD-10-CM

## 2024-12-11 DIAGNOSIS — I25.10 CORONARY ARTERY DISEASE INVOLVING NATIVE CORONARY ARTERY OF NATIVE HEART WITHOUT ANGINA PECTORIS: Chronic | ICD-10-CM

## 2024-12-11 DIAGNOSIS — I28.8 ENLARGED PULMONARY ARTERY: ICD-10-CM

## 2024-12-11 NOTE — PROGRESS NOTES
"Background:  Pt w Pulmonary hypertension, jose   Chief Complaint  Enlarged pulmonary artery    Subjective    History of Present Illness     Zay Kamara is here for follow up with [facility].  History of Present Illness  He saw Mari last when enlarged mediastinal nodes were identified and reassessed.  Nodes improved on follow up suggesting reactive.  There was concern for pulmonary hypertension but that does not appear evident based on echocardiogram reading.  He has restrictive lung physiology.  He has had albuterol mdi for use in event there is any bronchoscpastic component to his dyspnea. He hasn't needed the inhaler.  No new major new diagnoses.     Tobacco Use: Medium Risk (12/11/2024)    Patient History     Smoking Tobacco Use: Never     Smokeless Tobacco Use: Never     Passive Exposure: Current      Current Outpatient Medications   Medication Instructions    albuterol sulfate  (90 Base) MCG/ACT inhaler 2 puffs, Inhalation, Every 4 Hours PRN    allopurinol (ZYLOPRIM) 100 mg, Daily    amLODIPine (NORVASC) 2.5 MG tablet 1 tablet, Nightly    aspirin 81 mg, Oral, Daily    atorvastatin (LIPITOR) 20 mg, Nightly    citalopram (CELEXA) 20 mg, Daily    gabapentin (NEURONTIN) 400 mg, 2 Times Daily    glipizide (GLUCOTROL) 10 mg, Every Early Morning    HYDROcodone-acetaminophen (NORCO) 7.5-325 MG per tablet 1 tablet, Every 8 Hours PRN    levothyroxine (SYNTHROID, LEVOTHROID) 200 mcg, Daily    lisinopril (PRINIVIL,ZESTRIL) 5 mg, Nightly    melatonin 5 mg    metFORMIN ER (GLUCOPHAGE-XR) 500 mg, Daily With Breakfast    metoprolol tartrate (LOPRESSOR) 50 MG tablet Take 1 tablet by mouth 2 (Two) Times a Day.    nitroglycerin (NITROSTAT) 0.4 mg, Sublingual, Every 5 Minutes PRN    polyethylene glycol (MIRALAX) 17 g, Daily PRN    Vitamin D3 50 mcg, Daily      Objective     Vital Signs:   /62   Pulse 67   Ht 180.3 cm (70.98\")   Wt 113 kg (249 lb 6.4 oz)   " SpO2 98% Comment: RA  BMI 34.80 kg/m²   Physical Exam  Constitutional:       Appearance: Normal appearance. He is not ill-appearing or diaphoretic.   Eyes:      Extraocular Movements: Extraocular movements intact.   Pulmonary:      Effort: Pulmonary effort is normal. No respiratory distress.      Breath sounds: No wheezing, rhonchi or rales.   Neurological:      Mental Status: He is alert.      Result Review  Data Reviewed:  CT Chest With Contrast Diagnostic (06/28/2024 08:17)    1. Mediastinal and hilar is improved and was likely reactive. No active  disease is seen in the chest.  2. Postoperative chest and stable dilated aortic root and ascending  thoracic aorta.  3. Stable nonobstructing stones in the left kidney with cortical  thinning and probable atrophy and stable lipoma in the wall of the  hepatic flexure of the colon.      PFT Values          2/1/2024    13:15   Pre Drug PFT Results   FVC 65   FEV1 77   FEF 25-75% 178   FEV1/FVC 89   Other Tests PFT Results   DLCO 109   D/VAsb 135                Assessment and Plan    Diagnoses and all orders for this visit:    1. Shortness of breath (Primary)    2. Coronary artery disease involving native coronary artery of native heart without angina pectoris    3. Enlarged pulmonary artery    4. Restrictive lung disease    He appears overall stable.  Workup for pulmonary hypertension looks negative.  He has had restrictive lung physiology.  We discussed that today.  Will keep an eye on that and follow-up.  The normal diffusion capacity associated with that is encouraging.  Reassess the summer.  Call sooner as needed.  Follow Up   Return in about 6 months (around 6/11/2025) for Spirometry and DLCO.  Patient was given instructions and counseling regarding his condition or for health maintenance advice. Please see specific information pulled into the AVS if appropriate.    Electronically signed by Jimmie Wei MD, 12/11/2024, 10:10 CST

## 2024-12-13 ENCOUNTER — TELEPHONE (OUTPATIENT)
Dept: FAMILY MEDICINE CLINIC | Age: 74
End: 2024-12-13

## 2024-12-13 ENCOUNTER — OFFICE VISIT (OUTPATIENT)
Dept: FAMILY MEDICINE CLINIC | Age: 74
End: 2024-12-13
Payer: MEDICARE

## 2024-12-13 VITALS
HEIGHT: 71 IN | DIASTOLIC BLOOD PRESSURE: 72 MMHG | OXYGEN SATURATION: 95 % | TEMPERATURE: 96.9 F | HEART RATE: 68 BPM | SYSTOLIC BLOOD PRESSURE: 134 MMHG | WEIGHT: 244 LBS | BODY MASS INDEX: 34.16 KG/M2

## 2024-12-13 DIAGNOSIS — E89.0 POSTOPERATIVE HYPOTHYROIDISM: ICD-10-CM

## 2024-12-13 DIAGNOSIS — E11.22 TYPE 2 DIABETES MELLITUS WITH STAGE 3A CHRONIC KIDNEY DISEASE, WITHOUT LONG-TERM CURRENT USE OF INSULIN (HCC): ICD-10-CM

## 2024-12-13 DIAGNOSIS — E11.22 TYPE 2 DIABETES MELLITUS WITH STAGE 3A CHRONIC KIDNEY DISEASE, WITHOUT LONG-TERM CURRENT USE OF INSULIN (HCC): Primary | ICD-10-CM

## 2024-12-13 DIAGNOSIS — I10 ESSENTIAL HYPERTENSION: ICD-10-CM

## 2024-12-13 DIAGNOSIS — Z23 NEED FOR INFLUENZA VACCINATION: ICD-10-CM

## 2024-12-13 DIAGNOSIS — N18.31 TYPE 2 DIABETES MELLITUS WITH STAGE 3A CHRONIC KIDNEY DISEASE, WITHOUT LONG-TERM CURRENT USE OF INSULIN (HCC): ICD-10-CM

## 2024-12-13 DIAGNOSIS — I25.10 CORONARY ARTERY DISEASE INVOLVING NATIVE HEART WITHOUT ANGINA PECTORIS, UNSPECIFIED VESSEL OR LESION TYPE: ICD-10-CM

## 2024-12-13 DIAGNOSIS — I73.9 PAD (PERIPHERAL ARTERY DISEASE) (HCC): ICD-10-CM

## 2024-12-13 DIAGNOSIS — N18.31 TYPE 2 DIABETES MELLITUS WITH STAGE 3A CHRONIC KIDNEY DISEASE, WITHOUT LONG-TERM CURRENT USE OF INSULIN (HCC): Primary | ICD-10-CM

## 2024-12-13 DIAGNOSIS — R13.14 PHARYNGOESOPHAGEAL DYSPHAGIA: ICD-10-CM

## 2024-12-13 DIAGNOSIS — E78.2 MIXED HYPERLIPIDEMIA: ICD-10-CM

## 2024-12-13 LAB
ANION GAP SERPL CALCULATED.3IONS-SCNC: 10 MMOL/L (ref 7–19)
BUN SERPL-MCNC: 29 MG/DL (ref 8–23)
CALCIUM SERPL-MCNC: 9.4 MG/DL (ref 8.8–10.2)
CHLORIDE SERPL-SCNC: 102 MMOL/L (ref 98–111)
CO2 SERPL-SCNC: 25 MMOL/L (ref 22–29)
CREAT SERPL-MCNC: 1.4 MG/DL (ref 0.7–1.2)
GLUCOSE SERPL-MCNC: 157 MG/DL (ref 70–99)
HBA1C MFR BLD: 7.6 % (ref 4–5.6)
POTASSIUM SERPL-SCNC: 4.8 MMOL/L (ref 3.5–5)
SODIUM SERPL-SCNC: 137 MMOL/L (ref 136–145)
TSH SERPL DL<=0.005 MIU/L-ACNC: 0.55 UIU/ML (ref 0.27–4.2)

## 2024-12-13 PROCEDURE — G8427 DOCREV CUR MEDS BY ELIG CLIN: HCPCS | Performed by: NURSE PRACTITIONER

## 2024-12-13 PROCEDURE — 3017F COLORECTAL CA SCREEN DOC REV: CPT | Performed by: NURSE PRACTITIONER

## 2024-12-13 PROCEDURE — 1123F ACP DISCUSS/DSCN MKR DOCD: CPT | Performed by: NURSE PRACTITIONER

## 2024-12-13 PROCEDURE — G0008 ADMIN INFLUENZA VIRUS VAC: HCPCS | Performed by: NURSE PRACTITIONER

## 2024-12-13 PROCEDURE — 1036F TOBACCO NON-USER: CPT | Performed by: NURSE PRACTITIONER

## 2024-12-13 PROCEDURE — 1160F RVW MEDS BY RX/DR IN RCRD: CPT | Performed by: NURSE PRACTITIONER

## 2024-12-13 PROCEDURE — 90653 IIV ADJUVANT VACCINE IM: CPT | Performed by: NURSE PRACTITIONER

## 2024-12-13 PROCEDURE — G8482 FLU IMMUNIZE ORDER/ADMIN: HCPCS | Performed by: NURSE PRACTITIONER

## 2024-12-13 PROCEDURE — 3075F SYST BP GE 130 - 139MM HG: CPT | Performed by: NURSE PRACTITIONER

## 2024-12-13 PROCEDURE — G8417 CALC BMI ABV UP PARAM F/U: HCPCS | Performed by: NURSE PRACTITIONER

## 2024-12-13 PROCEDURE — 3051F HG A1C>EQUAL 7.0%<8.0%: CPT | Performed by: NURSE PRACTITIONER

## 2024-12-13 PROCEDURE — 3078F DIAST BP <80 MM HG: CPT | Performed by: NURSE PRACTITIONER

## 2024-12-13 PROCEDURE — 99214 OFFICE O/P EST MOD 30 MIN: CPT | Performed by: NURSE PRACTITIONER

## 2024-12-13 PROCEDURE — 1159F MED LIST DOCD IN RCRD: CPT | Performed by: NURSE PRACTITIONER

## 2024-12-13 PROCEDURE — 2022F DILAT RTA XM EVC RTNOPTHY: CPT | Performed by: NURSE PRACTITIONER

## 2024-12-13 SDOH — ECONOMIC STABILITY: FOOD INSECURITY: WITHIN THE PAST 12 MONTHS, YOU WORRIED THAT YOUR FOOD WOULD RUN OUT BEFORE YOU GOT MONEY TO BUY MORE.: NEVER TRUE

## 2024-12-13 SDOH — ECONOMIC STABILITY: FOOD INSECURITY: WITHIN THE PAST 12 MONTHS, THE FOOD YOU BOUGHT JUST DIDN'T LAST AND YOU DIDN'T HAVE MONEY TO GET MORE.: NEVER TRUE

## 2024-12-13 SDOH — ECONOMIC STABILITY: INCOME INSECURITY: HOW HARD IS IT FOR YOU TO PAY FOR THE VERY BASICS LIKE FOOD, HOUSING, MEDICAL CARE, AND HEATING?: NOT HARD AT ALL

## 2024-12-13 ASSESSMENT — PATIENT HEALTH QUESTIONNAIRE - PHQ9
1. LITTLE INTEREST OR PLEASURE IN DOING THINGS: NOT AT ALL
SUM OF ALL RESPONSES TO PHQ QUESTIONS 1-9: 0
4. FEELING TIRED OR HAVING LITTLE ENERGY: NOT AT ALL
SUM OF ALL RESPONSES TO PHQ9 QUESTIONS 1 & 2: 0
SUM OF ALL RESPONSES TO PHQ QUESTIONS 1-9: 0
SUM OF ALL RESPONSES TO PHQ QUESTIONS 1-9: 0
7. TROUBLE CONCENTRATING ON THINGS, SUCH AS READING THE NEWSPAPER OR WATCHING TELEVISION: NOT AT ALL
8. MOVING OR SPEAKING SO SLOWLY THAT OTHER PEOPLE COULD HAVE NOTICED. OR THE OPPOSITE, BEING SO FIGETY OR RESTLESS THAT YOU HAVE BEEN MOVING AROUND A LOT MORE THAN USUAL: NOT AT ALL
SUM OF ALL RESPONSES TO PHQ QUESTIONS 1-9: 0
10. IF YOU CHECKED OFF ANY PROBLEMS, HOW DIFFICULT HAVE THESE PROBLEMS MADE IT FOR YOU TO DO YOUR WORK, TAKE CARE OF THINGS AT HOME, OR GET ALONG WITH OTHER PEOPLE: NOT DIFFICULT AT ALL
9. THOUGHTS THAT YOU WOULD BE BETTER OFF DEAD, OR OF HURTING YOURSELF: NOT AT ALL
6. FEELING BAD ABOUT YOURSELF - OR THAT YOU ARE A FAILURE OR HAVE LET YOURSELF OR YOUR FAMILY DOWN: NOT AT ALL
3. TROUBLE FALLING OR STAYING ASLEEP: NOT AT ALL
2. FEELING DOWN, DEPRESSED OR HOPELESS: NOT AT ALL
5. POOR APPETITE OR OVEREATING: NOT AT ALL

## 2024-12-13 ASSESSMENT — ENCOUNTER SYMPTOMS
SORE THROAT: 0
NAUSEA: 0
RHINORRHEA: 0
COUGH: 1
CONSTIPATION: 0
SHORTNESS OF BREATH: 0
TROUBLE SWALLOWING: 0
ABDOMINAL PAIN: 0
VOMITING: 0
DIARRHEA: 0

## 2024-12-13 NOTE — PROGRESS NOTES
Pillo Ortega (:  1950) is a 74 y.o. male, Established patient, here for evaluation of the following chief complaint(s):  6 Month Follow-Up (BS- 180-200. No issues. ) and Cough (Every time he eats, he coughs. Has hard time swallowing at times. Feels like things get stuck.  Went to specialist in [place] and was just told to drink more water. Tried this and not working. )         Assessment & Plan  1. Diabetes mellitus.  His last A1c was 7.4. He is currently on glipizide once a day and metformin 500 mg once a day. He checks his blood sugar every other day, with readings ranging from 160 to 200. He reports no episodes of hypoglycemia. A recheck of his A1c will be done today. Jardiance will be added to his regimen to help with sugar control and reduce cardiovascular problems. Samples of Jardiance will be provided if available.    2. Chronic kidney disease.  He is on glipizide and metformin for diabetes management, which also affects his kidney function. Jardiance will be added to his regimen as it has benefits for kidney health.    3. Hypertension.  His blood pressure is well-controlled on his current regimen of lisinopril 5 mg once a day, amlodipine 2.5 mg once a day, and metoprolol 50 mg twice a day. He reports no chest pain or headaches.    4. Hyperlipidemia.  His last cholesterol levels were excellent: total cholesterol 90, HDL 21, LDL 41, and triglycerides 142. He is currently on Lipitor 20 mg a day.    5. Coronary artery disease.  He had open heart surgery in 2022 and continues to see his cardiologist, Dr. Trinh Lopez, for follow-up. He is currently on aspirin 81 mg a day.    6. Proximal atrial fibrillation.  He continues to see his cardiologist for management. No changes in his current treatment plan were discussed.    7. Hypothyroidism.  His last thyroid level in 2024 was 0.069, and his dose was decreased at that time. He is currently on thyroid medication at 175 mcg, taken on

## 2024-12-13 NOTE — PROGRESS NOTES
Vaccine Information Sheet, \"Influenza - Inactivated\"  given to Pillo HEMPHILL Crews, or parent/legal guardian of  Pillo HEMPHILL [employer] and verbalized understanding.    Patient responses:    Have you ever had a reaction to a flu vaccine? NO  Do you have an adverse reaction when you eat eggs? NO  Do you have any current illness?  NO  Have you ever had Guillian [place] Syndrome?  NO    Flu vaccine given per order. Please see immunization tab.

## 2024-12-13 NOTE — TELEPHONE ENCOUNTER
----- Message from MARY Suarez sent at 12/13/2024 12:25 PM CST -----  Please call patient and let them know results.   Blood sugars are stable with an A1c of 7.6.  Hopefully they will get better with the addition of jardiance  Metabolic panel shows elevated blood sugar 157.  Kidney function is stable  Normal thyroid

## 2025-01-06 DIAGNOSIS — M47.26 OSTEOARTHRITIS OF SPINE WITH RADICULOPATHY, LUMBAR REGION: ICD-10-CM

## 2025-01-06 NOTE — TELEPHONE ENCOUNTER
Last OV - 11/04/24  Next OV - 04/08/25    Follow up was made at 11/04/24 OV, if he needs sooner have you MA call the patient.

## 2025-01-07 RX ORDER — HYDROCODONE BITARTRATE AND ACETAMINOPHEN 7.5; 325 MG/1; MG/1
1 TABLET ORAL EVERY 6 HOURS PRN
Qty: 120 TABLET | Refills: 0 | Status: SHIPPED | OUTPATIENT
Start: 2025-01-07 | End: 2025-02-06

## 2025-01-23 ENCOUNTER — TELEPHONE (OUTPATIENT)
Dept: PAIN MANAGEMENT | Age: 75
End: 2025-01-23

## 2025-01-29 DIAGNOSIS — E79.0 HYPERURICEMIA: ICD-10-CM

## 2025-01-29 DIAGNOSIS — I10 ESSENTIAL HYPERTENSION: ICD-10-CM

## 2025-01-29 RX ORDER — METOPROLOL TARTRATE 50 MG
50 TABLET ORAL 2 TIMES DAILY
Qty: 180 TABLET | Refills: 3 | Status: SHIPPED | OUTPATIENT
Start: 2025-01-29

## 2025-01-29 RX ORDER — ALLOPURINOL 100 MG/1
100 TABLET ORAL DAILY
Qty: 90 TABLET | Refills: 3 | Status: SHIPPED | OUTPATIENT
Start: 2025-01-29

## 2025-01-29 NOTE — TELEPHONE ENCOUNTER
Received fax from pharmacy requesting refill on pts medication(s). Pt was last seen in office on 12/13/2024  and has a follow up scheduled for 6/13/25. Will send request to  Paulo Hoyt  for patient.     Requested Prescriptions     Pending Prescriptions Disp Refills    metoprolol tartrate (LOPRESSOR) 50 MG tablet [Pharmacy Med Name: METOPROLOL TARTRATE 50 MG TAB] 180 tablet 3     Sig: TAKE 1 TABLET BY MOUTH TWICE A DAY    allopurinol (ZYLOPRIM) 100 MG tablet [Pharmacy Med Name: ALLOPURINOL 100 MG TABLET] 90 tablet 3     Sig: TAKE 1 TABLET BY MOUTH EVERY DAY

## 2025-02-05 ENCOUNTER — HOSPITAL ENCOUNTER (OUTPATIENT)
Dept: PAIN MANAGEMENT | Age: 75
Discharge: HOME | End: 2025-02-05
Payer: MEDICARE

## 2025-02-05 VITALS
OXYGEN SATURATION: 97 % | TEMPERATURE: 98.1 F | SYSTOLIC BLOOD PRESSURE: 147 MMHG | DIASTOLIC BLOOD PRESSURE: 65 MMHG | HEART RATE: 59 BPM | RESPIRATION RATE: 18 BRPM

## 2025-02-05 DIAGNOSIS — R52 PAIN MANAGEMENT: ICD-10-CM

## 2025-02-05 PROCEDURE — 64635 DESTROY LUMB/SAC FACET JNT: CPT

## 2025-02-05 PROCEDURE — 99152 MOD SED SAME PHYS/QHP 5/>YRS: CPT

## 2025-02-05 PROCEDURE — 64636 DESTROY L/S FACET JNT ADDL: CPT

## 2025-02-05 PROCEDURE — 6360000002 HC RX W HCPCS

## 2025-02-05 RX ORDER — MIDAZOLAM HYDROCHLORIDE 1 MG/ML
INJECTION, SOLUTION INTRAMUSCULAR; INTRAVENOUS
Status: COMPLETED | OUTPATIENT
Start: 2025-02-05 | End: 2025-02-05

## 2025-02-05 RX ADMIN — MIDAZOLAM HYDROCHLORIDE 2 MG: 1 INJECTION, SOLUTION INTRAMUSCULAR; INTRAVENOUS at 09:11

## 2025-02-05 ASSESSMENT — PAIN - FUNCTIONAL ASSESSMENT
PAIN_FUNCTIONAL_ASSESSMENT: 0-10
PAIN_FUNCTIONAL_ASSESSMENT: NONE - DENIES PAIN
PAIN_FUNCTIONAL_ASSESSMENT: 0-10

## 2025-02-05 NOTE — PROCEDURES
The patient's History and Physical  was reviewed with the patient and I examined the patient. There was no change. The surgical site was confirmed by the patient and me.            Plan: The risks, benefits, expected outcome, and alternative to the recommended procedure have been discussed with the patient. Patient understands and wants to proceed with the procedure.      -------------------------------------------------------------------------------------------------------------------------    Procedure Summary          Indications: Cervical Facet Arthropathy      CONSENT: Risks, benefits and options were explained to the patient, all questions were answered and written informed consent was obtained.        PROCEDURE NOTE:   A pre-procedural time out was performed to confirm the correct patient, procedure, side, and site. The patient was placed in lateral position. A sterile field was prepped in standard fashion using Chlorhexidine and draped with sterile towels. The selected medial branch nerves were identified using a Lateral fluoroscopic view. The overlying skin and subcutaneous tissue was anesthetized with 1% lidocaine. A 25 gauge 1.5  inch spinal needle was advanced using intermittent fluoroscopy toward the center of the articular pillar at the bilateral 4, C5, and C6 Needle placement was confirmed with biplanar fluoroscopic imaging. Following negative aspiration, 0.5 mL of .5% bupivicaine was injected at each location. The needles were re-styletted and withdrawn. The patient’s skin was cleaned with alcohol and the injection sites covered with bandages.        EBL: None   COMPLICATIONS: None   PRE-OP PAIN SCORE:  8  POST-OP PAIN SCORE:  5       The patient was monitored for at least 30 minutes prior to discharge. Vital signs remained stable throughout the procedure and in the recovery area. There were no immediate complications and the patient tolerated the procedure well. Sensory and motor exam was unchanged

## 2025-02-06 ENCOUNTER — HOSPITAL ENCOUNTER (OUTPATIENT)
Dept: PAIN MANAGEMENT | Age: 75
Discharge: HOME | End: 2025-02-06
Payer: MEDICARE

## 2025-02-06 VITALS
SYSTOLIC BLOOD PRESSURE: 132 MMHG | HEIGHT: 66 IN | TEMPERATURE: 97.5 F | BODY MASS INDEX: 39.08 KG/M2 | OXYGEN SATURATION: 98 % | RESPIRATION RATE: 16 BRPM | WEIGHT: 243.2 LBS | HEART RATE: 64 BPM | DIASTOLIC BLOOD PRESSURE: 67 MMHG

## 2025-02-06 PROCEDURE — 99214 OFFICE O/P EST MOD 30 MIN: CPT

## 2025-02-06 ASSESSMENT — PAIN SCALES - GENERAL: PAINLEVEL_OUTOF10: 8

## 2025-02-06 ASSESSMENT — PAIN DESCRIPTION - LOCATION: LOCATION: NECK

## 2025-02-06 ASSESSMENT — PAIN DESCRIPTION - PAIN TYPE: TYPE: CHRONIC PAIN

## 2025-02-07 DIAGNOSIS — M79.18 MYOFASCIAL MUSCLE PAIN: ICD-10-CM

## 2025-02-07 DIAGNOSIS — G89.4 CHRONIC PAIN SYNDROME: ICD-10-CM

## 2025-02-07 DIAGNOSIS — F11.90 CHRONIC, CONTINUOUS USE OF OPIOIDS: Primary | ICD-10-CM

## 2025-02-07 DIAGNOSIS — M47.26 OSTEOARTHRITIS OF SPINE WITH RADICULOPATHY, LUMBAR REGION: ICD-10-CM

## 2025-02-07 PROBLEM — M47.812 ARTHROPATHY OF CERVICAL FACET JOINT: Status: ACTIVE | Noted: 2025-02-07

## 2025-02-07 RX ORDER — GABAPENTIN 400 MG/1
400 CAPSULE ORAL 3 TIMES DAILY
Qty: 90 CAPSULE | Refills: 3 | Status: SHIPPED | OUTPATIENT
Start: 2025-02-07 | End: 2025-06-07

## 2025-02-07 RX ORDER — HYDROCODONE BITARTRATE AND ACETAMINOPHEN 7.5; 325 MG/1; MG/1
1 TABLET ORAL EVERY 6 HOURS PRN
Qty: 120 TABLET | Refills: 0 | Status: SHIPPED | OUTPATIENT
Start: 2025-02-07 | End: 2025-03-09

## 2025-03-04 ENCOUNTER — TELEPHONE (OUTPATIENT)
Dept: CARDIAC SURGERY | Facility: CLINIC | Age: 75
End: 2025-03-04
Payer: MEDICARE

## 2025-03-04 NOTE — TELEPHONE ENCOUNTER
"        Relay     \"You are scheduled for the following appointments:  03/19/2025: Please check in at main registration at 07:00 AM. NPO for 6 hours prior. Medications are okay to take. Medication delivering devices/diabetic monintoring devices must be removed prior to this exam. Your follow up with Dr. White will be at 10:30. Please check in at the time of your appointment. He will review your results with you during that office visit.\"                    Called pt re: upcoming appointments. No answer. LM.  If pt returns call, please relay the above to him and document in this encounter.  "

## 2025-03-06 ENCOUNTER — TELEPHONE (OUTPATIENT)
Dept: CARDIAC SURGERY | Facility: CLINIC | Age: 75
End: 2025-03-06
Payer: MEDICARE

## 2025-03-06 DIAGNOSIS — M47.26 OSTEOARTHRITIS OF SPINE WITH RADICULOPATHY, LUMBAR REGION: ICD-10-CM

## 2025-03-07 RX ORDER — HYDROCODONE BITARTRATE AND ACETAMINOPHEN 7.5; 325 MG/1; MG/1
1 TABLET ORAL EVERY 6 HOURS PRN
Qty: 120 TABLET | Refills: 0 | Status: SHIPPED | OUTPATIENT
Start: 2025-03-09 | End: 2025-04-08

## 2025-03-07 NOTE — TELEPHONE ENCOUNTER
Called pt re: upcoming appointments. No answer. LM.  Appointment reminder letters for CT and OV mailed to pt.  Closing this encounter.

## 2025-03-13 ENCOUNTER — OFFICE VISIT (OUTPATIENT)
Dept: PAIN MANAGEMENT | Age: 75
End: 2025-03-13
Payer: MEDICARE

## 2025-03-13 VITALS
BODY MASS INDEX: 34.47 KG/M2 | HEIGHT: 70 IN | RESPIRATION RATE: 16 BRPM | SYSTOLIC BLOOD PRESSURE: 108 MMHG | WEIGHT: 240.8 LBS | HEART RATE: 70 BPM | TEMPERATURE: 97.5 F | OXYGEN SATURATION: 96 % | DIASTOLIC BLOOD PRESSURE: 59 MMHG

## 2025-03-13 DIAGNOSIS — M47.812 ARTHROPATHY OF CERVICAL FACET JOINT: ICD-10-CM

## 2025-03-13 DIAGNOSIS — Z79.891 ENCOUNTER FOR MONITORING OPIOID MAINTENANCE THERAPY: ICD-10-CM

## 2025-03-13 DIAGNOSIS — F11.90 CHRONIC, CONTINUOUS USE OF OPIOIDS: ICD-10-CM

## 2025-03-13 DIAGNOSIS — Z51.81 ENCOUNTER FOR MONITORING OPIOID MAINTENANCE THERAPY: ICD-10-CM

## 2025-03-13 DIAGNOSIS — M79.18 MYOFASCIAL MUSCLE PAIN: Primary | ICD-10-CM

## 2025-03-13 DIAGNOSIS — M47.26 OSTEOARTHRITIS OF SPINE WITH RADICULOPATHY, LUMBAR REGION: ICD-10-CM

## 2025-03-13 DIAGNOSIS — G89.4 CHRONIC PAIN SYNDROME: ICD-10-CM

## 2025-03-13 DIAGNOSIS — M79.18 MYOFASCIAL PAIN SYNDROME: ICD-10-CM

## 2025-03-13 PROCEDURE — 1036F TOBACCO NON-USER: CPT | Performed by: STUDENT IN AN ORGANIZED HEALTH CARE EDUCATION/TRAINING PROGRAM

## 2025-03-13 PROCEDURE — 1159F MED LIST DOCD IN RCRD: CPT | Performed by: STUDENT IN AN ORGANIZED HEALTH CARE EDUCATION/TRAINING PROGRAM

## 2025-03-13 PROCEDURE — G8417 CALC BMI ABV UP PARAM F/U: HCPCS | Performed by: STUDENT IN AN ORGANIZED HEALTH CARE EDUCATION/TRAINING PROGRAM

## 2025-03-13 PROCEDURE — 3074F SYST BP LT 130 MM HG: CPT | Performed by: STUDENT IN AN ORGANIZED HEALTH CARE EDUCATION/TRAINING PROGRAM

## 2025-03-13 PROCEDURE — G2211 COMPLEX E/M VISIT ADD ON: HCPCS | Performed by: STUDENT IN AN ORGANIZED HEALTH CARE EDUCATION/TRAINING PROGRAM

## 2025-03-13 PROCEDURE — 99213 OFFICE O/P EST LOW 20 MIN: CPT | Performed by: STUDENT IN AN ORGANIZED HEALTH CARE EDUCATION/TRAINING PROGRAM

## 2025-03-13 PROCEDURE — 3017F COLORECTAL CA SCREEN DOC REV: CPT | Performed by: STUDENT IN AN ORGANIZED HEALTH CARE EDUCATION/TRAINING PROGRAM

## 2025-03-13 PROCEDURE — 1123F ACP DISCUSS/DSCN MKR DOCD: CPT | Performed by: STUDENT IN AN ORGANIZED HEALTH CARE EDUCATION/TRAINING PROGRAM

## 2025-03-13 PROCEDURE — G8427 DOCREV CUR MEDS BY ELIG CLIN: HCPCS | Performed by: STUDENT IN AN ORGANIZED HEALTH CARE EDUCATION/TRAINING PROGRAM

## 2025-03-13 PROCEDURE — 3078F DIAST BP <80 MM HG: CPT | Performed by: STUDENT IN AN ORGANIZED HEALTH CARE EDUCATION/TRAINING PROGRAM

## 2025-03-13 RX ORDER — HYDROCODONE BITARTRATE AND ACETAMINOPHEN 7.5; 325 MG/1; MG/1
1 TABLET ORAL EVERY 6 HOURS PRN
Qty: 120 TABLET | Refills: 0 | Status: SHIPPED | OUTPATIENT
Start: 2025-04-08 | End: 2025-05-08

## 2025-03-13 ASSESSMENT — PATIENT HEALTH QUESTIONNAIRE - PHQ9
SUM OF ALL RESPONSES TO PHQ QUESTIONS 1-9: 1
SUM OF ALL RESPONSES TO PHQ QUESTIONS 1-9: 1
10. IF YOU CHECKED OFF ANY PROBLEMS, HOW DIFFICULT HAVE THESE PROBLEMS MADE IT FOR YOU TO DO YOUR WORK, TAKE CARE OF THINGS AT HOME, OR GET ALONG WITH OTHER PEOPLE: NOT DIFFICULT AT ALL
2. FEELING DOWN, DEPRESSED OR HOPELESS: NOT AT ALL
4. FEELING TIRED OR HAVING LITTLE ENERGY: SEVERAL DAYS
6. FEELING BAD ABOUT YOURSELF - OR THAT YOU ARE A FAILURE OR HAVE LET YOURSELF OR YOUR FAMILY DOWN: NOT AT ALL
3. TROUBLE FALLING OR STAYING ASLEEP: NOT AT ALL
SUM OF ALL RESPONSES TO PHQ QUESTIONS 1-9: 1
1. LITTLE INTEREST OR PLEASURE IN DOING THINGS: NOT AT ALL
7. TROUBLE CONCENTRATING ON THINGS, SUCH AS READING THE NEWSPAPER OR WATCHING TELEVISION: NOT AT ALL
5. POOR APPETITE OR OVEREATING: NOT AT ALL
SUM OF ALL RESPONSES TO PHQ QUESTIONS 1-9: 1
9. THOUGHTS THAT YOU WOULD BE BETTER OFF DEAD, OR OF HURTING YOURSELF: NOT AT ALL
8. MOVING OR SPEAKING SO SLOWLY THAT OTHER PEOPLE COULD HAVE NOTICED. OR THE OPPOSITE, BEING SO FIGETY OR RESTLESS THAT YOU HAVE BEEN MOVING AROUND A LOT MORE THAN USUAL: NOT AT ALL

## 2025-03-13 NOTE — PROGRESS NOTES
Primary Physician: Mihir Hoyt APRN    CHIEF COMPLAINT:neck pain    Progress 3/13/25  Patient received a cervical MBB at C4-6 on 2/5/25  bilaterally that helped his pain by 80%. His pain went from 8/10 down to 5/10. Patient recants and says he thinks the trigger point injections may have been more effective than the cervical MBB.     He presents with pain that is 8/10 and is in the neck and shoulders. He says that the pain is worse on the right side of his neck. It is non-radiating.    He is taking norco QID prn. He is taking 4 tablets per day. He reports that this is helping his pain significantly and is helping him work. He has some mild constipation from this that is controlled with over the counter stool softeners.    HISTORY OF PRESENT ILLNESS:  Mr. Pillo Ortega is 74 y.o. male with hx of elevated BMI (34), depression,  SUSY, DM, HTN/HLD, CAD s/p CABG x 4, , atrial fibrillation s/p MAZE on chronic anticoagulation (xarelto), carotid stenosis, and sarcoidosis. He was referred to Pain Management by Neurosurgery for treatment of his chronic neck pain.    Patient was last in office on 6/19/24. He presents today describing a \"jono horse\" in the neck. He denies radicular symptoms. Pain is worsened by turning the neck. Pain is 8/10. He says that the trigger point injections he had previously helped significanty    The back pain is also painful but the neck is worse. The pain does not radiate into the legs. Pain is 8/10. He has numbness/tingling in the feet that he associates with neuropathy. He denies radicular symptoms.    He has been taking hydrocodone 7.5-325 TID q 6 hours prn. He says that he is taking them every 6 hours. Says his pain reduces by about 50% after taking the medication. This lasts about 5-6 hours. Says this also improves his activity level and allows him to work. He does experience constipation that he takes stool softeners.    Pain History:      Location: neck and low back

## 2025-03-15 DIAGNOSIS — E03.9 ACQUIRED HYPOTHYROIDISM: ICD-10-CM

## 2025-03-17 RX ORDER — LEVOTHYROXINE SODIUM 175 UG/1
175 TABLET ORAL DAILY
Qty: 90 TABLET | Refills: 3 | Status: SHIPPED | OUTPATIENT
Start: 2025-03-17

## 2025-03-17 NOTE — TELEPHONE ENCOUNTER
Received fax from pharmacy requesting refill on pts medication(s). Pt was last seen in office on 12/13/2024  and has a follow up scheduled for Visit date not found. Will send request to  Paulo Hoyt  for authorization.     Requested Prescriptions     Pending Prescriptions Disp Refills    levothyroxine (SYNTHROID) 175 MCG tablet [Pharmacy Med Name: LEVOTHYROXINE 175 MCG TABLET] 90 tablet 3     Sig: TAKE 1 TABLET BY MOUTH EVERY DAY

## 2025-03-19 ENCOUNTER — HOSPITAL ENCOUNTER (OUTPATIENT)
Dept: CT IMAGING | Facility: HOSPITAL | Age: 75
Discharge: HOME | End: 2025-03-19
Admitting: NURSE PRACTITIONER
Payer: MEDICARE

## 2025-03-19 ENCOUNTER — OFFICE VISIT (OUTPATIENT)
Dept: CARDIAC SURGERY | Facility: CLINIC | Age: 75
End: 2025-03-19
Payer: MEDICARE

## 2025-03-19 VITALS
HEART RATE: 64 BPM | DIASTOLIC BLOOD PRESSURE: 62 MMHG | OXYGEN SATURATION: 97 % | HEIGHT: 71 IN | BODY MASS INDEX: 32.93 KG/M2 | SYSTOLIC BLOOD PRESSURE: 117 MMHG | WEIGHT: 235.2 LBS

## 2025-03-19 DIAGNOSIS — N18.31 STAGE 3A CHRONIC KIDNEY DISEASE: ICD-10-CM

## 2025-03-19 DIAGNOSIS — I71.21 ANEURYSM OF ASCENDING AORTA WITHOUT RUPTURE: ICD-10-CM

## 2025-03-19 DIAGNOSIS — Q25.43 AORTIC ROOT ANEURYSM: ICD-10-CM

## 2025-03-19 DIAGNOSIS — I71.21 ANEURYSM OF ASCENDING AORTA WITHOUT RUPTURE: Chronic | ICD-10-CM

## 2025-03-19 DIAGNOSIS — E66.811 CLASS 1 OBESITY DUE TO EXCESS CALORIES WITH SERIOUS COMORBIDITY AND BODY MASS INDEX (BMI) OF 33.0 TO 33.9 IN ADULT: Chronic | ICD-10-CM

## 2025-03-19 DIAGNOSIS — E66.09 CLASS 1 OBESITY DUE TO EXCESS CALORIES WITH SERIOUS COMORBIDITY AND BODY MASS INDEX (BMI) OF 33.0 TO 33.9 IN ADULT: Chronic | ICD-10-CM

## 2025-03-19 DIAGNOSIS — Q25.43 AORTIC ROOT ANEURYSM: Primary | ICD-10-CM

## 2025-03-19 LAB — CREAT BLDA-MCNC: 1.5 MG/DL (ref 0.6–1.3)

## 2025-03-19 PROCEDURE — 71275 CT ANGIOGRAPHY CHEST: CPT

## 2025-03-19 PROCEDURE — 82565 ASSAY OF CREATININE: CPT

## 2025-03-19 PROCEDURE — 25510000001 IOPAMIDOL PER 1 ML: Performed by: NURSE PRACTITIONER

## 2025-03-19 RX ORDER — EMPAGLIFLOZIN 10 MG/1
1 TABLET, FILM COATED ORAL DAILY
COMMUNITY
Start: 2025-03-01

## 2025-03-19 RX ORDER — IOPAMIDOL 755 MG/ML
100 INJECTION, SOLUTION INTRAVASCULAR
Status: COMPLETED | OUTPATIENT
Start: 2025-03-19 | End: 2025-03-19

## 2025-03-19 RX ADMIN — IOPAMIDOL 100 ML: 755 INJECTION, SOLUTION INTRAVENOUS at 07:45

## 2025-03-24 NOTE — PROGRESS NOTES
Subjective   Patient ID: Zay Kamara is a 74 y.o. male who is here for follow-up of a known aneurysm.   Chief Complaint   Patient presents with    Aortic Aneurysm     Patient is here for follow up w/CT     History of Present Illness  The patient presents for evaluation of aortic root aneurysm, ascending aortic aneurysm, status post coronary artery bypass grafting, and sternal nonunion.    He reports satisfactory respiratory function but experiences intermittent chest discomfort localized to the sternum. He recalls an isolated incident of severe, sharp pain radiating from his right shoulder to his abdomen during a car journey, which has not recurred since. He speculates that this may be muscular in origin, as he also reports concurrent neck pain. He continues to monitor his physical exertion, particularly avoiding lifting weights exceeding 25 pounds. He has no history of smoking.    SOCIAL HISTORY  He is a non-smoker.    The following portions of the patient's history were reviewed and updated as appropriate: allergies, current medications, past family history, past medical history, past social history, past surgical history and problem list.       Objective   Physical Exam  Physical Exam  The patient is not in any acute distress and is appropriately alert.  The lungs are clear to auscultation bilaterally without wheezing, rubs, or rales.  The heart has a regular rate and rhythm without murmurs, rubs, or gallops.  The abdomen is soft, nondistended, nontender.  There is no clubbing, cyanosis or edema in the musculoskeletal system.      CT Angiogram Chest  Result Date: 3/19/2025  Narrative: EXAM: CT ANGIOGRAM CHEST- 3/19/2025 6:38 AM  HISTORY: ascending aortic aneurysm, aortic root aneurysm; I71.21-Aneurysm of the ascending aorta, without rupture; Q25.43-Congenital aneurysm of aorta  DOSE LENGTH PRODUCT: 419.15 mGy.cm mGy cm. Automated exposure control was also utilized to decrease patient radiation dose.   COMPARISON: 9/11/2024  TECHNIQUE: Helical tomographic images of the chest were obtained after the administration of intravenous contrast following angiogram protocol. Additionally, 3D and multiplanar reformatted images as well as MIPS were provided.   FINDINGS:  Lungs/Pleura: No new or enlarging pulmonary nodule. Mild peripheral atelectasis/scarring. No consolidation or pleural effusion.  Lower Neck: Unremarkable.  Mediastinum/Hilum: No enlarged lymphadenopathy.  Heart/Aorta: No pericardial effusion. Stable dilation of the ascending thoracic aorta measuring up to 4.4 cm. Mild aortic valvular calcifications. Moderate to severe coronary artery calcifications. Mild to moderate aortic atherosclerosis. No dissection.  Pulmonary Arteries: Pulmonary arteries are well-opacified to the segmental level. No pulmonary embolism. Mildly enlarged main pulmonary artery measuring up to 3.6 cm.  Chest wall/Soft Tissues: Unremarkable.  Upper Abdomen: No acute or suspicious findings. Nonobstructing 3 mm left renal calculi. Right renal cyst. Subcentimeter low-density hepatic lesions, most likely cysts. Colonic diverticulosis.  Osseous Structures: No acute or suspicious osseous finding.      Impression:  Stable dilation of the ascending thoracic aorta (4.4 cm).  Mildly enlarged main pulmonary artery which can be seen with pulmonary artery hypertension.    This report was signed and finalized on 3/19/2025 8:53 AM by Joaquin Hein.      Results  Imaging  CT of the chest shows evidence of previous distal ascending aorta measures 3.8 cm in size. Aortic root measures 4.6 cm in greatest dimension. Bypass grafts remain patent on inspection grossly. Aortic root measures 4 cm in size on axial imaging and sagittal reconstructions. Aortic root measures 4.3 cm in size. Aortic root measures 4 cm in size on coronal reconstructions. Previously, ascending aorta measured 4.6 cm in size and aortic root measured 4.1 cm.    Diagnoses and all orders for  this visit:    1. Aortic root aneurysm (Primary)  -     CT Angiogram Chest; Future    2. Aneurysm of ascending aorta without rupture  -     CT Angiogram Chest; Future    3. Class 1 obesity due to excess calories with serious comorbidity and body mass index (BMI) of 33.0 to 33.9 in adult    4. Stage 3a chronic kidney disease          Assessment & Plan     Assessment & Plan  1. Aortic root aneurysm.  He returns today for known incidentally identified for preop coronary artery bypass grafting evaluation of thoracic aneurysm disease. On review of imaging, he remains stable. The aortic root measures 4 cm on current imaging, consistent with previous measurements. He has been advised to avoid lifting more than 25 pounds. Signs and symptoms of acute aortic syndromes were discussed, and he verbalized understanding. He will follow up with Sanjuana Mcnamara in 6 months with a CT of the chest.    2. Ascending aortic aneurysm.  The ascending aorta measures 4.6 cm, unchanged from previous measurements. He has been advised to avoid lifting more than 25 pounds. Signs and symptoms of acute aortic syndromes were discussed, and he verbalized understanding. He will follow up with Sanjuana Mcnamara in 6 months with a CT of the chest.    3. Status post coronary artery bypass grafting.  His bypass grafts remain patent on inspection. He has had a little bit of chest pain on, from his, from his right shoulder radiating towards his xiphoid process. On review of his imaging, he does have evidence of sternal diastasis. His sternum is stable on my exam today. But given the amount of diastasis, I suspect there is some instability with certain activities. Likely is the cause of his pain or another musculoskeletal etiology. I doubt this is coronary in, in origin as well as aneurysmal in origin. On this CT scan, you can visualize his bypass grafts you know grossly. He has been advised to avoid lifting more than 25 pounds. Signs and symptoms of acute aortic  syndromes were discussed, and he verbalized understanding. He will follow up with Sanjuana Mcnamara in 6 months with a CT of the chest.    4. Sternal nonunion.  He has evidence of sternal diastasis, which may be causing some instability with certain activities and contributing to his pain. His sternum is stable on examination today. He has been advised to avoid lifting more than 25 pounds. Signs and symptoms of acute aortic syndromes were discussed, and he verbalized understanding. He will follow up with Sanjuana Mcnamara in 6 months with a CT of the chest.    He is a non smoker     Many thanks for the opportunity to care for your patient.    I will continue to keep you apprised of provided care as it ensues.               Patient or patient representative verbalized consent for the use of Ambient Listening during the visit with  Chivo White MD for chart documentation. 3/23/2025  22:55 CDT

## 2025-04-02 ENCOUNTER — HOSPITAL ENCOUNTER (OUTPATIENT)
Dept: PAIN MANAGEMENT | Age: 75
Discharge: HOME | End: 2025-04-02
Payer: MEDICARE

## 2025-04-02 ENCOUNTER — TELEPHONE (OUTPATIENT)
Dept: PAIN MANAGEMENT | Age: 75
End: 2025-04-02

## 2025-04-02 VITALS
SYSTOLIC BLOOD PRESSURE: 130 MMHG | OXYGEN SATURATION: 99 % | DIASTOLIC BLOOD PRESSURE: 58 MMHG | HEART RATE: 64 BPM | RESPIRATION RATE: 16 BRPM | TEMPERATURE: 96.1 F

## 2025-04-02 PROCEDURE — 20553 NJX 1/MLT TRIGGER POINTS 3/>: CPT

## 2025-04-02 PROCEDURE — 6360000002 HC RX W HCPCS

## 2025-04-02 RX ORDER — BUPIVACAINE HYDROCHLORIDE 5 MG/ML
10 INJECTION, SOLUTION EPIDURAL; INTRACAUDAL; PERINEURAL ONCE
Status: DISCONTINUED | OUTPATIENT
Start: 2025-04-02 | End: 2025-04-04 | Stop reason: HOSPADM

## 2025-04-02 NOTE — PROCEDURES
The patient's History and Physical  was reviewed with the patient and I examined the patient. There was no change. The surgical site was confirmed by the patient and me.            Plan: The risks, benefits, expected outcome, and alternative to the recommended procedure have been discussed with the patient. Patient understands and wants to proceed with the procedure.      --------------------------------------------------------------------------------------  Patient was placed in the seated position. A hat, cap and sterile gloves were worn by myself. Palpable muscle twitch was noted in the bilateral cervical paraspinal muscles including the trapezius, splenius capitus, and semispinalis capitus muscles. Each of

## 2025-04-02 NOTE — PROGRESS NOTES
Procedure:  Level of Consciousness: [x]Alert [x]Oriented []Disoriented []Lethargic  Anxiety Level: [x]Calm []Anxious []Depressed []Other  Skin: [x]Warm [x]Dry []Cool []Moist []Intact []Other  Cardiovascular: []Palpitations: [x]Never []Occasionally []Frequently  Chest Pain: [x]No []Yes  Respiratory:  [x]Unlabored []Labored []Cough ([] Productive []Unproductive)  HCG Required: []No []Yes   Results: []Negative []Positive  Knowledge Level:        [x]Patient/Other verbalized understanding of pre-procedure instructions.        [x]Assessment of post-op care needs (transportation, responsible caregiver)        [x]Able to discuss health care problems and how to deal with it.  Factors that Affect Teaching:        Language Barrier: [x]No []Yes - why:        Hearing Loss:        []No [x]Yes            Corrective Device:  [x]Yes []No        Vision Loss:           []No [x]Yes            Corrective Device:  [x]Yes []No        Memory Loss:       [x]No []Yes            []Short Term []Long Term  Motivational Level:  []Asks Questions                  []Extremely Anxious       []Seems Interested               []Seems Uninterested                  [x]Denies need for Education  Risk for Injury:  [x]Patient oriented to person, place and time  []History of frequent falls/loss of balance  Nutritional:  []Change in appetite   []Weight Gain   []Weight Loss  Functional:  []Requires assistance with ADL's

## 2025-04-02 NOTE — TELEPHONE ENCOUNTER
Patient agreeable to 1215 appointment. Electronically signed by Jermaine Ambrosio RN on 4/2/2025 at 9:22 AM

## 2025-04-09 ENCOUNTER — TELEPHONE (OUTPATIENT)
Dept: PAIN MANAGEMENT | Age: 75
End: 2025-04-09

## 2025-04-09 NOTE — TELEPHONE ENCOUNTER
Placed follow up call regarding pt's bilateral cervical trigger point injections administered on 4/2/25.  No answer.  Left voicemail message.  Electronically signed by Samra Bolivar RN on 4/9/2025 at 2:01 PM

## 2025-04-24 ENCOUNTER — HOSPITAL ENCOUNTER (OUTPATIENT)
Dept: GENERAL RADIOLOGY | Age: 75
Discharge: HOME | End: 2025-04-24
Payer: MEDICARE

## 2025-04-24 ENCOUNTER — OFFICE VISIT (OUTPATIENT)
Age: 75
End: 2025-04-24
Payer: MEDICARE

## 2025-04-24 VITALS
OXYGEN SATURATION: 95 % | DIASTOLIC BLOOD PRESSURE: 48 MMHG | TEMPERATURE: 97.6 F | HEIGHT: 70 IN | SYSTOLIC BLOOD PRESSURE: 118 MMHG | HEART RATE: 75 BPM | BODY MASS INDEX: 33.5 KG/M2 | WEIGHT: 234 LBS

## 2025-04-24 DIAGNOSIS — M77.32 HEEL SPUR, LEFT: Primary | ICD-10-CM

## 2025-04-24 DIAGNOSIS — M79.671 RIGHT FOOT PAIN: ICD-10-CM

## 2025-04-24 DIAGNOSIS — R13.14 PHARYNGOESOPHAGEAL DYSPHAGIA: ICD-10-CM

## 2025-04-24 DIAGNOSIS — N18.31 TYPE 2 DIABETES MELLITUS WITH STAGE 3A CHRONIC KIDNEY DISEASE, WITHOUT LONG-TERM CURRENT USE OF INSULIN (HCC): ICD-10-CM

## 2025-04-24 DIAGNOSIS — I48.0 PAROXYSMAL ATRIAL FIBRILLATION (HCC): ICD-10-CM

## 2025-04-24 DIAGNOSIS — E11.22 TYPE 2 DIABETES MELLITUS WITH STAGE 3A CHRONIC KIDNEY DISEASE, WITHOUT LONG-TERM CURRENT USE OF INSULIN (HCC): ICD-10-CM

## 2025-04-24 PROCEDURE — 1123F ACP DISCUSS/DSCN MKR DOCD: CPT | Performed by: NURSE PRACTITIONER

## 2025-04-24 PROCEDURE — G8427 DOCREV CUR MEDS BY ELIG CLIN: HCPCS | Performed by: NURSE PRACTITIONER

## 2025-04-24 PROCEDURE — 3078F DIAST BP <80 MM HG: CPT | Performed by: NURSE PRACTITIONER

## 2025-04-24 PROCEDURE — 1160F RVW MEDS BY RX/DR IN RCRD: CPT | Performed by: NURSE PRACTITIONER

## 2025-04-24 PROCEDURE — G8417 CALC BMI ABV UP PARAM F/U: HCPCS | Performed by: NURSE PRACTITIONER

## 2025-04-24 PROCEDURE — 99214 OFFICE O/P EST MOD 30 MIN: CPT | Performed by: NURSE PRACTITIONER

## 2025-04-24 PROCEDURE — 1159F MED LIST DOCD IN RCRD: CPT | Performed by: NURSE PRACTITIONER

## 2025-04-24 PROCEDURE — 3074F SYST BP LT 130 MM HG: CPT | Performed by: NURSE PRACTITIONER

## 2025-04-24 PROCEDURE — 3046F HEMOGLOBIN A1C LEVEL >9.0%: CPT | Performed by: NURSE PRACTITIONER

## 2025-04-24 PROCEDURE — 73630 X-RAY EXAM OF FOOT: CPT

## 2025-04-24 RX ORDER — PANTOPRAZOLE SODIUM 20 MG/1
20 TABLET, DELAYED RELEASE ORAL DAILY
Qty: 30 TABLET | Refills: 11 | Status: SHIPPED | OUTPATIENT
Start: 2025-04-24

## 2025-04-24 SDOH — ECONOMIC STABILITY: FOOD INSECURITY: WITHIN THE PAST 12 MONTHS, YOU WORRIED THAT YOUR FOOD WOULD RUN OUT BEFORE YOU GOT MONEY TO BUY MORE.: NEVER TRUE

## 2025-04-24 SDOH — ECONOMIC STABILITY: FOOD INSECURITY: WITHIN THE PAST 12 MONTHS, THE FOOD YOU BOUGHT JUST DIDN'T LAST AND YOU DIDN'T HAVE MONEY TO GET MORE.: NEVER TRUE

## 2025-04-24 ASSESSMENT — ENCOUNTER SYMPTOMS
CONSTIPATION: 0
ABDOMINAL PAIN: 0
VOMITING: 0
DIARRHEA: 0
SORE THROAT: 0
COUGH: 0
SHORTNESS OF BREATH: 0
TROUBLE SWALLOWING: 0
NAUSEA: 0
RHINORRHEA: 0

## 2025-04-24 NOTE — PROGRESS NOTES
Coronary artery disease involving native heart without angina pectoris 1/18/2022    Hyperlipidemia     Hypertension     Hyperthyroidism     Prostate cancer (HCC)     Prostate cancer (HCC) 7/20/2017    Overview:  Added automatically from request for surgery [id number]    Sleep apnea     Type II or unspecified type diabetes mellitus without mention of complication, not stated as uncontrolled        Past Surgical History:   Procedure Laterality Date    CARDIAC SURGERY      [facility]G X 4 at [facility]    CHOLECYSTECTOMY      INCONTINENCE SURGERY  11/26/2018    dr christian    PROSTATE SURGERY      Dr Wu    PROSTATE SURGERY  08/01/2017    removal, dr sawant     THYROID SURGERY      removed by Dr Muhammad        Family History   Problem Relation Age of Onset    Schizophrenia Mother     Heart Failure Mother     Heart Attack Mother     Cancer Father         Prostate & Leukemia     Heart Disease Father     Heart Attack Father     Thyroid Disease Sister          Review of Systems   Constitutional:  Negative for activity change, appetite change, fatigue, fever and unexpected weight change.   HENT:  Negative for ear pain, rhinorrhea, sore throat and trouble swallowing.    Eyes:  Negative for visual disturbance.   Respiratory:  Negative for cough and shortness of breath.    Cardiovascular:  Negative for chest pain, palpitations and leg swelling.   Gastrointestinal:  Negative for abdominal pain, constipation, diarrhea, nausea and vomiting.   Genitourinary:  Negative for flank pain.   Musculoskeletal:  Positive for arthralgias (left foot pain). Negative for myalgias, neck pain and neck stiffness.   Neurological:  Negative for headaches.   Psychiatric/Behavioral:  Negative for decreased concentration and sleep disturbance. The patient is not nervous/anxious.           Objective   Blood pressure (!) 118/48, pulse 75, temperature 97.6 °F (36.4 °C), temperature source Temporal, height 1.778 m (5' 10\"), weight 106.1 kg (234 lb), SpO2 95%.

## 2025-04-30 DIAGNOSIS — I10 ESSENTIAL HYPERTENSION: ICD-10-CM

## 2025-04-30 RX ORDER — AMLODIPINE BESYLATE 2.5 MG/1
2.5 TABLET ORAL DAILY
Qty: 90 TABLET | Refills: 3 | Status: SHIPPED | OUTPATIENT
Start: 2025-04-30

## 2025-04-30 NOTE — TELEPHONE ENCOUNTER
Received fax from pharmacy requesting refill on pts medication(s). Pt was last seen in office on 4/24/2025  and has a follow up scheduled for 6/13/2025. Will send request to  Paulo Hoyt  for authorization.     Requested Prescriptions     Pending Prescriptions Disp Refills    amLODIPine (NORVASC) 2.5 MG tablet [Pharmacy Med Name: AMLODIPINE BESYLATE 2.5 MG TAB] 90 tablet 3     Sig: TAKE 1 TABLET BY MOUTH EVERY DAY

## 2025-05-01 NOTE — PROCEDURES
Preoperative Evaluation  [facility]  [address] [place] [address]  Phone: [phone]  Primary Provider: JOSELO Suarez  Pre-op Performing Provider: Raad Desai MD  May 1, 2025             5/1/2025   Surgical Information   What procedure is being done? Vertical Hernia Repair    Facility or Hospital where procedure/surgery will be performed: [facility]    Who is doing the procedure / surgery? Dr. Matthew    Date of surgery / procedure: 5/12/2025    Time of surgery / procedure: TBD    Where do you plan to recover after surgery? at home with family        Proxy-reported     Fax number for surgical facility: Note does not need to be faxed, will be available electronically in Epic.    Assessment & Plan     The proposed surgical procedure is considered INTERMEDIATE risk.    Preop examination  EKG was performed, personally viewed and shows sinus rhythm without any ischemic changes.  Labs are seen below.  He is currently optimized for his upcoming procedure.  He uses no medications but was reminded to avoid anti-inflammatories and fish oil prior to his procedure.  He will otherwise be n.p.o. after midnight.  He does have a history of alcohol use up to 6 to 8 cans of beer a day, discussed that he should try to decrease this significantly prior to his procedure.  No history of alcohol withdrawal.  - EKG 12-lead complete w/read - (Clinic Performed)  - Basic metabolic panel; Future  - Hemoglobin; Future  - Basic metabolic panel  - Hemoglobin    Uncomplicated alcohol dependence (H)  See above.            - No identified additional risk factors other than previously addressed    Recommendation  Approval given to proceed with proposed procedure, without further diagnostic evaluation.    Roxie White is a 73 year old, presenting for the following:  Pre-Op Exam          5/1/2025     9:39 AM   Additional Questions   Roomed by Humphrey Sorto   Accompanied by None         5/1/2025     9:39 AM  [facility]      Patient Name: Lenard Pride    : 1950                    Age: 67 y.o. Sex: male    Date: 2023    Pre-op Diagnosis: Myofascial Pain/ Muscle Spasms/ Cervicalgia    Post-op Diagnosis: Myofascial Pain/ Muscle Spasms/ Cervicalgia    Procedure: Cervical Trigger Point Injections    Performing Procedure: Issa Banegas, MSN, APRN, FNP-C    Previously Had Procedure:  Yes [x]  No []     No data found. Description of Procedure:     After a brief physical assessment and failure to improve with conservative measures the patient presented for Cervical Trigger Point Injections The indications, limitations and possible complications were discussed with the patient and the patient elected to proceed with the procedure. After voluntary, informed and signed consent obtained the patient was placed in a seated position. Appropriate time out was obtained per policy. The area of maximal tenderness was palpated over the [] Right   []  Left   [x]  Bilateral Cervical   [x] Splenius   [x] Trapezius  [x] Rhomboid. The skin overlying these areas was marked with a skin marker. The skin overlying the proposed injection sites were then sprayed with Gebauer's Solution while protecting patient eyes. The areas were then prepped in a sterile fashion with Prevantics swab. Each trigger point of the  [] Right   []   Left  [x]   Bilateral Cervical  [x] Splenius  [x] Trapezius   [x] Rhomboid   was then injected after negative aspiration using a 25 gauge 1 1/2 in needle with approximately 2 ml of a solution of     [x] 5 ml of 1% Lidocaine Plain and 5 ml of 0.5% Marcaine Plain per 10 ml syringe  [] Toradol 0.5 ml (30 mg/ml) per 10 ml syringe    Sterile dressings applied. [facility]    Patient Name: Lenard Pride    : 1950                    Age: 67 y.o.     Sex: male    Date: 2023    Pre-op   Patient Reported Additional Medications   Patient reports taking the following new medications None     HPI:    Patient has developed an umbilical/ventral hernia that has been reducible.  He was actually seen in the emergency department in April for this.  He believes that this may have worsened in March when he slipped and fell on some ice.  He has since been seen by general surgery who recommends hernia repair.  He is also due for repeat colonoscopy.  He is status post right hemicolectomy in 2023.  The plan is to have colonoscopy on May 8 followed by hernia repair on May 12.    He has had procedures in the past including knee replacement, ankle surgery and numerous colonoscopies.  He has tolerated these well without any bleeding or anesthesia concerns.  He did have some postop ileus after his hemicolectomy.    He quit smoking approximately 1 year ago.    He reports that he drinks up to 6-8 beers a day.  No history of alcohol withdrawal.        5/1/2025   Pre-Op Questionnaire   Have you ever had a heart attack or stroke? No    Have you ever had surgery on your heart or blood vessels, such as a stent placement, a coronary artery bypass, or surgery on an artery in your head, neck, heart, or legs? No    Do you have chest pain with activity? No    Do you have a history of heart failure? No    Do you currently have a cold, bronchitis or symptoms of other infection? No    Do you have a cough, shortness of breath, or wheezing? No    Do you or anyone in your family have previous history of blood clots? No    Do you or does anyone in your family have a serious bleeding problem such as prolonged bleeding following surgeries or cuts? No    Have you ever had problems with anemia or been told to take iron pills? No    Have you had any abnormal blood loss such as black, tarry or bloody stools? No    Have you ever had a blood transfusion? No    Are you willing to have a blood transfusion if it is medically needed before,  Diagnosis: Myofascial Pain/ Muscle Spasms    Post-op Diagnosis: Myofascial Pain/ Muscle Spasms    Procedure: Thoracic Trigger Point Injections     Performing Procedure: Suma Gunderson, MSN, APRN, FNP-C    Previously Had Procedure:   [x] Yes   [] No    No data found. Description of Procedure:    After a brief physical assessment and failure to improve with conservative measures the patient presented for Thoracic Trigger Point Injections The indications, limitations and possible complications were discussed with the patient and the patient elected to proceed with the procedure. After voluntary, informed and signed consent the patient was placed in a  [x] Sitting    [] Prone position. Appropriate time out was obtained per policy. The area of maximal tenderness was palpated over the  [] Right  [] Left  [x] Bilateral  [x] Erector Spinae  [x] Upper/Mid Latissimus  [x] Rhomboid Minor [x] Rhomboid Major. The skin overlying these areas was marked with a skin marker. The skin overlying the proposed injection sites were then sprayed with Gebauer's Solution and prepped in a sterile fashion with Prevantics swab. Each trigger point of the  [] Right   [] Left  [x] Bilateral   [x] Erector Spinae   [x] Upper/Mid Latissimus  [x] Rhomboid Minor   [x]  Rhomboid Major was then injected after negative aspiration using a 25 gauge 1 1/2 in needle with approximately 2 ml of a solution of     [x] 5 ml of 1% Lidocaine Plain and 5 ml of 0.5% Marcaine Plain per 10 ml syringe    [] Toradol 0.5 ml (30 mg/ml) per 10 ml syringe    Sterile dressing applied. Discharge: The patient tolerated the procedure well. There were no complications during the procedure and the patient was discharged home with discharge instructions. The patient has been instructed to contact the office should there be any complications or questions to arise between today and their next appointment.     Plan:  [x] Will return to the office in [] 1 month  [x] 4 - 6 weeks   [] 2 months   [] 3 months for:  [] Planned Procedure  [x] Procedure Follow-up   [] Office Visit      1 [facility], APRAMIRA, 1/8/2023 at 1:54 PM during, or after your surgery? Yes    Have you or any of your relatives ever had problems with anesthesia? No    Do you have sleep apnea, excessive snoring or daytime drowsiness? No    Do you have any artifical heart valves or other implanted medical devices like a pacemaker, defibrillator, or continuous glucose monitor? No    Do you have artificial joints? (!) YES    Are you allergic to latex? No        Proxy-reported     Advance Care Planning    Discussed advance care planning with patient; however, patient declined at this time.    Preoperative Review of    reviewed - no record of controlled substances prescribed.    Patient Active Problem List    Diagnosis Date Noted    Multiple closed fractures of pelvis with stable disruption of pelvic ring, initial encounter (H) 03/06/2025     Priority: Medium    Pelvic fracture (H) 03/06/2025     Priority: Medium    Adenomatous polyp of ascending colon 11/16/2023     Priority: Medium    Special screening for malignant neoplasms, colon 03/05/2021     Priority: Medium    Uncomplicated alcohol dependence (H) 03/05/2021     Priority: Medium    Tobacco use disorder 03/05/2021     Priority: Medium    High cholesterol 03/05/2021     Priority: Medium      Past Medical History:   Diagnosis Date    Alcohol Abuse 08/02/2011    Dyslipidemia 08/16/2011     Past Surgical History:   Procedure Laterality Date    ANKLE SURGERY  2000    LT, hardware removal and ankle fusion; traumatic arthritis    ARTHROPLASTY KNEE Right 12/15/2021    Procedure: RIGHT TOTAL KNEE ARTHROPLASTY;  Surgeon: Cruz Vasquez MD;  Location: [facility] OR    COLONOSCOPY  2011    with polypectomy    COLONOSCOPY N/A 4/15/2021    Procedure: Colonoscopy, with  polypectomy;  Surgeon: Sam Smith MD;  Location: [facility]    COLONOSCOPY N/A 9/28/2023    Procedure: COLONOSCOPY with polypectomy and tatooing 120cm colon;  Surgeon: Porfirio Matthew MD;  Location: [facility] OR     Current Outpatient Medications   Medication Sig  "Dispense Refill    bisacodyl (DULCOLAX) 5 MG EC tablet Take 2 tablets at 3 pm the day before your procedure. If your procedure is before 11 am, take 2 additional tablets at 11 pm. If your procedure is after 11 am, take 2 additional tablets at 6 am. For additional instructions refer to your colonoscopy prep instructions. (Patient not taking: Reported on 5/1/2025) 4 tablet 0    polyethylene glycol (GOLYTELY) 236 g suspension The night before the exam at 6 pm drink an 8-ounce glass every 15 minutes until the jug is half empty. If you arrive before 11 AM: Drink the other half of the Golytely jug at 11 PM night before procedure. If you arrive after 11 AM: Drink the other half of the Golytely jug at 6 AM day of procedure. For additional instructions refer to your colonoscopy prep instructions. (Patient not taking: Reported on 5/1/2025) 4000 mL 0       No Known Allergies     Social History     Tobacco Use    Smoking status: Former     Types: Cigarettes     Start date: 1967     Passive exposure: Past    Smokeless tobacco: Never    Tobacco comments:     Has not smoked for last 2 weeks   Substance Use Topics    Alcohol use: Yes     Alcohol/week: 11.0 standard drinks of alcohol     Types: 11 Cans of beer per week     Comment: beer daily        History   Drug Use Unknown           Objective    BP (!) 147/78 (BP Location: Left arm, Patient Position: Sitting, Cuff Size: Adult Regular)   Pulse 86   Temp 98.1  F (36.7  C) (Tympanic)   Resp 14   Wt 81.6 kg (180 lb)   SpO2 97%   BMI 27.37 kg/m     Estimated body mass index is 27.37 kg/m  as calculated from the following:    Height as of 3/6/25: 1.727 m (5' 8\").    Weight as of this encounter: 81.6 kg (180 lb).  Physical Exam  GENERAL: alert and no distress  EYES: Eyes grossly normal to inspection, PERRL and conjunctivae and sclerae normal  HENT: ear canals and TM's normal, nose and mouth without ulcers or lesions.  Upper and lower dentures are noted.  NECK: no adenopathy, no " asymmetry, masses, or scars  RESP: lungs clear to auscultation - no rales, rhonchi or wheezes  CV: regular rate and rhythm, normal S1 S2, no S3 or S4, no murmur, click or rub, no peripheral edema  ABDOMEN: Easily reducible umbilical hernias noted especially when he is sitting up.  Periumbilical incision from previous surgery is noted and has healed well.  No other masses.  No hepatosplenomegaly.  MS: no gross musculoskeletal defects noted, no edema  SKIN: no suspicious lesions or rashes  NEURO: Normal strength and tone, mentation intact and speech normal  PSYCH: mentation appears normal, affect normal/bright      Diagnostics  Recent Results (from the past 24 hours)   Basic metabolic panel    Collection Time: 05/01/25 10:12 AM   Result Value Ref Range    Sodium 136 135 - 145 mmol/L    Potassium 4.3 3.4 - 5.3 mmol/L    Chloride 101 98 - 107 mmol/L    Carbon Dioxide (CO2)      Anion Gap      Urea Nitrogen 6.8 (L) 8.0 - 23.0 mg/dL    Creatinine 0.62 (L) 0.67 - 1.17 mg/dL    GFR Estimate >90 >60 mL/min/1.73m2    Calcium 9.2 8.8 - 10.4 mg/dL    Glucose 91 70 - 99 mg/dL   Hemoglobin    Collection Time: 05/01/25 10:12 AM   Result Value Ref Range    Hemoglobin 15.1 13.3 - 17.7 g/dL   EKG 12-lead complete w/read - (Clinic Performed)    Collection Time: 05/01/25 10:24 AM   Result Value Ref Range    Systolic Blood Pressure  mmHg    Diastolic Blood Pressure  mmHg    Ventricular Rate 81 BPM    Atrial Rate 81 BPM    NM Interval 168 ms    QRS Duration 80 ms     ms    QTc 448 ms    P Axis 66 degrees    R AXIS 1 degrees    T Axis 66 degrees    Interpretation ECG       Sinus rhythm  Normal ECG  When compared with ECG of 09-Nov-2023 10:45,  No significant change was found        EKG: appears normal, NSR, normal axis, normal intervals, no acute ST/T changes c/w ischemia, no LVH by voltage criteria, unchanged from previous tracings    Revised Cardiac Risk Index (RCRI)  The patient has the following serious cardiovascular risks for  perioperative complications:   - No serious cardiac risks = 0 points     RCRI Interpretation: 0 points: Class I (very low risk - 0.4% complication rate)         Signed Electronically by: Raad Desai MD  A copy of this evaluation report is provided to the requesting physician.

## 2025-05-07 DIAGNOSIS — M47.26 OSTEOARTHRITIS OF SPINE WITH RADICULOPATHY, LUMBAR REGION: ICD-10-CM

## 2025-05-08 ENCOUNTER — RESULTS FOLLOW-UP (OUTPATIENT)
Age: 75
End: 2025-05-08

## 2025-05-09 RX ORDER — HYDROCODONE BITARTRATE AND ACETAMINOPHEN 7.5; 325 MG/1; MG/1
1 TABLET ORAL EVERY 6 HOURS PRN
Qty: 120 TABLET | Refills: 0 | Status: SHIPPED | OUTPATIENT
Start: 2025-05-09 | End: 2025-06-08

## 2025-05-12 NOTE — TELEPHONE ENCOUNTER
Pt aware and voiced understanding. Informed patient of any recommendations from providers. Will call with any further questions. Patient states he is taking his thyroid medication as prescribed. 98.4 °F (36.9 °C) (Oral)   Resp 18   Ht 1.651 m (5' 5\")   Wt 120.4 kg (265 lb 6.4 oz)   SpO2 100%   BMI 44.16 kg/m²     Physical Exam  Vitals reviewed.   Constitutional:       Appearance: She is well-developed.   HENT:      Head: Normocephalic and atraumatic.   Eyes:      Extraocular Movements: Extraocular movements intact.      Pupils: Pupils are equal, round, and reactive to light.   Cardiovascular:      Rate and Rhythm: Normal rate and regular rhythm.      Heart sounds: Normal heart sounds. No murmur heard.  Pulmonary:      Effort: Pulmonary effort is normal. No respiratory distress.      Breath sounds: Normal breath sounds.   Musculoskeletal:      Cervical back: Normal range of motion.   Skin:     General: Skin is warm and dry.   Neurological:      Mental Status: She is alert and oriented to person, place, and time.   Psychiatric:         Thought Content: Thought content normal.         Judgment: Judgment normal.             ASSESSMENT & PLAN    Results      Assessment & Plan  1. Hypertension: Stable.  - Blood pressure is well-regulated today.  - Reports no chest pain, shortness of breath, or headaches; occasional blurry vision noted.  - Provided with a list of optometrists for an eye examination.  - Laboratory tests have been ordered, and they have been directed to the lab for sample collection.    2. Human Immunodeficiency Virus (HIV): Chronic.  - Compliant with HIV medication regimen but has not yet consulted with the infectious disease office.  - Appointment with the infectious disease office scheduled for 05/29/2025.  - Laboratory tests have been ordered, and they have been directed to the lab for sample collection prior to their appointment.  - Encouraged to adhere to the scheduled follow-up appointments.    3. Medication Management.  - Prescriptions for iron, HIV medications, blood pressure medication, and melatonin for sleep have been sent to pharmacy.  - PDMP checked and is as

## 2025-05-13 ENCOUNTER — TELEPHONE (OUTPATIENT)
Age: 75
End: 2025-05-13

## 2025-05-13 NOTE — TELEPHONE ENCOUNTER
Called patient and LVM to Reschedule his appointment tomorrow 5/14 with Dr. Rios to 5/16 @ 9:15 or 5/21 @ 3:30pm.    Instructed patient to call back to confirm which appointment works best for him

## 2025-05-16 ENCOUNTER — OFFICE VISIT (OUTPATIENT)
Age: 75
End: 2025-05-16

## 2025-05-16 VITALS — BODY MASS INDEX: 33.5 KG/M2 | WEIGHT: 234 LBS | HEIGHT: 70 IN

## 2025-05-16 DIAGNOSIS — M72.2 PLANTAR FASCIITIS OF RIGHT FOOT: ICD-10-CM

## 2025-05-16 DIAGNOSIS — M77.31 HEEL SPUR, RIGHT: ICD-10-CM

## 2025-05-16 DIAGNOSIS — M79.672 LEFT FOOT PAIN: ICD-10-CM

## 2025-05-16 DIAGNOSIS — M79.671 PAIN IN RIGHT FOOT: ICD-10-CM

## 2025-05-16 DIAGNOSIS — M76.62 CALCIFIC ACHILLES TENDINITIS OF LEFT LOWER EXTREMITY: Primary | ICD-10-CM

## 2025-05-16 RX ORDER — TRIAMCINOLONE ACETONIDE 40 MG/ML
20 INJECTION, SUSPENSION INTRA-ARTICULAR; INTRAMUSCULAR ONCE
Status: COMPLETED | OUTPATIENT
Start: 2025-05-16 | End: 2025-05-16

## 2025-05-16 RX ORDER — DEXAMETHASONE SODIUM PHOSPHATE 4 MG/ML
4 INJECTION, SOLUTION INTRA-ARTICULAR; INTRALESIONAL; INTRAMUSCULAR; INTRAVENOUS; SOFT TISSUE ONCE
Status: COMPLETED | OUTPATIENT
Start: 2025-05-16 | End: 2025-05-16

## 2025-05-16 RX ORDER — BUPIVACAINE HYDROCHLORIDE 5 MG/ML
1 INJECTION, SOLUTION EPIDURAL; INTRACAUDAL; PERINEURAL ONCE
Status: COMPLETED | OUTPATIENT
Start: 2025-05-16 | End: 2025-05-16

## 2025-05-16 RX ADMIN — BUPIVACAINE HYDROCHLORIDE 5 MG: 5 INJECTION, SOLUTION EPIDURAL; INTRACAUDAL; PERINEURAL at 10:44

## 2025-05-16 RX ADMIN — TRIAMCINOLONE ACETONIDE 20 MG: 40 INJECTION, SUSPENSION INTRA-ARTICULAR; INTRAMUSCULAR at 10:46

## 2025-05-16 RX ADMIN — DEXAMETHASONE SODIUM PHOSPHATE 4 MG: 4 INJECTION, SOLUTION INTRA-ARTICULAR; INTRALESIONAL; INTRAMUSCULAR; INTRAVENOUS; SOFT TISSUE at 10:46

## 2025-05-16 ASSESSMENT — PAIN DESCRIPTION - DESCRIPTORS: DESCRIPTORS: ACHING

## 2025-05-16 ASSESSMENT — PAIN SCALES - GENERAL: PAINLEVEL_OUTOF10: 5

## 2025-05-16 ASSESSMENT — PAIN DESCRIPTION - LOCATION: LOCATION: FOOT

## 2025-05-16 ASSESSMENT — PAIN DESCRIPTION - ORIENTATION: ORIENTATION: RIGHT

## 2025-05-16 NOTE — PROGRESS NOTES
[facility]  [facility]  [address]  [facility] [id number] [phone]     Patient: Pillo Ortega   YOB: 1950   Date: 5/16/2025   Visit Type:  Consult    Body Part:  bilateral foot    When did the symptoms begin/Date of Onset or date of surgery? Pt states pain started about couple weeks ago, right and left foot has been causing pain for quite sometime / pt states he has been off work for couple days due to left and right pain of feet / pt states right foot pain is around heel and arch area / pt states left foot buldges out in back of heel       If over 55, have you kirby an Osteoporosis Screening in the last 2 years? No    History of Present Illness  Chief Complaint   Patient presents with    RAJANI FOOT - CONSULT        This is a 74 y.o. male  presents today complaining of bilateral foot pain.  Right foot hurts on the bottom of the heel.  Left hurts on the back of the heel.  Has tried shoe changes.  He is unable to take NSAIDs due to his kidney function.    He relates his problems with longstanding duration and progressively worse with time.  At this point the right heel is the most significant problem for him.  Pain level is 8 of 10 with 10 being the worst.  Has had to miss a couple of days of work secondary to the pain.    Last hemoglobin A1c in December 2024 was 7.6.    Review of Systems  System  Neg/Pos  Details  Constitutional  Negative  Chills, Fatigue, Fever and Night Sweats  Respiratory  Negative  Chest Pain, Cough and Dyspnea  Cardio   Negative  Leg Swelling  GI   Negative  Abdominal Pain, Constipation, Nausea and Vomiting     Negative  Urinary Incontinence   Endocrine  Negative  Weight Gain and Weight Loss  MS   Negative  Except as noted in HPI and Chief Complaint    Past Medical History:   Diagnosis Date    Allergic rhinitis     Arthritis     Cancer (HCC) 2012    Thyroid  - Dr Muhammad     Coronary artery disease

## 2025-06-02 DIAGNOSIS — I10 ESSENTIAL HYPERTENSION: ICD-10-CM

## 2025-06-02 DIAGNOSIS — N18.31 TYPE 2 DIABETES MELLITUS WITH STAGE 3A CHRONIC KIDNEY DISEASE, WITHOUT LONG-TERM CURRENT USE OF INSULIN (HCC): ICD-10-CM

## 2025-06-02 DIAGNOSIS — E11.22 TYPE 2 DIABETES MELLITUS WITH STAGE 3A CHRONIC KIDNEY DISEASE, WITHOUT LONG-TERM CURRENT USE OF INSULIN (HCC): ICD-10-CM

## 2025-06-02 RX ORDER — LISINOPRIL 5 MG/1
5 TABLET ORAL DAILY
Qty: 90 TABLET | Refills: 3 | Status: SHIPPED | OUTPATIENT
Start: 2025-06-02

## 2025-06-02 NOTE — TELEPHONE ENCOUNTER
Received fax from pharmacy requesting refill on pts medication(s). Pt was last seen in office on 4/24/2025  and has a follow up scheduled for 6/13/2025. Will send request to  Paulo Hoyt  for authorization.     Requested Prescriptions     Pending Prescriptions Disp Refills    lisinopril (PRINIVIL;ZESTRIL) 5 MG tablet [Pharmacy Med Name: LISINOPRIL 5 MG TABLET] 90 tablet 3     Sig: TAKE 1 TABLET BY MOUTH EVERY DAY

## 2025-06-03 DIAGNOSIS — N18.30 TYPE 2 DIABETES MELLITUS WITH STAGE 3 CHRONIC KIDNEY DISEASE, WITHOUT LONG-TERM CURRENT USE OF INSULIN (HCC): ICD-10-CM

## 2025-06-03 DIAGNOSIS — E78.2 MIXED HYPERLIPIDEMIA: ICD-10-CM

## 2025-06-03 DIAGNOSIS — E11.22 TYPE 2 DIABETES MELLITUS WITH STAGE 3 CHRONIC KIDNEY DISEASE, WITHOUT LONG-TERM CURRENT USE OF INSULIN (HCC): ICD-10-CM

## 2025-06-03 RX ORDER — GLIPIZIDE 5 MG/1
5 TABLET ORAL DAILY
Qty: 90 TABLET | Refills: 3 | OUTPATIENT
Start: 2025-06-03

## 2025-06-03 RX ORDER — ATORVASTATIN CALCIUM 20 MG/1
20 TABLET, FILM COATED ORAL DAILY
Qty: 90 TABLET | Refills: 3 | Status: SHIPPED | OUTPATIENT
Start: 2025-06-03

## 2025-06-03 NOTE — TELEPHONE ENCOUNTER
Nephrology consult follow up note    HPI:      Krysta Jansen is a 78 y.o. female is a nursing home resident and has ESRD and is on dialysis on Monday Wednesday Friday.  Brought to this hospital for altered mental status.  She was found to have some grand mal seizure.  She received some Ativan.  She had EEG done.  Her respiratory status and mental status deteriorated and required intubation to protect her airways.  Could not get any history from the patient.  Records revealed she was found to have some dysarthria and decreased level of consciousness Friday through being brought to this hospital.  Significant past medical history is positive for diabetes mellitus type 2 previous stroke hypertension coronary artery disease and congestive heart failure.    Interval history:     01/07/2024  Patient remains intubated and sedated.  On epinephrine for hypotension this morning.     01/08/25  Remains intubated and sedated on vent. Off pressors with BP stable at 130/60 range. Noted leucocytosis on am lab. Blood cultures pending.  Hgb stable at 7.6. Dialyzed yesterday and tolerated 500mL UF.      01/09/2025  No acute events overnight.  Patient remains intubated.  Not currently requiring vasopressors, but blood pressure does remain borderline low.  ICU team is requesting to hold hemodialysis today so they can have goals of care conversation with family.     01/10/2025   Currently tolerating dialysis.  Will remove only 500 cc of fluid.  Remains intubated.  Tolerating tube feedings.  Nurses reported that she is not on any sedation for about 48 hours.     01/13/2025   Weekend events noted.  Patient's condition is unchanged.  Remains on the ventilator and off sedation or pressors.  Unresponsive.     01/14/2025   Last 24 hours events noted.  Patient's condition remains unchanged and she is off pressors and off sedation and still unresponsive although she grimaces to painful stimuli.     01/15/2025   Currently tolerating  Received fax from pharmacy requesting refill on pts medication(s). Pt was last seen in office on 4/24/2025  and has a follow up scheduled for 6/13/2025. Will send request to  Paulo Hoyt  for authorization.     Requested Prescriptions     Pending Prescriptions Disp Refills    glipiZIDE (GLUCOTROL) 5 MG tablet [Pharmacy Med Name: GLIPIZIDE 5 MG TABLET] 90 tablet 3     Sig: TAKE 1 TABLET BY MOUTH EVERY DAY    atorvastatin (LIPITOR) 20 MG tablet [Pharmacy Med Name: ATORVASTATIN 20 MG TABLET] 90 tablet 3     Sig: TAKE 1 TABLET BY MOUTH EVERY DAY       dialysis.  Neuro status and respiratory status remains unchanged.    01/16/2025  Dialyze yesterday.  No clinical change overnight.    01/17/2025  No clinical change overnight.  Hemoglobin dropped today.  Remains intubated     Review of Systems:     Unable to obtain ROS due to mental status/intubation.     Past medical, family, surgical, and social history reviewed and unchanged from initial consult note.     Objective:       VITAL SIGNS: 24 HR MIN & MAX LAST    Temp  Min: 98.6 °F (37 °C)  Max: 99 °F (37.2 °C)  98.9 °F (37.2 °C)        BP  Min: 78/41  Max: 137/60  104/61     Pulse  Min: 81  Max: 99  88     Resp  Min: 19  Max: 22  (!) 22    SpO2  Min: 100 %  Max: 100 %  100 %      GEN: Chronically ill appearing AA female in NAD  HEENT:  ET tube in place  CV: RRR +S1,S2 without murmur  PULM: CTAB, unlabored  ABD: Soft, NT/ND abdomen with NABS  EXT: No cyanosis or edema  SKIN: Warm and dry  PSYCH:  Unresponsive  Dialysis access:  Left IJ PermCath            Component Value Date/Time     (L) 01/17/2025 0156     (L) 01/16/2025 0512     05/08/2024 0739     03/15/2024 1101    K 4.6 01/17/2025 0156    K 4.2 01/16/2025 0512    K 3.8 05/08/2024 0739    K 3.9 03/15/2024 1101    CO2 21 (L) 01/17/2025 0156    CO2 23 01/16/2025 0512    CO2 22 05/08/2024 0739    CO2 21 (L) 03/15/2024 1101    BUN 96.9 (H) 01/17/2025 0156    BUN 72.6 (H) 01/16/2025 0512    BUN 55 (H) 10/02/2024 0000    BUN 23 (H) 07/12/2024 0000    CREATININE 3.26 (H) 01/17/2025 0156    CREATININE 2.72 (H) 01/16/2025 0512    CREATININE 3.37 (H) 05/08/2024 0739    CREATININE 3.2 (H) 03/15/2024 1101    CALCIUM 8.3 (L) 01/17/2025 0156    CALCIUM 8.0 (L) 01/16/2025 0512    CALCIUM 9 05/08/2024 0739    CALCIUM 9.9 03/15/2024 1101    PHOS 2.0 (L) 01/17/2025 0156            Component Value Date/Time    WBC 14.26 (H) 01/17/2025 0156    WBC 14.26 01/17/2025 0156    WBC 13.54 (H) 01/16/2025 0512    WBC 13.54 01/16/2025 0512    HGB 6.8 (L) 01/17/2025  0156    HGB 7.0 (L) 01/16/2025 0512    HGB 10.3 (L) 11/24/2024 0000    HGB 10 (L) 11/18/2024 0000    HCT 21.6 (L) 01/17/2025 0156    HCT 21.9 (L) 01/16/2025 0512     01/17/2025 0156     01/16/2025 0512         Imaging reviewed      Assessment / Plan:       Active Hospital Problems    Diagnosis  POA    Seizures [R56.9]  Yes    Status epilepticus [G40.901]  Yes    Altered mental status [R41.82]  Unknown      Resolved Hospital Problems    Diagnosis Date Resolved POA    Seizure-like activity [R56.9] 01/08/2025 Yes       ESRD.  Normally MWF hemodialysis.    New onset seizure  Acute hypoxic respiratory failure  Hypertension, anemia  Diabetes mellitus type 2   History of coronary artery disease   History of previous stroke  Urinary tract infection  Anemia of chronic disease    Plan:  Plan for hemodialysis today on MWF schedule.  Give 1 unit PRBC with dialysis.    Jean Shine DO  Nephrology  [facility]  Clinic number: [phone]      Note was created with the assistance of electronic Dictation Services.  Note was reviewed to decrease errors, however, these may still be present.  Please contact me about questions or concerns with the dictation.

## 2025-06-05 ENCOUNTER — OFFICE VISIT (OUTPATIENT)
Dept: GASTROENTEROLOGY | Facility: CLINIC | Age: 75
End: 2025-06-05
Payer: MEDICARE

## 2025-06-05 VITALS
TEMPERATURE: 97.3 F | SYSTOLIC BLOOD PRESSURE: 106 MMHG | HEART RATE: 70 BPM | HEIGHT: 71 IN | OXYGEN SATURATION: 96 % | BODY MASS INDEX: 32.76 KG/M2 | WEIGHT: 234 LBS | DIASTOLIC BLOOD PRESSURE: 52 MMHG

## 2025-06-05 DIAGNOSIS — Z86.0101 HISTORY OF ADENOMATOUS POLYP OF COLON: Primary | ICD-10-CM

## 2025-06-05 DIAGNOSIS — R13.19 ESOPHAGEAL DYSPHAGIA: ICD-10-CM

## 2025-06-05 RX ORDER — PANTOPRAZOLE SODIUM 20 MG/1
1 TABLET, DELAYED RELEASE ORAL DAILY
COMMUNITY
Start: 2025-04-24

## 2025-06-05 RX ORDER — POLYETHYLENE GLYCOL 3350, SODIUM SULFATE ANHYDROUS, SODIUM BICARBONATE, SODIUM CHLORIDE, POTASSIUM CHLORIDE 236; 22.74; 6.74; 5.86; 2.97 G/4L; G/4L; G/4L; G/4L; G/4L
4 POWDER, FOR SOLUTION ORAL ONCE
Qty: 4000 ML | Refills: 0 | Status: SHIPPED | OUTPATIENT
Start: 2025-06-05 | End: 2025-06-06

## 2025-06-05 NOTE — PROGRESS NOTES
[facility] GASTROENTEROLOGY - OFFICE NOTE    6/5/2025    Zay Kamara   1950    Primary Physician: Frankie Bradley APRN    Chief Complaint   Patient presents with    Difficulty Swallowing         HISTORY OF PRESENT ILLNESS:     Zay Kamara is a 74 y.o. male presents  with dysphagia.         Dysphagia: Patient complains of difficulty swallowing. This started 6 mo ago, intermittent.  The patient points to the middle of the chest where this occurs. This is noted with solid foods.   Takes protonix daily with control of gerd.         No change in bowel habits or rectal bleeding. No weight loss.       COLONOSCOPY (05/20/2022 09:17) recall 3 years.   Tissue Pathology Exam (05/20/2022 09:43) tubular adenomatous.   No family history of colon cancer/polyps.     No history of egd.       Past Medical History:   Diagnosis Date    Arthritis     Atrial fibrillation     paroxysmal, on Xarelto    BMI 31.0-31.9,adult 06/28/2018    Carotid artery disease without cerebral infarction     Chronic knee pain     Chronic neck and back pain     Coronary artery disease 01/04/2022    COVID-19 08/18/2023    treated with Paxlovid    Depression     Diabetes     Disease of thyroid gland     Gout     Hypercholesteremia     Hypertension     IBS (irritable bowel syndrome)     Kidney disease     Osteoarthritis     back, neck, and knees - goes to Pain Management    Prostate cancer     Dr. Hong following    Sensorineural hearing loss     Sleep apnea     CPAP    Sudden hearing loss     Syncope     Tinnitus     Urine incontinence        Past Surgical History:   Procedure Laterality Date    ATRIAL APPENDAGE EXCLUSION LEFT WITH TRANSESOPHAGEAL ECHOCARDIOGRAM Left 1/6/2022    Procedure: LEFT ATRIAL APPENDAGE EXCLUSION WITH  40MM ATRICLIP; TRANSESOPHAGEAL ECHOCARDIOGRAM;  Surgeon: Chivo White MD;  Location: [facility];  Service: Cardiothoracic;  Laterality: Left;    BLADDER SUSPENSION N/A 11/26/2018    Procedure: CYSTOSCOPY  BLADDER SUSPENSION MALE SLING;  Surgeon: Zaheer Rebolledo MD;  Location:  [facility] OR;  Service: Urology    CARDIAC CATHETERIZATION N/A 12/30/2021    Procedure: Coronary angiography right radial to follow my 9:30 case;  Surgeon: Mike Fitzpatrick MD;  Location:  [facility];  Service: Cardiology;  Laterality: N/A;    CHOLECYSTECTOMY      COLONOSCOPY N/A 3/7/2017    Procedure: COLONOSCOPY WITH ANESTHESIA;  Surgeon: Hector Alvarenga MD;  Location:  [facility];  Service:     COLONOSCOPY N/A 5/20/2022    Procedure: COLONOSCOPY WITH ANESTHESIA;  Surgeon: Hector Alvarenga MD;  Location:  [facility];  Service: Gastroenterology;  Laterality: N/A;  pre brbpr  post polyps  Luis Alberto SMITH    CORONARY ARTERY BYPASS GRAFT N/A 1/6/2022    Procedure: CORONARY ARTERY BYPASS GRAFT X 4 WITH LEFT INTERNAL MAMMARY ARTERY, LEFT LOWER EXTREMITY ENDOSCOPIC VEIN HARVEST, AND PERFUSION;  Surgeon: Chivo White MD;  Location:  [facility];  Service: Cardiothoracic;  Laterality: N/A;    MAZE PROCEDURE N/A 1/6/2022    Procedure: BILATERAL MAZE PROCEDURE WITH RADIOFREQUENCY AND CRYOABLATION;  Surgeon: Chivo White MD;  Location:  [facility];  Service: Cardiothoracic;  Laterality: N/A;    PROSTATECTOMY N/A 8/1/2017    Procedure: PROSTATECTOMY LAPAROSCOPIC WITH DAVINCI SI ROBOT ;  Surgeon: Hernesto Hong MD;  Location:  [facility];  Service:     THYROID SURGERY      RADIATION THERAPY AFTER SURGERY       Outpatient Medications Marked as Taking for the 6/5/25 encounter (Office Visit) with Kati Galvan APRN   Medication Sig Dispense Refill    albuterol sulfate  (90 Base) MCG/ACT inhaler Inhale 2 puffs Every 4 (Four) Hours As Needed for Wheezing or Shortness of Air (rinse mouth after use). 6.7 g 0    allopurinol (ZYLOPRIM) 100 MG tablet Take 1 tablet by mouth Daily.      amLODIPine (NORVASC) 2.5 MG tablet Take 1 tablet by mouth Every Night.      aspirin (aspirin) 81 MG EC tablet Take 1 tablet by mouth  Daily.      atorvastatin (LIPITOR) 20 MG tablet Take 1 tablet by mouth Every Night.  2    Cholecalciferol (VITAMIN D3) 2000 units tablet Take 1 tablet by mouth Daily.      citalopram (CeleXA) 20 MG tablet Take 1 tablet by mouth Daily.      glipizide (GLUCOTROL) 5 MG tablet Take 2 tablets by mouth Every Morning.      HYDROcodone-acetaminophen (NORCO) 7.5-325 MG per tablet Take 1 tablet by mouth Every 8 (Eight) Hours As Needed for Moderate Pain. Usually takes twice a day      Jardiance 10 MG tablet tablet Take 1 tablet by mouth Daily.      levothyroxine (SYNTHROID, LEVOTHROID) 175 MCG tablet Take 200 mcg by mouth Daily. Takes 200      lisinopril (PRINIVIL,ZESTRIL) 5 MG tablet Take 1 tablet by mouth Every Night.      melatonin 5 MG tablet tablet Take 1 tablet by mouth.      metFORMIN ER (GLUCOPHAGE-XR) 500 MG 24 hr tablet Take 1 tablet by mouth Daily With Breakfast.      metoprolol tartrate (LOPRESSOR) 50 MG tablet Take 1 tablet by mouth 2 (Two) Times a Day.      nitroglycerin (NITROSTAT) 0.4 MG SL tablet Place 1 tablet under the tongue Every 5 (Five) Minutes As Needed for Chest Pain. 25 tablet 11    pantoprazole (PROTONIX) 20 MG EC tablet Take 1 tablet by mouth Daily.      tiZANidine (ZANAFLEX) 4 MG tablet 1/4 TABLET WITH MEALS 1/2 TO WHOLE TABLET AT BEDTIME         No Known Allergies    Social History     Socioeconomic History    Marital status:    Tobacco Use    Smoking status: Never     Passive exposure: Current    Smokeless tobacco: Never    Tobacco comments:     wife smokes in the house and vehicle for years - tries to smoke outside or use vent dumont over stove   Vaping Use    Vaping status: Never Used   Substance and Sexual Activity    Alcohol use: Yes     Comment: rare    Drug use: Never    Sexual activity: Defer     Partners: Female       Family History   Problem Relation Age of Onset    Heart disease Mother         assumed MI at time of death - had cardiopulmonary arrest    Sudden death Mother      "Prostate cancer Father     Cancer Father     Heart disease Father         CABG    Heart attack Father 70    Diabetes Sister     Arrhythmia Sister     Arrhythmia Maternal Aunt     Stroke Paternal Grandfather     Colon cancer Neg Hx     Colon polyps Neg Hx        Review of Systems   Constitutional:  Negative for chills, fever and unexpected weight change.   Respiratory:  Negative for shortness of breath.    Cardiovascular:  Negative for chest pain.   Gastrointestinal:  Negative for abdominal distention, abdominal pain, anal bleeding, blood in stool, constipation, diarrhea, nausea and vomiting.        Vitals:    06/05/25 1242   BP: 106/52   Pulse: 70   Temp: 97.3 °F (36.3 °C)   SpO2: 96%   Weight: 106 kg (234 lb)   Height: 179.1 cm (70.5\")      Body mass index is 33.1 kg/m².    Physical Exam  Vitals reviewed.   Constitutional:       General: He is not in acute distress.  Cardiovascular:      Rate and Rhythm: Normal rate and regular rhythm.      Heart sounds: Normal heart sounds.   Pulmonary:      Effort: Pulmonary effort is normal.      Breath sounds: Normal breath sounds.   Abdominal:      General: Bowel sounds are normal. There is no distension.      Palpations: Abdomen is soft.      Tenderness: There is no abdominal tenderness.   Skin:     General: Skin is warm and dry.   Neurological:      Mental Status: He is alert.         Results for orders placed or performed during the hospital encounter of 03/19/25   POC Creatinine    Collection Time: 03/19/25  7:39 AM    Specimen: Blood   Result Value Ref Range    Creatinine 1.50 (H) 0.60 - 1.30 mg/dL           ASSESSMENT AND PLAN    Assessment & Plan     Diagnoses and all orders for this visit:    1. History of adenomatous polyp of colon (Primary)  -     Case Request; Standing  -     Case Request    2. Esophageal dysphagia  -     Case Request; Standing  -     Case Request    Other orders  -     polyethylene glycol (Golytely) 236 g solution; Take 4,000 mL by mouth 1 (One) " Time for 1 dose. Take as directed per instruction sheet.  Dispense: 4000 mL; Refill: 0  -     Implement Anesthesia Orders Day of Procedure; Standing  -     Follow Anesthesia Guidelines / Protocol; Future  -     Obtain Informed Consent; Standing          In regards to dysphagia, differential diagnosis discussed.  I recommend cut food in small pieces and chew well.  I recommend upper endoscopy for further evaluation and the patient is agreeable.  Continue protonix daily.       Schedule colonoscopy. Golytely prep.       ESOPHAGOGASTRODUODENOSCOPY WITH ANESTHESIA (N/A), COLONOSCOPY WITH ANESTHESIA (N/A)  Risk, benefits, and alternatives of endoscopy were explained in full.  They understand that there is a risk of bleeding, perforation, and infection.  The risk of perforation goes up with esophageal dilation.  Other options to evaluate UGI complaints could involve barium swallow or UGI series, but these would be diagnostic tests only.  Patient was given time to ask questions.  I answered them to their satisfaction and they are agreeable to proceeding        All risks, benefits, alternatives, and indications of colonoscopy procedure have been discussed with the patient. Risks to include perforation of the colon requiring possible surgery or colostomy, risk of bleeding from biopsies or removal of colon tissue, possibility of missing a colon polyp or cancer, or adverse drug reaction.  Benefits to include the diagnosis and management of disease of the colon and rectum. Alternatives to include barium enema, radiographic evaluation, lab testing or no intervention. Pt verbalizes understanding and agrees.         No follow-ups on file.          There are no Patient Instructions on file for this visit.      LUIS Weir    EMR Dragon/transcription disclaimer:  Much of this encounter note is electronic transcription/translation of spoken language to printed text.  The electronic translation of spoken language may be  erroneous, or at times, nonsensical words or phrases may be inadvertently transcribed.  Although I have reviewed the note for such errors, some may still exist.

## 2025-06-05 NOTE — H&P (VIEW-ONLY)
[facility] GASTROENTEROLOGY - OFFICE NOTE    6/5/2025    Zay Kamara   1950    Primary Physician: Frankie Bradley APRN    Chief Complaint   Patient presents with    Difficulty Swallowing         HISTORY OF PRESENT ILLNESS:     Zay Kamara is a 74 y.o. male presents  with dysphagia.         Dysphagia: Patient complains of difficulty swallowing. This started 6 mo ago, intermittent.  The patient points to the middle of the chest where this occurs. This is noted with solid foods.   Takes protonix daily with control of gerd.         No change in bowel habits or rectal bleeding. No weight loss.       COLONOSCOPY (05/20/2022 09:17) recall 3 years.   Tissue Pathology Exam (05/20/2022 09:43) tubular adenomatous.   No family history of colon cancer/polyps.     No history of egd.       Past Medical History:   Diagnosis Date    Arthritis     Atrial fibrillation     paroxysmal, on Xarelto    BMI 31.0-31.9,adult 06/28/2018    Carotid artery disease without cerebral infarction     Chronic knee pain     Chronic neck and back pain     Coronary artery disease 01/04/2022    COVID-19 08/18/2023    treated with Paxlovid    Depression     Diabetes     Disease of thyroid gland     Gout     Hypercholesteremia     Hypertension     IBS (irritable bowel syndrome)     Kidney disease     Osteoarthritis     back, neck, and knees - goes to Pain Management    Prostate cancer     Dr. Hong following    Sensorineural hearing loss     Sleep apnea     CPAP    Sudden hearing loss     Syncope     Tinnitus     Urine incontinence        Past Surgical History:   Procedure Laterality Date    ATRIAL APPENDAGE EXCLUSION LEFT WITH TRANSESOPHAGEAL ECHOCARDIOGRAM Left 1/6/2022    Procedure: LEFT ATRIAL APPENDAGE EXCLUSION WITH  40MM ATRICLIP; TRANSESOPHAGEAL ECHOCARDIOGRAM;  Surgeon: Chivo White MD;  Location: [facility];  Service: Cardiothoracic;  Laterality: Left;    BLADDER SUSPENSION N/A 11/26/2018    Procedure: CYSTOSCOPY  BLADDER SUSPENSION MALE SLING;  Surgeon: Zaheer Rebolledo MD;  Location:  [facility] OR;  Service: Urology    CARDIAC CATHETERIZATION N/A 12/30/2021    Procedure: Coronary angiography right radial to follow my 9:30 case;  Surgeon: Mike Fitzpatrick MD;  Location:  [facility]ATH INVASIVE [facility];  Service: Cardiology;  Laterality: N/A;    CHOLECYSTECTOMY      COLONOSCOPY N/A 3/7/2017    Procedure: COLONOSCOPY WITH ANESTHESIA;  Surgeon: Hector Alvarenga MD;  Location:  [facility];  Service:     COLONOSCOPY N/A 5/20/2022    Procedure: COLONOSCOPY WITH ANESTHESIA;  Surgeon: Hector Alvarenga MD;  Location:  [facility]OS[facility];  Service: Gastroenterology;  Laterality: N/A;  pre brbpr  post polyps  Luis Alberto SMITH    CORONARY ARTERY BYPASS GRAFT N/A 1/6/2022    Procedure: CORONARY ARTERY BYPASS GRAFT X 4 WITH LEFT INTERNAL MAMMARY ARTERY, LEFT LOWER EXTREMITY ENDOSCOPIC VEIN HARVEST, AND PERFUSION;  Surgeon: Chivo White MD;  Location:  [facility];  Service: Cardiothoracic;  Laterality: N/A;    MAZE PROCEDURE N/A 1/6/2022    Procedure: BILATERAL MAZE PROCEDURE WITH RADIOFREQUENCY AND CRYOABLATION;  Surgeon: Chivo White MD;  Location:  [facility] OR;  Service: Cardiothoracic;  Laterality: N/A;    PROSTATECTOMY N/A 8/1/2017    Procedure: PROSTATECTOMY LAPAROSCOPIC WITH DAVINCI SI ROBOT ;  Surgeon: Hernesto Hong MD;  Location:  [facility] OR;  Service:     THYROID SURGERY      RADIATION THERAPY AFTER SURGERY       Outpatient Medications Marked as Taking for the 6/5/25 encounter (Office Visit) with Kati Galvan APRN   Medication Sig Dispense Refill    albuterol sulfate  (90 Base) MCG/ACT inhaler Inhale 2 puffs Every 4 (Four) Hours As Needed for Wheezing or Shortness of Air (rinse mouth after use). 6.7 g 0    allopurinol (ZYLOPRIM) 100 MG tablet Take 1 tablet by mouth Daily.      amLODIPine (NORVASC) 2.5 MG tablet Take 1 tablet by mouth Every Night.      aspirin (aspirin) 81 MG EC tablet Take 1 tablet by mouth  Daily.      atorvastatin (LIPITOR) 20 MG tablet Take 1 tablet by mouth Every Night.  2    Cholecalciferol (VITAMIN D3) 2000 units tablet Take 1 tablet by mouth Daily.      citalopram (CeleXA) 20 MG tablet Take 1 tablet by mouth Daily.      glipizide (GLUCOTROL) 5 MG tablet Take 2 tablets by mouth Every Morning.      HYDROcodone-acetaminophen (NORCO) 7.5-325 MG per tablet Take 1 tablet by mouth Every 8 (Eight) Hours As Needed for Moderate Pain. Usually takes twice a day      Jardiance 10 MG tablet tablet Take 1 tablet by mouth Daily.      levothyroxine (SYNTHROID, LEVOTHROID) 175 MCG tablet Take 200 mcg by mouth Daily. Takes 200      lisinopril (PRINIVIL,ZESTRIL) 5 MG tablet Take 1 tablet by mouth Every Night.      melatonin 5 MG tablet tablet Take 1 tablet by mouth.      metFORMIN ER (GLUCOPHAGE-XR) 500 MG 24 hr tablet Take 1 tablet by mouth Daily With Breakfast.      metoprolol tartrate (LOPRESSOR) 50 MG tablet Take 1 tablet by mouth 2 (Two) Times a Day.      nitroglycerin (NITROSTAT) 0.4 MG SL tablet Place 1 tablet under the tongue Every 5 (Five) Minutes As Needed for Chest Pain. 25 tablet 11    pantoprazole (PROTONIX) 20 MG EC tablet Take 1 tablet by mouth Daily.      tiZANidine (ZANAFLEX) 4 MG tablet 1/4 TABLET WITH MEALS 1/2 TO WHOLE TABLET AT BEDTIME         No Known Allergies    Social History     Socioeconomic History    Marital status:    Tobacco Use    Smoking status: Never     Passive exposure: Current    Smokeless tobacco: Never    Tobacco comments:     wife smokes in the house and vehicle for years - tries to smoke outside or use vent dumont over stove   Vaping Use    Vaping status: Never Used   Substance and Sexual Activity    Alcohol use: Yes     Comment: rare    Drug use: Never    Sexual activity: Defer     Partners: Female       Family History   Problem Relation Age of Onset    Heart disease Mother         assumed MI at time of death - had cardiopulmonary arrest    Sudden death Mother      "Prostate cancer Father     Cancer Father     Heart disease Father         CABG    Heart attack Father 70    Diabetes Sister     Arrhythmia Sister     Arrhythmia Maternal Aunt     Stroke Paternal Grandfather     Colon cancer Neg Hx     Colon polyps Neg Hx        Review of Systems   Constitutional:  Negative for chills, fever and unexpected weight change.   Respiratory:  Negative for shortness of breath.    Cardiovascular:  Negative for chest pain.   Gastrointestinal:  Negative for abdominal distention, abdominal pain, anal bleeding, blood in stool, constipation, diarrhea, nausea and vomiting.        Vitals:    06/05/25 1242   BP: 106/52   Pulse: 70   Temp: 97.3 °F (36.3 °C)   SpO2: 96%   Weight: 106 kg (234 lb)   Height: 179.1 cm (70.5\")      Body mass index is 33.1 kg/m².    Physical Exam  Vitals reviewed.   Constitutional:       General: He is not in acute distress.  Cardiovascular:      Rate and Rhythm: Normal rate and regular rhythm.      Heart sounds: Normal heart sounds.   Pulmonary:      Effort: Pulmonary effort is normal.      Breath sounds: Normal breath sounds.   Abdominal:      General: Bowel sounds are normal. There is no distension.      Palpations: Abdomen is soft.      Tenderness: There is no abdominal tenderness.   Skin:     General: Skin is warm and dry.   Neurological:      Mental Status: He is alert.         Results for orders placed or performed during the hospital encounter of 03/19/25   POC Creatinine    Collection Time: 03/19/25  7:39 AM    Specimen: Blood   Result Value Ref Range    Creatinine 1.50 (H) 0.60 - 1.30 mg/dL           ASSESSMENT AND PLAN    Assessment & Plan     Diagnoses and all orders for this visit:    1. History of adenomatous polyp of colon (Primary)  -     Case Request; Standing  -     Case Request    2. Esophageal dysphagia  -     Case Request; Standing  -     Case Request    Other orders  -     polyethylene glycol (Golytely) 236 g solution; Take 4,000 mL by mouth 1 (One) " Time for 1 dose. Take as directed per instruction sheet.  Dispense: 4000 mL; Refill: 0  -     Implement Anesthesia Orders Day of Procedure; Standing  -     Follow Anesthesia Guidelines / Protocol; Future  -     Obtain Informed Consent; Standing          In regards to dysphagia, differential diagnosis discussed.  I recommend cut food in small pieces and chew well.  I recommend upper endoscopy for further evaluation and the patient is agreeable.  Continue protonix daily.       Schedule colonoscopy. Golytely prep.       ESOPHAGOGASTRODUODENOSCOPY WITH ANESTHESIA (N/A), COLONOSCOPY WITH ANESTHESIA (N/A)  Risk, benefits, and alternatives of endoscopy were explained in full.  They understand that there is a risk of bleeding, perforation, and infection.  The risk of perforation goes up with esophageal dilation.  Other options to evaluate UGI complaints could involve barium swallow or UGI series, but these would be diagnostic tests only.  Patient was given time to ask questions.  I answered them to their satisfaction and they are agreeable to proceeding        All risks, benefits, alternatives, and indications of colonoscopy procedure have been discussed with the patient. Risks to include perforation of the colon requiring possible surgery or colostomy, risk of bleeding from biopsies or removal of colon tissue, possibility of missing a colon polyp or cancer, or adverse drug reaction.  Benefits to include the diagnosis and management of disease of the colon and rectum. Alternatives to include barium enema, radiographic evaluation, lab testing or no intervention. Pt verbalizes understanding and agrees.         No follow-ups on file.          There are no Patient Instructions on file for this visit.      LUIS Weir    EMR Dragon/transcription disclaimer:  Much of this encounter note is electronic transcription/translation of spoken language to printed text.  The electronic translation of spoken language may be  erroneous, or at times, nonsensical words or phrases may be inadvertently transcribed.  Although I have reviewed the note for such errors, some may still exist.

## 2025-06-06 ENCOUNTER — OFFICE VISIT (OUTPATIENT)
Dept: CARDIOLOGY | Facility: CLINIC | Age: 75
End: 2025-06-06
Payer: MEDICARE

## 2025-06-06 VITALS
BODY MASS INDEX: 32.06 KG/M2 | WEIGHT: 229 LBS | SYSTOLIC BLOOD PRESSURE: 110 MMHG | HEIGHT: 71 IN | DIASTOLIC BLOOD PRESSURE: 52 MMHG | OXYGEN SATURATION: 98 % | HEART RATE: 66 BPM

## 2025-06-06 DIAGNOSIS — Z95.1 HX OF CABG: Chronic | ICD-10-CM

## 2025-06-06 DIAGNOSIS — E11.22 TYPE 2 DIABETES MELLITUS WITH STAGE 3A CHRONIC KIDNEY DISEASE, WITHOUT LONG-TERM CURRENT USE OF INSULIN: Chronic | ICD-10-CM

## 2025-06-06 DIAGNOSIS — I65.23 BILATERAL CAROTID ARTERY STENOSIS: Chronic | ICD-10-CM

## 2025-06-06 DIAGNOSIS — I71.21 ANEURYSM OF ASCENDING AORTA WITHOUT RUPTURE: Chronic | ICD-10-CM

## 2025-06-06 DIAGNOSIS — I25.10 CORONARY ARTERY DISEASE INVOLVING NATIVE CORONARY ARTERY OF NATIVE HEART WITHOUT ANGINA PECTORIS: Primary | Chronic | ICD-10-CM

## 2025-06-06 DIAGNOSIS — E66.09 CLASS 1 OBESITY DUE TO EXCESS CALORIES WITH SERIOUS COMORBIDITY AND BODY MASS INDEX (BMI) OF 32.0 TO 32.9 IN ADULT: ICD-10-CM

## 2025-06-06 DIAGNOSIS — I48.0 PAROXYSMAL ATRIAL FIBRILLATION: Chronic | ICD-10-CM

## 2025-06-06 DIAGNOSIS — Z86.79 S/P MAZE OPERATION FOR ATRIAL FIBRILLATION: Chronic | ICD-10-CM

## 2025-06-06 DIAGNOSIS — Z98.890 S/P MAZE OPERATION FOR ATRIAL FIBRILLATION: Chronic | ICD-10-CM

## 2025-06-06 DIAGNOSIS — N18.31 TYPE 2 DIABETES MELLITUS WITH STAGE 3A CHRONIC KIDNEY DISEASE, WITHOUT LONG-TERM CURRENT USE OF INSULIN: Chronic | ICD-10-CM

## 2025-06-06 DIAGNOSIS — G47.30 SLEEP APNEA, UNSPECIFIED TYPE: Chronic | ICD-10-CM

## 2025-06-06 DIAGNOSIS — I10 PRIMARY HYPERTENSION: Chronic | ICD-10-CM

## 2025-06-06 DIAGNOSIS — E66.811 CLASS 1 OBESITY DUE TO EXCESS CALORIES WITH SERIOUS COMORBIDITY AND BODY MASS INDEX (BMI) OF 32.0 TO 32.9 IN ADULT: ICD-10-CM

## 2025-06-06 PROBLEM — I25.110 CORONARY ARTERY DISEASE INVOLVING NATIVE CORONARY ARTERY OF NATIVE HEART WITH UNSTABLE ANGINA PECTORIS: Status: RESOLVED | Noted: 2022-01-04 | Resolved: 2025-06-06

## 2025-06-06 RX ORDER — NITROGLYCERIN 0.4 MG/1
0.4 TABLET SUBLINGUAL
Qty: 25 TABLET | Refills: 11 | Status: SHIPPED | OUTPATIENT
Start: 2025-06-06 | End: 2026-06-06

## 2025-06-06 NOTE — PROGRESS NOTES
Encounter Date:06/06/2025  Chief Complaint:   Subjective    Zay Kamara is a 74 y.o. male who presents to [facility] today for six month cardiac follow-up. He is accompanied by his wife.      History of Present Illness   HPI       Atrial Fibrillation     Additional comments: No palpitations. S/p MAZE and left atrial appendage occlusion during CABG 1/2022. Not anticoagulated. No bleeding or falls on aspirin.             Hypertension     Additional comments: Was a little low 106/52 yesterday. Doesn't check very often at home. Dizzy if bends over and raises up too quickly. No headaches, nosebleeds. Some swelling in both ankles at sock line. No further near syncope or syncope which was caused by tizanidine dropping his BP too low in the past. Rarely takes 1/4 tablet now.             Hyperlipidemia     Additional comments: Last checked 9/2024. Low HDL. On atorvastatin.             Coronary Artery Disease     Additional comments: Soreness in chest every now and then usually at rest and sometimes with activity. Hx CABG 1/2022. Previous angina was severe decrease in exercise tolerance - weakness. Still working part time at [employer]. Not exercising. Has a treadmill but doesn't use it. Has lost 12 lbs since 12/2024 by eating less (not healthier).             Follow-up     Additional comments: Six month. Glucose has been running 180s and A1c 7.6% for the past year. Hasn't never tried a GLP1a - hx of thyroid cancer.              Aortic Aneurysm     Additional comments: Thoracic ascending 4.4 cm 6/2024 CTA chest, Dr. White following. Unchanged per repeat CTA chest 3/2025. Due for recheck 3/2026. No chest discomfort. Avoids lifting more than 20 lbs or straining.             Sleep Apnea     Additional comments: Wearing CPAP every night and helps.          Last edited by Letty Belcher APRN on 6/6/2025  1:13 PM.        History: Past medical, surgical, family, and social history  reviewed.    Outpatient Medications Marked as Taking for the 6/6/25 encounter (Office Visit) with Letty Belcher APRN   Medication Sig Dispense Refill    albuterol sulfate  (90 Base) MCG/ACT inhaler Inhale 2 puffs Every 4 (Four) Hours As Needed for Wheezing or Shortness of Air (rinse mouth after use). 6.7 g 0    allopurinol (ZYLOPRIM) 100 MG tablet Take 1 tablet by mouth Daily.      amLODIPine (NORVASC) 2.5 MG tablet Take 1 tablet by mouth Every Night.      aspirin (aspirin) 81 MG EC tablet Take 1 tablet by mouth Daily.      atorvastatin (LIPITOR) 20 MG tablet Take 1 tablet by mouth Every Night.  2    Cholecalciferol (VITAMIN D3) 2000 units tablet Take 1 tablet by mouth Daily.      citalopram (CeleXA) 20 MG tablet Take 1 tablet by mouth Daily.      DHA-EPA-Vitamin E (OMEGA-3 COMPLEX PO) Take 2 capsules by mouth Every Night. Omega XL      glipizide (GLUCOTROL) 5 MG tablet Take 2 tablets by mouth Every Morning.      HYDROcodone-acetaminophen (NORCO) 7.5-325 MG per tablet Take 1 tablet by mouth Every 8 (Eight) Hours As Needed for Moderate Pain. Has increased to 4 times per day due to arthritis and heel spur pain      Jardiance 10 MG tablet tablet Take 1 tablet by mouth Daily.      levothyroxine (SYNTHROID, LEVOTHROID) 175 MCG tablet Take 1 tablet by mouth Daily.      lisinopril (PRINIVIL,ZESTRIL) 5 MG tablet Take 1 tablet by mouth Every Night.      melatonin 5 MG tablet tablet Take 1 tablet by mouth At Night As Needed (sleep). Almost every night      metFORMIN ER (GLUCOPHAGE-XR) 500 MG 24 hr tablet Take 1 tablet by mouth Daily With Breakfast.      metoprolol tartrate (LOPRESSOR) 50 MG tablet Take 1 tablet by mouth 2 (Two) Times a Day.      nitroglycerin (NITROSTAT) 0.4 MG SL tablet Place 1 tablet under the tongue Every 5 (Five) Minutes As Needed for Chest Pain. 25 tablet 11    pantoprazole (PROTONIX) 20 MG EC tablet Take 1 tablet by mouth Daily.      tiZANidine (ZANAFLEX) 4 MG tablet Take 0.25 tablets by  "mouth Every 6 (Six) Hours As Needed for Muscle Spasms.      [DISCONTINUED] nitroglycerin (NITROSTAT) 0.4 MG SL tablet Place 1 tablet under the tongue Every 5 (Five) Minutes As Needed for Chest Pain. 25 tablet 11        Objective     Vital Signs:   /52 (BP Location: Right arm, Patient Position: Sitting, Cuff Size: Adult)   Pulse 66   Ht 179.1 cm (70.51\")   Wt 104 kg (229 lb)   SpO2 98%   BMI 32.38 kg/m²   Wt Readings from Last 3 Encounters:   06/06/25 104 kg (229 lb)   06/05/25 106 kg (234 lb)   03/19/25 107 kg (235 lb 3.2 oz)         Vitals reviewed.   Constitutional:       Appearance: Well-developed and well-groomed. Obese.   Eyes:      General: No scleral icterus.  HENT:      Right Ear: Decreased hearing noted.      Left Ear: Decreased hearing noted.      Ears:      Comments: Wears hearing aid  Neck:      Vascular: Normal carotid pulses. No carotid bruit or JVD.   Pulmonary:      Effort: Pulmonary effort is normal.      Breath sounds: Normal breath sounds.   Chest:   Breasts:     Left: No tenderness.   Cardiovascular:      Normal rate. Regular rhythm.      No gallop.    Pulses:     Intact distal pulses.   Edema:     Peripheral edema absent.   Skin:     General: Skin is warm and dry.      Comments: Midline sternal incision well healed   Neurological:      Mental Status: Alert and oriented to person, place, and time.   Psychiatric:         Behavior: Behavior is cooperative.         Result Review  Data Reviewed:  The following data was reviewed by: LUIS Garcia on 06/06/2025  Lab Results - Last 18 Months   Lab Units 03/19/25  0739 12/13/24  1023 09/11/24  1216 07/30/24  1026 06/28/24  0757 05/08/24  1048 04/30/24  2251   BUN mg/dL  --  29*  --  34*  --  27* 32*   CREATININE mg/dL 1.50* 1.4* 1.40* 1.57*   < > 1.3* 1.58*   SODIUM mmol/L  --  137  --  140  --  134* 140   POTASSIUM mmol/L  --  4.8  --  5.2  --  4.7 4.4   CHLORIDE mmol/L  --  102  --  104  --  100 104   CALCIUM mg/dL  --  9.4  --  " 9.7  --  9.2 8.5*   ALBUMIN g/dL  --   --   --   --   --  4.3 3.8   BILIRUBIN mg/dL  --   --   --   --   --  0.7 0.5   ALK PHOS U/L  --   --   --   --   --  70 61   AST (SGOT) U/L  --   --   --   --   --  12 12   ALT (SGPT) U/L  --   --   --   --   --  12 11   HEMOGLOBIN A1C %  --  7.6*  --   --   --  7.6*  --    WBC 10*3/mm3  --   --   --   --   --   --  8.32   RBC 10*6/mm3  --   --   --   --   --   --  3.73*   HEMATOCRIT %  --   --   --   --   --   --  33.0*   MCV fL  --   --   --   --   --   --  88.5   MCH pg  --   --   --   --   --   --  29.8   TSH uIU/mL  --   --   --   --   --   --  0.380   PSA ng/mL  --   --   --  <0.014  --   --   --    URIC ACID mg/dL  --   --   --   --   --  5.5  --     < > = values in this interval not displayed.   CT Angiogram Chest (03/19/2025 07:44)   LIPID PANEL (09/13/2024 08:48)                Assessment and Plan   Problem List Items Addressed This Visit          Cardiac and Vasculature    Coronary artery disease involving native heart - Primary (Chronic)    Overview   #1 99% ostial LAD stenosis and total occlusion of the distal LAD  #2 60% proximal RCA stenosis and 90% stenosis of the proximal PL branch  #3 dilated ascending aorta  #4 LVEF 55%    Coronary artery bypass graft x 4 (left internal mammary artery/sequencing the dominant diagonal artery and left anterior descending artery, reverse saphenous vein graft/distal right coronary artery, and reverse saphenous vein graft/posterior descending artery), Right and left MAZE procedure with radiofrequency and cryoablation, Left atrial appendage exclusion (Atriclip 40 mm device), Left endoscopic vein harvest performed on 1/6/2022 by Dr. White.            Current Assessment & Plan   Remains stable/improved s/p CABG x 4 1/2022. Again encouraged healthy diet and to gradual increase activity. Chest soreness has returned and is most consistent with post-surgical soreness. Recommend continuing Jardiance given cardiac, renal, and DM benefits.  He will discuss with PCP. Continue lifelong aspirin. Thoracic aortic ectasia remains stable and followed by Dr. White.         Relevant Medications    nitroglycerin (NITROSTAT) 0.4 MG SL tablet    Paroxysmal atrial fibrillation (Chronic)    Overview   [id number] SCORE   [id number] Score: 5 (12/6/2024  3:09 PM)    HAS-BLED SCORE   Total Risk Score (>5 Very High, 3-5 High, 2 Moderate, 1 Low): 3 (12/6/2024  3:10 PM)    s/p MAZE, left atrial appendage occlusion 1/2022         Current Assessment & Plan   Continues to maintain sinus rhythm per symptoms. Reasonable to remain off anticoagulation since s/p MAZE and left atrial appendage occlusion 1/2022. Call if any palpitations.         Relevant Medications    nitroglycerin (NITROSTAT) 0.4 MG SL tablet    Bilateral carotid artery stenosis (Chronic)    Overview   Dr. León following  8/21/2019 US - less than 50% R ICA, 50-69% L ICA stenosis  8/2021 - less than 50% L&R ICA  7/2023 - less than 50% bilaterally  7/2024 - less than 50% bilaterally         Current Assessment & Plan   Remains stable/improved. Again encouraged healthy diet, BP, lipid, and glucose control, and routine aerobic exercise. Continue present therapy. Recheck ordered for 7/2025 per Dr. León/Jeri Chambers NP.         Hypertension (Chronic)    Current Assessment & Plan   Well controlled per today's reading. Reviewed goal -120s/60-70s. Continue present therapy. Again encouraged regular home monitoring. Avoid hypotension.         Hx of CABG (Chronic)    Overview   1/2022         S/P Maze operation for atrial fibrillation (Chronic)    Overview   1/2022         Thoracic aortic aneurysm without rupture (Chronic)    Current Assessment & Plan   Remains stable/unchanged. Dr. White following.            Endocrine and Metabolic    Type 2 diabetes mellitus with stage 3 chronic kidney disease (Chronic)    Current Assessment & Plan   Followed by PCP. Still not quite to goal per 12/2024 labs (A1c 7.6%).  Again encouraged him to continue Jardiance for CV, DM, and renal benefits - reasonable to increase dose. Again encouraged healthy diet, weight loss, and gradual increase in activity. Defer further medication adjustments to PCP.         Class 1 obesity due to excess calories with serious comorbidity and body mass index (BMI) of 32.0 to 32.9 in adult    Current Assessment & Plan   Patient's (Body mass index is 32.38 kg/m².) indicates that they are obese (BMI >30) with health conditions that include obstructive sleep apnea, hypertension, coronary heart disease, diabetes mellitus, dyslipidemias, peripheral vascular disease and osteoarthritis . Weight is improving with lifestyle modifications and medications. BMI is is above average; BMI management plan is completed. We discussed low calorie, low carb based diet program and increasing exercise. Considered GLP1a but may be contraindicated given hx of thyroid cancer s/p thyroidectomy. Defer to PCP.            Sleep    Sleep apnea (Chronic)    Overview   Previously noncompliant with CPAP         Current Assessment & Plan   Again encouraged continued compliance with CPAP. Again encouraged continued weight loss but continue CPAP until has significant amount of weight loss and then re-evaluate need for CPAP.          Patient was given instructions and counseling regarding his condition or for health maintenance advice. Please see specific information pulled into the AVS if appropriate.    Follow Up :   Return in about 6 months (around 12/6/2025) for Recheck.                  Letty Belcher APRN, ACNP-BC, CHFN-BC

## 2025-06-06 NOTE — ASSESSMENT & PLAN NOTE
Well controlled per today's reading. Reviewed goal -120s/60-70s. Continue present therapy. Again encouraged regular home monitoring. Avoid hypotension.

## 2025-06-06 NOTE — ASSESSMENT & PLAN NOTE
Followed by PCP. Still not quite to goal per 12/2024 labs (A1c 7.6%). Again encouraged him to continue Jardiance for CV, DM, and renal benefits - reasonable to increase dose. Again encouraged healthy diet, weight loss, and gradual increase in activity. Defer further medication adjustments to PCP.

## 2025-06-06 NOTE — ASSESSMENT & PLAN NOTE
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 Again encouraged healthy diet and to gradual increase activity. Chest soreness has returned and is most consistent with post-surgical soreness. Recommend continuing Jardiance given cardiac, renal, and DM benefits. He will discuss with PCP. Continue lifelong aspirin. Thoracic aortic ectasia remains stable and followed by Dr. White.

## 2025-06-06 NOTE — ASSESSMENT & PLAN NOTE
Patient's (Body mass index is 32.38 kg/m².) indicates that they are obese (BMI >30) with health conditions that include obstructive sleep apnea, hypertension, coronary heart disease, diabetes mellitus, dyslipidemias, peripheral vascular disease and osteoarthritis . Weight is improving with lifestyle modifications and medications. BMI is is above average; BMI management plan is completed. We discussed low calorie, low carb based diet program and increasing exercise. Considered GLP1a but may be contraindicated given hx of thyroid cancer s/p thyroidectomy. Defer to PCP.

## 2025-06-06 NOTE — ASSESSMENT & PLAN NOTE
Remains well controlled except low HDL (21) per 9/2024 labs. Could consider changing atorvastatin to rosuvastatin to see if any improvement in HDL. Again encouraged healthy diet, weight loss, and gradual increase in routine aerobic activity. Recheck CMP and lipid panel at least yearly.

## 2025-06-06 NOTE — ASSESSMENT & PLAN NOTE
Again encouraged continued compliance with CPAP. Again encouraged continued weight loss but continue CPAP until has significant amount of weight loss and then re-evaluate need for CPAP.

## 2025-06-09 DIAGNOSIS — M47.26 OSTEOARTHRITIS OF SPINE WITH RADICULOPATHY, LUMBAR REGION: ICD-10-CM

## 2025-06-09 RX ORDER — HYDROCODONE BITARTRATE AND ACETAMINOPHEN 7.5; 325 MG/1; MG/1
1 TABLET ORAL EVERY 6 HOURS PRN
Qty: 120 TABLET | Refills: 0 | Status: SHIPPED | OUTPATIENT
Start: 2025-06-09 | End: 2025-06-12 | Stop reason: SDUPTHER

## 2025-06-09 NOTE — TELEPHONE ENCOUNTER
Patient called Friday, office is closed on Fridays  Last OV - 03/13/25  Next OV - 06/12/25  Last UDS - 11/04/24    Pt needs to be educated on calling refills in sooner at next OV

## 2025-06-12 ENCOUNTER — OFFICE VISIT (OUTPATIENT)
Dept: PAIN MANAGEMENT | Age: 75
End: 2025-06-12
Payer: MEDICARE

## 2025-06-12 VITALS
HEART RATE: 71 BPM | BODY MASS INDEX: 33.15 KG/M2 | WEIGHT: 236.8 LBS | RESPIRATION RATE: 16 BRPM | HEIGHT: 71 IN | TEMPERATURE: 96.7 F | SYSTOLIC BLOOD PRESSURE: 113 MMHG | DIASTOLIC BLOOD PRESSURE: 60 MMHG | OXYGEN SATURATION: 96 %

## 2025-06-12 DIAGNOSIS — M47.812 CERVICAL SPONDYLOSIS WITHOUT MYELOPATHY: Primary | ICD-10-CM

## 2025-06-12 DIAGNOSIS — M79.18 MYOFASCIAL MUSCLE PAIN: ICD-10-CM

## 2025-06-12 DIAGNOSIS — G89.4 CHRONIC PAIN SYNDROME: ICD-10-CM

## 2025-06-12 DIAGNOSIS — F11.90 CHRONIC, CONTINUOUS USE OF OPIOIDS: ICD-10-CM

## 2025-06-12 DIAGNOSIS — M79.18 CERVICAL MYOFASCIAL PAIN SYNDROME: ICD-10-CM

## 2025-06-12 PROCEDURE — 1036F TOBACCO NON-USER: CPT | Performed by: STUDENT IN AN ORGANIZED HEALTH CARE EDUCATION/TRAINING PROGRAM

## 2025-06-12 PROCEDURE — G8427 DOCREV CUR MEDS BY ELIG CLIN: HCPCS | Performed by: STUDENT IN AN ORGANIZED HEALTH CARE EDUCATION/TRAINING PROGRAM

## 2025-06-12 PROCEDURE — 3017F COLORECTAL CA SCREEN DOC REV: CPT | Performed by: STUDENT IN AN ORGANIZED HEALTH CARE EDUCATION/TRAINING PROGRAM

## 2025-06-12 PROCEDURE — G8417 CALC BMI ABV UP PARAM F/U: HCPCS | Performed by: STUDENT IN AN ORGANIZED HEALTH CARE EDUCATION/TRAINING PROGRAM

## 2025-06-12 PROCEDURE — 3074F SYST BP LT 130 MM HG: CPT | Performed by: STUDENT IN AN ORGANIZED HEALTH CARE EDUCATION/TRAINING PROGRAM

## 2025-06-12 PROCEDURE — 1159F MED LIST DOCD IN RCRD: CPT | Performed by: STUDENT IN AN ORGANIZED HEALTH CARE EDUCATION/TRAINING PROGRAM

## 2025-06-12 PROCEDURE — 1123F ACP DISCUSS/DSCN MKR DOCD: CPT | Performed by: STUDENT IN AN ORGANIZED HEALTH CARE EDUCATION/TRAINING PROGRAM

## 2025-06-12 PROCEDURE — 99214 OFFICE O/P EST MOD 30 MIN: CPT | Performed by: STUDENT IN AN ORGANIZED HEALTH CARE EDUCATION/TRAINING PROGRAM

## 2025-06-12 PROCEDURE — 1125F AMNT PAIN NOTED PAIN PRSNT: CPT | Performed by: STUDENT IN AN ORGANIZED HEALTH CARE EDUCATION/TRAINING PROGRAM

## 2025-06-12 PROCEDURE — 3078F DIAST BP <80 MM HG: CPT | Performed by: STUDENT IN AN ORGANIZED HEALTH CARE EDUCATION/TRAINING PROGRAM

## 2025-06-12 RX ORDER — HYDROCODONE BITARTRATE AND ACETAMINOPHEN 7.5; 325 MG/1; MG/1
1 TABLET ORAL EVERY 6 HOURS PRN
Qty: 120 TABLET | Refills: 0 | Status: SHIPPED | OUTPATIENT
Start: 2025-07-09 | End: 2025-08-08

## 2025-06-12 ASSESSMENT — PATIENT HEALTH QUESTIONNAIRE - PHQ9
5. POOR APPETITE OR OVEREATING: NOT AT ALL
10. IF YOU CHECKED OFF ANY PROBLEMS, HOW DIFFICULT HAVE THESE PROBLEMS MADE IT FOR YOU TO DO YOUR WORK, TAKE CARE OF THINGS AT HOME, OR GET ALONG WITH OTHER PEOPLE: NOT DIFFICULT AT ALL
1. LITTLE INTEREST OR PLEASURE IN DOING THINGS: NOT AT ALL
7. TROUBLE CONCENTRATING ON THINGS, SUCH AS READING THE NEWSPAPER OR WATCHING TELEVISION: NOT AT ALL
8. MOVING OR SPEAKING SO SLOWLY THAT OTHER PEOPLE COULD HAVE NOTICED. OR THE OPPOSITE, BEING SO FIGETY OR RESTLESS THAT YOU HAVE BEEN MOVING AROUND A LOT MORE THAN USUAL: NOT AT ALL
9. THOUGHTS THAT YOU WOULD BE BETTER OFF DEAD, OR OF HURTING YOURSELF: NOT AT ALL
SUM OF ALL RESPONSES TO PHQ QUESTIONS 1-9: 2
6. FEELING BAD ABOUT YOURSELF - OR THAT YOU ARE A FAILURE OR HAVE LET YOURSELF OR YOUR FAMILY DOWN: NOT AT ALL
3. TROUBLE FALLING OR STAYING ASLEEP: SEVERAL DAYS
2. FEELING DOWN, DEPRESSED OR HOPELESS: NOT AT ALL
4. FEELING TIRED OR HAVING LITTLE ENERGY: SEVERAL DAYS
SUM OF ALL RESPONSES TO PHQ QUESTIONS 1-9: 2

## 2025-06-12 NOTE — PROGRESS NOTES
neural foraminal stenosis.  C6-7: There are posterior osteophytes and moderate diffuse disc  bulging. There is bilateral facet arthropathy. No significant spinal  stenosis. There is moderate bilateral neural foraminal stenosis.  C7-T1: There are prominent posterior osteophytes and bilateral facet  arthropathy. There is bilateral neural foraminal stenosis. No  significant spinal stenosis.  The size and signal of the cervical spinal cord appear normal.  Moderate circumferential narrowing is seen at level C3-4 due to  prominent osteophytes, disc bulging and facetal arthropathy. No  abnormal intrinsic signal. No focal enlargement or expansion.  The visualized everette, medulla oblongata and cerebellum appear normal.  The prevertebral soft tissues appear normal.  Incidentally noted is a well-defined fluid signal nodule at the base  of the tongue, anterior wall of the vallecula and the midline,  measuring 1.2 cm in diameter. This may represent a thyroglossal duct  cyst or a mucous retention cyst of the lingual tonsil.  IMPRESSION:  The severe cervical spondylosis.  Multilevel prominent disc osteophyte complexes, uncinate spurs and  facetal arthropathy and resultant neural foraminal or spinal canal  stenosis as detailed above. This is most pronounced at C3-4.  Other findings as above.    Impression & Plan:  Mr. Pillo Ortega is 74 y.o. male with hx of elevated BMI (34), depression,  SUSY, DM, HTN/HLD, CAD s/p CABG x 4, , atrial fibrillation s/p MAZE on chronic anticoagulation (xarelto), carotid stenosis, and sarcoidosis. He was referred to Pain Management by Neurosurgery for treatment of his chronic neck pain.    1. Pain Generators / Etiology:   -cervical myofascial pain 80% relief after TPI  -cervical facet arthropathy - 50% relief after C4-C6 bilateral MBB  - lumbar facet arthrosis  -chronic continuous use of opioids  -chronic pain syndrome    2. Failed Pain Medications:           3. Previous Interventions:  4/12/25 80%

## 2025-06-13 ENCOUNTER — OFFICE VISIT (OUTPATIENT)
Age: 75
End: 2025-06-13
Payer: MEDICARE

## 2025-06-13 VITALS — HEIGHT: 71 IN | BODY MASS INDEX: 33.04 KG/M2 | WEIGHT: 236 LBS

## 2025-06-13 DIAGNOSIS — M72.2 PLANTAR FASCIITIS OF RIGHT FOOT: ICD-10-CM

## 2025-06-13 DIAGNOSIS — M79.672 LEFT FOOT PAIN: ICD-10-CM

## 2025-06-13 DIAGNOSIS — M79.671 PAIN IN RIGHT FOOT: ICD-10-CM

## 2025-06-13 DIAGNOSIS — M76.62 CALCIFIC ACHILLES TENDINITIS OF LEFT LOWER EXTREMITY: Primary | ICD-10-CM

## 2025-06-13 PROCEDURE — 3017F COLORECTAL CA SCREEN DOC REV: CPT | Performed by: NURSE PRACTITIONER

## 2025-06-13 PROCEDURE — G8427 DOCREV CUR MEDS BY ELIG CLIN: HCPCS | Performed by: NURSE PRACTITIONER

## 2025-06-13 PROCEDURE — 1123F ACP DISCUSS/DSCN MKR DOCD: CPT | Performed by: NURSE PRACTITIONER

## 2025-06-13 PROCEDURE — 99213 OFFICE O/P EST LOW 20 MIN: CPT | Performed by: NURSE PRACTITIONER

## 2025-06-13 PROCEDURE — 1159F MED LIST DOCD IN RCRD: CPT | Performed by: NURSE PRACTITIONER

## 2025-06-13 PROCEDURE — G8417 CALC BMI ABV UP PARAM F/U: HCPCS | Performed by: NURSE PRACTITIONER

## 2025-06-13 PROCEDURE — 1036F TOBACCO NON-USER: CPT | Performed by: NURSE PRACTITIONER

## 2025-06-13 NOTE — PROGRESS NOTES
[facility]  [facility]  [address]  [facility] [id number] [phone]     Patient: Pillo Ortega   YOB: 1950   Date: 6/13/2025   Visit Type:      History of Present Illness  Chief Complaint   Patient presents with    Follow-up     Follow up for Rt foot injection. Pt states that the foot pain is better but the Lt foot is hurting and wonders if he needs an injection in that foot.        This is a 74 y.o. male presents today complaining of right foot.  He states the injection has helped the right foot pain.   He reports continued left foot pain.  He has been wearing insoles and he feels that has helped some.  Past Medical History:   Diagnosis Date    Allergic rhinitis     Arthritis     Cancer (HCC) 2012    Thyroid  - Dr Muhammad     Coronary artery disease involving native heart without angina pectoris 1/18/2022    Hyperlipidemia     Hypertension     Hyperthyroidism     Prostate cancer (HCC)     Prostate cancer (HCC) 7/20/2017    Overview:  Added automatically from request for surgery [id number]    Sleep apnea     Type II or unspecified type diabetes mellitus without mention of complication, not stated as uncontrolled       Past Surgical History:   Procedure Laterality Date    CARDIAC SURGERY      CABG X 4 at [facility]    CHOLECYSTECTOMY      INCONTINENCE SURGERY  11/26/2018    dr christian    PROSTATE SURGERY      Dr Wu    PROSTATE SURGERY  08/01/2017    removal, dr sawant     THYROID SURGERY      removed by Dr Muhammad       Social History     Socioeconomic History    Marital status:      Spouse name: Angeli    Number of children: 1    Years of education: None    Highest education level: None   Tobacco Use    Smoking status: Never    Smokeless tobacco: Never   Vaping Use    Vaping status: Never Used   Substance and Sexual Activity    Alcohol use: No    Drug use: No     Social Drivers of Health     Financial Resource Strain: Low

## 2025-06-16 DIAGNOSIS — N18.31 TYPE 2 DIABETES MELLITUS WITH STAGE 3A CHRONIC KIDNEY DISEASE, WITHOUT LONG-TERM CURRENT USE OF INSULIN (HCC): ICD-10-CM

## 2025-06-16 DIAGNOSIS — E11.22 TYPE 2 DIABETES MELLITUS WITH STAGE 3A CHRONIC KIDNEY DISEASE, WITHOUT LONG-TERM CURRENT USE OF INSULIN (HCC): ICD-10-CM

## 2025-06-16 LAB
ALBUMIN SERPL-MCNC: 4.3 G/DL (ref 3.5–5.2)
ALP SERPL-CCNC: 61 U/L (ref 40–129)
ALT SERPL-CCNC: 13 U/L (ref 10–50)
ANION GAP SERPL CALCULATED.3IONS-SCNC: 10 MMOL/L (ref 8–16)
AST SERPL-CCNC: 13 U/L (ref 10–50)
BILIRUB SERPL-MCNC: 0.7 MG/DL (ref 0.2–1.2)
BUN SERPL-MCNC: 31 MG/DL (ref 8–23)
CALCIUM SERPL-MCNC: 9.4 MG/DL (ref 8.8–10.2)
CHLORIDE SERPL-SCNC: 100 MMOL/L (ref 98–107)
CHOLEST SERPL-MCNC: 108 MG/DL (ref 0–199)
CO2 SERPL-SCNC: 26 MMOL/L (ref 22–29)
CREAT SERPL-MCNC: 1.9 MG/DL (ref 0.7–1.2)
GLUCOSE SERPL-MCNC: 225 MG/DL (ref 70–99)
HBA1C MFR BLD: 7.9 % (ref 4–5.6)
HDLC SERPL-MCNC: 23 MG/DL (ref 40–60)
LDLC SERPL CALC-MCNC: 55 MG/DL
POTASSIUM SERPL-SCNC: 4.5 MMOL/L (ref 3.5–5.1)
PROT SERPL-MCNC: 6.7 G/DL (ref 6.4–8.3)
SODIUM SERPL-SCNC: 136 MMOL/L (ref 136–145)
TRIGL SERPL-MCNC: 149 MG/DL (ref 0–149)

## 2025-06-18 ENCOUNTER — OFFICE VISIT (OUTPATIENT)
Age: 75
End: 2025-06-18
Payer: MEDICARE

## 2025-06-18 VITALS
WEIGHT: 232 LBS | DIASTOLIC BLOOD PRESSURE: 54 MMHG | OXYGEN SATURATION: 95 % | TEMPERATURE: 97.2 F | HEART RATE: 64 BPM | BODY MASS INDEX: 33.21 KG/M2 | SYSTOLIC BLOOD PRESSURE: 120 MMHG | HEIGHT: 70 IN

## 2025-06-18 DIAGNOSIS — G47.33 OSA ON CPAP: ICD-10-CM

## 2025-06-18 DIAGNOSIS — M79.18 CERVICAL MYOFASCIAL PAIN SYNDROME: ICD-10-CM

## 2025-06-18 DIAGNOSIS — E78.2 MIXED HYPERLIPIDEMIA: ICD-10-CM

## 2025-06-18 DIAGNOSIS — M47.812 CERVICAL SPONDYLOSIS WITHOUT MYELOPATHY: ICD-10-CM

## 2025-06-18 DIAGNOSIS — I10 ESSENTIAL HYPERTENSION: ICD-10-CM

## 2025-06-18 DIAGNOSIS — E89.0 POSTOPERATIVE HYPOTHYROIDISM: ICD-10-CM

## 2025-06-18 DIAGNOSIS — G89.4 CHRONIC PAIN SYNDROME: ICD-10-CM

## 2025-06-18 DIAGNOSIS — I65.23 BILATERAL CAROTID ARTERY STENOSIS: ICD-10-CM

## 2025-06-18 DIAGNOSIS — E11.22 TYPE 2 DIABETES MELLITUS WITH STAGE 3B CHRONIC KIDNEY DISEASE, WITHOUT LONG-TERM CURRENT USE OF INSULIN (HCC): Primary | ICD-10-CM

## 2025-06-18 DIAGNOSIS — I48.0 PAROXYSMAL ATRIAL FIBRILLATION (HCC): ICD-10-CM

## 2025-06-18 DIAGNOSIS — I25.10 CORONARY ARTERY DISEASE INVOLVING NATIVE CORONARY ARTERY OF NATIVE HEART WITHOUT ANGINA PECTORIS: ICD-10-CM

## 2025-06-18 DIAGNOSIS — N18.32 TYPE 2 DIABETES MELLITUS WITH STAGE 3B CHRONIC KIDNEY DISEASE, WITHOUT LONG-TERM CURRENT USE OF INSULIN (HCC): Primary | ICD-10-CM

## 2025-06-18 DIAGNOSIS — I71.20 THORACIC AORTIC ANEURYSM WITHOUT RUPTURE, UNSPECIFIED PART: ICD-10-CM

## 2025-06-18 DIAGNOSIS — Z85.46 HISTORY OF PROSTATE CANCER: ICD-10-CM

## 2025-06-18 PROCEDURE — 1123F ACP DISCUSS/DSCN MKR DOCD: CPT | Performed by: NURSE PRACTITIONER

## 2025-06-18 PROCEDURE — 1159F MED LIST DOCD IN RCRD: CPT | Performed by: NURSE PRACTITIONER

## 2025-06-18 PROCEDURE — 1160F RVW MEDS BY RX/DR IN RCRD: CPT | Performed by: NURSE PRACTITIONER

## 2025-06-18 PROCEDURE — 3078F DIAST BP <80 MM HG: CPT | Performed by: NURSE PRACTITIONER

## 2025-06-18 PROCEDURE — G8417 CALC BMI ABV UP PARAM F/U: HCPCS | Performed by: NURSE PRACTITIONER

## 2025-06-18 PROCEDURE — 3074F SYST BP LT 130 MM HG: CPT | Performed by: NURSE PRACTITIONER

## 2025-06-18 PROCEDURE — G8427 DOCREV CUR MEDS BY ELIG CLIN: HCPCS | Performed by: NURSE PRACTITIONER

## 2025-06-18 PROCEDURE — 3051F HG A1C>EQUAL 7.0%<8.0%: CPT | Performed by: NURSE PRACTITIONER

## 2025-06-18 PROCEDURE — 99214 OFFICE O/P EST MOD 30 MIN: CPT | Performed by: NURSE PRACTITIONER

## 2025-06-18 RX ORDER — INSULIN GLARGINE 100 [IU]/ML
INJECTION, SOLUTION SUBCUTANEOUS
Qty: 5 ADJUSTABLE DOSE PRE-FILLED PEN SYRINGE | Refills: 3 | Status: SHIPPED | OUTPATIENT
Start: 2025-06-18

## 2025-06-18 SDOH — ECONOMIC STABILITY: FOOD INSECURITY: WITHIN THE PAST 12 MONTHS, YOU WORRIED THAT YOUR FOOD WOULD RUN OUT BEFORE YOU GOT MONEY TO BUY MORE.: NEVER TRUE

## 2025-06-18 SDOH — ECONOMIC STABILITY: FOOD INSECURITY: WITHIN THE PAST 12 MONTHS, THE FOOD YOU BOUGHT JUST DIDN'T LAST AND YOU DIDN'T HAVE MONEY TO GET MORE.: NEVER TRUE

## 2025-06-18 ASSESSMENT — PATIENT HEALTH QUESTIONNAIRE - PHQ9
SUM OF ALL RESPONSES TO PHQ QUESTIONS 1-9: 2
6. FEELING BAD ABOUT YOURSELF - OR THAT YOU ARE A FAILURE OR HAVE LET YOURSELF OR YOUR FAMILY DOWN: NOT AT ALL
1. LITTLE INTEREST OR PLEASURE IN DOING THINGS: NOT AT ALL
8. MOVING OR SPEAKING SO SLOWLY THAT OTHER PEOPLE COULD HAVE NOTICED. OR THE OPPOSITE, BEING SO FIGETY OR RESTLESS THAT YOU HAVE BEEN MOVING AROUND A LOT MORE THAN USUAL: NOT AT ALL
2. FEELING DOWN, DEPRESSED OR HOPELESS: NOT AT ALL
4. FEELING TIRED OR HAVING LITTLE ENERGY: MORE THAN HALF THE DAYS
SUM OF ALL RESPONSES TO PHQ QUESTIONS 1-9: 2
5. POOR APPETITE OR OVEREATING: NOT AT ALL
7. TROUBLE CONCENTRATING ON THINGS, SUCH AS READING THE NEWSPAPER OR WATCHING TELEVISION: NOT AT ALL
3. TROUBLE FALLING OR STAYING ASLEEP: NOT AT ALL
SUM OF ALL RESPONSES TO PHQ QUESTIONS 1-9: 2
9. THOUGHTS THAT YOU WOULD BE BETTER OFF DEAD, OR OF HURTING YOURSELF: NOT AT ALL
10. IF YOU CHECKED OFF ANY PROBLEMS, HOW DIFFICULT HAVE THESE PROBLEMS MADE IT FOR YOU TO DO YOUR WORK, TAKE CARE OF THINGS AT HOME, OR GET ALONG WITH OTHER PEOPLE: NOT DIFFICULT AT ALL
SUM OF ALL RESPONSES TO PHQ QUESTIONS 1-9: 2

## 2025-06-18 ASSESSMENT — ENCOUNTER SYMPTOMS
COUGH: 0
RHINORRHEA: 0
TROUBLE SWALLOWING: 0
ABDOMINAL PAIN: 0
SORE THROAT: 0
CONSTIPATION: 0
DIARRHEA: 0
NAUSEA: 0
VOMITING: 0
SHORTNESS OF BREATH: 0

## 2025-06-18 NOTE — PROGRESS NOTES
Pillo Ortega (:  1950) is a 74 y.o. male,Established patient, here for evaluation of the following chief complaint(s):  Discuss Labs (Here for 6 month follow up and to discus labs. Cardiology said to discuss possible ozempic)      ASSESSMENT/PLAN:    ICD-10-CM    1. Type 2 diabetes mellitus with stage 3b chronic kidney disease, without long-term current use of insulin (HCC)  E11.22 insulin glargine (LANTUS SOLOSTAR) 100 UNIT/ML injection pen    N18.32 Insulin Pen Needle 32G X 4 MM MISC     Hemoglobin A1C     Comprehensive Metabolic Panel    Discontinuing glipizide. Starting on lantus. Discussed renal function is decreasing and importance of sugar control. Also encouraged hydration 64 oz of water daily and avoiding NSAIDS.       2. Coronary artery disease involving native coronary artery of native heart without angina pectoris  I25.10 Stable, followed by cardiology. Note reviewed.       3. Postoperative hypothyroidism  E89.0 Stable. No concerns.       4. Paroxysmal atrial fibrillation (HCC)  I48.0 Stable, followed by cardiology. Note reviewed.       5. Mixed hyperlipidemia  E78.2 Stable, LDL at goal.        6. History of prostate cancer  Z85.46       7. SUSY on CPAP  G47.33 Compliant and benefits from therapy.       8. Essential hypertension  I10 The current medical regimen is effective;  continue present plan and medications.        9. Bilateral carotid artery stenosis  I65.23 Stable. Followed by vascular.         10. Cervical spondylosis without myelopathy  M47.812 Followed by pain mgmt. Stable.       11. Cervical myofascial pain syndrome  M79.18 Followed by pain mgmt. Stable.       12. Chronic pain syndrome  G89.4 Followed by pain mgmt. Stable.       13. Thoracic aortic aneurysm without rupture, unspecified part  I71.20 Stable. Followed by cardiothoracic at [facility].           Return in about 3 months (around 2025), or if symptoms worsen or fail to improve, for diabetic follow

## 2025-06-18 NOTE — PATIENT INSTRUCTIONS
Stop glipizide.    Start lantus 20 units nightly go up on dose every 2-3 days by 1-2 units until fasting blood sugar is running lower 100s.     Drink at least 64 oz of water a day. (4 bottles)    Check sugars daily. Bring log to follow up visit.

## 2025-06-23 ENCOUNTER — HOSPITAL ENCOUNTER (OUTPATIENT)
Dept: PAIN MANAGEMENT | Age: 75
Discharge: HOME | End: 2025-06-23
Payer: MEDICARE

## 2025-06-23 VITALS
HEART RATE: 70 BPM | OXYGEN SATURATION: 98 % | SYSTOLIC BLOOD PRESSURE: 114 MMHG | TEMPERATURE: 97.4 F | DIASTOLIC BLOOD PRESSURE: 59 MMHG | RESPIRATION RATE: 16 BRPM

## 2025-06-23 DIAGNOSIS — M79.18 CERVICAL MYOFASCIAL PAIN SYNDROME: ICD-10-CM

## 2025-06-23 DIAGNOSIS — M62.830 SPASM OF THORACIC BACK MUSCLE: Primary | ICD-10-CM

## 2025-06-23 PROCEDURE — 20553 NJX 1/MLT TRIGGER POINTS 3/>: CPT

## 2025-06-23 PROCEDURE — 6360000002 HC RX W HCPCS

## 2025-06-23 RX ORDER — LIDOCAINE HYDROCHLORIDE 10 MG/ML
15 INJECTION, SOLUTION EPIDURAL; INFILTRATION; INTRACAUDAL; PERINEURAL ONCE
Status: DISCONTINUED | OUTPATIENT
Start: 2025-06-23 | End: 2025-06-25 | Stop reason: HOSPADM

## 2025-06-23 RX ORDER — BUPIVACAINE HYDROCHLORIDE 5 MG/ML
15 INJECTION, SOLUTION EPIDURAL; INTRACAUDAL; PERINEURAL ONCE
Status: DISCONTINUED | OUTPATIENT
Start: 2025-06-23 | End: 2025-06-25 | Stop reason: HOSPADM

## 2025-06-23 NOTE — PROGRESS NOTES
[facility]    Patient Name: Pillo Ortega    : 1950                    Age: 74 y.o.    Sex: male    Date: 2025    Pre-op Diagnosis: Myofascial Pain/ Myalgia     Post-op Diagnosis: Myofascial Pain/ Myalgia    Procedure: Trigger Trigger Point Injections    Performing Procedure: MARY Booker, LENA    Patient Vitals for the past 24 hrs:   BP Temp Temp src Pulse Resp SpO2   25 1314 (!) 114/59 97.4 °F (36.3 °C) Temporal 70 16 98 %       [x] Cervical Trigger Point Injections [x] Thoracic Trigger Point Injections [] Lumbar Trigger Point Injections [] Piriformis Trigger Point Injections    PROCEDURE:      [x] Cervical Trigger Point Injections    Description of Procedure:  After a brief physical assessment and failure to improve with conservative measures the patient presented for Cervical Trigger Point Injections The indications, limitations and possible complications were discussed with the patient and the patient elected to proceed with the procedure.    After voluntary, informed and signed consent obtained the patient was placed in a seated position.     Appropriate time out was obtained per policy.     The area of maximal tenderness was palpated over the bilateral Cervical muscles - Splenius  Trapezius  Rhomboid The skin overlying these areas was marked with a skin marker. The skin overlying the proposed injection sites were then sprayed with Gebauer's Solution while protecting patient eyes. The areas were prepped using aseptic technique with CHG prep. Each trigger point of the Cervical muscles - Splenius  Trapezius  Rhomboid was injected after negative aspiration with approximately 1- 2 ml     [x] Plain solution of 5 ml of 1% Lidocaine Plain and 5 ml of 0.5% Marcaine Plain     OR    With a solution of 4.5 ml of 1% Lidocaine plan and 4.5 ml of 0.5% Marcaine Plain and     [] 1 ml of Toradol (30mg/ml)     [] 0.5 ml of Decadron (10mg/ml)     [] 1

## 2025-06-23 NOTE — DISCHARGE INSTRUCTIONS
will be numb for a few hours after the procedure    [] I understand if a steroid was used it will take effect in 3 - 7 days. I understand that as the numbing medication wears off, the pain may return until the steroids start working.    [] I understand that today I will rest for the next 24 hours and drink plenty of water.    [] For Botox for Migraines please do not wear anything constricting around the forehead for 7-10 days post injection. Examples headband, hats, or bandana    [] For Botox for Spasticity start therapy for the affected limb in two weeks.    [] Additional instructions: ______________________________________________ ___________________________________________________________________    Sedation:  Was oral sedation given?    [] Yes  [x] No    I understand that if I took an oral nerve calming medication I will not drive for  [] 24 hours after taking the medication.    [x] I have received a copy of my discharge instructions and understand the above instructions to the best of my knowledge    Patient Discharged:  [x] Home  [] Hospital  [] Other  ____________________________________________    Via:  [x] Ambulatory  [] Wheelchair   [] Stretcher [] EMS       Accompanied By:   [] Significant other  [] Family Member  [] Friend   [] Hospital Staff  []  Ambulance Staff  [x] Other_______________________________________________    Plan:  [] Will return to the office in   [] 1 month   [] 3 months for:  [] Procedure Follow-up  [x] Office Visit   [x] Planned Procedure        [x] Printed AVS and given to patient.  [] Patient has mychart and does not wish for AVS to be printed.      If you have questions, problems or concerns you may call [phone] and follow the prompts

## 2025-06-30 ENCOUNTER — TELEPHONE (OUTPATIENT)
Dept: PAIN MANAGEMENT | Age: 75
End: 2025-06-30

## 2025-06-30 NOTE — TELEPHONE ENCOUNTER
Placed follow up call regarding pt's bilateral cervical and thoracic trigger point injections administered on 6/23/25.  No answer.  Left voicemail message.  Electronically signed by Samra Bolivar RN on 6/30/2025 at 9:24 AM

## 2025-07-02 ENCOUNTER — ANESTHESIA EVENT (OUTPATIENT)
Dept: GASTROENTEROLOGY | Facility: HOSPITAL | Age: 75
End: 2025-07-02
Payer: MEDICARE

## 2025-07-02 ENCOUNTER — ANESTHESIA (OUTPATIENT)
Dept: GASTROENTEROLOGY | Facility: HOSPITAL | Age: 75
End: 2025-07-02
Payer: MEDICARE

## 2025-07-02 ENCOUNTER — HOSPITAL ENCOUNTER (OUTPATIENT)
Facility: HOSPITAL | Age: 75
Setting detail: HOSPITAL OUTPATIENT SURGERY
Discharge: HOME | End: 2025-07-02
Attending: INTERNAL MEDICINE | Admitting: INTERNAL MEDICINE
Payer: MEDICARE

## 2025-07-02 ENCOUNTER — TELEPHONE (OUTPATIENT)
Dept: GASTROENTEROLOGY | Facility: CLINIC | Age: 75
End: 2025-07-02
Payer: MEDICARE

## 2025-07-02 VITALS
HEIGHT: 71 IN | HEART RATE: 62 BPM | DIASTOLIC BLOOD PRESSURE: 64 MMHG | TEMPERATURE: 96.9 F | RESPIRATION RATE: 15 BRPM | WEIGHT: 230 LBS | BODY MASS INDEX: 32.2 KG/M2 | SYSTOLIC BLOOD PRESSURE: 129 MMHG | OXYGEN SATURATION: 95 %

## 2025-07-02 DIAGNOSIS — Z86.0101 HISTORY OF ADENOMATOUS POLYP OF COLON: ICD-10-CM

## 2025-07-02 DIAGNOSIS — R13.19 ESOPHAGEAL DYSPHAGIA: ICD-10-CM

## 2025-07-02 LAB — GLUCOSE BLDC GLUCOMTR-MCNC: 181 MG/DL (ref 70–130)

## 2025-07-02 PROCEDURE — 82948 REAGENT STRIP/BLOOD GLUCOSE: CPT | Performed by: ANESTHESIOLOGY

## 2025-07-02 PROCEDURE — 88305 TISSUE EXAM BY PATHOLOGIST: CPT | Performed by: INTERNAL MEDICINE

## 2025-07-02 PROCEDURE — 25010000002 PROPOFOL 10 MG/ML EMULSION: Performed by: NURSE ANESTHETIST, CERTIFIED REGISTERED

## 2025-07-02 PROCEDURE — 25810000003 SODIUM CHLORIDE 0.9 % SOLUTION: Performed by: ANESTHESIOLOGY

## 2025-07-02 PROCEDURE — 25010000002 LIDOCAINE PF 2% 2 % SOLUTION: Performed by: NURSE ANESTHETIST, CERTIFIED REGISTERED

## 2025-07-02 RX ORDER — SODIUM CHLORIDE 9 MG/ML
500 INJECTION, SOLUTION INTRAVENOUS CONTINUOUS PRN
Status: DISCONTINUED | OUTPATIENT
Start: 2025-07-02 | End: 2025-07-02 | Stop reason: HOSPADM

## 2025-07-02 RX ORDER — ONDANSETRON 2 MG/ML
4 INJECTION INTRAMUSCULAR; INTRAVENOUS ONCE AS NEEDED
Status: DISCONTINUED | OUTPATIENT
Start: 2025-07-02 | End: 2025-07-02 | Stop reason: HOSPADM

## 2025-07-02 RX ORDER — SODIUM CHLORIDE 0.9 % (FLUSH) 0.9 %
10 SYRINGE (ML) INJECTION AS NEEDED
Status: DISCONTINUED | OUTPATIENT
Start: 2025-07-02 | End: 2025-07-02 | Stop reason: HOSPADM

## 2025-07-02 RX ORDER — LIDOCAINE HYDROCHLORIDE 20 MG/ML
INJECTION, SOLUTION EPIDURAL; INFILTRATION; INTRACAUDAL; PERINEURAL AS NEEDED
Status: DISCONTINUED | OUTPATIENT
Start: 2025-07-02 | End: 2025-07-02 | Stop reason: SURG

## 2025-07-02 RX ORDER — LIDOCAINE HYDROCHLORIDE 10 MG/ML
0.5 INJECTION, SOLUTION EPIDURAL; INFILTRATION; INTRACAUDAL; PERINEURAL ONCE AS NEEDED
Status: DISCONTINUED | OUTPATIENT
Start: 2025-07-02 | End: 2025-07-02 | Stop reason: HOSPADM

## 2025-07-02 RX ORDER — PROPOFOL 10 MG/ML
VIAL (ML) INTRAVENOUS AS NEEDED
Status: DISCONTINUED | OUTPATIENT
Start: 2025-07-02 | End: 2025-07-02 | Stop reason: SURG

## 2025-07-02 RX ADMIN — LIDOCAINE HYDROCHLORIDE 50 MG: 20 INJECTION, SOLUTION EPIDURAL; INFILTRATION; INTRACAUDAL; PERINEURAL at 09:40

## 2025-07-02 RX ADMIN — PROPOFOL 450 MG: 10 INJECTION, EMULSION INTRAVENOUS at 09:39

## 2025-07-02 RX ADMIN — SODIUM CHLORIDE 500 ML: 9 INJECTION, SOLUTION INTRAVENOUS at 08:48

## 2025-07-02 NOTE — ANESTHESIA PREPROCEDURE EVALUATION
Anesthesia Evaluation     Patient summary reviewed   no history of anesthetic complications:   NPO Solid Status: > 8 hours             Airway   Mallampati: II  Dental      Pulmonary    (+) ,sleep apnea on CPAP  Cardiovascular   Exercise tolerance: excellent (>7 METS)    (+) hypertension, CAD, CABG (2022), dysrhythmias Atrial Fib, hyperlipidemia  (-) angina      Neuro/Psych- negative ROS  GI/Hepatic/Renal/Endo    (+) obesity, renal disease- CRI, diabetes mellitus using insulin, thyroid problem hypothyroidism    Musculoskeletal     Abdominal    Substance History      OB/GYN          Other                    Anesthesia Plan    ASA 3     MAC       Anesthetic plan, risks, benefits, and alternatives have been provided, discussed and informed consent has been obtained with: patient.    CODE STATUS:

## 2025-07-02 NOTE — ANESTHESIA POSTPROCEDURE EVALUATION
Patient: Zay Kamara    Procedure Summary       Date: 07/02/25 Room / Location: [facility] ENDOSCOPY 6 / [facility] ENDOSCOPY    Anesthesia Start: 0939 Anesthesia Stop: 1019    Procedures:       ESOPHAGOGASTRODUODENOSCOPY WITH ANESTHESIA      COLONOSCOPY WITH ANESTHESIA Diagnosis:       History of adenomatous polyp of colon      Esophageal dysphagia      (History of adenomatous polyp of colon [Z86.0101])      (Esophageal dysphagia [R13.19])    Surgeons: Hector Alvarenga MD Provider: Eduar Bangura CRNA    Anesthesia Type: MAC ASA Status: 3            Anesthesia Type: MAC    Vitals  Vitals Value Taken Time   BP 78/48 07/02/25 10:18   Temp     Pulse 59 07/02/25 10:19   Resp     SpO2 92 % 07/02/25 10:19   Vitals shown include unfiled device data.        Post Anesthesia Care and Evaluation    Patient location during evaluation: PACU  Patient participation: complete - patient participated  Level of consciousness: awake and awake and alert  Pain score: 0  Pain management: adequate    Airway patency: patent  Anesthetic complications: No anesthetic complications    Cardiovascular status: acceptable and stable  Respiratory status: acceptable and unassisted  Hydration status: acceptable

## 2025-07-02 NOTE — INTERVAL H&P NOTE
H&P reviewed. The patient was examined and there are no changes to the H&P.      He notes after he eats, he will start coughing.  He will cough up some mucus.  Denies heartburn but he does take acid reflux medication every once in a while.  Okay to proceed with endoscopy and colonoscopy

## 2025-07-29 ENCOUNTER — TELEPHONE (OUTPATIENT)
Dept: VASCULAR SURGERY | Facility: CLINIC | Age: 75
End: 2025-07-29
Payer: MEDICARE

## 2025-07-29 NOTE — TELEPHONE ENCOUNTER
Call placed to patient with no answer and no voicemail. Called wife and left message requesting call back from patient

## 2025-07-29 NOTE — TELEPHONE ENCOUNTER
Caller: Zay Kamara    Relationship: Self    Best call back number: [phone]     What orders are you requesting (i.e. lab or imaging): US    In what timeframe would the patient need to come in: NEXT AVAILABLE     Where will you receive your lab/imaging services: [facility]     Additional notes: PATIENT WOULD LIKE A CALL BACK.

## 2025-08-07 DIAGNOSIS — F11.90 CHRONIC, CONTINUOUS USE OF OPIOIDS: Primary | ICD-10-CM

## 2025-08-09 RX ORDER — HYDROCODONE BITARTRATE AND ACETAMINOPHEN 7.5; 325 MG/1; MG/1
1 TABLET ORAL EVERY 6 HOURS PRN
Qty: 120 TABLET | Refills: 0 | Status: SHIPPED | OUTPATIENT
Start: 2025-08-09 | End: 2025-09-08

## 2025-08-14 ENCOUNTER — TELEPHONE (OUTPATIENT)
Dept: VASCULAR SURGERY | Facility: CLINIC | Age: 75
End: 2025-08-14
Payer: MEDICARE

## 2025-08-14 DIAGNOSIS — M79.18 MYOFASCIAL MUSCLE PAIN: ICD-10-CM

## 2025-08-20 ENCOUNTER — OFFICE VISIT (OUTPATIENT)
Age: 75
End: 2025-08-20
Payer: MEDICARE

## 2025-08-20 VITALS
BODY MASS INDEX: 31.08 KG/M2 | OXYGEN SATURATION: 98 % | DIASTOLIC BLOOD PRESSURE: 76 MMHG | TEMPERATURE: 98.4 F | HEART RATE: 87 BPM | HEIGHT: 71 IN | WEIGHT: 222 LBS | SYSTOLIC BLOOD PRESSURE: 108 MMHG

## 2025-08-20 DIAGNOSIS — R30.0 DYSURIA: ICD-10-CM

## 2025-08-20 DIAGNOSIS — N30.90 CYSTITIS: ICD-10-CM

## 2025-08-20 DIAGNOSIS — N30.90 CYSTITIS: Primary | ICD-10-CM

## 2025-08-20 LAB
BILIRUB UR QL STRIP: NEGATIVE
CHARACTER UR: ABNORMAL
CLARITY UR: ABNORMAL
COLOR UR: ABNORMAL
GLUCOSE UR STRIP.AUTO-MCNC: 500 MG/DL
HGB UR STRIP.AUTO-MCNC: ABNORMAL MG/L
KETONES UR STRIP.AUTO-MCNC: ABNORMAL MG/DL
LEUKOCYTE ESTERASE UR QL STRIP.AUTO: ABNORMAL
NITRITE UR QL STRIP.AUTO: NEGATIVE
PH UR STRIP.AUTO: 5.5 [PH] (ref 5–8)
PROT UR STRIP.AUTO-MCNC: 100 MG/DL
SP GR UR STRIP.AUTO: 1.02 (ref 1–1.03)
UROBILINOGEN UR STRIP.AUTO-MCNC: 1 E.U./DL
WBC #/AREA URNS HPF: ABNORMAL /HPF (ref 0–5)

## 2025-08-20 PROCEDURE — 1123F ACP DISCUSS/DSCN MKR DOCD: CPT | Performed by: NURSE PRACTITIONER

## 2025-08-20 PROCEDURE — 99213 OFFICE O/P EST LOW 20 MIN: CPT | Performed by: NURSE PRACTITIONER

## 2025-08-20 PROCEDURE — G8427 DOCREV CUR MEDS BY ELIG CLIN: HCPCS | Performed by: NURSE PRACTITIONER

## 2025-08-20 PROCEDURE — 81002 URINALYSIS NONAUTO W/O SCOPE: CPT | Performed by: NURSE PRACTITIONER

## 2025-08-20 PROCEDURE — 1160F RVW MEDS BY RX/DR IN RCRD: CPT | Performed by: NURSE PRACTITIONER

## 2025-08-20 PROCEDURE — 3078F DIAST BP <80 MM HG: CPT | Performed by: NURSE PRACTITIONER

## 2025-08-20 PROCEDURE — G8417 CALC BMI ABV UP PARAM F/U: HCPCS | Performed by: NURSE PRACTITIONER

## 2025-08-20 PROCEDURE — 3074F SYST BP LT 130 MM HG: CPT | Performed by: NURSE PRACTITIONER

## 2025-08-20 PROCEDURE — 1159F MED LIST DOCD IN RCRD: CPT | Performed by: NURSE PRACTITIONER

## 2025-08-20 RX ORDER — CEFDINIR 300 MG/1
300 CAPSULE ORAL 2 TIMES DAILY
Qty: 20 CAPSULE | Refills: 0 | Status: SHIPPED | OUTPATIENT
Start: 2025-08-20 | End: 2025-08-21 | Stop reason: ALTCHOICE

## 2025-08-20 ASSESSMENT — ENCOUNTER SYMPTOMS
SHORTNESS OF BREATH: 0
COUGH: 0
SORE THROAT: 0
CONSTIPATION: 0
ABDOMINAL PAIN: 0
DIARRHEA: 0
TROUBLE SWALLOWING: 0
VOMITING: 0
NAUSEA: 0
RHINORRHEA: 0

## 2025-08-22 LAB
BACTERIA UR CULT: ABNORMAL
BACTERIA UR CULT: ABNORMAL
ORGANISM: ABNORMAL

## (undated) DEVICE — GLV SURG BIOGEL LTX PF 6 1/2

## (undated) DEVICE — ANTIBACTERIAL UNDYED BRAIDED (POLYGLACTIN 910), SYNTHETIC ABSORBABLE SUTURE: Brand: COATED VICRYL

## (undated) DEVICE — CLMP ABLAT ISOL TRNSPOLAR LNG XL

## (undated) DEVICE — DEFENDO AIR WATER SUCTION AND BIOPSY VALVE KIT FOR  OLYMPUS: Brand: DEFENDO AIR/WATER/SUCTION AND BIOPSY VALVE

## (undated) DEVICE — Device: Brand: DEFENDO AIR/WATER/SUCTION AND BIOPSY VALVE

## (undated) DEVICE — MASK,OXYGEN,MED CONC,ADLT,7' TUB, UC: Brand: PENDING

## (undated) DEVICE — BLAKE SILICONE DRAIN, 19 FR ROUND, HUBLESS WITH 1/4" BENDABLE TROCAR: Brand: BLAKE

## (undated) DEVICE — SKIN AFFIX SURG ADHESIVE 72/CS 0.55ML: Brand: MEDLINE

## (undated) DEVICE — OBT BLADLES ENDOWRIST DAVINCI/S 8MM

## (undated) DEVICE — E-PACK PROCEDURE KIT: Brand: E-PACK

## (undated) DEVICE — ENDOPATH PNEUMONEEDLE INSUFFLATION NEEDLES WITH LUER LOCK CONNECTORS 120MM: Brand: ENDOPATH

## (undated) DEVICE — PATIENT RETURN ELECTRODE, SINGLE-USE, CONTACT QUALITY MONITORING, ADULT, WITH 9FT CORD, FOR PATIENTS WEIGING OVER 33LBS. (15KG): Brand: MEGADYNE

## (undated) DEVICE — CUFF,BP,DISP,1 TUBE,ADULT,HP: Brand: MEDLINE

## (undated) DEVICE — CATH FOL COUNCL 2WY 16F 5CC

## (undated) DEVICE — CVR DISP HUG U VAC STEEP TREND

## (undated) DEVICE — RESERVOIR,SUCTION,100CC,SILICONE: Brand: MEDLINE

## (undated) DEVICE — TIP COVER ACCESSORY

## (undated) DEVICE — CLTH CLENS READYCLEANSE PERI CARE PK/5

## (undated) DEVICE — GLV SURG BIOGEL M LTX PF 8

## (undated) DEVICE — INTENDED FOR TISSUE SEPARATION, AND OTHER PROCEDURES THAT REQUIRE A SHARP SURGICAL BLADE TO PUNCTURE OR CUT.: Brand: BARD-PARKER ® STAINLESS STEEL BLADES

## (undated) DEVICE — CATHETER,URETHRAL,REDRUBBER,STRL,18FR: Brand: MEDLINE

## (undated) DEVICE — SPONGE,LAP,4"X18",XR,ST,5/PK,40PK/CS: Brand: MEDLINE INDUSTRIES, INC.

## (undated) DEVICE — PREP RAZOR: Brand: DEROYAL

## (undated) DEVICE — TBG PENCL TELESCP MEGADYNE SMOKE EVAC 10FT

## (undated) DEVICE — APPL HEMO SURG ARISTA/AH/FLEXITIP XL 38CM

## (undated) DEVICE — DRN JP RND NO TROC SIL 15F 3/16IN

## (undated) DEVICE — PK OPN HEART 30

## (undated) DEVICE — GLV SURG TRIUMPH ORTHO W/ALOE PF LTX 7.0

## (undated) DEVICE — HYDROGEL COATED LATEX FOLEY CATHETER,COUDE TIP, 5 ML, 2-WAY: Brand: DOVER

## (undated) DEVICE — GLV SURG BIOGEL LTX PF 7 1/2

## (undated) DEVICE — ARGYLE YANKAUER BULB TIP WITH VENT: Brand: ARGYLE

## (undated) DEVICE — 1/2 FORCE SURGICAL SPRING CLIP: Brand: STEALTH® SPRING CLIP

## (undated) DEVICE — SYS CLOSE PORTII CARTR/THOMASN XL

## (undated) DEVICE — KT ANTI FOG W/FLD AND SPNG

## (undated) DEVICE — DUAL LUMEN STOMACH TUBE: Brand: SALEM SUMP

## (undated) DEVICE — SUT MNCRYL 4/0 PS2 27IN UD MCP426H

## (undated) DEVICE — DRSNG WND SIL OPTIFOAM GENTLE BRDR ADHS W/SA 4X4IN

## (undated) DEVICE — SUT PDS 0 CT 36IN VIO PDP358T

## (undated) DEVICE — PK TURNOVER RM ADV

## (undated) DEVICE — ENDOGATOR AUXILIARY WATER JET CONNECTOR: Brand: ENDOGATOR

## (undated) DEVICE — ENDOSCOPIC SEAL URO 1 SIZE FITS ALL: Brand: ENDOSCOPIC SEAL

## (undated) DEVICE — ROTATING SURGICAL PUNCHES, 1 PER POUCH: Brand: A&E MEDICAL / ROTATING SURGICAL PUNCHES

## (undated) DEVICE — GW STARTER FXD CORE J .035 3X260CM 3MM

## (undated) DEVICE — PK SET UP ANES OPN HEART 30

## (undated) DEVICE — PROXIMATE RH ROTATING HEAD SKIN STAPLERS (35 WIDE) CONTAINS 35 STAINLESS STEEL STAPLES: Brand: PROXIMATE

## (undated) DEVICE — SENSR O2 OXIMAX FNGR A/ 18IN NONSTR

## (undated) DEVICE — TOWEL,OR,DSP,ST,BLUE,STD,4/PK,20PK/CS: Brand: MEDLINE

## (undated) DEVICE — OCEAN DRAIN, SINGLE, IN-LINE, STOP: Brand: OCEAN

## (undated) DEVICE — GLV SURG TRIUMPH NATURAL W/ALOE PF LTX 6 STRL

## (undated) DEVICE — 2, DISPOSABLE SUCTION/IRRIGATOR WITHOUT DISPOSABLE TIP: Brand: STRYKEFLOW

## (undated) DEVICE — DAVINCI: Brand: MEDLINE INDUSTRIES, INC.

## (undated) DEVICE — CONMED SCOPE SAVER BITE BLOCK, 20X27 MM: Brand: SCOPE SAVER

## (undated) DEVICE — PACK,SET UP,NO DRAPES: Brand: MEDLINE

## (undated) DEVICE — CATHETER,FOLEY,COUDE,LATEX,16FR,10ML: Brand: MEDLINE

## (undated) DEVICE — PK CATH CARD 30 CA/4

## (undated) DEVICE — FRCP BX RADJAW4 NDL 2.8 240 STD OG

## (undated) DEVICE — DRP WARMR SCOPE PILLOW SCPE 44X66IN STRL

## (undated) DEVICE — PAD, DEFIB, ADULT, RADIOTRANS, PHILIPS: Brand: MEDLINE

## (undated) DEVICE — MODEL BT2000 P/N 700287-012KIT CONTENTS: MANIFOLD WITH SALINE AND CONTRAST PORTS, SALINE TUBING WITH SPIKE AND HAND SYRINGE, TRANSDUCER: Brand: BT2000 AUTOMATED MANIFOLD KIT

## (undated) DEVICE — PROB CRYO ABL ICE MALL LSS BEND 10CM

## (undated) DEVICE — TRY PREP SCRB VAG PVP

## (undated) DEVICE — 3M™ WARMING BLANKET, UPPER BODY, 10 PER CASE, 42268: Brand: BAIR HUGGER™

## (undated) DEVICE — MINI ENDOCUT SCISSOR TIP, DISPOSABLE: Brand: RENEW

## (undated) DEVICE — GLV SURG BIOGEL M LTX PF 7 1/2

## (undated) DEVICE — PINNACLE INTRODUCER SHEATH: Brand: PINNACLE

## (undated) DEVICE — THE CHANNEL CLEANING BRUSH IS A NYLON FLEXI BRUSH ATTACHED TO A FLEXIBLE PLASTIC SHEATH DESIGNED TO SAFELY REMOVE DEBRIS FROM FLEXIBLE ENDOSCOPES.

## (undated) DEVICE — CYSTO/BLADDER IRRIGATION SET, REGULATING CLAMP

## (undated) DEVICE — SUT SILK 2/0 FS BLK 18IN 685G

## (undated) DEVICE — TROC ANCHORPORT BLADELES W/GRIP 12X120MM

## (undated) DEVICE — GW SENSR DUALFLEX NITNL STR .038IN 3X150CM

## (undated) DEVICE — RADIFOCUS OPTITORQUE ANGIOGRAPHIC CATHETER: Brand: OPTITORQUE

## (undated) DEVICE — CODMAN® SURGICAL STRIPS 1" X 6" (25MM X 152MM): Brand: CODMAN®

## (undated) DEVICE — GUIDE SELECT ATRICLIP

## (undated) DEVICE — TBG SMPL FLTR LINE NASL 02/C02 A/ BX/100

## (undated) DEVICE — PAD MAJOR LITHOTOMY: Brand: MEDLINE INDUSTRIES, INC.

## (undated) DEVICE — SUT VIC 4/0 RB1 27IN VCP214H

## (undated) DEVICE — CATH DIAG IMPULSE M/ PK 145 5FR

## (undated) DEVICE — SYS VASOVIEW HEMOPRO ENDOSCOPIC HARVST VESL

## (undated) DEVICE — SUT GUT CHRM 3/0 SH 27IN G122H

## (undated) DEVICE — CATH F5 INF JL 5 100CM: Brand: INFINITI

## (undated) DEVICE — DEFOGGER!" ANTI FOG KIT: Brand: DEROYAL

## (undated) DEVICE — ST TBG AIRSEAL FLTR TRI LUM

## (undated) DEVICE — GLV SURG TRIUMPH NATURAL W/ALOE PF LTX 7 STRL

## (undated) DEVICE — NDL HYPO PRECISIONGLIDE REG 22G 1 1/2

## (undated) DEVICE — TOWEL,OR,DSP,ST,BLUE,DLX,10/PK,8PK/CS: Brand: MEDLINE

## (undated) DEVICE — MSK O2 MD CONCENTR A/ LF 7FT 1P/U

## (undated) DEVICE — TR BAND RADIAL ARTERY COMPRESSION DEVICE: Brand: TR BAND

## (undated) DEVICE — Device

## (undated) DEVICE — TRAP FLD MINIVAC MEGADYNE 100ML

## (undated) DEVICE — BLAKE SILICONE DRAINS CARDIO CONNECTOR 2:1: Brand: BLAKE

## (undated) DEVICE — GLV SURG TRIUMPH MICRO PF LTX 8.5 STRL

## (undated) DEVICE — SNAR POLYP SENSATION MICRO OVL 13 240X40

## (undated) DEVICE — ENDOPATH XCEL WITH OPTIVIEW TECHNOLOGY BLADELESS TROCARS WITH STABILITY SLEEVES: Brand: ENDOPATH XCEL OPTIVIEW

## (undated) DEVICE — ELECTRD DEFIB M/FUNC GEL LIQ RL PROPADZ

## (undated) DEVICE — SOLIDIFIER LIQUI LOC PLUS 2000CC

## (undated) DEVICE — SUP ARMBRD ART/LINE BLU

## (undated) DEVICE — GLV SURG TRIUMPH ORTHO W/ALOE PF LTX 8.5 STRL

## (undated) DEVICE — CANN NASL ETCO2 LO/FLO A/

## (undated) DEVICE — YANKAUER,BULB TIP WITH VENT: Brand: ARGYLE

## (undated) DEVICE — GLIDESHEATH SLENDER STAINLESS STEEL KIT: Brand: GLIDESHEATH SLENDER

## (undated) DEVICE — SUT VIC 1 SUTUPAK TIES 18IN J913G

## (undated) DEVICE — DRAIN,WOUND,ROUND,24FR,5/16",FULL-FLUTED: Brand: MEDLINE

## (undated) DEVICE — GAUZE,SPONGE,4"X4",16PLY,XRAY,STRL,LF: Brand: MEDLINE

## (undated) DEVICE — ENDOPOUCH RETRIEVER SPECIMEN RETRIEVAL BAGS: Brand: ENDOPOUCH RETRIEVER

## (undated) DEVICE — TUBING, SUCTION, 1/4" X 12', STRAIGHT: Brand: MEDLINE

## (undated) DEVICE — ANGIO-SEAL VIP VASCULAR CLOSURE DEVICE: Brand: ANGIO-SEAL

## (undated) DEVICE — SYS DISTNTION VEIN BONCHEK 300MMHG

## (undated) DEVICE — HDRST INTUB GENTLETOUCH SLOT 7IN RT

## (undated) DEVICE — SOL IRR NACL 0.9PCT BT 1000ML

## (undated) DEVICE — CATH URETRL PA OLIVE TP 5F

## (undated) DEVICE — THE SINGLE USE ETRAP – POLYP TRAP IS USED FOR SUCTION RETRIEVAL OF ENDOSCOPICALLY REMOVED POLYPS.: Brand: ETRAP

## (undated) DEVICE — MODEL AT P65, P/N 701554-001KIT CONTENTS: HAND CONTROLLER, 3-WAY HIGH-PRESSURE STOPCOCK WITH ROTATING END AND PREMIUM HIGH-PRESSURE TUBING: Brand: ANGIOTOUCH® KIT

## (undated) DEVICE — STERNUM BLADE, OFFSET (32.0 X 0.8 X 6.3MM)

## (undated) DEVICE — SYR CONTRL LUERLOK 10CC

## (undated) DEVICE — DRSNG WND SIL OPTIFOAM GNTL BRDR ADHS 1.6X2IN